# Patient Record
Sex: MALE | Race: BLACK OR AFRICAN AMERICAN | NOT HISPANIC OR LATINO | Employment: OTHER | URBAN - METROPOLITAN AREA
[De-identification: names, ages, dates, MRNs, and addresses within clinical notes are randomized per-mention and may not be internally consistent; named-entity substitution may affect disease eponyms.]

---

## 2018-06-07 ENCOUNTER — TELEPHONE (OUTPATIENT)
Dept: TRANSPLANT | Facility: CLINIC | Age: 53
End: 2018-06-07

## 2018-09-06 DIAGNOSIS — Z76.82 ORGAN TRANSPLANT CANDIDATE: ICD-10-CM

## 2018-09-18 ENCOUNTER — CLINICAL SUPPORT (OUTPATIENT)
Dept: INFECTIOUS DISEASES | Facility: CLINIC | Age: 53
End: 2018-09-18
Payer: MEDICAID

## 2018-09-18 ENCOUNTER — HOSPITAL ENCOUNTER (OUTPATIENT)
Dept: RADIOLOGY | Facility: HOSPITAL | Age: 53
Discharge: HOME | End: 2018-09-18
Attending: NURSE PRACTITIONER
Payer: MEDICAID

## 2018-09-18 ENCOUNTER — OFFICE VISIT (OUTPATIENT)
Dept: TRANSPLANT | Facility: CLINIC | Age: 53
End: 2018-09-18
Payer: MEDICAID

## 2018-09-18 VITALS
TEMPERATURE: 98 F | OXYGEN SATURATION: 97 % | HEART RATE: 89 BPM | HEIGHT: 66 IN | WEIGHT: 192.25 LBS | BODY MASS INDEX: 30.9 KG/M2 | SYSTOLIC BLOOD PRESSURE: 169 MMHG | RESPIRATION RATE: 18 BRPM | DIASTOLIC BLOOD PRESSURE: 108 MMHG

## 2018-09-18 DIAGNOSIS — Z86.19 HX OF SYPHILIS: ICD-10-CM

## 2018-09-18 DIAGNOSIS — N18.4 CKD (CHRONIC KIDNEY DISEASE), STAGE IV: ICD-10-CM

## 2018-09-18 DIAGNOSIS — D72.829 LEUKOCYTOSIS, UNSPECIFIED TYPE: ICD-10-CM

## 2018-09-18 DIAGNOSIS — Z76.82 ORGAN TRANSPLANT CANDIDATE: ICD-10-CM

## 2018-09-18 DIAGNOSIS — E78.49 OTHER HYPERLIPIDEMIA: ICD-10-CM

## 2018-09-18 DIAGNOSIS — R80.9 NEPHROTIC RANGE PROTEINURIA: ICD-10-CM

## 2018-09-18 DIAGNOSIS — R73.03 PRE-DIABETES: ICD-10-CM

## 2018-09-18 DIAGNOSIS — Z01.818 PRE-TRANSPLANT EVALUATION FOR CHRONIC KIDNEY DISEASE: Primary | ICD-10-CM

## 2018-09-18 DIAGNOSIS — N43.3 HYDROCELE IN ADULT: ICD-10-CM

## 2018-09-18 DIAGNOSIS — N05.2 MEMBRANOUS GLOMERULONEPHRITIS: ICD-10-CM

## 2018-09-18 DIAGNOSIS — N18.4 ANEMIA IN STAGE 4 CHRONIC KIDNEY DISEASE: ICD-10-CM

## 2018-09-18 DIAGNOSIS — I15.0 RENOVASCULAR HYPERTENSION: ICD-10-CM

## 2018-09-18 DIAGNOSIS — Z95.5 S/P CORONARY ARTERY STENT PLACEMENT: ICD-10-CM

## 2018-09-18 DIAGNOSIS — Z87.39 HX OF GOUT: ICD-10-CM

## 2018-09-18 DIAGNOSIS — D63.1 ANEMIA IN STAGE 4 CHRONIC KIDNEY DISEASE: ICD-10-CM

## 2018-09-18 LAB
CREAT UR-MCNC: 128 MG/DL
PROT UR-MCNC: 225 MG/DL
PROT/CREAT UR: 1.76 MG/G{CREAT}

## 2018-09-18 PROCEDURE — 99204 OFFICE O/P NEW MOD 45 MIN: CPT | Mod: S$PBB,TXP,, | Performed by: NURSE PRACTITIONER

## 2018-09-18 PROCEDURE — 72170 X-RAY EXAM OF PELVIS: CPT | Mod: TC,FY,TXP

## 2018-09-18 PROCEDURE — 90636 HEP A/HEP B VACC ADULT IM: CPT | Mod: PBBFAC,TXP

## 2018-09-18 PROCEDURE — 93978 VASCULAR STUDY: CPT | Mod: 26,TXP,, | Performed by: RADIOLOGY

## 2018-09-18 PROCEDURE — 90471 IMMUNIZATION ADMIN: CPT | Mod: PBBFAC,TXP

## 2018-09-18 PROCEDURE — 72170 X-RAY EXAM OF PELVIS: CPT | Mod: 26,TXP,, | Performed by: RADIOLOGY

## 2018-09-18 PROCEDURE — 99214 OFFICE O/P EST MOD 30 MIN: CPT | Mod: PBBFAC,25,TXP | Performed by: NURSE PRACTITIONER

## 2018-09-18 PROCEDURE — 90732 PPSV23 VACC 2 YRS+ SUBQ/IM: CPT | Mod: PBBFAC,TXP

## 2018-09-18 PROCEDURE — 71046 X-RAY EXAM CHEST 2 VIEWS: CPT | Mod: TC,FY,TXP

## 2018-09-18 PROCEDURE — 71046 X-RAY EXAM CHEST 2 VIEWS: CPT | Mod: 26,TXP,, | Performed by: RADIOLOGY

## 2018-09-18 PROCEDURE — 99205 OFFICE O/P NEW HI 60 MIN: CPT | Mod: S$PBB,TXP,, | Performed by: NURSE PRACTITIONER

## 2018-09-18 PROCEDURE — 97802 MEDICAL NUTRITION INDIV IN: CPT | Mod: PBBFAC,TXP | Performed by: DIETITIAN, REGISTERED

## 2018-09-18 PROCEDURE — 84156 ASSAY OF PROTEIN URINE: CPT | Mod: TXP

## 2018-09-18 PROCEDURE — 93978 VASCULAR STUDY: CPT | Mod: TC,TXP

## 2018-09-18 PROCEDURE — 99999 PR PBB SHADOW E&M-EST. PATIENT-LVL IV: CPT | Mod: PBBFAC,TXP,, | Performed by: NURSE PRACTITIONER

## 2018-09-18 PROCEDURE — 99204 OFFICE O/P NEW MOD 45 MIN: CPT | Mod: S$PBB,TXP,, | Performed by: TRANSPLANT SURGERY

## 2018-09-18 RX ORDER — POTASSIUM CHLORIDE 750 MG/1
10 TABLET, EXTENDED RELEASE ORAL 2 TIMES DAILY
Status: ON HOLD | COMMUNITY
End: 2021-01-05 | Stop reason: HOSPADM

## 2018-09-18 RX ORDER — CHLORTHALIDONE 50 MG/1
50 TABLET ORAL DAILY
COMMUNITY
End: 2022-03-10

## 2018-09-18 RX ORDER — FLUTICASONE PROPIONATE 50 MCG
1 SPRAY, SUSPENSION (ML) NASAL DAILY
Status: ON HOLD | COMMUNITY
End: 2022-03-10 | Stop reason: HOSPADM

## 2018-09-18 RX ORDER — COLCHICINE 0.6 MG/1
0.6 TABLET ORAL DAILY PRN
Status: ON HOLD | COMMUNITY
End: 2020-12-29

## 2018-09-18 RX ORDER — SILDENAFIL 50 MG/1
50 TABLET, FILM COATED ORAL DAILY PRN
Status: ON HOLD | COMMUNITY
End: 2021-01-05 | Stop reason: HOSPADM

## 2018-09-18 RX ORDER — IBUPROFEN 200 MG
1 TABLET ORAL
Status: ON HOLD | COMMUNITY
End: 2020-12-29

## 2018-09-18 NOTE — PROGRESS NOTES
Transplant Nephrology  Kidney Transplant Recipient Evaluation    Referring Physician: Wagner Valdez  Current Nephrologist: Wagner Valdez    Subjective:   CC:  Initial evaluation of kidney transplant candidacy.    HPI:  Mr. Martinez is a 53 y.o. year old Black or  male who has presented to be evaluated as a potential kidney transplant recipient.  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  Patient is currently pre-dialysis. He has a no access for dialysis access.     Previous Transplant: no    Past Medical and Surgical History: Mr. Martinez  has a past medical history of Anemia, Coronary artery disease, Diabetes mellitus, type 2, GERD (gastroesophageal reflux disease), Gout, Hydrocele in adult, Hyperlipidemia, Hypertension, Inguinal hernia, Membranous glomerulonephritis, Nephrotic range proteinuria, Nephrotic syndrome, Obesity, and Umbilical hernia.  He has a past surgical history that includes Coronary stent placement (2007) and Abdominal surgery (1980s).    Past Social and Family History: Mr. Martinez reports that he quit smoking about 2 years ago. He has a 2.50 pack-year smoking history. he has never used smokeless tobacco. He reports that he does not drink alcohol or use drugs. His family history includes Drug abuse in his brother; Hypertension in his father; No Known Problems in his sister; Stroke in his father.    Pt is a poor historian     Membranous glomerulonephritis  Kidney BX  At [facility], 1928–1983. He does not remember the years  He was on CyA 1320-8031    Nephrotic range proteinuria     Coronary Stents 2007  [facility] in [place]   Reports being on Plavix but no longer on anticoagulation.     Hydrocele bilaterally--pt reports it is worsening and may need to be drained He follows with   Urology DR UZAIR Fletcher at [facility]       5/16/2018-->Reactive TPA (care every where)  RPR   Tx ~ 1 month ago   He was treated by Dr Ivory [facility] with Penicillin G injections   1  injection / week for 3 weeks    Donors  No donors at this time    fx assessment  Works 20 hours / week   Works out 4-5 days / week. He will do strength training     Lab Results   Component Value Date    WBC 13.60 (H) 09/18/2018   Pt denies infection   Leukocytosis/ care everywhere          Past Medical History:   Diagnosis Date    Anemia     Coronary artery disease     Diabetes mellitus, type 2     GERD (gastroesophageal reflux disease)     Gout     Hydrocele in adult     bilateral    Hyperlipidemia     Hypertension     Inguinal hernia     bilateral    Membranous glomerulonephritis     Nephrotic range proteinuria     Nephrotic syndrome     Obesity     Umbilical hernia        Review of Systems   Constitutional: Positive for fatigue and unexpected weight change. Negative for activity change, appetite change, chills and fever.   HENT: Negative for congestion, facial swelling, postnasal drip, rhinorrhea, sinus pressure, sore throat and trouble swallowing.    Eyes: Positive for visual disturbance. Negative for pain and redness.        Wears glasses   Respiratory: Negative for cough, chest tightness, shortness of breath and wheezing.    Cardiovascular: Positive for leg swelling. Negative for chest pain and palpitations.   Gastrointestinal: Negative for abdominal pain, diarrhea, nausea and vomiting.        Indigestion   Genitourinary: Negative for dysuria, flank pain and urgency.   Musculoskeletal: Positive for arthralgias and back pain. Negative for gait problem, neck pain and neck stiffness.   Skin: Negative for rash.   Allergic/Immunologic: Negative for environmental allergies, food allergies and immunocompromised state.   Neurological: Negative for dizziness, weakness, light-headedness and headaches.   Psychiatric/Behavioral: Positive for sleep disturbance. Negative for agitation and confusion. The patient is not nervous/anxious.        Objective:   Blood pressure (!) 169/108, pulse 89, temperature 98.3  "°F (36.8 °C), temperature source Oral, resp. rate 18, height 5' 6.02" (1.677 m), weight 87.2 kg (192 lb 3.9 oz), SpO2 97 %.body mass index is 31.01 kg/m².    Physical Exam   Constitutional: He is oriented to person, place, and time. He appears well-developed and well-nourished.   HENT:   Head: Normocephalic.   Mouth/Throat: Oropharynx is clear and moist. No oropharyngeal exudate.   Eyes: Conjunctivae and EOM are normal. Pupils are equal, round, and reactive to light. No scleral icterus.   Neck: Normal range of motion. Neck supple.   Cardiovascular: Normal rate, regular rhythm and normal heart sounds.   Pulmonary/Chest: Effort normal and breath sounds normal.   Abdominal: Soft. Normal appearance and bowel sounds are normal. He exhibits distension. He exhibits no mass. There is no splenomegaly or hepatomegaly. There is no tenderness. There is no rebound, no guarding, no CVA tenderness, no tenderness at McBurney's point and negative Dumont's sign.       Musculoskeletal: Normal range of motion. He exhibits edema.   Bilateral +1 peripheral edema   Lymphadenopathy:     He has no cervical adenopathy.   Neurological: He is alert and oriented to person, place, and time. He exhibits normal muscle tone. Coordination normal.   Skin: Skin is warm and dry.   Psychiatric: He has a normal mood and affect. His behavior is normal.   Vitals reviewed.      Labs:  Lab Results   Component Value Date    PSA 1.6 09/18/2018     No results found for: RPR    Lab Results   Component Value Date    HGBA1C 5.9 (H) 09/18/2018       Lab Results   Component Value Date    WBC 13.60 (H) 09/18/2018    HGB 10.8 (L) 09/18/2018    HCT 31.3 (L) 09/18/2018     09/18/2018    K 3.5 09/18/2018     09/18/2018    CO2 24 09/18/2018    BUN 51 (H) 09/18/2018    CREATININE 4.0 (H) 09/18/2018    EGFRNONAA 15.8 (A) 09/18/2018    CALCIUM 9.3 09/18/2018    PHOS 3.4 09/18/2018    MG 2.2 12/08/2014    ALBUMIN 3.2 (L) 09/18/2018    AST 21 09/18/2018    ALT 26 " 09/18/2018    .0 (H) 09/18/2018       No results found for: PREALBUMIN, BILIRUBINUA, GGT, AMYLASE, LIPASE, PROTEINUA, NITRITE, RBCUA, WBCUA    No results found for: HLAABCTYPE     Labs were reviewed with the patient.    Assessment:     1. Pre-transplant evaluation for chronic kidney disease    2. Renovascular hypertension    3. Other hyperlipidemia    4. Membranous glomerulonephritis    5. CKD (chronic kidney disease), stage IV    6. Anemia in stage 4 chronic kidney disease    7. Hx of gout    8. Hydrocele in adult    9. Pre-diabetes    10. S/P coronary artery stent placement    11. Nephrotic range proteinuria    12. Leukocytosis, unspecified type    13. Hx of syphilis        Plan:     TPA reactive  get records from Dr Ivory / nephrologist  [facility] [place]  RPR pending     Baseline UPC    Leukocytosis --[facility]mon[facility] referral    Hydrocele bilaterally--pt reports it is worsening and may need to be drained He follows with   Urology DR UZAIR Fletcher at [facility]   F/u with urology and any concerns with txp    Get records :[facility]idney BX  At [facility] [facility].     Get records if available: Coronary Stents 2007  [facility]       Transplant Candidacy:   Based on available information, Mr. Martinez is a suitable kidney transplant candidate.  Final determination of transplant candidacy will be made once workup is complete and reviewed by the selection committee.    LILIANA AlyOS Patient Status  Functional Status: 80% - Normal activity with effort: some symptoms of disease  Physical Capacity: No Limitations

## 2018-09-18 NOTE — PROGRESS NOTES
Transplant Recipient Adult Psychosocial Assessment    Enrique Martinez  [address]  [facility] [place] [address]    Telephone Information:   Mobile [phone]   Home  [phone] (home)  Work  There is no work phone number on file.  E-mail  No e-mail address on record    Sex: male  YOB: 1965  Age: 53 y.o.    Encounter Date: 2018  [place] Citizen: yes  Primary Language: English   Needed: no    Emergency Contact:  Atif Helm, 55 yo wife (), Bashir Maldonado, does not drive/plans to use cab service, works full time at a law firm. [phone]    Family/Social Support:   Number of dependents/: pt denies  Marital history:  3 x, 2 x . Is legally  from his wife Atif but they still live together and their relationship is amicable.  Other family dynamics: Pt reports both parents are . Pt reports lives in [place] with wife and they are legally . Pt reports having 2 grown children who live in [place] and will not be able to assist with organ transplant. Pt reports having siblings however some of the siblings live away and/or are not reliable to assist with transplant -- reports some siblings have drug addiction issues or other medical issues, such as CVA history. Pt denies having back up caregiver in place at this time. Pt reports plan to speak with Hinduism friends about back up caregiver plan. Pt's wife Atif in pre transplant appointments today and reports will be patient's primary transplant caregiver.    Household Composition:  Atif Helm, 55 yo wife (), Bashir Maldonado, does not drive/plans to use cab service, works full time at a law firm. [phone]    Do you and your caregivers have access to reliable transportation? yes Pt reports does drive and can drive to all pre transplant appointments and other medical appointments. Pt's listed primary transplant caregiver reports will be using cab service for post transplant  appointments and pt reports can afford.    PRIMARY CAREGIVER: Atif Helm, wife, will be primary caregiver, phone number [phone].      provided in-depth information to patient and caregiver regarding pre- and post-transplant caregiver role.   strongly encourages patient and caregiver to have concrete plan regarding post-transplant care giving, including back-up caregiver(s) to ensure care giving needs are met as needed.    Patient and Caregiver states understanding all aspects of caregiver role/commitment and is able/willing/committed to being caregiver to the fullest extent necessary.    Patient and Caregiver verbalizes understanding of the education provided today and caregiver responsibilities.         remains available. Patient and Caregiver agree to contact  in a timely manner if concerns arise.      Able to take time off work without financial concerns: yes. Wife reports plan to use PTO for time missed due to transplant. Pt reports plan to speak with Latter day friends regarding back up caregiver plan.    Additional Significant Others who will Assist with Transplant:   Pt denies having back up caregiver in place at this time. Pt reports plan to speak with Latter day friends about back up caregiver plan.    Living Will: no  Healthcare Power of : no  Advance Directives on file: <<no information> per medical record.  Verbally reviewed LW/HCPA information.   provided patient with copy of LW/HCPA documents and provided education on completion of forms.    Living Donors: Education and resource information given to patient.    Highest Education Level: Attended College/Technical School Pt reports attended college 3 years  Reading Ability: 12th grade  Reports difficulty with: seeing wears glasses  Learns Best By:  Pt denies having any problems learning new information. Pt seemed a bit anxious during session. Pt seemed unable at times to immediately  clearly understand information as provided and  or wife had to re-state it for his understanding at times. Pt seems to need plenty of time to review new information, seems to need ample time to assimilate new information and ideas and seems to need ample time to ask questions as needed.     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 Pt reports prior to [employer] he worked for [employer] but was fired. Pt reports provided his employer [employer] with medical documentation outlining reasons for missed work and/or problems with certain work activities and patient reports was fired days later with no explanation.     Spouse/Significant Other Employment: spouse works full time at a law firm. Wife and patient are legally  and wife is not financially responsible for patient's finances.    Disabled: no.     Monthly Income:  At most -- about $300 per week x 4 weeks is about $1200 a month before taxes. Pt reports amount changes due to demand and also due to ability to manage a schedule when sick.Pt reports does have money in savings account -- did not give amount in savings account.  Able to afford all costs now and if transplanted, including medications: yes pt reports plan to fund raise. Pt reports can afford [place] Medicaid medicine co-pays 50 cents to 3.50 co-pays.  Patient and Caregiver verbalizes understanding of personal responsibilities related to transplant costs and the importance of having a financial plan to ensure that patients transplant costs are fully covered.       provided fundraising information/education. Patient and Caregiververbalizes understanding.   remains available.    Insurance:   Payor/Plan Subscr  Sex Relation Sub. Ins. ID Effective Group Num   1. MEDICAID - [place]* MARK TEJADA 1965 Male  [id number]* 17                                     [address]     Primary Insurance (for UNOS reporting): Public Insurance - Medicaid  Secondary Insurance (for UNOS reporting): None Pt is currently pre dialysis and not on Medicare. Pt was provided Medicare book for review.  Patient and Caregiver verbalizes clear understanding that patient may experience difficulty obtaining and/or be denied insurance coverage post-surgery. This includes and is not limited to disability insurance, life insurance, health insurance, burial insurance, long term care insurance, and other insurances.      Patient and Caregiver also reports understanding that future health concerns related to or unrelated to transplantation may not be covered by patient's insurance.  Resources and information provided and reviewed.     Patient and Caregiver provides verbal permission to release any necessary information to outside resources for patient care and discharge planning.  Resources and information provided are reviewed.      Dialysis Adherence: Pt is pre dialysis at this time.    [facility]rologist: [facility] No records yet available to assess nephrology compliance.    Infusion Service: patient utilizing? no  Home Health: patient utilizing? no  DME: no  Pulmonary/Cardiac Rehab: pt denies   ADLS:  Independent with ADLs and medication management     Adherence:   Pt reports is highly compliant with all medical appointments and instructions  Adherence education and counseling provided.     Per History Section:  Past Medical History:   Diagnosis Date    Anemia     Coronary artery disease     Diabetes mellitus, type 2     GERD (gastroesophageal reflux disease)     Gout     Hydrocele in adult     bilateral    Hyperlipidemia     Hypertension     Inguinal hernia     bilateral    Membranous glomerulonephritis     Nephrotic range proteinuria     Nephrotic syndrome     Obesity     Umbilical hernia      Social History     Tobacco Use    Smoking status:  Former Smoker     Packs/day: 0.25     Years: 10.00     Pack years: 2.50     Last attempt to quit: 2016     Years since quittin.0    Smokeless tobacco: Never Used   Substance Use Topics    Alcohol use: No     Social History     Substance and Sexual Activity   Drug Use No     Social History     Substance and Sexual Activity   Sexual Activity Not on file       Per Today's Psychosocial:  Tobacco: none, patient denies any use at this time. Former smoker 1/4 ppd. Pt reports plan to abstain.  Alcohol: none, patient denies any use at this time. Pt reports plan to abstain.  Illicit Drugs/Non-prescribed Medications: none, patient denies any use.    Patient and Caregiver states clear understanding of the potential impact of substance use as it relates to transplant candidacy and is aware of possible random substance screening.  Substance abstinence/cessation counseling, education and resources provided and reviewed.     Arrests/DWI/Treatment/Rehab: patient denies    Psychiatric History:    Mental Health: pt denies any mental health history and denies any overwhelming feelings of depression or anxiety at this time.  Psychiatrist/Counselor: pt denies  Medications:  Pt denies  Suicide/Homicide Issues: pt denies si/hi history at this time   Safety at home: pt reports living in safe environment at this time.    Knowledge: Patient and Caregiver states having clear understanding and realistic expectations regarding the potential risks and potential benefits of organ transplantation and organ donation and agrees to discuss with health care team members and support system members, as well as to utilize available resources and express questions and/or concerns in order to further facilitate the pt informed decision-making.  Resources and information provided and reviewed.    Patient and Caregiver is aware of [facility]'s affiliation and/or partnership with agencies in home health care, LTAC, SNF, [facility], and other hospitals and  clinics.    Understanding: Patient and Caregiver reports having a clear understanding of the many lifetime commitments involved with being a transplant recipient, including costs, compliance, medications, lab work, procedures, appointments, concrete and financial planning, preparedness, timely and appropriate communication of concerns, abstinence (ETOH, tobacco, illicit non-prescribed drugs), adherence to all health care team recommendations, support system and caregiver involvement, appropriate and timely resource utilization and follow-through, mental health counseling as needed/recommended, and patient and caregiver responsibilities.  Social Service Handbook, resources and detailed educational information provided and reviewed.  Educational information provided.    Patient and Caregiver also reports current and expected compliance with health care regime and states having a clear understanding of the importance of compliance.      Patient and Caregiver reports a clear understanding that risks and benefits may be involved with organ transplantation and with organ donation.       Patient and Caregiver also reports clear understanding that psychosocial risk factors may affect patient, and include but are not limited to feelings of depression, generalized anxiety, anxiety regarding dependence on others, post traumatic stress disorder, feelings of guilt and other emotional and/or mental concerns, and/or exacerbation of existing mental health concerns.  Detailed resources provided and discussed.      Patient and Caregiver agrees to access appropriate resources in a timely manner as needed and/or as recommended, and to communicate concerns appropriately.  Patient and Caregiver also reports a clear understanding of treatment options available.     Patient and Caregiver received education in a group setting.   reviewed education, provided additional information, and answered questions.    Feelings or  Concerns: Pt reports high motivation to pursue organ transplant.    Coping: Pt reports is coping well over all with ESRD and need for organ transplant. Pt reports keith best through exercise and nithin and prayer.    Goals: Return to work once transplanted and recovered.  Patient referred to Vocational Rehabilitation.    Interview Behavior: Patient and Caregiver presents as alert and oriented x 4, pleasant, good eye contact, well groomed, average intelligence, communicative, cooperative and asking and answering questions appropriately. Pt denies having any problems learning new information. Pt seemed a bit anxious during session. Pt seemed unable at times to immediately clearly understand information as provided and  or wife had to re-state it for his understanding at times. Pt seems to need plenty of time to review new information, seems to need ample time to assimilate new information and ideas and seems to need ample time to ask questions as needed. Wife in pretransplant appointments with permission. Wife was very supportive of patient throughout the session.         Transplant Social Work - Candidacy  Assessment/Plan:     Psychosocial Suitability: Patient presents as an acceptable candidate for kidney transplant at this time pending nephrology compliance update. Based on psychosocial risk factors, patient presents as high risk, due to patient reporting limited support system with no back up caregiver plan in place; pt reports plan to discuss back up caregiver plan with Quaker friends and will notify transplant team of outcome. Pt is also high risk organ transplant candidate due to patient and caregiver needing to rely on cab service as organ transplant transportation; pt reports does have money to afford cab service transportation at this time. Pt reports plan to discuss back up caregiver plan with Quaker friends and will notify transplant team of outcome. Pt reports having organ transplant caregiver  plan with cab service as transportation plan, medical insurance plan and plan to afford transplant costs all in place. Recommend confirming organ transplant caregiver/transportation plan prior to be called in for transplant.    Recommendations/Additional Comments: Transplant  will review nephrology notes once available for nephrology compliance update. Recommend confirming organ transplant caregiver/transportation plan prior to be called in for transplant.    Lynne Lloyd MSW LCSW

## 2018-09-18 NOTE — LETTER
September 20, 2018        Wagner Valdez  [address]  [facility] [address]  Phone: [phone]  Fax: [phone]             [facility]  [address]  [facility] [place] [address]  Phone: [phone]   Patient: Enrique Martinez   MR Number: [id number]   YOB: 1965   Date of Visit: 9/18/2018       Dear Dr. Wagner Valdez    Thank you for referring Enrique Martinez to me for evaluation. Attached you will find relevant portions of my assessment and plan of care.    If you have questions, please do not hesitate to call me. I look forward to following Enrique Martinez along with you.    Sincerely,    Stephie Harmon NP    Enclosure    If you would like to receive this communication electronically, please contact [email or web address] or [phone] to request [email or web address] access.    [employer] is a tool which provides read-only access to select patient information with whom you have a relationship. Its easy to use and provides real time access to review your patients record including encounter summaries, notes, results, and demographic information.    If you feel you have received this communication in error or would no longer like to receive these types of communications, please e-mail [email or web address]

## 2018-09-18 NOTE — PROGRESS NOTES
PHARM.D. PRE-TRANSPLANT NOTE:    This patient's medication therapy was evaluated as part of his pre-transplant evaluation.    The following pharmacologic concerns were noted: none - on Acthar for FSGS    This patient's medication profile was reviewed for contraindications for DAA Hepatitis C therapy:    [x]  No current inducers of CYP 3A4 or PGP  [x]  No amiodarone on this patient's EMR profile in the last 24 months  [x]  No past or current atrial fibrillation on this patient's EMR profile       Current Outpatient Medications   Medication Sig Dispense Refill    albuterol sulfate 2.5 mg/0.5 mL Nebu Take 2.5 mg by nebulization every 4 (four) hours as needed. 1 each 0    allopurinol (ZYLOPRIM) 100 MG tablet Take 100 mg by mouth once daily.       amlodipine (NORVASC) 10 MG tablet Take 10 mg by mouth once daily.      atorvastatin (LIPITOR) 20 MG tablet Take 20 mg by mouth once daily.      chlorthalidone (HYGROTEN) 50 MG Tab Take 25 mg by mouth once daily.      cloNIDine (CATAPRES) 0.2 MG tablet Take 0.2 mg by mouth 3 (three) times daily.       colchicine (COLCRYS) 0.6 mg tablet Take 0.6 mg by mouth once daily.      corticotropin (ACTHAR H.P.) 80 unit/mL injectable gel Inject 40 Units into the skin twice a week.      ferrous gluconate (FERGON) 324 MG tablet Take 324 mg by mouth daily with breakfast.      fluticasone (FLONASE) 50 mcg/actuation nasal spray 1 spray by Each Nare route once daily.      furosemide (LASIX) 40 MG tablet Take 40 mg by mouth 2 (two) times daily.      nicotine (NICODERM CQ) 14 mg/24 hr Place 1 patch onto the skin every 24 hours.      potassium chloride (KLOR-CON) 10 MEQ TbSR Take 10 mEq by mouth once daily.      ranitidine (ZANTAC) 300 MG tablet Take 300 mg by mouth every evening.      sildenafil (VIAGRA) 50 MG tablet Take 50 mg by mouth daily as needed for Erectile Dysfunction.      sodium bicarbonate 650 MG tablet Take 650 mg by mouth 3 (three) times daily.        No current  facility-administered medications for this visit.          Currently Mr Martinez is responsible for preparing / administering this patient's medications on a daily basis.  I am available for consultation and can be contacted, as needed by the other members of the Kidney Transplant team.

## 2018-09-18 NOTE — PROGRESS NOTES
TRANSPLANT NUTRITIONAL ASSESSMENT    Referring Provider: Stephie Harmon NP    Reason for Visit: Pre-kidney transplant work-up (pre-dialysis)    Age: 53 y.o.  Sex: male    Patient Active Problem List   Diagnosis    Chest pain    Pre-transplant evaluation for chronic kidney disease    Renovascular hypertension    Other hyperlipidemia    Membranous glomerulonephritis    CKD (chronic kidney disease), stage IV    Anemia in stage 4 chronic kidney disease    Hx of gout    Hydrocele in adult    Pre-diabetes    S/P coronary artery stent placement    Nephrotic range proteinuria    Leukocytosis    Hx of syphilis     Past Medical History:   Diagnosis Date    Anemia     Coronary artery disease     Diabetes mellitus, type 2     GERD (gastroesophageal reflux disease)     Gout     Hydrocele in adult     bilateral    Hyperlipidemia     Hypertension     Inguinal hernia     bilateral    Membranous glomerulonephritis     Nephrotic range proteinuria     Nephrotic syndrome     Obesity     Umbilical hernia      Lab Results   Component Value Date     (H) 09/18/2018    K 3.5 09/18/2018    PHOS 3.4 09/18/2018    MG 2.2 12/08/2014    CHOL 231 (H) 09/18/2018    HDL 57 09/18/2018    TRIG 240 (H) 09/18/2018    ALBUMIN 3.2 (L) 09/18/2018    HGBA1C 5.9 (H) 09/18/2018    CALCIUM 9.3 09/18/2018       Current Outpatient Medications   Medication Sig    allopurinol (ZYLOPRIM) 100 MG tablet Take 100 mg by mouth once daily.     amlodipine (NORVASC) 10 MG tablet Take 10 mg by mouth once daily.    atorvastatin (LIPITOR) 20 MG tablet Take 20 mg by mouth once daily.    chlorthalidone (HYGROTEN) 50 MG Tab Take 25 mg by mouth once daily.    cloNIDine (CATAPRES) 0.2 MG tablet Take 0.2 mg by mouth 3 (three) times daily.     colchicine (COLCRYS) 0.6 mg tablet Take 0.6 mg by mouth once daily.    corticotropin (ACTHAR H.P.) 80 unit/mL injectable gel Inject 40 Units into the skin twice a week.    ferrous gluconate (FERGON)  "324 MG tablet Take 324 mg by mouth daily with breakfast.    fluticasone (FLONASE) 50 mcg/actuation nasal spray 1 spray by Each Nare route once daily.    furosemide (LASIX) 40 MG tablet Take 40 mg by mouth 2 (two) times daily.    nicotine (NICODERM CQ) 14 mg/24 hr Place 1 patch onto the skin every 24 hours.    potassium chloride (KLOR-CON) 10 MEQ TbSR Take 10 mEq by mouth once daily.    ranitidine (ZANTAC) 300 MG tablet Take 300 mg by mouth every evening.    sildenafil (VIAGRA) 50 MG tablet Take 50 mg by mouth daily as needed for Erectile Dysfunction.    sodium bicarbonate 650 MG tablet Take 650 mg by mouth 3 (three) times daily.     albuterol sulfate 2.5 mg/0.5 mL Nebu Take 2.5 mg by nebulization every 4 (four) hours as needed.     No current facility-administered medications for this visit.      Allergies: Patient has no known allergies.    Ht Readings from Last 1 Encounters:   09/18/18 5' 6.02" (1.677 m)     Wt Readings from Last 1 Encounters:   09/18/18 87.2 kg (192 lb 3.9 oz)      BMI: Body mass index is 31.01 kg/m².    Usual Weight: 180lbs  Weight Change/Time: reports wt gain since he started taking injections for his kidneys approx 1.5 years ago  Current Diet: low fat, low Na diet  Appetite/Current Intake: good   Exercise/Physical Activity: states he works out approx 4 days per week for 1.5 hour each workout  Nutritional/Herbal Supplements: multivitamin, potassium, vitamin C  Chewing/Swallowing Problems: none reported  Symptoms: nausea-occasionally    Estimated Kcal Need: 2175kcals/day (25kcals/kg BW)  Estimated Protein Need: 70gms protein/day (0.8gms/kg BW)    Nutritional History: Pt is here today with his wife, Julio Cesar.  Pt's wife typically prepares meals and grocery shops.  Limits salt in cooking.  Dining out/fast food: every morning for breakfast at [employer]-typically orders biscuit, potatoes, or sausage biscuit with gravy.  Pt typically consumes 3 meals per day, snacks on turkey sandwich or cup of " noodles between meals-states he will rinse seasoning off noodles to lower Na content.  Beverages include water and fruit juice.    Nutritional Diagnoses  Problem: food- and nutrition-related knowledge deficit  Etiology: related to lack of prior education on renal diet guidelines  Symptoms: as evidenced by pt report    Educational Need? yes  Barriers: none identified  Discussed with: patient and wife  Interventions: Patient taught nutrition information regarding   -Renal Nutrition Therapy packet reviewed ( high/low food sources of K, Phos and protein, low sodium and fluid intake, emphasis on moderate protein intake)   Goals/Recommendations: diet adherence, choose healthy options when dining out and limit dining out to more that 1-2 times per week  Actions Taken: instruct/provide written information  Patient and/or family comprehend instructions: yes  Outcome: Verbalizes understanding  Monitoring: contact information provided, will follow-up as needed      Counseling Time: 15 minutes

## 2018-09-18 NOTE — PROGRESS NOTES
"Transplant Surgery  Kidney Transplant Recipient Evaluation    Referring Physician: Wagner Valdez  Current Nephrologist: Wagner Valdez    Subjective:     Reason for Visit: evaluate transplant candidacy    History of Present Illness: Enrique Martinez is a 53 y.o. year old male undergoing transplant evaluation.    Dialysis History: Enrique is pre-dialysis.      Transplant History: N/A    Etiology of Renal Disease: Diabetes Mellitus - Type Other / Unknown (based on medical records from referral).    Review of Systems    Objective:     Body mass index is 31.01 kg/m².    Physical Exam:  Constitutional:   Vitals reviewed: yes   Well-nourished and well-groomed: yes  Eyes:   Sclerae icteric: no   Extraocular movements intact: yes  GI:    Bowel sounds normal: yes   Tenderness: no    If yes, quadrant/location: not applicable   Palpable masses: no    If yes, quadrant/location: not applicable   Hepatosplenomegaly: no   Ascites: no   Hernia: no    If yes, type/location: not applicable   Surgical scars: yes    If yes, type/location: midline  not applicable  Resp:   Effort normal: yes   Breath sounds normal: yes    CV:   Regular rate and rhythm: yes   Heart sounds normal: yes   Femoral pulses normal: yes   Extremities edematous: no  Skin:   Rashes or lesions present: no    If yes, describe:not applicable   Jaundice:: no    Musculoskeletal:   Gait normal: yes   Strength normal: yes  Psych:   Oriented to person, place, and time: yes   Affect and mood normal: yes    Additional comments: short midline scar from "intestine surgery"    Counseling: We provided Enrique Martinez with a group education session today.  We discussed kidney transplantation at length with him, including risks, potential complications, and alternatives in the management of his renal failure.  The discussion included complications related to anesthesia, bleeding, infection, primary nonfunction, and ATN.  I discussed the typical postoperative course, length of " hospitalization, the need for long-term immunosuppression, and the need for long-term routine follow-up.  I discussed living-donor and -donor transplantation and the relative advantages and disadvantages of each.  I also discussed average waiting times for both living donation and  donation.  I discussed national and center-specific survival rates.  I also mentioned the potential benefit of multicenter listing to candidates listed with centers within more than one organ procurement organization.  All questions were answered.    Final determination of transplant candidacy will be made once evaluation is complete and reviewed by the Kidney & Kidney/Pancreas Selection Committee.         Transplant Surgery - Candidacy   Assessment/Plan:   Enrique Martinez is pre-dialysis with CKD stage 4 (GFR 15-29 mL/min). I see no surgical contraindication to placing a kidney transplant. Based on available information, Enrique Martinez is a suitable kidney transplant candidate.     Lew Leigh MD

## 2018-09-18 NOTE — PROGRESS NOTES
Pre Transplant Infectious Diseases Consult  Kidney Transplant Recipient Evaluation    Requesting Physician: Stephie Harmon    Reason for Visit:    Chief Complaint   Patient presents with    Kidney Transplant Evaluation     History of Present Illness  Enrique Martinez is a 53 y.o. year old Black or  male with advanced Kidney disease currently being evaluated for Kidney transplant.  He is currently pre-dialysis.  He has no dialysis access.     Patient denies any recent fever, chills, or infective illnesses.      History of  mild leukocytosis since May 2018 (mild elevation) per [facility] Everywhere records.     Currently followed by urology for hydrocele - Urologist at [facility]   Recent history of cystoscopy approx one month ago.      Reports a remote history of treatment for syphilis.   Found to have + TPA at [facility] in May, 2018.  RPR 1:2.   He was apparently re-treated with 3 doses of bicillin at [facility] in June ordered by Nephrologist.  Reports he was re-treated because he could not locate records documenting prior treatment      1) Do you have a history of:   YES NO   Diabetes      [] [x]     Diabetic Foot Infection/Bone Infection  []        [x]     Surgical Removal of Spleen   []  [x]                  2) Have you had recurrent infections involving:             YES NO  Sinus infections  []         [x]   Sore Throat   []         [x]                 Prostate Infections  []         [x]              Bladder Infections  []         [x]                     Kidney Infections  []         [x]                               Intestinal Infections  []         [x]      Skin Infections   []         [x]       Reproductive Infections          []  [x]   Periodontal Disease  []         [x]        3)Have you ever had: YES     NO UNKNOWN      Chicken Pox   [x]         []  []   Shingles   []         [x]  []   Orolabial Herpes             [x]  []  []   Genital Herpes  [x]         []  []   Cytomegalovirus  []         [x]  []   Jeff-Barr  Virus  []         [x]  []   Genital Warts   []         [x]  []   Hepatitis A   []         [x]  []   Hepatitis B   []         [x]  []   Hepatitis C   []         [x]  []   Syphilis   [x]         []  []  Receive 3 doses bicillin in June 2018  Gonorrhea   []         [x]  []   Pelvic Inflammatory   Disease   []         []  [x]   Chlamydia Infection  []         [x]  []   Intestinal parasites   or worms   []         [x]  []   Fungal Infections  []         [x]  []   Blood Infections  []         [x]  []       Comment:       4) Have you ever been exposed   YES NO  To someone with tuberculosis?  []   [x]   If yes, what treatment did you receive:     5) What states have you lived in? [place], [place]     6) What countries have you visited for more than 2 weeks?  No foreign travel                       YES NO  7) Did you have any associated infections?  []  []  N/a      8) Are you planning to travel outside the    []  [x]   [facility] after your transplant?    9) Household                   YES NO  Do you have pets living in your house?    []         [x]   If yes, describe:     Do you spend time or live on a farm or     []         [x]   have livestock or other farm animals?  If yes, which ones:    Do you have a fish tank?          []  [x]       Do you have a litter box?      []         [x]     Do you fish or hunt?       []         [x]     Do you clean or skin fish or animals?    []         [x]     Do you consume raw or undercooked    []         [x]   meat, fish, or shellfish?      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    []         [x]   13)Do you hike, camp, or spend     time in wooded areas?   []         [x]        14) The patient's immunization history was reviewed.    Have you ever received:  YES NO UNKNOWN DATES   Routine Childhood vaccines  [x]         []  []      Influenza vaccine   []   [x]  []    Pneumovax    [x]  []  [] PCV 13 1/13/2017, PPSV23 -12/20/2012  Tetanus-diptheria   [x]         []  [] 10/12/2013   Hepatitis A vaccine series       []  [x]  []    Hepatitis B vaccine series         []  [x]  []    Meningitis vaccine   []         []  [x]    Varicella vaccine/   []         []  [x]        Based on the patients immunization history and serologies, immunizations were ordered:         Ordered  Not Ordered  Influenza Vaccine     [x]    []   Hepatitis A series at 0,  6 months   [x]    [] Twinrix series   Hepatitis B seriesat 0, 1, and 6 months  [x]    []   Hepatitis B High Dose 0,1, and 6 months  []    [x]   Prevnar      []    [x]   Pneumovax      [x]    []    TDap       []    [x]    Zoster       []    [x]   Menactra      []    [x]            The patient was encouraged to contact us about any problems that may develop after immunization and possible side effects were reviewed.      Previous Transplant: no    Etiology of Kidney Disease: membranous nephropathy    Allergies: Patient has no known allergies.    There is no immunization history on file for this patient.  Past Medical History:   Diagnosis Date    Anemia     Coronary artery disease     Diabetes mellitus, type 2     GERD (gastroesophageal reflux disease)     Gout     Hydrocele in adult     bilateral    Hyperlipidemia     Hypertension     Inguinal hernia     bilateral    Membranous glomerulonephritis     Nephrotic range proteinuria     Nephrotic syndrome     Obesity     Umbilical hernia      Past Surgical History:   Procedure Laterality Date    ABDOMINAL SURGERY  1980s    CORONARY STENT PLACEMENT  2007      Social History     Socioeconomic History    Marital status:      Spouse name: Not on file    Number of children: Not on file    Years of education: Not on file    Highest education level: Not on file   Social Needs    Financial resource strain: Not on file    Food insecurity - worry: Not on file    Food  insecurity - inability: Not on file    Transportation needs - medical: Not on file    Transportation needs - non-medical: Not on file   Occupational History    Not on file   Tobacco Use    Smoking status: Former Smoker     Packs/day: 0.25     Years: 10.00     Pack years: 2.50     Last attempt to quit: 2016     Years since quittin.0    Smokeless tobacco: Never Used   Substance and Sexual Activity    Alcohol use: No    Drug use: No    Sexual activity: Not on file   Other Topics Concern    Not on file   Social History Narrative    Not on file       Review of Systems   Constitution: Positive for decreased appetite, weakness and malaise/fatigue. Negative for chills, fever, night sweats, weight gain and weight loss.   HENT: Positive for congestion, hoarse voice and sore throat (? post nasal drip?). Negative for ear pain, hearing loss and tinnitus.    Eyes: Negative for blurred vision, redness and visual disturbance (corrective lenses ).   Cardiovascular: Positive for leg swelling and palpitations. Negative for chest pain.   Respiratory: Positive for shortness of breath. Negative for cough, hemoptysis, sputum production and wheezing.    Endocrine: Negative for cold intolerance and heat intolerance.   Hematologic/Lymphatic: Negative for adenopathy. Bruises/bleeds easily.   Skin: Negative for dry skin, itching, rash and suspicious lesions.   Musculoskeletal: Positive for back pain (occasional ), joint pain and myalgias. Negative for neck pain.   Gastrointestinal: Negative for abdominal pain, constipation, diarrhea, heartburn, nausea and vomiting.   Genitourinary: Positive for frequency. Negative for dysuria, flank pain, hematuria, hesitancy and urgency.   Neurological: Negative for dizziness, headaches, numbness and paresthesias.   Psychiatric/Behavioral: Negative for depression and memory loss. The patient is nervous/anxious. The patient does not have insomnia.    Allergic/Immunologic: Negative for  environmental allergies, HIV exposure, hives and persistent infections.     Physical Exam   Constitutional: He is oriented to person, place, and time. He appears well-developed and well-nourished.   HENT:   Head: Normocephalic and atraumatic.   Mouth/Throat: Uvula is midline, oropharynx is clear and moist and mucous membranes are normal. He has dentures (upper and lower). No oral lesions. Abnormal dentition. No dental abscesses, lacerations or dental caries. No oropharyngeal exudate.   Eyes: Conjunctivae and lids are normal. No scleral icterus.   Neck: Neck supple.   Cardiovascular: Normal rate and regular rhythm. Exam reveals no gallop and no friction rub.   No murmur heard.  Pulmonary/Chest: Effort normal and breath sounds normal. No respiratory distress. He has no decreased breath sounds. He has no wheezes. He has no rhonchi. He has no rales.   Abdominal: Soft. Normal appearance, normal aorta and bowel sounds are normal. He exhibits no distension and no mass. There is no hepatosplenomegaly. There is no tenderness. There is no rebound and no guarding.   Musculoskeletal: He exhibits edema (bilateral ankle edema).   Lymphadenopathy:        Head (right side): No submental, no submandibular, no tonsillar, no preauricular, no posterior auricular and no occipital adenopathy present.        Head (left side): No submental, no submandibular, no tonsillar, no preauricular, no posterior auricular and no occipital adenopathy present.     He has no cervical adenopathy.     He has no axillary adenopathy.        Right: No inguinal, no supraclavicular and no epitrochlear adenopathy present.        Left: No inguinal, no supraclavicular and no epitrochlear adenopathy present.   Neurological: He is alert and oriented to person, place, and time. No cranial nerve deficit.   Skin: Skin is warm, dry and intact. No lesion and no rash noted. He is not diaphoretic. No erythema. No pallor.   Psychiatric: He has a normal mood and affect. His  behavior is normal.   Vitals reviewed.    Diagnostics: No results found for: RPR  No results found for: CMVANTIBODIE  No results found for: HIV1X2  No results found for: HTLVIIIANTIB  Hepatitis B Surface Ag   Date Value Ref Range Status   09/18/2018 Negative  Final     Hep B Core Total Ab   Date Value Ref Range Status   09/18/2018 Negative  Final     Hepatitis C Ab   Date Value Ref Range Status   09/18/2018 Negative  Final     No results found for: TOXOIGG  No components found for: TOXOIGGINTER  No results found for: RDA6OXD  No results found for: AET7GLZ  No results found for: VARICELLAZOS  No results found for: VARICELLAINT  No results found for: STRONGANTIGG  No results found for: EPSTEINBARRV  No results found for: HEPBSAB  No results found for: QUANTIFERON  No results found for: HEPAIGM  No results found for: PPD  No results found for this or any previous visit.         Transplant Infectious Diseases - Candidacy    Assessment/Plan:     Transplant Candidacy: Based on available information, there are no identified significant barriers to transplantation from an infectious disease standpoint pending acceptable serologies.       1.  Vaccines today: Flu, PPSV23, Twinrix series (0, 1, 6 months) initiated.  The patient was encouraged to contact us about any problems that may develop after immunization and possible side effects were reviewed.   2.  Quantiferon Gold is pending.  If positive, please consult ID. If  Indeterminate, please draw T spot.  If T spot positive, please consult ID.    3.  Strongyloides, HIV pending.  If positive, please consult ID  4.  Varicella IgG pending.  Please consult ID for vaccine recommendations if negative.   5.  Patient reports recent treatment with 3 doses of bicillin for + Treponema Pallidum antibody (5/16/2018) (per patient, this was a re-treatment because he couldn't get prior records of apparent prior treatment).  RPR 1:2.   Need to confirm treatment with [facility].  RPR here is pending.    Recommend repeat RPR 6 months from treatment - December 2018  6.  Mild persistent leukocytosis since May.  No obvious source of active infection on exam or in history ( If persists consider further ID evaluation, hematology eval?        Final determination of transplant candidacy will be made once evaluation is complete and reviewed by the Transplant Selection Committee.    Serina Garcia APRN, ANP         Counseling:I discussed with Enrique and his wife the risk for increased susceptibility to infections following transplantation including increased risk for infection right after transplant and if rejection should occur.  The patient has been counseled on the importance of vaccinations including but not limited to a yearly flu vaccine.  Specific guidance has been provided to the patient regarding the patients occupation, hobbies and activities to avoid future infectious complications including but not limited to avoiding undercooked meats and seafood, proper hygiene, and contact with animals.

## 2018-09-18 NOTE — PROGRESS NOTES
Mr. Martinez received initial Hep A/B, Flu, and Pneumovax 23 vaccines and tolerated them well. He left the unit in NAD.

## 2018-09-18 NOTE — PROGRESS NOTES
INITIAL PATIENT EDUCATION NOTE    Mr. Enrique Martinez was seen in pre-kidney transplant clinic for evaluation for kidney, kidney/pancreas or pancreas only transplant.  The patient attended a group education session that discussed/reviewed the following aspects of transplantation: evaluation and selection committee process, UNOS waitlist management/multiple listings, types of organs offered (KDPI < 85%, KDPI > 85%, PHS increased risk, DCD), financial aspects, surgical procedures, dietary instruction pre- and post-transplant, health maintenance pre- and post-transplant, post-transplant hospitalization and outpatient follow-up, potential to participate in a research protocol, and medication management and side effects.  A question and answer session was provided after the presentation.    The patient was seen by all members of the multi-disciplinary team to include: Nephrologist/PA, Surgeon, , Transplant Coordinator, , Pharmacist and Dietician (if applicable).    The patient reviewed and signed all consents for evaluation which were witnessed and sent to scanning into the EPIC chart.    The patient was given an education book and plan for further evaluation based on his individual assessment.      The patient was encouraged to call with any questions or concerns.

## 2018-09-20 ENCOUNTER — SOCIAL WORK (OUTPATIENT)
Dept: TRANSPLANT | Facility: CLINIC | Age: 53
End: 2018-09-20

## 2018-09-20 NOTE — PROGRESS NOTES
Patient's nephrology progress notes at [facility] not available in Care Everywhere to review for nephrology compliance. Organ transplant  sent nephrology compliance form to Dr. Valdez' nurse today via FAX. Waiting for completed nephrology compliance form from Dr. Olivera' nurse.     Lynne Lloyd MSW [facility]

## 2018-10-22 ENCOUNTER — CLINICAL SUPPORT (OUTPATIENT)
Dept: CARDIOLOGY | Facility: CLINIC | Age: 53
End: 2018-10-22
Attending: NURSE PRACTITIONER
Payer: MEDICAID

## 2018-10-22 DIAGNOSIS — Z76.82 ORGAN TRANSPLANT CANDIDATE: ICD-10-CM

## 2018-10-22 PROCEDURE — 93017 CV STRESS TEST TRACING ONLY: CPT | Mod: PBBFAC,TXP | Performed by: INTERNAL MEDICINE

## 2018-10-22 PROCEDURE — 93016 CV STRESS TEST SUPVJ ONLY: CPT | Mod: S$PBB,TXP,, | Performed by: INTERNAL MEDICINE

## 2018-10-22 PROCEDURE — 93018 CV STRESS TEST I&R ONLY: CPT | Mod: S$PBB,TXP,, | Performed by: INTERNAL MEDICINE

## 2018-10-22 PROCEDURE — 78452 HT MUSCLE IMAGE SPECT MULT: CPT | Mod: 26,S$PBB,TXP, | Performed by: INTERNAL MEDICINE

## 2018-10-30 ENCOUNTER — TELEPHONE (OUTPATIENT)
Dept: TRANSPLANT | Facility: CLINIC | Age: 53
End: 2018-10-30

## 2018-10-30 DIAGNOSIS — Z76.82 ORGAN TRANSPLANT CANDIDATE: Primary | ICD-10-CM

## 2018-10-30 DIAGNOSIS — N18.4 CKD (CHRONIC KIDNEY DISEASE), STAGE IV: Primary | ICD-10-CM

## 2018-10-30 DIAGNOSIS — Z76.82 ORGAN TRANSPLANT CANDIDATE: ICD-10-CM

## 2018-10-30 NOTE — TELEPHONE ENCOUNTER
----- Message from Hamlet Maya RN sent at 10/30/2018 11:38 AM CDT -----  Stephie,     The above patient needs an order for a colonoscopy done here at [facility].    Thanks,   Hamlet

## 2018-11-27 ENCOUNTER — SOCIAL WORK (OUTPATIENT)
Dept: TRANSPLANT | Facility: CLINIC | Age: 53
End: 2018-11-27

## 2018-11-27 NOTE — PROGRESS NOTES
Patient's nephrology progress notes at [facility] not available in Care Everywhere to review for nephrology compliance. Organ transplant  sent nephrology compliance form to Dr. Valdez' nurse via FAX. Transplant  has not received completed compliance form from patient's nephrologist. Waiting for completed nephrology compliance form from Dr. Olivera' nurse.      Lynne Lloyd MSW [facility]

## 2018-12-06 DIAGNOSIS — Z76.82 ORGAN TRANSPLANT CANDIDATE: Primary | ICD-10-CM

## 2018-12-10 NOTE — PROGRESS NOTES
CC:  neutrophilia    HPI:  Mr Martinez is a 52 yo man with CAD, T2DM, HTN, membranous glomerulonephritis who is being evaluated for kidney transplant, who presents today for further evaluation of leukocytosis. CBC on 2014 showed WBC count of 13.18, 76.3% neutrophils, 12.2% lymphocytes, Hb 11.9, plt count 225. CBC on 18 showed WBC count of 13.6, with 84.7% neutrophils, 7.5% lymphocytes, Hb 10.8, MCV 87, plt count 225. He presents today for further evaluation. He was diagnosed with membranous glomerulonephritis in the  and has been on steroids since then. He has been on corticotropin twice weekly since . Current smoker. No fever, chills. Chronic fatigue, weight gain from steroids.     ECO    Past Medical History:   Diagnosis Date    Anemia     Coronary artery disease     Diabetes mellitus, type 2     GERD (gastroesophageal reflux disease)     Gout     Hydrocele in adult     bilateral    Hyperlipidemia     Hypertension     Inguinal hernia     bilateral    Membranous glomerulonephritis     Nephrotic range proteinuria     Nephrotic syndrome     Obesity     Umbilical hernia         Past Surgical History:   Procedure Laterality Date    ABDOMINAL SURGERY  1980s    CORONARY STENT PLACEMENT         Family History   Problem Relation Age of Onset    Hypertension Father     Stroke Father     No Known Problems Sister     Drug abuse Brother        Social History     Socioeconomic History    Marital status:      Spouse name: Not on file    Number of children: Not on file    Years of education: Not on file    Highest education level: Not on file   Social Needs    Financial resource strain: Not on file    Food insecurity - worry: Not on file    Food insecurity - inability: Not on file    Transportation needs - medical: Not on file    Transportation needs - non-medical: Not on file   Occupational History    Not on file   Tobacco Use    Smoking status: Former Smoker      Packs/day: 0.25     Years: 10.00     Pack years: 2.50     Last attempt to quit: 2016     Years since quittin.2    Smokeless tobacco: Never Used   Substance and Sexual Activity    Alcohol use: No    Drug use: No    Sexual activity: Not on file   Other Topics Concern    Not on file   Social History Narrative    Not on file       Review of Systems   Constitutional: Positive for fatigue and unexpected weight change (weight gain). Negative for appetite change, chills and fever.   HENT: Negative for mouth sores, nosebleeds, tinnitus, trouble swallowing and voice change.    Eyes: Negative for pain, redness and visual disturbance.   Respiratory: Negative for cough, shortness of breath and wheezing.    Cardiovascular: Negative for chest pain, palpitations and leg swelling.   Gastrointestinal: Negative for abdominal distention, abdominal pain, blood in stool, constipation, diarrhea, nausea and vomiting.   Endocrine: Negative for polydipsia, polyphagia and polyuria.   Genitourinary: Negative for flank pain, frequency and hematuria.   Musculoskeletal: Negative for arthralgias, back pain, gait problem, joint swelling, myalgias, neck pain and neck stiffness.   Skin: Negative for color change, pallor, rash and wound.   Neurological: Negative for tremors, seizures, syncope, speech difficulty, weakness, light-headedness, numbness and headaches.   Hematological: Negative for adenopathy. Does not bruise/bleed easily.   Psychiatric/Behavioral: Negative for confusion, dysphoric mood and self-injury. The patient is not nervous/anxious.    All other systems reviewed and are negative.      Objective:  Physical Exam   Constitutional: He is oriented to person, place, and time. He appears well-developed and well-nourished. No distress.   Looks tired   HENT:   Head: Normocephalic and atraumatic.   Mouth/Throat: Oropharynx is clear and moist. No oropharyngeal exudate.   Eyes: Conjunctivae and EOM are normal. Pupils are equal,  round, and reactive to light. No scleral icterus.   Neck: Normal range of motion. Neck supple. No JVD present. No thyromegaly present.   Cardiovascular: Normal rate, regular rhythm and normal heart sounds. Exam reveals no gallop and no friction rub.   No murmur heard.  Pulmonary/Chest: Effort normal and breath sounds normal. No respiratory distress. He has no wheezes. He has no rales.   Abdominal: Soft. Bowel sounds are normal. He exhibits no distension and no mass. There is no hepatosplenomegaly. There is no tenderness. There is no rebound. No hernia.   Musculoskeletal: Normal range of motion. He exhibits no edema, tenderness or deformity.   Lymphadenopathy:     He has no cervical adenopathy.        Right: No supraclavicular adenopathy present.        Left: No supraclavicular adenopathy present.   Neurological: He is alert and oriented to person, place, and time. No cranial nerve deficit. He exhibits normal muscle tone.   Skin: Skin is warm and dry. No rash noted. No erythema. No pallor.   Psychiatric: He has a normal mood and affect. His behavior is normal. Judgment and thought content normal.   Vitals reviewed.      Labs:  CBC on 12/08/2014 showed WBC count of 13.18, 76.3% neutrophils, 12.2% lymphocytes, Hb 11.9, plt count 225. CBC on 09/18/18 showed WBC count of 13.6, with 84.7% neutrophils, 7.5% lymphocytes, Hb 10.8, MCV 87, plt count 225.   Imaging Data:      Assessment and plan:    1. Chronic neutrophilia    2. CKD (chronic kidney disease), stage IV    3. Anemia in stage 4 chronic kidney disease       1.  - Mr Martinez is a 52 yo man with membranous glomerulonephritis who has been on corticotropin since 2013, who presents today for further evaluation of neutrophilia since at least 2014. No signs of infection. He is a smoker. Discussed with patient that neutrophilia can be from steroids, smoking, or underlying bone marrow disease. Will check CBC, ESR, CRP, BCR/ABL today. Given that he is being evaluated for  kidney transplant, will get a bone marrow biopsy to rule out myeloproliferative disease. I will call him after the bone marrow biopsy result comes back. He understands and agrees with the plan.     2.  - not on dialysis  - f/u with renal    3.  - mild and stable  - monitor    ADDENDUM:  CBC today showed normal WBC of 9.22, with 70% of neutrophils and 18% of lymphocytes. Previous neutrophilia likely from steroids or transient infection. BCR/ABL pending. Called patient. NA. Left VM. WBC normal now, no need for bone marrow biopsy. I will call him when  BCR/ABL result is back. Encouraged to call should issues arise.     Distress Screening Results: Psychosocial Distress screening score of Distress Score: 6 noted and reviewed. No intervention indicated.

## 2018-12-12 ENCOUNTER — OFFICE VISIT (OUTPATIENT)
Dept: CARDIOLOGY | Facility: CLINIC | Age: 53
End: 2018-12-12
Payer: MEDICAID

## 2018-12-12 ENCOUNTER — HOSPITAL ENCOUNTER (OUTPATIENT)
Dept: CARDIOLOGY | Facility: CLINIC | Age: 53
Discharge: HOME | End: 2018-12-12
Attending: NURSE PRACTITIONER
Payer: MEDICAID

## 2018-12-12 ENCOUNTER — TELEPHONE (OUTPATIENT)
Dept: HEMATOLOGY/ONCOLOGY | Facility: CLINIC | Age: 53
End: 2018-12-12

## 2018-12-12 ENCOUNTER — INITIAL CONSULT (OUTPATIENT)
Dept: HEMATOLOGY/ONCOLOGY | Facility: CLINIC | Age: 53
End: 2018-12-12
Payer: MEDICAID

## 2018-12-12 VITALS
DIASTOLIC BLOOD PRESSURE: 80 MMHG | HEART RATE: 75 BPM | HEIGHT: 66 IN | BODY MASS INDEX: 30.05 KG/M2 | WEIGHT: 187 LBS | SYSTOLIC BLOOD PRESSURE: 127 MMHG

## 2018-12-12 VITALS
DIASTOLIC BLOOD PRESSURE: 76 MMHG | WEIGHT: 185.19 LBS | HEART RATE: 74 BPM | HEIGHT: 66 IN | BODY MASS INDEX: 29.76 KG/M2 | SYSTOLIC BLOOD PRESSURE: 125 MMHG

## 2018-12-12 VITALS
TEMPERATURE: 98 F | HEART RATE: 89 BPM | WEIGHT: 186.94 LBS | OXYGEN SATURATION: 99 % | DIASTOLIC BLOOD PRESSURE: 80 MMHG | RESPIRATION RATE: 18 BRPM | BODY MASS INDEX: 30.04 KG/M2 | SYSTOLIC BLOOD PRESSURE: 127 MMHG | HEIGHT: 66 IN

## 2018-12-12 DIAGNOSIS — N18.4 CKD (CHRONIC KIDNEY DISEASE), STAGE IV: ICD-10-CM

## 2018-12-12 DIAGNOSIS — N05.2 MEMBRANOUS GLOMERULONEPHRITIS: ICD-10-CM

## 2018-12-12 DIAGNOSIS — Z76.82 ORGAN TRANSPLANT CANDIDATE: ICD-10-CM

## 2018-12-12 DIAGNOSIS — Z95.5 S/P CORONARY ARTERY STENT PLACEMENT: ICD-10-CM

## 2018-12-12 DIAGNOSIS — Z01.810 PREOPERATIVE CARDIOVASCULAR EXAMINATION: Primary | ICD-10-CM

## 2018-12-12 DIAGNOSIS — E78.5 HYPERLIPIDEMIA, UNSPECIFIED HYPERLIPIDEMIA TYPE: ICD-10-CM

## 2018-12-12 DIAGNOSIS — D63.1 ANEMIA IN STAGE 4 CHRONIC KIDNEY DISEASE: ICD-10-CM

## 2018-12-12 DIAGNOSIS — I15.0 RENOVASCULAR HYPERTENSION: ICD-10-CM

## 2018-12-12 DIAGNOSIS — D72.828 CHRONIC NEUTROPHILIA: Primary | ICD-10-CM

## 2018-12-12 DIAGNOSIS — N18.4 ANEMIA IN STAGE 4 CHRONIC KIDNEY DISEASE: ICD-10-CM

## 2018-12-12 LAB
ASCENDING AORTA: 3.45 CM
AV INDEX (PROSTH): 0.89
AV MEAN GRADIENT: 4.3 MMHG
AV PEAK GRADIENT: 8.41 MMHG
AV VALVE AREA: 3.73 CM2
BSA FOR ECHO PROCEDURE: 1.99 M2
CV ECHO LV RWT: 0.41 CM
DOP CALC AO PEAK VEL: 1.45 M/S
DOP CALC AO VTI: 22.33 CM
DOP CALC LVOT AREA: 4.19 CM2
DOP CALC LVOT DIAMETER: 2.31 CM
DOP CALC LVOT STROKE VOLUME: 83.23 CM3
DOP CALCLVOT PEAK VEL VTI: 19.87 CM
E WAVE DECELERATION TIME: 216.26 MSEC
E/A RATIO: 0.73
E/E' RATIO: 13.56
ECHO LV POSTERIOR WALL: 1.1 CM (ref 0.6–1.1)
FRACTIONAL SHORTENING: 31 % (ref 28–44)
INTERVENTRICULAR SEPTUM: 1.2 CM (ref 0.6–1.1)
IVRT: 0.11 MSEC
LA MAJOR: 5.23 CM
LA MINOR: 5.56 CM
LA WIDTH: 4.36 CM
LEFT ATRIUM SIZE: 4.08 CM
LEFT ATRIUM VOLUME INDEX: 41.9 ML/M2
LEFT ATRIUM VOLUME: 81.5 CM3
LEFT INTERNAL DIMENSION IN SYSTOLE: 3.75 CM (ref 2.1–4)
LEFT VENTRICLE DIASTOLIC VOLUME INDEX: 73.53 ML/M2
LEFT VENTRICLE DIASTOLIC VOLUME: 142.93 ML
LEFT VENTRICLE MASS INDEX: 129.5 G/M2
LEFT VENTRICLE SYSTOLIC VOLUME INDEX: 30.9 ML/M2
LEFT VENTRICLE SYSTOLIC VOLUME: 60.11 ML
LEFT VENTRICULAR INTERNAL DIMENSION IN DIASTOLE: 5.43 CM (ref 3.5–6)
LEFT VENTRICULAR MASS: 251.69 G
LV LATERAL E/E' RATIO: 12.2
LV SEPTAL E/E' RATIO: 15.25
MV PEAK A VEL: 0.83 M/S
MV PEAK E VEL: 0.61 M/S
PISA TR MAX VEL: 2.32 M/S
PULM VEIN S/D RATIO: 2.06
PV PEAK D VEL: 0.33 M/S
PV PEAK S VEL: 0.68 M/S
RA MAJOR: 5.26 CM
RA PRESSURE: 3 MMHG
RA WIDTH: 4.68 CM
RIGHT VENTRICULAR END-DIASTOLIC DIMENSION: 4.05 CM
RV TISSUE DOPPLER FREE WALL SYSTOLIC VELOCITY 1 (APICAL 4 CHAMBER VIEW): 13.78 M/S
SINUS: 3.56 CM
STJ: 3.05 CM
TDI LATERAL: 0.05
TDI SEPTAL: 0.04
TDI: 0.05
TR MAX PG: 21.53 MMHG
TRICUSPID ANNULAR PLANE SYSTOLIC EXCURSION: 2.7 CM
TV REST PULMONARY ARTERY PRESSURE: 24.53 MMHG

## 2018-12-12 PROCEDURE — 99204 OFFICE O/P NEW MOD 45 MIN: CPT | Mod: S$PBB,TXP,, | Performed by: INTERNAL MEDICINE

## 2018-12-12 PROCEDURE — 99213 OFFICE O/P EST LOW 20 MIN: CPT | Mod: PBBFAC,27,TXP,25 | Performed by: INTERNAL MEDICINE

## 2018-12-12 PROCEDURE — 99204 OFFICE O/P NEW MOD 45 MIN: CPT | Mod: S$PBB,,, | Performed by: INTERNAL MEDICINE

## 2018-12-12 PROCEDURE — 99999 PR PBB SHADOW E&M-EST. PATIENT-LVL III: CPT | Mod: PBBFAC,,, | Performed by: INTERNAL MEDICINE

## 2018-12-12 PROCEDURE — 99999 PR PBB SHADOW E&M-EST. PATIENT-LVL III: CPT | Mod: PBBFAC,TXP,, | Performed by: INTERNAL MEDICINE

## 2018-12-12 PROCEDURE — 93306 TTE W/DOPPLER COMPLETE: CPT | Mod: PBBFAC,TXP | Performed by: INTERNAL MEDICINE

## 2018-12-12 PROCEDURE — 99213 OFFICE O/P EST LOW 20 MIN: CPT | Mod: PBBFAC,25 | Performed by: INTERNAL MEDICINE

## 2018-12-12 NOTE — LETTER
December 12, 2018      Stephie Harmon, LILIANA  [address] [facility]  1st Floor Clinic  [facility] [address]           [facility] - Cardiology  [address]  [place] [address]  Phone: [phone]          Patient: Enrique Martinez   MR Number: [id number]   YOB: 1965   Date of Visit: 12/12/2018       Dear Stephie Harmon:    Thank you for referring Enrique Martinez to me for evaluation. Attached you will find relevant portions of my assessment and plan of care.    If you have questions, please do not hesitate to call me. I look forward to following Enrique Martinez along with you.    Sincerely,    Lucien Hannon MD    Enclosure  CC:  No Recipients    If you would like to receive this communication electronically, please contact [email or web address] or [phone] to request more information on [employer] Link access.    For providers and/or their staff who would like to refer a patient to [facility], please contact us through our one-stop-shop provider referral line, [facility] , at [phone].    If you feel you have received this communication in error or would no longer like to receive these types of communications, please e-mail [email or web address]

## 2018-12-12 NOTE — Clinical Note
Thank you for referring Enrique Martinez for preoperative cardiovascular evaluation. Please see my note for details of this encounter. If you have any questions, please contact me.  Thank you again for the referral.

## 2018-12-12 NOTE — TELEPHONE ENCOUNTER
Contacted hematology lab to request peripheral smear on patient to be tubed up to station Number [id number] after blood draws completed

## 2018-12-12 NOTE — LETTER
December 12, 2018      Stephie Harmon NP  [address] [facility]  1st Floor Clinic  [facility] [address]           [place] - Hematology Oncology  [address]  [facility] [address]  Phone: [phone]          Patient: Enrique Martinez   MR Number: [id number]   YOB: 1965   Date of Visit: 12/12/2018       Dear Stephie Harmon:    Thank you for referring Enrique Martinez to me for evaluation. Attached you will find relevant portions of my assessment and plan of care.    If you have questions, please do not hesitate to call me. I look forward to following Enrique Martinez along with you.    Sincerely,    Juli Paz MD    Enclosure  CC:  No Recipients    If you would like to receive this communication electronically, please contact [email or web address] or [phone] to request more information on [employer] Link access.    For providers and/or their staff who would like to refer a patient to [facility], please contact us through our one-stop-shop provider referral line, [facility], at [phone].    If you feel you have received this communication in error or would no longer like to receive these types of communications, please e-mail [email or web address]

## 2018-12-12 NOTE — PROGRESS NOTES
It is Chart has been dictated using voice recognition software.  It is not been reviewed carefully for any transcriptional errors due to this technology.   Subjective:   Patient ID:  Enrique Martinez is a 53 y.o. male who presents for evaluation of Pre-op Exam (kidney transplant)      HPI:  Patient with end-stage renal disease due to membranous nephropathy who is not currently on dialysis.  Patient also has history coronary artery disease, type 2 diabetes, GERD, hyperlipidemia, and hypertension. The patient is being evaluated for renal transplant.    Patient had probable NSTEMI while incarcerated in [place] in 2007 after developing chest discomfort and dyspnea at rest.  He had a stent placed in LAD from his description.  Subsequent this to that episode, the patient denies he has had any further shortness of breath or chest discomfort.  The patient notes he has had dyspnea on exertion and lower extremity edema when his kidney function has deteriorated. Patient is currently able to walk up 2nd floor ramp without getting dyspnea. Patient denies any palpitations, lightheadedness, or syncope.      Echocardiogram (12-December-2018):  · Normal left ventricular systolic function. The estimated ejection fraction is 65%  · Eccentric left ventricular hypertrophy.  · No wall motion abnormalities.  · Indeterminate left ventricular diastolic function.  · Normal right ventricular systolic function.  · Mobile echodenosity measuring 1.1 x 1.0 cm that is concerning for a vegetation. There is moderate regurgitation. Clinical correlation is advised.  · Mild-to-moderate aortic regurgitation.  · Severe left atrial enlargement.  · Trace pulmonic regurgitation.  · Mild mitral regurgitation.  · Mild tricuspid regurgitation.  · The estimated PA systolic pressure is 24.53 mm Hg  · Normal central venous pressure (3 mm Hg).    Regadenoson stress SPECT scan (22-October-2018):  EKG Conclusions:  1. The EKG portion of this study is negative for  ischemia at a peak heart rate of 80 bpm (48% of predicted).   2. Specificity is reduced secondary to LVH.   3. Blood pressure remained stable throughout the protocol  (Presenting BP: 158/95 Peak BP: 151/101).   4. The following arrhythmias were present: rare PACs.   5. There were no symptoms of chest discomfort or significant dyspnea throughout the protocol.   Nuclear Quantitative Functional Analysis:   LVEF: 54 % (normal is >= 51%)  LVED Volume: 148 ml (normal is <=171)  LVES Volume: 68 ml (normal is <=70)  Impression: NORMAL MYOCARDIAL PERFUSION  1. The perfusion scan is free of evidence for myocardial ischemia or injury.   2. Resting wall motion is physiologic.   3. Resting LV function is normal.  (normal is >= 51%)  4. The ventricular volumes are normal at rest and stress.   5. The extracardiac distribution of radioactivity is normal.      Cardiac risk factors: hyperlipidemia, hypertension, positive family history, tobacco use    It should be noted the patient is a poor historian who appeared to be frequently challenged by many questions about his history.  The patient blames most of his problems with on the corticotropin that he has been using.      Past Medical History:   Diagnosis Date    Anemia     Coronary artery disease     Diabetes mellitus, type 2     GERD (gastroesophageal reflux disease)     Gout     Hydrocele in adult     bilateral    Hyperlipidemia     Hypertension     Inguinal hernia     bilateral    Membranous glomerulonephritis     Nephrotic range proteinuria     Nephrotic syndrome     Obesity     Umbilical hernia        Past Surgical History:   Procedure Laterality Date    ABDOMINAL SURGERY  1980s    CARDIAC CATHETERIZATION  2007    x 2    CORONARY STENT PLACEMENT  2007       Social History     Tobacco Use    Smoking status: Current Some Day Smoker     Packs/day: 0.25     Years: 10.00     Pack years: 2.50    Smokeless tobacco: Never Used   Substance Use Topics    Alcohol use:  No    Drug use: No       Outpatient Medications Prior to Visit   Medication Sig Dispense Refill    albuterol sulfate 2.5 mg/0.5 mL Nebu Take 2.5 mg by nebulization every 4 (four) hours as needed. 1 each 0    allopurinol (ZYLOPRIM) 100 MG tablet Take 100 mg by mouth once daily.       atorvastatin (LIPITOR) 20 MG tablet Take 20 mg by mouth once daily.      chlorthalidone (HYGROTEN) 50 MG Tab Take 25 mg by mouth once daily.      cloNIDine (CATAPRES) 0.2 MG tablet Take 0.2 mg by mouth 3 (three) times daily.       colchicine (COLCRYS) 0.6 mg tablet Take 0.6 mg by mouth daily as needed.       corticotropin (ACTHAR H.P.) 80 unit/mL injectable gel Inject 40 Units into the skin twice a week.      fluticasone (FLONASE) 50 mcg/actuation nasal spray 1 spray by Each Nare route once daily.      furosemide (LASIX) 40 MG tablet Take 40 mg by mouth 2 (two) times daily.      nicotine (NICODERM CQ) 14 mg/24 hr Place 1 patch onto the skin every 24 hours.      potassium chloride (KLOR-CON) 10 MEQ TbSR Take 20 mEq by mouth once daily.       ranitidine (ZANTAC) 300 MG tablet Take 300 mg by mouth nightly as needed.       sildenafil (VIAGRA) 50 MG tablet Take 50 mg by mouth daily as needed for Erectile Dysfunction.      sodium bicarbonate 650 MG tablet Take 650 mg by mouth 3 (three) times daily.       amlodipine (NORVASC) 10 MG tablet Take 10 mg by mouth once daily.      ferrous gluconate (FERGON) 324 MG tablet Take 324 mg by mouth daily with breakfast.       No facility-administered medications prior to visit.        Review of patient's allergies indicates:  No Known Allergies    Review of Systems   Constitution: Negative for weight gain and weight loss.   HENT: Negative for nosebleeds.    Eyes: Negative for vision loss in left eye and vision loss in right eye.   Cardiovascular: Negative for claudication.        As above   Respiratory: Negative for hemoptysis, shortness of breath, snoring, sputum production and wheezing.   "  Endocrine: Negative for polydipsia and polyuria.   Hematologic/Lymphatic: Does not bruise/bleed easily.   Musculoskeletal: Negative for myalgias.   Gastrointestinal: Negative for change in bowel habit, hematemesis, hematochezia, melena, nausea and vomiting.   Genitourinary: Negative for hematuria.   Neurological: Negative for focal weakness and numbness.   Psychiatric/Behavioral:        Patient notes he has some paranoia  - attributes to IM steroids.     Objective:   Physical Exam   Constitutional: He is oriented to person, place, and time. He appears well-developed and well-nourished.   /76 (BP Location: Left arm, Patient Position: Sitting, BP Method: X-Large (Automatic))   Pulse 74   Ht 5' 6" (1.676 m)   Wt 84 kg (185 lb 3 oz)   BMI 29.89 kg/m²      Neck: Neck supple. No JVD present. Carotid bruit is not present.   Cardiovascular: Normal rate, regular rhythm, normal heart sounds and intact distal pulses. Exam reveals no gallop and no friction rub.   No murmur heard.  Pulmonary/Chest: Effort normal and breath sounds normal. He has no wheezes. He has no rales.   Abdominal: Soft. Bowel sounds are normal. He exhibits no abdominal bruit. There is no hepatomegaly. There is no tenderness.   Musculoskeletal: He exhibits edema (1+ bilateral at the ankles).   Neurological: He is alert and oriented to person, place, and time. He has normal strength.   Skin: No cyanosis. Nails show no clubbing.       Lab Results   Component Value Date    WBC 9.22 12/12/2018    HGB 10.7 (L) 12/12/2018    HCT 31.6 (L) 12/12/2018    MCV 84 12/12/2018     12/12/2018       Lab Results   Component Value Date     09/18/2018    K 3.5 09/18/2018    BUN 51 (H) 09/18/2018    CREATININE 4.0 (H) 09/18/2018     (H) 09/18/2018    HGBA1C 5.9 (H) 09/18/2018    CHOL 231 (H) 09/18/2018    HDL 57 09/18/2018    LDLCALC 126.0 09/18/2018    TRIG 240 (H) 09/18/2018    CHOLHDL 24.7 09/18/2018    HGB 10.7 (L) 12/12/2018    HCT 31.6 (L) " 12/12/2018     12/12/2018    INR 0.9 09/18/2018       Assessment:     1. Preoperative cardiovascular examination    2. S/P coronary artery stent placement    3. Renovascular hypertension    4. Hyperlipidemia, unspecified hyperlipidemia type    5. Membranous glomerulonephritis    6. CKD (chronic kidney disease), stage IV      The patient's cardiovascular risk for renal transplantation appears to be relatively low.  He has no evidence of ischemia on the exercise test, he has no symptoms of ischemia at this time, his left ventricular function is normal.  He does have a density on his aortic valve that is causing some mild regurgitation but his left ventricle is of normal size and function.  There is no evidence of pulmonary hypertension.  There are no anticipated cardiovascular problems for this patient at this time that would restrict him undergoing transplantation.  Patient should return in 1 year for re-evaluation if there are no intervening problems..  Plan:     Enrique was seen today for pre-op exam.    Diagnoses and all orders for this visit:    Preoperative cardiovascular examination    S/P coronary artery stent placement    Renovascular hypertension    Hyperlipidemia, unspecified hyperlipidemia type    Membranous glomerulonephritis    CKD (chronic kidney disease), stage IV          Lucien Hanonn MD  Consultative Cardiology

## 2018-12-12 NOTE — Clinical Note
Check CBC, ESR, CRP, BCR/ABL today. Get a peripheral smear on today's lab. Schedule for the first available bone marrow biopsy under sedation

## 2018-12-13 ENCOUNTER — TELEPHONE (OUTPATIENT)
Dept: HEMATOLOGY/ONCOLOGY | Facility: CLINIC | Age: 53
End: 2018-12-13

## 2018-12-13 NOTE — TELEPHONE ENCOUNTER
Contacted patient to discuss that bone marrow biopsy was no longer indicated at this time, patient voiced he heard Dr. Paz's voicemail. Patient provided our office number if any other concerns arise, pt verbalized understanding

## 2018-12-17 ENCOUNTER — TELEPHONE (OUTPATIENT)
Dept: HEMATOLOGY/ONCOLOGY | Facility: CLINIC | Age: 53
End: 2018-12-17

## 2018-12-17 NOTE — TELEPHONE ENCOUNTER
Lab results are back. WBC normal at 9.22 with a normal differential BCR/ABL negative. Peripheral smear reviewed. Normal WBC. No premature WBC. Previous leukocytosis likely reactive. No contraindication to kidney transplant from a hematologic perspective. Called and spoke with Mr. Martinez. He does not need to follow up with me. Encouraged to call should questions arise.

## 2018-12-18 ENCOUNTER — SOCIAL WORK (OUTPATIENT)
Dept: TRANSPLANT | Facility: CLINIC | Age: 53
End: 2018-12-18

## 2018-12-18 NOTE — PROGRESS NOTES
"Nephrology compliance update. RE-sent (x4 now) nephrology compliance update for nephrology completion.    "Hi, Please complete the nephrology compliance update for organ transplant team review. A completed nephrology compliance update is mandatory to continue with patient's organ transplant evaluation.  Thank you!  Lynne AREVALO LCSW"      Psychosocial Suitability: Patient presents as an acceptable candidate for kidney transplant at this time pending nephrology compliance update. Based on psychosocial risk factors, patient presents as high risk, due to patient reporting limited support system with no back up caregiver plan in place; pt reports plan to discuss back up caregiver plan with [employer] friends and will notify transplant team of outcome. Pt is also high risk organ transplant candidate due to patient and caregiver needing to rely on cab service as organ transplant transportation; pt reports does have money to afford cab service transportation at this time. Pt reports plan to discuss back up caregiver plan with [employer] friends and will notify transplant team of outcome. Pt reports having organ transplant caregiver plan with cab service as transportation plan, medical insurance plan and plan to afford transplant costs all in place. Recommend confirming organ transplant caregiver/transportation plan prior to be called in for transplant.     Recommendations/Additional Comments: Transplant  will review nephrology notes once available for nephrology compliance update. Recommend confirming organ transplant caregiver/transportation plan prior to be called in for transplant.     Lynne AREVALO LCSW            "

## 2018-12-18 NOTE — PROGRESS NOTES
"18 Transplant  Epic message conversation with transplant nephrologist regarding transplant cardiology consult and psychiatric issues noticed in cardiology appointment.    "Lynne Herrera MD   Cc: [facility] Pre-Kidney Transplant Clinical             Hi, I reviewed my notes.     The patient had his wife with him for psychosocial. She seemed to be highly supportive of him however they are legally . Pt reported low social support other than perhaps [employer] friends. He denied having back up transplant caregiver plan. Pt denied mental health problems, did not voice feelings of paranoia and did not seem as if he was having problems trusting me while in our session. Pt did seem to be a bit anxious and seemed to have problems processing new information during the session. Please see below info and impressions provided in my psychosocial.     "Family/Social Support:   Number of dependents/: pt denies   Marital history:  3 x, 2 x . Is legally  from his wife Atif but they still live together and their relationship is amicable.   Other family dynamics: Pt reports both parents are . Pt reports lives in [facility] with wife and they are legally . Pt reports having 2 grown children who live in [place] and will not be able to assist with organ transplant. Pt reports having siblings however some of the siblings live away and/or are not reliable to assist with transplant -- reports some siblings have drug addiction issues or other medical issues, such as CVA history. Pt denies having back up caregiver in place at this time. Pt reports plan to speak with [employer] friends about back up caregiver plan. Pt's wife Atif in pre transplant appointments today and reports will be patient's primary transplant caregiver.     Learns Best By:  Pt denies having any problems learning new information. Pt seemed a bit anxious during session. Pt " "seemed unable at times to immediately clearly understand information as provided and  or wife had to re-state it for his understanding at times. Pt seems to need plenty of time to review new information, seems to need ample time to assimilate new information and ideas and seems to need ample time to ask questions as needed.     Arrests/DWI/Treatment/Rehab: patient denies       Psychiatric History:     Mental Health: pt denies any mental health history and denies any overwhelming feelings of depression or anxiety at this time.   Psychiatrist/Counselor: pt denies   Medications:  Pt denies   Suicide/Homicide Issues: pt denies si/hi history at this time   Safety at home: pt reports living in safe environment at this time."     I was unable to speak his nephrologist office about compliance. I tried 3 x by phone and fax. I can't review in Care Everywhere because the patient did not authorize (from what I can gather from trying many times to access).     Thank you,   [facility]    Previous Messages      ----- Message -----   From: Katie Herrera MD   Sent: 12/17/2018   2:56 PM   To: Lynne Lloyd, *   Subject: FW: Cardiovascular evaluation                     Lynne,     Please see comments below from Cardiology.  Any thoughts?     I am curious if you noted anything on your visit that was suspicious."           "

## 2019-01-07 ENCOUNTER — TELEPHONE (OUTPATIENT)
Dept: TRANSPLANT | Facility: HOSPITAL | Age: 54
End: 2019-01-07

## 2019-01-07 ENCOUNTER — SOCIAL WORK (OUTPATIENT)
Dept: TRANSPLANT | Facility: CLINIC | Age: 54
End: 2019-01-07

## 2019-01-07 DIAGNOSIS — F39 MOOD DISORDER: Primary | ICD-10-CM

## 2019-01-07 DIAGNOSIS — Z01.818 PRE-TRANSPLANT EVALUATION FOR CHRONIC KIDNEY DISEASE: ICD-10-CM

## 2019-01-07 NOTE — PROGRESS NOTES
[facility]rologist: [facility]    Nephrology compliance update shows patient being compliant with nephrology appointments and instructions but has missed 1 x due to unknown reasons (8-15-18). Update states no concerns regarding caregivers, transportation, or psychosocial issues. Please review below completed form for any further nephrology information reported.    Psychosocial Suitability: Patient is unacceptable high risk candidate for organ transplant at this time due to more information needed regarding reported anxiety with paranoia and problems understanding and communicating information. Please order psychiatry consult for organ transplant clearance. Pt seemed anxious during initial psychosocial session and seemed unable at times to immediately clearly understand information as provided and  or wife had to re-state it for his understanding at times. Pt seemed to need plenty of time to review new information, seemed to need more time to assimilate new information and ideas and seemed to need more time to ask questions as needed. Patient seen by [facility] cardiology on 12-12-18 showing significant signs of anxiety with possible paranoia and problems processing and communicating information. Additionally, based on psychosocial risk factors, patient presents as high risk, due to patient reporting limited support system with no back up caregiver plan in place; pt reports plan to discuss back up caregiver plan with [employer] friends and will notify transplant team of outcome. Pt is also high risk organ transplant candidate due to patient and caregiver needing to rely on cab service as organ transplant transportation; pt reports does have money to afford cab service transportation at this time. Pt reports plan to discuss back up caregiver plan with [employer] friends and will notify transplant team of outcome. Pt reports having organ transplant caregiver plan with cab service as transportation plan, medical  insurance plan and plan to afford transplant costs all in place; recommend confirming organ transplant caregiver/transportation plan prior to be called in for transplant.     Recommendations/Additional Comments: Psychiatry consult for organ transplant evaluation needed due to issues reported regarding patient's anxiety with paranoia as well as problems regarding processing and communicating information. Transplant  recommends confirming organ transplant caregiver/transportation plan prior to be called in for transplant.

## 2019-01-17 ENCOUNTER — TELEPHONE (OUTPATIENT)
Dept: TRANSPLANT | Facility: CLINIC | Age: 54
End: 2019-01-17

## 2019-01-17 NOTE — TELEPHONE ENCOUNTER
Nephrology Adherence:    SWI left a message with pt to confirm pt's nephrologist.    SWI remains available at [phone].

## 2019-02-28 ENCOUNTER — DOCUMENTATION ONLY (OUTPATIENT)
Dept: PSYCHIATRY | Facility: CLINIC | Age: 54
End: 2019-02-28

## 2019-02-28 NOTE — PROGRESS NOTES
Pt was 35 minutes late for his evaluation today.  I explained to patient that we needed an hour to do a complete evaluation.  He agreed to reschedule.

## 2019-03-01 ENCOUNTER — TELEPHONE (OUTPATIENT)
Dept: PSYCHIATRY | Facility: CLINIC | Age: 54
End: 2019-03-01

## 2019-03-14 NOTE — PROGRESS NOTES
Chart reviewed for selection committee. Patient is outstanding for psych clearance, appointment scheduled for 4/11/19. Patient is also outstanding for a colonoscopy. Patient informed of the above. No pen available at this time to take Endo number down. Patient informed that I will call back and leave Endo contact number on his voicemail. All questions were answered and pt verbalized unserstanding. Voicemail left on pt's phone.

## 2019-03-19 DIAGNOSIS — Z12.11 SPECIAL SCREENING FOR MALIGNANT NEOPLASMS, COLON: Primary | ICD-10-CM

## 2019-03-19 RX ORDER — POLYETHYLENE GLYCOL 3350, SODIUM SULFATE ANHYDROUS, SODIUM BICARBONATE, SODIUM CHLORIDE, POTASSIUM CHLORIDE 236; 22.74; 6.74; 5.86; 2.97 G/4L; G/4L; G/4L; G/4L; G/4L
4 POWDER, FOR SOLUTION ORAL ONCE
Qty: 4000 ML | Refills: 0 | Status: SHIPPED | OUTPATIENT
Start: 2019-03-19 | End: 2019-03-19

## 2019-04-05 ENCOUNTER — ANESTHESIA (OUTPATIENT)
Dept: ENDOSCOPY | Facility: HOSPITAL | Age: 54
End: 2019-04-05
Payer: MEDICAID

## 2019-04-05 ENCOUNTER — ANESTHESIA EVENT (OUTPATIENT)
Dept: ENDOSCOPY | Facility: HOSPITAL | Age: 54
End: 2019-04-05
Payer: MEDICAID

## 2019-04-05 ENCOUNTER — HOSPITAL ENCOUNTER (OUTPATIENT)
Facility: HOSPITAL | Age: 54
Discharge: HOME | End: 2019-04-05
Attending: SURGERY | Admitting: COLON & RECTAL SURGERY
Payer: MEDICAID

## 2019-04-05 VITALS
HEIGHT: 66 IN | RESPIRATION RATE: 18 BRPM | WEIGHT: 183 LBS | HEART RATE: 87 BPM | OXYGEN SATURATION: 94 % | SYSTOLIC BLOOD PRESSURE: 135 MMHG | DIASTOLIC BLOOD PRESSURE: 92 MMHG | TEMPERATURE: 98 F | BODY MASS INDEX: 29.41 KG/M2

## 2019-04-05 DIAGNOSIS — Z12.11 SCREENING FOR COLON CANCER: Primary | ICD-10-CM

## 2019-04-05 LAB — POCT GLUCOSE: 113 MG/DL (ref 70–110)

## 2019-04-05 PROCEDURE — 25000003 PHARM REV CODE 250: Mod: TXP | Performed by: NURSE ANESTHETIST, CERTIFIED REGISTERED

## 2019-04-05 PROCEDURE — 25000003 PHARM REV CODE 250: Mod: TXP | Performed by: COLON & RECTAL SURGERY

## 2019-04-05 PROCEDURE — 27201089 HC SNARE, DISP (ANY): Mod: TXP | Performed by: COLON & RECTAL SURGERY

## 2019-04-05 PROCEDURE — 63600175 PHARM REV CODE 636 W HCPCS: Mod: TXP | Performed by: NURSE ANESTHETIST, CERTIFIED REGISTERED

## 2019-04-05 PROCEDURE — 88305 TISSUE EXAM BY PATHOLOGIST: CPT | Mod: 26,TXP,, | Performed by: PATHOLOGY

## 2019-04-05 PROCEDURE — E9220 PRA ENDO ANESTHESIA: ICD-10-PCS | Mod: TXP,,, | Performed by: NURSE ANESTHETIST, CERTIFIED REGISTERED

## 2019-04-05 PROCEDURE — 45385 PR COLONOSCOPY,REMV LESN,SNARE: ICD-10-PCS | Mod: TXP,,, | Performed by: COLON & RECTAL SURGERY

## 2019-04-05 PROCEDURE — 37000009 HC ANESTHESIA EA ADD 15 MINS: Mod: TXP | Performed by: COLON & RECTAL SURGERY

## 2019-04-05 PROCEDURE — 45385 COLONOSCOPY W/LESION REMOVAL: CPT | Mod: TXP | Performed by: COLON & RECTAL SURGERY

## 2019-04-05 PROCEDURE — 37000008 HC ANESTHESIA 1ST 15 MINUTES: Mod: TXP | Performed by: COLON & RECTAL SURGERY

## 2019-04-05 PROCEDURE — 88305 TISSUE SPECIMEN TO PATHOLOGY - SURGERY: ICD-10-PCS | Mod: 26,TXP,, | Performed by: PATHOLOGY

## 2019-04-05 PROCEDURE — E9220 PRA ENDO ANESTHESIA: HCPCS | Mod: TXP,,, | Performed by: NURSE ANESTHETIST, CERTIFIED REGISTERED

## 2019-04-05 PROCEDURE — 88305 TISSUE EXAM BY PATHOLOGIST: CPT | Mod: TXP | Performed by: PATHOLOGY

## 2019-04-05 PROCEDURE — 45385 COLONOSCOPY W/LESION REMOVAL: CPT | Mod: TXP,,, | Performed by: COLON & RECTAL SURGERY

## 2019-04-05 RX ORDER — LIDOCAINE HCL/PF 100 MG/5ML
SYRINGE (ML) INTRAVENOUS
Status: DISCONTINUED | OUTPATIENT
Start: 2019-04-05 | End: 2019-04-05

## 2019-04-05 RX ORDER — GLYCOPYRROLATE 0.2 MG/ML
INJECTION INTRAMUSCULAR; INTRAVENOUS
Status: DISCONTINUED | OUTPATIENT
Start: 2019-04-05 | End: 2019-04-05

## 2019-04-05 RX ORDER — SODIUM CHLORIDE 0.9 % (FLUSH) 0.9 %
10 SYRINGE (ML) INJECTION
Status: DISCONTINUED | OUTPATIENT
Start: 2019-04-05 | End: 2019-04-05 | Stop reason: HOSPADM

## 2019-04-05 RX ORDER — PROPOFOL 10 MG/ML
VIAL (ML) INTRAVENOUS CONTINUOUS PRN
Status: DISCONTINUED | OUTPATIENT
Start: 2019-04-05 | End: 2019-04-05

## 2019-04-05 RX ORDER — SODIUM CHLORIDE 9 MG/ML
INJECTION, SOLUTION INTRAVENOUS CONTINUOUS
Status: DISCONTINUED | OUTPATIENT
Start: 2019-04-05 | End: 2019-04-05 | Stop reason: HOSPADM

## 2019-04-05 RX ORDER — PROPOFOL 10 MG/ML
VIAL (ML) INTRAVENOUS
Status: DISCONTINUED | OUTPATIENT
Start: 2019-04-05 | End: 2019-04-05

## 2019-04-05 RX ADMIN — SODIUM CHLORIDE: 0.9 INJECTION, SOLUTION INTRAVENOUS at 09:04

## 2019-04-05 RX ADMIN — PROPOFOL 100 MG: 10 INJECTION, EMULSION INTRAVENOUS at 09:04

## 2019-04-05 RX ADMIN — GLYCOPYRROLATE 0.1 MG: 0.2 INJECTION, SOLUTION INTRAMUSCULAR; INTRAVENOUS at 09:04

## 2019-04-05 RX ADMIN — PROPOFOL 200 MCG/KG/MIN: 10 INJECTION, EMULSION INTRAVENOUS at 09:04

## 2019-04-05 RX ADMIN — LIDOCAINE HYDROCHLORIDE 100 MG: 20 INJECTION, SOLUTION INTRAVENOUS at 09:04

## 2019-04-05 NOTE — TRANSFER OF CARE
"Anesthesia Transfer of Care Note    Patient: Enrique Martinez    Procedure(s) Performed: Procedure(s) (LRB):  COLONOSCOPY (N/A)    Patient location: PACU    Anesthesia Type: general    Transport from OR: Transported from OR on room air with adequate spontaneous ventilation    Post pain: adequate analgesia    Post assessment: no apparent anesthetic complications and tolerated procedure well    Post vital signs: stable    Level of consciousness: sedated and responds to stimulation    Nausea/Vomiting: no nausea/vomiting    Complications: none    Transfer of care protocol was followed      Last vitals:   Visit Vitals  BP (!) 179/101 (BP Location: Left arm, Patient Position: Lying)   Pulse 92   Temp 36.8 °C (98.2 °F) (Temporal)   Resp 14   Ht 5' 6" (1.676 m)   Wt 83 kg (183 lb)   SpO2 99%   BMI 29.54 kg/m²     "

## 2019-04-05 NOTE — [employer]OVATION PATIENT INSTRUCTIONS
Discharge Summary/Instructions after an Endoscopic Procedure  Patient Name: Enrique Martinez  Patient MRN: [id number]  Patient YOB: 1965 Friday, April 05, 2019  Jose L Carty MD  RESTRICTIONS:  During your procedure today, you received medications for sedation.  These   medications may affect your judgment, balance and coordination.  Therefore,   for 24 hours, you have the following restrictions:   - DO NOT drive a car, operate machinery, make legal/financial decisions,   sign important papers or drink alcohol.    ACTIVITY:  Today: no heavy lifting, straining or running due to procedural   sedation/anesthesia.  The following day: return to full activity including work.  DIET:  Eat and drink normally unless instructed otherwise.     TREATMENT FOR COMMON SIDE EFFECTS:  - Mild abdominal pain, nausea, belching, bloating or excessive gas:  rest,   eat lightly and use a heating pad.  - Sore Throat: treat with throat lozenges and/or gargle with warm salt   water.  - Because air was used during the procedure, expelling large amounts of air   from your rectum or belching is normal.  - If a bowel prep was taken, you may not have a bowel movement for 1-3 days.    This is normal.  SYMPTOMS TO WATCH FOR AND REPORT TO YOUR PHYSICIAN:  1. Abdominal pain or bloating, other than gas cramps.  2. Chest pain.  3. Back pain.  4. Signs of infection such as: chills or fever occurring within 24 hours   after the procedure.  5. Rectal bleeding, which would show as bright red, maroon, or black stools.   (A tablespoon of blood from the rectum is not serious, especially if   hemorrhoids are present.)  6. Vomiting.  7. Weakness or dizziness.  GO DIRECTLY TO THE NEAREST EMERGENCY ROOM IF YOU HAVE ANY OF THE FOLLOWING:      Difficulty breathing              Chills and/or fever over 101 F   Persistent vomiting and/or vomiting blood   Severe abdominal pain   Severe chest pain   Black, tarry stools   Bleeding- more than one  tablespoon   Any other symptom or condition that you feel may need urgent attention  Your doctor recommends these additional instructions:  If any biopsies were taken, your doctors clinic will contact you in 1 to 2   weeks with any results.  - Discharge patient to home (ambulatory).   - Resume previous diet.   - Continue present medications.   - Await pathology results.   - Repeat colonoscopy in 3 - 5 years for surveillance based on pathology   results.  For questions, problems or results please call your physician - Jose L Carty MD at Work:  [phone].  [facility], EMERGENCY ROOM PHONE NUMBER: [phone]  IF A COMPLICATION OR EMERGENCY SITUATION ARISES AND YOU ARE UNABLE TO REACH   YOUR PHYSICIAN - GO DIRECTLY TO THE EMERGENCY ROOM.  Jose L Carty MD  4/5/2019 9:36:44 AM  This report has been verified and signed electronically.  [employer]

## 2019-04-05 NOTE — ANESTHESIA POSTPROCEDURE EVALUATION
Anesthesia Post Evaluation    Patient: Enrique Martienz    Procedure(s) Performed: Procedure(s) (LRB):  COLONOSCOPY (N/A)    Final Anesthesia Type: general  Patient location during evaluation: GI PACU  Patient participation: Yes- Able to Participate  Level of consciousness: awake and alert  Pain management: adequate  Airway patency: patent  PONV status at discharge: No PONV  Anesthetic complications: no      Cardiovascular status: blood pressure returned to baseline  Respiratory status: unassisted, spontaneous ventilation and room air  Hydration status: euvolemic  Follow-up not needed.          Vitals Value Taken Time   /92 4/5/2019 10:10 AM   Temp 36.5 °C (97.7 °F) 4/5/2019  9:40 AM   Pulse 87 4/5/2019 10:10 AM   Resp 18 4/5/2019 10:10 AM   SpO2 94 % 4/5/2019 10:10 AM         Event Time     Out of Recovery 10:13:35          Pain/Francesco Score: Francesco Score: 8 (4/5/2019  9:50 AM)

## 2019-04-05 NOTE — ANESTHESIA PREPROCEDURE EVALUATION
04/05/2019  Enrique Martinez is a 54 y.o., male.    Anesthesia Evaluation    I have reviewed the Patient Summary Reports.    I have reviewed the Nursing Notes.   I have reviewed the Medications.     Review of Systems  Anesthesia Hx:  No problems with previous Anesthesia  Denies Family Hx of Anesthesia complications.   Denies Personal Hx of Anesthesia complications.   Social:  Smoker    Hematology/Oncology:  Hematology Normal   Oncology Normal     EENT/Dental:EENT/Dental Normal   Cardiovascular:   Hypertension CAD      Pulmonary:  Pulmonary Normal    Renal/:   Chronic Renal Disease    Hepatic/GI:   GERD    Musculoskeletal:  Musculoskeletal Normal    Neurological:  Neurology Normal    Endocrine:   Diabetes    Dermatological:  Skin Normal    Psych:  Psychiatric Normal           Physical Exam  General:  Well nourished    Airway/Jaw/Neck:  Airway Findings: Mouth Opening: Normal Tongue: Normal  General Airway Assessment: Adult  Mallampati: II  TM Distance: Normal, at least 6 cm      Dental:  Dental Findings:   Chest/Lungs:  Chest/Lungs Findings: Clear to auscultation     Heart/Vascular:  Heart Findings: Rate: Normal  Rhythm: Regular Rhythm  Sounds: Normal     Abdomen:  Abdomen Findings:  Normal     Musculoskeletal:  Musculoskeletal Findings:    Skin:  Skin Findings:     Mental Status:  Mental Status Findings:  Cooperative, Alert and Oriented         Anesthesia Plan  Type of Anesthesia, risks & benefits discussed:  Anesthesia Type:  general  Patient's Preference: General  Intra-op Monitoring Plan: standard ASA monitors  Intra-op Monitoring Plan Comments:   Post Op Pain Control Plan:   Post Op Pain Control Plan Comments:   Induction:   IV  Beta Blocker:  Patient is not currently on a Beta-Blocker (No further documentation required).       Informed Consent: Patient understands risks and agrees with Anesthesia plan.   Questions answered. Anesthesia consent signed with patient.  ASA Score: 3     Day of Surgery Review of History & Physical: I have interviewed and examined the patient. I have reviewed the patient's H&P dated:  There are no significant changes.          Ready For Surgery From Anesthesia Perspective.     Past Medical History:   Diagnosis Date    Anemia     Coronary artery disease     Diabetes mellitus, type 2     GERD (gastroesophageal reflux disease)     Gout     Hydrocele in adult     bilateral    Hyperlipidemia     Hypertension     Inguinal hernia     bilateral    Membranous glomerulonephritis     Nephrotic range proteinuria     Nephrotic syndrome     Obesity     Umbilical hernia      Past Surgical History:   Procedure Laterality Date    ABDOMINAL SURGERY  1980s    CARDIAC CATHETERIZATION  2007    x 2    CORONARY STENT PLACEMENT  2007     Patient Active Problem List   Diagnosis    Chest pain    Pre-transplant evaluation for chronic kidney disease    Renovascular hypertension    Hyperlipidemia    Membranous glomerulonephritis    CKD (chronic kidney disease), stage IV    Anemia in stage 4 chronic kidney disease    Hx of gout    Hydrocele in adult    Pre-diabetes    S/P coronary artery stent placement    Nephrotic range proteinuria    Leukocytosis    Hx of syphilis    Preoperative cardiovascular examination     There is no height or weight on file to calculate BMI.  2D Echo:  No results found for this or any previous visit.    Please See ROS/PMH and Active Problem List above

## 2019-04-05 NOTE — H&P
COLONOSCOPY HISTORY AND PE    Procedure : Colonoscopy      INDICATIONS: asymptomatic screening exam, evaluation for transplant for kidney    Family Hx of CRC: no    Last Colonoscopy:  never  Findings: none       Past Medical History:   Diagnosis Date    Anemia     Coronary artery disease     Diabetes mellitus, type 2     GERD (gastroesophageal reflux disease)     Gout     Hydrocele in adult     bilateral    Hyperlipidemia     Hypertension     Inguinal hernia     bilateral    Membranous glomerulonephritis     Nephrotic range proteinuria     Nephrotic syndrome     Obesity     Umbilical hernia      Sedation Problems: NO  Family History   Problem Relation Age of Onset    Hypertension Father     Stroke Father     Heart attack Father     Hyperlipidemia Father     No Known Problems Sister     Drug abuse Brother     Heart attack Brother     Hypertension Brother      Fam Hx of Sedation Problems: NO  Social History     Socioeconomic History    Marital status:      Spouse name: Not on file    Number of children: Not on file    Years of education: Not on file    Highest education level: Not on file   Occupational History    Not on file   Social Needs    Financial resource strain: Not on file    Food insecurity:     Worry: Not on file     Inability: Not on file    Transportation needs:     Medical: Not on file     Non-medical: Not on file   Tobacco Use    Smoking status: Current Some Day Smoker     Packs/day: 0.25     Years: 10.00     Pack years: 2.50    Smokeless tobacco: Never Used   Substance and Sexual Activity    Alcohol use: No    Drug use: No    Sexual activity: Not on file   Lifestyle    Physical activity:     Days per week: Not on file     Minutes per session: Not on file    Stress: Not on file   Relationships    Social connections:     Talks on phone: Not on file     Gets together: Not on file     Attends Sikh service: Not on file     Active member of club or  organization: Not on file     Attends meetings of clubs or organizations: Not on file     Relationship status: Not on file   Other Topics Concern    Not on file   Social History Narrative    Not on file       Review of Systems - Negative except   Respiratory ROS: no dyspnea  Cardiovascular ROS: no exertional chest pain  Gastrointestinal ROS: NO abdominal discomfort,  NO rectal bleeding  Musculoskeletal ROS: no muscular pain  Neurological ROS: no recent stroke    Physical Exam:  There were no vitals taken for this visit.  General: no distress  Head: normocephalic  Mallampati Score   Neck: supple, symmetrical, trachea midline  Lungs:  clear to auscultation bilaterally and normal respiratory effort  Heart: regular rate and rhythm and no murmur  Abdomen: soft, non-tender non-distented; bowel sounds normal; no masses,  no organomegaly  Extremities: no cyanosis or edema, or clubbing    ASA:  II    PLAN  COLONOSCOPY.    SedationPlan :MAC    The details of the procedure, the possible need for biopsy or polypectomy and the potential risks including bleeding, perforation, missed polyps were discussed in detail.

## 2019-04-11 ENCOUNTER — INITIAL CONSULT (OUTPATIENT)
Dept: PSYCHIATRY | Facility: CLINIC | Age: 54
End: 2019-04-11
Payer: MEDICAID

## 2019-04-11 VITALS
WEIGHT: 184.06 LBS | BODY MASS INDEX: 29.58 KG/M2 | DIASTOLIC BLOOD PRESSURE: 108 MMHG | HEART RATE: 96 BPM | SYSTOLIC BLOOD PRESSURE: 176 MMHG | HEIGHT: 66 IN

## 2019-04-11 DIAGNOSIS — Z01.818 ENCOUNTER FOR PRE-TRANSPLANT EVALUATION FOR KIDNEY TRANSPLANT: Primary | ICD-10-CM

## 2019-04-11 PROCEDURE — 99999 PR PBB SHADOW E&M-EST. PATIENT-LVL II: ICD-10-PCS | Mod: PBBFAC,TXP,, | Performed by: PSYCHIATRY & NEUROLOGY

## 2019-04-11 PROCEDURE — 90792 PSYCH DIAG EVAL W/MED SRVCS: CPT | Mod: TXP,,, | Performed by: PSYCHIATRY & NEUROLOGY

## 2019-04-11 PROCEDURE — 90792 PR PSYCHIATRIC DIAGNOSTIC EVALUATION W/MEDICAL SERVICES: ICD-10-PCS | Mod: TXP,,, | Performed by: PSYCHIATRY & NEUROLOGY

## 2019-04-11 PROCEDURE — 99212 OFFICE O/P EST SF 10 MIN: CPT | Mod: PBBFAC,TXP | Performed by: PSYCHIATRY & NEUROLOGY

## 2019-04-11 PROCEDURE — 99999 PR PBB SHADOW E&M-EST. PATIENT-LVL II: CPT | Mod: PBBFAC,TXP,, | Performed by: PSYCHIATRY & NEUROLOGY

## 2019-04-11 NOTE — PROGRESS NOTES
"Outpatient Psychiatry Initial Visit (MD/NP)    4/11/2019    Enrique Martinez, a 54 y.o. male, presenting for initial evaluation visit. Met with patient.    Reason for Encounter: Consult from renal transplant. Patient complains of No chief complaint on file.  .    History of Present Illness: .The patient is a 54 year old man referred by renal transplant for evaluation for possible transplant.  He has had renal disease for about 15 years.  He states he has been a compliant patient with his health care.  He did share that he has been on an essential kidney medication that has caused him to have unusual thoughts and to see see things that actually weren't there.  He said these experiences are brief lasting only 5-6 seconds.  He maintained that these thoughts do not interfere with his care.  He later went on to describe in December the belief that his apartment was bugged through the smoke detectors in his apartment.  He said this because of drug abusers in the apartment below him.  He denies having these thoughts at this time.  He denies alcohol or substance abuse.  He also denies psychiatric treatment or suicidal ideation.  His family supports transplantation.  He was firm about not getting dialysis.      Review Of Systems:     Pertinent items are noted in HPI.    Current Evaluation:     Nutritional Screening: Considering the patient's height and weight, medications, medical history and preferences, should a referral be made to the dietitian? no    Constitutional  Vitals:  Most recent vital signs, dated less than 90 days prior to this appointment, were reviewed.    Vitals:    04/11/19 0907   BP: (!) 176/108   Pulse: 96   Weight: 83.5 kg (184 lb 1.4 oz)   Height: 5' 6" (1.676 m)        General:  age appropriate, relates in an intense manner     Musculoskeletal  Muscle Strength/Tone:  no tremor   Gait & Station:  non-ataxic     Psychiatric  Speech:  very talkative   Mood & Affect:  anxious  congruent and appropriate "   Thought Process:  somewhat difficult to follow at times   Associations:  intact   Thought Content:  see above for unsusal, possibly paranoid thinking   Insight:  limited awareness of illness   Judgement: behavior is adequate to circumstances   Orientation:  grossly intact   Memory: intact for content of interview   Language: grossly intact   Attention Span & Concentration:  able to focus   Fund of Knowledge:  intact and appropriate to age and level of education       Relevant Elements of Neurological Exam: normal gait    Functioning in Relationships:  Spouse/partner: happily   Peers: says he has few friends  Employers: counsellor at [employer]    Laboratory Data  Admission on 04/05/2019, Discharged on 04/05/2019   Component Date Value Ref Range Status    POCT Glucose 04/05/2019 113* 70 - 110 mg/dL Final         Medications  Outpatient Encounter Medications as of 4/11/2019   Medication Sig Dispense Refill    albuterol sulfate 2.5 mg/0.5 mL Nebu Take 2.5 mg by nebulization every 4 (four) hours as needed. 1 each 0    allopurinol (ZYLOPRIM) 100 MG tablet Take 100 mg by mouth once daily.       amlodipine (NORVASC) 10 MG tablet Take 10 mg by mouth once daily.      atorvastatin (LIPITOR) 20 MG tablet Take 20 mg by mouth once daily.      chlorthalidone (HYGROTEN) 50 MG Tab Take 25 mg by mouth once daily.      cloNIDine (CATAPRES) 0.2 MG tablet Take 0.2 mg by mouth 3 (three) times daily.       colchicine (COLCRYS) 0.6 mg tablet Take 0.6 mg by mouth daily as needed.       corticotropin (ACTHAR H.P.) 80 unit/mL injectable gel Inject 40 Units into the skin twice a week.      ferrous gluconate (FERGON) 324 MG tablet Take 324 mg by mouth daily with breakfast.      fluticasone (FLONASE) 50 mcg/actuation nasal spray 1 spray by Each Nare route once daily.      furosemide (LASIX) 40 MG tablet Take 40 mg by mouth 2 (two) times daily.      nicotine (NICODERM CQ) 14 mg/24 hr Place 1 patch onto the skin every 24 hours.       potassium chloride (KLOR-CON) 10 MEQ TbSR Take 20 mEq by mouth once daily.       ranitidine (ZANTAC) 300 MG tablet Take 300 mg by mouth nightly as needed.       sildenafil (VIAGRA) 50 MG tablet Take 50 mg by mouth daily as needed for Erectile Dysfunction.      sodium bicarbonate 650 MG tablet Take 650 mg by mouth 3 (three) times daily.        No facility-administered encounter medications on file as of 4/11/2019.            Assessment - Diagnosis - Goals:     Impression: Possible paranoid thinking , perhaps secondary to medications.      ICD-10-CM ICD-9-CM   1. Encounter for pre-transplant evaluation for kidney transplant Z01.818 V72.83       Strengths and Liabilities: Strength: Patient is intelligent., Strength: Patient has positive support network.    Treatment Goals:  Specify outcomes written in observable, behavioral terms:   Reevaluation of transplant meds and elimination of unusual thinking.      Treatment Plan/Recommendations:   · Treatment was offered to pt, but he didn't think it was necessary at this time      Return to Clinic: as needed    Counseling time: 20  Total time: 60  Consulting clinician was informed of the encounter and consult note.

## 2019-04-12 ENCOUNTER — TELEPHONE (OUTPATIENT)
Dept: ENDOSCOPY | Facility: HOSPITAL | Age: 54
End: 2019-04-12

## 2019-04-24 ENCOUNTER — SOCIAL WORK (OUTPATIENT)
Dept: TRANSPLANT | Facility: CLINIC | Age: 54
End: 2019-04-24

## 2019-04-24 NOTE — PROGRESS NOTES
"  Mental health clearance update:    [facility] evaluated patient for organ transplant clearance 4/11/19. Please review complete psychiatric note for details. Psychiatry note indicated patient experienced symptoms of thought process problems due to taking mandatory kidney medicines in December 2018. Psychiatry note indicated pt reported the symptoms lasted about 5 or 6 seconds at a time and did not interfere in his medical care. Psychiatry note showed pt denies any thought process problems at this time. Psychiatry note indicated pt having good support system in place.    [facility] did not state patient had any psychiatric barriers to organ transplant. [facility] note indicates "Impression: Possible paranoid thinking, perhaps secondary to meds". See below further information clarified.    4-23-19 Epic message.  "MD Lynne Lema    He was very suspicious with me and gave a history of his home being monitored through the smoke detectors several months ago and alleged that federal agents came in and inspected his home.  He attributed these experiences to side effects from  medications he was on.  He denied these ideas at present.   The concern is whether these ideas could return if he requires similar medications in the future.   If the committee chooses to list him and if the symptoms recur, we would do our best to treat them. [facility]"     Transplant nephrologist informed of above via Epic message on 4-23-19.        Transplant caregiver plan confirmed:    Atif Helm, 53 yo wife (), Bashir Maldonado, does not drive/plans to use [employer] service, works full time at a law firm. [phone]    Transplant  telephoned pt today [phone] and reviewed and confirmed his primary organ transplant caregiver/transportation plan; pt reports supportive wife Atif Helm to be primary transplant caregiver but patient denies having back up caregiver. Pt reported was at the " "gym when transplant  called. Pt sounded lucid and upbeat. Pt reported sister is hoping to be evaluated for living donation to him but patient had problems understanding information transplant  was providing about living donation and transplant caregiver plan (donor would need own caregiver).  Pt reported is highly motivated to pursue organ transplant. Pt denied any psychiatric problems today.       Transplant psychosocial suitability:    Psychosocial Suitability: Patient presents as an acceptable, medium risk candidate for organ transplant at this time.     On 4-11-19, [facility] psychiatrically cleared patient (with some reservation) for organ transplant. Please review complete psychiatry note for details. Pt seemed anxious during 9-20-19 initial transplant  psychosocial and seemed unable at times to immediately clearly understand information as provided and  or wife had to re-state it for his understanding at times. At 9-20-19 initial transplant  psychosocial, pt seemed to need plenty of time to review new information, seemed to need more time to assimilate new information and ideas and seemed to need more time to ask questions as needed. Patient's cardiologist 12-12-19 note showed concern about possible paranoia due to medicine side effects. [facility] did not state patient had any psychiatric barriers to organ transplant. [facility] 4-11-19 note indicates "Impression: Possible paranoid thinking, perhaps secondary to meds". With further clarification in 4-23-19 Epic message "He was very suspicious with me and gave a history of his home being monitored through the smoke detectors several months ago and alleged that federal agents came in and inspected his home. He attributed these experiences to side effects from  medications he was on.  He denied these ideas at present.The concern is whether these ideas could return if he " "requires similar medications in the future. If the committee chooses to list him and if the symptoms recur, we would do our best to treat them. [facility]"     Based on psychosocial risk factors, patient presents as medium risk organ transplant candidate, due to patient reporting limited support system with no back up caregiver plan in place. Pt also presents as medium risk organ transplant candidate due to patient and caregiver needing to rely on cab service as organ transplant transportation; pt reports does have money to afford cab service transportation at this time. Pt was 35 minutes late for 2-28-19 initial psychiatry appointment so it needed to be rescheduled. Pt reports having organ transplant caregiver plan with cab service as transportation plan, medical insurance plan and plan to afford transplant costs all in place.     Recommendations/Additional Comments: Transplant  recommends confirming organ transplant caregiver/transportation plan prior to be called in for transplant.    Final determination of transplant candidacy will be made once work up is complete and reviewed by the selection committee.    Lynne Lloyd  MSW LCSW            "

## 2019-06-14 ENCOUNTER — COMMITTEE REVIEW (OUTPATIENT)
Dept: TRANSPLANT | Facility: CLINIC | Age: 54
End: 2019-06-14

## 2019-06-14 DIAGNOSIS — Z76.82 ORGAN TRANSPLANT CANDIDATE: Primary | ICD-10-CM

## 2019-06-14 NOTE — LETTER
June 14, 2019    Enrique Martinez  [address] [place] [address]    Dear Enrique Martinez:  MRN: [id number]    Your transplant evaluation was reviewed at the [facility] meeting on 6/14/2019.  It is with regret I inform you that your workup is incomplete and we are unable to determine your transplant candidacy at this time due to need of blood cultures and CBC to assess for infections, clarification from your cardiologist due to potential vegetation noted on an echocardiogram and clarification regarding psychiatric issues.  You will be re-presented to the selection committee once pending studies are completed.  You will be notified of the committees decision once we review the new results.    The [facility] Selection [facility] carefully considers each patients transplant candidacy using established selection criteria to determine if it is safe to proceed with transplantation for each and every person evaluated.  Although the selection committee believes your workup is incomplete and we are unable to determine your transplant candidacy, you have the right to be evaluated at other transplant centers.  You may request your [facility] records be sent to any center of your choice by contacting our Medical Records Department at [phone].    Attached is a letter from the [employer] Network for Organ Sharing (UNOS).  It describes the services and information offered to patients by UNOS and the Organ Procurement and Transplant Network.    Sincerely,      Katie West MD  Medical Director, Kidney & Kidney/Pancreas Transplantation  [facility]    In basket:  Wagner Valdez MD    Encl: UNOS Letter          OPTN/UNOS: Your Resource for Organ Transplant Information        If you have a question regarding your own medical care, you always should call your transplant center first. However, for general organ transplant-related information, you can call the [employer] Network  for Organ Sharing (UNOS) toll-free patient services line at 1-894.814.9483.    Anyone, including potential transplant candidates, recipients, family members/friends, living donors, and/or donor family members can call this number to:    · talk about organ donation, living donation, how transplant and donation work, the donation process, transplant policies, and transplant/donor information;  · get a free patient information kit with helpful booklets, waiting list and transplant information, and a list of all transplant centers;  · ask questions about the Organ Procurement and Transplantation Network (OPTN) web site (www.optn.transplant.hrsa.gov); the UNOS Web site (www.unos.org); or the UNOS web site for living donors and transplant recipients (www.transplantliving.org);  · learn how UNOS and the OPTN can help you;  · talk about any concerns that you may have with a transplant center and how they perform    UNOS is a not-for-profit organization that provides all of the administrative services for the national OPTN under federal contract to the Health Resources and Services Administration (HRSA), an agency under the U.S. Department of Health and Human Services (HHS).     UNOS and OPTN responsibilities include:    · writing educational material for patients, the public and professionals;  · helping to make people aware of the need for donated organs and tissue;  · writing organ transplant policy with help from doctors, nurses, transplant patients/candidates, donor families, living donors, and the public;  · coordinating the organ matching and placement process;  · collecting information about every organ transplant and donation that occurs in the [place].    Remember, you should contact your transplant center directly if you have questions or concerns about your own medical care including medical records, work-up progress and test reports. [facility] is not your transplant center, and staff at [facility] will not be able to  transfer you to your transplant center, so keep your transplant centers phone number handy. But, while you research your transplant needs and learn as much as you can about transplantation and donation, we welcome your call to our toll-free patient services line at [phone].

## 2019-06-14 NOTE — COMMITTEE REVIEW
"Native Organ Dx: Diabetes Mellitus - Type Other / Unknown      Unable to determine transplant candidacy at this time due to need of blood cultures and CBC due to leukocytosis, clarification from Dr. Hannon due to potential vegetation, and input from patient's nephrologist regarding patient's psych status.  Dr. Padilla to call patient nephrologist.    Patient with waxing and waning leukocytosis for which he was seen by hem-onc but labs from day of hem-onc visit showed normalization of WBC thus bone marrow biopsy was not pursued, but his WBCs have been 11.2 (3/27/19) --> 13.7 (5/29/19) in [facility]ARE [facility]. Additionally the TTE from 12/12/18 with report of: "mobile 1.1 x 1.0 cm echodensity concerning for vegetation" thus concern for whether he could have endocarditis?? Will need to get CBC with diff, blood cultures, and send message to Dr. Hannon to review the TTE and clarify whether there is a vegetation and whether he needs KAILA and additional work-up.     He had cystic lesions noted on renal US from May 2018 --> would get repeat renal US now.     This writer discussed case with Dr. Wagner Ivory (hospitals nephrology fellow), Dr. Leigha Blandon, and Dr. Justice Lee and below are the inputs. None of these providers had any knowledge of the paranoid thoughts that patient expressed [place] [facility]     [place]. Wagner Ivory: he feels patient often "blames" Acthar for a lot of his issues, He has been told to establish with a PCP many times but patient still hasn't done so and expects general nephrology to address everything. Patient generally keeps his appointments however he does have a hard time controlling patient's BP. Dr. Ivory didn't think he was a particularly "bad" transplant patient candidate.     Dr. Leigha Blandon: he feels that patient wants to dictate his care and that he knows what is best for himself. Dr. Blandon echoed the same sentiments regarding patient being instructed to get a PCP but " "refusing to do so.     Dr. Justice Lee: he feels that patient has a "strong survival instinct" and if he feels something will help him he will comply with it but "he can be opinionated" but he thinks he would do good as a transplant candidate.     Spoken to patient in regards to transplant candidacy. I had a difficult time explaining what was needed per committee recommendations. By the end of this conversation, patient was willing to come here for further assess and verbalized understanding.     Note written by ZOYA Mejia RN    ===============================================    I was present at the meeting and attest to the decision of the committee. My additional comments are bolded above.    Zari Padilla MD    "

## 2019-06-21 ENCOUNTER — TELEPHONE (OUTPATIENT)
Dept: TRANSPLANT | Facility: CLINIC | Age: 54
End: 2019-06-21

## 2019-07-09 ENCOUNTER — HOSPITAL ENCOUNTER (OUTPATIENT)
Dept: RADIOLOGY | Facility: HOSPITAL | Age: 54
Discharge: HOME | End: 2019-07-09
Attending: NURSE PRACTITIONER
Payer: MEDICAID

## 2019-07-09 DIAGNOSIS — Z76.82 ORGAN TRANSPLANT CANDIDATE: ICD-10-CM

## 2019-07-09 PROCEDURE — 76770 US RETROPERITONEAL COMPLETE: ICD-10-PCS | Mod: 26,TXP,, | Performed by: RADIOLOGY

## 2019-07-09 PROCEDURE — 76770 US EXAM ABDO BACK WALL COMP: CPT | Mod: 26,TXP,, | Performed by: RADIOLOGY

## 2019-07-09 PROCEDURE — 76770 US EXAM ABDO BACK WALL COMP: CPT | Mod: TC,TXP

## 2019-07-29 ENCOUNTER — TELEPHONE (OUTPATIENT)
Dept: TRANSPLANT | Facility: CLINIC | Age: 54
End: 2019-07-29

## 2019-07-29 ENCOUNTER — LAB VISIT (OUTPATIENT)
Dept: LAB | Facility: HOSPITAL | Age: 54
End: 2019-07-29
Attending: NURSE PRACTITIONER
Payer: MEDICAID

## 2019-07-29 DIAGNOSIS — Z76.82 ORGAN TRANSPLANT CANDIDATE: ICD-10-CM

## 2019-07-29 LAB
BASOPHILS # BLD AUTO: 0.02 K/UL (ref 0–0.2)
BASOPHILS NFR BLD: 0.2 % (ref 0–1.9)
DIFFERENTIAL METHOD: ABNORMAL
EOSINOPHIL # BLD AUTO: 0 K/UL (ref 0–0.5)
EOSINOPHIL NFR BLD: 0.5 % (ref 0–8)
ERYTHROCYTE [DISTWIDTH] IN BLOOD BY AUTOMATED COUNT: 14.2 % (ref 11.5–14.5)
HCT VFR BLD AUTO: 32.2 % (ref 40–54)
HGB BLD-MCNC: 10.9 G/DL (ref 14–18)
IMM GRANULOCYTES # BLD AUTO: 0.06 K/UL (ref 0–0.04)
IMM GRANULOCYTES NFR BLD AUTO: 0.7 % (ref 0–0.5)
LYMPHOCYTES # BLD AUTO: 1 K/UL (ref 1–4.8)
LYMPHOCYTES NFR BLD: 11.2 % (ref 18–48)
MCH RBC QN AUTO: 28.7 PG (ref 27–31)
MCHC RBC AUTO-ENTMCNC: 33.9 G/DL (ref 32–36)
MCV RBC AUTO: 85 FL (ref 82–98)
MONOCYTES # BLD AUTO: 0.6 K/UL (ref 0.3–1)
MONOCYTES NFR BLD: 6.7 % (ref 4–15)
NEUTROPHILS # BLD AUTO: 6.9 K/UL (ref 1.8–7.7)
NEUTROPHILS NFR BLD: 80.7 % (ref 38–73)
NRBC BLD-RTO: 0 /100 WBC
PLATELET # BLD AUTO: 190 K/UL (ref 150–350)
PMV BLD AUTO: 9.8 FL (ref 9.2–12.9)
RBC # BLD AUTO: 3.8 M/UL (ref 4.6–6.2)
WBC # BLD AUTO: 8.51 K/UL (ref 3.9–12.7)

## 2019-07-29 PROCEDURE — 87040 BLOOD CULTURE FOR BACTERIA: CPT | Mod: TXP

## 2019-07-29 PROCEDURE — 86833 HLA CLASS II HIGH DEFIN QUAL: CPT | Mod: PO,TXP

## 2019-07-29 PROCEDURE — 86832 HLA CLASS I HIGH DEFIN QUAL: CPT | Mod: PO,TXP

## 2019-07-29 PROCEDURE — 85025 COMPLETE CBC W/AUTO DIFF WBC: CPT | Mod: TXP

## 2019-07-29 NOTE — TELEPHONE ENCOUNTER
----- Message from Belkys Short RN sent at 7/26/2019  3:58 PM CDT -----  Contact: patient      ----- Message -----  From: Caroline Bean  Sent: 7/26/2019   3:41 PM  To: [facility] Pre-Kidney Transplant Clinical    Please call pt at [phone]    Patient would like to get his lab results    Thank you

## 2019-08-03 LAB
BACTERIA BLD CULT: NORMAL
BACTERIA BLD CULT: NORMAL

## 2019-08-05 LAB — HPRA INTERPRETATION: NORMAL

## 2019-08-06 ENCOUNTER — TELEPHONE (OUTPATIENT)
Dept: TRANSPLANT | Facility: CLINIC | Age: 54
End: 2019-08-06

## 2019-08-06 NOTE — TELEPHONE ENCOUNTER
Call returned. Mr Martinez inquiring about lab test results. Reviewed that WBC is normal and Blood culture was no growth. I advised that I would pass his call on to his coordinator for when she returns to the office.

## 2019-08-06 NOTE — TELEPHONE ENCOUNTER
----- Message from Donya Bullard sent at 8/6/2019 12:12 PM CDT -----  Contact: Pt  Test Results    Type of Test: Labs    Date of Test: 07/29/19    Communication Preference: [phone]     Additional Information: N/A

## 2019-08-08 LAB
CLASS I ANTIBODY COMMENTS - LUMINEX: NORMAL
CLASS II ANTIBODIES - LUMINEX: NEGATIVE
CPRA %: 0
SERUM COLLECTION DT - LUMINEX CLASS I: NORMAL
SERUM COLLECTION DT - LUMINEX CLASS II: NORMAL
SPCL1 TESTING DATE: NORMAL
SPCL2 TESTING DATE: NORMAL
SPCLU TESTING DATE: NORMAL

## 2019-08-09 ENCOUNTER — TELEPHONE (OUTPATIENT)
Dept: TRANSPLANT | Facility: CLINIC | Age: 54
End: 2019-08-09

## 2019-08-09 NOTE — TELEPHONE ENCOUNTER
----- Message from Lucien Hannon MD sent at 8/2/2019  5:31 PM CDT -----  I reviewed Mr. Martinez is echocardiogram with Dr. Lee Allen in the Cardiology imaging group.  We both believe that the density seen on the patient's aortic valve (any also has 1 on the mitral valve) represent calcified lesions and not of vegetations.  Given the fact that Mr. Martinez has gone 8 months without any evidence of an infection or embolization, it is extremely unlikely that these lesions represent endocarditis.  Therefore, it is our opinion that Mr. Martinez does not need to undergo transesophageal echocardiography.  If you have any further questions, please contact me.  ----- Message -----  From: Alicia Mejia  Sent: 8/2/2019   2:59 PM  To: Zari Padilla MD, MD Dr. Parvez Pope,  Can you please review the TTE and clarify whether there is a vegetation and whether he needs KAILA and additional work-up.    Alicia

## 2019-08-16 ENCOUNTER — TELEPHONE (OUTPATIENT)
Dept: TRANSPLANT | Facility: CLINIC | Age: 54
End: 2019-08-16

## 2019-08-16 NOTE — TELEPHONE ENCOUNTER
----- Message from Demetra Ashton LMSW sent at 8/15/2019  1:37 PM CDT -----  Contact: Enrique brown accidentally sent this to [facility]  ----- Message -----  From: Mariam Rubio  Sent: 8/15/2019   1:23 PM  To: [facility] Kidney Transplant Social Workers    Needs Advice    Reason for call:Pt called to f/u on his labs and scheduling with Iram.        Communication Preference:[phone]    Additional Information:

## 2019-08-16 NOTE — TELEPHONE ENCOUNTER
----- Message from Heide Wyatt sent at 8/16/2019  3:39 PM CDT -----  Pt states he's calling to speak w/coordinator and she knows the reason for his call    No further info given    Pt contact [phone]

## 2019-09-13 ENCOUNTER — COMMITTEE REVIEW (OUTPATIENT)
Dept: TRANSPLANT | Facility: CLINIC | Age: 54
End: 2019-09-13

## 2019-09-13 DIAGNOSIS — Z76.82 ORGAN TRANSPLANT CANDIDATE: Primary | ICD-10-CM

## 2019-09-13 NOTE — LETTER
September 13, 2019    Justice Lee MD  [address]  [address]  [place] [address]    Phone: [phone]  Fax: [phone]       Dear Dr. Lee:    Patient: Enrique Martinez   MR Number: [id number]   YOB: 1965     Your patient, Enrique Martinez, was recently discussed at the [facility] Selection [facility] meeting on 6/14/2019. I am happy to inform you that Enrique has been approved for transplantation.  He has met selection criteria for a kidney transplant related to CKD stage 5 secondary to primary diagnosis of Diabetes Mellitus - Type Other / Unknown. Your patient will be placed on the cadaveric wait list pending drug screen and dental clearance per insurance protocol for final financial approval from insurance company.     We appreciate your confidence in allowing us to participate in your patients care.  If you have any questions or concerns, please do not hesitate to contact me.    Sincerely,      Katie West MD  Medical Director, Kidney & Kidney/Pancreas Transplantation    [facility]//

## 2019-09-13 NOTE — COMMITTEE REVIEW
Native Organ Dx: Diabetes Mellitus - Type Other / Unknown      SELECTION COMMITTEE NOTE    Enrique Martinez was re-presented at selection committee on 9/13/2019.  Patient met selection criteria for kidney transplant related to CKD, Stage 5 due to    Diabetes Mellitus - Type Other / Unknown.  No absolute contraindications to transplant at this time.  Patient will be placed on the cadaveric wait list pending final financial approval from insurance company.  Patient will return to clinic for routine appointment in 1 year(s). Patient does not meet criteria for High KDPI kidney offer due to weight. Patient meets HCV+ kidney offer, but patient does NOT have Hep C Rx coverage at this time.     Patient will need drug screen and dental clearance per insurance protocol.        Note written by Alicia Mejia RN    ===============================================    I was present at the meeting and attest to the decision of the committee.    Jose Terry  09/13/2019

## 2019-09-25 DIAGNOSIS — Z76.82 ORGAN TRANSPLANT CANDIDATE: Primary | ICD-10-CM

## 2019-09-30 ENCOUNTER — TELEPHONE (OUTPATIENT)
Dept: TRANSPLANT | Facility: CLINIC | Age: 54
End: 2019-09-30

## 2019-10-04 ENCOUNTER — TELEPHONE (OUTPATIENT)
Dept: TRANSPLANT | Facility: CLINIC | Age: 54
End: 2019-10-04

## 2019-10-04 NOTE — TELEPHONE ENCOUNTER
Patient stated that appointment was make and he no longer needs a dental referral  ----- Message from Yumiko Garcia sent at 10/3/2019  4:21 PM CDT -----      ----- Message -----  From: Heide Wyatt  Sent: 10/3/2019  12:39 PM CDT  To: [facility] Pre-Kidney Transplant Clinical    Type:  Patient Requesting Referral    Who Called: pt    Does the patient already have the specialty appointment scheduled?: no    If yes, what is the date of that appointment?:     Referral to What Specialty: [facility] dental clinic    Reason for Referral: transplant clearance    Does the patient want the referral with a specific physician?: no    [facility] the specialist [facility]    Patient Requesting a Response?: yes    Would the patient rather a call back or a response via [facility]?  c/b    Best Call Back Number: [phone]    Additional Information:

## 2019-10-08 DIAGNOSIS — Z76.82 ORGAN TRANSPLANT CANDIDATE: Primary | ICD-10-CM

## 2019-10-11 ENCOUNTER — TELEPHONE (OUTPATIENT)
Dept: TRANSPLANT | Facility: CLINIC | Age: 54
End: 2019-10-11

## 2019-10-11 DIAGNOSIS — Z76.82 ORGAN TRANSPLANT CANDIDATE: Primary | ICD-10-CM

## 2019-10-11 NOTE — TELEPHONE ENCOUNTER
I spoken to Violeta. FC to sent her a completed prior Auth and last H&P for an extension. Patient will need to be seen again for an update before they will approved him for listing.   Left message for patient to contact me.        ----- Message from Brayden Beckman sent at 10/11/2019  8:21 AM CDT -----  Contact: Violeta dietz/ [employer]      ----- Message -----  From: Agueda Hardwick  Sent: 10/11/2019   8:05 AM CDT  To: [facility] Pre-Kidney Transplant Non-Clinical    [employer] was returning your phone call.    Violeta# [phone]

## 2019-10-31 ENCOUNTER — OFFICE VISIT (OUTPATIENT)
Dept: TRANSPLANT | Facility: CLINIC | Age: 54
End: 2019-10-31
Payer: MEDICAID

## 2019-10-31 ENCOUNTER — HOSPITAL ENCOUNTER (OUTPATIENT)
Dept: RADIOLOGY | Facility: HOSPITAL | Age: 54
Discharge: HOME | End: 2019-10-31
Attending: NURSE PRACTITIONER
Payer: MEDICAID

## 2019-10-31 VITALS
DIASTOLIC BLOOD PRESSURE: 95 MMHG | SYSTOLIC BLOOD PRESSURE: 148 MMHG | RESPIRATION RATE: 16 BRPM | HEIGHT: 64 IN | BODY MASS INDEX: 31.62 KG/M2 | HEART RATE: 79 BPM | TEMPERATURE: 98 F | WEIGHT: 185.19 LBS | OXYGEN SATURATION: 100 %

## 2019-10-31 DIAGNOSIS — N50.3 EPIDIDYMAL CYST: Chronic | ICD-10-CM

## 2019-10-31 DIAGNOSIS — Z76.82 ORGAN TRANSPLANT CANDIDATE: ICD-10-CM

## 2019-10-31 DIAGNOSIS — R73.03 PRE-DIABETES: ICD-10-CM

## 2019-10-31 DIAGNOSIS — N18.4 CKD (CHRONIC KIDNEY DISEASE), STAGE IV: ICD-10-CM

## 2019-10-31 DIAGNOSIS — I15.0 RENOVASCULAR HYPERTENSION: ICD-10-CM

## 2019-10-31 DIAGNOSIS — Z01.818 PRE-TRANSPLANT EVALUATION FOR CHRONIC KIDNEY DISEASE: Primary | ICD-10-CM

## 2019-10-31 PROCEDURE — 71046 X-RAY EXAM CHEST 2 VIEWS: CPT | Mod: TC,TXP

## 2019-10-31 PROCEDURE — 99999 PR PBB SHADOW E&M-EST. PATIENT-LVL IV: ICD-10-PCS | Mod: PBBFAC,TXP,, | Performed by: NURSE PRACTITIONER

## 2019-10-31 PROCEDURE — 99205 PR OFFICE/OUTPT VISIT, NEW, LEVL V, 60-74 MIN: ICD-10-PCS | Mod: S$PBB,TXP,, | Performed by: NURSE PRACTITIONER

## 2019-10-31 PROCEDURE — 71046 X-RAY EXAM CHEST 2 VIEWS: CPT | Mod: 26,TXP,, | Performed by: RADIOLOGY

## 2019-10-31 PROCEDURE — 71046 XR CHEST PA AND LATERAL: ICD-10-PCS | Mod: 26,TXP,, | Performed by: RADIOLOGY

## 2019-10-31 PROCEDURE — 99999 PR PBB SHADOW E&M-EST. PATIENT-LVL IV: CPT | Mod: PBBFAC,TXP,, | Performed by: NURSE PRACTITIONER

## 2019-10-31 PROCEDURE — 99205 OFFICE O/P NEW HI 60 MIN: CPT | Mod: S$PBB,TXP,, | Performed by: NURSE PRACTITIONER

## 2019-10-31 PROCEDURE — 99214 OFFICE O/P EST MOD 30 MIN: CPT | Mod: PBBFAC,25,TXP | Performed by: NURSE PRACTITIONER

## 2019-10-31 NOTE — LETTER
November 4, 2019        Justice Lee  [address]  [address]  [place] [address]  Phone: [phone]  Fax: [phone]             [facility]  [address]  [facility] [address]  Phone: [phone]   Patient: Enrique Martinez   MR Number: [id number]   YOB: 1965   Date of Visit: 10/31/2019       Dear Dr. Justice Lee    Thank you for referring Enrique Martinez to me for evaluation. Attached you will find relevant portions of my assessment and plan of care.    If you have questions, please do not hesitate to call me. I look forward to following Enrique Martinez along with you.    Sincerely,    Stephie Harmon, NP    Enclosure    If you would like to receive this communication electronically, please contact [email or web address] or [phone] to request [employer] access.    [employer] is a tool which provides read-only access to select patient information with whom you have a relationship. Its easy to use and provides real time access to review your patients record including encounter summaries, notes, results, and demographic information.    If you feel you have received this communication in error or would no longer like to receive these types of communications, please e-mail [email or web address]

## 2019-10-31 NOTE — PROGRESS NOTES
Transplant Nephrology  Kidney Transplant Recipient Evaluation    Referring Physician: Wagner Valdez  Current Nephrologist: Justice Lee    Subjective:   CC:  Initial evaluation of kidney transplant candidacy.    HPI:  Mr. Martinez is a 54 y.o. year old Black or  male who has presented to be evaluated as a potential kidney transplant recipient.  He has advanced kidney disease secondary to diabetic nephropathy.  Patient is currently pre-dialysis. He has a no access for dialysis access. The patient over all is doing well and looks good. Remains active, denies any SOB or chest pain. Does get some LE edema but takes Lasix. Reports Epididymal cyst is enlarging . He f/u with [facility]/ urology  Pt reports having a living donor in work up.    patient approved for listing but after an UTD visit with transplant nephrology    Previous Transplant: no     10/31/2019 CXR  Impression     No acute disease     7/9/2019  Impression     Chronic medical renal disease.  Multiple, bilateral simple to minimally complex renal cysts as above.  The largest cyst on the left warrants surveillance.  A repeat ultrasound is recommended in 6 months.       Past Medical and Surgical History: Mr. Martinez  has a past medical history of Anemia, Coronary artery disease, Diabetes mellitus, type 2, GERD (gastroesophageal reflux disease), Gout, Hydrocele in adult, Hyperlipidemia, Hypertension, Inguinal hernia, Membranous glomerulonephritis, Nephrotic range proteinuria, Nephrotic syndrome, Obesity, and Umbilical hernia.  He has a past surgical history that includes Coronary stent placement (2007); Abdominal surgery (1980s); Cardiac catheterization (2007); and Colonoscopy (N/A, 4/5/2019).    Past Social and Family History: Mr. Martinez reports that he has been smoking. He has a 2.50 pack-year smoking history. He has never used smokeless tobacco. He reports that he does not drink alcohol or use drugs. His family history includes Drug abuse in his  "brother; Heart attack in his brother and father; Hyperlipidemia in his father; Hypertension in his brother and father; No Known Problems in his sister; Stroke in his father.    Past Medical History:   Diagnosis Date    Anemia     Coronary artery disease     Diabetes mellitus, type 2     GERD (gastroesophageal reflux disease)     Gout     Hydrocele in adult     bilateral    Hyperlipidemia     Hypertension     Inguinal hernia     bilateral    Membranous glomerulonephritis     Nephrotic range proteinuria     Nephrotic syndrome     Obesity     Umbilical hernia        Review of Systems   Constitutional: Negative for activity change, appetite change, chills, fatigue, fever and unexpected weight change.   HENT: Negative for congestion, facial swelling, postnasal drip, rhinorrhea, sinus pressure, sore throat and trouble swallowing.    Eyes: Negative for pain, redness and visual disturbance.   Respiratory: Negative for cough, chest tightness, shortness of breath and wheezing.    Cardiovascular: Positive for leg swelling. Negative for chest pain and palpitations.   Gastrointestinal: Negative for abdominal pain, diarrhea, nausea and vomiting.   Genitourinary: Negative for dysuria, flank pain and urgency.   Musculoskeletal: Negative for gait problem, neck pain and neck stiffness.   Skin: Negative for rash.   Allergic/Immunologic: Negative for environmental allergies, food allergies and immunocompromised state.   Neurological: Negative for dizziness, weakness, light-headedness and headaches.   Psychiatric/Behavioral: Negative for agitation and confusion. The patient is not nervous/anxious.        Objective:   Blood pressure (!) 148/95, pulse 79, temperature 97.7 °F (36.5 °C), temperature source Oral, resp. rate 16, height 5' 3.78" (1.62 m), weight 84 kg (185 lb 3 oz), SpO2 100 %.body mass index is 32.01 kg/m².    Physical Exam   Constitutional: He is oriented to person, place, and time. He appears well-developed " and well-nourished.   HENT:   Head: Normocephalic.   Mouth/Throat: Oropharynx is clear and moist. No oropharyngeal exudate.   Eyes: Pupils are equal, round, and reactive to light. Conjunctivae and EOM are normal. No scleral icterus.   Neck: Normal range of motion. Neck supple.   Cardiovascular: Normal rate, regular rhythm and normal heart sounds.   Pulmonary/Chest: Effort normal and breath sounds normal.   Abdominal: Soft. Normal appearance and bowel sounds are normal. He exhibits distension. He exhibits no mass. There is no splenomegaly or hepatomegaly. There is no tenderness. There is no rebound, no guarding, no CVA tenderness, no tenderness at McBurney's point and negative Dumont's sign.   Musculoskeletal: Normal range of motion. He exhibits edema.   bilateral +1 peripheral edema   Lymphadenopathy:     He has no cervical adenopathy.   Neurological: He is alert and oriented to person, place, and time. He exhibits normal muscle tone. Coordination normal.   Skin: Skin is warm and dry.   Psychiatric: He has a normal mood and affect. His behavior is normal.   Vitals reviewed.      Labs:  Lab Results   Component Value Date    HGBA1C 5.9 (H) 09/18/2018       Lab Results   Component Value Date    WBC 6.66 10/31/2019    HGB 8.3 (L) 10/31/2019    HCT 25.4 (L) 10/31/2019     09/18/2018    K 3.5 09/18/2018     09/18/2018    CO2 24 09/18/2018    BUN 51 (H) 09/18/2018    CREATININE 4.0 (H) 09/18/2018    EGFRNONAA 15.8 (A) 09/18/2018    CALCIUM 9.3 09/18/2018    PHOS 3.4 09/18/2018    MG 2.2 12/08/2014    ALBUMIN 3.2 (L) 09/18/2018    AST 21 09/18/2018    ALT 26 09/18/2018    UTPCR 1.76 (H) 09/18/2018    .0 (H) 09/18/2018       No results found for: PREALBUMIN, BILIRUBINUA, GGT, AMYLASE, LIPASE, PROTEINUA, NITRITE, RBCUA, WBCUA    No results found for: HLAABCTYPE    Labs were reviewed with the patient.    Assessment:     1. Pre-transplant evaluation for chronic kidney disease    2. CKD (chronic kidney  disease), stage IV    3. Renovascular hypertension    4. Pre-diabetes    5. Epididymal cyst        Plan:   Repeat renal US 1/2019--reassess complex renal cyst   f/u with psych/ pt w/history psych issues as recommended   Encouraged to f/u with urology d/t epididymal cyst concerns.         Transplant Candidacy:   Based on available information, Mr. Martinez is a suitable kidney transplant candidate.   Meets center eligibility for accepting HCV+ donor offer - yes.  Patient educated on HCV+ donors. Enrique is willing to accept HCV+ donor offer -  does NOT have Hep C Rx coverage at this time.   Patient is a candidate for KDPI > 85 kidney donor offer - no d/t weight .  Final determination of transplant candidacy will be made once workup is complete and reviewed by the selection committee.    Stephie Harmon NP       OS Patient Status  Functional Status: 80% - Normal activity with effort: some symptoms of disease  Physical Capacity: No Limitations

## 2019-10-31 NOTE — PROGRESS NOTES
Transplant Recipient Adult Psychosocial Assessment    Enrique Martinez  [address]   [place] [address]  Telephone Information:   Mobile [phone]   Home  [phone] (home)  Work  There is no work phone number on file.  E-mail  [email or web address]    Sex: male  YOB: 1965  Age: 54 y.o.    Encounter Date: 10/31/2019  [place] Citizen: yes  Primary Language: English   Needed: no    Emergency Contact:  Atif Helm, 55 yo wife (), [place], does not drive/plans to use cab service, works full time at a law firm. [phone]    Family/Social Support:   Number of dependents/: pt denies  Marital history:  3 x, 2 x . Is legally  from his wife Atif but they still live together and their relationship is amicable.  Other family dynamics: Pt reports both parents are . Pt reports lives in [place] with wife and they are legally . Pt reports having 2 grown children who live in [place] and will not be able to assist with organ transplant. Pt reports having siblings however some of the siblings live away and/or are not reliable to assist with transplant -- reports some siblings have drug addiction issues or other medical issues, such as CVA history. Pt denies having back up caregiver in place at this time. Pt reports plan to speak with [employer] friends about back up caregiver plan. Pt's wife Atif in pre transplant appointments today and reports will be patient's primary transplant caregiver.    Household Composition:  Atif Helm, 55 yo wife (), [place], does not drive/plans to use cab service, works full time at a law firm. [phone]    Do you and your caregivers have access to reliable transportation? yes Pt reports does drive and can drive to all pre transplant appointments and other medical appointments. Pt's listed primary transplant caregiver reports will be using cab service for post  transplant appointments and pt reports can afford.    PRIMARY CAREGIVER: Atif Helm, wife, will be primary caregiver, phone number [phone].      provided in-depth information to patient and caregiver regarding pre- and post-transplant caregiver role.   strongly encourages patient and caregiver to have concrete plan regarding post-transplant care giving, including back-up caregiver(s) to ensure care giving needs are met as needed.    Patient and Caregiver states understanding all aspects of caregiver role/commitment and is able/willing/committed to being caregiver to the fullest extent necessary.    Patient and Caregiver verbalizes understanding of the education provided today and caregiver responsibilities.         remains available. Patient and Caregiver agree to contact  in a timely manner if concerns arise.      Able to take time off work without financial concerns: yes. Wife reports plan to use PTO for time missed due to transplant. Pt reports plan to speak with [employer] friends regarding back up caregiver plan.    Additional Significant Others who will Assist with Transplant:   Pt denies having back up caregiver in place at this time. Pt reports plan to speak with S[employer] friends about back up caregiver plan.    Living Will: no  Healthcare Power of : no  Advance Directives on file: <<no information> per medical record.  Verbally reviewed LW/HCPA information.   provided patient with copy of LW/HCPA documents and provided education on completion of forms.    Living Donors: Education and resource information given to patient.    Highest Education Level: Attended College/Technical School Pt reports attended college 3 years  Reading Ability: 12th grade  Reports difficulty with: seeing wears glasses  Learns Best By:  Pt denies having any problems learning new information. Pt seemed a bit anxious during session. Pt seemed unable at times to  immediately clearly understand information as provided and  or wife had to re-state it for his understanding at times. Pt seems to need plenty of time to review new information, seems to need ample time to assimilate new information and ideas and seems to need ample time to ask questions as needed.     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 Pt reports prior to [employer] he worked for [employer] but was fired. Pt reports provided his employer [employer] with medical documentation outlining reasons for missed work and/or problems with certain work activities and patient reports was fired days later with no explanation.     Spouse/Significant Other Employment: spouse works full time at a law firm. Wife and patient are legally  and wife is not financially responsible for patient's finances.    Disabled: no.     Monthly Income:  At most -- about $300 per week x 4 weeks is about $1200 a month before taxes. Pt reports amount changes due to demand and also due to ability to manage a schedule when sick.Pt reports does have money in savings account -- did not give amount in savings account.  Able to afford all costs now and if transplanted, including medications: yes pt reports plan to fund raise. Pt reports can afford [place] Medicaid medicine co-pays 50 cents to 3.50 co-pays.  Patient and Caregiver verbalizes understanding of personal responsibilities related to transplant costs and the importance of having a financial plan to ensure that patients transplant costs are fully covered.       provided fundraising information/education. Patient and Caregiververbalizes understanding.   remains available.    Insurance:   Payor/Plan Subscr  Sex Relation Sub. Ins. ID Effective Group Num   1. MEDICAID - [place]* MARK TEJADA 1965 Male   "[id number]* 6/1/17                                    [address]     Primary Insurance (for UNOS reporting): Public Insurance - Medicaid  Secondary Insurance (for UNOS reporting): None Pt is currently pre dialysis and not on Medicare. Pt was provided Medicare book for review.  Patient and Caregiver verbalizes clear understanding that patient may experience difficulty obtaining and/or be denied insurance coverage post-surgery. This includes and is not limited to disability insurance, life insurance, health insurance, burial insurance, long term care insurance, and other insurances.      Patient and Caregiver also reports understanding that future health concerns related to or unrelated to transplantation may not be covered by patient's insurance.  Resources and information provided and reviewed.     Patient and Caregiver provides verbal permission to release any necessary information to outside resources for patient care and discharge planning.  Resources and information provided are reviewed.      Dialysis Adherence: Pt is pre dialysis at this time. When BRE asked pt about going on dialysis he stated "Don't even mention that word to me. I'm not doing it. I have a donor". BRE attempted to ask patient further about resistance to dialysis and he stated "I monitor what I need to and I will if it's life or death but it,s not right now". BRE encouraged pt to speak with medical team about this.     Nephrologist: Wagner Valdez of [facility]. Patient has a history of compliance with nephrology .    Infusion Service: patient utilizing? no  Home Health: patient utilizing? no  DME: no  Pulmonary/Cardiac Rehab: pt denies   ADLS:  Independent with ADLs and medication management     Adherence:   Pt reports is highly compliant with all medical appointments and instructions  Adherence education and counseling provided.     Per History Section:  Past Medical History:   Diagnosis Date    Anemia     Coronary artery disease     Diabetes " mellitus, type 2     GERD (gastroesophageal reflux disease)     Gout     Hydrocele in adult     bilateral    Hyperlipidemia     Hypertension     Inguinal hernia     bilateral    Membranous glomerulonephritis     Nephrotic range proteinuria     Nephrotic syndrome     Obesity     Umbilical hernia      Social History     Tobacco Use    Smoking status: Current Some Day Smoker     Packs/day: 0.25     Years: 10.00     Pack years: 2.50    Smokeless tobacco: Never Used   Substance Use Topics    Alcohol use: No     Social History     Substance and Sexual Activity   Drug Use No     Social History     Substance and Sexual Activity   Sexual Activity Not on file       Per Today's Psychosocial:  Tobacco: none, patient denies any use at this time. Former smoker 1/4 ppd. Pt reports plan to abstain.  Alcohol: none, patient denies any use at this time. Pt reports plan to abstain.  Illicit Drugs/Non-prescribed Medications: none, patient denies any use.    Patient and Caregiver states clear understanding of the potential impact of substance use as it relates to transplant candidacy and is aware of possible random substance screening.  Substance abstinence/cessation counseling, education and resources provided and reviewed.     Arrests/DWI/Treatment/Rehab: patient denies    Psychiatric History:    Mental Health: pt denies any mental health history and denies any overwhelming feelings of depression or anxiety at this time. Patient does have a history of paranoia and has been cleared by psych. Patient claims it is due to some of his medications. Please see notes for details.  Patient was not paranoid with this writer today.   Psychiatrist/Counselor: pt denies  Medications:  Pt denies  Suicide/Homicide Issues: pt denies si/hi history at this time   Safety at home: pt reports living in safe environment at this time.    Knowledge: Patient and Caregiver states having clear understanding and realistic expectations regarding the  potential risks and potential benefits of organ transplantation and organ donation and agrees to discuss with health care team members and support system members, as well as to utilize available resources and express questions and/or concerns in order to further facilitate the pt informed decision-making.  Resources and information provided and reviewed.    Patient and Caregiver is aware of [facility]'s affiliation and/or partnership with agencies in home health care, LTAC, SNF, [facility], and other hospitals and clinics.    Understanding: Patient and Caregiver reports having a clear understanding of the many lifetime commitments involved with being a transplant recipient, including costs, compliance, medications, lab work, procedures, appointments, concrete and financial planning, preparedness, timely and appropriate communication of concerns, abstinence (ETOH, tobacco, illicit non-prescribed drugs), adherence to all health care team recommendations, support system and caregiver involvement, appropriate and timely resource utilization and follow-through, mental health counseling as needed/recommended, and patient and caregiver responsibilities.  Social Service Handbook, resources and detailed educational information provided and reviewed.  Educational information provided.    Patient and Caregiver also reports current and expected compliance with health care regime and states having a clear understanding of the importance of compliance.      Patient and Caregiver reports a clear understanding that risks and benefits may be involved with organ transplantation and with organ donation.       Patient and Caregiver also reports clear understanding that psychosocial risk factors may affect patient, and include but are not limited to feelings of depression, generalized anxiety, anxiety regarding dependence on others, post traumatic stress disorder, feelings of guilt and other emotional and/or mental concerns, and/or exacerbation  of existing mental health concerns.  Detailed resources provided and discussed.      Patient and Caregiver agrees to access appropriate resources in a timely manner as needed and/or as recommended, and to communicate concerns appropriately.  Patient and Caregiver also reports a clear understanding of treatment options available.     Patient and Caregiver received education in a group setting.   reviewed education, provided additional information, and answered questions.    Feelings or Concerns: Pt reports high motivation to pursue organ transplant.    Coping: Pt reports is coping well over all with ESRD and need for organ transplant. Pt reports keith best through exercise and nithin and prayer.    Goals: Return to work once transplanted and recovered.  Patient referred to Vocational Rehabilitation.    Interview Behavior: Patient and Caregiver presents as alert and oriented x 4, pleasant, good eye contact, well groomed, average intelligence, communicative, cooperative and asking and answering questions appropriately.        Transplant Social Work - Candidacy  Assessment/Plan:     Psychosocial Suitability: Patient presents as an acceptable candidate for kidney transplant at this time. Based on psychosocial risk factors, patient presents as high risk, due to patient reporting limited support system with no back up caregiver plan in place; pt reports plan to discuss back up caregiver plan with [employer] friends and will notify transplant team of outcome.    Recommendations/Additional Comments:   BRE recommends that pt conduct fundraising to assist pt with pay for cost of medication, food,gas, and other transplant related expenses. BRE recommends that pt remain free of all tobacco,ETOH, and substance use. SW remains available to assist with any concerns that may arise as pt navigates through the transplant process.        Demetra Ashton LMSW

## 2019-11-05 ENCOUNTER — TELEPHONE (OUTPATIENT)
Dept: TRANSPLANT | Facility: CLINIC | Age: 54
End: 2019-11-05

## 2019-11-05 NOTE — TELEPHONE ENCOUNTER
----- Message from REGINA Steele, ANP sent at 11/5/2019  1:03 PM CST -----  Refer to ID to look at this. Thanks    ----- Message -----  From: Alicia Mejia  Sent: 11/5/2019  11:01 AM CST  To: REGINA Steele, ANP    Helio,     Patient has +RPR and FTA this year. Patient was seen a year ago by you. He reported a history and treated by Anast[facility] but his lab test a year ago was negative. Does he have to come back to be seen by you guys?    Alicia

## 2019-11-05 NOTE — TELEPHONE ENCOUNTER
----- Message from Yumiko Garcia sent at 11/4/2019  4:17 PM CST -----  Contact: Pt      ----- Message -----  From: Arianna Magana  Sent: 11/4/2019   4:03 PM CST  To: [facility] Pre-Kidney Transplant Clinical    Pt would like to speak with someone to found out lab results.    Pt# [phone]

## 2019-11-06 ENCOUNTER — TELEPHONE (OUTPATIENT)
Dept: TRANSPLANT | Facility: CLINIC | Age: 54
End: 2019-11-06

## 2019-11-08 ENCOUNTER — TELEPHONE (OUTPATIENT)
Dept: TRANSPLANT | Facility: CLINIC | Age: 54
End: 2019-11-08

## 2019-11-08 NOTE — TELEPHONE ENCOUNTER
Patient stated that he spoken to Krupa and everything has been taken care of.   ----- Message from Martha Noe sent at 11/8/2019 11:06 AM CST -----  Contact: pt#[phone]  Pt did not want to give details and he just wants a call back.

## 2019-11-19 ENCOUNTER — OFFICE VISIT (OUTPATIENT)
Dept: INFECTIOUS DISEASES | Facility: CLINIC | Age: 54
End: 2019-11-19
Payer: MEDICAID

## 2019-11-19 VITALS
HEART RATE: 77 BPM | BODY MASS INDEX: 29.58 KG/M2 | HEIGHT: 66 IN | SYSTOLIC BLOOD PRESSURE: 112 MMHG | TEMPERATURE: 98 F | WEIGHT: 184.06 LBS | DIASTOLIC BLOOD PRESSURE: 81 MMHG

## 2019-11-19 DIAGNOSIS — Z86.19 HX OF SYPHILIS: Primary | ICD-10-CM

## 2019-11-19 PROCEDURE — 99214 PR OFFICE/OUTPT VISIT, EST, LEVL IV, 30-39 MIN: ICD-10-PCS | Mod: S$PBB,NTX,, | Performed by: INTERNAL MEDICINE

## 2019-11-19 PROCEDURE — 99213 OFFICE O/P EST LOW 20 MIN: CPT | Mod: PBBFAC,TXP | Performed by: INTERNAL MEDICINE

## 2019-11-19 PROCEDURE — 99214 OFFICE O/P EST MOD 30 MIN: CPT | Mod: S$PBB,NTX,, | Performed by: INTERNAL MEDICINE

## 2019-11-19 PROCEDURE — 99999 PR PBB SHADOW E&M-EST. PATIENT-LVL III: CPT | Mod: PBBFAC,TXP,, | Performed by: INTERNAL MEDICINE

## 2019-11-19 PROCEDURE — 99999 PR PBB SHADOW E&M-EST. PATIENT-LVL III: ICD-10-PCS | Mod: PBBFAC,TXP,, | Performed by: INTERNAL MEDICINE

## 2019-11-19 NOTE — PROGRESS NOTES
Infectious Diseases Clinic Note    Subjective:       Patient ID: Enrique Martinez is a 54 y.o. male.    Chief Complaint: No chief complaint on file.    HPI    55 y/o M h/o DM nephropathy not currently on HD Being worked up for living donor kidney transplant here to discuss +RPR.    20 years ago dx and treated, 6/27/18 PCN IM x 3, then 9/18/18 then in 12/12/18 RPR negative, then repeated for annual eval and RPR, then 10/31/19 RPR 1:1 and FTA IgG IgM positive, patient has not had sex in the past year     Past Medical History:   Diagnosis Date    Anemia     Coronary artery disease     Diabetes mellitus, type 2     GERD (gastroesophageal reflux disease)     Gout     Hydrocele in adult     bilateral    Hyperlipidemia     Hypertension     Inguinal hernia     bilateral    Membranous glomerulonephritis     Nephrotic range proteinuria     Nephrotic syndrome     Obesity     Umbilical hernia        Social History     Socioeconomic History    Marital status:      Spouse name: Not on file    Number of children: Not on file    Years of education: Not on file    Highest education level: Not on file   Occupational History    Not on file   Social Needs    Financial resource strain: Not on file    Food insecurity:     Worry: Not on file     Inability: Not on file    Transportation needs:     Medical: Not on file     Non-medical: Not on file   Tobacco Use    Smoking status: Current Some Day Smoker     Packs/day: 0.25     Years: 10.00     Pack years: 2.50    Smokeless tobacco: Never Used   Substance and Sexual Activity    Alcohol use: No    Drug use: No    Sexual activity: Not on file   Lifestyle    Physical activity:     Days per week: Not on file     Minutes per session: Not on file    Stress: Not on file   Relationships    Social connections:     Talks on phone: Not on file     Gets together: Not on file     Attends [employer] witness service: Not on file     Active member of club or organization: Not on  file     Attends meetings of clubs or organizations: Not on file     Relationship status: Not on file   Other Topics Concern    Not on file   Social History Narrative    Not on file         Current Outpatient Medications:     allopurinol (ZYLOPRIM) 100 MG tablet, Take 100 mg by mouth once daily. , Disp: , Rfl:     amlodipine (NORVASC) 10 MG tablet, Take 10 mg by mouth once daily., Disp: , Rfl:     atorvastatin (LIPITOR) 20 MG tablet, Take 20 mg by mouth once daily., Disp: , Rfl:     chlorthalidone (HYGROTEN) 50 MG Tab, Take 25 mg by mouth once daily., Disp: , Rfl:     cloNIDine (CATAPRES) 0.2 MG tablet, Take 0.2 mg by mouth 3 (three) times daily. , Disp: , Rfl:     colchicine (COLCRYS) 0.6 mg tablet, Take 0.6 mg by mouth daily as needed. , Disp: , Rfl:     corticotropin (ACTHAR H.P.) 80 unit/mL injectable gel, Inject 40 Units into the skin twice a week., Disp: , Rfl:     ferrous gluconate (FERGON) 324 MG tablet, Take 324 mg by mouth daily with breakfast., Disp: , Rfl:     fluticasone (FLONASE) 50 mcg/actuation nasal spray, 1 spray by Each Nare route once daily., Disp: , Rfl:     furosemide (LASIX) 40 MG tablet, Take 40 mg by mouth 2 (two) times daily., Disp: , Rfl:     potassium chloride (KLOR-CON) 10 MEQ TbSR, Take 20 mEq by mouth once daily. , Disp: , Rfl:     ranitidine (ZANTAC) 300 MG tablet, Take 300 mg by mouth nightly as needed. , Disp: , Rfl:     sodium bicarbonate 650 MG tablet, Take 650 mg by mouth 3 (three) times daily. , Disp: , Rfl:     albuterol sulfate 2.5 mg/0.5 mL Nebu, Take 2.5 mg by nebulization every 4 (four) hours as needed. (Patient not taking: Reported on 11/19/2019), Disp: 1 each, Rfl: 0    nicotine (NICODERM CQ) 14 mg/24 hr, Place 1 patch onto the skin every 24 hours., Disp: , Rfl:     sildenafil (VIAGRA) 50 MG tablet, Take 50 mg by mouth daily as needed for Erectile Dysfunction., Disp: , Rfl:     Review of Systems   Constitutional: Negative for activity change, appetite  change, chills, fatigue and fever.   HENT: Negative for congestion, dental problem, mouth sores and sinus pressure.    Eyes: Negative for pain, redness and visual disturbance.   Respiratory: Negative for cough, shortness of breath and wheezing.    Cardiovascular: Negative for chest pain and leg swelling.   Gastrointestinal: Negative for abdominal distention, abdominal pain, diarrhea, nausea and vomiting.   Endocrine: Negative for polyuria.   Genitourinary: Negative for decreased urine volume, dysuria and frequency.   Musculoskeletal: Negative for joint swelling.   Skin: Negative for color change.   Allergic/Immunologic: Negative for food allergies.   Neurological: Negative for dizziness, weakness and headaches.   Hematological: Negative for adenopathy.   Psychiatric/Behavioral: Negative for agitation and confusion. The patient is not nervous/anxious.            Objective:      Vitals:    11/19/19 1540   BP: 112/81   Pulse: 77   Temp: 97.7 °F (36.5 °C)     Physical Exam   Constitutional: He is oriented to person, place, and time. He appears well-developed and well-nourished.   HENT:   Head: Normocephalic and atraumatic.   Mouth/Throat: Oropharynx is clear and moist.   Eyes: Conjunctivae are normal.   Neck: Neck supple.   Cardiovascular: Normal rate, regular rhythm and normal heart sounds.   No murmur heard.  Pulmonary/Chest: Effort normal and breath sounds normal. No respiratory distress. He has no wheezes.   Abdominal: Soft. Bowel sounds are normal. He exhibits no distension. There is no tenderness.   Musculoskeletal: Normal range of motion. He exhibits no edema or tenderness.   Lymphadenopathy:     He has no cervical adenopathy.   Neurological: He is alert and oriented to person, place, and time. Coordination normal.   Skin: Skin is warm and dry. No rash noted.   Psychiatric: He has a normal mood and affect. His behavior is normal.           Assessment/Plan:       No diagnosis found.      55 y/o M h/o DM  nephropathy not currently on HD Being worked up for living donor kidney transplant here to discuss +RPR  - pt has been treated at least 3 times in past.  RPR 1:1, suspect serofast, no further treatment needed at this time  - no clear absolute infectious contraindications to transplantation

## 2019-11-20 ENCOUNTER — TELEPHONE (OUTPATIENT)
Dept: TRANSPLANT | Facility: CLINIC | Age: 54
End: 2019-11-20

## 2019-11-22 ENCOUNTER — TELEPHONE (OUTPATIENT)
Dept: TRANSPLANT | Facility: CLINIC | Age: 54
End: 2019-11-22

## 2019-11-22 DIAGNOSIS — Z76.82 ORGAN TRANSPLANT CANDIDATE: Primary | ICD-10-CM

## 2019-11-22 NOTE — TELEPHONE ENCOUNTER
KIDNEY WAIT LISTING NOTE    Date of Financial clearance to list: 2019    SSN/[facility]:     Organ: Kidney  Name:       Enrique Martinez   : 1965          Gender:     male    MRN#: [id number]                                 State of Permanent Residence:  [address] [place] [id number]  Ethnicity: /Black   Race:      Black or     CLINICAL INFORMATION   Candidate Medical Urgency Status: Active  Number of Previous Kidney Transplants: 0  Number of Previous Solid Organ Transplants: 0  Did you enter number of previous kidney or other solid organ transplants? yes  Is this Candidate a Prior Living Donor: no  (If yes, please generate letter to [facility] with patient's date of donation, recipient SSN, signed by Surgical Director after patient is listed in order to receive priority points).      ABO  ABO Blood Group:   O NEG     ABO Confirmation: (THESE DATES MUST BE PRIOR TO THE LIST DATE AND SUPPORTED BY SEPARATE LAB REPORTS)    Internal Results    Lab Results   Component Value Date    GROUPTRH O NEG 2018    GROUPTRH O NEG 2018     No results found for: ABO    External Results    ABO Date 1:    ABO Date 2  Are either of these ABO results based on External Labs? no  (If Yes, STOP and go to source document in Media Tab for verification).    VITALS  Height:  5'3  Weight: 185 lbs   (Use height from Transplant clinic visits only).  Did you enter height/weight? Yes    HLA    Class I:  Lab Results   Component Value Date    POAO1CW 3 2018    KPMX9BS 80 2018    VIXL0IO 7 2018    DUUO5SQ 44 2018    HUYGA6SO 6 2018    QGUAU1ZC 4 2018    GTJPM3CL 4 2018    GTYOD7QJ 7 2018       Class II:  Lab Results   Component Value Date    LCHXGD81SJ 11 2018    NJFPNG27UN 13 2018    JXELEI763WH 52 2018    EURUWF0441 XX 2018    WOZXN9PH 7 2018    AGWHO2KE 6 2018       Tested for HLA  "Antibodies: Yes, antibodies detected     If result is "Positive" antibodies are detected     If result is "Negative or questionable" no antibodies detected    Lab Results   Component Value Date    CIPRAS Negative 09/18/2018    CIIPRAS Negative 09/18/2018       DIALYSIS INFORMATION  Is patient Pre-Dialysis: Yes     GFR Information  Report GFR being used as the criteria for placement on the kidney list. If not, leave blank  GFR < or = 20 ml/min? Yes  If Yes, Specify value  _10_   ml/min     Initial date GFR became 20 or less:  10/31/2019  Is GFR obtained from an Outside lab Result? No  (If YES verify with source document scanned into media)    If patient on Dialysis:    Is candidate currently on dialysis for ESRD? No  If Yes,  Date Chronic Dialysis Started:   (Not currently on dialysis)  (verify with source document in Media Tab)   Dialysis Unit Name: (Not currently on dialysis)                        Physician Name:  Dr. Katie Collins  NPI#: [id number]    DIABETES INFORMATION  Primary Native Kidney Diagnosis: Diabetes Mellitus - Type Other / Unknown  C-Peptide Value - No results found for: CPEPTIDE  Current Diabetes Status: None    FOR NON-KIDNEY DEPARTMENT USE ONLY:  Additional Organs Registered? none    Maximum Acceptable Number of HLA Mismatches  ABDR:     6      (0-6)               AB:               (0-4)  ADR:   _____  (0-4)              BDR: _____ (0-4)  A:        _____  (0-2)              B:      _____ (0-2)          DR: ______ (0-2)    Will Recipient Accept?   Accept HBcAB Positive Organ:            Yes  Accept HBV HILARIO Positive Organ:        no  Accept HCV Antibody Positive Organ: no   Accept HCV HILARIO Positive Organ: no    Dual Kidney and En Bloc Opt In : No  Dual  Local:   No  Dual Import:   No  En Bloc Local:   No  En Bloc Import: No     Accept KDPI > 85: Single: No     Local: No     Import: No  Accept KDPI > 85: Dual: No     Local: No     Import: No      ### NURSE TO VERIFY CONSENT AND MAKE ANY " NECESSARY CHANGES NEEDED IN UNET AT THE TIME OF VERIFICATION ###    Unacceptible Antigens  If yes, list     Lab Results   Component Value Date    XC4XXDH A2 09/26/2019    CIABCLM WEAK A69 09/26/2019    CIIAB Negative 09/26/2019       ### DO NOT LIST IF ANTIGEN VALUE WEAK ###

## 2019-11-22 NOTE — LETTER
2019    Enrique Martinez  [address] [place] [address]    Dear Enrique Martinez:  MRN: [id number]    Congratulations! As of 2019, your name has been placed on the  donor waiting list at the [facility].  Your candidacy for kidney transplant is based on the following criteria: Diabetes .    If you have any friends or family members interested in donating a kidney to you, please have them call the donor coordinator.  If you are a pre-dialysis patient or if you dialyze at home monthly transplant blood kits will be sent directly to you.  If you dialyze at a dialysis center the kits will be sent to your dialysis unit.      While active on the list, you must keep us notified of any changes in your health status.  Should your medical condition change and transplantation is no longer a viable option, it may be necessary to deactivate your status or to remove you from the [facility] list.    Please notify your coordinator when there are changes to your telephone number, address, insurance coverage, or dialysis unit.  If you have any alternate phone numbers that you would like us to have, please let us know.  Your Listed Transplant Coordinator will be Cipriano Moon RN.  Feel free to call your coordinator at [phone] or [phone] should you have any questions.    The [facility] [employer] [facility] and the [employer] requires that we inform any patient placed on our waiting list of information that would impact their ability to receive a transplant.  [facility] program has a transplant surgeon and physician available 365 days a year, 24 hours a day, and 7 days a week to facilitate organ acceptance, procurement, and implantation, and to address urgent patient issues.  You will be notified in writing of any changes to our key personnel and of any changes to our staffing plan that would impact  your ability to receive a transplant.    Attached is a letter from the [employer] ([employer]).  It describes the services and information offered to patients by [facility] and the Organ Procurement and Transplant Network.    Again, we congratulate you on your success and look forward to working with you very closely in the future.  Sincerely,    Katie West M.D.                                                Medical Director, Kidney and Kidney/Pancreas Transplant  tj/Enclosure  [facility] [address] [id number] [phone]        [facility]: Your Resource for Organ Transplant Information        If you have a question regarding your own medical care, you always should call your transplant center first. However, for general organ transplant-related information, you can call the [employer] ([employer]) toll-free patient services line at [phone].    Anyone, including potential transplant candidates, recipients, family members/friends, living donors, and/or donor family members can call this number to:    · talk about organ donation, living donation, how transplant and donation work, the donation process, transplant policies, and transplant/donor information;  · get a free patient information kit with helpful booklets, waiting list and transplant information, and a list of all transplant centers;  · ask questions about the Organ Procurement and Transplantation Network (OPTN) web site (www.optn.transplant.hrsa.gov); the [facility] Web site (www.unos.org); or the [facility] web site for living donors and transplant recipients (www.transplantliving.org);  · learn how [facility] and the OPTN can help you;  · talk about any concerns that you may have with a transplant center and how they perform    UN[employer] is a not-for-profit organization that provides all of the administrative services for the national OPTN under federal contract to the [facility]  Resources and Services Administration (HRSA), an agency under the U.S. Department of Health and Human Services (HHS).     [facility] and OPTN responsibilities include:    · writing educational material for patients, the public and professionals;  · helping to make people aware of the need for donated organs and tissue;  · writing organ transplant policy with help from doctors, nurses, transplant patients/candidates, donor families, living donors, and the public;  · coordinating the organ matching and placement process;  · collecting information about every organ transplant and donation that occurs in the [place].    Remember, you should contact your transplant center directly if you have questions or concerns about your own medical care including medical records, work-up progress and test reports. [facility] is not your transplant center, and staff at [facility] will not be able to transfer you to your transplant center, so keep your transplant centers phone number handy. But, while you research your transplant needs and learn as much as you can about transplantation and donation, we welcome your call to our toll-free patient services line at [phone].

## 2019-11-26 ENCOUNTER — TELEPHONE (OUTPATIENT)
Dept: TRANSPLANT | Facility: CLINIC | Age: 54
End: 2019-11-26

## 2019-11-26 DIAGNOSIS — Z76.82 ORGAN TRANSPLANT CANDIDATE: Primary | ICD-10-CM

## 2019-11-26 NOTE — PROGRESS NOTES
Spoke w/pt to introduce myself as WL coordinator. Answered questions related to WL & living donation. Pt is scheduled for monthly HLA labs draws at [facility] every first Friday. Pt verbalized understanding.

## 2020-02-24 ENCOUNTER — OFFICE VISIT (OUTPATIENT)
Dept: CARDIOLOGY | Facility: CLINIC | Age: 55
End: 2020-02-24
Payer: MEDICAID

## 2020-02-24 VITALS
HEART RATE: 86 BPM | BODY MASS INDEX: 29.3 KG/M2 | WEIGHT: 182.31 LBS | DIASTOLIC BLOOD PRESSURE: 76 MMHG | SYSTOLIC BLOOD PRESSURE: 120 MMHG | HEIGHT: 66 IN

## 2020-02-24 DIAGNOSIS — I15.0 RENOVASCULAR HYPERTENSION: ICD-10-CM

## 2020-02-24 DIAGNOSIS — N05.2 MEMBRANOUS GLOMERULONEPHRITIS: ICD-10-CM

## 2020-02-24 DIAGNOSIS — Z95.5 S/P CORONARY ARTERY STENT PLACEMENT: ICD-10-CM

## 2020-02-24 DIAGNOSIS — D63.1 ANEMIA IN STAGE 4 CHRONIC KIDNEY DISEASE: ICD-10-CM

## 2020-02-24 DIAGNOSIS — I25.10 ATHEROSCLEROSIS OF NATIVE CORONARY ARTERY OF NATIVE HEART WITHOUT ANGINA PECTORIS: ICD-10-CM

## 2020-02-24 DIAGNOSIS — E78.5 HYPERLIPIDEMIA LDL GOAL <70: ICD-10-CM

## 2020-02-24 DIAGNOSIS — N18.4 TYPE 2 DIABETES MELLITUS WITH STAGE 4 CHRONIC KIDNEY DISEASE, WITHOUT LONG-TERM CURRENT USE OF INSULIN: ICD-10-CM

## 2020-02-24 DIAGNOSIS — N18.4 CKD (CHRONIC KIDNEY DISEASE), STAGE IV: ICD-10-CM

## 2020-02-24 DIAGNOSIS — N18.4 ANEMIA IN STAGE 4 CHRONIC KIDNEY DISEASE: ICD-10-CM

## 2020-02-24 DIAGNOSIS — Z01.818 PRE-TRANSPLANT EVALUATION FOR CHRONIC KIDNEY DISEASE: Primary | ICD-10-CM

## 2020-02-24 DIAGNOSIS — E11.22 TYPE 2 DIABETES MELLITUS WITH STAGE 4 CHRONIC KIDNEY DISEASE, WITHOUT LONG-TERM CURRENT USE OF INSULIN: ICD-10-CM

## 2020-02-24 PROCEDURE — 99214 OFFICE O/P EST MOD 30 MIN: CPT | Mod: S$PBB,TXP,, | Performed by: NURSE PRACTITIONER

## 2020-02-24 PROCEDURE — 99999 PR PBB SHADOW E&M-EST. PATIENT-LVL III: CPT | Mod: PBBFAC,TXP,, | Performed by: NURSE PRACTITIONER

## 2020-02-24 PROCEDURE — 99214 PR OFFICE/OUTPT VISIT, EST, LEVL IV, 30-39 MIN: ICD-10-PCS | Mod: S$PBB,TXP,, | Performed by: NURSE PRACTITIONER

## 2020-02-24 PROCEDURE — 99999 PR PBB SHADOW E&M-EST. PATIENT-LVL III: ICD-10-PCS | Mod: PBBFAC,TXP,, | Performed by: NURSE PRACTITIONER

## 2020-02-24 PROCEDURE — 99213 OFFICE O/P EST LOW 20 MIN: CPT | Mod: PBBFAC,TXP | Performed by: NURSE PRACTITIONER

## 2020-02-24 RX ORDER — ERGOCALCIFEROL 1.25 MG/1
50000 CAPSULE ORAL
Status: ON HOLD | COMMUNITY
Start: 2020-02-17 | End: 2022-03-10 | Stop reason: HOSPADM

## 2020-02-24 RX ORDER — METOPROLOL SUCCINATE 100 MG/1
100 TABLET, EXTENDED RELEASE ORAL DAILY
Status: ON HOLD | COMMUNITY
End: 2020-12-29

## 2020-02-24 NOTE — PROGRESS NOTES
Cardiology Consults Clinic Note    Subjective:   Chief Complaint: Annual follow up/Kidney transplant pre-op evaluation   Last Clinic Visit: 12/12/2018 with Dr. Hannon     Problems:  ESRD d/t membranous nephropathy  CAD with Hx probable NSTEMI s/p PCI to LAD  HTN   HLD  DM2    History of Present Illness: Enrique Martinez is a 54 y.o. male who presents for annual follow up and pre-op evaluation for kidney transplant.   Reports doing well since previous visit. He denies chest pain at rest or with exertion. Denies SOB, REYNOSO, palpitations, lightheadedness, presyncope, or syncope. Denies PND/orthopnea.  Denies claudication or leg swelling.   Completed lexiscan in 2018 with normal myocardial perfusion.     BP well controlled. Review of most recent labs revealing of A1c 9.4% and lipids not at goal with  and TGs 240.  He is currently not being treated for DM2.  Stopped taking atorvastatin due to myalgias. Reports that he has changed his diet and has reduced carbs and startches.     Review of Systems   Constitution: Negative for decreased appetite, fever, malaise/fatigue and weight gain.   HENT: Negative for congestion, hearing loss and nosebleeds.    Eyes: Negative for visual disturbance.   Cardiovascular: Negative for chest pain, dyspnea on exertion, irregular heartbeat, leg swelling, palpitations and syncope.   Respiratory: Negative for cough, shortness of breath and sleep disturbances due to breathing.    Endocrine: Negative for cold intolerance and heat intolerance.   Hematologic/Lymphatic: Negative for bleeding problem. Does not bruise/bleed easily.   Musculoskeletal: Positive for joint pain.   Gastrointestinal: Negative for bloating, abdominal pain, change in bowel habit and heartburn.   Neurological: Negative for dizziness, loss of balance and numbness.   Psychiatric/Behavioral: Negative for altered mental status. The patient is not nervous/anxious.        Medications:  Patient's Medications   New  Prescriptions    No medications on file   Previous Medications    ALBUTEROL SULFATE 2.5 MG/0.5 ML NEBU    Take 2.5 mg by nebulization every 4 (four) hours as needed.    ALLOPURINOL (ZYLOPRIM) 100 MG TABLET    Take 100 mg by mouth once daily.     AMLODIPINE (NORVASC) 10 MG TABLET    Take 10 mg by mouth once daily.    CHLORTHALIDONE (HYGROTEN) 50 MG TAB    Take 25 mg by mouth once daily.    CLONIDINE (CATAPRES) 0.2 MG TABLET    Take 0.2 mg by mouth 3 (three) times daily.     COLCHICINE (COLCRYS) 0.6 MG TABLET    Take 0.6 mg by mouth daily as needed.     CORTICOTROPIN (ACTHAR H.P.) 80 UNIT/ML INJECTABLE GEL    Inject 40 Units into the skin twice a week.    ERGOCALCIFEROL (ERGOCALCIFEROL) 50,000 UNIT CAP        FERROUS GLUCONATE (FERGON) 324 MG TABLET    Take 324 mg by mouth daily with breakfast.    FLUTICASONE (FLONASE) 50 MCG/ACTUATION NASAL SPRAY    1 spray by Each Nare route once daily.    FUROSEMIDE (LASIX) 40 MG TABLET    Take 40 mg by mouth 2 (two) times daily.    METOPROLOL SUCCINATE (TOPROL-XL) 100 MG 24 HR TABLET    Take 100 mg by mouth once daily.    NICOTINE (NICODERM CQ) 14 MG/24 HR    Place 1 patch onto the skin every 24 hours.    POTASSIUM CHLORIDE (KLOR-CON) 10 MEQ TBSR    Take 20 mEq by mouth once daily.     RANITIDINE (ZANTAC) 300 MG TABLET    Take 300 mg by mouth nightly as needed.     SILDENAFIL (VIAGRA) 50 MG TABLET    Take 50 mg by mouth daily as needed for Erectile Dysfunction.    SODIUM BICARBONATE 650 MG TABLET    Take 650 mg by mouth 3 (three) times daily.    Modified Medications    No medications on file   Discontinued Medications    ATORVASTATIN (LIPITOR) 20 MG TABLET    Take 20 mg by mouth once daily.       Family History:  Enrique's family history includes Drug abuse in his brother; Heart attack in his brother and father; Hyperlipidemia in his father; Hypertension in his brother and father; No Known Problems in his sister; Stroke in his father.    Social History:  Enrique reports that he has  "been smoking. He has a 2.50 pack-year smoking history. He has never used smokeless tobacco. He reports that he does not drink alcohol or use drugs.    Objective:   /76 (BP Location: Left arm, Patient Position: Sitting, BP Method: Large (Automatic))   Pulse 86   Ht 5' 6" (1.676 m)   Wt 82.7 kg (182 lb 5.1 oz)   BMI 29.43 kg/m²     Physical Exam   Constitutional: He is oriented to person, place, and time. Vital signs are normal. He appears well-developed and well-nourished.   HENT:   Head: Normocephalic.   Eyes: Pupils are equal, round, and reactive to light.   Neck: Normal range of motion. Neck supple. No JVD present. Carotid bruit is not present.   Cardiovascular: Normal rate, regular rhythm, S1 normal, S2 normal and intact distal pulses. PMI is not displaced. Exam reveals no gallop and no friction rub.   No murmur heard.  Pulses:       Radial pulses are 2+ on the right side, and 2+ on the left side.        Dorsalis pedis pulses are 2+ on the right side, and 2+ on the left side.        Posterior tibial pulses are 1+ on the right side, and 1+ on the left side.   Pulmonary/Chest: Effort normal and breath sounds normal. No accessory muscle usage. He has no decreased breath sounds. He has no wheezes.   Abdominal: Soft. Normal appearance and bowel sounds are normal. He exhibits no distension, no fluid wave and no ascites. There is no hepatosplenomegaly. There is no tenderness.   Musculoskeletal: Normal range of motion. He exhibits no edema.   Neurological: He is alert and oriented to person, place, and time. He has normal strength.   Skin: Skin is warm, dry and intact. No ecchymosis and no rash noted. No erythema.   Psychiatric: He has a normal mood and affect. His speech is normal and behavior is normal. Judgment and thought content normal. Cognition and memory are normal.   Vitals reviewed.    TTE (12/12/2018):  · Normal left ventricular systolic function. The estimated ejection fraction is 65%  · Eccentric " left ventricular hypertrophy.  · No wall motion abnormalities.  · Indeterminate left ventricular diastolic function.  · Normal right ventricular systolic function.  · Mobile echodenosity measuring 1.1 x 1.0 cm that is concerning for a vegetation. There is moderate regurgitation. Clinical correlation is advised.  · Mild-to-moderate aortic regurgitation.  · Severe left atrial enlargement.  · Trace pulmonic regurgitation.  · Mild mitral regurgitation.  · Mild tricuspid regurgitation.  · The estimated PA systolic pressure is 24.53 mm Hg  · Normal central venous pressure (3 mm Hg).    Lipids:  Recent Labs   Lab 09/18/18  0710   LDL Cholesterol 126.0   HDL 57   Cholesterol 231 H      Renal:  Recent Labs   Lab 10/31/19  1113   Creatinine 6.3 H   Potassium 4.4   CO2 23   BUN, Bld 48 H     Liver:  Recent Labs   Lab 10/31/19  1113   AST 21   ALT 23       Assessment:     1. Pre-transplant evaluation for chronic kidney disease    2. Atherosclerosis of native coronary artery of native heart without angina pectoris    3. S/P coronary artery stent placement    4. Renovascular hypertension    5. Hyperlipidemia LDL goal <70    6. Type 2 diabetes mellitus with stage 4 chronic kidney disease, without long-term current use of insulin    7. CKD (chronic kidney disease), stage IV    8. Membranous glomerulonephritis    9. Anemia in stage 4 chronic kidney disease      Plan:     Enrique Martinez was seen in clinic today for annual follow up and kidney transplant pre-op evaluation    Diagnoses and associated orders include:    Pre-transplant evaluation for chronic kidney disease  Comments:  CV risk for renal transplantation appears to be relatively low.  No evidence of ischemia on exercise stress test (2018), no symptoms of ischemia.     Atherosclerosis of native coronary artery of native heart without angina pectoris  S/P coronary artery stent placement  Comments:  Asymptomatic, stable  Continue current regimen  Not taking statin due to  intolerance. Monitor lipids and plan to start treatment pending results     Renovascular hypertension  Comments:  BP at goal < 130/80  Continue current regimen  Recommend low salt diet    Hyperlipidemia LDL goal <70  Comments:  LDL not at goal, TGs elevated  Discussed importance of treating cholesterol in setting of CAD, hx PCI, DM2  Monitor lipids  Discussed possibly starting PSK9-I   Mediterranean Diet   Orders:  -     Lipid panel; Future; Expected date: 03/06/2020    Type 2 diabetes mellitus with stage 4 chronic kidney disease, without long-term current use of insulin  Comments:  A1c 9.4%, previously 5.9%  Not currently treated for DM2  Repeat A1c  Plan for endocrine referral   Orders:  -     HEMOGLOBIN A1C; Future; Expected date: 03/06/2020    CKD (chronic kidney disease), stage IV  Membranous glomerulonephritis  Comments:  Stable kidney dysfunction, Cr 4.3 with GFR 10-18  Not on dialysis  Routine follow up with [facility]    Anemia in stage 4 chronic kidney disease        Follow up annually or sooner if needed    This patient was seen and discussed with Dr. Hannon.     Deena TAPIA, [place]  02/24/2020  10:57 AM      Follow-up:     Future Appointments   Date Time Provider Department Center   3/6/2020  9:30 AM LAB, APPOINTMENT [facility]   4/3/2020  9:30 AM LAB, [facility]   5/1/2020  9:30 AM LAB, APPOINTMENT [facility]   6/5/2020  9:30 AM LAB, APPOINTMENT [facility]   7/6/2020  9:30 AM LAB, APPOINTMENT [facility]   8/7/2020  9:30 AM LAB, APPOINTMENT [facility]   9/4/2020  9:30 AM LAB, APPOINTMENT [facility]   10/2/2020  9:30 AM LAB, APPOINTMENT [facility]   11/6/2020  9:30 AM LAB, APPOINTMENT [facility]

## 2020-02-24 NOTE — LETTER
February 24, 2020      Stephie Harmon NP  [address]  [facility]  1st Floor Clinic  [facility] [address]           [facility]  [address] [facility] [address]  Phone: [phone]          Patient: Enrique Martinez   MR Number: [id number]   YOB: 1965   Date of Visit: 2/24/2020       Dear Stephie Harmon:    Thank you for referring Enrique Martinez to me for evaluation. Attached you will find relevant portions of my assessment and plan of care.    If you have questions, please do not hesitate to call me. I look forward to following Enrique Martinez along with you.    Sincerely,    Deena Meyer NP    Enclosure  CC:  No Recipients    If you would like to receive this communication electronically, please contact [email or web address] or [phone] to request more information on [employer] access.    For providers and/or their staff who would like to refer a patient to [facility], please contact us through our one-stop-shop provider referral line, [facility] , at [phone].    If you feel you have received this communication in error or would no longer like to receive these types of communications, please e-mail [email or web address]

## 2020-03-02 DIAGNOSIS — Z76.82 ORGAN TRANSPLANT CANDIDATE: Primary | ICD-10-CM

## 2020-03-06 ENCOUNTER — HOSPITAL ENCOUNTER (OUTPATIENT)
Dept: RADIOLOGY | Facility: HOSPITAL | Age: 55
Discharge: HOME | End: 2020-03-06
Attending: NURSE PRACTITIONER
Payer: MEDICAID

## 2020-03-06 DIAGNOSIS — Z76.82 ORGAN TRANSPLANT CANDIDATE: Primary | ICD-10-CM

## 2020-03-06 DIAGNOSIS — Z76.82 ORGAN TRANSPLANT CANDIDATE: ICD-10-CM

## 2020-03-06 PROCEDURE — 76770 US EXAM ABDO BACK WALL COMP: CPT | Mod: TC,TXP

## 2020-03-06 PROCEDURE — 76770 US RETROPERITONEAL COMPLETE: ICD-10-PCS | Mod: 26,TXP,, | Performed by: RADIOLOGY

## 2020-03-06 PROCEDURE — 76770 US EXAM ABDO BACK WALL COMP: CPT | Mod: 26,TXP,, | Performed by: RADIOLOGY

## 2020-03-09 ENCOUNTER — TELEPHONE (OUTPATIENT)
Dept: CARDIOLOGY | Facility: CLINIC | Age: 55
End: 2020-03-09

## 2020-03-09 NOTE — TELEPHONE ENCOUNTER
Mr. Martinez called to discuss recent lab work.  Lipid panel improved, however, LDL not at goal < 70. Recommend starting on statin. Mr. Martinez reports that [facility]rolog[facility] started him on atorvastatin 20 mg on 2/26.  Will continue this plan for now. Reinforced importance of treating cholesterol given CAD, PCI, DM2.  A1c not at goal < 7%.  Recommend referral to endo to be managed/treated for DM2. He does not have a PCP and states that he does not want a PCP.    Stressed the importance of treating DM in setting of CAD, CKD 4, and hx PCI.  Mr. Martinez stated he did not want an [facility] referral. States that he would prefer to discuss with his [facility] medical team.

## 2020-03-24 ENCOUNTER — TELEPHONE (OUTPATIENT)
Dept: TRANSPLANT | Facility: CLINIC | Age: 55
End: 2020-03-24

## 2020-03-24 NOTE — TELEPHONE ENCOUNTER
LM for pt cancelling clinic appt scheduled tomorrow, 3/25/20. Clinic is closed for 2 weeks due to covid-19. Informed pt that we will re-schedule appt at a later date.

## 2020-05-20 ENCOUNTER — INITIAL CONSULT (OUTPATIENT)
Dept: TRANSPLANT | Facility: CLINIC | Age: 55
End: 2020-05-20
Payer: MEDICAID

## 2020-05-20 VITALS
TEMPERATURE: 98 F | BODY MASS INDEX: 29 KG/M2 | DIASTOLIC BLOOD PRESSURE: 88 MMHG | SYSTOLIC BLOOD PRESSURE: 123 MMHG | RESPIRATION RATE: 18 BRPM | OXYGEN SATURATION: 98 % | HEART RATE: 87 BPM | WEIGHT: 179.69 LBS

## 2020-05-20 DIAGNOSIS — N43.3 HYDROCELE IN ADULT: ICD-10-CM

## 2020-05-20 DIAGNOSIS — Z76.82 ORGAN TRANSPLANT CANDIDATE: Primary | ICD-10-CM

## 2020-05-20 PROCEDURE — 99999 PR PBB SHADOW E&M-EST. PATIENT-LVL III: CPT | Mod: PBBFAC,TXP,,

## 2020-05-20 PROCEDURE — 99214 OFFICE O/P EST MOD 30 MIN: CPT | Mod: S$PBB,TXP,, | Performed by: SURGERY

## 2020-05-20 PROCEDURE — 99999 PR PBB SHADOW E&M-EST. PATIENT-LVL III: ICD-10-PCS | Mod: PBBFAC,TXP,,

## 2020-05-20 PROCEDURE — 99214 PR OFFICE/OUTPT VISIT, EST, LEVL IV, 30-39 MIN: ICD-10-PCS | Mod: S$PBB,TXP,, | Performed by: SURGERY

## 2020-05-20 PROCEDURE — 99213 OFFICE O/P EST LOW 20 MIN: CPT | Mod: PBBFAC,TXP

## 2020-05-20 NOTE — PROGRESS NOTES
Transplant Surgery  Kidney Transplant Recipient Evaluation    Referring Physician: Wagner Valdez  Current Nephrologist: Justice Lee    Subjective:     Reason for Visit: evaluate transplant candidacy    History of Present Illness: Enrique Martinez is a 55 y.o. year old male undergoing transplant evaluation.    Dialysis History: Enrique is pre-dialysis.      Transplant History: N/A    Etiology of Renal Disease: Diabetes Mellitus - Type Other / Unknown (based on medical records from referral).    Review of Systems   Constitutional: Positive for fatigue.   HENT: Negative for drooling, postnasal drip and sore throat.    Eyes: Negative for discharge and itching.   Respiratory: Negative for choking and stridor.    Gastrointestinal: Negative for rectal pain.   Endocrine: Negative for polydipsia.   Genitourinary: Negative for enuresis and genital sores.   Musculoskeletal: Negative for back pain, neck pain and neck stiffness.   Allergic/Immunologic: Negative for immunocompromised state.   Neurological: Negative for facial asymmetry and numbness.   Hematological: Negative for adenopathy.   Psychiatric/Behavioral: Negative for behavioral problems, self-injury and suicidal ideas.       Objective:     Physical Exam:  Constitutional:   Vitals reviewed: yes   Well-nourished and well-groomed: yes  Eyes:   Sclerae icteric: no   Extraocular movements intact: yes  GI:    Bowel sounds normal: yes   Tenderness: no    If yes, quadrant/location: not applicable   Palpable masses: no    If yes, quadrant/location: not applicable   Hepatosplenomegaly: no   Ascites: no   Hernia: yes. Location: small bilateral inuinal hernias, also has a large left hydrocele    If yes, type/location: bilateral inguinal hernia, reducible   Surgical scars: yes    If yes, type/location: midline  not applicable  Resp:   Effort normal: yes   Breath sounds normal: yes    CV:   Regular rate and rhythm: yes   Heart sounds normal: yes   Femoral pulses normal:  yes   Extremities edematous: no  Skin:   Rashes or lesions present: no    If yes, describe:not applicable   Jaundice:: no    Musculoskeletal:   Gait normal: yes   Strength normal: yes  Psych:   Oriented to person, place, and time: yes   Affect and mood normal: yes    Additional comments: not applicable    Counseling: We provided Enrique Martinez with a group education session today.  We discussed kidney transplantation at length with him, including risks, potential complications, and alternatives in the management of his renal failure.  The discussion included complications related to anesthesia, bleeding, infection, primary nonfunction, and ATN.  I discussed the typical postoperative course, length of hospitalization, the need for long-term immunosuppression, and the need for long-term routine follow-up.  I discussed living-donor and -donor transplantation and the relative advantages and disadvantages of each.  I also discussed average waiting times for both living donation and  donation.  I discussed national and center-specific survival rates.  I also mentioned the potential benefit of multicenter listing to candidates listed with centers within more than one organ procurement organization.  All questions were answered.    Final determination of transplant candidacy will be made once evaluation is complete and reviewed by the Kidney & Kidney/Pancreas Selection Committee.         Transplant Surgery - Candidacy   Assessment/Plan:   Enrique Martinez is pre-dialysis with CKD stage 4 (GFR 15-29 mL/min). I see no surgical contraindication to placing a kidney transplant. Based on available information, Enrique Martinez is a suitable kidney transplant candidate.     With regards to hernia repair prior to living donor kidney transplant - my opinion is that his right inguinal hernia is best fixed at the time of kidney transplant.  He understands that if we were to repair the right hernia prior to transplant -  we would have to likely cut thru the repair to perform the transplant.    His hernias are small and easily reducible. We went over the signs and symptoms of incarceration.  He is going to see his urologist at [facility] wrt to repair of his left hydrocele.    Melecio Kohli MD

## 2020-06-05 ENCOUNTER — HOSPITAL ENCOUNTER (OUTPATIENT)
Dept: RADIOLOGY | Facility: HOSPITAL | Age: 55
Discharge: HOME | End: 2020-06-05
Attending: NURSE PRACTITIONER
Payer: MEDICAID

## 2020-06-05 DIAGNOSIS — Z76.82 ORGAN TRANSPLANT CANDIDATE: ICD-10-CM

## 2020-06-05 PROCEDURE — 76700 US ABDOMEN COMPLETE: ICD-10-PCS | Mod: 26,TXP,, | Performed by: RADIOLOGY

## 2020-06-05 PROCEDURE — 76700 US EXAM ABDOM COMPLETE: CPT | Mod: 26,TXP,, | Performed by: RADIOLOGY

## 2020-06-05 PROCEDURE — 76700 US EXAM ABDOM COMPLETE: CPT | Mod: TC,TXP

## 2020-06-09 DIAGNOSIS — Z76.82 ORGAN TRANSPLANT CANDIDATE: Primary | ICD-10-CM

## 2020-07-28 ENCOUNTER — OFFICE VISIT (OUTPATIENT)
Dept: SURGERY | Facility: CLINIC | Age: 55
End: 2020-07-28
Payer: MEDICAID

## 2020-07-28 VITALS
TEMPERATURE: 98 F | OXYGEN SATURATION: 96 % | WEIGHT: 177 LBS | BODY MASS INDEX: 28.57 KG/M2 | DIASTOLIC BLOOD PRESSURE: 77 MMHG | SYSTOLIC BLOOD PRESSURE: 131 MMHG | HEART RATE: 84 BPM

## 2020-07-28 DIAGNOSIS — N43.3 HYDROCELE, UNSPECIFIED HYDROCELE TYPE: Primary | ICD-10-CM

## 2020-07-28 PROCEDURE — 99999 PR PBB SHADOW E&M-EST. PATIENT-LVL IV: CPT | Mod: PBBFAC,TXP,, | Performed by: SURGERY

## 2020-07-28 PROCEDURE — 99213 OFFICE O/P EST LOW 20 MIN: CPT | Mod: S$PBB,NTX,, | Performed by: SURGERY

## 2020-07-28 PROCEDURE — 99213 PR OFFICE/OUTPT VISIT, EST, LEVL III, 20-29 MIN: ICD-10-PCS | Mod: S$PBB,NTX,, | Performed by: SURGERY

## 2020-07-28 PROCEDURE — 99999 PR PBB SHADOW E&M-EST. PATIENT-LVL IV: ICD-10-PCS | Mod: PBBFAC,TXP,, | Performed by: SURGERY

## 2020-07-28 PROCEDURE — 99214 OFFICE O/P EST MOD 30 MIN: CPT | Mod: PBBFAC,NTX | Performed by: SURGERY

## 2020-07-28 RX ORDER — FAMOTIDINE 20 MG/1
20 TABLET, FILM COATED ORAL DAILY PRN
COMMUNITY
Start: 2020-04-22

## 2020-07-28 RX ORDER — ATORVASTATIN CALCIUM 20 MG/1
80 TABLET, FILM COATED ORAL DAILY
COMMUNITY
Start: 2020-02-27 | End: 2021-02-22

## 2020-07-28 RX ORDER — METOPROLOL TARTRATE 100 MG/1
100 TABLET ORAL DAILY
Status: ON HOLD | COMMUNITY
Start: 2020-04-29 | End: 2021-01-11 | Stop reason: HOSPADM

## 2020-07-28 RX ORDER — ONDANSETRON 4 MG/1
4 TABLET, FILM COATED ORAL EVERY 4 HOURS PRN
Status: ON HOLD | COMMUNITY
Start: 2020-06-22 | End: 2021-01-05 | Stop reason: HOSPADM

## 2020-07-28 NOTE — PROGRESS NOTES
History & Physical    SUBJECTIVE:     History of Present Illness:  Patient is a 55 y.o. male presents with bilateral inguinal hernias, left hydrocele, and umbilical hernia on the list for 2 years for renal transplant. States his main concern is the left hydrocele with is painful and seems to be enlarging. Denies ever having irreducible hernias, intractable pain, nausea/vomiting. Denies fevers/chills.    Hx HTN, DM2, ESRD, CAD s/p PCI, and laparotomy about 20 years ago for SW and intestinal perforation. States he can walk about 2 blocks or 1 flight of stairs before becoming short of breath and needing a break.     No chief complaint on file.      Review of patient's allergies indicates:  No Known Allergies    Current Outpatient Medications   Medication Sig Dispense Refill    albuterol sulfate 2.5 mg/0.5 mL Nebu Take 2.5 mg by nebulization every 4 (four) hours as needed. 1 each 0    allopurinol (ZYLOPRIM) 100 MG tablet Take 100 mg by mouth once daily.       amlodipine (NORVASC) 10 MG tablet Take 10 mg by mouth once daily.      chlorthalidone (HYGROTEN) 50 MG Tab Take 25 mg by mouth once daily.      cloNIDine (CATAPRES) 0.2 MG tablet Take 0.2 mg by mouth 3 (three) times daily.       colchicine (COLCRYS) 0.6 mg tablet Take 0.6 mg by mouth daily as needed.       corticotropin (ACTHAR H.P.) 80 unit/mL injectable gel Inject 40 Units into the skin twice a week.      ergocalciferol (ERGOCALCIFEROL) 50,000 unit Cap       ferrous gluconate (FERGON) 324 MG tablet Take 324 mg by mouth daily with breakfast.      fluticasone (FLONASE) 50 mcg/actuation nasal spray 1 spray by Each Nare route once daily.      furosemide (LASIX) 40 MG tablet Take 40 mg by mouth 2 (two) times daily.      metoprolol succinate (TOPROL-XL) 100 MG 24 hr tablet Take 100 mg by mouth once daily.      nicotine (NICODERM CQ) 14 mg/24 hr Place 1 patch onto the skin every 24 hours.      potassium chloride (KLOR-CON) 10 MEQ TbSR Take 20 mEq by mouth  once daily.       ranitidine (ZANTAC) 300 MG tablet Take 300 mg by mouth nightly as needed.       sildenafil (VIAGRA) 50 MG tablet Take 50 mg by mouth daily as needed for Erectile Dysfunction.      sodium bicarbonate 650 MG tablet Take 650 mg by mouth 3 (three) times daily.        No current facility-administered medications for this visit.        Past Medical History:   Diagnosis Date    Anemia     Coronary artery disease     Diabetes mellitus, type 2     GERD (gastroesophageal reflux disease)     Gout     Hydrocele in adult     bilateral    Hyperlipidemia     Hypertension     Inguinal hernia     bilateral    Membranous glomerulonephritis     Nephrotic range proteinuria     Nephrotic syndrome     Obesity     Umbilical hernia      Past Surgical History:   Procedure Laterality Date    ABDOMINAL SURGERY  1980s    CARDIAC CATHETERIZATION  2007    x 2    COLONOSCOPY N/A 4/5/2019    Procedure: COLONOSCOPY;  Surgeon: Jose L Carty MD;  Location: [facility] ([address] [place] [address]);  Service: Colon and Rectal;  Laterality: N/A;    CORONARY STENT PLACEMENT  2007     Family History   Problem Relation Age of Onset    Hypertension Father     Stroke Father     Heart attack Father     Hyperlipidemia Father     No Known Problems Sister     Drug abuse Brother     Heart attack Brother     Hypertension Brother      Social History     Tobacco Use    Smoking status: Current Some Day Smoker     Packs/day: 0.25     Years: 10.00     Pack years: 2.50    Smokeless tobacco: Never Used   Substance Use Topics    Alcohol use: No    Drug use: No        Review of Systems:  Review of Systems   Constitutional: Negative for activity change, appetite change, chills, diaphoresis, fatigue, fever and unexpected weight change.   HENT: Negative for congestion, nosebleeds and trouble swallowing.    Eyes: Negative for visual disturbance.   Respiratory: Negative for cough, chest tightness and shortness of breath.    Cardiovascular:  Negative for chest pain, palpitations and leg swelling.   Gastrointestinal: Positive for abdominal pain (Mild, at site of hernias). Negative for abdominal distention, constipation, diarrhea, nausea and vomiting.   Genitourinary: Positive for scrotal swelling. Negative for decreased urine volume, difficulty urinating and dysuria.   Musculoskeletal: Negative for gait problem, joint swelling and myalgias.   Skin: Negative for color change, pallor and rash.   Neurological: Negative for dizziness, numbness and headaches.   Psychiatric/Behavioral: Negative for agitation and confusion.       OBJECTIVE:     Vital Signs (Most Recent)  Temp: 98.4 °F (36.9 °C) (07/28/20 0956)  Pulse: 84 (07/28/20 0956)  BP: 131/77 (07/28/20 0956)  SpO2: 96 % (07/28/20 0956)     80.3 kg (177 lb)     Physical Exam:  Physical Exam  Constitutional:       General: He is not in acute distress.     Appearance: Normal appearance. He is not ill-appearing, toxic-appearing or diaphoretic.   HENT:      Head: Normocephalic and atraumatic.      Nose: Nose normal.      Mouth/Throat:      Mouth: Mucous membranes are moist.      Pharynx: Oropharynx is clear.   Eyes:      Extraocular Movements: Extraocular movements intact.      Conjunctiva/sclera: Conjunctivae normal.      Pupils: Pupils are equal, round, and reactive to light.   Neck:      Musculoskeletal: Normal range of motion and neck supple.   Cardiovascular:      Rate and Rhythm: Normal rate and regular rhythm.      Pulses: Normal pulses.   Pulmonary:      Effort: Pulmonary effort is normal.      Breath sounds: Normal breath sounds.   Abdominal:      General: Abdomen is flat.      Palpations: Abdomen is soft.      Hernia: A hernia (bilateral inguinal and umbilical hernia, midline scar c/w history, large left hydrocele) is present.   Musculoskeletal: Normal range of motion.   Skin:     General: Skin is warm and dry.      Capillary Refill: Capillary refill takes less than 2 seconds.   Neurological:       General: No focal deficit present.      Mental Status: He is alert and oriented to person, place, and time. Mental status is at baseline.   Psychiatric:         Mood and Affect: Mood normal.         Behavior: Behavior normal.         Thought Content: Thought content normal.         Laboratory  reviewed    Diagnostic Results:  reviewed    ASSESSMENT/PLAN:     55 year-old male ESRD listed for renal transplant with bilateral small inguinal hernias and small umbilical hernia. Main complaint is left hydrocele which is rather large. Given small and minimally symptomatic hernias, would defer repair to the time of transplant or after.     PLAN:    - Refer to urology for potential drainage of hydrocele   - May follow with us after transplant if hernias not repaired at time of transplant

## 2020-07-28 NOTE — PROGRESS NOTES
"Subjective:       Enrique Martinez is a 55 y.o. male who is a new patient who was referred by Dr Mcduffie for evaluation of hydrocele.      Recently evaluated by general surgery for b/l inguinal hernias. Also noted to have large L hydrocele that is most bothersome x 3 years. He feels hydrocele is enlarging. Reports size is smaller in AM and gets larger throughout the day when standing. He feels it is related to his fluid intake.     He has significant PMH with ESRD (not on HD) awaiting transplant, CAD s/p stents, DM2, HTN.    He has been seeing urologist at [facility]. DEEDEE found from 2018 that reported L spermatocele.     The following portions of the patient's history were reviewed and updated as appropriate: allergies, current medications, past family history, past medical history, past social history, past surgical history and problem list.    Review of Systems  Constitutional: no fever or chills  ENT: no nasal congestion or sore throat  Respiratory: no cough or shortness of breath  Cardiovascular: no chest pain or palpitations  Gastrointestinal: no nausea or vomiting, tolerating diet  Genitourinary: as per HPI  Hematologic/Lymphatic: no easy bruising or lymphadenopathy  Musculoskeletal: no arthralgias or myalgias  Skin: no rashes or lesions  Neurological: no seizures or tremors  Behavioral/Psych: no auditory or visual hallucinations       Objective:    Vitals: BP (!) 158/92 (BP Location: Left arm, Patient Position: Sitting, BP Method: Large (Automatic))   Pulse 74   Ht 5' 6" (1.676 m)   Wt 80.3 kg (177 lb 0.5 oz)   BMI 28.57 kg/m²     Physical Exam   General: well developed, well nourished in no acute distress  Head: normocephalic, atraumatic  Neck: supple, trachea midline, no obvious enlargement of thyroid  HEENT: EOMI, mucus membranes moist, sclera anicteric, no hearing impairment  Lungs: symmetric expansion, non-labored breathing  Cardiovascular: regular rate and rhythm, normal pulses  Abdomen: soft, non tender, non " distended, no palpable masses, no hepatosplenomegaly, no hernias, bladder nonpalpable. No CVA tenderness.  Musculoskeletal: no peripheral edema, normal ROM in bilateral upper and lower extremities  Lymphatics: no cervical or inguinal lymphadenopathy  Skin: no rashes or lesions  Neuro: alert and oriented x 3, no gross deficits  Psych: normal judgment and insight, normal mood/affect and non-anxious  Genitourinary:   Penis -  circumcised penis without plaques, lesions, or scarring.  Urethra - orthotopic location without stenosis.  Scrotum  - no lesions or rashes, Testes - descended bilaterally, symmetric without masses, non tender. Able to palpate L testis in superior scrotum  Epididymides - no cysts or lesions, no spermatocele, symmetric   L SCROTUM WITH OBVIOUS ENLARGEMENT, SOFT SWELLING. KNOWN B/L INGUINAL HERNIAS  CYNTHIA: patient declined exam         Lab Review   Urine analysis today in clinic shows no urine     Lab Results   Component Value Date    WBC 6.66 10/31/2019    HGB 8.3 (L) 10/31/2019    HCT 25.4 (L) 10/31/2019    MCV 85 10/31/2019     10/31/2019     Lab Results   Component Value Date    CREATININE 6.3 (H) 10/31/2019    BUN 48 (H) 10/31/2019     Lab Results   Component Value Date    PSA 1.6 09/18/2018     No results found for: PSADIAG    Imaging  DEEDEE report reviewed  Reports and images were personally reviewed by me and discussed with the patient today         Assessment/Plan:      1. Scrotal swelling    - History c/w communicating hydrocele and would therefore need hernia repair at time of surgery. He has seen general surgery as well.   - DEEDEE at [facility] demonstrated spermatocele - usually more firm on exam and do not fluctuate in size   - Recommend DEEDEE now   - If planning for surgery, would need general surgery available if hernia encountered     2. Bilateral inguinal hernia without obstruction or gangrene, recurrence not specified    - Seeing gen surg   - Planned for repair at time of transplant or  after       Follow up in 2 weeks

## 2020-07-29 ENCOUNTER — OFFICE VISIT (OUTPATIENT)
Dept: UROLOGY | Facility: CLINIC | Age: 55
End: 2020-07-29
Attending: UROLOGY
Payer: MEDICAID

## 2020-07-29 VITALS
DIASTOLIC BLOOD PRESSURE: 92 MMHG | SYSTOLIC BLOOD PRESSURE: 158 MMHG | WEIGHT: 177 LBS | BODY MASS INDEX: 28.45 KG/M2 | HEART RATE: 74 BPM | HEIGHT: 66 IN

## 2020-07-29 DIAGNOSIS — K40.20 BILATERAL INGUINAL HERNIA WITHOUT OBSTRUCTION OR GANGRENE, RECURRENCE NOT SPECIFIED: ICD-10-CM

## 2020-07-29 DIAGNOSIS — N50.89 SCROTAL SWELLING: Primary | ICD-10-CM

## 2020-07-29 PROCEDURE — 99204 OFFICE O/P NEW MOD 45 MIN: CPT | Mod: NTX,S$GLB,, | Performed by: UROLOGY

## 2020-07-29 PROCEDURE — 99204 PR OFFICE/OUTPT VISIT, NEW, LEVL IV, 45-59 MIN: ICD-10-PCS | Mod: NTX,S$GLB,, | Performed by: UROLOGY

## 2020-07-29 NOTE — LETTER
July 29, 2020      Humza Alvarez MD  [address]  [place] [address]           [facility], [facility] [address]  [facility] [address]  Phone: [phone]  Fax: [phone]          Patient: Enrique Martinez   MR Number: [id number]   YOB: 1965   Date of Visit: 7/29/2020       Dear Dr. Humza Alvarez:    Thank you for referring Enrique Martinez to me for evaluation. Attached you will find relevant portions of my assessment and plan of care.    If you have questions, please do not hesitate to call me. I look forward to following Enrique Martinez along with you.    Sincerely,    Ella Minaya MD    Enclosure  CC:  No Recipients    If you would like to receive this communication electronically, please contact [email or web address] or [phone] to request more information on [employer] Link access.    For providers and/or their staff who would like to refer a patient to [facility], please contact us through our one-stop-shop provider referral line, [facility], at [phone].    If you feel you have received this communication in error or would no longer like to receive these types of communications, please e-mail [email or web address]

## 2020-08-03 ENCOUNTER — HOSPITAL ENCOUNTER (OUTPATIENT)
Dept: RADIOLOGY | Facility: HOSPITAL | Age: 55
Discharge: HOME | End: 2020-08-03
Attending: UROLOGY
Payer: MEDICAID

## 2020-08-03 DIAGNOSIS — N50.89 SCROTAL SWELLING: ICD-10-CM

## 2020-08-03 PROCEDURE — 76870 US EXAM SCROTUM: CPT | Mod: TC,NTX

## 2020-08-03 PROCEDURE — 76870 US SCROTUM AND TESTICLES: ICD-10-PCS | Mod: 26,NTX,, | Performed by: RADIOLOGY

## 2020-08-03 PROCEDURE — 76870 US EXAM SCROTUM: CPT | Mod: 26,NTX,, | Performed by: RADIOLOGY

## 2020-08-12 ENCOUNTER — OFFICE VISIT (OUTPATIENT)
Dept: UROLOGY | Facility: CLINIC | Age: 55
End: 2020-08-12
Attending: UROLOGY
Payer: MEDICAID

## 2020-08-12 VITALS
BODY MASS INDEX: 28.45 KG/M2 | WEIGHT: 177 LBS | SYSTOLIC BLOOD PRESSURE: 132 MMHG | DIASTOLIC BLOOD PRESSURE: 86 MMHG | HEART RATE: 80 BPM | HEIGHT: 66 IN

## 2020-08-12 DIAGNOSIS — N43.2 COMMUNICATING HYDROCELE: Primary | ICD-10-CM

## 2020-08-12 DIAGNOSIS — N50.3 EPIDIDYMAL CYST: Chronic | ICD-10-CM

## 2020-08-12 DIAGNOSIS — K40.20 BILATERAL INGUINAL HERNIA WITHOUT OBSTRUCTION OR GANGRENE, RECURRENCE NOT SPECIFIED: ICD-10-CM

## 2020-08-12 DIAGNOSIS — N50.89 SCROTAL SWELLING: ICD-10-CM

## 2020-08-12 PROCEDURE — 99214 PR OFFICE/OUTPT VISIT, EST, LEVL IV, 30-39 MIN: ICD-10-PCS | Mod: NTX,S$GLB,, | Performed by: UROLOGY

## 2020-08-12 PROCEDURE — 99214 OFFICE O/P EST MOD 30 MIN: CPT | Mod: NTX,S$GLB,, | Performed by: UROLOGY

## 2020-09-04 ENCOUNTER — TELEPHONE (OUTPATIENT)
Dept: UROLOGY | Facility: CLINIC | Age: 55
End: 2020-09-04

## 2020-09-04 NOTE — TELEPHONE ENCOUNTER
----- Message from Elo Robertson sent at 9/4/2020 11:48 AM CDT -----  Contact: [phone]  Patient is requesting a call asap. Didn't want to state the reason.    Please call and advise.    Thank You

## 2020-09-08 DIAGNOSIS — Z76.82 ORGAN TRANSPLANT CANDIDATE: Primary | ICD-10-CM

## 2020-09-22 ENCOUNTER — TELEPHONE (OUTPATIENT)
Dept: TRANSPLANT | Facility: CLINIC | Age: 55
End: 2020-09-22

## 2020-09-22 DIAGNOSIS — Z76.82 ORGAN TRANSPLANT CANDIDATE: Primary | ICD-10-CM

## 2020-11-03 NOTE — PROGRESS NOTES
Most recent clinicals sent to [employer] park[employer] for ins auth. Pt scheduled for [facility] clinic appt 12/10/20.

## 2020-11-10 DIAGNOSIS — Z76.82 ORGAN TRANSPLANT CANDIDATE: Primary | ICD-10-CM

## 2020-11-10 NOTE — PROGRESS NOTES
Received a message from [employer] Tucker with request from [employer] for required clinical info for ins auth. Pt will need dental clearance. This coor attempted to call pt several times, someone answered the phone then hung up each time. I will try to call back at a later date.

## 2020-11-13 ENCOUNTER — TELEPHONE (OUTPATIENT)
Dept: TRANSPLANT | Facility: CLINIC | Age: 55
End: 2020-11-13

## 2020-11-13 NOTE — TELEPHONE ENCOUNTER
Message left for patient that her sister's application has been found. He does not need to complete another application, we only need his updated financial information to resubmit. Instructed to contact me with any questions.

## 2020-11-19 ENCOUNTER — TELEPHONE (OUTPATIENT)
Dept: TRANSPLANT | Facility: CLINIC | Age: 55
End: 2020-11-19

## 2020-11-19 NOTE — TELEPHONE ENCOUNTER
Patient informed that I received his financial information for donor's NLDAC application. Once donor's financial info received we will resubmit application.

## 2020-11-20 DIAGNOSIS — Z76.82 ORGAN TRANSPLANT CANDIDATE: Primary | ICD-10-CM

## 2020-12-08 ENCOUNTER — TELEPHONE (OUTPATIENT)
Dept: CARDIOLOGY | Facility: CLINIC | Age: 55
End: 2020-12-08

## 2020-12-10 ENCOUNTER — HOSPITAL ENCOUNTER (OUTPATIENT)
Dept: CARDIOLOGY | Facility: HOSPITAL | Age: 55
Discharge: HOME | End: 2020-12-10
Attending: NURSE PRACTITIONER
Payer: MEDICAID

## 2020-12-10 ENCOUNTER — HOSPITAL ENCOUNTER (OUTPATIENT)
Dept: RADIOLOGY | Facility: HOSPITAL | Age: 55
Discharge: HOME | End: 2020-12-10
Attending: NURSE PRACTITIONER
Payer: MEDICAID

## 2020-12-10 ENCOUNTER — OFFICE VISIT (OUTPATIENT)
Dept: TRANSPLANT | Facility: CLINIC | Age: 55
End: 2020-12-10
Payer: MEDICAID

## 2020-12-10 VITALS
WEIGHT: 160.06 LBS | TEMPERATURE: 97 F | OXYGEN SATURATION: 96 % | HEIGHT: 64 IN | RESPIRATION RATE: 18 BRPM | DIASTOLIC BLOOD PRESSURE: 75 MMHG | BODY MASS INDEX: 27.33 KG/M2 | SYSTOLIC BLOOD PRESSURE: 124 MMHG | HEART RATE: 68 BPM

## 2020-12-10 VITALS — WEIGHT: 177 LBS | HEIGHT: 66 IN | BODY MASS INDEX: 28.45 KG/M2

## 2020-12-10 VITALS
HEART RATE: 64 BPM | DIASTOLIC BLOOD PRESSURE: 76 MMHG | BODY MASS INDEX: 28.93 KG/M2 | HEIGHT: 66 IN | WEIGHT: 180 LBS | SYSTOLIC BLOOD PRESSURE: 124 MMHG

## 2020-12-10 DIAGNOSIS — D63.1 ANEMIA IN STAGE 5 CHRONIC KIDNEY DISEASE, NOT ON CHRONIC DIALYSIS: ICD-10-CM

## 2020-12-10 DIAGNOSIS — R73.03 PRE-DIABETES: ICD-10-CM

## 2020-12-10 DIAGNOSIS — N05.2 MEMBRANOUS GLOMERULONEPHRITIS: ICD-10-CM

## 2020-12-10 DIAGNOSIS — Z76.82 ORGAN TRANSPLANT CANDIDATE: ICD-10-CM

## 2020-12-10 DIAGNOSIS — Z76.82 PATIENT ON WAITING LIST FOR KIDNEY TRANSPLANT: Primary | ICD-10-CM

## 2020-12-10 DIAGNOSIS — R80.9 NEPHROTIC RANGE PROTEINURIA: ICD-10-CM

## 2020-12-10 DIAGNOSIS — N18.5 ANEMIA IN STAGE 5 CHRONIC KIDNEY DISEASE, NOT ON CHRONIC DIALYSIS: ICD-10-CM

## 2020-12-10 DIAGNOSIS — I15.0 RENOVASCULAR HYPERTENSION: ICD-10-CM

## 2020-12-10 DIAGNOSIS — I25.10 CORONARY ARTERY DISEASE INVOLVING NATIVE CORONARY ARTERY OF NATIVE HEART WITHOUT ANGINA PECTORIS: ICD-10-CM

## 2020-12-10 DIAGNOSIS — N18.5 CKD (CHRONIC KIDNEY DISEASE), STAGE V: ICD-10-CM

## 2020-12-10 PROBLEM — Z01.810 PREOPERATIVE CARDIOVASCULAR EXAMINATION: Status: RESOLVED | Noted: 2018-12-12 | Resolved: 2020-12-10

## 2020-12-10 PROBLEM — Z01.818 PRE-TRANSPLANT EVALUATION FOR CHRONIC KIDNEY DISEASE: Status: RESOLVED | Noted: 2018-09-18 | Resolved: 2020-12-10

## 2020-12-10 PROBLEM — Z12.11 SCREENING FOR COLON CANCER: Status: RESOLVED | Noted: 2019-04-05 | Resolved: 2020-12-10

## 2020-12-10 LAB
ASCENDING AORTA: 3.65 CM
AV INDEX (PROSTH): 0.99
AV MEAN GRADIENT: 4 MMHG
AV PEAK GRADIENT: 7 MMHG
AV VALVE AREA: 3.54 CM2
AV VELOCITY RATIO: 0.84
BSA FOR ECHO PROCEDURE: 1.95 M2
CV ECHO LV RWT: 0.47 CM
CV PHARM DOSE: 0.4 MG
CV STRESS BASE HR: 62 BPM
DIASTOLIC BLOOD PRESSURE: 98 MMHG
DOP CALC AO PEAK VEL: 1.35 M/S
DOP CALC AO VTI: 21.04 CM
DOP CALC LVOT AREA: 3.6 CM2
DOP CALC LVOT DIAMETER: 2.14 CM
DOP CALC LVOT PEAK VEL: 1.13 M/S
DOP CALC LVOT STROKE VOLUME: 74.52 CM3
DOP CALCLVOT PEAK VEL VTI: 20.73 CM
E WAVE DECELERATION TIME: 199.71 MSEC
E/A RATIO: 0.69
E/E' RATIO: 10.29 M/S
ECHO LV POSTERIOR WALL: 1.09 CM (ref 0.6–1.1)
END DIASTOLIC INDEX-HIGH: 170 ML/M2
END SYSTOLIC INDEX-HIGH: 70 ML/M2
FRACTIONAL SHORTENING: 28 % (ref 28–44)
INTERVENTRICULAR SEPTUM: 1.22 CM (ref 0.6–1.1)
LA MAJOR: 4.8 CM
LA MINOR: 4.99 CM
LA WIDTH: 4 CM
LEFT ATRIUM SIZE: 4 CM
LEFT ATRIUM VOLUME INDEX MOD: 15.8 ML/M2
LEFT ATRIUM VOLUME INDEX: 34.8 ML/M2
LEFT ATRIUM VOLUME MOD: 30.22 CM3
LEFT ATRIUM VOLUME: 66.55 CM3
LEFT INTERNAL DIMENSION IN SYSTOLE: 3.35 CM (ref 2.1–4)
LEFT VENTRICLE DIASTOLIC VOLUME INDEX: 53.01 ML/M2
LEFT VENTRICLE DIASTOLIC VOLUME: 101.38 ML
LEFT VENTRICLE MASS INDEX: 104 G/M2
LEFT VENTRICLE SYSTOLIC VOLUME INDEX: 24 ML/M2
LEFT VENTRICLE SYSTOLIC VOLUME: 45.86 ML
LEFT VENTRICULAR INTERNAL DIMENSION IN DIASTOLE: 4.68 CM (ref 3.5–6)
LEFT VENTRICULAR MASS: 199.47 G
LV LATERAL E/E' RATIO: 9 M/S
LV SEPTAL E/E' RATIO: 12 M/S
MV A" WAVE DURATION": 9.9 MSEC
MV PEAK A VEL: 0.52 M/S
MV PEAK E VEL: 0.36 M/S
OHS CV CPX 85 PERCENT MAX PREDICTED HEART RATE MALE: 139
OHS CV CPX MAX PREDICTED HEART RATE: 163
OHS CV CPX PATIENT IS FEMALE: 0
OHS CV CPX PATIENT IS MALE: 1
OHS CV CPX PEAK DIASTOLIC BLOOD PRESSURE: 84 MMHG
OHS CV CPX PEAK HEAR RATE: 69 BPM
OHS CV CPX PEAK RATE PRESSURE PRODUCT: NORMAL
OHS CV CPX PEAK SYSTOLIC BLOOD PRESSURE: 156 MMHG
OHS CV CPX PERCENT MAX PREDICTED HEART RATE ACHIEVED: 42
OHS CV CPX RATE PRESSURE PRODUCT PRESENTING: 9734
PISA TR MAX VEL: 2.33 M/S
PULM VEIN S/D RATIO: 2
PV PEAK D VEL: 0.3 M/S
PV PEAK S VEL: 0.6 M/S
RA MAJOR: 4.22 CM
RA PRESSURE: 3 MMHG
RA WIDTH: 3.05 CM
RETIRED EF AND QEF - SEE NOTES: 51 %
RIGHT VENTRICULAR END-DIASTOLIC DIMENSION: 3.04 CM
SINUS: 3.64 CM
STJ: 3.22 CM
SYSTOLIC BLOOD PRESSURE: 157 MMHG
TDI LATERAL: 0.04 M/S
TDI SEPTAL: 0.03 M/S
TDI: 0.04 M/S
TR MAX PG: 22 MMHG
TRICUSPID ANNULAR PLANE SYSTOLIC EXCURSION: 1.89 CM
TV REST PULMONARY ARTERY PRESSURE: 25 MMHG

## 2020-12-10 PROCEDURE — 78452 STRESS TEST WITH MYOCARDIAL PERFUSION (CUPID ONLY): ICD-10-PCS | Mod: 26,TXP,, | Performed by: INTERNAL MEDICINE

## 2020-12-10 PROCEDURE — 71046 X-RAY EXAM CHEST 2 VIEWS: CPT | Mod: 26,TXP,, | Performed by: RADIOLOGY

## 2020-12-10 PROCEDURE — 93018 STRESS TEST WITH MYOCARDIAL PERFUSION (CUPID ONLY): ICD-10-PCS | Mod: TXP,,, | Performed by: INTERNAL MEDICINE

## 2020-12-10 PROCEDURE — 99999 PR PBB SHADOW E&M-EST. PATIENT-LVL V: CPT | Mod: PBBFAC,TXP,, | Performed by: NURSE PRACTITIONER

## 2020-12-10 PROCEDURE — 76770 US EXAM ABDO BACK WALL COMP: CPT | Mod: 26,TXP,, | Performed by: RADIOLOGY

## 2020-12-10 PROCEDURE — 93306 ECHO (CUPID ONLY): ICD-10-PCS | Mod: 26,TXP,, | Performed by: INTERNAL MEDICINE

## 2020-12-10 PROCEDURE — 93306 TTE W/DOPPLER COMPLETE: CPT | Mod: 26,TXP,, | Performed by: INTERNAL MEDICINE

## 2020-12-10 PROCEDURE — 93306 TTE W/DOPPLER COMPLETE: CPT | Mod: TXP

## 2020-12-10 PROCEDURE — 76770 US RETROPERITONEAL COMPLETE: ICD-10-PCS | Mod: 26,TXP,, | Performed by: RADIOLOGY

## 2020-12-10 PROCEDURE — 71046 XR CHEST PA AND LATERAL: ICD-10-PCS | Mod: 26,TXP,, | Performed by: RADIOLOGY

## 2020-12-10 PROCEDURE — 74176 CT ABD & PELVIS W/O CONTRAST: CPT | Mod: TC,TXP

## 2020-12-10 PROCEDURE — 78452 HT MUSCLE IMAGE SPECT MULT: CPT | Mod: 26,TXP,, | Performed by: INTERNAL MEDICINE

## 2020-12-10 PROCEDURE — 93018 CV STRESS TEST I&R ONLY: CPT | Mod: TXP,,, | Performed by: INTERNAL MEDICINE

## 2020-12-10 PROCEDURE — 99215 OFFICE O/P EST HI 40 MIN: CPT | Mod: S$PBB,TXP,, | Performed by: NURSE PRACTITIONER

## 2020-12-10 PROCEDURE — 74176 CT ABDOMEN PELVIS WITHOUT CONTRAST: ICD-10-PCS | Mod: 26,TXP,, | Performed by: RADIOLOGY

## 2020-12-10 PROCEDURE — 71046 X-RAY EXAM CHEST 2 VIEWS: CPT | Mod: TC,TXP

## 2020-12-10 PROCEDURE — A9502 TC99M TETROFOSMIN: HCPCS | Mod: TXP

## 2020-12-10 PROCEDURE — 63600175 PHARM REV CODE 636 W HCPCS: Mod: TXP | Performed by: NURSE PRACTITIONER

## 2020-12-10 PROCEDURE — 74176 CT ABD & PELVIS W/O CONTRAST: CPT | Mod: 26,TXP,, | Performed by: RADIOLOGY

## 2020-12-10 PROCEDURE — 93016 CV STRESS TEST SUPVJ ONLY: CPT | Mod: TXP,,, | Performed by: INTERNAL MEDICINE

## 2020-12-10 PROCEDURE — 99215 PR OFFICE/OUTPT VISIT, EST, LEVL V, 40-54 MIN: ICD-10-PCS | Mod: S$PBB,TXP,, | Performed by: NURSE PRACTITIONER

## 2020-12-10 PROCEDURE — 76770 US EXAM ABDO BACK WALL COMP: CPT | Mod: TC,TXP

## 2020-12-10 PROCEDURE — 99215 OFFICE O/P EST HI 40 MIN: CPT | Mod: PBBFAC,25,TXP | Performed by: NURSE PRACTITIONER

## 2020-12-10 PROCEDURE — 93016 STRESS TEST WITH MYOCARDIAL PERFUSION (CUPID ONLY): ICD-10-PCS | Mod: TXP,,, | Performed by: INTERNAL MEDICINE

## 2020-12-10 PROCEDURE — 99999 PR PBB SHADOW E&M-EST. PATIENT-LVL V: ICD-10-PCS | Mod: PBBFAC,TXP,, | Performed by: NURSE PRACTITIONER

## 2020-12-10 RX ORDER — REGADENOSON 0.08 MG/ML
0.4 INJECTION, SOLUTION INTRAVENOUS
Status: COMPLETED | OUTPATIENT
Start: 2020-12-10 | End: 2020-12-10

## 2020-12-10 RX ORDER — AMINOPHYLLINE 25 MG/ML
75 INJECTION, SOLUTION INTRAVENOUS ONCE
Status: COMPLETED | OUTPATIENT
Start: 2020-12-10 | End: 2020-12-10

## 2020-12-10 RX ADMIN — REGADENOSON 0.4 MG: 0.08 INJECTION, SOLUTION INTRAVENOUS at 11:12

## 2020-12-10 RX ADMIN — AMINOPHYLLINE 75 MG: 25 INJECTION, SOLUTION INTRAVENOUS at 11:12

## 2020-12-10 NOTE — LETTER
December 11, 2020        Justice Lee  [address]  [address]  [place] [address]  Phone: [phone]  Fax: [phone]             [facility]- Transplant 1st Fl  [address]  [facility] [address]  Phone: [phone]   Patient: Enrique Martinez   MR Number: [id number]   YOB: 1965   Date of Visit: 12/10/2020       Dear Dr. Justice Lee    Thank you for referring Enrique Martinez to me for evaluation. Attached you will find relevant portions of my assessment and plan of care.    If you have questions, please do not hesitate to call me. I look forward to following Enrique Martinez along with you.    Sincerely,    Stephie Harmon, NP    Enclosure    If you would like to receive this communication electronically, please contact [email or web address] or [phone] to request [employer] Link access.    [employer] Link is a tool which provides read-only access to select patient information with whom you have a relationship. Its easy to use and provides real time access to review your patients record including encounter summaries, notes, results, and demographic information.    If you feel you have received this communication in error or would no longer like to receive these types of communications, please e-mail [email or web address]

## 2020-12-10 NOTE — PROGRESS NOTES
Kidney Transplant Recipient Reevalulation    Referring Physician: Wagner Valdez  Current Nephrologist: Justice Lee  Waitlist Status: active  Dialysis Start Date: (Not currently on dialysis)    Subjective:     CC:  Annual reassessment of kidney transplant candidacy.    HPI:  Mr. Martinez is a 55 y.o. year old Black or  male with advanced kidney disease secondary to diabetic nephropathy.  He has been on the wait list for a kidney transplant at [facility] since 11/22/2019. Patient is currently pre-dialysis. He has a no access. Patient denies any recent hospitalizations or ED visits.    remains active and regularly goes to the gym a few times per week, Is overall in good physical shape. eports umbilical hernia and scheduled for repair at [facility] for 1/20/21      Care every where 12/2/20--currently CKD V        PSA, Screen (ng/mL)   Date Value   12/10/2020 2.4         Past Medical History:   Diagnosis Date    Anemia     Coronary artery disease     Diabetes mellitus, type 2     GERD (gastroesophageal reflux disease)     Gout     Hydrocele in adult     bilateral    Hyperlipidemia     Hypertension     Inguinal hernia     bilateral    Membranous glomerulonephritis     Nephrotic range proteinuria     Nephrotic syndrome     Obesity     Umbilical hernia        Review of Systems   Constitutional: Negative for activity change, appetite change, chills, fatigue, fever and unexpected weight change.   HENT: Negative for congestion, facial swelling, postnasal drip, rhinorrhea, sinus pressure, sore throat and trouble swallowing.    Eyes: Positive for visual disturbance. Negative for pain and redness.        Wears glasses   Respiratory: Negative for cough, chest tightness, shortness of breath and wheezing.    Cardiovascular: Negative.  Negative for chest pain, palpitations and leg swelling.   Gastrointestinal: Positive for abdominal distention. Negative for abdominal pain, diarrhea, nausea and vomiting.  "       Umbilical hernia   Indigestion    Genitourinary: Negative for dysuria, flank pain and urgency.   Musculoskeletal: Positive for arthralgias. Negative for gait problem, neck pain and neck stiffness.   Skin: Negative for rash.   Allergic/Immunologic: Negative for environmental allergies, food allergies and immunocompromised state.   Neurological: Negative for dizziness, weakness, light-headedness and headaches.   Psychiatric/Behavioral: Negative for agitation and confusion. The patient is not nervous/anxious.        Objective:   body mass index is 27.26 kg/m².  /75 (BP Location: Right arm, Patient Position: Sitting, BP Method: Medium (Automatic))   Pulse 68   Temp 96.8 °F (36 °C) (Oral)   Resp 18   Ht 5' 4.25" (1.632 m)   Wt 72.6 kg (160 lb 0.9 oz)   SpO2 96%   BMI 27.26 kg/m²     Physical Exam  Vitals signs reviewed.   Constitutional:       Appearance: Normal appearance. He is well-developed.   HENT:      Head: Normocephalic.   Eyes:      Pupils: Pupils are equal, round, and reactive to light.   Neck:      Musculoskeletal: Normal range of motion and neck supple.   Cardiovascular:      Rate and Rhythm: Normal rate and regular rhythm.      Heart sounds: Normal heart sounds.   Pulmonary:      Effort: Pulmonary effort is normal.      Breath sounds: Normal breath sounds.   Abdominal:      General: Bowel sounds are normal. There is distension.      Palpations: Abdomen is soft.       Musculoskeletal: Normal range of motion.   Skin:     General: Skin is warm and dry.   Neurological:      Mental Status: He is alert and oriented to person, place, and time.      Motor: No abnormal muscle tone.   Psychiatric:         Behavior: Behavior normal.         Labs:  Lab Results   Component Value Date    WBC 6.66 10/31/2019    HGB 8.3 (L) 10/31/2019    HCT 25.4 (L) 10/31/2019     10/31/2019    K 4.4 10/31/2019     10/31/2019    CO2 23 10/31/2019    BUN 48 (H) 10/31/2019    CREATININE 6.3 (H) 10/31/2019    " EGFRNONAA 9 (A) 10/31/2019    CALCIUM 9.5 10/31/2019    PHOS 3.4 09/18/2018    MG 2.2 12/08/2014    ALBUMIN 3.3 (L) 12/10/2020    AST 28 12/10/2020    ALT 35 12/10/2020    UTPCR 1.76 (H) 09/18/2018    .0 (H) 09/18/2018       No results found for: PREALBUMIN, BILIRUBINUA, GGT, AMYLASE, LIPASE, PROTEINUA, NITRITE, RBCUA, WBCUA    No results found for: HLAABCTYPE    Lab Results   Component Value Date    CPRA 0 04/03/2020    AX9REOF Negative 04/03/2020    CIABCLM WEAK A69 09/26/2019    CIIAB Negative 04/03/2020       Labs were reviewed with the patient.    Pre-transplant Workup:   Reviewed with the patient.    Assessment:     1. Patient on waiting list for kidney transplant    2. CKD (chronic kidney disease), stage V    3. Renovascular hypertension    4. Coronary artery disease involving native coronary artery of native heart without angina pectoris    5. Membranous glomerulonephritis    6. Nephrotic range proteinuria    7. Pre-diabetes    8. Anemia in stage 5 chronic kidney disease, not on chronic dialysis        Plan:   SPECT stress test pending  TTE , CXR, renal US , CTA/P pending     Transplant Candidacy:   Mr. Martinez is a suitable kidney transplant candidate.  Meets center eligibility for accepting HCV+ donor offer - yes.  Patient educated on HCV+ donors. Enrique is willing  to accept HCV+ donor offer -   previously did NOT have Hep C Rx coverage  --will need to verify   Patient is a candidate for KDPI > 85 kidney donor offer - no d/t weight.  He remains in overall stable health, and will remain active on the transplant list.    Stephie Harmon NP       Follow-up:   In addition to the tests noted in the plan, Mr. Martinez will continue to have reevaluation as per the standing pre-kidney transplant protocol:  1. Monthly blood for PRA  2. Annual return to clinic, except HIV positive, > 65 years of age, or pancreas transplant candidates who will be scheduled to see transplant every 6 months while in pre-transplant  phase  3. Annual re-testing: CXR, EKG, yearly mammograms for women over 40 and PSA for males over 40, cardiology follow-up as recommended by initial cardiology pre-transplant evaluation  4. Renal ultrasound every 2 years  5. Baseline colonoscopy after age 50 and repeated as recommended    UNOS Patient Status  Functional Status: 80% - Normal activity with effort: some symptoms of disease  Physical Capacity: No Limitations

## 2020-12-11 NOTE — PROGRESS NOTES
LISTED PATIENT EDUCATION NOTE    Mr. Enrique Martinez was seen in LISTED kidney transplant clinic for evaluation for kidney, kidney/pancreas or pancreas only transplant.  The patient attended a an individual video education session that discussed/reviewed the following aspects of transplantation: evaluation and selection committee process, UNOS waitlist management/multiple listings, types of organs offered (KDPI < 85%, KDPI > 85%, PHS increased risk, DCD, HCV+, HIV+ for HIV+ recipients and enbloc/dual), financial aspects, surgical procedures, dietary instruction pre- and post-transplant, health maintenance pre- and post-transplant, post-transplant hospitalization and outpatient follow-up, potential to participate in a research protocol, and medication management and side effects.  A question and answer session was provided after the presentation.    The patient was seen by all members of the multi-disciplinary team to include: Nephrologist/PA, Surgeon, , Transplant Coordinator, , Pharmacist and Dietician (if applicable).    The patient reviewed and signed all consents for evaluation which were witnessed and sent to scanning into the [facility] chart.    The patient was given an education book and plan for further evaluation based on his individual assessment.      The patient was encouraged to call with any questions or concerns.

## 2020-12-15 DIAGNOSIS — Z76.82 ORGAN TRANSPLANT CANDIDATE: Primary | ICD-10-CM

## 2020-12-16 ENCOUNTER — TELEPHONE (OUTPATIENT)
Dept: TRANSPLANT | Facility: CLINIC | Age: 55
End: 2020-12-16

## 2020-12-16 NOTE — TELEPHONE ENCOUNTER
Spoke with pt who stated he is scheduled for hernia repair mid-January. ID consult scheduled 2/22/20 but may be cancelled pending pt's recovery. Pt is aware he will be placed in an inactive status until surgical clr received.

## 2020-12-18 NOTE — PROGRESS NOTES
Severe Iliac calcifications are present.  Possibly prohibitive, review at selection.      Need repeat CT in 2 years if not transplanted.

## 2020-12-21 DIAGNOSIS — Z76.82 ORGAN TRANSPLANT CANDIDATE: Primary | ICD-10-CM

## 2020-12-25 ENCOUNTER — HOSPITAL ENCOUNTER (INPATIENT)
Facility: HOSPITAL | Age: 55
LOS: 11 days | Discharge: PSYCHIATRIC OR SUBSTANCE USE FACILITY | DRG: 056 | End: 2021-01-05
Attending: EMERGENCY MEDICINE | Admitting: HOSPITALIST
Payer: MEDICAID

## 2020-12-25 DIAGNOSIS — F22 PARANOIA: ICD-10-CM

## 2020-12-25 DIAGNOSIS — R94.31 QT PROLONGATION: ICD-10-CM

## 2020-12-25 DIAGNOSIS — N19 UREMIA: ICD-10-CM

## 2020-12-25 DIAGNOSIS — Z72.0 TOBACCO USE: ICD-10-CM

## 2020-12-25 DIAGNOSIS — Z91.89 AT RISK FOR LONG QT SYNDROME: ICD-10-CM

## 2020-12-25 DIAGNOSIS — F06.8: ICD-10-CM

## 2020-12-25 DIAGNOSIS — F23 ACUTE PSYCHOSIS: Primary | ICD-10-CM

## 2020-12-25 LAB — POCT GLUCOSE: 271 MG/DL (ref 70–110)

## 2020-12-25 PROCEDURE — 99285 PR EMERGENCY DEPT VISIT,LEVEL V: ICD-10-PCS | Mod: NTX,,, | Performed by: EMERGENCY MEDICINE

## 2020-12-25 PROCEDURE — 99285 EMERGENCY DEPT VISIT HI MDM: CPT | Mod: NTX,,, | Performed by: EMERGENCY MEDICINE

## 2020-12-25 PROCEDURE — U0002 COVID-19 LAB TEST NON-CDC: HCPCS | Mod: NTX | Performed by: EMERGENCY MEDICINE

## 2020-12-25 PROCEDURE — 12000002 HC ACUTE/MED SURGE SEMI-PRIVATE ROOM: Mod: NTX

## 2020-12-25 PROCEDURE — 82962 GLUCOSE BLOOD TEST: CPT | Mod: NTX

## 2020-12-25 PROCEDURE — 99285 EMERGENCY DEPT VISIT HI MDM: CPT | Mod: 25,NTX

## 2020-12-26 PROBLEM — N18.9 ACUTE KIDNEY INJURY SUPERIMPOSED ON CHRONIC KIDNEY DISEASE: Status: ACTIVE | Noted: 2020-12-26

## 2020-12-26 PROBLEM — N18.9 ANEMIA DUE TO CHRONIC KIDNEY DISEASE: Status: ACTIVE | Noted: 2018-09-18

## 2020-12-26 PROBLEM — N18.9 ACUTE KIDNEY INJURY SUPERIMPOSED ON CHRONIC KIDNEY DISEASE: Status: RESOLVED | Noted: 2020-12-26 | Resolved: 2020-12-26

## 2020-12-26 PROBLEM — N18.5 CHRONIC KIDNEY DISEASE (CKD), ACTIVE MEDICAL MANAGEMENT WITHOUT DIALYSIS, STAGE 5: Status: ACTIVE | Noted: 2018-09-18

## 2020-12-26 PROBLEM — N18.6 ESRD (END STAGE RENAL DISEASE): Status: ACTIVE | Noted: 2018-09-18

## 2020-12-26 PROBLEM — F23 ACUTE PSYCHOSIS: Status: ACTIVE | Noted: 2020-12-26

## 2020-12-26 PROBLEM — Z72.0 TOBACCO USE: Status: ACTIVE | Noted: 2020-12-26

## 2020-12-26 PROBLEM — K21.9 GERD (GASTROESOPHAGEAL REFLUX DISEASE): Status: ACTIVE | Noted: 2020-12-26

## 2020-12-26 PROBLEM — N17.9 ACUTE KIDNEY INJURY SUPERIMPOSED ON CHRONIC KIDNEY DISEASE: Status: ACTIVE | Noted: 2020-12-26

## 2020-12-26 PROBLEM — N17.9 ACUTE KIDNEY INJURY SUPERIMPOSED ON CHRONIC KIDNEY DISEASE: Status: RESOLVED | Noted: 2020-12-26 | Resolved: 2020-12-26

## 2020-12-26 PROBLEM — E11.9 TYPE 2 DIABETES MELLITUS: Status: ACTIVE | Noted: 2018-09-18

## 2020-12-26 LAB
ALBUMIN SERPL BCP-MCNC: 3.4 G/DL (ref 3.5–5.2)
ALBUMIN SERPL BCP-MCNC: 3.4 G/DL (ref 3.5–5.2)
ALP SERPL-CCNC: 91 U/L (ref 55–135)
ALP SERPL-CCNC: 92 U/L (ref 55–135)
ALT SERPL W/O P-5'-P-CCNC: 30 U/L (ref 10–44)
ALT SERPL W/O P-5'-P-CCNC: 32 U/L (ref 10–44)
AMPHET+METHAMPHET UR QL: NEGATIVE
ANION GAP SERPL CALC-SCNC: 17 MMOL/L (ref 8–16)
ANION GAP SERPL CALC-SCNC: 18 MMOL/L (ref 8–16)
APAP SERPL-MCNC: <3 UG/ML (ref 10–20)
AST SERPL-CCNC: 28 U/L (ref 10–40)
AST SERPL-CCNC: 29 U/L (ref 10–40)
BACTERIA #/AREA URNS AUTO: NORMAL /HPF
BACTERIA #/AREA URNS AUTO: NORMAL /HPF
BARBITURATES UR QL SCN>200 NG/ML: NEGATIVE
BASOPHILS # BLD AUTO: 0.01 K/UL (ref 0–0.2)
BASOPHILS # BLD AUTO: 0.01 K/UL (ref 0–0.2)
BASOPHILS NFR BLD: 0.1 % (ref 0–1.9)
BASOPHILS NFR BLD: 0.1 % (ref 0–1.9)
BENZODIAZ UR QL SCN>200 NG/ML: NEGATIVE
BILIRUB SERPL-MCNC: 0.3 MG/DL (ref 0.1–1)
BILIRUB SERPL-MCNC: 0.3 MG/DL (ref 0.1–1)
BILIRUB UR QL STRIP: NEGATIVE
BILIRUB UR QL STRIP: NEGATIVE
BUN SERPL-MCNC: 134 MG/DL (ref 6–20)
BUN SERPL-MCNC: 142 MG/DL (ref 6–20)
BZE UR QL SCN: NEGATIVE
CALCIUM SERPL-MCNC: 9.6 MG/DL (ref 8.7–10.5)
CALCIUM SERPL-MCNC: 9.7 MG/DL (ref 8.7–10.5)
CANNABINOIDS UR QL SCN: NEGATIVE
CHLORIDE SERPL-SCNC: 89 MMOL/L (ref 95–110)
CHLORIDE SERPL-SCNC: 90 MMOL/L (ref 95–110)
CHOLEST SERPL-MCNC: 162 MG/DL (ref 120–199)
CHOLEST/HDLC SERPL: 7.7 {RATIO} (ref 2–5)
CLARITY UR REFRACT.AUTO: CLEAR
CLARITY UR REFRACT.AUTO: CLEAR
CO2 SERPL-SCNC: 26 MMOL/L (ref 23–29)
CO2 SERPL-SCNC: 29 MMOL/L (ref 23–29)
COLOR UR AUTO: ABNORMAL
COLOR UR AUTO: ABNORMAL
CREAT SERPL-MCNC: 8.3 MG/DL (ref 0.5–1.4)
CREAT SERPL-MCNC: 8.5 MG/DL (ref 0.5–1.4)
CREAT UR-MCNC: 36 MG/DL (ref 23–375)
CREAT UR-MCNC: 57 MG/DL (ref 23–375)
CREAT UR-MCNC: 58 MG/DL (ref 23–375)
CTP QC/QA: YES
DIFFERENTIAL METHOD: ABNORMAL
DIFFERENTIAL METHOD: ABNORMAL
EOSINOPHIL # BLD AUTO: 0 K/UL (ref 0–0.5)
EOSINOPHIL # BLD AUTO: 0 K/UL (ref 0–0.5)
EOSINOPHIL NFR BLD: 0 % (ref 0–8)
EOSINOPHIL NFR BLD: 0 % (ref 0–8)
ERYTHROCYTE [DISTWIDTH] IN BLOOD BY AUTOMATED COUNT: 13.2 % (ref 11.5–14.5)
ERYTHROCYTE [DISTWIDTH] IN BLOOD BY AUTOMATED COUNT: 13.4 % (ref 11.5–14.5)
EST. GFR  (AFRICAN AMERICAN): 7.2 ML/MIN/1.73 M^2
EST. GFR  (AFRICAN AMERICAN): 7.4 ML/MIN/1.73 M^2
EST. GFR  (NON AFRICAN AMERICAN): 6.3 ML/MIN/1.73 M^2
EST. GFR  (NON AFRICAN AMERICAN): 6.4 ML/MIN/1.73 M^2
ESTIMATED AVG GLUCOSE: 275 MG/DL (ref 68–131)
ETHANOL SERPL-MCNC: <10 MG/DL
FERRITIN SERPL-MCNC: 347 NG/ML (ref 20–300)
GLUCOSE SERPL-MCNC: 116 MG/DL (ref 70–110)
GLUCOSE SERPL-MCNC: 73 MG/DL (ref 70–110)
GLUCOSE UR QL STRIP: NEGATIVE
GLUCOSE UR QL STRIP: NEGATIVE
HBA1C MFR BLD HPLC: 11.2 % (ref 4–5.6)
HCT VFR BLD AUTO: 30.5 % (ref 40–54)
HCT VFR BLD AUTO: 30.8 % (ref 40–54)
HDLC SERPL-MCNC: 21 MG/DL (ref 40–75)
HDLC SERPL: 13 % (ref 20–50)
HGB BLD-MCNC: 10.4 G/DL (ref 14–18)
HGB BLD-MCNC: 10.5 G/DL (ref 14–18)
HGB UR QL STRIP: ABNORMAL
HGB UR QL STRIP: ABNORMAL
HYALINE CASTS UR QL AUTO: 0 /LPF
HYALINE CASTS UR QL AUTO: 0 /LPF
IGA SERPL-MCNC: 102 MG/DL (ref 40–350)
IGG SERPL-MCNC: 353 MG/DL (ref 650–1600)
IGM SERPL-MCNC: 24 MG/DL (ref 50–300)
IMM GRANULOCYTES # BLD AUTO: 0.06 K/UL (ref 0–0.04)
IMM GRANULOCYTES # BLD AUTO: 0.08 K/UL (ref 0–0.04)
IMM GRANULOCYTES NFR BLD AUTO: 0.6 % (ref 0–0.5)
IMM GRANULOCYTES NFR BLD AUTO: 0.7 % (ref 0–0.5)
INR PPP: 0.9 (ref 0.8–1.2)
IRON SERPL-MCNC: 40 UG/DL (ref 45–160)
KETONES UR QL STRIP: NEGATIVE
KETONES UR QL STRIP: NEGATIVE
LDLC SERPL CALC-MCNC: ABNORMAL MG/DL (ref 63–159)
LEUKOCYTE ESTERASE UR QL STRIP: NEGATIVE
LEUKOCYTE ESTERASE UR QL STRIP: NEGATIVE
LYMPHOCYTES # BLD AUTO: 1.6 K/UL (ref 1–4.8)
LYMPHOCYTES # BLD AUTO: 1.8 K/UL (ref 1–4.8)
LYMPHOCYTES NFR BLD: 13.4 % (ref 18–48)
LYMPHOCYTES NFR BLD: 17.4 % (ref 18–48)
MAGNESIUM SERPL-MCNC: 2.9 MG/DL (ref 1.6–2.6)
MCH RBC QN AUTO: 28.3 PG (ref 27–31)
MCH RBC QN AUTO: 28.4 PG (ref 27–31)
MCHC RBC AUTO-ENTMCNC: 34.1 G/DL (ref 32–36)
MCHC RBC AUTO-ENTMCNC: 34.1 G/DL (ref 32–36)
MCV RBC AUTO: 83 FL (ref 82–98)
MCV RBC AUTO: 83 FL (ref 82–98)
METHADONE UR QL SCN>300 NG/ML: NEGATIVE
MICROSCOPIC COMMENT: NORMAL
MICROSCOPIC COMMENT: NORMAL
MONOCYTES # BLD AUTO: 1.2 K/UL (ref 0.3–1)
MONOCYTES # BLD AUTO: 1.4 K/UL (ref 0.3–1)
MONOCYTES NFR BLD: 11.7 % (ref 4–15)
MONOCYTES NFR BLD: 12.1 % (ref 4–15)
NEUTROPHILS # BLD AUTO: 7.1 K/UL (ref 1.8–7.7)
NEUTROPHILS # BLD AUTO: 8.9 K/UL (ref 1.8–7.7)
NEUTROPHILS NFR BLD: 69.8 % (ref 38–73)
NEUTROPHILS NFR BLD: 74.1 % (ref 38–73)
NITRITE UR QL STRIP: NEGATIVE
NITRITE UR QL STRIP: NEGATIVE
NONHDLC SERPL-MCNC: 141 MG/DL
NRBC BLD-RTO: 0 /100 WBC
NRBC BLD-RTO: 0 /100 WBC
OPIATES UR QL SCN: NEGATIVE
PCP UR QL SCN>25 NG/ML: NEGATIVE
PH UR STRIP: 6 [PH] (ref 5–8)
PH UR STRIP: 6 [PH] (ref 5–8)
PHOSPHATE SERPL-MCNC: 5.4 MG/DL (ref 2.7–4.5)
PLATELET # BLD AUTO: 227 K/UL (ref 150–350)
PLATELET # BLD AUTO: 247 K/UL (ref 150–350)
PMV BLD AUTO: 9.4 FL (ref 9.2–12.9)
PMV BLD AUTO: 9.7 FL (ref 9.2–12.9)
POCT GLUCOSE: 210 MG/DL (ref 70–110)
POCT GLUCOSE: 238 MG/DL (ref 70–110)
POCT GLUCOSE: 256 MG/DL (ref 70–110)
POCT GLUCOSE: 95 MG/DL (ref 70–110)
POTASSIUM SERPL-SCNC: 3.2 MMOL/L (ref 3.5–5.1)
POTASSIUM SERPL-SCNC: 3.8 MMOL/L (ref 3.5–5.1)
PROT SERPL-MCNC: 6.8 G/DL (ref 6–8.4)
PROT SERPL-MCNC: 6.9 G/DL (ref 6–8.4)
PROT UR QL STRIP: ABNORMAL
PROT UR QL STRIP: ABNORMAL
PROT UR-MCNC: 40 MG/DL (ref 0–15)
PROT UR-MCNC: 73 MG/DL (ref 0–15)
PROT/CREAT UR: 1.11 MG/G{CREAT} (ref 0–0.2)
PROT/CREAT UR: 1.28 MG/G{CREAT} (ref 0–0.2)
PROTHROMBIN TIME: 10.1 SEC (ref 9–12.5)
RBC # BLD AUTO: 3.68 M/UL (ref 4.6–6.2)
RBC # BLD AUTO: 3.7 M/UL (ref 4.6–6.2)
RBC #/AREA URNS AUTO: 1 /HPF (ref 0–4)
RBC #/AREA URNS AUTO: 1 /HPF (ref 0–4)
RHEUMATOID FACT SERPL-ACNC: <10 IU/ML (ref 0–15)
SARS-COV-2 RDRP RESP QL NAA+PROBE: NEGATIVE
SATURATED IRON: 13 % (ref 20–50)
SODIUM SERPL-SCNC: 134 MMOL/L (ref 136–145)
SODIUM SERPL-SCNC: 135 MMOL/L (ref 136–145)
SP GR UR STRIP: 1.01 (ref 1–1.03)
SP GR UR STRIP: 1.01 (ref 1–1.03)
T4 FREE SERPL-MCNC: 0.93 NG/DL (ref 0.71–1.51)
TOTAL IRON BINDING CAPACITY: 305 UG/DL (ref 250–450)
TOXICOLOGY INFORMATION: NORMAL
TRANSFERRIN SERPL-MCNC: 206 MG/DL (ref 200–375)
TRIGL SERPL-MCNC: 403 MG/DL (ref 30–150)
TSH SERPL DL<=0.005 MIU/L-ACNC: 0.28 UIU/ML (ref 0.4–4)
URN SPEC COLLECT METH UR: ABNORMAL
URN SPEC COLLECT METH UR: ABNORMAL
WBC # BLD AUTO: 10.14 K/UL (ref 3.9–12.7)
WBC # BLD AUTO: 11.99 K/UL (ref 3.9–12.7)
WBC #/AREA URNS AUTO: 0 /HPF (ref 0–5)
WBC #/AREA URNS AUTO: 1 /HPF (ref 0–5)

## 2020-12-26 PROCEDURE — 11000001 HC ACUTE MED/SURG PRIVATE ROOM: Mod: NTX

## 2020-12-26 PROCEDURE — 99223 1ST HOSP IP/OBS HIGH 75: CPT | Mod: NTX,,, | Performed by: HOSPITALIST

## 2020-12-26 PROCEDURE — 80320 DRUG SCREEN QUANTALCOHOLS: CPT | Mod: NTX

## 2020-12-26 PROCEDURE — 63600175 PHARM REV CODE 636 W HCPCS: Mod: NTX | Performed by: STUDENT IN AN ORGANIZED HEALTH CARE EDUCATION/TRAINING PROGRAM

## 2020-12-26 PROCEDURE — 80307 DRUG TEST PRSMV CHEM ANLYZR: CPT | Mod: NTX

## 2020-12-26 PROCEDURE — 25000003 PHARM REV CODE 250: Mod: NTX | Performed by: STUDENT IN AN ORGANIZED HEALTH CARE EDUCATION/TRAINING PROGRAM

## 2020-12-26 PROCEDURE — 83735 ASSAY OF MAGNESIUM: CPT | Mod: NTX

## 2020-12-26 PROCEDURE — 99233 PR SUBSEQUENT HOSPITAL CARE,LEVL III: ICD-10-PCS | Mod: NTX,,, | Performed by: INTERNAL MEDICINE

## 2020-12-26 PROCEDURE — 84156 ASSAY OF PROTEIN URINE: CPT | Mod: NTX

## 2020-12-26 PROCEDURE — 85610 PROTHROMBIN TIME: CPT | Mod: NTX

## 2020-12-26 PROCEDURE — 86235 NUCLEAR ANTIGEN ANTIBODY: CPT | Mod: NTX

## 2020-12-26 PROCEDURE — 83036 HEMOGLOBIN GLYCOSYLATED A1C: CPT | Mod: NTX

## 2020-12-26 PROCEDURE — 84439 ASSAY OF FREE THYROXINE: CPT | Mod: NTX

## 2020-12-26 PROCEDURE — 25000242 PHARM REV CODE 250 ALT 637 W/ HCPCS: Mod: NTX | Performed by: STUDENT IN AN ORGANIZED HEALTH CARE EDUCATION/TRAINING PROGRAM

## 2020-12-26 PROCEDURE — 80061 LIPID PANEL: CPT | Mod: NTX

## 2020-12-26 PROCEDURE — 80329 ANALGESICS NON-OPIOID 1 OR 2: CPT | Mod: NTX

## 2020-12-26 PROCEDURE — 80053 COMPREHEN METABOLIC PANEL: CPT | Mod: NTX

## 2020-12-26 PROCEDURE — 93005 ELECTROCARDIOGRAM TRACING: CPT | Mod: NTX

## 2020-12-26 PROCEDURE — 84156 ASSAY OF PROTEIN URINE: CPT | Mod: 91,NTX

## 2020-12-26 PROCEDURE — 36415 COLL VENOUS BLD VENIPUNCTURE: CPT | Mod: NTX

## 2020-12-26 PROCEDURE — 83540 ASSAY OF IRON: CPT | Mod: NTX

## 2020-12-26 PROCEDURE — 80053 COMPREHEN METABOLIC PANEL: CPT | Mod: 91,NTX

## 2020-12-26 PROCEDURE — 99233 SBSQ HOSP IP/OBS HIGH 50: CPT | Mod: NTX,,, | Performed by: INTERNAL MEDICINE

## 2020-12-26 PROCEDURE — 84100 ASSAY OF PHOSPHORUS: CPT | Mod: NTX

## 2020-12-26 PROCEDURE — 84443 ASSAY THYROID STIM HORMONE: CPT | Mod: NTX

## 2020-12-26 PROCEDURE — 86255 FLUORESCENT ANTIBODY SCREEN: CPT | Mod: 91,NTX

## 2020-12-26 PROCEDURE — 82787 IGG 1 2 3 OR 4 EACH: CPT | Mod: 59,NTX

## 2020-12-26 PROCEDURE — 86235 NUCLEAR ANTIGEN ANTIBODY: CPT | Mod: 59,NTX

## 2020-12-26 PROCEDURE — 85025 COMPLETE CBC W/AUTO DIFF WBC: CPT | Mod: NTX

## 2020-12-26 PROCEDURE — 99223 PR INITIAL HOSPITAL CARE,LEVL III: ICD-10-PCS | Mod: NTX,,, | Performed by: HOSPITALIST

## 2020-12-26 PROCEDURE — 93010 EKG 12-LEAD: ICD-10-PCS | Mod: NTX,,, | Performed by: INTERNAL MEDICINE

## 2020-12-26 PROCEDURE — 81001 URINALYSIS AUTO W/SCOPE: CPT | Mod: 91,NTX

## 2020-12-26 PROCEDURE — 82728 ASSAY OF FERRITIN: CPT | Mod: NTX

## 2020-12-26 PROCEDURE — 90792 PSYCH DIAG EVAL W/MED SRVCS: CPT | Mod: NTX,,, | Performed by: PSYCHIATRY & NEUROLOGY

## 2020-12-26 PROCEDURE — 86038 ANTINUCLEAR ANTIBODIES: CPT | Mod: NTX

## 2020-12-26 PROCEDURE — 90792 PR PSYCHIATRIC DIAGNOSTIC EVALUATION W/MEDICAL SERVICES: ICD-10-PCS | Mod: NTX,,, | Performed by: PSYCHIATRY & NEUROLOGY

## 2020-12-26 PROCEDURE — 86431 RHEUMATOID FACTOR QUANT: CPT | Mod: NTX

## 2020-12-26 PROCEDURE — 82784 ASSAY IGA/IGD/IGG/IGM EACH: CPT | Mod: 59,NTX

## 2020-12-26 PROCEDURE — 93010 ELECTROCARDIOGRAM REPORT: CPT | Mod: NTX,,, | Performed by: INTERNAL MEDICINE

## 2020-12-26 PROCEDURE — 81001 URINALYSIS AUTO W/SCOPE: CPT | Mod: NTX

## 2020-12-26 RX ORDER — DROPERIDOL 2.5 MG/ML
2.5 INJECTION, SOLUTION INTRAMUSCULAR; INTRAVENOUS
Status: DISCONTINUED | OUTPATIENT
Start: 2020-12-26 | End: 2020-12-26

## 2020-12-26 RX ORDER — HALOPERIDOL 5 MG/ML
10 INJECTION INTRAMUSCULAR
Status: DISCONTINUED | OUTPATIENT
Start: 2020-12-26 | End: 2020-12-26

## 2020-12-26 RX ORDER — SODIUM CHLORIDE 0.9 % (FLUSH) 0.9 %
10 SYRINGE (ML) INJECTION
Status: DISCONTINUED | OUTPATIENT
Start: 2020-12-26 | End: 2021-01-05 | Stop reason: HOSPADM

## 2020-12-26 RX ORDER — FLUTICASONE PROPIONATE 50 MCG
1 SPRAY, SUSPENSION (ML) NASAL DAILY
Status: DISCONTINUED | OUTPATIENT
Start: 2020-12-26 | End: 2021-01-05 | Stop reason: HOSPADM

## 2020-12-26 RX ORDER — IBUPROFEN 200 MG
1 TABLET ORAL
Status: DISCONTINUED | OUTPATIENT
Start: 2020-12-26 | End: 2021-01-05 | Stop reason: HOSPADM

## 2020-12-26 RX ORDER — IBUPROFEN 200 MG
24 TABLET ORAL
Status: DISCONTINUED | OUTPATIENT
Start: 2020-12-26 | End: 2020-12-29

## 2020-12-26 RX ORDER — INSULIN ASPART 100 [IU]/ML
0-5 INJECTION, SOLUTION INTRAVENOUS; SUBCUTANEOUS
Status: DISCONTINUED | OUTPATIENT
Start: 2020-12-26 | End: 2020-12-29

## 2020-12-26 RX ORDER — FUROSEMIDE 40 MG/1
40 TABLET ORAL 2 TIMES DAILY
Status: DISCONTINUED | OUTPATIENT
Start: 2020-12-26 | End: 2020-12-26

## 2020-12-26 RX ORDER — FERROUS GLUCONATE 324(37.5)
324 TABLET ORAL
Status: DISCONTINUED | OUTPATIENT
Start: 2020-12-26 | End: 2021-01-05 | Stop reason: HOSPADM

## 2020-12-26 RX ORDER — ATORVASTATIN CALCIUM 20 MG/1
20 TABLET, FILM COATED ORAL DAILY
Status: DISCONTINUED | OUTPATIENT
Start: 2020-12-26 | End: 2021-01-05 | Stop reason: HOSPADM

## 2020-12-26 RX ORDER — METOPROLOL SUCCINATE 100 MG/1
100 TABLET, EXTENDED RELEASE ORAL DAILY
Status: DISCONTINUED | OUTPATIENT
Start: 2020-12-26 | End: 2021-01-05 | Stop reason: HOSPADM

## 2020-12-26 RX ORDER — SODIUM BICARBONATE 650 MG/1
650 TABLET ORAL 3 TIMES DAILY
Status: DISCONTINUED | OUTPATIENT
Start: 2020-12-26 | End: 2020-12-27

## 2020-12-26 RX ORDER — CLONIDINE HYDROCHLORIDE 0.1 MG/1
0.2 TABLET ORAL 3 TIMES DAILY
Status: DISCONTINUED | OUTPATIENT
Start: 2020-12-26 | End: 2021-01-01

## 2020-12-26 RX ORDER — POLYETHYLENE GLYCOL 3350 17 G/17G
17 POWDER, FOR SOLUTION ORAL DAILY
Status: DISCONTINUED | OUTPATIENT
Start: 2020-12-26 | End: 2021-01-05 | Stop reason: HOSPADM

## 2020-12-26 RX ORDER — IBUPROFEN 200 MG
16 TABLET ORAL
Status: DISCONTINUED | OUTPATIENT
Start: 2020-12-26 | End: 2020-12-29

## 2020-12-26 RX ORDER — FAMOTIDINE 20 MG/1
20 TABLET, FILM COATED ORAL DAILY
Status: DISCONTINUED | OUTPATIENT
Start: 2020-12-26 | End: 2021-01-05 | Stop reason: HOSPADM

## 2020-12-26 RX ORDER — ONDANSETRON 8 MG/1
8 TABLET, ORALLY DISINTEGRATING ORAL EVERY 8 HOURS PRN
Status: DISCONTINUED | OUTPATIENT
Start: 2020-12-26 | End: 2021-01-05 | Stop reason: HOSPADM

## 2020-12-26 RX ORDER — GLUCAGON 1 MG
1 KIT INJECTION
Status: DISCONTINUED | OUTPATIENT
Start: 2020-12-26 | End: 2020-12-29

## 2020-12-26 RX ORDER — AMLODIPINE BESYLATE 10 MG/1
10 TABLET ORAL DAILY
Status: DISCONTINUED | OUTPATIENT
Start: 2020-12-26 | End: 2021-01-05 | Stop reason: HOSPADM

## 2020-12-26 RX ORDER — FUROSEMIDE 10 MG/ML
40 INJECTION INTRAMUSCULAR; INTRAVENOUS
Status: DISCONTINUED | OUTPATIENT
Start: 2020-12-26 | End: 2020-12-27

## 2020-12-26 RX ORDER — HALOPERIDOL 5 MG/ML
5 INJECTION INTRAMUSCULAR EVERY 6 HOURS PRN
Status: DISCONTINUED | OUTPATIENT
Start: 2020-12-26 | End: 2020-12-31

## 2020-12-26 RX ORDER — LORAZEPAM 2 MG/ML
2 INJECTION INTRAMUSCULAR
Status: DISCONTINUED | OUTPATIENT
Start: 2020-12-26 | End: 2020-12-26

## 2020-12-26 RX ORDER — ALLOPURINOL 100 MG/1
100 TABLET ORAL DAILY
Status: DISCONTINUED | OUTPATIENT
Start: 2020-12-26 | End: 2020-12-26

## 2020-12-26 RX ADMIN — SODIUM BICARBONATE 650 MG TABLET 650 MG: at 02:12

## 2020-12-26 RX ADMIN — SODIUM BICARBONATE 650 MG TABLET 650 MG: at 09:12

## 2020-12-26 RX ADMIN — FAMOTIDINE 20 MG: 20 TABLET, FILM COATED ORAL at 09:12

## 2020-12-26 RX ADMIN — AMLODIPINE BESYLATE 10 MG: 10 TABLET ORAL at 09:12

## 2020-12-26 RX ADMIN — CLONIDINE HYDROCHLORIDE 0.2 MG: 0.1 TABLET ORAL at 02:12

## 2020-12-26 RX ADMIN — INSULIN ASPART 2 UNITS: 100 INJECTION, SOLUTION INTRAVENOUS; SUBCUTANEOUS at 05:12

## 2020-12-26 RX ADMIN — CLONIDINE HYDROCHLORIDE 0.2 MG: 0.1 TABLET ORAL at 08:12

## 2020-12-26 RX ADMIN — CLONIDINE HYDROCHLORIDE 0.2 MG: 0.1 TABLET ORAL at 09:12

## 2020-12-26 RX ADMIN — HALOPERIDOL LACTATE 5 MG: 5 INJECTION, SOLUTION INTRAMUSCULAR at 12:12

## 2020-12-26 RX ADMIN — FUROSEMIDE 40 MG: 10 INJECTION, SOLUTION INTRAMUSCULAR; INTRAVENOUS at 05:12

## 2020-12-26 RX ADMIN — SODIUM BICARBONATE 650 MG TABLET 650 MG: at 08:12

## 2020-12-26 RX ADMIN — FERROUS GLUCONATE TAB 324 MG (37.5 MG ELEMENTAL IRON) 324 MG: 324 (37.5 FE) TAB at 09:12

## 2020-12-26 RX ADMIN — ATORVASTATIN CALCIUM 20 MG: 20 TABLET, FILM COATED ORAL at 09:12

## 2020-12-26 RX ADMIN — FLUTICASONE PROPIONATE 50 MCG: 50 SPRAY, METERED NASAL at 10:12

## 2020-12-26 RX ADMIN — INSULIN ASPART 1 UNITS: 100 INJECTION, SOLUTION INTRAVENOUS; SUBCUTANEOUS at 08:12

## 2020-12-26 RX ADMIN — INSULIN ASPART 3 UNITS: 100 INJECTION, SOLUTION INTRAVENOUS; SUBCUTANEOUS at 12:12

## 2020-12-26 RX ADMIN — FUROSEMIDE 40 MG: 10 INJECTION, SOLUTION INTRAMUSCULAR; INTRAVENOUS at 06:12

## 2020-12-26 RX ADMIN — POLYETHYLENE GLYCOL 3350 17 G: 17 POWDER, FOR SOLUTION ORAL at 09:12

## 2020-12-26 RX ADMIN — METOPROLOL SUCCINATE 100 MG: 100 TABLET, EXTENDED RELEASE ORAL at 09:12

## 2020-12-27 LAB
ALBUMIN SERPL BCP-MCNC: 3.2 G/DL (ref 3.5–5.2)
ALP SERPL-CCNC: 98 U/L (ref 55–135)
ALT SERPL W/O P-5'-P-CCNC: 24 U/L (ref 10–44)
ANION GAP SERPL CALC-SCNC: 17 MMOL/L (ref 8–16)
AST SERPL-CCNC: 17 U/L (ref 10–40)
BASOPHILS # BLD AUTO: 0.01 K/UL (ref 0–0.2)
BASOPHILS NFR BLD: 0.1 % (ref 0–1.9)
BILIRUB SERPL-MCNC: 0.4 MG/DL (ref 0.1–1)
BUN SERPL-MCNC: 131 MG/DL (ref 6–20)
CALCIUM SERPL-MCNC: 9.7 MG/DL (ref 8.7–10.5)
CHLORIDE SERPL-SCNC: 90 MMOL/L (ref 95–110)
CO2 SERPL-SCNC: 31 MMOL/L (ref 23–29)
CREAT SERPL-MCNC: 7.5 MG/DL (ref 0.5–1.4)
DIFFERENTIAL METHOD: ABNORMAL
EOSINOPHIL # BLD AUTO: 0 K/UL (ref 0–0.5)
EOSINOPHIL NFR BLD: 0.1 % (ref 0–8)
ERYTHROCYTE [DISTWIDTH] IN BLOOD BY AUTOMATED COUNT: 13.2 % (ref 11.5–14.5)
EST. GFR  (AFRICAN AMERICAN): 8.4 ML/MIN/1.73 M^2
EST. GFR  (NON AFRICAN AMERICAN): 7.3 ML/MIN/1.73 M^2
GLUCOSE SERPL-MCNC: 200 MG/DL (ref 70–110)
HCT VFR BLD AUTO: 33.3 % (ref 40–54)
HGB BLD-MCNC: 11 G/DL (ref 14–18)
IMM GRANULOCYTES # BLD AUTO: 0.08 K/UL (ref 0–0.04)
IMM GRANULOCYTES NFR BLD AUTO: 0.8 % (ref 0–0.5)
LYMPHOCYTES # BLD AUTO: 1.2 K/UL (ref 1–4.8)
LYMPHOCYTES NFR BLD: 11.7 % (ref 18–48)
MAGNESIUM SERPL-MCNC: 2.8 MG/DL (ref 1.6–2.6)
MCH RBC QN AUTO: 27.9 PG (ref 27–31)
MCHC RBC AUTO-ENTMCNC: 33 G/DL (ref 32–36)
MCV RBC AUTO: 85 FL (ref 82–98)
MONOCYTES # BLD AUTO: 1.1 K/UL (ref 0.3–1)
MONOCYTES NFR BLD: 10.9 % (ref 4–15)
NEUTROPHILS # BLD AUTO: 7.6 K/UL (ref 1.8–7.7)
NEUTROPHILS NFR BLD: 76.4 % (ref 38–73)
NRBC BLD-RTO: 0 /100 WBC
PHOSPHATE SERPL-MCNC: 4.9 MG/DL (ref 2.7–4.5)
PLATELET # BLD AUTO: 245 K/UL (ref 150–350)
PMV BLD AUTO: 9.7 FL (ref 9.2–12.9)
POCT GLUCOSE: 237 MG/DL (ref 70–110)
POCT GLUCOSE: 269 MG/DL (ref 70–110)
POCT GLUCOSE: 290 MG/DL (ref 70–110)
POCT GLUCOSE: 392 MG/DL (ref 70–110)
POTASSIUM SERPL-SCNC: 3.2 MMOL/L (ref 3.5–5.1)
PROT SERPL-MCNC: 6.6 G/DL (ref 6–8.4)
RBC # BLD AUTO: 3.94 M/UL (ref 4.6–6.2)
SODIUM SERPL-SCNC: 138 MMOL/L (ref 136–145)
WBC # BLD AUTO: 9.92 K/UL (ref 3.9–12.7)

## 2020-12-27 PROCEDURE — 63600175 PHARM REV CODE 636 W HCPCS: Mod: NTX | Performed by: STUDENT IN AN ORGANIZED HEALTH CARE EDUCATION/TRAINING PROGRAM

## 2020-12-27 PROCEDURE — 85025 COMPLETE CBC W/AUTO DIFF WBC: CPT | Mod: NTX

## 2020-12-27 PROCEDURE — 80053 COMPREHEN METABOLIC PANEL: CPT | Mod: NTX

## 2020-12-27 PROCEDURE — 99232 PR SUBSEQUENT HOSPITAL CARE,LEVL II: ICD-10-PCS | Mod: NTX,,, | Performed by: INTERNAL MEDICINE

## 2020-12-27 PROCEDURE — 25000003 PHARM REV CODE 250: Mod: NTX | Performed by: STUDENT IN AN ORGANIZED HEALTH CARE EDUCATION/TRAINING PROGRAM

## 2020-12-27 PROCEDURE — 84100 ASSAY OF PHOSPHORUS: CPT | Mod: NTX

## 2020-12-27 PROCEDURE — 99232 SBSQ HOSP IP/OBS MODERATE 35: CPT | Mod: NTX,,, | Performed by: INTERNAL MEDICINE

## 2020-12-27 PROCEDURE — 83735 ASSAY OF MAGNESIUM: CPT | Mod: NTX

## 2020-12-27 PROCEDURE — 99233 PR SUBSEQUENT HOSPITAL CARE,LEVL III: ICD-10-PCS | Mod: NTX,,, | Performed by: HOSPITALIST

## 2020-12-27 PROCEDURE — 36415 COLL VENOUS BLD VENIPUNCTURE: CPT | Mod: NTX

## 2020-12-27 PROCEDURE — 11000001 HC ACUTE MED/SURG PRIVATE ROOM: Mod: NTX

## 2020-12-27 PROCEDURE — 99233 SBSQ HOSP IP/OBS HIGH 50: CPT | Mod: NTX,,, | Performed by: HOSPITALIST

## 2020-12-27 RX ORDER — ACETAMINOPHEN 325 MG/1
650 TABLET ORAL EVERY 8 HOURS PRN
Status: DISCONTINUED | OUTPATIENT
Start: 2020-12-27 | End: 2021-01-05 | Stop reason: HOSPADM

## 2020-12-27 RX ORDER — ARIPIPRAZOLE 5 MG/1
5 TABLET ORAL DAILY
Status: DISCONTINUED | OUTPATIENT
Start: 2020-12-28 | End: 2020-12-30

## 2020-12-27 RX ORDER — HYDROCORTISONE 1 %
CREAM (GRAM) TOPICAL 2 TIMES DAILY
Status: CANCELLED | OUTPATIENT
Start: 2020-12-27

## 2020-12-27 RX ORDER — HYDROCORTISONE 5 MG/1
10 TABLET ORAL NIGHTLY
Status: DISCONTINUED | OUTPATIENT
Start: 2020-12-27 | End: 2021-01-05

## 2020-12-27 RX ORDER — HYDROCORTISONE 20 MG/1
20 TABLET ORAL DAILY
Status: DISCONTINUED | OUTPATIENT
Start: 2020-12-28 | End: 2021-01-05 | Stop reason: HOSPADM

## 2020-12-27 RX ADMIN — FERROUS GLUCONATE TAB 324 MG (37.5 MG ELEMENTAL IRON) 324 MG: 324 (37.5 FE) TAB at 09:12

## 2020-12-27 RX ADMIN — ATORVASTATIN CALCIUM 20 MG: 20 TABLET, FILM COATED ORAL at 09:12

## 2020-12-27 RX ADMIN — ACETAMINOPHEN 650 MG: 325 TABLET ORAL at 03:12

## 2020-12-27 RX ADMIN — CLONIDINE HYDROCHLORIDE 0.2 MG: 0.1 TABLET ORAL at 04:12

## 2020-12-27 RX ADMIN — METOPROLOL SUCCINATE 100 MG: 100 TABLET, EXTENDED RELEASE ORAL at 09:12

## 2020-12-27 RX ADMIN — CLONIDINE HYDROCHLORIDE 0.2 MG: 0.1 TABLET ORAL at 09:12

## 2020-12-27 RX ADMIN — INSULIN ASPART 5 UNITS: 100 INJECTION, SOLUTION INTRAVENOUS; SUBCUTANEOUS at 12:12

## 2020-12-27 RX ADMIN — CLONIDINE HYDROCHLORIDE 0.2 MG: 0.1 TABLET ORAL at 08:12

## 2020-12-27 RX ADMIN — FAMOTIDINE 20 MG: 20 TABLET, FILM COATED ORAL at 09:12

## 2020-12-27 RX ADMIN — POLYETHYLENE GLYCOL 3350 17 G: 17 POWDER, FOR SOLUTION ORAL at 09:12

## 2020-12-27 RX ADMIN — INSULIN ASPART 2 UNITS: 100 INJECTION, SOLUTION INTRAVENOUS; SUBCUTANEOUS at 08:12

## 2020-12-27 RX ADMIN — FLUTICASONE PROPIONATE 50 MCG: 50 SPRAY, METERED NASAL at 10:12

## 2020-12-27 RX ADMIN — INSULIN ASPART 2 UNITS: 100 INJECTION, SOLUTION INTRAVENOUS; SUBCUTANEOUS at 10:12

## 2020-12-27 RX ADMIN — FUROSEMIDE 40 MG: 10 INJECTION, SOLUTION INTRAMUSCULAR; INTRAVENOUS at 05:12

## 2020-12-27 RX ADMIN — AMLODIPINE BESYLATE 10 MG: 10 TABLET ORAL at 09:12

## 2020-12-27 RX ADMIN — HYDROCORTISONE 10 MG: 5 TABLET ORAL at 08:12

## 2020-12-28 LAB
ALBUMIN SERPL BCP-MCNC: 3.1 G/DL (ref 3.5–5.2)
ALP SERPL-CCNC: 88 U/L (ref 55–135)
ALT SERPL W/O P-5'-P-CCNC: 20 U/L (ref 10–44)
ANION GAP SERPL CALC-SCNC: 16 MMOL/L (ref 8–16)
ANION GAP SERPL CALC-SCNC: 19 MMOL/L (ref 8–16)
AST SERPL-CCNC: 13 U/L (ref 10–40)
BASOPHILS # BLD AUTO: 0.02 K/UL (ref 0–0.2)
BASOPHILS NFR BLD: 0.2 % (ref 0–1.9)
BILIRUB SERPL-MCNC: 0.5 MG/DL (ref 0.1–1)
BUN SERPL-MCNC: 122 MG/DL (ref 6–20)
BUN SERPL-MCNC: 125 MG/DL (ref 6–20)
CALCIUM SERPL-MCNC: 9.3 MG/DL (ref 8.7–10.5)
CALCIUM SERPL-MCNC: 9.5 MG/DL (ref 8.7–10.5)
CHLORIDE SERPL-SCNC: 93 MMOL/L (ref 95–110)
CHLORIDE SERPL-SCNC: 94 MMOL/L (ref 95–110)
CLARITY CSF: ABNORMAL
CLARITY CSF: ABNORMAL
CO2 SERPL-SCNC: 28 MMOL/L (ref 23–29)
CO2 SERPL-SCNC: 28 MMOL/L (ref 23–29)
COLOR CSF: ABNORMAL
COLOR CSF: ABNORMAL
CREAT SERPL-MCNC: 6.7 MG/DL (ref 0.5–1.4)
CREAT SERPL-MCNC: 7.1 MG/DL (ref 0.5–1.4)
DIFFERENTIAL METHOD: ABNORMAL
EOSINOPHIL # BLD AUTO: 0 K/UL (ref 0–0.5)
EOSINOPHIL NFR BLD: 0.2 % (ref 0–8)
ERYTHROCYTE [DISTWIDTH] IN BLOOD BY AUTOMATED COUNT: 13.3 % (ref 11.5–14.5)
EST. GFR  (AFRICAN AMERICAN): 9 ML/MIN/1.73 M^2
EST. GFR  (AFRICAN AMERICAN): 9.6 ML/MIN/1.73 M^2
EST. GFR  (NON AFRICAN AMERICAN): 7.8 ML/MIN/1.73 M^2
EST. GFR  (NON AFRICAN AMERICAN): 8.3 ML/MIN/1.73 M^2
GLUCOSE CSF-MCNC: 112 MG/DL (ref 40–70)
GLUCOSE SERPL-MCNC: 181 MG/DL (ref 70–110)
GLUCOSE SERPL-MCNC: 250 MG/DL (ref 70–110)
HCT VFR BLD AUTO: 34.4 % (ref 40–54)
HGB BLD-MCNC: 11.2 G/DL (ref 14–18)
IMM GRANULOCYTES # BLD AUTO: 0.06 K/UL (ref 0–0.04)
IMM GRANULOCYTES NFR BLD AUTO: 0.5 % (ref 0–0.5)
LYMPHOCYTES # BLD AUTO: 1.6 K/UL (ref 1–4.8)
LYMPHOCYTES NFR BLD: 14.4 % (ref 18–48)
LYMPHOCYTES NFR CSF MANUAL: 10 % (ref 40–80)
LYMPHOCYTES NFR CSF MANUAL: 7 % (ref 40–80)
MAGNESIUM SERPL-MCNC: 2.5 MG/DL (ref 1.6–2.6)
MCH RBC QN AUTO: 27.8 PG (ref 27–31)
MCHC RBC AUTO-ENTMCNC: 32.6 G/DL (ref 32–36)
MCV RBC AUTO: 85 FL (ref 82–98)
MONOCYTES # BLD AUTO: 1.3 K/UL (ref 0.3–1)
MONOCYTES NFR BLD: 11.4 % (ref 4–15)
MONOS+MACROS NFR CSF MANUAL: 3 % (ref 15–45)
MONOS+MACROS NFR CSF MANUAL: 5 % (ref 15–45)
NEUTROPHILS # BLD AUTO: 8.1 K/UL (ref 1.8–7.7)
NEUTROPHILS NFR BLD: 73.3 % (ref 38–73)
NEUTROPHILS NFR CSF MANUAL: 85 % (ref 0–6)
NEUTROPHILS NFR CSF MANUAL: 90 % (ref 0–6)
NRBC BLD-RTO: 0 /100 WBC
PHOSPHATE SERPL-MCNC: 4.1 MG/DL (ref 2.7–4.5)
PLATELET # BLD AUTO: 245 K/UL (ref 150–350)
PMV BLD AUTO: 9.6 FL (ref 9.2–12.9)
POCT GLUCOSE: 190 MG/DL (ref 70–110)
POCT GLUCOSE: 196 MG/DL (ref 70–110)
POCT GLUCOSE: 249 MG/DL (ref 70–110)
POCT GLUCOSE: 362 MG/DL (ref 70–110)
POTASSIUM SERPL-SCNC: 2.8 MMOL/L (ref 3.5–5.1)
POTASSIUM SERPL-SCNC: 3.3 MMOL/L (ref 3.5–5.1)
PROT CSF-MCNC: 43 MG/DL (ref 15–40)
PROT SERPL-MCNC: 6.6 G/DL (ref 6–8.4)
RBC # BLD AUTO: 4.03 M/UL (ref 4.6–6.2)
RBC # CSF: 3000 /CU MM
RBC # CSF: 8000 /CU MM
SODIUM SERPL-SCNC: 137 MMOL/L (ref 136–145)
SODIUM SERPL-SCNC: 141 MMOL/L (ref 136–145)
SPECIMEN VOL CSF: 3 ML
SPECIMEN VOL CSF: 4.3 ML
WBC # BLD AUTO: 11.01 K/UL (ref 3.9–12.7)
WBC # CSF: 12 /CU MM (ref 0–5)
WBC # CSF: 25 /CU MM (ref 0–5)

## 2020-12-28 PROCEDURE — 87529 HSV DNA AMP PROBE: CPT | Mod: NTX

## 2020-12-28 PROCEDURE — 99233 PR SUBSEQUENT HOSPITAL CARE,LEVL III: ICD-10-PCS | Mod: NTX,,, | Performed by: PSYCHIATRY & NEUROLOGY

## 2020-12-28 PROCEDURE — 25000003 PHARM REV CODE 250: Mod: NTX | Performed by: STUDENT IN AN ORGANIZED HEALTH CARE EDUCATION/TRAINING PROGRAM

## 2020-12-28 PROCEDURE — 89051 BODY FLUID CELL COUNT: CPT | Mod: NTX

## 2020-12-28 PROCEDURE — 62270 LUMBAR PUNCTURE: ICD-10-PCS | Mod: NTX,,, | Performed by: PSYCHIATRY & NEUROLOGY

## 2020-12-28 PROCEDURE — 84100 ASSAY OF PHOSPHORUS: CPT | Mod: NTX

## 2020-12-28 PROCEDURE — 62270 DX LMBR SPI PNXR: CPT | Mod: NTX,,, | Performed by: PSYCHIATRY & NEUROLOGY

## 2020-12-28 PROCEDURE — 99233 PR SUBSEQUENT HOSPITAL CARE,LEVL III: ICD-10-PCS | Mod: NTX,,, | Performed by: INTERNAL MEDICINE

## 2020-12-28 PROCEDURE — 87798 DETECT AGENT NOS DNA AMP: CPT | Mod: 91,NTX

## 2020-12-28 PROCEDURE — 82945 GLUCOSE OTHER FLUID: CPT | Mod: NTX

## 2020-12-28 PROCEDURE — C9399 UNCLASSIFIED DRUGS OR BIOLOG: HCPCS | Mod: NTX | Performed by: STUDENT IN AN ORGANIZED HEALTH CARE EDUCATION/TRAINING PROGRAM

## 2020-12-28 PROCEDURE — 99233 SBSQ HOSP IP/OBS HIGH 50: CPT | Mod: NTX,,, | Performed by: PSYCHIATRY & NEUROLOGY

## 2020-12-28 PROCEDURE — 80048 BASIC METABOLIC PNL TOTAL CA: CPT | Mod: NTX

## 2020-12-28 PROCEDURE — 87798 DETECT AGENT NOS DNA AMP: CPT | Mod: NTX

## 2020-12-28 PROCEDURE — 87205 SMEAR GRAM STAIN: CPT | Mod: NTX

## 2020-12-28 PROCEDURE — 84157 ASSAY OF PROTEIN OTHER: CPT | Mod: NTX

## 2020-12-28 PROCEDURE — 87070 CULTURE OTHR SPECIMN AEROBIC: CPT | Mod: NTX

## 2020-12-28 PROCEDURE — 63600175 PHARM REV CODE 636 W HCPCS: Mod: NTX | Performed by: STUDENT IN AN ORGANIZED HEALTH CARE EDUCATION/TRAINING PROGRAM

## 2020-12-28 PROCEDURE — 86592 SYPHILIS TEST NON-TREP QUAL: CPT | Mod: NTX

## 2020-12-28 PROCEDURE — 99000 SPECIMEN HANDLING OFFICE-LAB: CPT | Mod: NTX

## 2020-12-28 PROCEDURE — 99232 PR SUBSEQUENT HOSPITAL CARE,LEVL II: ICD-10-PCS | Mod: NTX,,, | Performed by: INTERNAL MEDICINE

## 2020-12-28 PROCEDURE — 99233 SBSQ HOSP IP/OBS HIGH 50: CPT | Mod: NTX,,, | Performed by: HOSPITALIST

## 2020-12-28 PROCEDURE — 99233 PR SUBSEQUENT HOSPITAL CARE,LEVL III: ICD-10-PCS | Mod: 25,NTX,, | Performed by: PSYCHIATRY & NEUROLOGY

## 2020-12-28 PROCEDURE — 11000001 HC ACUTE MED/SURG PRIVATE ROOM: Mod: NTX

## 2020-12-28 PROCEDURE — 99233 SBSQ HOSP IP/OBS HIGH 50: CPT | Mod: NTX,,, | Performed by: INTERNAL MEDICINE

## 2020-12-28 PROCEDURE — 83735 ASSAY OF MAGNESIUM: CPT | Mod: NTX

## 2020-12-28 PROCEDURE — 99233 SBSQ HOSP IP/OBS HIGH 50: CPT | Mod: 25,NTX,, | Performed by: PSYCHIATRY & NEUROLOGY

## 2020-12-28 PROCEDURE — 25000242 PHARM REV CODE 250 ALT 637 W/ HCPCS: Mod: NTX | Performed by: STUDENT IN AN ORGANIZED HEALTH CARE EDUCATION/TRAINING PROGRAM

## 2020-12-28 PROCEDURE — 85025 COMPLETE CBC W/AUTO DIFF WBC: CPT | Mod: NTX

## 2020-12-28 PROCEDURE — 99233 PR SUBSEQUENT HOSPITAL CARE,LEVL III: ICD-10-PCS | Mod: NTX,,, | Performed by: HOSPITALIST

## 2020-12-28 PROCEDURE — 99232 SBSQ HOSP IP/OBS MODERATE 35: CPT | Mod: NTX,,, | Performed by: INTERNAL MEDICINE

## 2020-12-28 PROCEDURE — 80053 COMPREHEN METABOLIC PANEL: CPT | Mod: NTX

## 2020-12-28 PROCEDURE — 36415 COLL VENOUS BLD VENIPUNCTURE: CPT | Mod: NTX

## 2020-12-28 RX ORDER — POTASSIUM CHLORIDE 20 MEQ/1
40 TABLET, EXTENDED RELEASE ORAL ONCE
Status: DISCONTINUED | OUTPATIENT
Start: 2020-12-28 | End: 2020-12-28

## 2020-12-28 RX ORDER — POTASSIUM CHLORIDE 7.45 MG/ML
10 INJECTION INTRAVENOUS
Status: DISCONTINUED | OUTPATIENT
Start: 2020-12-28 | End: 2020-12-28

## 2020-12-28 RX ORDER — POTASSIUM CHLORIDE 20 MEQ/1
40 TABLET, EXTENDED RELEASE ORAL 2 TIMES DAILY
Status: COMPLETED | OUTPATIENT
Start: 2020-12-28 | End: 2020-12-28

## 2020-12-28 RX ADMIN — INSULIN DETEMIR 3 UNITS: 100 INJECTION, SOLUTION SUBCUTANEOUS at 10:12

## 2020-12-28 RX ADMIN — AMLODIPINE BESYLATE 10 MG: 10 TABLET ORAL at 08:12

## 2020-12-28 RX ADMIN — FERROUS GLUCONATE TAB 324 MG (37.5 MG ELEMENTAL IRON) 324 MG: 324 (37.5 FE) TAB at 08:12

## 2020-12-28 RX ADMIN — CLONIDINE HYDROCHLORIDE 0.2 MG: 0.1 TABLET ORAL at 10:12

## 2020-12-28 RX ADMIN — DEXTROSE 10 MILLION UNITS: 5 SOLUTION INTRAVENOUS at 06:12

## 2020-12-28 RX ADMIN — HYDROCORTISONE 10 MG: 5 TABLET ORAL at 10:12

## 2020-12-28 RX ADMIN — FLUTICASONE PROPIONATE 50 MCG: 50 SPRAY, METERED NASAL at 08:12

## 2020-12-28 RX ADMIN — HYDROCORTISONE 20 MG: 20 TABLET ORAL at 08:12

## 2020-12-28 RX ADMIN — FAMOTIDINE 20 MG: 20 TABLET, FILM COATED ORAL at 08:12

## 2020-12-28 RX ADMIN — CLONIDINE HYDROCHLORIDE 0.2 MG: 0.1 TABLET ORAL at 08:12

## 2020-12-28 RX ADMIN — INSULIN ASPART 2 UNITS: 100 INJECTION, SOLUTION INTRAVENOUS; SUBCUTANEOUS at 12:12

## 2020-12-28 RX ADMIN — POLYETHYLENE GLYCOL 3350 17 G: 17 POWDER, FOR SOLUTION ORAL at 08:12

## 2020-12-28 RX ADMIN — ATORVASTATIN CALCIUM 20 MG: 20 TABLET, FILM COATED ORAL at 08:12

## 2020-12-28 RX ADMIN — ACYCLOVIR SODIUM 320 MG: 50 INJECTION, SOLUTION INTRAVENOUS at 04:12

## 2020-12-28 RX ADMIN — INSULIN ASPART 5 UNITS: 100 INJECTION, SOLUTION INTRAVENOUS; SUBCUTANEOUS at 04:12

## 2020-12-28 RX ADMIN — POTASSIUM CHLORIDE 40 MEQ: 1500 TABLET, EXTENDED RELEASE ORAL at 10:12

## 2020-12-28 RX ADMIN — METOPROLOL SUCCINATE 100 MG: 100 TABLET, EXTENDED RELEASE ORAL at 08:12

## 2020-12-28 RX ADMIN — CLONIDINE HYDROCHLORIDE 0.2 MG: 0.1 TABLET ORAL at 03:12

## 2020-12-28 NOTE — TELEPHONE ENCOUNTER
----- Message from Lynne Lloyd sent at 1/7/2019 11:46 AM CST -----  Regarding: FW: Cardiovascular evaluation  Hi Dr. Collins, I'm not sure what you decided to do about this patient. I am recommending psychiatry evaluation for organ transplant clearance since cardiologist had concerns and I noticed anxiety with problems processing information while in session. Also, this patient is not on dialysis and is not seen often by a LCSW who can assess any ongoing mental health issues and refer to treatment. See below compliance update and my new psychosocial suitability.  Thank you, Lynne    Nephrologist: Wagner Valdez of \[facility]\""    Nephrology compliance update shows patient being compliant with nephrology appointments and instructions but has missed 1 x due to unknown reasons (8-15-18). Update states no concerns regarding caregivers, transportation, or psychosocial issues. Please review below completed form for any further nephrology information reported.    Psychosocial Suitability: Patient is unacceptable high risk candidate for organ transplant at this time due to more information needed regarding reported anxiety with paranoia and noticed problems understanding and communicating information. Please order psychiatry consult for transplant evaluation. Pt seemed anxious during initial psychosocial session and seemed unable at times to immediately clearly understand information as provided and  or wife had to re-state it for his understanding at times. Pt seemed to need plenty of time to review new information, seemed to need more time to assimilate new information and ideas and seemed to need more time to ask questions as needed. Patient seen by [facility] cardiology on 12-12-18 showing significant signs of anxiety with possible paranoia and problems processing and communicating information. Additionally, based on psychosocial risk factors, patient presents as high risk, due to patient reporting limited support  "system with no back up caregiver plan in place; pt reports plan to discuss back up caregiver plan with [employer] friends and will notify transplant team of outcome. Pt is also high risk organ transplant candidate due to patient and caregiver needing to rely on cab service as organ transplant transportation; pt reports does have money to afford cab service transportation at this time. Pt reports plan to discuss back up caregiver plan with [employer] friends and will notify transplant team of outcome. Pt reports having organ transplant caregiver plan with cab service as transportation plan, medical insurance plan and plan to afford transplant costs all in place; recommend confirming organ transplant caregiver/transportation plan prior to be called in for transplant.     Recommendations/Additional Comments: Psychiatry consult for organ transplant clearance needed due to issues reported regarding patient's anxiety with paranoia as well as problems regarding processing and communicating information. Transplant  recommends confirming organ transplant caregiver/transportation plan prior to be called in for transplant.      ----- Message -----  From: Lynne Lloyd  Sent: 12/18/2018   1:19 PM  To: Katie Herrera MD, #  Subject: RE: Cardiovascular evaluation                    Hi, I reviewed my notes.    The patient had his wife with him for psychosocial. She seemed to be highly supportive of him however they are legally . Pt reported low social support other than perhaps [employer] friends. He denied having back up transplant caregiver plan. Pt denied mental health problems, did not voice feelings of paranoia and did not seem as if he was having problems trusting me while in our session. Pt did seem to be a bit anxious and seemed to have problems processing new information during the session. Please see below info and impressions provided in my psychosocial.    "Family/Social Support:   Number of " "dependents/: pt denies  Marital history:  3 x, 2 x . Is legally  from his wife Atif but they still live together and their relationship is amicable.  Other family dynamics: Pt reports both parents are . Pt reports lives in [place] with wife and they are legally . Pt reports having 2 grown children who live in [place] and will not be able to assist with organ transplant. Pt reports having siblings however some of the siblings live away and/or are not reliable to assist with transplant -- reports some siblings have drug addiction issues or other medical issues, such as CVA history. Pt denies having back up caregiver in place at this time. Pt reports plan to speak with [employer] friends about back up caregiver plan. Pt's wife Atif in pre transplant appointments today and reports will be patient's primary transplant caregiver.    Learns Best By:  Pt denies having any problems learning new information. Pt seemed a bit anxious during session. Pt seemed unable at times to immediately clearly understand information as provided and  or wife had to re-state it for his understanding at times. Pt seems to need plenty of time to review new information, seems to need ample time to assimilate new information and ideas and seems to need ample time to ask questions as needed.    Arrests/DWI/Treatment/Rehab: patient denies     Psychiatric History:    Mental Health: pt denies any mental health history and denies any overwhelming feelings of depression or anxiety at this time.  Psychiatrist/Counselor: pt denies  Medications:  Pt denies  Suicide/Homicide Issues: pt denies si/hi history at this time   Safety at home: pt reports living in safe environment at this time."    I was unable to speak his nephrologist office about compliance. I tried 3 x by phone and fax. I can't review in Care Everywhere because the patient did not authorize (from what I can gather from " trying many times to access).     Thank you,  Lynne Lloyd MSW LCSW      ----- Message -----  From: Katie Herrera MD  Sent: 12/17/2018   2:56 PM  To: Lynne Prema, #  Subject: FW: Cardiovascular evaluation                    Lynne,    Please see comments below from Cardiology.  Any thoughts?    I am curious if you noted anything on your visit that was suspicious.    ----- Message -----  From: Lucien Hannon MD  Sent: 12/12/2018   8:09 PM  To: Stephie Harmon NP  Subject: Cardiovascular evaluation                        Stephie,    I am somewhat concerned about Mr. Martinez has a candidate for renal transplantation.  While there is no cardiovascular reason that he would not be an acceptable candidate at this time, the patient appeared to have a noticeable paranoid tendency to any questions concerning his prior medical history.  I am concerned that he may have mild paranoid schizophrenia or, possibly, neurosyphilis affecting his ability to interact appropriately with medical personnel.  As I am not a psychiatrist, I am not giving him a diagnosis.  With however, I believe it would be worthwhile for him to see a psychiatrist and be thoroughly evaluated. If you have any questions, please let me know.     Regards,  Lucien Hannon         No

## 2020-12-29 PROBLEM — L02.214 GROIN ABSCESS: Status: ACTIVE | Noted: 2020-12-29

## 2020-12-29 LAB
ALBUMIN SERPL BCP-MCNC: 2.9 G/DL (ref 3.5–5.2)
ALP SERPL-CCNC: 76 U/L (ref 55–135)
ALT SERPL W/O P-5'-P-CCNC: 17 U/L (ref 10–44)
ANA SER QL IF: NORMAL
ANION GAP SERPL CALC-SCNC: 17 MMOL/L (ref 8–16)
ANTI SM ANTIBODY: 0.05 RATIO (ref 0–0.99)
ANTI-SM INTERPRETATION: NEGATIVE
ANTI-SSA ANTIBODY: 0.05 RATIO (ref 0–0.99)
ANTI-SSA INTERPRETATION: NEGATIVE
AST SERPL-CCNC: 14 U/L (ref 10–40)
BASOPHILS # BLD AUTO: 0 K/UL (ref 0–0.2)
BASOPHILS NFR BLD: 0 % (ref 0–1.9)
BILIRUB SERPL-MCNC: 0.5 MG/DL (ref 0.1–1)
BUN SERPL-MCNC: 118 MG/DL (ref 6–20)
CALCIUM SERPL-MCNC: 9.7 MG/DL (ref 8.7–10.5)
CHLORIDE SERPL-SCNC: 96 MMOL/L (ref 95–110)
CO2 SERPL-SCNC: 24 MMOL/L (ref 23–29)
CREAT SERPL-MCNC: 6.4 MG/DL (ref 0.5–1.4)
DIFFERENTIAL METHOD: ABNORMAL
EOSINOPHIL # BLD AUTO: 0 K/UL (ref 0–0.5)
EOSINOPHIL NFR BLD: 0.2 % (ref 0–8)
ERYTHROCYTE [DISTWIDTH] IN BLOOD BY AUTOMATED COUNT: 13.3 % (ref 11.5–14.5)
EST. GFR  (AFRICAN AMERICAN): 10.2 ML/MIN/1.73 M^2
EST. GFR  (NON AFRICAN AMERICAN): 8.8 ML/MIN/1.73 M^2
GLUCOSE SERPL-MCNC: 253 MG/DL (ref 70–110)
HCT VFR BLD AUTO: 33.5 % (ref 40–54)
HGB BLD-MCNC: 10.9 G/DL (ref 14–18)
IMM GRANULOCYTES # BLD AUTO: 0.06 K/UL (ref 0–0.04)
IMM GRANULOCYTES NFR BLD AUTO: 0.6 % (ref 0–0.5)
LYMPHOCYTES # BLD AUTO: 1.2 K/UL (ref 1–4.8)
LYMPHOCYTES NFR BLD: 12.2 % (ref 18–48)
MAGNESIUM SERPL-MCNC: 2.6 MG/DL (ref 1.6–2.6)
MCH RBC QN AUTO: 27.7 PG (ref 27–31)
MCHC RBC AUTO-ENTMCNC: 32.5 G/DL (ref 32–36)
MCV RBC AUTO: 85 FL (ref 82–98)
MONOCYTES # BLD AUTO: 1 K/UL (ref 0.3–1)
MONOCYTES NFR BLD: 11 % (ref 4–15)
NEUTROPHILS # BLD AUTO: 7.2 K/UL (ref 1.8–7.7)
NEUTROPHILS NFR BLD: 76 % (ref 38–73)
NRBC BLD-RTO: 0 /100 WBC
PHOSPHATE SERPL-MCNC: 3.6 MG/DL (ref 2.7–4.5)
PLATELET # BLD AUTO: 239 K/UL (ref 150–350)
PMV BLD AUTO: 9.7 FL (ref 9.2–12.9)
POCT GLUCOSE: 229 MG/DL (ref 70–110)
POCT GLUCOSE: 237 MG/DL (ref 70–110)
POCT GLUCOSE: 238 MG/DL (ref 70–110)
POCT GLUCOSE: 242 MG/DL (ref 70–110)
POCT GLUCOSE: 272 MG/DL (ref 70–110)
POCT GLUCOSE: 400 MG/DL (ref 70–110)
POTASSIUM SERPL-SCNC: 3.6 MMOL/L (ref 3.5–5.1)
PROT SERPL-MCNC: 6.5 G/DL (ref 6–8.4)
RBC # BLD AUTO: 3.93 M/UL (ref 4.6–6.2)
SODIUM SERPL-SCNC: 137 MMOL/L (ref 136–145)
WBC # BLD AUTO: 9.43 K/UL (ref 3.9–12.7)

## 2020-12-29 PROCEDURE — 80053 COMPREHEN METABOLIC PANEL: CPT | Mod: NTX

## 2020-12-29 PROCEDURE — 63600175 PHARM REV CODE 636 W HCPCS: Mod: NTX | Performed by: STUDENT IN AN ORGANIZED HEALTH CARE EDUCATION/TRAINING PROGRAM

## 2020-12-29 PROCEDURE — 25000003 PHARM REV CODE 250: Mod: NTX | Performed by: STUDENT IN AN ORGANIZED HEALTH CARE EDUCATION/TRAINING PROGRAM

## 2020-12-29 PROCEDURE — 99233 PR SUBSEQUENT HOSPITAL CARE,LEVL III: ICD-10-PCS | Mod: NTX,,, | Performed by: HOSPITALIST

## 2020-12-29 PROCEDURE — 83735 ASSAY OF MAGNESIUM: CPT | Mod: NTX

## 2020-12-29 PROCEDURE — 87077 CULTURE AEROBIC IDENTIFY: CPT | Mod: NTX

## 2020-12-29 PROCEDURE — 93010 ELECTROCARDIOGRAM REPORT: CPT | Mod: NTX,,, | Performed by: INTERNAL MEDICINE

## 2020-12-29 PROCEDURE — 85025 COMPLETE CBC W/AUTO DIFF WBC: CPT | Mod: NTX

## 2020-12-29 PROCEDURE — 99233 SBSQ HOSP IP/OBS HIGH 50: CPT | Mod: NTX,,, | Performed by: PSYCHIATRY & NEUROLOGY

## 2020-12-29 PROCEDURE — 99233 PR SUBSEQUENT HOSPITAL CARE,LEVL III: ICD-10-PCS | Mod: NTX,,, | Performed by: PSYCHIATRY & NEUROLOGY

## 2020-12-29 PROCEDURE — 87070 CULTURE OTHR SPECIMN AEROBIC: CPT | Mod: NTX

## 2020-12-29 PROCEDURE — 36415 COLL VENOUS BLD VENIPUNCTURE: CPT | Mod: NTX

## 2020-12-29 PROCEDURE — 99232 PR SUBSEQUENT HOSPITAL CARE,LEVL II: ICD-10-PCS | Mod: NTX,,, | Performed by: PSYCHIATRY & NEUROLOGY

## 2020-12-29 PROCEDURE — 93010 EKG 12-LEAD: ICD-10-PCS | Mod: NTX,,, | Performed by: INTERNAL MEDICINE

## 2020-12-29 PROCEDURE — 99233 PR SUBSEQUENT HOSPITAL CARE,LEVL III: ICD-10-PCS | Mod: NTX,,, | Performed by: INTERNAL MEDICINE

## 2020-12-29 PROCEDURE — 11000001 HC ACUTE MED/SURG PRIVATE ROOM: Mod: NTX

## 2020-12-29 PROCEDURE — 99233 SBSQ HOSP IP/OBS HIGH 50: CPT | Mod: NTX,,, | Performed by: HOSPITALIST

## 2020-12-29 PROCEDURE — 99232 SBSQ HOSP IP/OBS MODERATE 35: CPT | Mod: NTX,,, | Performed by: PSYCHIATRY & NEUROLOGY

## 2020-12-29 PROCEDURE — 87205 SMEAR GRAM STAIN: CPT | Mod: NTX

## 2020-12-29 PROCEDURE — 93005 ELECTROCARDIOGRAM TRACING: CPT | Mod: NTX

## 2020-12-29 PROCEDURE — 99233 SBSQ HOSP IP/OBS HIGH 50: CPT | Mod: NTX,,, | Performed by: INTERNAL MEDICINE

## 2020-12-29 PROCEDURE — 87075 CULTR BACTERIA EXCEPT BLOOD: CPT | Mod: NTX

## 2020-12-29 PROCEDURE — 87186 SC STD MICRODIL/AGAR DIL: CPT | Mod: NTX

## 2020-12-29 PROCEDURE — 84100 ASSAY OF PHOSPHORUS: CPT | Mod: NTX

## 2020-12-29 RX ORDER — INSULIN ASPART 100 [IU]/ML
3 INJECTION, SOLUTION INTRAVENOUS; SUBCUTANEOUS
Status: DISCONTINUED | OUTPATIENT
Start: 2020-12-29 | End: 2021-01-05 | Stop reason: HOSPADM

## 2020-12-29 RX ORDER — GLIPIZIDE 5 MG/1
2.5 TABLET ORAL DAILY
Status: ON HOLD | COMMUNITY
End: 2021-01-05 | Stop reason: HOSPADM

## 2020-12-29 RX ORDER — INSULIN ASPART 100 [IU]/ML
1-10 INJECTION, SOLUTION INTRAVENOUS; SUBCUTANEOUS
Status: DISCONTINUED | OUTPATIENT
Start: 2020-12-29 | End: 2021-01-05 | Stop reason: SDUPTHER

## 2020-12-29 RX ORDER — GLUCAGON 1 MG
1 KIT INJECTION
Status: DISCONTINUED | OUTPATIENT
Start: 2020-12-29 | End: 2021-01-05 | Stop reason: SDUPTHER

## 2020-12-29 RX ORDER — INSULIN ASPART 100 [IU]/ML
1-10 INJECTION, SOLUTION INTRAVENOUS; SUBCUTANEOUS
Status: DISCONTINUED | OUTPATIENT
Start: 2020-12-29 | End: 2020-12-29

## 2020-12-29 RX ORDER — LIDOCAINE HYDROCHLORIDE 10 MG/ML
10 INJECTION, SOLUTION EPIDURAL; INFILTRATION; INTRACAUDAL; PERINEURAL ONCE
Status: COMPLETED | OUTPATIENT
Start: 2020-12-29 | End: 2020-12-29

## 2020-12-29 RX ORDER — ALBUTEROL SULFATE 90 UG/1
2 AEROSOL, METERED RESPIRATORY (INHALATION) EVERY 6 HOURS PRN
COMMUNITY

## 2020-12-29 RX ORDER — METHOCARBAMOL 500 MG/1
500 TABLET, FILM COATED ORAL 2 TIMES DAILY
Status: ON HOLD | COMMUNITY
End: 2022-03-10 | Stop reason: HOSPADM

## 2020-12-29 RX ORDER — IBUPROFEN 200 MG
24 TABLET ORAL
Status: DISCONTINUED | OUTPATIENT
Start: 2020-12-29 | End: 2021-01-05 | Stop reason: SDUPTHER

## 2020-12-29 RX ORDER — IBUPROFEN 200 MG
16 TABLET ORAL
Status: DISCONTINUED | OUTPATIENT
Start: 2020-12-29 | End: 2021-01-05 | Stop reason: SDUPTHER

## 2020-12-29 RX ADMIN — CLONIDINE HYDROCHLORIDE 0.2 MG: 0.1 TABLET ORAL at 04:12

## 2020-12-29 RX ADMIN — INSULIN ASPART 2 UNITS: 100 INJECTION, SOLUTION INTRAVENOUS; SUBCUTANEOUS at 09:12

## 2020-12-29 RX ADMIN — METOPROLOL SUCCINATE 100 MG: 100 TABLET, EXTENDED RELEASE ORAL at 09:12

## 2020-12-29 RX ADMIN — INSULIN ASPART 4 UNITS: 100 INJECTION, SOLUTION INTRAVENOUS; SUBCUTANEOUS at 06:12

## 2020-12-29 RX ADMIN — LIDOCAINE HYDROCHLORIDE 100 MG: 10 INJECTION, SOLUTION EPIDURAL; INFILTRATION; INTRACAUDAL at 04:12

## 2020-12-29 RX ADMIN — INSULIN ASPART 10 UNITS: 100 INJECTION, SOLUTION INTRAVENOUS; SUBCUTANEOUS at 01:12

## 2020-12-29 RX ADMIN — INSULIN DETEMIR 3 UNITS: 100 INJECTION, SOLUTION SUBCUTANEOUS at 09:12

## 2020-12-29 RX ADMIN — INSULIN ASPART 3 UNITS: 100 INJECTION, SOLUTION INTRAVENOUS; SUBCUTANEOUS at 06:12

## 2020-12-29 RX ADMIN — FAMOTIDINE 20 MG: 20 TABLET, FILM COATED ORAL at 09:12

## 2020-12-29 RX ADMIN — HYDROCORTISONE 20 MG: 20 TABLET ORAL at 09:12

## 2020-12-29 RX ADMIN — ACYCLOVIR SODIUM 320 MG: 50 INJECTION, SOLUTION INTRAVENOUS at 09:12

## 2020-12-29 RX ADMIN — DEXTROSE 10 MILLION UNITS: 5 SOLUTION INTRAVENOUS at 05:12

## 2020-12-29 RX ADMIN — FERROUS GLUCONATE TAB 324 MG (37.5 MG ELEMENTAL IRON) 324 MG: 324 (37.5 FE) TAB at 09:12

## 2020-12-29 RX ADMIN — ATORVASTATIN CALCIUM 20 MG: 20 TABLET, FILM COATED ORAL at 09:12

## 2020-12-29 RX ADMIN — AMLODIPINE BESYLATE 10 MG: 10 TABLET ORAL at 09:12

## 2020-12-29 RX ADMIN — ARIPIPRAZOLE 5 MG: 5 TABLET ORAL at 09:12

## 2020-12-29 RX ADMIN — HYDROCORTISONE 10 MG: 5 TABLET ORAL at 09:12

## 2020-12-29 RX ADMIN — CLONIDINE HYDROCHLORIDE 0.2 MG: 0.1 TABLET ORAL at 09:12

## 2020-12-29 RX ADMIN — INSULIN DETEMIR 5 UNITS: 100 INJECTION, SOLUTION SUBCUTANEOUS at 09:12

## 2020-12-30 ENCOUNTER — TELEPHONE (OUTPATIENT)
Dept: TRANSPLANT | Facility: CLINIC | Age: 55
End: 2020-12-30

## 2020-12-30 LAB
ALBUMIN SERPL BCP-MCNC: 2.8 G/DL (ref 3.5–5.2)
ALP SERPL-CCNC: 76 U/L (ref 55–135)
ALT SERPL W/O P-5'-P-CCNC: 17 U/L (ref 10–44)
ANCA AB TITR SER IF: NORMAL TITER
ANION GAP SERPL CALC-SCNC: 16 MMOL/L (ref 8–16)
AST SERPL-CCNC: 19 U/L (ref 10–40)
BASOPHILS # BLD AUTO: 0.01 K/UL (ref 0–0.2)
BASOPHILS NFR BLD: 0.1 % (ref 0–1.9)
BILIRUB SERPL-MCNC: 0.3 MG/DL (ref 0.1–1)
BUN SERPL-MCNC: 114 MG/DL (ref 6–20)
CALCIUM SERPL-MCNC: 9.6 MG/DL (ref 8.7–10.5)
CHLORIDE SERPL-SCNC: 96 MMOL/L (ref 95–110)
CO2 SERPL-SCNC: 23 MMOL/L (ref 23–29)
CREAT SERPL-MCNC: 7.3 MG/DL (ref 0.5–1.4)
DIFFERENTIAL METHOD: ABNORMAL
EOSINOPHIL # BLD AUTO: 0.1 K/UL (ref 0–0.5)
EOSINOPHIL NFR BLD: 0.7 % (ref 0–8)
ERYTHROCYTE [DISTWIDTH] IN BLOOD BY AUTOMATED COUNT: 13.2 % (ref 11.5–14.5)
EST. GFR  (AFRICAN AMERICAN): 8.7 ML/MIN/1.73 M^2
EST. GFR  (NON AFRICAN AMERICAN): 7.5 ML/MIN/1.73 M^2
GLUCOSE SERPL-MCNC: 248 MG/DL (ref 70–110)
GRAM STN SPEC: NORMAL
GRAM STN SPEC: NORMAL
HCT VFR BLD AUTO: 32.2 % (ref 40–54)
HGB BLD-MCNC: 10.6 G/DL (ref 14–18)
HSV1, PCR, CSF: NEGATIVE
HSV2, PCR, CSF: NEGATIVE
IGG1 SER-MCNC: 243 MG/DL (ref 382–929)
IGG2 SER-MCNC: 78 MG/DL (ref 242–700)
IGG3 SER-MCNC: 57 MG/DL (ref 22–176)
IGG4 SER-MCNC: 7 MG/DL (ref 4–86)
IMM GRANULOCYTES # BLD AUTO: 0.07 K/UL (ref 0–0.04)
IMM GRANULOCYTES NFR BLD AUTO: 0.9 % (ref 0–0.5)
LYMPHOCYTES # BLD AUTO: 1.5 K/UL (ref 1–4.8)
LYMPHOCYTES NFR BLD: 19.6 % (ref 18–48)
MAGNESIUM SERPL-MCNC: 2.7 MG/DL (ref 1.6–2.6)
MCH RBC QN AUTO: 27.7 PG (ref 27–31)
MCHC RBC AUTO-ENTMCNC: 32.9 G/DL (ref 32–36)
MCV RBC AUTO: 84 FL (ref 82–98)
MONOCYTES # BLD AUTO: 0.9 K/UL (ref 0.3–1)
MONOCYTES NFR BLD: 11.9 % (ref 4–15)
NEUTROPHILS # BLD AUTO: 4.9 K/UL (ref 1.8–7.7)
NEUTROPHILS NFR BLD: 66.8 % (ref 38–73)
NRBC BLD-RTO: 0 /100 WBC
P-ANCA TITR SER IF: NORMAL TITER
PHOSPHATE SERPL-MCNC: 4.8 MG/DL (ref 2.7–4.5)
PLATELET # BLD AUTO: 245 K/UL (ref 150–350)
PMV BLD AUTO: 9.7 FL (ref 9.2–12.9)
POCT GLUCOSE: 240 MG/DL (ref 70–110)
POCT GLUCOSE: 240 MG/DL (ref 70–110)
POCT GLUCOSE: 247 MG/DL (ref 70–110)
POCT GLUCOSE: 337 MG/DL (ref 70–110)
POCT GLUCOSE: 350 MG/DL (ref 70–110)
POTASSIUM SERPL-SCNC: 3.4 MMOL/L (ref 3.5–5.1)
PROT SERPL-MCNC: 6.6 G/DL (ref 6–8.4)
RBC # BLD AUTO: 3.83 M/UL (ref 4.6–6.2)
SODIUM SERPL-SCNC: 135 MMOL/L (ref 136–145)
SPECIMEN SOURCE: NORMAL
VARICELLA ZOSTER BY PCR RESULT: NEGATIVE
VDRL CSF QL: NORMAL
WBC # BLD AUTO: 7.4 K/UL (ref 3.9–12.7)

## 2020-12-30 PROCEDURE — 93010 ELECTROCARDIOGRAM REPORT: CPT | Mod: NTX,,, | Performed by: INTERNAL MEDICINE

## 2020-12-30 PROCEDURE — 99233 PR SUBSEQUENT HOSPITAL CARE,LEVL III: ICD-10-PCS | Mod: NTX,,, | Performed by: HOSPITALIST

## 2020-12-30 PROCEDURE — 25000003 PHARM REV CODE 250: Mod: NTX | Performed by: STUDENT IN AN ORGANIZED HEALTH CARE EDUCATION/TRAINING PROGRAM

## 2020-12-30 PROCEDURE — 84100 ASSAY OF PHOSPHORUS: CPT | Mod: NTX

## 2020-12-30 PROCEDURE — 99233 SBSQ HOSP IP/OBS HIGH 50: CPT | Mod: NTX,,, | Performed by: INTERNAL MEDICINE

## 2020-12-30 PROCEDURE — 80053 COMPREHEN METABOLIC PANEL: CPT | Mod: NTX

## 2020-12-30 PROCEDURE — 83735 ASSAY OF MAGNESIUM: CPT | Mod: NTX

## 2020-12-30 PROCEDURE — 93005 ELECTROCARDIOGRAM TRACING: CPT | Mod: NTX

## 2020-12-30 PROCEDURE — 99233 PR SUBSEQUENT HOSPITAL CARE,LEVL III: ICD-10-PCS | Mod: NTX,,, | Performed by: INTERNAL MEDICINE

## 2020-12-30 PROCEDURE — 85025 COMPLETE CBC W/AUTO DIFF WBC: CPT | Mod: NTX

## 2020-12-30 PROCEDURE — 99233 SBSQ HOSP IP/OBS HIGH 50: CPT | Mod: NTX,,, | Performed by: HOSPITALIST

## 2020-12-30 PROCEDURE — 63600175 PHARM REV CODE 636 W HCPCS: Mod: NTX | Performed by: STUDENT IN AN ORGANIZED HEALTH CARE EDUCATION/TRAINING PROGRAM

## 2020-12-30 PROCEDURE — 93010 EKG 12-LEAD: ICD-10-PCS | Mod: NTX,,, | Performed by: INTERNAL MEDICINE

## 2020-12-30 PROCEDURE — 36415 COLL VENOUS BLD VENIPUNCTURE: CPT | Mod: NTX

## 2020-12-30 PROCEDURE — 11000001 HC ACUTE MED/SURG PRIVATE ROOM: Mod: NTX

## 2020-12-30 RX ORDER — DOXYCYCLINE HYCLATE 100 MG
100 TABLET ORAL EVERY 12 HOURS
Status: DISCONTINUED | OUTPATIENT
Start: 2020-12-30 | End: 2021-01-05 | Stop reason: HOSPADM

## 2020-12-30 RX ORDER — POTASSIUM CHLORIDE 20 MEQ/1
40 TABLET, EXTENDED RELEASE ORAL ONCE
Status: COMPLETED | OUTPATIENT
Start: 2020-12-30 | End: 2020-12-30

## 2020-12-30 RX ORDER — ARIPIPRAZOLE 5 MG/1
10 TABLET ORAL DAILY
Status: DISCONTINUED | OUTPATIENT
Start: 2020-12-31 | End: 2021-01-02

## 2020-12-30 RX ADMIN — ARIPIPRAZOLE 5 MG: 5 TABLET ORAL at 10:12

## 2020-12-30 RX ADMIN — INSULIN ASPART 3 UNITS: 100 INJECTION, SOLUTION INTRAVENOUS; SUBCUTANEOUS at 12:12

## 2020-12-30 RX ADMIN — ACYCLOVIR SODIUM 640 MG: 50 INJECTION, SOLUTION INTRAVENOUS at 10:12

## 2020-12-30 RX ADMIN — INSULIN DETEMIR 5 UNITS: 100 INJECTION, SOLUTION SUBCUTANEOUS at 09:12

## 2020-12-30 RX ADMIN — DEXTROSE 10 MILLION UNITS: 5 SOLUTION INTRAVENOUS at 05:12

## 2020-12-30 RX ADMIN — INSULIN ASPART 2 UNITS: 100 INJECTION, SOLUTION INTRAVENOUS; SUBCUTANEOUS at 09:12

## 2020-12-30 RX ADMIN — CLONIDINE HYDROCHLORIDE 0.2 MG: 0.1 TABLET ORAL at 10:12

## 2020-12-30 RX ADMIN — HYDROCORTISONE 10 MG: 5 TABLET ORAL at 09:12

## 2020-12-30 RX ADMIN — FLUTICASONE PROPIONATE 50 MCG: 50 SPRAY, METERED NASAL at 10:12

## 2020-12-30 RX ADMIN — ATORVASTATIN CALCIUM 20 MG: 20 TABLET, FILM COATED ORAL at 10:12

## 2020-12-30 RX ADMIN — INSULIN ASPART 8 UNITS: 100 INJECTION, SOLUTION INTRAVENOUS; SUBCUTANEOUS at 12:12

## 2020-12-30 RX ADMIN — FAMOTIDINE 20 MG: 20 TABLET, FILM COATED ORAL at 10:12

## 2020-12-30 RX ADMIN — POTASSIUM CHLORIDE 40 MEQ: 1500 TABLET, EXTENDED RELEASE ORAL at 11:12

## 2020-12-30 RX ADMIN — DOXYCYCLINE HYCLATE 100 MG: 100 TABLET, COATED ORAL at 09:12

## 2020-12-30 RX ADMIN — INSULIN ASPART 4 UNITS: 100 INJECTION, SOLUTION INTRAVENOUS; SUBCUTANEOUS at 05:12

## 2020-12-30 RX ADMIN — HYDROCORTISONE 20 MG: 20 TABLET ORAL at 10:12

## 2020-12-30 RX ADMIN — AMLODIPINE BESYLATE 10 MG: 10 TABLET ORAL at 10:12

## 2020-12-30 RX ADMIN — FERROUS GLUCONATE TAB 324 MG (37.5 MG ELEMENTAL IRON) 324 MG: 324 (37.5 FE) TAB at 10:12

## 2020-12-30 RX ADMIN — METOPROLOL SUCCINATE 100 MG: 100 TABLET, EXTENDED RELEASE ORAL at 10:12

## 2020-12-30 RX ADMIN — CLONIDINE HYDROCHLORIDE 0.2 MG: 0.1 TABLET ORAL at 09:12

## 2020-12-30 RX ADMIN — INSULIN DETEMIR 5 UNITS: 100 INJECTION, SOLUTION SUBCUTANEOUS at 10:12

## 2020-12-30 RX ADMIN — INSULIN ASPART 3 UNITS: 100 INJECTION, SOLUTION INTRAVENOUS; SUBCUTANEOUS at 05:12

## 2020-12-30 RX ADMIN — CLONIDINE HYDROCHLORIDE 0.2 MG: 0.1 TABLET ORAL at 05:12

## 2020-12-30 NOTE — TELEPHONE ENCOUNTER
Returned pt's call, no answer, left msg stating this is a non-transplant issue and suggested pt contacts patient relations.  ----- Message from Mychal Mc sent at 12/30/2020  8:06 AM CST -----  Regarding: Pt Issues    Pt has been in the hospital for four days, and has several issues with his care. Would like to speak with coord Asap on why he's been treated poorly and cant be released.                    [phone]

## 2020-12-31 LAB
ALBUMIN SERPL BCP-MCNC: 2.8 G/DL (ref 3.5–5.2)
ALP SERPL-CCNC: 73 U/L (ref 55–135)
ALT SERPL W/O P-5'-P-CCNC: 21 U/L (ref 10–44)
ANION GAP SERPL CALC-SCNC: 14 MMOL/L (ref 8–16)
AST SERPL-CCNC: 22 U/L (ref 10–40)
BASOPHILS # BLD AUTO: 0.01 K/UL (ref 0–0.2)
BASOPHILS NFR BLD: 0.2 % (ref 0–1.9)
BILIRUB SERPL-MCNC: 0.3 MG/DL (ref 0.1–1)
BUN SERPL-MCNC: 114 MG/DL (ref 6–20)
CALCIUM SERPL-MCNC: 9.2 MG/DL (ref 8.7–10.5)
CHLORIDE SERPL-SCNC: 96 MMOL/L (ref 95–110)
CO2 SERPL-SCNC: 23 MMOL/L (ref 23–29)
CREAT SERPL-MCNC: 7 MG/DL (ref 0.5–1.4)
DIFFERENTIAL METHOD: ABNORMAL
EOSINOPHIL # BLD AUTO: 0 K/UL (ref 0–0.5)
EOSINOPHIL NFR BLD: 0.6 % (ref 0–8)
ERYTHROCYTE [DISTWIDTH] IN BLOOD BY AUTOMATED COUNT: 13.2 % (ref 11.5–14.5)
EST. GFR  (AFRICAN AMERICAN): 9.1 ML/MIN/1.73 M^2
EST. GFR  (NON AFRICAN AMERICAN): 7.9 ML/MIN/1.73 M^2
GLUCOSE SERPL-MCNC: 259 MG/DL (ref 70–110)
GRAM STN SPEC: NORMAL
GRAM STN SPEC: NORMAL
HCT VFR BLD AUTO: 29.5 % (ref 40–54)
HGB BLD-MCNC: 9.8 G/DL (ref 14–18)
IMM GRANULOCYTES # BLD AUTO: 0.06 K/UL (ref 0–0.04)
IMM GRANULOCYTES NFR BLD AUTO: 0.9 % (ref 0–0.5)
LYMPHOCYTES # BLD AUTO: 1.5 K/UL (ref 1–4.8)
LYMPHOCYTES NFR BLD: 22.9 % (ref 18–48)
MCH RBC QN AUTO: 28 PG (ref 27–31)
MCHC RBC AUTO-ENTMCNC: 33.2 G/DL (ref 32–36)
MCV RBC AUTO: 84 FL (ref 82–98)
MONOCYTES # BLD AUTO: 0.8 K/UL (ref 0.3–1)
MONOCYTES NFR BLD: 12.4 % (ref 4–15)
NEUTROPHILS # BLD AUTO: 4.1 K/UL (ref 1.8–7.7)
NEUTROPHILS NFR BLD: 63 % (ref 38–73)
NRBC BLD-RTO: 0 /100 WBC
PLATELET # BLD AUTO: 235 K/UL (ref 150–350)
PMV BLD AUTO: 9.6 FL (ref 9.2–12.9)
POCT GLUCOSE: 126 MG/DL (ref 70–110)
POCT GLUCOSE: 166 MG/DL (ref 70–110)
POCT GLUCOSE: 244 MG/DL (ref 70–110)
POCT GLUCOSE: 415 MG/DL (ref 70–110)
POCT GLUCOSE: >500 MG/DL (ref 70–110)
POTASSIUM SERPL-SCNC: 3.3 MMOL/L (ref 3.5–5.1)
PROT SERPL-MCNC: 6.4 G/DL (ref 6–8.4)
RBC # BLD AUTO: 3.5 M/UL (ref 4.6–6.2)
SODIUM SERPL-SCNC: 133 MMOL/L (ref 136–145)
SPECIMEN SOURCE: NORMAL
SPECIMEN SOURCE: NORMAL
T GONDII DNA CSF QL NAA+PROBE: NEGATIVE
WBC # BLD AUTO: 6.55 K/UL (ref 3.9–12.7)
WNV RNA CSF QL NAA+PROBE: NEGATIVE

## 2020-12-31 PROCEDURE — 93010 ELECTROCARDIOGRAM REPORT: CPT | Mod: NTX,,, | Performed by: INTERNAL MEDICINE

## 2020-12-31 PROCEDURE — 99233 SBSQ HOSP IP/OBS HIGH 50: CPT | Mod: NTX,,, | Performed by: STUDENT IN AN ORGANIZED HEALTH CARE EDUCATION/TRAINING PROGRAM

## 2020-12-31 PROCEDURE — 99233 SBSQ HOSP IP/OBS HIGH 50: CPT | Mod: NTX,,, | Performed by: INTERNAL MEDICINE

## 2020-12-31 PROCEDURE — 25000003 PHARM REV CODE 250: Mod: NTX | Performed by: STUDENT IN AN ORGANIZED HEALTH CARE EDUCATION/TRAINING PROGRAM

## 2020-12-31 PROCEDURE — 36415 COLL VENOUS BLD VENIPUNCTURE: CPT | Mod: NTX

## 2020-12-31 PROCEDURE — 63600175 PHARM REV CODE 636 W HCPCS: Mod: NTX | Performed by: STUDENT IN AN ORGANIZED HEALTH CARE EDUCATION/TRAINING PROGRAM

## 2020-12-31 PROCEDURE — 87205 SMEAR GRAM STAIN: CPT | Mod: NTX

## 2020-12-31 PROCEDURE — 80053 COMPREHEN METABOLIC PANEL: CPT | Mod: NTX

## 2020-12-31 PROCEDURE — 99232 PR SUBSEQUENT HOSPITAL CARE,LEVL II: ICD-10-PCS | Mod: NTX,,, | Performed by: PSYCHIATRY & NEUROLOGY

## 2020-12-31 PROCEDURE — 93010 EKG 12-LEAD: ICD-10-PCS | Mod: NTX,,, | Performed by: INTERNAL MEDICINE

## 2020-12-31 PROCEDURE — 85025 COMPLETE CBC W/AUTO DIFF WBC: CPT | Mod: NTX

## 2020-12-31 PROCEDURE — 11000001 HC ACUTE MED/SURG PRIVATE ROOM: Mod: NTX

## 2020-12-31 PROCEDURE — 87075 CULTR BACTERIA EXCEPT BLOOD: CPT | Mod: NTX

## 2020-12-31 PROCEDURE — 99232 SBSQ HOSP IP/OBS MODERATE 35: CPT | Mod: NTX,,, | Performed by: PSYCHIATRY & NEUROLOGY

## 2020-12-31 PROCEDURE — 99233 PR SUBSEQUENT HOSPITAL CARE,LEVL III: ICD-10-PCS | Mod: NTX,,, | Performed by: INTERNAL MEDICINE

## 2020-12-31 PROCEDURE — 99233 PR SUBSEQUENT HOSPITAL CARE,LEVL III: ICD-10-PCS | Mod: NTX,,, | Performed by: STUDENT IN AN ORGANIZED HEALTH CARE EDUCATION/TRAINING PROGRAM

## 2020-12-31 PROCEDURE — 93005 ELECTROCARDIOGRAM TRACING: CPT | Mod: NTX

## 2020-12-31 PROCEDURE — C9399 UNCLASSIFIED DRUGS OR BIOLOG: HCPCS | Mod: NTX | Performed by: STUDENT IN AN ORGANIZED HEALTH CARE EDUCATION/TRAINING PROGRAM

## 2020-12-31 PROCEDURE — 87070 CULTURE OTHR SPECIMN AEROBIC: CPT | Mod: NTX

## 2020-12-31 RX ORDER — OLANZAPINE 5 MG/1
5 TABLET ORAL EVERY 6 HOURS PRN
Status: DISCONTINUED | OUTPATIENT
Start: 2020-12-31 | End: 2021-01-05 | Stop reason: HOSPADM

## 2020-12-31 RX ADMIN — INSULIN ASPART 3 UNITS: 100 INJECTION, SOLUTION INTRAVENOUS; SUBCUTANEOUS at 05:12

## 2020-12-31 RX ADMIN — DOXYCYCLINE HYCLATE 100 MG: 100 TABLET, COATED ORAL at 08:12

## 2020-12-31 RX ADMIN — INSULIN ASPART 3 UNITS: 100 INJECTION, SOLUTION INTRAVENOUS; SUBCUTANEOUS at 08:12

## 2020-12-31 RX ADMIN — HALOPERIDOL LACTATE 5 MG: 5 INJECTION, SOLUTION INTRAMUSCULAR at 12:12

## 2020-12-31 RX ADMIN — ATORVASTATIN CALCIUM 20 MG: 20 TABLET, FILM COATED ORAL at 08:12

## 2020-12-31 RX ADMIN — HYDROCORTISONE 20 MG: 20 TABLET ORAL at 08:12

## 2020-12-31 RX ADMIN — INSULIN ASPART 2 UNITS: 100 INJECTION, SOLUTION INTRAVENOUS; SUBCUTANEOUS at 08:12

## 2020-12-31 RX ADMIN — ARIPIPRAZOLE 10 MG: 5 TABLET ORAL at 08:12

## 2020-12-31 RX ADMIN — DEXTROSE 10 MILLION UNITS: 5 SOLUTION INTRAVENOUS at 04:12

## 2020-12-31 RX ADMIN — INSULIN DETEMIR 5 UNITS: 100 INJECTION, SOLUTION SUBCUTANEOUS at 08:12

## 2020-12-31 RX ADMIN — ACETAMINOPHEN 650 MG: 325 TABLET ORAL at 03:12

## 2020-12-31 RX ADMIN — FERROUS GLUCONATE TAB 324 MG (37.5 MG ELEMENTAL IRON) 324 MG: 324 (37.5 FE) TAB at 08:12

## 2020-12-31 RX ADMIN — HYDROCORTISONE 10 MG: 5 TABLET ORAL at 08:12

## 2020-12-31 RX ADMIN — CLONIDINE HYDROCHLORIDE 0.2 MG: 0.1 TABLET ORAL at 02:12

## 2020-12-31 RX ADMIN — AMLODIPINE BESYLATE 10 MG: 10 TABLET ORAL at 08:12

## 2020-12-31 RX ADMIN — INSULIN ASPART 3 UNITS: 100 INJECTION, SOLUTION INTRAVENOUS; SUBCUTANEOUS at 12:12

## 2020-12-31 RX ADMIN — METOPROLOL SUCCINATE 100 MG: 100 TABLET, EXTENDED RELEASE ORAL at 08:12

## 2020-12-31 RX ADMIN — INSULIN ASPART 10 UNITS: 100 INJECTION, SOLUTION INTRAVENOUS; SUBCUTANEOUS at 12:12

## 2020-12-31 RX ADMIN — FAMOTIDINE 20 MG: 20 TABLET, FILM COATED ORAL at 08:12

## 2020-12-31 RX ADMIN — CLONIDINE HYDROCHLORIDE 0.2 MG: 0.1 TABLET ORAL at 08:12

## 2020-12-31 RX ADMIN — FLUTICASONE PROPIONATE 50 MCG: 50 SPRAY, METERED NASAL at 08:12

## 2021-01-01 LAB
ALBUMIN SERPL BCP-MCNC: 2.5 G/DL (ref 3.5–5.2)
ALP SERPL-CCNC: 87 U/L (ref 55–135)
ALT SERPL W/O P-5'-P-CCNC: 22 U/L (ref 10–44)
ANION GAP SERPL CALC-SCNC: 16 MMOL/L (ref 8–16)
AST SERPL-CCNC: 21 U/L (ref 10–40)
BASOPHILS # BLD AUTO: 0.01 K/UL (ref 0–0.2)
BASOPHILS NFR BLD: 0.2 % (ref 0–1.9)
BILIRUB SERPL-MCNC: 0.2 MG/DL (ref 0.1–1)
BUN SERPL-MCNC: 102 MG/DL (ref 6–20)
CALCIUM SERPL-MCNC: 8.8 MG/DL (ref 8.7–10.5)
CHLORIDE SERPL-SCNC: 99 MMOL/L (ref 95–110)
CO2 SERPL-SCNC: 22 MMOL/L (ref 23–29)
CREAT SERPL-MCNC: 6.4 MG/DL (ref 0.5–1.4)
DIFFERENTIAL METHOD: ABNORMAL
EOSINOPHIL # BLD AUTO: 0.1 K/UL (ref 0–0.5)
EOSINOPHIL NFR BLD: 0.8 % (ref 0–8)
ERYTHROCYTE [DISTWIDTH] IN BLOOD BY AUTOMATED COUNT: 13 % (ref 11.5–14.5)
EST. GFR  (AFRICAN AMERICAN): 10.2 ML/MIN/1.73 M^2
EST. GFR  (NON AFRICAN AMERICAN): 8.8 ML/MIN/1.73 M^2
GLUCOSE SERPL-MCNC: 314 MG/DL (ref 70–110)
HCT VFR BLD AUTO: 27.4 % (ref 40–54)
HGB BLD-MCNC: 9 G/DL (ref 14–18)
IMM GRANULOCYTES # BLD AUTO: 0.05 K/UL (ref 0–0.04)
IMM GRANULOCYTES NFR BLD AUTO: 0.8 % (ref 0–0.5)
LYMPHOCYTES # BLD AUTO: 1.1 K/UL (ref 1–4.8)
LYMPHOCYTES NFR BLD: 17.9 % (ref 18–48)
MCH RBC QN AUTO: 27.8 PG (ref 27–31)
MCHC RBC AUTO-ENTMCNC: 32.8 G/DL (ref 32–36)
MCV RBC AUTO: 85 FL (ref 82–98)
MONOCYTES # BLD AUTO: 0.8 K/UL (ref 0.3–1)
MONOCYTES NFR BLD: 12.2 % (ref 4–15)
NEUTROPHILS # BLD AUTO: 4.3 K/UL (ref 1.8–7.7)
NEUTROPHILS NFR BLD: 68.1 % (ref 38–73)
NRBC BLD-RTO: 0 /100 WBC
PLATELET # BLD AUTO: 230 K/UL (ref 150–350)
PMV BLD AUTO: 9.2 FL (ref 9.2–12.9)
POCT GLUCOSE: 155 MG/DL (ref 70–110)
POCT GLUCOSE: 156 MG/DL (ref 70–110)
POCT GLUCOSE: 162 MG/DL (ref 70–110)
POCT GLUCOSE: 218 MG/DL (ref 70–110)
POTASSIUM SERPL-SCNC: 3.7 MMOL/L (ref 3.5–5.1)
PROT SERPL-MCNC: 5.8 G/DL (ref 6–8.4)
RBC # BLD AUTO: 3.24 M/UL (ref 4.6–6.2)
SODIUM SERPL-SCNC: 137 MMOL/L (ref 136–145)
WBC # BLD AUTO: 6.37 K/UL (ref 3.9–12.7)

## 2021-01-01 PROCEDURE — 93010 ELECTROCARDIOGRAM REPORT: CPT | Mod: NTX,,, | Performed by: INTERNAL MEDICINE

## 2021-01-01 PROCEDURE — 99232 PR SUBSEQUENT HOSPITAL CARE,LEVL II: ICD-10-PCS | Mod: NTX,,, | Performed by: INTERNAL MEDICINE

## 2021-01-01 PROCEDURE — 80053 COMPREHEN METABOLIC PANEL: CPT | Mod: NTX

## 2021-01-01 PROCEDURE — 99232 PR SUBSEQUENT HOSPITAL CARE,LEVL II: ICD-10-PCS | Mod: NTX,,, | Performed by: STUDENT IN AN ORGANIZED HEALTH CARE EDUCATION/TRAINING PROGRAM

## 2021-01-01 PROCEDURE — 25000003 PHARM REV CODE 250: Mod: NTX | Performed by: STUDENT IN AN ORGANIZED HEALTH CARE EDUCATION/TRAINING PROGRAM

## 2021-01-01 PROCEDURE — 99232 SBSQ HOSP IP/OBS MODERATE 35: CPT | Mod: NTX,,, | Performed by: STUDENT IN AN ORGANIZED HEALTH CARE EDUCATION/TRAINING PROGRAM

## 2021-01-01 PROCEDURE — 93010 EKG 12-LEAD: ICD-10-PCS | Mod: NTX,,, | Performed by: INTERNAL MEDICINE

## 2021-01-01 PROCEDURE — 85025 COMPLETE CBC W/AUTO DIFF WBC: CPT | Mod: NTX

## 2021-01-01 PROCEDURE — 99232 SBSQ HOSP IP/OBS MODERATE 35: CPT | Mod: NTX,,, | Performed by: INTERNAL MEDICINE

## 2021-01-01 PROCEDURE — 11000001 HC ACUTE MED/SURG PRIVATE ROOM: Mod: NTX

## 2021-01-01 PROCEDURE — 63600175 PHARM REV CODE 636 W HCPCS: Mod: NTX | Performed by: STUDENT IN AN ORGANIZED HEALTH CARE EDUCATION/TRAINING PROGRAM

## 2021-01-01 PROCEDURE — 93005 ELECTROCARDIOGRAM TRACING: CPT | Mod: NTX

## 2021-01-01 PROCEDURE — 36415 COLL VENOUS BLD VENIPUNCTURE: CPT | Mod: NTX

## 2021-01-01 RX ORDER — CLONIDINE HYDROCHLORIDE 0.1 MG/1
0.4 TABLET ORAL 3 TIMES DAILY
Status: DISCONTINUED | OUTPATIENT
Start: 2021-01-02 | End: 2021-01-05 | Stop reason: HOSPADM

## 2021-01-01 RX ADMIN — DEXTROSE 10 MILLION UNITS: 5 SOLUTION INTRAVENOUS at 04:01

## 2021-01-01 RX ADMIN — AMLODIPINE BESYLATE 10 MG: 10 TABLET ORAL at 08:01

## 2021-01-01 RX ADMIN — INSULIN DETEMIR 5 UNITS: 100 INJECTION, SOLUTION SUBCUTANEOUS at 09:01

## 2021-01-01 RX ADMIN — FERROUS GLUCONATE TAB 324 MG (37.5 MG ELEMENTAL IRON) 324 MG: 324 (37.5 FE) TAB at 08:01

## 2021-01-01 RX ADMIN — HYDROCORTISONE 10 MG: 5 TABLET ORAL at 09:01

## 2021-01-01 RX ADMIN — FAMOTIDINE 20 MG: 20 TABLET, FILM COATED ORAL at 08:01

## 2021-01-01 RX ADMIN — METOPROLOL SUCCINATE 100 MG: 100 TABLET, EXTENDED RELEASE ORAL at 08:01

## 2021-01-01 RX ADMIN — DOXYCYCLINE HYCLATE 100 MG: 100 TABLET, COATED ORAL at 09:01

## 2021-01-01 RX ADMIN — INSULIN ASPART 1 UNITS: 100 INJECTION, SOLUTION INTRAVENOUS; SUBCUTANEOUS at 09:01

## 2021-01-01 RX ADMIN — FLUTICASONE PROPIONATE 50 MCG: 50 SPRAY, METERED NASAL at 08:01

## 2021-01-01 RX ADMIN — INSULIN ASPART 3 UNITS: 100 INJECTION, SOLUTION INTRAVENOUS; SUBCUTANEOUS at 04:01

## 2021-01-01 RX ADMIN — HYDROCORTISONE 20 MG: 20 TABLET ORAL at 08:01

## 2021-01-01 RX ADMIN — CLONIDINE HYDROCHLORIDE 0.2 MG: 0.1 TABLET ORAL at 02:01

## 2021-01-01 RX ADMIN — INSULIN ASPART 3 UNITS: 100 INJECTION, SOLUTION INTRAVENOUS; SUBCUTANEOUS at 08:01

## 2021-01-01 RX ADMIN — INSULIN ASPART 3 UNITS: 100 INJECTION, SOLUTION INTRAVENOUS; SUBCUTANEOUS at 12:01

## 2021-01-01 RX ADMIN — CLONIDINE HYDROCHLORIDE 0.2 MG: 0.1 TABLET ORAL at 08:01

## 2021-01-01 RX ADMIN — DOXYCYCLINE HYCLATE 100 MG: 100 TABLET, COATED ORAL at 08:01

## 2021-01-01 RX ADMIN — CLONIDINE HYDROCHLORIDE 0.2 MG: 0.1 TABLET ORAL at 09:01

## 2021-01-01 RX ADMIN — ARIPIPRAZOLE 10 MG: 5 TABLET ORAL at 08:01

## 2021-01-01 RX ADMIN — INSULIN ASPART 4 UNITS: 100 INJECTION, SOLUTION INTRAVENOUS; SUBCUTANEOUS at 04:01

## 2021-01-01 RX ADMIN — ATORVASTATIN CALCIUM 20 MG: 20 TABLET, FILM COATED ORAL at 08:01

## 2021-01-01 RX ADMIN — INSULIN DETEMIR 5 UNITS: 100 INJECTION, SOLUTION SUBCUTANEOUS at 08:01

## 2021-01-02 LAB
ALBUMIN SERPL BCP-MCNC: 2.6 G/DL (ref 3.5–5.2)
ALP SERPL-CCNC: 76 U/L (ref 55–135)
ALT SERPL W/O P-5'-P-CCNC: 24 U/L (ref 10–44)
ANION GAP SERPL CALC-SCNC: 13 MMOL/L (ref 8–16)
AST SERPL-CCNC: 26 U/L (ref 10–40)
BACTERIA CSF CULT: NO GROWTH
BACTERIA SPEC AEROBE CULT: NORMAL
BASOPHILS # BLD AUTO: 0.01 K/UL (ref 0–0.2)
BASOPHILS NFR BLD: 0.1 % (ref 0–1.9)
BILIRUB SERPL-MCNC: 0.3 MG/DL (ref 0.1–1)
BUN SERPL-MCNC: 85 MG/DL (ref 6–20)
CALCIUM SERPL-MCNC: 9 MG/DL (ref 8.7–10.5)
CHLORIDE SERPL-SCNC: 97 MMOL/L (ref 95–110)
CO2 SERPL-SCNC: 24 MMOL/L (ref 23–29)
CREAT SERPL-MCNC: 6 MG/DL (ref 0.5–1.4)
DIFFERENTIAL METHOD: ABNORMAL
EOSINOPHIL # BLD AUTO: 0 K/UL (ref 0–0.5)
EOSINOPHIL NFR BLD: 0.4 % (ref 0–8)
ERYTHROCYTE [DISTWIDTH] IN BLOOD BY AUTOMATED COUNT: 13.2 % (ref 11.5–14.5)
EST. GFR  (AFRICAN AMERICAN): 11 ML/MIN/1.73 M^2
EST. GFR  (NON AFRICAN AMERICAN): 9.5 ML/MIN/1.73 M^2
GLUCOSE SERPL-MCNC: 288 MG/DL (ref 70–110)
GRAM STN SPEC: NORMAL
HCT VFR BLD AUTO: 30.1 % (ref 40–54)
HGB BLD-MCNC: 9.8 G/DL (ref 14–18)
IMM GRANULOCYTES # BLD AUTO: 0.11 K/UL (ref 0–0.04)
IMM GRANULOCYTES NFR BLD AUTO: 1.5 % (ref 0–0.5)
LYMPHOCYTES # BLD AUTO: 1.4 K/UL (ref 1–4.8)
LYMPHOCYTES NFR BLD: 19.2 % (ref 18–48)
MCH RBC QN AUTO: 27.8 PG (ref 27–31)
MCHC RBC AUTO-ENTMCNC: 32.6 G/DL (ref 32–36)
MCV RBC AUTO: 85 FL (ref 82–98)
MONOCYTES # BLD AUTO: 0.9 K/UL (ref 0.3–1)
MONOCYTES NFR BLD: 12.3 % (ref 4–15)
NEUTROPHILS # BLD AUTO: 4.9 K/UL (ref 1.8–7.7)
NEUTROPHILS NFR BLD: 66.5 % (ref 38–73)
NRBC BLD-RTO: 0 /100 WBC
PLATELET # BLD AUTO: 250 K/UL (ref 150–350)
PMV BLD AUTO: 9.5 FL (ref 9.2–12.9)
POCT GLUCOSE: 203 MG/DL (ref 70–110)
POCT GLUCOSE: 213 MG/DL (ref 70–110)
POCT GLUCOSE: 226 MG/DL (ref 70–110)
POCT GLUCOSE: 294 MG/DL (ref 70–110)
POTASSIUM SERPL-SCNC: 3.8 MMOL/L (ref 3.5–5.1)
PROT SERPL-MCNC: 6.1 G/DL (ref 6–8.4)
RBC # BLD AUTO: 3.53 M/UL (ref 4.6–6.2)
SODIUM SERPL-SCNC: 134 MMOL/L (ref 136–145)
WBC # BLD AUTO: 7.33 K/UL (ref 3.9–12.7)

## 2021-01-02 PROCEDURE — 25000003 PHARM REV CODE 250: Mod: NTX | Performed by: STUDENT IN AN ORGANIZED HEALTH CARE EDUCATION/TRAINING PROGRAM

## 2021-01-02 PROCEDURE — 93010 EKG 12-LEAD: ICD-10-PCS | Mod: NTX,,, | Performed by: INTERNAL MEDICINE

## 2021-01-02 PROCEDURE — 99231 PR SUBSEQUENT HOSPITAL CARE,LEVL I: ICD-10-PCS | Mod: NTX,,, | Performed by: PSYCHIATRY & NEUROLOGY

## 2021-01-02 PROCEDURE — 85025 COMPLETE CBC W/AUTO DIFF WBC: CPT | Mod: NTX

## 2021-01-02 PROCEDURE — 99231 SBSQ HOSP IP/OBS SF/LOW 25: CPT | Mod: NTX,,, | Performed by: PSYCHIATRY & NEUROLOGY

## 2021-01-02 PROCEDURE — 99233 PR SUBSEQUENT HOSPITAL CARE,LEVL III: ICD-10-PCS | Mod: NTX,,, | Performed by: STUDENT IN AN ORGANIZED HEALTH CARE EDUCATION/TRAINING PROGRAM

## 2021-01-02 PROCEDURE — 36415 COLL VENOUS BLD VENIPUNCTURE: CPT | Mod: NTX

## 2021-01-02 PROCEDURE — 11000001 HC ACUTE MED/SURG PRIVATE ROOM: Mod: NTX

## 2021-01-02 PROCEDURE — 93005 ELECTROCARDIOGRAM TRACING: CPT | Mod: NTX

## 2021-01-02 PROCEDURE — 63600175 PHARM REV CODE 636 W HCPCS: Mod: NTX | Performed by: STUDENT IN AN ORGANIZED HEALTH CARE EDUCATION/TRAINING PROGRAM

## 2021-01-02 PROCEDURE — 99233 SBSQ HOSP IP/OBS HIGH 50: CPT | Mod: NTX,,, | Performed by: STUDENT IN AN ORGANIZED HEALTH CARE EDUCATION/TRAINING PROGRAM

## 2021-01-02 PROCEDURE — 80053 COMPREHEN METABOLIC PANEL: CPT | Mod: NTX

## 2021-01-02 PROCEDURE — 93010 ELECTROCARDIOGRAM REPORT: CPT | Mod: NTX,,, | Performed by: INTERNAL MEDICINE

## 2021-01-02 RX ORDER — ARIPIPRAZOLE 5 MG/1
15 TABLET ORAL DAILY
Status: DISCONTINUED | OUTPATIENT
Start: 2021-01-03 | End: 2021-01-05

## 2021-01-02 RX ADMIN — HYDROCORTISONE 20 MG: 20 TABLET ORAL at 08:01

## 2021-01-02 RX ADMIN — INSULIN ASPART 3 UNITS: 100 INJECTION, SOLUTION INTRAVENOUS; SUBCUTANEOUS at 04:01

## 2021-01-02 RX ADMIN — AMLODIPINE BESYLATE 10 MG: 10 TABLET ORAL at 08:01

## 2021-01-02 RX ADMIN — INSULIN ASPART 6 UNITS: 100 INJECTION, SOLUTION INTRAVENOUS; SUBCUTANEOUS at 08:01

## 2021-01-02 RX ADMIN — FLUTICASONE PROPIONATE 50 MCG: 50 SPRAY, METERED NASAL at 08:01

## 2021-01-02 RX ADMIN — ARIPIPRAZOLE 10 MG: 5 TABLET ORAL at 08:01

## 2021-01-02 RX ADMIN — DOXYCYCLINE HYCLATE 100 MG: 100 TABLET, COATED ORAL at 08:01

## 2021-01-02 RX ADMIN — FERROUS GLUCONATE TAB 324 MG (37.5 MG ELEMENTAL IRON) 324 MG: 324 (37.5 FE) TAB at 08:01

## 2021-01-02 RX ADMIN — HYDROCORTISONE 10 MG: 5 TABLET ORAL at 08:01

## 2021-01-02 RX ADMIN — CLONIDINE HYDROCHLORIDE 0.4 MG: 0.1 TABLET ORAL at 03:01

## 2021-01-02 RX ADMIN — INSULIN ASPART 3 UNITS: 100 INJECTION, SOLUTION INTRAVENOUS; SUBCUTANEOUS at 08:01

## 2021-01-02 RX ADMIN — INSULIN ASPART 2 UNITS: 100 INJECTION, SOLUTION INTRAVENOUS; SUBCUTANEOUS at 08:01

## 2021-01-02 RX ADMIN — FAMOTIDINE 20 MG: 20 TABLET, FILM COATED ORAL at 08:01

## 2021-01-02 RX ADMIN — METOPROLOL SUCCINATE 100 MG: 100 TABLET, EXTENDED RELEASE ORAL at 08:01

## 2021-01-02 RX ADMIN — INSULIN ASPART 3 UNITS: 100 INJECTION, SOLUTION INTRAVENOUS; SUBCUTANEOUS at 01:01

## 2021-01-02 RX ADMIN — INSULIN DETEMIR 5 UNITS: 100 INJECTION, SOLUTION SUBCUTANEOUS at 08:01

## 2021-01-02 RX ADMIN — ATORVASTATIN CALCIUM 20 MG: 20 TABLET, FILM COATED ORAL at 08:01

## 2021-01-02 RX ADMIN — INSULIN ASPART 4 UNITS: 100 INJECTION, SOLUTION INTRAVENOUS; SUBCUTANEOUS at 04:01

## 2021-01-02 RX ADMIN — CLONIDINE HYDROCHLORIDE 0.4 MG: 0.1 TABLET ORAL at 08:01

## 2021-01-02 RX ADMIN — DEXTROSE 10 MILLION UNITS: 5 SOLUTION INTRAVENOUS at 04:01

## 2021-01-03 LAB
ALBUMIN SERPL BCP-MCNC: 2.9 G/DL (ref 3.5–5.2)
ALP SERPL-CCNC: 81 U/L (ref 55–135)
ALT SERPL W/O P-5'-P-CCNC: 27 U/L (ref 10–44)
ANION GAP SERPL CALC-SCNC: 13 MMOL/L (ref 8–16)
AST SERPL-CCNC: 26 U/L (ref 10–40)
BACTERIA SPEC AEROBE CULT: ABNORMAL
BACTERIA SPEC AEROBE CULT: ABNORMAL
BASOPHILS # BLD AUTO: 0.02 K/UL (ref 0–0.2)
BASOPHILS NFR BLD: 0.2 % (ref 0–1.9)
BILIRUB SERPL-MCNC: 0.3 MG/DL (ref 0.1–1)
BUN SERPL-MCNC: 73 MG/DL (ref 6–20)
CALCIUM SERPL-MCNC: 10.1 MG/DL (ref 8.7–10.5)
CHLORIDE SERPL-SCNC: 99 MMOL/L (ref 95–110)
CO2 SERPL-SCNC: 25 MMOL/L (ref 23–29)
CREAT SERPL-MCNC: 5.5 MG/DL (ref 0.5–1.4)
DIFFERENTIAL METHOD: ABNORMAL
EOSINOPHIL # BLD AUTO: 0 K/UL (ref 0–0.5)
EOSINOPHIL NFR BLD: 0.3 % (ref 0–8)
ERYTHROCYTE [DISTWIDTH] IN BLOOD BY AUTOMATED COUNT: 13.4 % (ref 11.5–14.5)
EST. GFR  (AFRICAN AMERICAN): 12.2 ML/MIN/1.73 M^2
EST. GFR  (NON AFRICAN AMERICAN): 10.6 ML/MIN/1.73 M^2
GLUCOSE SERPL-MCNC: 156 MG/DL (ref 70–110)
HCT VFR BLD AUTO: 31.6 % (ref 40–54)
HGB BLD-MCNC: 10.4 G/DL (ref 14–18)
IMM GRANULOCYTES # BLD AUTO: 0.09 K/UL (ref 0–0.04)
IMM GRANULOCYTES NFR BLD AUTO: 0.9 % (ref 0–0.5)
LYMPHOCYTES # BLD AUTO: 2.2 K/UL (ref 1–4.8)
LYMPHOCYTES NFR BLD: 22.4 % (ref 18–48)
MCH RBC QN AUTO: 27.9 PG (ref 27–31)
MCHC RBC AUTO-ENTMCNC: 32.9 G/DL (ref 32–36)
MCV RBC AUTO: 85 FL (ref 82–98)
MONOCYTES # BLD AUTO: 1.2 K/UL (ref 0.3–1)
MONOCYTES NFR BLD: 12.8 % (ref 4–15)
NEUTROPHILS # BLD AUTO: 6.1 K/UL (ref 1.8–7.7)
NEUTROPHILS NFR BLD: 63.4 % (ref 38–73)
NRBC BLD-RTO: 0 /100 WBC
PLATELET # BLD AUTO: 270 K/UL (ref 150–350)
PMV BLD AUTO: 9.1 FL (ref 9.2–12.9)
POCT GLUCOSE: 164 MG/DL (ref 70–110)
POCT GLUCOSE: 171 MG/DL (ref 70–110)
POCT GLUCOSE: 210 MG/DL (ref 70–110)
POCT GLUCOSE: 265 MG/DL (ref 70–110)
POTASSIUM SERPL-SCNC: 3.9 MMOL/L (ref 3.5–5.1)
PROT SERPL-MCNC: 6.7 G/DL (ref 6–8.4)
RBC # BLD AUTO: 3.73 M/UL (ref 4.6–6.2)
SODIUM SERPL-SCNC: 137 MMOL/L (ref 136–145)
WBC # BLD AUTO: 9.6 K/UL (ref 3.9–12.7)

## 2021-01-03 PROCEDURE — 11000001 HC ACUTE MED/SURG PRIVATE ROOM: Mod: NTX

## 2021-01-03 PROCEDURE — 99233 SBSQ HOSP IP/OBS HIGH 50: CPT | Mod: NTX,,, | Performed by: STUDENT IN AN ORGANIZED HEALTH CARE EDUCATION/TRAINING PROGRAM

## 2021-01-03 PROCEDURE — 93010 EKG 12-LEAD: ICD-10-PCS | Mod: NTX,,, | Performed by: INTERNAL MEDICINE

## 2021-01-03 PROCEDURE — 25000003 PHARM REV CODE 250: Mod: NTX | Performed by: STUDENT IN AN ORGANIZED HEALTH CARE EDUCATION/TRAINING PROGRAM

## 2021-01-03 PROCEDURE — 80053 COMPREHEN METABOLIC PANEL: CPT | Mod: NTX

## 2021-01-03 PROCEDURE — 36415 COLL VENOUS BLD VENIPUNCTURE: CPT | Mod: NTX

## 2021-01-03 PROCEDURE — 85025 COMPLETE CBC W/AUTO DIFF WBC: CPT | Mod: NTX

## 2021-01-03 PROCEDURE — 93010 ELECTROCARDIOGRAM REPORT: CPT | Mod: NTX,,, | Performed by: INTERNAL MEDICINE

## 2021-01-03 PROCEDURE — 93005 ELECTROCARDIOGRAM TRACING: CPT | Mod: NTX

## 2021-01-03 PROCEDURE — 94761 N-INVAS EAR/PLS OXIMETRY MLT: CPT | Mod: NTX

## 2021-01-03 PROCEDURE — 63600175 PHARM REV CODE 636 W HCPCS: Mod: NTX | Performed by: STUDENT IN AN ORGANIZED HEALTH CARE EDUCATION/TRAINING PROGRAM

## 2021-01-03 PROCEDURE — 99233 PR SUBSEQUENT HOSPITAL CARE,LEVL III: ICD-10-PCS | Mod: NTX,,, | Performed by: STUDENT IN AN ORGANIZED HEALTH CARE EDUCATION/TRAINING PROGRAM

## 2021-01-03 RX ADMIN — FERROUS GLUCONATE TAB 324 MG (37.5 MG ELEMENTAL IRON) 324 MG: 324 (37.5 FE) TAB at 09:01

## 2021-01-03 RX ADMIN — HYDROCORTISONE 10 MG: 5 TABLET ORAL at 08:01

## 2021-01-03 RX ADMIN — INSULIN ASPART 2 UNITS: 100 INJECTION, SOLUTION INTRAVENOUS; SUBCUTANEOUS at 09:01

## 2021-01-03 RX ADMIN — INSULIN DETEMIR 5 UNITS: 100 INJECTION, SOLUTION SUBCUTANEOUS at 08:01

## 2021-01-03 RX ADMIN — FLUTICASONE PROPIONATE 50 MCG: 50 SPRAY, METERED NASAL at 09:01

## 2021-01-03 RX ADMIN — FAMOTIDINE 20 MG: 20 TABLET, FILM COATED ORAL at 09:01

## 2021-01-03 RX ADMIN — INSULIN ASPART 3 UNITS: 100 INJECTION, SOLUTION INTRAVENOUS; SUBCUTANEOUS at 01:01

## 2021-01-03 RX ADMIN — INSULIN ASPART 2 UNITS: 100 INJECTION, SOLUTION INTRAVENOUS; SUBCUTANEOUS at 05:01

## 2021-01-03 RX ADMIN — ATORVASTATIN CALCIUM 20 MG: 20 TABLET, FILM COATED ORAL at 09:01

## 2021-01-03 RX ADMIN — CLONIDINE HYDROCHLORIDE 0.4 MG: 0.1 TABLET ORAL at 03:01

## 2021-01-03 RX ADMIN — CLONIDINE HYDROCHLORIDE 0.4 MG: 0.1 TABLET ORAL at 08:01

## 2021-01-03 RX ADMIN — ARIPIPRAZOLE 15 MG: 5 TABLET ORAL at 09:01

## 2021-01-03 RX ADMIN — INSULIN ASPART 2 UNITS: 100 INJECTION, SOLUTION INTRAVENOUS; SUBCUTANEOUS at 08:01

## 2021-01-03 RX ADMIN — AMLODIPINE BESYLATE 10 MG: 10 TABLET ORAL at 09:01

## 2021-01-03 RX ADMIN — HYDROCORTISONE 20 MG: 20 TABLET ORAL at 09:01

## 2021-01-03 RX ADMIN — METOPROLOL SUCCINATE 100 MG: 100 TABLET, EXTENDED RELEASE ORAL at 09:01

## 2021-01-03 RX ADMIN — INSULIN ASPART 6 UNITS: 100 INJECTION, SOLUTION INTRAVENOUS; SUBCUTANEOUS at 01:01

## 2021-01-03 RX ADMIN — DOXYCYCLINE HYCLATE 100 MG: 100 TABLET, COATED ORAL at 08:01

## 2021-01-03 RX ADMIN — INSULIN ASPART 3 UNITS: 100 INJECTION, SOLUTION INTRAVENOUS; SUBCUTANEOUS at 05:01

## 2021-01-03 RX ADMIN — INSULIN ASPART 3 UNITS: 100 INJECTION, SOLUTION INTRAVENOUS; SUBCUTANEOUS at 09:01

## 2021-01-03 RX ADMIN — CLONIDINE HYDROCHLORIDE 0.4 MG: 0.1 TABLET ORAL at 09:01

## 2021-01-03 RX ADMIN — DOXYCYCLINE HYCLATE 100 MG: 100 TABLET, COATED ORAL at 09:01

## 2021-01-03 RX ADMIN — DEXTROSE 10 MILLION UNITS: 5 SOLUTION INTRAVENOUS at 05:01

## 2021-01-04 LAB
ALBUMIN SERPL BCP-MCNC: 2.5 G/DL (ref 3.5–5.2)
ALP SERPL-CCNC: 95 U/L (ref 55–135)
ALT SERPL W/O P-5'-P-CCNC: 26 U/L (ref 10–44)
ANION GAP SERPL CALC-SCNC: 15 MMOL/L (ref 8–16)
AST SERPL-CCNC: 23 U/L (ref 10–40)
BASOPHILS # BLD AUTO: 0.02 K/UL (ref 0–0.2)
BASOPHILS NFR BLD: 0.3 % (ref 0–1.9)
BILIRUB SERPL-MCNC: 0.2 MG/DL (ref 0.1–1)
BUN SERPL-MCNC: 77 MG/DL (ref 6–20)
CALCIUM SERPL-MCNC: 9.3 MG/DL (ref 8.7–10.5)
CHLORIDE SERPL-SCNC: 99 MMOL/L (ref 95–110)
CO2 SERPL-SCNC: 21 MMOL/L (ref 23–29)
CREAT SERPL-MCNC: 5 MG/DL (ref 0.5–1.4)
DIFFERENTIAL METHOD: ABNORMAL
EOSINOPHIL # BLD AUTO: 0 K/UL (ref 0–0.5)
EOSINOPHIL NFR BLD: 0.4 % (ref 0–8)
ERYTHROCYTE [DISTWIDTH] IN BLOOD BY AUTOMATED COUNT: 13.3 % (ref 11.5–14.5)
EST. GFR  (AFRICAN AMERICAN): 13.7 ML/MIN/1.73 M^2
EST. GFR  (NON AFRICAN AMERICAN): 11.9 ML/MIN/1.73 M^2
GLUCOSE SERPL-MCNC: 232 MG/DL (ref 70–110)
HCT VFR BLD AUTO: 27.2 % (ref 40–54)
HGB BLD-MCNC: 8.9 G/DL (ref 14–18)
IMM GRANULOCYTES # BLD AUTO: 0.11 K/UL (ref 0–0.04)
IMM GRANULOCYTES NFR BLD AUTO: 1.4 % (ref 0–0.5)
LYMPHOCYTES # BLD AUTO: 1.5 K/UL (ref 1–4.8)
LYMPHOCYTES NFR BLD: 19 % (ref 18–48)
MCH RBC QN AUTO: 28.3 PG (ref 27–31)
MCHC RBC AUTO-ENTMCNC: 32.7 G/DL (ref 32–36)
MCV RBC AUTO: 87 FL (ref 82–98)
MONOCYTES # BLD AUTO: 1.1 K/UL (ref 0.3–1)
MONOCYTES NFR BLD: 13.8 % (ref 4–15)
NEUTROPHILS # BLD AUTO: 5.2 K/UL (ref 1.8–7.7)
NEUTROPHILS NFR BLD: 65.1 % (ref 38–73)
NRBC BLD-RTO: 0 /100 WBC
PLATELET # BLD AUTO: 232 K/UL (ref 150–350)
PMV BLD AUTO: 9.5 FL (ref 9.2–12.9)
POCT GLUCOSE: 124 MG/DL (ref 70–110)
POCT GLUCOSE: 199 MG/DL (ref 70–110)
POCT GLUCOSE: 270 MG/DL (ref 70–110)
POCT GLUCOSE: 296 MG/DL (ref 70–110)
POTASSIUM SERPL-SCNC: 3.7 MMOL/L (ref 3.5–5.1)
PROT SERPL-MCNC: 5.7 G/DL (ref 6–8.4)
RBC # BLD AUTO: 3.14 M/UL (ref 4.6–6.2)
SODIUM SERPL-SCNC: 135 MMOL/L (ref 136–145)
WBC # BLD AUTO: 7.95 K/UL (ref 3.9–12.7)

## 2021-01-04 PROCEDURE — 25000003 PHARM REV CODE 250: Mod: NTX | Performed by: STUDENT IN AN ORGANIZED HEALTH CARE EDUCATION/TRAINING PROGRAM

## 2021-01-04 PROCEDURE — 36415 COLL VENOUS BLD VENIPUNCTURE: CPT | Mod: NTX

## 2021-01-04 PROCEDURE — 63600175 PHARM REV CODE 636 W HCPCS: Mod: NTX | Performed by: STUDENT IN AN ORGANIZED HEALTH CARE EDUCATION/TRAINING PROGRAM

## 2021-01-04 PROCEDURE — 99233 SBSQ HOSP IP/OBS HIGH 50: CPT | Mod: NTX,,, | Performed by: STUDENT IN AN ORGANIZED HEALTH CARE EDUCATION/TRAINING PROGRAM

## 2021-01-04 PROCEDURE — 93010 EKG 12-LEAD: ICD-10-PCS | Mod: NTX,,, | Performed by: INTERNAL MEDICINE

## 2021-01-04 PROCEDURE — 85025 COMPLETE CBC W/AUTO DIFF WBC: CPT | Mod: NTX

## 2021-01-04 PROCEDURE — 80053 COMPREHEN METABOLIC PANEL: CPT | Mod: NTX

## 2021-01-04 PROCEDURE — 99233 PR SUBSEQUENT HOSPITAL CARE,LEVL III: ICD-10-PCS | Mod: NTX,,, | Performed by: STUDENT IN AN ORGANIZED HEALTH CARE EDUCATION/TRAINING PROGRAM

## 2021-01-04 PROCEDURE — 93005 ELECTROCARDIOGRAM TRACING: CPT | Mod: NTX

## 2021-01-04 PROCEDURE — 97803 MED NUTRITION INDIV SUBSEQ: CPT | Mod: NTX

## 2021-01-04 PROCEDURE — 11000001 HC ACUTE MED/SURG PRIVATE ROOM: Mod: NTX

## 2021-01-04 PROCEDURE — 93010 ELECTROCARDIOGRAM REPORT: CPT | Mod: NTX,,, | Performed by: INTERNAL MEDICINE

## 2021-01-04 RX ORDER — MAG HYDROX/ALUMINUM HYD/SIMETH 200-200-20
30 SUSPENSION, ORAL (FINAL DOSE FORM) ORAL EVERY 6 HOURS PRN
Status: DISCONTINUED | OUTPATIENT
Start: 2021-01-04 | End: 2021-01-05 | Stop reason: HOSPADM

## 2021-01-04 RX ADMIN — INSULIN ASPART 6 UNITS: 100 INJECTION, SOLUTION INTRAVENOUS; SUBCUTANEOUS at 04:01

## 2021-01-04 RX ADMIN — FERROUS GLUCONATE TAB 324 MG (37.5 MG ELEMENTAL IRON) 324 MG: 324 (37.5 FE) TAB at 08:01

## 2021-01-04 RX ADMIN — ARIPIPRAZOLE 15 MG: 5 TABLET ORAL at 08:01

## 2021-01-04 RX ADMIN — INSULIN ASPART 3 UNITS: 100 INJECTION, SOLUTION INTRAVENOUS; SUBCUTANEOUS at 04:01

## 2021-01-04 RX ADMIN — HYDROCORTISONE 10 MG: 5 TABLET ORAL at 09:01

## 2021-01-04 RX ADMIN — INSULIN ASPART 2 UNITS: 100 INJECTION, SOLUTION INTRAVENOUS; SUBCUTANEOUS at 08:01

## 2021-01-04 RX ADMIN — CLONIDINE HYDROCHLORIDE 0.4 MG: 0.1 TABLET ORAL at 08:01

## 2021-01-04 RX ADMIN — INSULIN DETEMIR 5 UNITS: 100 INJECTION, SOLUTION SUBCUTANEOUS at 09:01

## 2021-01-04 RX ADMIN — DOXYCYCLINE HYCLATE 100 MG: 100 TABLET, COATED ORAL at 08:01

## 2021-01-04 RX ADMIN — DOXYCYCLINE HYCLATE 100 MG: 100 TABLET, COATED ORAL at 09:01

## 2021-01-04 RX ADMIN — HYDROCORTISONE 20 MG: 20 TABLET ORAL at 08:01

## 2021-01-04 RX ADMIN — INSULIN ASPART 3 UNITS: 100 INJECTION, SOLUTION INTRAVENOUS; SUBCUTANEOUS at 08:01

## 2021-01-04 RX ADMIN — CLONIDINE HYDROCHLORIDE 0.4 MG: 0.1 TABLET ORAL at 04:01

## 2021-01-04 RX ADMIN — ATORVASTATIN CALCIUM 20 MG: 20 TABLET, FILM COATED ORAL at 08:01

## 2021-01-04 RX ADMIN — DEXTROSE 10 MILLION UNITS: 5 SOLUTION INTRAVENOUS at 06:01

## 2021-01-04 RX ADMIN — FLUTICASONE PROPIONATE 50 MCG: 50 SPRAY, METERED NASAL at 08:01

## 2021-01-04 RX ADMIN — ALUMINUM HYDROXIDE, MAGNESIUM HYDROXIDE, AND SIMETHICONE 30 ML: 200; 200; 20 SUSPENSION ORAL at 02:01

## 2021-01-04 RX ADMIN — METOPROLOL SUCCINATE 100 MG: 100 TABLET, EXTENDED RELEASE ORAL at 08:01

## 2021-01-04 RX ADMIN — INSULIN ASPART 3 UNITS: 100 INJECTION, SOLUTION INTRAVENOUS; SUBCUTANEOUS at 12:01

## 2021-01-04 RX ADMIN — CLONIDINE HYDROCHLORIDE 0.4 MG: 0.1 TABLET ORAL at 09:01

## 2021-01-04 RX ADMIN — INSULIN DETEMIR 5 UNITS: 100 INJECTION, SOLUTION SUBCUTANEOUS at 08:01

## 2021-01-04 RX ADMIN — AMLODIPINE BESYLATE 10 MG: 10 TABLET ORAL at 08:01

## 2021-01-04 RX ADMIN — FAMOTIDINE 20 MG: 20 TABLET, FILM COATED ORAL at 08:01

## 2021-01-05 VITALS
OXYGEN SATURATION: 98 % | DIASTOLIC BLOOD PRESSURE: 89 MMHG | RESPIRATION RATE: 16 BRPM | TEMPERATURE: 99 F | WEIGHT: 179.88 LBS | SYSTOLIC BLOOD PRESSURE: 152 MMHG | HEIGHT: 66 IN | BODY MASS INDEX: 28.91 KG/M2 | HEART RATE: 80 BPM

## 2021-01-05 PROBLEM — F29 PSYCHOSIS: Status: ACTIVE | Noted: 2021-01-05

## 2021-01-05 PROBLEM — F06.8 PSYCHOSIS DUE TO INFECTION: Status: ACTIVE | Noted: 2021-01-05

## 2021-01-05 LAB
ALBUMIN SERPL BCP-MCNC: 2.4 G/DL (ref 3.5–5.2)
ALP SERPL-CCNC: 93 U/L (ref 55–135)
ALT SERPL W/O P-5'-P-CCNC: 21 U/L (ref 10–44)
ANION GAP SERPL CALC-SCNC: 13 MMOL/L (ref 8–16)
AST SERPL-CCNC: 25 U/L (ref 10–40)
BACTERIA SPEC ANAEROBE CULT: NORMAL
BASOPHILS # BLD AUTO: 0.02 K/UL (ref 0–0.2)
BASOPHILS NFR BLD: 0.3 % (ref 0–1.9)
BILIRUB SERPL-MCNC: 0.3 MG/DL (ref 0.1–1)
BUN SERPL-MCNC: 72 MG/DL (ref 6–20)
CALCIUM SERPL-MCNC: 9.6 MG/DL (ref 8.7–10.5)
CHLORIDE SERPL-SCNC: 103 MMOL/L (ref 95–110)
CO2 SERPL-SCNC: 24 MMOL/L (ref 23–29)
CREAT SERPL-MCNC: 4.8 MG/DL (ref 0.5–1.4)
DIFFERENTIAL METHOD: ABNORMAL
EOSINOPHIL # BLD AUTO: 0 K/UL (ref 0–0.5)
EOSINOPHIL NFR BLD: 0.3 % (ref 0–8)
ERYTHROCYTE [DISTWIDTH] IN BLOOD BY AUTOMATED COUNT: 13.8 % (ref 11.5–14.5)
EST. GFR  (AFRICAN AMERICAN): 14.4 ML/MIN/1.73 M^2
EST. GFR  (NON AFRICAN AMERICAN): 12.5 ML/MIN/1.73 M^2
GLUCOSE SERPL-MCNC: 192 MG/DL (ref 70–110)
HCT VFR BLD AUTO: 29.2 % (ref 40–54)
HGB BLD-MCNC: 9.5 G/DL (ref 14–18)
IMM GRANULOCYTES # BLD AUTO: 0.08 K/UL (ref 0–0.04)
IMM GRANULOCYTES NFR BLD AUTO: 1 % (ref 0–0.5)
LYMPHOCYTES # BLD AUTO: 1.6 K/UL (ref 1–4.8)
LYMPHOCYTES NFR BLD: 20.1 % (ref 18–48)
MCH RBC QN AUTO: 28 PG (ref 27–31)
MCHC RBC AUTO-ENTMCNC: 32.5 G/DL (ref 32–36)
MCV RBC AUTO: 86 FL (ref 82–98)
MONOCYTES # BLD AUTO: 1 K/UL (ref 0.3–1)
MONOCYTES NFR BLD: 13.4 % (ref 4–15)
NEUTROPHILS # BLD AUTO: 5 K/UL (ref 1.8–7.7)
NEUTROPHILS NFR BLD: 64.9 % (ref 38–73)
NRBC BLD-RTO: 0 /100 WBC
PLATELET # BLD AUTO: 243 K/UL (ref 150–350)
PMV BLD AUTO: 9.5 FL (ref 9.2–12.9)
POCT GLUCOSE: 139 MG/DL (ref 70–110)
POCT GLUCOSE: 141 MG/DL (ref 70–110)
POCT GLUCOSE: 259 MG/DL (ref 70–110)
POTASSIUM SERPL-SCNC: 3.6 MMOL/L (ref 3.5–5.1)
PROT SERPL-MCNC: 5.9 G/DL (ref 6–8.4)
RBC # BLD AUTO: 3.39 M/UL (ref 4.6–6.2)
SARS-COV-2 RNA RESP QL NAA+PROBE: NOT DETECTED
SODIUM SERPL-SCNC: 140 MMOL/L (ref 136–145)
WBC # BLD AUTO: 7.7 K/UL (ref 3.9–12.7)

## 2021-01-05 PROCEDURE — 93010 ELECTROCARDIOGRAM REPORT: CPT | Mod: NTX,,, | Performed by: INTERNAL MEDICINE

## 2021-01-05 PROCEDURE — 99233 PR SUBSEQUENT HOSPITAL CARE,LEVL III: ICD-10-PCS | Mod: NTX,,, | Performed by: PSYCHIATRY & NEUROLOGY

## 2021-01-05 PROCEDURE — 85025 COMPLETE CBC W/AUTO DIFF WBC: CPT | Mod: NTX

## 2021-01-05 PROCEDURE — 25000003 PHARM REV CODE 250: Mod: NTX | Performed by: STUDENT IN AN ORGANIZED HEALTH CARE EDUCATION/TRAINING PROGRAM

## 2021-01-05 PROCEDURE — 36415 COLL VENOUS BLD VENIPUNCTURE: CPT | Mod: NTX

## 2021-01-05 PROCEDURE — 25000003 PHARM REV CODE 250: Mod: NTX | Performed by: PSYCHIATRY & NEUROLOGY

## 2021-01-05 PROCEDURE — 63600175 PHARM REV CODE 636 W HCPCS: Mod: NTX | Performed by: STUDENT IN AN ORGANIZED HEALTH CARE EDUCATION/TRAINING PROGRAM

## 2021-01-05 PROCEDURE — 99239 PR HOSPITAL DISCHARGE DAY,>30 MIN: ICD-10-PCS | Mod: NTX,,, | Performed by: STUDENT IN AN ORGANIZED HEALTH CARE EDUCATION/TRAINING PROGRAM

## 2021-01-05 PROCEDURE — 93010 EKG 12-LEAD: ICD-10-PCS | Mod: NTX,,, | Performed by: INTERNAL MEDICINE

## 2021-01-05 PROCEDURE — 99239 HOSP IP/OBS DSCHRG MGMT >30: CPT | Mod: NTX,,, | Performed by: STUDENT IN AN ORGANIZED HEALTH CARE EDUCATION/TRAINING PROGRAM

## 2021-01-05 PROCEDURE — 93005 ELECTROCARDIOGRAM TRACING: CPT | Mod: NTX

## 2021-01-05 PROCEDURE — 99233 SBSQ HOSP IP/OBS HIGH 50: CPT | Mod: NTX,,, | Performed by: PSYCHIATRY & NEUROLOGY

## 2021-01-05 PROCEDURE — U0003 INFECTIOUS AGENT DETECTION BY NUCLEIC ACID (DNA OR RNA); SEVERE ACUTE RESPIRATORY SYNDROME CORONAVIRUS 2 (SARS-COV-2) (CORONAVIRUS DISEASE [COVID-19]), AMPLIFIED PROBE TECHNIQUE, MAKING USE OF HIGH THROUGHPUT TECHNOLOGIES AS DESCRIBED BY CMS-2020-01-R: HCPCS | Mod: NTX

## 2021-01-05 PROCEDURE — 80053 COMPREHEN METABOLIC PANEL: CPT | Mod: NTX

## 2021-01-05 RX ORDER — DOXYCYCLINE HYCLATE 100 MG
100 TABLET ORAL EVERY 12 HOURS
Qty: 16 TABLET | Refills: 0 | Status: SHIPPED | OUTPATIENT
Start: 2021-01-05 | End: 2021-01-05

## 2021-01-05 RX ORDER — IBUPROFEN 200 MG
24 TABLET ORAL
Status: DISCONTINUED | OUTPATIENT
Start: 2021-01-05 | End: 2021-01-05 | Stop reason: HOSPADM

## 2021-01-05 RX ORDER — ARIPIPRAZOLE 5 MG/1
20 TABLET ORAL DAILY
Status: DISCONTINUED | OUTPATIENT
Start: 2021-01-05 | End: 2021-01-05 | Stop reason: HOSPADM

## 2021-01-05 RX ORDER — GLUCAGON 1 MG
1 KIT INJECTION
Status: DISCONTINUED | OUTPATIENT
Start: 2021-01-05 | End: 2021-01-05 | Stop reason: HOSPADM

## 2021-01-05 RX ORDER — ARIPIPRAZOLE 20 MG/1
20 TABLET ORAL DAILY
Qty: 30 TABLET | Refills: 1 | Status: ON HOLD | OUTPATIENT
Start: 2021-01-06 | End: 2021-01-11 | Stop reason: HOSPADM

## 2021-01-05 RX ORDER — IBUPROFEN 200 MG
16 TABLET ORAL
Status: DISCONTINUED | OUTPATIENT
Start: 2021-01-05 | End: 2021-01-05 | Stop reason: HOSPADM

## 2021-01-05 RX ORDER — DOXYCYCLINE HYCLATE 100 MG
100 TABLET ORAL EVERY 12 HOURS
Qty: 12 TABLET | Refills: 0 | Status: ON HOLD | OUTPATIENT
Start: 2021-01-05 | End: 2021-01-11 | Stop reason: HOSPADM

## 2021-01-05 RX ORDER — INSULIN ASPART 100 [IU]/ML
1-10 INJECTION, SOLUTION INTRAVENOUS; SUBCUTANEOUS
Status: DISCONTINUED | OUTPATIENT
Start: 2021-01-05 | End: 2021-01-05 | Stop reason: HOSPADM

## 2021-01-05 RX ADMIN — DOXYCYCLINE HYCLATE 100 MG: 100 TABLET, COATED ORAL at 08:01

## 2021-01-05 RX ADMIN — INSULIN ASPART 3 UNITS: 100 INJECTION, SOLUTION INTRAVENOUS; SUBCUTANEOUS at 06:01

## 2021-01-05 RX ADMIN — INSULIN ASPART 3 UNITS: 100 INJECTION, SOLUTION INTRAVENOUS; SUBCUTANEOUS at 01:01

## 2021-01-05 RX ADMIN — INSULIN ASPART 4 UNITS: 100 INJECTION, SOLUTION INTRAVENOUS; SUBCUTANEOUS at 06:01

## 2021-01-05 RX ADMIN — CLONIDINE HYDROCHLORIDE 0.4 MG: 0.1 TABLET ORAL at 08:01

## 2021-01-05 RX ADMIN — ATORVASTATIN CALCIUM 20 MG: 20 TABLET, FILM COATED ORAL at 08:01

## 2021-01-05 RX ADMIN — INSULIN ASPART 3 UNITS: 100 INJECTION, SOLUTION INTRAVENOUS; SUBCUTANEOUS at 08:01

## 2021-01-05 RX ADMIN — FERROUS GLUCONATE TAB 324 MG (37.5 MG ELEMENTAL IRON) 324 MG: 324 (37.5 FE) TAB at 08:01

## 2021-01-05 RX ADMIN — METOPROLOL SUCCINATE 100 MG: 100 TABLET, EXTENDED RELEASE ORAL at 08:01

## 2021-01-05 RX ADMIN — CLONIDINE HYDROCHLORIDE 0.4 MG: 0.1 TABLET ORAL at 04:01

## 2021-01-05 RX ADMIN — DEXTROSE 10 MILLION UNITS: 5 SOLUTION INTRAVENOUS at 06:01

## 2021-01-05 RX ADMIN — HYDROCORTISONE 20 MG: 20 TABLET ORAL at 08:01

## 2021-01-05 RX ADMIN — AMLODIPINE BESYLATE 10 MG: 10 TABLET ORAL at 08:01

## 2021-01-05 RX ADMIN — ARIPIPRAZOLE 20 MG: 5 TABLET ORAL at 08:01

## 2021-01-05 RX ADMIN — INSULIN ASPART 6 UNITS: 100 INJECTION, SOLUTION INTRAVENOUS; SUBCUTANEOUS at 08:01

## 2021-01-05 RX ADMIN — FLUTICASONE PROPIONATE 50 MCG: 50 SPRAY, METERED NASAL at 09:01

## 2021-01-05 RX ADMIN — INSULIN DETEMIR 5 UNITS: 100 INJECTION, SOLUTION SUBCUTANEOUS at 08:01

## 2021-01-05 RX ADMIN — FAMOTIDINE 20 MG: 20 TABLET, FILM COATED ORAL at 08:01

## 2021-01-06 LAB — POCT GLUCOSE: 242 MG/DL (ref 70–110)

## 2021-01-08 ENCOUNTER — PATIENT MESSAGE (OUTPATIENT)
Dept: TRANSPLANT | Facility: CLINIC | Age: 56
End: 2021-01-08

## 2021-01-08 LAB — BACTERIA SPEC ANAEROBE CULT: NORMAL

## 2021-01-11 ENCOUNTER — TELEPHONE (OUTPATIENT)
Dept: INFECTIOUS DISEASES | Facility: CLINIC | Age: 56
End: 2021-01-11

## 2021-01-11 PROBLEM — F23 ACUTE PSYCHOSIS: Status: RESOLVED | Noted: 2020-12-26 | Resolved: 2021-01-11

## 2021-01-11 PROBLEM — R31.29 HEMATURIA, MICROSCOPIC: Status: ACTIVE | Noted: 2021-01-11

## 2021-01-11 PROBLEM — K40.20 BILATERAL INGUINAL HERNIA WITHOUT OBSTRUCTION OR GANGRENE: Status: ACTIVE | Noted: 2020-12-02

## 2021-01-11 PROBLEM — F29 PSYCHOSIS: Status: RESOLVED | Noted: 2021-01-05 | Resolved: 2021-01-11

## 2021-01-11 PROBLEM — L02.214 GROIN ABSCESS: Status: RESOLVED | Noted: 2020-12-29 | Resolved: 2021-01-11

## 2021-01-11 PROBLEM — D72.829 LEUKOCYTOSIS: Status: RESOLVED | Noted: 2018-09-18 | Resolved: 2021-01-11

## 2021-01-11 PROBLEM — K42.9 UMBILICAL HERNIA WITHOUT OBSTRUCTION AND WITHOUT GANGRENE: Status: ACTIVE | Noted: 2020-10-01

## 2021-01-11 PROBLEM — N40.0 BENIGN PROSTATIC HYPERPLASIA: Status: ACTIVE | Noted: 2020-10-01

## 2021-01-11 NOTE — PROGRESS NOTES
Transplant Recipient Adult Psychosocial Assessment    Enrique Martinez  [address]  [address]   [place] [address]  Telephone Information:   Mobile [phone]   Home  [phone] (home)  Work  There is no work phone number on file.  E-mail  [email or web address]    Sex: male  YOB: 1965  Age: 55 y.o.    Encounter Date: 12/10/2020  [place] Citizen: yes  Primary Language: English   Needed: no    Emergency Contact:  Atif Helm, 55 yo wife (), [place], does not drive/plans to use cab service, works full time at a law firm. [phone]    Family/Social Support:   Number of dependents/: pt denies  Marital history:  3 x, 2 x . Is legally  from his wife Atif but they still live together and their relationship is amicable.  Other family dynamics: Pt reports both parents are . Pt reports lives in [facility] with wife and they are legally . Pt reports having 2 grown children who live in [place] and will not be able to assist with organ transplant. Pt reports having siblings however some of the siblings live away and/or are not reliable to assist with transplant -- reports some siblings have drug addiction issues or other medical issues, such as CVA history. Pt denies having back up caregiver in place at this time. Pt reports plan to speak with [employer] friends about back up caregiver plan. Pt's wife Atif in pre transplant appointments today and reports will be patient's primary transplant caregiver.    Household Composition:  Atif Helm, 55 yo wife (), [place], does not drive/plans to use cab service, works full time at a law firm. [phone]    Do you and your caregivers have access to reliable transportation? yes Pt reports does drive and can drive to all pre transplant appointments and other medical appointments. Pt's listed primary transplant caregiver reports will be using cab service for post  transplant appointments and pt reports can afford.    PRIMARY CAREGIVER: Atif Helm, wife, will be primary caregiver, phone number [phone].      provided in-depth information to patient and caregiver regarding pre- and post-transplant caregiver role.   strongly encourages patient and caregiver to have concrete plan regarding post-transplant care giving, including back-up caregiver(s) to ensure care giving needs are met as needed.    Patient and Caregiver states understanding all aspects of caregiver role/commitment and is able/willing/committed to being caregiver to the fullest extent necessary.    Patient and Caregiver verbalizes understanding of the education provided today and caregiver responsibilities.         remains available. Patient and Caregiver agree to contact  in a timely manner if concerns arise.      Able to take time off work without financial concerns: yes. Wife reports plan to use PTO for time missed due to transplant. Pt reports plan to speak with [employer] friends regarding back up caregiver plan.    Additional Significant Others who will Assist with Transplant:   Pt denies having back up caregiver in place at this time. Pt reports plan to speak with [employer] friends about back up caregiver plan.    Living Will: no  Healthcare Power of : no  Advance Directives on file: <<no information> per medical record.  Verbally reviewed LW/HCPA information.   provided patient with copy of LW/HCPA documents and provided education on completion of forms.    Living Donors: Education and resource information given to patient.    Highest Education Level: Attended College/Technical School Pt reports attended college 3 years  Reading Ability: 12th grade  Reports difficulty with: seeing wears glasses  Learns Best By:  Pt denies having any problems learning new information. Pt seemed a bit anxious during session. Pt seemed unable at times to  immediately clearly understand information as provided and  or wife had to re-state it for his understanding at times. Pt seems to need plenty of time to review new information, seems to need ample time to assimilate new information and ideas and seems to need ample time to ask questions as needed.     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 Pt reports prior to [employer] he worked for [employer] but was fired. Pt reports provided his employer [employer] with medical documentation outlining reasons for missed work and/or problems with certain work activities and patient reports was fired days later with no explanation.     Spouse/Significant Other Employment: spouse works full time at a law firm. Wife and patient are legally  and wife is not financially responsible for patient's finances.    Disabled: no.     Monthly Income:  At most -- about $300 per week x 4 weeks is about $1200 a month before taxes. Pt reports amount changes due to demand and also due to ability to manage a schedule when sick.Pt reports does have money in savings account -- did not give amount in savings account.  Able to afford all costs now and if transplanted, including medications: yes pt reports plan to fund raise. Pt reports can afford [place] Medicaid medicine co-pays 50 cents to 3.50 co-pays.  Patient and Caregiver verbalizes understanding of personal responsibilities related to transplant costs and the importance of having a financial plan to ensure that patients transplant costs are fully covered.       provided fundraising information/education. Patient and Caregiververbalizes understanding.   remains available.    Insurance:   Payor/Plan Subscr  Sex Relation Sub. Ins. ID Effective Group Num   1. MEDICAID - [place]* MARK TEJADA 1965 Male   "[id number]* 6/1/17                                    [address]     Primary Insurance (for UNOS reporting): Public Insurance - Medicaid  Secondary Insurance (for UNOS reporting): None Pt is currently pre dialysis and not on Medicare. Pt was provided Medicare book for review.  Patient and Caregiver verbalizes clear understanding that patient may experience difficulty obtaining and/or be denied insurance coverage post-surgery. This includes and is not limited to disability insurance, life insurance, health insurance, burial insurance, long term care insurance, and other insurances.      Patient and Caregiver also reports understanding that future health concerns related to or unrelated to transplantation may not be covered by patient's insurance.  Resources and information provided and reviewed.     Patient and Caregiver provides verbal permission to release any necessary information to outside resources for patient care and discharge planning.  Resources and information provided are reviewed.      Dialysis Adherence: Pt is pre dialysis at this time. When BRE asked pt about going on dialysis he stated "Don't even mention that word to me. I'm not doing it. I have a donor". BRE attempted to ask patient further about resistance to dialysis and he stated "I monitor what I need to and I will if it's life or death but it,s not right now". BRE encouraged pt to speak with medical team about this.     Nephrologist: Wagner Valdez of [facility]. Patient has a history of compliance with nephrology .    Infusion Service: patient utilizing? no  Home Health: patient utilizing? no  DME: no  Pulmonary/Cardiac Rehab: pt denies   ADLS:  Independent with ADLs and medication management     Adherence:   Pt reports is highly compliant with all medical appointments and instructions  Adherence education and counseling provided.     Per History Section:  Past Medical History:   Diagnosis Date    Anemia     Coronary artery disease     Diabetes " mellitus, type 2     GERD (gastroesophageal reflux disease)     Gout     Hydrocele in adult     bilateral    Hyperlipidemia     Hypertension     Inguinal hernia     bilateral    Membranous glomerulonephritis     Nephrotic range proteinuria     Nephrotic syndrome     Obesity     Umbilical hernia      Social History     Tobacco Use    Smoking status: Current Some Day Smoker     Packs/day: 0.25     Years: 10.00     Pack years: 2.50    Smokeless tobacco: Never Used   Substance Use Topics    Alcohol use: No     Social History     Substance and Sexual Activity   Drug Use No     Social History     Substance and Sexual Activity   Sexual Activity Not on file       Per Today's Psychosocial:  Tobacco: none, patient denies any use at this time. Former smoker 1/4 ppd. Pt reports plan to abstain.  Alcohol: none, patient denies any use at this time. Pt reports plan to abstain.  Illicit Drugs/Non-prescribed Medications: none, patient denies any use.    Patient and Caregiver states clear understanding of the potential impact of substance use as it relates to transplant candidacy and is aware of possible random substance screening.  Substance abstinence/cessation counseling, education and resources provided and reviewed.     Arrests/DWI/Treatment/Rehab: patient denies    Psychiatric History:    Mental Health: pt denies any mental health history and denies any overwhelming feelings of depression or anxiety at this time. Patient does have a history of paranoia and has been cleared by psych. Patient claims it is due to some of his medications. Please see notes for details.  Patient was not paranoid with this writer today.   Psychiatrist/Counselor: pt denies  Medications:  Pt denies  Suicide/Homicide Issues: pt denies si/hi history at this time   Safety at home: pt reports living in safe environment at this time.    Knowledge: Patient and Caregiver states having clear understanding and realistic expectations regarding the  potential risks and potential benefits of organ transplantation and organ donation and agrees to discuss with health care team members and support system members, as well as to utilize available resources and express questions and/or concerns in order to further facilitate the pt informed decision-making.  Resources and information provided and reviewed.    Patient and Caregiver is aware of [facility]'s affiliation and/or partnership with agencies in home health care, LTAC, SNF, [facility], and other hospitals and clinics.    Understanding: Patient and Caregiver reports having a clear understanding of the many lifetime commitments involved with being a transplant recipient, including costs, compliance, medications, lab work, procedures, appointments, concrete and financial planning, preparedness, timely and appropriate communication of concerns, abstinence (ETOH, tobacco, illicit non-prescribed drugs), adherence to all health care team recommendations, support system and caregiver involvement, appropriate and timely resource utilization and follow-through, mental health counseling as needed/recommended, and patient and caregiver responsibilities.  Social Service Handbook, resources and detailed educational information provided and reviewed.  Educational information provided.    Patient and Caregiver also reports current and expected compliance with health care regime and states having a clear understanding of the importance of compliance.      Patient and Caregiver reports a clear understanding that risks and benefits may be involved with organ transplantation and with organ donation.       Patient and Caregiver also reports clear understanding that psychosocial risk factors may affect patient, and include but are not limited to feelings of depression, generalized anxiety, anxiety regarding dependence on others, post traumatic stress disorder, feelings of guilt and other emotional and/or mental concerns, and/or exacerbation  of existing mental health concerns.  Detailed resources provided and discussed.      Patient and Caregiver agrees to access appropriate resources in a timely manner as needed and/or as recommended, and to communicate concerns appropriately.  Patient and Caregiver also reports a clear understanding of treatment options available.     Patient and Caregiver received education in a group setting.   reviewed education, provided additional information, and answered questions.    Feelings or Concerns: Pt reports high motivation to pursue organ transplant.    Coping: Pt reports is coping well over all with ESRD and need for organ transplant. Pt reports keith best through exercise and nithin and prayer.    Goals: Return to work once transplanted and recovered.  Patient referred to Vocational Rehabilitation.    Interview Behavior: Patient and Caregiver presents as alert and oriented x 4, pleasant, good eye contact, well groomed, average intelligence, communicative, cooperative and asking and answering questions appropriately.        Transplant Social Work - Candidacy  Assessment/Plan:     Psychosocial Suitability: Patient presents as an acceptable candidate for kidney transplant at this time. Based on psychosocial risk factors, patient presents as high risk, due to patient reporting limited support system with no back up caregiver plan in place; pt reports plan to discuss back up caregiver plan with [employer] friends and will notify transplant team of outcome.    Recommendations/Additional Comments:   BRE recommends that pt conduct fundraising to assist pt with pay for cost of medication, food,gas, and other transplant related expenses. SW recommends that pt remain free of all tobacco,ETOH, and substance use. SW remains available to assist with any concerns that may arise as pt navigates through the transplant process.        Thais Hall, IRINA, LMSW

## 2021-01-14 ENCOUNTER — TELEPHONE (OUTPATIENT)
Dept: TRANSPLANT | Facility: CLINIC | Age: 56
End: 2021-01-14

## 2021-02-05 ENCOUNTER — TELEPHONE (OUTPATIENT)
Dept: TRANSPLANT | Facility: CLINIC | Age: 56
End: 2021-02-05

## 2021-02-12 ENCOUNTER — COMMITTEE REVIEW (OUTPATIENT)
Dept: TRANSPLANT | Facility: CLINIC | Age: 56
End: 2021-02-12

## 2021-02-12 DIAGNOSIS — Z76.82 ORGAN TRANSPLANT CANDIDATE: Primary | ICD-10-CM

## 2021-02-15 ENCOUNTER — TELEPHONE (OUTPATIENT)
Dept: TRANSPLANT | Facility: CLINIC | Age: 56
End: 2021-02-15

## 2021-02-22 ENCOUNTER — OFFICE VISIT (OUTPATIENT)
Dept: CARDIOLOGY | Facility: CLINIC | Age: 56
End: 2021-02-22
Payer: MEDICAID

## 2021-02-22 ENCOUNTER — HOSPITAL ENCOUNTER (OUTPATIENT)
Dept: RADIOLOGY | Facility: HOSPITAL | Age: 56
Discharge: HOME | End: 2021-02-22
Attending: NURSE PRACTITIONER
Payer: MEDICAID

## 2021-02-22 ENCOUNTER — OFFICE VISIT (OUTPATIENT)
Dept: INFECTIOUS DISEASES | Facility: CLINIC | Age: 56
End: 2021-02-22
Payer: MEDICAID

## 2021-02-22 ENCOUNTER — TELEPHONE (OUTPATIENT)
Dept: TRANSPLANT | Facility: CLINIC | Age: 56
End: 2021-02-22

## 2021-02-22 ENCOUNTER — CLINICAL SUPPORT (OUTPATIENT)
Dept: INFECTIOUS DISEASES | Facility: CLINIC | Age: 56
End: 2021-02-22
Payer: MEDICAID

## 2021-02-22 VITALS
BODY MASS INDEX: 28.49 KG/M2 | HEIGHT: 66 IN | WEIGHT: 177.25 LBS | SYSTOLIC BLOOD PRESSURE: 148 MMHG | HEART RATE: 75 BPM | DIASTOLIC BLOOD PRESSURE: 90 MMHG

## 2021-02-22 VITALS
DIASTOLIC BLOOD PRESSURE: 80 MMHG | WEIGHT: 178.38 LBS | HEART RATE: 82 BPM | HEIGHT: 66 IN | SYSTOLIC BLOOD PRESSURE: 133 MMHG | BODY MASS INDEX: 28.67 KG/M2 | TEMPERATURE: 98 F

## 2021-02-22 DIAGNOSIS — A63.0 GENITAL WARTS: ICD-10-CM

## 2021-02-22 DIAGNOSIS — Z11.3 SCREEN FOR STD (SEXUALLY TRANSMITTED DISEASE): ICD-10-CM

## 2021-02-22 DIAGNOSIS — Z23 NEED FOR HEPATITIS B VACCINATION: ICD-10-CM

## 2021-02-22 DIAGNOSIS — N05.2 MEMBRANOUS GLOMERULONEPHRITIS: ICD-10-CM

## 2021-02-22 DIAGNOSIS — Z76.82 ORGAN TRANSPLANT CANDIDATE: ICD-10-CM

## 2021-02-22 DIAGNOSIS — N18.5 CHRONIC KIDNEY DISEASE (CKD), ACTIVE MEDICAL MANAGEMENT WITHOUT DIALYSIS, STAGE 5: ICD-10-CM

## 2021-02-22 DIAGNOSIS — Z95.5 S/P CORONARY ARTERY STENT PLACEMENT: Primary | ICD-10-CM

## 2021-02-22 DIAGNOSIS — F06.8: ICD-10-CM

## 2021-02-22 DIAGNOSIS — Z76.82 ORGAN TRANSPLANT CANDIDATE: Primary | ICD-10-CM

## 2021-02-22 DIAGNOSIS — E11.22 TYPE 2 DIABETES MELLITUS WITH STAGE 5 CHRONIC KIDNEY DISEASE NOT ON CHRONIC DIALYSIS, WITHOUT LONG-TERM CURRENT USE OF INSULIN: ICD-10-CM

## 2021-02-22 DIAGNOSIS — I10 BENIGN ESSENTIAL HYPERTENSION: ICD-10-CM

## 2021-02-22 DIAGNOSIS — Z11.1 SCREENING EXAMINATION FOR PULMONARY TUBERCULOSIS: ICD-10-CM

## 2021-02-22 DIAGNOSIS — N18.5 TYPE 2 DIABETES MELLITUS WITH STAGE 5 CHRONIC KIDNEY DISEASE NOT ON CHRONIC DIALYSIS, WITHOUT LONG-TERM CURRENT USE OF INSULIN: ICD-10-CM

## 2021-02-22 DIAGNOSIS — Z71.85 VACCINE COUNSELING: ICD-10-CM

## 2021-02-22 DIAGNOSIS — Z23 NEED FOR VACCINATION AGAINST HEPATITIS A: ICD-10-CM

## 2021-02-22 DIAGNOSIS — E78.2 MIXED HYPERLIPIDEMIA: ICD-10-CM

## 2021-02-22 DIAGNOSIS — Z86.19 HX OF SYPHILIS: ICD-10-CM

## 2021-02-22 PROCEDURE — 90471 IMMUNIZATION ADMIN: CPT | Mod: PBBFAC,TXP

## 2021-02-22 PROCEDURE — 99999 PR PBB SHADOW E&M-EST. PATIENT-LVL IV: CPT | Mod: PBBFAC,TXP,, | Performed by: INTERNAL MEDICINE

## 2021-02-22 PROCEDURE — 99215 PR OFFICE/OUTPT VISIT, EST, LEVL V, 40-54 MIN: ICD-10-PCS | Mod: S$PBB,TXP,, | Performed by: INTERNAL MEDICINE

## 2021-02-22 PROCEDURE — 90632 HEPA VACCINE ADULT IM: CPT | Mod: PBBFAC,TXP

## 2021-02-22 PROCEDURE — 99214 OFFICE O/P EST MOD 30 MIN: CPT | Mod: PBBFAC,TXP,25 | Performed by: STUDENT IN AN ORGANIZED HEALTH CARE EDUCATION/TRAINING PROGRAM

## 2021-02-22 PROCEDURE — 93978 VASCULAR STUDY: CPT | Mod: 26,TXP,, | Performed by: RADIOLOGY

## 2021-02-22 PROCEDURE — 93978 VASCULAR STUDY: CPT | Mod: TC,TXP

## 2021-02-22 PROCEDURE — 99203 OFFICE O/P NEW LOW 30 MIN: CPT | Mod: S$PBB,TXP,, | Performed by: STUDENT IN AN ORGANIZED HEALTH CARE EDUCATION/TRAINING PROGRAM

## 2021-02-22 PROCEDURE — 99215 OFFICE O/P EST HI 40 MIN: CPT | Mod: S$PBB,TXP,, | Performed by: INTERNAL MEDICINE

## 2021-02-22 PROCEDURE — 99203 PR OFFICE/OUTPT VISIT, NEW, LEVL III, 30-44 MIN: ICD-10-PCS | Mod: S$PBB,TXP,, | Performed by: STUDENT IN AN ORGANIZED HEALTH CARE EDUCATION/TRAINING PROGRAM

## 2021-02-22 PROCEDURE — 99999 PR PBB SHADOW E&M-EST. PATIENT-LVL IV: CPT | Mod: PBBFAC,TXP,, | Performed by: STUDENT IN AN ORGANIZED HEALTH CARE EDUCATION/TRAINING PROGRAM

## 2021-02-22 PROCEDURE — 93978 US DOPP ILIACS BILATERAL: ICD-10-PCS | Mod: 26,TXP,, | Performed by: RADIOLOGY

## 2021-02-22 PROCEDURE — 99214 OFFICE O/P EST MOD 30 MIN: CPT | Mod: PBBFAC,25,27,TXP | Performed by: INTERNAL MEDICINE

## 2021-02-22 PROCEDURE — 99999 PR PBB SHADOW E&M-EST. PATIENT-LVL IV: ICD-10-PCS | Mod: PBBFAC,TXP,, | Performed by: INTERNAL MEDICINE

## 2021-02-22 PROCEDURE — 99999 PR PBB SHADOW E&M-EST. PATIENT-LVL IV: ICD-10-PCS | Mod: PBBFAC,TXP,, | Performed by: STUDENT IN AN ORGANIZED HEALTH CARE EDUCATION/TRAINING PROGRAM

## 2021-02-22 RX ORDER — ATORVASTATIN CALCIUM 80 MG/1
80 TABLET, FILM COATED ORAL DAILY
Qty: 90 TABLET | Refills: 3 | Status: SHIPPED | OUTPATIENT
Start: 2021-02-22 | End: 2022-10-03

## 2021-02-22 RX ORDER — ASPIRIN 81 MG/1
81 TABLET ORAL DAILY
Qty: 360 TABLET | Refills: 0 | Status: SHIPPED | OUTPATIENT
Start: 2021-02-22

## 2021-02-22 RX ORDER — TADALAFIL 2.5 MG/1
2.5 TABLET ORAL
COMMUNITY
Start: 2021-02-03

## 2021-02-22 RX ORDER — IMIQUIMOD 12.5 MG/.25G
CREAM TOPICAL
Qty: 24 PACKET | Refills: 0 | Status: SHIPPED | OUTPATIENT
Start: 2021-02-22 | End: 2021-06-22

## 2021-03-22 ENCOUNTER — TELEPHONE (OUTPATIENT)
Dept: INFECTIOUS DISEASES | Facility: CLINIC | Age: 56
End: 2021-03-22

## 2021-03-26 ENCOUNTER — CLINICAL SUPPORT (OUTPATIENT)
Dept: INFECTIOUS DISEASES | Facility: CLINIC | Age: 56
End: 2021-03-26
Payer: MEDICAID

## 2021-03-26 PROCEDURE — 90471 IMMUNIZATION ADMIN: CPT | Mod: PBBFAC,TXP

## 2021-04-16 ENCOUNTER — PATIENT MESSAGE (OUTPATIENT)
Dept: RESEARCH | Facility: HOSPITAL | Age: 56
End: 2021-04-16

## 2021-05-25 ENCOUNTER — TELEPHONE (OUTPATIENT)
Dept: TRANSPLANT | Facility: CLINIC | Age: 56
End: 2021-05-25

## 2021-06-11 ENCOUNTER — TELEPHONE (OUTPATIENT)
Dept: TRANSPLANT | Facility: CLINIC | Age: 56
End: 2021-06-11

## 2021-11-02 DIAGNOSIS — Z76.82 ORGAN TRANSPLANT CANDIDATE: Primary | ICD-10-CM

## 2021-11-03 ENCOUNTER — TELEPHONE (OUTPATIENT)
Dept: TRANSPLANT | Facility: CLINIC | Age: 56
End: 2021-11-03
Payer: MEDICAID

## 2022-01-12 DIAGNOSIS — Z76.82 ORGAN TRANSPLANT CANDIDATE: Primary | ICD-10-CM

## 2022-01-18 DIAGNOSIS — Z76.82 ORGAN TRANSPLANT CANDIDATE: Primary | ICD-10-CM

## 2022-01-18 NOTE — PROGRESS NOTES
Spoke with pt and informed him that he will need dental clr faxed to me. We will also need to schedule an ID follow-up for insurance auth. Pt verbalized understanding.

## 2022-01-19 DIAGNOSIS — Z76.82 ORGAN TRANSPLANT CANDIDATE: Primary | ICD-10-CM

## 2022-01-21 ENCOUNTER — HOSPITAL ENCOUNTER (OUTPATIENT)
Dept: RADIOLOGY | Facility: HOSPITAL | Age: 57
Discharge: HOME | End: 2022-01-21
Attending: NURSE PRACTITIONER
Payer: MEDICAID

## 2022-01-21 ENCOUNTER — HOSPITAL ENCOUNTER (OUTPATIENT)
Dept: CARDIOLOGY | Facility: HOSPITAL | Age: 57
Discharge: HOME | End: 2022-01-21
Attending: NURSE PRACTITIONER
Payer: MEDICAID

## 2022-01-21 ENCOUNTER — OFFICE VISIT (OUTPATIENT)
Dept: TRANSPLANT | Facility: CLINIC | Age: 57
End: 2022-01-21
Payer: MEDICAID

## 2022-01-21 VITALS
WEIGHT: 180.13 LBS | HEART RATE: 78 BPM | OXYGEN SATURATION: 97 % | SYSTOLIC BLOOD PRESSURE: 182 MMHG | DIASTOLIC BLOOD PRESSURE: 95 MMHG | HEIGHT: 66 IN | TEMPERATURE: 97 F | BODY MASS INDEX: 28.95 KG/M2 | RESPIRATION RATE: 18 BRPM

## 2022-01-21 VITALS
SYSTOLIC BLOOD PRESSURE: 182 MMHG | HEIGHT: 66 IN | WEIGHT: 180 LBS | BODY MASS INDEX: 28.93 KG/M2 | HEART RATE: 85 BPM | DIASTOLIC BLOOD PRESSURE: 95 MMHG

## 2022-01-21 DIAGNOSIS — R80.9 PROTEINURIA, UNSPECIFIED TYPE: ICD-10-CM

## 2022-01-21 DIAGNOSIS — I15.0 RENOVASCULAR HYPERTENSION: ICD-10-CM

## 2022-01-21 DIAGNOSIS — N18.5 ANEMIA DUE TO STAGE 5 CHRONIC KIDNEY DISEASE, NOT ON CHRONIC DIALYSIS: ICD-10-CM

## 2022-01-21 DIAGNOSIS — Z76.82 ORGAN TRANSPLANT CANDIDATE: ICD-10-CM

## 2022-01-21 DIAGNOSIS — Z76.82 PATIENT ON WAITING LIST FOR KIDNEY TRANSPLANT: Primary | ICD-10-CM

## 2022-01-21 DIAGNOSIS — N18.5 TYPE 2 DIABETES MELLITUS WITH STAGE 5 CHRONIC KIDNEY DISEASE NOT ON CHRONIC DIALYSIS, UNSPECIFIED WHETHER LONG TERM INSULIN USE: ICD-10-CM

## 2022-01-21 DIAGNOSIS — N05.2 MEMBRANOUS GLOMERULONEPHRITIS: ICD-10-CM

## 2022-01-21 DIAGNOSIS — E11.22 TYPE 2 DIABETES MELLITUS WITH STAGE 5 CHRONIC KIDNEY DISEASE NOT ON CHRONIC DIALYSIS, UNSPECIFIED WHETHER LONG TERM INSULIN USE: ICD-10-CM

## 2022-01-21 DIAGNOSIS — D63.1 ANEMIA DUE TO STAGE 5 CHRONIC KIDNEY DISEASE, NOT ON CHRONIC DIALYSIS: ICD-10-CM

## 2022-01-21 DIAGNOSIS — N18.5 CHRONIC KIDNEY DISEASE (CKD), ACTIVE MEDICAL MANAGEMENT WITHOUT DIALYSIS, STAGE 5: ICD-10-CM

## 2022-01-21 LAB
AV INDEX (PROSTH): 0.85
AV MEAN GRADIENT: 6 MMHG
AV PEAK GRADIENT: 12 MMHG
AV VALVE AREA: 3.25 CM2
AV VELOCITY RATIO: 0.78
BSA FOR ECHO PROCEDURE: 1.95 M2
CV ECHO LV RWT: 0.37 CM
DOP CALC AO PEAK VEL: 1.71 M/S
DOP CALC AO VTI: 28.7 CM
DOP CALC LVOT AREA: 3.8 CM2
DOP CALC LVOT DIAMETER: 2.21 CM
DOP CALC LVOT PEAK VEL: 1.33 M/S
DOP CALC LVOT STROKE VOLUME: 93.17 CM3
DOP CALCLVOT PEAK VEL VTI: 24.3 CM
E WAVE DECELERATION TIME: 244.64 MSEC
E/A RATIO: 0.59
E/E' RATIO: 11.38 M/S
ECHO LV POSTERIOR WALL: 1.1 CM (ref 0.6–1.1)
EJECTION FRACTION: 65 %
FRACTIONAL SHORTENING: 41 % (ref 28–44)
INTERVENTRICULAR SEPTUM: 0.93 CM (ref 0.6–1.1)
IVRT: 185.54 MSEC
LA MAJOR: 4.77 CM
LA MINOR: 4.72 CM
LA WIDTH: 4.17 CM
LEFT ATRIUM SIZE: 4.16 CM
LEFT ATRIUM VOLUME INDEX MOD: 27.6 ML/M2
LEFT ATRIUM VOLUME INDEX: 36.6 ML/M2
LEFT ATRIUM VOLUME MOD: 52.72 CM3
LEFT ATRIUM VOLUME: 69.96 CM3
LEFT INTERNAL DIMENSION IN SYSTOLE: 3.47 CM (ref 2.1–4)
LEFT VENTRICLE DIASTOLIC VOLUME INDEX: 90.55 ML/M2
LEFT VENTRICLE DIASTOLIC VOLUME: 172.96 ML
LEFT VENTRICLE MASS INDEX: 128 G/M2
LEFT VENTRICLE SYSTOLIC VOLUME INDEX: 26.1 ML/M2
LEFT VENTRICLE SYSTOLIC VOLUME: 49.94 ML
LEFT VENTRICULAR INTERNAL DIMENSION IN DIASTOLE: 5.9 CM (ref 3.5–6)
LEFT VENTRICULAR MASS: 244.62 G
LV LATERAL E/E' RATIO: 9.25 M/S
LV SEPTAL E/E' RATIO: 14.8 M/S
MV A" WAVE DURATION": 10.56 MSEC
MV PEAK A VEL: 1.25 M/S
MV PEAK E VEL: 0.74 M/S
MV STENOSIS PRESSURE HALF TIME: 70.95 MS
MV VALVE AREA P 1/2 METHOD: 3.1 CM2
PISA TR MAX VEL: 2.31 M/S
PULM VEIN S/D RATIO: 2.04
PV PEAK D VEL: 0.27 M/S
PV PEAK S VEL: 0.55 M/S
RA MAJOR: 4.94 CM
RA PRESSURE: 3 MMHG
RA WIDTH: 3.12 CM
RIGHT VENTRICULAR END-DIASTOLIC DIMENSION: 3.94 CM
RV TISSUE DOPPLER FREE WALL SYSTOLIC VELOCITY 1 (APICAL 4 CHAMBER VIEW): 17.16 CM/S
SINUS: 3.55 CM
STJ: 3.26 CM
TDI LATERAL: 0.08 M/S
TDI SEPTAL: 0.05 M/S
TDI: 0.07 M/S
TR MAX PG: 21 MMHG
TRICUSPID ANNULAR PLANE SYSTOLIC EXCURSION: 2.36 CM
TV REST PULMONARY ARTERY PRESSURE: 24 MMHG

## 2022-01-21 PROCEDURE — 99215 PR OFFICE/OUTPT VISIT, EST, LEVL V, 40-54 MIN: ICD-10-PCS | Mod: S$PBB,TXP,, | Performed by: NURSE PRACTITIONER

## 2022-01-21 PROCEDURE — 3008F BODY MASS INDEX DOCD: CPT | Mod: CPTII,TXP,, | Performed by: NURSE PRACTITIONER

## 2022-01-21 PROCEDURE — 72170 X-RAY EXAM OF PELVIS: CPT | Mod: 26,TXP,, | Performed by: RADIOLOGY

## 2022-01-21 PROCEDURE — 93306 TTE W/DOPPLER COMPLETE: CPT | Mod: TXP

## 2022-01-21 PROCEDURE — 71046 X-RAY EXAM CHEST 2 VIEWS: CPT | Mod: TC,TXP

## 2022-01-21 PROCEDURE — 99999 PR PBB SHADOW E&M-EST. PATIENT-LVL IV: CPT | Mod: PBBFAC,TXP,, | Performed by: NURSE PRACTITIONER

## 2022-01-21 PROCEDURE — 99999 PR PBB SHADOW E&M-EST. PATIENT-LVL IV: ICD-10-PCS | Mod: PBBFAC,TXP,, | Performed by: NURSE PRACTITIONER

## 2022-01-21 PROCEDURE — 3080F PR MOST RECENT DIASTOLIC BLOOD PRESSURE >= 90 MM HG: ICD-10-PCS | Mod: CPTII,TXP,, | Performed by: NURSE PRACTITIONER

## 2022-01-21 PROCEDURE — 99215 OFFICE O/P EST HI 40 MIN: CPT | Mod: S$PBB,TXP,, | Performed by: NURSE PRACTITIONER

## 2022-01-21 PROCEDURE — 1159F MED LIST DOCD IN RCRD: CPT | Mod: CPTII,TXP,, | Performed by: NURSE PRACTITIONER

## 2022-01-21 PROCEDURE — 1160F RVW MEDS BY RX/DR IN RCRD: CPT | Mod: CPTII,TXP,, | Performed by: NURSE PRACTITIONER

## 2022-01-21 PROCEDURE — 71046 X-RAY EXAM CHEST 2 VIEWS: CPT | Mod: 26,TXP,, | Performed by: RADIOLOGY

## 2022-01-21 PROCEDURE — 76770 US EXAM ABDO BACK WALL COMP: CPT | Mod: TC,TXP

## 2022-01-21 PROCEDURE — 3008F PR BODY MASS INDEX (BMI) DOCUMENTED: ICD-10-PCS | Mod: CPTII,TXP,, | Performed by: NURSE PRACTITIONER

## 2022-01-21 PROCEDURE — 76770 US EXAM ABDO BACK WALL COMP: CPT | Mod: 26,TXP,, | Performed by: RADIOLOGY

## 2022-01-21 PROCEDURE — 3080F DIAST BP >= 90 MM HG: CPT | Mod: CPTII,TXP,, | Performed by: NURSE PRACTITIONER

## 2022-01-21 PROCEDURE — 3077F PR MOST RECENT SYSTOLIC BLOOD PRESSURE >= 140 MM HG: ICD-10-PCS | Mod: CPTII,TXP,, | Performed by: NURSE PRACTITIONER

## 2022-01-21 PROCEDURE — 1159F PR MEDICATION LIST DOCUMENTED IN MEDICAL RECORD: ICD-10-PCS | Mod: CPTII,TXP,, | Performed by: NURSE PRACTITIONER

## 2022-01-21 PROCEDURE — 3051F HG A1C>EQUAL 7.0%<8.0%: CPT | Mod: CPTII,TXP,, | Performed by: NURSE PRACTITIONER

## 2022-01-21 PROCEDURE — 72170 XR PELVIS ROUTINE AP: ICD-10-PCS | Mod: 26,TXP,, | Performed by: RADIOLOGY

## 2022-01-21 PROCEDURE — 93306 TTE W/DOPPLER COMPLETE: CPT | Mod: 26,TXP,, | Performed by: INTERNAL MEDICINE

## 2022-01-21 PROCEDURE — 99214 OFFICE O/P EST MOD 30 MIN: CPT | Mod: PBBFAC,25,TXP | Performed by: NURSE PRACTITIONER

## 2022-01-21 PROCEDURE — 93306 ECHO (CUPID ONLY): ICD-10-PCS | Mod: 26,TXP,, | Performed by: INTERNAL MEDICINE

## 2022-01-21 PROCEDURE — 71046 XR CHEST PA AND LATERAL: ICD-10-PCS | Mod: 26,TXP,, | Performed by: RADIOLOGY

## 2022-01-21 PROCEDURE — 3077F SYST BP >= 140 MM HG: CPT | Mod: CPTII,TXP,, | Performed by: NURSE PRACTITIONER

## 2022-01-21 PROCEDURE — 1160F PR REVIEW ALL MEDS BY PRESCRIBER/CLIN PHARMACIST DOCUMENTED: ICD-10-PCS | Mod: CPTII,TXP,, | Performed by: NURSE PRACTITIONER

## 2022-01-21 PROCEDURE — 72170 X-RAY EXAM OF PELVIS: CPT | Mod: TC,TXP

## 2022-01-21 PROCEDURE — 3051F PR MOST RECENT HEMOGLOBIN A1C LEVEL 7.0 - < 8.0%: ICD-10-PCS | Mod: CPTII,TXP,, | Performed by: NURSE PRACTITIONER

## 2022-01-21 PROCEDURE — 76770 US RETROPERITONEAL COMPLETE: ICD-10-PCS | Mod: 26,TXP,, | Performed by: RADIOLOGY

## 2022-01-21 NOTE — PROGRESS NOTES
Transplant Recipient Adult Psychosocial Assessment (updated from 12/10/2020)     Enrique Martinez  [address]  [address]   [place] [address]  Telephone Information:   Mobile [phone]   Home  [phone] (home)  Work  There is no work phone number on file.  E-mail  [email or web address]    Sex: male  YOB: 1965  Age: 56 y.o.    Encounter Date: 2022  [place] Citizen: yes  Primary Language: English   Needed: no    Emergency Contact:  Atif Helm, 56 yo wife (legally ), Bashir [place]don[place], does not drive/uses cab service, works full time at a law firm. [phone]    Family/Social Support:   Number of dependents/: pt denies  Marital history:  3 x, 2 x . Pt reported he is legally  from his wife of 15 yrs (Atif).  They do not live together per pt.   Other family dynamics: Pt reports both parents are . Pt reports lives alone.  Pt reports having 2 grown children who live in [place] and will not be able to assist with organ transplant. Pt reports having siblings, and some of the siblings live away and/or are not reliable to assist with transplant.    Pt reported that his wife (even though they are ) will be his primary caregiver post transplant.   Pt reports plan to speak with [employer] friends about back up caregiver plan.   At this time, pt has no other potential caregivers.  Pt's wife is not present today.    Household Composition:  Pt lives alone.    Do you and your caregivers have access to reliable transportation? yes Pt reports does drive and can drive to all pre transplant appointments and other medical appointments. Pt's listed primary transplant caregiver reported at prior visit that she will be using cab service for post transplant appointments and pt reports can afford.    PRIMARY CAREGIVER: Atif Helm, wife, will be primary caregiver, phone number [phone].  Pt reported that they are , but he  feels that she will still provide assistance.  SW attempted to call his wife in clinic, and had to leave message.  Waiting on call back.      provided in-depth information to patient and caregiver regarding pre- and post-transplant caregiver role.   strongly encourages patient and caregiver to have concrete plan regarding post-transplant care giving, including back-up caregiver(s) to ensure care giving needs are met as needed.    Patient states understanding all aspects of caregiver role/commitment and is able/willing/committed to being caregiver to the fullest extent necessary.    Patient verbalizes understanding of the education provided today and caregiver responsibilities.         remains available. Patient agree to contact  in a timely manner if concerns arise.      Able to take time off work without financial concerns: yes. Wife reported at last visit that she plans to use PTO for time missed due to transplant. Pt reports plan to speak with L[employer] day friends regarding back up caregiver plan.    Additional Significant Others who will Assist with Transplant:   Pt denies having back up caregiver in place at this time. Pt reports plan to speak with L[employer] day friends about back up caregiver plan.    Living Will: no  Healthcare Power of : no  Advance Directives on file: <<no information> per medical record.  Verbally reviewed LW/HCPA information.   provided patient with copy of LW/HCPA documents and provided education on completion of forms.    Living Donors: Education and resource information given to patient.    Highest Education Level: Attended College/Technical School Pt reports attended college for 3 years.  Reading Ability: 12th grade  Reports difficulty with: seeing wears glasses  Learns Best By:  Pt denies having any problems learning new information. Pt learns best by hands on instruction.    Pt reported that he was a bit anxious earlier  when he learned he had lots of tests today.  Pt seemed to calm down once educated on transplant process.      Status: no  VA Benefits: no     Working for Income: No  If yes, working activity level: Pt reported that he quit his part time job last year.   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     Spouse/Significant Other Employment: spouse works full time at a law firm.   Wife and patient are legally  and wife is not financially responsible for patient's finances per last assessment.    Disabled: no. (Pending disability- application done 5 months ago.)    Monthly Income: None  Pt reported that he is supporting himself with his savings.  Pt also reported that he uses services of different charities to help pay his living expenses.    Able to afford all costs now and if transplanted, including medications: yes pt reports he plans to fund raise.  Pt stated that he does not have any co-pays with his medicaid.     Patient verbalizes understanding of personal responsibilities related to transplant costs and the importance of having a financial plan to ensure that patients transplant costs are fully covered.       provided fundraising information/education. Patient verbalizes understanding.   remains available.    Insurance:     Payer/Plan Subscr  Sex Relation Sub. Ins. ID Effective Group Num   1. [place] - [place]* MARK TEJADA L 1965 Male Self [id number]* [id number]                                   [address]       Primary Insurance (for UNOS reporting): Public Insurance - Medicaid  Secondary Insurance (for UNOS reporting): None Pt is currently pre dialysis and not on Medicare.     Patient verbalizes clear understanding that patient may experience difficulty obtaining and/or be denied insurance coverage post-surgery. This includes and is not limited to disability insurance, life insurance, health insurance, burial insurance, long term care insurance,  and other insurances.      Patient also reports understanding that future health concerns related to or unrelated to transplantation may not be covered by patient's insurance.  Resources and information provided and reviewed.     Patient provides verbal permission to release any necessary information to outside resources for patient care and discharge planning.  Resources and information provided are reviewed.      Dialysis Adherence: Pt is pre dialysis at this time.  Adherence letter sent to pt's nephrologist.     Nephrologist: [facility]rick of [facility] per pt.     Infusion Service: patient utilizing? no  Home Health: patient utilizing? no  DME: no  Pulmonary/Cardiac Rehab: pt denies   ADLS:  Independent with ADLs and medication management     Adherence:   Pt reports is highly compliant with all medical appointments and instructions  Adherence education and counseling provided.     Per History Section:  Past Medical History:   Diagnosis Date    Anemia     Coronary artery disease     Diabetes mellitus, type 2     GERD (gastroesophageal reflux disease)     Gout     Hydrocele in adult     bilateral    Hyperlipidemia     Hypertension     Inguinal hernia     bilateral    Membranous glomerulonephritis     Nephrotic range proteinuria     Nephrotic syndrome     Obesity     Umbilical hernia      Social History     Tobacco Use    Smoking status: Current Some Day Smoker     Packs/day: 0.25     Years: 10.00     Pack years: 2.50    Smokeless tobacco: Never Used   Substance Use Topics    Alcohol use: No     Social History     Substance and Sexual Activity   Drug Use No     Social History     Substance and Sexual Activity   Sexual Activity Not on file       Per Today's Psychosocial:  Tobacco: none, patient denies any use at this time. Former smoker 1/4 ppd. Pt reports plan to abstain.  Alcohol: none, patient denies any use at this time. Pt reports plan to abstain.  Illicit Drugs/Non-prescribed Medications:  "none, patient denies any use.    Patient states clear understanding of the potential impact of substance use as it relates to transplant candidacy and is aware of possible random substance screening.  Substance abstinence/cessation counseling, education and resources provided and reviewed.     Arrests/DWI/Treatment/Rehab: patient denies    Psychiatric History:    Mental Health: pt denies any mental health history and denies any overwhelming feelings of depression or anxiety at this time. Noted that patient does have a history of paranoia and has been cleared by psych. Patient reported he had an argument with his neighbor last year, brought to ER, and found to have a medical problem which caused "fluid to leak in his brain" and needed medications to treat it. Pt reported this is resolved.   Psychiatrist/Counselor: pt denies  Medications:  Pt denies  Suicide/Homicide Issues: pt denies si/hi history at this time   Safety at home: pt reports living in safe environment at this time.    Knowledge: Patient states having clear understanding and realistic expectations regarding the potential risks and potential benefits of organ transplantation and organ donation and agrees to discuss with health care team members and support system members, as well as to utilize available resources and express questions and/or concerns in order to further facilitate the pt informed decision-making.  Resources and information provided and reviewed.    Patient is aware of [facility]'s affiliation and/or partnership with agencies in home health care, LTAC, SNF, [facility], and other hospitals and clinics.    Understanding: Patient reports having a clear understanding of the many lifetime commitments involved with being a transplant recipient, including costs, compliance, medications, lab work, procedures, appointments, concrete and financial planning, preparedness, timely and appropriate communication of concerns, abstinence (ETOH, tobacco, illicit " non-prescribed drugs), adherence to all health care team recommendations, support system and caregiver involvement, appropriate and timely resource utilization and follow-through, mental health counseling as needed/recommended, and patient and caregiver responsibilities.  Social Service Handbook, resources and detailed educational information provided and reviewed.  Educational information provided.    Patient also reports current and expected compliance with health care regime and states having a clear understanding of the importance of compliance.      Patient reports a clear understanding that risks and benefits may be involved with organ transplantation and with organ donation.       Patient also reports clear understanding that psychosocial risk factors may affect patient, and include but are not limited to feelings of depression, generalized anxiety, anxiety regarding dependence on others, post traumatic stress disorder, feelings of guilt and other emotional and/or mental concerns, and/or exacerbation of existing mental health concerns.  Detailed resources provided and discussed.      Patient agrees to access appropriate resources in a timely manner as needed and/or as recommended, and to communicate concerns appropriately.  Patient also reports a clear understanding of treatment options available.     Patient received education in a group setting.   reviewed education, provided additional information, and answered questions.    Feelings or Concerns: Pt reports high motivation to pursue organ transplant.  Pt denies any overwhelming concerns at this time.    Coping:   Currently and Pre-Transplant:  Pt reports is coping well over all with need for organ transplant. Pt reports keith best through nithin and prayer. Pt reported he also enjoys driving/sightseeing, reading, games phone, and listening to the news/politics.  At time of surgery: prayer, reading, games on phone, TV  Post surgery and Recovery  Period: prayer, games on phone, TV, reading, and resume normal activities.     Goals: Pt reported that he wants to avoid dialysis.  Pt also plans to return to work once transplanted and recovered.  Patient referred to Vocational Rehabilitation.    Interview Behavior: Patient presents as alert and oriented x 4, pleasant, good eye contact, well groomed, average intelligence, communicative, cooperative and asking and answering questions appropriately.   Pt presented to clinic with no caregiver.       Transplant Social Work - Candidacy  Assessment/Plan:     Psychosocial Suitability: Based on psychosocial risk factors, patient presents as high risk, due to patient reporting limited support system with no back up caregiver plan in place; pt reports plan to discuss back up caregiver plan with [employer] friends.  Pt also is  from his wife (whom he has identified as his primary caregiver), and SW has not been able to get in touch with her today.  Pt has no income at this time (pending disability), and supports himself with savings and assistance from local charities.     Recommendations/Additional Comments:   Pt needs to establish adequate financial plan, confirm suitable caregiver, and have adequate transportation plan prior to having transplant.   SW recommends that pt conduct fundraising to assist pt with pay for cost of medications, food, gas, and other transplant related needs.  SW recommends that pt remain aware of potential mental health concerns and contact the team if any concerns arise.  SW recommends that pt remain abstinent from tobacco, ETOH, and drug use.  SW supports pt's continued adherence. SW remains available to answer any questions or concerns that arise as the pt moves through the transplant process.     Maria Antonia Herrera, [facility]

## 2022-01-21 NOTE — PROGRESS NOTES
Kidney Transplant Recipient Reevalulation    Referring Physician: Wagner Valdez  Current Nephrologist: Justice Lee  Waitlist Status: active  Dialysis Start Date: (Not currently on dialysis)    Subjective:     CC:  Annual reassessment of kidney transplant candidacy.    HPI:  Mr. Martinez is a 56 y.o. year old Black or  male with advanced kidney disease secondary to diabetic nephropathy.  He has been on the wait list for a kidney transplant at [facility] since 11/22/2019. Patient is currently pre-dialysis. He has a no access. Patient denies any recent hospitalizations or ED visits.       Care every where 11/3/2021--currently CKD V       PSA, Screen (ng/mL)   Date Value   01/21/2022 2.1          Bilateral inguinal hernias and umbilical hernia--  Reports not repaired yet      Left hydrocele-- s/p hydrocelectomy 6/25/21 with [facility]rology/[facility].    Lab /diagnostic results reviewed with patient today.    1/21/2022 CXR:No active cardiac or pulmonary findings.    1/22/2022 PXR: No acute bony abnormalities.  As before, some scattered pelvic and medial thigh vascular calcifications related to the distal aorta and iliofemoral vasculature.  Some additional subcentimeter rounded pelvic densities should relate to phleboliths.    1/21/2022 Renal US:  chronic medical renal disease. Bilateral simple and minimally complex renal cysts.  1/21/2022 TTE: pending       Past Medical History:   Diagnosis Date    Anemia     Coronary artery disease     Diabetes mellitus, type 2     GERD (gastroesophageal reflux disease)     Gout     Hydrocele in adult     bilateral    Hyperlipidemia     Hypertension     Inguinal hernia     bilateral    Membranous glomerulonephritis     Nephrotic range proteinuria     Nephrotic syndrome     Obesity     Umbilical hernia        Review of Systems   Constitutional: Negative for activity change, appetite change, chills, fatigue, fever and unexpected weight change.   HENT: Negative for  "congestion, facial swelling, postnasal drip, rhinorrhea, sinus pressure, sore throat and trouble swallowing.    Eyes: Positive for visual disturbance. Negative for pain and redness.        Wears glasses   Respiratory: Negative for cough, chest tightness, shortness of breath and wheezing.    Cardiovascular: Negative.  Negative for chest pain, palpitations and leg swelling.        HX CAD, s/p stents   Gastrointestinal: Positive for abdominal distention. Negative for abdominal pain, diarrhea, nausea and vomiting.        Umbilical hernia   Indigestion    Genitourinary: Negative for dysuria, flank pain and urgency.   Musculoskeletal: Positive for arthralgias. Negative for gait problem, neck pain and neck stiffness.   Skin: Negative for rash.   Allergic/Immunologic: Negative for environmental allergies, food allergies and immunocompromised state.   Neurological: Negative for dizziness, weakness, light-headedness and headaches.   Psychiatric/Behavioral: Positive for sleep disturbance. Negative for agitation and confusion. The patient is not nervous/anxious.        Objective:   body mass index is 29.07 kg/m².  BP (!) 182/95 (BP Location: Right arm, Patient Position: Sitting, BP Method: Medium (Automatic))   Pulse 78   Temp 97.3 °F (36.3 °C) (Tympanic)   Resp 18   Ht 5' 6" (1.676 m)   Wt 81.7 kg (180 lb 1.9 oz)   SpO2 97%   BMI 29.07 kg/m²     Physical Exam  Vitals reviewed.   Constitutional:       Appearance: Normal appearance. He is well-developed.   HENT:      Head: Normocephalic.   Eyes:      Pupils: Pupils are equal, round, and reactive to light.   Cardiovascular:      Rate and Rhythm: Normal rate and regular rhythm.      Heart sounds: Normal heart sounds.   Pulmonary:      Effort: Pulmonary effort is normal.      Breath sounds: Normal breath sounds.   Abdominal:      General: Bowel sounds are normal. There is distension.      Palpations: Abdomen is soft.       Musculoskeletal:         General: Normal range of " motion.      Cervical back: Normal range of motion and neck supple.      Right lower leg: Edema present.      Left lower leg: Edema present.      Comments: Bilateral trace peripheral edema   Skin:     General: Skin is warm and dry.   Neurological:      Mental Status: He is alert and oriented to person, place, and time.      Motor: No abnormal muscle tone.   Psychiatric:         Behavior: Behavior normal.         Labs:  Lab Results   Component Value Date    WBC 7.88 01/21/2022    HGB 10.2 (L) 01/21/2022    HCT 30.3 (L) 01/21/2022     01/11/2021    K 4.3 01/11/2021     01/11/2021    CO2 22 (L) 01/11/2021    BUN 66 (H) 01/11/2021    CREATININE 4.50 (H) 01/11/2021    EGFRNONAA 13.5 (A) 01/11/2021    CALCIUM 9.9 01/11/2021    PHOS 3.6 01/08/2021    MG 1.5 (L) 01/11/2021    ALBUMIN 3.5 01/11/2021    AST 43 01/11/2021    ALT 29 01/11/2021    UTPCR 1.11 (H) 12/26/2020    .4 (H) 01/21/2022       Lab Results   Component Value Date    BILIRUBINUA Negative 12/26/2020    PROTEINUA 2+ (A) 12/26/2020    NITRITE Negative 12/26/2020    RBCUA 1 12/26/2020    WBCUA 1 12/26/2020       No results found for: HLAABCTYPE    Lab Results   Component Value Date    CPRA 0 04/03/2020    II2RPPB Negative 04/03/2020    CIABCLM WEAK A69 09/26/2019    CIIAB Negative 04/03/2020       Labs were reviewed with the patient.    Pre-transplant Workup:   Reviewed with the patient.    Assessment:     1. Patient on waiting list for kidney transplant    2. Membranous glomerulonephritis    3. Chronic kidney disease (CKD), active medical management without dialysis, stage 5    4. Type 2 diabetes mellitus with stage 5 chronic kidney disease not on chronic dialysis, unspecified whether long term insulin use    5. Proteinuria, unspecified type    6. Renovascular hypertension    7. Anemia due to stage 5 chronic kidney disease, not on chronic dialysis        Plan:   TTE  pending       Transplant Candidacy:   Mr. Martinez is a suitable kidney  transplant candidate.  Meets center eligibility for accepting HCV+ donor offer - yes.  Patient educated on HCV+ donors. Enrique is willing  to accept HCV+ donor offer -   previously did NOT have Hep C Rx coverage  --will need to verify   Patient is a candidate for KDPI > 85 kidney donor offer - yes    He remains in overall stable health, and will remain active on the transplant list.    Stephie Harmon NP       Follow-up:   In addition to the tests noted in the plan, Mr. Martinez will continue to have reevaluation as per the standing pre-kidney transplant protocol:  1. Monthly blood for PRA  2. Annual return to clinic, except HIV positive, > 65 years of age, or pancreas transplant candidates who will be scheduled to see transplant every 6 months while in pre-transplant phase  3. Annual re-testing: CXR, EKG, yearly mammograms for women over 40 and PSA for males over 40, cardiology follow-up as recommended by initial cardiology pre-transplant evaluation  4. Renal ultrasound every 2 years  5. Baseline colonoscopy after age 50 and repeated as recommended    UNOS Patient Status  Functional Status: 80% - Normal activity with effort: some symptoms of disease  Physical Capacity: No Limitations

## 2022-01-21 NOTE — LETTER
Date: 1/21/2022          Listed Patient      To: Nephrologist / Nephrology Staff From:  [facility]     Transplant RN Coordinator & BRE Madden)    RE: Enrique VIVIENNE Del Castillois,,1965,, [phone]        RETURN FAX: [phone]    [facility] conducts adherence checks as an important part of transplant care. Pre-dialysis assessments are not complete without adherence information.       Information requested:       Data and Any Concerns (from last 3 months) - If yes, please explain       Appointment Adherence:  YES / NO        _________________________________________________________________________________________     Labs:      YES / NO        _________________________________________________________________________________________     Caregivers:    YES / NO      _________________________________________________________________________________________      Transportation:     YES / NO      _________________________________________________________________________________________     Any Psychiatric, Psychosocial, Substance, or Other concerns:   YES / NO       _________________________________________________________________________________________              PLEASE RETURN TO [facility]   -   FAX # [phone]       Thank you in advance for your collaboration and patient care.          [address]  ?  [place] [address]  ?  phone [phone]  ?  fax [phone]  ?  [email or web address]  Confidentiality notice: The accompanying facsimile is intended solely for the use of the recipient designated above. Document(s) transmitted herewith may contain information that is confidential and privileged. Delivery, distribution, or dissemination of this communication other than to the intended recipient is strictly prohibited. If you have received this facsimile in error, please notify us immediately by telephone.

## 2022-01-21 NOTE — LETTER
January 24, 2022        Justice Lee  [address]  [address]  [place] [address]  Phone: [phone]  Fax: [phone]             [facility]- Transplant 1st Fl  [address]  [facility] [address]  Phone: [phone]   Patient: Enrique Martinez   MR Number: [id number]   YOB: 1965   Date of Visit: 1/21/2022       Dear Dr. Justice Lee    Thank you for referring Enrique Martinez to me for evaluation. Attached you will find relevant portions of my assessment and plan of care.    If you have questions, please do not hesitate to call me. I look forward to following Enrique Martinez along with you.    Sincerely,    Stephie Harmon, NP    Enclosure    If you would like to receive this communication electronically, please contact [email or web address] or [phone] to request My[employer] Link access.    [employer] Link is a tool which provides read-only access to select patient information with whom you have a relationship. Its easy to use and provides real time access to review your patients record including encounter summaries, notes, results, and demographic information.    If you feel you have received this communication in error or would no longer like to receive these types of communications, please e-mail [email or web address]

## 2022-01-25 NOTE — PROGRESS NOTES
Patient's chart reviewed today. Patient due for follow-up in January 2023. Appointments to be scheduled per protocol. HLA sample current, in lab, and being received on a regular basis.

## 2022-01-28 ENCOUNTER — COMMITTEE REVIEW (OUTPATIENT)
Dept: TRANSPLANT | Facility: CLINIC | Age: 57
End: 2022-01-28
Payer: MEDICAID

## 2022-01-28 ENCOUNTER — TELEPHONE (OUTPATIENT)
Dept: TRANSPLANT | Facility: HOSPITAL | Age: 57
End: 2022-01-28
Payer: MEDICAID

## 2022-01-28 NOTE — PROGRESS NOTES
Enrique Martinez medical records reviewed with . Provider determined that due to having potential safety issues that the patient will be made inactive on waitlist at this time.   No caregiver present with him. He is no longer living with his wife and she has not confirmed caregiver role. He has no other caregiver identified.Also, he has no income. Supports himself through assistance from charities. He is no longer working and he is pending disability at this time.       Enrique Martinez notified of safety concerns related to transplant at this time. Patients dialysis unit and Nephrologist notified of above concerns by letter.

## 2022-01-28 NOTE — LETTER
January 28, 2022    Enrique Martinez  [address]  [place] [address]    Dear Enrique Martinez:  MRN: [id number]    The [facility] team reviewed your transplant candidacy.  It is our programs opinion that you are temporarily not a transplant candidate at our facility as of 1/28/2022 because of insurance complications and lack of caregiver plan.  You will remain listed on the wait list, but will not be able to receive a transplant until this issue is resolved.      Attached is a letter from the [employer] for Organ Sharing (UNOS).  It describes the services and information offered to patients by UNOS and the Organ Procurement and Transplant Network.    The [facility] team remains available to answer any questions.  Should you have any questions regarding this decision, please do not hesitate to contact our pre-transplant office.      Sincerely,    Katie West MD  Medical Director, Kidney & Kidney/Pancreas Transplantation    Tj/Enclosed    Cc: Wagner Valdez MD             The Organ Procurement and Transplantation Network   Toll-free patient services line: Your resource for organ transplant information     If you have a question regarding your own medical care, you always should call your transplant hospital first. However, for general organ transplant-related information, you can call the Organ Procurement and Transplantation Network (OPTN) toll-free patient services line at [phone].     Anyone, including potential transplant candidates, candidates, recipients, family members, friends, living donors, and donor family members, can call this number to:     · Talk about organ donation, living donation, the transplant process, the donation process, and transplant policies.   · Get a free patient information kit with helpful booklets, waiting list and transplant information, and a list of all transplant hospitals.   · Ask questions about the OPTN  website (https://optn.transplant.hrsa.gov/), the [employer] for Organ Sharings (UNOS) website (https://unos.org/), or the UNOS website for living donors and transplant recipients. (https://www.transplantliving.org/).   · Learn how the OPTN can help you.   · Talk about any concerns that you may have with a transplant hospital.     The nations transplant system, the OPTN, is managed under federal contract by the [employer] Organ Sharing (UNOS), which is a non-profit charitable organization. The OPTN helps create and define organ sharing policies that make the best use of donated organs. This process continuously evaluating new advances and discoveries so policies can be adapted to best serve patients waiting for transplants. To do so, the OPTN works closely with transplant professionals, transplant patients, transplant candidates, donor families, living donors, and the public. All transplant programs and organ procurement organizations throughout the country are OPTN members and are obligated to follow the policies the OPTN creates for allocating organs.     The OPTN also is responsible for:   · Providing educational material for patients, the public, and professionals.   · Raising awareness of the need for donated organs and tissue.   · Coordinating organ procurement, matching, and placement.   · Collecting information about every organ transplant and donation that occurs in the [place].     Remember, you should contact your transplant hospital directly if you have questions or concerns about your own medical care including medical records, work-up progress, and test results.     We are not your transplant hospital, and our staff will not be able to answer questions about your case, so please keep your transplant hospitals phone number handy.   However, while you research your transplant needs and learn as much as you can about transplantation and donation, we welcome your call to our toll-free  patient services line at [phone].

## 2022-01-28 NOTE — TELEPHONE ENCOUNTER
SW received call from pt's wife (with whom he is  from).  Atif Alicja [phone]:w;  [phone]:c.    Discussed caregiver duties and inquire about her ability to be pt's caregiver.  Ms. Srivastava reported that she has been ill her self for a few months, and she is just returning to work.  Ms. Srivastava stated that she is not in a position to be pt's caregiver at this time, but may be able to help in about a year once her PTO has re- accumulated.    Ms. Srivastava agreed to be called back when pt returns to clinic for his yearly appointment.

## 2022-01-28 NOTE — COMMITTEE REVIEW
Native Organ Dx: Diabetes Mellitus - Type Other / Unknown      Discussion only. Place patient in inactive status for 6 months. Patient will need to confirm caregiver plan and verify insurance of source of income.      Note written by Cipriano Moon RN    ===============================================

## 2022-02-15 DIAGNOSIS — Z76.82 ORGAN TRANSPLANT CANDIDATE: Primary | ICD-10-CM

## 2022-02-16 ENCOUNTER — HOSPITAL ENCOUNTER (INPATIENT)
Facility: HOSPITAL | Age: 57
LOS: 22 days | Discharge: NURSING, REHAB OR LONG-TERM CARE | DRG: 673 | End: 2022-03-10
Attending: EMERGENCY MEDICINE | Admitting: INTERNAL MEDICINE
Payer: MEDICAID

## 2022-02-16 DIAGNOSIS — L03.116 LEFT LEG CELLULITIS: Primary | ICD-10-CM

## 2022-02-16 DIAGNOSIS — Z99.2 TYPE 2 DIABETES MELLITUS WITH CHRONIC KIDNEY DISEASE ON CHRONIC DIALYSIS, WITH LONG-TERM CURRENT USE OF INSULIN: ICD-10-CM

## 2022-02-16 DIAGNOSIS — N18.6 TYPE 2 DIABETES MELLITUS WITH CHRONIC KIDNEY DISEASE ON CHRONIC DIALYSIS, WITH LONG-TERM CURRENT USE OF INSULIN: ICD-10-CM

## 2022-02-16 DIAGNOSIS — Z99.2 ANEMIA DUE TO CHRONIC KIDNEY DISEASE, ON CHRONIC DIALYSIS: ICD-10-CM

## 2022-02-16 DIAGNOSIS — N17.9 ACUTE RENAL FAILURE SUPERIMPOSED ON STAGE 5 CHRONIC KIDNEY DISEASE, NOT ON CHRONIC DIALYSIS, UNSPECIFIED ACUTE RENAL FAILURE TYPE: ICD-10-CM

## 2022-02-16 DIAGNOSIS — N18.6 ANEMIA DUE TO CHRONIC KIDNEY DISEASE, ON CHRONIC DIALYSIS: ICD-10-CM

## 2022-02-16 DIAGNOSIS — J39.0 RETROPHARYNGEAL ABSCESS: ICD-10-CM

## 2022-02-16 DIAGNOSIS — M86.20: ICD-10-CM

## 2022-02-16 DIAGNOSIS — K21.9 GASTROESOPHAGEAL REFLUX DISEASE WITHOUT ESOPHAGITIS: ICD-10-CM

## 2022-02-16 DIAGNOSIS — M86.279 SUBACUTE OSTEOMYELITIS OF FOOT, UNSPECIFIED LATERALITY: ICD-10-CM

## 2022-02-16 DIAGNOSIS — Z99.2 ACUTE RENAL FAILURE SUPERIMPOSED ON CHRONIC KIDNEY DISEASE, ON CHRONIC DIALYSIS, UNSPECIFIED ACUTE RENAL FAILURE TYPE: ICD-10-CM

## 2022-02-16 DIAGNOSIS — M1A.3720 CHRONIC GOUT OF LEFT FOOT DUE TO RENAL IMPAIRMENT WITHOUT TOPHUS: ICD-10-CM

## 2022-02-16 DIAGNOSIS — N05.2 MEMBRANOUS GLOMERULONEPHRITIS: ICD-10-CM

## 2022-02-16 DIAGNOSIS — N17.9 ACUTE RENAL FAILURE SUPERIMPOSED ON STAGE 5 CHRONIC KIDNEY DISEASE, NOT ON CHRONIC DIALYSIS: ICD-10-CM

## 2022-02-16 DIAGNOSIS — I25.10 ATHEROSCLEROSIS OF NATIVE CORONARY ARTERY OF NATIVE HEART WITHOUT ANGINA PECTORIS: ICD-10-CM

## 2022-02-16 DIAGNOSIS — Z76.82 PATIENT ON WAITING LIST FOR KIDNEY TRANSPLANT: ICD-10-CM

## 2022-02-16 DIAGNOSIS — M54.2 NECK PAIN: ICD-10-CM

## 2022-02-16 DIAGNOSIS — M10.00 IDIOPATHIC GOUT, UNSPECIFIED CHRONICITY, UNSPECIFIED SITE: ICD-10-CM

## 2022-02-16 DIAGNOSIS — M79.605 PAIN AND SWELLING OF LEFT LOWER EXTREMITY: ICD-10-CM

## 2022-02-16 DIAGNOSIS — M79.89 PAIN AND SWELLING OF LEFT LOWER EXTREMITY: ICD-10-CM

## 2022-02-16 DIAGNOSIS — Z79.4 TYPE 2 DIABETES MELLITUS WITH CHRONIC KIDNEY DISEASE ON CHRONIC DIALYSIS, WITH LONG-TERM CURRENT USE OF INSULIN: ICD-10-CM

## 2022-02-16 DIAGNOSIS — N18.6 ESRD (END STAGE RENAL DISEASE): ICD-10-CM

## 2022-02-16 DIAGNOSIS — D63.1 ANEMIA DUE TO CHRONIC KIDNEY DISEASE, ON CHRONIC DIALYSIS: ICD-10-CM

## 2022-02-16 DIAGNOSIS — E78.00 PURE HYPERCHOLESTEROLEMIA: ICD-10-CM

## 2022-02-16 DIAGNOSIS — M86.20 SUBACUTE OSTEOMYELITIS, UNSPECIFIED SITE: ICD-10-CM

## 2022-02-16 DIAGNOSIS — N18.5 ACUTE RENAL FAILURE SUPERIMPOSED ON STAGE 5 CHRONIC KIDNEY DISEASE, NOT ON CHRONIC DIALYSIS: ICD-10-CM

## 2022-02-16 DIAGNOSIS — M86.272 SUBACUTE OSTEOMYELITIS OF LEFT FOOT: ICD-10-CM

## 2022-02-16 DIAGNOSIS — N18.5 ACUTE RENAL FAILURE WITH OTHER SPECIFIED PATHOLOGICAL KIDNEY LESION SUPERIMPOSED ON STAGE 5 CHRONIC KIDNEY DISEASE, NOT ON CHRONIC DIALYSIS: ICD-10-CM

## 2022-02-16 DIAGNOSIS — R93.7 ABNORMAL MRI, CERVICAL SPINE: ICD-10-CM

## 2022-02-16 DIAGNOSIS — R53.81 DEBILITY: ICD-10-CM

## 2022-02-16 DIAGNOSIS — N18.5 ACUTE RENAL FAILURE SUPERIMPOSED ON STAGE 5 CHRONIC KIDNEY DISEASE, NOT ON CHRONIC DIALYSIS, UNSPECIFIED ACUTE RENAL FAILURE TYPE: ICD-10-CM

## 2022-02-16 DIAGNOSIS — E11.22 TYPE 2 DIABETES MELLITUS WITH CHRONIC KIDNEY DISEASE ON CHRONIC DIALYSIS, WITH LONG-TERM CURRENT USE OF INSULIN: ICD-10-CM

## 2022-02-16 DIAGNOSIS — E53.8 FOLIC ACID DEFICIENCY: ICD-10-CM

## 2022-02-16 DIAGNOSIS — Z95.5 S/P CORONARY ARTERY STENT PLACEMENT: ICD-10-CM

## 2022-02-16 DIAGNOSIS — N17.8 ACUTE RENAL FAILURE WITH OTHER SPECIFIED PATHOLOGICAL KIDNEY LESION SUPERIMPOSED ON STAGE 5 CHRONIC KIDNEY DISEASE, NOT ON CHRONIC DIALYSIS: ICD-10-CM

## 2022-02-16 DIAGNOSIS — E87.5 HYPERKALEMIA: ICD-10-CM

## 2022-02-16 DIAGNOSIS — Z86.19 HX OF SYPHILIS: ICD-10-CM

## 2022-02-16 DIAGNOSIS — N18.6 ACUTE RENAL FAILURE SUPERIMPOSED ON CHRONIC KIDNEY DISEASE, ON CHRONIC DIALYSIS, UNSPECIFIED ACUTE RENAL FAILURE TYPE: ICD-10-CM

## 2022-02-16 DIAGNOSIS — N17.9 ACUTE RENAL FAILURE SUPERIMPOSED ON CHRONIC KIDNEY DISEASE, ON CHRONIC DIALYSIS, UNSPECIFIED ACUTE RENAL FAILURE TYPE: ICD-10-CM

## 2022-02-16 DIAGNOSIS — R07.9 CHEST PAIN: ICD-10-CM

## 2022-02-16 DIAGNOSIS — I15.0 RENOVASCULAR HYPERTENSION: ICD-10-CM

## 2022-02-16 LAB
ALBUMIN SERPL BCP-MCNC: 2.7 G/DL (ref 3.5–5.2)
ALP SERPL-CCNC: 107 U/L (ref 55–135)
ALT SERPL W/O P-5'-P-CCNC: 11 U/L (ref 10–44)
ANION GAP SERPL CALC-SCNC: 19 MMOL/L (ref 8–16)
AST SERPL-CCNC: 14 U/L (ref 10–40)
BASOPHILS # BLD AUTO: 0.02 K/UL (ref 0–0.2)
BASOPHILS NFR BLD: 0.1 % (ref 0–1.9)
BILIRUB SERPL-MCNC: 0.3 MG/DL (ref 0.1–1)
BUN SERPL-MCNC: 198 MG/DL (ref 6–20)
CALCIUM SERPL-MCNC: 10.1 MG/DL (ref 8.7–10.5)
CHLORIDE SERPL-SCNC: 102 MMOL/L (ref 95–110)
CO2 SERPL-SCNC: 18 MMOL/L (ref 23–29)
CREAT SERPL-MCNC: 19.7 MG/DL (ref 0.5–1.4)
CRP SERPL-MCNC: 33.4 MG/L (ref 0–8.2)
DIFFERENTIAL METHOD: ABNORMAL
EOSINOPHIL # BLD AUTO: 0 K/UL (ref 0–0.5)
EOSINOPHIL NFR BLD: 0.1 % (ref 0–8)
ERYTHROCYTE [DISTWIDTH] IN BLOOD BY AUTOMATED COUNT: 16.2 % (ref 11.5–14.5)
ERYTHROCYTE [SEDIMENTATION RATE] IN BLOOD BY WESTERGREN METHOD: 97 MM/HR (ref 0–23)
EST. GFR  (AFRICAN AMERICAN): 2.6 ML/MIN/1.73 M^2
EST. GFR  (NON AFRICAN AMERICAN): 2.2 ML/MIN/1.73 M^2
GLUCOSE SERPL-MCNC: 82 MG/DL (ref 70–110)
HCT VFR BLD AUTO: 22.2 % (ref 40–54)
HGB BLD-MCNC: 7.4 G/DL (ref 14–18)
IMM GRANULOCYTES # BLD AUTO: 0.12 K/UL (ref 0–0.04)
IMM GRANULOCYTES NFR BLD AUTO: 0.7 % (ref 0–0.5)
LACTATE SERPL-SCNC: 0.9 MMOL/L (ref 0.5–2.2)
LYMPHOCYTES # BLD AUTO: 1.2 K/UL (ref 1–4.8)
LYMPHOCYTES NFR BLD: 7.6 % (ref 18–48)
MAGNESIUM SERPL-MCNC: 1.9 MG/DL (ref 1.6–2.6)
MCH RBC QN AUTO: 28.5 PG (ref 27–31)
MCHC RBC AUTO-ENTMCNC: 33.3 G/DL (ref 32–36)
MCV RBC AUTO: 85 FL (ref 82–98)
MONOCYTES # BLD AUTO: 1.3 K/UL (ref 0.3–1)
MONOCYTES NFR BLD: 8 % (ref 4–15)
NEUTROPHILS # BLD AUTO: 13.4 K/UL (ref 1.8–7.7)
NEUTROPHILS NFR BLD: 83.5 % (ref 38–73)
NRBC BLD-RTO: 0 /100 WBC
PHOSPHATE SERPL-MCNC: 10.4 MG/DL (ref 2.7–4.5)
PLATELET # BLD AUTO: 302 K/UL (ref 150–450)
PMV BLD AUTO: 11 FL (ref 9.2–12.9)
POTASSIUM SERPL-SCNC: 5.5 MMOL/L (ref 3.5–5.1)
PROT SERPL-MCNC: 6.2 G/DL (ref 6–8.4)
RBC # BLD AUTO: 2.6 M/UL (ref 4.6–6.2)
SODIUM SERPL-SCNC: 139 MMOL/L (ref 136–145)
WBC # BLD AUTO: 16.01 K/UL (ref 3.9–12.7)

## 2022-02-16 PROCEDURE — 96375 TX/PRO/DX INJ NEW DRUG ADDON: CPT | Mod: NTX

## 2022-02-16 PROCEDURE — 99291 CRITICAL CARE FIRST HOUR: CPT | Mod: 25,NTX

## 2022-02-16 PROCEDURE — 83735 ASSAY OF MAGNESIUM: CPT | Mod: NTX | Performed by: PHYSICIAN ASSISTANT

## 2022-02-16 PROCEDURE — 80053 COMPREHEN METABOLIC PANEL: CPT | Mod: NTX | Performed by: PHYSICIAN ASSISTANT

## 2022-02-16 PROCEDURE — 93010 EKG 12-LEAD: ICD-10-PCS | Mod: NTX,,, | Performed by: INTERNAL MEDICINE

## 2022-02-16 PROCEDURE — 99291 PR CRITICAL CARE, E/M 30-74 MINUTES: ICD-10-PCS | Mod: NTX,CS,, | Performed by: EMERGENCY MEDICINE

## 2022-02-16 PROCEDURE — 63600175 PHARM REV CODE 636 W HCPCS: Mod: NTX | Performed by: PHYSICIAN ASSISTANT

## 2022-02-16 PROCEDURE — 85025 COMPLETE CBC W/AUTO DIFF WBC: CPT | Mod: NTX | Performed by: PHYSICIAN ASSISTANT

## 2022-02-16 PROCEDURE — 25000003 PHARM REV CODE 250: Mod: NTX | Performed by: PHYSICIAN ASSISTANT

## 2022-02-16 PROCEDURE — 83605 ASSAY OF LACTIC ACID: CPT | Mod: NTX | Performed by: PHYSICIAN ASSISTANT

## 2022-02-16 PROCEDURE — 12000002 HC ACUTE/MED SURGE SEMI-PRIVATE ROOM: Mod: NTX

## 2022-02-16 PROCEDURE — 96374 THER/PROPH/DIAG INJ IV PUSH: CPT | Mod: NTX

## 2022-02-16 PROCEDURE — 85652 RBC SED RATE AUTOMATED: CPT | Mod: NTX | Performed by: PHYSICIAN ASSISTANT

## 2022-02-16 PROCEDURE — 86140 C-REACTIVE PROTEIN: CPT | Mod: NTX | Performed by: PHYSICIAN ASSISTANT

## 2022-02-16 PROCEDURE — 99291 CRITICAL CARE FIRST HOUR: CPT | Mod: NTX,CS,, | Performed by: EMERGENCY MEDICINE

## 2022-02-16 PROCEDURE — 87040 BLOOD CULTURE FOR BACTERIA: CPT | Mod: 59,NTX | Performed by: PHYSICIAN ASSISTANT

## 2022-02-16 PROCEDURE — 93010 ELECTROCARDIOGRAM REPORT: CPT | Mod: NTX,,, | Performed by: INTERNAL MEDICINE

## 2022-02-16 PROCEDURE — 96361 HYDRATE IV INFUSION ADD-ON: CPT | Mod: NTX

## 2022-02-16 PROCEDURE — 93005 ELECTROCARDIOGRAM TRACING: CPT | Mod: NTX

## 2022-02-16 PROCEDURE — 84100 ASSAY OF PHOSPHORUS: CPT | Mod: NTX | Performed by: PHYSICIAN ASSISTANT

## 2022-02-16 RX ORDER — SEVELAMER CARBONATE 800 MG/1
800 TABLET, FILM COATED ORAL
Status: COMPLETED | OUTPATIENT
Start: 2022-02-16 | End: 2022-02-16

## 2022-02-16 RX ORDER — CLINDAMYCIN HYDROCHLORIDE 300 MG/1
300 CAPSULE ORAL EVERY 6 HOURS
Qty: 28 CAPSULE | Refills: 0 | Status: SHIPPED | OUTPATIENT
Start: 2022-02-16 | End: 2022-03-10 | Stop reason: HOSPADM

## 2022-02-16 RX ORDER — HYDROMORPHONE HYDROCHLORIDE 1 MG/ML
0.5 INJECTION, SOLUTION INTRAMUSCULAR; INTRAVENOUS; SUBCUTANEOUS
Status: COMPLETED | OUTPATIENT
Start: 2022-02-16 | End: 2022-02-16

## 2022-02-16 RX ADMIN — HYDROMORPHONE HYDROCHLORIDE 0.5 MG: 1 INJECTION, SOLUTION INTRAMUSCULAR; INTRAVENOUS; SUBCUTANEOUS at 07:02

## 2022-02-16 RX ADMIN — SODIUM CHLORIDE 500 ML: 0.9 INJECTION, SOLUTION INTRAVENOUS at 07:02

## 2022-02-16 RX ADMIN — SEVELAMER CARBONATE 800 MG: 800 TABLET, FILM COATED ORAL at 09:02

## 2022-02-16 NOTE — ED NOTES
Bed: [facility]  Expected date:   Expected time:   Means of arrival:   Comments:

## 2022-02-16 NOTE — ED TRIAGE NOTES
Pt is a 57 y/o male that presents to the ED via personal transport from home.     Pt was d/c last week for similar symptoms of left foot infection. Pt also reporting severe left shoulder pain that started today. Left foot  Is swollen. Pt completed abx yesterday.

## 2022-02-17 PROBLEM — E87.5 HYPERKALEMIA: Status: ACTIVE | Noted: 2022-02-17

## 2022-02-17 PROBLEM — M79.605 PAIN AND SWELLING OF LEFT LOWER EXTREMITY: Status: ACTIVE | Noted: 2022-02-17

## 2022-02-17 PROBLEM — N17.9 ACUTE RENAL FAILURE SUPERIMPOSED ON STAGE 5 CHRONIC KIDNEY DISEASE, NOT ON CHRONIC DIALYSIS: Status: ACTIVE | Noted: 2022-02-17

## 2022-02-17 PROBLEM — E83.39 HYPERPHOSPHATEMIA: Status: ACTIVE | Noted: 2022-02-17

## 2022-02-17 PROBLEM — E53.8 FOLIC ACID DEFICIENCY: Status: ACTIVE | Noted: 2022-02-17

## 2022-02-17 PROBLEM — M79.89 PAIN AND SWELLING OF LEFT LOWER EXTREMITY: Status: ACTIVE | Noted: 2022-02-17

## 2022-02-17 PROBLEM — M86.20 SUBACUTE OSTEOMYELITIS: Status: ACTIVE | Noted: 2022-02-17

## 2022-02-17 PROBLEM — M10.9 GOUT: Status: ACTIVE | Noted: 2022-02-17

## 2022-02-17 PROBLEM — N18.5 ACUTE RENAL FAILURE SUPERIMPOSED ON STAGE 5 CHRONIC KIDNEY DISEASE, NOT ON CHRONIC DIALYSIS: Status: ACTIVE | Noted: 2022-02-17

## 2022-02-17 PROBLEM — E87.29 INCREASED ANION GAP METABOLIC ACIDOSIS: Status: ACTIVE | Noted: 2022-02-17

## 2022-02-17 LAB
ALBUMIN SERPL BCP-MCNC: 2.6 G/DL (ref 3.5–5.2)
ALP SERPL-CCNC: 96 U/L (ref 55–135)
ALT SERPL W/O P-5'-P-CCNC: 10 U/L (ref 10–44)
ANION GAP SERPL CALC-SCNC: 20 MMOL/L (ref 8–16)
ANION GAP SERPL CALC-SCNC: 22 MMOL/L (ref 8–16)
ANION GAP SERPL CALC-SCNC: 23 MMOL/L (ref 8–16)
AST SERPL-CCNC: 14 U/L (ref 10–40)
BACTERIA #/AREA URNS AUTO: NORMAL /HPF
BASOPHILS # BLD AUTO: 0.01 K/UL (ref 0–0.2)
BASOPHILS NFR BLD: 0.1 % (ref 0–1.9)
BILIRUB SERPL-MCNC: 0.2 MG/DL (ref 0.1–1)
BILIRUB UR QL STRIP: NEGATIVE
BNP SERPL-MCNC: 439 PG/ML (ref 0–99)
BUN SERPL-MCNC: 184 MG/DL (ref 6–20)
BUN SERPL-MCNC: 186 MG/DL (ref 6–20)
BUN SERPL-MCNC: 194 MG/DL (ref 6–20)
CALCIUM SERPL-MCNC: 10.1 MG/DL (ref 8.7–10.5)
CALCIUM SERPL-MCNC: 10.1 MG/DL (ref 8.7–10.5)
CALCIUM SERPL-MCNC: 10.6 MG/DL (ref 8.7–10.5)
CHLORIDE SERPL-SCNC: 103 MMOL/L (ref 95–110)
CHLORIDE SERPL-SCNC: 104 MMOL/L (ref 95–110)
CHLORIDE SERPL-SCNC: 104 MMOL/L (ref 95–110)
CLARITY UR REFRACT.AUTO: CLEAR
CO2 SERPL-SCNC: 17 MMOL/L (ref 23–29)
CO2 SERPL-SCNC: 19 MMOL/L (ref 23–29)
CO2 SERPL-SCNC: 19 MMOL/L (ref 23–29)
COLOR UR AUTO: ABNORMAL
CREAT SERPL-MCNC: 18.3 MG/DL (ref 0.5–1.4)
CREAT SERPL-MCNC: 18.6 MG/DL (ref 0.5–1.4)
CREAT SERPL-MCNC: 19.7 MG/DL (ref 0.5–1.4)
CREAT UR-MCNC: 65 MG/DL (ref 23–375)
CTP QC/QA: YES
DIFFERENTIAL METHOD: ABNORMAL
EOSINOPHIL # BLD AUTO: 0.1 K/UL (ref 0–0.5)
EOSINOPHIL NFR BLD: 0.4 % (ref 0–8)
ERYTHROCYTE [DISTWIDTH] IN BLOOD BY AUTOMATED COUNT: 16.1 % (ref 11.5–14.5)
EST. GFR  (AFRICAN AMERICAN): 2.6 ML/MIN/1.73 M^2
EST. GFR  (AFRICAN AMERICAN): 2.8 ML/MIN/1.73 M^2
EST. GFR  (AFRICAN AMERICAN): 2.8 ML/MIN/1.73 M^2
EST. GFR  (NON AFRICAN AMERICAN): 2.2 ML/MIN/1.73 M^2
EST. GFR  (NON AFRICAN AMERICAN): 2.4 ML/MIN/1.73 M^2
EST. GFR  (NON AFRICAN AMERICAN): 2.5 ML/MIN/1.73 M^2
ESTIMATED AVG GLUCOSE: 171 MG/DL (ref 68–131)
FERRITIN SERPL-MCNC: 384 NG/ML (ref 20–300)
FOLATE SERPL-MCNC: 4.4 NG/ML (ref 4–24)
GLUCOSE SERPL-MCNC: 104 MG/DL (ref 70–110)
GLUCOSE SERPL-MCNC: 85 MG/DL (ref 70–110)
GLUCOSE SERPL-MCNC: 85 MG/DL (ref 70–110)
GLUCOSE UR QL STRIP: NEGATIVE
HBA1C MFR BLD: 7.6 % (ref 4–5.6)
HCT VFR BLD AUTO: 21.9 % (ref 40–54)
HGB BLD-MCNC: 7.2 G/DL (ref 14–18)
HGB UR QL STRIP: ABNORMAL
HYALINE CASTS UR QL AUTO: 0 /LPF
IMM GRANULOCYTES # BLD AUTO: 0.07 K/UL (ref 0–0.04)
IMM GRANULOCYTES NFR BLD AUTO: 0.5 % (ref 0–0.5)
IRON SERPL-MCNC: 10 UG/DL (ref 45–160)
KETONES UR QL STRIP: NEGATIVE
LEUKOCYTE ESTERASE UR QL STRIP: NEGATIVE
LYMPHOCYTES # BLD AUTO: 1.5 K/UL (ref 1–4.8)
LYMPHOCYTES NFR BLD: 10.7 % (ref 18–48)
MAGNESIUM SERPL-MCNC: 1.9 MG/DL (ref 1.6–2.6)
MCH RBC QN AUTO: 27.4 PG (ref 27–31)
MCHC RBC AUTO-ENTMCNC: 32.9 G/DL (ref 32–36)
MCV RBC AUTO: 83 FL (ref 82–98)
MICROSCOPIC COMMENT: NORMAL
MONOCYTES # BLD AUTO: 1.5 K/UL (ref 0.3–1)
MONOCYTES NFR BLD: 10.3 % (ref 4–15)
NEUTROPHILS # BLD AUTO: 11 K/UL (ref 1.8–7.7)
NEUTROPHILS NFR BLD: 78 % (ref 38–73)
NITRITE UR QL STRIP: NEGATIVE
NRBC BLD-RTO: 0 /100 WBC
PH UR STRIP: 6 [PH] (ref 5–8)
PHOSPHATE SERPL-MCNC: 10.4 MG/DL (ref 2.7–4.5)
PHOSPHATE SERPL-MCNC: 10.6 MG/DL (ref 2.7–4.5)
PLATELET # BLD AUTO: 316 K/UL (ref 150–450)
PMV BLD AUTO: 10.6 FL (ref 9.2–12.9)
POCT GLUCOSE: 119 MG/DL (ref 70–110)
POCT GLUCOSE: 151 MG/DL (ref 70–110)
POCT GLUCOSE: 93 MG/DL (ref 70–110)
POTASSIUM SERPL-SCNC: 4.9 MMOL/L (ref 3.5–5.1)
POTASSIUM SERPL-SCNC: 4.9 MMOL/L (ref 3.5–5.1)
POTASSIUM SERPL-SCNC: 5.2 MMOL/L (ref 3.5–5.1)
PROCALCITONIN SERPL IA-MCNC: 0.99 NG/ML
PROT SERPL-MCNC: 6.1 G/DL (ref 6–8.4)
PROT UR QL STRIP: ABNORMAL
PTH-INTACT SERPL-MCNC: 897.3 PG/ML (ref 9–77)
RBC # BLD AUTO: 2.63 M/UL (ref 4.6–6.2)
RBC #/AREA URNS AUTO: 1 /HPF (ref 0–4)
SARS-COV-2 RDRP RESP QL NAA+PROBE: NEGATIVE
SATURATED IRON: 4 % (ref 20–50)
SODIUM SERPL-SCNC: 143 MMOL/L (ref 136–145)
SODIUM SERPL-SCNC: 143 MMOL/L (ref 136–145)
SODIUM SERPL-SCNC: 145 MMOL/L (ref 136–145)
SODIUM UR-SCNC: 88 MMOL/L (ref 20–250)
SP GR UR STRIP: 1.01 (ref 1–1.03)
SQUAMOUS #/AREA URNS AUTO: 0 /HPF
TOTAL IRON BINDING CAPACITY: 258 UG/DL (ref 250–450)
TRANSFERRIN SERPL-MCNC: 174 MG/DL (ref 200–375)
TRANSFERRIN SERPL-MCNC: 174 MG/DL (ref 200–375)
URN SPEC COLLECT METH UR: ABNORMAL
VIT B12 SERPL-MCNC: 863 PG/ML (ref 210–950)
WBC # BLD AUTO: 14.09 K/UL (ref 3.9–12.7)
WBC #/AREA URNS AUTO: 3 /HPF (ref 0–5)

## 2022-02-17 PROCEDURE — 84100 ASSAY OF PHOSPHORUS: CPT | Mod: 91,NTX | Performed by: HOSPITALIST

## 2022-02-17 PROCEDURE — 81001 URINALYSIS AUTO W/SCOPE: CPT | Mod: NTX | Performed by: PHYSICIAN ASSISTANT

## 2022-02-17 PROCEDURE — 82570 ASSAY OF URINE CREATININE: CPT | Mod: NTX | Performed by: PHYSICIAN ASSISTANT

## 2022-02-17 PROCEDURE — 83970 ASSAY OF PARATHORMONE: CPT | Mod: NTX

## 2022-02-17 PROCEDURE — 84300 ASSAY OF URINE SODIUM: CPT | Mod: NTX | Performed by: PHYSICIAN ASSISTANT

## 2022-02-17 PROCEDURE — 25000003 PHARM REV CODE 250: Mod: NTX | Performed by: HOSPITALIST

## 2022-02-17 PROCEDURE — 85025 COMPLETE CBC W/AUTO DIFF WBC: CPT | Mod: NTX | Performed by: PHYSICIAN ASSISTANT

## 2022-02-17 PROCEDURE — U0002 COVID-19 LAB TEST NON-CDC: HCPCS | Mod: NTX | Performed by: EMERGENCY MEDICINE

## 2022-02-17 PROCEDURE — 83880 ASSAY OF NATRIURETIC PEPTIDE: CPT | Mod: NTX | Performed by: PHYSICIAN ASSISTANT

## 2022-02-17 PROCEDURE — 82728 ASSAY OF FERRITIN: CPT | Mod: NTX

## 2022-02-17 PROCEDURE — 99223 1ST HOSP IP/OBS HIGH 75: CPT | Mod: NTX,,, | Performed by: INTERNAL MEDICINE

## 2022-02-17 PROCEDURE — 36415 COLL VENOUS BLD VENIPUNCTURE: CPT | Mod: NTX | Performed by: HOSPITALIST

## 2022-02-17 PROCEDURE — 27000207 HC ISOLATION: Mod: NTX

## 2022-02-17 PROCEDURE — 36415 COLL VENOUS BLD VENIPUNCTURE: CPT | Mod: NTX | Performed by: PHYSICIAN ASSISTANT

## 2022-02-17 PROCEDURE — 84100 ASSAY OF PHOSPHORUS: CPT | Mod: NTX | Performed by: PHYSICIAN ASSISTANT

## 2022-02-17 PROCEDURE — 83036 HEMOGLOBIN GLYCOSYLATED A1C: CPT | Mod: NTX | Performed by: PHYSICIAN ASSISTANT

## 2022-02-17 PROCEDURE — 25000003 PHARM REV CODE 250: Mod: NTX | Performed by: PHYSICIAN ASSISTANT

## 2022-02-17 PROCEDURE — 99223 1ST HOSP IP/OBS HIGH 75: CPT | Mod: NTX,,, | Performed by: PHYSICIAN ASSISTANT

## 2022-02-17 PROCEDURE — 80053 COMPREHEN METABOLIC PANEL: CPT | Mod: NTX | Performed by: PHYSICIAN ASSISTANT

## 2022-02-17 PROCEDURE — 82607 VITAMIN B-12: CPT | Mod: NTX | Performed by: HOSPITALIST

## 2022-02-17 PROCEDURE — 63600175 PHARM REV CODE 636 W HCPCS: Mod: NTX | Performed by: PHYSICIAN ASSISTANT

## 2022-02-17 PROCEDURE — 82746 ASSAY OF FOLIC ACID SERUM: CPT | Mod: NTX | Performed by: HOSPITALIST

## 2022-02-17 PROCEDURE — 83735 ASSAY OF MAGNESIUM: CPT | Mod: NTX | Performed by: PHYSICIAN ASSISTANT

## 2022-02-17 PROCEDURE — 84145 PROCALCITONIN (PCT): CPT | Mod: NTX | Performed by: HOSPITALIST

## 2022-02-17 PROCEDURE — 25000003 PHARM REV CODE 250: Mod: NTX

## 2022-02-17 PROCEDURE — 84466 ASSAY OF TRANSFERRIN: CPT | Mod: NTX

## 2022-02-17 PROCEDURE — 63600175 PHARM REV CODE 636 W HCPCS: Mod: NTX | Performed by: HOSPITALIST

## 2022-02-17 PROCEDURE — 20600001 HC STEP DOWN PRIVATE ROOM: Mod: NTX

## 2022-02-17 PROCEDURE — 99223 PR INITIAL HOSPITAL CARE,LEVL III: ICD-10-PCS | Mod: NTX,,, | Performed by: INTERNAL MEDICINE

## 2022-02-17 PROCEDURE — 63600175 PHARM REV CODE 636 W HCPCS: Mod: NTX | Performed by: EMERGENCY MEDICINE

## 2022-02-17 PROCEDURE — 99233 SBSQ HOSP IP/OBS HIGH 50: CPT | Mod: NTX,,, | Performed by: PODIATRIST

## 2022-02-17 PROCEDURE — 99233 PR SUBSEQUENT HOSPITAL CARE,LEVL III: ICD-10-PCS | Mod: NTX,,, | Performed by: PODIATRIST

## 2022-02-17 PROCEDURE — 99223 PR INITIAL HOSPITAL CARE,LEVL III: ICD-10-PCS | Mod: NTX,,, | Performed by: PHYSICIAN ASSISTANT

## 2022-02-17 PROCEDURE — 80048 BASIC METABOLIC PNL TOTAL CA: CPT | Mod: 91,NTX | Performed by: HOSPITALIST

## 2022-02-17 RX ORDER — LABETALOL 100 MG/1
200 TABLET, FILM COATED ORAL EVERY 12 HOURS
Status: DISCONTINUED | OUTPATIENT
Start: 2022-02-17 | End: 2022-03-10 | Stop reason: HOSPADM

## 2022-02-17 RX ORDER — ASPIRIN 81 MG/1
81 TABLET ORAL DAILY
Status: DISCONTINUED | OUTPATIENT
Start: 2022-02-17 | End: 2022-03-10 | Stop reason: HOSPADM

## 2022-02-17 RX ORDER — SEVELAMER CARBONATE 800 MG/1
800 TABLET, FILM COATED ORAL 3 TIMES DAILY
Status: DISCONTINUED | OUTPATIENT
Start: 2022-02-17 | End: 2022-02-17

## 2022-02-17 RX ORDER — METHOCARBAMOL 500 MG/1
500 TABLET, FILM COATED ORAL 4 TIMES DAILY PRN
Status: DISCONTINUED | OUTPATIENT
Start: 2022-02-17 | End: 2022-02-17

## 2022-02-17 RX ORDER — MAG HYDROX/ALUMINUM HYD/SIMETH 200-200-20
30 SUSPENSION, ORAL (FINAL DOSE FORM) ORAL 4 TIMES DAILY PRN
Status: DISCONTINUED | OUTPATIENT
Start: 2022-02-17 | End: 2022-03-10 | Stop reason: HOSPADM

## 2022-02-17 RX ORDER — AMLODIPINE BESYLATE 10 MG/1
10 TABLET ORAL DAILY
Status: DISCONTINUED | OUTPATIENT
Start: 2022-02-17 | End: 2022-03-10 | Stop reason: HOSPADM

## 2022-02-17 RX ORDER — PROCHLORPERAZINE EDISYLATE 5 MG/ML
5 INJECTION INTRAMUSCULAR; INTRAVENOUS EVERY 6 HOURS PRN
Status: DISCONTINUED | OUTPATIENT
Start: 2022-02-17 | End: 2022-03-10 | Stop reason: HOSPADM

## 2022-02-17 RX ORDER — FUROSEMIDE 40 MG/1
40 TABLET ORAL 2 TIMES DAILY
Status: DISCONTINUED | OUTPATIENT
Start: 2022-02-17 | End: 2022-02-17

## 2022-02-17 RX ORDER — IBUPROFEN 200 MG
16 TABLET ORAL
Status: DISCONTINUED | OUTPATIENT
Start: 2022-02-17 | End: 2022-03-10 | Stop reason: HOSPADM

## 2022-02-17 RX ORDER — OMEPRAZOLE 20 MG/1
20 CAPSULE, DELAYED RELEASE ORAL DAILY
COMMUNITY
Start: 2021-12-29 | End: 2023-09-14

## 2022-02-17 RX ORDER — FUROSEMIDE 10 MG/ML
80 INJECTION INTRAMUSCULAR; INTRAVENOUS ONCE
Status: COMPLETED | OUTPATIENT
Start: 2022-02-17 | End: 2022-02-17

## 2022-02-17 RX ORDER — GLUCAGON 1 MG
1 KIT INJECTION
Status: DISCONTINUED | OUTPATIENT
Start: 2022-02-17 | End: 2022-03-10 | Stop reason: HOSPADM

## 2022-02-17 RX ORDER — SODIUM BICARBONATE 650 MG/1
650 TABLET ORAL 3 TIMES DAILY
Status: DISCONTINUED | OUTPATIENT
Start: 2022-02-17 | End: 2022-02-25

## 2022-02-17 RX ORDER — CALCITRIOL 0.5 UG/1
0.5 CAPSULE ORAL DAILY
Status: DISCONTINUED | OUTPATIENT
Start: 2022-02-17 | End: 2022-02-17

## 2022-02-17 RX ORDER — CHLORTHALIDONE 25 MG/1
50 TABLET ORAL DAILY
Status: DISCONTINUED | OUTPATIENT
Start: 2022-02-17 | End: 2022-02-17

## 2022-02-17 RX ORDER — CALCITRIOL 0.5 UG/1
0.5 CAPSULE ORAL DAILY
Status: ON HOLD | COMMUNITY
Start: 2022-01-27 | End: 2022-03-10 | Stop reason: HOSPADM

## 2022-02-17 RX ORDER — AMLODIPINE BESYLATE 10 MG/1
10 TABLET ORAL DAILY
Status: DISCONTINUED | OUTPATIENT
Start: 2022-02-17 | End: 2022-02-17

## 2022-02-17 RX ORDER — FUROSEMIDE 10 MG/ML
80 INJECTION INTRAMUSCULAR; INTRAVENOUS
Status: DISCONTINUED | OUTPATIENT
Start: 2022-02-17 | End: 2022-02-17

## 2022-02-17 RX ORDER — NALOXONE HCL 0.4 MG/ML
0.02 VIAL (ML) INJECTION
Status: DISCONTINUED | OUTPATIENT
Start: 2022-02-17 | End: 2022-03-10 | Stop reason: HOSPADM

## 2022-02-17 RX ORDER — SODIUM CHLORIDE 9 MG/ML
INJECTION, SOLUTION INTRAVENOUS CONTINUOUS
Status: DISCONTINUED | OUTPATIENT
Start: 2022-02-17 | End: 2022-02-17

## 2022-02-17 RX ORDER — MORPHINE SULFATE 2 MG/ML
2 INJECTION, SOLUTION INTRAMUSCULAR; INTRAVENOUS ONCE AS NEEDED
Status: COMPLETED | OUTPATIENT
Start: 2022-02-17 | End: 2022-02-27

## 2022-02-17 RX ORDER — ARIPIPRAZOLE 5 MG/1
15 TABLET ORAL NIGHTLY
Status: DISCONTINUED | OUTPATIENT
Start: 2022-02-17 | End: 2022-03-10 | Stop reason: HOSPADM

## 2022-02-17 RX ORDER — IPRATROPIUM BROMIDE AND ALBUTEROL SULFATE 2.5; .5 MG/3ML; MG/3ML
3 SOLUTION RESPIRATORY (INHALATION) EVERY 4 HOURS PRN
Status: DISCONTINUED | OUTPATIENT
Start: 2022-02-17 | End: 2022-03-10 | Stop reason: HOSPADM

## 2022-02-17 RX ORDER — ACETAMINOPHEN 500 MG
1000 TABLET ORAL EVERY 8 HOURS
Status: DISCONTINUED | OUTPATIENT
Start: 2022-02-17 | End: 2022-03-10 | Stop reason: HOSPADM

## 2022-02-17 RX ORDER — IBUPROFEN 200 MG
24 TABLET ORAL
Status: DISCONTINUED | OUTPATIENT
Start: 2022-02-17 | End: 2022-03-10 | Stop reason: HOSPADM

## 2022-02-17 RX ORDER — INSULIN GLARGINE 100 [IU]/ML
5 INJECTION, SOLUTION SUBCUTANEOUS DAILY
COMMUNITY
Start: 2022-01-18 | End: 2022-03-10

## 2022-02-17 RX ORDER — METHOCARBAMOL 750 MG/1
750 TABLET, FILM COATED ORAL 4 TIMES DAILY PRN
Status: DISCONTINUED | OUTPATIENT
Start: 2022-02-17 | End: 2022-03-10 | Stop reason: HOSPADM

## 2022-02-17 RX ORDER — SODIUM BICARBONATE 650 MG/1
650 TABLET ORAL 2 TIMES DAILY
Status: DISCONTINUED | OUTPATIENT
Start: 2022-02-17 | End: 2022-02-17

## 2022-02-17 RX ORDER — OXYCODONE HYDROCHLORIDE 10 MG/1
10 TABLET ORAL EVERY 6 HOURS PRN
Status: DISCONTINUED | OUTPATIENT
Start: 2022-02-17 | End: 2022-02-17

## 2022-02-17 RX ORDER — CLONIDINE HYDROCHLORIDE 0.1 MG/1
0.4 TABLET ORAL 3 TIMES DAILY
Status: DISCONTINUED | OUTPATIENT
Start: 2022-02-17 | End: 2022-02-25

## 2022-02-17 RX ORDER — SODIUM BICARBONATE 650 MG/1
1300 TABLET ORAL 3 TIMES DAILY
Status: DISCONTINUED | OUTPATIENT
Start: 2022-02-17 | End: 2022-02-17

## 2022-02-17 RX ORDER — OXYCODONE HYDROCHLORIDE 5 MG/1
5 TABLET ORAL EVERY 6 HOURS PRN
Status: DISCONTINUED | OUTPATIENT
Start: 2022-02-17 | End: 2022-03-10 | Stop reason: HOSPADM

## 2022-02-17 RX ORDER — COLCHICINE 0.6 MG/1
0.6 TABLET ORAL DAILY PRN
COMMUNITY
Start: 2021-11-03

## 2022-02-17 RX ORDER — OXYCODONE HYDROCHLORIDE 10 MG/1
10 TABLET ORAL EVERY 4 HOURS PRN
Status: DISCONTINUED | OUTPATIENT
Start: 2022-02-17 | End: 2022-03-10 | Stop reason: HOSPADM

## 2022-02-17 RX ORDER — CLONIDINE HYDROCHLORIDE 0.1 MG/1
0.4 TABLET ORAL 3 TIMES DAILY
Status: DISCONTINUED | OUTPATIENT
Start: 2022-02-17 | End: 2022-02-17

## 2022-02-17 RX ORDER — SODIUM CHLORIDE 9 MG/ML
INJECTION, SOLUTION INTRAVENOUS CONTINUOUS
Status: ACTIVE | OUTPATIENT
Start: 2022-02-17 | End: 2022-02-18

## 2022-02-17 RX ORDER — TALC
6 POWDER (GRAM) TOPICAL NIGHTLY PRN
Status: DISCONTINUED | OUTPATIENT
Start: 2022-02-17 | End: 2022-03-10 | Stop reason: HOSPADM

## 2022-02-17 RX ORDER — SODIUM CHLORIDE 0.9 % (FLUSH) 0.9 %
10 SYRINGE (ML) INJECTION EVERY 8 HOURS PRN
Status: DISCONTINUED | OUTPATIENT
Start: 2022-02-17 | End: 2022-03-10 | Stop reason: HOSPADM

## 2022-02-17 RX ORDER — ATORVASTATIN CALCIUM 20 MG/1
80 TABLET, FILM COATED ORAL DAILY
Status: DISCONTINUED | OUTPATIENT
Start: 2022-02-17 | End: 2022-03-10 | Stop reason: HOSPADM

## 2022-02-17 RX ORDER — FOLIC ACID 1 MG/1
1 TABLET ORAL DAILY
Status: DISCONTINUED | OUTPATIENT
Start: 2022-02-18 | End: 2022-03-10 | Stop reason: HOSPADM

## 2022-02-17 RX ORDER — SEVELAMER CARBONATE 800 MG/1
800 TABLET, FILM COATED ORAL 3 TIMES DAILY
COMMUNITY
Start: 2022-02-04 | End: 2022-05-23 | Stop reason: SDUPTHER

## 2022-02-17 RX ORDER — MORPHINE SULFATE 2 MG/ML
6 INJECTION, SOLUTION INTRAMUSCULAR; INTRAVENOUS
Status: COMPLETED | OUTPATIENT
Start: 2022-02-17 | End: 2022-02-17

## 2022-02-17 RX ORDER — ONDANSETRON 8 MG/1
8 TABLET, ORALLY DISINTEGRATING ORAL EVERY 8 HOURS PRN
Status: DISCONTINUED | OUTPATIENT
Start: 2022-02-17 | End: 2022-03-10 | Stop reason: HOSPADM

## 2022-02-17 RX ORDER — ACETAMINOPHEN 325 MG/1
650 TABLET ORAL EVERY 4 HOURS PRN
Status: DISCONTINUED | OUTPATIENT
Start: 2022-02-17 | End: 2022-02-17

## 2022-02-17 RX ORDER — HYDRALAZINE HYDROCHLORIDE 50 MG/1
50 TABLET, FILM COATED ORAL EVERY 8 HOURS
Status: DISCONTINUED | OUTPATIENT
Start: 2022-02-17 | End: 2022-02-28

## 2022-02-17 RX ORDER — SEVELAMER CARBONATE 800 MG/1
1600 TABLET, FILM COATED ORAL 3 TIMES DAILY
Status: DISCONTINUED | OUTPATIENT
Start: 2022-02-17 | End: 2022-02-25

## 2022-02-17 RX ORDER — SIMETHICONE 80 MG
1 TABLET,CHEWABLE ORAL 4 TIMES DAILY PRN
Status: DISCONTINUED | OUTPATIENT
Start: 2022-02-17 | End: 2022-03-10 | Stop reason: HOSPADM

## 2022-02-17 RX ORDER — POLYETHYLENE GLYCOL 3350 17 G/17G
17 POWDER, FOR SOLUTION ORAL DAILY PRN
Status: DISCONTINUED | OUTPATIENT
Start: 2022-02-17 | End: 2022-02-20

## 2022-02-17 RX ORDER — HEPARIN SODIUM 5000 [USP'U]/ML
5000 INJECTION, SOLUTION INTRAVENOUS; SUBCUTANEOUS EVERY 8 HOURS
Status: DISCONTINUED | OUTPATIENT
Start: 2022-02-17 | End: 2022-02-18

## 2022-02-17 RX ORDER — HYDRALAZINE HYDROCHLORIDE 25 MG/1
25 TABLET, FILM COATED ORAL EVERY 8 HOURS
Status: DISCONTINUED | OUTPATIENT
Start: 2022-02-17 | End: 2022-02-17

## 2022-02-17 RX ADMIN — CLONIDINE HYDROCHLORIDE 0.4 MG: 0.1 TABLET ORAL at 12:02

## 2022-02-17 RX ADMIN — OXYCODONE HYDROCHLORIDE 10 MG: 10 TABLET ORAL at 07:02

## 2022-02-17 RX ADMIN — CLONIDINE HYDROCHLORIDE 0.4 MG: 0.1 TABLET ORAL at 08:02

## 2022-02-17 RX ADMIN — HEPARIN SODIUM 5000 UNITS: 5000 INJECTION INTRAVENOUS; SUBCUTANEOUS at 08:02

## 2022-02-17 RX ADMIN — SODIUM BICARBONATE 650 MG TABLET 650 MG: at 08:02

## 2022-02-17 RX ADMIN — OXYCODONE HYDROCHLORIDE 10 MG: 10 TABLET ORAL at 02:02

## 2022-02-17 RX ADMIN — ARIPIPRAZOLE 15 MG: 5 TABLET ORAL at 08:02

## 2022-02-17 RX ADMIN — LABETALOL HYDROCHLORIDE 200 MG: 100 TABLET, FILM COATED ORAL at 08:02

## 2022-02-17 RX ADMIN — SODIUM CHLORIDE: 0.9 INJECTION, SOLUTION INTRAVENOUS at 04:02

## 2022-02-17 RX ADMIN — ASPIRIN 81 MG: 81 TABLET, COATED ORAL at 08:02

## 2022-02-17 RX ADMIN — METHOCARBAMOL 500 MG: 500 TABLET ORAL at 08:02

## 2022-02-17 RX ADMIN — METHOCARBAMOL 750 MG: 750 TABLET ORAL at 08:02

## 2022-02-17 RX ADMIN — METHOCARBAMOL 750 MG: 750 TABLET ORAL at 02:02

## 2022-02-17 RX ADMIN — SODIUM BICARBONATE 650 MG TABLET 1300 MG: at 02:02

## 2022-02-17 RX ADMIN — COLCHICINE 0.3 MG: 0.6 TABLET, FILM COATED ORAL at 04:02

## 2022-02-17 RX ADMIN — OXYCODONE HYDROCHLORIDE 10 MG: 10 TABLET ORAL at 10:02

## 2022-02-17 RX ADMIN — AMLODIPINE BESYLATE 10 MG: 10 TABLET ORAL at 05:02

## 2022-02-17 RX ADMIN — CLONIDINE HYDROCHLORIDE 0.4 MG: 0.1 TABLET ORAL at 06:02

## 2022-02-17 RX ADMIN — OXYCODONE HYDROCHLORIDE 10 MG: 10 TABLET ORAL at 05:02

## 2022-02-17 RX ADMIN — SEVELAMER CARBONATE 1600 MG: 800 TABLET, FILM COATED ORAL at 12:02

## 2022-02-17 RX ADMIN — SEVELAMER CARBONATE 1600 MG: 800 TABLET, FILM COATED ORAL at 08:02

## 2022-02-17 RX ADMIN — OXYCODONE HYDROCHLORIDE 10 MG: 10 TABLET ORAL at 11:02

## 2022-02-17 RX ADMIN — ATORVASTATIN CALCIUM 80 MG: 20 TABLET, FILM COATED ORAL at 08:02

## 2022-02-17 RX ADMIN — SODIUM ZIRCONIUM CYCLOSILICATE 5 G: 5 POWDER, FOR SUSPENSION ORAL at 05:02

## 2022-02-17 RX ADMIN — PIPERACILLIN SODIUM AND TAZOBACTAM SODIUM 3.38 G: 3; .375 INJECTION, POWDER, FOR SOLUTION INTRAVENOUS at 08:02

## 2022-02-17 RX ADMIN — HYDRALAZINE HYDROCHLORIDE 50 MG: 50 TABLET ORAL at 08:02

## 2022-02-17 RX ADMIN — HYDRALAZINE HYDROCHLORIDE 25 MG: 25 TABLET, FILM COATED ORAL at 12:02

## 2022-02-17 RX ADMIN — CALCITRIOL 0.5 MCG: 0.5 CAPSULE, LIQUID FILLED ORAL at 08:02

## 2022-02-17 RX ADMIN — ACETAMINOPHEN 1000 MG: 500 TABLET ORAL at 08:02

## 2022-02-17 RX ADMIN — MORPHINE SULFATE 6 MG: 2 INJECTION, SOLUTION INTRAMUSCULAR; INTRAVENOUS at 01:02

## 2022-02-17 RX ADMIN — FUROSEMIDE 80 MG: 10 INJECTION, SOLUTION INTRAMUSCULAR; INTRAVENOUS at 05:02

## 2022-02-17 RX ADMIN — VANCOMYCIN HYDROCHLORIDE 1500 MG: 1.5 INJECTION, POWDER, LYOPHILIZED, FOR SOLUTION INTRAVENOUS at 06:02

## 2022-02-17 NOTE — CONSULTS
[facility] - Transplant Stepdown  Nephrology  Consult Note    Patient Name: Enrique Martinez  MRN: [id number]  Admission Date: 2/16/2022  Hospital Length of Stay: 0 days  Attending Provider: Dianna Tijerina MD   Primary Care Physician: Primary Doctor No  Principal Problem:Subacute osteomyelitis    Inpatient consult to Nephrology  Consult performed by: Harrison Acuna MD  Consult ordered by: Dianna Tijerina MD        Recommendations:    - Recommend HD if patient is amenable. Discussed HD with patient as emergent need in this setting and as a bridge to transplant. Patient continues to decline.   - Continue phos binder sevelamer  - Increase Na bicarb to 1300 TID  - Follow up PTH level  - Hold calcitriol in setting of hyperphosphatemia. Will re-start when phos reaches normal level.   - Trend Cr  - Strict I&Os  - Avoid nephrotoxic agents  - Renally adjust medications     Subjective:     HPI: Mr. Martinez is a 56 y.o. 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 He was recently admitted at [facility] from 2/2-2/4/2022 from LLE cellulitis. He was found to have salmonella bacteremia suspected due to diarrheal illness. He was discharged with cipro for which he reports compliance. He has had no improvement in his pain, redness, or swelling of his left foot and leg. He has also developed a new blister on his left foot, with MRI foot concerning for osteomyelitis and showing soft tissue swelling c/w cellulitis. He is admitted for treatment of suspected osteomyelitis.     He follows with Dr. Cardoza (nephrology), has adamantly declined HD in the past and would rather wait for transplant. He reports being on the transplant list for the past 4 years. His last renal biopsy in  2013 showed 50% glomerulosclerosis. Started on calcitriol 0.5, hx of elevated PTH at 921. Patient complains of decreased appetite, neck cramps, fatigue. He still urinates 4-5 times/day. He reports his B/L LE edema has worsened over the past few weeks. No dyspnea, chest pain, dysuria, hematuria. Nephro consulted for HD initiation.      Labs are significant for hyperkalemia 5.5, bicarb 18, , phosphate 10.4, Cr 19.7, GFR 2.2, ESR 97, CRP 33.4. . CBC significant for Hgb of 7.2, white count 14. Previous echo showed EF of 65%.       Past Medical History:   Diagnosis Date    Anemia     Coronary artery disease     Diabetes mellitus, type 2     GERD (gastroesophageal reflux disease)     Gout     Hydrocele in adult     bilateral    Hyperlipidemia     Hypertension     Inguinal hernia     bilateral    Membranous glomerulonephritis     Nephrotic range proteinuria     Nephrotic syndrome     Obesity     Umbilical hernia        Past Surgical History:   Procedure Laterality Date    ABDOMINAL SURGERY  1980s    CARDIAC CATHETERIZATION  2007    x 2    COLONOSCOPY N/A 4/5/2019    Procedure: COLONOSCOPY;  Surgeon: Jose L Carty MD;  Location: [facility] ([address] [place] [address]);  Service: Colon and Rectal;  Laterality: N/A;    CORONARY STENT PLACEMENT  2007       Review of patient's allergies indicates:  No Known Allergies  Current Facility-Administered Medications   Medication Frequency    acetaminophen tablet 650 mg Q4H PRN    albuterol-ipratropium 2.5 mg-0.5 mg/3 mL nebulizer solution 3 mL Q4H PRN    aluminum-magnesium hydroxide-simethicone 200-200-20 mg/5 mL suspension 30 mL QID PRN    amLODIPine tablet 10 mg Daily    ARIPiprazole tablet 15 mg QHS    aspirin EC tablet 81 mg Daily    atorvastatin tablet 80 mg Daily    calcitRIOL capsule 0.5 mcg Daily    cloNIDine tablet 0.4 mg TID    dextrose 10% bolus 125 mL PRN    dextrose 10% bolus 250 mL PRN    furosemide injection 80 mg Q12H    glucagon (human  recombinant) injection 1 mg PRN    glucose chewable tablet 16 g PRN    glucose chewable tablet 24 g PRN    heparin (porcine) injection 5,000 Units Q8H    hydrALAZINE tablet 25 mg Q8H    labetaloL tablet 200 mg Q12H    melatonin tablet 6 mg Nightly PRN    methocarbamoL tablet 500 mg QID PRN    morphine injection 2 mg Once PRN    naloxone 0.4 mg/mL injection 0.02 mg PRN    ondansetron disintegrating tablet 8 mg Q8H PRN    oxyCODONE immediate release tablet 5 mg Q6H PRN    oxyCODONE immediate release tablet Tab 10 mg Q6H PRN    polyethylene glycol packet 17 g Daily PRN    prochlorperazine injection Soln 5 mg Q6H PRN    sevelamer carbonate tablet 1,600 mg TID    simethicone chewable tablet 80 mg QID PRN    sodium bicarbonate tablet 650 mg BID    sodium chloride 0.9% flush 10 mL Q8H PRN    sodium zirconium cyclosilicate packet 10 g Once     Family History     Problem Relation (Age of Onset)    Drug abuse Brother    Heart attack Father, Brother    Hyperlipidemia Father    Hypertension Father, Brother    No Known Problems Sister    Stroke Father        Tobacco Use    Smoking status: Current Some Day Smoker     Packs/day: 0.25     Years: 10.00     Pack years: 2.50    Smokeless tobacco: Never Used   Substance and Sexual Activity    Alcohol use: No    Drug use: No    Sexual activity: Not on file     Review of Systems   Constitutional: Positive for appetite change, chills and fatigue. Negative for activity change and fever.   HENT: Negative for congestion and trouble swallowing.    Eyes: Negative for photophobia and visual disturbance.   Respiratory: Negative for chest tightness, shortness of breath and wheezing.    Cardiovascular: Positive for leg swelling. Negative for chest pain and palpitations.   Gastrointestinal: Negative for abdominal pain, constipation, nausea and vomiting.   Genitourinary: Negative for dysuria, flank pain, frequency, hematuria and urgency.   Musculoskeletal: Positive for  arthralgias and neck pain. Negative for back pain and gait problem.   Skin: Positive for color change and wound. Negative for rash.   Neurological: Positive for tremors. Negative for syncope.   Psychiatric/Behavioral: Negative for agitation and confusion. The patient is not nervous/anxious.      Objective:     Vital Signs (Most Recent):  Temp: 98.4 °F (36.9 °C) (22 0515)  Pulse: 97 (22 08)  Resp: (!) 24 (22 08)  BP: (!) 174/89 (22 08)  SpO2: 97 % (22 08)  O2 Device (Oxygen Therapy): room air (22 08) Vital Signs (24h Range):  Temp:  [98.3 °F (36.8 °C)-98.4 °F (36.9 °C)] 98.4 °F (36.9 °C)  Pulse:  [] 97  Resp:  [12-38] 24  SpO2:  [97 %-100 %] 97 %  BP: (130-221)/() 174/89     Weight: 74.4 kg (164 lb 0.4 oz) (22 0500)  Body mass index is 26.49 kg/m².  Body surface area is 1.86 meters squared.    I/O last 3 completed shifts:  In: 500 [IV Piggyback:500]  Out: -     Physical Exam  Vitals and nursing note reviewed.   Constitutional:       Appearance: He is well-developed. He is ill-appearing.   HENT:      Head: Normocephalic and atraumatic.      Mouth/Throat:      Mouth: Mucous membranes are moist.   Eyes:      General: No scleral icterus.     Conjunctiva/sclera: Conjunctivae normal.   Cardiovascular:      Rate and Rhythm: Normal rate and regular rhythm.      Heart sounds: Normal heart sounds.   Pulmonary:      Effort: Pulmonary effort is normal. No respiratory distress.      Breath sounds: Normal breath sounds. No wheezing.   Abdominal:      General: Bowel sounds are normal. There is no distension.      Palpations: Abdomen is soft.      Tenderness: There is no abdominal tenderness.   Musculoskeletal:         General: No tenderness. Normal range of motion.      Cervical back: Normal range of motion and neck supple.      Right lower le+ Pitting Edema present.      Left lower le+ Pitting Edema present.   Feet:      Right foot:      Skin integrity: Skin  integrity normal.      Left foot:      Skin integrity: Skin breakdown (Tendern to palpation at wound), erythema, warmth and dry skin present.   Skin:     General: Skin is warm and dry.      Capillary Refill: Capillary refill takes less than 2 seconds.   Neurological:      Mental Status: He is alert.      Motor: Tremor present.   Psychiatric:         Behavior: Behavior normal.         Thought Content: Thought content normal.         Judgment: Judgment normal.         Significant Labs:  Cardiac Markers: No results for input(s): CKMB, TROPONINT, MYOGLOBIN in the last 168 hours.  CBC:   Recent Labs   Lab 02/17/22  0658   WBC 14.09*   RBC 2.63*   HGB 7.2*   HCT 21.9*      MCV 83   MCH 27.4   MCHC 32.9     CMP:   Recent Labs   Lab 02/17/22  0658      CALCIUM 10.1   ALBUMIN 2.6*   PROT 6.1      K 5.2*   CO2 17*      *   CREATININE 18.6*   ALKPHOS 96   ALT 10   AST 14   BILITOT 0.2       Significant Imaging:  MRI: Reviewed  soft tissue swelling L foot, degenerative change of first metatarsophalangeal joint    Assessment/Plan:     * Subacute osteomyelitis  Management per primary team and podiatry.     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 Previously, he has declined HD in the past, never receiving a fistula. He is on the transplant wait list. Patient exhibits signs of uremia including fatigue, poor appetite, and mental slowing. On labs, he is hyperkalemic, uremic, AGMA, hyperphosphatemic. GFR is 2-3, which is reduced from previous GFR 13. Home meds include sodium bicarb, Renvela, and calcitriol. 2+ pitting edema on B/L lower extremities. Renal US from Jan 2022 shows sonographic evidence of chronic medical renal disease and bilateral  simple and minimally complex renal cysts.  sCr one year ago was 4.5, now 18.6. UPCr ratio 1.11 (Dec 2020)    Plan:   - Recommend HD if patient is amenable. Discussed HD with patient as emergent need in this setting and as a bridge to transplant. Patient continues to decline.   - Continue phos binder sevelamer  - Increase Na bicarb to 1300 TID  - Follow up PTH level  - Hold calcitriol in setting of hyperphosphatemia. Will re-start when phos reaches normal level.   - Trend Cr  - Strict I&Os  - Avoid nephrotoxic agents  - Renally adjust medications           Increased anion gap metabolic acidosis  Initial labs significant for bicarb of 18, anion gap 19. AGMA likely 2/2 uremia.     - HD if amenable.   - Increase sodium bicarb to 1300 TID    Hyperphosphatemia  Hyperphosphatemia at 10.9.     - Continue Sevelamer  - follow up PTH level    Hyperkalemia  Hyperkalemic at 5.5, now 5.2. No EKG changes of hyperkalemia on initial EKG.     - Shift as needed by primary.     Patient on waiting list for kidney transplant  Has been on waiting list for 4 years.     Anemia due to chronic kidney disease  Hemoglobin 10.1 one month ago, now 7.2. Normocytic anemia with previously elevated ferritin and low iron.     - F/u iron studies  - May need EPO/IV iron     Renovascular hypertension  Patient remains hypertensive in 170s/89 after admission. On amlodipine, clonidine, labetolol, and hydralazine scheduled.     - Will defer to primary team for management      Thank you for your consult. I will follow-up with patient. Please contact us if you have any additional questions.    Harrison Acuna MD  Nephrology  [facility] - Transplant Stepdown

## 2022-02-17 NOTE — HPI
56 y.o. male with PMHx of CKD V glomerulonephropathy, insulin dependent diabetes mellitus, gout, and CAD who presented to [facility] 2/16/2022 with chief complaint of left ankle pain and swelling x 1 week. Patient was recently admitted to [facility] earlier this month for LLE cellulitis and was discharged home with PO ciprofloxacin which he reports compliance with. Also developed a new blister on his left foot with pain overlying the blister, but reports most of his pain, redness, and swelling is along his left ankle. Patient does not recall any prior trauma/injury to his foot and ankle. He reports waking up one morning with pain to the ankle.     On initial presentation, patient hypertensive with systolic BP in the 200s. WBC 16k ESR 97 CRP 33.4. Xray left foot demonstrating prominent cystic erosive change of the 1st metatarsophalangeal joint consistent with gouty arthropathy and gout history. MRI right forefoot demonstrating regions of focal abnormal signal involving the distal 1st metatarsal and proximal 1st phalanx concerning for foci of subacute osteomyelitis (Yared's abscess) vs cystic degenerative change.

## 2022-02-17 NOTE — PLAN OF CARE
Pt AAO x 4 throughout shift. Pt complaining of severe neck pain - needs MRI but refusing at this time. Pt refusing HD despite multiple MDs speaking with him regarding need for it. BUN and Cr elevated. Pt with free from falls and injury throughout shift. Pt to start IV antibiotics for osteomyelitis.

## 2022-02-17 NOTE — ED NOTES
"Pt heard yelling into hallway; pt states he has "cramps" in his neck; pt told that MD will be made aware; pt responds with cussing and stating, "I would get better care at [facility]!" MD made aware of pt's neck pain.  "

## 2022-02-17 NOTE — NURSING
Pt in wet clothes but patient refusing to allow staff to assist him to change into a gown and out of the wet clothing. Multiple RNs and PCTs have attempted to help patient into new dry and clean clothing but patient refuses. Patient very rude and yelling at RN and MD this AM during initial exam. Pt calling and requesting RN or PCT hold his penis while he urinates. Pt is able to use both hands without issue and has fed himself and taken his medications without problem and thus staff not comfortable with this request.    Charge RN and Unit Director and MD aware of patient behavior.

## 2022-02-17 NOTE — ASSESSMENT & PLAN NOTE
Patient remains hypertensive in 170s/89 after admission. On amlodipine, clonidine, labetolol, and hydralazine scheduled.     - Will defer to primary team for management

## 2022-02-17 NOTE — ED PROVIDER NOTES
Encounter Date: 2/16/2022       History     Chief Complaint   Patient presents with    Ankle Pain     Left ankle pain. No injury. Bilateral ankle edema.      Patient is a 56yoM who presents for continued left foot swelling and pain; pertinent PMHx recent admission at [facility] 2/02-04/2022 for cellulitis of LLE, CKD V glomerulonephropathy, DM 2 on insulin, umbilical hernia, CAD, HLD. Also found to have ERIK on CKD V, though adamently declined dialysis. He was also found to have salmonella bacteremia likely from reported diarrheal illness. Discharged on cipro which he has completed. He reports continuation of left foot pain, swelling and color change despite antibiotics. Also reports new blister to top of left foot, which he believes is  D/t his shoes. Also reports continuation of diarrhea, about 6-7 times per day, no emesis nor abdominal pain. No melenic stool or hematochezia. Compliant with insulin. Denies fever/chills, N/V, decreased UOP, pruritis, confusion.  Of note, he was also diagnosed with gout and is on allopurinol.   The patients available PMH, PSH, Social History, medications, allergies, and triage vital signs were reviewed just prior to their medical evaluation.  A ten point review of systems was completed and is negative except as documented above.  Patient denies any other acute medical complaint.    Please be advised this text was dictated with SKURA software and may contain errors due to translation.           Review of patient's allergies indicates:  No Known Allergies  Past Medical History:   Diagnosis Date    Anemia     Coronary artery disease     Diabetes mellitus, type 2     GERD (gastroesophageal reflux disease)     Gout     Hydrocele in adult     bilateral    Hyperlipidemia     Hypertension     Inguinal hernia     bilateral    Membranous glomerulonephritis     Nephrotic range proteinuria     Nephrotic syndrome     Obesity     Umbilical hernia      Past Surgical History:   Procedure  Laterality Date    ABDOMINAL SURGERY  1980s    CARDIAC CATHETERIZATION  2007    x 2    COLONOSCOPY N/A 4/5/2019    Procedure: COLONOSCOPY;  Surgeon: Jose L Carty MD;  Location: [facility] ([address] [place] [address]);  Service: Colon and Rectal;  Laterality: N/A;    CORONARY STENT PLACEMENT  2007     Family History   Problem Relation Age of Onset    Hypertension Father     Stroke Father     Heart attack Father     Hyperlipidemia Father     No Known Problems Sister     Drug abuse Brother     Heart attack Brother     Hypertension Brother      Social History     Tobacco Use    Smoking status: Current Some Day Smoker     Packs/day: 0.25     Years: 10.00     Pack years: 2.50    Smokeless tobacco: Never Used   Substance Use Topics    Alcohol use: No    Drug use: No     Review of Systems   Constitutional: Negative for chills and fever.   HENT: Negative for sore throat.    Respiratory: Negative for cough and shortness of breath.    Cardiovascular: Positive for leg swelling. Negative for chest pain.   Gastrointestinal: Positive for diarrhea. Negative for abdominal pain, blood in stool, nausea and vomiting.   Genitourinary: Negative for dysuria, flank pain, frequency and penile discharge.   Musculoskeletal: Negative for back pain.   Skin: Positive for wound. Negative for rash.   Neurological: Negative for dizziness, weakness and headaches.   Hematological: Does not bruise/bleed easily.   Psychiatric/Behavioral: Negative for confusion.       Physical Exam     Initial Vitals [02/16/22 1640]   BP Pulse Resp Temp SpO2   (!) 130/94 88 16 98.3 °F (36.8 °C) 99 %      MAP       --         Physical Exam    Nursing note and vitals reviewed.  Constitutional: He appears well-developed and well-nourished. He is not diaphoretic. No distress.   Older than stated age   HENT:   Head: Normocephalic and atraumatic.   Right Ear: External ear normal.   Left Ear: External ear normal.   Mouth/Throat: Oropharynx is clear and moist.   Eyes:  Conjunctivae and EOM are normal. Pupils are equal, round, and reactive to light. No scleral icterus.   Cardiovascular: Normal rate, regular rhythm and intact distal pulses.   Pulmonary/Chest: Breath sounds normal. No respiratory distress. He has no wheezes. He has no rhonchi. He has no rales.   Abdominal: Abdomen is soft. Bowel sounds are normal. There is no abdominal tenderness.   Umbilical hernia, soft There is no rebound and no guarding.   Musculoskeletal:         General: Tenderness and edema present. Normal range of motion.      Comments: BLE edema with hyperpigmentation     Neurological: He is alert and oriented to person, place, and time.   Skin: Skin is warm and dry. Capillary refill takes less than 2 seconds. There is erythema.   LLE ankle and dorsum of foot is warm with reddish hue  Tenderness throughout dorsum of foot    Rupture blister down to subQ tissue, serous leakage    DP faint, good cap refill, toes warm   Psychiatric: He has a normal mood and affect. His behavior is normal. Judgment and thought content normal.             ED Course   Critical Care    Date/Time: 2/17/2022 2:56 PM  Performed by: Hemanth Zhou MD  Authorized by: Hemanth Zhou MD   Direct patient critical care time: 35 minutes  Additional history critical care time: 5 minutes  Ordering / reviewing critical care time: 5 minutes  Documentation critical care time: 5 minutes  Consulting other physicians critical care time: 0 minutes  Consult with family critical care time: 0 minutes  Other critical care time: 0 minutes  Total critical care time (exclusive of procedural time) : 50 minutes  Critical care was necessary to treat or prevent imminent or life-threatening deterioration of the following conditions: renal failure and metabolic crisis.  Critical care was time spent personally by me on the following activities: development of treatment plan with patient or surrogate, blood draw for specimens, interpretation of cardiac output  measurements, evaluation of patient's response to treatment, examination of patient, obtaining history from patient or surrogate, ordering and review of laboratory studies, ordering and performing treatments and interventions, ordering and review of radiographic studies, pulse oximetry, re-evaluation of patient's condition and review of old charts.        Labs Reviewed   CBC W/ AUTO DIFFERENTIAL - Abnormal; Notable for the following components:       Result Value    WBC 16.01 (*)     RBC 2.60 (*)     Hemoglobin 7.4 (*)     Hematocrit 22.2 (*)     RDW 16.2 (*)     Immature Granulocytes 0.7 (*)     Gran # (ANC) 13.4 (*)     Immature Grans (Abs) 0.12 (*)     Mono # 1.3 (*)     Gran % 83.5 (*)     Lymph % 7.6 (*)     All other components within normal limits   COMPREHENSIVE METABOLIC PANEL - Abnormal; Notable for the following components:    Potassium 5.5 (*)     CO2 18 (*)      (*)     Creatinine 19.7 (*)     Albumin 2.7 (*)     Anion Gap 19 (*)     eGFR if  2.6 (*)     eGFR if non  2.2 (*)     All other components within normal limits   SEDIMENTATION RATE - Abnormal; Notable for the following components:    Sed Rate 97 (*)     All other components within normal limits   C-REACTIVE PROTEIN - Abnormal; Notable for the following components:    CRP 33.4 (*)     All other components within normal limits   PHOSPHORUS - Abnormal; Notable for the following components:    Phosphorus 10.4 (*)     All other components within normal limits    Narrative:     add on mg & phos per Hemanth Zhou MD order# [id number] & [id number]    02/16/2022 @ 19:24    CULTURE, STOOL   LACTIC ACID, PLASMA   MAGNESIUM   PHOSPHORUS   MAGNESIUM    Narrative:     add on mg & phos per Hemanth Zhou MD order# [id number] & [id number]    02/16/2022 @ 19:24    WBC, STOOL   SARS-COV-2 RDRP GENE    Narrative:     This test utilizes isothermal nucleic acid amplification   technology to detect the SARS-CoV-2 RdRp nucleic acid  segment.   The analytical sensitivity (limit of detection) is 125 genome   equivalents/mL.   A POSITIVE result implies infection with the SARS-CoV-2 virus;   the patient is presumed to be contagious.     A NEGATIVE result means that SARS-CoV-2 nucleic acids are not   present above the limit of detection. A NEGATIVE result should be   treated as presumptive. It does not rule out the possibility of   COVID-19 and should not be the sole basis for treatment decisions.   If COVID-19 is strongly suspected based on clinical and exposure   history, re-testing using an alternate molecular assay should be   considered.   This test is only for use under the Food and Drug   Administration s Emergency Use Authorization (EUA).   Commercial kits are provided by [employer].   Performance characteristics of the EUA have been independently   verified by [facility] Department of   Pathology and Laboratory Medicine.   _________________________________________________________________   The authorized Fact Sheet for Healthcare Providers and the authorized Fact   Sheet for Patients of the ID NOW COVID-19 are available on the FDA   website:     https://www.fda.gov/media/379653/download  https://www.fda.gov/media/738059/download           EKG Readings: (Independently Interpreted)   Initial Reading: No STEMI. Rhythm: Normal Sinus Rhythm. Heart Rate: 100. T Waves Flipped: V3, V4, V5 and V6. Axis: Normal. [facility] ED EKG2 - Other Findings: LVH.     ECG Results          EKG 12-lead (Final result)  Result time 02/17/22 11:01:34    Final result by Interface, Lab In [facility] (02/17/22 11:01:34)                 Narrative:    Test Reason : E87.5,    Vent. Rate : 101 BPM     Atrial Rate : 101 BPM     P-R Int : 112 ms          QRS Dur : 090 ms      QT Int : 350 ms       P-R-T Axes : 061 026 110 degrees     QTc Int : 453 ms    Sinus tachycardia  Nonspecific ST and T wave abnormality  Abnormal ECG  When compared with ECG of 05-JAN-2021  05:22,  Vent. rate has increased BY  33 BPM  ST now depressed in Anterior leads  Confirmed by Shaggy TRUONG, Benjie YODER ([id number]) on 2/17/2022 11:01:26 AM    Referred By: DARCIE   SELF           Confirmed By:Benjie Coon MD                            Imaging Results           MRI Foot (Midfoot) Left Without Contrast (Final result)  Result time 02/16/22 23:16:48    Final result by Benjie Manzano MD (02/16/22 23:16:48)                 Impression:      Regions of focal abnormal signal involving the distal 1st metatarsal and proximal 1st phalanx as described above.  Findings are concerning for foci of subacute myelitis (Yared's abscess).  Other less likely considerations include cystic degenerative change.    Diffuse skin thickening and subcutaneous soft tissue swelling throughout the left foot, likely representing component of cellulitis, with other non infectious inflammatory process is also considered.    Cystic degenerative change most prominent at the 1st metatarsophalangeal joint.    Reactive edema about the flexor hallucis longus muscle.    This report was flagged in Epic as abnormal.    Electronically signed by resident: Eitan Echavarria  Date:    02/16/2022  Time:    22:37    Electronically signed by: Benjie Manzano  Date:    02/16/2022  Time:    23:16             Narrative:    EXAMINATION:  MRI FOOT (FOREFOOT) LEFT WITHOUT CONTRAST; MRI FOOT (MIDFOOT) LEFT WITHOUT CONTRAST    CLINICAL HISTORY:  Osteomyelitis, foot;    TECHNIQUE:  Multiplanar, multisequence imaging was obtained of the left mid foot and forefoot without the administration of intravenous contrast.    COMPARISON:  Foot radiograph 02/16/2022    FINDINGS:  Osseous structures demonstrate normal marrow signal without evidence for fracture or marrow infiltrative process.  No geographic low signal to suggest osteomyelitis.    Diffuse skin thickening and subcutaneous soft tissue swelling throughout the left foot, with component of cellulitis remain a  consideration.    There are areas of focal abnormal signal involving the distal 1st metatarsal and proximal 1st phalanx (sagittal series 3, image 6; image 9), noting central T1 Iso/hyperintense signal and peripheral T1 hypointense signal.  Additionally, there is trace marrow edema surrounding these abnormal regions.    Remaining metatarsophalangeal joints demonstrate no evidence for effusion, OA, or dorsal osteophytes.    Lisfranc ligament is intact.    Structures of the forefoot and midfoot maintain alignment.    Flexor and extensor tendons are normal in signal and unremarkable.    Increased signal involving the flexor hallucis longus muscle (series 4, image 7), likely reactive in etiology.    Hallux sesamoid complex is unremarkable.    Vascular structures and nerves are unremarkable.    No evidence for Stevenson's neuroma.                                MRI Foot (Forefoot) Left Without Contrast (Final result)  Result time 02/16/22 23:16:48    Final result by Benjie Mnazano MD (02/16/22 23:16:48)                 Impression:      Regions of focal abnormal signal involving the distal 1st metatarsal and proximal 1st phalanx as described above.  Findings are concerning for foci of subacute myelitis (Yared's abscess).  Other less likely considerations include cystic degenerative change.    Diffuse skin thickening and subcutaneous soft tissue swelling throughout the left foot, likely representing component of cellulitis, with other non infectious inflammatory process is also considered.    Cystic degenerative change most prominent at the 1st metatarsophalangeal joint.    Reactive edema about the flexor hallucis longus muscle.    This report was flagged in Epic as abnormal.    Electronically signed by resident: Eitan Echavarria  Date:    02/16/2022  Time:    22:37    Electronically signed by: Benjie Manzano  Date:    02/16/2022  Time:    23:16             Narrative:    EXAMINATION:  MRI FOOT (FOREFOOT) LEFT WITHOUT CONTRAST;  MRI FOOT (MIDFOOT) LEFT WITHOUT CONTRAST    CLINICAL HISTORY:  Osteomyelitis, foot;    TECHNIQUE:  Multiplanar, multisequence imaging was obtained of the left mid foot and forefoot without the administration of intravenous contrast.    COMPARISON:  Foot radiograph 02/16/2022    FINDINGS:  Osseous structures demonstrate normal marrow signal without evidence for fracture or marrow infiltrative process.  No geographic low signal to suggest osteomyelitis.    Diffuse skin thickening and subcutaneous soft tissue swelling throughout the left foot, with component of cellulitis remain a consideration.    There are areas of focal abnormal signal involving the distal 1st metatarsal and proximal 1st phalanx (sagittal series 3, image 6; image 9), noting central T1 Iso/hyperintense signal and peripheral T1 hypointense signal.  Additionally, there is trace marrow edema surrounding these abnormal regions.    Remaining metatarsophalangeal joints demonstrate no evidence for effusion, OA, or dorsal osteophytes.    Lisfranc ligament is intact.    Structures of the forefoot and midfoot maintain alignment.    Flexor and extensor tendons are normal in signal and unremarkable.    Increased signal involving the flexor hallucis longus muscle (series 4, image 7), likely reactive in etiology.    Hallux sesamoid complex is unremarkable.    Vascular structures and nerves are unremarkable.    No evidence for Stevenson's neuroma.                               X-Ray Foot Complete Left (Final result)  Result time 02/16/22 19:23:23    Final result by Jung Mendoza MD (02/16/22 19:23:23)                 Impression:      1. Use edema about the dorsal aspect of the foot extending to the great toe.  There is prominent cystic erosive change of the 1st metatarsophalangeal joint.  Given overlying edema, osteomyelitis cannot be excluded in this setting noting no previous exams are available for comparison.  Differential would include of previous infection,  correlation with any history of gout.      Electronically signed by: Jung Mendoza MD  Date:    02/16/2022  Time:    19:23             Narrative:    EXAMINATION:  XR FOOT COMPLETE 3 VIEW LEFT    CLINICAL HISTORY:  cellulitis, blister;.    TECHNIQUE:  AP, lateral and oblique views of the left foot were performed.    COMPARISON:  None    FINDINGS:  Three views left foot.    There is edema about the great toe.  Erosive changes are noted of the 1st metatarsophalangeal joint.  There is diffuse edema about the dorsal aspect of the foot.  No radiopaque foreign body.  No convincing acute displaced fracture or dislocation.                                 Medications   sodium chloride 0.9% flush 10 mL (has no administration in time range)   polyethylene glycol packet 17 g (has no administration in time range)   ondansetron disintegrating tablet 8 mg (has no administration in time range)   prochlorperazine injection Soln 5 mg (has no administration in time range)   glucose chewable tablet 16 g (has no administration in time range)   glucose chewable tablet 24 g (has no administration in time range)   dextrose 10% bolus 125 mL (has no administration in time range)   dextrose 10% bolus 250 mL (has no administration in time range)   glucagon (human recombinant) injection 1 mg (has no administration in time range)   albuterol-ipratropium 2.5 mg-0.5 mg/3 mL nebulizer solution 3 mL (has no administration in time range)   melatonin tablet 6 mg (6 mg Oral Given 2/18/22 0112)   simethicone chewable tablet 80 mg (has no administration in time range)   aluminum-magnesium hydroxide-simethicone 200-200-20 mg/5 mL suspension 30 mL (has no administration in time range)   naloxone 0.4 mg/mL injection 0.02 mg (has no administration in time range)   heparin (porcine) injection 5,000 Units (5,000 Units Subcutaneous Given 2/18/22 0604)   ARIPiprazole tablet 15 mg (15 mg Oral Given 2/17/22 2025)   aspirin EC tablet 81 mg (81 mg Oral Given  2/17/22 0804)   atorvastatin tablet 80 mg (80 mg Oral Given 2/17/22 0804)   oxyCODONE immediate release tablet 5 mg (has no administration in time range)   amLODIPine tablet 10 mg (10 mg Oral Given 2/17/22 0534)   cloNIDine tablet 0.4 mg (0.4 mg Oral Given 2/17/22 2025)   morphine injection 2 mg (has no administration in time range)   sevelamer carbonate tablet 1,600 mg (1,600 mg Oral Given 2/17/22 2026)   sodium zirconium cyclosilicate packet 10 g (0 g Oral Hold 2/17/22 0830)   labetaloL tablet 200 mg (200 mg Oral Given 2/17/22 2026)   methocarbamoL tablet 750 mg (750 mg Oral Given 2/18/22 0112)   oxyCODONE immediate release tablet Tab 10 mg (10 mg Oral Given by Other 2/17/22 2305)   folic acid tablet 1 mg (has no administration in time range)   acetaminophen tablet 1,000 mg (1,000 mg Oral Given 2/18/22 0604)   colchicine split tablet 0.3 mg (0.3 mg Oral Given 2/17/22 1655)   sodium bicarbonate tablet 650 mg (650 mg Oral Given 2/17/22 2029)   hydrALAZINE tablet 50 mg (50 mg Oral Given 2/18/22 0604)   0.9%  NaCl infusion ( Intravenous Restarted 2/17/22 1645)   vancomycin - pharmacy to dose (has no administration in time range)   piperacillin-tazobactam 3.375 g in dextrose 5 % 50 mL IVPB (ready to mix system) (0 g Intravenous Stopped 2/18/22 0037)   morphine injection 2 mg (has no administration in time range)   HYDROmorphone injection 0.5 mg (0.5 mg Intravenous Given 2/16/22 1921)   sodium chloride 0.9% bolus 500 mL (0 mLs Intravenous Stopped 2/16/22 2023)   sevelamer carbonate tablet 800 mg (800 mg Oral Given 2/16/22 2102)   morphine injection 6 mg (6 mg Intravenous Given 2/17/22 0138)   sodium zirconium cyclosilicate packet 5 g (5 g Oral Given 2/17/22 0518)   furosemide injection 80 mg (80 mg Intravenous Given 2/17/22 0518)   vancomycin 1.5 g in dextrose 5 % 250 mL IVPB (ready to mix) (1,500 mg Intravenous Random Level Due 2/18/22 1800)     Medical Decision Making:   History:   Old Medical Records: I decided to  obtain old medical records.  Old Records Summarized: records from clinic visits and records from previous admission(s).  Initial Assessment:   Patient presents for continued infectious changes of LLE s/p completion of Cipro (LLE cellulitis and salmonella bacteremia). +ERIK on CKD previously refused HD. Hypertensive, afebrile  Differential Diagnosis:   DDx includes cellulits +/- osteomyelitis/deep space infection, metabolic derangements, dehydration. Physical exam and history taking lower clinical suspicion for septic shock, acute ischemia, DVT.   Independently Interpreted Test(s):   I have ordered and independently interpreted X-rays - see prior notes.  I have ordered and independently interpreted EKG Reading(s) - see prior notes  Clinical Tests:   Lab Tests: Ordered and Reviewed  Radiological Study: Ordered and Reviewed  Medical Tests: Ordered and Reviewed  ED Management:  Signed out at shift change pending MRI imaging for osteomyelitis eval. Discussed option of hospital admission as there is concern his infection has failed PO cipro. Though patient wishes to go home if MRI is negative for osteomyelitiis.  Also discussed severe metabolic derangement and progressed renal failure. He again declines dialysis.             Attending Attestation:     Physician Attestation Statement for NP/PA:   I have conducted a face to face encounter with this patient in addition to the NP/PA, due to Medical Complexity    Other NP/PA Attestation Additions:     Physical Exam: Patient with a approximately 2 centimeter superficial ulcer along the lateral aspect of the dorsum of his left foot.  He has some tenderness over that ulcer but does not explicitly extend beyond that.  He does have edema to the bilateral feet which is symmetric overall.  The left foot does appear mildly more erythematous.  I explained to the patient that given his end-stage renal disease, and already failed 1 antibiotic, I do strongly feel that the chance of this  infection improving are very slim.  Despite that, patient wishes to pursue outpatient management.  MRI was obtained to investigate for osteomyelitis.  If negative, will discharge on course of clinda              ED Course as of 02/18/22 0724 Wed Feb 16, 2022   1855 Lactate, Anmol: 0.9 [MF]   1914 Potassium(!): 5.5  Prior 3.0 on 2/3/22 [MF]   1915 Creatinine(!): 19.7  Prior 16.5 on 2/3/22 [MF]   1915 CO2(!): 18  Prior 17 on 2/3/22 [MF]   1915 BUN(!): 198  Prior 161 on 2/3/22 [MF]   1915 Hemoglobin(!): 7.4  Prior 9 on 2/3/22 [MF]   1915 WBC(!): 16.01  Prior 11.7 on 2/3/22 [MF]   2014 Pain significantly improved [MF]      ED Course User Index  [MF] May Cho PA-C             Clinical Impression:   Final diagnoses:  [E87.5] Hyperkalemia  [N17.9, N18.5] Acute renal failure superimposed on stage 5 chronic kidney disease, not on chronic dialysis, unspecified acute renal failure type  [L03.116] Left leg cellulitis (Primary)  [M86.279] Subacute osteomyelitis of foot, unspecified laterality  [M86.20] Subacute osteomyelitis          ED Disposition Condition    Admit               May Cho PA-C  02/17/22 1456       Hemanth Zhou MD  02/18/22 0705

## 2022-02-17 NOTE — SUBJECTIVE & OBJECTIVE
Past Medical History:   Diagnosis Date    Anemia     Coronary artery disease     Diabetes mellitus, type 2     GERD (gastroesophageal reflux disease)     Gout     Hydrocele in adult     bilateral    Hyperlipidemia     Hypertension     Inguinal hernia     bilateral    Membranous glomerulonephritis     Nephrotic range proteinuria     Nephrotic syndrome     Obesity     Umbilical hernia        Past Surgical History:   Procedure Laterality Date    ABDOMINAL SURGERY  1980s    CARDIAC CATHETERIZATION  2007    x 2    COLONOSCOPY N/A 4/5/2019    Procedure: COLONOSCOPY;  Surgeon: Jose L Carty MD;  Location: [facility] ([address] [place] [address]);  Service: Colon and Rectal;  Laterality: N/A;    CORONARY STENT PLACEMENT  2007       Review of patient's allergies indicates:  No Known Allergies    No current facility-administered medications on file prior to encounter.     Current Outpatient Medications on File Prior to Encounter   Medication Sig    albuterol (PROVENTIL/VENTOLIN HFA) 90 mcg/actuation inhaler Inhale 2 puffs into the lungs every 6 (six) hours as needed for Wheezing or Shortness of Breath. Rescue    amlodipine (NORVASC) 10 MG tablet Take 10 mg by mouth once daily.    ARIPiprazole (ABILIFY) 15 MG Tab Take 1 tablet (15 mg total) by mouth every evening.    aspirin (ECOTRIN) 81 MG EC tablet Take 1 tablet (81 mg total) by mouth once daily.    atorvastatin (LIPITOR) 80 MG tablet Take 1 tablet (80 mg total) by mouth once daily.    blood-glucose meter kit Use as instructed    chlorthalidone (HYGROTEN) 50 MG Tab Take 50 mg by mouth once daily.     cloNIDine (CATAPRES) 0.2 MG tablet Take 0.4 mg by mouth 3 (three) times daily.     ergocalciferol (ERGOCALCIFEROL) 50,000 unit Cap Take 50,000 Units by mouth every 30 days.     famotidine (PEPCID) 20 MG tablet Take 20 mg by mouth daily as needed for Heartburn.     ferrous gluconate (FERGON) 324 MG tablet Take 324 mg by mouth 2 (two) times a day.     fluticasone  (FLONASE) 50 mcg/actuation nasal spray 1 spray by Each Nare route once daily.    furosemide (LASIX) 40 MG tablet Take 40 mg by mouth 2 (two) times daily.    methocarbamoL (ROBAXIN) 500 MG Tab Take 500 mg by mouth 2 (two) times a day.    metoprolol tartrate (LOPRESSOR) 100 MG tablet Take 100 mg by mouth.    sodium bicarbonate 650 MG tablet Take 650 mg by mouth.    tadalafiL (CIALIS) 2.5 mg Tab Take 2.5 mg by mouth.    TRUE METRIX GLUCOSE TEST STRIP Strp      Family History     Problem Relation (Age of Onset)    Drug abuse Brother    Heart attack Father, Brother    Hyperlipidemia Father    Hypertension Father, Brother    No Known Problems Sister    Stroke Father        Tobacco Use    Smoking status: Current Some Day Smoker     Packs/day: 0.25     Years: 10.00     Pack years: 2.50    Smokeless tobacco: Never Used   Substance and Sexual Activity    Alcohol use: No    Drug use: No    Sexual activity: Not on file     Review of Systems   Constitutional: Positive for chills. Negative for activity change and fever.   HENT: Negative for congestion and trouble swallowing.    Eyes: Negative for photophobia and visual disturbance.   Respiratory: Negative for chest tightness, shortness of breath and wheezing.    Cardiovascular: Positive for leg swelling. Negative for chest pain and palpitations.   Gastrointestinal: Negative for abdominal pain, constipation, nausea and vomiting.   Genitourinary: Negative for dysuria, flank pain, frequency, hematuria and urgency.   Musculoskeletal: Positive for arthralgias. Negative for back pain and gait problem.   Skin: Positive for color change and wound. Negative for rash.   Neurological: Negative for dizziness, syncope, weakness, light-headedness, numbness and headaches.   Psychiatric/Behavioral: Negative for agitation and confusion. The patient is not nervous/anxious.      Objective:     Vital Signs (Most Recent):  Temp: 98.3 °F (36.8 °C) (02/16/22 1640)  Pulse: 90 (02/17/22  0230)  Resp: 16 (22 0230)  BP: (!) 174/92 (22 0230)  SpO2: 99 % (22 0230) Vital Signs (24h Range):  Temp:  [98.3 °F (36.8 °C)] 98.3 °F (36.8 °C)  Pulse:  [] 90  Resp:  [16-20] 16  SpO2:  [99 %-100 %] 99 %  BP: (130-183)/(84-98) 174/92        There is no height or weight on file to calculate BMI.    Physical Exam  Vitals and nursing note reviewed.   Constitutional:       Appearance: He is well-developed. He is ill-appearing.   HENT:      Head: Normocephalic and atraumatic.      Mouth/Throat:      Mouth: Mucous membranes are moist.   Eyes:      General: No scleral icterus.     Conjunctiva/sclera: Conjunctivae normal.   Cardiovascular:      Rate and Rhythm: Normal rate and regular rhythm.      Heart sounds: Normal heart sounds.   Pulmonary:      Effort: Pulmonary effort is normal. No respiratory distress.      Breath sounds: Normal breath sounds. No wheezing.   Abdominal:      General: Bowel sounds are normal. There is no distension.      Palpations: Abdomen is soft.      Tenderness: There is no abdominal tenderness.   Musculoskeletal:         General: No tenderness. Normal range of motion.      Cervical back: Normal range of motion and neck supple.      Right lower le+ Pitting Edema present.      Left lower le+ Pitting Edema present.   Feet:      Left foot:      Skin integrity: Skin breakdown, erythema and warmth present.   Skin:     General: Skin is warm and dry.      Capillary Refill: Capillary refill takes less than 2 seconds.   Neurological:      Mental Status: He is alert and oriented to person, place, and time.      Cranial Nerves: No cranial nerve deficit.   Psychiatric:         Behavior: Behavior normal.         Thought Content: Thought content normal.         Judgment: Judgment normal.                 Significant Labs:   All pertinent labs within the past 24 hours have been reviewed.  CBC:   Recent Labs   Lab 22  1807   WBC 16.01*   HGB 7.4*   HCT 22.2*        CMP:    Recent Labs   Lab 02/16/22  1807      K 5.5*      CO2 18*   GLU 82   *   CREATININE 19.7*   CALCIUM 10.1   PROT 6.2   ALBUMIN 2.7*   BILITOT 0.3   ALKPHOS 107   AST 14   ALT 11   ANIONGAP 19*   EGFRNONAA 2.2*     Lactic Acid:   Recent Labs   Lab 02/16/22  1807   LACTATE 0.9       Significant Imaging: I have reviewed all pertinent imaging results/findings within the past 24 hours.   MRI Foot (Midfoot) Left Without Contrast  Narrative: EXAMINATION:  MRI FOOT (FOREFOOT) LEFT WITHOUT CONTRAST; MRI FOOT (MIDFOOT) LEFT WITHOUT CONTRAST    CLINICAL HISTORY:  Osteomyelitis, foot;    TECHNIQUE:  Multiplanar, multisequence imaging was obtained of the left mid foot and forefoot without the administration of intravenous contrast.    COMPARISON:  Foot radiograph 02/16/2022    FINDINGS:  Osseous structures demonstrate normal marrow signal without evidence for fracture or marrow infiltrative process.  No geographic low signal to suggest osteomyelitis.    Diffuse skin thickening and subcutaneous soft tissue swelling throughout the left foot, with component of cellulitis remain a consideration.    There are areas of focal abnormal signal involving the distal 1st metatarsal and proximal 1st phalanx (sagittal series 3, image 6; image 9), noting central T1 Iso/hyperintense signal and peripheral T1 hypointense signal.  Additionally, there is trace marrow edema surrounding these abnormal regions.    Remaining metatarsophalangeal joints demonstrate no evidence for effusion, OA, or dorsal osteophytes.    Lisfranc ligament is intact.    Structures of the forefoot and midfoot maintain alignment.    Flexor and extensor tendons are normal in signal and unremarkable.    Increased signal involving the flexor hallucis longus muscle (series 4, image 7), likely reactive in etiology.    Hallux sesamoid complex is unremarkable.    Vascular structures and nerves are unremarkable.    No evidence for Stevenson's neuroma.  Impression:  Regions of focal abnormal signal involving the distal 1st metatarsal and proximal 1st phalanx as described above.  Findings are concerning for foci of subacute myelitis (Yared's abscess).  Other less likely considerations include cystic degenerative change.    Diffuse skin thickening and subcutaneous soft tissue swelling throughout the left foot, likely representing component of cellulitis, with other non infectious inflammatory process is also considered.    Cystic degenerative change most prominent at the 1st metatarsophalangeal joint.    Reactive edema about the flexor hallucis longus muscle.    This report was flagged in Epic as abnormal.    Electronically signed by resident: Eitan Echavarria  Date:    02/16/2022  Time:    22:37    Electronically signed by: Benjie Manzano  Date:    02/16/2022  Time:    23:16  MRI Foot (Forefoot) Left Without Contrast  Narrative: EXAMINATION:  MRI FOOT (FOREFOOT) LEFT WITHOUT CONTRAST; MRI FOOT (MIDFOOT) LEFT WITHOUT CONTRAST    CLINICAL HISTORY:  Osteomyelitis, foot;    TECHNIQUE:  Multiplanar, multisequence imaging was obtained of the left mid foot and forefoot without the administration of intravenous contrast.    COMPARISON:  Foot radiograph 02/16/2022    FINDINGS:  Osseous structures demonstrate normal marrow signal without evidence for fracture or marrow infiltrative process.  No geographic low signal to suggest osteomyelitis.    Diffuse skin thickening and subcutaneous soft tissue swelling throughout the left foot, with component of cellulitis remain a consideration.    There are areas of focal abnormal signal involving the distal 1st metatarsal and proximal 1st phalanx (sagittal series 3, image 6; image 9), noting central T1 Iso/hyperintense signal and peripheral T1 hypointense signal.  Additionally, there is trace marrow edema surrounding these abnormal regions.    Remaining metatarsophalangeal joints demonstrate no evidence for effusion, OA, or dorsal  osteophytes.    Lisfranc ligament is intact.    Structures of the forefoot and midfoot maintain alignment.    Flexor and extensor tendons are normal in signal and unremarkable.    Increased signal involving the flexor hallucis longus muscle (series 4, image 7), likely reactive in etiology.    Hallux sesamoid complex is unremarkable.    Vascular structures and nerves are unremarkable.    No evidence for Stevenson's neuroma.  Impression: Regions of focal abnormal signal involving the distal 1st metatarsal and proximal 1st phalanx as described above.  Findings are concerning for foci of subacute myelitis (Yared's abscess).  Other less likely considerations include cystic degenerative change.    Diffuse skin thickening and subcutaneous soft tissue swelling throughout the left foot, likely representing component of cellulitis, with other non infectious inflammatory process is also considered.    Cystic degenerative change most prominent at the 1st metatarsophalangeal joint.    Reactive edema about the flexor hallucis longus muscle.    This report was flagged in Epic as abnormal.    Electronically signed by resident: Eitan Echavarria  Date:    02/16/2022  Time:    22:37    Electronically signed by: Benjie Manzano  Date:    02/16/2022  Time:    23:16  X-Ray Foot Complete Left  Narrative: EXAMINATION:  XR FOOT COMPLETE 3 VIEW LEFT    CLINICAL HISTORY:  cellulitis, blister;.    TECHNIQUE:  AP, lateral and oblique views of the left foot were performed.    COMPARISON:  None    FINDINGS:  Three views left foot.    There is edema about the great toe.  Erosive changes are noted of the 1st metatarsophalangeal joint.  There is diffuse edema about the dorsal aspect of the foot.  No radiopaque foreign body.  No convincing acute displaced fracture or dislocation.  Impression: 1. Use edema about the dorsal aspect of the foot extending to the great toe.  There is prominent cystic erosive change of the 1st metatarsophalangeal joint.  Given  overlying edema, osteomyelitis cannot be excluded in this setting noting no previous exams are available for comparison.  Differential would include of previous infection, correlation with any history of gout.    Electronically signed by: Jung Mendoza MD  Date:    02/16/2022  Time:    19:23

## 2022-02-17 NOTE — ED NOTES
LOC: The patient is awake, alert and aware of environment with an appropriate affect, the patient is oriented x 3 and speaking appropriately.   APPEARANCE: Patient appears uncomfortable and in acute distress, patient is clean and well groomed. Pt c/o 10/10 foot pain  SKIN: The skin is warm and dry, color consistent with ethnicity. Open blister, redness, swelling, and flaking noted to L foot. R foot also edematous.   MUSCULOSKELETAL: Patient moving all extremities spontaneously, no swelling noted.  RESPIRATORY: Airway is open and patent, respirations are spontaneous, patient has a normal effort and rate, no accessory muscle use noted.  CARDIAC: Patient has a normal rate and regular rhythm, no edema noted, capillary refill < 3 seconds.   GASTRO: Soft and non tender to palpation,  distention noted.   : Pt denies any pain or frequency with urination.  NEURO: Pt opens eyes spontaneously, behavior appropriate to situation, follows commands.

## 2022-02-17 NOTE — H&P
[facility] - Emergency Dept  [facility]  History & Physical    Patient Name: Enrique Martinez  MRN: [id number]  Patient Class: IP- Inpatient  Admission Date: 2/16/2022  Attending Physician: Karena Pimentel MD   Primary Care Provider: Primary Doctor No         Patient information was obtained from patient, past medical records and ER records.     Subjective:     Principal Problem:Subacute osteomyelitis    Chief Complaint:   Chief Complaint   Patient presents with    Ankle Pain     Left ankle pain. No injury. Bilateral ankle edema.         HPI: Enrique Martinez is a 56 y.o. male with a PMHx of CKD V glomerulonephropathy, insulin dependent diabetes mellitus, and CAD who presented tot he ED for evaluation of worsening left foot pain and swelling. He was recently admitted at [facility] from 2/2-2/4/2022 from LLE cellulitis. He was found to have salmonella bacteremia suspected due to diarrheal illness. He was discharged with cipro for which he reports compliance. He has had no improvement in his pain, redness, or swelling of his left foot and leg. He has also developed a new blister on his left foot. He endorses chills. He denies fevers, chest pain, SOB, N/V, decreased urine output, dysuria, and flank pain.    ED: HR 97, other VSSAF. CBC with leukocytosis of 16.01, Hgb 7.4, Hct 22.2.  ESR 97. CRP 33.4. K 5.5. Cr 19.7, . Phos 10.4. Lactic WNL. MRI L foot with cellulitis and osteomyelitis of distal 1st metatarsal and proximal 1st phalanx.      Past Medical History:   Diagnosis Date    Anemia     Coronary artery disease     Diabetes mellitus, type 2     GERD (gastroesophageal reflux disease)     Gout     Hydrocele in adult     bilateral    Hyperlipidemia     Hypertension     Inguinal hernia     bilateral    Membranous glomerulonephritis     Nephrotic range proteinuria     Nephrotic syndrome     Obesity     Umbilical hernia        Past Surgical History:   Procedure Laterality Date    ABDOMINAL SURGERY   1980s    CARDIAC CATHETERIZATION  2007    x 2    COLONOSCOPY N/A 4/5/2019    Procedure: COLONOSCOPY;  Surgeon: Jose L Carty MD;  Location: [facility] ([address] [place] [address]);  Service: Colon and Rectal;  Laterality: N/A;    CORONARY STENT PLACEMENT  2007       Review of patient's allergies indicates:  No Known Allergies    No current facility-administered medications on file prior to encounter.     Current Outpatient Medications on File Prior to Encounter   Medication Sig    albuterol (PROVENTIL/VENTOLIN HFA) 90 mcg/actuation inhaler Inhale 2 puffs into the lungs every 6 (six) hours as needed for Wheezing or Shortness of Breath. Rescue    amlodipine (NORVASC) 10 MG tablet Take 10 mg by mouth once daily.    ARIPiprazole (ABILIFY) 15 MG Tab Take 1 tablet (15 mg total) by mouth every evening.    aspirin (ECOTRIN) 81 MG EC tablet Take 1 tablet (81 mg total) by mouth once daily.    atorvastatin (LIPITOR) 80 MG tablet Take 1 tablet (80 mg total) by mouth once daily.    blood-glucose meter kit Use as instructed    chlorthalidone (HYGROTEN) 50 MG Tab Take 50 mg by mouth once daily.     cloNIDine (CATAPRES) 0.2 MG tablet Take 0.4 mg by mouth 3 (three) times daily.     ergocalciferol (ERGOCALCIFEROL) 50,000 unit Cap Take 50,000 Units by mouth every 30 days.     famotidine (PEPCID) 20 MG tablet Take 20 mg by mouth daily as needed for Heartburn.     ferrous gluconate (FERGON) 324 MG tablet Take 324 mg by mouth 2 (two) times a day.     fluticasone (FLONASE) 50 mcg/actuation nasal spray 1 spray by Each Nare route once daily.    furosemide (LASIX) 40 MG tablet Take 40 mg by mouth 2 (two) times daily.    methocarbamoL (ROBAXIN) 500 MG Tab Take 500 mg by mouth 2 (two) times a day.    metoprolol tartrate (LOPRESSOR) 100 MG tablet Take 100 mg by mouth.    sodium bicarbonate 650 MG tablet Take 650 mg by mouth.    tadalafiL (CIALIS) 2.5 mg Tab Take 2.5 mg by mouth.    TRUE METRIX GLUCOSE TEST STRIP Strp      Family History      Problem Relation (Age of Onset)    Drug abuse Brother    Heart attack Father, Brother    Hyperlipidemia Father    Hypertension Father, Brother    No Known Problems Sister    Stroke Father        Tobacco Use    Smoking status: Current Some Day Smoker     Packs/day: 0.25     Years: 10.00     Pack years: 2.50    Smokeless tobacco: Never Used   Substance and Sexual Activity    Alcohol use: No    Drug use: No    Sexual activity: Not on file     Review of Systems   Constitutional: Positive for chills. Negative for activity change and fever.   HENT: Negative for congestion and trouble swallowing.    Eyes: Negative for photophobia and visual disturbance.   Respiratory: Negative for chest tightness, shortness of breath and wheezing.    Cardiovascular: Positive for leg swelling. Negative for chest pain and palpitations.   Gastrointestinal: Negative for abdominal pain, constipation, nausea and vomiting.   Genitourinary: Negative for dysuria, flank pain, frequency, hematuria and urgency.   Musculoskeletal: Positive for arthralgias. Negative for back pain and gait problem.   Skin: Positive for color change and wound. Negative for rash.   Neurological: Negative for dizziness, syncope, weakness, light-headedness, numbness and headaches.   Psychiatric/Behavioral: Negative for agitation and confusion. The patient is not nervous/anxious.      Objective:     Vital Signs (Most Recent):  Temp: 98.3 °F (36.8 °C) (02/16/22 1640)  Pulse: 90 (02/17/22 0230)  Resp: 16 (02/17/22 0230)  BP: (!) 174/92 (02/17/22 0230)  SpO2: 99 % (02/17/22 0230) Vital Signs (24h Range):  Temp:  [98.3 °F (36.8 °C)] 98.3 °F (36.8 °C)  Pulse:  [] 90  Resp:  [16-20] 16  SpO2:  [99 %-100 %] 99 %  BP: (130-183)/(84-98) 174/92        There is no height or weight on file to calculate BMI.    Physical Exam  Vitals and nursing note reviewed.   Constitutional:       Appearance: He is well-developed. He is ill-appearing.   HENT:      Head: Normocephalic and  atraumatic.      Mouth/Throat:      Mouth: Mucous membranes are moist.   Eyes:      General: No scleral icterus.     Conjunctiva/sclera: Conjunctivae normal.   Cardiovascular:      Rate and Rhythm: Normal rate and regular rhythm.      Heart sounds: Normal heart sounds.   Pulmonary:      Effort: Pulmonary effort is normal. No respiratory distress.      Breath sounds: Normal breath sounds. No wheezing.   Abdominal:      General: Bowel sounds are normal. There is no distension.      Palpations: Abdomen is soft.      Tenderness: There is no abdominal tenderness.   Musculoskeletal:         General: No tenderness. Normal range of motion.      Cervical back: Normal range of motion and neck supple.      Right lower le+ Pitting Edema present.      Left lower le+ Pitting Edema present.   Feet:      Left foot:      Skin integrity: Skin breakdown, erythema and warmth present.   Skin:     General: Skin is warm and dry.      Capillary Refill: Capillary refill takes less than 2 seconds.   Neurological:      Mental Status: He is alert and oriented to person, place, and time.      Cranial Nerves: No cranial nerve deficit.   Psychiatric:         Behavior: Behavior normal.         Thought Content: Thought content normal.         Judgment: Judgment normal.                 Significant Labs:   All pertinent labs within the past 24 hours have been reviewed.  CBC:   Recent Labs   Lab 22  1807   WBC 16.01*   HGB 7.4*   HCT 22.2*        CMP:   Recent Labs   Lab 22  1807      K 5.5*      CO2 18*   GLU 82   *   CREATININE 19.7*   CALCIUM 10.1   PROT 6.2   ALBUMIN 2.7*   BILITOT 0.3   ALKPHOS 107   AST 14   ALT 11   ANIONGAP 19*   EGFRNONAA 2.2*     Lactic Acid:   Recent Labs   Lab 22  1807   LACTATE 0.9       Significant Imaging: I have reviewed all pertinent imaging results/findings within the past 24 hours.   MRI Foot (Midfoot) Left Without Contrast  Narrative: EXAMINATION:  MRI FOOT  (FOREFOOT) LEFT WITHOUT CONTRAST; MRI FOOT (MIDFOOT) LEFT WITHOUT CONTRAST    CLINICAL HISTORY:  Osteomyelitis, foot;    TECHNIQUE:  Multiplanar, multisequence imaging was obtained of the left mid foot and forefoot without the administration of intravenous contrast.    COMPARISON:  Foot radiograph 02/16/2022    FINDINGS:  Osseous structures demonstrate normal marrow signal without evidence for fracture or marrow infiltrative process.  No geographic low signal to suggest osteomyelitis.    Diffuse skin thickening and subcutaneous soft tissue swelling throughout the left foot, with component of cellulitis remain a consideration.    There are areas of focal abnormal signal involving the distal 1st metatarsal and proximal 1st phalanx (sagittal series 3, image 6; image 9), noting central T1 Iso/hyperintense signal and peripheral T1 hypointense signal.  Additionally, there is trace marrow edema surrounding these abnormal regions.    Remaining metatarsophalangeal joints demonstrate no evidence for effusion, OA, or dorsal osteophytes.    Lisfranc ligament is intact.    Structures of the forefoot and midfoot maintain alignment.    Flexor and extensor tendons are normal in signal and unremarkable.    Increased signal involving the flexor hallucis longus muscle (series 4, image 7), likely reactive in etiology.    Hallux sesamoid complex is unremarkable.    Vascular structures and nerves are unremarkable.    No evidence for Stevenson's neuroma.  Impression: Regions of focal abnormal signal involving the distal 1st metatarsal and proximal 1st phalanx as described above.  Findings are concerning for foci of subacute myelitis (Yared's abscess).  Other less likely considerations include cystic degenerative change.    Diffuse skin thickening and subcutaneous soft tissue swelling throughout the left foot, likely representing component of cellulitis, with other non infectious inflammatory process is also considered.    Cystic  degenerative change most prominent at the 1st metatarsophalangeal joint.    Reactive edema about the flexor hallucis longus muscle.    This report was flagged in Epic as abnormal.    Electronically signed by resident: Eitan Echavarria  Date:    02/16/2022  Time:    22:37    Electronically signed by: Benjie Manzano  Date:    02/16/2022  Time:    23:16  MRI Foot (Forefoot) Left Without Contrast  Narrative: EXAMINATION:  MRI FOOT (FOREFOOT) LEFT WITHOUT CONTRAST; MRI FOOT (MIDFOOT) LEFT WITHOUT CONTRAST    CLINICAL HISTORY:  Osteomyelitis, foot;    TECHNIQUE:  Multiplanar, multisequence imaging was obtained of the left mid foot and forefoot without the administration of intravenous contrast.    COMPARISON:  Foot radiograph 02/16/2022    FINDINGS:  Osseous structures demonstrate normal marrow signal without evidence for fracture or marrow infiltrative process.  No geographic low signal to suggest osteomyelitis.    Diffuse skin thickening and subcutaneous soft tissue swelling throughout the left foot, with component of cellulitis remain a consideration.    There are areas of focal abnormal signal involving the distal 1st metatarsal and proximal 1st phalanx (sagittal series 3, image 6; image 9), noting central T1 Iso/hyperintense signal and peripheral T1 hypointense signal.  Additionally, there is trace marrow edema surrounding these abnormal regions.    Remaining metatarsophalangeal joints demonstrate no evidence for effusion, OA, or dorsal osteophytes.    Lisfranc ligament is intact.    Structures of the forefoot and midfoot maintain alignment.    Flexor and extensor tendons are normal in signal and unremarkable.    Increased signal involving the flexor hallucis longus muscle (series 4, image 7), likely reactive in etiology.    Hallux sesamoid complex is unremarkable.    Vascular structures and nerves are unremarkable.    No evidence for Stevenson's neuroma.  Impression: Regions of focal abnormal signal involving the distal 1st  metatarsal and proximal 1st phalanx as described above.  Findings are concerning for foci of subacute myelitis (Yared's abscess).  Other less likely considerations include cystic degenerative change.    Diffuse skin thickening and subcutaneous soft tissue swelling throughout the left foot, likely representing component of cellulitis, with other non infectious inflammatory process is also considered.    Cystic degenerative change most prominent at the 1st metatarsophalangeal joint.    Reactive edema about the flexor hallucis longus muscle.    This report was flagged in Epic as abnormal.    Electronically signed by resident: Eitan Echavarria  Date:    02/16/2022  Time:    22:37    Electronically signed by: Benjie Manzano  Date:    02/16/2022  Time:    23:16  X-Ray Foot Complete Left  Narrative: EXAMINATION:  XR FOOT COMPLETE 3 VIEW LEFT    CLINICAL HISTORY:  cellulitis, blister;.    TECHNIQUE:  AP, lateral and oblique views of the left foot were performed.    COMPARISON:  None    FINDINGS:  Three views left foot.    There is edema about the great toe.  Erosive changes are noted of the 1st metatarsophalangeal joint.  There is diffuse edema about the dorsal aspect of the foot.  No radiopaque foreign body.  No convincing acute displaced fracture or dislocation.  Impression: 1. Use edema about the dorsal aspect of the foot extending to the great toe.  There is prominent cystic erosive change of the 1st metatarsophalangeal joint.  Given overlying edema, osteomyelitis cannot be excluded in this setting noting no previous exams are available for comparison.  Differential would include of previous infection, correlation with any history of gout.    Electronically signed by: Jung Mendoza MD  Date:    02/16/2022  Time:    19:23        Assessment/Plan:     * Subacute osteomyelitis  - 2/4 SIRS with HR>90, WBC 16  - lactic WNL  - ESR 97, CRP 33.4  - MRI w/ regions of focal abnormal signal involving the distal 1st metatarsal and  proximal 1st phalanx as described above.  Findings are concerning for foci of subacute myelitis   - patient is hemodynamically stable, will hold off on antibiotics at this time in anticipation of biospy but will start if signs/symptoms worsen  - podiatry consulted, appreciate recs    Acute renal failure superimposed on stage 5 chronic kidney disease, not on chronic dialysis  Hyperkalemia  Hyperphosphatemia  Metabolic acidosis  Kidney transplant candidate    - 2/2 membranous glomerulopathy  - GFR was 13 in January 2022. Labs repeated today and GFR is 2.6  - patient follows with Dr. Cardoza and dialysis has been discussed multiple times, patient continues to refuse.  - on transplant list with [facility]  - UA, urine electrolytes ordered  - KTM consulted  - nephrology consulted  - continue sodium bicarb  - start renvela   - renal, fluid restricted diet    GERD (gastroesophageal reflux disease)  - continue PPI    S/P coronary artery stent placement  - continue ASA, statin    Type 2 diabetes mellitus  - home meds: lantus 5 units daily  - SSI   - ACHS accuchecks  - diabetic diet    Anemia due to chronic kidney disease  - H/H stable, at baseline    Hyperlipidemia  - continue statin    Renovascular hypertension  - continue amlodipine, clonidine, chlorthalidone  - patient reports on metoprolol but unsure of dose      VTE Risk Mitigation (From admission, onward)         Ordered     heparin (porcine) injection 5,000 Units  Every 8 hours         02/17/22 0151     IP VTE HIGH RISK PATIENT  Once         02/17/22 0151     Place sequential compression device  Until discontinued         02/17/22 0151                   Amber Castano PA-C  Department of Hospital Medicine   [facility] - Emergency Dept

## 2022-02-17 NOTE — ASSESSMENT & PLAN NOTE
Hyperkalemic at 5.5, now 5.2. No EKG changes of hyperkalemia on initial EKG.     - Shift as needed by primary.

## 2022-02-17 NOTE — ASSESSMENT & PLAN NOTE
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 Previously, he has declined HD in the past, never receiving a fistula. He is on the transplant wait list. Patient exhibits signs of uremia including fatigue, poor appetite, and mental slowing. On labs, he is hyperkalemic, uremic, AGMA, hyperphosphatemic. GFR is 2-3, which is reduced from previous GFR 13. Home meds include sodium bicarb, Renvela, and calcitriol. 2+ pitting edema on B/L lower extremities. Renal US from Jan 2022 shows sonographic evidence of chronic medical renal disease and bilateral simple and minimally complex renal cysts.  sCr one year ago was 4.5, now 18.6. UPCr ratio 1.11 (Dec 2020)  2/18: Patient increasingly somnolent, lethargic.     Plan:   - Recommend HD if patient is amenable. Discussed HD with patient as possible emergent need in this setting and as a bridge to transplant. Patient continues to decline. Discussed extensively that his labs and clinical decline from uremia would preclude him from getting a kidney should one become available, rendering his time on the wait list pointless.   - Consider palliative care consult for [facility] as patient's uremia is worsening to the point where he may not have capacity eventually  - Shift potassium as needed  - Continue phos binder sevelamer 1600 TID  - Continue Nabicarb 650 TID  - Hold calcitriol in setting of hyperphosphatemia. Will re-start when phos reaches normal level.   - Trend Cr  - Strict I&Os  - Avoid nephrotoxic agents  - Renally adjust medications

## 2022-02-17 NOTE — DISCHARGE INSTRUCTIONS
-take antibiotics until complete, follow up with wound care  -follow up with nephrology  -return to ER for worsening presentation or confusion

## 2022-02-17 NOTE — SUBJECTIVE & OBJECTIVE
Past Medical History:   Diagnosis Date    Anemia     Coronary artery disease     Diabetes mellitus, type 2     GERD (gastroesophageal reflux disease)     Gout     Hydrocele in adult     bilateral    Hyperlipidemia     Hypertension     Inguinal hernia     bilateral    Membranous glomerulonephritis     Nephrotic range proteinuria     Nephrotic syndrome     Obesity     Umbilical hernia        Past Surgical History:   Procedure Laterality Date    ABDOMINAL SURGERY  1980s    CARDIAC CATHETERIZATION  2007    x 2    COLONOSCOPY N/A 4/5/2019    Procedure: COLONOSCOPY;  Surgeon: Jose L Carty MD;  Location: [facility] ([address] [place] [address]);  Service: Colon and Rectal;  Laterality: N/A;    CORONARY STENT PLACEMENT  2007       Review of patient's allergies indicates:  No Known Allergies  Current Facility-Administered Medications   Medication Frequency    acetaminophen tablet 650 mg Q4H PRN    albuterol-ipratropium 2.5 mg-0.5 mg/3 mL nebulizer solution 3 mL Q4H PRN    aluminum-magnesium hydroxide-simethicone 200-200-20 mg/5 mL suspension 30 mL QID PRN    amLODIPine tablet 10 mg Daily    ARIPiprazole tablet 15 mg QHS    aspirin EC tablet 81 mg Daily    atorvastatin tablet 80 mg Daily    calcitRIOL capsule 0.5 mcg Daily    cloNIDine tablet 0.4 mg TID    dextrose 10% bolus 125 mL PRN    dextrose 10% bolus 250 mL PRN    furosemide injection 80 mg Q12H    glucagon (human recombinant) injection 1 mg PRN    glucose chewable tablet 16 g PRN    glucose chewable tablet 24 g PRN    heparin (porcine) injection 5,000 Units Q8H    hydrALAZINE tablet 25 mg Q8H    labetaloL tablet 200 mg Q12H    melatonin tablet 6 mg Nightly PRN    methocarbamoL tablet 500 mg QID PRN    morphine injection 2 mg Once PRN    naloxone 0.4 mg/mL injection 0.02 mg PRN    ondansetron disintegrating tablet 8 mg Q8H PRN    oxyCODONE immediate release tablet 5 mg Q6H PRN    oxyCODONE immediate release tablet Tab 10 mg Q6H PRN     polyethylene glycol packet 17 g Daily PRN    prochlorperazine injection Soln 5 mg Q6H PRN    sevelamer carbonate tablet 1,600 mg TID    simethicone chewable tablet 80 mg QID PRN    sodium bicarbonate tablet 650 mg BID    sodium chloride 0.9% flush 10 mL Q8H PRN    sodium zirconium cyclosilicate packet 10 g Once     Family History     Problem Relation (Age of Onset)    Drug abuse Brother    Heart attack Father, Brother    Hyperlipidemia Father    Hypertension Father, Brother    No Known Problems Sister    Stroke Father        Tobacco Use    Smoking status: Current Some Day Smoker     Packs/day: 0.25     Years: 10.00     Pack years: 2.50    Smokeless tobacco: Never Used   Substance and Sexual Activity    Alcohol use: No    Drug use: No    Sexual activity: Not on file     Review of Systems   Constitutional: Positive for appetite change, chills and fatigue. Negative for activity change and fever.   HENT: Negative for congestion and trouble swallowing.    Eyes: Negative for photophobia and visual disturbance.   Respiratory: Negative for chest tightness, shortness of breath and wheezing.    Cardiovascular: Positive for leg swelling. Negative for chest pain and palpitations.   Gastrointestinal: Negative for abdominal pain, constipation, nausea and vomiting.   Genitourinary: Negative for dysuria, flank pain, frequency, hematuria and urgency.   Musculoskeletal: Positive for arthralgias and neck pain. Negative for back pain and gait problem.   Skin: Positive for color change and wound. Negative for rash.   Neurological: Positive for tremors. Negative for syncope.   Psychiatric/Behavioral: Negative for agitation and confusion. The patient is not nervous/anxious.      Objective:     Vital Signs (Most Recent):  Temp: 98.4 °F (36.9 °C) (02/17/22 0515)  Pulse: 97 (02/17/22 0820)  Resp: (!) 24 (02/17/22 0820)  BP: (!) 174/89 (02/17/22 0820)  SpO2: 97 % (02/17/22 0820)  O2 Device (Oxygen Therapy): room air (02/17/22 0820)  Vital Signs (24h Range):  Temp:  [98.3 °F (36.8 °C)-98.4 °F (36.9 °C)] 98.4 °F (36.9 °C)  Pulse:  [] 97  Resp:  [12-38] 24  SpO2:  [97 %-100 %] 97 %  BP: (130-221)/() 174/89     Weight: 74.4 kg (164 lb 0.4 oz) (22 0500)  Body mass index is 26.49 kg/m².  Body surface area is 1.86 meters squared.    I/O last 3 completed shifts:  In: 500 [IV Piggyback:500]  Out: -     Physical Exam  Vitals and nursing note reviewed.   Constitutional:       Appearance: He is well-developed. He is ill-appearing.   HENT:      Head: Normocephalic and atraumatic.      Mouth/Throat:      Mouth: Mucous membranes are moist.   Eyes:      General: No scleral icterus.     Conjunctiva/sclera: Conjunctivae normal.   Cardiovascular:      Rate and Rhythm: Normal rate and regular rhythm.      Heart sounds: Normal heart sounds.   Pulmonary:      Effort: Pulmonary effort is normal. No respiratory distress.      Breath sounds: Normal breath sounds. No wheezing.   Abdominal:      General: Bowel sounds are normal. There is no distension.      Palpations: Abdomen is soft.      Tenderness: There is no abdominal tenderness.   Musculoskeletal:         General: No tenderness. Normal range of motion.      Cervical back: Normal range of motion and neck supple.      Right lower le+ Pitting Edema present.      Left lower le+ Pitting Edema present.   Feet:      Right foot:      Skin integrity: Skin integrity normal.      Left foot:      Skin integrity: Skin breakdown (Tendern to palpation at wound), erythema, warmth and dry skin present.   Skin:     General: Skin is warm and dry.      Capillary Refill: Capillary refill takes less than 2 seconds.   Neurological:      Mental Status: He is alert.      Motor: Tremor present.   Psychiatric:         Behavior: Behavior normal.         Thought Content: Thought content normal.         Judgment: Judgment normal.         Significant Labs:  Cardiac Markers: No results for input(s): CKMB, TROPONINT,  MYOGLOBIN in the last 168 hours.  CBC:   Recent Labs   Lab 02/17/22  0658   WBC 14.09*   RBC 2.63*   HGB 7.2*   HCT 21.9*      MCV 83   MCH 27.4   MCHC 32.9     CMP:   Recent Labs   Lab 02/17/22  0658      CALCIUM 10.1   ALBUMIN 2.6*   PROT 6.1      K 5.2*   CO2 17*      *   CREATININE 18.6*   ALKPHOS 96   ALT 10   AST 14   BILITOT 0.2       Significant Imaging:  MRI: Reviewed  soft tissue swelling L foot, degenerative change of first metatarsophalangeal joint

## 2022-02-17 NOTE — PLAN OF CARE
"Seen this AM with nursing at bedside.  He was very agitated at times during exam and has been agitated with nursing throughout the morning.  His main complaint is neck and left shoulder pain and severe pain to the left foot, ankle with even light touch removing the blanket. Has reported gout concers at [facility] in early February but has open blister now. MRI showing signs of possible subacute myelitis vs cystic degenerative changes, inflammation as well as possile cellulitis of soft tissue. On exam, no redness but very tender. Wbc of 16 with CRP 33, ESR 97. Antibiotics not initiated overnight to awaiti podiatry recs to increase culture yield if sampling needed as no signs of sepsis. Afebrile at home he reports. Had salmonella bacteremia on 2/1 at [facility] and treated with cipro for 2 weeks, end date would be 2/15 he says he took all of medication at home. Concern with neck pain could have seeded infection as no hitsory of this in chart and he denies it before. He says its muscular but cannot know this without imaging. Xray ordered of C spine to start. Blood Cx pending.   Podiatry ordered X ray ankle. Will f/u recs and possible ID consult pending thoughts.  If fevers- start antibiotic coverage now.    Other major issue is CKD 5.  Cr was 4 here, at [facility] it was 6--16 per their notes in a week on 2/1. On dc was still 16. It was 19--18 on admit here. Phos was 10 (around 10 on their labs also) and K was 5.5, shifted and K improved. Phos binders increased as well as na bicarb for anion gap metabolic acidosis. Hold calcitriol per renal  [facility]. He is very defensive when asked which doctors he sees and he says "it sholdu be in the chart why are you asking me these things" and yells at me for asking him basic questions. I told him I dont trust other peoples histories including doctors and I take my own history for everything.  Robaxin and oxy started for neck pain in interim.  He is having good urine output " and says he was at home also. He is being very difficult with nursing about using urinal and having output cannot quantify due to urinating in bed, nephro notified to see if would like patel for adequate I/O.   BP meds adjusted given nearly 200 systolic on admit. Stopped chlorthalidone given advanced CKD. Added labetalol and hydrlalazine. He said hes been on all of the eds in the past before in combinations. BP improvign now 130-160s. Have room to inc hydralazine PRN.  A1C in 7's.     Hg 7's, unclear etiology. No signs of bleeding. Folate 4 so relatively deficient and replacement start. b12 ok. Ferritin 300s.     If goes below 7 may need blood tomorrow.    I asked him about dialysi as has refused before and why he hasnt wanted it. He said katlyn he knows who has gone on it has . I asked of what and he said hert issues and had their legs amputated. I told them these people lkely had HD inititated from DM and his is from something else so his risk is different but his risk of acutely having an arrythmia from electrolyte abnormality is more concerning that could cause sudden death. I asked him if people he know who had transplants had  and he dint really answer this. He is on waiting list for transplant.  I told him that I dont blame him for not wanting HD as its not fun to sit in a chair for  4 hours a day but that im concerned he needs it for his health now and asked if it was worth potentially dying or having that risk for not getting dialysis for something we could fix and he said yes. I asked if his family would agree of that and could I talk to his ayanayl and he said not right now and that they would agree with him. He was tearful. He said a lot of doctors have tried to convince him to do this and done this.  I said that I wasn't trying to convince him or anything for reasons of my own or for my health that I was trying to let him know and tell him the reasons its important in this setting for his own  health, and he said he knows that. He continued to state he doesn't want to do HD. itold him to think about it and that the nephro team was coming and they likely would reiterate this as well.     On exam he had left C spine neck pain with any movement In bed, he had left foot pain with light touch and movement, open blister seen. Swelling to bilateral ankles with R>L to mid shin. He had aterixis on exam with bilateral hand tremors on outstretching arms.  There was reports of myoclonic jerks overnight.    Signs of uremia based on exam and has multiple indications for dialysis including signs of uremia, electrolyte abnormalities, volume overload. Discussed with renal and recs and they will f/u with staff further on rounds regarding his refusal of HD.      Will f/u podiatry recs, xrays

## 2022-02-17 NOTE — ASSESSMENT & PLAN NOTE
- 2/4 SIRS with HR>90, WBC 16  - lactic WNL  - ESR 97, CRP 33.4  - MRI w/ regions of focal abnormal signal involving the distal 1st metatarsal and proximal 1st phalanx as described above.  Findings are concerning for foci of subacute myelitis   - patient is hemodynamically stable, will hold off on antibiotics at this time in anticipation of biospy but will start if signs/symptoms worsen  - podiatry consulted, appreciate recs

## 2022-02-17 NOTE — ASSESSMENT & PLAN NOTE
Chief complaint of left ankle pain with increased warmth, erythema, edema, and pain with ankle range of motion. Xray left ankle ordered noting no fractures or effusions. Ddx diagnosis include acute gout ankle arthropathy, cellulitis LLE, septic ankle arthritis low on the differentials.     Plan:   Continue colchicine  Follow up uric acid levels  Will consider left ankle joint aspiration with steroid injection if ankle pain does not improve  No weightbearing restrictions  Podiatry will continue to follow

## 2022-02-17 NOTE — SUBJECTIVE & OBJECTIVE
Scheduled Meds:   acetaminophen  1,000 mg Oral Q8H    amLODIPine  10 mg Oral Daily    ARIPiprazole  15 mg Oral QHS    aspirin  81 mg Oral Daily    atorvastatin  80 mg Oral Daily    cloNIDine  0.4 mg Oral TID    colchicine  0.3 mg Oral BID    [START ON 2/18/2022] folic acid  1 mg Oral Daily    heparin (porcine)  5,000 Units Subcutaneous Q8H    hydrALAZINE  50 mg Oral Q8H    labetalol  200 mg Oral Q12H    piperacillin-tazobactam (ZOSYN) IVPB  3.375 g Intravenous Q12H    sevelamer carbonate  1,600 mg Oral TID    sodium bicarbonate  650 mg Oral TID    sodium zirconium cyclosilicate  10 g Oral Once     Continuous Infusions:   sodium chloride 0.9% 50 mL/hr at 02/17/22 1645     PRN Meds:albuterol-ipratropium, aluminum-magnesium hydroxide-simethicone, dextrose 10%, dextrose 10%, glucagon (human recombinant), glucose, glucose, melatonin, methocarbamoL, morphine, naloxone, ondansetron, oxyCODONE, oxyCODONE, polyethylene glycol, prochlorperazine, simethicone, sodium chloride 0.9%, Pharmacy to dose Vancomycin consult **AND** vancomycin - pharmacy to dose    Review of patient's allergies indicates:  No Known Allergies     Past Medical History:   Diagnosis Date    Anemia     Coronary artery disease     Diabetes mellitus, type 2     GERD (gastroesophageal reflux disease)     Gout     Hydrocele in adult     bilateral    Hyperlipidemia     Hypertension     Inguinal hernia     bilateral    Membranous glomerulonephritis     Nephrotic range proteinuria     Nephrotic syndrome     Obesity     Umbilical hernia      Past Surgical History:   Procedure Laterality Date    ABDOMINAL SURGERY  1980s    CARDIAC CATHETERIZATION  2007    x 2    COLONOSCOPY N/A 4/5/2019    Procedure: COLONOSCOPY;  Surgeon: Jose L Carty MD;  Location: [facility] ([address] [place] [address]);  Service: Colon and Rectal;  Laterality: N/A;    CORONARY STENT PLACEMENT  2007       Family History     Problem Relation (Age of Onset)    Drug abuse  Brother    Heart attack Father, Brother    Hyperlipidemia Father    Hypertension Father, Brother    No Known Problems Sister    Stroke Father        Tobacco Use    Smoking status: Current Some Day Smoker     Packs/day: 0.25     Years: 10.00     Pack years: 2.50    Smokeless tobacco: Never Used   Substance and Sexual Activity    Alcohol use: No    Drug use: No    Sexual activity: Not on file     Review of Systems   Constitutional: Positive for activity change. Negative for appetite change, chills and fever.   Respiratory: Negative for cough, shortness of breath and wheezing.    Cardiovascular: Positive for leg swelling.   Gastrointestinal: Negative for nausea and vomiting.   Musculoskeletal: Positive for arthralgias and myalgias. Negative for gait problem.   Skin: Positive for wound. Negative for color change.   Neurological: Negative for weakness and numbness.   Psychiatric/Behavioral: Positive for agitation.     Objective:     Vital Signs (Most Recent):  Temp: 97.7 °F (36.5 °C) (02/17/22 1646)  Pulse: 82 (02/17/22 1646)  Resp: 20 (02/17/22 1437)  BP: (!) 168/95 (02/17/22 1646)  SpO2: 99 % (02/17/22 1646) Vital Signs (24h Range):  Temp:  [97.7 °F (36.5 °C)-98.4 °F (36.9 °C)] 97.7 °F (36.5 °C)  Pulse:  [] 82  Resp:  [12-38] 20  SpO2:  [97 %-100 %] 99 %  BP: (132-221)/() 168/95     Weight: 74.4 kg (164 lb 0.4 oz)  Body mass index is 26.47 kg/m².    Foot Exam    General  Orientation: alert and oriented to person, place, and time       Right Foot/Ankle     Inspection and Palpation  Tenderness: none   Swelling: (Pitting edema to bilateral lower extremities, L>R)  Skin Exam: skin intact; no drainage, no cellulitis, no abnormal color, no ulcer and no erythema     Neurovascular  Dorsalis pedis: 2+  Posterior tibial: 1+      Left Foot/Ankle      Inspection and Palpation  Tenderness: (Anterior ankle joint,dorsal foot overlying deroofed blister)  Swelling: (Pitting edema to bilateral lower extremities,  L>R)  Skin Exam: cellulitis and erythema; no drainage and no ulcer     Neurovascular  Dorsalis pedis: 2+  Posterior tibial: 1+            Laboratory:  CBC:   Recent Labs   Lab 02/17/22  0658   WBC 14.09*   RBC 2.63*   HGB 7.2*   HCT 21.9*      MCV 83   MCH 27.4   MCHC 32.9     CRP:   Recent Labs   Lab 02/16/22 1807   CRP 33.4*     ESR:   Recent Labs   Lab 02/16/22 1807   SEDRATE 97*     Wound Cultures: No results for input(s): LABAERO in the last 4320 hours.  Microbiology Results (last 7 days)     Procedure Component Value Units Date/Time    Blood Culture #2 **CANNOT BE ORDERED STAT** [[id number]] Collected: 02/16/22 1806    Order Status: Completed Specimen: Blood from Peripheral, Antecubital, Left Updated: 02/17/22 0145     Blood Culture, Routine No Growth to date    Blood Culture #1 **CANNOT BE ORDERED STAT** [[id number]] Collected: 02/16/22 1807    Order Status: Completed Specimen: Blood from Peripheral, Hand, Right Updated: 02/17/22 0145     Blood Culture, Routine No Growth to date    Stool culture **CANNOT BE ORDERED STAT** [[id number]]     Order Status: No result Specimen: Stool     Clostridium difficile EIA [[id number]]     Order Status: Canceled Specimen: Stool         Specimen (24h ago, onward)            None          Diagnostic Results:  I have reviewed all pertinent imaging results/findings within the past 24 hours.   Imaging Results           MRI Foot (Midfoot) Left Without Contrast (Final result)  Result time 02/16/22 23:16:48    Final result by Benjie Manzano MD (02/16/22 23:16:48)                 Impression:      Regions of focal abnormal signal involving the distal 1st metatarsal and proximal 1st phalanx as described above.  Findings are concerning for foci of subacute myelitis (Yared's abscess).  Other less likely considerations include cystic degenerative change.    Diffuse skin thickening and subcutaneous soft tissue swelling throughout the left foot, likely representing component of  cellulitis, with other non infectious inflammatory process is also considered.    Cystic degenerative change most prominent at the 1st metatarsophalangeal joint.    Reactive edema about the flexor hallucis longus muscle.    This report was flagged in Epic as abnormal.    Electronically signed by resident: Eitan Echavarria  Date:    02/16/2022  Time:    22:37    Electronically signed by: Benjie Manzano  Date:    02/16/2022  Time:    23:16             Narrative:    EXAMINATION:  MRI FOOT (FOREFOOT) LEFT WITHOUT CONTRAST; MRI FOOT (MIDFOOT) LEFT WITHOUT CONTRAST    CLINICAL HISTORY:  Osteomyelitis, foot;    TECHNIQUE:  Multiplanar, multisequence imaging was obtained of the left mid foot and forefoot without the administration of intravenous contrast.    COMPARISON:  Foot radiograph 02/16/2022    FINDINGS:  Osseous structures demonstrate normal marrow signal without evidence for fracture or marrow infiltrative process.  No geographic low signal to suggest osteomyelitis.    Diffuse skin thickening and subcutaneous soft tissue swelling throughout the left foot, with component of cellulitis remain a consideration.    There are areas of focal abnormal signal involving the distal 1st metatarsal and proximal 1st phalanx (sagittal series 3, image 6; image 9), noting central T1 Iso/hyperintense signal and peripheral T1 hypointense signal.  Additionally, there is trace marrow edema surrounding these abnormal regions.    Remaining metatarsophalangeal joints demonstrate no evidence for effusion, OA, or dorsal osteophytes.    Lisfranc ligament is intact.    Structures of the forefoot and midfoot maintain alignment.    Flexor and extensor tendons are normal in signal and unremarkable.    Increased signal involving the flexor hallucis longus muscle (series 4, image 7), likely reactive in etiology.    Hallux sesamoid complex is unremarkable.    Vascular structures and nerves are unremarkable.    No evidence for Stevenson's neuroma.                                 MRI Foot (Forefoot) Left Without Contrast (Final result)  Result time 02/16/22 23:16:48    Final result by Benjie Manzano MD (02/16/22 23:16:48)                 Impression:      Regions of focal abnormal signal involving the distal 1st metatarsal and proximal 1st phalanx as described above.  Findings are concerning for foci of subacute myelitis (Yared's abscess).  Other less likely considerations include cystic degenerative change.    Diffuse skin thickening and subcutaneous soft tissue swelling throughout the left foot, likely representing component of cellulitis, with other non infectious inflammatory process is also considered.    Cystic degenerative change most prominent at the 1st metatarsophalangeal joint.    Reactive edema about the flexor hallucis longus muscle.    This report was flagged in Epic as abnormal.    Electronically signed by resident: Eitan Echavarria  Date:    02/16/2022  Time:    22:37    Electronically signed by: Benjie Manzano  Date:    02/16/2022  Time:    23:16             Narrative:    EXAMINATION:  MRI FOOT (FOREFOOT) LEFT WITHOUT CONTRAST; MRI FOOT (MIDFOOT) LEFT WITHOUT CONTRAST    CLINICAL HISTORY:  Osteomyelitis, foot;    TECHNIQUE:  Multiplanar, multisequence imaging was obtained of the left mid foot and forefoot without the administration of intravenous contrast.    COMPARISON:  Foot radiograph 02/16/2022    FINDINGS:  Osseous structures demonstrate normal marrow signal without evidence for fracture or marrow infiltrative process.  No geographic low signal to suggest osteomyelitis.    Diffuse skin thickening and subcutaneous soft tissue swelling throughout the left foot, with component of cellulitis remain a consideration.    There are areas of focal abnormal signal involving the distal 1st metatarsal and proximal 1st phalanx (sagittal series 3, image 6; image 9), noting central T1 Iso/hyperintense signal and peripheral T1 hypointense signal.  Additionally, there  is trace marrow edema surrounding these abnormal regions.    Remaining metatarsophalangeal joints demonstrate no evidence for effusion, OA, or dorsal osteophytes.    Lisfranc ligament is intact.    Structures of the forefoot and midfoot maintain alignment.    Flexor and extensor tendons are normal in signal and unremarkable.    Increased signal involving the flexor hallucis longus muscle (series 4, image 7), likely reactive in etiology.    Hallux sesamoid complex is unremarkable.    Vascular structures and nerves are unremarkable.    No evidence for Stevenson's neuroma.                               X-Ray Foot Complete Left (Final result)  Result time 02/16/22 19:23:23    Final result by Jung Mendoza MD (02/16/22 19:23:23)                 Impression:      1. Use edema about the dorsal aspect of the foot extending to the great toe.  There is prominent cystic erosive change of the 1st metatarsophalangeal joint.  Given overlying edema, osteomyelitis cannot be excluded in this setting noting no previous exams are available for comparison.  Differential would include of previous infection, correlation with any history of gout.      Electronically signed by: Jung Mendoza MD  Date:    02/16/2022  Time:    19:23             Narrative:    EXAMINATION:  XR FOOT COMPLETE 3 VIEW LEFT    CLINICAL HISTORY:  cellulitis, blister;.    TECHNIQUE:  AP, lateral and oblique views of the left foot were performed.    COMPARISON:  None    FINDINGS:  Three views left foot.    There is edema about the great toe.  Erosive changes are noted of the 1st metatarsophalangeal joint.  There is diffuse edema about the dorsal aspect of the foot.  No radiopaque foreign body.  No convincing acute displaced fracture or dislocation.                                  Clinical Findings:  Increased erythema, warmth, and edema to the left ankle joint. Significant pain with left ankle joint dorsiflexion and plantarflexion. Tenderness on palpation to ankle  carissa. Deroofed blister to the dorsal forefoot with intact underlying skin. No erythema or edema to the foot. No pain with 1st MTP joint range of motion.

## 2022-02-17 NOTE — ASSESSMENT & PLAN NOTE
- continue amlodipine, clonidine, chlorthalidone  - patient reports on metoprolol but unsure of dose

## 2022-02-17 NOTE — NURSING
Pt changed into gown. Pt refused to help PCT and RN with removing his shirts - of which there were at least 5 - and screamed the entire time. Pt informed he needed to urinate in the urinal and not pee on himself or the bed. Pt stated he just spilled water on the bed, will change bed when patient goes to x-ray and pad bed and patient to prevent problems.

## 2022-02-17 NOTE — PROGRESS NOTES
Patient with some facial twitching/tremors on initial assessment upon arriving to floor. SBP >180, complaints of neck/LLE pain.   ADA Clark notified +she is already aware of tremors/twitching   Orders to give AM clonidine + amlodipine early.   PRN oxy 10 ordered + given at this time.  Non skid socks worn, call light within reach, pt placed on VISI monitor + bedside tele monitor

## 2022-02-17 NOTE — ED NOTES
Patient is yelling to to hallway; when assessing needs, pt states he needs a urinal. Pt given urinal and encouraged to use call light for any needs he may have, pt verbalized understanding.

## 2022-02-17 NOTE — PLAN OF CARE
"Discussed patient throughout day with renal and updated orders per renal recs now. Updated by nursing throughout day as well and on board with care plan as has changed through day at times. Awaiting podiatry recs in addition.  Returned to see patient around 5 pm and foot a little more red than this AM and he was having some chills (? Rigors), so vanc/zosyn added as looked more cellulitic to the foot than previous and still veyr tender to touch. procal pending on labs now as well as repeat BMP to reassess K per renal recs.  Concern for neck with severe neck pain new to him not present on 2/1 admit notes at [facility] and he says is new. He is adament its from the bed/positional. His Xray notes anterolisthesis of C1 on C2 with widening of the predental distance with anterior displacement of C1 on C2 and possible rupture of transverse ligament. Discussed with dr louie who is on ortho spine the Xray report and he reported the the report itself (as he has not seen the patient) is concern for atlantoaxial instability given the clinical history of new onset neck pain and I have concern he could have a infection in addition with crp/esr up with recent salmonella, he recs MRi and if concerning findings to consult NS/orhto on spine call. Updated patient on this and he was having neck pain still on arrival. Once I told him this he changed his story and said it wasn't that bad and that "yall getting worked up over nothing. Calm down its jsut the bed". I told him that maybe it is chronic but I cant guaranttee that and a specialist who reviewed the report felt that it was worrisome to get an MRI and knowing my clinical history I also am in agreement. He said he doesn't want an MRI right now even when I told him that that if unstable it could risk paralysis if that was the actual finding on an MRI. He refused MRi at this time. On exam he did chin to chest, moved head side to side, had no weakness of the shoulder shrug. He clearly had " "pain and winced with the movements and said it "was his foot". I told him it appeared to be his neck and he said he wanted to rest it a different position. Upon talking further he said if it was not better tomorrow he would consider MRI. He said if his wife felt he sholud have it then he would get it. She didn't answer the phone when I called her but I left her a message.  He was having still signs of uremia on exam with tremors and asterixis.     Will consult ID in the AM for assist for infection concerns and will f/u podiatry recs.    Will f/u bMP and procal now. Vanc/zosyn added.  Blood CX NG to date.  If patient is amenable will order MRI. If he becomes amenable overnight would get MRI C spine without contrast.  Will try to talk to his wife tomorrow. Left message for her to bring Acthar gel injections for non formularly as its 40,000 dollars and non formulary. He is very well hooked in with [facility] with dr pao valdez and we talked about their department as we know some of the same doctors from previous years. He has seen bill cook in the past also and he feels dr valdez takes very good care of him    "

## 2022-02-17 NOTE — ASSESSMENT & PLAN NOTE
Hyperkalemia  Hyperphosphatemia  Metabolic acidosis  Kidney transplant candidate    - 2/2 membranous glomerulopathy  - GFR was 13 in January 2022. Labs repeated today and GFR is 2.6  - patient follows with Dr. Cardoza and dialysis has been discussed multiple times, patient continues to refuse.  - on transplant list with [facility]  - UA, urine electrolytes ordered  - KTM consulted  - nephrology consulted  - continue sodium bicarb  - start renvela   - renal, fluid restricted diet

## 2022-02-17 NOTE — PROGRESS NOTES
Pharmacokinetic Initial Assessment: IV Vancomycin    Assessment/Plan:    Initiate intravenous vancomycin with loading dose of 1500 mg once with subsequent doses when random concentrations are less than 20 mcg/mL  Desired empiric serum trough concentration is 10 to 20 mcg/mL  Draw vancomycin random level on 2/18 at 1800.  Pharmacy will continue to follow and monitor vancomycin.      Please contact pharmacy at extension [phone] with any questions regarding this assessment.     Thank you for the consult,   Kunal Azevedo       Patient brief summary:  Enrique Martinez is a 56 y.o. male initiated on antimicrobial therapy with IV Vancomycin for treatment of suspected skin & soft tissue infection    Drug Allergies:   Review of patient's allergies indicates:  No Known Allergies    Actual Body Weight:   74.4kg    Renal Function:   Estimated Creatinine Clearance: 3.7 mL/min (A) (based on SCr of 19.7 mg/dL (H)).,     Dialysis Method (if applicable):  N/A  CBC (last 72 hours):  Recent Labs   Lab Result Units 02/16/22  1807 02/17/22  0658   WBC K/uL 16.01* 14.09*   Hemoglobin g/dL 7.4* 7.2*   Hemoglobin A1C %  --  7.6*   Hematocrit % 22.2* 21.9*   Platelets K/uL 302 316   Gran % % 83.5* 78.0*   Lymph % % 7.6* 10.7*   Mono % % 8.0 10.3   Eosinophil % % 0.1 0.4   Basophil % % 0.1 0.1   Differential Method  Automated Automated       Metabolic Panel (last 72 hours):  Recent Labs   Lab Result Units 02/16/22  1807 02/17/22  0658 02/17/22  1041 02/17/22  1450   Sodium mmol/L 139 143 143  --    Sodium, Urine mmol/L  --   --   --  88   Potassium mmol/L 5.5* 5.2* 4.9  --    Chloride mmol/L 102 104 104  --    CO2 mmol/L 18* 17* 19*  --    Glucose mg/dL 82 104 85  --    Glucose, UA   --   --   --  Negative   BUN mg/dL 198* 194* 186*  --    Creatinine mg/dL 19.7* 18.6* 19.7*  --    Creatinine, Urine mg/dL  --   --   --  65.0   Albumin g/dL 2.7* 2.6*  --   --    Total Bilirubin mg/dL 0.3 0.2  --   --    Alkaline Phosphatase U/L 107 96  --   --     AST U/L 14 14  --   --    ALT U/L 11 10  --   --    Magnesium mg/dL 1.9 1.9  --   --    Phosphorus mg/dL 10.4* 10.4* 10.6*  --        Drug levels (last 3 results):  No results for input(s): VANCOMYCINRA, VANCOMYCINPE, VANCOMYCINTR in the last 72 hours.    Microbiologic Results:  Microbiology Results (last 7 days)     Procedure Component Value Units Date/Time    Blood Culture #2 **CANNOT BE ORDERED STAT** [[id number]] Collected: 02/16/22 1806    Order Status: Completed Specimen: Blood from Peripheral, Antecubital, Left Updated: 02/17/22 0145     Blood Culture, Routine No Growth to date    Blood Culture #1 **CANNOT BE ORDERED STAT** [[id number]] Collected: 02/16/22 1807    Order Status: Completed Specimen: Blood from Peripheral, Hand, Right Updated: 02/17/22 0145     Blood Culture, Routine No Growth to date    Stool culture **CANNOT BE ORDERED STAT** [[id number]]     Order Status: No result Specimen: Stool     Clostridium difficile EIA [[id number]]     Order Status: Canceled Specimen: Stool

## 2022-02-17 NOTE — HPI
Enrique Martinez is a 56 y.o. male with a PMHx of CKD V glomerulonephropathy, insulin dependent diabetes mellitus, and CAD who presented tot he ED for evaluation of worsening left foot pain and swelling. He was recently admitted at [facility] from 2/2-2/4/2022 from LLE cellulitis. He was found to have salmonella bacteremia suspected due to diarrheal illness. He was discharged with cipro for which he reports compliance. He has had no improvement in his pain, redness, or swelling of his left foot and leg. He has also developed a new blister on his left foot. He endorses chills. He denies fevers, chest pain, SOB, N/V, decreased urine output, dysuria, and flank pain.    ED: HR 97, other VSSAF. CBC with leukocytosis of 16.01, Hgb 7.4, Hct 22.2.  ESR 97. CRP 33.4. K 5.5. Cr 19.7, . Phos 10.4. Lactic WNL. MRI L foot with cellulitis and osteomyelitis of distal 1st metatarsal and proximal 1st phalanx.

## 2022-02-17 NOTE — ASSESSMENT & PLAN NOTE
Hemoglobin 10.1 one month ago, now 7.2. Normocytic anemia with previously elevated ferritin and low iron.     - F/u iron studies  - May need EPO/IV iron

## 2022-02-17 NOTE — ASSESSMENT & PLAN NOTE
Has history of gout and hyperuricemia. Uric acid elevated at 15. S/p NS 50 cc/hr for 10 hours. Ankle pain ddx includes gout vs cellulitis vs septic joint. Podiatry has low concern for osteo    - Colchicine 0.3 BID  - scheduled tylenol 1 g TID for pain  - consider left ankle joint aspiration per podiatry recs

## 2022-02-17 NOTE — ED NOTES
Telemetry Verification   Patient placed on Telemetry Box  Verified with War Room  Box # [id number]   Monitor Tech    Rate    Rhythm

## 2022-02-17 NOTE — PLAN OF CARE
SW will continue to follow and assist with poc needs as recommended by tx team.        02/17/22 1449   Post-Acute Status   Post-Acute Authorization Other   Other Status See Comments  (POC needs to be determined.)   Discharge Delays None known at this time     Elijah Barfield LMSW  [facility]  [phone]

## 2022-02-17 NOTE — ED NOTES
Patient's pain reasssed; pt states pain is improved and states he needs a blanket; blanket given to pt.

## 2022-02-17 NOTE — ED NOTES
Assumed care of patient. Received report from DARIUS Reed.  The patient is resting quietly in ED stretcher, and is AAOx4 at this time. Respirations are even and unlabored, with no distress noted. The patient remains on continuous cardiac monitor, automated BP cuff cycling Q30 minutes, and continuous pulse oximeter. The patient is aware of POC and all questions and concerns addressed at this time. The patient was offered restroom assistance and denies need to void. The patient denies further needs and has no complaints at this time. SR raised x2, bed locked and in low position with brake engaged. Call bell within reach and the patient verbalized he/she would call for assistance if needed. Personal belongings are at bedside within reach. Patient has a visitor at bedside.     Adult Physical Assessment  LOC: Enrique Martinez, 56 y.o. male verified via two identifiers.  The patient is awake, alert, oriented and speaking appropriately at this time.  APPEARANCE: Patient resting comfortably and appears to be in no acute distress at this time. Patient is clean and well groomed, patient's clothing is properly fastened.  SKIN:The skin is warm and dry, color consistent with ethnicity, patient has normal skin turgor and moist mucus membranes. Patient has open sore noted to left foot, it appears edematous, flaky, and red.   MUSCULOSKELETAL: Patient moving all extremities well, no obvious swelling or deformities noted.  RESPIRATORY: Airway is open and patent, respirations are spontaneous, patient has a normal effort and rate, no accessory muscle use noted.  CARDIAC: Patient has a normal rate and rhythm, capillary refill < 3 seconds in all extremities, +2 edema noted to bilateral extremities.   ABDOMEN: Soft and non tender to palpation, no abdominal distention noted. Bowel sounds present in all four quadrants.  NEUROLOGIC: Eyes open spontaneously, behavior appropriate to situation, follows commands, facial expression symmetrical,  bilateral hand grasp equal and even, purposeful motor response noted, normal sensation in all extremities when touched with a finger.

## 2022-02-17 NOTE — PLAN OF CARE
CM met with patient in room to obtain Discharge Planning Assessment. Patient was agitated and asked CM to come back tomorrow. CM will attempt Discharge Planning Assessment in the morning.      Heidi Bermudez RN     [phone]

## 2022-02-17 NOTE — CARE UPDATE
Rounded with Dr. Naik. Counseled patient on very likely need for dialysis as a bridge prior to transplant, and that getting dialysis may expedite his ability to get a transplant sooner. Patient continues to adamantly decline. Patient made aware that we will continue to respect his wishes, but that his clinical deterioration would likely delay transplant should a kidney become available.     Hyperkalemia improved, shift as needed per primary. Patient exhibits no respiratory distress, orthopnea, PND necessitating dialysis for volume overload. Will evaluate need for HD based on tomorrow's labs.     Recommend colchine BID for hyperuricemia and likely increased concentration of uric acid in serum due to poor PO intake. IV tylenol if possible for pain and minimizing opioids. Scheduled tylenol PO okay due to cost. Gentle IV fluids 50 cc/hr for 10 hours to help dilute uric acid. Decreased Nabicarb to 650 TID.     No role for EPO/iron in setting of infection.

## 2022-02-17 NOTE — ASSESSMENT & PLAN NOTE
Left foot Xray with prominent cystic erosive change of the 1st metatarsophalangeal joint.  Left forefoot MRI demonstrating regions of focal abnormal signal involving the distal 1st metatarsal and proximal 1st phalanx consistent with cystic degenerative change from gouty arthropathy versus osteomyelitis.     No plans for surgical intervention or bone biopsy from podiatry at this time given low clinical concern for osteomyelitis at the 1st MTP joint.

## 2022-02-17 NOTE — HPI
Mr. Martinez is a 56 y.o. 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 He was recently admitted at [facility] from 2/2-2/4/2022 from LLE cellulitis. He was found to have salmonella bacteremia suspected due to diarrheal illness. He was discharged with cipro for which he reports compliance. He has had no improvement in his pain, redness, or swelling of his left foot and leg. He has also developed a new blister on his left foot, with MRI foot concerning for osteomyelitis and showing soft tissue swelling c/w cellulitis. He is admitted for treatment of suspected osteomyelitis.     He follows with Dr. Cardoza (nephrology), has adamantly declined HD in the past and would rather wait for transplant. He reports being on the transplant list for the past 4 years. His last renal biopsy in 2013 showed 50% glomerulosclerosis. Started on calcitriol 0.5, hx of elevated PTH at 921. Patient complains of decreased appetite, neck cramps, fatigue. He still urinates 4-5 times/day. He reports his B/L LE edema has worsened over the past few weeks. No dyspnea, chest pain, dysuria, hematuria. Nephro consulted for HD initiation.      Labs are significant for hyperkalemia 5.5, bicarb 18, , phosphate 10.4, Cr 19.7, GFR 2.2, ESR 97, CRP 33.4. . CBC significant for Hgb of 7.2, white count 14. Previous echo showed EF of 65%.

## 2022-02-17 NOTE — CONSULTS
[facility] - Transplant Stepdown  Podiatry  Consult Note    Patient Name: Enrique Martinez  MRN: [id number]  Admission Date: 2/16/2022  Hospital Length of Stay: 0 days  Attending Physician: Dianna Tijerina MD  Primary Care Provider: Primary Doctor No     Inpatient consult to Podiatry  Consult performed by: Lanny Elmore MD  Consult ordered by: Amber Castano PA-C        Subjective:     History of Present Illness:  56 y.o. male with PMHx of CKD V glomerulonephropathy, insulin dependent diabetes mellitus, gout, and CAD who presented to [facility] 2/16/2022 with chief complaint of left ankle pain and swelling x 1 week. Patient was recently admitted to [facility] earlier this month for LLE cellulitis and was discharged home with PO ciprofloxacin which he reports compliance with. Also developed a new blister on his left foot with pain overlying the blister, but reports most of his pain, redness, and swelling is along his left ankle. Patient does not recall any prior trauma/injury to his foot and ankle. He reports waking up one morning with pain to the ankle.     On initial presentation, patient hypertensive with systolic BP in the 200s. WBC 16k ESR 97 CRP 33.4. Xray left foot demonstrating prominent cystic erosive change of the 1st metatarsophalangeal joint consistent with gouty arthropathy and gout history. MRI right forefoot demonstrating regions of focal abnormal signal involving the distal 1st metatarsal and proximal 1st phalanx concerning for foci of subacute osteomyelitis (Yared's abscess) vs cystic degenerative change.         Scheduled Meds:   acetaminophen  1,000 mg Oral Q8H    amLODIPine  10 mg Oral Daily    ARIPiprazole  15 mg Oral QHS    aspirin  81 mg Oral Daily    atorvastatin  80 mg Oral Daily    cloNIDine  0.4 mg Oral TID    colchicine  0.3 mg Oral BID    [START ON 2/18/2022] folic acid  1 mg Oral Daily    heparin (porcine)  5,000 Units Subcutaneous Q8H    hydrALAZINE  50 mg Oral Q8H     labetalol  200 mg Oral Q12H    piperacillin-tazobactam (ZOSYN) IVPB  3.375 g Intravenous Q12H    sevelamer carbonate  1,600 mg Oral TID    sodium bicarbonate  650 mg Oral TID    sodium zirconium cyclosilicate  10 g Oral Once     Continuous Infusions:   sodium chloride 0.9% 50 mL/hr at 02/17/22 1645     PRN Meds:albuterol-ipratropium, aluminum-magnesium hydroxide-simethicone, dextrose 10%, dextrose 10%, glucagon (human recombinant), glucose, glucose, melatonin, methocarbamoL, morphine, naloxone, ondansetron, oxyCODONE, oxyCODONE, polyethylene glycol, prochlorperazine, simethicone, sodium chloride 0.9%, Pharmacy to dose Vancomycin consult **AND** vancomycin - pharmacy to dose    Review of patient's allergies indicates:  No Known Allergies     Past Medical History:   Diagnosis Date    Anemia     Coronary artery disease     Diabetes mellitus, type 2     GERD (gastroesophageal reflux disease)     Gout     Hydrocele in adult     bilateral    Hyperlipidemia     Hypertension     Inguinal hernia     bilateral    Membranous glomerulonephritis     Nephrotic range proteinuria     Nephrotic syndrome     Obesity     Umbilical hernia      Past Surgical History:   Procedure Laterality Date    ABDOMINAL SURGERY  1980s    CARDIAC CATHETERIZATION  2007    x 2    COLONOSCOPY N/A 4/5/2019    Procedure: COLONOSCOPY;  Surgeon: Jose L Carty MD;  Location: [facility] ([address] [place] [address]);  Service: Colon and Rectal;  Laterality: N/A;    CORONARY STENT PLACEMENT  2007       Family History     Problem Relation (Age of Onset)    Drug abuse Brother    Heart attack Father, Brother    Hyperlipidemia Father    Hypertension Father, Brother    No Known Problems Sister    Stroke Father        Tobacco Use    Smoking status: Current Some Day Smoker     Packs/day: 0.25     Years: 10.00     Pack years: 2.50    Smokeless tobacco: Never Used   Substance and Sexual Activity    Alcohol use: No    Drug use: No    Sexual activity: Not  on file     Review of Systems   Constitutional: Positive for activity change. Negative for appetite change, chills and fever.   Respiratory: Negative for cough, shortness of breath and wheezing.    Cardiovascular: Positive for leg swelling.   Gastrointestinal: Negative for nausea and vomiting.   Musculoskeletal: Positive for arthralgias and myalgias. Negative for gait problem.   Skin: Positive for wound. Negative for color change.   Neurological: Negative for weakness and numbness.   Psychiatric/Behavioral: Positive for agitation.     Objective:     Vital Signs (Most Recent):  Temp: 97.7 °F (36.5 °C) (02/17/22 1646)  Pulse: 82 (02/17/22 1646)  Resp: 20 (02/17/22 1437)  BP: (!) 168/95 (02/17/22 1646)  SpO2: 99 % (02/17/22 1646) Vital Signs (24h Range):  Temp:  [97.7 °F (36.5 °C)-98.4 °F (36.9 °C)] 97.7 °F (36.5 °C)  Pulse:  [] 82  Resp:  [12-38] 20  SpO2:  [97 %-100 %] 99 %  BP: (132-221)/() 168/95     Weight: 74.4 kg (164 lb 0.4 oz)  Body mass index is 26.47 kg/m².    Foot Exam    General  Orientation: alert and oriented to person, place, and time       Right Foot/Ankle     Inspection and Palpation  Tenderness: none   Swelling: (Pitting edema to bilateral lower extremities, L>R)  Skin Exam: skin intact; no drainage, no cellulitis, no abnormal color, no ulcer and no erythema     Neurovascular  Dorsalis pedis: 2+  Posterior tibial: 1+      Left Foot/Ankle      Inspection and Palpation  Tenderness: (Anterior ankle joint,dorsal foot overlying deroofed blister)  Swelling: (Pitting edema to bilateral lower extremities, L>R)  Skin Exam: cellulitis and erythema; no drainage and no ulcer     Neurovascular  Dorsalis pedis: 2+  Posterior tibial: 1+            Laboratory:  CBC:   Recent Labs   Lab 02/17/22  0658   WBC 14.09*   RBC 2.63*   HGB 7.2*   HCT 21.9*      MCV 83   MCH 27.4   MCHC 32.9     CRP:   Recent Labs   Lab 02/16/22  1807   CRP 33.4*     ESR:   Recent Labs   Lab 02/16/22  1807   RICHARDRATE 97*      Wound Cultures: No results for input(s): LABAERO in the last 4320 hours.  Microbiology Results (last 7 days)     Procedure Component Value Units Date/Time    Blood Culture #2 **CANNOT BE ORDERED STAT** [[id number]] Collected: 02/16/22 1806    Order Status: Completed Specimen: Blood from Peripheral, Antecubital, Left Updated: 02/17/22 0145     Blood Culture, Routine No Growth to date    Blood Culture #1 **CANNOT BE ORDERED STAT** [[id number]] Collected: 02/16/22 1807    Order Status: Completed Specimen: Blood from Peripheral, Hand, Right Updated: 02/17/22 0145     Blood Culture, Routine No Growth to date    Stool culture **CANNOT BE ORDERED STAT** [[id number]]     Order Status: No result Specimen: Stool     Clostridium difficile EIA [[id number]]     Order Status: Canceled Specimen: Stool         Specimen (24h ago, onward)            None          Diagnostic Results:  I have reviewed all pertinent imaging results/findings within the past 24 hours.   Imaging Results           MRI Foot (Midfoot) Left Without Contrast (Final result)  Result time 02/16/22 23:16:48    Final result by Benjie Manzano MD (02/16/22 23:16:48)                 Impression:      Regions of focal abnormal signal involving the distal 1st metatarsal and proximal 1st phalanx as described above.  Findings are concerning for foci of subacute myelitis (Yared's abscess).  Other less likely considerations include cystic degenerative change.    Diffuse skin thickening and subcutaneous soft tissue swelling throughout the left foot, likely representing component of cellulitis, with other non infectious inflammatory process is also considered.    Cystic degenerative change most prominent at the 1st metatarsophalangeal joint.    Reactive edema about the flexor hallucis longus muscle.    This report was flagged in Epic as abnormal.    Electronically signed by resident: Eitan Echavarria  Date:    02/16/2022  Time:    22:37    Electronically signed by: Benjie  Jose Juan  Date:    02/16/2022  Time:    23:16             Narrative:    EXAMINATION:  MRI FOOT (FOREFOOT) LEFT WITHOUT CONTRAST; MRI FOOT (MIDFOOT) LEFT WITHOUT CONTRAST    CLINICAL HISTORY:  Osteomyelitis, foot;    TECHNIQUE:  Multiplanar, multisequence imaging was obtained of the left mid foot and forefoot without the administration of intravenous contrast.    COMPARISON:  Foot radiograph 02/16/2022    FINDINGS:  Osseous structures demonstrate normal marrow signal without evidence for fracture or marrow infiltrative process.  No geographic low signal to suggest osteomyelitis.    Diffuse skin thickening and subcutaneous soft tissue swelling throughout the left foot, with component of cellulitis remain a consideration.    There are areas of focal abnormal signal involving the distal 1st metatarsal and proximal 1st phalanx (sagittal series 3, image 6; image 9), noting central T1 Iso/hyperintense signal and peripheral T1 hypointense signal.  Additionally, there is trace marrow edema surrounding these abnormal regions.    Remaining metatarsophalangeal joints demonstrate no evidence for effusion, OA, or dorsal osteophytes.    Lisfranc ligament is intact.    Structures of the forefoot and midfoot maintain alignment.    Flexor and extensor tendons are normal in signal and unremarkable.    Increased signal involving the flexor hallucis longus muscle (series 4, image 7), likely reactive in etiology.    Hallux sesamoid complex is unremarkable.    Vascular structures and nerves are unremarkable.    No evidence for Stevenson's neuroma.                                MRI Foot (Forefoot) Left Without Contrast (Final result)  Result time 02/16/22 23:16:48    Final result by Benjie Manzano MD (02/16/22 23:16:48)                 Impression:      Regions of focal abnormal signal involving the distal 1st metatarsal and proximal 1st phalanx as described above.  Findings are concerning for foci of subacute myelitis (Yared's abscess).   Other less likely considerations include cystic degenerative change.    Diffuse skin thickening and subcutaneous soft tissue swelling throughout the left foot, likely representing component of cellulitis, with other non infectious inflammatory process is also considered.    Cystic degenerative change most prominent at the 1st metatarsophalangeal joint.    Reactive edema about the flexor hallucis longus muscle.    This report was flagged in Epic as abnormal.    Electronically signed by resident: Eitan Echavarria  Date:    02/16/2022  Time:    22:37    Electronically signed by: Benjie Manzano  Date:    02/16/2022  Time:    23:16             Narrative:    EXAMINATION:  MRI FOOT (FOREFOOT) LEFT WITHOUT CONTRAST; MRI FOOT (MIDFOOT) LEFT WITHOUT CONTRAST    CLINICAL HISTORY:  Osteomyelitis, foot;    TECHNIQUE:  Multiplanar, multisequence imaging was obtained of the left mid foot and forefoot without the administration of intravenous contrast.    COMPARISON:  Foot radiograph 02/16/2022    FINDINGS:  Osseous structures demonstrate normal marrow signal without evidence for fracture or marrow infiltrative process.  No geographic low signal to suggest osteomyelitis.    Diffuse skin thickening and subcutaneous soft tissue swelling throughout the left foot, with component of cellulitis remain a consideration.    There are areas of focal abnormal signal involving the distal 1st metatarsal and proximal 1st phalanx (sagittal series 3, image 6; image 9), noting central T1 Iso/hyperintense signal and peripheral T1 hypointense signal.  Additionally, there is trace marrow edema surrounding these abnormal regions.    Remaining metatarsophalangeal joints demonstrate no evidence for effusion, OA, or dorsal osteophytes.    Lisfranc ligament is intact.    Structures of the forefoot and midfoot maintain alignment.    Flexor and extensor tendons are normal in signal and unremarkable.    Increased signal involving the flexor hallucis longus muscle  (series 4, image 7), likely reactive in etiology.    Hallux sesamoid complex is unremarkable.    Vascular structures and nerves are unremarkable.    No evidence for Stevenson's neuroma.                               X-Ray Foot Complete Left (Final result)  Result time 02/16/22 19:23:23    Final result by Jung Mendoza MD (02/16/22 19:23:23)                 Impression:      1. Use edema about the dorsal aspect of the foot extending to the great toe.  There is prominent cystic erosive change of the 1st metatarsophalangeal joint.  Given overlying edema, osteomyelitis cannot be excluded in this setting noting no previous exams are available for comparison.  Differential would include of previous infection, correlation with any history of gout.      Electronically signed by: Jung Mendoza MD  Date:    02/16/2022  Time:    19:23             Narrative:    EXAMINATION:  XR FOOT COMPLETE 3 VIEW LEFT    CLINICAL HISTORY:  cellulitis, blister;.    TECHNIQUE:  AP, lateral and oblique views of the left foot were performed.    COMPARISON:  None    FINDINGS:  Three views left foot.    There is edema about the great toe.  Erosive changes are noted of the 1st metatarsophalangeal joint.  There is diffuse edema about the dorsal aspect of the foot.  No radiopaque foreign body.  No convincing acute displaced fracture or dislocation.                                  Clinical Findings:  Increased erythema, warmth, and edema to the left ankle joint. Significant pain with left ankle joint dorsiflexion and plantarflexion. Tenderness on palpation to ankle gutter. Deroofed blister to the dorsal forefoot with intact underlying skin. No erythema or edema to the foot. No pain with 1st MTP joint range of motion.                   Assessment/Plan:     Pain and swelling of left lower extremity  Left foot Xray with prominent cystic erosive change of the 1st metatarsophalangeal joint.  Left forefoot MRI demonstrating regions of focal abnormal  signal involving the distal 1st metatarsal and proximal 1st phalanx consistent with cystic degenerative change from gouty arthropathy versus osteomyelitis.     No plans for surgical intervention or bone biopsy from podiatry at this time given low clinical concern for osteomyelitis at the 1st MTP joint.     Gout  Chief complaint of left ankle pain with increased warmth, erythema, edema, and pain with ankle range of motion. Xray left ankle ordered noting no fractures or effusions. Ddx diagnosis include acute gout ankle arthropathy, cellulitis LLE, septic ankle arthritis low on the differentials.     Plan:   Continue colchicine  Follow up uric acid levels  Will consider left ankle joint aspiration with steroid injection if ankle pain does not improve  No weightbearing restrictions  Podiatry will continue to follow         Thank you for your consult. I will follow-up with patient. Please contact us if you have any additional questions.    Lanny Elmore DPM  [facility]  Podiatry PGY3  Pager: [phone]  Spectra:[phone]

## 2022-02-18 PROBLEM — Z86.19: Status: ACTIVE | Noted: 2022-02-18

## 2022-02-18 PROBLEM — M54.2 NECK PAIN: Status: ACTIVE | Noted: 2022-02-18

## 2022-02-18 LAB
ABO + RH BLD: NORMAL
ANION GAP SERPL CALC-SCNC: 20 MMOL/L (ref 8–16)
BASOPHILS # BLD AUTO: 0.03 K/UL (ref 0–0.2)
BASOPHILS NFR BLD: 0.3 % (ref 0–1.9)
BLD GP AB SCN CELLS X3 SERPL QL: NORMAL
BUN SERPL-MCNC: 183 MG/DL (ref 6–20)
CALCIUM SERPL-MCNC: 9.8 MG/DL (ref 8.7–10.5)
CHLORIDE SERPL-SCNC: 102 MMOL/L (ref 95–110)
CO2 SERPL-SCNC: 20 MMOL/L (ref 23–29)
CREAT SERPL-MCNC: 18.8 MG/DL (ref 0.5–1.4)
DIFFERENTIAL METHOD: ABNORMAL
EOSINOPHIL # BLD AUTO: 0.1 K/UL (ref 0–0.5)
EOSINOPHIL NFR BLD: 0.9 % (ref 0–8)
ERYTHROCYTE [DISTWIDTH] IN BLOOD BY AUTOMATED COUNT: 16.5 % (ref 11.5–14.5)
EST. GFR  (AFRICAN AMERICAN): 2.8 ML/MIN/1.73 M^2
EST. GFR  (NON AFRICAN AMERICAN): 2.4 ML/MIN/1.73 M^2
GLUCOSE SERPL-MCNC: 81 MG/DL (ref 70–110)
HCT VFR BLD AUTO: 19.2 % (ref 40–54)
HGB BLD-MCNC: 6.2 G/DL (ref 14–18)
IMM GRANULOCYTES # BLD AUTO: 0.08 K/UL (ref 0–0.04)
IMM GRANULOCYTES NFR BLD AUTO: 0.8 % (ref 0–0.5)
LYMPHOCYTES # BLD AUTO: 1.1 K/UL (ref 1–4.8)
LYMPHOCYTES NFR BLD: 10.7 % (ref 18–48)
MAGNESIUM SERPL-MCNC: 1.9 MG/DL (ref 1.6–2.6)
MCH RBC QN AUTO: 27.7 PG (ref 27–31)
MCHC RBC AUTO-ENTMCNC: 32.3 G/DL (ref 32–36)
MCV RBC AUTO: 86 FL (ref 82–98)
MONOCYTES # BLD AUTO: 1.1 K/UL (ref 0.3–1)
MONOCYTES NFR BLD: 10.4 % (ref 4–15)
NEUTROPHILS # BLD AUTO: 8.2 K/UL (ref 1.8–7.7)
NEUTROPHILS NFR BLD: 76.9 % (ref 38–73)
NRBC BLD-RTO: 0 /100 WBC
PHOSPHATE SERPL-MCNC: 11.2 MG/DL (ref 2.7–4.5)
PLATELET # BLD AUTO: 255 K/UL (ref 150–450)
PMV BLD AUTO: 11.2 FL (ref 9.2–12.9)
POCT GLUCOSE: 118 MG/DL (ref 70–110)
POTASSIUM SERPL-SCNC: 5.3 MMOL/L (ref 3.5–5.1)
RBC # BLD AUTO: 2.24 M/UL (ref 4.6–6.2)
SODIUM SERPL-SCNC: 142 MMOL/L (ref 136–145)
URATE SERPL-MCNC: 15.1 MG/DL (ref 3.4–7)
URATE SERPL-MCNC: 15.1 MG/DL (ref 3.4–7)
WBC # BLD AUTO: 10.61 K/UL (ref 3.9–12.7)

## 2022-02-18 PROCEDURE — 25000003 PHARM REV CODE 250: Mod: NTX

## 2022-02-18 PROCEDURE — 25000003 PHARM REV CODE 250: Mod: NTX | Performed by: PHYSICIAN ASSISTANT

## 2022-02-18 PROCEDURE — 84100 ASSAY OF PHOSPHORUS: CPT | Mod: NTX | Performed by: PHYSICIAN ASSISTANT

## 2022-02-18 PROCEDURE — 86920 COMPATIBILITY TEST SPIN: CPT | Mod: NTX | Performed by: HOSPITALIST

## 2022-02-18 PROCEDURE — C9113 INJ PANTOPRAZOLE SODIUM, VIA: HCPCS | Mod: NTX | Performed by: HOSPITALIST

## 2022-02-18 PROCEDURE — 25000003 PHARM REV CODE 250: Mod: NTX | Performed by: HOSPITALIST

## 2022-02-18 PROCEDURE — 99356 PR PROLONGED SERV,INPATIENT,1ST HR: ICD-10-PCS | Mod: NTX,,, | Performed by: HOSPITALIST

## 2022-02-18 PROCEDURE — 84550 ASSAY OF BLOOD/URIC ACID: CPT | Mod: NTX | Performed by: STUDENT IN AN ORGANIZED HEALTH CARE EDUCATION/TRAINING PROGRAM

## 2022-02-18 PROCEDURE — P9016 RBC LEUKOCYTES REDUCED: HCPCS | Mod: NTX | Performed by: HOSPITALIST

## 2022-02-18 PROCEDURE — 80048 BASIC METABOLIC PNL TOTAL CA: CPT | Mod: NTX | Performed by: PHYSICIAN ASSISTANT

## 2022-02-18 PROCEDURE — 36415 COLL VENOUS BLD VENIPUNCTURE: CPT | Mod: NTX | Performed by: PHYSICIAN ASSISTANT

## 2022-02-18 PROCEDURE — 99233 PR SUBSEQUENT HOSPITAL CARE,LEVL III: ICD-10-PCS | Mod: NTX,,, | Performed by: HOSPITALIST

## 2022-02-18 PROCEDURE — 86850 RBC ANTIBODY SCREEN: CPT | Mod: NTX | Performed by: HOSPITALIST

## 2022-02-18 PROCEDURE — 99223 PR INITIAL HOSPITAL CARE,LEVL III: ICD-10-PCS | Mod: NTX,,, | Performed by: STUDENT IN AN ORGANIZED HEALTH CARE EDUCATION/TRAINING PROGRAM

## 2022-02-18 PROCEDURE — 36415 COLL VENOUS BLD VENIPUNCTURE: CPT | Mod: NTX | Performed by: HOSPITALIST

## 2022-02-18 PROCEDURE — 99356 PR PROLONGED SERV,INPATIENT,1ST HR: CPT | Mod: NTX,,, | Performed by: HOSPITALIST

## 2022-02-18 PROCEDURE — 20600001 HC STEP DOWN PRIVATE ROOM: Mod: NTX

## 2022-02-18 PROCEDURE — 83735 ASSAY OF MAGNESIUM: CPT | Mod: NTX | Performed by: PHYSICIAN ASSISTANT

## 2022-02-18 PROCEDURE — 99233 SBSQ HOSP IP/OBS HIGH 50: CPT | Mod: NTX,,, | Performed by: HOSPITALIST

## 2022-02-18 PROCEDURE — 99223 1ST HOSP IP/OBS HIGH 75: CPT | Mod: NTX,,, | Performed by: STUDENT IN AN ORGANIZED HEALTH CARE EDUCATION/TRAINING PROGRAM

## 2022-02-18 PROCEDURE — 63600175 PHARM REV CODE 636 W HCPCS: Mod: NTX | Performed by: HOSPITALIST

## 2022-02-18 PROCEDURE — 36430 TRANSFUSION BLD/BLD COMPNT: CPT | Mod: NTX

## 2022-02-18 PROCEDURE — 85025 COMPLETE CBC W/AUTO DIFF WBC: CPT | Mod: NTX | Performed by: PHYSICIAN ASSISTANT

## 2022-02-18 PROCEDURE — 63600175 PHARM REV CODE 636 W HCPCS: Mod: NTX | Performed by: PHYSICIAN ASSISTANT

## 2022-02-18 RX ORDER — DOXYCYCLINE HYCLATE 100 MG
100 TABLET ORAL EVERY 12 HOURS
Status: DISCONTINUED | OUTPATIENT
Start: 2022-02-18 | End: 2022-02-19

## 2022-02-18 RX ORDER — PANTOPRAZOLE SODIUM 40 MG/10ML
80 INJECTION, POWDER, LYOPHILIZED, FOR SOLUTION INTRAVENOUS ONCE
Status: COMPLETED | OUTPATIENT
Start: 2022-02-18 | End: 2022-02-18

## 2022-02-18 RX ORDER — PANTOPRAZOLE SODIUM 40 MG/10ML
40 INJECTION, POWDER, LYOPHILIZED, FOR SOLUTION INTRAVENOUS 2 TIMES DAILY
Status: DISCONTINUED | OUTPATIENT
Start: 2022-02-19 | End: 2022-02-27

## 2022-02-18 RX ORDER — LORAZEPAM 2 MG/ML
1 INJECTION INTRAMUSCULAR
Status: DISCONTINUED | OUTPATIENT
Start: 2022-02-18 | End: 2022-03-10 | Stop reason: HOSPADM

## 2022-02-18 RX ORDER — HYDROCODONE BITARTRATE AND ACETAMINOPHEN 500; 5 MG/1; MG/1
TABLET ORAL
Status: DISCONTINUED | OUTPATIENT
Start: 2022-02-18 | End: 2022-02-18

## 2022-02-18 RX ORDER — MORPHINE SULFATE 2 MG/ML
2 INJECTION, SOLUTION INTRAMUSCULAR; INTRAVENOUS
Status: DISCONTINUED | OUTPATIENT
Start: 2022-02-18 | End: 2022-03-10 | Stop reason: HOSPADM

## 2022-02-18 RX ADMIN — HEPARIN SODIUM 5000 UNITS: 5000 INJECTION INTRAVENOUS; SUBCUTANEOUS at 06:02

## 2022-02-18 RX ADMIN — SEVELAMER CARBONATE 1600 MG: 800 TABLET, FILM COATED ORAL at 03:02

## 2022-02-18 RX ADMIN — SODIUM BICARBONATE 650 MG TABLET 650 MG: at 08:02

## 2022-02-18 RX ADMIN — CEFTRIAXONE SODIUM 2 G: 2 INJECTION, SOLUTION INTRAVENOUS at 04:02

## 2022-02-18 RX ADMIN — SODIUM BICARBONATE 650 MG TABLET 650 MG: at 03:02

## 2022-02-18 RX ADMIN — COLCHICINE 0.3 MG: 0.6 TABLET, FILM COATED ORAL at 08:02

## 2022-02-18 RX ADMIN — SEVELAMER CARBONATE 1600 MG: 800 TABLET, FILM COATED ORAL at 08:02

## 2022-02-18 RX ADMIN — HYDRALAZINE HYDROCHLORIDE 50 MG: 50 TABLET ORAL at 03:02

## 2022-02-18 RX ADMIN — LABETALOL HYDROCHLORIDE 200 MG: 100 TABLET, FILM COATED ORAL at 08:02

## 2022-02-18 RX ADMIN — OXYCODONE HYDROCHLORIDE 10 MG: 10 TABLET ORAL at 07:02

## 2022-02-18 RX ADMIN — Medication 6 MG: at 08:02

## 2022-02-18 RX ADMIN — SODIUM ZIRCONIUM CYCLOSILICATE 10 G: 5 POWDER, FOR SUSPENSION ORAL at 08:02

## 2022-02-18 RX ADMIN — Medication 6 MG: at 01:02

## 2022-02-18 RX ADMIN — ATORVASTATIN CALCIUM 80 MG: 20 TABLET, FILM COATED ORAL at 08:02

## 2022-02-18 RX ADMIN — ACETAMINOPHEN 1000 MG: 500 TABLET ORAL at 07:02

## 2022-02-18 RX ADMIN — ACETAMINOPHEN 1000 MG: 500 TABLET ORAL at 06:02

## 2022-02-18 RX ADMIN — PANTOPRAZOLE SODIUM 80 MG: 40 INJECTION, POWDER, FOR SOLUTION INTRAVENOUS at 03:02

## 2022-02-18 RX ADMIN — METHOCARBAMOL 750 MG: 750 TABLET ORAL at 01:02

## 2022-02-18 RX ADMIN — HYDRALAZINE HYDROCHLORIDE 50 MG: 50 TABLET ORAL at 08:02

## 2022-02-18 RX ADMIN — CLONIDINE HYDROCHLORIDE 0.4 MG: 0.1 TABLET ORAL at 03:02

## 2022-02-18 RX ADMIN — PIPERACILLIN SODIUM AND TAZOBACTAM SODIUM 3.38 G: 3; .375 INJECTION, POWDER, FOR SOLUTION INTRAVENOUS at 08:02

## 2022-02-18 RX ADMIN — FOLIC ACID 1 MG: 1 TABLET ORAL at 08:02

## 2022-02-18 RX ADMIN — ONDANSETRON 8 MG: 8 TABLET, ORALLY DISINTEGRATING ORAL at 08:02

## 2022-02-18 RX ADMIN — CLONIDINE HYDROCHLORIDE 0.4 MG: 0.1 TABLET ORAL at 08:02

## 2022-02-18 RX ADMIN — ACETAMINOPHEN 1000 MG: 500 TABLET ORAL at 03:02

## 2022-02-18 RX ADMIN — OXYCODONE 5 MG: 5 TABLET ORAL at 08:02

## 2022-02-18 RX ADMIN — ASPIRIN 81 MG: 81 TABLET, COATED ORAL at 08:02

## 2022-02-18 RX ADMIN — HYDRALAZINE HYDROCHLORIDE 50 MG: 50 TABLET ORAL at 06:02

## 2022-02-18 RX ADMIN — AMLODIPINE BESYLATE 10 MG: 10 TABLET ORAL at 08:02

## 2022-02-18 RX ADMIN — DOXYCYCLINE HYCLATE 100 MG: 100 TABLET, COATED ORAL at 08:02

## 2022-02-18 RX ADMIN — ARIPIPRAZOLE 15 MG: 5 TABLET ORAL at 08:02

## 2022-02-18 NOTE — SUBJECTIVE & OBJECTIVE
"  Interval history: patient with labs similar to previous day with elevated Cr/Phos/BUN. Still with asterixis on exam. HG down to 6's today, unclear cause, as baseline 9 with 7 on admit. adamently denies dark stools, blood in stools, hx of ulcers. Did not want to blood consent this AM and returned later today when HG resulted and agreeable to transfusion when I showed him the labs on my phone as he said "yall trying to find more things to keep me here longer". To show him that I was not looking to find more things and that this was a lab finding I do not know the cause of. fobt ordered, as seems may be another cause besides AOCD. Notes complaining of severe neck pain all night and he says today it was not bad, I told him this AM we have to get MRI no more option of refusing as the potential for spine instability that was explaine to him yesterday when he refused, his wife was on the phone today and she said he should also needing to get it. She said HD is up to him and we cant make him get it whe I asked if she knew about the HD issues. His BP is better, he said "its not from lasix". I said I know lasix is not a BP med and its being held now per nephro recs for possible gout/crystals in his left foot. He said he doesn't have gout or crystals in it. He has what loooks liek a possible tophi in right foot also in big toe and he said its not. His foot is still extremely painful to any light touch podiatry has no plans for surgery as unclear if acute infection or chronic based on imaging. Redness appearsimproving with antibiotics initiation, procal 1. No fevers but concern for infectious process with recent salmonella and severe pain to the foot, possible mixed process. ID consulted. Concern for possible infection in spine with new onset pain with recent bacteremia also. He has some back pain he said too he claims is from the bed. No growth in blood Cx now. He denies hx of sickle cell dx that would predispose him to " "salmonella he had recently.   He agreed to MRi but then refuse to nursing after I left. I returned to consent him for blood and he was highly agitated and argumentative which he has been almost every time guerita seen him in the past day which has been 5 times now in 2 days. He said im "trying to find things to keep him here" and insinuated that im trying to increase hospital stay, hospital bill, and running unnecessary tests multiple times about his severe anemia and need for MRI and infectious work up. He still adamently refuses HD. im not sure what he is in the hospital for if he is refusing all of our interventions to help him.  I told him I am not getting an MRI because I need it because I dont have neck pain that he does and its severe, and there is no reason for me to get a test unless I think its exremely important for HIS health not for my benefit and I cannot give someone pain meds without working up a diagnosis for pain that has ap ossible severe etiology based on xrays and brief discussion with ortho reguarding the xray results and their rec is for MRI not CT and possible C spine instability concerns. I told him I would stop pain meds if he does not get MRi as I cannot ethically treat someone with pain meds if they have this condition without a work up. He was unhappy and said we were manipulating him and I told him if he does not follow the care plan that trained physicians have discussed the reasons for then we will have to further discuss things. He said we have done an MRI and I pulled up epic on the computer and showed him we have never done spine imaging here and only done a brain MRI in 12/20.   He said he will try to attempt MRI with ativan/morphine pretreatment. If he cannot tolerate we will do a CT non con but we have to try MRi first. He was not happy but he has not been happy about really anything here, I do not logically know why he does not want to get his pain worked up as he has been in " severe pain, and I told him this multiple times. I will call his wife again and discuss if needed as I am concerned with his refusal of treaments consistently and have worked to develop a trust and rapport but he has been very resistant to everything. FOBt ordered and discussed this with him.    Will f/u podiatry, ID, renal recs and MRI. Nursing updated outside of room.  Valencia placed vernight as was having excessive incontinence overnight.                Review of patient's allergies indicates:  No Known Allergies    No current facility-administered medications on file prior to encounter.     Current Outpatient Medications on File Prior to Encounter   Medication Sig    insulin (LANTUS SOLOSTAR U-100 INSULIN) glargine 100 units/mL (3mL) SubQ pen Inject 5 Units into the skin once daily.    albuterol (PROVENTIL/VENTOLIN HFA) 90 mcg/actuation inhaler Inhale 2 puffs into the lungs every 6 (six) hours as needed for Wheezing or Shortness of Breath. Rescue    amlodipine (NORVASC) 10 MG tablet Take 10 mg by mouth once daily.    ARIPiprazole (ABILIFY) 15 MG Tab Take 1 tablet (15 mg total) by mouth every evening.    aspirin (ECOTRIN) 81 MG EC tablet Take 1 tablet (81 mg total) by mouth once daily.    atorvastatin (LIPITOR) 80 MG tablet Take 1 tablet (80 mg total) by mouth once daily.    blood-glucose meter kit Use as instructed    calcitRIOL (ROCALTROL) 0.5 MCG Cap Take 0.5 mcg by mouth once daily.    chlorthalidone (HYGROTEN) 50 MG Tab Take 50 mg by mouth once daily.     cloNIDine (CATAPRES) 0.2 MG tablet Take 0.4 mg by mouth 3 (three) times daily.     colchicine (COLCRYS) 0.6 mg tablet Take 0.6 mg by mouth daily as needed.    ergocalciferol (ERGOCALCIFEROL) 50,000 unit Cap Take 50,000 Units by mouth every 30 days.     famotidine (PEPCID) 20 MG tablet Take 20 mg by mouth daily as needed for Heartburn.     ferrous gluconate (FERGON) 324 MG tablet Take 324 mg by mouth 2 (two) times a day.     fluticasone  (FLONASE) 50 mcg/actuation nasal spray 1 spray by Each Nare route once daily.    furosemide (LASIX) 40 MG tablet Take 40 mg by mouth 2 (two) times daily.    methocarbamoL (ROBAXIN) 500 MG Tab Take 500 mg by mouth 2 (two) times a day.    metoprolol tartrate (LOPRESSOR) 100 MG tablet Take 100 mg by mouth.    omeprazole (PRILOSEC) 20 MG capsule Take 20 mg by mouth once daily.    RENVELA 800 mg Tab Take 800 mg by mouth 3 (three) times daily.    sodium bicarbonate 650 MG tablet Take 650 mg by mouth.    tadalafiL (CIALIS) 2.5 mg Tab Take 2.5 mg by mouth.    TRUE METRIX GLUCOSE TEST STRIP Strp      Family History     Problem Relation (Age of Onset)    Drug abuse Brother    Heart attack Father, Brother    Hyperlipidemia Father    Hypertension Father, Brother    No Known Problems Sister    Stroke Father        Tobacco Use    Smoking status: Current Some Day Smoker     Packs/day: 0.25     Years: 10.00     Pack years: 2.50    Smokeless tobacco: Never Used   Substance and Sexual Activity    Alcohol use: No    Drug use: No    Sexual activity: Not on file     Review of Systems   Constitutional: Positive for chills. Negative for activity change and fever.   HENT: Negative for congestion and trouble swallowing.    Eyes: Negative for photophobia and visual disturbance.   Respiratory: Negative for chest tightness, shortness of breath and wheezing.    Cardiovascular: Positive for leg swelling. Negative for chest pain and palpitations.   Gastrointestinal: Negative for abdominal pain, constipation, nausea and vomiting.   Genitourinary: Negative for dysuria, flank pain, frequency, hematuria and urgency.   Musculoskeletal: Positive for arthralgias. Negative for back pain and gait problem.   Skin: Positive for color change and wound. Negative for rash.   Neurological: Negative for dizziness, syncope, weakness, light-headedness, numbness and headaches.   Psychiatric/Behavioral: Negative for agitation and confusion. The patient  is not nervous/anxious.      Objective:     Vital Signs (Most Recent):  Temp: 98.2 °F (36.8 °C) (22 1240)  Pulse: 77 (22 1240)  Resp: 19 (22 1240)  BP: 120/68 (22 1240)  SpO2: 99 % (22 1240) Vital Signs (24h Range):  Temp:  [97.6 °F (36.4 °C)-98.8 °F (37.1 °C)] 98.2 °F (36.8 °C)  Pulse:  [73-94] 77  Resp:  [16-20] 19  SpO2:  [98 %-99 %] 99 %  BP: (115-168)/(68-95) 120/68     Weight: 73.2 kg (161 lb 6 oz)  Body mass index is 26.05 kg/m².    Physical Exam  Vitals and nursing note reviewed.   Constitutional:       Appearance: He is well-developed. He is ill-appearing.      Comments: Agitated, baseline. Asterixis with tremors at rest and with movement    HENT:      Head: Normocephalic and atraumatic.      Mouth/Throat:      Mouth: Mucous membranes are moist.   Eyes:      General: No scleral icterus.     Conjunctiva/sclera: Conjunctivae normal.   Cardiovascular:      Rate and Rhythm: Normal rate and regular rhythm.      Heart sounds: Normal heart sounds.   Pulmonary:      Effort: Pulmonary effort is normal. No respiratory distress.      Breath sounds: Normal breath sounds. No wheezing.   Abdominal:      General: Bowel sounds are normal. There is no distension.      Palpations: Abdomen is soft.      Tenderness: There is no abdominal tenderness.   Musculoskeletal:         General: No tenderness. Normal range of motion.      Cervical back: Normal range of motion and neck supple.      Right lower le+ Pitting Edema present.      Left lower le+ Pitting Edema present.      Comments: LLE with burst blister. Severe pain with any light touch.  Signs of bunion on both big toes but also possible gout tophi deposit on right big toe. Edema I RLE >LLE. pitting   Feet:      Left foot:      Skin integrity: Skin breakdown, erythema and warmth present.   Skin:     General: Skin is warm and dry.      Capillary Refill: Capillary refill takes less than 2 seconds.   Neurological:      Mental Status: He is  alert and oriented to person, place, and time.      Cranial Nerves: No cranial nerve deficit.      Comments: Severe pain with movement of neck, wincing   Psychiatric:         Behavior: Behavior normal.         Thought Content: Thought content normal.         Judgment: Judgment normal.                 Significant Labs:   All pertinent labs within the past 24 hours have been reviewed.  CBC:   Recent Labs   Lab 02/16/22  1807 02/17/22  0658 02/18/22  0622   WBC 16.01* 14.09* 10.61   HGB 7.4* 7.2* 6.2*   HCT 22.2* 21.9* 19.2*    316 255     CMP:   Recent Labs   Lab 02/16/22  1807 02/16/22  1807 02/17/22  0658 02/17/22  0658 02/17/22  1041 02/17/22  1746 02/18/22  0622      < > 143   < > 143 145 142   K 5.5*   < > 5.2*   < > 4.9 4.9 5.3*      < > 104   < > 104 103 102   CO2 18*   < > 17*   < > 19* 19* 20*   GLU 82   < > 104   < > 85 85 81   *   < > 194*   < > 186* 184* 183*   CREATININE 19.7*   < > 18.6*   < > 19.7* 18.3* 18.8*   CALCIUM 10.1   < > 10.1   < > 10.1 10.6* 9.8   PROT 6.2  --  6.1  --   --   --   --    ALBUMIN 2.7*  --  2.6*  --   --   --   --    BILITOT 0.3  --  0.2  --   --   --   --    ALKPHOS 107  --  96  --   --   --   --    AST 14  --  14  --   --   --   --    ALT 11  --  10  --   --   --   --    ANIONGAP 19*   < > 22*   < > 20* 23* 20*   EGFRNONAA 2.2*   < > 2.4*   < > 2.2* 2.5* 2.4*    < > = values in this interval not displayed.     Lactic Acid:   Recent Labs   Lab 02/16/22 1807   LACTATE 0.9       Significant Imaging: I have reviewed all pertinent imaging results/findings within the past 24 hours.   X-Ray Ankle Complete Left  Narrative: EXAMINATION:  XR ANKLE COMPLETE 3 VIEW LEFT    CLINICAL HISTORY:  cellulitis, pain in L ankle;    TECHNIQUE:  AP, lateral and oblique views of the left ankle were performed.    COMPARISON:  Left foot 02/16/2022    FINDINGS:  Skeletal structures at the ankle are intact without fracture or a dislocation.  Ankle joint space is satisfactory.   The talar dome shows a small lucency likely representing osteochondral defect.  Mild soft tissue swelling is present around the ankle.    The findings at 1st MTP joint are better evaluated with recent MRI and radiographic exams.  Impression: no fracture or significant arthritis identified at left ankle.  Probable small osteochondral defect along the articular surface of the talar dome.    Electronically signed by: Randy Reyonlds MD  Date:    02/17/2022  Time:    14:48  X-Ray Cervical Spine AP And Lateral  Narrative: EXAMINATION:  XR CERVICAL SPINE AP LATERAL    CLINICAL HISTORY:  new onset neck pain, assess if any abnormalties or infection signs with recent bacteremia;    FINDINGS:  Cervical spine two views: There is widening of the predental distance with anterior displacement of C1 on C2 and possible rupture of the transverse ligament.  There is mild anterolisthesis of C2 on C3 and there is moderate DJD between C3 and C7.  No fracture dislocation bone destruction seen.  Impression: Anterolisthesis of C1 on C2 with widening of the predental distance was seen on the CT dated 12/26/2020.  This is a chronic finding: There are degenerative changes elsewhere as above.    Electronically signed by: Azael Jaime MD  Date:    02/17/2022  Time:    14:47

## 2022-02-18 NOTE — ASSESSMENT & PLAN NOTE
-will bring acthar gel from home to do non formulary as 40K and got via prior auth  -reason for CKD 5.

## 2022-02-18 NOTE — CARE UPDATE
Spoke to Mr. Martinez's wife, Atif Sierrateena. Informed her of patient's clinical decline and increased fatigue, somnolence, and inattentiveness. His wife reports that he had been sleeping most of the day, increasingly fatigued at home prior to admission. He relies on Acthar injections for energy. She says that in the past Acthar has made him more energetic, talkative, decreased his need for sleep. Lately, it has been less effective. She will try to bring the Acthar tomorrow. The challenges she faces are that she doesn't drive and will need to pay for Uber to his house and to the hospital to bring the Acthar.     She is aware that his labs and clinical picture make him unsuitable for transplant if a kidney were to become available soon. She has tried in the past to convince him to undergo dialysis. I explained to her that we will continue to respect his wishes, but that a C discussion would be appropriate given that he may continue to become more fatigued since we cannot do anything to improve his uremia without dialysis. He will need to make an informed decision about what he wants the rest of his life to look like if he continues to decline dialysis.

## 2022-02-18 NOTE — PLAN OF CARE
CM attempted to set up hospital follow up appointment in case he discharges over weekend. Patient sees Dr. Jory Garza at [facility]. The  advised she would send message over to Dr. Garza's office, and they will call patient directly to set up hospital follow up appointment.      Heidi Bermudez RN     [phone]

## 2022-02-18 NOTE — ASSESSMENT & PLAN NOTE
Initial labs significant for bicarb of 18, anion gap 19. AGMA likely 2/2 uremia. Continued AGMA,, uremia during admssion. Bun slightly decreased to 180s after IVF.     - HD if amenable.   - Sodium bicarb 650 TID

## 2022-02-18 NOTE — ASSESSMENT & PLAN NOTE
Baseline Hb 9-10, Hb 7.2 on admission. Iron low at 10, iron sat low at 4%, ferritin elevated 384, transferrin low at 174.   Antiplatelet/anticoagulation: aspirin    Plan:  - Recommend transfusion today  - Trend CBC daily  - No role for IV iron in setting of infection  - Transfuse with goal Hb > 7

## 2022-02-18 NOTE — ASSESSMENT & PLAN NOTE
Has history of membranous glomerulonephritis. Previous renal biopsy in 2013 showed 50% glomerulosclerosis. [place] [facility] [place]

## 2022-02-18 NOTE — ASSESSMENT & PLAN NOTE
Hyperkalemic at 5.5 on admission, now 5.3. No EKG changes of hyperkalemia on initial EKG.     - Shift as needed by primary.

## 2022-02-18 NOTE — ASSESSMENT & PLAN NOTE
Enrique Martinez is a 56 y.o. male with a PMH of CKD V glomerulonephropathy, insulin dependent diabetes mellitus, and CAD who presented to the ED for evaluation of worsening left foot pain and swelling. He was recently admitted at [facility] from 2/2-2/4/2022 from LLE cellulitis complicated by salmonella bacteremia after having diarrheal illness. Pt now returns with recurrent cellulitis vs gout with ulcer blister formation on left lower extremity. MRI foot also showing findings concerning for luca's abscess on 1st metatarsal and 1st phalanx of left foot. Blood cultures NGTD this admission. Pt has been afebrile in last 24 hours. On further questioning, pt denies hx of sickle cell/blood dyscrasias along with denying hx of spleen removal. With pts exam and lack of systemic symptoms, lower extremity symptoms appear more Gout like appearance.     Plan:  - Recommend discontinuing Vanc and Zosyn  - Recommend starting Doxy 100mg BID and Rocephin 2g Q24H for broad coverage of salmonella and possible infectious process in cervical neck area.  - F/U podiatry recs with regards to foot/left lower extremity cellulitis/gout.   - Will f/u blood cultures  - F/U MRI neck to evaluate for cervical neck infectious process

## 2022-02-18 NOTE — ASSESSMENT & PLAN NOTE
-elevated to 180s on admit but improved on current regimen to 130s systolic on exam 2/18  -cont hydralazine, labetalol, norvassc, clonidine.

## 2022-02-18 NOTE — PLAN OF CARE
AAOx4, VSS, Tele- NSR, afebrile overnight; IV abx continued for osteomyelitis, podiatry following  Neck pain/back pain continued overnight; requesting pain medicine around the clock as well as muscle relaxers with no relief. Screaming in pain yet refusing MRI, is aware of risks involved if condition worsens.  Minimal UOP made, able to hold the urinal independently but requesting nurses to hold it in place for him. He urinated on himself twice due to this, Condom catheter placed.  Refusing HD, nephrology following at this time due to uremia. NS @ 50cc/hr for 10hrs, colchine started; tremors continued  Sleeping very little at this time, very agitated and uncooperative, will cont to monitor

## 2022-02-18 NOTE — PROGRESS NOTES
Vancomycin therapy has been discontinued by provider. Will sign off, please re-consult if needed.    Jules Mahajan, FrantzD  Inpatient Pharmacy  [facility]

## 2022-02-18 NOTE — PROGRESS NOTES
[facility] - Transplant Stepdown  Nephrology  Progress Note    Patient Name: Enrique Martinez  MRN: [id number]  Admission Date: 2/16/2022  Hospital Length of Stay: 1 days  Attending Provider: Dianna Tijerina MD   Primary Care Physician: Primary Doctor No  Principal Problem:Acute renal failure superimposed on stage 5 chronic kidney disease, not on chronic dialysis    Recommendations  - Will hold off on HD given that patient declines, but continued worsening symptomatic uremia  - Consider palliative care consult for [facility] as patient's uremia is worsening to the point where he may not have capacity eventually  - Shift potassium as needed  - Continue phos binder sevelamer 1600 TID  - Nabicarb 650 TID  - Hold calcitriol in setting of hyperphosphatemia. Will re-start if phos reaches normal level.   - Recommend pRBC transfusion given morning hemoglobin 6.2  - Continue colchicine and tylenol for hyperuricemia/gout  - Recommend DC antibiotics given likely gout > septic joint or osteo  - Trend Cr  - Strict I&Os  - Avoid nephrotoxic agents  - Renally adjust medications     Subjective:     HPI: Mr. Martinez is a 56 y.o. 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 He was recently admitted at [facility] from 2/2-2/4/2022 from LLE cellulitis. He was found to have salmonella bacteremia suspected due to diarrheal illness. He was discharged with cipro for which he reports compliance. He has had no improvement in his pain, redness, or swelling of his left foot and leg. He has also developed a new blister on his left foot, with MRI foot concerning for osteomyelitis and showing soft tissue swelling c/w cellulitis. He is admitted for treatment of suspected osteomyelitis.     He  follows with Dr. Cardoza (nephrology), has adamantly declined HD in the past and would rather wait for transplant. He reports being on the transplant list for the past 4 years. His last renal biopsy in 2013 showed 50% glomerulosclerosis. Started on calcitriol 0.5, hx of elevated PTH at 921. Patient complains of decreased appetite, neck cramps, fatigue. He still urinates 4-5 times/day. He reports his B/L LE edema has worsened over the past few weeks. No dyspnea, chest pain, dysuria, hematuria. Nephro consulted for HD initiation.      Labs are significant for hyperkalemia 5.5, bicarb 18, , phosphate 10.4, Cr 19.7, GFR 2.2, ESR 97, CRP 33.4. . CBC significant for Hgb of 7.2, white count 14. Previous echo showed EF of 65%.       Interval History: Overnight, he complained of neck pain, urinated on himself twice. Podiatry seen, low concern for osteo. Patient still declines HD. More somnolent, fatigued decreased appetite. Doses off more often during my assessment. Reports left foot pain, neck pain improved. CXR C-spine showing some anterolisthesis of C1 on C2, may be chronic or concerning for atlanto-axial instability.  Vanc/zosyn initiated.     Review of patient's allergies indicates:  No Known Allergies  Current Facility-Administered Medications   Medication Frequency    0.9%  NaCl infusion (for blood administration) Q24H PRN    acetaminophen tablet 1,000 mg Q8H    albuterol-ipratropium 2.5 mg-0.5 mg/3 mL nebulizer solution 3 mL Q4H PRN    aluminum-magnesium hydroxide-simethicone 200-200-20 mg/5 mL suspension 30 mL QID PRN    amLODIPine tablet 10 mg Daily    ARIPiprazole tablet 15 mg QHS    aspirin EC tablet 81 mg Daily    atorvastatin tablet 80 mg Daily    cloNIDine tablet 0.4 mg TID    colchicine split tablet 0.3 mg BID    dextrose 10% bolus 125 mL PRN    dextrose 10% bolus 250 mL PRN    folic acid tablet 1 mg Daily    glucagon (human recombinant) injection 1 mg PRN    glucose chewable tablet  16 g PRN    glucose chewable tablet 24 g PRN    heparin (porcine) injection 5,000 Units Q8H    hydrALAZINE tablet 50 mg Q8H    labetaloL tablet 200 mg Q12H    lorazepam injection 1 mg On Call Procedure    melatonin tablet 6 mg Nightly PRN    methocarbamoL tablet 750 mg QID PRN    morphine injection 2 mg Once PRN    morphine injection 2 mg On Call Procedure    naloxone 0.4 mg/mL injection 0.02 mg PRN    ondansetron disintegrating tablet 8 mg Q8H PRN    oxyCODONE immediate release tablet 5 mg Q6H PRN    oxyCODONE immediate release tablet Tab 10 mg Q4H PRN    piperacillin-tazobactam 3.375 g in dextrose 5 % 50 mL IVPB (ready to mix system) Q12H    polyethylene glycol packet 17 g Daily PRN    prochlorperazine injection Soln 5 mg Q6H PRN    sevelamer carbonate tablet 1,600 mg TID    simethicone chewable tablet 80 mg QID PRN    sodium bicarbonate tablet 650 mg TID    sodium chloride 0.9% flush 10 mL Q8H PRN    sodium zirconium cyclosilicate packet 10 g Once    vancomycin - pharmacy to dose pharmacy to manage frequency       Objective:     Vital Signs (Most Recent):  Temp: 98.8 °F (37.1 °C) (02/18/22 0800)  Pulse: 86 (02/18/22 0919)  Resp: 16 (02/18/22 0818)  BP: 137/84 (02/18/22 0800)  SpO2: 99 % (02/18/22 0800)  O2 Device (Oxygen Therapy): room air (02/18/22 0800) Vital Signs (24h Range):  Temp:  [97.7 °F (36.5 °C)-98.8 °F (37.1 °C)] 98.8 °F (37.1 °C)  Pulse:  [77-94] 86  Resp:  [16-20] 16  SpO2:  [98 %-99 %] 99 %  BP: (126-168)/(84-95) 137/84     Weight: 73.2 kg (161 lb 6 oz) (02/18/22 0400)  Body mass index is 26.05 kg/m².  Body surface area is 1.85 meters squared.    I/O last 3 completed shifts:  In: 935.5 [P.O.:140; I.V.:295.5; IV Piggyback:500]  Out: 475 [Urine:475]    Physical Exam  Vitals and nursing note reviewed.   Constitutional:       Appearance: He is well-developed and overweight. He is ill-appearing.   HENT:      Head: Normocephalic and atraumatic.      Nose: Nose normal.       Mouth/Throat:      Mouth: Mucous membranes are moist.   Eyes:      General: No scleral icterus.     Conjunctiva/sclera: Conjunctivae normal.   Cardiovascular:      Rate and Rhythm: Normal rate and regular rhythm.      Heart sounds: Normal heart sounds.   Pulmonary:      Effort: Pulmonary effort is normal. No respiratory distress.      Breath sounds: Normal breath sounds. No wheezing or rales.   Abdominal:      General: Bowel sounds are normal. There is no distension.      Palpations: Abdomen is soft.      Tenderness: There is no abdominal tenderness. There is no guarding.   Musculoskeletal:         General: Swelling and signs of injury present. Normal range of motion.      Cervical back: Normal range of motion and neck supple.      Right lower le+ Pitting Edema present.      Left lower le+ Pitting Edema present.   Feet:      Right foot:      Skin integrity: Skin integrity normal.      Left foot:      Skin integrity: Skin breakdown, erythema, warmth and dry skin present.   Skin:     General: Skin is warm and dry.      Capillary Refill: Capillary refill takes less than 2 seconds.      Comments: L leg, ankle, foot skin sloughing.   Deroofed blister on dorsum of left foot   Neurological:      Mental Status: He is easily aroused. He is lethargic.      GCS: GCS eye subscore is 4. GCS verbal subscore is 5. GCS motor subscore is 6.      Motor: Tremor present.      Comments: Asterixis present   Psychiatric:         Behavior: Behavior normal. Behavior is cooperative.         Thought Content: Thought content normal.         Judgment: Judgment normal.         Significant Labs:  CBC:   Recent Labs   Lab 22  0622   WBC 10.61   RBC 2.24*   HGB 6.2*   HCT 19.2*      MCV 86   MCH 27.7   MCHC 32.3     CMP:   Recent Labs   Lab 22  0658 22  1041 22  0622      < > 81   CALCIUM 10.1   < > 9.8   ALBUMIN 2.6*  --   --    PROT 6.1  --   --       < > 142   K 5.2*   < > 5.3*   CO2 17*   < >  20*      < > 102   *   < > 183*   CREATININE 18.6*   < > 18.8*   ALKPHOS 96  --   --    ALT 10  --   --    AST 14  --   --    BILITOT 0.2  --   --     < > = values in this interval not displayed.     All labs within the past 24 hours have been reviewed.     Significant Imaging:  Labs: Reviewed  uric acid 15, phos 11.2, hemoglobin 6.2. 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 Previously, he has declined HD in the past, never receiving a fistula. He is on the transplant wait list. Patient exhibits signs of uremia including fatigue, poor appetite, and mental slowing. On labs, he is hyperkalemic, uremic, AGMA, hyperphosphatemic. GFR is 2-3, which is reduced from previous GFR 13. Home meds include sodium bicarb, Renvela, and calcitriol. 2+ pitting edema on B/L lower extremities. Renal US from Jan 2022 shows sonographic evidence of chronic medical renal disease and bilateral simple and minimally complex renal cysts.  sCr one year ago was 4.5, now 18.6. UPCr ratio 1.11 (Dec 2020)  2/18: Patient increasingly somnolent, lethargic.     Plan:   - Recommend HD if patient is amenable. Discussed HD with patient as possible emergent need in this setting and as a bridge to transplant. Patient continues to decline. Discussed extensively that his labs and clinical decline from uremia would preclude him from getting a kidney should one become available, rendering his time on the wait list pointless.   - Consider palliative care consult for [facility] as patient's uremia is worsening to the point where he may not have capacity eventually  - Shift potassium as needed  - Continue phos binder sevelamer 1600  TID  - Continue Nabicarb 650 TID  - Hold calcitriol in setting of hyperphosphatemia. Will re-start when phos reaches normal level.   - Trend Cr  - Strict I&Os  - Avoid nephrotoxic agents  - Renally adjust medications           Gout  Has history of gout and hyperuricemia. Uric acid elevated at 15. S/p NS 50 cc/hr for 10 hours. Ankle pain ddx includes gout vs cellulitis vs septic joint. Podiatry has low concern for osteo    - Colchicine 0.3 BID  - scheduled tylenol 1 g TID for pain  - consider left ankle joint aspiration per podiatry recs       Increased anion gap metabolic acidosis  Initial labs significant for bicarb of 18, anion gap 19. AGMA likely 2/2 uremia. Continued AGMA,, uremia during admssion. Bun slightly decreased to 180s after IVF.     - HD if amenable.   - Sodium bicarb 650 TID    Hyperphosphatemia  Hyperphosphatemia at 10.9.     - Continue Sevelamer 1600 TID    Hyperkalemia  Hyperkalemic at 5.5 on admission, now 5.3. No EKG changes of hyperkalemia on initial EKG.     - Shift as needed by primary.     SHANNAN (iron deficiency anemia)  Baseline Hb 9-10, Hb 7.2 on admission. Iron low at 10, iron sat low at 4%, ferritin elevated 384, transferrin low at 174.   Antiplatelet/anticoagulation: aspirin    Plan:  - Recommend transfusion today  - Trend CBC daily  - No role for IV iron in setting of infection  - Transfuse with goal Hb > 7      Patient on waiting list for kidney transplant  Has been on waiting list for 4 years.     Type 2 diabetes mellitus  HbA1c 7.6 in Feb 2022, BG during hospitalization at goal. Takes insulin glargine at home.     - ISS as needed.     Membranous glomerulonephritis  Has history of membranous glomerulonephritis. Previous renal biopsy in 2013 showed 50% glomerulosclerosis. Follows with Dr. Lee at [facility].     Renovascular hypertension  Patient remains hypertensive in 170s/89 after admission. On amlodipine, clonidine, labetolol, and hydralazine scheduled.     - Will defer to primary  team for management      Thank you for your consult. I will follow-up with patient. Please contact us if you have any additional questions.    Harrison Acuna MD  Nephrology  [facility] - Transplant Stepdown

## 2022-02-18 NOTE — HPI
Enrique Martinez is a 56 y.o. male with a PMH of CKD V glomerulonephropathy, insulin dependent diabetes mellitus, and CAD who presented to the ED for evaluation of worsening left foot pain and swelling. He was recently admitted at [facility] from 2/2-2/4/2022 from LLE cellulitis complicated by salmonella bacteremia after having diarrheal illness.  He was discharged home with ciprofloxacin. He reports no improvement in his cellulitis since being discharged home and no has new blister development. ID was consulted due to recent salmonella bacteremia and new findings on MRI suspicious for left foot myelitis and persistent cellulitis infection. On initial ID encounter, pt reporting pain in left foot and ankle but denying any fever, chills, N/V, diarrhea, chest pain, headaches, or SOB.

## 2022-02-18 NOTE — ASSESSMENT & PLAN NOTE
Hyperkalemia  Hyperphosphatemia  Metabolic acidosis  Kidney transplant candidate    - 2/2 membranous glomerulopathy  - GFR was 13-16 up from baseline 4-6 previously in January 2022. Labs repeated today and GFR is 2.6, Cr 19.   - patient follows with Dr. Cardoza and Dr Lee and dialysis has been discussed multiple times, patient continues to refuse.  - on transplant list with [facility]  -cont phos binders, Na bicarb. [place] for adequate I/O. Lasix held 2/17 per renal recs  -uric acid trending  -at severe risk of adverse events, BUN almost 200, asterixis on exam. Meets multiple criteria but refusing HD. Has capacity. Wife aware and states is his decision

## 2022-02-18 NOTE — ASSESSMENT & PLAN NOTE
-abnormal C spine Xray with possible signs of C spine instability, MRi ordered and patient refused 2/17, on pain meds, discussed AT length, see 2/17 and 2/18 note above, ordered with premediation now. Concern for possible infection also given acuity of pain  -If fails then will get CT non con

## 2022-02-18 NOTE — ASSESSMENT & PLAN NOTE
- 2/4 SIRS with HR>90, WBC 16  - lactic WNL  - ESR 97, CRP 33.4, procal 1.  - MRI w/ regions of focal abnormal signal involving the distal 1st metatarsal and proximal 1st phalanx as described above.  Findings are concerning for foci of subacute myelitis   - patient is hemodynamically stable, podiatry consulted, unclear if ifection but no plans to do biopsy now cellulitis worsened it appears 2/17 PM so vanc/zosyn initiated. ID consulted 2/18 as severely tender extremity, concern with recent bacteremia on 2/1 with salmonella with scaling on exam for possible staph/strep also scalded skin type look.. will f/u recs.

## 2022-02-18 NOTE — ASSESSMENT & PLAN NOTE
Enrique Martinez is a 56 y.o. male with a PMH of CKD V glomerulonephropathy, insulin dependent diabetes mellitus, and CAD who presented to the ED for evaluation of worsening left foot pain and swelling. He was recently admitted at [facility] from 2/2-2/4/2022 from LLE cellulitis complicated by salmonella bacteremia after having diarrheal illness. Pt now returns with recurrent cellulitis vs gout with ulcer blister formation on left lower extremity. MRI foot also showing findings concerning for luca's abscess on 1st metatarsal and 1st phalanx of left foot. Blood cultures NGTD this admission. Pt has been afebrile in last 24 hours. On further questioning, pt denies hx of sickle cell/blood dyscrasias along with denying hx of spleen removal. With pts exam and lack of systemic symptoms, lower extremity symptoms appear more Gout like appearance.     Plan:  - Recommend discontinuing Vanc and Zosyn  - Recommend starting Doxy 100mg BID and Rocephin 2g Q24H for broad coverage.  - F/U podiatry recs with regards to foot/left lower extremity cellulitis/gout.   - Will f/u blood cultures  - F/U MRI neck to evaluate for cervical neck infectious process

## 2022-02-18 NOTE — SUBJECTIVE & OBJECTIVE
Past Medical History:   Diagnosis Date    Anemia     Coronary artery disease     Diabetes mellitus, type 2     GERD (gastroesophageal reflux disease)     Gout     Hydrocele in adult     bilateral    Hyperlipidemia     Hypertension     Inguinal hernia     bilateral    Membranous glomerulonephritis     Nephrotic range proteinuria     Nephrotic syndrome     Obesity     Umbilical hernia        Past Surgical History:   Procedure Laterality Date    ABDOMINAL SURGERY  1980s    CARDIAC CATHETERIZATION  2007    x 2    COLONOSCOPY N/A 4/5/2019    Procedure: COLONOSCOPY;  Surgeon: Jose L Carty MD;  Location: [facility] ([address] [place] [address]);  Service: Colon and Rectal;  Laterality: N/A;    CORONARY STENT PLACEMENT  2007       Review of patient's allergies indicates:  No Known Allergies    Medications:  Medications Prior to Admission   Medication Sig    insulin (LANTUS SOLOSTAR U-100 INSULIN) glargine 100 units/mL (3mL) SubQ pen Inject 5 Units into the skin once daily.    albuterol (PROVENTIL/VENTOLIN HFA) 90 mcg/actuation inhaler Inhale 2 puffs into the lungs every 6 (six) hours as needed for Wheezing or Shortness of Breath. Rescue    amlodipine (NORVASC) 10 MG tablet Take 10 mg by mouth once daily.    ARIPiprazole (ABILIFY) 15 MG Tab Take 1 tablet (15 mg total) by mouth every evening.    aspirin (ECOTRIN) 81 MG EC tablet Take 1 tablet (81 mg total) by mouth once daily.    atorvastatin (LIPITOR) 80 MG tablet Take 1 tablet (80 mg total) by mouth once daily.    blood-glucose meter kit Use as instructed    calcitRIOL (ROCALTROL) 0.5 MCG Cap Take 0.5 mcg by mouth once daily.    chlorthalidone (HYGROTEN) 50 MG Tab Take 50 mg by mouth once daily.     cloNIDine (CATAPRES) 0.2 MG tablet Take 0.4 mg by mouth 3 (three) times daily.     colchicine (COLCRYS) 0.6 mg tablet Take 0.6 mg by mouth daily as needed.    ergocalciferol (ERGOCALCIFEROL) 50,000 unit Cap Take 50,000 Units by mouth every 30 days.      "famotidine (PEPCID) 20 MG tablet Take 20 mg by mouth daily as needed for Heartburn.     ferrous gluconate (FERGON) 324 MG tablet Take 324 mg by mouth 2 (two) times a day.     fluticasone (FLONASE) 50 mcg/actuation nasal spray 1 spray by Each Nare route once daily.    furosemide (LASIX) 40 MG tablet Take 40 mg by mouth 2 (two) times daily.    methocarbamoL (ROBAXIN) 500 MG Tab Take 500 mg by mouth 2 (two) times a day.    metoprolol tartrate (LOPRESSOR) 100 MG tablet Take 100 mg by mouth.    omeprazole (PRILOSEC) 20 MG capsule Take 20 mg by mouth once daily.    RENVELA 800 mg Tab Take 800 mg by mouth 3 (three) times daily.    sodium bicarbonate 650 MG tablet Take 650 mg by mouth.    tadalafiL (CIALIS) 2.5 mg Tab Take 2.5 mg by mouth.    TRUE METRIX GLUCOSE TEST STRIP Strp      Antibiotics (From admission, onward)            Start     Stop Route Frequency Ordered    02/17/22 2000  piperacillin-tazobactam 3.375 g in dextrose 5 % 50 mL IVPB (ready to mix system)         -- IV Every 12 hours (non-standard times) 02/17/22 1745    02/17/22 1820  vancomycin - pharmacy to dose  (vancomycin IVPB)        "And" Linked Group Details    -- IV pharmacy to manage frequency 02/17/22 1720        Antifungals (From admission, onward)            None        Antivirals (From admission, onward)    None           Immunization History   Administered Date(s) Administered    Hepatitis A / Hepatitis B 09/18/2018    Hepatitis A, Adult 02/22/2021    Hepatitis B (recombinant) Adjuvanted, 2 dose 02/22/2021, 03/26/2021    Influenza 12/11/2012, 10/18/2019    Influenza - Quadrivalent - MDCK - PF 10/18/2019    Influenza - Quadrivalent - PF *Preferred* (6 months and older) 10/30/2013, 10/12/2016, 10/04/2017, 09/18/2018, 09/01/2020    Influenza - Trivalent - PF (ADULT) 10/30/2013, 09/09/2015    Pneumococcal Conjugate - 13 Valent 01/13/2017    Pneumococcal Polysaccharide - 23 Valent 12/20/2012, 09/18/2018    Tdap 12/20/2012, 10/12/2013 "    Zoster 11/15/2020    Zoster Recombinant 11/15/2020, 01/23/2021       Family History     Problem Relation (Age of Onset)    Drug abuse Brother    Heart attack Father, Brother    Hyperlipidemia Father    Hypertension Father, Brother    No Known Problems Sister    Stroke Father        Social History     Socioeconomic History    Marital status:    Tobacco Use    Smoking status: Current Some Day Smoker     Packs/day: 0.25     Years: 10.00     Pack years: 2.50    Smokeless tobacco: Never Used   Substance and Sexual Activity    Alcohol use: No    Drug use: No     Review of Systems   Constitutional: Negative for activity change, chills and fever.   HENT: Negative for congestion and trouble swallowing.    Eyes: Negative for photophobia and visual disturbance.   Respiratory: Negative for chest tightness, shortness of breath and wheezing.    Cardiovascular: Positive for leg swelling. Negative for chest pain and palpitations.   Gastrointestinal: Negative for abdominal pain, constipation, nausea and vomiting.   Genitourinary: Negative for dysuria, flank pain, frequency, hematuria and urgency.   Musculoskeletal: Positive for arthralgias. Negative for back pain and gait problem.   Skin: Positive for wound. Negative for rash.   Neurological: Negative for syncope.   Psychiatric/Behavioral: Negative for agitation and confusion. The patient is not nervous/anxious.      Objective:     Vital Signs (Most Recent):  Temp: 98.8 °F (37.1 °C) (02/18/22 0800)  Pulse: 86 (02/18/22 0919)  Resp: 16 (02/18/22 0818)  BP: 137/84 (02/18/22 0800)  SpO2: 99 % (02/18/22 0800) Vital Signs (24h Range):  Temp:  [97.7 °F (36.5 °C)-98.8 °F (37.1 °C)] 98.8 °F (37.1 °C)  Pulse:  [77-94] 86  Resp:  [16-20] 16  SpO2:  [98 %-99 %] 99 %  BP: (126-168)/(84-95) 137/84     Weight: 73.2 kg (161 lb 6 oz)  Body mass index is 26.05 kg/m².    Estimated Creatinine Clearance: 3.9 mL/min (A) (based on SCr of 18.8 mg/dL (H)).    Physical Exam  Vitals and  nursing note reviewed.   Constitutional:       General: He is not in acute distress.     Appearance: He is well-developed. He is ill-appearing.   HENT:      Head: Normocephalic and atraumatic.      Mouth/Throat:      Mouth: Mucous membranes are moist.   Eyes:      General: No scleral icterus.     Conjunctiva/sclera: Conjunctivae normal.   Cardiovascular:      Rate and Rhythm: Normal rate and regular rhythm.      Heart sounds: Normal heart sounds.   Pulmonary:      Effort: Pulmonary effort is normal. No respiratory distress.      Breath sounds: Normal breath sounds. No wheezing.   Abdominal:      General: Bowel sounds are normal. There is no distension.      Palpations: Abdomen is soft.      Tenderness: There is no abdominal tenderness.   Musculoskeletal:         General: No tenderness. Normal range of motion.      Cervical back: Normal range of motion and neck supple.      Right lower le+ Pitting Edema present.      Left lower le+ Pitting Edema present.   Feet:      Right foot:      Skin integrity: Skin integrity normal.      Left foot:      Skin integrity: Skin breakdown (Tendern to palpation at wound), erythema, warmth and dry skin present.   Skin:     General: Skin is warm and dry.      Capillary Refill: Capillary refill takes less than 2 seconds.   Neurological:      General: No focal deficit present.      Mental Status: He is alert and oriented to person, place, and time.      Motor: Tremor present.   Psychiatric:         Behavior: Behavior normal.         Thought Content: Thought content normal.         Judgment: Judgment normal.         Significant Labs: All pertinent labs within the past 24 hours have been reviewed.    Significant Imaging: I have reviewed all pertinent imaging results/findings within the past 24 hours.

## 2022-02-18 NOTE — ASSESSMENT & PLAN NOTE
HbA1c 7.6 in Feb 2022, BG during hospitalization at goal. Takes insulin glargine at home.     - ISS as needed.

## 2022-02-18 NOTE — PROGRESS NOTES
"[facility] Medicine  Progress Note    Patient Name: Enrique Martinez  MRN: [id number]  Patient Class: IP- Inpatient   Admission Date: 2/16/2022  Length of Stay: 1 days  Attending Physician: Dianna Tijerina MD  Primary Care Provider: Primary Doctor No        Subjective:     Principal Problem:Subacute osteomyelitis        HPI:  Enrqiue Martinez is a 56 y.o. male with a PMHx of CKD V glomerulonephropathy, insulin dependent diabetes mellitus, and CAD who presented tot he ED for evaluation of worsening left foot pain and swelling. He was recently admitted at [facility] from 2/2-2/4/2022 from LLE cellulitis. He was found to have salmonella bacteremia suspected due to diarrheal illness. He was discharged with cipro for which he reports compliance. He has had no improvement in his pain, redness, or swelling of his left foot and leg. He has also developed a new blister on his left foot. He endorses chills. He denies fevers, chest pain, SOB, N/V, decreased urine output, dysuria, and flank pain.    ED: HR 97, other VSSAF. CBC with leukocytosis of 16.01, Hgb 7.4, Hct 22.2.  ESR 97. CRP 33.4. K 5.5. Cr 19.7, . Phos 10.4. Lactic WNL. MRI L foot with cellulitis and osteomyelitis of distal 1st metatarsal and proximal 1st phalanx.      Overview/Hospital Course:  No notes on file      Interval history: patient with labs similar to previous day with elevated Cr/Phos/BUN. Still with asterixis on exam. HG down to 6's today, unclear cause, as baseline 9 with 7 on admit. adamently denies dark stools, blood in stools, hx of ulcers. Did not want to blood consent this AM and returned later today when HG resulted and agreeable to transfusion when I showed him the labs on my phone as he said "yall trying to find more things to keep me here longer". To show him that I was not looking to find more things and that this was a lab finding I do not know the cause of. fobt ordered, as seems may be another cause besides " "AOCD. Notes complaining of severe neck pain all night and he says today it was not bad, I told him this AM we have to get MRI no more option of refusing as the potential for spine instability that was explaine to him yesterday when he refused, his wife was on the phone today and she said he should also needing to get it. She said HD is up to him and we cant make him get it whe I asked if she knew about the HD issues. His BP is better, he said "its not from lasix". I said I know lasix is not a BP med and its being held now per nephro recs for possible gout/crystals in his left foot. He said he doesn't have gout or crystals in it. He has what loooks liek a possible tophi in right foot also in big toe and he said its not. His foot is still extremely painful to any light touch podiatry has no plans for surgery as unclear if acute infection or chronic based on imaging. Redness appearsimproving with antibiotics initiation, procal 1. No fevers but concern for infectious process with recent salmonella and severe pain to the foot, possible mixed process. ID consulted. Concern for possible infection in spine with new onset pain with recent bacteremia also. He has some back pain he said too he claims is from the bed. No growth in blood Cx now. He denies hx of sickle cell dx that would predispose him to salmonella he had recently.   He agreed to MRi but then refuse to nursing after I left. I returned to consent him for blood and he was highly agitated and argumentative which he has been almost every time guerita seen him in the past day which has been 5 times now in 2 days. He said im "trying to find things to keep him here" and insinuated that im trying to increase hospital stay, hospital bill, and running unnecessary tests multiple times about his severe anemia and need for MRI and infectious work up. He still adamently refuses HD. im not sure what he is in the hospital for if he is refusing all of our interventions to help " him.  I told him I am not getting an MRI because I need it because I dont have neck pain that he does and its severe, and there is no reason for me to get a test unless I think its exremely important for HIS health not for my benefit and I cannot give someone pain meds without working up a diagnosis for pain that has ap ossible severe etiology based on xrays and brief discussion with ortho reguarding the xray results and their rec is for MRI not CT and possible C spine instability concerns. I told him I would stop pain meds if he does not get MRi as I cannot ethically treat someone with pain meds if they have this condition without a work up. He was unhappy and said we were manipulating him and I told him if he does not follow the care plan that trained physicians have discussed the reasons for then we will have to further discuss things. He said we have done an MRI and I pulled up epic on the computer and showed him we have never done spine imaging here and only done a brain MRI in 12/20.   He said he will try to attempt MRI with ativan/morphine pretreatment. If he cannot tolerate we will do a CT non con but we have to try MRi first. He was not happy but he has not been happy about really anything here, I do not logically know why he does not want to get his pain worked up as he has been in severe pain, and I told him this multiple times. I will call his wife again and discuss if needed as I am concerned with his refusal of treaments consistently and have worked to develop a trust and rapport but he has been very resistant to everything. FOBt ordered and discussed this with him.    Will f/u podiatry, ID, renal recs and MRI. Nursing updated outside of room.  Valencia placed vernight as was having excessive incontinence overnight.                Review of patient's allergies indicates:  No Known Allergies    No current facility-administered medications on file prior to encounter.     Current Outpatient Medications on  File Prior to Encounter   Medication Sig    insulin (LANTUS SOLOSTAR U-100 INSULIN) glargine 100 units/mL (3mL) SubQ pen Inject 5 Units into the skin once daily.    albuterol (PROVENTIL/VENTOLIN HFA) 90 mcg/actuation inhaler Inhale 2 puffs into the lungs every 6 (six) hours as needed for Wheezing or Shortness of Breath. Rescue    amlodipine (NORVASC) 10 MG tablet Take 10 mg by mouth once daily.    ARIPiprazole (ABILIFY) 15 MG Tab Take 1 tablet (15 mg total) by mouth every evening.    aspirin (ECOTRIN) 81 MG EC tablet Take 1 tablet (81 mg total) by mouth once daily.    atorvastatin (LIPITOR) 80 MG tablet Take 1 tablet (80 mg total) by mouth once daily.    blood-glucose meter kit Use as instructed    calcitRIOL (ROCALTROL) 0.5 MCG Cap Take 0.5 mcg by mouth once daily.    chlorthalidone (HYGROTEN) 50 MG Tab Take 50 mg by mouth once daily.     cloNIDine (CATAPRES) 0.2 MG tablet Take 0.4 mg by mouth 3 (three) times daily.     colchicine (COLCRYS) 0.6 mg tablet Take 0.6 mg by mouth daily as needed.    ergocalciferol (ERGOCALCIFEROL) 50,000 unit Cap Take 50,000 Units by mouth every 30 days.     famotidine (PEPCID) 20 MG tablet Take 20 mg by mouth daily as needed for Heartburn.     ferrous gluconate (FERGON) 324 MG tablet Take 324 mg by mouth 2 (two) times a day.     fluticasone (FLONASE) 50 mcg/actuation nasal spray 1 spray by Each Nare route once daily.    furosemide (LASIX) 40 MG tablet Take 40 mg by mouth 2 (two) times daily.    methocarbamoL (ROBAXIN) 500 MG Tab Take 500 mg by mouth 2 (two) times a day.    metoprolol tartrate (LOPRESSOR) 100 MG tablet Take 100 mg by mouth.    omeprazole (PRILOSEC) 20 MG capsule Take 20 mg by mouth once daily.    RENVELA 800 mg Tab Take 800 mg by mouth 3 (three) times daily.    sodium bicarbonate 650 MG tablet Take 650 mg by mouth.    tadalafiL (CIALIS) 2.5 mg Tab Take 2.5 mg by mouth.    TRUE METRIX GLUCOSE TEST STRIP Strp      Family History     Problem Relation  (Age of Onset)    Drug abuse Brother    Heart attack Father, Brother    Hyperlipidemia Father    Hypertension Father, Brother    No Known Problems Sister    Stroke Father        Tobacco Use    Smoking status: Current Some Day Smoker     Packs/day: 0.25     Years: 10.00     Pack years: 2.50    Smokeless tobacco: Never Used   Substance and Sexual Activity    Alcohol use: No    Drug use: No    Sexual activity: Not on file     Review of Systems   Constitutional: Positive for chills. Negative for activity change and fever.   HENT: Negative for congestion and trouble swallowing.    Eyes: Negative for photophobia and visual disturbance.   Respiratory: Negative for chest tightness, shortness of breath and wheezing.    Cardiovascular: Positive for leg swelling. Negative for chest pain and palpitations.   Gastrointestinal: Negative for abdominal pain, constipation, nausea and vomiting.   Genitourinary: Negative for dysuria, flank pain, frequency, hematuria and urgency.   Musculoskeletal: Positive for arthralgias. Negative for back pain and gait problem.   Skin: Positive for color change and wound. Negative for rash.   Neurological: Negative for dizziness, syncope, weakness, light-headedness, numbness and headaches.   Psychiatric/Behavioral: Negative for agitation and confusion. The patient is not nervous/anxious.      Objective:     Vital Signs (Most Recent):  Temp: 98.2 °F (36.8 °C) (02/18/22 1240)  Pulse: 77 (02/18/22 1240)  Resp: 19 (02/18/22 1240)  BP: 120/68 (02/18/22 1240)  SpO2: 99 % (02/18/22 1240) Vital Signs (24h Range):  Temp:  [97.6 °F (36.4 °C)-98.8 °F (37.1 °C)] 98.2 °F (36.8 °C)  Pulse:  [73-94] 77  Resp:  [16-20] 19  SpO2:  [98 %-99 %] 99 %  BP: (115-168)/(68-95) 120/68     Weight: 73.2 kg (161 lb 6 oz)  Body mass index is 26.05 kg/m².    Physical Exam  Vitals and nursing note reviewed.   Constitutional:       Appearance: He is well-developed. He is ill-appearing.      Comments: Agitated, baseline.  Asterixis with tremors at rest and with movement    HENT:      Head: Normocephalic and atraumatic.      Mouth/Throat:      Mouth: Mucous membranes are moist.   Eyes:      General: No scleral icterus.     Conjunctiva/sclera: Conjunctivae normal.   Cardiovascular:      Rate and Rhythm: Normal rate and regular rhythm.      Heart sounds: Normal heart sounds.   Pulmonary:      Effort: Pulmonary effort is normal. No respiratory distress.      Breath sounds: Normal breath sounds. No wheezing.   Abdominal:      General: Bowel sounds are normal. There is no distension.      Palpations: Abdomen is soft.      Tenderness: There is no abdominal tenderness.   Musculoskeletal:         General: No tenderness. Normal range of motion.      Cervical back: Normal range of motion and neck supple.      Right lower le+ Pitting Edema present.      Left lower le+ Pitting Edema present.      Comments: LLE with burst blister. Severe pain with any light touch.  Signs of bunion on both big toes but also possible gout tophi deposit on right big toe. Edema I RLE >LLE. pitting   Feet:      Left foot:      Skin integrity: Skin breakdown, erythema and warmth present.   Skin:     General: Skin is warm and dry.      Capillary Refill: Capillary refill takes less than 2 seconds.   Neurological:      Mental Status: He is alert and oriented to person, place, and time.      Cranial Nerves: No cranial nerve deficit.      Comments: Severe pain with movement of neck, wincing   Psychiatric:         Behavior: Behavior normal.         Thought Content: Thought content normal.         Judgment: Judgment normal.                 Significant Labs:   All pertinent labs within the past 24 hours have been reviewed.  CBC:   Recent Labs   Lab 22  18022  0658 22  0622   WBC 16.01* 14.09* 10.61   HGB 7.4* 7.2* 6.2*   HCT 22.2* 21.9* 19.2*    316 255     CMP:   Recent Labs   Lab 22  1807 22  18022  0658 22  0658  02/17/22  1041 02/17/22  1746 02/18/22  0622      < > 143   < > 143 145 142   K 5.5*   < > 5.2*   < > 4.9 4.9 5.3*      < > 104   < > 104 103 102   CO2 18*   < > 17*   < > 19* 19* 20*   GLU 82   < > 104   < > 85 85 81   *   < > 194*   < > 186* 184* 183*   CREATININE 19.7*   < > 18.6*   < > 19.7* 18.3* 18.8*   CALCIUM 10.1   < > 10.1   < > 10.1 10.6* 9.8   PROT 6.2  --  6.1  --   --   --   --    ALBUMIN 2.7*  --  2.6*  --   --   --   --    BILITOT 0.3  --  0.2  --   --   --   --    ALKPHOS 107  --  96  --   --   --   --    AST 14  --  14  --   --   --   --    ALT 11  --  10  --   --   --   --    ANIONGAP 19*   < > 22*   < > 20* 23* 20*   EGFRNONAA 2.2*   < > 2.4*   < > 2.2* 2.5* 2.4*    < > = values in this interval not displayed.     Lactic Acid:   Recent Labs   Lab 02/16/22  1807   LACTATE 0.9       Significant Imaging: I have reviewed all pertinent imaging results/findings within the past 24 hours.   X-Ray Ankle Complete Left  Narrative: EXAMINATION:  XR ANKLE COMPLETE 3 VIEW LEFT    CLINICAL HISTORY:  cellulitis, pain in L ankle;    TECHNIQUE:  AP, lateral and oblique views of the left ankle were performed.    COMPARISON:  Left foot 02/16/2022    FINDINGS:  Skeletal structures at the ankle are intact without fracture or a dislocation.  Ankle joint space is satisfactory.  The talar dome shows a small lucency likely representing osteochondral defect.  Mild soft tissue swelling is present around the ankle.    The findings at 1st MTP joint are better evaluated with recent MRI and radiographic exams.  Impression: no fracture or significant arthritis identified at left ankle.  Probable small osteochondral defect along the articular surface of the talar dome.    Electronically signed by: Randy Reynolds MD  Date:    02/17/2022  Time:    14:48  X-Ray Cervical Spine AP And Lateral  Narrative: EXAMINATION:  XR CERVICAL SPINE AP LATERAL    CLINICAL HISTORY:  new onset neck pain, assess if any  abnormalties or infection signs with recent bacteremia;    FINDINGS:  Cervical spine two views: There is widening of the predental distance with anterior displacement of C1 on C2 and possible rupture of the transverse ligament.  There is mild anterolisthesis of C2 on C3 and there is moderate DJD between C3 and C7.  No fracture dislocation bone destruction seen.  Impression: Anterolisthesis of C1 on C2 with widening of the predental distance was seen on the CT dated 12/26/2020.  This is a chronic finding: There are degenerative changes elsewhere as above.    Electronically signed by: Azael Jaime MD  Date:    02/17/2022  Time:    14:47          Assessment/Plan:      * Subacute osteomyelitis  - 2/4 SIRS with HR>90, WBC 16  - lactic WNL  - ESR 97, CRP 33.4, procal 1.  - MRI w/ regions of focal abnormal signal involving the distal 1st metatarsal and proximal 1st phalanx as described above.  Findings are concerning for foci of subacute myelitis   - patient is hemodynamically stable, podiatry consulted, unclear if ifection but no plans to do biopsy now cellulitis worsened it appears 2/17 PM so vanc/zosyn initiated. ID consulted 2/18 as severely tender extremity, concern with recent bacteremia on 2/1 with salmonella with scaling on exam for possible staph/strep also scalded skin type look.. will f/u recs.      History of Salmonella septicemia  -+ blood CX at [facility] 2/1, with clearance immediately, treated with cipro for 14 days unclear source but presumed diarrheal infection. He acts like he has no idea and had no diarrhea then  -denies sickle cell hx to predispose to this  -concern with neck pain and foot pain with recent bacteremia  -work up in progress  -ID consulted.        Neck pain  -abnormal C spine Xray with possible signs of C spine instability, MRi ordered and patient refused 2/17, on pain meds, discussed AT length, see 2/17 and 2/18 note above, ordered with premediation now. Concern for possible infection also  given acuity of pain  -If fails then will get CT non con        Gout  -colchicine BID, light IVF x 10 hours 2/17 per renal recs. Avoid steroids with posisble infection. He denies he has gout      Folic acid deficiency  -low at 4.4.  -daily replacement      Increased anion gap metabolic acidosis  Secondary to ckd  -cont Na bicarb  -refusing HD      Hyperphosphatemia  -cont binders, elvated at 10-11  -refusing HD      Hyperkalemia  -shift PRN      Acute renal failure superimposed on stage 5 chronic kidney disease, not on chronic dialysis  Hyperkalemia  Hyperphosphatemia  Metabolic acidosis  Kidney transplant candidate    - 2/2 membranous glomerulopathy  - GFR was 13-16 up from baseline 4-6 previously in January 2022. Labs repeated today and GFR is 2.6, Cr 19.   - patient follows with Dr. Cardoza and Dr Lee and dialysis has been discussed multiple times, patient continues to refuse.  - on transplant list with [facility]  -cont phos binders, Na bicarb. Valencia for adequate I/O. Lasix held 2/17 per renal recs  -uric acid trending  -at severe risk of adverse events, BUN almost 200, asterixis on exam. Meets multiple criteria but refusing HD. Has capacity. Wife aware and states is his decision    Benign prostatic hyperplasia  Valencia placed 2/18 overnight for incontinence    GERD (gastroesophageal reflux disease)  - continue PPI    S/P coronary artery stent placement  - continue ASA, statin    Type 2 diabetes mellitus  - home meds: lantus 5 units daily  - SSI   - ACHS accuchecks  - diabetic diet  -A1 C 7's    Anemia due to chronic kidney disease  -hg with downtick to 6 on 2/18 from 7 on admit with 9 baseline  -unclear cause if another reason besides chronic kidney disease  -fobt ordered  -transfuse 2 U on 2/18. adamently denies bleeding. Will add protonix for coverage in interim in case of any bleeding  -denies nsaid use    Membranous glomerulonephritis  -will bring acthar gel from home to do non formulary as 40K and got via prior  auth  -reason for CKD 5.      Hyperlipidemia  - continue statin    Renovascular hypertension  -elevated to 180s on admit but improved on current regimen to 130s systolic on exam 2/18  -cont hydralazine, labetalol, norvassc, clonidine.      VTE Risk Mitigation (From admission, onward)         Ordered     heparin (porcine) injection 5,000 Units  Every 8 hours         02/17/22 0151     IP VTE HIGH RISK PATIENT  Once         02/17/22 0151     Place sequential compression device  Until discontinued         02/17/22 0151                Discharge Planning   JOAN: 2/21/2022     Code Status: Prior   Is the patient medically ready for discharge?: No    Reason for patient still in hospital (select all that apply): Patient unstable      Discharge Delays: None known at this time              Dianna Tijerina MD  Department of Hospital Medicine   [facility]

## 2022-02-18 NOTE — SUBJECTIVE & OBJECTIVE
Interval History: Overnight, he complained of neck pain, urinated on himself twice. Podiatry seen, low concern for osteo. Patient still declines HD. More somnolent, fatigued decreased appetite. Doses off more often during my assessment. Reports left foot pain, neck pain improved. CXR C-spine showing some anterolisthesis of C1 on C2, may be chronic or concerning for atlanto-axial instability.  Vanc/zosyn initiated.     Review of patient's allergies indicates:  No Known Allergies  Current Facility-Administered Medications   Medication Frequency    0.9%  NaCl infusion (for blood administration) Q24H PRN    acetaminophen tablet 1,000 mg Q8H    albuterol-ipratropium 2.5 mg-0.5 mg/3 mL nebulizer solution 3 mL Q4H PRN    aluminum-magnesium hydroxide-simethicone 200-200-20 mg/5 mL suspension 30 mL QID PRN    amLODIPine tablet 10 mg Daily    ARIPiprazole tablet 15 mg QHS    aspirin EC tablet 81 mg Daily    atorvastatin tablet 80 mg Daily    cloNIDine tablet 0.4 mg TID    colchicine split tablet 0.3 mg BID    dextrose 10% bolus 125 mL PRN    dextrose 10% bolus 250 mL PRN    folic acid tablet 1 mg Daily    glucagon (human recombinant) injection 1 mg PRN    glucose chewable tablet 16 g PRN    glucose chewable tablet 24 g PRN    heparin (porcine) injection 5,000 Units Q8H    hydrALAZINE tablet 50 mg Q8H    labetaloL tablet 200 mg Q12H    lorazepam injection 1 mg On Call Procedure    melatonin tablet 6 mg Nightly PRN    methocarbamoL tablet 750 mg QID PRN    morphine injection 2 mg Once PRN    morphine injection 2 mg On Call Procedure    naloxone 0.4 mg/mL injection 0.02 mg PRN    ondansetron disintegrating tablet 8 mg Q8H PRN    oxyCODONE immediate release tablet 5 mg Q6H PRN    oxyCODONE immediate release tablet Tab 10 mg Q4H PRN    piperacillin-tazobactam 3.375 g in dextrose 5 % 50 mL IVPB (ready to mix system) Q12H    polyethylene glycol packet 17 g Daily PRN    prochlorperazine injection Soln 5  mg Q6H PRN    sevelamer carbonate tablet 1,600 mg TID    simethicone chewable tablet 80 mg QID PRN    sodium bicarbonate tablet 650 mg TID    sodium chloride 0.9% flush 10 mL Q8H PRN    sodium zirconium cyclosilicate packet 10 g Once    vancomycin - pharmacy to dose pharmacy to manage frequency       Objective:     Vital Signs (Most Recent):  Temp: 98.8 °F (37.1 °C) (02/18/22 0800)  Pulse: 86 (02/18/22 0919)  Resp: 16 (02/18/22 0818)  BP: 137/84 (02/18/22 0800)  SpO2: 99 % (02/18/22 0800)  O2 Device (Oxygen Therapy): room air (02/18/22 0800) Vital Signs (24h Range):  Temp:  [97.7 °F (36.5 °C)-98.8 °F (37.1 °C)] 98.8 °F (37.1 °C)  Pulse:  [77-94] 86  Resp:  [16-20] 16  SpO2:  [98 %-99 %] 99 %  BP: (126-168)/(84-95) 137/84     Weight: 73.2 kg (161 lb 6 oz) (02/18/22 0400)  Body mass index is 26.05 kg/m².  Body surface area is 1.85 meters squared.    I/O last 3 completed shifts:  In: 935.5 [P.O.:140; I.V.:295.5; IV Piggyback:500]  Out: 475 [Urine:475]    Physical Exam  Vitals and nursing note reviewed.   Constitutional:       Appearance: He is well-developed and overweight. He is ill-appearing.   HENT:      Head: Normocephalic and atraumatic.      Nose: Nose normal.      Mouth/Throat:      Mouth: Mucous membranes are moist.   Eyes:      General: No scleral icterus.     Conjunctiva/sclera: Conjunctivae normal.   Cardiovascular:      Rate and Rhythm: Normal rate and regular rhythm.      Heart sounds: Normal heart sounds.   Pulmonary:      Effort: Pulmonary effort is normal. No respiratory distress.      Breath sounds: Normal breath sounds. No wheezing or rales.   Abdominal:      General: Bowel sounds are normal. There is no distension.      Palpations: Abdomen is soft.      Tenderness: There is no abdominal tenderness. There is no guarding.   Musculoskeletal:         General: Swelling and signs of injury present. Normal range of motion.      Cervical back: Normal range of motion and neck supple.      Right lower  le+ Pitting Edema present.      Left lower le+ Pitting Edema present.   Feet:      Right foot:      Skin integrity: Skin integrity normal.      Left foot:      Skin integrity: Skin breakdown, erythema, warmth and dry skin present.   Skin:     General: Skin is warm and dry.      Capillary Refill: Capillary refill takes less than 2 seconds.      Comments: L leg, ankle, foot skin sloughing.   Deroofed blister on dorsum of left foot   Neurological:      Mental Status: He is easily aroused. He is lethargic.      GCS: GCS eye subscore is 4. GCS verbal subscore is 5. GCS motor subscore is 6.      Motor: Tremor present.      Comments: Asterixis present   Psychiatric:         Behavior: Behavior normal. Behavior is cooperative.         Thought Content: Thought content normal.         Judgment: Judgment normal.         Significant Labs:  CBC:   Recent Labs   Lab 22  0622   WBC 10.61   RBC 2.24*   HGB 6.2*   HCT 19.2*      MCV 86   MCH 27.7   MCHC 32.3     CMP:   Recent Labs   Lab 22  0658 22  1041 22  0622      < > 81   CALCIUM 10.1   < > 9.8   ALBUMIN 2.6*  --   --    PROT 6.1  --   --       < > 142   K 5.2*   < > 5.3*   CO2 17*   < > 20*      < > 102   *   < > 183*   CREATININE 18.6*   < > 18.8*   ALKPHOS 96  --   --    ALT 10  --   --    AST 14  --   --    BILITOT 0.2  --   --     < > = values in this interval not displayed.     All labs within the past 24 hours have been reviewed.     Significant Imaging:  Labs: Reviewed  uric acid 15, phos 11.2, hemoglobin 6.2. Potassium 5.3, , Procalcitonin 0.99

## 2022-02-18 NOTE — ASSESSMENT & PLAN NOTE
-hg with downtick to 6 on 2/18 from 7 on admit with 9 baseline  -unclear cause if another reason besides chronic kidney disease  -fobt ordered  -transfuse 2 U on 2/18. adamently denies bleeding. Will add protonix for coverage in interim in case of any bleeding  -denies nsaid use

## 2022-02-18 NOTE — CONSULTS
[facility] - Transplant Stepdown  Infectious Disease  Consult Note    Patient Name: Enrique Martinez  MRN: [id number]  Admission Date: 2/16/2022  Hospital Length of Stay: 1 days  Attending Physician: Dianna Tijerina MD  Primary Care Provider: Primary Doctor No     Isolation Status: No active isolations    Patient information was obtained from patient and ER records.      Inpatient consult to Infectious Diseases  Consult performed by: Deena Maya MD  Consult ordered by: Dianna Tijerina MD        Assessment/Plan:     * Gout  Enrique Martinez is a 56 y.o. male with a PMH of CKD V glomerulonephropathy, insulin dependent diabetes mellitus, and CAD who presented to the ED for evaluation of worsening left foot pain and swelling. He was recently admitted at [facility] from 2/2-2/4/2022 from LLE cellulitis complicated by salmonella bacteremia after having diarrheal illness. Pt now returns with recurrent cellulitis vs gout with ulcer blister formation on left lower extremity. MRI foot also showing findings concerning for luca's abscess on 1st metatarsal and 1st phalanx of left foot. Blood cultures NGTD this admission. Pt has been afebrile in last 24 hours. On further questioning, pt denies hx of sickle cell/blood dyscrasias along with denying hx of spleen removal. With pts exam and lack of systemic symptoms, lower extremity symptoms appear more Gout like appearance.     Plan:  - Recommend discontinuing Vanc and Zosyn  - Recommend starting Doxy 100mg BID and Rocephin 2g Q24H for broad coverage of salmonella and possible infectious process in cervical neck area.  - F/U podiatry recs with regards to foot/left lower extremity cellulitis/gout.   - Will f/u blood cultures  - F/U MRI neck to evaluate for cervical neck infectious process      Neck pain  See Gout A/P    Pain and swelling of left lower extremity  See Gout A/P      Thank you for your consult. I will follow-up with patient. Please contact us if you have any additional  questions.    Deena Maya MD  Infectious Disease  [facility] - Transplant Stepdown    Subjective:     Principal Problem: Gout    HPI: Enrique Martinez is a 56 y.o. male with a PMH of CKD V glomerulonephropathy, insulin dependent diabetes mellitus, and CAD who presented to the ED for evaluation of worsening left foot pain and swelling. He was recently admitted at [facility] from 2/2-2/4/2022 from LLE cellulitis complicated by salmonella bacteremia after having diarrheal illness.  He was discharged home with ciprofloxacin. He reports no improvement in his cellulitis since being discharged home and no has new blister development. ID was consulted due to recent salmonella bacteremia and new findings on MRI suspicious for left foot myelitis and persistent cellulitis infection. On initial ID encounter, pt reporting pain in left foot and ankle but denying any fever, chills, N/V, diarrhea, chest pain, headaches, or SOB.       Past Medical History:   Diagnosis Date    Anemia     Coronary artery disease     Diabetes mellitus, type 2     GERD (gastroesophageal reflux disease)     Gout     Hydrocele in adult     bilateral    Hyperlipidemia     Hypertension     Inguinal hernia     bilateral    Membranous glomerulonephritis     Nephrotic range proteinuria     Nephrotic syndrome     Obesity     Umbilical hernia        Past Surgical History:   Procedure Laterality Date    ABDOMINAL SURGERY  1980s    CARDIAC CATHETERIZATION  2007    x 2    COLONOSCOPY N/A 4/5/2019    Procedure: COLONOSCOPY;  Surgeon: Jose L Carty MD;  Location: [facility] ([address] [place] [address]);  Service: Colon and Rectal;  Laterality: N/A;    CORONARY STENT PLACEMENT  2007       Review of patient's allergies indicates:  No Known Allergies    Medications:  Medications Prior to Admission   Medication Sig    insulin (LANTUS SOLOSTAR U-100 INSULIN) glargine 100 units/mL (3mL) SubQ pen Inject 5 Units into the skin once daily.    albuterol (PROVENTIL/VENTOLIN HFA) 90  mcg/actuation inhaler Inhale 2 puffs into the lungs every 6 (six) hours as needed for Wheezing or Shortness of Breath. Rescue    amlodipine (NORVASC) 10 MG tablet Take 10 mg by mouth once daily.    ARIPiprazole (ABILIFY) 15 MG Tab Take 1 tablet (15 mg total) by mouth every evening.    aspirin (ECOTRIN) 81 MG EC tablet Take 1 tablet (81 mg total) by mouth once daily.    atorvastatin (LIPITOR) 80 MG tablet Take 1 tablet (80 mg total) by mouth once daily.    blood-glucose meter kit Use as instructed    calcitRIOL (ROCALTROL) 0.5 MCG Cap Take 0.5 mcg by mouth once daily.    chlorthalidone (HYGROTEN) 50 MG Tab Take 50 mg by mouth once daily.     cloNIDine (CATAPRES) 0.2 MG tablet Take 0.4 mg by mouth 3 (three) times daily.     colchicine (COLCRYS) 0.6 mg tablet Take 0.6 mg by mouth daily as needed.    ergocalciferol (ERGOCALCIFEROL) 50,000 unit Cap Take 50,000 Units by mouth every 30 days.     famotidine (PEPCID) 20 MG tablet Take 20 mg by mouth daily as needed for Heartburn.     ferrous gluconate (FERGON) 324 MG tablet Take 324 mg by mouth 2 (two) times a day.     fluticasone (FLONASE) 50 mcg/actuation nasal spray 1 spray by Each Nare route once daily.    furosemide (LASIX) 40 MG tablet Take 40 mg by mouth 2 (two) times daily.    methocarbamoL (ROBAXIN) 500 MG Tab Take 500 mg by mouth 2 (two) times a day.    metoprolol tartrate (LOPRESSOR) 100 MG tablet Take 100 mg by mouth.    omeprazole (PRILOSEC) 20 MG capsule Take 20 mg by mouth once daily.    RENVELA 800 mg Tab Take 800 mg by mouth 3 (three) times daily.    sodium bicarbonate 650 MG tablet Take 650 mg by mouth.    tadalafiL (CIALIS) 2.5 mg Tab Take 2.5 mg by mouth.    TRUE METRIX GLUCOSE TEST STRIP Strp      Antibiotics (From admission, onward)            Start     Stop Route Frequency Ordered    02/17/22 2000  piperacillin-tazobactam 3.375 g in dextrose 5 % 50 mL IVPB (ready to mix system)         -- IV Every 12 hours (non-standard times)  "02/17/22 1745    02/17/22 1820  vancomycin - pharmacy to dose  (vancomycin IVPB)        "And" Linked Group Details    -- IV pharmacy to manage frequency 02/17/22 1720        Antifungals (From admission, onward)            None        Antivirals (From admission, onward)    None           Immunization History   Administered Date(s) Administered    Hepatitis A / Hepatitis B 09/18/2018    Hepatitis A, Adult 02/22/2021    Hepatitis B (recombinant) Adjuvanted, 2 dose 02/22/2021, 03/26/2021    Influenza 12/11/2012, 10/18/2019    Influenza - Quadrivalent - MDCK - PF 10/18/2019    Influenza - Quadrivalent - PF *Preferred* (6 months and older) 10/30/2013, 10/12/2016, 10/04/2017, 09/18/2018, 09/01/2020    Influenza - Trivalent - PF (ADULT) 10/30/2013, 09/09/2015    Pneumococcal Conjugate - 13 Valent 01/13/2017    Pneumococcal Polysaccharide - 23 Valent 12/20/2012, 09/18/2018    Tdap 12/20/2012, 10/12/2013    Zoster 11/15/2020    Zoster Recombinant 11/15/2020, 01/23/2021       Family History     Problem Relation (Age of Onset)    Drug abuse Brother    Heart attack Father, Brother    Hyperlipidemia Father    Hypertension Father, Brother    No Known Problems Sister    Stroke Father        Social History     Socioeconomic History    Marital status:    Tobacco Use    Smoking status: Current Some Day Smoker     Packs/day: 0.25     Years: 10.00     Pack years: 2.50    Smokeless tobacco: Never Used   Substance and Sexual Activity    Alcohol use: No    Drug use: No     Review of Systems   Constitutional: Negative for activity change, chills and fever.   HENT: Negative for congestion and trouble swallowing.    Eyes: Negative for photophobia and visual disturbance.   Respiratory: Negative for chest tightness, shortness of breath and wheezing.    Cardiovascular: Positive for leg swelling. Negative for chest pain and palpitations.   Gastrointestinal: Negative for abdominal pain, constipation, nausea and vomiting. "   Genitourinary: Negative for dysuria, flank pain, frequency, hematuria and urgency.   Musculoskeletal: Positive for arthralgias. Negative for back pain and gait problem.   Skin: Positive for wound. Negative for rash.   Neurological: Negative for syncope.   Psychiatric/Behavioral: Negative for agitation and confusion. The patient is not nervous/anxious.      Objective:     Vital Signs (Most Recent):  Temp: 98.8 °F (37.1 °C) (22 0800)  Pulse: 86 (22 0919)  Resp: 16 (22 0818)  BP: 137/84 (22 0800)  SpO2: 99 % (22 0800) Vital Signs (24h Range):  Temp:  [97.7 °F (36.5 °C)-98.8 °F (37.1 °C)] 98.8 °F (37.1 °C)  Pulse:  [77-94] 86  Resp:  [16-20] 16  SpO2:  [98 %-99 %] 99 %  BP: (126-168)/(84-95) 137/84     Weight: 73.2 kg (161 lb 6 oz)  Body mass index is 26.05 kg/m².    Estimated Creatinine Clearance: 3.9 mL/min (A) (based on SCr of 18.8 mg/dL (H)).    Physical Exam  Vitals and nursing note reviewed.   Constitutional:       General: He is not in acute distress.     Appearance: He is well-developed. He is ill-appearing.   HENT:      Head: Normocephalic and atraumatic.      Mouth/Throat:      Mouth: Mucous membranes are moist.   Eyes:      General: No scleral icterus.     Conjunctiva/sclera: Conjunctivae normal.   Cardiovascular:      Rate and Rhythm: Normal rate and regular rhythm.      Heart sounds: Normal heart sounds.   Pulmonary:      Effort: Pulmonary effort is normal. No respiratory distress.      Breath sounds: Normal breath sounds. No wheezing.   Abdominal:      General: Bowel sounds are normal. There is no distension.      Palpations: Abdomen is soft.      Tenderness: There is no abdominal tenderness.   Musculoskeletal:         General: No tenderness. Normal range of motion.      Cervical back: Normal range of motion and neck supple.      Right lower le+ Pitting Edema present.      Left lower le+ Pitting Edema present.   Feet:      Right foot:      Skin integrity: Skin  integrity normal.      Left foot:      Skin integrity: Skin breakdown (Tendern to palpation at wound), erythema, warmth and dry skin present.   Skin:     General: Skin is warm and dry.      Capillary Refill: Capillary refill takes less than 2 seconds.   Neurological:      General: No focal deficit present.      Mental Status: He is alert and oriented to person, place, and time.      Motor: Tremor present.   Psychiatric:         Behavior: Behavior normal.         Thought Content: Thought content normal.         Judgment: Judgment normal.         Significant Labs: All pertinent labs within the past 24 hours have been reviewed.    Significant Imaging: I have reviewed all pertinent imaging results/findings within the past 24 hours.

## 2022-02-18 NOTE — PLAN OF CARE
[facility] - Transplant Stepdown  Initial Discharge Assessment       Primary Care Provider: Primary Doctor No    Admission Diagnosis: Hyperkalemia [E87.5]  Subacute osteomyelitis [M86.20]  Chest pain [R07.9]  Left leg cellulitis [L03.116]  Subacute osteomyelitis of foot, unspecified laterality [M86.279]  Acute renal failure superimposed on stage 5 chronic kidney disease, not on chronic dialysis, unspecified acute renal failure type [N17.9, N18.5]    Admission Date: 2/16/2022  Expected Discharge Date: 2/21/2022    Discharge Barriers Identified: Underinsured    Payor: MEDICAID / Plan: [place] [employer] / Product Type: Managed Medicaid /     Extended Emergency Contact Information  Primary Emergency Contact: Atif Helm  Address: [address]           [place] [address] [facility]  Home Phone: [phone]  Mobile Phone: [phone]  Relation: Spouse    Discharge Plan A: Home  Discharge Plan B: Home with family      [id number]  [place] - [address] [facility] [address]  [address] [address]  Phone: [phone] Fax: [phone]      Transferred from:     Past Medical History:   Diagnosis Date    Anemia     Coronary artery disease     Diabetes mellitus, type 2     GERD (gastroesophageal reflux disease)     Gout     Hydrocele in adult     bilateral    Hyperlipidemia     Hypertension     Inguinal hernia     bilateral    Membranous glomerulonephritis     Nephrotic range proteinuria     Nephrotic syndrome     Obesity     Umbilical hernia          CM met with patient in room for Discharge Planning Assessment.  Patient was able to answer questions.  Per patient, he lives alone in a 1-story home with 0 step(s) to enter.   Per patient, he was independent with ADLS and used no DME for ambulation. Per patient, he is not on dialysis and does not take Coumadin.  Patient will have help from Atif Helm (spouse) [phone] upon discharge.    Discharge Planning Booklet given to patient/family and discussed.  All questions addressed.  CM will follow for needs.      Initial Assessment (most recent)     Adult Discharge Assessment - 02/18/22 1451        Discharge Assessment    Assessment Type Discharge Planning Assessment     Confirmed/corrected address, phone number and insurance Yes     Confirmed Demographics Correct on Facesheet     Source of Information patient     When was your last doctors appointment? 02/11/22     Communicated JOAN with patient/caregiver Date not available/Unable to determine     Reason For Admission Acute renal failure superimposed on stage 5 chronic kidney disease, not on chronic dialysis     Lives With alone     Facility Arrived From: Home     Do you expect to return to your current living situation? Yes     Do you have help at home or someone to help you manage your care at home? Yes     Who are your caregiver(s) and their phone number(s)? Atif Helm (spouse) [phone]     Prior to hospitilization cognitive status: Alert/Oriented     Current cognitive status: Alert/Oriented     Walking or Climbing Stairs Difficulty none     Dressing/Bathing Difficulty none     Equipment Currently Used at Home none     Readmission within 30 days? No     Patient currently being followed by outpatient case management? No     Do you currently have service(s) that help you manage your care at home? No     Do you take prescription medications? Yes     Do you have prescription coverage? Yes     Coverage [place] - [place] [facility]     Do you have any problems affording any of your prescribed medications? No     Is the patient taking medications as prescribed? yes     Who is going to help you get home at discharge? Atif Helm (spouse) [phone]     How do you get to doctors appointments? family or friend will provide     Are you on dialysis? No     Do you take coumadin? No     Discharge Plan A Home     Discharge Plan B Home with family      DME Needed Upon Discharge  other (see comments)   TBD    Discharge Plan discussed with: Patient     Discharge Barriers Identified Underinsured                        PCP:  Primary Doctor No  None        Pharmacy:    [id number] - [place]  [address] AT [facility] [address]  [address] [address]  Phone: [phone] Fax: [phone]        Emergency Contacts:  Extended Emergency Contact Information  Primary Emergency Contact: Atif Helm  Address: [address]           [place] [address] [facility]  Home Phone: [phone]  Mobile Phone: [phone]  Relation: Spouse      Insurance:    Payor: MEDICAID / Plan: [place] [facility] CONNECT / Product Type: Managed Medicaid /     Heidi Bermudez RN     [phone]      02/18/2022  3:01 PM

## 2022-02-18 NOTE — ASSESSMENT & PLAN NOTE
-+ blood CX at [facility] 2/1, with clearance immediately, treated with cipro for 14 days unclear source but presumed diarrheal infection. He acts like he has no idea and had no diarrhea then  -denies sickle cell hx to predispose to this  -concern with neck pain and foot pain with recent bacteremia  -work up in progress  -ID consulted.

## 2022-02-18 NOTE — PLAN OF CARE
Patient did not complete MRI. Called radiology to discuss findings and difficult to fully tell given lack of contrast and full MRI cuts given he stopped study early it appears but very abnormal MRI. C2-c5 with abnormal signal with likely fluid on top of severe degenerative disc disease but more signal than expected with concern for infectio. Odontoid and C1 appear to have signs of septic arthritis based on widening, and appears to have retropharyngeal fluid collection between c1 and dens as possible cause for widening. recs CT with contrast but cannot get due to his severe kidney disease at this time. Very abnormal however and with elevated inflamatory markers and with recent bacteremia and severe pain, concerning for septic seeding of abnormal area of DDD at baseline. Will await full read in addition but discussed with Jory with radiology the above prelim findings with clinical context and will need to consult neurosurgery in the AM.    Updated the patient in the room and he was agitated about results. On phone with son- he has been talking to him about HD and need for this with nursing prior to coming by. His son is adament he needs HD now and reiterated it with him multiple times while we talked on the phone. He was angry about the results and reviewed with him and son and above. Discussed that if infected and would need intervention that nobody could do any intervention in this state without HD as too illl and he would likely become septic if the infection is throughout the C spine without treatments and he already is pushing severe health riskw ithout HD with electrolyte abnormalities. I let his son know the statements he has made including that it was worth it to possibly risk cardiac arrest to avoid HD. His son was very upset that he is still refusing HD at this time and strongly told him it is time for dialysis now. He said he wants to go home without dialysis on discharge and I said it would have to be  with hospice which renal talked to his wife earlier about also. I said he wont be in any safe state to discharge without dialysis under any medical advice from this stay at this point it would be on hospice or against medical advice in the future at any point. He is not trying to leave the hospital now but his kidneys are not going to recover and is is uremic with lethargy, asterixis and confusion at times with metallic taste in mouth and clear signs that warrant HD.  I suspect downtrend Hg is from severe inflammation from infection as a contributor. 1 U given today per nursing. Holding 2nd given dont want to overload.  Discussed what HD entails as he said that he its surgeries. nrsing and I explained that anestheia places a bedside line to start and this is how you start HD. Fistulas come later.  He was tearful at times and very angry at times also  I told him multiple times this is not worth death that this is a bridge to when he gest his kidney transplant and hes waited 4 years and I know its a hard decision but its not worth this to his family as his son was very upset on phone. His son asked when he needs to decide ans I said Monday or so as anesthesia does lines for HD on weekdays and his son is on baord with the plan right now. He is aware of spine infection concerns and NS consult tomorrow.  The patient said that dr valdez has not stated he needed HD right now (which per notes as recently as 2/1 is untrue as they wanted to start HD then as well). So I told him I would get Dr Valdez's phone number and we can arrange to try to get a call with him now as he has told me multiple times how much he trusts dr valdez as he arranged [facility] and has followed with him for years as well as [facility] for over 10 years and he is amenable to this.  I have reached out to Dr Valdez and explained the situation and will see if we can arrange a phone call between him and mr kiran or I can help set up a phone call in the room if  there is a time that dr valdez may be available.

## 2022-02-18 NOTE — ASSESSMENT & PLAN NOTE
-colchicine BID, light IVF x 10 hours 2/17 per renal recs. Avoid steroids with posisble infection. He denies he has gout

## 2022-02-19 PROBLEM — J39.0 RETROPHARYNGEAL ABSCESS: Status: ACTIVE | Noted: 2022-02-19

## 2022-02-19 PROBLEM — R93.7 ABNORMAL MRI, CERVICAL SPINE: Status: ACTIVE | Noted: 2022-02-19

## 2022-02-19 LAB
ANION GAP SERPL CALC-SCNC: 20 MMOL/L (ref 8–16)
BASOPHILS # BLD AUTO: 0.03 K/UL (ref 0–0.2)
BASOPHILS NFR BLD: 0.3 % (ref 0–1.9)
BLD PROD TYP BPU: NORMAL
BLD PROD TYP BPU: NORMAL
BLOOD UNIT EXPIRATION DATE: NORMAL
BLOOD UNIT EXPIRATION DATE: NORMAL
BLOOD UNIT TYPE CODE: 9500
BLOOD UNIT TYPE CODE: 9500
BLOOD UNIT TYPE: NORMAL
BLOOD UNIT TYPE: NORMAL
BUN SERPL-MCNC: 172 MG/DL (ref 6–20)
CALCIUM SERPL-MCNC: 9.2 MG/DL (ref 8.7–10.5)
CHLORIDE SERPL-SCNC: 106 MMOL/L (ref 95–110)
CO2 SERPL-SCNC: 19 MMOL/L (ref 23–29)
CODING SYSTEM: NORMAL
CODING SYSTEM: NORMAL
CREAT SERPL-MCNC: 18.5 MG/DL (ref 0.5–1.4)
DIFFERENTIAL METHOD: ABNORMAL
DISPENSE STATUS: NORMAL
DISPENSE STATUS: NORMAL
EOSINOPHIL # BLD AUTO: 0.1 K/UL (ref 0–0.5)
EOSINOPHIL NFR BLD: 1.2 % (ref 0–8)
ERYTHROCYTE [DISTWIDTH] IN BLOOD BY AUTOMATED COUNT: 16.2 % (ref 11.5–14.5)
EST. GFR  (AFRICAN AMERICAN): 2.8 ML/MIN/1.73 M^2
EST. GFR  (NON AFRICAN AMERICAN): 2.4 ML/MIN/1.73 M^2
GLUCOSE SERPL-MCNC: 99 MG/DL (ref 70–110)
HCT VFR BLD AUTO: 21.5 % (ref 40–54)
HGB BLD-MCNC: 6.7 G/DL (ref 14–18)
IMM GRANULOCYTES # BLD AUTO: 0.05 K/UL (ref 0–0.04)
IMM GRANULOCYTES NFR BLD AUTO: 0.5 % (ref 0–0.5)
LYMPHOCYTES # BLD AUTO: 1.1 K/UL (ref 1–4.8)
LYMPHOCYTES NFR BLD: 11.3 % (ref 18–48)
MAGNESIUM SERPL-MCNC: 1.7 MG/DL (ref 1.6–2.6)
MCH RBC QN AUTO: 27.3 PG (ref 27–31)
MCHC RBC AUTO-ENTMCNC: 31.2 G/DL (ref 32–36)
MCV RBC AUTO: 88 FL (ref 82–98)
MONOCYTES # BLD AUTO: 1.2 K/UL (ref 0.3–1)
MONOCYTES NFR BLD: 12.3 % (ref 4–15)
NEUTROPHILS # BLD AUTO: 7.2 K/UL (ref 1.8–7.7)
NEUTROPHILS NFR BLD: 74.4 % (ref 38–73)
NRBC BLD-RTO: 0 /100 WBC
NUM UNITS TRANS PACKED RBC: NORMAL
NUM UNITS TRANS PACKED RBC: NORMAL
PHOSPHATE SERPL-MCNC: 11.1 MG/DL (ref 2.7–4.5)
PLATELET # BLD AUTO: 213 K/UL (ref 150–450)
PMV BLD AUTO: 10.6 FL (ref 9.2–12.9)
POTASSIUM SERPL-SCNC: 5.3 MMOL/L (ref 3.5–5.1)
RBC # BLD AUTO: 2.45 M/UL (ref 4.6–6.2)
SODIUM SERPL-SCNC: 145 MMOL/L (ref 136–145)
WBC # BLD AUTO: 9.74 K/UL (ref 3.9–12.7)

## 2022-02-19 PROCEDURE — 63600175 PHARM REV CODE 636 W HCPCS: Mod: NTX | Performed by: INTERNAL MEDICINE

## 2022-02-19 PROCEDURE — 99233 SBSQ HOSP IP/OBS HIGH 50: CPT | Mod: NTX,,, | Performed by: INTERNAL MEDICINE

## 2022-02-19 PROCEDURE — 85025 COMPLETE CBC W/AUTO DIFF WBC: CPT | Mod: NTX | Performed by: PHYSICIAN ASSISTANT

## 2022-02-19 PROCEDURE — C9113 INJ PANTOPRAZOLE SODIUM, VIA: HCPCS | Mod: NTX | Performed by: HOSPITALIST

## 2022-02-19 PROCEDURE — P9016 RBC LEUKOCYTES REDUCED: HCPCS | Mod: NTX | Performed by: HOSPITALIST

## 2022-02-19 PROCEDURE — 25000003 PHARM REV CODE 250: Mod: NTX | Performed by: INTERNAL MEDICINE

## 2022-02-19 PROCEDURE — 99356 PR PROLONGED SERV,INPATIENT,1ST HR: CPT | Mod: NTX,,, | Performed by: HOSPITALIST

## 2022-02-19 PROCEDURE — 99232 PR SUBSEQUENT HOSPITAL CARE,LEVL II: ICD-10-PCS | Mod: NTX,,, | Performed by: INTERNAL MEDICINE

## 2022-02-19 PROCEDURE — 25000003 PHARM REV CODE 250: Mod: NTX | Performed by: PHYSICIAN ASSISTANT

## 2022-02-19 PROCEDURE — 83735 ASSAY OF MAGNESIUM: CPT | Mod: NTX | Performed by: PHYSICIAN ASSISTANT

## 2022-02-19 PROCEDURE — 99223 1ST HOSP IP/OBS HIGH 75: CPT | Mod: NTX,,, | Performed by: NEUROLOGICAL SURGERY

## 2022-02-19 PROCEDURE — 99232 SBSQ HOSP IP/OBS MODERATE 35: CPT | Mod: NTX,,, | Performed by: INTERNAL MEDICINE

## 2022-02-19 PROCEDURE — 99223 PR INITIAL HOSPITAL CARE,LEVL III: ICD-10-PCS | Mod: NTX,,, | Performed by: NEUROLOGICAL SURGERY

## 2022-02-19 PROCEDURE — 99357 PR PROLONGED SERV,INPATIENT,EA ADD 30 MIN: CPT | Mod: NTX,,, | Performed by: HOSPITALIST

## 2022-02-19 PROCEDURE — 99233 SBSQ HOSP IP/OBS HIGH 50: CPT | Mod: NTX,,, | Performed by: HOSPITALIST

## 2022-02-19 PROCEDURE — 99357 PR PROLONGED SERV,INPATIENT,EA ADD 30 MIN: ICD-10-PCS | Mod: NTX,,, | Performed by: HOSPITALIST

## 2022-02-19 PROCEDURE — 80048 BASIC METABOLIC PNL TOTAL CA: CPT | Mod: NTX | Performed by: PHYSICIAN ASSISTANT

## 2022-02-19 PROCEDURE — 63600175 PHARM REV CODE 636 W HCPCS: Mod: NTX | Performed by: HOSPITALIST

## 2022-02-19 PROCEDURE — 36415 COLL VENOUS BLD VENIPUNCTURE: CPT | Mod: NTX | Performed by: PHYSICIAN ASSISTANT

## 2022-02-19 PROCEDURE — 99233 PR SUBSEQUENT HOSPITAL CARE,LEVL III: ICD-10-PCS | Mod: NTX,,, | Performed by: INTERNAL MEDICINE

## 2022-02-19 PROCEDURE — 20600001 HC STEP DOWN PRIVATE ROOM: Mod: NTX

## 2022-02-19 PROCEDURE — 25000003 PHARM REV CODE 250: Mod: NTX | Performed by: HOSPITALIST

## 2022-02-19 PROCEDURE — 84100 ASSAY OF PHOSPHORUS: CPT | Mod: NTX | Performed by: PHYSICIAN ASSISTANT

## 2022-02-19 PROCEDURE — 25000003 PHARM REV CODE 250: Mod: NTX

## 2022-02-19 PROCEDURE — 99356 PR PROLONGED SERV,INPATIENT,1ST HR: ICD-10-PCS | Mod: NTX,,, | Performed by: HOSPITALIST

## 2022-02-19 PROCEDURE — 99233 PR SUBSEQUENT HOSPITAL CARE,LEVL III: ICD-10-PCS | Mod: NTX,,, | Performed by: HOSPITALIST

## 2022-02-19 RX ORDER — FUROSEMIDE 10 MG/ML
40 INJECTION INTRAMUSCULAR; INTRAVENOUS
Status: DISCONTINUED | OUTPATIENT
Start: 2022-02-19 | End: 2022-02-21

## 2022-02-19 RX ORDER — HYDROCODONE BITARTRATE AND ACETAMINOPHEN 500; 5 MG/1; MG/1
TABLET ORAL
Status: DISCONTINUED | OUTPATIENT
Start: 2022-02-19 | End: 2022-02-27

## 2022-02-19 RX ADMIN — LABETALOL HYDROCHLORIDE 200 MG: 100 TABLET, FILM COATED ORAL at 09:02

## 2022-02-19 RX ADMIN — SODIUM BICARBONATE 650 MG TABLET 650 MG: at 08:02

## 2022-02-19 RX ADMIN — ACETAMINOPHEN 1000 MG: 500 TABLET ORAL at 09:02

## 2022-02-19 RX ADMIN — FUROSEMIDE 40 MG: 10 INJECTION, SOLUTION INTRAMUSCULAR; INTRAVENOUS at 09:02

## 2022-02-19 RX ADMIN — COLCHICINE 0.3 MG: 0.6 TABLET, FILM COATED ORAL at 08:02

## 2022-02-19 RX ADMIN — HYDRALAZINE HYDROCHLORIDE 50 MG: 50 TABLET ORAL at 09:02

## 2022-02-19 RX ADMIN — DAPTOMYCIN 445 MG: 350 INJECTION, POWDER, LYOPHILIZED, FOR SOLUTION INTRAVENOUS at 10:02

## 2022-02-19 RX ADMIN — SEVELAMER CARBONATE 1600 MG: 800 TABLET, FILM COATED ORAL at 09:02

## 2022-02-19 RX ADMIN — ACETAMINOPHEN 1000 MG: 500 TABLET ORAL at 05:02

## 2022-02-19 RX ADMIN — ACETAMINOPHEN 1000 MG: 500 TABLET ORAL at 03:02

## 2022-02-19 RX ADMIN — COLCHICINE 0.3 MG: 0.6 TABLET, FILM COATED ORAL at 09:02

## 2022-02-19 RX ADMIN — AMLODIPINE BESYLATE 10 MG: 10 TABLET ORAL at 10:02

## 2022-02-19 RX ADMIN — PANTOPRAZOLE SODIUM 40 MG: 40 INJECTION, POWDER, FOR SOLUTION INTRAVENOUS at 08:02

## 2022-02-19 RX ADMIN — SODIUM BICARBONATE 650 MG TABLET 650 MG: at 09:02

## 2022-02-19 RX ADMIN — PANTOPRAZOLE SODIUM 40 MG: 40 INJECTION, POWDER, FOR SOLUTION INTRAVENOUS at 02:02

## 2022-02-19 RX ADMIN — ARIPIPRAZOLE 15 MG: 5 TABLET ORAL at 09:02

## 2022-02-19 RX ADMIN — PANTOPRAZOLE SODIUM 40 MG: 40 INJECTION, POWDER, FOR SOLUTION INTRAVENOUS at 09:02

## 2022-02-19 RX ADMIN — SODIUM BICARBONATE 650 MG TABLET 650 MG: at 03:02

## 2022-02-19 RX ADMIN — SEVELAMER CARBONATE 1600 MG: 800 TABLET, FILM COATED ORAL at 08:02

## 2022-02-19 RX ADMIN — OXYCODONE HYDROCHLORIDE 10 MG: 10 TABLET ORAL at 03:02

## 2022-02-19 RX ADMIN — HYDRALAZINE HYDROCHLORIDE 50 MG: 50 TABLET ORAL at 05:02

## 2022-02-19 RX ADMIN — DOXYCYCLINE HYCLATE 100 MG: 100 TABLET, COATED ORAL at 08:02

## 2022-02-19 RX ADMIN — SEVELAMER CARBONATE 1600 MG: 800 TABLET, FILM COATED ORAL at 03:02

## 2022-02-19 RX ADMIN — CLONIDINE HYDROCHLORIDE 0.4 MG: 0.1 TABLET ORAL at 03:02

## 2022-02-19 RX ADMIN — CEFTRIAXONE SODIUM 2 G: 2 INJECTION, SOLUTION INTRAVENOUS at 03:02

## 2022-02-19 RX ADMIN — FOLIC ACID 1 MG: 1 TABLET ORAL at 08:02

## 2022-02-19 RX ADMIN — CLONIDINE HYDROCHLORIDE 0.4 MG: 0.1 TABLET ORAL at 10:02

## 2022-02-19 RX ADMIN — SODIUM ZIRCONIUM CYCLOSILICATE 5 G: 5 POWDER, FOR SUSPENSION ORAL at 11:02

## 2022-02-19 RX ADMIN — HYDRALAZINE HYDROCHLORIDE 50 MG: 50 TABLET ORAL at 03:02

## 2022-02-19 RX ADMIN — ATORVASTATIN CALCIUM 80 MG: 20 TABLET, FILM COATED ORAL at 08:02

## 2022-02-19 RX ADMIN — ASPIRIN 81 MG: 81 TABLET, COATED ORAL at 08:02

## 2022-02-19 NOTE — NURSING
Pt's BP 95/68 on bedside monitor. Holding AM BP meds. Pt is answering orientation questions correctly but isn't making much sense. He is asking about a gymnasium down the carey, thought this RN had given him meds already when I hadn't been in the room yet. RLE swelling noted; no warmth or pain reported by pt. UZAIR Tijerina MD notified of all of the above.

## 2022-02-19 NOTE — SUBJECTIVE & OBJECTIVE
Interval History: Patient still declines HD. More somnolent, fatigued decreased appetite. More confused today compared to before. Oriented, but making odd claims. Reports left foot pain. MRI C spine concerning for retropharyngeal fluid collection, cannot r/o abscess. Chronic widening of pre dens space. NSGY consulted, recommend Aspen collar, no surgical intervention given he is not medically stable.     Review of patient's allergies indicates:  No Known Allergies  Current Facility-Administered Medications   Medication Frequency    0.9%  NaCl infusion (for blood administration) Q24H PRN    acetaminophen tablet 1,000 mg Q8H    albuterol-ipratropium 2.5 mg-0.5 mg/3 mL nebulizer solution 3 mL Q4H PRN    aluminum-magnesium hydroxide-simethicone 200-200-20 mg/5 mL suspension 30 mL QID PRN    amLODIPine tablet 10 mg Daily    ARIPiprazole tablet 15 mg QHS    aspirin EC tablet 81 mg Daily    atorvastatin tablet 80 mg Daily    cefTRIAXone (ROCEPHIN) 2 g/50 mL D5W IVPB Q24H    cloNIDine tablet 0.4 mg TID    colchicine split tablet 0.3 mg BID    dextrose 10% bolus 125 mL PRN    dextrose 10% bolus 250 mL PRN    doxycycline tablet 100 mg Q12H    folic acid tablet 1 mg Daily    furosemide injection 40 mg Q12H    glucagon (human recombinant) injection 1 mg PRN    glucose chewable tablet 16 g PRN    glucose chewable tablet 24 g PRN    hydrALAZINE tablet 50 mg Q8H    labetaloL tablet 200 mg Q12H    lorazepam injection 1 mg On Call Procedure    melatonin tablet 6 mg Nightly PRN    methocarbamoL tablet 750 mg QID PRN    morphine injection 2 mg Once PRN    morphine injection 2 mg On Call Procedure    naloxone 0.4 mg/mL injection 0.02 mg PRN    ondansetron disintegrating tablet 8 mg Q8H PRN    oxyCODONE immediate release tablet 5 mg Q6H PRN    oxyCODONE immediate release tablet Tab 10 mg Q4H PRN    pantoprazole injection 40 mg BID    polyethylene glycol packet 17 g Daily PRN    prochlorperazine injection Soln 5 mg Q6H PRN    sevelamer  carbonate tablet 1,600 mg TID    simethicone chewable tablet 80 mg QID PRN    sodium bicarbonate tablet 650 mg TID    sodium chloride 0.9% flush 10 mL Q8H PRN    sodium zirconium cyclosilicate packet 10 g Once       Objective:     Vital Signs (Most Recent):  Temp: 98.4 °F (36.9 °C) (22 1252)  Pulse: 84 (22 1252)  Resp: 18 (22 1252)  BP: (!) 152/85 (22 1252)  SpO2: 97 % (22 1252)  O2 Device (Oxygen Therapy): room air (22 1054)   Vital Signs (24h Range):  Temp:  [97.6 °F (36.4 °C)-98.9 °F (37.2 °C)] 98.4 °F (36.9 °C)  Pulse:  [74-92] 84  Resp:  [13-20] 18  SpO2:  [94 %-99 %] 97 %  BP: ()/(60-91) 152/85     Weight: 74 kg (163 lb 2.3 oz) (22 0500)  Body mass index is 26.33 kg/m².  Body surface area is 1.86 meters squared.    I/O last 3 completed shifts:  In: 1585.5 [P.O.:990; I.V.:295.5; Blood:300]  Out: 710 [Urine:710]    Physical Exam  Vitals and nursing note reviewed.   Constitutional:       Appearance: He is well-developed and overweight. He is ill-appearing.   HENT:      Head: Normocephalic and atraumatic.      Nose: Nose normal.      Mouth/Throat:      Mouth: Mucous membranes are moist.   Eyes:      General: No scleral icterus.     Conjunctiva/sclera: Conjunctivae normal.   Cardiovascular:      Rate and Rhythm: Normal rate and regular rhythm.      Heart sounds: Normal heart sounds.   Pulmonary:      Effort: Pulmonary effort is normal. No respiratory distress.      Breath sounds: Normal breath sounds. No wheezing or rales.   Abdominal:      General: Bowel sounds are normal. There is no distension.      Palpations: Abdomen is soft.      Tenderness: There is no abdominal tenderness. There is no guarding.   Musculoskeletal:         General: Swelling and signs of injury present. Normal range of motion.      Cervical back: Normal range of motion and neck supple.      Right lower le+ Pitting Edema present.      Left lower le+ Pitting Edema present.   Feet:       Right foot:      Skin integrity: Skin integrity normal.      Left foot:      Skin integrity: Skin breakdown, erythema, warmth and dry skin present.   Skin:     General: Skin is warm and dry.      Capillary Refill: Capillary refill takes less than 2 seconds.      Comments: L leg, ankle, foot skin sloughing.   Deroofed blister on dorsum of left foot   Neurological:      Mental Status: He is easily aroused. He is lethargic.      GCS: GCS eye subscore is 4. GCS verbal subscore is 5. GCS motor subscore is 6.      Motor: Tremor present.      Comments: Asterixis present   Psychiatric:         Behavior: Behavior normal. Behavior is cooperative.         Thought Content: Thought content normal.         Judgment: Judgment normal.       Significant Labs:  CBC:   Recent Labs   Lab 02/19/22  0815   WBC 9.74   RBC 2.45*   HGB 6.7*   HCT 21.5*      MCV 88   MCH 27.3   MCHC 31.2*     CMP:   Recent Labs   Lab 02/17/22  0658 02/17/22  1041 02/19/22  0815      < > 99   CALCIUM 10.1   < > 9.2   ALBUMIN 2.6*  --   --    PROT 6.1  --   --       < > 145   K 5.2*   < > 5.3*   CO2 17*   < > 19*      < > 106   *   < > 172*   CREATININE 18.6*   < > 18.5*   ALKPHOS 96  --   --    ALT 10  --   --    AST 14  --   --    BILITOT 0.2  --   --     < > = values in this interval not displayed.     All labs within the past 24 hours have been reviewed.     Significant Imaging:  MRI: Reviewed  Retropharyngeal fluid collection, spondylosis, pre dens space widening

## 2022-02-19 NOTE — PLAN OF CARE
Pt is answering orientation questions correctly, AAOx3, but appears confused/talks nonsense. VSS on bedside monitor, BP has fluctuated this shift, see flowsheets for details. HR NSR on bedside monitor, afebrile. Pt admitted 2/17 with osteomyelitis of the LLE. H&H- 6.7/21.5; pt received PRBCs x1 unit. Lasix 40mg given. Phosphorus critically elevated @ 11.1 this am; renvela continued. K-5.3; lokelma given. Creatinine 18.5 today. Pt continues to refuse HD; nephrology following; per pt's wife and POA, pt has said in the past that he would rather die than be put on dialysis. Pt continues to report pain in LLE. RLE 2+ edema noted; team aware. Pt reporting neck pain; MRI of neck showing possible infection/abscess; neurosurgery consulted, recommending pt wear cervical neck brace; pt refusing neck brace. IV rocephin q24hrs continued; ID following. Pt is incontinent of bladder, condom cath in place. Stool sample needed for occult blood studies; no BM this shift. Pt remained in bed for this shift, was able to help staff turn when needed; pt has continued to be disrespectful towards staff and refuses care. Nonskid socks on, call bell within reach, bed locked and in lowest position, bed alarm on. Pt's wife/POA at bedside during day. Pt verbalized understanding to call for any needs/assistance. Hourly rounding completed throughout shift. Plan for psych to be consulted tomorrow to determine decision-making capacity; Plan to discuss code status tomorrow as well (per Dr. Tijerina). Pt remains full code at this time.

## 2022-02-19 NOTE — CONSULTS
[facility] - Transplant Stepdown  Neurosurgery  Consult Note    Inpatient consult to Neurosurgery  Consult performed by: Boaz Lambert MD  Consult ordered by: Dianna Tijerina MD        Subjective:     Chief Complaint/Reason for Admission: neck pain concern for upper cervical instability/infection.     History of Present Illness:  Enrique Martinez is a 56 y.o. male with a PMHx of CKD V glomerulonephropathy, insulin dependent diabetes mellitus, and CAD who presented to the ED for evaluation of worsening left foot pain and swelling. He was recently admitted at [facility] from 2/2-2/4/2022 from LLE cellulitis. He was found to have salmonella bacteremia suspected due to diarrheal illness. He was discharged with cipro for which he reports compliance. He has had no improvement in his pain, redness, or swelling of his left foot and leg. He has also developed a new blister on his left foot. He endorses chills. He denies fevers, chest pain, SOB, N/V, decreased urine output, dysuria, and flank pain.      NSGY was consulted as the patient transiently endorsed neck pain.  X ray C spine with increased LIZZY. MRI C non contrast with prevertebral stir signal and edema, C2-3 listhesis   with picture overall concerning for cervical instability. PT denies weakness, pain or limitation of motion. He further denies symptoms associated with myelopathy including but not  limited to dropping things, difficulty buttoning shirts..etc       Medications Prior to Admission   Medication Sig Dispense Refill Last Dose    insulin (LANTUS SOLOSTAR U-100 INSULIN) glargine 100 units/mL (3mL) SubQ pen Inject 5 Units into the skin once daily.       albuterol (PROVENTIL/VENTOLIN HFA) 90 mcg/actuation inhaler Inhale 2 puffs into the lungs every 6 (six) hours as needed for Wheezing or Shortness of Breath. Rescue       amlodipine (NORVASC) 10 MG tablet Take 10 mg by mouth once daily.       ARIPiprazole (ABILIFY) 15 MG Tab Take 1 tablet (15 mg total) by mouth  every evening. 90 tablet 0     aspirin (ECOTRIN) 81 MG EC tablet Take 1 tablet (81 mg total) by mouth once daily. 360 tablet 0     atorvastatin (LIPITOR) 80 MG tablet Take 1 tablet (80 mg total) by mouth once daily. 90 tablet 3     blood-glucose meter kit Use as instructed       calcitRIOL (ROCALTROL) 0.5 MCG Cap Take 0.5 mcg by mouth once daily.       chlorthalidone (HYGROTEN) 50 MG Tab Take 50 mg by mouth once daily.        cloNIDine (CATAPRES) 0.2 MG tablet Take 0.4 mg by mouth 3 (three) times daily.        colchicine (COLCRYS) 0.6 mg tablet Take 0.6 mg by mouth daily as needed.       ergocalciferol (ERGOCALCIFEROL) 50,000 unit Cap Take 50,000 Units by mouth every 30 days.        famotidine (PEPCID) 20 MG tablet Take 20 mg by mouth daily as needed for Heartburn.        ferrous gluconate (FERGON) 324 MG tablet Take 324 mg by mouth 2 (two) times a day.        fluticasone (FLONASE) 50 mcg/actuation nasal spray 1 spray by Each Nare route once daily.       furosemide (LASIX) 40 MG tablet Take 40 mg by mouth 2 (two) times daily.       methocarbamoL (ROBAXIN) 500 MG Tab Take 500 mg by mouth 2 (two) times a day.       metoprolol tartrate (LOPRESSOR) 100 MG tablet Take 100 mg by mouth.       omeprazole (PRILOSEC) 20 MG capsule Take 20 mg by mouth once daily.       RENVELA 800 mg Tab Take 800 mg by mouth 3 (three) times daily.       sodium bicarbonate 650 MG tablet Take 650 mg by mouth.       tadalafiL (CIALIS) 2.5 mg Tab Take 2.5 mg by mouth.       TRUE METRIX GLUCOSE TEST STRIP Strp           Review of patient's allergies indicates:  No Known Allergies    Past Medical History:   Diagnosis Date    Anemia     Coronary artery disease     Diabetes mellitus, type 2     GERD (gastroesophageal reflux disease)     Gout     Hydrocele in adult     bilateral    Hyperlipidemia     Hypertension     Inguinal hernia     bilateral    Membranous glomerulonephritis     Nephrotic range proteinuria      Nephrotic syndrome     Obesity     Umbilical hernia      Past Surgical History:   Procedure Laterality Date    ABDOMINAL SURGERY  1980s    CARDIAC CATHETERIZATION  2007    x 2    COLONOSCOPY N/A 4/5/2019    Procedure: COLONOSCOPY;  Surgeon: Jose L Carty MD;  Location: [facility] ([address] [place] [address]);  Service: Colon and Rectal;  Laterality: N/A;    CORONARY STENT PLACEMENT  2007     Family History       Problem Relation (Age of Onset)    Drug abuse Brother    Heart attack Father, Brother    Hyperlipidemia Father    Hypertension Father, Brother    No Known Problems Sister    Stroke Father          Tobacco Use    Smoking status: Current Some Day Smoker     Packs/day: 0.25     Years: 10.00     Pack years: 2.50    Smokeless tobacco: Never Used   Substance and Sexual Activity    Alcohol use: No    Drug use: No    Sexual activity: Not on file     Review of Systems  Objective:     Weight: 74 kg (163 lb 2.3 oz)  Body mass index is 26.33 kg/m².  Vital Signs (Most Recent):  Temp: 98.4 °F (36.9 °C) (02/19/22 1252)  Pulse: 84 (02/19/22 1252)  Resp: 18 (02/19/22 1252)  BP: (!) 152/85 (02/19/22 1252)  SpO2: 97 % (02/19/22 1252)   Vital Signs (24h Range):  Temp:  [97.5 °F (36.4 °C)-98.9 °F (37.2 °C)] 98.4 °F (36.9 °C)  Pulse:  [74-92] 84  Resp:  [13-20] 18  SpO2:  [94 %-99 %] 97 %  BP: ()/(60-91) 152/85     Date 02/19/22 0700 - 02/20/22 0659   Shift 9170-5144 0064-1626 9827-1914 24 Hour Total   INTAKE   Blood 425   425   Shift Total(mL/kg) 425(5.7)   425(5.7)   OUTPUT   Shift Total(mL/kg)       Weight (kg) 74 74 74 74                   Male External Urinary Catheter 02/18/22 0418 Small (Active)   Collection Container Urimeter 02/19/22 0837   Securement Method secured to top of thigh w/ adhesive device 02/18/22 1934   Skin no redness;no breakdown 02/19/22 0837   Tolerance no signs/symptoms of discomfort;did not assist with appliance change 02/19/22 0837   Output (mL) 120 mL 02/19/22 0500   Catheter Change Date 02/19/22  02/19/22 0837   Catheter Change Time 1213 02/19/22 0837       Physical Exam  Aox3 PERRL    full cervical ROM      5/5 strength  LROM of LUE cannot abduct left  elbow  above  shoulder      Significant Labs:  Recent Labs   Lab 02/17/22  1746 02/18/22  0622 02/19/22  0815   GLU 85 81 99    142 145   K 4.9 5.3* 5.3*    102 106   CO2 19* 20* 19*   * 183* 172*   CREATININE 18.3* 18.8* 18.5*   CALCIUM 10.6* 9.8 9.2   MG  --  1.9 1.7     Recent Labs   Lab 02/18/22  0622 02/19/22  0815   WBC 10.61 9.74   HGB 6.2* 6.7*   HCT 19.2* 21.5*    213     No results for input(s): LABPT, INR, APTT in the last 48 hours.  Microbiology Results (last 7 days)       Procedure Component Value Units Date/Time    Blood Culture #1 **CANNOT BE ORDERED STAT** [[id number]] Collected: 02/16/22 1807    Order Status: Completed Specimen: Blood from Peripheral, Hand, Right Updated: 02/18/22 2012     Blood Culture, Routine No Growth to date      No Growth to date      No Growth to date    Blood Culture #2 **CANNOT BE ORDERED STAT** [[id number]] Collected: 02/16/22 1806    Order Status: Completed Specimen: Blood from Peripheral, Antecubital, Left Updated: 02/18/22 2012     Blood Culture, Routine No Growth to date      No Growth to date      No Growth to date    Stool culture **CANNOT BE ORDERED STAT** [[id number]]     Order Status: No result Specimen: Stool     Clostridium difficile EIA [[id number]]     Order Status: Canceled Specimen: Stool           All pertinent labs from the last 24 hours have been reviewed.    Significant Diagnostics:  MRI: No results found in the last 24 hours.    Assessment/Plan:     Neck pain  55 y/o M with renal failure and recent salmonella bacteremia , transiently endorsed neck pain but is now denies pain, weakness or myelopathy and is intact on exam despite radiographic features concerning for cervical instability with possible associated infection .      Recommend Flexion extension  cervical spine    Recommend aspen collar.   If patient refuses to wear it would document we recommended it to protect his cervical spine and that patient refused.     No acute NSGY intervention at this time given patient is not medically stable at this time.     Once stable MRI  Spine with and without would be prudent.     Discussed with Dr. Sandhu.             Thank you for your consult     Boaz Lambert MD  Neurosurgery  [facility] - Transplant Stepdown

## 2022-02-19 NOTE — ASSESSMENT & PLAN NOTE
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 Previously, he has declined HD in the past, never receiving a fistula. He is on the transplant wait list. Patient exhibits signs of uremia including fatigue, poor appetite, and mental slowing. On labs, he is hyperkalemic, uremic, AGMA, hyperphosphatemic. GFR is 2-3, which is reduced from previous GFR 13. Home meds include sodium bicarb, Renvela, and calcitriol. 2+ pitting edema on B/L lower extremities. Renal US from Jan 2022 shows sonographic evidence of chronic medical renal disease and bilateral simple and minimally complex renal cysts.  sCr one year ago was 4.5, now 18.6. UPCr ratio 1.11 (Dec 2020)  2/18: Patient increasingly somnolent, lethargic. Hospital med in process of getting in touch with Dr. Lee, who the patient follows with outpatient.     Plan:   - Recommend HD if patient is amenable. Discussed HD with patient as possible emergent need in this setting and as a bridge to transplant. Patient continues to decline. Discussed extensively that his labs and clinical decline from uremia would preclude him from getting a kidney should one become available, rendering his time on the wait list pointless.   - Recommend Psych consult for formal capacity evaluation as patient is becoming more confused  - Consider palliative care consult for GOC/hospice as patient's uremia is worsening to the point where he may not have capacity eventually. If he continues to refuse dialysis, will have to clarify if he would prefer to transition to comfort care over getting dialysis  - Shift potassium as needed  - Continue phos binder sevelamer 1600 TID  - Continue Nabicarb 650 TID  - Hold calcitriol in setting of hyperphosphatemia. Will re-start when phos reaches normal level.   - Trend Cr  - Strict I&Os  - Avoid nephrotoxic  agents  - Renally adjust medications

## 2022-02-19 NOTE — ASSESSMENT & PLAN NOTE
Has history of membranous glomerulonephritis. Previous renal biopsy in 2013 showed 50% glomerulosclerosis. Follows with Dr. Lee at [facility] [place].

## 2022-02-19 NOTE — ASSESSMENT & PLAN NOTE
55 y/o M with renal failure and recent salmonella bacteremia , transiently endorsed neck pain but is now denies pain, weakness or myelopathy and is intact on exam despite radiographic features concerning for cervical instability with possible associated infection .      Recommend Flexion extension  cervical spine   Recommend aspen collar.   If patient refuses to wear it would document we recommended it to protect his cervical spine and that patient refused.     No acute NSGY intervention at this time given patient is not medically stable at this time.     Once stable MRI  Spine with and without would be prudent.     Discussed with Dr. Sandhu.

## 2022-02-19 NOTE — PROGRESS NOTES
[facility] Step[facility]  Nephrology  Progress Note    Patient Name: Enrique Martinez  MRN: [id number]  Admission Date: 2/16/2022  Hospital Length of Stay: 2 days  Attending Provider: Dianna Tijerina MD   Primary Care Physician: Primary Doctor No  Principal Problem:Acute renal failure superimposed on stage 5 chronic kidney disease, not on chronic dialysis    Subjective:     HPI: Mr. Martinez is a 56 y.o. 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 He was recently admitted at [facility] from 2/2-2/4/2022 from LLE cellulitis. He was found to have salmonella bacteremia suspected due to diarrheal illness. He was discharged with cipro for which he reports compliance. He has had no improvement in his pain, redness, or swelling of his left foot and leg. He has also developed a new blister on his left foot, with MRI foot concerning for osteomyelitis and showing soft tissue swelling c/w cellulitis. He is admitted for treatment of suspected osteomyelitis.     He follows with Dr. Cardoza (nephrology), has adamantly declined HD in the past and would rather wait for transplant. He reports being on the transplant list for the past 4 years. His last renal biopsy in 2013 showed 50% glomerulosclerosis. Started on calcitriol 0.5, hx of elevated PTH at 921. Patient complains of decreased appetite, neck cramps, fatigue. He still urinates 4-5 times/day. He reports his B/L LE edema has worsened over the past few weeks. No dyspnea, chest pain, dysuria, hematuria. Nephro consulted for HD initiation.      Labs are significant for hyperkalemia 5.5, bicarb 18, , phosphate 10.4, Cr 19.7, GFR 2.2, ESR 97, CRP 33.4. . CBC significant for Hgb of 7.2, white count 14.  Previous echo showed EF of 65%.       Interval History: Patient still declines HD. More somnolent, fatigued decreased appetite. More confused today compared to before. Oriented, but making odd claims. Reports left foot pain. MRI C spine concerning for retropharyngeal fluid collection, cannot r/o abscess. Chronic widening of pre dens space. NSGY consulted, recommend Aspen collar, no surgical intervention given he is not medically stable.     Review of patient's allergies indicates:  No Known Allergies  Current Facility-Administered Medications   Medication Frequency    0.9%  NaCl infusion (for blood administration) Q24H PRN    acetaminophen tablet 1,000 mg Q8H    albuterol-ipratropium 2.5 mg-0.5 mg/3 mL nebulizer solution 3 mL Q4H PRN    aluminum-magnesium hydroxide-simethicone 200-200-20 mg/5 mL suspension 30 mL QID PRN    amLODIPine tablet 10 mg Daily    ARIPiprazole tablet 15 mg QHS    aspirin EC tablet 81 mg Daily    atorvastatin tablet 80 mg Daily    cefTRIAXone (ROCEPHIN) 2 g/50 mL D5W IVPB Q24H    cloNIDine tablet 0.4 mg TID    colchicine split tablet 0.3 mg BID    dextrose 10% bolus 125 mL PRN    dextrose 10% bolus 250 mL PRN    doxycycline tablet 100 mg Q12H    folic acid tablet 1 mg Daily    furosemide injection 40 mg Q12H    glucagon (human recombinant) injection 1 mg PRN    glucose chewable tablet 16 g PRN    glucose chewable tablet 24 g PRN    hydrALAZINE tablet 50 mg Q8H    labetaloL tablet 200 mg Q12H    lorazepam injection 1 mg On Call Procedure    melatonin tablet 6 mg Nightly PRN    methocarbamoL tablet 750 mg QID PRN    morphine injection 2 mg Once PRN    morphine injection 2 mg On Call Procedure    naloxone 0.4 mg/mL injection 0.02 mg PRN    ondansetron disintegrating tablet 8 mg Q8H PRN    oxyCODONE immediate release tablet 5 mg Q6H PRN    oxyCODONE immediate release tablet Tab 10 mg Q4H PRN    pantoprazole injection 40 mg BID    polyethylene glycol packet 17 g  Daily PRN    prochlorperazine injection Soln 5 mg Q6H PRN    sevelamer carbonate tablet 1,600 mg TID    simethicone chewable tablet 80 mg QID PRN    sodium bicarbonate tablet 650 mg TID    sodium chloride 0.9% flush 10 mL Q8H PRN    sodium zirconium cyclosilicate packet 10 g Once       Objective:     Vital Signs (Most Recent):  Temp: 98.4 °F (36.9 °C) (22 1252)  Pulse: 84 (22 1252)  Resp: 18 (22 1252)  BP: (!) 152/85 (22 1252)  SpO2: 97 % (22 1252)  O2 Device (Oxygen Therapy): room air (22 1054)   Vital Signs (24h Range):  Temp:  [97.6 °F (36.4 °C)-98.9 °F (37.2 °C)] 98.4 °F (36.9 °C)  Pulse:  [74-92] 84  Resp:  [13-20] 18  SpO2:  [94 %-99 %] 97 %  BP: ()/(60-91) 152/85     Weight: 74 kg (163 lb 2.3 oz) (22 0500)  Body mass index is 26.33 kg/m².  Body surface area is 1.86 meters squared.    I/O last 3 completed shifts:  In: 1585.5 [P.O.:990; I.V.:295.5; Blood:300]  Out: 710 [Urine:710]    Physical Exam  Vitals and nursing note reviewed.   Constitutional:       Appearance: He is well-developed and overweight. He is ill-appearing.   HENT:      Head: Normocephalic and atraumatic.      Nose: Nose normal.      Mouth/Throat:      Mouth: Mucous membranes are moist.   Eyes:      General: No scleral icterus.     Conjunctiva/sclera: Conjunctivae normal.   Cardiovascular:      Rate and Rhythm: Normal rate and regular rhythm.      Heart sounds: Normal heart sounds.   Pulmonary:      Effort: Pulmonary effort is normal. No respiratory distress.      Breath sounds: Normal breath sounds. No wheezing or rales.   Abdominal:      General: Bowel sounds are normal. There is no distension.      Palpations: Abdomen is soft.      Tenderness: There is no abdominal tenderness. There is no guarding.   Musculoskeletal:         General: Swelling and signs of injury present. Normal range of motion.      Cervical back: Normal range of motion and neck supple.      Right lower le+ Pitting  Edema present.      Left lower le+ Pitting Edema present.   Feet:      Right foot:      Skin integrity: Skin integrity normal.      Left foot:      Skin integrity: Skin breakdown, erythema, warmth and dry skin present.   Skin:     General: Skin is warm and dry.      Capillary Refill: Capillary refill takes less than 2 seconds.      Comments: L leg, ankle, foot skin sloughing.   Deroofed blister on dorsum of left foot   Neurological:      Mental Status: He is easily aroused. He is lethargic.      GCS: GCS eye subscore is 4. GCS verbal subscore is 5. GCS motor subscore is 6.      Motor: Tremor present.      Comments: Asterixis present   Psychiatric:         Behavior: Behavior normal. Behavior is cooperative.         Thought Content: Thought content normal.         Judgment: Judgment normal.       Significant Labs:  CBC:   Recent Labs   Lab 22  0815   WBC 9.74   RBC 2.45*   HGB 6.7*   HCT 21.5*      MCV 88   MCH 27.3   MCHC 31.2*     CMP:   Recent Labs   Lab 22  0658 22  1041 22  0815      < > 99   CALCIUM 10.1   < > 9.2   ALBUMIN 2.6*  --   --    PROT 6.1  --   --       < > 145   K 5.2*   < > 5.3*   CO2 17*   < > 19*      < > 106   *   < > 172*   CREATININE 18.6*   < > 18.5*   ALKPHOS 96  --   --    ALT 10  --   --    AST 14  --   --    BILITOT 0.2  --   --     < > = values in this interval not displayed.     All labs within the past 24 hours have been reviewed.     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 Previously, he has declined HD in the past, never receiving a  fistula. He is on the transplant wait list. Patient exhibits signs of uremia including fatigue, poor appetite, and mental slowing. On labs, he is hyperkalemic, uremic, AGMA, hyperphosphatemic. GFR is 2-3, which is reduced from previous GFR 13. Home meds include sodium bicarb, Renvela, and calcitriol. 2+ pitting edema on B/L lower extremities. Renal US from Jan 2022 shows sonographic evidence of chronic medical renal disease and bilateral simple and minimally complex renal cysts.  sCr one year ago was 4.5, now 18.6. UPCr ratio 1.11 (Dec 2020)  2/18: Patient increasingly somnolent, lethargic. Hospital med in process of getting in touch with Dr. Lee, who the patient follows with outpatient.     Plan:   - Recommend HD if patient is amenable. Discussed HD with patient as possible emergent need in this setting and as a bridge to transplant. Patient continues to decline. Discussed extensively that his labs and clinical decline from uremia would preclude him from getting a kidney should one become available, rendering his time on the wait list pointless.   - Recommend Psych consult for formal capacity evaluation as patient is becoming more confused  - Consider palliative care consult for GOC/hospice as patient's uremia is worsening to the point where he may not have capacity eventually. If he continues to refuse dialysis, will have to clarify if he would prefer to transition to comfort care over getting dialysis  - Shift potassium as needed  - Continue phos binder sevelamer 1600 TID  - Continue Nabicarb 650 TID  - Hold calcitriol in setting of hyperphosphatemia. Will re-start when phos reaches normal level.   - Trend Cr  - Strict I&Os  - Avoid nephrotoxic agents  - Renally adjust medications           Increased anion gap metabolic acidosis  Initial labs significant for bicarb of 18, anion gap 19. AGMA likely 2/2 uremia. Continued AGMA,, uremia during admssion. Bun slightly decreased to 180s after IVF.     - HD if  amenable.   - Sodium bicarb 650 TID    Hyperphosphatemia  Hyperphosphatemia at 10.9.     - Continue Sevelamer 1600 TID    Hyperkalemia  Hyperkalemic at 5.5 on admission, now 5.3. No EKG changes of hyperkalemia on initial EKG.     - Shift as needed by primary.     SHANNAN (iron deficiency anemia)  Baseline Hb 9-10, Hb 7.2 on admission. Iron low at 10, iron sat low at 4%, ferritin elevated 384, transferrin low at 174.   Antiplatelet/anticoagulation: aspirin    Plan:  - Recommend transfusion today  - Trend CBC daily  - No role for IV iron in setting of infection  - Transfuse with goal Hb > 7      Membranous glomerulonephritis  Has history of membranous glomerulonephritis. Previous renal biopsy in 2013 showed 50% glomerulosclerosis. [place] with [place]. [place] [facility] [place].         Thank you for your consult. I will follow-up with patient. Please contact us if you have any additional questions.    Harrison Acuna MD  Nephrology  [facility] - Trans[facility] Step[facility]

## 2022-02-19 NOTE — SUBJECTIVE & OBJECTIVE
Medications Prior to Admission   Medication Sig Dispense Refill Last Dose    insulin (LANTUS SOLOSTAR U-100 INSULIN) glargine 100 units/mL (3mL) SubQ pen Inject 5 Units into the skin once daily.       albuterol (PROVENTIL/VENTOLIN HFA) 90 mcg/actuation inhaler Inhale 2 puffs into the lungs every 6 (six) hours as needed for Wheezing or Shortness of Breath. Rescue       amlodipine (NORVASC) 10 MG tablet Take 10 mg by mouth once daily.       ARIPiprazole (ABILIFY) 15 MG Tab Take 1 tablet (15 mg total) by mouth every evening. 90 tablet 0     aspirin (ECOTRIN) 81 MG EC tablet Take 1 tablet (81 mg total) by mouth once daily. 360 tablet 0     atorvastatin (LIPITOR) 80 MG tablet Take 1 tablet (80 mg total) by mouth once daily. 90 tablet 3     blood-glucose meter kit Use as instructed       calcitRIOL (ROCALTROL) 0.5 MCG Cap Take 0.5 mcg by mouth once daily.       chlorthalidone (HYGROTEN) 50 MG Tab Take 50 mg by mouth once daily.        cloNIDine (CATAPRES) 0.2 MG tablet Take 0.4 mg by mouth 3 (three) times daily.        colchicine (COLCRYS) 0.6 mg tablet Take 0.6 mg by mouth daily as needed.       ergocalciferol (ERGOCALCIFEROL) 50,000 unit Cap Take 50,000 Units by mouth every 30 days.        famotidine (PEPCID) 20 MG tablet Take 20 mg by mouth daily as needed for Heartburn.        ferrous gluconate (FERGON) 324 MG tablet Take 324 mg by mouth 2 (two) times a day.        fluticasone (FLONASE) 50 mcg/actuation nasal spray 1 spray by Each Nare route once daily.       furosemide (LASIX) 40 MG tablet Take 40 mg by mouth 2 (two) times daily.       methocarbamoL (ROBAXIN) 500 MG Tab Take 500 mg by mouth 2 (two) times a day.       metoprolol tartrate (LOPRESSOR) 100 MG tablet Take 100 mg by mouth.       omeprazole (PRILOSEC) 20 MG capsule Take 20 mg by mouth once daily.       RENVELA 800 mg Tab Take 800 mg by mouth 3 (three) times daily.       sodium bicarbonate 650 MG tablet Take 650 mg by mouth.       tadalafiL (CIALIS) 2.5 mg  Tab Take 2.5 mg by mouth.       TRUE METRIX GLUCOSE TEST STRIP Strp           Review of patient's allergies indicates:  No Known Allergies    Past Medical History:   Diagnosis Date    Anemia     Coronary artery disease     Diabetes mellitus, type 2     GERD (gastroesophageal reflux disease)     Gout     Hydrocele in adult     bilateral    Hyperlipidemia     Hypertension     Inguinal hernia     bilateral    Membranous glomerulonephritis     Nephrotic range proteinuria     Nephrotic syndrome     Obesity     Umbilical hernia      Past Surgical History:   Procedure Laterality Date    ABDOMINAL SURGERY  1980s    CARDIAC CATHETERIZATION  2007    x 2    COLONOSCOPY N/A 4/5/2019    Procedure: COLONOSCOPY;  Surgeon: Jose L Carty MD;  Location: [facility] ([address] [place] [address]);  Service: Colon and Rectal;  Laterality: N/A;    CORONARY STENT PLACEMENT  2007     Family History       Problem Relation (Age of Onset)    Drug abuse Brother    Heart attack Father, Brother    Hyperlipidemia Father    Hypertension Father, Brother    No Known Problems Sister    Stroke Father          Tobacco Use    Smoking status: Current Some Day Smoker     Packs/day: 0.25     Years: 10.00     Pack years: 2.50    Smokeless tobacco: Never Used   Substance and Sexual Activity    Alcohol use: No    Drug use: No    Sexual activity: Not on file     Review of Systems  Objective:     Weight: 74 kg (163 lb 2.3 oz)  Body mass index is 26.33 kg/m².  Vital Signs (Most Recent):  Temp: 98.4 °F (36.9 °C) (02/19/22 1252)  Pulse: 84 (02/19/22 1252)  Resp: 18 (02/19/22 1252)  BP: (!) 152/85 (02/19/22 1252)  SpO2: 97 % (02/19/22 1252)   Vital Signs (24h Range):  Temp:  [97.5 °F (36.4 °C)-98.9 °F (37.2 °C)] 98.4 °F (36.9 °C)  Pulse:  [74-92] 84  Resp:  [13-20] 18  SpO2:  [94 %-99 %] 97 %  BP: ()/(60-91) 152/85     Date 02/19/22 0700 - 02/20/22 0659   Shift 5701-9696 3474-0966 6910-1538 24 Hour Total   INTAKE   Blood 425   425   Shift Total(mL/kg) 425(5.7)   425(5.7)    OUTPUT   Shift Total(mL/kg)       Weight (kg) 74 74 74 74                   Male External Urinary Catheter 02/18/22 0418 Small (Active)   Collection Container Urimeter 02/19/22 0837   Securement Method secured to top of thigh w/ adhesive device 02/18/22 1934   Skin no redness;no breakdown 02/19/22 0837   Tolerance no signs/symptoms of discomfort;did not assist with appliance change 02/19/22 0837   Output (mL) 120 mL 02/19/22 0500   Catheter Change Date 02/19/22 02/19/22 0837   Catheter Change Time 1213 02/19/22 0837       Physical Exam  Aox3 PERRL    full cervical ROM      5/5 strength  LROM of LUE cannot abduct left  elbow  above  shoulder      Significant Labs:  Recent Labs   Lab 02/17/22  1746 02/18/22  0622 02/19/22  0815   GLU 85 81 99    142 145   K 4.9 5.3* 5.3*    102 106   CO2 19* 20* 19*   * 183* 172*   CREATININE 18.3* 18.8* 18.5*   CALCIUM 10.6* 9.8 9.2   MG  --  1.9 1.7     Recent Labs   Lab 02/18/22  0622 02/19/22  0815   WBC 10.61 9.74   HGB 6.2* 6.7*   HCT 19.2* 21.5*    213     No results for input(s): LABPT, INR, APTT in the last 48 hours.  Microbiology Results (last 7 days)       Procedure Component Value Units Date/Time    Blood Culture #1 **CANNOT BE ORDERED STAT** [[id number]] Collected: 02/16/22 1807    Order Status: Completed Specimen: Blood from Peripheral, Hand, Right Updated: 02/18/22 2012     Blood Culture, Routine No Growth to date      No Growth to date      No Growth to date    Blood Culture #2 **CANNOT BE ORDERED STAT** [[id number]] Collected: 02/16/22 1806    Order Status: Completed Specimen: Blood from Peripheral, Antecubital, Left Updated: 02/18/22 2012     Blood Culture, Routine No Growth to date      No Growth to date      No Growth to date    Stool culture **CANNOT BE ORDERED STAT** [[id number]]     Order Status: No result Specimen: Stool     Clostridium difficile EIA [[id number]]     Order Status: Canceled Specimen: Stool           All pertinent labs  from the last 24 hours have been reviewed.    Significant Diagnostics:  MRI: No results found in the last 24 hours.

## 2022-02-19 NOTE — HPI
Enrique Martinez is a 56 y.o. male with a PMHx of CKD V glomerulonephropathy, insulin dependent diabetes mellitus, and CAD who presented to the ED for evaluation of worsening left foot pain and swelling. He was recently admitted at [facility] from 2/2-2/4/2022 from LLE cellulitis. He was found to have salmonella bacteremia suspected due to diarrheal illness. He was discharged with cipro for which he reports compliance. He has had no improvement in his pain, redness, or swelling of his left foot and leg. He has also developed a new blister on his left foot. He endorses chills. He denies fevers, chest pain, SOB, N/V, decreased urine output, dysuria, and flank pain.      NSGY was consulted as the patient transiently endorsed neck pain.  X ray C spine with increased LIZZY. MRI C non contrast with prevertebral stir signal and edema, C2-3 listhesis   with picture overall concerning for cervical instability. PT denies weakness, pain or limitation of motion. He further denies symptoms associated with myelopathy including but not  limited to dropping things, difficulty buttoning shirts..etc

## 2022-02-20 LAB
ALBUMIN SERPL BCP-MCNC: 2.2 G/DL (ref 3.5–5.2)
ALP SERPL-CCNC: 76 U/L (ref 55–135)
ALT SERPL W/O P-5'-P-CCNC: 8 U/L (ref 10–44)
ANION GAP SERPL CALC-SCNC: 23 MMOL/L (ref 8–16)
AST SERPL-CCNC: 19 U/L (ref 10–40)
BASOPHILS # BLD AUTO: 0.03 K/UL (ref 0–0.2)
BASOPHILS NFR BLD: 0.3 % (ref 0–1.9)
BILIRUB SERPL-MCNC: 0.2 MG/DL (ref 0.1–1)
BUN SERPL-MCNC: 172 MG/DL (ref 6–20)
CALCIUM SERPL-MCNC: 9.3 MG/DL (ref 8.7–10.5)
CHLORIDE SERPL-SCNC: 103 MMOL/L (ref 95–110)
CO2 SERPL-SCNC: 17 MMOL/L (ref 23–29)
CREAT SERPL-MCNC: 18.4 MG/DL (ref 0.5–1.4)
DIFFERENTIAL METHOD: ABNORMAL
EOSINOPHIL # BLD AUTO: 0.1 K/UL (ref 0–0.5)
EOSINOPHIL NFR BLD: 1.3 % (ref 0–8)
ERYTHROCYTE [DISTWIDTH] IN BLOOD BY AUTOMATED COUNT: 16.5 % (ref 11.5–14.5)
EST. GFR  (AFRICAN AMERICAN): 2.8 ML/MIN/1.73 M^2
EST. GFR  (NON AFRICAN AMERICAN): 2.4 ML/MIN/1.73 M^2
GLUCOSE SERPL-MCNC: 108 MG/DL (ref 70–110)
HCT VFR BLD AUTO: 25 % (ref 40–54)
HGB BLD-MCNC: 7.9 G/DL (ref 14–18)
IMM GRANULOCYTES # BLD AUTO: 0.07 K/UL (ref 0–0.04)
IMM GRANULOCYTES NFR BLD AUTO: 0.7 % (ref 0–0.5)
LYMPHOCYTES # BLD AUTO: 1.1 K/UL (ref 1–4.8)
LYMPHOCYTES NFR BLD: 11.8 % (ref 18–48)
MCH RBC QN AUTO: 28.3 PG (ref 27–31)
MCHC RBC AUTO-ENTMCNC: 31.6 G/DL (ref 32–36)
MCV RBC AUTO: 90 FL (ref 82–98)
MONOCYTES # BLD AUTO: 1.3 K/UL (ref 0.3–1)
MONOCYTES NFR BLD: 13.9 % (ref 4–15)
NEUTROPHILS # BLD AUTO: 6.9 K/UL (ref 1.8–7.7)
NEUTROPHILS NFR BLD: 72 % (ref 38–73)
NRBC BLD-RTO: 0 /100 WBC
PHOSPHATE SERPL-MCNC: 10.6 MG/DL (ref 2.7–4.5)
PLATELET # BLD AUTO: 209 K/UL (ref 150–450)
PMV BLD AUTO: 10.9 FL (ref 9.2–12.9)
POTASSIUM SERPL-SCNC: 4.4 MMOL/L (ref 3.5–5.1)
PROT SERPL-MCNC: 5.4 G/DL (ref 6–8.4)
RBC # BLD AUTO: 2.79 M/UL (ref 4.6–6.2)
SODIUM SERPL-SCNC: 143 MMOL/L (ref 136–145)
WBC # BLD AUTO: 9.58 K/UL (ref 3.9–12.7)

## 2022-02-20 PROCEDURE — 25000003 PHARM REV CODE 250: Mod: NTX

## 2022-02-20 PROCEDURE — C9113 INJ PANTOPRAZOLE SODIUM, VIA: HCPCS | Mod: NTX | Performed by: HOSPITALIST

## 2022-02-20 PROCEDURE — 99232 PR SUBSEQUENT HOSPITAL CARE,LEVL II: ICD-10-PCS | Mod: NTX,,, | Performed by: NEUROLOGICAL SURGERY

## 2022-02-20 PROCEDURE — 99233 SBSQ HOSP IP/OBS HIGH 50: CPT | Mod: NTX,,, | Performed by: HOSPITALIST

## 2022-02-20 PROCEDURE — 85025 COMPLETE CBC W/AUTO DIFF WBC: CPT | Mod: NTX | Performed by: HOSPITALIST

## 2022-02-20 PROCEDURE — 99356 PR PROLONGED SERV,INPATIENT,1ST HR: CPT | Mod: NTX,,, | Performed by: HOSPITALIST

## 2022-02-20 PROCEDURE — 94761 N-INVAS EAR/PLS OXIMETRY MLT: CPT | Mod: NTX

## 2022-02-20 PROCEDURE — 25000003 PHARM REV CODE 250: Mod: NTX | Performed by: HOSPITALIST

## 2022-02-20 PROCEDURE — 99232 SBSQ HOSP IP/OBS MODERATE 35: CPT | Mod: NTX,,, | Performed by: NEUROLOGICAL SURGERY

## 2022-02-20 PROCEDURE — 84100 ASSAY OF PHOSPHORUS: CPT | Mod: NTX | Performed by: HOSPITALIST

## 2022-02-20 PROCEDURE — 63600175 PHARM REV CODE 636 W HCPCS: Mod: NTX | Performed by: HOSPITALIST

## 2022-02-20 PROCEDURE — 25000003 PHARM REV CODE 250: Mod: NTX | Performed by: PHYSICIAN ASSISTANT

## 2022-02-20 PROCEDURE — 80053 COMPREHEN METABOLIC PANEL: CPT | Mod: NTX | Performed by: HOSPITALIST

## 2022-02-20 PROCEDURE — 90792 PR PSYCHIATRIC DIAGNOSTIC EVALUATION W/MEDICAL SERVICES: ICD-10-PCS | Mod: NTX,,, | Performed by: PSYCHIATRY & NEUROLOGY

## 2022-02-20 PROCEDURE — 90792 PSYCH DIAG EVAL W/MED SRVCS: CPT | Mod: NTX,,, | Performed by: PSYCHIATRY & NEUROLOGY

## 2022-02-20 PROCEDURE — 99232 SBSQ HOSP IP/OBS MODERATE 35: CPT | Mod: NTX,,, | Performed by: INTERNAL MEDICINE

## 2022-02-20 PROCEDURE — 99233 SBSQ HOSP IP/OBS HIGH 50: CPT | Mod: NTX,,, | Performed by: INTERNAL MEDICINE

## 2022-02-20 PROCEDURE — 36415 COLL VENOUS BLD VENIPUNCTURE: CPT | Mod: NTX | Performed by: HOSPITALIST

## 2022-02-20 PROCEDURE — 99233 PR SUBSEQUENT HOSPITAL CARE,LEVL III: ICD-10-PCS | Mod: NTX,,, | Performed by: HOSPITALIST

## 2022-02-20 PROCEDURE — 99233 PR SUBSEQUENT HOSPITAL CARE,LEVL III: ICD-10-PCS | Mod: NTX,,, | Performed by: INTERNAL MEDICINE

## 2022-02-20 PROCEDURE — 99356 PR PROLONGED SERV,INPATIENT,1ST HR: ICD-10-PCS | Mod: NTX,,, | Performed by: HOSPITALIST

## 2022-02-20 PROCEDURE — 20600001 HC STEP DOWN PRIVATE ROOM: Mod: NTX

## 2022-02-20 PROCEDURE — 99232 PR SUBSEQUENT HOSPITAL CARE,LEVL II: ICD-10-PCS | Mod: NTX,,, | Performed by: INTERNAL MEDICINE

## 2022-02-20 RX ORDER — NYSTATIN 100000 [USP'U]/ML
500000 SUSPENSION ORAL
Status: DISCONTINUED | OUTPATIENT
Start: 2022-02-20 | End: 2022-03-10 | Stop reason: HOSPADM

## 2022-02-20 RX ORDER — POLYETHYLENE GLYCOL 3350 17 G/17G
17 POWDER, FOR SOLUTION ORAL DAILY
Status: DISCONTINUED | OUTPATIENT
Start: 2022-02-20 | End: 2022-02-26

## 2022-02-20 RX ORDER — AMOXICILLIN 250 MG
1 CAPSULE ORAL DAILY
Status: DISCONTINUED | OUTPATIENT
Start: 2022-02-20 | End: 2022-02-26

## 2022-02-20 RX ADMIN — PANTOPRAZOLE SODIUM 40 MG: 40 INJECTION, POWDER, FOR SOLUTION INTRAVENOUS at 08:02

## 2022-02-20 RX ADMIN — SODIUM ZIRCONIUM CYCLOSILICATE 5 G: 5 POWDER, FOR SUSPENSION ORAL at 09:02

## 2022-02-20 RX ADMIN — FUROSEMIDE 40 MG: 10 INJECTION, SOLUTION INTRAMUSCULAR; INTRAVENOUS at 09:02

## 2022-02-20 RX ADMIN — HYDRALAZINE HYDROCHLORIDE 50 MG: 50 TABLET ORAL at 02:02

## 2022-02-20 RX ADMIN — CEFTRIAXONE SODIUM 2 G: 2 INJECTION, SOLUTION INTRAVENOUS at 02:02

## 2022-02-20 RX ADMIN — CLONIDINE HYDROCHLORIDE 0.4 MG: 0.1 TABLET ORAL at 08:02

## 2022-02-20 RX ADMIN — HYDRALAZINE HYDROCHLORIDE 50 MG: 50 TABLET ORAL at 08:02

## 2022-02-20 RX ADMIN — AMLODIPINE BESYLATE 10 MG: 10 TABLET ORAL at 09:02

## 2022-02-20 RX ADMIN — LABETALOL HYDROCHLORIDE 200 MG: 100 TABLET, FILM COATED ORAL at 09:02

## 2022-02-20 RX ADMIN — PANTOPRAZOLE SODIUM 40 MG: 40 INJECTION, POWDER, FOR SOLUTION INTRAVENOUS at 09:02

## 2022-02-20 RX ADMIN — CLONIDINE HYDROCHLORIDE 0.4 MG: 0.1 TABLET ORAL at 02:02

## 2022-02-20 RX ADMIN — CLONIDINE HYDROCHLORIDE 0.4 MG: 0.1 TABLET ORAL at 09:02

## 2022-02-20 RX ADMIN — SEVELAMER CARBONATE 1600 MG: 800 TABLET, FILM COATED ORAL at 08:02

## 2022-02-20 RX ADMIN — SEVELAMER CARBONATE 1600 MG: 800 TABLET, FILM COATED ORAL at 09:02

## 2022-02-20 RX ADMIN — FUROSEMIDE 40 MG: 10 INJECTION, SOLUTION INTRAMUSCULAR; INTRAVENOUS at 08:02

## 2022-02-20 RX ADMIN — ACETAMINOPHEN 1000 MG: 500 TABLET ORAL at 02:02

## 2022-02-20 RX ADMIN — FOLIC ACID 1 MG: 1 TABLET ORAL at 09:02

## 2022-02-20 RX ADMIN — LABETALOL HYDROCHLORIDE 200 MG: 100 TABLET, FILM COATED ORAL at 08:02

## 2022-02-20 RX ADMIN — ARIPIPRAZOLE 15 MG: 5 TABLET ORAL at 08:02

## 2022-02-20 RX ADMIN — METHOCARBAMOL 750 MG: 750 TABLET ORAL at 05:02

## 2022-02-20 RX ADMIN — COLCHICINE 0.3 MG: 0.6 TABLET, FILM COATED ORAL at 09:02

## 2022-02-20 RX ADMIN — NYSTATIN 500000 UNITS: 500000 SUSPENSION ORAL at 12:02

## 2022-02-20 RX ADMIN — SODIUM BICARBONATE 650 MG TABLET 650 MG: at 02:02

## 2022-02-20 RX ADMIN — SEVELAMER CARBONATE 1600 MG: 800 TABLET, FILM COATED ORAL at 02:02

## 2022-02-20 RX ADMIN — SENNOSIDES AND DOCUSATE SODIUM 1 TABLET: 50; 8.6 TABLET ORAL at 09:02

## 2022-02-20 RX ADMIN — NYSTATIN 500000 UNITS: 500000 SUSPENSION ORAL at 05:02

## 2022-02-20 RX ADMIN — SODIUM BICARBONATE 650 MG TABLET 650 MG: at 09:02

## 2022-02-20 RX ADMIN — ACETAMINOPHEN 1000 MG: 500 TABLET ORAL at 08:02

## 2022-02-20 RX ADMIN — ACETAMINOPHEN 1000 MG: 500 TABLET ORAL at 05:02

## 2022-02-20 RX ADMIN — SODIUM BICARBONATE 650 MG TABLET 650 MG: at 08:02

## 2022-02-20 RX ADMIN — OXYCODONE 5 MG: 5 TABLET ORAL at 02:02

## 2022-02-20 RX ADMIN — COLCHICINE 0.3 MG: 0.6 TABLET, FILM COATED ORAL at 08:02

## 2022-02-20 RX ADMIN — HYDRALAZINE HYDROCHLORIDE 50 MG: 50 TABLET ORAL at 05:02

## 2022-02-20 RX ADMIN — ASPIRIN 81 MG: 81 TABLET, COATED ORAL at 09:02

## 2022-02-20 RX ADMIN — NYSTATIN 500000 UNITS: 500000 SUSPENSION ORAL at 08:02

## 2022-02-20 RX ADMIN — ATORVASTATIN CALCIUM 80 MG: 20 TABLET, FILM COATED ORAL at 09:02

## 2022-02-20 NOTE — PROGRESS NOTES
[facility] - Transplant [facility] Medicine  Progress Note    Patient Name: Enrique Martinez  MRN: [id number]  Patient Class: IP- Inpatient   Admission Date: 2/16/2022  Length of Stay: 2 days  Attending Physician: Dianna Tijerina MD  Primary Care Provider: Primary Doctor No        Subjective:     Principal Problem:Acute renal failure superimposed on stage 5 chronic kidney disease, not on chronic dialysis        HPI:  Enrique Martinez is a 56 y.o. male with a PMHx of CKD V glomerulonephropathy, insulin dependent diabetes mellitus, and CAD who presented tot he ED for evaluation of worsening left foot pain and swelling. He was recently admitted at [facility] from 2/2-2/4/2022 from LLE cellulitis. He was found to have salmonella bacteremia suspected due to diarrheal illness. He was discharged with cipro for which he reports compliance. He has had no improvement in his pain, redness, or swelling of his left foot and leg. He has also developed a new blister on his left foot. He endorses chills. He denies fevers, chest pain, SOB, N/V, decreased urine output, dysuria, and flank pain.    ED: HR 97, other VSSAF. CBC with leukocytosis of 16.01, Hgb 7.4, Hct 22.2.  ESR 97. CRP 33.4. K 5.5. Cr 19.7, . Phos 10.4. Lactic WNL. MRI L foot with cellulitis and osteomyelitis of distal 1st metatarsal and proximal 1st phalanx.      Overview/Hospital Course:  No notes on file      Interval history:   Extensive discussions today again similar to yesterday with over 2 hours discussions with him, his wife, his son on phone, neurosurgery/ID/renal, his nephrologist at [facility] was contacted in addition. Hg still 6's, suspect neck is lkely source with fluid collection of unclear source and inflammation. No sign of gi bleeing, fobt pending. Phos high, cr high, bun high, k shifted as mid 5's. Bp lower end, staggering bp meds, and norvacs given this AM and trended to see if needs others. Wife at bedside today. Extensive discussion with them both.  "She is very upset he will not agree to hD. He refuses adamently despite her and myself both etensively counselling on this. He has no real reason, he refues to hav ea conversaion about it. She said her step dad is on HD and done fine so his reason that everyone he knows on HD dies she said is not true. I told him 10 years of nephrology clinics and 4  years on transplant list is all for naught as he isnt transplant candiate right now even if kidney came ready today due to where hes at right now and he said he doesn't caer bascally. He said if he were to have breathing issues he siad let him go and dont do a breathing tube. I dont think he has capacity at this point. His wife also heard these statemnets. She is his POA. He confirmed this as well. He has fluctuatig level of awareness. He thought the hallway was a gym. He dindt recognize his wife earlier. He asked me "you excited" I said for what, and he said for yesterday. He contiuously tries to use the urinal when he has a patel. His wife nor his son knew he had salmonella bacteremia at [facility], he did not tell them. He stays in dirty places his wife says. He eats only fast food. He doesn't eat at salad bars, he does not have turtles. She said he eats  foods and poor quality foods. He had severe diarrhea before [facility] admit she said. Suspect severe infection tipped him over to ESRD we are at right now and were at [facility] also. He claims dr valdez his kidney MD doesn't recommend hD. I read him the texts from dr valdez that state he does recommend HD right now and doesn't want to lose him because of refusal. Last night he said he wantd to talk to him and we were goint ot arrange at 2 pm but could not get ahhold of dr valdez. In any event, he was extremely agitated and very aggressive and openly hostile even worse than his verbally abusive baseline to providers and nursing including myself on exam at 2 pm this afternoon. He was yeling at me and his wife and about his son and " "was very verbally abusive to me. He told me "wh jd you still in here. What do you want" when I was asking him questions in the room after we were unable to contact dr valdez. I told him I was witing to see if his MD would call me back for a short period and he said "go get in the hallway then get out of here". He has been very hostile to nursing also and they were going to have security tot he room if he continued his behavior. Maybe uremia but family reports history of verbal abuse is his baseline and told me this and nursing this. His son reports he was not involved in his life until age 28 and has been not interested in family life and his family is going out of their wya to try to help him to no avail. He has said to them for years he would rather die than get HD they told me and documented etensively in [facility] records. His wife reports as his POA she would not give him HD against his wiill if he doesn't have capacity and would unforunately have to do hospice also as would not dialyze him udner sedation against his will.  Son and wife both in agreement that Monday he has to decide to dialyze or hospice pursing and will consult palliative onmonday if he is undecided or he does not want to pursue hD. He is at risk of decompensation in the interim and they are both aware.   He has remained full code as I cannto take his statements of do not intubate for sure at this time given I dont know if he has capacity given he has fallen asleep many times on exam, and confused at times, tremors still, and uremic.  Would defer to wife it it came down to this situation who heard this statement.  Dicussed with son marlee on phone all of above for 30 min or so.  Both are very upset with him and want him to pursue HD, and have tried to tell him this in conversations with me as well and he has resisted.   Renal and I discussed the above. Will see if he is amenable to talking to outpatient MD dr valdez tomorrow.   ID recs to continue " current reigmen.  NS concerned for spine instability with MRi findings. Rec xrays now and MRi further when stable and cannot inervene when med unstable as he is now. And c collar all times and aware he may refused. He did refuse nursing documented this.  His wife and son are aware of the infection concerns and wife was at bedside for NS and ID exams.   Will consult psych in AM for official capacity eval but at this point, wife will not do HD against his will if she he doesn't have capacity so it woudnt change what current management is which is him refusing now.   Extremely challenging case. Appreciate all involved- nursing and MDs for assistance with very difficult patient.                Review of patient's allergies indicates:  No Known Allergies    No current facility-administered medications on file prior to encounter.     Current Outpatient Medications on File Prior to Encounter   Medication Sig    insulin (LANTUS SOLOSTAR U-100 INSULIN) glargine 100 units/mL (3mL) SubQ pen Inject 5 Units into the skin once daily.    albuterol (PROVENTIL/VENTOLIN HFA) 90 mcg/actuation inhaler Inhale 2 puffs into the lungs every 6 (six) hours as needed for Wheezing or Shortness of Breath. Rescue    amlodipine (NORVASC) 10 MG tablet Take 10 mg by mouth once daily.    ARIPiprazole (ABILIFY) 15 MG Tab Take 1 tablet (15 mg total) by mouth every evening.    aspirin (ECOTRIN) 81 MG EC tablet Take 1 tablet (81 mg total) by mouth once daily.    atorvastatin (LIPITOR) 80 MG tablet Take 1 tablet (80 mg total) by mouth once daily.    blood-glucose meter kit Use as instructed    calcitRIOL (ROCALTROL) 0.5 MCG Cap Take 0.5 mcg by mouth once daily.    chlorthalidone (HYGROTEN) 50 MG Tab Take 50 mg by mouth once daily.     cloNIDine (CATAPRES) 0.2 MG tablet Take 0.4 mg by mouth 3 (three) times daily.     colchicine (COLCRYS) 0.6 mg tablet Take 0.6 mg by mouth daily as needed.    ergocalciferol (ERGOCALCIFEROL) 50,000 unit Cap Take  50,000 Units by mouth every 30 days.     famotidine (PEPCID) 20 MG tablet Take 20 mg by mouth daily as needed for Heartburn.     ferrous gluconate (FERGON) 324 MG tablet Take 324 mg by mouth 2 (two) times a day.     fluticasone (FLONASE) 50 mcg/actuation nasal spray 1 spray by Each Nare route once daily.    furosemide (LASIX) 40 MG tablet Take 40 mg by mouth 2 (two) times daily.    methocarbamoL (ROBAXIN) 500 MG Tab Take 500 mg by mouth 2 (two) times a day.    metoprolol tartrate (LOPRESSOR) 100 MG tablet Take 100 mg by mouth.    omeprazole (PRILOSEC) 20 MG capsule Take 20 mg by mouth once daily.    RENVELA 800 mg Tab Take 800 mg by mouth 3 (three) times daily.    sodium bicarbonate 650 MG tablet Take 650 mg by mouth.    tadalafiL (CIALIS) 2.5 mg Tab Take 2.5 mg by mouth.    TRUE METRIX GLUCOSE TEST STRIP Strp      Family History       Problem Relation (Age of Onset)    Drug abuse Brother    Heart attack Father, Brother    Hyperlipidemia Father    Hypertension Father, Brother    No Known Problems Sister    Stroke Father          Tobacco Use    Smoking status: Current Some Day Smoker     Packs/day: 0.25     Years: 10.00     Pack years: 2.50    Smokeless tobacco: Never Used   Substance and Sexual Activity    Alcohol use: No    Drug use: No    Sexual activity: Not on file     Review of Systems   Constitutional:  Positive for chills. Negative for activity change and fever.   HENT:  Negative for congestion and trouble swallowing.    Eyes:  Negative for photophobia and visual disturbance.   Respiratory:  Negative for chest tightness, shortness of breath and wheezing.    Cardiovascular:  Positive for leg swelling. Negative for chest pain and palpitations.   Gastrointestinal:  Negative for abdominal pain, constipation, nausea and vomiting.   Genitourinary:  Negative for dysuria, flank pain, frequency, hematuria and urgency.   Musculoskeletal:  Positive for arthralgias. Negative for back pain and gait  problem.   Skin:  Positive for color change and wound. Negative for rash.   Neurological:  Negative for dizziness, syncope, weakness, light-headedness, numbness and headaches.   Psychiatric/Behavioral:  Negative for agitation and confusion. The patient is not nervous/anxious.    Objective:     Vital Signs (Most Recent):  Temp: 98.6 °F (37 °C) (22 1625)  Pulse: 89 (22 1625)  Resp: 17 (22 162)  BP: 139/79 (22 162)  SpO2: 95 % (22 162) Vital Signs (24h Range):  Temp:  [97.9 °F (36.6 °C)-98.9 °F (37.2 °C)] 98.6 °F (37 °C)  Pulse:  [] 89  Resp:  [13-20] 17  SpO2:  [94 %-98 %] 95 %  BP: ()/(60-91) 139/79     Weight: 74 kg (163 lb 2.3 oz)  Body mass index is 26.33 kg/m².    Physical Exam  Vitals and nursing note reviewed.   Constitutional:       Appearance: He is well-developed. He is ill-appearing.      Comments: Agitated, baseline. Asterixis with tremors at rest and with movement    HENT:      Head: Normocephalic and atraumatic.      Mouth/Throat:      Mouth: Mucous membranes are moist.   Eyes:      General: No scleral icterus.     Conjunctiva/sclera: Conjunctivae normal.   Cardiovascular:      Rate and Rhythm: Normal rate and regular rhythm.      Heart sounds: Normal heart sounds.   Pulmonary:      Effort: Pulmonary effort is normal. No respiratory distress.      Breath sounds: Normal breath sounds. No wheezing.   Abdominal:      General: Bowel sounds are normal. There is no distension.      Palpations: Abdomen is soft.      Tenderness: There is no abdominal tenderness.   Musculoskeletal:         General: No tenderness. Normal range of motion.      Cervical back: Normal range of motion and neck supple.      Right lower le+ Pitting Edema present.      Left lower le+ Pitting Edema present.      Comments: LLE with burst blister. Severe pain with any light touch.  Signs of bunion on both big toes but also possible gout tophi deposit on right big toe. Edema I RLE >LLE.  pitting   Feet:      Left foot:      Skin integrity: Skin breakdown, erythema and warmth present.   Skin:     General: Skin is warm and dry.      Capillary Refill: Capillary refill takes less than 2 seconds.   Neurological:      Mental Status: He is alert and oriented to person, place, and time.      Cranial Nerves: No cranial nerve deficit.      Comments: Severe pain with movement of neck, wincing   Psychiatric:         Behavior: Behavior normal.         Thought Content: Thought content normal.         Judgment: Judgment normal.               Significant Labs:   All pertinent labs within the past 24 hours have been reviewed.  CBC:   Recent Labs   Lab 02/18/22 0622 02/19/22  0815   WBC 10.61 9.74   HGB 6.2* 6.7*   HCT 19.2* 21.5*    213       CMP:   Recent Labs   Lab 02/18/22 0622 02/19/22 0815    145   K 5.3* 5.3*    106   CO2 20* 19*   GLU 81 99   * 172*   CREATININE 18.8* 18.5*   CALCIUM 9.8 9.2   ANIONGAP 20* 20*   EGFRNONAA 2.4* 2.4*       Lactic Acid:   No results for input(s): LACTATE in the last 48 hours.      Significant Imaging: I have reviewed all pertinent imaging results/findings within the past 24 hours.   MRI Cervical Spine Without Contrast  Narrative: EXAMINATION:  MRI CERVICAL SPINE WITHOUT CONTRAST    CLINICAL HISTORY:  severe neck pain, concern for infection vs c spine instability based on xray.    TECHNIQUE:  Multisequence MR images of the cervical spine were acquired without the intravenous administration of contrast.  This is a limited exam due to early termination per patient request.  Only sagittal T1, T2, and STIR images obtained.  Examination is degraded by patient motion artifact.    COMPARISON:  Cervical spine radiograph 02/17/2022, CT head 12/26/2020    FINDINGS:  Reversal of the normal cervical lordosis.  Anterolisthesis of C2 on C3, measuring approximately 0.6 cm. There is increased soft tissue and fluid within the atlanto-odontoid joint and widening of the  pre dens space, measuring up to 0.7 cm, similar to prior head CT 12/26/2020.    Vertebral body heights are well maintained.  Multilevel disc desiccation and disc space narrowing, most significant at C4-C5 with severe narrowing and degenerative endplate edema.  There is mildly increased STIR signal within the C3-C4 disc space and mild edema associated with the superior endplate of C4.  No evidence of a marrow placement process.  No acute fracture.    Evaluation of the cord is limited due to motion and lack of axial images.  However, allowing for this cord is normal in caliber without definite signal abnormality.  No epidural fluid collections.    Large prevertebral fluid collection extending from the base of the skull to C6-C7, measuring up to 9.2 cm cranio caudally.    No severe spinal canal stenosis based on sagittal images.  However, there is probable at least mild if not moderate spinal canal stenosis at C3-C4 and C4-C5.  Evaluation of the neural foramina is limited.  Impression: Limited exam due to early termination per patient request of limited diagnostic utility.    Mild edema signal within the C3-C4 disc space and superior endplate of C4, favored to be degenerative in nature but early infectious process could have similar imaging characteristics especially given history of bacteremia.  No epidural fluid collection to suggest abscess.  Recommend correlation with inflammatory markers and and follow-up as clinically indicated.    Large prevertebral/retropharyngeal fluid collection extending from the skull base to C6-C7, favored to represent an effusion with abscess not excluded given lack of IV contrast    Chronic widening of the pre-dens space, measuring 0.7 cm, associated with increased soft tissue and fluid within the atlanto-odontoid joint.  Atlantal dens instability not excluded.    Multilevel cervical spondylosis, most significant at C3-C4 and C4-C5.    This report was flagged in Epic as  abnormal.    Electronically signed by resident: Brittany Victor  Date:    02/19/2022  Time:    08:14    Electronically signed by: Valdemar Mcconnell MD  Date:    02/19/2022  Time:    08:48          Assessment/Plan:      * Acute renal failure superimposed on stage 5 chronic kidney disease, not on chronic dialysis  Hyperkalemia  Hyperphosphatemia  Metabolic acidosis  Kidney transplant candidate    - 2/2 membranous glomerulopathy  - GFR was 13-16 up from baseline 4-6 previously in January 2022. Labs repeated today and GFR is 2.6, Cr 19.   - patient follows with Dr. Cardoza and Dr Lee and dialysis has been discussed multiple times, patient continues to refuse.  - on transplant list with [facility]  -cont phos binders, Na bicarb. Valencia for adequate I/O. Lasix held 2/17 per renal recs  -uric acid trending  -at severe risk of adverse events, BUN almost 200, asterixis on exam. Meets multiple criteria but refusing HD. Has capacity. Wife aware and states is his decision  Continues to refuse on 2/19 after extensive discussions with all parties.     History of Salmonella septicemia  -+ blood CX at [facility] 2/1, with clearance immediately, treated with cipro for 14 days unclear source but presumed diarrheal infection. He acts like he has no idea and had no diarrhea then  -denies sickle cell hx to predispose to this  -concern with neck pain and foot pain with recent bacteremia  -work up in progress  -ID consulted.        Neck pain  -abnormal C spine Xray with possible signs of C spine instability, MRi ordered and patient refused 2/17, on pain meds, discussed AT length, see 2/17 and 2/18 note above, ordered with premediation now. Concern for possible infection also given acuity of pain  -signs of C spine instability, prevertebral abscess likely with fluid collection with widening in C spine, NS recs C collar- patient refused, and Xrays, refused, and MRi full spine when stable. Cannot do any intervention now as unstable bc of kidneys.          Gout  -colchicine BID, light IVF x 10 hours 2/17 per renal recs. Avoid steroids with posisble infection. He denies he has gout      Folic acid deficiency  -low at 4.4.  -daily replacement      Increased anion gap metabolic acidosis  Secondary to ckd  -cont Na bicarb  -refusing HD      Hyperphosphatemia  -cont binders, elvated at 10-11  -refusing HD      Hyperkalemia  -shift PRN      Subacute osteomyelitis  - 2/4 SIRS with HR>90, WBC 16  - lactic WNL  - ESR 97, CRP 33.4, procal 1.  - MRI w/ regions of focal abnormal signal involving the distal 1st metatarsal and proximal 1st phalanx as described above.  Findings are concerning for foci of subacute myelitis   - patient is hemodynamically stable, podiatry consulted, unclear if ifection but no plans to do biopsy now cellulitis worsened it appears 2/17 PM so vanc/zosyn initiated. ID consulted 2/18 as severely tender extremity, concern with recent bacteremia on 2/1 with salmonella with scaling on exam for possible staph/strep also scalded skin type look.. will f/u recs.  -treating gout, minimal improvement in pain, less red on doxy/rocephin per ID recs now    Benign prostatic hyperplasia  Valencia placed 2/18 overnight for incontinence    GERD (gastroesophageal reflux disease)  - continue PPI    S/P coronary artery stent placement  - continue ASA, statin    Type 2 diabetes mellitus  - home meds: lantus 5 units daily  - SSI   - ACHS accuchecks  - diabetic diet  -A1 C 7's    Anemia due to chronic kidney disease  -hg with downtick to 6 on 2/18 from 7 on admit with 9 baseline  -unclear cause if another reason besides chronic kidney disease  -fobt ordered  -transfuse 1 U on 2/18. adamently denies bleeding. Will add protonix for coverage in interim in case of any bleeding  -tx 1 U on 2/19, Hg still higher 6's, unclear soure, fobt pending, possible from neck inflammation with fluid collection of unclear source.  -denies nsaid use    Membranous glomerulonephritis  -will  bring acthar gel from home to do non formulary as 40K and got via prior auth- at bedside but hold for now given likely not working  -reason for CKD 5.      Hyperlipidemia  - continue statin    Renovascular hypertension  -elevated to 180s on admit but improved on current regimen to 130s systolic on exam 2/18  -cont hydralazine, labetalol, norvassc, clonidine.      VTE Risk Mitigation (From admission, onward)         Ordered     IP VTE HIGH RISK PATIENT  Once         02/17/22 0151     Place sequential compression device  Until discontinued         02/17/22 0151                Discharge Planning   JOAN: 2/21/2022     Code Status: Full Code   Is the patient medically ready for discharge?: No    Reason for patient still in hospital (select all that apply): Patient unstable  Discharge Plan A: Home   Discharge Delays: None known at this time              Dianna Tijerina MD  Department of Hospital Medicine   [facility] - Transplant Step[facility]

## 2022-02-20 NOTE — ASSESSMENT & PLAN NOTE
Baseline Hb 9-10, Hb 7.2 on admission. Iron low at 10, iron sat low at 4%, ferritin elevated 384, transferrin low at 174.   Antiplatelet/anticoagulation: aspirin    Plan:  - Trend CBC daily  - No role for IV iron in setting of infection  - Transfuse with goal Hb > 7

## 2022-02-20 NOTE — ASSESSMENT & PLAN NOTE
Has history of membranous glomerulonephritis. Previous renal biopsy in 2013 showed 50% glomerulosclerosis. [place] [facility]

## 2022-02-20 NOTE — CONSULTS
"[facility] - Transplant Stepdown  Psychiatry  Progress Note    Patient Name: Enrique Martinez  MRN: [id number]   Code Status: Full Code  Admission Date: 2/16/2022  Hospital Length of Stay: 3 days  Expected Discharge Date: 2/21/2022  Attending Physician: Dianna Tijerina MD  Primary Care Provider: Primary Doctor No    Current Legal Status: Uncontested    Patient information was obtained from patient and spouse/SO.       Subjective:     Patient is a 56 y.o., male, presents with:    Principal Problem:Acute renal failure superimposed on stage 5 chronic kidney disease, not on chronic dialysis    Chief Complaint: AMS, Capacity to refuse hemodialysis    HPI:   2/20/2022 9:41 AM  Enrique Martinez  1965  [id number]    Capacity Evaluation    History of Present Illness     HPI: Enrique Martinez is a 56 y.o. male with a PMH of CKD V glomerulonephropathy, insulin dependent diabetes mellitus, and CAD who presented to the ED for evaluation of worsening left foot pain and swelling. He was recently admitted at [facility] from 2/2-2/4/2022 from LLE cellulitis complicated by salmonella bacteremia after having diarrheal illness.  He was discharged home with ciprofloxacin. He reported no improvement in his cellulitis since being discharged home and no has new blister development. ID was consulted due to recent salmonella bacteremia and new findings on MRI suspicious for left foot myelitis and persistent cellulitis infection.     Psychiatry consulted for capacity to refuse dialysis.     Chart reviewed, patient seen. Patient lying in bed. Mood is irritable, concentration is poor; patient often provides incorrect answers to questions asked, such as stating month when asked day of week, year, etc. States he is in the hospital because of his ankle, and that it is an infection. Denies knowledge of cervical spinal infection, states, "I just gotta loosen it up a little bit." Informed of primary team concern for need for dialysis. Patient states that he " "does not want to undergo dialysis. He is unable to state a basic understanding of what dialysis is or why his team is recommending it. When asked why he does not want to undergo dialysis, he gives illogical response that he believes undergoing dialysis would kick him off of the transplant waiting list, because "I've been waiting for years and I don't want to get kicked off." Informed that dialysis would not remove him from transplant list. He is able to cite that dialysis would help him, and that refusal would ultimately end in death, though his understanding of prognosis is impaired, as he states death would occur "a long time from now." Informed that refusal to undergo dialysis would result in death in as early as days. Asked what his most important to him in considering this decision, he is unable to give response. Has significant deficits in memory recall, concentration, orientation during interview.        Mental Status Exam     CAM-ICU:  · Acute change and/or fluctuating course of mental status: Yes  · Inattention (SAVEAHAART): Yes  · "Squeeze my hand, only when you hear, the letter 'A'."  · Altered Level of Consciousness: Yes  · Disorganized Thinking (Errors >1/6): Yes  · "Will a stone float on water?"  · "Are there fish in the sea?"  · "Does one pound weigh more than two?"  · "Can you use a hammer to pound a nail?"  · Command(s):  · "Hold up 2 fingers."  · "Now do the same thing with the other hand."    Score: 1+2 AND, either 3 or 4 present = Positive CAM-ICU    Appearance: unremarkable, age appropriate   Level of Consciousness:  Awake, fluctuant   Grooming:  appropriate to situation   Psychomotor Behavior: uncooperative   Speech: normal tone, normal pitch, loud, profane   Language: english, fluid   Orientation:   person, place, month of year, year   Attention Span/Concentration: Decreased   Memory: Recent impaired   Mood: "Good"   Affect: irritable and labile   Thought Process: circumstantial "   Associations: circumstantial   Thought Content: normal, no suicidality, no homicidality, delusions, or paranoia   Fund of Knowledge: adequate to education level   Insight: poor   Judgment:  poor     Capacity Evaluation     Capacity question:  · Does the patient possess the ability to make an informed decision, regarding the proposed treatment/option for his care?    Capacity for a given decision requires:  · Understanding - the ability to state the meaning of the relevant information (eg, diagnosis, risks and benefits of a treatment or procedure, indications, and options of care).  o The patient must be able to understand the proposed treatment, and/or options for his care.   Appreciation - the ability to explain how information (eg, diagnosis, benefit, and risk) applies to oneself.  o The patient must be able to appreciate his own medical situation, and abstract, as to how the treatments/proposed options for care, apply to his medical situation.   Reasoning - the ability to compare information and infer consequences of choice.  o The patient must be able to understand the consequences of his medical decision, in a reasonable manner, supported by the facts, and his own values, in a consistent fashion.   Expressing a choice - the ability to state a decision.  o The patient must demonstrate the ability to communicate a choice, clearly and consistently.    Assessment of capacity:  · The patient understands the clinical condition relation to this evaluation: No.  · The patient understands the indication and nature of/reasoning behind the proposed treatment(s) and/or options for his care: No.  · The patient understands and appreciates the risks and benefits of the proposed treatment(s) and/or options for his care: No.  · The patient understands and appreciates the risks and benefits of refusing the proposed treatment(s) and/or options for his care: Yes.  · The patient is in not in agreement with the assessment and  his does not consent to treatment.  · The patient has evidence of impaired insight and/or judgment: Yes.    Recommendations     · Based upon my evaluation of Enrique Martinez regarding his medical decision-making capacity for refusing hemodialysis, I found with reasonable certainty that Enrique Martinez does not have capacity to make medical decisions on his behalf.    Case discussed with: Dr. Wright Trent Matthew Wiedemann, MD  [facility] PGY-II  02/20/2022             Hospital Course: No notes on file    No new subjective & objective note has been filed under this hospital service since the last note was generated.       Scheduled Medications:   acetaminophen  1,000 mg Oral Q8H    amLODIPine  10 mg Oral Daily    ARIPiprazole  15 mg Oral QHS    aspirin  81 mg Oral Daily    atorvastatin  80 mg Oral Daily    cefTRIAXone (ROCEPHIN) IVPB  2 g Intravenous Q24H    cloNIDine  0.4 mg Oral TID    colchicine  0.3 mg Oral BID    DAPTOmycin (CUBICIN)  IV  6 mg/kg Intravenous Q48H    folic acid  1 mg Oral Daily    furosemide (LASIX) injection  40 mg Intravenous Q12H    hydrALAZINE  50 mg Oral Q8H    labetalol  200 mg Oral Q12H    nystatin  500,000 Units Oral QID (WM & HS)    pantoprazole  40 mg Intravenous BID    polyethylene glycol  17 g Oral Daily    senna-docusate 8.6-50 mg  1 tablet Oral Daily    sevelamer carbonate  1,600 mg Oral TID    sodium bicarbonate  650 mg Oral TID       PRN Medications:  sodium chloride, albuterol-ipratropium, aluminum-magnesium hydroxide-simethicone, dextrose 10%, dextrose 10%, glucagon (human recombinant), glucose, glucose, lorazepam, melatonin, methocarbamoL, morphine, morphine, naloxone, ondansetron, oxyCODONE, oxyCODONE, prochlorperazine, simethicone, sodium chloride 0.9%    Review of patient's allergies indicates:  No Known Allergies        Need for Continued Hospitalization:  Requires ongoing hospitalization for stabilization of medications.    Anticipated  Disposition:  Still a Patient    Total time:  60 minutes with greater than 50% of this time spent in counseling and/or coordination of care.   Case Discussed with: Dr. Wright Trent Matthew Wiedemann, MD   Psychiatry  [facility] - Transplant Stepdown

## 2022-02-20 NOTE — SUBJECTIVE & OBJECTIVE
Interval History: NAEO. Pt is becoming more encephalopathic and increasing poor mental status. More drowsy during exam and doses off in middle of conversation. Long discussions with patient and primary team about initiating dialysis. Patient appears to be more agreeable and considering starting HD. Will continue Abx therapy and continue to follow patient.     Review of Systems   Constitutional:  Negative for activity change, chills and fever.   HENT:  Negative for congestion and trouble swallowing.    Eyes:  Negative for photophobia and visual disturbance.   Respiratory:  Negative for chest tightness, shortness of breath and wheezing.    Cardiovascular:  Positive for leg swelling. Negative for chest pain and palpitations.   Gastrointestinal:  Negative for abdominal pain, constipation, nausea and vomiting.   Genitourinary:  Negative for dysuria, flank pain, frequency, hematuria and urgency.   Musculoskeletal:  Positive for arthralgias. Negative for back pain and gait problem.   Skin:  Positive for wound. Negative for rash.   Neurological:  Negative for syncope.   Psychiatric/Behavioral:  Negative for agitation and confusion. The patient is not nervous/anxious.    Objective:     Vital Signs (Most Recent):  Temp: 98.3 °F (36.8 °C) (02/20/22 1224)  Pulse: 88 (02/20/22 1224)  Resp: 15 (02/20/22 1224)  BP: (!) 151/72 (02/20/22 1224)  SpO2: 97 % (02/20/22 1224)   Vital Signs (24h Range):  Temp:  [98.3 °F (36.8 °C)-98.8 °F (37.1 °C)] 98.3 °F (36.8 °C)  Pulse:  [] 88  Resp:  [15-24] 15  SpO2:  [94 %-99 %] 97 %  BP: (137-159)/(64-84) 151/72     Weight: 74 kg (163 lb 2.3 oz)  Body mass index is 26.33 kg/m².    Estimated Creatinine Clearance: 3.9 mL/min (A) (based on SCr of 18.4 mg/dL (H)).    Physical Exam  Vitals and nursing note reviewed.   Constitutional:       General: He is not in acute distress.     Appearance: He is well-developed. He is ill-appearing.   HENT:      Head: Normocephalic and atraumatic.       Mouth/Throat:      Mouth: Mucous membranes are moist.   Eyes:      General: No scleral icterus.     Conjunctiva/sclera: Conjunctivae normal.   Cardiovascular:      Rate and Rhythm: Normal rate and regular rhythm.      Heart sounds: Normal heart sounds.   Pulmonary:      Effort: Pulmonary effort is normal. No respiratory distress.      Breath sounds: Normal breath sounds. No wheezing.   Abdominal:      General: Bowel sounds are normal. There is no distension.      Palpations: Abdomen is soft.      Tenderness: There is no abdominal tenderness.   Musculoskeletal:         General: No tenderness. Normal range of motion.      Cervical back: Normal range of motion and neck supple.      Right lower le+ Pitting Edema present.      Left lower le+ Pitting Edema present.   Feet:      Right foot:      Skin integrity: Skin integrity normal.      Left foot:      Skin integrity: Skin breakdown (Tendern to palpation at wound), erythema, warmth and dry skin present.   Skin:     General: Skin is warm and dry.      Capillary Refill: Capillary refill takes 2 to 3 seconds.   Neurological:      General: No focal deficit present.      Mental Status: He is alert and oriented to person, place, and time.   Psychiatric:      Comments: Drowsy during exam and appearing more encephalopathic       Significant Labs: All pertinent labs within the past 24 hours have been reviewed.    Significant Imaging: I have reviewed all pertinent imaging results/findings within the past 24 hours.

## 2022-02-20 NOTE — SUBJECTIVE & OBJECTIVE
Interval History: No acute nsgy events. PT awaiting cervical flex ex.     Medications:  Continuous Infusions:  Scheduled Meds:   acetaminophen  1,000 mg Oral Q8H    amLODIPine  10 mg Oral Daily    ARIPiprazole  15 mg Oral QHS    aspirin  81 mg Oral Daily    atorvastatin  80 mg Oral Daily    cefTRIAXone (ROCEPHIN) IVPB  2 g Intravenous Q24H    cloNIDine  0.4 mg Oral TID    colchicine  0.3 mg Oral BID    DAPTOmycin (CUBICIN)  IV  6 mg/kg Intravenous Q48H    folic acid  1 mg Oral Daily    furosemide (LASIX) injection  40 mg Intravenous Q12H    hydrALAZINE  50 mg Oral Q8H    labetalol  200 mg Oral Q12H    nystatin  500,000 Units Oral QID (WM & HS)    pantoprazole  40 mg Intravenous BID    polyethylene glycol  17 g Oral Daily    senna-docusate 8.6-50 mg  1 tablet Oral Daily    sevelamer carbonate  1,600 mg Oral TID    sodium bicarbonate  650 mg Oral TID     PRN Meds:sodium chloride, albuterol-ipratropium, aluminum-magnesium hydroxide-simethicone, dextrose 10%, dextrose 10%, glucagon (human recombinant), glucose, glucose, lorazepam, melatonin, methocarbamoL, morphine, morphine, naloxone, ondansetron, oxyCODONE, oxyCODONE, prochlorperazine, simethicone, sodium chloride 0.9%     Review of Systems  Objective:     Weight: 74 kg (163 lb 2.3 oz)  Body mass index is 26.33 kg/m².  Vital Signs (Most Recent):  Temp: 98.8 °F (37.1 °C) (02/20/22 0940)  Pulse: 92 (02/20/22 0940)  Resp: (!) 21 (02/20/22 0940)  BP: (!) 159/82 (02/20/22 0940)  SpO2: 99 % (02/20/22 0940)   Vital Signs (24h Range):  Temp:  [98 °F (36.7 °C)-98.8 °F (37.1 °C)] 98.8 °F (37.1 °C)  Pulse:  [] 92  Resp:  [16-24] 21  SpO2:  [94 %-99 %] 99 %  BP: (137-159)/(64-91) 159/82                     Male External Urinary Catheter 02/18/22 0418 Small (Active)   Collection Container Urimeter 02/19/22 2330   Securement Method secured to top of thigh w/ adhesive device 02/19/22 2330   Skin no redness;no breakdown 02/19/22 2330   Tolerance no signs/symptoms of  discomfort;did not assist with appliance change 02/19/22 2330   Output (mL) 450 mL 02/19/22 1524   Catheter Change Date 02/19/22 02/19/22 2330   Catheter Change Time 2100 02/19/22 2330       Physical Exam    Neurosurgery Physical Exam    5/5 strength   LROM left shoulder       full Range of motion in neck   No hoffmans or clonus     Significant Labs:  Recent Labs   Lab 02/19/22  0815 02/20/22  0815   GLU 99 108    143   K 5.3* 4.4    103   CO2 19* 17*   * 172*   CREATININE 18.5* 18.4*   CALCIUM 9.2 9.3   MG 1.7  --      Recent Labs   Lab 02/19/22  0815 02/20/22  0815   WBC 9.74 9.58   HGB 6.7* 7.9*   HCT 21.5* 25.0*    209     No results for input(s): LABPT, INR, APTT in the last 48 hours.  Microbiology Results (last 7 days)       Procedure Component Value Units Date/Time    Blood Culture #1 **CANNOT BE ORDERED STAT** [[id number]] Collected: 02/16/22 1807    Order Status: Completed Specimen: Blood from Peripheral, Hand, Right Updated: 02/19/22 2012     Blood Culture, Routine No Growth to date      No Growth to date      No Growth to date      No Growth to date    Blood Culture #2 **CANNOT BE ORDERED STAT** [[id number]] Collected: 02/16/22 1806    Order Status: Completed Specimen: Blood from Peripheral, Antecubital, Left Updated: 02/19/22 2012     Blood Culture, Routine No Growth to date      No Growth to date      No Growth to date      No Growth to date    Stool culture **CANNOT BE ORDERED STAT** [[id number]]     Order Status: No result Specimen: Stool     Clostridium difficile EIA [[id number]]     Order Status: Canceled Specimen: Stool           All pertinent labs from the last 24 hours have been reviewed.    Significant Diagnostics:  CT: No results found in the last 24 hours.  I have reviewed all pertinent imaging results/findings within the past 24 hours.  I have reviewed and interpreted all pertinent imaging results/findings within the past 24 hours.

## 2022-02-20 NOTE — NURSING
- Pt answering orientation questions correctly, but states much frustration when asked. Pt continues to make nonsense statements.   - Daptomycin started, doxycycline d/c per orders  - Pt refusing to have xrays, wife/POA agreed to not send pt to xray overnight  - Pt refusing to wear cervical collar. Collar still in pt room  - External catheter reapplied, no signs of intolerance noted at this time  - Pt observed to weight shift independently, but needs assistance when fully turning. Provided assistance as needed  - All BP meds administered as scheduled. Discussed with on call provider, ZOYA Soto NP, regarding adding parameters.   - 40 mg Lasix administered as ordered  - Wife at bedside, assisting pt periodically   - PRN robaxin and oxy administered   - Pt continues to make hostile statements towards this RN regarding his care. Pt does not seem to understand why he is taking antibiotics, although explained to him multiple times. Pt was very reluctant to take night time medication.   - Bed in lowest locked position, call light and personal items in reach, nonskid socks on, bed alarm set, verbalized understanding to call for assistance, AYDEE

## 2022-02-20 NOTE — ASSESSMENT & PLAN NOTE
-abnormal C spine Xray with possible signs of C spine instability, MRi ordered and patient refused 2/17, on pain meds, discussed AT length, see 2/17 and 2/18 note above, ordered with premediation now. Concern for possible infection also given acuity of pain  -signs of C spine instability, prevertebral abscess likely with fluid collection with widening in C spine, NS recs C collar- patient refused, and Xrays, refused, and MRi full spine when stable. Cannot do any intervention now as unstable bc of kidneys.

## 2022-02-20 NOTE — HPI
"2/20/2022 9:41 AM  Enrique Martinez  1965  [id number]    Capacity Evaluation    History of Present Illness     HPI: Enrique Martinez is a 56 y.o. male with a PMH of CKD V glomerulonephropathy, insulin dependent diabetes mellitus, and CAD who presented to the ED for evaluation of worsening left foot pain and swelling. He was recently admitted at [facility] from 2/2-2/4/2022 from LLE cellulitis complicated by salmonella bacteremia after having diarrheal illness.  He was discharged home with ciprofloxacin. He reported no improvement in his cellulitis since being discharged home and no has new blister development. ID was consulted due to recent salmonella bacteremia and new findings on MRI suspicious for left foot myelitis and persistent cellulitis infection.    [facility] consulted for capacity to refuse dialysis on 2/20.  At that time he was deemed to not have capacity to refuse dialysis.      Patient has improved mentation since receiving dialysis.  [facility] was re-consulted in order to assess patient's capacity to continue to accept dialysis as an outpatient.  Pt is agreeable to proposed treatment, so capacity is irrelevant.  However, primary team stated that the outpatient dialysis center is insisting on a capacity to accept dialysis consult prior to them scheduling him for outpatient treatment.    Pt napping in bed upon initiation of interview.  He states that his health is feeling much better.  He is happy that he has been able to receive dialysis in the hospital.  Pt says that he was told that his kidneys are not working property and "don't filter right".  He says that dialysis is necessary or else he may die.  Pt would like to continue dialysis as an outpatient, which is in agreement with proposed medical care by his treatment team.  He is especially happy that he heard there is a dialysis center close to his house.  Pt lists his sister as a social support who will help him when he isn't feeling well or on " "dialysis days.         Mental Status Exam     CAM-ICU:  Acute change and/or fluctuating course of mental status: No  Inattention (SAVEAHAART): No  "Squeeze my hand, only when you hear, the letter 'A'."  Altered Level of Consciousness: No  Disorganized Thinking (Errors >1/6): NA  "Will a stone float on water?"  "Are there fish in the sea?"  "Does one pound weigh more than two?"  "Can you use a hammer to pound a nail?"  Command(s):  "Hold up 2 fingers."  "Now do the same thing with the other hand."    Score: 1+2 AND, either 3 or 4 present = Positive CAM-ICU    Appearance: unremarkable, age appropriate   Level of Consciousness:  Awake, Alert    Grooming:  appropriate to situation   Psychomotor Behavior: cooperative   Speech: normal tone, normal rate, normal pitch, normal volume   Language: english, fluid   Orientation:   person, place, month of year, year   Attention Span/Concentration: Decreased   Memory: Recent impaired   Mood: "Good"   Affect: irritable and labile   Thought Process: circumstantial   Associations: circumstantial   Thought Content: normal, no suicidality, no homicidality, delusions, or paranoia   Fund of Knowledge: adequate to education level   Insight: good   Judgment:  good     Capacity Evaluation     Capacity question:  Does the patient possess the ability to make an informed decision, regarding the proposed treatment/option for his care?    Capacity for a given decision requires:  Understanding - the ability to state the meaning of the relevant information (eg, diagnosis, risks and benefits of a treatment or procedure, indications, and options of care).  The patient must be able to understand the proposed treatment, and/or options for his care.  Appreciation - the ability to explain how information (eg, diagnosis, benefit, and risk) applies to oneself.  The patient must be able to appreciate his own medical situation, and abstract, as to how the treatments/proposed options for care, apply to his " medical situation.  Reasoning - the ability to compare information and infer consequences of choice.  The patient must be able to understand the consequences of his medical decision, in a reasonable manner, supported by the facts, and his own values, in a consistent fashion.  Expressing a choice - the ability to state a decision.  The patient must demonstrate the ability to communicate a choice, clearly and consistently.    Assessment of capacity:  The patient understands the clinical condition relation to this evaluation: Yes  The patient understands the indication and nature of/reasoning behind the proposed treatment(s) and/or options for his care: Yes  The patient understands and appreciates the risks and benefits of the proposed treatment(s) and/or options for his care: Yes  The patient understands and appreciates the risks and benefits of refusing the proposed treatment(s) and/or options for his care: Yes.  The patient is in in agreement with the assessment and his does not consent to treatment.  The patient has evidence of impaired insight and/or judgment: No  Recommendations     Based upon my evaluation of Enrique Martinez regarding his medical decision-making capacity for accepting hemodialysis, I found with reasonable certainty that Enrique Martinez does have capacity to make this decision on his behalf.  He is in agreement with the proposed care.        Ernestina Javier MD, PhD  [facility]  -2  03/04/2022

## 2022-02-20 NOTE — ASSESSMENT & PLAN NOTE
-hg with downtick to 6 on 2/18 from 7 on admit with 9 baseline  -unclear cause if another reason besides chronic kidney disease  -fobt ordered  -transfuse 1 U on 2/18. adamently denies bleeding. Will add protonix for coverage in interim in case of any bleeding  -tx 1 U on 2/19, Hg still higher 6's, unclear soure, fobt pending, possible from neck inflammation with fluid collection of unclear source.  -improved 2/20 to 8's  -denies nsaid use

## 2022-02-20 NOTE — ASSESSMENT & PLAN NOTE
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 Previously, he has declined HD in the past, never receiving a fistula. He is on the transplant wait list. Patient exhibits signs of uremia including fatigue, poor appetite, and mental slowing. On labs, he is hyperkalemic, uremic, AGMA, hyperphosphatemic. GFR is 2-3, which is reduced from previous GFR 13. Home meds include sodium bicarb, Renvela, and calcitriol. 2+ pitting edema on B/L lower extremities. Renal US from Jan 2022 shows sonographic evidence of chronic medical renal disease and bilateral simple and minimally complex renal cysts.  sCr one year ago was 4.5, now 18.6. UPCr ratio 1.11 (Dec 2020)  2/18: Patient increasingly somnolent, lethargic. Hospital med in process of getting in touch with Dr. Lee, who the patient follows with outpatient.     Plan:   - Recommend HD if patient is amenable. Discussed HD with patient as possible emergent need in this setting and as a bridge to transplant. Patient continues to decline. Discussed extensively that his labs and clinical decline from uremia would preclude him from getting a kidney should one become available, rendering his time on the wait list pointless.   - Recommend Psych consult for formal capacity evaluation as patient is becoming more confused  - Consider palliative care consult for GOC/hospice as patient's uremia is worsening to the point where he may not have capacity eventually. If he continues to refuse dialysis, will have to clarify if he would prefer to transition to comfort care over getting dialysis  - Shift potassium as needed  - Continue phos binder sevelamer 1600 TID  - Continue Nabicarb 650 TID  - Hold calcitriol in setting of hyperphosphatemia. Will re-start when phos reaches normal level.   - Trend Cr  - Strict I&Os  - Avoid nephrotoxic  agents  - Renally adjust medications

## 2022-02-20 NOTE — ASSESSMENT & PLAN NOTE
Hyperkalemia  Hyperphosphatemia  Metabolic acidosis  Kidney transplant candidate    - 2/2 membranous glomerulopathy  - GFR was 13-16 up from baseline 4-6 previously in January 2022. Labs repeated today and GFR is 2.6, Cr 19.   - patient follows with Dr. Cardoza and Dr Lee and dialysis has been discussed multiple times, patient continues to refuse.  - on transplant list with [facility]  -cont phos binders, Na bicarb. Valencia for adequate I/O. Lasix held 2/17 per renal recs  -uric acid trending  -at severe risk of adverse events, BUN almost 200, asterixis on exam. Meets multiple criteria but refusing HD. Has capacity. Wife aware and states is his decision  Continues to refuse on 2/19 after extensive discussions with all parties.   -2/20- may consent tomorrow per wife, if so anesthesia for IJ. No permacath now as active infection concerns in neck. If not, palliative consult. Wife will agree to HD for him if he says yes despite no capacity per psych. Wife will not do it against his will as poa

## 2022-02-20 NOTE — PROGRESS NOTES
[facility]  Infectious Disease  Progress Note    Patient Name: Enrique Martinez  MRN: [id number]  Admission Date: 2/16/2022  Length of Stay: 2 days  Attending Physician: Dianna Tijerina MD  Primary Care Provider: Primary Doctor No    Isolation Status: No active isolations  Assessment/Plan:      Neck pain  Non-contrast MRI had to be discontinued due to patient request.  But available images shows retropharyngeal fluid collection with abscess not excluded.  Deeply concerned that this could represent infection.  His recent bacteremia makes me concerned this could be salmonella.   He is immunocompromised due to a combination of uremia and diabetes so invasive salmonellosis is possible.    Plan    1. Empiric ceftriaxone IV.  Discontinue doxycycline and start daptomycin q 48 hours.    2. Neurosurgery following and appreciate their recommendations.    Pain and swelling of left lower extremity  MRI foot shows brodies abscess (subacute osteomyelitis).  Will manage with empiric ceftriaxone and daptomycin for now.    Retropharyngeal abscess  As outlined above.        Anticipated Disposition: TBD    Thank you for your consult. I will follow-up with patient. Please contact us if you have any additional questions.    Hector Hassan MD  Infectious Disease  [facility] - Transplant Stepdown    Subjective:     Principal Problem:Acute renal failure superimposed on stage 5 chronic kidney disease, not on chronic dialysis    HPI: Enrique Martinez is a 56 y.o. male with a PMH of CKD V glomerulonephropathy, insulin dependent diabetes mellitus, and CAD who presented to the ED for evaluation of worsening left foot pain and swelling. He was recently admitted at [facility] from 2/2-2/4/2022 from LLE cellulitis complicated by salmonella bacteremia after having diarrheal illness.  He was discharged home with ciprofloxacin. He reports no improvement in his cellulitis since being discharged home and no has new blister development. ID was  consulted due to recent salmonella bacteremia and new findings on MRI suspicious for left foot myelitis and persistent cellulitis infection. On initial ID encounter, pt reporting pain in left foot and ankle but denying any fever, chills, N/V, diarrhea, chest pain, headaches, or SOB.     Interval History:     Was in a bad mood this morning.    Review of Systems   All other systems reviewed and are negative.  Objective:     Vital Signs (Most Recent):  Temp: 98.6 °F (37 °C) (22 1625)  Pulse: 89 (22 162)  Resp: 17 (22 162)  BP: 139/79 (22 162)  SpO2: 95 % (22) Vital Signs (24h Range):  Temp:  [98 °F (36.7 °C)-98.9 °F (37.2 °C)] 98.6 °F (37 °C)  Pulse:  [] 89  Resp:  [13-19] 17  SpO2:  [94 %-98 %] 95 %  BP: ()/(68-91) 139/79     Weight: 74 kg (163 lb 2.3 oz)  Body mass index is 26.33 kg/m².    Estimated Creatinine Clearance: 3.9 mL/min (A) (based on SCr of 18.5 mg/dL (H)).    Physical Exam  Vitals and nursing note reviewed.   Constitutional:       General: He is not in acute distress.     Appearance: He is well-developed. He is ill-appearing.   HENT:      Head: Normocephalic and atraumatic.      Mouth/Throat:      Mouth: Mucous membranes are moist.   Eyes:      General: No scleral icterus.     Conjunctiva/sclera: Conjunctivae normal.   Cardiovascular:      Rate and Rhythm: Normal rate and regular rhythm.      Heart sounds: Normal heart sounds.   Pulmonary:      Effort: Pulmonary effort is normal. No respiratory distress.      Breath sounds: Normal breath sounds. No wheezing.   Abdominal:      General: Bowel sounds are normal. There is no distension.      Palpations: Abdomen is soft.      Tenderness: There is no abdominal tenderness.   Musculoskeletal:         General: No tenderness. Normal range of motion.      Cervical back: Normal range of motion and neck supple.      Right lower le+ Pitting Edema present.      Left lower le+ Pitting Edema present.   Feet:       Right foot:      Skin integrity: Skin integrity normal.      Left foot:      Skin integrity: Skin breakdown (Tendern to palpation at wound), erythema, warmth and dry skin present.   Skin:     General: Skin is warm and dry.      Capillary Refill: Capillary refill takes less than 2 seconds.   Neurological:      General: No focal deficit present.      Mental Status: He is alert and oriented to person, place, and time.      Motor: Tremor present.   Psychiatric:         Behavior: Behavior normal.         Thought Content: Thought content normal.         Judgment: Judgment normal.       Significant Labs:   Microbiology Results (last 7 days)       Procedure Component Value Units Date/Time    Blood Culture #1 **CANNOT BE ORDERED STAT** [[id number]] Collected: 02/16/22 1807    Order Status: Completed Specimen: Blood from Peripheral, Hand, Right Updated: 02/18/22 2012     Blood Culture, Routine No Growth to date      No Growth to date      No Growth to date    Blood Culture #2 **CANNOT BE ORDERED STAT** [[id number]] Collected: 02/16/22 1806    Order Status: Completed Specimen: Blood from Peripheral, Antecubital, Left Updated: 02/18/22 2012     Blood Culture, Routine No Growth to date      No Growth to date      No Growth to date    Stool culture **CANNOT BE ORDERED STAT** [[id number]]     Order Status: No result Specimen: Stool     Clostridium difficile EIA [[id number]]     Order Status: Canceled Specimen: Stool             Significant Imaging: I have reviewed all pertinent imaging results/findings within the past 24 hours.

## 2022-02-20 NOTE — PROGRESS NOTES
[facility] - Transplant Stepdown  Neurosurgery  Progress Note    Subjective:     History of Present Illness:  Enrique Martinez is a 56 y.o. male with a PMHx of CKD V glomerulonephropathy, insulin dependent diabetes mellitus, and CAD who presented to the ED for evaluation of worsening left foot pain and swelling. He was recently admitted at [facility] from 2/2-2/4/2022 from LLE cellulitis. He was found to have salmonella bacteremia suspected due to diarrheal illness. He was discharged with cipro for which he reports compliance. He has had no improvement in his pain, redness, or swelling of his left foot and leg. He has also developed a new blister on his left foot. He endorses chills. He denies fevers, chest pain, SOB, N/V, decreased urine output, dysuria, and flank pain.      NSGY was consulted as the patient transiently endorsed neck pain.  X ray C spine with increased LIZZY. MRI C non contrast with prevertebral stir signal and edema, C2-3 listhesis   with picture overall concerning for cervical instability. PT denies weakness, pain or limitation of motion. He further denies symptoms associated with myelopathy including but not  limited to dropping things, difficulty buttoning shirts..etc       Post-Op Info:  * No surgery found *         Interval History: No acute nsgy events. PT awaiting cervical flex ex.     Medications:  Continuous Infusions:  Scheduled Meds:   acetaminophen  1,000 mg Oral Q8H    amLODIPine  10 mg Oral Daily    ARIPiprazole  15 mg Oral QHS    aspirin  81 mg Oral Daily    atorvastatin  80 mg Oral Daily    cefTRIAXone (ROCEPHIN) IVPB  2 g Intravenous Q24H    cloNIDine  0.4 mg Oral TID    colchicine  0.3 mg Oral BID    DAPTOmycin (CUBICIN)  IV  6 mg/kg Intravenous Q48H    folic acid  1 mg Oral Daily    furosemide (LASIX) injection  40 mg Intravenous Q12H    hydrALAZINE  50 mg Oral Q8H    labetalol  200 mg Oral Q12H    nystatin  500,000 Units Oral QID (WM & HS)    pantoprazole  40 mg Intravenous  BID    polyethylene glycol  17 g Oral Daily    senna-docusate 8.6-50 mg  1 tablet Oral Daily    sevelamer carbonate  1,600 mg Oral TID    sodium bicarbonate  650 mg Oral TID     PRN Meds:sodium chloride, albuterol-ipratropium, aluminum-magnesium hydroxide-simethicone, dextrose 10%, dextrose 10%, glucagon (human recombinant), glucose, glucose, lorazepam, melatonin, methocarbamoL, morphine, morphine, naloxone, ondansetron, oxyCODONE, oxyCODONE, prochlorperazine, simethicone, sodium chloride 0.9%     Review of Systems  Objective:     Weight: 74 kg (163 lb 2.3 oz)  Body mass index is 26.33 kg/m².  Vital Signs (Most Recent):  Temp: 98.8 °F (37.1 °C) (02/20/22 0940)  Pulse: 92 (02/20/22 0940)  Resp: (!) 21 (02/20/22 0940)  BP: (!) 159/82 (02/20/22 0940)  SpO2: 99 % (02/20/22 0940)   Vital Signs (24h Range):  Temp:  [98 °F (36.7 °C)-98.8 °F (37.1 °C)] 98.8 °F (37.1 °C)  Pulse:  [] 92  Resp:  [16-24] 21  SpO2:  [94 %-99 %] 99 %  BP: (137-159)/(64-91) 159/82                     Male External Urinary Catheter 02/18/22 0418 Small (Active)   Collection Container Urimeter 02/19/22 2330   Securement Method secured to top of thigh w/ adhesive device 02/19/22 2330   Skin no redness;no breakdown 02/19/22 2330   Tolerance no signs/symptoms of discomfort;did not assist with appliance change 02/19/22 2330   Output (mL) 450 mL 02/19/22 1524   Catheter Change Date 02/19/22 02/19/22 2330   Catheter Change Time 2100 02/19/22 2330       Physical Exam    Neurosurgery Physical Exam    5/5 strength   LROM left shoulder       full Range of motion in neck   No hoffmans or clonus     Significant Labs:  Recent Labs   Lab 02/19/22  0815 02/20/22  0815   GLU 99 108    143   K 5.3* 4.4    103   CO2 19* 17*   * 172*   CREATININE 18.5* 18.4*   CALCIUM 9.2 9.3   MG 1.7  --      Recent Labs   Lab 02/19/22  0815 02/20/22  0815   WBC 9.74 9.58   HGB 6.7* 7.9*   HCT 21.5* 25.0*    209     No results for input(s): LABPT,  INR, APTT in the last 48 hours.  Microbiology Results (last 7 days)       Procedure Component Value Units Date/Time    Blood Culture #1 **CANNOT BE ORDERED STAT** [[id number]] Collected: 02/16/22 1807    Order Status: Completed Specimen: Blood from Peripheral, Hand, Right Updated: 02/19/22 2012     Blood Culture, Routine No Growth to date      No Growth to date      No Growth to date      No Growth to date    Blood Culture #2 **CANNOT BE ORDERED STAT** [[id number]] Collected: 02/16/22 1806    Order Status: Completed Specimen: Blood from Peripheral, Antecubital, Left Updated: 02/19/22 2012     Blood Culture, Routine No Growth to date      No Growth to date      No Growth to date      No Growth to date    Stool culture **CANNOT BE ORDERED STAT** [[id number]]     Order Status: No result Specimen: Stool     Clostridium difficile EIA [[id number]]     Order Status: Canceled Specimen: Stool           All pertinent labs from the last 24 hours have been reviewed.    Significant Diagnostics:  CT: No results found in the last 24 hours.  I have reviewed all pertinent imaging results/findings within the past 24 hours.  I have reviewed and interpreted all pertinent imaging results/findings within the past 24 hours.    Assessment/Plan:     Neck pain  57 y/o M with renal failure and recent salmonella bacteremia , transiently endorsed neck pain but is now denies pain, weakness or myelopathy and is intact on exam despite radiographic features concerning for cervical instability with possible associated infection .      Recommend Flexion extension cervical spine   (ordered) still pending.     Recommend aspen collar.   If patient refuses to wear it would document we recommended it to protect his cervical spine and that patient refused.   Recommending infectious disease and ENT consult for drainage of prevertebral collection for source control        No acute NSGY intervention at this time given patient is not medically stable at this time.      Once stable MRI  Spine with and without would be prudent.     Discussed with Dr. Sandhu.             Boaz Lambert MD  Neurosurgery  [facility] - Transplant Stepdown

## 2022-02-20 NOTE — PROGRESS NOTES
"[facility] - Transplant Stepdown  Psychiatry  Progress Note    Patient Name: Enrique Martinez  MRN: [id number]   Code Status: Full Code  Admission Date: 2/16/2022  Hospital Length of Stay: 3 days  Expected Discharge Date: 2/21/2022  Attending Physician: Dianna Tijerina MD  Primary Care Provider: Primary Doctor No    Current Legal Status: Uncontested    Patient information was obtained from spouse/SO and parent.       Subjective:     Patient is a 56 y.o., male, presents with:    Principal Problem:Acute renal failure superimposed on stage 5 chronic kidney disease, not on chronic dialysis    Chief Complaint: AMS, Capacity to refuse HD    HPI:   2/20/2022 9:41 AM  Enrique Martinez  1965  [id number]    Capacity Evaluation    History of Present Illness     HPI: Enrique Martinez is a 56 y.o. male with a PMH of CKD V glomerulonephropathy, insulin dependent diabetes mellitus, and CAD who presented to the ED for evaluation of worsening left foot pain and swelling. He was recently admitted at [facility] from 2/2-2/4/2022 from LLE cellulitis complicated by salmonella bacteremia after having diarrheal illness.  He was discharged home with ciprofloxacin. He reported no improvement in his cellulitis since being discharged home and no has new blister development. ID was consulted due to recent salmonella bacteremia and new findings on MRI suspicious for left foot myelitis and persistent cellulitis infection.     Psychiatry consulted for capacity to refuse dialysis.     Chart reviewed, patient seen. Patient lying in bed. Mood is irritable, concentration is poor; patient often provides incorrect answers to questions asked, such as stating month when asked day of week, year, etc. States he is in the hospital because of his ankle, and that it is an infection. Denies knowledge of cervical spinal infection, states, "I just gotta loosen it up a little bit." Informed of primary team concern for need for dialysis. Patient states that he does not " "want to undergo dialysis. He is unable to state a basic understanding of what dialysis is or why his team is recommending it. When asked why he does not want to undergo dialysis, he gives illogical response that he believes undergoing dialysis would kick him off of the transplant waiting list, because "I've been waiting for years and I don't want to get kicked off." Informed that dialysis would not remove him from transplant list. He is able to cite that dialysis would help him, and that refusal would ultimately end in death, though his understanding of prognosis is impaired, as he states death would occur "a long time from now." Informed that refusal to undergo dialysis would result in death in as early as days. Asked what his most important to him in considering this decision, he is unable to give response. Has significant deficits in memory recall, concentration, orientation during interview.        Mental Status Exam     CAM-ICU:  · Acute change and/or fluctuating course of mental status: Yes  · Inattention (SAVEAHAART): Yes  · "Squeeze my hand, only when you hear, the letter 'A'."  · Altered Level of Consciousness: Yes  · Disorganized Thinking (Errors >1/6): Yes  · "Will a stone float on water?"  · "Are there fish in the sea?"  · "Does one pound weigh more than two?"  · "Can you use a hammer to pound a nail?"  · Command(s):  · "Hold up 2 fingers."  · "Now do the same thing with the other hand."    Score: 1+2 AND, either 3 or 4 present = Positive CAM-ICU    Appearance: unremarkable, age appropriate   Level of Consciousness:  Awake, fluctuant   Grooming:  appropriate to situation   Psychomotor Behavior: uncooperative   Speech: normal tone, normal pitch, loud, profane   Language: english, fluid   Orientation:   person, place, month of year, year   Attention Span/Concentration: Decreased   Memory: Recent impaired   Mood: "Good"   Affect: irritable and labile   Thought Process: circumstantial   Associations: " circumstantial   Thought Content: normal, no suicidality, no homicidality, delusions, or paranoia   Fund of Knowledge: adequate to education level   Insight: poor   Judgment:  poor     Capacity Evaluation     Capacity question:  · Does the patient possess the ability to make an informed decision, regarding the proposed treatment/option for his care?    Capacity for a given decision requires:  · Understanding - the ability to state the meaning of the relevant information (eg, diagnosis, risks and benefits of a treatment or procedure, indications, and options of care).  o The patient must be able to understand the proposed treatment, and/or options for his care.   Appreciation - the ability to explain how information (eg, diagnosis, benefit, and risk) applies to oneself.  o The patient must be able to appreciate his own medical situation, and abstract, as to how the treatments/proposed options for care, apply to his medical situation.   Reasoning - the ability to compare information and infer consequences of choice.  o The patient must be able to understand the consequences of his medical decision, in a reasonable manner, supported by the facts, and his own values, in a consistent fashion.   Expressing a choice - the ability to state a decision.  o The patient must demonstrate the ability to communicate a choice, clearly and consistently.    Assessment of capacity:  · The patient understands the clinical condition relation to this evaluation: No.  · The patient understands the indication and nature of/reasoning behind the proposed treatment(s) and/or options for his care: No.  · The patient understands and appreciates the risks and benefits of the proposed treatment(s) and/or options for his care: No.  · The patient understands and appreciates the risks and benefits of refusing the proposed treatment(s) and/or options for his care: Yes.  · The patient is in not in agreement with the assessment and his does not  consent to treatment.  · The patient has evidence of impaired insight and/or judgment: Yes.    Recommendations     · Based upon my evaluation of Enrique Martinez regarding his medical decision-making capacity for refusing hemodialysis, I found with reasonable certainty that Enrique Martinez does not have capacity to make medical decisions on his behalf.    Case discussed with: Dr. Wright Trent Matthew Wiedemann, MD  [facility] Psychiatry PGY-II  02/20/2022

## 2022-02-20 NOTE — NURSING
"Pt found attempting to stand out of bed as bed alarm sounds. Pt states he was going to the bathroom. When asked if he had to void or urinate, pt expressed aggravation and stated he just wanted to go to the bathroom. After standing, his foot/ankle pain increased. Then, pt observed trying to use urinal while also having external catheter on. Pt removed external catheter and had difficulty using urinal, thus making it challenging to document accurate output during the night. Pt had 2 large urine outputs, and 100 cc that made it into the urinal. After reapplying external catheter, pt reconnected to bedside monitor. Pt expressed aggravation and stated that the hospitality is not great here because he has not had his "diabetes shot" yet and then stated "that's why I always look angry". This RN explained to pt that he does not have any insulin ordered at this time. Bed alarm reset at this time.  "

## 2022-02-20 NOTE — ASSESSMENT & PLAN NOTE
Hyperkalemia  Hyperphosphatemia  Metabolic acidosis  Kidney transplant candidate    - 2/2 membranous glomerulopathy  - GFR was 13-16 up from baseline 4-6 previously in January 2022. Labs repeated today and GFR is 2.6, Cr 19.   - patient follows with Dr. Cardoza and Dr Lee and dialysis has been discussed multiple times, patient continues to refuse.  - on transplant list with [facility]  -cont phos binders, Na bicarb. Valencia for adequate I/O. Lasix held 2/17 per renal recs  -uric acid trending  -at severe risk of adverse events, BUN almost 200, asterixis on exam. Meets multiple criteria but refusing HD. Has capacity. Wife aware and states is his decision  Continues to refuse on 2/19 after extensive discussions with all parties.

## 2022-02-20 NOTE — PROGRESS NOTES
[facility]  Nephrology  Progress Note    Patient Name: Enrique Martinez  MRN: [id number]  Admission Date: 2/16/2022  Hospital Length of Stay: 3 days  Attending Provider: Dianna Tijerina MD   Primary Care Physician: Primary Doctor No  Principal Problem:Acute renal failure superimposed on stage 5 chronic kidney disease, not on chronic dialysis    Subjective:     HPI: Mr. Martinez is a 56 y.o. 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 He was recently admitted at [facility] from 2/2-2/4/2022 from LLE cellulitis. He was found to have salmonella bacteremia suspected due to diarrheal illness. He was discharged with cipro for which he reports compliance. He has had no improvement in his pain, redness, or swelling of his left foot and leg. He has also developed a new blister on his left foot, with MRI foot concerning for osteomyelitis and showing soft tissue swelling c/w cellulitis. He is admitted for treatment of suspected osteomyelitis.     He follows with Dr. Cardoza (nephrology), has adamantly declined HD in the past and would rather wait for transplant. He reports being on the transplant list for the past 4 years. His last renal biopsy in 2013 showed 50% glomerulosclerosis. Started on calcitriol 0.5, hx of elevated PTH at 921. Patient complains of decreased appetite, neck cramps, fatigue. He still urinates 4-5 times/day. He reports his B/L LE edema has worsened over the past few weeks. No dyspnea, chest pain, dysuria, hematuria. Nephro consulted for HD initiation.      Labs are significant for hyperkalemia 5.5, bicarb 18, , phosphate 10.4, Cr 19.7, GFR 2.2, ESR 97, CRP 33.4. . CBC significant for Hgb of 7.2, white count 14.  Previous echo showed EF of 65%.       Interval History: Patient still declines HD. Awake, oriented, conversant. Eating some. Labs pending.    Review of patient's allergies indicates:  No Known Allergies  Current Facility-Administered Medications   Medication Frequency    0.9%  NaCl infusion (for blood administration) Q24H PRN    acetaminophen tablet 1,000 mg Q8H    albuterol-ipratropium 2.5 mg-0.5 mg/3 mL nebulizer solution 3 mL Q4H PRN    aluminum-magnesium hydroxide-simethicone 200-200-20 mg/5 mL suspension 30 mL QID PRN    amLODIPine tablet 10 mg Daily    ARIPiprazole tablet 15 mg QHS    aspirin EC tablet 81 mg Daily    atorvastatin tablet 80 mg Daily    cefTRIAXone (ROCEPHIN) 2 g/50 mL D5W IVPB Q24H    cloNIDine tablet 0.4 mg TID    colchicine split tablet 0.3 mg BID    DAPTOmycin (CUBICIN) 445 mg in sodium chloride 0.9% IVPB Q48H    dextrose 10% bolus 125 mL PRN    dextrose 10% bolus 250 mL PRN    folic acid tablet 1 mg Daily    furosemide injection 40 mg Q12H    glucagon (human recombinant) injection 1 mg PRN    glucose chewable tablet 16 g PRN    glucose chewable tablet 24 g PRN    hydrALAZINE tablet 50 mg Q8H    labetaloL tablet 200 mg Q12H    lorazepam injection 1 mg On Call Procedure    melatonin tablet 6 mg Nightly PRN    methocarbamoL tablet 750 mg QID PRN    morphine injection 2 mg Once PRN    morphine injection 2 mg On Call Procedure    naloxone 0.4 mg/mL injection 0.02 mg PRN    ondansetron disintegrating tablet 8 mg Q8H PRN    oxyCODONE immediate release tablet 5 mg Q6H PRN    oxyCODONE immediate release tablet Tab 10 mg Q4H PRN    pantoprazole injection 40 mg BID    polyethylene glycol packet 17 g Daily    prochlorperazine injection Soln 5 mg Q6H PRN    senna-docusate 8.6-50 mg per tablet 1 tablet Daily    sevelamer carbonate tablet 1,600 mg TID    simethicone chewable tablet 80 mg QID PRN    sodium bicarbonate tablet 650 mg TID    sodium chloride 0.9% flush 10  mL Q8H PRN       Objective:     Vital Signs (Most Recent):  Temp: 98.4 °F (36.9 °C) (22)  Pulse: 84 (22 0309)  Resp: 16 (22 025)  BP: (!) 157/82 (22 0506)  SpO2: 95 % (22 0307)  O2 Device (Oxygen Therapy): room air (22 0250)   Vital Signs (24h Range):  Temp:  [98 °F (36.7 °C)-98.6 °F (37 °C)] 98.4 °F (36.9 °C)  Pulse:  [] 84  Resp:  [16-24] 16  SpO2:  [94 %-98 %] 95 %  BP: (122-159)/(64-91) 157/82     Weight: 74 kg (163 lb 2.3 oz) (22)  Body mass index is 26.33 kg/m².  Body surface area is 1.86 meters squared.    I/O last 3 completed shifts:  In: 783 [P.O.:358; Blood:425]  Out: 670 [Urine:670]    Physical Exam  Vitals and nursing note reviewed.   Constitutional:       Appearance: He is well-developed and overweight. He is ill-appearing.   HENT:      Head: Normocephalic and atraumatic.      Nose: Nose normal.      Mouth/Throat:      Mouth: Mucous membranes are moist.   Eyes:      General: No scleral icterus.     Conjunctiva/sclera: Conjunctivae normal.   Cardiovascular:      Rate and Rhythm: Normal rate and regular rhythm.      Heart sounds: Normal heart sounds.   Pulmonary:      Effort: Pulmonary effort is normal. No respiratory distress.      Breath sounds: Normal breath sounds. No wheezing or rales.   Abdominal:      General: Bowel sounds are normal. There is no distension.      Palpations: Abdomen is soft.      Tenderness: There is no abdominal tenderness. There is no guarding.   Musculoskeletal:         General: Swelling and signs of injury present. Normal range of motion.      Cervical back: Normal range of motion and neck supple.      Right lower le+ Pitting Edema present.      Left lower le+ Pitting Edema present.   Feet:      Right foot:      Skin integrity: Skin integrity normal.      Left foot:      Skin integrity: Skin breakdown, erythema, warmth and dry skin present.   Skin:     General: Skin is warm and dry.      Capillary Refill:  Capillary refill takes less than 2 seconds.      Comments: L leg, ankle, foot skin sloughing.   Deroofed blister on dorsum of left foot   Neurological:      Mental Status: He is easily aroused. He is lethargic.      GCS: GCS eye subscore is 4. GCS verbal subscore is 5. GCS motor subscore is 6.      Motor: Tremor present.      Comments: Asterixis present   Psychiatric:         Behavior: Behavior normal. Behavior is cooperative.         Thought Content: Thought content normal.         Judgment: Judgment normal.       Significant Labs:  CBC:   Recent Labs   Lab 02/19/22  0815   WBC 9.74   RBC 2.45*   HGB 6.7*   HCT 21.5*      MCV 88   MCH 27.3   MCHC 31.2*       CMP:   Recent Labs   Lab 02/17/22  0658 02/17/22  1041 02/19/22  0815      < > 99   CALCIUM 10.1   < > 9.2   ALBUMIN 2.6*  --   --    PROT 6.1  --   --       < > 145   K 5.2*   < > 5.3*   CO2 17*   < > 19*      < > 106   *   < > 172*   CREATININE 18.6*   < > 18.5*   ALKPHOS 96  --   --    ALT 10  --   --    AST 14  --   --    BILITOT 0.2  --   --     < > = values in this interval not displayed.       All labs within the past 24 hours have been reviewed.     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 Previously, he has declined HD in the past, never receiving a fistula. He is on the transplant wait list. Patient exhibits signs of uremia including fatigue, poor appetite, and mental slowing. On labs, he is hyperkalemic, uremic, AGMA, hyperphosphatemic. GFR is 2-3, which is reduced from previous GFR 13. Home meds include sodium bicarb, Renvela, and  calcitriol. 2+ pitting edema on B/L lower extremities. Renal US from Jan 2022 shows sonographic evidence of chronic medical renal disease and bilateral simple and minimally complex renal cysts.  sCr one year ago was 4.5, now 18.6. UPCr ratio 1.11 (Dec 2020)  2/18: Patient increasingly somnolent, lethargic. Hospital med in process of getting in touch with Dr. Lee, who the patient follows with outpatient.     Plan:   - Recommend HD if patient /decision maker amenable. Patient thinking about it. Discussed HD with patient as possible emergent need in this setting and as a bridge to transplant. Discussed extensively that his labs and clinical decline from uremia would preclude him from getting a kidney should one become available, rendering his time on the wait list pointless.   - Appreciate psych. Does not have capacity to make decisions  - Consider palliative care consult for GOC/hospice as patient's uremia is worsening to the point where he may not have capacity eventually. If HD declined, will have to clarify if he would prefer to transition to comfort care over getting dialysis  - Shift potassium as needed  - Continue phos binder sevelamer 1600 TID  - Continue Nabicarb 650 TID  - Hold calcitriol in setting of hyperphosphatemia. Will re-start when phos reaches normal level.   - Trend Cr  - Strict I&Os  - Avoid nephrotoxic agents  - Renally adjust medications           Gout  Has history of gout and hyperuricemia. Uric acid elevated at 15. S/p NS 50 cc/hr for 10 hours. Ankle pain ddx includes gout vs cellulitis vs septic joint. Podiatry has low concern for osteo    - Colchicine 0.3 BID  - scheduled tylenol 1 g TID for pain  - consider left ankle joint aspiration per podiatry recs       Increased anion gap metabolic acidosis  Initial labs significant for bicarb of 18, anion gap 19. AGMA likely 2/2 uremia. Continued AGMA,, uremia during admssion. Bun slightly decreased to 180s after IVF.     - HD if amenable.   -  Sodium bicarb 650 TID    Hyperphosphatemia  Hyperphosphatemia at 10.9.     - Continue Sevelamer 1600 TID    Hyperkalemia  Hyperkalemic at 5.5 on admission, now 5.3. No EKG changes of hyperkalemia on initial EKG.     - Shift as needed by primary.     Subacute osteomyelitis  Management per primary team and podiatry.     SHANNAN (iron deficiency anemia)  Baseline Hb 9-10, Hb 7.2 on admission. Iron low at 10, iron sat low at 4%, ferritin elevated 384, transferrin low at 174.   Antiplatelet/anticoagulation: aspirin    Plan:  - Trend CBC daily  - No role for IV iron in setting of infection  - Transfuse with goal Hb > 7      Patient on waiting list for kidney transplant  Has been on waiting list for 4 years.     Type 2 diabetes mellitus  HbA1c 7.6 in Feb 2022, BG during hospitalization at goal. Takes insulin glargine at home.     - ISS as needed.     Anemia due to chronic kidney disease  Hemoglobin 10.1 one month ago, now 7.2. Normocytic anemia with previously elevated ferritin and low iron.     - F/u iron studies  - May need EPO/IV iron     Membranous glomerulonephritis  Has history of membranous glomerulonephritis. Previous renal biopsy in 2013 showed 50% glomerulosclerosis. [place] [facility] [place]     Renovascular hypertension  Patient remains hypertensive in 170s/89 after admission. On amlodipine, clonidine, labetolol, and hydralazine scheduled.     - Will defer to primary team for management        Thank you for your consult. I will follow-up with patient. Please contact us if you have any additional questions.    Stephie Trujillo, DO  Nephrology  [facility]

## 2022-02-20 NOTE — ASSESSMENT & PLAN NOTE
-will bring acthar gel from home to do non formulary as 40K and got via prior auth- at bedside but hold for now given likely not working  -reason for CKD 5.

## 2022-02-20 NOTE — ASSESSMENT & PLAN NOTE
55 y/o M with renal failure and recent salmonella bacteremia , transiently endorsed neck pain but is now denies pain, weakness or myelopathy and is intact on exam despite radiographic features concerning for cervical instability with possible associated infection .      Recommend Flexion extension cervical spine   (ordered) still pending.     Recommend aspen collar.   If patient refuses to wear it would document we recommended it to protect his cervical spine and that patient refused.   Recommending infectious disease and ENT consult for drainage of prevertebral collection for source control        No acute NSGY intervention at this time given patient is not medically stable at this time.     Once stable MRI  Spine with and without would be prudent.     Discussed with Dr. Sandhu.

## 2022-02-20 NOTE — SUBJECTIVE & OBJECTIVE
"  Interval history:   Extensive discussions today again similar to yesterday with over 2 hours discussions with him, his wife, his son on phone, neurosurgery/ID/renal, [facility] was contacted in addition. Hg still 6's, suspect neck is lkely source with fluid collection of unclear source and inflammation. No sign of gi bleeing, fobt pending. Phos high, cr high, bun high, k shifted as mid 5's. Bp lower end, staggering bp meds, and norvacs given this AM and trended to see if needs others. Wife at bedside today. Extensive discussion with them both. She is very upset he will not agree to hD. He refuses adamently despite her and myself both etensively counselling on this. He has no real reason, he refues to hav ea conversaion about it. She said her step dad is on HD and done fine so his reason that everyone he knows on HD dies she said is not true. I told him 10 years of nephrology clinics and 4  years on transplant list is all for naught as he isnt transplant candiate right now even if kidney came ready today due to where hes at right now and he said he doesn't caer bascally. He said if he were to have breathing issues he siad let him go and dont do a breathing tube. I dont think he has capacity at this point. His wife also heard these statemnets. She is his POA. He confirmed this as well. He has fluctuatig level of awareness. He thought the hallway was a gym. He dindt recognize his wife earlier. He asked me "you excited" I said for what, and he said for yesterday. He contiuously tries to use the urinal when he has a patel. His wife nor his son knew he had salmonella bacteremia at [facility], he did not tell them. He stays in dirty places his wife says. He eats only fast food. He doesn't eat at salad bars, he does not have turtles. She said he eats  foods and poor quality foods. He had severe diarrhea before [facility] admit she said. Suspect severe infection tipped him over to ESRD we are at right now and were at " "[facility] also. He claims dr valdez his kidney MD doesn't recommend hD. I read him the texts from dr valdez that state he does recommend HD right now and doesn't want to lose him because of refusal. Last night he said he wantd to talk to him and we were goint ot arrange at 2 pm but could not get ahhold of dr valdez. In any event, he was extremely agitated and very aggressive and openly hostile even worse than his verbally abusive baseline to providers and nursing including myself on exam at 2 pm this afternoon. He was yeling at me and his wife and about his son and was very verbally abusive to me. He told me "wh jd you still in here. What do you want" when I was asking him questions in the room after we were unable to contact dr valdez. I told him I was witing to see if his MD would call me back for a short period and he said "go get in the hallway then get out of here". He has been very hostile to nursing also and they were going to have security tot he room if he continued his behavior. Maybe uremia but family reports history of verbal abuse is his baseline and told me this and nursing this. His son reports he was not involved in his life until age 28 and has been not interested in family life and his family is going out of their wya to try to help him to no avail. He has said to them for years he would rather die than get HD they told me and documented etensively in [facility] records. His wife reports as his POA she would not give him HD against his wiill if he doesn't have capacity and would unforunately have to do hospice also as would not dialyze him udner sedation against his will.  Son and wife both in agreement that Monday he has to decide to dialyze or hospice pursing and will consult palliative onmonday if he is undecided or he does not want to pursue hD. He is at risk of decompensation in the interim and they are both aware.   He has remained full code as I cannto take his statements of do not intubate for sure at " this time given I dont know if he has capacity given he has fallen asleep many times on exam, and confused at times, tremors still, and uremic.  Would defer to wife it it came down to this situation who heard this statement.  Dicussed with son marlee on phone all of above for 30 min or so.  Both are very upset with him and want him to pursue HD, and have tried to tell him this in conversations with me as well and he has resisted.   Renal and I discussed the above. Will see if he is amenable to talking to outpatient MD dr valdez tomorrow.   ID recs to continue current reigmen.  NS concerned for spine instability with MRi findings. Rec xrays now and MRi further when stable and cannot inervene when med unstable as he is now. And c collar all times and aware he may refused. He did refuse nursing documented this.  His wife and son are aware of the infection concerns and wife was at bedside for NS and ID exams.   Will consult psych in AM for official capacity eval but at this point, wife will not do HD against his will if she he doesn't have capacity so it woudnt change what current management is which is him refusing now.   Extremely challenging case. Appreciate all involved- nursing and MDs for assistance with very difficult patient.                Review of patient's allergies indicates:  No Known Allergies    No current facility-administered medications on file prior to encounter.     Current Outpatient Medications on File Prior to Encounter   Medication Sig    insulin (LANTUS SOLOSTAR U-100 INSULIN) glargine 100 units/mL (3mL) SubQ pen Inject 5 Units into the skin once daily.    albuterol (PROVENTIL/VENTOLIN HFA) 90 mcg/actuation inhaler Inhale 2 puffs into the lungs every 6 (six) hours as needed for Wheezing or Shortness of Breath. Rescue    amlodipine (NORVASC) 10 MG tablet Take 10 mg by mouth once daily.    ARIPiprazole (ABILIFY) 15 MG Tab Take 1 tablet (15 mg total) by mouth every evening.    aspirin (ECOTRIN) 81  MG EC tablet Take 1 tablet (81 mg total) by mouth once daily.    atorvastatin (LIPITOR) 80 MG tablet Take 1 tablet (80 mg total) by mouth once daily.    blood-glucose meter kit Use as instructed    calcitRIOL (ROCALTROL) 0.5 MCG Cap Take 0.5 mcg by mouth once daily.    chlorthalidone (HYGROTEN) 50 MG Tab Take 50 mg by mouth once daily.     cloNIDine (CATAPRES) 0.2 MG tablet Take 0.4 mg by mouth 3 (three) times daily.     colchicine (COLCRYS) 0.6 mg tablet Take 0.6 mg by mouth daily as needed.    ergocalciferol (ERGOCALCIFEROL) 50,000 unit Cap Take 50,000 Units by mouth every 30 days.     famotidine (PEPCID) 20 MG tablet Take 20 mg by mouth daily as needed for Heartburn.     ferrous gluconate (FERGON) 324 MG tablet Take 324 mg by mouth 2 (two) times a day.     fluticasone (FLONASE) 50 mcg/actuation nasal spray 1 spray by Each Nare route once daily.    furosemide (LASIX) 40 MG tablet Take 40 mg by mouth 2 (two) times daily.    methocarbamoL (ROBAXIN) 500 MG Tab Take 500 mg by mouth 2 (two) times a day.    metoprolol tartrate (LOPRESSOR) 100 MG tablet Take 100 mg by mouth.    omeprazole (PRILOSEC) 20 MG capsule Take 20 mg by mouth once daily.    RENVELA 800 mg Tab Take 800 mg by mouth 3 (three) times daily.    sodium bicarbonate 650 MG tablet Take 650 mg by mouth.    tadalafiL (CIALIS) 2.5 mg Tab Take 2.5 mg by mouth.    TRUE METRIX GLUCOSE TEST STRIP Strp      Family History       Problem Relation (Age of Onset)    Drug abuse Brother    Heart attack Father, Brother    Hyperlipidemia Father    Hypertension Father, Brother    No Known Problems Sister    Stroke Father          Tobacco Use    Smoking status: Current Some Day Smoker     Packs/day: 0.25     Years: 10.00     Pack years: 2.50    Smokeless tobacco: Never Used   Substance and Sexual Activity    Alcohol use: No    Drug use: No    Sexual activity: Not on file     Review of Systems   Constitutional:  Positive for chills. Negative for activity change and fever.    HENT:  Negative for congestion and trouble swallowing.    Eyes:  Negative for photophobia and visual disturbance.   Respiratory:  Negative for chest tightness, shortness of breath and wheezing.    Cardiovascular:  Positive for leg swelling. Negative for chest pain and palpitations.   Gastrointestinal:  Negative for abdominal pain, constipation, nausea and vomiting.   Genitourinary:  Negative for dysuria, flank pain, frequency, hematuria and urgency.   Musculoskeletal:  Positive for arthralgias. Negative for back pain and gait problem.   Skin:  Positive for color change and wound. Negative for rash.   Neurological:  Negative for dizziness, syncope, weakness, light-headedness, numbness and headaches.   Psychiatric/Behavioral:  Negative for agitation and confusion. The patient is not nervous/anxious.    Objective:     Vital Signs (Most Recent):  Temp: 98.6 °F (37 °C) (02/19/22 1625)  Pulse: 89 (02/19/22 1625)  Resp: 17 (02/19/22 1625)  BP: 139/79 (02/19/22 1625)  SpO2: 95 % (02/19/22 1625) Vital Signs (24h Range):  Temp:  [97.9 °F (36.6 °C)-98.9 °F (37.2 °C)] 98.6 °F (37 °C)  Pulse:  [] 89  Resp:  [13-20] 17  SpO2:  [94 %-98 %] 95 %  BP: ()/(60-91) 139/79     Weight: 74 kg (163 lb 2.3 oz)  Body mass index is 26.33 kg/m².    Physical Exam  Vitals and nursing note reviewed.   Constitutional:       Appearance: He is well-developed. He is ill-appearing.      Comments: Agitated, baseline. Asterixis with tremors at rest and with movement    HENT:      Head: Normocephalic and atraumatic.      Mouth/Throat:      Mouth: Mucous membranes are moist.   Eyes:      General: No scleral icterus.     Conjunctiva/sclera: Conjunctivae normal.   Cardiovascular:      Rate and Rhythm: Normal rate and regular rhythm.      Heart sounds: Normal heart sounds.   Pulmonary:      Effort: Pulmonary effort is normal. No respiratory distress.      Breath sounds: Normal breath sounds. No wheezing.   Abdominal:      General: Bowel sounds  are normal. There is no distension.      Palpations: Abdomen is soft.      Tenderness: There is no abdominal tenderness.   Musculoskeletal:         General: No tenderness. Normal range of motion.      Cervical back: Normal range of motion and neck supple.      Right lower le+ Pitting Edema present.      Left lower le+ Pitting Edema present.      Comments: LLE with burst blister. Severe pain with any light touch.  Signs of bunion on both big toes but also possible gout tophi deposit on right big toe. Edema I RLE >LLE. pitting   Feet:      Left foot:      Skin integrity: Skin breakdown, erythema and warmth present.   Skin:     General: Skin is warm and dry.      Capillary Refill: Capillary refill takes less than 2 seconds.   Neurological:      Mental Status: He is alert and oriented to person, place, and time.      Cranial Nerves: No cranial nerve deficit.      Comments: Severe pain with movement of neck, wincing   Psychiatric:         Behavior: Behavior normal.         Thought Content: Thought content normal.         Judgment: Judgment normal.               Significant Labs:   All pertinent labs within the past 24 hours have been reviewed.  CBC:   Recent Labs   Lab 22  0622 22  0815   WBC 10.61 9.74   HGB 6.2* 6.7*   HCT 19.2* 21.5*    213       CMP:   Recent Labs   Lab 2222 22  0815    145   K 5.3* 5.3*    106   CO2 20* 19*   GLU 81 99   * 172*   CREATININE 18.8* 18.5*   CALCIUM 9.8 9.2   ANIONGAP 20* 20*   EGFRNONAA 2.4* 2.4*       Lactic Acid:   No results for input(s): LACTATE in the last 48 hours.      Significant Imaging: I have reviewed all pertinent imaging results/findings within the past 24 hours.   MRI Cervical Spine Without Contrast  Narrative: EXAMINATION:  MRI CERVICAL SPINE WITHOUT CONTRAST    CLINICAL HISTORY:  severe neck pain, concern for infection vs c spine instability based on xray.    TECHNIQUE:  Multisequence MR images of the  cervical spine were acquired without the intravenous administration of contrast.  This is a limited exam due to early termination per patient request.  Only sagittal T1, T2, and STIR images obtained.  Examination is degraded by patient motion artifact.    COMPARISON:  Cervical spine radiograph 02/17/2022, CT head 12/26/2020    FINDINGS:  Reversal of the normal cervical lordosis.  Anterolisthesis of C2 on C3, measuring approximately 0.6 cm. There is increased soft tissue and fluid within the atlanto-odontoid joint and widening of the pre dens space, measuring up to 0.7 cm, similar to prior head CT 12/26/2020.    Vertebral body heights are well maintained.  Multilevel disc desiccation and disc space narrowing, most significant at C4-C5 with severe narrowing and degenerative endplate edema.  There is mildly increased STIR signal within the C3-C4 disc space and mild edema associated with the superior endplate of C4.  No evidence of a marrow placement process.  No acute fracture.    Evaluation of the cord is limited due to motion and lack of axial images.  However, allowing for this cord is normal in caliber without definite signal abnormality.  No epidural fluid collections.    Large prevertebral fluid collection extending from the base of the skull to C6-C7, measuring up to 9.2 cm cranio caudally.    No severe spinal canal stenosis based on sagittal images.  However, there is probable at least mild if not moderate spinal canal stenosis at C3-C4 and C4-C5.  Evaluation of the neural foramina is limited.  Impression: Limited exam due to early termination per patient request of limited diagnostic utility.    Mild edema signal within the C3-C4 disc space and superior endplate of C4, favored to be degenerative in nature but early infectious process could have similar imaging characteristics especially given history of bacteremia.  No epidural fluid collection to suggest abscess.  Recommend correlation with inflammatory  markers and and follow-up as clinically indicated.    Large prevertebral/retropharyngeal fluid collection extending from the skull base to C6-C7, favored to represent an effusion with abscess not excluded given lack of IV contrast    Chronic widening of the pre-dens space, measuring 0.7 cm, associated with increased soft tissue and fluid within the atlanto-odontoid joint.  Atlantal dens instability not excluded.    Multilevel cervical spondylosis, most significant at C3-C4 and C4-C5.    This report was flagged in Epic as abnormal.    Electronically signed by resident: Brittany Victor  Date:    02/19/2022  Time:    08:14    Electronically signed by: Valdemar Mcconnell MD  Date:    02/19/2022  Time:    08:48

## 2022-02-20 NOTE — ASSESSMENT & PLAN NOTE
MRI foot shows brodies abscess (subacute osteomyelitis).  Will manage with empiric ceftriaxone and daptomycin for now.

## 2022-02-20 NOTE — ASSESSMENT & PLAN NOTE
Non-contrast MRI had to be discontinued due to patient request.  But available images shows retropharyngeal fluid collection with abscess not excluded.  Deeply concerned that this could represent infection.  His recent bacteremia makes me concerned this could be salmonella.   He is immunocompromised due to a combination of uremia and diabetes so invasive salmonellosis is possible.    Plan    1. Continue empiric ceftriaxone IV. Continue daptomycin q 48 hours.    2. Neurosurgery following and appreciate their recommendations. Now recommending ENT consultation for possible drainage of prevertebral collection.     3. Recommend repeat MRI of C-Spine for better evaluation of prevertebral fluid collection

## 2022-02-20 NOTE — ASSESSMENT & PLAN NOTE
-ent consulted, will likely bedside scope. He reports trouble swallowing food to wife now when denied to us before liquid diet.

## 2022-02-20 NOTE — PROGRESS NOTES
[facility] Medicine  Progress Note    Patient Name: Enrique Martinez  MRN: [id number]  Patient Class: IP- Inpatient   Admission Date: 2/16/2022  Length of Stay: 3 days  Attending Physician: Dianna Tijerina MD  Primary Care Provider: Primary Doctor No        Subjective:     Principal Problem:Acute renal failure superimposed on stage 5 chronic kidney disease, not on chronic dialysis        HPI:  Enrique Martinez is a 56 y.o. male with a PMHx of CKD V glomerulonephropathy, insulin dependent diabetes mellitus, and CAD who presented tot he ED for evaluation of worsening left foot pain and swelling. He was recently admitted at [facility] from 2/2-2/4/2022 from LLE cellulitis. He was found to have salmonella bacteremia suspected due to diarrheal illness. He was discharged with cipro for which he reports compliance. He has had no improvement in his pain, redness, or swelling of his left foot and leg. He has also developed a new blister on his left foot. He endorses chills. He denies fevers, chest pain, SOB, N/V, decreased urine output, dysuria, and flank pain.    ED: HR 97, other VSSAF. CBC with leukocytosis of 16.01, Hgb 7.4, Hct 22.2.  ESR 97. CRP 33.4. K 5.5. Cr 19.7, . Phos 10.4. Lactic WNL. MRI L foot with cellulitis and osteomyelitis of distal 1st metatarsal and proximal 1st phalanx.      Overview/Hospital Course:  No notes on file      Interval history:   Hg improved. K/Phos/Cr remain similar to previous. Wife at bedside, she was encouraging HD again today. He was confused on exam and overnight, using urinal and patel at same time. He was told on exam by myself he could not speak in rude manner to MD or nursing and security would come by if he contnued to use a loud aggressive and very rude tone to everyone here. Wife is in agreement with this and says it is his baseline. I did not engage patient in any talks today about situation as we have already done this and he knows what the  situation is. I talked to his wife extensively on the phone for 30 minutes later in the day. He said in the room earlier he was ready for hospice and palliative consult and we can arrange for him. Son called him and his wife and I listened to their conversation while they talke about dialysis.   Krishna is aware he needs HD first then treatment of his neck and retropharyngeal fluid collection. NS and ID rec ENT consult. ENT consulted later in day, plan to scope him likely tomorrow if amenable. He was not amenable to C spine xrays earlier today, ID recs MRi repeat but holding for now given he has been not amenabe to further iaging. Not amenable to c collar. His wie told me on the phone he admitted to her that he cannot swallow food well, he denied this to me when asked specifically this question before. He is having left foot numbness/tingling also. These are al signs of the neck issues and fluid collection issues. Changed to liquis diet per hre request.   He kept saying his glucose was up, which it isnt we told him.  Discussed at length with id, renal, psych, ent today and family    Wife stated this evening on phone that he said he is possibly considering saying okay to HD tomorrow. She said do not mention it to him tonight. If so, will talk to anesthesia about IJ tomorrow, dimitrios see them at 8 am. Shes coming back to hospital tonight. If not, will consult palliaitve. Shes in agreement  Psych reports does not have capacity to make decisions. So if he says okay to HD then technically its his wife and him as she is poa saying okay for us to pursue dialysis for him at this time. She will not pursue hd for him if he says no even without capacity as he has said he would not want it for years.                  Review of patient's allergies indicates:  No Known Allergies    No current facility-administered medications on file prior to encounter.     Current Outpatient Medications on File Prior to Encounter   Medication Sig     insulin (LANTUS SOLOSTAR U-100 INSULIN) glargine 100 units/mL (3mL) SubQ pen Inject 5 Units into the skin once daily.    albuterol (PROVENTIL/VENTOLIN HFA) 90 mcg/actuation inhaler Inhale 2 puffs into the lungs every 6 (six) hours as needed for Wheezing or Shortness of Breath. Rescue    amlodipine (NORVASC) 10 MG tablet Take 10 mg by mouth once daily.    ARIPiprazole (ABILIFY) 15 MG Tab Take 1 tablet (15 mg total) by mouth every evening.    aspirin (ECOTRIN) 81 MG EC tablet Take 1 tablet (81 mg total) by mouth once daily.    atorvastatin (LIPITOR) 80 MG tablet Take 1 tablet (80 mg total) by mouth once daily.    blood-glucose meter kit Use as instructed    calcitRIOL (ROCALTROL) 0.5 MCG Cap Take 0.5 mcg by mouth once daily.    chlorthalidone (HYGROTEN) 50 MG Tab Take 50 mg by mouth once daily.     cloNIDine (CATAPRES) 0.2 MG tablet Take 0.4 mg by mouth 3 (three) times daily.     colchicine (COLCRYS) 0.6 mg tablet Take 0.6 mg by mouth daily as needed.    ergocalciferol (ERGOCALCIFEROL) 50,000 unit Cap Take 50,000 Units by mouth every 30 days.     famotidine (PEPCID) 20 MG tablet Take 20 mg by mouth daily as needed for Heartburn.     ferrous gluconate (FERGON) 324 MG tablet Take 324 mg by mouth 2 (two) times a day.     fluticasone (FLONASE) 50 mcg/actuation nasal spray 1 spray by Each Nare route once daily.    furosemide (LASIX) 40 MG tablet Take 40 mg by mouth 2 (two) times daily.    methocarbamoL (ROBAXIN) 500 MG Tab Take 500 mg by mouth 2 (two) times a day.    metoprolol tartrate (LOPRESSOR) 100 MG tablet Take 100 mg by mouth.    omeprazole (PRILOSEC) 20 MG capsule Take 20 mg by mouth once daily.    RENVELA 800 mg Tab Take 800 mg by mouth 3 (three) times daily.    sodium bicarbonate 650 MG tablet Take 650 mg by mouth.    tadalafiL (CIALIS) 2.5 mg Tab Take 2.5 mg by mouth.    TRUE METRIX GLUCOSE TEST STRIP Strp      Family History       Problem Relation (Age of Onset)    Drug abuse Brother     Heart attack Father, Brother    Hyperlipidemia Father    Hypertension Father, Brother    No Known Problems Sister    Stroke Father          Tobacco Use    Smoking status: Current Some Day Smoker     Packs/day: 0.25     Years: 10.00     Pack years: 2.50    Smokeless tobacco: Never Used   Substance and Sexual Activity    Alcohol use: No    Drug use: No    Sexual activity: Not on file     Review of Systems   Constitutional:  Positive for chills. Negative for activity change and fever.   HENT:  Negative for congestion and trouble swallowing.    Eyes:  Negative for photophobia and visual disturbance.   Respiratory:  Negative for chest tightness, shortness of breath and wheezing.    Cardiovascular:  Positive for leg swelling. Negative for chest pain and palpitations.   Gastrointestinal:  Negative for abdominal pain, constipation, nausea and vomiting.   Genitourinary:  Negative for dysuria, flank pain, frequency, hematuria and urgency.   Musculoskeletal:  Positive for arthralgias. Negative for back pain and gait problem.   Skin:  Positive for color change and wound. Negative for rash.   Neurological:  Negative for dizziness, syncope, weakness, light-headedness, numbness and headaches.   Psychiatric/Behavioral:  Negative for agitation and confusion. The patient is not nervous/anxious.    Objective:     Vital Signs (Most Recent):  Temp: 98.3 °F (36.8 °C) (02/20/22 1224)  Pulse: 88 (02/20/22 1224)  Resp: 15 (02/20/22 1224)  BP: (!) 151/72 (02/20/22 1224)  SpO2: 97 % (02/20/22 1224) Vital Signs (24h Range):  Temp:  [98.3 °F (36.8 °C)-98.8 °F (37.1 °C)] 98.3 °F (36.8 °C)  Pulse:  [] 88  Resp:  [15-24] 15  SpO2:  [94 %-99 %] 97 %  BP: (137-159)/(64-82) 151/72     Weight: 74 kg (163 lb 2.3 oz)  Body mass index is 26.33 kg/m².    Physical Exam  Vitals and nursing note reviewed.   Constitutional:       Appearance: He is well-developed. He is ill-appearing.      Comments: Agitated, baseline. Asterixis with tremors at  rest and with movement    HENT:      Head: Normocephalic and atraumatic.      Mouth/Throat:      Mouth: Mucous membranes are moist.   Eyes:      General: No scleral icterus.     Conjunctiva/sclera: Conjunctivae normal.   Cardiovascular:      Rate and Rhythm: Normal rate and regular rhythm.      Heart sounds: Normal heart sounds.   Pulmonary:      Effort: Pulmonary effort is normal. No respiratory distress.      Breath sounds: Normal breath sounds. No wheezing.   Abdominal:      General: Bowel sounds are normal. There is no distension.      Palpations: Abdomen is soft.      Tenderness: There is no abdominal tenderness.   Musculoskeletal:         General: No tenderness. Normal range of motion.      Cervical back: Normal range of motion and neck supple.      Right lower le+ Pitting Edema present.      Left lower le+ Pitting Edema present.      Comments: LLE with burst blister. Severe pain with any light touch.  Signs of bunion on both big toes but also possible gout tophi deposit on right big toe. Edema I RLE >LLE. pitting   Feet:      Left foot:      Skin integrity: Skin breakdown, erythema and warmth present.   Skin:     General: Skin is warm and dry.      Capillary Refill: Capillary refill takes less than 2 seconds.   Neurological:      Mental Status: He is alert and oriented to person, place, and time.      Cranial Nerves: No cranial nerve deficit.      Comments: Severe pain with movement of neck, wincing   Psychiatric:         Behavior: Behavior normal.         Thought Content: Thought content normal.         Judgment: Judgment normal.               Significant Labs:   All pertinent labs within the past 24 hours have been reviewed.  CBC:   Recent Labs   Lab 22  0815 22  0815   WBC 9.74 9.58   HGB 6.7* 7.9*   HCT 21.5* 25.0*    209       CMP:   Recent Labs   Lab 22  0815 22  0815    143   K 5.3* 4.4    103   CO2 19* 17*   GLU 99 108   * 172*   CREATININE  18.5* 18.4*   CALCIUM 9.2 9.3   PROT  --  5.4*   ALBUMIN  --  2.2*   BILITOT  --  0.2   ALKPHOS  --  76   AST  --  19   ALT  --  8*   ANIONGAP 20* 23*   EGFRNONAA 2.4* 2.4*       Lactic Acid:   No results for input(s): LACTATE in the last 48 hours.      Significant Imaging: I have reviewed all pertinent imaging results/findings within the past 24 hours.   MRI Cervical Spine Without Contrast  Narrative: EXAMINATION:  MRI CERVICAL SPINE WITHOUT CONTRAST    CLINICAL HISTORY:  severe neck pain, concern for infection vs c spine instability based on xray.    TECHNIQUE:  Multisequence MR images of the cervical spine were acquired without the intravenous administration of contrast.  This is a limited exam due to early termination per patient request.  Only sagittal T1, T2, and STIR images obtained.  Examination is degraded by patient motion artifact.    COMPARISON:  Cervical spine radiograph 02/17/2022, CT head 12/26/2020    FINDINGS:  Reversal of the normal cervical lordosis.  Anterolisthesis of C2 on C3, measuring approximately 0.6 cm. There is increased soft tissue and fluid within the atlanto-odontoid joint and widening of the pre dens space, measuring up to 0.7 cm, similar to prior head CT 12/26/2020.    Vertebral body heights are well maintained.  Multilevel disc desiccation and disc space narrowing, most significant at C4-C5 with severe narrowing and degenerative endplate edema.  There is mildly increased STIR signal within the C3-C4 disc space and mild edema associated with the superior endplate of C4.  No evidence of a marrow placement process.  No acute fracture.    Evaluation of the cord is limited due to motion and lack of axial images.  However, allowing for this cord is normal in caliber without definite signal abnormality.  No epidural fluid collections.    Large prevertebral fluid collection extending from the base of the skull to C6-C7, measuring up to 9.2 cm cranio caudally.    No severe spinal canal  stenosis based on sagittal images.  However, there is probable at least mild if not moderate spinal canal stenosis at C3-C4 and C4-C5.  Evaluation of the neural foramina is limited.  Impression: Limited exam due to early termination per patient request of limited diagnostic utility.    Mild edema signal within the C3-C4 disc space and superior endplate of C4, favored to be degenerative in nature but early infectious process could have similar imaging characteristics especially given history of bacteremia.  No epidural fluid collection to suggest abscess.  Recommend correlation with inflammatory markers and and follow-up as clinically indicated.    Large prevertebral/retropharyngeal fluid collection extending from the skull base to C6-C7, favored to represent an effusion with abscess not excluded given lack of IV contrast    Chronic widening of the pre-dens space, measuring 0.7 cm, associated with increased soft tissue and fluid within the atlanto-odontoid joint.  Atlantal dens instability not excluded.    Multilevel cervical spondylosis, most significant at C3-C4 and C4-C5.    This report was flagged in Epic as abnormal.    Electronically signed by resident: Brittany Victor  Date:    02/19/2022  Time:    08:14    Electronically signed by: Valdemar Mcconnell MD  Date:    02/19/2022  Time:    08:48          Assessment/Plan:      * Acute renal failure superimposed on stage 5 chronic kidney disease, not on chronic dialysis  Hyperkalemia  Hyperphosphatemia  Metabolic acidosis  Kidney transplant candidate    - 2/2 membranous glomerulopathy  - GFR was 13-16 up from baseline 4-6 previously in January 2022. Labs repeated today and GFR is 2.6, Cr 19.   - patient follows with Dr. Cardoza and Dr Lee and dialysis has been discussed multiple times, patient continues to refuse.  - on transplant list with [facility]  -cont phos binders, Na bicarb. Valencia for adequate I/O. Lasix held 2/17 per renal recs  -uric acid trending  -at severe  risk of adverse events, BUN almost 200, asterixis on exam. Meets multiple criteria but refusing HD. Has capacity. Wife aware and states is his decision  Continues to refuse on 2/19 after extensive discussions with all parties.   -2/20- may consent tomorrow per wife, if so anesthesia for IJ. No permacath now as active infection concerns in neck. If not, palliative consult. Wife will agree to HD for him if he says yes despite no capacity per psych. Wife will not do it against his will as poa    Retropharyngeal abscess  -ent consulted, will likely bedside scope. He reports trouble swallowing food to wife now when denied to us before liquid diet.        History of Salmonella septicemia  -+ blood CX at [facility] 2/1, with clearance immediately, treated with cipro for 14 days unclear source but presumed diarrheal infection. He acts like he has no idea and had no diarrhea then  -denies sickle cell hx to predispose to this  -concern with neck pain and foot pain with recent bacteremia  -work up in progress  -ID consulted.        Neck pain  -abnormal C spine Xray with possible signs of C spine instability, MRi ordered and patient refused 2/17, on pain meds, discussed AT length, see 2/17 and 2/18 note above, ordered with premediation now. Concern for possible infection also given acuity of pain  -signs of C spine instability, prevertebral abscess likely with fluid collection with widening in C spine, NS recs C collar- patient refused, and Xrays, refused, and MRi full spine when stable. Cannot do any intervention now as unstable bc of kidneys.   -ent consulted for retropharyngeal abscess per ns and id recs, will likely scope if amenable at bedside.   -refused c collar xrays  -if amenable id also recs repeat mri        Gout  -colchicine BID, light IVF x 10 hours 2/17 per renal recs. Avoid steroids with posisble infection. He denies he has gout      Folic acid deficiency  -low at 4.4.  -daily replacement      Increased anion gap  metabolic acidosis  Secondary to ckd  -cont Na bicarb  -refusing HD      Hyperphosphatemia  -cont binders, elvated at 10-11  -refusing HD      Hyperkalemia  -shift PRN      Subacute osteomyelitis  - 2/4 SIRS with HR>90, WBC 16  - lactic WNL  - ESR 97, CRP 33.4, procal 1.  - MRI w/ regions of focal abnormal signal involving the distal 1st metatarsal and proximal 1st phalanx as described above.  Findings are concerning for foci of subacute myelitis   - patient is hemodynamically stable, podiatry consulted, unclear if ifection but no plans to do biopsy now cellulitis worsened it appears 2/17 PM so vanc/zosyn initiated. ID consulted 2/18 as severely tender extremity, concern with recent bacteremia on 2/1 with salmonella with scaling on exam for possible staph/strep also scalded skin type look.. will f/u recs.  -treating gout, minimal improvement in pain, less red on doxy/rocephin per ID recs now    Benign prostatic hyperplasia  Valencia placed 2/18 overnight for incontinence    GERD (gastroesophageal reflux disease)  - continue PPI    S/P coronary artery stent placement  - continue ASA, statin    Type 2 diabetes mellitus  - home meds: lantus 5 units daily  - SSI   - ACHS accuchecks  - diabetic diet  -A1 C 7's    Anemia due to chronic kidney disease  -hg with downtick to 6 on 2/18 from 7 on admit with 9 baseline  -unclear cause if another reason besides chronic kidney disease  -fobt ordered  -transfuse 1 U on 2/18. adamently denies bleeding. Will add protonix for coverage in interim in case of any bleeding  -tx 1 U on 2/19, Hg still higher 6's, unclear soure, fobt pending, possible from neck inflammation with fluid collection of unclear source.  -improved 2/20 to 8's  -denies nsaid use    Membranous glomerulonephritis  -will bring acthar gel from home to do non formulary as 40K and got via prior auth- at bedside but hold for now given likely not working  -reason for CKD 5.      Hyperlipidemia  - continue  statin    Renovascular hypertension  -elevated to 180s on admit but improved on current regimen to 130s systolic on exam 2/18  -cont hydralazine, labetalol, norvassc, clonidine.      VTE Risk Mitigation (From admission, onward)         Ordered     IP VTE HIGH RISK PATIENT  Once         02/17/22 0151     Place sequential compression device  Until discontinued         02/17/22 0151                Discharge Planning   JOAN: 2/21/2022     Code Status: Full Code   Is the patient medically ready for discharge?: No    Reason for patient still in hospital (select all that apply): Patient unstable  Discharge Plan A: Home   Discharge Delays: None known at this time              Dianna Tijerina MD  Department of Hospital Medicine   [facility]

## 2022-02-20 NOTE — ASSESSMENT & PLAN NOTE
-abnormal C spine Xray with possible signs of C spine instability, MRi ordered and patient refused 2/17, on pain meds, discussed AT length, see 2/17 and 2/18 note above, ordered with premediation now. Concern for possible infection also given acuity of pain  -signs of C spine instability, prevertebral abscess likely with fluid collection with widening in C spine, NS recs C collar- patient refused, and Xrays, refused, and MRi full spine when stable. Cannot do any intervention now as unstable bc of kidneys.   -ent consulted for retropharyngeal abscess per ns and id recs, will likely scope if amenable at bedside.   -refused c collar xrays  -if amenable id also recs repeat mri

## 2022-02-20 NOTE — SUBJECTIVE & OBJECTIVE
Interval History: Patient still declines HD. Awake, oriented, conversant. Eating some. Labs pending.    Review of patient's allergies indicates:  No Known Allergies  Current Facility-Administered Medications   Medication Frequency    0.9%  NaCl infusion (for blood administration) Q24H PRN    acetaminophen tablet 1,000 mg Q8H    albuterol-ipratropium 2.5 mg-0.5 mg/3 mL nebulizer solution 3 mL Q4H PRN    aluminum-magnesium hydroxide-simethicone 200-200-20 mg/5 mL suspension 30 mL QID PRN    amLODIPine tablet 10 mg Daily    ARIPiprazole tablet 15 mg QHS    aspirin EC tablet 81 mg Daily    atorvastatin tablet 80 mg Daily    cefTRIAXone (ROCEPHIN) 2 g/50 mL D5W IVPB Q24H    cloNIDine tablet 0.4 mg TID    colchicine split tablet 0.3 mg BID    DAPTOmycin (CUBICIN) 445 mg in sodium chloride 0.9% IVPB Q48H    dextrose 10% bolus 125 mL PRN    dextrose 10% bolus 250 mL PRN    folic acid tablet 1 mg Daily    furosemide injection 40 mg Q12H    glucagon (human recombinant) injection 1 mg PRN    glucose chewable tablet 16 g PRN    glucose chewable tablet 24 g PRN    hydrALAZINE tablet 50 mg Q8H    labetaloL tablet 200 mg Q12H    lorazepam injection 1 mg On Call Procedure    melatonin tablet 6 mg Nightly PRN    methocarbamoL tablet 750 mg QID PRN    morphine injection 2 mg Once PRN    morphine injection 2 mg On Call Procedure    naloxone 0.4 mg/mL injection 0.02 mg PRN    ondansetron disintegrating tablet 8 mg Q8H PRN    oxyCODONE immediate release tablet 5 mg Q6H PRN    oxyCODONE immediate release tablet Tab 10 mg Q4H PRN    pantoprazole injection 40 mg BID    polyethylene glycol packet 17 g Daily    prochlorperazine injection Soln 5 mg Q6H PRN    senna-docusate 8.6-50 mg per tablet 1 tablet Daily    sevelamer carbonate tablet 1,600 mg TID    simethicone chewable tablet 80 mg QID PRN    sodium bicarbonate tablet 650 mg TID    sodium chloride 0.9% flush 10 mL Q8H PRN       Objective:     Vital Signs (Most Recent):  Temp: 98.4 °F  (36.9 °C) (22)  Pulse: 84 (22 0309)  Resp: 16 (22 0252)  BP: (!) 157/82 (22 0506)  SpO2: 95 % (22 0307)  O2 Device (Oxygen Therapy): room air (22 0250)   Vital Signs (24h Range):  Temp:  [98 °F (36.7 °C)-98.6 °F (37 °C)] 98.4 °F (36.9 °C)  Pulse:  [] 84  Resp:  [16-24] 16  SpO2:  [94 %-98 %] 95 %  BP: (122-159)/(64-91) 157/82     Weight: 74 kg (163 lb 2.3 oz) (22 050)  Body mass index is 26.33 kg/m².  Body surface area is 1.86 meters squared.    I/O last 3 completed shifts:  In: 783 [P.O.:358; Blood:425]  Out: 670 [Urine:670]    Physical Exam  Vitals and nursing note reviewed.   Constitutional:       Appearance: He is well-developed and overweight. He is ill-appearing.   HENT:      Head: Normocephalic and atraumatic.      Nose: Nose normal.      Mouth/Throat:      Mouth: Mucous membranes are moist.   Eyes:      General: No scleral icterus.     Conjunctiva/sclera: Conjunctivae normal.   Cardiovascular:      Rate and Rhythm: Normal rate and regular rhythm.      Heart sounds: Normal heart sounds.   Pulmonary:      Effort: Pulmonary effort is normal. No respiratory distress.      Breath sounds: Normal breath sounds. No wheezing or rales.   Abdominal:      General: Bowel sounds are normal. There is no distension.      Palpations: Abdomen is soft.      Tenderness: There is no abdominal tenderness. There is no guarding.   Musculoskeletal:         General: Swelling and signs of injury present. Normal range of motion.      Cervical back: Normal range of motion and neck supple.      Right lower le+ Pitting Edema present.      Left lower le+ Pitting Edema present.   Feet:      Right foot:      Skin integrity: Skin integrity normal.      Left foot:      Skin integrity: Skin breakdown, erythema, warmth and dry skin present.   Skin:     General: Skin is warm and dry.      Capillary Refill: Capillary refill takes less than 2 seconds.      Comments: L leg, ankle, foot  skin sloughing.   Deroofed blister on dorsum of left foot   Neurological:      Mental Status: He is easily aroused. He is lethargic.      GCS: GCS eye subscore is 4. GCS verbal subscore is 5. GCS motor subscore is 6.      Motor: Tremor present.      Comments: Asterixis present   Psychiatric:         Behavior: Behavior normal. Behavior is cooperative.         Thought Content: Thought content normal.         Judgment: Judgment normal.       Significant Labs:  CBC:   Recent Labs   Lab 02/19/22  0815   WBC 9.74   RBC 2.45*   HGB 6.7*   HCT 21.5*      MCV 88   MCH 27.3   MCHC 31.2*       CMP:   Recent Labs   Lab 02/17/22  0658 02/17/22  1041 02/19/22  0815      < > 99   CALCIUM 10.1   < > 9.2   ALBUMIN 2.6*  --   --    PROT 6.1  --   --       < > 145   K 5.2*   < > 5.3*   CO2 17*   < > 19*      < > 106   *   < > 172*   CREATININE 18.6*   < > 18.5*   ALKPHOS 96  --   --    ALT 10  --   --    AST 14  --   --    BILITOT 0.2  --   --     < > = values in this interval not displayed.       All labs within the past 24 hours have been reviewed.     Significant Imaging:  MRI: Reviewed  Retropharyngeal fluid collection, spondylosis, pre dens space widening

## 2022-02-20 NOTE — ASSESSMENT & PLAN NOTE
- 2/4 SIRS with HR>90, WBC 16  - lactic WNL  - ESR 97, CRP 33.4, procal 1.  - MRI w/ regions of focal abnormal signal involving the distal 1st metatarsal and proximal 1st phalanx as described above.  Findings are concerning for foci of subacute myelitis   - patient is hemodynamically stable, podiatry consulted, unclear if ifection but no plans to do biopsy now cellulitis worsened it appears 2/17 PM so vanc/zosyn initiated. ID consulted 2/18 as severely tender extremity, concern with recent bacteremia on 2/1 with salmonella with scaling on exam for possible staph/strep also scalded skin type look.. will f/u recs.  -treating gout, minimal improvement in pain, less red on doxy/rocephin per ID recs now

## 2022-02-20 NOTE — PLAN OF CARE
Pt continues to answer orientation questions correctly, AAOx3, but appears confused/talks nonsense. Psych consulted, per psych note, pt is deemed incapable of making decision for himself/his care. VSS on bedside monitor. NSR on bedside monitor, afebrile. H&H-7.9/25.0 s/p blood transfusion 2/18 + 2/19. Lasix 40mg Q12hrs given. Phosphorus remains critically elevated @ 10.6 this am; renvela continued. K-4.4; lokelma continued. Creatinine 18.4 today. Pt continues to refuse HD; plan for final decision tomorrow (Monday). Pt reporting intermittent neck pain; neurosurgery following for possible infection/abscess, recommending pt wear cervical neck brace; pt refusing neck brace still; ENT consulted today.  ID following; IV rocephin q24hrs continued; Daptomycin q48hrs. Pt is incontinent of bladder, refusing condom catheter today. No BM this shift; bowel regimen ordered, pt refusing miralax. Pt is 1-person assist, was able to help staff turn when needed; remains free from falls this shift; pt has continued to be disrespectful towards staff. Nonskid socks on, call bell within reach, bed locked and in lowest position, bed alarm on, side rails x3. Pt's wife/POA at bedside during day. Pt verbalized understanding to call for any needs/assistance. Hourly rounding completed throughout shift.

## 2022-02-20 NOTE — NURSING
RN entered pt's room due to bed alarm sounding. Pt was sitting up on edge of bed, stated he was trying to go to the bathroom. Condom catheter had been removed, urinal was found upside down in the bed, jermaine pads and pt's gown noted to be soaked in urine. This RN discussed fall risk with pt. Pt stood up after this RN asked him to stay in bed. This RN and pt's wife were able to talk pt back into bed. Pt refusing condom catheter to be put back on at this time. Pt refusing to go downstairs for cervical x-ray at this time; continuing to refuse cervical neck brace as well. Pt appearing more agitated this morning than yesterday, continues to answer orientation questions correctly but talks nonsense (states he smells ammonia). Psych consult in place to determine pt's decision-making capacity. Pt's wife at bedside. Bed alarm reset, bed in lowest position, side rails up x3. Fall risk reviewed with pt's wife. Will monitor.

## 2022-02-20 NOTE — ASSESSMENT & PLAN NOTE
-hg with downtick to 6 on 2/18 from 7 on admit with 9 baseline  -unclear cause if another reason besides chronic kidney disease  -fobt ordered  -transfuse 1 U on 2/18. adamently denies bleeding. Will add protonix for coverage in interim in case of any bleeding  -tx 1 U on 2/19, Hg still higher 6's, unclear soure, fobt pending, possible from neck inflammation with fluid collection of unclear source.  -denies nsaid use

## 2022-02-20 NOTE — SUBJECTIVE & OBJECTIVE
Interval History:     Was in a bad mood this morning.    Review of Systems   All other systems reviewed and are negative.  Objective:     Vital Signs (Most Recent):  Temp: 98.6 °F (37 °C) (22 162)  Pulse: 89 (22 162)  Resp: 17 (22 162)  BP: 139/79 (22 162)  SpO2: 95 % (22 162) Vital Signs (24h Range):  Temp:  [98 °F (36.7 °C)-98.9 °F (37.2 °C)] 98.6 °F (37 °C)  Pulse:  [] 89  Resp:  [13-19] 17  SpO2:  [94 %-98 %] 95 %  BP: ()/(68-91) 139/79     Weight: 74 kg (163 lb 2.3 oz)  Body mass index is 26.33 kg/m².    Estimated Creatinine Clearance: 3.9 mL/min (A) (based on SCr of 18.5 mg/dL (H)).    Physical Exam  Vitals and nursing note reviewed.   Constitutional:       General: He is not in acute distress.     Appearance: He is well-developed. He is ill-appearing.   HENT:      Head: Normocephalic and atraumatic.      Mouth/Throat:      Mouth: Mucous membranes are moist.   Eyes:      General: No scleral icterus.     Conjunctiva/sclera: Conjunctivae normal.   Cardiovascular:      Rate and Rhythm: Normal rate and regular rhythm.      Heart sounds: Normal heart sounds.   Pulmonary:      Effort: Pulmonary effort is normal. No respiratory distress.      Breath sounds: Normal breath sounds. No wheezing.   Abdominal:      General: Bowel sounds are normal. There is no distension.      Palpations: Abdomen is soft.      Tenderness: There is no abdominal tenderness.   Musculoskeletal:         General: No tenderness. Normal range of motion.      Cervical back: Normal range of motion and neck supple.      Right lower le+ Pitting Edema present.      Left lower le+ Pitting Edema present.   Feet:      Right foot:      Skin integrity: Skin integrity normal.      Left foot:      Skin integrity: Skin breakdown (Tendern to palpation at wound), erythema, warmth and dry skin present.   Skin:     General: Skin is warm and dry.      Capillary Refill: Capillary refill takes less than 2  seconds.   Neurological:      General: No focal deficit present.      Mental Status: He is alert and oriented to person, place, and time.      Motor: Tremor present.   Psychiatric:         Behavior: Behavior normal.         Thought Content: Thought content normal.         Judgment: Judgment normal.       Significant Labs:   Microbiology Results (last 7 days)       Procedure Component Value Units Date/Time    Blood Culture #1 **CANNOT BE ORDERED STAT** [[id number]] Collected: 02/16/22 1807    Order Status: Completed Specimen: Blood from Peripheral, Hand, Right Updated: 02/18/22 2012     Blood Culture, Routine No Growth to date      No Growth to date      No Growth to date    Blood Culture #2 **CANNOT BE ORDERED STAT** [[id number]] Collected: 02/16/22 1806    Order Status: Completed Specimen: Blood from Peripheral, Antecubital, Left Updated: 02/18/22 2012     Blood Culture, Routine No Growth to date      No Growth to date      No Growth to date    Stool culture **CANNOT BE ORDERED STAT** [[id number]]     Order Status: No result Specimen: Stool     Clostridium difficile EIA [[id number]]     Order Status: Canceled Specimen: Stool             Significant Imaging: I have reviewed all pertinent imaging results/findings within the past 24 hours.

## 2022-02-20 NOTE — SUBJECTIVE & OBJECTIVE
Interval history:   Hg improved. K/Phos/Cr remain similar to previous. Wife at bedside, she was encouraging HD again today. He was confused on exam and overnight, using urinal and patel at same time. He was told on exam by myself he could not speak in rude manner to MD or nursing and security would come by if he contnued to use a loud aggressive and very rude tone to everyone here. Wife is in agreement with this and says it is his baseline. I did not engage patient in any talks today about situation as we have already done this and he knows what the situation is. I talked to his wife extensively on the phone for 30 minutes later in the day. He said in the room earlier he was ready for hospice and palliative consult and we can arrange for him. Son called him and his wife and I listened to their conversation while they talke about dialysis.   Krishna is aware he needs HD first then treatment of his neck and retropharyngeal fluid collection. NS and ID rec ENT consult. ENT consulted later in day, plan to scope him likely tomorrow if amenable. He was not amenable to C spine xrays earlier today, ID recs MRi repeat but holding for now given he has been not amenabe to further iaging. Not amenable to c collar. His wie told me on the phone he admitted to her that he cannot swallow food well, he denied this to me when asked specifically this question before. He is having left foot numbness/tingling also. These are al signs of the neck issues and fluid collection issues. Changed to liquis diet per hre request.   He kept saying his glucose was up, which it isnt we told him.  Discussed at length with id, renal, psych, ent today and family    Wife stated this evening on phone that he said he is possibly considering saying okay to HD tomorrow. She said do not mention it to him tonight. If so, will talk to anesthesia about IJ tomorrow, dimitrios see them at 8 am. Shes coming back to hospital tonight. If not, will consult palliaitve.  Shes in agreement  Psych reports does not have capacity to make decisions. So if he says okay to HD then technically its his wife and him as she is poa saying okay for us to pursue dialysis for him at this time. She will not pursue hd for him if he says no even without capacity as he has said he would not want it for years.                  Review of patient's allergies indicates:  No Known Allergies    No current facility-administered medications on file prior to encounter.     Current Outpatient Medications on File Prior to Encounter   Medication Sig    insulin (LANTUS SOLOSTAR U-100 INSULIN) glargine 100 units/mL (3mL) SubQ pen Inject 5 Units into the skin once daily.    albuterol (PROVENTIL/VENTOLIN HFA) 90 mcg/actuation inhaler Inhale 2 puffs into the lungs every 6 (six) hours as needed for Wheezing or Shortness of Breath. Rescue    amlodipine (NORVASC) 10 MG tablet Take 10 mg by mouth once daily.    ARIPiprazole (ABILIFY) 15 MG Tab Take 1 tablet (15 mg total) by mouth every evening.    aspirin (ECOTRIN) 81 MG EC tablet Take 1 tablet (81 mg total) by mouth once daily.    atorvastatin (LIPITOR) 80 MG tablet Take 1 tablet (80 mg total) by mouth once daily.    blood-glucose meter kit Use as instructed    calcitRIOL (ROCALTROL) 0.5 MCG Cap Take 0.5 mcg by mouth once daily.    chlorthalidone (HYGROTEN) 50 MG Tab Take 50 mg by mouth once daily.     cloNIDine (CATAPRES) 0.2 MG tablet Take 0.4 mg by mouth 3 (three) times daily.     colchicine (COLCRYS) 0.6 mg tablet Take 0.6 mg by mouth daily as needed.    ergocalciferol (ERGOCALCIFEROL) 50,000 unit Cap Take 50,000 Units by mouth every 30 days.     famotidine (PEPCID) 20 MG tablet Take 20 mg by mouth daily as needed for Heartburn.     ferrous gluconate (FERGON) 324 MG tablet Take 324 mg by mouth 2 (two) times a day.     fluticasone (FLONASE) 50 mcg/actuation nasal spray 1 spray by Each Nare route once daily.    furosemide (LASIX) 40 MG tablet Take 40 mg by mouth 2  (two) times daily.    methocarbamoL (ROBAXIN) 500 MG Tab Take 500 mg by mouth 2 (two) times a day.    metoprolol tartrate (LOPRESSOR) 100 MG tablet Take 100 mg by mouth.    omeprazole (PRILOSEC) 20 MG capsule Take 20 mg by mouth once daily.    RENVELA 800 mg Tab Take 800 mg by mouth 3 (three) times daily.    sodium bicarbonate 650 MG tablet Take 650 mg by mouth.    tadalafiL (CIALIS) 2.5 mg Tab Take 2.5 mg by mouth.    TRUE METRIX GLUCOSE TEST STRIP Strp      Family History       Problem Relation (Age of Onset)    Drug abuse Brother    Heart attack Father, Brother    Hyperlipidemia Father    Hypertension Father, Brother    No Known Problems Sister    Stroke Father          Tobacco Use    Smoking status: Current Some Day Smoker     Packs/day: 0.25     Years: 10.00     Pack years: 2.50    Smokeless tobacco: Never Used   Substance and Sexual Activity    Alcohol use: No    Drug use: No    Sexual activity: Not on file     Review of Systems   Constitutional:  Positive for chills. Negative for activity change and fever.   HENT:  Negative for congestion and trouble swallowing.    Eyes:  Negative for photophobia and visual disturbance.   Respiratory:  Negative for chest tightness, shortness of breath and wheezing.    Cardiovascular:  Positive for leg swelling. Negative for chest pain and palpitations.   Gastrointestinal:  Negative for abdominal pain, constipation, nausea and vomiting.   Genitourinary:  Negative for dysuria, flank pain, frequency, hematuria and urgency.   Musculoskeletal:  Positive for arthralgias. Negative for back pain and gait problem.   Skin:  Positive for color change and wound. Negative for rash.   Neurological:  Negative for dizziness, syncope, weakness, light-headedness, numbness and headaches.   Psychiatric/Behavioral:  Negative for agitation and confusion. The patient is not nervous/anxious.    Objective:     Vital Signs (Most Recent):  Temp: 98.3 °F (36.8 °C) (02/20/22 1224)  Pulse: 88 (02/20/22  1224)  Resp: 15 (22 1224)  BP: (!) 151/72 (22 1224)  SpO2: 97 % (22 1224) Vital Signs (24h Range):  Temp:  [98.3 °F (36.8 °C)-98.8 °F (37.1 °C)] 98.3 °F (36.8 °C)  Pulse:  [] 88  Resp:  [15-24] 15  SpO2:  [94 %-99 %] 97 %  BP: (137-159)/(64-82) 151/72     Weight: 74 kg (163 lb 2.3 oz)  Body mass index is 26.33 kg/m².    Physical Exam  Vitals and nursing note reviewed.   Constitutional:       Appearance: He is well-developed. He is ill-appearing.      Comments: Agitated, baseline. Asterixis with tremors at rest and with movement    HENT:      Head: Normocephalic and atraumatic.      Mouth/Throat:      Mouth: Mucous membranes are moist.   Eyes:      General: No scleral icterus.     Conjunctiva/sclera: Conjunctivae normal.   Cardiovascular:      Rate and Rhythm: Normal rate and regular rhythm.      Heart sounds: Normal heart sounds.   Pulmonary:      Effort: Pulmonary effort is normal. No respiratory distress.      Breath sounds: Normal breath sounds. No wheezing.   Abdominal:      General: Bowel sounds are normal. There is no distension.      Palpations: Abdomen is soft.      Tenderness: There is no abdominal tenderness.   Musculoskeletal:         General: No tenderness. Normal range of motion.      Cervical back: Normal range of motion and neck supple.      Right lower le+ Pitting Edema present.      Left lower le+ Pitting Edema present.      Comments: LLE with burst blister. Severe pain with any light touch.  Signs of bunion on both big toes but also possible gout tophi deposit on right big toe. Edema I RLE >LLE. pitting   Feet:      Left foot:      Skin integrity: Skin breakdown, erythema and warmth present.   Skin:     General: Skin is warm and dry.      Capillary Refill: Capillary refill takes less than 2 seconds.   Neurological:      Mental Status: He is alert and oriented to person, place, and time.      Cranial Nerves: No cranial nerve deficit.      Comments: Severe pain with  movement of neck, wincing   Psychiatric:         Behavior: Behavior normal.         Thought Content: Thought content normal.         Judgment: Judgment normal.               Significant Labs:   All pertinent labs within the past 24 hours have been reviewed.  CBC:   Recent Labs   Lab 02/19/22  0815 02/20/22  0815   WBC 9.74 9.58   HGB 6.7* 7.9*   HCT 21.5* 25.0*    209       CMP:   Recent Labs   Lab 02/19/22  0815 02/20/22  0815    143   K 5.3* 4.4    103   CO2 19* 17*   GLU 99 108   * 172*   CREATININE 18.5* 18.4*   CALCIUM 9.2 9.3   PROT  --  5.4*   ALBUMIN  --  2.2*   BILITOT  --  0.2   ALKPHOS  --  76   AST  --  19   ALT  --  8*   ANIONGAP 20* 23*   EGFRNONAA 2.4* 2.4*       Lactic Acid:   No results for input(s): LACTATE in the last 48 hours.      Significant Imaging: I have reviewed all pertinent imaging results/findings within the past 24 hours.   MRI Cervical Spine Without Contrast  Narrative: EXAMINATION:  MRI CERVICAL SPINE WITHOUT CONTRAST    CLINICAL HISTORY:  severe neck pain, concern for infection vs c spine instability based on xray.    TECHNIQUE:  Multisequence MR images of the cervical spine were acquired without the intravenous administration of contrast.  This is a limited exam due to early termination per patient request.  Only sagittal T1, T2, and STIR images obtained.  Examination is degraded by patient motion artifact.    COMPARISON:  Cervical spine radiograph 02/17/2022, CT head 12/26/2020    FINDINGS:  Reversal of the normal cervical lordosis.  Anterolisthesis of C2 on C3, measuring approximately 0.6 cm. There is increased soft tissue and fluid within the atlanto-odontoid joint and widening of the pre dens space, measuring up to 0.7 cm, similar to prior head CT 12/26/2020.    Vertebral body heights are well maintained.  Multilevel disc desiccation and disc space narrowing, most significant at C4-C5 with severe narrowing and degenerative endplate edema.  There is  mildly increased STIR signal within the C3-C4 disc space and mild edema associated with the superior endplate of C4.  No evidence of a marrow placement process.  No acute fracture.    Evaluation of the cord is limited due to motion and lack of axial images.  However, allowing for this cord is normal in caliber without definite signal abnormality.  No epidural fluid collections.    Large prevertebral fluid collection extending from the base of the skull to C6-C7, measuring up to 9.2 cm cranio caudally.    No severe spinal canal stenosis based on sagittal images.  However, there is probable at least mild if not moderate spinal canal stenosis at C3-C4 and C4-C5.  Evaluation of the neural foramina is limited.  Impression: Limited exam due to early termination per patient request of limited diagnostic utility.    Mild edema signal within the C3-C4 disc space and superior endplate of C4, favored to be degenerative in nature but early infectious process could have similar imaging characteristics especially given history of bacteremia.  No epidural fluid collection to suggest abscess.  Recommend correlation with inflammatory markers and and follow-up as clinically indicated.    Large prevertebral/retropharyngeal fluid collection extending from the skull base to C6-C7, favored to represent an effusion with abscess not excluded given lack of IV contrast    Chronic widening of the pre-dens space, measuring 0.7 cm, associated with increased soft tissue and fluid within the atlanto-odontoid joint.  Atlantal dens instability not excluded.    Multilevel cervical spondylosis, most significant at C3-C4 and C4-C5.    This report was flagged in Epic as abnormal.    Electronically signed by resident: Brittany Victor  Date:    02/19/2022  Time:    08:14    Electronically signed by: Valdemar Mcconnell MD  Date:    02/19/2022  Time:    08:48

## 2022-02-20 NOTE — NURSING
"At this time, pt found to have removed external catheter. Urinal found squished and twisted with 100 cc of yellow urine on bedside table. When asked if he needed to be changed, pt refused and then stated he was wet all over and this RN could "just come wipe me off later if you feel like it". This RN explained to pt the need to be changed when wet as sitting in urine can cause rashes/excoriation. Pt then agreed to be changed. External catheter reapplied and new urinal provided. This RN explained to pt the importance of not removing external catheter or to use the urinal when the urge to urinate occurs. While cleaning pt, he asked "is this a nonsmoking facility" because he "smelled someone smoking outside." Educated pt that this is a nonsmoking facility and no one would be smoking inside the hospital. WCTM.   "

## 2022-02-20 NOTE — PROGRESS NOTES
[facility] - [facility]plant [facility]  Infectious Disease  Progress Note    Patient Name: Enrique Martinez  MRN: [id number]  Admission Date: 2/16/2022  Length of Stay: 3 days  Attending Physician: Dianna Tijerina MD  Primary Care Provider: Primary Doctor No    Isolation Status: No active isolations  Assessment/Plan:      * Neck pain  Non-contrast MRI had to be discontinued due to patient request.  But available images shows retropharyngeal fluid collection with abscess not excluded.  Deeply concerned that this could represent infection.  His recent bacteremia makes me concerned this could be salmonella.   He is immunocompromised due to a combination of uremia and diabetes so invasive salmonellosis is possible.    Plan    1. Continue empiric ceftriaxone IV. Continue daptomycin q 48 hours.    2. Neurosurgery following and appreciate their recommendations. Now recommending ENT consultation for possible drainage of prevertebral collection.     3. Recommend repeat MRI of C-Spine for better evaluation of prevertebral fluid collection      Retropharyngeal abscess  As outlined above.    Pain and swelling of left lower extremity  MRI foot shows brodies abscess (subacute osteomyelitis).  Will manage with empiric ceftriaxone and daptomycin for now.      Anticipated Disposition: TBD    Thank you for your consult. I will follow-up with patient. Please contact us if you have any additional questions.    Deena Maya MD  Infectious Disease  [facility] - [facility]    Subjective:     Principal Problem:Neck pain    HPI: Enrique Martinez is a 56 y.o. male with a PMH of CKD V glomerulonephropathy, insulin dependent diabetes mellitus, and CAD who presented to the ED for evaluation of worsening left foot pain and swelling. He was recently admitted at [facility] from 2/2-2/4/2022 from LLE cellulitis complicated by salmonella bacteremia after having diarrheal illness.  He was discharged home with ciprofloxacin. He reports no improvement in his  cellulitis since being discharged home and no has new blister development. ID was consulted due to recent salmonella bacteremia and new findings on MRI suspicious for left foot myelitis and persistent cellulitis infection. On initial ID encounter, pt reporting pain in left foot and ankle but denying any fever, chills, N/V, diarrhea, chest pain, headaches, or SOB.     Interval History: NAEO. Pt is becoming more encephalopathic and increasing poor mental status. More drowsy during exam and doses off in middle of conversation. Long discussions with patient and primary team about initiating dialysis. Patient appears to be more agreeable and considering starting HD. Will continue Abx therapy and continue to follow patient.     Review of Systems   Constitutional:  Negative for activity change, chills and fever.   HENT:  Negative for congestion and trouble swallowing.    Eyes:  Negative for photophobia and visual disturbance.   Respiratory:  Negative for chest tightness, shortness of breath and wheezing.    Cardiovascular:  Positive for leg swelling. Negative for chest pain and palpitations.   Gastrointestinal:  Negative for abdominal pain, constipation, nausea and vomiting.   Genitourinary:  Negative for dysuria, flank pain, frequency, hematuria and urgency.   Musculoskeletal:  Positive for arthralgias. Negative for back pain and gait problem.   Skin:  Positive for wound. Negative for rash.   Neurological:  Negative for syncope.   Psychiatric/Behavioral:  Negative for agitation and confusion. The patient is not nervous/anxious.    Objective:     Vital Signs (Most Recent):  Temp: 98.3 °F (36.8 °C) (02/20/22 1224)  Pulse: 88 (02/20/22 1224)  Resp: 15 (02/20/22 1224)  BP: (!) 151/72 (02/20/22 1224)  SpO2: 97 % (02/20/22 1224)   Vital Signs (24h Range):  Temp:  [98.3 °F (36.8 °C)-98.8 °F (37.1 °C)] 98.3 °F (36.8 °C)  Pulse:  [] 88  Resp:  [15-24] 15  SpO2:  [94 %-99 %] 97 %  BP: (137-159)/(64-84) 151/72     Weight: 74  kg (163 lb 2.3 oz)  Body mass index is 26.33 kg/m².    Estimated Creatinine Clearance: 3.9 mL/min (A) (based on SCr of 18.4 mg/dL (H)).    Physical Exam  Vitals and nursing note reviewed.   Constitutional:       General: He is not in acute distress.     Appearance: He is well-developed. He is ill-appearing.   HENT:      Head: Normocephalic and atraumatic.      Mouth/Throat:      Mouth: Mucous membranes are moist.   Eyes:      General: No scleral icterus.     Conjunctiva/sclera: Conjunctivae normal.   Cardiovascular:      Rate and Rhythm: Normal rate and regular rhythm.      Heart sounds: Normal heart sounds.   Pulmonary:      Effort: Pulmonary effort is normal. No respiratory distress.      Breath sounds: Normal breath sounds. No wheezing.   Abdominal:      General: Bowel sounds are normal. There is no distension.      Palpations: Abdomen is soft.      Tenderness: There is no abdominal tenderness.   Musculoskeletal:         General: No tenderness. Normal range of motion.      Cervical back: Normal range of motion and neck supple.      Right lower le+ Pitting Edema present.      Left lower le+ Pitting Edema present.   Feet:      Right foot:      Skin integrity: Skin integrity normal.      Left foot:      Skin integrity: Skin breakdown (Tendern to palpation at wound), erythema, warmth and dry skin present.   Skin:     General: Skin is warm and dry.      Capillary Refill: Capillary refill takes 2 to 3 seconds.   Neurological:      General: No focal deficit present.      Mental Status: He is alert and oriented to person, place, and time.   Psychiatric:      Comments: Drowsy during exam and appearing more encephalopathic       Significant Labs: All pertinent labs within the past 24 hours have been reviewed.    Significant Imaging: I have reviewed all pertinent imaging results/findings within the past 24 hours.

## 2022-02-21 ENCOUNTER — ANESTHESIA (OUTPATIENT)
Dept: TRANSPLANT | Facility: HOSPITAL | Age: 57
DRG: 673 | End: 2022-02-21
Payer: MEDICAID

## 2022-02-21 ENCOUNTER — ANESTHESIA EVENT (OUTPATIENT)
Dept: TRANSPLANT | Facility: HOSPITAL | Age: 57
DRG: 673 | End: 2022-02-21
Payer: MEDICAID

## 2022-02-21 LAB
ANION GAP SERPL CALC-SCNC: 21 MMOL/L (ref 8–16)
BACTERIA BLD CULT: NORMAL
BACTERIA BLD CULT: NORMAL
BASOPHILS # BLD AUTO: 0.03 K/UL (ref 0–0.2)
BASOPHILS NFR BLD: 0.3 % (ref 0–1.9)
BUN SERPL-MCNC: 171 MG/DL (ref 6–20)
CALCIUM SERPL-MCNC: 9.3 MG/DL (ref 8.7–10.5)
CHLORIDE SERPL-SCNC: 105 MMOL/L (ref 95–110)
CO2 SERPL-SCNC: 21 MMOL/L (ref 23–29)
CREAT SERPL-MCNC: 19 MG/DL (ref 0.5–1.4)
DIFFERENTIAL METHOD: ABNORMAL
EOSINOPHIL # BLD AUTO: 0.2 K/UL (ref 0–0.5)
EOSINOPHIL NFR BLD: 1.7 % (ref 0–8)
ERYTHROCYTE [DISTWIDTH] IN BLOOD BY AUTOMATED COUNT: 16.4 % (ref 11.5–14.5)
EST. GFR  (AFRICAN AMERICAN): 2.7 ML/MIN/1.73 M^2
EST. GFR  (NON AFRICAN AMERICAN): 2.3 ML/MIN/1.73 M^2
GLUCOSE SERPL-MCNC: 96 MG/DL (ref 70–110)
HCT VFR BLD AUTO: 26.3 % (ref 40–54)
HGB BLD-MCNC: 8.2 G/DL (ref 14–18)
IMM GRANULOCYTES # BLD AUTO: 0.04 K/UL (ref 0–0.04)
IMM GRANULOCYTES NFR BLD AUTO: 0.5 % (ref 0–0.5)
LYMPHOCYTES # BLD AUTO: 1.2 K/UL (ref 1–4.8)
LYMPHOCYTES NFR BLD: 13.6 % (ref 18–48)
MCH RBC QN AUTO: 27.8 PG (ref 27–31)
MCHC RBC AUTO-ENTMCNC: 31.2 G/DL (ref 32–36)
MCV RBC AUTO: 89 FL (ref 82–98)
MONOCYTES # BLD AUTO: 1.4 K/UL (ref 0.3–1)
MONOCYTES NFR BLD: 16.2 % (ref 4–15)
NEUTROPHILS # BLD AUTO: 5.9 K/UL (ref 1.8–7.7)
NEUTROPHILS NFR BLD: 67.7 % (ref 38–73)
NRBC BLD-RTO: 0 /100 WBC
PHOSPHATE SERPL-MCNC: 10.9 MG/DL (ref 2.7–4.5)
PLATELET # BLD AUTO: 220 K/UL (ref 150–450)
PMV BLD AUTO: 10.8 FL (ref 9.2–12.9)
POCT GLUCOSE: 114 MG/DL (ref 70–110)
POTASSIUM SERPL-SCNC: 4.2 MMOL/L (ref 3.5–5.1)
RBC # BLD AUTO: 2.95 M/UL (ref 4.6–6.2)
SODIUM SERPL-SCNC: 147 MMOL/L (ref 136–145)
WBC # BLD AUTO: 8.77 K/UL (ref 3.9–12.7)

## 2022-02-21 PROCEDURE — 99232 PR SUBSEQUENT HOSPITAL CARE,LEVL II: ICD-10-PCS | Mod: NTX,,, | Performed by: NEUROLOGICAL SURGERY

## 2022-02-21 PROCEDURE — 20600001 HC STEP DOWN PRIVATE ROOM: Mod: NTX

## 2022-02-21 PROCEDURE — 63600175 PHARM REV CODE 636 W HCPCS: Mod: NTX | Performed by: INTERNAL MEDICINE

## 2022-02-21 PROCEDURE — 99232 SBSQ HOSP IP/OBS MODERATE 35: CPT | Mod: NTX,,, | Performed by: NEUROLOGICAL SURGERY

## 2022-02-21 PROCEDURE — 25000003 PHARM REV CODE 250: Mod: NTX | Performed by: INTERNAL MEDICINE

## 2022-02-21 PROCEDURE — 99233 SBSQ HOSP IP/OBS HIGH 50: CPT | Mod: NTX,,, | Performed by: HOSPITALIST

## 2022-02-21 PROCEDURE — 36415 COLL VENOUS BLD VENIPUNCTURE: CPT | Mod: NTX | Performed by: NURSE PRACTITIONER

## 2022-02-21 PROCEDURE — 84100 ASSAY OF PHOSPHORUS: CPT | Mod: NTX | Performed by: HOSPITALIST

## 2022-02-21 PROCEDURE — 99233 SBSQ HOSP IP/OBS HIGH 50: CPT | Mod: NTX,,, | Performed by: INTERNAL MEDICINE

## 2022-02-21 PROCEDURE — 80053 COMPREHEN METABOLIC PANEL: CPT | Mod: NTX | Performed by: NURSE PRACTITIONER

## 2022-02-21 PROCEDURE — 25000003 PHARM REV CODE 250: Mod: NTX | Performed by: PHYSICIAN ASSISTANT

## 2022-02-21 PROCEDURE — 25000003 PHARM REV CODE 250: Mod: NTX

## 2022-02-21 PROCEDURE — 99356 PR PROLONGED SERV,INPATIENT,1ST HR: CPT | Mod: NTX,,, | Performed by: HOSPITALIST

## 2022-02-21 PROCEDURE — 63600175 PHARM REV CODE 636 W HCPCS: Mod: NTX | Performed by: STUDENT IN AN ORGANIZED HEALTH CARE EDUCATION/TRAINING PROGRAM

## 2022-02-21 PROCEDURE — 99233 PR SUBSEQUENT HOSPITAL CARE,LEVL III: ICD-10-PCS | Mod: NTX,,, | Performed by: HOSPITALIST

## 2022-02-21 PROCEDURE — 63600175 PHARM REV CODE 636 W HCPCS: Mod: NTX | Performed by: HOSPITALIST

## 2022-02-21 PROCEDURE — 99356 PR PROLONGED SERV,INPATIENT,1ST HR: ICD-10-PCS | Mod: NTX,,, | Performed by: HOSPITALIST

## 2022-02-21 PROCEDURE — 36415 COLL VENOUS BLD VENIPUNCTURE: CPT | Mod: NTX | Performed by: HOSPITALIST

## 2022-02-21 PROCEDURE — 83735 ASSAY OF MAGNESIUM: CPT | Mod: NTX | Performed by: NURSE PRACTITIONER

## 2022-02-21 PROCEDURE — 99233 PR SUBSEQUENT HOSPITAL CARE,LEVL III: ICD-10-PCS | Mod: NTX,,, | Performed by: INTERNAL MEDICINE

## 2022-02-21 PROCEDURE — 99357 PR PROLONGED SERV,INPATIENT,EA ADD 30 MIN: CPT | Mod: NTX,,, | Performed by: HOSPITALIST

## 2022-02-21 PROCEDURE — 80048 BASIC METABOLIC PNL TOTAL CA: CPT | Mod: NTX | Performed by: HOSPITALIST

## 2022-02-21 PROCEDURE — 84100 ASSAY OF PHOSPHORUS: CPT | Mod: 91,NTX | Performed by: NURSE PRACTITIONER

## 2022-02-21 PROCEDURE — 25000003 PHARM REV CODE 250: Mod: NTX | Performed by: HOSPITALIST

## 2022-02-21 PROCEDURE — 99357 PR PROLONGED SERV,INPATIENT,EA ADD 30 MIN: ICD-10-PCS | Mod: NTX,,, | Performed by: HOSPITALIST

## 2022-02-21 PROCEDURE — C9113 INJ PANTOPRAZOLE SODIUM, VIA: HCPCS | Mod: NTX | Performed by: HOSPITALIST

## 2022-02-21 PROCEDURE — 85025 COMPLETE CBC W/AUTO DIFF WBC: CPT | Mod: NTX | Performed by: HOSPITALIST

## 2022-02-21 RX ORDER — LORAZEPAM 2 MG/ML
2 INJECTION INTRAMUSCULAR ONCE
Status: COMPLETED | OUTPATIENT
Start: 2022-02-21 | End: 2022-02-21

## 2022-02-21 RX ADMIN — PANTOPRAZOLE SODIUM 40 MG: 40 INJECTION, POWDER, FOR SOLUTION INTRAVENOUS at 09:02

## 2022-02-21 RX ADMIN — LABETALOL HYDROCHLORIDE 200 MG: 100 TABLET, FILM COATED ORAL at 08:02

## 2022-02-21 RX ADMIN — SODIUM BICARBONATE 650 MG TABLET 650 MG: at 09:02

## 2022-02-21 RX ADMIN — OXYCODONE HYDROCHLORIDE 10 MG: 10 TABLET ORAL at 12:02

## 2022-02-21 RX ADMIN — SODIUM BICARBONATE 650 MG TABLET 650 MG: at 02:02

## 2022-02-21 RX ADMIN — ACETAMINOPHEN 1000 MG: 500 TABLET ORAL at 09:02

## 2022-02-21 RX ADMIN — CEFTRIAXONE SODIUM 2 G: 2 INJECTION, SOLUTION INTRAVENOUS at 02:02

## 2022-02-21 RX ADMIN — ARIPIPRAZOLE 15 MG: 5 TABLET ORAL at 09:02

## 2022-02-21 RX ADMIN — NYSTATIN 500000 UNITS: 500000 SUSPENSION ORAL at 09:02

## 2022-02-21 RX ADMIN — POLYETHYLENE GLYCOL 3350 17 G: 17 POWDER, FOR SOLUTION ORAL at 08:02

## 2022-02-21 RX ADMIN — PANTOPRAZOLE SODIUM 40 MG: 40 INJECTION, POWDER, FOR SOLUTION INTRAVENOUS at 08:02

## 2022-02-21 RX ADMIN — COLCHICINE 0.3 MG: 0.6 TABLET, FILM COATED ORAL at 09:02

## 2022-02-21 RX ADMIN — FOLIC ACID 1 MG: 1 TABLET ORAL at 08:02

## 2022-02-21 RX ADMIN — METHOCARBAMOL 750 MG: 750 TABLET ORAL at 03:02

## 2022-02-21 RX ADMIN — NYSTATIN 500000 UNITS: 500000 SUSPENSION ORAL at 02:02

## 2022-02-21 RX ADMIN — ATORVASTATIN CALCIUM 80 MG: 20 TABLET, FILM COATED ORAL at 08:02

## 2022-02-21 RX ADMIN — ONDANSETRON 8 MG: 8 TABLET, ORALLY DISINTEGRATING ORAL at 09:02

## 2022-02-21 RX ADMIN — LABETALOL HYDROCHLORIDE 200 MG: 100 TABLET, FILM COATED ORAL at 09:02

## 2022-02-21 RX ADMIN — CLONIDINE HYDROCHLORIDE 0.4 MG: 0.1 TABLET ORAL at 08:02

## 2022-02-21 RX ADMIN — ACETAMINOPHEN 1000 MG: 500 TABLET ORAL at 04:02

## 2022-02-21 RX ADMIN — AMLODIPINE BESYLATE 10 MG: 10 TABLET ORAL at 08:02

## 2022-02-21 RX ADMIN — SODIUM BICARBONATE 650 MG TABLET 650 MG: at 08:02

## 2022-02-21 RX ADMIN — OXYCODONE HYDROCHLORIDE 10 MG: 10 TABLET ORAL at 04:02

## 2022-02-21 RX ADMIN — CLONIDINE HYDROCHLORIDE 0.4 MG: 0.1 TABLET ORAL at 09:02

## 2022-02-21 RX ADMIN — SENNOSIDES AND DOCUSATE SODIUM 1 TABLET: 50; 8.6 TABLET ORAL at 08:02

## 2022-02-21 RX ADMIN — HYDRALAZINE HYDROCHLORIDE 50 MG: 50 TABLET ORAL at 09:02

## 2022-02-21 RX ADMIN — HYDRALAZINE HYDROCHLORIDE 50 MG: 50 TABLET ORAL at 02:02

## 2022-02-21 RX ADMIN — DAPTOMYCIN 445 MG: 350 INJECTION, POWDER, LYOPHILIZED, FOR SOLUTION INTRAVENOUS at 09:02

## 2022-02-21 RX ADMIN — SEVELAMER CARBONATE 1600 MG: 800 TABLET, FILM COATED ORAL at 02:02

## 2022-02-21 RX ADMIN — SEVELAMER CARBONATE 1600 MG: 800 TABLET, FILM COATED ORAL at 09:02

## 2022-02-21 RX ADMIN — LORAZEPAM 2 MG: 2 INJECTION INTRAMUSCULAR; INTRAVENOUS at 04:02

## 2022-02-21 RX ADMIN — ALUMINUM HYDROXIDE, MAGNESIUM HYDROXIDE, AND SIMETHICONE 30 ML: 200; 200; 20 SUSPENSION ORAL at 12:02

## 2022-02-21 RX ADMIN — HYDRALAZINE HYDROCHLORIDE 50 MG: 50 TABLET ORAL at 04:02

## 2022-02-21 RX ADMIN — SEVELAMER CARBONATE 1600 MG: 800 TABLET, FILM COATED ORAL at 08:02

## 2022-02-21 RX ADMIN — NYSTATIN 500000 UNITS: 500000 SUSPENSION ORAL at 08:02

## 2022-02-21 RX ADMIN — CLONIDINE HYDROCHLORIDE 0.4 MG: 0.1 TABLET ORAL at 02:02

## 2022-02-21 RX ADMIN — COLCHICINE 0.3 MG: 0.6 TABLET, FILM COATED ORAL at 08:02

## 2022-02-21 NOTE — SUBJECTIVE & OBJECTIVE
Interval History: No acute nsgy events. PT awaiting cervical flex ex.     Medications:  Continuous Infusions:  Scheduled Meds:   acetaminophen  1,000 mg Oral Q8H    amLODIPine  10 mg Oral Daily    ARIPiprazole  15 mg Oral QHS    aspirin  81 mg Oral Daily    atorvastatin  80 mg Oral Daily    cefTRIAXone (ROCEPHIN) IVPB  2 g Intravenous Q24H    cloNIDine  0.4 mg Oral TID    colchicine  0.3 mg Oral BID    DAPTOmycin (CUBICIN)  IV  6 mg/kg Intravenous Q48H    folic acid  1 mg Oral Daily    hydrALAZINE  50 mg Oral Q8H    labetalol  200 mg Oral Q12H    NON FORMULARY MEDICATION   Injection Every Mon, Fri    nystatin  500,000 Units Oral QID (WM & HS)    pantoprazole  40 mg Intravenous BID    polyethylene glycol  17 g Oral Daily    senna-docusate 8.6-50 mg  1 tablet Oral Daily    sevelamer carbonate  1,600 mg Oral TID    sodium bicarbonate  650 mg Oral TID     PRN Meds:sodium chloride, albuterol-ipratropium, aluminum-magnesium hydroxide-simethicone, dextrose 10%, dextrose 10%, glucagon (human recombinant), glucose, glucose, lorazepam, melatonin, methocarbamoL, morphine, morphine, naloxone, ondansetron, oxyCODONE, oxyCODONE, prochlorperazine, simethicone, sodium chloride 0.9%     Review of Systems  Objective:     Weight: 70.5 kg (155 lb 6.8 oz)  Body mass index is 25.09 kg/m².  Vital Signs (Most Recent):  Temp: 97.7 °F (36.5 °C) (02/21/22 1141)  Pulse: 82 (02/21/22 1141)  Resp: 18 (02/21/22 1141)  BP: 135/73 (02/21/22 1141)  SpO2: 97 % (02/21/22 1141)   Vital Signs (24h Range):  Temp:  [97.7 °F (36.5 °C)-99.1 °F (37.3 °C)] 97.7 °F (36.5 °C)  Pulse:  [79-92] 82  Resp:  [17-18] 18  SpO2:  [93 %-98 %] 97 %  BP: (115-145)/(57-77) 135/73     Date 02/21/22 0700 - 02/22/22 0659   Shift [id number] 4420-7709 7610-0659 24 Hour Total   INTAKE   Shift Total(mL/kg)       OUTPUT   Urine(mL/kg/hr) 100   100   Shift Total(mL/kg) 100(1.4)   100(1.4)   Weight (kg) 70.5 70.5 70.5 70.5                   Male External Urinary Catheter 02/18/22  0418 Small (Active)   Collection Container Urimeter 02/19/22 2330   Securement Method secured to top of thigh w/ adhesive device 02/19/22 2330   Skin no redness;no breakdown 02/19/22 2330   Tolerance no signs/symptoms of discomfort;did not assist with appliance change 02/19/22 2330   Output (mL) 450 mL 02/19/22 1524   Catheter Change Date 02/19/22 02/19/22 2330   Catheter Change Time 2100 02/19/22 2330       Physical Exam    Neurosurgery Physical Exam    5/5 strength   LROM left shoulder       full Range of motion in neck   No hoffmans or clonus     Significant Labs:  Recent Labs   Lab 02/20/22  0815 02/21/22  0655    96    147*   K 4.4 4.2    105   CO2 17* 21*   * 171*   CREATININE 18.4* 19.0*   CALCIUM 9.3 9.3       Recent Labs   Lab 02/20/22  0815 02/21/22  0655   WBC 9.58 8.77   HGB 7.9* 8.2*   HCT 25.0* 26.3*    220       No results for input(s): LABPT, INR, APTT in the last 48 hours.  Microbiology Results (last 7 days)       Procedure Component Value Units Date/Time    Blood Culture #2 **CANNOT BE ORDERED STAT** [[id number]] Collected: 02/16/22 1806    Order Status: Completed Specimen: Blood from Peripheral, Antecubital, Left Updated: 02/20/22 2012     Blood Culture, Routine No Growth to date      No Growth to date      No Growth to date      No Growth to date      No Growth to date    Blood Culture #1 **CANNOT BE ORDERED STAT** [[id number]] Collected: 02/16/22 1807    Order Status: Completed Specimen: Blood from Peripheral, Hand, Right Updated: 02/20/22 2012     Blood Culture, Routine No Growth to date      No Growth to date      No Growth to date      No Growth to date      No Growth to date    Stool culture **CANNOT BE ORDERED STAT** [[id number]]     Order Status: No result Specimen: Stool     Clostridium difficile EIA [[id number]]     Order Status: Canceled Specimen: Stool           All pertinent labs from the last 24 hours have been reviewed.    Significant Diagnostics:  CT:  No results found in the last 24 hours.  I have reviewed all pertinent imaging results/findings within the past 24 hours.  I have reviewed and interpreted all pertinent imaging results/findings within the past 24 hours.

## 2022-02-21 NOTE — CONSULTS
[facility] - Transplant Stepdown  Otorhinolaryngology-Head & Neck Surgery  Consult Note    Patient Name: Enrique Martinez  MRN: [id number]  Code Status: Full Code  Admission Date: 2/16/2022  Hospital Length of Stay: 4 days  Attending Physician: Dianna Tijerina MD  Primary Care Provider: Primary Doctor No    Patient information was obtained from patient and past medical records.     Inpatient consult to ENT  Consult performed by: Zhang Bartlett MD  Consult ordered by: Dianna Tijerina MD        Subjective:     Chief Complaint/Reason for Admission: posterior neck pain    History of Present Illness: 57 y/o M with h/o CKD V, DM, CAD, salmonella bacteremia who presented for evaluation of worsening left foot pain. Since presentation, he has also complained of neck pain. MRI C spine non contrast obtained showing prevertebral C1 to C6 anterior collection and NSGY was consulted. NSGY recommended ENT consult for drainage of prevertebral collection. Patient seen at bedside this morning. He denies any trouble swallowing at this time. Says he has had trouble at times with this but rarely. Denies any trouble breathing, throat pain, voice changes, fevers, chills, or weight loss. Says he has pain over his posterior cervical spine, and pointed with his finger. When asked if he was amenable to flexible laryngoscopy, patient politely refused and stated he didn't think this was necessary.          Medications:  Continuous Infusions:  Scheduled Meds:   acetaminophen  1,000 mg Oral Q8H    amLODIPine  10 mg Oral Daily    ARIPiprazole  15 mg Oral QHS    aspirin  81 mg Oral Daily    atorvastatin  80 mg Oral Daily    cefTRIAXone (ROCEPHIN) IVPB  2 g Intravenous Q24H    cloNIDine  0.4 mg Oral TID    colchicine  0.3 mg Oral BID    DAPTOmycin (CUBICIN)  IV  6 mg/kg Intravenous Q48H    folic acid  1 mg Oral Daily    hydrALAZINE  50 mg Oral Q8H    labetalol  200 mg Oral Q12H    NON FORMULARY MEDICATION   Injection Every Mon, Fri     nystatin  500,000 Units Oral QID (WM & HS)    pantoprazole  40 mg Intravenous BID    polyethylene glycol  17 g Oral Daily    senna-docusate 8.6-50 mg  1 tablet Oral Daily    sevelamer carbonate  1,600 mg Oral TID    sodium bicarbonate  650 mg Oral TID     PRN Meds:sodium chloride, albuterol-ipratropium, aluminum-magnesium hydroxide-simethicone, dextrose 10%, dextrose 10%, glucagon (human recombinant), glucose, glucose, lorazepam, melatonin, methocarbamoL, morphine, morphine, naloxone, ondansetron, oxyCODONE, oxyCODONE, prochlorperazine, simethicone, sodium chloride 0.9%     No current facility-administered medications on file prior to encounter.     Current Outpatient Medications on File Prior to Encounter   Medication Sig    insulin (LANTUS SOLOSTAR U-100 INSULIN) glargine 100 units/mL (3mL) SubQ pen Inject 5 Units into the skin once daily.    albuterol (PROVENTIL/VENTOLIN HFA) 90 mcg/actuation inhaler Inhale 2 puffs into the lungs every 6 (six) hours as needed for Wheezing or Shortness of Breath. Rescue    amlodipine (NORVASC) 10 MG tablet Take 10 mg by mouth once daily.    ARIPiprazole (ABILIFY) 15 MG Tab Take 1 tablet (15 mg total) by mouth every evening.    aspirin (ECOTRIN) 81 MG EC tablet Take 1 tablet (81 mg total) by mouth once daily.    atorvastatin (LIPITOR) 80 MG tablet Take 1 tablet (80 mg total) by mouth once daily.    blood-glucose meter kit Use as instructed    calcitRIOL (ROCALTROL) 0.5 MCG Cap Take 0.5 mcg by mouth once daily.    chlorthalidone (HYGROTEN) 50 MG Tab Take 50 mg by mouth once daily.     cloNIDine (CATAPRES) 0.2 MG tablet Take 0.4 mg by mouth 3 (three) times daily.     colchicine (COLCRYS) 0.6 mg tablet Take 0.6 mg by mouth daily as needed.    ergocalciferol (ERGOCALCIFEROL) 50,000 unit Cap Take 50,000 Units by mouth every 30 days.     famotidine (PEPCID) 20 MG tablet Take 20 mg by mouth daily as needed for Heartburn.     ferrous gluconate (FERGON) 324 MG tablet  Take 324 mg by mouth 2 (two) times a day.     fluticasone (FLONASE) 50 mcg/actuation nasal spray 1 spray by Each Nare route once daily.    furosemide (LASIX) 40 MG tablet Take 40 mg by mouth 2 (two) times daily.    methocarbamoL (ROBAXIN) 500 MG Tab Take 500 mg by mouth 2 (two) times a day.    metoprolol tartrate (LOPRESSOR) 100 MG tablet Take 100 mg by mouth.    omeprazole (PRILOSEC) 20 MG capsule Take 20 mg by mouth once daily.    RENVELA 800 mg Tab Take 800 mg by mouth 3 (three) times daily.    sodium bicarbonate 650 MG tablet Take 650 mg by mouth.    tadalafiL (CIALIS) 2.5 mg Tab Take 2.5 mg by mouth.    TRUE METRIX GLUCOSE TEST STRIP Strp        Review of patient's allergies indicates:  No Known Allergies    Past Medical History:   Diagnosis Date    Anemia     Coronary artery disease     Diabetes mellitus, type 2     GERD (gastroesophageal reflux disease)     Gout     Hydrocele in adult     bilateral    Hyperlipidemia     Hypertension     Inguinal hernia     bilateral    Membranous glomerulonephritis     Nephrotic range proteinuria     Nephrotic syndrome     Obesity     Umbilical hernia      Past Surgical History:   Procedure Laterality Date    ABDOMINAL SURGERY  1980s    CARDIAC CATHETERIZATION  2007    x 2    COLONOSCOPY N/A 4/5/2019    Procedure: COLONOSCOPY;  Surgeon: Jose L Carty MD;  Location: [facility] ([address] [place] [address]);  Service: Colon and Rectal;  Laterality: N/A;    CORONARY STENT PLACEMENT  2007     Family History       Problem Relation (Age of Onset)    Drug abuse Brother    Heart attack Father, Brother    Hyperlipidemia Father    Hypertension Father, Brother    No Known Problems Sister    Stroke Father          Tobacco Use    Smoking status: Current Some Day Smoker     Packs/day: 0.25     Years: 10.00     Pack years: 2.50    Smokeless tobacco: Never Used   Substance and Sexual Activity    Alcohol use: No    Drug use: No    Sexual activity: Not on file     Review of  Systems   Constitutional:  Negative for fever and unexpected weight change.   HENT:  Positive for trouble swallowing (intermittent). Negative for sore throat and voice change.    Respiratory:  Negative for shortness of breath.    Musculoskeletal:  Positive for neck pain.   Skin:  Positive for wound.   Objective:     Vital Signs (Most Recent):  Temp: 97.7 °F (36.5 °C) (02/21/22 0315)  Pulse: 83 (02/21/22 0702)  Resp: 18 (02/21/22 0427)  BP: (!) 144/76 (02/21/22 0315)  SpO2: 98 % (02/21/22 0315)   Vital Signs (24h Range):  Temp:  [97.7 °F (36.5 °C)-99.1 °F (37.3 °C)] 97.7 °F (36.5 °C)  Pulse:  [79-92] 83  Resp:  [15-21] 18  SpO2:  [93 %-99 %] 98 %  BP: (115-159)/(57-82) 144/76     Weight: 70.5 kg (155 lb 6.8 oz)  Body mass index is 25.09 kg/m².        Physical Exam  Constitutional: NAD. Voice clear.  Eyes: EOM I Bilaterally  Head/Face: Normocephalic.  Negative paranasal sinus pressure/tenderness.  Salivary glands WNL.  House Brackmann I Bilaterally.  Right Ear: Auricle WNL.  Left Ear: Auricle WNL.  Nose: External nasal skin without masses/lesions.  Oral Cavity: Gingiva/lips WNL. Oral Tongue mobile. Hard Palate WNL.   Oropharynx: No masses/lesions noted. Tonsillar fossa/pharyngeal wall without lesions. Posterior oropharynx WNL, no significant swelling noted per orally.  Soft palate without masses. Midline uvula.   Neck/Lymphatic: No LAD I-VI bilaterally. No TTP. No thyromegaly.  No masses noted on exam. Pain over posterior cervical spine.  Neuro/Psychiatric: AOx3.  Normal mood and affect.   Respiratory: Normal respiratory effort, no stridor/stertor, no retractions noted.    Flexible Fiberoptic Laryngoscopy   Patient refused exam.      Significant Labs:  BMP:   Recent Labs   Lab 02/19/22  0815 02/20/22  0815 02/21/22  0655   GLU 99   < > 96      < > 105   CO2 19*   < > 21*   *   < > 171*   CREATININE 18.5*   < > 19.0*   CALCIUM 9.2   < > 9.3   MG 1.7  --   --     < > = values in this interval not displayed.      CBC:   Recent Labs   Lab 02/21/22  0655   WBC 8.77   RBC 2.95*   HGB 8.2*   HCT 26.3*      MCV 89   MCH 27.8   MCHC 31.2*       Significant Diagnostics:  MRI: I have reviewed all pertinent results/findings within the past 24 hours and my personal findings are:  prevertebral collection from C1-C6      Assessment/Plan:     Prevertebral Fluid Collection  57 y/o M with h/o CKD V, DM, CAD, salmonella bacteremia, with posterior cervical neck pain. MRI C spine non contrast obtained showing prevertebral C1 to C6 anterior collection. NSGY recommended ENT consult for drainage of prevertebral collection. Patient refused flexible laryngoscopy.    - recommend MRI c spine with contrast if possible given CKD  - recommend evaluation by ortho spine  - discussed with Dr. Arredondo  - please call ENT with questions or concerns      VTE Risk Mitigation (From admission, onward)         Ordered     IP VTE HIGH RISK PATIENT  Once         02/17/22 0151     Place sequential compression device  Until discontinued         02/17/22 0151                Thank you for your consult. I will follow-up with patient. Please contact us if you have any additional questions.    Zhang Bartlett MD  Otorhinolaryngology-Head & Neck Surgery  [facility] - Transplant Step[facility]

## 2022-02-21 NOTE — ASSESSMENT & PLAN NOTE
Has history of gout and hyperuricemia. Uric acid elevated at 15. S/p NS 50 cc/hr for 10 hours. Ankle pain ddx includes gout vs cellulitis vs septic joint. Podiatry has low concern for osteo    - Colchicine 0.3 BID  - scheduled tylenol 1 g TID for pain  - Hold diuretics to avoid concentrating uric acid in serum

## 2022-02-21 NOTE — PROGRESS NOTES
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 Previously, he has declined HD in the past, never receiving a fistula. He is on the transplant wait list. Patient exhibits signs of uremia including fatigue, poor appetite, and mental slowing. On labs, he is hyperkalemic, uremic, AGMA, hyperphosphatemic. GFR is 2-3, which is reduced from previous GFR 13. Home meds include sodium bicarb, Renvela, and calcitriol. 2+ pitting edema on B/L lower extremities. Renal US from Jan 2022 shows sonographic evidence of chronic medical renal disease and bilateral simple and minimally complex renal cysts.  sCr one year ago was 4.5, now 18.6. UPCr ratio 1.11 (Dec 2020)  2/18: Patient increasingly somnolent, lethargic. Hospital med in process of getting in touch with Dr. Lee, who the patient follows with outpatient.     Plan:   - Recommend HD if patient is amenable. Discussed HD with patient as possible emergent need in this setting and as a bridge to transplant. Discussed extensively that his labs and clinical decline from uremia would preclude him from getting a kidney should one become available, rendering his time on the wait list pointless.   - Consider palliative care consult for GOC/hospice as patient's uremia is worsening to the point where he does not have capacity. If he continues to refuse  dialysis, will have to clarify with family if he would prefer to transition to comfort care over getting dialysis  - HD tonight if he has central line placed.   - Shift potassium as needed  - Continue phos binder sevelamer 1600 TID  - DC Nabicarb 650 TID  - Hold calcitriol in setting of hyperphosphatemia. Will re-start when phos reaches normal level.   - Trend Cr  - Strict I&Os  - Avoid nephrotoxic agents  - Renally adjust medications           Gout  Has history of gout and hyperuricemia. Uric acid elevated at 15. S/p NS 50 cc/hr for 10 hours. Ankle pain ddx includes gout vs cellulitis vs septic joint. Podiatry has low concern for osteo    - Colchicine 0.3 BID  - scheduled tylenol 1 g TID for pain  - Hold diuretics to avoid concentrating uric acid in serum          Increased anion gap metabolic acidosis  Initial labs significant for bicarb of 18, anion gap 19. AGMA likely 2/2 uremia. Continued AGMA,, uremia during admssion. Bun slightly decreased to 180s after IVF. Improved to 21 on 2/21.     - HD if amenable.   - DC sodium bicarb    Hyperphosphatemia  Hyperphosphatemia at 10.9.     - Continue Sevelamer 1600 TID    Hyperkalemia  Hyperkalemic at 5.5 on admission, now 5.3. No EKG changes of hyperkalemia on initial EKG.     - Shift as needed by primary.     SHANNAN (iron deficiency anemia)  Baseline Hb 9-10, Hb 7.2 on admission. Iron low at 10, iron sat low at 4%, ferritin elevated 384, transferrin low at 174.   Antiplatelet/anticoagulation: aspirin    Plan:  - Trend CBC daily  - No role for IV iron in setting of infection  - Transfuse with goal Hb > 7      Type 2 diabetes mellitus  HbA1c 7.6 in Feb 2022, BG during hospitalization at goal. Takes insulin glargine at home.     - ISS as needed.     Anemia due to chronic kidney disease  Hemoglobin 10.1 one month ago, now 7.2. Normocytic anemia with low iron, iron sat, elevated ferritin.     - May need EPO/IV iron with HD    Membranous glomerulonephritis  Has history of  membranous glomerulonephritis. Previous renal biopsy in 2013 showed 50% glomerulosclerosis. [place]. [place] [facility] [place]     Renovascular hypertension  Patient remains hypertensive in 170s/89 after admission. On amlodipine, clonidine, labetolol, and hydralazine scheduled.     - Will defer to primary team for management      Thank you for your consult. I will sign off. Please contact us if you have any additional questions.      Subjective:     HPI: Mr. Martinez is a 56 y.o. 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 He was recently admitted at [facility] from 2/2-2/4/2022 from LLE cellulitis. He was found to have salmonella bacteremia suspected due to diarrheal illness. He was discharged with cipro for which he reports compliance. He has had no improvement in his pain, redness, or swelling of his left foot and leg. He has also developed a new blister on his left foot, with MRI foot concerning for osteomyelitis and showing soft tissue swelling c/w cellulitis. He is admitted for treatment of suspected osteomyelitis.     He follows with Dr. Cardoza (nephrology), has adamantly declined HD in the past and would rather wait for transplant. He reports being on the transplant list for the past 4 years. His last renal biopsy in 2013 showed 50% glomerulosclerosis. Started on calcitriol 0.5, hx of elevated PTH at 921. Patient complains of decreased appetite, neck cramps, fatigue. He still urinates 4-5 times/day. He reports his B/L LE edema has worsened over the past few weeks. No dyspnea, chest pain, dysuria, hematuria. Nephro consulted for HD initiation.      Labs are significant for hyperkalemia 5.5, bicarb 18, , phosphate 10.4, Cr 19.7, GFR 2.2, ESR 97, CRP  33.4. . CBC significant for Hgb of 7.2, white count 14. Previous echo showed EF of 65%.       Interval History: patient amendable to central line placement later this afternoon whenever his wife comes to visit after 4/5pm. HD orders to be placed pending central line placement. BUN/Cr stable in 170s/19. Declined flexible laryngoscopy offered by ENT.     Review of patient's allergies indicates:  No Known Allergies  Current Facility-Administered Medications   Medication Frequency    0.9%  NaCl infusion (for blood administration) Q24H PRN    acetaminophen tablet 1,000 mg Q8H    albuterol-ipratropium 2.5 mg-0.5 mg/3 mL nebulizer solution 3 mL Q4H PRN    aluminum-magnesium hydroxide-simethicone 200-200-20 mg/5 mL suspension 30 mL QID PRN    amLODIPine tablet 10 mg Daily    ARIPiprazole tablet 15 mg QHS    aspirin EC tablet 81 mg Daily    atorvastatin tablet 80 mg Daily    cefTRIAXone (ROCEPHIN) 2 g/50 mL D5W IVPB Q24H    cloNIDine tablet 0.4 mg TID    colchicine split tablet 0.3 mg BID    DAPTOmycin (CUBICIN) 445 mg in sodium chloride 0.9% IVPB Q48H    dextrose 10% bolus 125 mL PRN    dextrose 10% bolus 250 mL PRN    folic acid tablet 1 mg Daily    glucagon (human recombinant) injection 1 mg PRN    glucose chewable tablet 16 g PRN    glucose chewable tablet 24 g PRN    hydrALAZINE tablet 50 mg Q8H    labetaloL tablet 200 mg Q12H    lorazepam injection 1 mg On Call Procedure    melatonin tablet 6 mg Nightly PRN    methocarbamoL tablet 750 mg QID PRN    morphine injection 2 mg Once PRN    morphine injection 2 mg On Call Procedure    naloxone 0.4 mg/mL injection 0.02 mg PRN    NON FORMULARY MEDICATION Every Mon, Fri    nystatin 100,000 unit/mL suspension 500,000 Units QID (WM & HS)    ondansetron disintegrating tablet 8 mg Q8H PRN    oxyCODONE immediate release tablet 5 mg Q6H PRN    oxyCODONE immediate release tablet Tab 10 mg Q4H PRN    pantoprazole injection 40 mg BID     polyethylene glycol packet 17 g Daily    prochlorperazine injection Soln 5 mg Q6H PRN    senna-docusate 8.6-50 mg per tablet 1 tablet Daily    sevelamer carbonate tablet 1,600 mg TID    simethicone chewable tablet 80 mg QID PRN    sodium bicarbonate tablet 650 mg TID    sodium chloride 0.9% flush 10 mL Q8H PRN       Objective:     Vital Signs (Most Recent):  Temp: 97.7 °F (36.5 °C) (02/21/22 1141)  Pulse: 82 (02/21/22 1141)  Resp: 18 (02/21/22 1141)  BP: 135/73 (02/21/22 1141)  SpO2: 97 % (02/21/22 1141)  O2 Device (Oxygen Therapy): room air (02/21/22 0848) Vital Signs (24h Range):  Temp:  [97.7 °F (36.5 °C)-99.1 °F (37.3 °C)] 97.7 °F (36.5 °C)  Pulse:  [79-92] 82  Resp:  [17-18] 18  SpO2:  [93 %-98 %] 97 %  BP: (115-145)/(57-77) 135/73     Weight: 70.5 kg (155 lb 6.8 oz) (02/21/22 0400)  Body mass index is 25.09 kg/m².  Body surface area is 1.81 meters squared.    I/O last 3 completed shifts:  In: 536 [P.O.:536]  Out: 625 [Urine:625]    Physical Exam  Vitals and nursing note reviewed.   Constitutional:       Appearance: He is well-developed and overweight. He is ill-appearing.   HENT:      Head: Normocephalic and atraumatic.      Nose: Nose normal.      Mouth/Throat:      Mouth: Mucous membranes are moist.   Eyes:      General: No scleral icterus.     Conjunctiva/sclera: Conjunctivae normal.   Cardiovascular:      Rate and Rhythm: Normal rate and regular rhythm.      Heart sounds: Normal heart sounds.   Pulmonary:      Effort: Pulmonary effort is normal. No respiratory distress.      Breath sounds: Normal breath sounds. No wheezing or rales.   Abdominal:      General: Bowel sounds are normal. There is no distension.      Palpations: Abdomen is soft.      Tenderness: There is no abdominal tenderness. There is no guarding.   Musculoskeletal:         General: Swelling and signs of injury present. Normal range of motion.      Cervical back: Normal range of motion and neck supple.      Right lower leg: No edema.       Left lower leg: No edema.      Comments: Improved LE swelling   Feet:      Right foot:      Skin integrity: Skin integrity normal.      Left foot:      Skin integrity: Skin breakdown, erythema, warmth and dry skin present.   Skin:     General: Skin is warm and dry.      Capillary Refill: Capillary refill takes less than 2 seconds.      Comments: L leg, ankle, foot skin sloughing.   Deroofed blister on dorsum of left foot   Neurological:      Mental Status: He is easily aroused. He is lethargic.      GCS: GCS eye subscore is 4. GCS verbal subscore is 5. GCS motor subscore is 6.      Motor: Tremor present.      Comments: Asterixis present   Psychiatric:         Behavior: Behavior normal. Behavior is cooperative.         Thought Content: Thought content normal.         Judgment: Judgment normal.       Significant Labs:  CBC:   Recent Labs   Lab 02/21/22  0655   WBC 8.77   RBC 2.95*   HGB 8.2*   HCT 26.3*      MCV 89   MCH 27.8   MCHC 31.2*     CMP:   Recent Labs   Lab 02/20/22  0815 02/21/22  0655    96   CALCIUM 9.3 9.3   ALBUMIN 2.2*  --    PROT 5.4*  --     147*   K 4.4 4.2   CO2 17* 21*    105   * 171*   CREATININE 18.4* 19.0*   ALKPHOS 76  --    ALT 8*  --    AST 19  --    BILITOT 0.2  --      All labs within the past 24 hours have been reviewed.     Significant Imaging:  Labs: Reviewed  Uremic, hypernatremic      Harrison Acuna MD  Nephrology  [facility] - Trans[facility]

## 2022-02-21 NOTE — PROGRESS NOTES
[facility] - Transplant Stepdown  Neurosurgery  Progress Note    Subjective:     History of Present Illness:  Enrique Martinez is a 56 y.o. male with a PMHx of CKD V glomerulonephropathy, insulin dependent diabetes mellitus, and CAD who presented to the ED for evaluation of worsening left foot pain and swelling. He was recently admitted at [facility] from 2/2-2/4/2022 from LLE cellulitis. He was found to have salmonella bacteremia suspected due to diarrheal illness. He was discharged with cipro for which he reports compliance. He has had no improvement in his pain, redness, or swelling of his left foot and leg. He has also developed a new blister on his left foot. He endorses chills. He denies fevers, chest pain, SOB, N/V, decreased urine output, dysuria, and flank pain.      NSGY was consulted as the patient transiently endorsed neck pain.  X ray C spine with increased LIZZY. MRI C non contrast with prevertebral stir signal and edema, C2-3 listhesis   with picture overall concerning for cervical instability. PT denies weakness, pain or limitation of motion. He further denies symptoms associated with myelopathy including but not  limited to dropping things, difficulty buttoning shirts..etc       Post-Op Info:  Procedure(s) (LRB):  MPU PROCEDURE (N/A)         Interval History: No acute nsgy events. PT awaiting cervical flex ex.     Medications:  Continuous Infusions:  Scheduled Meds:   acetaminophen  1,000 mg Oral Q8H    amLODIPine  10 mg Oral Daily    ARIPiprazole  15 mg Oral QHS    aspirin  81 mg Oral Daily    atorvastatin  80 mg Oral Daily    cefTRIAXone (ROCEPHIN) IVPB  2 g Intravenous Q24H    cloNIDine  0.4 mg Oral TID    colchicine  0.3 mg Oral BID    DAPTOmycin (CUBICIN)  IV  6 mg/kg Intravenous Q48H    folic acid  1 mg Oral Daily    hydrALAZINE  50 mg Oral Q8H    labetalol  200 mg Oral Q12H    NON FORMULARY MEDICATION   Injection Every Mon, Fri    nystatin  500,000 Units Oral QID (WM & HS)    pantoprazole   40 mg Intravenous BID    polyethylene glycol  17 g Oral Daily    senna-docusate 8.6-50 mg  1 tablet Oral Daily    sevelamer carbonate  1,600 mg Oral TID    sodium bicarbonate  650 mg Oral TID     PRN Meds:sodium chloride, albuterol-ipratropium, aluminum-magnesium hydroxide-simethicone, dextrose 10%, dextrose 10%, glucagon (human recombinant), glucose, glucose, lorazepam, melatonin, methocarbamoL, morphine, morphine, naloxone, ondansetron, oxyCODONE, oxyCODONE, prochlorperazine, simethicone, sodium chloride 0.9%     Review of Systems  Objective:     Weight: 70.5 kg (155 lb 6.8 oz)  Body mass index is 25.09 kg/m².  Vital Signs (Most Recent):  Temp: 97.7 °F (36.5 °C) (02/21/22 1141)  Pulse: 82 (02/21/22 1141)  Resp: 18 (02/21/22 1141)  BP: 135/73 (02/21/22 1141)  SpO2: 97 % (02/21/22 1141)   Vital Signs (24h Range):  Temp:  [97.7 °F (36.5 °C)-99.1 °F (37.3 °C)] 97.7 °F (36.5 °C)  Pulse:  [79-92] 82  Resp:  [17-18] 18  SpO2:  [93 %-98 %] 97 %  BP: (115-145)/(57-77) 135/73     Date 02/21/22 0700 - 02/22/22 0659   Shift 0424-9303 [id number] 2459-2834 24 Hour Total   INTAKE   Shift Total(mL/kg)       OUTPUT   Urine(mL/kg/hr) 100   100   Shift Total(mL/kg) 100(1.4)   100(1.4)   Weight (kg) 70.5 70.5 70.5 70.5                   Male External Urinary Catheter 02/18/22 1901 Small (Active)   Collection Container Urimeter 02/19/22 2330   Securement Method secured to top of thigh w/ adhesive device 02/19/22 2330   Skin no redness;no breakdown 02/19/22 2330   Tolerance no signs/symptoms of discomfort;did not assist with appliance change 02/19/22 2330   Output (mL) 450 mL 02/19/22 1524   Catheter Change Date 02/19/22 02/19/22 2330   Catheter Change Time 2100 02/19/22 2330       Physical Exam    Neurosurgery Physical Exam    5/5 strength   LROM left shoulder       full Range of motion in neck   No hoffmans or clonus     Significant Labs:  Recent Labs   Lab 02/20/22  0815 02/21/22  0655    96    147*   K 4.4 4.2   CL  103 105   CO2 17* 21*   * 171*   CREATININE 18.4* 19.0*   CALCIUM 9.3 9.3       Recent Labs   Lab 02/20/22  0815 02/21/22  0655   WBC 9.58 8.77   HGB 7.9* 8.2*   HCT 25.0* 26.3*    220       No results for input(s): LABPT, INR, APTT in the last 48 hours.  Microbiology Results (last 7 days)       Procedure Component Value Units Date/Time    Blood Culture #2 **CANNOT BE ORDERED STAT** [[id number]] Collected: 02/16/22 1806    Order Status: Completed Specimen: Blood from Peripheral, Antecubital, Left Updated: 02/20/22 2012     Blood Culture, Routine No Growth to date      No Growth to date      No Growth to date      No Growth to date      No Growth to date    Blood Culture #1 **CANNOT BE ORDERED STAT** [[id number]] Collected: 02/16/22 1807    Order Status: Completed Specimen: Blood from Peripheral, Hand, Right Updated: 02/20/22 2012     Blood Culture, Routine No Growth to date      No Growth to date      No Growth to date      No Growth to date      No Growth to date    Stool culture **CANNOT BE ORDERED STAT** [[id number]]     Order Status: No result Specimen: Stool     Clostridium difficile EIA [[id number]]     Order Status: Canceled Specimen: Stool           All pertinent labs from the last 24 hours have been reviewed.    Significant Diagnostics:  CT: No results found in the last 24 hours.  I have reviewed all pertinent imaging results/findings within the past 24 hours.  I have reviewed and interpreted all pertinent imaging results/findings within the past 24 hours.    Assessment/Plan:     Neck pain  55 y/o M with renal failure and recent salmonella bacteremia , transiently endorsed neck pain but is now denies pain, weakness or myelopathy and is intact on exam despite radiographic features concerning for cervical instability with possible associated infection .      Recommend Flexion extension cervical spine   (ordered) still pending.     Recommend aspen collar.   If patient refuses to wear it would  document we recommended it to protect his cervical spine and that patient refused.   Recommending infectious disease and ENT consult for drainage of prevertebral collection for source control   -- pt reportedly refused ENT scope    Recommend IR culture of prevertebral collection       No acute NSGY intervention at this time given patient is not medically stable at this time and pending final imaging     Discussed with Dr. Sandhu.             Mandy Barnes MD  Neurosurgery  [facility] - Transplant Stepdown

## 2022-02-21 NOTE — SUBJECTIVE & OBJECTIVE
Interval History: patient amendable to central line placement later this afternoon whenever his wife comes to visit after 4/5pm. HD orders to be placed pending central line placement. BUN/Cr stable in 170s/19. Declined flexible laryngoscopy offered by ENT.     Review of patient's allergies indicates:  No Known Allergies  Current Facility-Administered Medications   Medication Frequency    0.9%  NaCl infusion (for blood administration) Q24H PRN    acetaminophen tablet 1,000 mg Q8H    albuterol-ipratropium 2.5 mg-0.5 mg/3 mL nebulizer solution 3 mL Q4H PRN    aluminum-magnesium hydroxide-simethicone 200-200-20 mg/5 mL suspension 30 mL QID PRN    amLODIPine tablet 10 mg Daily    ARIPiprazole tablet 15 mg QHS    aspirin EC tablet 81 mg Daily    atorvastatin tablet 80 mg Daily    cefTRIAXone (ROCEPHIN) 2 g/50 mL D5W IVPB Q24H    cloNIDine tablet 0.4 mg TID    colchicine split tablet 0.3 mg BID    DAPTOmycin (CUBICIN) 445 mg in sodium chloride 0.9% IVPB Q48H    dextrose 10% bolus 125 mL PRN    dextrose 10% bolus 250 mL PRN    folic acid tablet 1 mg Daily    glucagon (human recombinant) injection 1 mg PRN    glucose chewable tablet 16 g PRN    glucose chewable tablet 24 g PRN    hydrALAZINE tablet 50 mg Q8H    labetaloL tablet 200 mg Q12H    lorazepam injection 1 mg On Call Procedure    melatonin tablet 6 mg Nightly PRN    methocarbamoL tablet 750 mg QID PRN    morphine injection 2 mg Once PRN    morphine injection 2 mg On Call Procedure    naloxone 0.4 mg/mL injection 0.02 mg PRN    NON FORMULARY MEDICATION Every Mon, Fri    nystatin 100,000 unit/mL suspension 500,000 Units QID (WM & HS)    ondansetron disintegrating tablet 8 mg Q8H PRN    oxyCODONE immediate release tablet 5 mg Q6H PRN    oxyCODONE immediate release tablet Tab 10 mg Q4H PRN    pantoprazole injection 40 mg BID    polyethylene glycol packet 17 g Daily    prochlorperazine injection Soln 5 mg Q6H PRN    senna-docusate 8.6-50 mg per tablet 1 tablet Daily     sevelamer carbonate tablet 1,600 mg TID    simethicone chewable tablet 80 mg QID PRN    sodium bicarbonate tablet 650 mg TID    sodium chloride 0.9% flush 10 mL Q8H PRN       Objective:     Vital Signs (Most Recent):  Temp: 97.7 °F (36.5 °C) (02/21/22 1141)  Pulse: 82 (02/21/22 1141)  Resp: 18 (02/21/22 1141)  BP: 135/73 (02/21/22 1141)  SpO2: 97 % (02/21/22 1141)  O2 Device (Oxygen Therapy): room air (02/21/22 0848) Vital Signs (24h Range):  Temp:  [97.7 °F (36.5 °C)-99.1 °F (37.3 °C)] 97.7 °F (36.5 °C)  Pulse:  [79-92] 82  Resp:  [17-18] 18  SpO2:  [93 %-98 %] 97 %  BP: (115-145)/(57-77) 135/73     Weight: 70.5 kg (155 lb 6.8 oz) (02/21/22 0400)  Body mass index is 25.09 kg/m².  Body surface area is 1.81 meters squared.    I/O last 3 completed shifts:  In: 536 [P.O.:536]  Out: 625 [Urine:625]    Physical Exam  Vitals and nursing note reviewed.   Constitutional:       Appearance: He is well-developed and overweight. He is ill-appearing.   HENT:      Head: Normocephalic and atraumatic.      Nose: Nose normal.      Mouth/Throat:      Mouth: Mucous membranes are moist.   Eyes:      General: No scleral icterus.     Conjunctiva/sclera: Conjunctivae normal.   Cardiovascular:      Rate and Rhythm: Normal rate and regular rhythm.      Heart sounds: Normal heart sounds.   Pulmonary:      Effort: Pulmonary effort is normal. No respiratory distress.      Breath sounds: Normal breath sounds. No wheezing or rales.   Abdominal:      General: Bowel sounds are normal. There is no distension.      Palpations: Abdomen is soft.      Tenderness: There is no abdominal tenderness. There is no guarding.   Musculoskeletal:         General: Swelling and signs of injury present. Normal range of motion.      Cervical back: Normal range of motion and neck supple.      Right lower leg: No edema.      Left lower leg: No edema.      Comments: Improved LE swelling   Feet:      Right foot:      Skin integrity: Skin integrity normal.      Left  foot:      Skin integrity: Skin breakdown, erythema, warmth and dry skin present.   Skin:     General: Skin is warm and dry.      Capillary Refill: Capillary refill takes less than 2 seconds.      Comments: L leg, ankle, foot skin sloughing.   Deroofed blister on dorsum of left foot   Neurological:      Mental Status: He is easily aroused. He is lethargic.      GCS: GCS eye subscore is 4. GCS verbal subscore is 5. GCS motor subscore is 6.      Motor: Tremor present.      Comments: Asterixis present   Psychiatric:         Behavior: Behavior normal. Behavior is cooperative.         Thought Content: Thought content normal.         Judgment: Judgment normal.       Significant Labs:  CBC:   Recent Labs   Lab 02/21/22  0655   WBC 8.77   RBC 2.95*   HGB 8.2*   HCT 26.3*      MCV 89   MCH 27.8   MCHC 31.2*     CMP:   Recent Labs   Lab 02/20/22  0815 02/21/22  0655    96   CALCIUM 9.3 9.3   ALBUMIN 2.2*  --    PROT 5.4*  --     147*   K 4.4 4.2   CO2 17* 21*    105   * 171*   CREATININE 18.4* 19.0*   ALKPHOS 76  --    ALT 8*  --    AST 19  --    BILITOT 0.2  --      All labs within the past 24 hours have been reviewed.     Significant Imaging:  Labs: Reviewed  Uremic, hypernatremic

## 2022-02-21 NOTE — ASSESSMENT & PLAN NOTE
Hemoglobin 10.1 one month ago, now 7.2. Normocytic anemia with low iron, iron sat, elevated ferritin.     - May need EPO/IV iron with HD

## 2022-02-21 NOTE — ASSESSMENT & PLAN NOTE
-ent consulted, will likely bedside scope. He reports trouble swallowing food to wife now but denies again to me  -refused bedside scope  -reattempt at MRI C spine         - - -

## 2022-02-21 NOTE — ASSESSMENT & PLAN NOTE
55 y/o M with renal failure and recent salmonella bacteremia , transiently endorsed neck pain but is now denies pain, weakness or myelopathy and is intact on exam despite radiographic features concerning for cervical instability with possible associated infection .      Recommend Flexion extension cervical spine   (ordered) still pending.     Recommend aspen collar.   If patient refuses to wear it would document we recommended it to protect his cervical spine and that patient refused.   Recommending infectious disease and ENT consult for drainage of prevertebral collection for source control   -- pt reportedly refused ENT scope    Recommend IR culture of prevertebral collection       No acute NSGY intervention at this time given patient is not medically stable at this time and pending final imaging     Discussed with Dr. Sandhu.

## 2022-02-21 NOTE — SUBJECTIVE & OBJECTIVE
Interval history:     Seen this AM before his wife went to work at 8 am with son on face time in [place]. He initially refused HD early in AM and palliative consult was discussed and planned for. Throughout conversation he told us including his POA His wife that he agreed to HD now. He denies saying his throat hurt to swallow before that his wife told me he had said, says its from thrush. On exam later, he didn't eat much and asked for guzmán and crackers. He refused ENT scope to look for airway edema from retropharyngeal abscess. ENt recs MRI with contrast of C spine but cannot do contrast due to CKD 5/ESRD with preserved urination. Hold lasix with elevated Na today. NS recs all of spine MRI in prev days but he likely wont allow so C spine MRi non con added back. Xrays added back per NS recs. He refused line placement earlier today with anesthesia sayign he wanted it tomorrow when wife was here. Called wife to tell her and she was not happy about this as said hes playing games. Mika and I returned to the room together and he went back and forth about it but agreed to get line placed at 330 pm with anesthesia if he could smoke before hand so theyre going to smoke outside before the line is placed. Message sent to renal that HD orders may potentially be needed tonight. Wife updated after this as well on phone. She is coming after work, will stay if he is cooperating with line but going home if not. Son is coming in town from [place]. IR consulted per NS recs to sample the abscess in the neck. Disussed with them that not really stable to sapirate the neck even with noninvasive procedure until he has at least a session of dialysis. Wife will lkely need to consent with his blessing potentially, discussed at length with IR, ID, ENT, anesthesia, NS, renal today as well as family multiple times today.  Even waiting until tomorrow for dialysis is risking his health further and it Is delaying care for his infection  to be diagnosed further with imaging, sampling, and care for this by his refusal of treatments, scoping, MRI/Xrays, C collar compliance, and HD to make him medically safe for procedures as above. His family are completely aware of this, as is patient                   Review of patient's allergies indicates:  No Known Allergies    No current facility-administered medications on file prior to encounter.     Current Outpatient Medications on File Prior to Encounter   Medication Sig    insulin (LANTUS SOLOSTAR U-100 INSULIN) glargine 100 units/mL (3mL) SubQ pen Inject 5 Units into the skin once daily.    albuterol (PROVENTIL/VENTOLIN HFA) 90 mcg/actuation inhaler Inhale 2 puffs into the lungs every 6 (six) hours as needed for Wheezing or Shortness of Breath. Rescue    amlodipine (NORVASC) 10 MG tablet Take 10 mg by mouth once daily.    ARIPiprazole (ABILIFY) 15 MG Tab Take 1 tablet (15 mg total) by mouth every evening.    aspirin (ECOTRIN) 81 MG EC tablet Take 1 tablet (81 mg total) by mouth once daily.    atorvastatin (LIPITOR) 80 MG tablet Take 1 tablet (80 mg total) by mouth once daily.    blood-glucose meter kit Use as instructed    calcitRIOL (ROCALTROL) 0.5 MCG Cap Take 0.5 mcg by mouth once daily.    chlorthalidone (HYGROTEN) 50 MG Tab Take 50 mg by mouth once daily.     cloNIDine (CATAPRES) 0.2 MG tablet Take 0.4 mg by mouth 3 (three) times daily.     colchicine (COLCRYS) 0.6 mg tablet Take 0.6 mg by mouth daily as needed.    ergocalciferol (ERGOCALCIFEROL) 50,000 unit Cap Take 50,000 Units by mouth every 30 days.     famotidine (PEPCID) 20 MG tablet Take 20 mg by mouth daily as needed for Heartburn.     ferrous gluconate (FERGON) 324 MG tablet Take 324 mg by mouth 2 (two) times a day.     fluticasone (FLONASE) 50 mcg/actuation nasal spray 1 spray by Each Nare route once daily.    furosemide (LASIX) 40 MG tablet Take 40 mg by mouth 2 (two) times daily.    methocarbamoL (ROBAXIN) 500 MG Tab Take 500 mg by mouth  2 (two) times a day.    metoprolol tartrate (LOPRESSOR) 100 MG tablet Take 100 mg by mouth.    omeprazole (PRILOSEC) 20 MG capsule Take 20 mg by mouth once daily.    RENVELA 800 mg Tab Take 800 mg by mouth 3 (three) times daily.    sodium bicarbonate 650 MG tablet Take 650 mg by mouth.    tadalafiL (CIALIS) 2.5 mg Tab Take 2.5 mg by mouth.    TRUE METRIX GLUCOSE TEST STRIP Strp      Family History       Problem Relation (Age of Onset)    Drug abuse Brother    Heart attack Father, Brother    Hyperlipidemia Father    Hypertension Father, Brother    No Known Problems Sister    Stroke Father          Tobacco Use    Smoking status: Current Some Day Smoker     Packs/day: 0.25     Years: 10.00     Pack years: 2.50    Smokeless tobacco: Never Used   Substance and Sexual Activity    Alcohol use: No    Drug use: No    Sexual activity: Not on file     Review of Systems   Constitutional:  Positive for chills. Negative for activity change and fever.   HENT:  Negative for congestion and trouble swallowing.    Eyes:  Negative for photophobia and visual disturbance.   Respiratory:  Negative for chest tightness, shortness of breath and wheezing.    Cardiovascular:  Positive for leg swelling. Negative for chest pain and palpitations.   Gastrointestinal:  Negative for abdominal pain, constipation, nausea and vomiting.   Genitourinary:  Negative for dysuria, flank pain, frequency, hematuria and urgency.   Musculoskeletal:  Positive for arthralgias. Negative for back pain and gait problem.   Skin:  Positive for color change and wound. Negative for rash.   Neurological:  Negative for dizziness, syncope, weakness, light-headedness, numbness and headaches.   Psychiatric/Behavioral:  Negative for agitation and confusion. The patient is not nervous/anxious.    Objective:     Vital Signs (Most Recent):  Temp: 97.7 °F (36.5 °C) (02/21/22 1141)  Pulse: 82 (02/21/22 1141)  Resp: 18 (02/21/22 1141)  BP: (!) 146/79 (02/21/22 1415)  SpO2: 97 %  (22 1141) Vital Signs (24h Range):  Temp:  [97.7 °F (36.5 °C)-99.1 °F (37.3 °C)] 97.7 °F (36.5 °C)  Pulse:  [79-92] 82  Resp:  [17-18] 18  SpO2:  [93 %-98 %] 97 %  BP: (115-146)/(57-79) 146/79     Weight: 70.5 kg (155 lb 6.8 oz)  Body mass index is 25.09 kg/m².    Physical Exam  Vitals and nursing note reviewed.   Constitutional:       Appearance: He is well-developed. He is ill-appearing.      Comments: Agitated, baseline. Asterixis with tremors at rest and with movement    HENT:      Head: Normocephalic and atraumatic.      Mouth/Throat:      Mouth: Mucous membranes are moist.   Eyes:      General: No scleral icterus.     Conjunctiva/sclera: Conjunctivae normal.   Cardiovascular:      Rate and Rhythm: Normal rate and regular rhythm.      Heart sounds: Normal heart sounds.   Pulmonary:      Effort: Pulmonary effort is normal. No respiratory distress.      Breath sounds: Normal breath sounds. No wheezing.   Abdominal:      General: Bowel sounds are normal. There is no distension.      Palpations: Abdomen is soft.      Tenderness: There is no abdominal tenderness.   Musculoskeletal:         General: No tenderness. Normal range of motion.      Cervical back: Normal range of motion and neck supple.      Right lower le+ Pitting Edema present.      Left lower le+ Pitting Edema present.      Comments: LLE with burst blister. Severe pain with any light touch.  Signs of bunion on both big toes but also possible gout tophi deposit on right big toe. Edema I RLE >LLE. pitting   Feet:      Left foot:      Skin integrity: Skin breakdown, erythema and warmth present.   Skin:     General: Skin is warm and dry.      Capillary Refill: Capillary refill takes less than 2 seconds.   Neurological:      Mental Status: He is alert and oriented to person, place, and time.      Cranial Nerves: No cranial nerve deficit.      Comments: Severe pain with movement of neck, wincing   Psychiatric:         Behavior: Behavior normal.          Thought Content: Thought content normal.         Judgment: Judgment normal.               Significant Labs:   All pertinent labs within the past 24 hours have been reviewed.  CBC:   Recent Labs   Lab 02/20/22  0815 02/21/22  0655   WBC 9.58 8.77   HGB 7.9* 8.2*   HCT 25.0* 26.3*    220       CMP:   Recent Labs   Lab 02/20/22  0815 02/21/22  0655    147*   K 4.4 4.2    105   CO2 17* 21*    96   * 171*   CREATININE 18.4* 19.0*   CALCIUM 9.3 9.3   PROT 5.4*  --    ALBUMIN 2.2*  --    BILITOT 0.2  --    ALKPHOS 76  --    AST 19  --    ALT 8*  --    ANIONGAP 23* 21*   EGFRNONAA 2.4* 2.3*       Lactic Acid:   No results for input(s): LACTATE in the last 48 hours.      Significant Imaging: I have reviewed all pertinent imaging results/findings within the past 24 hours.   MRI Cervical Spine Without Contrast  Narrative: EXAMINATION:  MRI CERVICAL SPINE WITHOUT CONTRAST    CLINICAL HISTORY:  severe neck pain, concern for infection vs c spine instability based on xray.    TECHNIQUE:  Multisequence MR images of the cervical spine were acquired without the intravenous administration of contrast.  This is a limited exam due to early termination per patient request.  Only sagittal T1, T2, and STIR images obtained.  Examination is degraded by patient motion artifact.    COMPARISON:  Cervical spine radiograph 02/17/2022, CT head 12/26/2020    FINDINGS:  Reversal of the normal cervical lordosis.  Anterolisthesis of C2 on C3, measuring approximately 0.6 cm. There is increased soft tissue and fluid within the atlanto-odontoid joint and widening of the pre dens space, measuring up to 0.7 cm, similar to prior head CT 12/26/2020.    Vertebral body heights are well maintained.  Multilevel disc desiccation and disc space narrowing, most significant at C4-C5 with severe narrowing and degenerative endplate edema.  There is mildly increased STIR signal within the C3-C4 disc space and mild edema associated  with the superior endplate of C4.  No evidence of a marrow placement process.  No acute fracture.    Evaluation of the cord is limited due to motion and lack of axial images.  However, allowing for this cord is normal in caliber without definite signal abnormality.  No epidural fluid collections.    Large prevertebral fluid collection extending from the base of the skull to C6-C7, measuring up to 9.2 cm cranio caudally.    No severe spinal canal stenosis based on sagittal images.  However, there is probable at least mild if not moderate spinal canal stenosis at C3-C4 and C4-C5.  Evaluation of the neural foramina is limited.  Impression: Limited exam due to early termination per patient request of limited diagnostic utility.    Mild edema signal within the C3-C4 disc space and superior endplate of C4, favored to be degenerative in nature but early infectious process could have similar imaging characteristics especially given history of bacteremia.  No epidural fluid collection to suggest abscess.  Recommend correlation with inflammatory markers and and follow-up as clinically indicated.    Large prevertebral/retropharyngeal fluid collection extending from the skull base to C6-C7, favored to represent an effusion with abscess not excluded given lack of IV contrast    Chronic widening of the pre-dens space, measuring 0.7 cm, associated with increased soft tissue and fluid within the atlanto-odontoid joint.  Atlantal dens instability not excluded.    Multilevel cervical spondylosis, most significant at C3-C4 and C4-C5.    This report was flagged in Epic as abnormal.    Electronically signed by resident: Brittany Victor  Date:    02/19/2022  Time:    08:14    Electronically signed by: Valdemar Mcconnell MD  Date:    02/19/2022  Time:    08:48

## 2022-02-21 NOTE — SUBJECTIVE & OBJECTIVE
Medications:  Continuous Infusions:  Scheduled Meds:   acetaminophen  1,000 mg Oral Q8H    amLODIPine  10 mg Oral Daily    ARIPiprazole  15 mg Oral QHS    aspirin  81 mg Oral Daily    atorvastatin  80 mg Oral Daily    cefTRIAXone (ROCEPHIN) IVPB  2 g Intravenous Q24H    cloNIDine  0.4 mg Oral TID    colchicine  0.3 mg Oral BID    DAPTOmycin (CUBICIN)  IV  6 mg/kg Intravenous Q48H    folic acid  1 mg Oral Daily    hydrALAZINE  50 mg Oral Q8H    labetalol  200 mg Oral Q12H    NON FORMULARY MEDICATION   Injection Every Mon, Fri    nystatin  500,000 Units Oral QID (WM & HS)    pantoprazole  40 mg Intravenous BID    polyethylene glycol  17 g Oral Daily    senna-docusate 8.6-50 mg  1 tablet Oral Daily    sevelamer carbonate  1,600 mg Oral TID    sodium bicarbonate  650 mg Oral TID     PRN Meds:sodium chloride, albuterol-ipratropium, aluminum-magnesium hydroxide-simethicone, dextrose 10%, dextrose 10%, glucagon (human recombinant), glucose, glucose, lorazepam, melatonin, methocarbamoL, morphine, morphine, naloxone, ondansetron, oxyCODONE, oxyCODONE, prochlorperazine, simethicone, sodium chloride 0.9%     No current facility-administered medications on file prior to encounter.     Current Outpatient Medications on File Prior to Encounter   Medication Sig    insulin (LANTUS SOLOSTAR U-100 INSULIN) glargine 100 units/mL (3mL) SubQ pen Inject 5 Units into the skin once daily.    albuterol (PROVENTIL/VENTOLIN HFA) 90 mcg/actuation inhaler Inhale 2 puffs into the lungs every 6 (six) hours as needed for Wheezing or Shortness of Breath. Rescue    amlodipine (NORVASC) 10 MG tablet Take 10 mg by mouth once daily.    ARIPiprazole (ABILIFY) 15 MG Tab Take 1 tablet (15 mg total) by mouth every evening.    aspirin (ECOTRIN) 81 MG EC tablet Take 1 tablet (81 mg total) by mouth once daily.    atorvastatin (LIPITOR) 80 MG tablet Take 1 tablet (80 mg total) by mouth once daily.    blood-glucose meter kit Use as instructed    calcitRIOL  (ROCALTROL) 0.5 MCG Cap Take 0.5 mcg by mouth once daily.    chlorthalidone (HYGROTEN) 50 MG Tab Take 50 mg by mouth once daily.     cloNIDine (CATAPRES) 0.2 MG tablet Take 0.4 mg by mouth 3 (three) times daily.     colchicine (COLCRYS) 0.6 mg tablet Take 0.6 mg by mouth daily as needed.    ergocalciferol (ERGOCALCIFEROL) 50,000 unit Cap Take 50,000 Units by mouth every 30 days.     famotidine (PEPCID) 20 MG tablet Take 20 mg by mouth daily as needed for Heartburn.     ferrous gluconate (FERGON) 324 MG tablet Take 324 mg by mouth 2 (two) times a day.     fluticasone (FLONASE) 50 mcg/actuation nasal spray 1 spray by Each Nare route once daily.    furosemide (LASIX) 40 MG tablet Take 40 mg by mouth 2 (two) times daily.    methocarbamoL (ROBAXIN) 500 MG Tab Take 500 mg by mouth 2 (two) times a day.    metoprolol tartrate (LOPRESSOR) 100 MG tablet Take 100 mg by mouth.    omeprazole (PRILOSEC) 20 MG capsule Take 20 mg by mouth once daily.    RENVELA 800 mg Tab Take 800 mg by mouth 3 (three) times daily.    sodium bicarbonate 650 MG tablet Take 650 mg by mouth.    tadalafiL (CIALIS) 2.5 mg Tab Take 2.5 mg by mouth.    TRUE METRIX GLUCOSE TEST STRIP Strp        Review of patient's allergies indicates:  No Known Allergies    Past Medical History:   Diagnosis Date    Anemia     Coronary artery disease     Diabetes mellitus, type 2     GERD (gastroesophageal reflux disease)     Gout     Hydrocele in adult     bilateral    Hyperlipidemia     Hypertension     Inguinal hernia     bilateral    Membranous glomerulonephritis     Nephrotic range proteinuria     Nephrotic syndrome     Obesity     Umbilical hernia      Past Surgical History:   Procedure Laterality Date    ABDOMINAL SURGERY  1980s    CARDIAC CATHETERIZATION  2007    x 2    COLONOSCOPY N/A 4/5/2019    Procedure: COLONOSCOPY;  Surgeon: Jose L Carty MD;  Location: [facility] ([address] [place] [address]);  Service: Colon and Rectal;  Laterality: N/A;    CORONARY STENT PLACEMENT  2007      Family History       Problem Relation (Age of Onset)    Drug abuse Brother    Heart attack Father, Brother    Hyperlipidemia Father    Hypertension Father, Brother    No Known Problems Sister    Stroke Father          Tobacco Use    Smoking status: Current Some Day Smoker     Packs/day: 0.25     Years: 10.00     Pack years: 2.50    Smokeless tobacco: Never Used   Substance and Sexual Activity    Alcohol use: No    Drug use: No    Sexual activity: Not on file     Review of Systems   Constitutional:  Negative for fever and unexpected weight change.   HENT:  Positive for trouble swallowing (intermittent). Negative for sore throat and voice change.    Respiratory:  Negative for shortness of breath.    Musculoskeletal:  Positive for neck pain.   Skin:  Positive for wound.   Objective:     Vital Signs (Most Recent):  Temp: 97.7 °F (36.5 °C) (02/21/22 0315)  Pulse: 83 (02/21/22 0702)  Resp: 18 (02/21/22 0427)  BP: (!) 144/76 (02/21/22 0315)  SpO2: 98 % (02/21/22 0315)   Vital Signs (24h Range):  Temp:  [97.7 °F (36.5 °C)-99.1 °F (37.3 °C)] 97.7 °F (36.5 °C)  Pulse:  [79-92] 83  Resp:  [15-21] 18  SpO2:  [93 %-99 %] 98 %  BP: (115-159)/(57-82) 144/76     Weight: 70.5 kg (155 lb 6.8 oz)  Body mass index is 25.09 kg/m².        Physical Exam  Constitutional: NAD. Voice clear.  Eyes: EOM I Bilaterally  Head/Face: Normocephalic.  Negative paranasal sinus pressure/tenderness.  Salivary glands WNL.  House Brackmann I Bilaterally.  Right Ear: Auricle WNL.  Left Ear: Auricle WNL.  Nose: External nasal skin without masses/lesions.  Oral Cavity: Gingiva/lips WNL. Oral Tongue mobile. Hard Palate WNL.   Oropharynx: No masses/lesions noted. Tonsillar fossa/pharyngeal wall without lesions. Posterior oropharynx WNL, no significant swelling noted per orally.  Soft palate without masses. Midline uvula.   Neck/Lymphatic: No LAD I-VI bilaterally. No TTP. No thyromegaly.  No masses noted on exam. Pain over posterior cervical  spine.  Neuro/Psychiatric: AOx3.  Normal mood and affect.   Respiratory: Normal respiratory effort, no stridor/stertor, no retractions noted.    Flexible Fiberoptic Laryngoscopy   Patient refused exam.      Significant Labs:  BMP:   Recent Labs   Lab 02/19/22  0815 02/20/22  0815 02/21/22  0655   GLU 99   < > 96      < > 105   CO2 19*   < > 21*   *   < > 171*   CREATININE 18.5*   < > 19.0*   CALCIUM 9.2   < > 9.3   MG 1.7  --   --     < > = values in this interval not displayed.     CBC:   Recent Labs   Lab 02/21/22  0655   WBC 8.77   RBC 2.95*   HGB 8.2*   HCT 26.3*      MCV 89   MCH 27.8   MCHC 31.2*       Significant Diagnostics:  MRI: I have reviewed all pertinent results/findings within the past 24 hours and my personal findings are:  prevertebral collection from C1-C6

## 2022-02-21 NOTE — ASSESSMENT & PLAN NOTE
Hyperkalemia  Hyperphosphatemia  Metabolic acidosis  Kidney transplant candidate    - 2/2 membranous glomerulopathy  - GFR was 13-16 up from baseline 4-6 previously in January 2022. Labs repeated today and GFR is 2.6, Cr 19.   - patient follows with Dr. Cardoza and Dr Lee and dialysis has been discussed multiple times, patient continues to refuse.  - on transplant list with [facility]  -cont phos binders, Na bicarb. [place] for adequate I/O. Lasix held 2/17 per renal recs  -uric acid trending  -at severe risk of adverse events, BUN almost 200, asterixis on exam. Meets multiple criteria but refusing HD. Has capacity. Wife aware and states is his decision  Continues to refuse on 2/19 after extensive discussions with all parties.   -2/20- may consent tomorrow per wife, if so anesthesia for IJ. No permacath now as active infection concerns in neck. If not, palliative consult. Wife will agree to HD for him if he says yes despite no capacity per psych. Wife will not do it against his will as poa  Agrees to HD on 2/21. Trying to delay line, awaiting to see if placed 2/21 PM, if so will need HD orders for tonight. Will need HD before any procedures for neck

## 2022-02-21 NOTE — ASSESSMENT & PLAN NOTE
-abnormal C spine Xray with possible signs of C spine instability, MRi ordered and patient refused 2/17, on pain meds, discussed AT length, see 2/17 and 2/18 note above, ordered with premediation now. Concern for possible infection also given acuity of pain  -signs of C spine instability, prevertebral abscess likely with fluid collection with widening in C spine, NS recs C collar- patient refused, and Xrays, refused, and MRi full spine when stable. Cannot do any intervention now as unstable bc of kidneys.   -ent consulted for retropharyngeal abscess per ns and id recs, will likely scope if amenable at bedside (he refused). They rec MRI C spien with contrast- but cannot get due to kidneys  -refused c collar xrays  -NS recs MRI all spine, ID/ENT rec repeat MRI C spine for better pictures. (cnnnot do contrast). He is likely nto going to sit for all the spine right now 2/21 per my exam and nursing. Placed repeat flex xrays and C spine MRi orders in now. They called him at 3 pm but anesthesia is coming at 330 pm to do the line he said he would now do this afternoon after a cigarette, so nursing to tell them to come back. IR consulted to sample the vertebral involvement, will need HD before any sampling I advised for stability  -ID following- on dapto and Ceftriaxone

## 2022-02-21 NOTE — PROGRESS NOTES
[facility]  Progress Note    Patient Name: Enrique Martinez  MRN: [id number]  Patient Class: IP- Inpatient   Admission Date: 2/16/2022  Length of Stay: 4 days  Attending Physician: Dianna Tijerina MD  Primary Care Provider: Primary Doctor No        Subjective:     Principal Problem:Acute renal failure superimposed on stage 5 chronic kidney disease, not on chronic dialysis        HPI:  Enrique Martinez is a 56 y.o. male with a PMHx of CKD V glomerulonephropathy, insulin dependent diabetes mellitus, and CAD who presented tot he ED for evaluation of worsening left foot pain and swelling. He was recently admitted at [facility] from 2/2-2/4/2022 from LLE cellulitis. He was found to have salmonella bacteremia suspected due to diarrheal illness. He was discharged with cipro for which he reports compliance. He has had no improvement in his pain, redness, or swelling of his left foot and leg. He has also developed a new blister on his left foot. He endorses chills. He denies fevers, chest pain, SOB, N/V, decreased urine output, dysuria, and flank pain.    ED: HR 97, other VSSAF. CBC with leukocytosis of 16.01, Hgb 7.4, Hct 22.2.  ESR 97. CRP 33.4. K 5.5. Cr 19.7, . Phos 10.4. Lactic WNL. MRI L foot with cellulitis and osteomyelitis of distal 1st metatarsal and proximal 1st phalanx.      Overview/Hospital Course:  No notes on file      Interval history:     Seen this AM before his wife went to work at 8 am with son on face time in [place]. He initially refused HD early in AM and palliative consult was discussed and planned for. Throughout conversation he told us including his POA His wife that he agreed to HD now. He denies saying his throat hurt to swallow before that his wife told me he had said, says its from thrush. On exam later, he didn't eat much and asked for guzmán and crackers. He refused ENT scope to look for airway edema from retropharyngeal abscess. ENt recs MRI with  contrast of C spine but cannot do contrast due to CKD 5/ESRD with preserved urination. Hold lasix with elevated Na today. NS recs all of spine MRI in prev days but he likely wont allow so C spine MRi non con added back. Xrays added back per NS recs. He refused line placement earlier today with anesthesia sayign he wanted it tomorrow when wife was here. Called wife to tell her and she was not happy about this as said hes playing games. Aneshtesia and I returned to the room together and he went back and forth about it but agreed to get line placed at 330 pm with anesthesia if he could smoke before hand so theyre going to smoke outside before the line is placed. Message sent to renal that HD orders may potentially be needed tonight. Wife updated after this as well on phone. She is coming after work, will stay if he is cooperating with line but going home if not. Son is coming in town from [place]. IR consulted per NS recs to sample the abscess in the neck. Disussed with them that not really stable to sapirate the neck even with noninvasive procedure until he has at least a session of dialysis. Wife will lkely need to consent with his blessing potentially, discussed at length with IR, ID, ENT, anesthesia, NS, renal today as well as family multiple times today.  Even waiting until tomorrow for dialysis is risking his health further and it Is delaying care for his infection to be diagnosed further with imaging, sampling, and care for this by his refusal of treatments, scoping, MRI/Xrays, C collar compliance, and HD to make him medically safe for procedures as above. His family are completely aware of this, as is patient                   Review of patient's allergies indicates:  No Known Allergies    No current facility-administered medications on file prior to encounter.     Current Outpatient Medications on File Prior to Encounter   Medication Sig    insulin (LANTUS SOLOSTAR U-100 INSULIN) glargine 100 units/mL (3mL)  SubQ pen Inject 5 Units into the skin once daily.    albuterol (PROVENTIL/VENTOLIN HFA) 90 mcg/actuation inhaler Inhale 2 puffs into the lungs every 6 (six) hours as needed for Wheezing or Shortness of Breath. Rescue    amlodipine (NORVASC) 10 MG tablet Take 10 mg by mouth once daily.    ARIPiprazole (ABILIFY) 15 MG Tab Take 1 tablet (15 mg total) by mouth every evening.    aspirin (ECOTRIN) 81 MG EC tablet Take 1 tablet (81 mg total) by mouth once daily.    atorvastatin (LIPITOR) 80 MG tablet Take 1 tablet (80 mg total) by mouth once daily.    blood-glucose meter kit Use as instructed    calcitRIOL (ROCALTROL) 0.5 MCG Cap Take 0.5 mcg by mouth once daily.    chlorthalidone (HYGROTEN) 50 MG Tab Take 50 mg by mouth once daily.     cloNIDine (CATAPRES) 0.2 MG tablet Take 0.4 mg by mouth 3 (three) times daily.     colchicine (COLCRYS) 0.6 mg tablet Take 0.6 mg by mouth daily as needed.    ergocalciferol (ERGOCALCIFEROL) 50,000 unit Cap Take 50,000 Units by mouth every 30 days.     famotidine (PEPCID) 20 MG tablet Take 20 mg by mouth daily as needed for Heartburn.     ferrous gluconate (FERGON) 324 MG tablet Take 324 mg by mouth 2 (two) times a day.     fluticasone (FLONASE) 50 mcg/actuation nasal spray 1 spray by Each Nare route once daily.    furosemide (LASIX) 40 MG tablet Take 40 mg by mouth 2 (two) times daily.    methocarbamoL (ROBAXIN) 500 MG Tab Take 500 mg by mouth 2 (two) times a day.    metoprolol tartrate (LOPRESSOR) 100 MG tablet Take 100 mg by mouth.    omeprazole (PRILOSEC) 20 MG capsule Take 20 mg by mouth once daily.    RENVELA 800 mg Tab Take 800 mg by mouth 3 (three) times daily.    sodium bicarbonate 650 MG tablet Take 650 mg by mouth.    tadalafiL (CIALIS) 2.5 mg Tab Take 2.5 mg by mouth.    TRUE METRIX GLUCOSE TEST STRIP Strp      Family History       Problem Relation (Age of Onset)    Drug abuse Brother    Heart attack Father, Brother    Hyperlipidemia Father    Hypertension  Father, Brother    No Known Problems Sister    Stroke Father          Tobacco Use    Smoking status: Current Some Day Smoker     Packs/day: 0.25     Years: 10.00     Pack years: 2.50    Smokeless tobacco: Never Used   Substance and Sexual Activity    Alcohol use: No    Drug use: No    Sexual activity: Not on file     Review of Systems   Constitutional:  Positive for chills. Negative for activity change and fever.   HENT:  Negative for congestion and trouble swallowing.    Eyes:  Negative for photophobia and visual disturbance.   Respiratory:  Negative for chest tightness, shortness of breath and wheezing.    Cardiovascular:  Positive for leg swelling. Negative for chest pain and palpitations.   Gastrointestinal:  Negative for abdominal pain, constipation, nausea and vomiting.   Genitourinary:  Negative for dysuria, flank pain, frequency, hematuria and urgency.   Musculoskeletal:  Positive for arthralgias. Negative for back pain and gait problem.   Skin:  Positive for color change and wound. Negative for rash.   Neurological:  Negative for dizziness, syncope, weakness, light-headedness, numbness and headaches.   Psychiatric/Behavioral:  Negative for agitation and confusion. The patient is not nervous/anxious.    Objective:     Vital Signs (Most Recent):  Temp: 97.7 °F (36.5 °C) (02/21/22 1141)  Pulse: 82 (02/21/22 1141)  Resp: 18 (02/21/22 1141)  BP: (!) 146/79 (02/21/22 1415)  SpO2: 97 % (02/21/22 1141) Vital Signs (24h Range):  Temp:  [97.7 °F (36.5 °C)-99.1 °F (37.3 °C)] 97.7 °F (36.5 °C)  Pulse:  [79-92] 82  Resp:  [17-18] 18  SpO2:  [93 %-98 %] 97 %  BP: (115-146)/(57-79) 146/79     Weight: 70.5 kg (155 lb 6.8 oz)  Body mass index is 25.09 kg/m².    Physical Exam  Vitals and nursing note reviewed.   Constitutional:       Appearance: He is well-developed. He is ill-appearing.      Comments: Agitated, baseline. Asterixis with tremors at rest and with movement    HENT:      Head: Normocephalic and atraumatic.       Mouth/Throat:      Mouth: Mucous membranes are moist.   Eyes:      General: No scleral icterus.     Conjunctiva/sclera: Conjunctivae normal.   Cardiovascular:      Rate and Rhythm: Normal rate and regular rhythm.      Heart sounds: Normal heart sounds.   Pulmonary:      Effort: Pulmonary effort is normal. No respiratory distress.      Breath sounds: Normal breath sounds. No wheezing.   Abdominal:      General: Bowel sounds are normal. There is no distension.      Palpations: Abdomen is soft.      Tenderness: There is no abdominal tenderness.   Musculoskeletal:         General: No tenderness. Normal range of motion.      Cervical back: Normal range of motion and neck supple.      Right lower le+ Pitting Edema present.      Left lower le+ Pitting Edema present.      Comments: LLE with burst blister. Severe pain with any light touch.  Signs of bunion on both big toes but also possible gout tophi deposit on right big toe. Edema I RLE >LLE. pitting   Feet:      Left foot:      Skin integrity: Skin breakdown, erythema and warmth present.   Skin:     General: Skin is warm and dry.      Capillary Refill: Capillary refill takes less than 2 seconds.   Neurological:      Mental Status: He is alert and oriented to person, place, and time.      Cranial Nerves: No cranial nerve deficit.      Comments: Severe pain with movement of neck, wincing   Psychiatric:         Behavior: Behavior normal.         Thought Content: Thought content normal.         Judgment: Judgment normal.               Significant Labs:   All pertinent labs within the past 24 hours have been reviewed.  CBC:   Recent Labs   Lab 22  0815 22  0655   WBC 9.58 8.77   HGB 7.9* 8.2*   HCT 25.0* 26.3*    220       CMP:   Recent Labs   Lab 22  0815 22  0655    147*   K 4.4 4.2    105   CO2 17* 21*    96   * 171*   CREATININE 18.4* 19.0*   CALCIUM 9.3 9.3   PROT 5.4*  --    ALBUMIN 2.2*  --    BILITOT  0.2  --    ALKPHOS 76  --    AST 19  --    ALT 8*  --    ANIONGAP 23* 21*   EGFRNONAA 2.4* 2.3*       Lactic Acid:   No results for input(s): LACTATE in the last 48 hours.      Significant Imaging: I have reviewed all pertinent imaging results/findings within the past 24 hours.   MRI Cervical Spine Without Contrast  Narrative: EXAMINATION:  MRI CERVICAL SPINE WITHOUT CONTRAST    CLINICAL HISTORY:  severe neck pain, concern for infection vs c spine instability based on xray.    TECHNIQUE:  Multisequence MR images of the cervical spine were acquired without the intravenous administration of contrast.  This is a limited exam due to early termination per patient request.  Only sagittal T1, T2, and STIR images obtained.  Examination is degraded by patient motion artifact.    COMPARISON:  Cervical spine radiograph 02/17/2022, CT head 12/26/2020    FINDINGS:  Reversal of the normal cervical lordosis.  Anterolisthesis of C2 on C3, measuring approximately 0.6 cm. There is increased soft tissue and fluid within the atlanto-odontoid joint and widening of the pre dens space, measuring up to 0.7 cm, similar to prior head CT 12/26/2020.    Vertebral body heights are well maintained.  Multilevel disc desiccation and disc space narrowing, most significant at C4-C5 with severe narrowing and degenerative endplate edema.  There is mildly increased STIR signal within the C3-C4 disc space and mild edema associated with the superior endplate of C4.  No evidence of a marrow placement process.  No acute fracture.    Evaluation of the cord is limited due to motion and lack of axial images.  However, allowing for this cord is normal in caliber without definite signal abnormality.  No epidural fluid collections.    Large prevertebral fluid collection extending from the base of the skull to C6-C7, measuring up to 9.2 cm cranio caudally.    No severe spinal canal stenosis based on sagittal images.  However, there is probable at least mild if  not moderate spinal canal stenosis at C3-C4 and C4-C5.  Evaluation of the neural foramina is limited.  Impression: Limited exam due to early termination per patient request of limited diagnostic utility.    Mild edema signal within the C3-C4 disc space and superior endplate of C4, favored to be degenerative in nature but early infectious process could have similar imaging characteristics especially given history of bacteremia.  No epidural fluid collection to suggest abscess.  Recommend correlation with inflammatory markers and and follow-up as clinically indicated.    Large prevertebral/retropharyngeal fluid collection extending from the skull base to C6-C7, favored to represent an effusion with abscess not excluded given lack of IV contrast    Chronic widening of the pre-dens space, measuring 0.7 cm, associated with increased soft tissue and fluid within the atlanto-odontoid joint.  Atlantal dens instability not excluded.    Multilevel cervical spondylosis, most significant at C3-C4 and C4-C5.    This report was flagged in Epic as abnormal.    Electronically signed by resident: Brittany Victor  Date:    02/19/2022  Time:    08:14    Electronically signed by: Valdemar Mcconnell MD  Date:    02/19/2022  Time:    08:48          Assessment/Plan:      * Acute renal failure superimposed on stage 5 chronic kidney disease, not on chronic dialysis  Hyperkalemia  Hyperphosphatemia  Metabolic acidosis  Kidney transplant candidate    - 2/2 membranous glomerulopathy  - GFR was 13-16 up from baseline 4-6 previously in January 2022. Labs repeated today and GFR is 2.6, Cr 19.   - patient follows with Dr. Cardoza and Dr Lee and dialysis has been discussed multiple times, patient continues to refuse.  - on transplant list with [facility]  -cont phos binders, Na bicarb. Valencia for adequate I/O. Lasix held 2/17 per renal recs  -uric acid trending  -at severe risk of adverse events, BUN almost 200, asterixis on exam. Meets multiple  criteria but refusing HD. Has capacity. Wife aware and states is his decision  Continues to refuse on 2/19 after extensive discussions with all parties.   -2/20- may consent tomorrow per wife, if so anesthesia for IJ. No permacath now as active infection concerns in neck. If not, palliative consult. Wife will agree to HD for him if he says yes despite no capacity per psych. Wife will not do it against his will as poa  Agrees to HD on 2/21. Trying to delay line, awaiting to see if placed 2/21 PM, if so will need HD orders for tonight. Will need HD before any procedures for neck    Prevertebral Fluid Collection  -ent consulted, will likely bedside scope. He reports trouble swallowing food to wife now but denies again to me  -refused bedside scope  -reattempt at MRI C spine          History of Salmonella septicemia  -+ blood CX at [facility] 2/1, with clearance immediately, treated with cipro for 14 days unclear source but presumed diarrheal infection (wife endorses this)  -denies sickle cell hx to predispose to this  -concern with neck pain and foot pain with recent bacteremia  -work up in progress  -ID consulted. On dapto/rocephin        Neck pain  -abnormal C spine Xray with possible signs of C spine instability, MRi ordered and patient refused 2/17, on pain meds, discussed AT length, see 2/17 and 2/18 note above, ordered with premediation now. Concern for possible infection also given acuity of pain  -signs of C spine instability, prevertebral abscess likely with fluid collection with widening in C spine, NS recs C collar- patient refused, and Xrays, refused, and MRi full spine when stable. Cannot do any intervention now as unstable bc of kidneys.   -ent consulted for retropharyngeal abscess per ns and id recs, will likely scope if amenable at bedside (he refused). They rec MRI C spien with contrast- but cannot get due to kidneys  -refused c collar xrays  -NS recs MRI all spine, ID/ENT rec repeat MRI C spine for better  pictures. (cnnnot do contrast). He is likely nto going to sit for all the spine right now 2/21 per my exam and nursing. Placed repeat flex xrays and C spine MRi orders in now. They called him at 3 pm but anesthesia is coming at 330 pm to do the line he said he would now do this afternoon after a cigarette, so nursing to tell them to come back. IR consulted to sample the vertebral involvement, will need HD before any sampling I advised for stability  -ID following- on dapto and Ceftriaxone        Gout  -colchicine BID, light IVF x 10 hours 2/17 per renal recs. Avoid steroids with posisble infection. He denies he has gout      Folic acid deficiency  -low at 4.4.  -daily replacement      Increased anion gap metabolic acidosis  Secondary to ckd  -cont Na bicarb  -refusing HD      Hyperphosphatemia  -cont binders, elvated at 10-11  -refusing HD      Hyperkalemia  -shift PRN      Subacute osteomyelitis  - 2/4 SIRS with HR>90, WBC 16  - lactic WNL  - ESR 97, CRP 33.4, procal 1.  - MRI w/ regions of focal abnormal signal involving the distal 1st metatarsal and proximal 1st phalanx as described above.  Findings are concerning for foci of subacute myelitis   - patient is hemodynamically stable, podiatry consulted, unclear if ifection but no plans to do biopsy now cellulitis worsened it appears 2/17 PM so vanc/zosyn initiated. ID consulted 2/18 as severely tender extremity, concern with recent bacteremia on 2/1 with salmonella with scaling on exam for possible staph/strep also scalded skin type look.. will f/u recs.  -treating gout, minimal improvement in pain, less red on doxy/rocephin per ID recs now    Benign prostatic hyperplasia  Valencia placed 2/18 overnight for incontinence    GERD (gastroesophageal reflux disease)  - continue PPI    S/P coronary artery stent placement  - continue ASA, statin    Type 2 diabetes mellitus  - home meds: lantus 5 units daily  - SSI   - ACHS accuchecks  - diabetic diet  -A1 C 7's    Anemia due  to chronic kidney disease  -hg with downtick to 6 on 2/18 from 7 on admit with 9 baseline  -unclear cause if another reason besides chronic kidney disease  -fobt ordered  -transfuse 1 U on 2/18. adamently denies bleeding. Will add protonix for coverage in interim in case of any bleeding  -tx 1 U on 2/19, Hg still higher 6's, unclear soure, fobt pending, possible from neck inflammation with fluid collection of unclear source.  -improved 2/20 to 8's and stable further 2/21.  -denies nsaid use    Membranous glomerulonephritis  -will bring acthar gel from home to do non formulary as 40K and got via prior auth-non form request addded  -reason for CKD 5.      Hyperlipidemia  - continue statin    Renovascular hypertension  -elevated to 180s on admit but improved on current regimen to 130s systolic on exam 2/18  -cont hydralazine, labetalol, norvassc, clonidine.      VTE Risk Mitigation (From admission, onward)         Ordered     IP VTE HIGH RISK PATIENT  Once         02/17/22 0151     Place sequential compression device  Until discontinued         02/17/22 0151                Discharge Planning   JOAN: 2/24/2022     Code Status: Full Code   Is the patient medically ready for discharge?: No    Reason for patient still in hospital (select all that apply): Patient unstable  Discharge Plan A: Home   Discharge Delays: None known at this time              Dianna Tijerina MD  Department of Hospital Medicine   [facility]

## 2022-02-21 NOTE — ASSESSMENT & PLAN NOTE
Non-contrast MRI had to be discontinued due to patient request.  But available images shows retropharyngeal fluid collection with abscess not excluded.  Deeply concerned that this could represent infection.  His recent bacteremia makes me concerned this could be salmonella.   He is immunocompromised due to a combination of uremia and diabetes so invasive salmonellosis is possible. Active problems of concern are his cervical neck prevertebral fluid collection, left lower extremity cellulitis/ brodies abscess, and recent salmonella bacteremia.     Plan  - Continue empiric ceftriaxone IV. Continue daptomycin q 48 hours. Will continue empiric coverage until more information with regards to imaging and cultures of c-spine infection are obtained.   - Recommend repeat MRI of C-Spine for better evaluation of prevertebral fluid collection  - ENT and NSGY consultation, appreciate their recommendations. They are recommending repeat MRI c-spine along with ortho spine consultation. NSGY recommending IR aspirate of prevertebral collection to get cultures.   - Hospital medicine team having long discussions with patient and family about initiating HD. On psych eval, pt does not have capacity to make his own decisions so his Wife as POA is now making decisions for him. Appears that patient has agreed to HD and will have trialysis line placed today with anesthesia

## 2022-02-21 NOTE — ASSESSMENT & PLAN NOTE
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 Previously, he has declined HD in the past, never receiving a fistula. He is on the transplant wait list. Patient exhibits signs of uremia including fatigue, poor appetite, and mental slowing. On labs, he is hyperkalemic, uremic, AGMA, hyperphosphatemic. GFR is 2-3, which is reduced from previous GFR 13. Home meds include sodium bicarb, Renvela, and calcitriol. 2+ pitting edema on B/L lower extremities. Renal US from Jan 2022 shows sonographic evidence of chronic medical renal disease and bilateral simple and minimally complex renal cysts.  sCr one year ago was 4.5, now 18.6. UPCr ratio 1.11 (Dec 2020)  2/18: Patient increasingly somnolent, lethargic. Hospital med in process of getting in touch with Dr. Lee, who the patient follows with outpatient.     Plan:   - Recommend HD if patient is able to get trialysis line placed. Discussed HD with patient as possible emergent need in this setting and as a bridge to transplant. Discussed extensively that his labs and clinical decline from uremia would preclude him from getting a kidney should one become available, rendering his time on the wait list pointless.   - HD tonight if he has central line placed. Wife still wanting to pursue dialysis. Patient has voiced that he is amenable and we made it clear that he would have to be able to tolerate having a line in place long term for HD to be possible.   - Shift potassium as needed  - Continue phos binder sevelamer 1600 TID  - Hold calcitriol in setting of hyperphosphatemia. Will re-start when phos reaches normal level.   - Trend Cr  - Strict I&Os  - Avoid nephrotoxic agents  - Renally adjust medications

## 2022-02-21 NOTE — ANESTHESIA PROCEDURE NOTES
Central Line    Diagnosis: End stage renal disease  Patient location during procedure: floor  Procedure start time: 2/21/2022 5:05 PM  Timeout: 2/21/2022 5:00 PM  Procedure end time: 2/21/2022 5:45 PM    Staffing  Authorizing Provider: Malathi Ken MD  Performing Provider: Malathi Ken MD    Preanesthetic Checklist  Completed: patient identified, IV checked, site marked, risks and benefits discussed, surgical consent, monitors and equipment checked, pre-op evaluation, timeout performed and anesthesia consent given  Indication   Indication: hemodialysis     Anesthesia   local infiltration and see MAR for details    Central Line   Skin Prep: skin prepped with ChloraPrep, skin prep agent completely dried prior to procedure  Sterile Barriers Followed: Yes    All five maximal barriers used- gloves, gown, cap, mask, and large sterile sheet    hand hygiene performed prior to central venous catheter insertion  Location: right internal jugular.   Catheter type: triple lumen  Catheter Size: 12 Fr  Inserted Catheter Length: 15 cm  Ultrasound: vascular probe with ultrasound   Vessel Caliber: small, medium, patent  Vascular Doppler:  not done, compressibility normal  Needle advanced into vessel with real time Ultrasound guidance.  sterile gel and probe cover used in ultrasound-guided central venous catheter insertion   Manometry: Venous cannualation confirmed by visual estimation of blood vessel pressure using manometry.  Insertion Attempts: 2   Securement:line sutured, chlorhexidine patch, sterile dressing applied and blood return through all ports    Post-Procedure   X-Ray: no pneumothorax on x-ray   Adverse Events:none      Guidewire Guidewire removed intact.   Additional Notes  I had to place this central line twice and it was a complete failure. We started this procedure by giving 2 mg of lorazepam. Pt was snoring comfortably. I started 1st attempt, and after getting blood Pt took off his full drape and threw it on  the floor. 2nd attempt everything went very well and smooth, and then almost throwing my last suture Pt sits up and pull the whole line out with the drapes.     In conclusion line placement was a complete failure. Next time I would recommended either placing it under full sedation case or place it with Pt fully awake and without benzodiazapine.     After Pt had removed his central line, we had kept pressure for 5 minutes, and then applied pressure dressing.       Malathi Baer MD  PGY-4 Anesthesiology Resident   Specter Link: [id number]        Medications:  Medication Administration Time: 2/21/2022 5:05 PM

## 2022-02-21 NOTE — ASSESSMENT & PLAN NOTE
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 Previously, he has declined HD in the past, never receiving a fistula. He is on the transplant wait list. Patient exhibits signs of uremia including fatigue, poor appetite, and mental slowing. On labs, he is hyperkalemic, uremic, AGMA, hyperphosphatemic. GFR is 2-3, which is reduced from previous GFR 13. Home meds include sodium bicarb, Renvela, and calcitriol. 2+ pitting edema on B/L lower extremities. Renal US from Jan 2022 shows sonographic evidence of chronic medical renal disease and bilateral simple and minimally complex renal cysts.  sCr one year ago was 4.5, now 18.6. UPCr ratio 1.11 (Dec 2020)  2/18: Patient increasingly somnolent, lethargic. Hospital med in process of getting in touch with Dr. Lee, who the patient follows with outpatient.     Plan:   - Recommend HD if patient is amenable. Discussed HD with patient as possible emergent need in this setting and as a bridge to transplant. Discussed extensively that his labs and clinical decline from uremia would preclude him from getting a kidney should one become available, rendering his time on the wait list pointless.   - Consider palliative care consult for GOC/hospice as patient's uremia is worsening to the point where he may not have capacity eventually. If he continues to refuse dialysis, will have to clarify if he would prefer to transition to comfort care over getting dialysis  - HD tonight if he has central line placed.   - Shift potassium as needed  - Continue phos binder sevelamer 1600 TID  - DC Nabicarb 650 TID  - Hold calcitriol in setting of hyperphosphatemia. Will re-start when phos reaches normal level.   - Trend Cr  - Strict I&Os  - Avoid nephrotoxic agents  - Renally adjust medications

## 2022-02-21 NOTE — ASSESSMENT & PLAN NOTE
Initial labs significant for bicarb of 18, anion gap 19. AGMA likely 2/2 uremia. Continued AGMA,, uremia during admssion. Bun slightly decreased to 180s after IVF. Improved to 21 on 2/21.     - HD if amenable.   - DC sodium bicarb

## 2022-02-21 NOTE — SUBJECTIVE & OBJECTIVE
Interval History: NAEO. Pt afebrile overnight. Pt denynig any fevers, trouble swallowing or increasing foot pain. Appears that pt is agreeable to HD and anesthesia has been consulted for trialysis line placement. ENT also consulted for evaluation of prevertebral fluid collection.     Review of Systems   Constitutional:  Negative for activity change, chills and fever.   HENT:  Negative for congestion and trouble swallowing.    Eyes:  Negative for photophobia and visual disturbance.   Respiratory:  Negative for chest tightness, shortness of breath and wheezing.    Cardiovascular:  Positive for leg swelling. Negative for chest pain and palpitations.   Gastrointestinal:  Negative for abdominal pain, constipation, nausea and vomiting.   Genitourinary:  Negative for dysuria, flank pain, frequency, hematuria and urgency.   Musculoskeletal:  Positive for arthralgias. Negative for back pain and gait problem.   Skin:  Positive for wound. Negative for rash.   Neurological:  Negative for syncope.   Psychiatric/Behavioral:  Negative for agitation and confusion. The patient is not nervous/anxious.    Objective:     Vital Signs (Most Recent):  Temp: 98.2 °F (36.8 °C) (02/21/22 0848)  Pulse: 86 (02/21/22 0848)  Resp: 18 (02/21/22 0848)  BP: 131/75 (02/21/22 0848)  SpO2: 98 % (02/21/22 0848) Vital Signs (24h Range):  Temp:  [97.7 °F (36.5 °C)-99.1 °F (37.3 °C)] 98.2 °F (36.8 °C)  Pulse:  [79-92] 86  Resp:  [15-18] 18  SpO2:  [93 %-98 %] 98 %  BP: (115-151)/(57-77) 131/75     Weight: 70.5 kg (155 lb 6.8 oz)  Body mass index is 25.09 kg/m².    Estimated Creatinine Clearance: 3.8 mL/min (A) (based on SCr of 19 mg/dL (H)).    Physical Exam  Vitals and nursing note reviewed.   Constitutional:       General: He is not in acute distress.     Appearance: He is well-developed. He is ill-appearing.   HENT:      Head: Normocephalic and atraumatic.      Comments: Denying any trouble swallowing but cervical neck area appears swollen on exam      Mouth/Throat:      Mouth: Mucous membranes are moist.   Eyes:      General: No scleral icterus.     Conjunctiva/sclera: Conjunctivae normal.   Cardiovascular:      Rate and Rhythm: Normal rate and regular rhythm.      Heart sounds: Normal heart sounds.   Pulmonary:      Effort: Pulmonary effort is normal. No respiratory distress.      Breath sounds: Normal breath sounds. No wheezing.   Abdominal:      General: Bowel sounds are normal. There is no distension.      Palpations: Abdomen is soft.      Tenderness: There is no abdominal tenderness.   Musculoskeletal:         General: No tenderness. Normal range of motion.      Cervical back: Normal range of motion and neck supple.      Right lower le+ Pitting Edema present.      Left lower le+ Pitting Edema present.   Feet:      Right foot:      Skin integrity: Skin integrity normal.      Left foot:      Skin integrity: Skin breakdown (Tendern to palpation at wound), erythema, warmth and dry skin present.   Skin:     General: Skin is warm and dry.      Capillary Refill: Capillary refill takes 2 to 3 seconds.   Neurological:      General: No focal deficit present.      Mental Status: He is alert and oriented to person, place, and time.      Cranial Nerves: No cranial nerve deficit.   Psychiatric:         Mood and Affect: Mood normal.      Comments: Drowsy during exam and appearing more encephalopathic       Significant Labs: All pertinent labs within the past 24 hours have been reviewed.    Significant Imaging: I have reviewed all pertinent imaging results/findings within the past 24 hours.

## 2022-02-21 NOTE — ASSESSMENT & PLAN NOTE
Has history of membranous glomerulonephritis. Previous renal biopsy in 2013 showed 50% glomerulosclerosis. Follows with Dr. Lee at [facility].

## 2022-02-21 NOTE — CARE UPDATE
Pt wants his central line to be placed whenever his wife comes to visit around 4-5 Pm. Will try to place it by then if not we'll do it tomorrow.       Malathi Baer MD  PGY-4 Anesthesiology Resident   Specter Link: [id number]

## 2022-02-21 NOTE — ASSESSMENT & PLAN NOTE
-will bring acthar gel from home to do non formulary as 40K and got via prior auth-non form request addded  -reason for CKD 5.

## 2022-02-21 NOTE — HPI
55 y/o M with h/o CKD V, DM, CAD, salmonella bacteremia who presented for evaluation of worsening left foot pain. Since presentation, he has also complained of neck pain. MRI C spine non contrast obtained showing prevertebral C1 to C6 anterior collection and NSGY was consulted. NSGY recommended ENT consult for drainage of prevertebral collection. Patient seen at bedside this morning. He denies any trouble swallowing at this time. Says he has had trouble at times with this but rarely. Denies any trouble breathing, throat pain, voice changes, fevers, chills, or weight loss. Says he has pain over his posterior cervical spine, and pointed with his finger. When asked if he was amenable to flexible laryngoscopy, patient politely refused and stated he didn't think this was necessary.

## 2022-02-21 NOTE — PROGRESS NOTES
[facility] - Transplant Stepdown  Infectious Disease  Progress Note    Patient Name: Enrique Martinez  MRN: [id number]  Admission Date: 2/16/2022  Length of Stay: 4 days  Attending Physician: Dianna Tijerina MD  Primary Care Provider: Primary Doctor No    Isolation Status: No active isolations  Assessment/Plan:      * Neck pain  Non-contrast MRI had to be discontinued due to patient request.  But available images shows retropharyngeal fluid collection with abscess not excluded.  Deeply concerned that this could represent infection.  His recent bacteremia makes me concerned this could be salmonella.   He is immunocompromised due to a combination of uremia and diabetes so invasive salmonellosis is possible. Active problems of concern are his cervical neck prevertebral fluid collection, left lower extremity cellulitis/ brodies abscess, and recent salmonella bacteremia.     Plan  - Continue empiric ceftriaxone IV. Continue daptomycin q 48 hours. Will continue empiric coverage until more information with regards to imaging and cultures of c-spine infection are obtained.   - Recommend repeat MRI of C-Spine for better evaluation of prevertebral fluid collection  - ENT and NSGY consultation, appreciate their recommendations. They are recommending repeat MRI c-spine along with ortho spine consultation. NSGY recommending IR aspirate of prevertebral collection to get cultures.   - Hospital medicine team having long discussions with patient and family about initiating HD. On psych eval, pt does not have capacity to make his own decisions so his Wife as POA is now making decisions for him. Appears that patient has agreed to HD and will have trialysis line placed today with anesthesia           Prevertebral Fluid Collection  As outlined above.    Pain and swelling of left lower extremity  MRI foot shows brodies abscess (subacute osteomyelitis).  Will manage with empiric ceftriaxone and daptomycin for now.      Anticipated  Disposition: TBD    Thank you for your consult. I will follow-up with patient. Please contact us if you have any additional questions.    Deena Maya MD  Infectious Disease  [facility] - Transplant Stepdown    Subjective:     Principal Problem:Neck pain    HPI: Enrique Martinez is a 56 y.o. male with a PMH of CKD V glomerulonephropathy, insulin dependent diabetes mellitus, and CAD who presented to the ED for evaluation of worsening left foot pain and swelling. He was recently admitted at [facility] from 2/2-2/4/2022 from LLE cellulitis complicated by salmonella bacteremia after having diarrheal illness.  He was discharged home with ciprofloxacin. He reports no improvement in his cellulitis since being discharged home and no has new blister development. ID was consulted due to recent salmonella bacteremia and new findings on MRI suspicious for left foot myelitis and persistent cellulitis infection. On initial ID encounter, pt reporting pain in left foot and ankle but denying any fever, chills, N/V, diarrhea, chest pain, headaches, or SOB.     Interval History: NAEO. Pt afebrile overnight. Pt denynig any fevers, trouble swallowing or increasing foot pain. Appears that pt is agreeable to HD and anesthesia has been consulted for trialysis line placement. ENT also consulted for evaluation of prevertebral fluid collection.     Review of Systems   Constitutional:  Negative for activity change, chills and fever.   HENT:  Negative for congestion and trouble swallowing.    Eyes:  Negative for photophobia and visual disturbance.   Respiratory:  Negative for chest tightness, shortness of breath and wheezing.    Cardiovascular:  Positive for leg swelling. Negative for chest pain and palpitations.   Gastrointestinal:  Negative for abdominal pain, constipation, nausea and vomiting.   Genitourinary:  Negative for dysuria, flank pain, frequency, hematuria and urgency.   Musculoskeletal:  Positive for arthralgias. Negative for back pain and  gait problem.   Skin:  Positive for wound. Negative for rash.   Neurological:  Negative for syncope.   Psychiatric/Behavioral:  Negative for agitation and confusion. The patient is not nervous/anxious.    Objective:     Vital Signs (Most Recent):  Temp: 98.2 °F (36.8 °C) (22)  Pulse: 86 (22)  Resp: 18 (22)  BP: 131/75 (22)  SpO2: 98 % (22) Vital Signs (24h Range):  Temp:  [97.7 °F (36.5 °C)-99.1 °F (37.3 °C)] 98.2 °F (36.8 °C)  Pulse:  [79-92] 86  Resp:  [15-18] 18  SpO2:  [93 %-98 %] 98 %  BP: (115-151)/(57-77) 131/75     Weight: 70.5 kg (155 lb 6.8 oz)  Body mass index is 25.09 kg/m².    Estimated Creatinine Clearance: 3.8 mL/min (A) (based on SCr of 19 mg/dL (H)).    Physical Exam  Vitals and nursing note reviewed.   Constitutional:       General: He is not in acute distress.     Appearance: He is well-developed. He is ill-appearing.   HENT:      Head: Normocephalic and atraumatic.      Comments: Denying any trouble swallowing but cervical neck area appears swollen on exam     Mouth/Throat:      Mouth: Mucous membranes are moist.   Eyes:      General: No scleral icterus.     Conjunctiva/sclera: Conjunctivae normal.   Cardiovascular:      Rate and Rhythm: Normal rate and regular rhythm.      Heart sounds: Normal heart sounds.   Pulmonary:      Effort: Pulmonary effort is normal. No respiratory distress.      Breath sounds: Normal breath sounds. No wheezing.   Abdominal:      General: Bowel sounds are normal. There is no distension.      Palpations: Abdomen is soft.      Tenderness: There is no abdominal tenderness.   Musculoskeletal:         General: No tenderness. Normal range of motion.      Cervical back: Normal range of motion and neck supple.      Right lower le+ Pitting Edema present.      Left lower le+ Pitting Edema present.   Feet:      Right foot:      Skin integrity: Skin integrity normal.      Left foot:      Skin integrity: Skin breakdown  (Tendern to palpation at wound), erythema, warmth and dry skin present.   Skin:     General: Skin is warm and dry.      Capillary Refill: Capillary refill takes 2 to 3 seconds.   Neurological:      General: No focal deficit present.      Mental Status: He is alert and oriented to person, place, and time.      Cranial Nerves: No cranial nerve deficit.   Psychiatric:         Mood and Affect: Mood normal.      Comments: Drowsy during exam and appearing more encephalopathic       Significant Labs: All pertinent labs within the past 24 hours have been reviewed.    Significant Imaging: I have reviewed all pertinent imaging results/findings within the past 24 hours.

## 2022-02-21 NOTE — ASSESSMENT & PLAN NOTE
-hg with downtick to 6 on 2/18 from 7 on admit with 9 baseline  -unclear cause if another reason besides chronic kidney disease  -fobt ordered  -transfuse 1 U on 2/18. adamently denies bleeding. Will add protonix for coverage in interim in case of any bleeding  -tx 1 U on 2/19, Hg still higher 6's, unclear soure, fobt pending, possible from neck inflammation with fluid collection of unclear source.  -improved 2/20 to 8's and stable further 2/21.  -denies nsaid use

## 2022-02-21 NOTE — PLAN OF CARE
Lengthy discussion with patient his wife who is POA and son on phone with nursing at bedside also.  He is agreeable to HD after discussions at length.  Anesthesia consulted for trialysis. Message sent to renal.  Will plan for MRIs orders after HD as once med stable now can pursue the other acute medical issues and now has had breakthrough in being amenable to medical treatments, wife is surrogate for him right now so as he said yes to us, she will consent for him as health care proxy

## 2022-02-21 NOTE — CONSULTS
Radiology Consult    Enrique Martinez is a 56 y.o. male admitted with salmonella bacteremia, mri cervical spine demonstrated prevertebral fluid collection from base of skull to C6-C7. Patient reportedly declined ENT scope for evaluation/intervention. IR consulted for prevertebral fluid collection aspiration.  Past Medical History:   Diagnosis Date    Anemia     Coronary artery disease     Diabetes mellitus, type 2     GERD (gastroesophageal reflux disease)     Gout     Hydrocele in adult     bilateral    Hyperlipidemia     Hypertension     Inguinal hernia     bilateral    Membranous glomerulonephritis     Nephrotic range proteinuria     Nephrotic syndrome     Obesity     Umbilical hernia      Past Surgical History:   Procedure Laterality Date    ABDOMINAL SURGERY  1980s    CARDIAC CATHETERIZATION  2007    x 2    COLONOSCOPY N/A 4/5/2019    Procedure: COLONOSCOPY;  Surgeon: Jose L Carty MD;  Location: [facility] ([address] [place] [address]);  Service: Colon and Rectal;  Laterality: N/A;    CORONARY STENT PLACEMENT  2007       Discussed with primary team, Dr. Tijerina.    Imaging reviewed with Radiology staff, Dr. Shah.    Scheduled Meds:    acetaminophen  1,000 mg Oral Q8H    amLODIPine  10 mg Oral Daily    ARIPiprazole  15 mg Oral QHS    aspirin  81 mg Oral Daily    atorvastatin  80 mg Oral Daily    cefTRIAXone (ROCEPHIN) IVPB  2 g Intravenous Q24H    cloNIDine  0.4 mg Oral TID    colchicine  0.3 mg Oral BID    DAPTOmycin (CUBICIN)  IV  6 mg/kg Intravenous Q48H    folic acid  1 mg Oral Daily    hydrALAZINE  50 mg Oral Q8H    labetalol  200 mg Oral Q12H    NON FORMULARY MEDICATION   Injection Every Mon, Fri    nystatin  500,000 Units Oral QID (WM & HS)    pantoprazole  40 mg Intravenous BID    polyethylene glycol  17 g Oral Daily    senna-docusate 8.6-50 mg  1 tablet Oral Daily    sevelamer carbonate  1,600 mg Oral TID    sodium bicarbonate  650 mg Oral TID     Continuous Infusions:   PRN  Meds:sodium chloride, albuterol-ipratropium, aluminum-magnesium hydroxide-simethicone, dextrose 10%, dextrose 10%, glucagon (human recombinant), glucose, glucose, lorazepam, melatonin, methocarbamoL, morphine, morphine, naloxone, ondansetron, oxyCODONE, oxyCODONE, prochlorperazine, simethicone, sodium chloride 0.9%    Allergies: Review of patient's allergies indicates:  No Known Allergies    Labs:  No results for input(s): INR in the last 168 hours.    Invalid input(s):  PT,  PTT    Recent Labs   Lab 02/21/22  0655   WBC 8.77   HGB 8.2*   HCT 26.3*   MCV 89         Recent Labs   Lab 02/19/22  0815 02/20/22  0815 02/21/22  0655   GLU 99 108 96    143 147*   K 5.3* 4.4 4.2    103 105   CO2 19* 17* 21*   * 172* 171*   CREATININE 18.5* 18.4* 19.0*   CALCIUM 9.2 9.3 9.3   MG 1.7  --   --    ALT  --  8*  --    AST  --  19  --    ALBUMIN  --  2.2*  --    BILITOT  --  0.2  --          Vitals (Most Recent):  Temp: 97.7 °F (36.5 °C) (02/21/22 1141)  Pulse: 82 (02/21/22 1141)  Resp: 18 (02/21/22 1141)  BP: (!) 146/79 (02/21/22 1415)  SpO2: 97 % (02/21/22 1141)    Plan:     Will review MRI c spine images when obtained. Prevertebral collection incompletely evaluated due to aborted prior c spine MRI.  No immediate plans for intervention at this time.     Amandeep Frederick, DO  PGY-III  Diagnostic and Interventional Radiology  [facility]

## 2022-02-21 NOTE — ASSESSMENT & PLAN NOTE
55 y/o M with h/o CKD V, DM, CAD, salmonella bacteremia, with posterior cervical neck pain. MRI C spine non contrast obtained showing prevertebral C1 to C6 anterior collection. NSGY recommended ENT consult for drainage of prevertebral collection. Patient refused flexible laryngoscopy.    - recommend MRI c spine with contrast if possible given CKD  - recommend evaluation by ortho spine  - discussed with Dr. Arredondo  - please call ENT with questions or concerns

## 2022-02-21 NOTE — ASSESSMENT & PLAN NOTE
-+ blood CX at [facility] 2/1, with clearance immediately, treated with cipro for 14 days unclear source but presumed diarrheal infection (wife endorses this)  -denies sickle cell hx to predispose to this  -concern with neck pain and foot pain with recent bacteremia  -work up in progress  -ID consulted. On dapto/rocephin

## 2022-02-21 NOTE — PLAN OF CARE
Awake, alert, and oriented; seems foggy at times, wife to remain @ bedside  Neck pain moderately controlled with PRN oxy 10 q4h, scheduled tylenol, and robaxin PRN-- brace worn by patient for 20 minutes overnight with nurse encouragement. Plan to try again while patient awake this AM; ENT consult for drainage   Nephrology following for HD need, pt much more open to idea this AM.   LLE CDI, IV rocephin + dapto continued, remains afebrile overnight  Wife remains @ bedside + pt POA, sleeping between care, non skid socks worn, call light within reach, will cont to monitor

## 2022-02-22 ENCOUNTER — ANESTHESIA (OUTPATIENT)
Dept: TRANSPLANT | Facility: HOSPITAL | Age: 57
DRG: 673 | End: 2022-02-22
Payer: MEDICAID

## 2022-02-22 ENCOUNTER — ANESTHESIA EVENT (OUTPATIENT)
Dept: TRANSPLANT | Facility: HOSPITAL | Age: 57
DRG: 673 | End: 2022-02-22
Payer: MEDICAID

## 2022-02-22 LAB
ABO + RH BLD: NORMAL
ALBUMIN SERPL BCP-MCNC: 2.1 G/DL (ref 3.5–5.2)
ALP SERPL-CCNC: 83 U/L (ref 55–135)
ALT SERPL W/O P-5'-P-CCNC: 13 U/L (ref 10–44)
ANION GAP SERPL CALC-SCNC: 22 MMOL/L (ref 8–16)
ANION GAP SERPL CALC-SCNC: 22 MMOL/L (ref 8–16)
AST SERPL-CCNC: 24 U/L (ref 10–40)
BASOPHILS # BLD AUTO: 0.03 K/UL (ref 0–0.2)
BASOPHILS NFR BLD: 0.4 % (ref 0–1.9)
BILIRUB SERPL-MCNC: 0.2 MG/DL (ref 0.1–1)
BLD GP AB SCN CELLS X3 SERPL QL: NORMAL
BUN SERPL-MCNC: 170 MG/DL (ref 6–20)
BUN SERPL-MCNC: 172 MG/DL (ref 6–20)
CALCIUM SERPL-MCNC: 9.3 MG/DL (ref 8.7–10.5)
CALCIUM SERPL-MCNC: 9.4 MG/DL (ref 8.7–10.5)
CHLORIDE SERPL-SCNC: 104 MMOL/L (ref 95–110)
CHLORIDE SERPL-SCNC: 105 MMOL/L (ref 95–110)
CO2 SERPL-SCNC: 20 MMOL/L (ref 23–29)
CO2 SERPL-SCNC: 20 MMOL/L (ref 23–29)
CREAT SERPL-MCNC: 19.4 MG/DL (ref 0.5–1.4)
CREAT SERPL-MCNC: 19.6 MG/DL (ref 0.5–1.4)
DIFFERENTIAL METHOD: ABNORMAL
EOSINOPHIL # BLD AUTO: 0.2 K/UL (ref 0–0.5)
EOSINOPHIL NFR BLD: 2.1 % (ref 0–8)
ERYTHROCYTE [DISTWIDTH] IN BLOOD BY AUTOMATED COUNT: 16.3 % (ref 11.5–14.5)
EST. GFR  (AFRICAN AMERICAN): 2.6 ML/MIN/1.73 M^2
EST. GFR  (AFRICAN AMERICAN): 2.6 ML/MIN/1.73 M^2
EST. GFR  (NON AFRICAN AMERICAN): 2.3 ML/MIN/1.73 M^2
EST. GFR  (NON AFRICAN AMERICAN): 2.3 ML/MIN/1.73 M^2
GLUCOSE SERPL-MCNC: 101 MG/DL (ref 70–110)
GLUCOSE SERPL-MCNC: 105 MG/DL (ref 70–110)
HCT VFR BLD AUTO: 25.2 % (ref 40–54)
HGB BLD-MCNC: 7.9 G/DL (ref 14–18)
IMM GRANULOCYTES # BLD AUTO: 0.07 K/UL (ref 0–0.04)
IMM GRANULOCYTES NFR BLD AUTO: 0.9 % (ref 0–0.5)
LYMPHOCYTES # BLD AUTO: 1.1 K/UL (ref 1–4.8)
LYMPHOCYTES NFR BLD: 14.1 % (ref 18–48)
MAGNESIUM SERPL-MCNC: 2 MG/DL (ref 1.6–2.6)
MCH RBC QN AUTO: 28.2 PG (ref 27–31)
MCHC RBC AUTO-ENTMCNC: 31.3 G/DL (ref 32–36)
MCV RBC AUTO: 90 FL (ref 82–98)
MONOCYTES # BLD AUTO: 1.3 K/UL (ref 0.3–1)
MONOCYTES NFR BLD: 16.5 % (ref 4–15)
NEUTROPHILS # BLD AUTO: 5.4 K/UL (ref 1.8–7.7)
NEUTROPHILS NFR BLD: 66 % (ref 38–73)
NRBC BLD-RTO: 0 /100 WBC
PHOSPHATE SERPL-MCNC: 10.2 MG/DL (ref 2.7–4.5)
PHOSPHATE SERPL-MCNC: 10.2 MG/DL (ref 2.7–4.5)
PLATELET # BLD AUTO: 216 K/UL (ref 150–450)
PMV BLD AUTO: 10.8 FL (ref 9.2–12.9)
POTASSIUM SERPL-SCNC: 4.3 MMOL/L (ref 3.5–5.1)
POTASSIUM SERPL-SCNC: 4.4 MMOL/L (ref 3.5–5.1)
PROT SERPL-MCNC: 5.5 G/DL (ref 6–8.4)
RBC # BLD AUTO: 2.8 M/UL (ref 4.6–6.2)
SODIUM SERPL-SCNC: 146 MMOL/L (ref 136–145)
SODIUM SERPL-SCNC: 147 MMOL/L (ref 136–145)
WBC # BLD AUTO: 8.1 K/UL (ref 3.9–12.7)

## 2022-02-22 PROCEDURE — 99232 SBSQ HOSP IP/OBS MODERATE 35: CPT | Mod: NTX,,, | Performed by: INTERNAL MEDICINE

## 2022-02-22 PROCEDURE — 36415 COLL VENOUS BLD VENIPUNCTURE: CPT | Mod: NTX | Performed by: HOSPITALIST

## 2022-02-22 PROCEDURE — 25000003 PHARM REV CODE 250: Mod: NTX

## 2022-02-22 PROCEDURE — 25000003 PHARM REV CODE 250: Mod: NTX | Performed by: PHYSICIAN ASSISTANT

## 2022-02-22 PROCEDURE — 99232 PR SUBSEQUENT HOSPITAL CARE,LEVL II: ICD-10-PCS | Mod: NTX,,, | Performed by: INTERNAL MEDICINE

## 2022-02-22 PROCEDURE — 94761 N-INVAS EAR/PLS OXIMETRY MLT: CPT | Mod: NTX

## 2022-02-22 PROCEDURE — 85025 COMPLETE CBC W/AUTO DIFF WBC: CPT | Mod: NTX | Performed by: HOSPITALIST

## 2022-02-22 PROCEDURE — 80048 BASIC METABOLIC PNL TOTAL CA: CPT | Mod: NTX | Performed by: HOSPITALIST

## 2022-02-22 PROCEDURE — 99233 SBSQ HOSP IP/OBS HIGH 50: CPT | Mod: NTX,,, | Performed by: HOSPITALIST

## 2022-02-22 PROCEDURE — 99233 PR SUBSEQUENT HOSPITAL CARE,LEVL III: ICD-10-PCS | Mod: NTX,,, | Performed by: HOSPITALIST

## 2022-02-22 PROCEDURE — 20600001 HC STEP DOWN PRIVATE ROOM: Mod: NTX

## 2022-02-22 PROCEDURE — 99233 PR SUBSEQUENT HOSPITAL CARE,LEVL III: ICD-10-PCS | Mod: NTX,,, | Performed by: INTERNAL MEDICINE

## 2022-02-22 PROCEDURE — 86920 COMPATIBILITY TEST SPIN: CPT | Mod: NTX | Performed by: HOSPITALIST

## 2022-02-22 PROCEDURE — C1751 CATH, INF, PER/CENT/MIDLINE: HCPCS | Mod: NTX

## 2022-02-22 PROCEDURE — 63600175 PHARM REV CODE 636 W HCPCS: Mod: NTX | Performed by: HOSPITALIST

## 2022-02-22 PROCEDURE — 99233 SBSQ HOSP IP/OBS HIGH 50: CPT | Mod: NTX,,, | Performed by: INTERNAL MEDICINE

## 2022-02-22 PROCEDURE — 84100 ASSAY OF PHOSPHORUS: CPT | Mod: NTX | Performed by: HOSPITALIST

## 2022-02-22 PROCEDURE — C9113 INJ PANTOPRAZOLE SODIUM, VIA: HCPCS | Mod: NTX | Performed by: HOSPITALIST

## 2022-02-22 PROCEDURE — 86901 BLOOD TYPING SEROLOGIC RH(D): CPT | Mod: NTX | Performed by: HOSPITALIST

## 2022-02-22 PROCEDURE — 25000003 PHARM REV CODE 250: Mod: NTX | Performed by: HOSPITALIST

## 2022-02-22 RX ORDER — SODIUM CHLORIDE 9 MG/ML
INJECTION, SOLUTION INTRAVENOUS ONCE
Status: DISCONTINUED | OUTPATIENT
Start: 2022-02-22 | End: 2022-03-07

## 2022-02-22 RX ADMIN — OXYCODONE HYDROCHLORIDE 10 MG: 10 TABLET ORAL at 11:02

## 2022-02-22 RX ADMIN — ARIPIPRAZOLE 15 MG: 5 TABLET ORAL at 09:02

## 2022-02-22 RX ADMIN — CLONIDINE HYDROCHLORIDE 0.4 MG: 0.1 TABLET ORAL at 09:02

## 2022-02-22 RX ADMIN — HYDRALAZINE HYDROCHLORIDE 50 MG: 50 TABLET ORAL at 05:02

## 2022-02-22 RX ADMIN — SEVELAMER CARBONATE 1600 MG: 800 TABLET, FILM COATED ORAL at 09:02

## 2022-02-22 RX ADMIN — ASPIRIN 81 MG: 81 TABLET, COATED ORAL at 09:02

## 2022-02-22 RX ADMIN — LABETALOL HYDROCHLORIDE 200 MG: 100 TABLET, FILM COATED ORAL at 09:02

## 2022-02-22 RX ADMIN — REPOSITORY CORTICOTROPIN 80 UNITS: 80 INJECTION INTRAMUSCULAR; SUBCUTANEOUS at 09:02

## 2022-02-22 RX ADMIN — ACETAMINOPHEN 1000 MG: 500 TABLET ORAL at 05:02

## 2022-02-22 RX ADMIN — ACETAMINOPHEN 1000 MG: 500 TABLET ORAL at 02:02

## 2022-02-22 RX ADMIN — METHOCARBAMOL 750 MG: 750 TABLET ORAL at 10:02

## 2022-02-22 RX ADMIN — HYDRALAZINE HYDROCHLORIDE 50 MG: 50 TABLET ORAL at 09:02

## 2022-02-22 RX ADMIN — NYSTATIN 500000 UNITS: 500000 SUSPENSION ORAL at 10:02

## 2022-02-22 RX ADMIN — SODIUM BICARBONATE 650 MG TABLET 650 MG: at 09:02

## 2022-02-22 RX ADMIN — SEVELAMER CARBONATE 1600 MG: 800 TABLET, FILM COATED ORAL at 05:02

## 2022-02-22 RX ADMIN — HYDRALAZINE HYDROCHLORIDE 50 MG: 50 TABLET ORAL at 02:02

## 2022-02-22 RX ADMIN — PANTOPRAZOLE SODIUM 40 MG: 40 INJECTION, POWDER, FOR SOLUTION INTRAVENOUS at 09:02

## 2022-02-22 RX ADMIN — OXYCODONE HYDROCHLORIDE 10 MG: 10 TABLET ORAL at 07:02

## 2022-02-22 RX ADMIN — ACETAMINOPHEN 1000 MG: 500 TABLET ORAL at 09:02

## 2022-02-22 RX ADMIN — PANTOPRAZOLE SODIUM 40 MG: 40 INJECTION, POWDER, FOR SOLUTION INTRAVENOUS at 10:02

## 2022-02-22 RX ADMIN — Medication 6 MG: at 10:02

## 2022-02-22 RX ADMIN — COLCHICINE 0.3 MG: 0.6 TABLET, FILM COATED ORAL at 09:02

## 2022-02-22 RX ADMIN — OXYCODONE HYDROCHLORIDE 10 MG: 10 TABLET ORAL at 12:02

## 2022-02-22 RX ADMIN — SENNOSIDES AND DOCUSATE SODIUM 1 TABLET: 50; 8.6 TABLET ORAL at 09:02

## 2022-02-22 RX ADMIN — CLONIDINE HYDROCHLORIDE 0.4 MG: 0.1 TABLET ORAL at 02:02

## 2022-02-22 RX ADMIN — FOLIC ACID 1 MG: 1 TABLET ORAL at 09:02

## 2022-02-22 RX ADMIN — AMLODIPINE BESYLATE 10 MG: 10 TABLET ORAL at 09:02

## 2022-02-22 RX ADMIN — SODIUM BICARBONATE 650 MG TABLET 650 MG: at 02:02

## 2022-02-22 RX ADMIN — NYSTATIN 500000 UNITS: 500000 SUSPENSION ORAL at 05:02

## 2022-02-22 RX ADMIN — ATORVASTATIN CALCIUM 80 MG: 20 TABLET, FILM COATED ORAL at 09:02

## 2022-02-22 RX ADMIN — CEFTRIAXONE SODIUM 2 G: 2 INJECTION, SOLUTION INTRAVENOUS at 02:02

## 2022-02-22 RX ADMIN — SEVELAMER CARBONATE 1600 MG: 800 TABLET, FILM COATED ORAL at 02:02

## 2022-02-22 RX ADMIN — NYSTATIN 500000 UNITS: 500000 SUSPENSION ORAL at 12:02

## 2022-02-22 NOTE — ASSESSMENT & PLAN NOTE
-ent consulted, will likely bedside scope. He reports trouble swallowing food to wife now but denies again to me  -refused bedside scope  -reattempt at MRI C spine- shows more chronic issues prevertebral area but retropharyngela area still prominent with fluid. Will f/u recs from specialitss regarding this, severe narrowing seen

## 2022-02-22 NOTE — PROGRESS NOTES
[facility] - Transplant [facility] Medicine  Progress Note    Patient Name: Enrique Martinez  MRN: [id number]  Patient Class: IP- Inpatient   Admission Date: 2/16/2022  Length of Stay: 5 days  Attending Physician: Dianna Tijerina MD  Primary Care Provider: Primary Doctor No        Subjective:     Principal Problem:Neck pain        HPI:  Enrique Martinez is a 56 y.o. male with a PMHx of CKD V glomerulonephropathy, insulin dependent diabetes mellitus, and CAD who presented tot he ED for evaluation of worsening left foot pain and swelling. He was recently admitted at [facility] from 2/2-2/4/2022 from LLE cellulitis. He was found to have salmonella bacteremia suspected due to diarrheal illness. He was discharged with cipro for which he reports compliance. He has had no improvement in his pain, redness, or swelling of his left foot and leg. He has also developed a new blister on his left foot. He endorses chills. He denies fevers, chest pain, SOB, N/V, decreased urine output, dysuria, and flank pain.    ED: HR 97, other VSSAF. CBC with leukocytosis of 16.01, Hgb 7.4, Hct 22.2.  ESR 97. CRP 33.4. K 5.5. Cr 19.7, . Phos 10.4. Lactic WNL. MRI L foot with cellulitis and osteomyelitis of distal 1st metatarsal and proximal 1st phalanx.      Overview/Hospital Course:  No notes on file      Interval history:     He was calm this morning, seen in the hallway when going to MRi as he agreed to it this morning of C spine. He said hes ready to get the line attempted again too. He said his neck is feeling okay. Returned later today once back in room and sitter t bedside. He reports pain doing okay. He asked for some more juice. Calm and not aggressive this morning or causing trouble from reports from nursing. Went for CT ordered by surgcal teams also. Anesthesia to attempt to do line again today. Is risk at accidentally pulling out, teams aware. Will have sitter and will need to cover up. If he is agreeing to HD we have to try  even though is at risk for this, mainly due to his woresning uremia as biggest contributor. Wife reported yesterday was more confused than normal. Today was falling asleep on exam a few times, but he has done that before in previous days. Will await to see if line is successful.                   Review of patient's allergies indicates:  No Known Allergies    No current facility-administered medications on file prior to encounter.     Current Outpatient Medications on File Prior to Encounter   Medication Sig    insulin (LANTUS SOLOSTAR U-100 INSULIN) glargine 100 units/mL (3mL) SubQ pen Inject 5 Units into the skin once daily.    albuterol (PROVENTIL/VENTOLIN HFA) 90 mcg/actuation inhaler Inhale 2 puffs into the lungs every 6 (six) hours as needed for Wheezing or Shortness of Breath. Rescue    amlodipine (NORVASC) 10 MG tablet Take 10 mg by mouth once daily.    ARIPiprazole (ABILIFY) 15 MG Tab Take 1 tablet (15 mg total) by mouth every evening.    aspirin (ECOTRIN) 81 MG EC tablet Take 1 tablet (81 mg total) by mouth once daily.    atorvastatin (LIPITOR) 80 MG tablet Take 1 tablet (80 mg total) by mouth once daily.    blood-glucose meter kit Use as instructed    calcitRIOL (ROCALTROL) 0.5 MCG Cap Take 0.5 mcg by mouth once daily.    chlorthalidone (HYGROTEN) 50 MG Tab Take 50 mg by mouth once daily.     cloNIDine (CATAPRES) 0.2 MG tablet Take 0.4 mg by mouth 3 (three) times daily.     colchicine (COLCRYS) 0.6 mg tablet Take 0.6 mg by mouth daily as needed.    ergocalciferol (ERGOCALCIFEROL) 50,000 unit Cap Take 50,000 Units by mouth every 30 days.     famotidine (PEPCID) 20 MG tablet Take 20 mg by mouth daily as needed for Heartburn.     ferrous gluconate (FERGON) 324 MG tablet Take 324 mg by mouth 2 (two) times a day.     fluticasone (FLONASE) 50 mcg/actuation nasal spray 1 spray by Each Nare route once daily.    furosemide (LASIX) 40 MG tablet Take 40 mg by mouth 2 (two) times daily.     methocarbamoL (ROBAXIN) 500 MG Tab Take 500 mg by mouth 2 (two) times a day.    metoprolol tartrate (LOPRESSOR) 100 MG tablet Take 100 mg by mouth.    omeprazole (PRILOSEC) 20 MG capsule Take 20 mg by mouth once daily.    RENVELA 800 mg Tab Take 800 mg by mouth 3 (three) times daily.    sodium bicarbonate 650 MG tablet Take 650 mg by mouth.    tadalafiL (CIALIS) 2.5 mg Tab Take 2.5 mg by mouth.    TRUE METRIX GLUCOSE TEST STRIP Strp      Family History       Problem Relation (Age of Onset)    Drug abuse Brother    Heart attack Father, Brother    Hyperlipidemia Father    Hypertension Father, Brother    No Known Problems Sister    Stroke Father          Tobacco Use    Smoking status: Current Some Day Smoker     Packs/day: 0.25     Years: 10.00     Pack years: 2.50    Smokeless tobacco: Never Used   Substance and Sexual Activity    Alcohol use: No    Drug use: No    Sexual activity: Not on file     Review of Systems   Constitutional:  Positive for chills. Negative for activity change and fever.   HENT:  Negative for congestion and trouble swallowing.    Eyes:  Negative for photophobia and visual disturbance.   Respiratory:  Negative for chest tightness, shortness of breath and wheezing.    Cardiovascular:  Positive for leg swelling. Negative for chest pain and palpitations.   Gastrointestinal:  Negative for abdominal pain, constipation, nausea and vomiting.   Genitourinary:  Negative for dysuria, flank pain, frequency, hematuria and urgency.   Musculoskeletal:  Positive for arthralgias. Negative for back pain and gait problem.   Skin:  Positive for color change and wound. Negative for rash.   Neurological:  Negative for dizziness, syncope, weakness, light-headedness, numbness and headaches.   Psychiatric/Behavioral:  Negative for agitation and confusion. The patient is not nervous/anxious.    Objective:     Vital Signs (Most Recent):  Temp: 98.2 °F (36.8 °C) (02/22/22 1443)  Pulse: 96 (02/22/22 1506)  Resp:  18 (22 1443)  BP: 130/68 (22 1443)  SpO2: (!) 93 % (22 1443) Vital Signs (24h Range):  Temp:  [97.7 °F (36.5 °C)-98.9 °F (37.2 °C)] 98.2 °F (36.8 °C)  Pulse:  [] 96  Resp:  [18-19] 18  SpO2:  [93 %-98 %] 93 %  BP: (124-154)/(68-90) 130/68     Weight: 71.5 kg (157 lb 10.1 oz)  Body mass index is 25.44 kg/m².    Physical Exam  Vitals and nursing note reviewed.   Constitutional:       Appearance: He is well-developed. He is ill-appearing.      Comments: Calmer. Asterixis with tremors at rest and with movement    HENT:      Head: Normocephalic and atraumatic.      Mouth/Throat:      Mouth: Mucous membranes are moist.   Eyes:      General: No scleral icterus.     Conjunctiva/sclera: Conjunctivae normal.   Cardiovascular:      Rate and Rhythm: Normal rate and regular rhythm.      Heart sounds: Normal heart sounds.   Pulmonary:      Effort: Pulmonary effort is normal. No respiratory distress.      Breath sounds: Normal breath sounds. No wheezing.   Abdominal:      General: Bowel sounds are normal. There is no distension.      Palpations: Abdomen is soft.      Tenderness: There is no abdominal tenderness.   Musculoskeletal:         General: No tenderness. Normal range of motion.      Cervical back: Normal range of motion and neck supple.      Right lower le+ Pitting Edema present.      Left lower le+ Pitting Edema present.      Comments: LLE with burst blister. Severe pain with any light touch.  Signs of bunion on both big toes but also possible gout tophi deposit on right big toe. Edema I RLE >LLE. pitting   Feet:      Left foot:      Skin integrity: Skin breakdown, erythema and warmth present.   Skin:     General: Skin is warm and dry.      Capillary Refill: Capillary refill takes less than 2 seconds.   Neurological:      Mental Status: He is alert and oriented to person, place, and time.      Cranial Nerves: No cranial nerve deficit.      Comments: Pain improved with movement to neck     Psychiatric:         Behavior: Behavior normal.         Thought Content: Thought content normal.         Judgment: Judgment normal.               Significant Labs:   All pertinent labs within the past 24 hours have been reviewed.  CBC:   Recent Labs   Lab 02/21/22  0655 02/22/22  0631   WBC 8.77 8.10   HGB 8.2* 7.9*   HCT 26.3* 25.2*    216       CMP:   Recent Labs   Lab 02/21/22  0655 02/21/22  2311 02/22/22  0631   * 147* 146*   K 4.2 4.3 4.4    105 104   CO2 21* 20* 20*   GLU 96 101 105   * 172* 170*   CREATININE 19.0* 19.4* 19.6*   CALCIUM 9.3 9.3 9.4   PROT  --  5.5*  --    ALBUMIN  --  2.1*  --    BILITOT  --  0.2  --    ALKPHOS  --  83  --    AST  --  24  --    ALT  --  13  --    ANIONGAP 21* 22* 22*   EGFRNONAA 2.3* 2.3* 2.3*       Lactic Acid:   No results for input(s): LACTATE in the last 48 hours.      Significant Imaging: I have reviewed all pertinent imaging results/findings within the past 24 hours.   MRI Cervical Spine Without Contrast  Narrative: EXAMINATION:  MRI CERVICAL SPINE WITHOUT CONTRAST    CLINICAL HISTORY:  reassess fluid collection with more cuts per NS, ID, ENT request;.  Abnormal findings on diagnostic imaging of other parts of musculoskeletal system    TECHNIQUE:  Multiplanar, multisequence MR images of the cervical spine were acquired without the administration of contrast.    COMPARISON:  MRI cervical spine 02/18/2022; MRI brain 12/27/2020    FINDINGS:  Sequences are degraded by patient motion artifact.    Reversal of normal cervical lordosis centered at C3-4.  Grade 1 anterolisthesis at C2-3 and C7-T1.  Widening of the atlantodental interval up to 5 mm.    No compression fractures.  No marrow replacing lesions.    Multilevel disc, uncovertebral, and facet joint degeneration, described in detail below.    There is discogenic endplate edema at C4-5, and to a lesser extent C3-4.  No fluid bright signal within the disc spaces.  No endplate destruction.  There  is adjacent soft tissue edema.    Visualized structures in the posterior fossa are unchanged, again noting small foci of encephalomalacia in the delores and left cerebellar hemisphere.  The cervical spinal cord is unremarkable.    There is a 9.5 x 2.7 x 1.1 cm retropharyngeal fluid collection (6:9 and 9:14), not significantly changed from 02/18/2022.  No cervical lymphadenopathy.    SIGNIFICANT FINDINGS BY LEVEL:    C2-3: Disc osteophyte complex and posterior disc uncovering.  Bilateral facet arthropathy, left greater than right.  Mild canal stenosis.  Severe left foraminal stenosis.    C3-4: Disc osteophyte complex.  Moderate canal stenosis.  Moderate right and mild left foraminal stenosis.    C4-5: Disc osteophyte complex.  Moderate canal stenosis.  Moderate bilateral foraminal stenosis.    C5-6: Small disc osteophyte complex.  Mild canal stenosis.  Moderate bilateral foraminal stenosis.    C6-7: Small disc osteophyte complex.  Mild canal stenosis.  Mild right and moderate left foraminal stenosis.    C7-T1: Small disc osteophyte complex and posterior disc uncovering.  Bilateral facet arthropathy.  Mild canal stenosis.  Severe bilateral foraminal stenosis.  Impression: Unchanged retropharyngeal fluid collection, which could represent an effusion or abscess.    Endplate edema at C3-4 and C4-5, favored degenerative.  No fluid bright signal within the disc spaces or endplate destruction to suggest discitis-osteomyelitis.    Multilevel degenerative changes, most advanced at C2-3 and C7-T1 where there is degenerative anterolisthesis contributing to severe foraminal stenosis at each level.    This report was flagged in Epic as abnormal.    Electronically signed by: Oj Montes  Date:    02/22/2022  Time:    11:05          Assessment/Plan:      * Neck pain  -abnormal C spine Xray with possible signs of C spine instability, MRi ordered and patient refused 2/17, on pain meds, discussed AT length, see 2/17 and 2/18 note above,  ordered with premediation now. Concern for possible infection also given acuity of pain  -signs of C spine instability, prevertebral abscess likely with fluid collection with widening in C spine, NS recs C collar- patient refused, and Xrays, refused, and MRi full spine when stable. Cannot do any intervention now as unstable bc of kidneys.   -ent consulted for retropharyngeal abscess per ns and id recs, will likely scope if amenable at bedside (he refused). They rec MRI C spien with contrast- but cannot get due to kidneys  -refused c collar xrays  -NS recs MRI all spine, ID/ENT rec repeat MRI C spine for better pictures. (cannot do contrast). He is likely nto going to sit for all the spine right now 2/21 per my exam and nursing. Placed repeat flex xrays and C spine MRi orders in now. They called him at 3 pm but anesthesia is coming at 330 pm to do the line he said he would now do this afternoon after a cigarette, so nursing to tell them to come back. IR consulted to sample the vertebral involvement, they report they wanted to review further imaging once in to see if they needed - will f/u thoughts but likely suspect wont need sampling of the spine as this appear more chroinc, retropharyngeal collection however likely needs further addressed. CT also pending as rec for ENT as well.   -ID following- on dapto and Ceftriaxone      Prevertebral Fluid Collection  -ent consulted, will likely bedside scope. He reports trouble swallowing food to wife now but denies again to me  -refused bedside scope  -reattempt at MRI C spine- shows more chronic issues prevertebral area but retropharyngela area still prominent with fluid. Will f/u recs from specialitss regarding this, severe narrowing seen          History of Salmonella septicemia  -+ blood CX at [facility] 2/1, with clearance immediately, treated with cipro for 14 days unclear source but presumed diarrheal infection (wife endorses this)  -denies sickle cell hx to predispose to  this  -concern with neck pain and foot pain with recent bacteremia  -work up in progress  -ID consulted. On dapto/rocephin        Gout  -colchicine BID, light IVF x 10 hours 2/17 per renal recs. Avoid steroids with posisble infection. He denies he has gout      Folic acid deficiency  -low at 4.4.  -daily replacement      Increased anion gap metabolic acidosis  Secondary to ckd  -cont Na bicarb  -refusing HD      Hyperphosphatemia  -cont binders, elvated at 10-11  -refusing HD      Hyperkalemia  -shift PRN      Acute renal failure superimposed on stage 5 chronic kidney disease, not on chronic dialysis  Hyperkalemia  Hyperphosphatemia  Metabolic acidosis  Kidney transplant candidate    - 2/2 membranous glomerulopathy  - GFR was 13-16 up from baseline 4-6 previously in January 2022. Labs repeated today and GFR is 2.6, Cr 19.   - patient follows with Dr. Cardoza and Dr Lee and dialysis has been discussed multiple times, patient continues to refuse.  - on transplant list with [facility]  -cont phos binders, Na bicarb. Valencia for adequate I/O. Lasix held 2/17 per renal recs  -uric acid trending  -at severe risk of adverse events, BUN almost 200, asterixis on exam. Meets multiple criteria but refusing HD. Has capacity. Wife aware and states is his decision  Continues to refuse on 2/19 after extensive discussions with all parties.   -2/20- may consent tomorrow per wife, if so anesthesia for IJ. No permacath now as active infection concerns in neck. If not, palliative consult. Wife will agree to HD for him if he says yes despite no capacity per psych. Wife will not do it against his will as poa  Agrees to HD on 2/21. Attempted line on 2/21 as he agreed but he accidentally pulled it.  reattempt pending 2/22. High risk to accidentally pull out so will need close monitoring while uremic and has sitter    Subacute osteomyelitis  - 2/4 SIRS with HR>90, WBC 16  - lactic WNL  - ESR 97, CRP 33.4, procal 1.  - MRI w/ regions of focal  abnormal signal involving the distal 1st metatarsal and proximal 1st phalanx as described above.  Findings are concerning for foci of subacute myelitis   - patient is hemodynamically stable, podiatry consulted, unclear if ifection but no plans to do biopsy now cellulitis worsened it appears 2/17 PM so vanc/zosyn initiated. ID consulted 2/18 as severely tender extremity, concern with recent bacteremia on 2/1 with salmonella with scaling on exam for possible staph/strep also scalded skin type look..   -treating gout, minimal improvement in pain, less red on doxy/rocephin per ID recs now  -no sign of osteo on repeat MRi 2/22 in neck    Benign prostatic hyperplasia  Valencia placed 2/18 overnight for incontinence    GERD (gastroesophageal reflux disease)  - continue PPI    S/P coronary artery stent placement  - continue ASA, statin    Type 2 diabetes mellitus  - home meds: lantus 5 units daily  - SSI   - ACHS accuchecks  - diabetic diet  -A1 C 7's    Anemia due to chronic kidney disease  -hg with downtick to 6 on 2/18 from 7 on admit with 9 baseline  -unclear cause if another reason besides chronic kidney disease  -fobt ordered  -transfuse 1 U on 2/18. adamently denies bleeding. Will add protonix for coverage in interim in case of any bleeding  -tx 1 U on 2/19, Hg still higher 6's, unclear soure, fobt pending, possible from neck inflammation with fluid collection of unclear source.  -improved 2/20 to 8's and stable further 2/21.  -denies nsaid use    Membranous glomerulonephritis  -will bring acthar gel from home to do non formulary as 40K and got via prior auth-non form request addded  -reason for CKD 5.      Hyperlipidemia  - continue statin    Renovascular hypertension  -elevated to 180s on admit but improved on current regimen to 130s systolic on exam 2/18  -cont hydralazine, labetalol, norvassc, clonidine.      VTE Risk Mitigation (From admission, onward)         Ordered     IP VTE HIGH RISK PATIENT  Once          02/17/22 0151     Place sequential compression device  Until discontinued         02/17/22 0151                Discharge Planning   JOAN: 2/25/2022     Code Status: Full Code   Is the patient medically ready for discharge?: No    Reason for patient still in hospital (select all that apply): Patient trending condition  Discharge Plan A: Home   Discharge Delays: None known at this time              Dianna Tijerina MD  Department of Hospital Medicine   [facility] - Transplant Stepdown

## 2022-02-22 NOTE — ASSESSMENT & PLAN NOTE
Initial labs significant for bicarb of 18, anion gap 19. AGMA likely 2/2 uremia. Continued AGMA,, uremia during admssion. Bun slightly decreased to 180s after IVF. Improved to 21 on 2/21.     - HD if line is placed.   - DC sodium bicarb

## 2022-02-22 NOTE — CARE UPDATE
Spoke to patient's wife Atif Sierrateena. She clarified that Mr. Martinez is now on board with pursuing hemodialysis. She explained that she and her son were able to persuade the patient to go forward. I explained that two attempts had been made with anesthesia yesterday and the patient pulled off the drapes and pulled out the line along with the drapes during the two attempts. Per primary team, anesthesia planning to attempt again tonight. She believes this was likely due to the delirium caused by the ativan.     I expressed my concerns about patient's ability to keep the line in even after it is placed successfully and how he may struggle keeping a permacath in for outpatient HD upon discharge. I explained that in order to go forward with dialysis he will need to consent to and be amenable to all the other things such as line placement that go along with completing dialysis successfully.

## 2022-02-22 NOTE — SUBJECTIVE & OBJECTIVE
Interval History: Pt afebrile overnight. Anesthesia attempted trialysis line placement yesterday afternoon without success as patient pulled line out during procedure. MRI c-spine today with possible reattempt at line placement for HD today.     Review of Systems   Constitutional:  Negative for activity change, chills and fever.   HENT:  Negative for congestion and trouble swallowing.    Eyes:  Negative for photophobia and visual disturbance.   Respiratory:  Negative for chest tightness, shortness of breath and wheezing.    Cardiovascular:  Positive for leg swelling. Negative for chest pain and palpitations.   Gastrointestinal:  Negative for abdominal pain, constipation, nausea and vomiting.   Genitourinary:  Negative for dysuria, flank pain, frequency, hematuria and urgency.   Musculoskeletal:  Positive for arthralgias. Negative for back pain and gait problem.   Skin:  Positive for wound. Negative for rash.   Neurological:  Negative for syncope, speech difficulty and headaches.   Psychiatric/Behavioral:  Negative for agitation and confusion. The patient is not nervous/anxious.    Objective:     Vital Signs (Most Recent):  Temp: 97.7 °F (36.5 °C) (02/22/22 1126)  Pulse: 81 (02/22/22 1126)  Resp: 18 (02/22/22 1126)  BP: 124/71 (02/22/22 1126)  SpO2: 96 % (02/22/22 1126) Vital Signs (24h Range):  Temp:  [97.7 °F (36.5 °C)-98.9 °F (37.2 °C)] 97.7 °F (36.5 °C)  Pulse:  [] 81  Resp:  [18-19] 18  SpO2:  [94 %-98 %] 96 %  BP: (124-154)/(71-90) 124/71     Weight: 71.5 kg (157 lb 10.1 oz)  Body mass index is 25.44 kg/m².    Estimated Creatinine Clearance: 3.7 mL/min (A) (based on SCr of 19.6 mg/dL (H)).    Physical Exam  Vitals and nursing note reviewed.   Constitutional:       General: He is not in acute distress.     Appearance: He is well-developed and overweight. He is ill-appearing.   HENT:      Head: Normocephalic and atraumatic.      Nose: Nose normal.      Mouth/Throat:      Mouth: Mucous membranes are moist.    Eyes:      General: No scleral icterus.     Conjunctiva/sclera: Conjunctivae normal.   Cardiovascular:      Rate and Rhythm: Normal rate and regular rhythm.      Heart sounds: Normal heart sounds.   Pulmonary:      Effort: Pulmonary effort is normal. No respiratory distress.      Breath sounds: Normal breath sounds. No wheezing or rales.   Abdominal:      General: Bowel sounds are normal. There is no distension.      Palpations: Abdomen is soft.      Tenderness: There is no abdominal tenderness. There is no guarding.   Musculoskeletal:         General: Swelling and signs of injury present. Normal range of motion.      Cervical back: Normal range of motion and neck supple.      Right lower leg: No edema.      Left lower leg: No edema.      Comments: Improved LE swelling   Feet:      Right foot:      Skin integrity: Skin integrity normal.      Left foot:      Skin integrity: Skin breakdown, erythema, warmth and dry skin present.   Skin:     General: Skin is warm and dry.      Capillary Refill: Capillary refill takes less than 2 seconds.      Comments: L leg, ankle, foot skin sloughing.   Deroofed blister on dorsum of left foot, appearing with slightly more purulent drainage    Neurological:      General: No focal deficit present.      Mental Status: He is alert and easily aroused.      GCS: GCS eye subscore is 4. GCS verbal subscore is 5. GCS motor subscore is 6.      Cranial Nerves: No cranial nerve deficit.      Sensory: No sensory deficit.      Motor: Tremor present.      Comments: Oriented to self, birthday, and place but not to year   Psychiatric:         Behavior: Behavior normal. Behavior is cooperative.         Thought Content: Thought content normal.         Judgment: Judgment normal.       Significant Labs: All pertinent labs within the past 24 hours have been reviewed.    Significant Imaging: I have reviewed all pertinent imaging results/findings within the past 24 hours.

## 2022-02-22 NOTE — PROGRESS NOTES
Patient arrived via stretcher in NAD, two identifiers used, allergies reviewed, WAR notified of portable monitoring removed for scan, pt. placed on MRI compatible telemetry monitoring system, no S/S of discomfort, will continue to monitor throughout scan, WAR room will be notified once patient is placed back on portable telemetry.

## 2022-02-22 NOTE — PLAN OF CARE
Pt intermittently AAOx4. VSS, afebrile, on room air. Pt w/o complaints of pain. Pt on renal diet. 0 BM/shift. Pt OOBTC throughout shift w/ 1 person assistance. Non-skid socks on pt when ambulating. Bed in lowest position, wheels locked, call light within pt reach. Attempted central line access 2x at bedside for HD. Pt pre-medicated w/ Ativan prior to placement. Pt pulled off drape in first attempt, pulled out line in second attempt (refer to anesthesia note). Pressure dressing applied to site, CDI. Pt & spouse updated on plan of care, plans for possible line placement tomorrow.

## 2022-02-22 NOTE — SUBJECTIVE & OBJECTIVE
Interval History: on 2/21, anesthesia made two unsuccessful attempt at placing trialysis line after patient was given ativan for sedation. Patient pulled off drapes 1st attempt, pulled out line and drapes on 2nd attempt. Anesthesia planning to try again today with full sedation or without benzo. Spoke to patient's wife, still amenable to HD. Patient more confused, not oriented to location. Completed MRI C-spine this morning. IR, ENT, NSGY, ID following for possible retropharyngeal abscess    Review of patient's allergies indicates:  No Known Allergies  Current Facility-Administered Medications   Medication Frequency    0.9%  NaCl infusion (for blood administration) Q24H PRN    acetaminophen tablet 1,000 mg Q8H    albuterol-ipratropium 2.5 mg-0.5 mg/3 mL nebulizer solution 3 mL Q4H PRN    aluminum-magnesium hydroxide-simethicone 200-200-20 mg/5 mL suspension 30 mL QID PRN    amLODIPine tablet 10 mg Daily    ARIPiprazole tablet 15 mg QHS    aspirin EC tablet 81 mg Daily    atorvastatin tablet 80 mg Daily    cefTRIAXone (ROCEPHIN) 2 g/50 mL D5W IVPB Q24H    cloNIDine tablet 0.4 mg TID    colchicine split tablet 0.3 mg BID    DAPTOmycin (CUBICIN) 445 mg in sodium chloride 0.9% IVPB Q48H    dextrose 10% bolus 125 mL PRN    dextrose 10% bolus 250 mL PRN    folic acid tablet 1 mg Daily    glucagon (human recombinant) injection 1 mg PRN    glucose chewable tablet 16 g PRN    glucose chewable tablet 24 g PRN    HP Acthar (corticotropin) injectable gel 80 units Every Mon, Fri    hydrALAZINE tablet 50 mg Q8H    labetaloL tablet 200 mg Q12H    lorazepam injection 1 mg On Call Procedure    melatonin tablet 6 mg Nightly PRN    methocarbamoL tablet 750 mg QID PRN    morphine injection 2 mg Once PRN    morphine injection 2 mg On Call Procedure    naloxone 0.4 mg/mL injection 0.02 mg PRN    nystatin 100,000 unit/mL suspension 500,000 Units QID (WM & HS)    ondansetron disintegrating tablet 8 mg Q8H PRN    oxyCODONE immediate release  tablet 5 mg Q6H PRN    oxyCODONE immediate release tablet Tab 10 mg Q4H PRN    pantoprazole injection 40 mg BID    polyethylene glycol packet 17 g Daily    prochlorperazine injection Soln 5 mg Q6H PRN    senna-docusate 8.6-50 mg per tablet 1 tablet Daily    sevelamer carbonate tablet 1,600 mg TID    simethicone chewable tablet 80 mg QID PRN    sodium bicarbonate tablet 650 mg TID    sodium chloride 0.9% flush 10 mL Q8H PRN       Objective:     Vital Signs (Most Recent):  Temp: 97.7 °F (36.5 °C) (02/22/22 1126)  Pulse: 81 (02/22/22 1126)  Resp: 18 (02/22/22 1224)  BP: 124/71 (02/22/22 1126)  SpO2: 96 % (02/22/22 1126)  O2 Device (Oxygen Therapy): room air (02/22/22 1126) Vital Signs (24h Range):  Temp:  [97.7 °F (36.5 °C)-98.9 °F (37.2 °C)] 97.7 °F (36.5 °C)  Pulse:  [] 81  Resp:  [18-19] 18  SpO2:  [94 %-98 %] 96 %  BP: (124-154)/(71-90) 124/71     Weight: 71.5 kg (157 lb 10.1 oz) (02/22/22 0600)  Body mass index is 25.44 kg/m².  Body surface area is 1.82 meters squared.    I/O last 3 completed shifts:  In: 420 [P.O.:420]  Out: 800 [Urine:800]    Physical Exam  Vitals and nursing note reviewed.   Constitutional:       Appearance: He is well-developed and overweight. He is ill-appearing.   HENT:      Head: Normocephalic and atraumatic.      Nose: Nose normal.      Mouth/Throat:      Mouth: Mucous membranes are moist.   Eyes:      General: No scleral icterus.     Conjunctiva/sclera: Conjunctivae normal.   Cardiovascular:      Rate and Rhythm: Normal rate and regular rhythm.      Heart sounds: Normal heart sounds.   Pulmonary:      Effort: Pulmonary effort is normal. No respiratory distress.      Breath sounds: Normal breath sounds. No wheezing or rales.   Abdominal:      General: Bowel sounds are normal. There is no distension.      Palpations: Abdomen is soft.      Tenderness: There is no abdominal tenderness. There is no guarding.   Musculoskeletal:         General: Swelling and signs of injury present. Normal  range of motion.      Cervical back: Normal range of motion and neck supple.      Right lower leg: No edema.      Left lower leg: No edema.      Comments: Improved LE swelling   Feet:      Right foot:      Skin integrity: Skin integrity normal.      Left foot:      Skin integrity: Skin breakdown, erythema, warmth and dry skin present.   Skin:     General: Skin is warm and dry.      Capillary Refill: Capillary refill takes less than 2 seconds.      Comments: L leg, ankle, foot skin sloughing.   Deroofed blister on dorsum of left foot   Neurological:      Mental Status: He is easily aroused. He is lethargic.      GCS: GCS eye subscore is 4. GCS verbal subscore is 5. GCS motor subscore is 6.      Motor: Tremor present.      Comments: Asterixis present   Psychiatric:         Behavior: Behavior normal. Behavior is cooperative.         Thought Content: Thought content normal.         Judgment: Judgment normal.       Significant Labs:  CBC:   Recent Labs   Lab 02/22/22  0631   WBC 8.10   RBC 2.80*   HGB 7.9*   HCT 25.2*      MCV 90   MCH 28.2   MCHC 31.3*     CMP:   Recent Labs   Lab 02/21/22  2311 02/22/22  0631    105   CALCIUM 9.3 9.4   ALBUMIN 2.1*  --    PROT 5.5*  --    * 146*   K 4.3 4.4   CO2 20* 20*    104   * 170*   CREATININE 19.4* 19.6*   ALKPHOS 83  --    ALT 13  --    AST 24  --    BILITOT 0.2  --      All labs within the past 24 hours have been reviewed.     Significant Imaging:  MRI C spine: stable retropharyngeal fluid collection,  Multilevel degenerative changes, most advanced at C2-3 and C7-T1 where there is degenerative anterolisthesis contributing to severe foraminal stenosis at each level.

## 2022-02-22 NOTE — ASSESSMENT & PLAN NOTE
Hyperkalemia  Hyperphosphatemia  Metabolic acidosis  Kidney transplant candidate    - 2/2 membranous glomerulopathy  - GFR was 13-16 up from baseline 4-6 previously in January 2022. Labs repeated today and GFR is 2.6, Cr 19.   - patient follows with Dr. Cardoza and Dr Lee and dialysis has been discussed multiple times, patient continues to refuse.  - on transplant list with [facility]  -cont phos binders, Na bicarb. Valencia for adequate I/O. Lasix held 2/17 per renal recs  -uric acid trending  -at severe risk of adverse events, BUN almost 200, asterixis on exam. Meets multiple criteria but refusing HD. Has capacity. Wife aware and states is his decision  Continues to refuse on 2/19 after extensive discussions with all parties.   -2/20- may consent tomorrow per wife, if so anesthesia for IJ. No permacath now as active infection concerns in neck. If not, palliative consult. Wife will agree to HD for him if he says yes despite no capacity per psych. Wife will not do it against his will as poa  Agrees to HD on 2/21. Attempted line on 2/21 as he agreed but he accidentally pulled it.  reattempt pending 2/22. High risk to accidentally pull out so will need close monitoring while uremic and has sitter

## 2022-02-22 NOTE — ASSESSMENT & PLAN NOTE
- 2/4 SIRS with HR>90, WBC 16  - lactic WNL  - ESR 97, CRP 33.4, procal 1.  - MRI w/ regions of focal abnormal signal involving the distal 1st metatarsal and proximal 1st phalanx as described above.  Findings are concerning for foci of subacute myelitis   - patient is hemodynamically stable, podiatry consulted, unclear if ifection but no plans to do biopsy now cellulitis worsened it appears 2/17 PM so vanc/zosyn initiated. ID consulted 2/18 as severely tender extremity, concern with recent bacteremia on 2/1 with salmonella with scaling on exam for possible staph/strep also scalded skin type look..   -treating gout, minimal improvement in pain, less red on doxy/rocephin per ID recs now  -no sign of osteo on repeat MRi 2/22 in neck

## 2022-02-22 NOTE — PROGRESS NOTES
Pt increasingly more confused + visual hallucinations on initial assessment. Day Nurse did pass along that change in mentation was noticed after given IV ativan for line placement.  Notified LILIANA Soto of mentation change. Family at bedside worried about status change, this nurse relayed to family that this is likely due to combination of uremia and ativan  Vital signs remain stable, , no increased lethargy or shortness of breath.  Stat CMP, phos, and mag collected, will follow up on labs and cont to monitor  Bed alarm in place and wife to remain at bedside, will cont to monitor

## 2022-02-22 NOTE — PROGRESS NOTES
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 Previously, he has declined HD in the past, never receiving a fistula. He is on the transplant wait list. Patient exhibits signs of uremia including fatigue, poor appetite, and mental slowing. On labs, he is hyperkalemic, uremic, AGMA, hyperphosphatemic. GFR is 2-3, which is reduced from previous GFR 13. Home meds include sodium bicarb, Renvela, and calcitriol. 2+ pitting edema on B/L lower extremities. Renal US from Jan 2022 shows sonographic evidence of chronic medical renal disease and bilateral simple and minimally complex renal cysts.  sCr one year ago was 4.5, now 18.6. UPCr ratio 1.11 (Dec 2020)  2/18: Patient increasingly somnolent, lethargic. Hospital med in process of getting in touch with Dr. Lee, who the patient follows with outpatient.     Plan:   - Recommend HD if patient is able to get trialysis line placed. Discussed HD with patient as possible emergent need in this setting and as a bridge to transplant. Discussed extensively that his labs and clinical decline from uremia would preclude him from getting a kidney should one become available, rendering his time on the wait list pointless.   - HD tonight if he has central line placed. Wife still wanting to pursue dialysis. Patient has voiced that he is amenable and we made it clear that he would have to be able to tolerate having a line in place long term  for HD to be possible.   - Shift potassium as needed  - Continue phos binder sevelamer 1600 TID  - Hold calcitriol in setting of hyperphosphatemia. Will re-start when phos reaches normal level.   - Trend Cr  - Strict I&Os  - Avoid nephrotoxic agents  - Renally adjust medications         Gout  Has history of gout and hyperuricemia. Uric acid elevated at 15. S/p NS 50 cc/hr for 10 hours. Ankle pain ddx includes gout vs cellulitis vs septic joint. Podiatry has low concern for osteo    - Colchicine 0.3 BID  - scheduled tylenol 1 g TID for pain  - Hold diuretics to avoid concentrating uric acid in serum          Increased anion gap metabolic acidosis  Initial labs significant for bicarb of 18, anion gap 19. AGMA likely 2/2 uremia. Continued AGMA,, uremia during admssion. Bun slightly decreased to 180s after IVF. Improved to 21 on 2/21.     - HD if line is placed.   - DC sodium bicarb    Hyperphosphatemia  Hyperphosphatemia at 10.9.     - Continue Sevelamer 1600 TID    Hyperkalemia  Hyperkalemic at 5.5 on admission, now 5.3. No EKG changes of hyperkalemia on initial EKG.     - Shift as needed by primary.     SHANNAN (iron deficiency anemia)  Baseline Hb 9-10, Hb 7.2 on admission. Iron low at 10, iron sat low at 4%, ferritin elevated 384, transferrin low at 174.   Antiplatelet/anticoagulation: aspirin    Plan:  - Trend CBC daily  - No role for IV iron in setting of infection  - Transfuse with goal Hb > 7      Patient on waiting list for kidney transplant  Has been on waiting list for 4 years.     Type 2 diabetes mellitus  HbA1c 7.6 in Feb 2022, BG during hospitalization at goal. Takes insulin glargine at home.     - ISS as needed.     Anemia due to chronic kidney disease  Hemoglobin 10.1 one month ago, now 7.2. Normocytic anemia with low iron, iron sat, elevated ferritin.     - May need EPO/IV iron with HD    Membranous glomerulonephritis  Has history of membranous glomerulonephritis. Previous renal biopsy in 2013  showed 50% glomerulosclerosis. Follows with Dr. Lee at [facility].     Renovascular hypertension  Patient remains hypertensive in 170s/89 after admission. On amlodipine, clonidine, labetolol, and hydralazine scheduled.     - Will defer to primary team for management        Thank you for your consult. I will follow-up with patient. Please contact us if you have any additional questions.      Subjective:     HPI: Mr. Martinez is a 56 y.o. 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 He was recently admitted at [facility] from 2/2-2/4/2022 from LLE cellulitis. He was found to have salmonella bacteremia suspected due to diarrheal illness. He was discharged with cipro for which he reports compliance. He has had no improvement in his pain, redness, or swelling of his left foot and leg. He has also developed a new blister on his left foot, with MRI foot concerning for osteomyelitis and showing soft tissue swelling c/w cellulitis. He is admitted for treatment of suspected osteomyelitis.     He follows with Dr. Cardoza (nephrology), has adamantly declined HD in the past and would rather wait for transplant. He reports being on the transplant list for the past 4 years. His last renal biopsy in 2013 showed 50% glomerulosclerosis. Started on calcitriol 0.5, hx of elevated PTH at 921. Patient complains of decreased appetite, neck cramps, fatigue. He still urinates 4-5 times/day. He reports his B/L LE edema has worsened over the past few weeks. No dyspnea, chest pain, dysuria, hematuria. Nephro consulted for HD initiation.      Labs are significant for hyperkalemia 5.5, bicarb 18, , phosphate 10.4, Cr 19.7, GFR 2.2, ESR 97, CRP 33.4. . CBC significant for Hgb of  7.2, white count 14. Previous echo showed EF of 65%.       Interval History: on 2/21, anesthesia made two unsuccessful attempt at placing trialysis line after patient was given ativan for sedation. Patient pulled off drapes 1st attempt, pulled out line and drapes on 2nd attempt. Anesthesia planning to try again today with full sedation or without benzo. Spoke to patient's wife, still amenable to HD. Patient more confused, not oriented to location. Completed MRI C-spine this morning. IR, ENT, NSGY, ID following for possible retropharyngeal abscess    Review of patient's allergies indicates:  No Known Allergies  Current Facility-Administered Medications   Medication Frequency    0.9%  NaCl infusion (for blood administration) Q24H PRN    acetaminophen tablet 1,000 mg Q8H    albuterol-ipratropium 2.5 mg-0.5 mg/3 mL nebulizer solution 3 mL Q4H PRN    aluminum-magnesium hydroxide-simethicone 200-200-20 mg/5 mL suspension 30 mL QID PRN    amLODIPine tablet 10 mg Daily    ARIPiprazole tablet 15 mg QHS    aspirin EC tablet 81 mg Daily    atorvastatin tablet 80 mg Daily    cefTRIAXone (ROCEPHIN) 2 g/50 mL D5W IVPB Q24H    cloNIDine tablet 0.4 mg TID    colchicine split tablet 0.3 mg BID    DAPTOmycin (CUBICIN) 445 mg in sodium chloride 0.9% IVPB Q48H    dextrose 10% bolus 125 mL PRN    dextrose 10% bolus 250 mL PRN    folic acid tablet 1 mg Daily    glucagon (human recombinant) injection 1 mg PRN    glucose chewable tablet 16 g PRN    glucose chewable tablet 24 g PRN    HP Acthar (corticotropin) injectable gel 80 units Every Mon, Fri    hydrALAZINE tablet 50 mg Q8H    labetaloL tablet 200 mg Q12H    lorazepam injection 1 mg On Call Procedure    melatonin tablet 6 mg Nightly PRN    methocarbamoL tablet 750 mg QID PRN    morphine injection 2 mg Once PRN    morphine injection 2 mg On Call Procedure    naloxone 0.4 mg/mL injection 0.02 mg PRN    nystatin 100,000 unit/mL suspension 500,000 Units QID (WM  & HS)    ondansetron disintegrating tablet 8 mg Q8H PRN    oxyCODONE immediate release tablet 5 mg Q6H PRN    oxyCODONE immediate release tablet Tab 10 mg Q4H PRN    pantoprazole injection 40 mg BID    polyethylene glycol packet 17 g Daily    prochlorperazine injection Soln 5 mg Q6H PRN    senna-docusate 8.6-50 mg per tablet 1 tablet Daily    sevelamer carbonate tablet 1,600 mg TID    simethicone chewable tablet 80 mg QID PRN    sodium bicarbonate tablet 650 mg TID    sodium chloride 0.9% flush 10 mL Q8H PRN       Objective:     Vital Signs (Most Recent):  Temp: 97.7 °F (36.5 °C) (02/22/22 1126)  Pulse: 81 (02/22/22 1126)  Resp: 18 (02/22/22 1224)  BP: 124/71 (02/22/22 1126)  SpO2: 96 % (02/22/22 1126)  O2 Device (Oxygen Therapy): room air (02/22/22 1126) Vital Signs (24h Range):  Temp:  [97.7 °F (36.5 °C)-98.9 °F (37.2 °C)] 97.7 °F (36.5 °C)  Pulse:  [] 81  Resp:  [18-19] 18  SpO2:  [94 %-98 %] 96 %  BP: (124-154)/(71-90) 124/71     Weight: 71.5 kg (157 lb 10.1 oz) (02/22/22 0600)  Body mass index is 25.44 kg/m².  Body surface area is 1.82 meters squared.    I/O last 3 completed shifts:  In: 420 [P.O.:420]  Out: 800 [Urine:800]    Physical Exam  Vitals and nursing note reviewed.   Constitutional:       Appearance: He is well-developed and overweight. He is ill-appearing.   HENT:      Head: Normocephalic and atraumatic.      Nose: Nose normal.      Mouth/Throat:      Mouth: Mucous membranes are moist.   Eyes:      General: No scleral icterus.     Conjunctiva/sclera: Conjunctivae normal.   Cardiovascular:      Rate and Rhythm: Normal rate and regular rhythm.      Heart sounds: Normal heart sounds.   Pulmonary:      Effort: Pulmonary effort is normal. No respiratory distress.      Breath sounds: Normal breath sounds. No wheezing or rales.   Abdominal:      General: Bowel sounds are normal. There is no distension.      Palpations: Abdomen is soft.      Tenderness: There is no abdominal tenderness.  There is no guarding.   Musculoskeletal:         General: Swelling and signs of injury present. Normal range of motion.      Cervical back: Normal range of motion and neck supple.      Right lower leg: No edema.      Left lower leg: No edema.      Comments: Improved LE swelling   Feet:      Right foot:      Skin integrity: Skin integrity normal.      Left foot:      Skin integrity: Skin breakdown, erythema, warmth and dry skin present.   Skin:     General: Skin is warm and dry.      Capillary Refill: Capillary refill takes less than 2 seconds.      Comments: L leg, ankle, foot skin sloughing.   Deroofed blister on dorsum of left foot   Neurological:      Mental Status: He is easily aroused. He is lethargic.      GCS: GCS eye subscore is 4. GCS verbal subscore is 5. GCS motor subscore is 6.      Motor: Tremor present.      Comments: Asterixis present   Psychiatric:         Behavior: Behavior normal. Behavior is cooperative.         Thought Content: Thought content normal.         Judgment: Judgment normal.       Significant Labs:  CBC:   Recent Labs   Lab 02/22/22  0631   WBC 8.10   RBC 2.80*   HGB 7.9*   HCT 25.2*      MCV 90   MCH 28.2   MCHC 31.3*     CMP:   Recent Labs   Lab 02/21/22  2311 02/22/22  0631    105   CALCIUM 9.3 9.4   ALBUMIN 2.1*  --    PROT 5.5*  --    * 146*   K 4.3 4.4   CO2 20* 20*    104   * 170*   CREATININE 19.4* 19.6*   ALKPHOS 83  --    ALT 13  --    AST 24  --    BILITOT 0.2  --      All labs within the past 24 hours have been reviewed.     Significant Imaging:  MRI C spine: stable retropharyngeal fluid collection,  Multilevel degenerative changes, most advanced at C2-3 and C7-T1 where there is degenerative anterolisthesis contributing to severe foraminal stenosis at each level.      Harrison Acuna MD  Nephrology  [facility] - Transplant Stepdown

## 2022-02-22 NOTE — ASSESSMENT & PLAN NOTE
Non-contrast MRI had to be discontinued due to patient request.  But available images shows retropharyngeal fluid collection with abscess not excluded.  Deeply concerned that this could represent infection.  His recent bacteremia makes me concerned this could be salmonella.   He is immunocompromised due to a combination of uremia and diabetes so invasive salmonellosis is possible. Active problems of concern are his cervical neck prevertebral fluid collection, left lower extremity cellulitis/ brodies abscess, and recent salmonella bacteremia.     Plan  - Continue empiric ceftriaxone IV. Continue daptomycin q 48 hours. Will continue empiric coverage until more information with regards to imaging and cultures of c-spine infection are obtained.   - MRI of C-Spine without changes with regards to retropharyngeal fluid collection  - ENT and NSGY consultation, appreciate their assistance and will f/u recommendations   - Trialysis line attempted yesterday without success due to patient pulling out line during procedure. Anesthesia to reattempt today

## 2022-02-22 NOTE — PROGRESS NOTES
[facility] - Transplant Stepdown  Infectious Disease  Progress Note    Patient Name: Enrique Martinez  MRN: [id number]  Admission Date: 2/16/2022  Length of Stay: 5 days  Attending Physician: Dianna Tijerina MD  Primary Care Provider: Primary Doctor No    Isolation Status: No active isolations  Assessment/Plan:      * Neck pain  Non-contrast MRI had to be discontinued due to patient request.  But available images shows retropharyngeal fluid collection with abscess not excluded.  Deeply concerned that this could represent infection.  His recent bacteremia makes me concerned this could be salmonella.   He is immunocompromised due to a combination of uremia and diabetes so invasive salmonellosis is possible. Active problems of concern are his cervical neck prevertebral fluid collection, left lower extremity cellulitis/ brodies abscess, and recent salmonella bacteremia.     Plan  - Continue empiric ceftriaxone IV. Continue daptomycin q 48 hours. Will continue empiric coverage until more information with regards to imaging and cultures of c-spine infection are obtained.   - MRI of C-Spine without changes with regards to retropharyngeal fluid collection  - ENT and NSGY consultation, appreciate their assistance and will f/u recommendations   - Trialysis line attempted yesterday without success due to patient pulling out line during procedure. Anesthesia to reattempt today          Prevertebral Fluid Collection  As outlined above.    Pain and swelling of left lower extremity  MRI foot shows brodies abscess (subacute osteomyelitis).  Will manage with empiric ceftriaxone and daptomycin for now.        Anticipated Disposition: TBD    Thank you for your consult. I will follow-up with patient. Please contact us if you have any additional questions.    Deena Maya MD  Infectious Disease  [facility] - [facility]    Subjective:     Principal Problem:Neck pain    HPI: Enrique Martinez is a 56 y.o. male with a PMH of CKD V  glomerulonephropathy, insulin dependent diabetes mellitus, and CAD who presented to the ED for evaluation of worsening left foot pain and swelling. He was recently admitted at [facility] from 2/2-2/4/2022 from LLE cellulitis complicated by salmonella bacteremia after having diarrheal illness.  He was discharged home with ciprofloxacin. He reports no improvement in his cellulitis since being discharged home and no has new blister development. ID was consulted due to recent salmonella bacteremia and new findings on MRI suspicious for left foot myelitis and persistent cellulitis infection. On initial ID encounter, pt reporting pain in left foot and ankle but denying any fever, chills, N/V, diarrhea, chest pain, headaches, or SOB.     Interval History: Pt afebrile overnight. Anesthesia attempted trialysis line placement yesterday afternoon without success as patient pulled line out during procedure. MRI c-spine today with possible reattempt at line placement for HD today.     Review of Systems   Constitutional:  Negative for activity change, chills and fever.   HENT:  Negative for congestion and trouble swallowing.    Eyes:  Negative for photophobia and visual disturbance.   Respiratory:  Negative for chest tightness, shortness of breath and wheezing.    Cardiovascular:  Positive for leg swelling. Negative for chest pain and palpitations.   Gastrointestinal:  Negative for abdominal pain, constipation, nausea and vomiting.   Genitourinary:  Negative for dysuria, flank pain, frequency, hematuria and urgency.   Musculoskeletal:  Positive for arthralgias. Negative for back pain and gait problem.   Skin:  Positive for wound. Negative for rash.   Neurological:  Negative for syncope, speech difficulty and headaches.   Psychiatric/Behavioral:  Negative for agitation and confusion. The patient is not nervous/anxious.    Objective:     Vital Signs (Most Recent):  Temp: 97.7 °F (36.5 °C) (02/22/22 1126)  Pulse: 81 (02/22/22 1126)  Resp:  18 (02/22/22 1126)  BP: 124/71 (02/22/22 1126)  SpO2: 96 % (02/22/22 1126) Vital Signs (24h Range):  Temp:  [97.7 °F (36.5 °C)-98.9 °F (37.2 °C)] 97.7 °F (36.5 °C)  Pulse:  [] 81  Resp:  [18-19] 18  SpO2:  [94 %-98 %] 96 %  BP: (124-154)/(71-90) 124/71     Weight: 71.5 kg (157 lb 10.1 oz)  Body mass index is 25.44 kg/m².    Estimated Creatinine Clearance: 3.7 mL/min (A) (based on SCr of 19.6 mg/dL (H)).    Physical Exam  Vitals and nursing note reviewed.   Constitutional:       General: He is not in acute distress.     Appearance: He is well-developed and overweight. He is ill-appearing.   HENT:      Head: Normocephalic and atraumatic.      Nose: Nose normal.      Mouth/Throat:      Mouth: Mucous membranes are moist.   Eyes:      General: No scleral icterus.     Conjunctiva/sclera: Conjunctivae normal.   Cardiovascular:      Rate and Rhythm: Normal rate and regular rhythm.      Heart sounds: Normal heart sounds.   Pulmonary:      Effort: Pulmonary effort is normal. No respiratory distress.      Breath sounds: Normal breath sounds. No wheezing or rales.   Abdominal:      General: Bowel sounds are normal. There is no distension.      Palpations: Abdomen is soft.      Tenderness: There is no abdominal tenderness. There is no guarding.   Musculoskeletal:         General: Swelling and signs of injury present. Normal range of motion.      Cervical back: Normal range of motion and neck supple.      Right lower leg: No edema.      Left lower leg: No edema.      Comments: Improved LE swelling   Feet:      Right foot:      Skin integrity: Skin integrity normal.      Left foot:      Skin integrity: Skin breakdown, erythema, warmth and dry skin present.   Skin:     General: Skin is warm and dry.      Capillary Refill: Capillary refill takes less than 2 seconds.      Comments: L leg, ankle, foot skin sloughing.   Deroofed blister on dorsum of left foot, appearing with slightly more purulent drainage    Neurological:       General: No focal deficit present.      Mental Status: He is alert and easily aroused.      GCS: GCS eye subscore is 4. GCS verbal subscore is 5. GCS motor subscore is 6.      Cranial Nerves: No cranial nerve deficit.      Sensory: No sensory deficit.      Motor: Tremor present.      Comments: Oriented to self, birthday, and place but not to year   Psychiatric:         Behavior: Behavior normal. Behavior is cooperative.         Thought Content: Thought content normal.         Judgment: Judgment normal.       Significant Labs: All pertinent labs within the past 24 hours have been reviewed.    Significant Imaging: I have reviewed all pertinent imaging results/findings within the past 24 hours.

## 2022-02-22 NOTE — ASSESSMENT & PLAN NOTE
Has history of membranous glomerulonephritis. Previous renal biopsy in 2013 showed 50% glomerulosclerosis. Follows with Dr. Lee at [facility].

## 2022-02-22 NOTE — PROGRESS NOTES
Pt awake alert and oriented this AM, when told he had an MRI/CT/X-RAY of spine scheduled to better assess the fluid that needs to be drained, he laughed at nurse and refused. He is unwilling to listen at this time about the scans of spine.   He did state he was still interested about line placement.. will pass along to day team.   Wife to remain at bedside and remains indifferent

## 2022-02-22 NOTE — SUBJECTIVE & OBJECTIVE
Interval history:     He was calm this morning, seen in the hallway when going to MRi as he agreed to it this morning of C spine. He said hes ready to get the line attempted again too. He said his neck is feeling okay. Returned later today once back in room and sitter t bedside. He reports pain doing okay. He asked for some more juice. Calm and not aggressive this morning or causing trouble from reports from nursing. Went for CT ordered by surgcal teams also. Anesthesia to attempt to do line again today. Is risk at accidentally pulling out, teams aware. Will have sitter and will need to cover up. If he is agreeing to HD we have to try even though is at risk for this, mainly due to his woresning uremia as biggest contributor. Wife reported yesterday was more confused than normal. Today was falling asleep on exam a few times, but he has done that before in previous days. Will await to see if line is successful.                   Review of patient's allergies indicates:  No Known Allergies    No current facility-administered medications on file prior to encounter.     Current Outpatient Medications on File Prior to Encounter   Medication Sig    insulin (LANTUS SOLOSTAR U-100 INSULIN) glargine 100 units/mL (3mL) SubQ pen Inject 5 Units into the skin once daily.    albuterol (PROVENTIL/VENTOLIN HFA) 90 mcg/actuation inhaler Inhale 2 puffs into the lungs every 6 (six) hours as needed for Wheezing or Shortness of Breath. Rescue    amlodipine (NORVASC) 10 MG tablet Take 10 mg by mouth once daily.    ARIPiprazole (ABILIFY) 15 MG Tab Take 1 tablet (15 mg total) by mouth every evening.    aspirin (ECOTRIN) 81 MG EC tablet Take 1 tablet (81 mg total) by mouth once daily.    atorvastatin (LIPITOR) 80 MG tablet Take 1 tablet (80 mg total) by mouth once daily.    blood-glucose meter kit Use as instructed    calcitRIOL (ROCALTROL) 0.5 MCG Cap Take 0.5 mcg by mouth once daily.    chlorthalidone (HYGROTEN) 50 MG Tab Take 50 mg by  mouth once daily.     cloNIDine (CATAPRES) 0.2 MG tablet Take 0.4 mg by mouth 3 (three) times daily.     colchicine (COLCRYS) 0.6 mg tablet Take 0.6 mg by mouth daily as needed.    ergocalciferol (ERGOCALCIFEROL) 50,000 unit Cap Take 50,000 Units by mouth every 30 days.     famotidine (PEPCID) 20 MG tablet Take 20 mg by mouth daily as needed for Heartburn.     ferrous gluconate (FERGON) 324 MG tablet Take 324 mg by mouth 2 (two) times a day.     fluticasone (FLONASE) 50 mcg/actuation nasal spray 1 spray by Each Nare route once daily.    furosemide (LASIX) 40 MG tablet Take 40 mg by mouth 2 (two) times daily.    methocarbamoL (ROBAXIN) 500 MG Tab Take 500 mg by mouth 2 (two) times a day.    metoprolol tartrate (LOPRESSOR) 100 MG tablet Take 100 mg by mouth.    omeprazole (PRILOSEC) 20 MG capsule Take 20 mg by mouth once daily.    RENVELA 800 mg Tab Take 800 mg by mouth 3 (three) times daily.    sodium bicarbonate 650 MG tablet Take 650 mg by mouth.    tadalafiL (CIALIS) 2.5 mg Tab Take 2.5 mg by mouth.    TRUE METRIX GLUCOSE TEST STRIP Strp      Family History       Problem Relation (Age of Onset)    Drug abuse Brother    Heart attack Father, Brother    Hyperlipidemia Father    Hypertension Father, Brother    No Known Problems Sister    Stroke Father          Tobacco Use    Smoking status: Current Some Day Smoker     Packs/day: 0.25     Years: 10.00     Pack years: 2.50    Smokeless tobacco: Never Used   Substance and Sexual Activity    Alcohol use: No    Drug use: No    Sexual activity: Not on file     Review of Systems   Constitutional:  Positive for chills. Negative for activity change and fever.   HENT:  Negative for congestion and trouble swallowing.    Eyes:  Negative for photophobia and visual disturbance.   Respiratory:  Negative for chest tightness, shortness of breath and wheezing.    Cardiovascular:  Positive for leg swelling. Negative for chest pain and palpitations.   Gastrointestinal:  Negative for  abdominal pain, constipation, nausea and vomiting.   Genitourinary:  Negative for dysuria, flank pain, frequency, hematuria and urgency.   Musculoskeletal:  Positive for arthralgias. Negative for back pain and gait problem.   Skin:  Positive for color change and wound. Negative for rash.   Neurological:  Negative for dizziness, syncope, weakness, light-headedness, numbness and headaches.   Psychiatric/Behavioral:  Negative for agitation and confusion. The patient is not nervous/anxious.    Objective:     Vital Signs (Most Recent):  Temp: 98.2 °F (36.8 °C) (22 1443)  Pulse: 96 (22 1506)  Resp: 18 (22 1443)  BP: 130/68 (22 1443)  SpO2: (!) 93 % (22 1443) Vital Signs (24h Range):  Temp:  [97.7 °F (36.5 °C)-98.9 °F (37.2 °C)] 98.2 °F (36.8 °C)  Pulse:  [] 96  Resp:  [18-19] 18  SpO2:  [93 %-98 %] 93 %  BP: (124-154)/(68-90) 130/68     Weight: 71.5 kg (157 lb 10.1 oz)  Body mass index is 25.44 kg/m².    Physical Exam  Vitals and nursing note reviewed.   Constitutional:       Appearance: He is well-developed. He is ill-appearing.      Comments: Calmer. Asterixis with tremors at rest and with movement    HENT:      Head: Normocephalic and atraumatic.      Mouth/Throat:      Mouth: Mucous membranes are moist.   Eyes:      General: No scleral icterus.     Conjunctiva/sclera: Conjunctivae normal.   Cardiovascular:      Rate and Rhythm: Normal rate and regular rhythm.      Heart sounds: Normal heart sounds.   Pulmonary:      Effort: Pulmonary effort is normal. No respiratory distress.      Breath sounds: Normal breath sounds. No wheezing.   Abdominal:      General: Bowel sounds are normal. There is no distension.      Palpations: Abdomen is soft.      Tenderness: There is no abdominal tenderness.   Musculoskeletal:         General: No tenderness. Normal range of motion.      Cervical back: Normal range of motion and neck supple.      Right lower le+ Pitting Edema present.      Left lower  le+ Pitting Edema present.      Comments: LLE with burst blister. Severe pain with any light touch.  Signs of bunion on both big toes but also possible gout tophi deposit on right big toe. Edema I RLE >LLE. pitting   Feet:      Left foot:      Skin integrity: Skin breakdown, erythema and warmth present.   Skin:     General: Skin is warm and dry.      Capillary Refill: Capillary refill takes less than 2 seconds.   Neurological:      Mental Status: He is alert and oriented to person, place, and time.      Cranial Nerves: No cranial nerve deficit.      Comments: Pain improved with movement to neck    Psychiatric:         Behavior: Behavior normal.         Thought Content: Thought content normal.         Judgment: Judgment normal.               Significant Labs:   All pertinent labs within the past 24 hours have been reviewed.  CBC:   Recent Labs   Lab 22  0655 22  0631   WBC 8.77 8.10   HGB 8.2* 7.9*   HCT 26.3* 25.2*    216       CMP:   Recent Labs   Lab 22  0655 22  2311 22  0631   * 147* 146*   K 4.2 4.3 4.4    105 104   CO2 21* 20* 20*   GLU 96 101 105   * 172* 170*   CREATININE 19.0* 19.4* 19.6*   CALCIUM 9.3 9.3 9.4   PROT  --  5.5*  --    ALBUMIN  --  2.1*  --    BILITOT  --  0.2  --    ALKPHOS  --  83  --    AST  --  24  --    ALT  --  13  --    ANIONGAP 21* 22* 22*   EGFRNONAA 2.3* 2.3* 2.3*       Lactic Acid:   No results for input(s): LACTATE in the last 48 hours.      Significant Imaging: I have reviewed all pertinent imaging results/findings within the past 24 hours.   MRI Cervical Spine Without Contrast  Narrative: EXAMINATION:  MRI CERVICAL SPINE WITHOUT CONTRAST    CLINICAL HISTORY:  reassess fluid collection with more cuts per NS, ID, ENT request;.  Abnormal findings on diagnostic imaging of other parts of musculoskeletal system    TECHNIQUE:  Multiplanar, multisequence MR images of the cervical spine were acquired without the administration  of contrast.    COMPARISON:  MRI cervical spine 02/18/2022; MRI brain 12/27/2020    FINDINGS:  Sequences are degraded by patient motion artifact.    Reversal of normal cervical lordosis centered at C3-4.  Grade 1 anterolisthesis at C2-3 and C7-T1.  Widening of the atlantodental interval up to 5 mm.    No compression fractures.  No marrow replacing lesions.    Multilevel disc, uncovertebral, and facet joint degeneration, described in detail below.    There is discogenic endplate edema at C4-5, and to a lesser extent C3-4.  No fluid bright signal within the disc spaces.  No endplate destruction.  There is adjacent soft tissue edema.    Visualized structures in the posterior fossa are unchanged, again noting small foci of encephalomalacia in the delores and left cerebellar hemisphere.  The cervical spinal cord is unremarkable.    There is a 9.5 x 2.7 x 1.1 cm retropharyngeal fluid collection (6:9 and 9:14), not significantly changed from 02/18/2022.  No cervical lymphadenopathy.    SIGNIFICANT FINDINGS BY LEVEL:    C2-3: Disc osteophyte complex and posterior disc uncovering.  Bilateral facet arthropathy, left greater than right.  Mild canal stenosis.  Severe left foraminal stenosis.    C3-4: Disc osteophyte complex.  Moderate canal stenosis.  Moderate right and mild left foraminal stenosis.    C4-5: Disc osteophyte complex.  Moderate canal stenosis.  Moderate bilateral foraminal stenosis.    C5-6: Small disc osteophyte complex.  Mild canal stenosis.  Moderate bilateral foraminal stenosis.    C6-7: Small disc osteophyte complex.  Mild canal stenosis.  Mild right and moderate left foraminal stenosis.    C7-T1: Small disc osteophyte complex and posterior disc uncovering.  Bilateral facet arthropathy.  Mild canal stenosis.  Severe bilateral foraminal stenosis.  Impression: Unchanged retropharyngeal fluid collection, which could represent an effusion or abscess.    Endplate edema at C3-4 and C4-5, favored degenerative.  No  fluid bright signal within the disc spaces or endplate destruction to suggest discitis-osteomyelitis.    Multilevel degenerative changes, most advanced at C2-3 and C7-T1 where there is degenerative anterolisthesis contributing to severe foraminal stenosis at each level.    This report was flagged in Epic as abnormal.    Electronically signed by: Oj Montes  Date:    02/22/2022  Time:    11:05

## 2022-02-22 NOTE — PLAN OF CARE
"Pt more alert this AM but still remains forgetful; still willing to have line placement this AM for HD  Pt Sub Q acthar scheduled for 2/21 PM, but pt family member did not want it given due to it "keeping the patient up all night."   Notified Chad CABALLERO on call and asked if this nurse could administer 2/21 PM dose this AM 2/22,  he stated he would pass along to day team so I will hold off on giving it until then in fear of further delaying line placement/HD care  Non skid socks worn, call light within reach, will cont to monitor, bed alarm remains on, wife to remain @ bedside  "

## 2022-02-22 NOTE — PLAN OF CARE
-Pt free from fall or injury so far this shift. Instructed to call if assistance needed, verbalized understanding. Bed alarm on for added safety.   -Pt alert, but confused at times, easily reoriented. Pt has requested to go out and smoke a cigarette multiple times this shift.  -MRI, CT, and X-rays complete.   -Creat 19.6, Phos 10.2. Plan to place Trialysis at the BS.   -Dressing to left foot changed. Wound care consulted.   -Oxycodone given for pain.   -2 Large BM's today.   -Excoriation noted to buttocks. Pt able to turn self in bed independently. Up to the BSC with 2 person assist.   -Afebrile. Rocephin continued. Daily labs monitored.

## 2022-02-23 LAB
ANION GAP SERPL CALC-SCNC: 26 MMOL/L (ref 8–16)
BASOPHILS # BLD AUTO: 0.01 K/UL (ref 0–0.2)
BASOPHILS NFR BLD: 0.1 % (ref 0–1.9)
BLD PROD TYP BPU: NORMAL
BLOOD UNIT EXPIRATION DATE: NORMAL
BLOOD UNIT TYPE CODE: 9500
BLOOD UNIT TYPE: NORMAL
BUN SERPL-MCNC: 159 MG/DL (ref 6–20)
CALCIUM SERPL-MCNC: 9.4 MG/DL (ref 8.7–10.5)
CHLORIDE SERPL-SCNC: 100 MMOL/L (ref 95–110)
CO2 SERPL-SCNC: 18 MMOL/L (ref 23–29)
CODING SYSTEM: NORMAL
CREAT SERPL-MCNC: 20.8 MG/DL (ref 0.5–1.4)
DIFFERENTIAL METHOD: ABNORMAL
DISPENSE STATUS: NORMAL
EOSINOPHIL # BLD AUTO: 0 K/UL (ref 0–0.5)
EOSINOPHIL NFR BLD: 0 % (ref 0–8)
ERYTHROCYTE [DISTWIDTH] IN BLOOD BY AUTOMATED COUNT: 16.5 % (ref 11.5–14.5)
EST. GFR  (AFRICAN AMERICAN): 2.4 ML/MIN/1.73 M^2
EST. GFR  (NON AFRICAN AMERICAN): 2.1 ML/MIN/1.73 M^2
GLUCOSE SERPL-MCNC: 105 MG/DL (ref 70–110)
HCT VFR BLD AUTO: 21.9 % (ref 40–54)
HGB BLD-MCNC: 6.8 G/DL (ref 14–18)
IMM GRANULOCYTES # BLD AUTO: 0.04 K/UL (ref 0–0.04)
IMM GRANULOCYTES NFR BLD AUTO: 0.6 % (ref 0–0.5)
LYMPHOCYTES # BLD AUTO: 0.6 K/UL (ref 1–4.8)
LYMPHOCYTES NFR BLD: 8.6 % (ref 18–48)
MCH RBC QN AUTO: 28.6 PG (ref 27–31)
MCHC RBC AUTO-ENTMCNC: 31.1 G/DL (ref 32–36)
MCV RBC AUTO: 92 FL (ref 82–98)
MONOCYTES # BLD AUTO: 0.2 K/UL (ref 0.3–1)
MONOCYTES NFR BLD: 2.7 % (ref 4–15)
NEUTROPHILS # BLD AUTO: 6 K/UL (ref 1.8–7.7)
NEUTROPHILS NFR BLD: 88 % (ref 38–73)
NRBC BLD-RTO: 0 /100 WBC
NUM UNITS TRANS PACKED RBC: NORMAL
PHOSPHATE SERPL-MCNC: 9.8 MG/DL (ref 2.7–4.5)
PLATELET # BLD AUTO: 200 K/UL (ref 150–450)
PMV BLD AUTO: 11.2 FL (ref 9.2–12.9)
POTASSIUM SERPL-SCNC: 5 MMOL/L (ref 3.5–5.1)
RBC # BLD AUTO: 2.38 M/UL (ref 4.6–6.2)
SODIUM SERPL-SCNC: 144 MMOL/L (ref 136–145)
WBC # BLD AUTO: 6.76 K/UL (ref 3.9–12.7)

## 2022-02-23 PROCEDURE — 25000003 PHARM REV CODE 250: Mod: NTX

## 2022-02-23 PROCEDURE — 25000003 PHARM REV CODE 250: Mod: NTX | Performed by: PHYSICIAN ASSISTANT

## 2022-02-23 PROCEDURE — 20600001 HC STEP DOWN PRIVATE ROOM: Mod: NTX

## 2022-02-23 PROCEDURE — 99233 SBSQ HOSP IP/OBS HIGH 50: CPT | Mod: NTX,,, | Performed by: INTERNAL MEDICINE

## 2022-02-23 PROCEDURE — 99232 PR SUBSEQUENT HOSPITAL CARE,LEVL II: ICD-10-PCS | Mod: NTX,,, | Performed by: NEUROLOGICAL SURGERY

## 2022-02-23 PROCEDURE — C9113 INJ PANTOPRAZOLE SODIUM, VIA: HCPCS | Mod: NTX | Performed by: HOSPITALIST

## 2022-02-23 PROCEDURE — P9016 RBC LEUKOCYTES REDUCED: HCPCS | Mod: NTX | Performed by: HOSPITALIST

## 2022-02-23 PROCEDURE — 80100014 HC HEMODIALYSIS 1:1: Mod: NTX

## 2022-02-23 PROCEDURE — 63600175 PHARM REV CODE 636 W HCPCS: Mod: NTX | Performed by: HOSPITALIST

## 2022-02-23 PROCEDURE — 80048 BASIC METABOLIC PNL TOTAL CA: CPT | Mod: NTX | Performed by: HOSPITALIST

## 2022-02-23 PROCEDURE — 84100 ASSAY OF PHOSPHORUS: CPT | Mod: NTX | Performed by: HOSPITALIST

## 2022-02-23 PROCEDURE — 25000003 PHARM REV CODE 250: Mod: NTX | Performed by: HOSPITALIST

## 2022-02-23 PROCEDURE — C1751 CATH, INF, PER/CENT/MIDLINE: HCPCS | Mod: NTX

## 2022-02-23 PROCEDURE — 99233 PR SUBSEQUENT HOSPITAL CARE,LEVL III: ICD-10-PCS | Mod: NTX,,, | Performed by: HOSPITALIST

## 2022-02-23 PROCEDURE — 99232 SBSQ HOSP IP/OBS MODERATE 35: CPT | Mod: NTX,,, | Performed by: NEUROLOGICAL SURGERY

## 2022-02-23 PROCEDURE — 99233 SBSQ HOSP IP/OBS HIGH 50: CPT | Mod: NTX,,, | Performed by: HOSPITALIST

## 2022-02-23 PROCEDURE — 99233 PR SUBSEQUENT HOSPITAL CARE,LEVL III: ICD-10-PCS | Mod: NTX,,, | Performed by: INTERNAL MEDICINE

## 2022-02-23 PROCEDURE — 85025 COMPLETE CBC W/AUTO DIFF WBC: CPT | Mod: NTX | Performed by: HOSPITALIST

## 2022-02-23 PROCEDURE — 25000003 PHARM REV CODE 250: Mod: NTX | Performed by: INTERNAL MEDICINE

## 2022-02-23 PROCEDURE — 63600175 PHARM REV CODE 636 W HCPCS: Mod: NTX | Performed by: INTERNAL MEDICINE

## 2022-02-23 RX ORDER — HYDROCODONE BITARTRATE AND ACETAMINOPHEN 500; 5 MG/1; MG/1
TABLET ORAL
Status: DISCONTINUED | OUTPATIENT
Start: 2022-02-23 | End: 2022-02-25

## 2022-02-23 RX ORDER — OLANZAPINE 10 MG/2ML
2.5 INJECTION, POWDER, FOR SOLUTION INTRAMUSCULAR ONCE AS NEEDED
Status: DISCONTINUED | OUTPATIENT
Start: 2022-02-23 | End: 2022-03-10 | Stop reason: HOSPADM

## 2022-02-23 RX ADMIN — NYSTATIN 500000 UNITS: 500000 SUSPENSION ORAL at 04:02

## 2022-02-23 RX ADMIN — HYDRALAZINE HYDROCHLORIDE 50 MG: 50 TABLET ORAL at 05:02

## 2022-02-23 RX ADMIN — CEFTRIAXONE SODIUM 2 G: 2 INJECTION, SOLUTION INTRAVENOUS at 02:02

## 2022-02-23 RX ADMIN — ATORVASTATIN CALCIUM 80 MG: 20 TABLET, FILM COATED ORAL at 11:02

## 2022-02-23 RX ADMIN — HYDRALAZINE HYDROCHLORIDE 50 MG: 50 TABLET ORAL at 02:02

## 2022-02-23 RX ADMIN — LABETALOL HYDROCHLORIDE 200 MG: 100 TABLET, FILM COATED ORAL at 11:02

## 2022-02-23 RX ADMIN — CLONIDINE HYDROCHLORIDE 0.4 MG: 0.1 TABLET ORAL at 02:02

## 2022-02-23 RX ADMIN — COLCHICINE 0.3 MG: 0.6 TABLET, FILM COATED ORAL at 11:02

## 2022-02-23 RX ADMIN — OXYCODONE HYDROCHLORIDE 10 MG: 10 TABLET ORAL at 12:02

## 2022-02-23 RX ADMIN — ASPIRIN 81 MG: 81 TABLET, COATED ORAL at 11:02

## 2022-02-23 RX ADMIN — COLCHICINE 0.3 MG: 0.6 TABLET, FILM COATED ORAL at 09:02

## 2022-02-23 RX ADMIN — ONDANSETRON 8 MG: 8 TABLET, ORALLY DISINTEGRATING ORAL at 09:02

## 2022-02-23 RX ADMIN — NYSTATIN 500000 UNITS: 500000 SUSPENSION ORAL at 09:02

## 2022-02-23 RX ADMIN — ACETAMINOPHEN 1000 MG: 500 TABLET ORAL at 02:02

## 2022-02-23 RX ADMIN — AMLODIPINE BESYLATE 10 MG: 10 TABLET ORAL at 11:02

## 2022-02-23 RX ADMIN — ACETAMINOPHEN 1000 MG: 500 TABLET ORAL at 09:02

## 2022-02-23 RX ADMIN — NYSTATIN 500000 UNITS: 500000 SUSPENSION ORAL at 11:02

## 2022-02-23 RX ADMIN — SEVELAMER CARBONATE 1600 MG: 800 TABLET, FILM COATED ORAL at 11:02

## 2022-02-23 RX ADMIN — PANTOPRAZOLE SODIUM 40 MG: 40 INJECTION, POWDER, FOR SOLUTION INTRAVENOUS at 11:02

## 2022-02-23 RX ADMIN — SODIUM BICARBONATE 650 MG TABLET 650 MG: at 09:02

## 2022-02-23 RX ADMIN — PANTOPRAZOLE SODIUM 40 MG: 40 INJECTION, POWDER, FOR SOLUTION INTRAVENOUS at 09:02

## 2022-02-23 RX ADMIN — SODIUM BICARBONATE 650 MG TABLET 650 MG: at 11:02

## 2022-02-23 RX ADMIN — DAPTOMYCIN 445 MG: 350 INJECTION, POWDER, LYOPHILIZED, FOR SOLUTION INTRAVENOUS at 09:02

## 2022-02-23 RX ADMIN — FOLIC ACID 1 MG: 1 TABLET ORAL at 11:02

## 2022-02-23 RX ADMIN — ACETAMINOPHEN 1000 MG: 500 TABLET ORAL at 05:02

## 2022-02-23 RX ADMIN — CLONIDINE HYDROCHLORIDE 0.4 MG: 0.1 TABLET ORAL at 09:02

## 2022-02-23 RX ADMIN — ARIPIPRAZOLE 15 MG: 5 TABLET ORAL at 09:02

## 2022-02-23 RX ADMIN — HYDRALAZINE HYDROCHLORIDE 50 MG: 50 TABLET ORAL at 09:02

## 2022-02-23 RX ADMIN — SEVELAMER CARBONATE 1600 MG: 800 TABLET, FILM COATED ORAL at 05:02

## 2022-02-23 RX ADMIN — LABETALOL HYDROCHLORIDE 200 MG: 100 TABLET, FILM COATED ORAL at 09:02

## 2022-02-23 NOTE — PROGRESS NOTES
[facility] - Transplant Stepdown  Infectious Disease  Progress Note    Patient Name: Enrique Martinez  MRN: [id number]  Admission Date: 2/16/2022  Length of Stay: 6 days  Attending Physician: Dianna Tijerina MD  Primary Care Provider: Primary Doctor No    Isolation Status: No active isolations  Assessment/Plan:      * Neck pain  Non-contrast MRI had to be discontinued due to patient request.  But available images shows retropharyngeal fluid collection with abscess not excluded.  Deeply concerned that this could represent infection.  His recent bacteremia makes me concerned this could be salmonella.   He is immunocompromised due to a combination of uremia and diabetes so invasive salmonellosis is possible. Active problems of concern are his cervical neck prevertebral fluid collection, left lower extremity cellulitis/ brodies abscess, and recent salmonella bacteremia.     Plan  - Continue empiric ceftriaxone IV. Continue daptomycin q 48 hours. Will continue empiric coverage until more information with regards to imaging and cultures of c-spine infection are obtained. Pt to likely need 6 weeks of antibiotics for brodies abscess/osteomyelitis of left foot. Can tailor treatment if retropharyngeal collection is cultured.   - MRI of C-Spine without changes with regards to retropharyngeal fluid collection.   - ENT and NSGY consultation, appreciate their assistance and will f/u recommendations. ENT recommending repeat MRI c-spine w/wo contrast.  - Trialysis line placed yesterday afternoon and pt receiving 1st round of HD this AM             Prevertebral Fluid Collection  As outlined above.    Pain and swelling of left lower extremity  MRI foot shows brodies abscess (subacute osteomyelitis).  Will manage with empiric ceftriaxone and daptomycin for now.  See Neck pain A/P      Anticipated Disposition: TBD    Thank you for your consult. I will follow-up with patient. Please contact us if you have any additional  questions.    Deena Maya MD  Infectious Disease  [facility] - Transplant Stepdown    Subjective:     Principal Problem:Neck pain    HPI: Enrique Martinez is a 56 y.o. male with a PMH of CKD V glomerulonephropathy, insulin dependent diabetes mellitus, and CAD who presented to the ED for evaluation of worsening left foot pain and swelling. He was recently admitted at [facility] from 2/2-2/4/2022 from LLE cellulitis complicated by salmonella bacteremia after having diarrheal illness.  He was discharged home with ciprofloxacin. He reports no improvement in his cellulitis since being discharged home and no has new blister development. ID was consulted due to recent salmonella bacteremia and new findings on MRI suspicious for left foot myelitis and persistent cellulitis infection. On initial ID encounter, pt reporting pain in left foot and ankle but denying any fever, chills, N/V, diarrhea, chest pain, headaches, or SOB.     Interval History: NAEO. Pt reporting getting good sleep and feeling well this AM. Pt had successful trialysis line placed yesterday afternoon.     Review of Systems   Constitutional:  Negative for activity change, chills and fever.   HENT:  Negative for congestion and trouble swallowing.    Eyes:  Negative for photophobia and visual disturbance.   Respiratory:  Negative for chest tightness, shortness of breath and wheezing.    Cardiovascular:  Positive for leg swelling. Negative for chest pain and palpitations.   Gastrointestinal:  Negative for abdominal pain, constipation, nausea and vomiting.   Genitourinary:  Negative for dysuria, flank pain, frequency, hematuria and urgency.   Musculoskeletal:  Positive for arthralgias. Negative for back pain and gait problem.        Left foot pain   Skin:  Positive for wound. Negative for rash.   Neurological:  Negative for syncope, speech difficulty and headaches.   Psychiatric/Behavioral:  Negative for agitation and confusion. The patient is not nervous/anxious.     Objective:     Vital Signs (Most Recent):  Temp: 98.8 °F (37.1 °C) (02/23/22 0840)  Pulse: 101 (02/23/22 0930)  Resp: 16 (02/23/22 0930)  BP: (!) 147/75 (02/23/22 0930)  SpO2: 95 % (02/23/22 0736)   Vital Signs (24h Range):  Temp:  [97.7 °F (36.5 °C)-98.8 °F (37.1 °C)] 98.8 °F (37.1 °C)  Pulse:  [] 101  Resp:  [16-19] 16  SpO2:  [93 %-99 %] 95 %  BP: (124-175)/(68-92) 147/75     Weight: 72 kg (158 lb 11.7 oz)  Body mass index is 25.62 kg/m².    Estimated Creatinine Clearance: 3.5 mL/min (A) (based on SCr of 20.8 mg/dL (H)).    Physical Exam  Vitals and nursing note reviewed.   Constitutional:       General: He is not in acute distress.     Appearance: He is well-developed and overweight. He is ill-appearing.   HENT:      Head: Normocephalic and atraumatic.      Nose: Nose normal.      Mouth/Throat:      Mouth: Mucous membranes are moist.   Eyes:      General: No scleral icterus.     Conjunctiva/sclera: Conjunctivae normal.   Neck:      Comments: Right IJ trialysis line in place. Pt undergoing HD on exam  Cardiovascular:      Rate and Rhythm: Normal rate and regular rhythm.      Heart sounds: Normal heart sounds.   Pulmonary:      Effort: Pulmonary effort is normal. No respiratory distress.      Breath sounds: Normal breath sounds. No wheezing or rales.   Abdominal:      General: Bowel sounds are normal. There is no distension.      Palpations: Abdomen is soft.      Tenderness: There is no abdominal tenderness. There is no guarding.   Musculoskeletal:         General: Swelling and signs of injury present. Normal range of motion.      Cervical back: Normal range of motion and neck supple.      Right lower leg: No edema.      Left lower leg: No edema.      Comments: Improved LE swelling   Feet:      Right foot:      Skin integrity: Skin integrity normal.      Left foot:      Skin integrity: Skin breakdown, erythema, warmth and dry skin present.   Skin:     General: Skin is warm and dry.      Capillary Refill:  Capillary refill takes less than 2 seconds.      Comments: L leg, ankle, foot skin sloughing.   Deroofed blister on dorsum of left foot   Neurological:      General: No focal deficit present.      Mental Status: He is alert, oriented to person, place, and time and easily aroused.      GCS: GCS eye subscore is 4. GCS verbal subscore is 5. GCS motor subscore is 6.      Cranial Nerves: No cranial nerve deficit.      Sensory: No sensory deficit.      Motor: Tremor present.   Psychiatric:         Behavior: Behavior normal. Behavior is cooperative.         Thought Content: Thought content normal.         Judgment: Judgment normal.       Significant Labs: All pertinent labs within the past 24 hours have been reviewed.    Significant Imaging: I have reviewed all pertinent imaging results/findings within the past 24 hours.

## 2022-02-23 NOTE — PROGRESS NOTES
[facility] - Transplant Stepdown  Otorhinolaryngology-Head & Neck Surgery  Progress Note    Subjective:     Post-Op Info:  Procedure(s) (LRB):  MPU PROCEDURE (N/A)      Hospital Day: 8     Interval History: Now on dialysis. Patient denies any throat pain this morning.    Medications:  Continuous Infusions:  Scheduled Meds:   sodium chloride 0.9%   Intravenous Once    acetaminophen  1,000 mg Oral Q8H    amLODIPine  10 mg Oral Daily    ARIPiprazole  15 mg Oral QHS    aspirin  81 mg Oral Daily    atorvastatin  80 mg Oral Daily    cefTRIAXone (ROCEPHIN) IVPB  2 g Intravenous Q24H    cloNIDine  0.4 mg Oral TID    colchicine  0.3 mg Oral BID    DAPTOmycin (CUBICIN)  IV  6 mg/kg Intravenous Q48H    folic acid  1 mg Oral Daily    corticotropin  80 Units Subcutaneous Every Mon, Fri    hydrALAZINE  50 mg Oral Q8H    labetalol  200 mg Oral Q12H    nystatin  500,000 Units Oral QID (WM & HS)    pantoprazole  40 mg Intravenous BID    polyethylene glycol  17 g Oral Daily    senna-docusate 8.6-50 mg  1 tablet Oral Daily    sevelamer carbonate  1,600 mg Oral TID    sodium bicarbonate  650 mg Oral TID     PRN Meds:sodium chloride, sodium chloride, albuterol-ipratropium, aluminum-magnesium hydroxide-simethicone, dextrose 10%, dextrose 10%, glucagon (human recombinant), glucose, glucose, lorazepam, melatonin, methocarbamoL, morphine, morphine, naloxone, OLANZapine, ondansetron, oxyCODONE, oxyCODONE, prochlorperazine, simethicone, sodium chloride 0.9%, sodium chloride 0.9%     Review of patient's allergies indicates:  No Known Allergies  Objective:     Vital Signs (24h Range):  Temp:  [97.7 °F (36.5 °C)-98.8 °F (37.1 °C)] 98.8 °F (37.1 °C)  Pulse:  [] 105  Resp:  [16-19] 18  SpO2:  [93 %-99 %] 95 %  BP: (124-175)/(68-92) 175/92       Lines/Drains/Airways       Central Venous Catheter Line  Duration             Percutaneous Central Line Insertion/Assessment - Triple Lumen  02/22/22 1816 right internal jugular <1  day                    Physical Exam  Constitutional: NAD. Voice clear.  Eyes: EOM I Bilaterally  Head/Face: Normocephalic.  Negative paranasal sinus pressure/tenderness.  Salivary glands WNL.  House Brackmann I Bilaterally.  Right Ear: Auricle WNL.  Left Ear: Auricle WNL.  Nose: External nasal skin without masses/lesions.  Oral Cavity: Gingiva/lips WNL. Oral Tongue mobile. Hard Palate WNL.   Oropharynx: No masses/lesions noted. Tonsillar fossa/pharyngeal wall without lesions. Posterior oropharynx WNL, no significant swelling noted per orally.  Soft palate without masses. Midline uvula.   Neck/Lymphatic: Trialysis catheter on the right. No LAD I-VI bilaterally. No TTP. No thyromegaly.  No masses noted on exam. Pain over posterior cervical spine.  Neuro/Psychiatric: AOx3.  Normal mood and affect.   Respiratory: Normal respiratory effort, no stridor/stertor, no retractions noted.      Significant Labs:  BMP:   Recent Labs   Lab 02/21/22  2311 02/22/22  0631 02/23/22  0600      < > 105      < > 100   CO2 20*   < > 18*   *   < > 159*   CREATININE 19.4*   < > 20.8*   CALCIUM 9.3   < > 9.4   MG 2.0  --   --     < > = values in this interval not displayed.     CBC:   Recent Labs   Lab 02/23/22  0600   WBC 6.76   RBC 2.38*   HGB 6.8*   HCT 21.9*      MCV 92   MCH 28.6   MCHC 31.1*       Significant Diagnostics:  I have reviewed all pertinent imaging results/findings within the past 24 hours.    Assessment/Plan:     Prevertebral Fluid Collection  55 y/o M with h/o CKD V, DM, CAD, salmonella bacteremia, with posterior cervical neck pain. MRI/CT C spine non contrast obtained showing prevertebral C1 to C6 anterior collection. NSGY recommended ENT consult for drainage of prevertebral collection. Patient refused flexible laryngoscopy.    - recommend MRI c spine with and without contrast, nephrology at bedside this morning who will discuss if this is possible with staff given patient is now on dialysis    - discussed with ortho spine  - discussed with Dr. Arredondo  - please call ENT with questions or concerns        Zhang Bartlett MD  Otorhinolaryngology-Head & Neck Surgery  [facility] - Transplant Stepdown

## 2022-02-23 NOTE — PLAN OF CARE
PRN BRE met with the pt to discuss Outpt HD.  He wants to use [facility] in [place] near [employer].  SW sent a referral to [facility] intake. The covering SW will need to f/u tomorrow.    Lizzy Ceron LCSW   PRN

## 2022-02-23 NOTE — ASSESSMENT & PLAN NOTE
Hyperkalemia  Hyperphosphatemia  Metabolic acidosis  Kidney transplant candidate    - 2/2 membranous glomerulopathy  - GFR was 13-16 up from baseline 4-6 previously in January 2022. Labs repeated today and GFR is 2.6, Cr 19.   - patient follows with Dr. Cardoza and Dr Lee and dialysis has been discussed multiple times, patient continues to refuse.  - on transplant list with [facility]  -cont phos binders, Na bicarb. [place] for adequate I/O. Lasix held 2/17 per renal recs  -uric acid trending  -at severe risk of adverse events, BUN almost 200, asterixis on exam. Meets multiple criteria but refusing HD. Has capacity. Wife aware and states is his decision  Continues to refuse on 2/19 after extensive discussions with all parties.   -2/20- may consent tomorrow per wife, if so anesthesia for IJ. No permacath now as active infection concerns in neck. If not, palliative consult. Wife will agree to HD for him if he says yes despite no capacity per psych. Wife will not do it against his will as poa  Agrees to HD on 2/21. Attempted line on 2/21 as he agreed but he accidentally pulled it.  reattempt pending 2/22. High risk to accidentally pull out so will need close monitoring while uremic and has sitter  -successful 2/22 line, HD 2/23 started. Plan for HD tomorrow after MRI imaging. Possible tunneled cath fri/monday per renal. He will agree to bi weekly HD per renal notes at home, need tos tart on OP chair

## 2022-02-23 NOTE — ANESTHESIA PROCEDURE NOTES
Central Line    Diagnosis: Chronic renal failure  Patient location during procedure: floor  Timeout: 2/22/2022 5:10 PM  Procedure end time: 2/22/2022 5:35 PM    Staffing  Authorizing Provider: Malathi Ken MD  Performing Provider: Malathi Ken MD    Preanesthetic Checklist  Completed: patient identified, IV checked, site marked, risks and benefits discussed, surgical consent, monitors and equipment checked, pre-op evaluation, timeout performed and anesthesia consent given  Indication   Indication: hemodialysis     Anesthesia   local infiltration    Central Line   Skin Prep: skin prepped with ChloraPrep, skin prep agent completely dried prior to procedure  Sterile Barriers Followed: Yes    All five maximal barriers used- gloves, gown, cap, mask, and large sterile sheet    hand hygiene performed prior to central venous catheter insertion  Location: right internal jugular.   Catheter type: triple lumen  Catheter Size: 12 Fr  Inserted Catheter Length: 15 cm  Ultrasound: vascular probe with ultrasound   Vessel Caliber: medium, patent  Vascular Doppler:  not done, compressibility normal  Needle advanced into vessel with real time Ultrasound guidance.  Guidewire confirmed in vessel.  sterile gel and probe cover used in ultrasound-guided central venous catheter insertion   Manometry: Venous cannualation confirmed by visual estimation of blood vessel pressure using manometry.  Insertion Attempts: 1   Securement:line sutured, chlorhexidine patch, sterile dressing applied and blood return through all ports    Post-Procedure    Adverse Events:none      Guidewire Guidewire removed intact.

## 2022-02-23 NOTE — SUBJECTIVE & OBJECTIVE
Interval History: NAEO. Pt reporting getting good sleep and feeling well this AM. Pt had successful trialysis line placed yesterday afternoon.     Review of Systems   Constitutional:  Negative for activity change, chills and fever.   HENT:  Negative for congestion and trouble swallowing.    Eyes:  Negative for photophobia and visual disturbance.   Respiratory:  Negative for chest tightness, shortness of breath and wheezing.    Cardiovascular:  Positive for leg swelling. Negative for chest pain and palpitations.   Gastrointestinal:  Negative for abdominal pain, constipation, nausea and vomiting.   Genitourinary:  Negative for dysuria, flank pain, frequency, hematuria and urgency.   Musculoskeletal:  Positive for arthralgias. Negative for back pain and gait problem.        Left foot pain   Skin:  Positive for wound. Negative for rash.   Neurological:  Negative for syncope, speech difficulty and headaches.   Psychiatric/Behavioral:  Negative for agitation and confusion. The patient is not nervous/anxious.    Objective:     Vital Signs (Most Recent):  Temp: 98.8 °F (37.1 °C) (02/23/22 0840)  Pulse: 101 (02/23/22 0930)  Resp: 16 (02/23/22 0930)  BP: (!) 147/75 (02/23/22 0930)  SpO2: 95 % (02/23/22 0736)   Vital Signs (24h Range):  Temp:  [97.7 °F (36.5 °C)-98.8 °F (37.1 °C)] 98.8 °F (37.1 °C)  Pulse:  [] 101  Resp:  [16-19] 16  SpO2:  [93 %-99 %] 95 %  BP: (124-175)/(68-92) 147/75     Weight: 72 kg (158 lb 11.7 oz)  Body mass index is 25.62 kg/m².    Estimated Creatinine Clearance: 3.5 mL/min (A) (based on SCr of 20.8 mg/dL (H)).    Physical Exam  Vitals and nursing note reviewed.   Constitutional:       General: He is not in acute distress.     Appearance: He is well-developed and overweight. He is ill-appearing.   HENT:      Head: Normocephalic and atraumatic.      Nose: Nose normal.      Mouth/Throat:      Mouth: Mucous membranes are moist.   Eyes:      General: No scleral icterus.     Conjunctiva/sclera:  Conjunctivae normal.   Neck:      Comments: Right IJ trialysis line in place. Pt undergoing HD on exam  Cardiovascular:      Rate and Rhythm: Normal rate and regular rhythm.      Heart sounds: Normal heart sounds.   Pulmonary:      Effort: Pulmonary effort is normal. No respiratory distress.      Breath sounds: Normal breath sounds. No wheezing or rales.   Abdominal:      General: Bowel sounds are normal. There is no distension.      Palpations: Abdomen is soft.      Tenderness: There is no abdominal tenderness. There is no guarding.   Musculoskeletal:         General: Swelling and signs of injury present. Normal range of motion.      Cervical back: Normal range of motion and neck supple.      Right lower leg: No edema.      Left lower leg: No edema.      Comments: Improved LE swelling   Feet:      Right foot:      Skin integrity: Skin integrity normal.      Left foot:      Skin integrity: Skin breakdown, erythema, warmth and dry skin present.   Skin:     General: Skin is warm and dry.      Capillary Refill: Capillary refill takes less than 2 seconds.      Comments: L leg, ankle, foot skin sloughing.   Deroofed blister on dorsum of left foot   Neurological:      General: No focal deficit present.      Mental Status: He is alert, oriented to person, place, and time and easily aroused.      GCS: GCS eye subscore is 4. GCS verbal subscore is 5. GCS motor subscore is 6.      Cranial Nerves: No cranial nerve deficit.      Sensory: No sensory deficit.      Motor: Tremor present.   Psychiatric:         Behavior: Behavior normal. Behavior is cooperative.         Thought Content: Thought content normal.         Judgment: Judgment normal.       Significant Labs: All pertinent labs within the past 24 hours have been reviewed.    Significant Imaging: I have reviewed all pertinent imaging results/findings within the past 24 hours.

## 2022-02-23 NOTE — ASSESSMENT & PLAN NOTE
55 y/o M with h/o CKD V, DM, CAD, salmonella bacteremia, with posterior cervical neck pain. MRI/CT C spine non contrast obtained showing prevertebral C1 to C6 anterior collection. NSGY recommended ENT consult for drainage of prevertebral collection. Patient refused flexible laryngoscopy.    - recommend MRI c spine with and without contrast, nephrology at bedside this morning who will discuss if this is possible with staff given patient is now on dialysis   - discussed with ortho spine  - discussed with Dr. Arredondo  - please call ENT with questions or concerns

## 2022-02-23 NOTE — ASSESSMENT & PLAN NOTE
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 Previously, he has declined HD in the past, never receiving a fistula. He is on the transplant wait list. Patient exhibits signs of uremia including fatigue, poor appetite, and mental slowing. On labs, he is hyperkalemic, uremic, AGMA, hyperphosphatemic. GFR is 2-3, which is reduced from previous GFR 13. Home meds include sodium bicarb, Renvela, and calcitriol. 2+ pitting edema on B/L lower extremities. Renal US from Jan 2022 shows sonographic evidence of chronic medical renal disease and bilateral simple and minimally complex renal cysts.  sCr one year ago was 4.5, now 18.6. UPCr ratio 1.11 (Dec 2020)  2/18: Patient increasingly somnolent, lethargic. Hospital med in process of getting in touch with Dr. Lee, who the patient follows with outpatient.   2/23: first session of HD 2 hours, no net fluid, patient tolerated. Still confused, having visual hallucinations of mice.     Plan:   - Will need placement of tunneled catheter prior to discharge, aim for Fri/Mon if infectious workup is negative.   - CM to set up HD chair for outpt HD. Patient has expressed that he is willing to do 2 sessions weekly as opposed to the standard 3 per week.   - HD with 1 unit pRBC transfused today  - HD planned for tomorrow  - Continue phos binder sevelamer 1600 TID  - Ok for MRI C-spine with and w/o contrast from our standpoint. Recommend timing MRI close to next HD session to manage volume.   - Okay to increase protein in diet now that he is on HD to 1 g/kg per day  - Notified patient's outpatient nephrologists Anton Cardoza/Rosa that patient has initiated HD during hospitalization   - Hold calcitriol in setting of hyperphosphatemia. Will re-start when phos reaches normal level.   - Trend Cr  - Strict I&Os  - Avoid nephrotoxic  agents  - Renally adjust medications

## 2022-02-23 NOTE — PROGRESS NOTES
Bedside HD completed, no net fluid removed , post B/P 177/88, pulse 99 , patient alert , but remains confused Listed RR Provider Communication Tool    Coordinator: Cipriano Moon  Social Work:   Nephrology:     Nurse Communication  Labs     Blood type: O NEG    CPRA:  Lab Results   Component Value Date    CPRA 0 04/03/2020       Last Draw: 11/6/20    Patient Information     Pt Status: Active  High KDPI candidate with signed consent: No d/t weight    Listed for: kidney    Center Waitlisted Date: 11/22/2019 [facility] Qualified Date:11/22/2019    Number of days on waitlist: 384    Visit Type: Annual         Date Last Seen: 12/10/20    Pertinent Medical History:   Past Medical History:   Diagnosis Date    Anemia     Coronary artery disease     Diabetes mellitus, type 2     GERD (gastroesophageal reflux disease)     Gout     Hydrocele in adult     bilateral    Hyperlipidemia     Hypertension     Inguinal hernia     bilateral    Membranous glomerulonephritis     Nephrotic range proteinuria     Nephrotic syndrome     Obesity     Umbilical hernia        Recent Medical Testing needing attention: 1/21/22 PENDING - CXR, PXR, renal us, echo, labs      Additional Comments/Testing Needed: Pre-dialysis/ GFR 15.6 as of 1/11/21   stable

## 2022-02-23 NOTE — ASSESSMENT & PLAN NOTE
Non-contrast MRI had to be discontinued due to patient request.  But available images shows retropharyngeal fluid collection with abscess not excluded.  Deeply concerned that this could represent infection.  His recent bacteremia makes me concerned this could be salmonella.   He is immunocompromised due to a combination of uremia and diabetes so invasive salmonellosis is possible. Active problems of concern are his cervical neck prevertebral fluid collection, left lower extremity cellulitis/ brodies abscess, and recent salmonella bacteremia.     Plan  - Continue empiric ceftriaxone IV. Continue daptomycin q 48 hours. Will continue empiric coverage until more information with regards to imaging and cultures of c-spine infection are obtained. Pt to likely need 6 weeks of antibiotics for brodies abscess/osteomyelitis of left foot. Can tailor treatment if retropharyngeal collection is cultured.   - MRI of C-Spine without changes with regards to retropharyngeal fluid collection.   - ENT and NSGY consultation, appreciate their assistance and will f/u recommendations. ENT recommending repeat MRI c-spine w/wo contrast.  - Trialysis line placed yesterday afternoon and pt receiving 1st round of HD this AM

## 2022-02-23 NOTE — PROGRESS NOTES
Bedside HD initiated, /, no uf , will keep patient even. B/P 175/92, pulse 108. Patient awake, but slightly confused .

## 2022-02-23 NOTE — PROGRESS NOTES
[facility] Medicine  Progress Note    Patient Name: Enrique Martinez  MRN: [id number]  Patient Class: IP- Inpatient   Admission Date: 2/16/2022  Length of Stay: 6 days  Attending Physician: Dianna Tijerina MD  Primary Care Provider: Primary Doctor No        Subjective:     Principal Problem:Neck pain        HPI:  Enrique Martinez is a 56 y.o. male with a PMHx of CKD V glomerulonephropathy, insulin dependent diabetes mellitus, and CAD who presented tot he ED for evaluation of worsening left foot pain and swelling. He was recently admitted at [facility] from 2/2-2/4/2022 from LLE cellulitis. He was found to have salmonella bacteremia suspected due to diarrheal illness. He was discharged with cipro for which he reports compliance. He has had no improvement in his pain, redness, or swelling of his left foot and leg. He has also developed a new blister on his left foot. He endorses chills. He denies fevers, chest pain, SOB, N/V, decreased urine output, dysuria, and flank pain.    ED: HR 97, other VSSAF. CBC with leukocytosis of 16.01, Hgb 7.4, Hct 22.2.  ESR 97. CRP 33.4. K 5.5. Cr 19.7, . Phos 10.4. Lactic WNL. MRI L foot with cellulitis and osteomyelitis of distal 1st metatarsal and proximal 1st phalanx.      Overview/Hospital Course:  No notes on file      Interval history:     He was doing well on HD at bedside this moring. He reports he doesn't have any extra energy yet and is still feeling sleepy. He is excited for his son to come visit today from [place]. He said another family member is also coming. He said his wife went home last night, I talked to her on phone last night. Hg downtick but still no signs of bleeding, transfuse with HD. Renal gave approval for MRi of C spine with contrast tomorrow before next HD session to dialyze after so placed for 6 am tomorrow as want to assess the retropharyngeal fluid collection better.  Rrenal plans to do tunneled catheter fri vs Monday pending  infectious work up. ID updated team that they plan for likely 6 weeks antibiotics of infections to cover the left foot abscess /chronic osteo seen and will tailor pending the results of MRI c spine with contrst. Will see patient later when family here to see if they have any questions.                Review of patient's allergies indicates:  No Known Allergies    No current facility-administered medications on file prior to encounter.     Current Outpatient Medications on File Prior to Encounter   Medication Sig    insulin (LANTUS SOLOSTAR U-100 INSULIN) glargine 100 units/mL (3mL) SubQ pen Inject 5 Units into the skin once daily.    albuterol (PROVENTIL/VENTOLIN HFA) 90 mcg/actuation inhaler Inhale 2 puffs into the lungs every 6 (six) hours as needed for Wheezing or Shortness of Breath. Rescue    amlodipine (NORVASC) 10 MG tablet Take 10 mg by mouth once daily.    ARIPiprazole (ABILIFY) 15 MG Tab Take 1 tablet (15 mg total) by mouth every evening.    aspirin (ECOTRIN) 81 MG EC tablet Take 1 tablet (81 mg total) by mouth once daily.    atorvastatin (LIPITOR) 80 MG tablet Take 1 tablet (80 mg total) by mouth once daily.    blood-glucose meter kit Use as instructed    calcitRIOL (ROCALTROL) 0.5 MCG Cap Take 0.5 mcg by mouth once daily.    chlorthalidone (HYGROTEN) 50 MG Tab Take 50 mg by mouth once daily.     cloNIDine (CATAPRES) 0.2 MG tablet Take 0.4 mg by mouth 3 (three) times daily.     colchicine (COLCRYS) 0.6 mg tablet Take 0.6 mg by mouth daily as needed.    ergocalciferol (ERGOCALCIFEROL) 50,000 unit Cap Take 50,000 Units by mouth every 30 days.     famotidine (PEPCID) 20 MG tablet Take 20 mg by mouth daily as needed for Heartburn.     ferrous gluconate (FERGON) 324 MG tablet Take 324 mg by mouth 2 (two) times a day.     fluticasone (FLONASE) 50 mcg/actuation nasal spray 1 spray by Each Nare route once daily.    furosemide (LASIX) 40 MG tablet Take 40 mg by mouth 2 (two) times daily.     methocarbamoL (ROBAXIN) 500 MG Tab Take 500 mg by mouth 2 (two) times a day.    metoprolol tartrate (LOPRESSOR) 100 MG tablet Take 100 mg by mouth.    omeprazole (PRILOSEC) 20 MG capsule Take 20 mg by mouth once daily.    RENVELA 800 mg Tab Take 800 mg by mouth 3 (three) times daily.    sodium bicarbonate 650 MG tablet Take 650 mg by mouth.    tadalafiL (CIALIS) 2.5 mg Tab Take 2.5 mg by mouth.    TRUE METRIX GLUCOSE TEST STRIP Strp      Family History       Problem Relation (Age of Onset)    Drug abuse Brother    Heart attack Father, Brother    Hyperlipidemia Father    Hypertension Father, Brother    No Known Problems Sister    Stroke Father          Tobacco Use    Smoking status: Current Some Day Smoker     Packs/day: 0.25     Years: 10.00     Pack years: 2.50    Smokeless tobacco: Never Used   Substance and Sexual Activity    Alcohol use: No    Drug use: No    Sexual activity: Not on file     Review of Systems   Constitutional:  Positive for chills. Negative for activity change and fever.   HENT:  Negative for congestion and trouble swallowing.    Eyes:  Negative for photophobia and visual disturbance.   Respiratory:  Negative for chest tightness, shortness of breath and wheezing.    Cardiovascular:  Positive for leg swelling. Negative for chest pain and palpitations.   Gastrointestinal:  Negative for abdominal pain, constipation, nausea and vomiting.   Genitourinary:  Negative for dysuria, flank pain, frequency, hematuria and urgency.   Musculoskeletal:  Positive for arthralgias. Negative for back pain and gait problem.   Skin:  Positive for color change and wound. Negative for rash.   Neurological:  Negative for dizziness, syncope, weakness, light-headedness, numbness and headaches.   Psychiatric/Behavioral:  Negative for agitation and confusion. The patient is not nervous/anxious.    Objective:     Vital Signs (Most Recent):  Temp: 96.7 °F (35.9 °C) (02/23/22 1148)  Pulse: 104 (02/23/22 1148)  Resp:  18 (22 1206)  BP: 137/85 (22 1148)  SpO2: 98 % (22 1148) Vital Signs (24h Range):  Temp:  [96.7 °F (35.9 °C)-98.8 °F (37.1 °C)] 96.7 °F (35.9 °C)  Pulse:  [2-105] 104  Resp:  [16-19] 18  SpO2:  [93 %-99 %] 98 %  BP: (127-175)/() 137/85     Weight: 72 kg (158 lb 11.7 oz)  Body mass index is 25.62 kg/m².    Physical Exam  Vitals and nursing note reviewed.   Constitutional:       Appearance: He is well-developed. He is ill-appearing.      Comments: On HDCalmer. Asterixis with tremors at rest and with movement    HENT:      Head: Normocephalic and atraumatic.      Mouth/Throat:      Mouth: Mucous membranes are moist.   Eyes:      General: No scleral icterus.     Conjunctiva/sclera: Conjunctivae normal.   Cardiovascular:      Rate and Rhythm: Normal rate and regular rhythm.      Heart sounds: Normal heart sounds.   Pulmonary:      Effort: Pulmonary effort is normal. No respiratory distress.      Breath sounds: Normal breath sounds. No wheezing.   Abdominal:      General: Bowel sounds are normal. There is no distension.      Palpations: Abdomen is soft.      Tenderness: There is no abdominal tenderness.   Musculoskeletal:         General: No tenderness. Normal range of motion.      Cervical back: Normal range of motion and neck supple.      Right lower le+ Pitting Edema present.      Left lower le+ Pitting Edema present.      Comments: LLE with burst blister. Severe pain with any light touch.  Signs of bunion on both big toes but also possible gout tophi deposit on right big toe. Edema I RLE >LLE. pitting   Feet:      Left foot:      Skin integrity: Skin breakdown, erythema and warmth present.   Skin:     General: Skin is warm and dry.      Capillary Refill: Capillary refill takes less than 2 seconds.   Neurological:      Mental Status: He is alert and oriented to person, place, and time.      Cranial Nerves: No cranial nerve deficit.      Comments: Pain improved with movement to neck     Psychiatric:         Behavior: Behavior normal.         Thought Content: Thought content normal.         Judgment: Judgment normal.               Significant Labs:   All pertinent labs within the past 24 hours have been reviewed.  CBC:   Recent Labs   Lab 02/22/22  0631 02/23/22  0600   WBC 8.10 6.76   HGB 7.9* 6.8*   HCT 25.2* 21.9*    200       CMP:   Recent Labs   Lab 02/21/22  2311 02/22/22  0631 02/23/22  0600   * 146* 144   K 4.3 4.4 5.0    104 100   CO2 20* 20* 18*    105 105   * 170* 159*   CREATININE 19.4* 19.6* 20.8*   CALCIUM 9.3 9.4 9.4   PROT 5.5*  --   --    ALBUMIN 2.1*  --   --    BILITOT 0.2  --   --    ALKPHOS 83  --   --    AST 24  --   --    ALT 13  --   --    ANIONGAP 22* 22* 26*   EGFRNONAA 2.3* 2.3* 2.1*       Lactic Acid:   No results for input(s): LACTATE in the last 48 hours.      Significant Imaging: I have reviewed all pertinent imaging results/findings within the past 24 hours.   X-Ray Cervical Spine AP Lat with Flexion Extension  Narrative: EXAMINATION:  XR CERVICAL SPINE AP LAT WITH FLEX EXTEN    CLINICAL HISTORY:  assesss c spine stability;    FINDINGS:  Cervical spine four views: There is widening of the predental distance and anterior translocation of C1 on C2.  There is anterolisthesis of C2 on C3.  There is moderate DJD throughout the C-spine.  No fracture dislocation bone destruction seen.  Impression: C1-C2 instability with widening of the predental distance and suggestion of transverse ligament rupture.  Please see the prior CT and MRIs cervical spine reports.    Electronically signed by: Azael Jaime MD  Date:    02/23/2022  Time:    08:17          Assessment/Plan:      * Neck pain  -abnormal C spine Xray with possible signs of C spine instability, MRi ordered and patient refused 2/17, on pain meds, discussed AT length, see 2/17 and 2/18 note above, ordered with premediation now. Concern for possible infection also given acuity of  pain  -signs of C spine instability, prevertebral abscess likely with fluid collection with widening in C spine, NS recs C collar- patient refused, and Xrays, refused, and MRi full spine when stable. Cannot do any intervention now as unstable bc of kidneys.   -ent consulted for retropharyngeal abscess per ns and id recs, will likely scope if amenable at bedside (he refused). They rec MRI C spien with contrast- but cannot get due to kidneys  -refused c collar xrays  -NS recs MRI all spine, ID/ENT rec repeat MRI C spine for better pictures. (cannot do contrast). He is likely nto going to sit for all the spine right now 2/21 per my exam and nursing. Placed repeat flex xrays and C spine MRi orders in now. They called him at 3 pm but anesthesia is coming at 330 pm to do the line he said he would now do this afternoon after a cigarette, so nursing to tell them to come back. IR consulted to sample the vertebral involvement, they report they wanted to review further imaging once in to see if they needed - will f/u thoughts but likely suspect wont need sampling of the spine as this appear more chroinc, retropharyngeal collection however likely needs further addressed. CT also pending as rec for ENT as well.   -ID following- on dapto and Ceftriaxone  -MRI/CT showing similar fidings to before, ENt reports need contrast to really see retropharyngeal area well, nephro approved contrast on 2/24 before HD and order placed for 6 am      Prevertebral Fluid Collection  -ent consulted, will likely bedside scope. He reports trouble swallowing food to wife now but denies again to me  -refused bedside scope  -reattempt at MRI C spine- shows more chronic issues prevertebral area but retropharyngela area still prominent with fluid. Will f/u recs from specialitss regarding this, severe narrowing seen          History of Salmonella septicemia  -+ blood CX at [facility] 2/1, with clearance immediately, treated with cipro for 14 days unclear source  but presumed diarrheal infection (wife endorses this)  -denies sickle cell hx to predispose to this  -concern with neck pain and foot pain with recent bacteremia  -work up in progress  -ID consulted. On dapto/rocephin        Gout  -colchicine BID, light IVF x 10 hours 2/17 per renal recs. Avoid steroids with posisble infection. He denies he has gout      Folic acid deficiency  -low at 4.4.  -daily replacement      Increased anion gap metabolic acidosis  Secondary to ckd  -cont Na bicarb  -refusing HD      Hyperphosphatemia  -cont binders, elvated at 10-11  -refusing HD      Hyperkalemia  -shift PRN      Acute renal failure superimposed on stage 5 chronic kidney disease, not on chronic dialysis  Hyperkalemia  Hyperphosphatemia  Metabolic acidosis  Kidney transplant candidate    - 2/2 membranous glomerulopathy  - GFR was 13-16 up from baseline 4-6 previously in January 2022. Labs repeated today and GFR is 2.6, Cr 19.   - patient follows with Dr. Cardoza and Dr Lee and dialysis has been discussed multiple times, patient continues to refuse.  - on transplant list with [facility]  -cont phos binders, Na bicarb. Valencia for adequate I/O. Lasix held 2/17 per renal recs  -uric acid trending  -at severe risk of adverse events, BUN almost 200, asterixis on exam. Meets multiple criteria but refusing HD. Has capacity. Wife aware and states is his decision  Continues to refuse on 2/19 after extensive discussions with all parties.   -2/20- may consent tomorrow per wife, if so anesthesia for IJ. No permacath now as active infection concerns in neck. If not, palliative consult. Wife will agree to HD for him if he says yes despite no capacity per psych. Wife will not do it against his will as poa  Agrees to HD on 2/21. Attempted line on 2/21 as he agreed but he accidentally pulled it.  reattempt pending 2/22. High risk to accidentally pull out so will need close monitoring while uremic and has sitter  -successful 2/22 line, HD 2/23  started. Plan for HD tomorrow after MRI imaging. Possible tunneled cath fri/monday per renal. He will agree to bi weekly HD per renal notes at home, need tos tart on OP chair    Subacute osteomyelitis  - 2/4 SIRS with HR>90, WBC 16  - lactic WNL  - ESR 97, CRP 33.4, procal 1.  - MRI w/ regions of focal abnormal signal involving the distal 1st metatarsal and proximal 1st phalanx as described above.  Findings are concerning for foci of subacute myelitis   - patient is hemodynamically stable, podiatry consulted, unclear if ifection but no plans to do biopsy now cellulitis worsened it appears 2/17 PM so vanc/zosyn initiated. ID consulted 2/18 as severely tender extremity, concern with recent bacteremia on 2/1 with salmonella with scaling on exam for possible staph/strep also scalded skin type look..   -treating gout, minimal improvement in pain, less red on doxy/rocephin per ID recs now  -no sign of osteo on repeat MRi 2/22 in neck  -ID reports 6 weeks antibiotics planned to cover foot    Benign prostatic hyperplasia  Valencia placed 2/18 overnight for incontinence    GERD (gastroesophageal reflux disease)  - continue PPI    S/P coronary artery stent placement  - continue ASA, statin    Type 2 diabetes mellitus  - home meds: lantus 5 units daily  - SSI   - ACHS accuchecks  - diabetic diet  -A1 C 7's    Anemia due to chronic kidney disease  -hg with downtick to 6 on 2/18 from 7 on admit with 9 baseline  -unclear cause if another reason besides chronic kidney disease  -fobt ordered  -transfuse 1 U on 2/18. adamently denies bleeding. Will add protonix for coverage in interim in case of any bleeding  -tx 1 U on 2/19, Hg still higher 6's, unclear soure, fobt pending, possible from neck inflammation with fluid collection of unclear source.  -improved 2/20 to 8's and stable further 2/21.  -denies nsaid use  downtick again 2/23 and 1 U given. Do not see signs of bleeding, still concern could be in fluid collection /inflammation  from this as source. bM yesterday no blood or melena    Membranous glomerulonephritis  -will bring acthar gel from home to do non formulary as 40K and got via prior auth-non form request addded  -reason for CKD 5.      Hyperlipidemia  - continue statin    Renovascular hypertension  -elevated to 180s on admit but improved on current regimen to 130s systolic on exam 2/18  -cont hydralazine, labetalol, norvassc, clonidine.      VTE Risk Mitigation (From admission, onward)         Ordered     IP VTE HIGH RISK PATIENT  Once         02/17/22 0151     Place sequential compression device  Until discontinued         02/17/22 0151                Discharge Planning   JOAN: 2/25/2022     Code Status: Full Code   Is the patient medically ready for discharge?: No    Reason for patient still in hospital (select all that apply): Patient trending condition  Discharge Plan A: Home   Discharge Delays: None known at this time              Dianna Tijerina MD  Department of Hospital Medicine   [facility] - [facility] Step[facility]

## 2022-02-23 NOTE — CARE UPDATE
Dialysis consent completed via phone call to Ms. Srivastava Alicja, patient's wife and POA with Dr. Diana Cid and witnesses. Will place consent paperwork in chart.

## 2022-02-23 NOTE — ASSESSMENT & PLAN NOTE
MRI foot shows brodies abscess (subacute osteomyelitis).  Will manage with empiric ceftriaxone and daptomycin for now.  See Neck pain A/P

## 2022-02-23 NOTE — ASSESSMENT & PLAN NOTE
-hg with downtick to 6 on 2/18 from 7 on admit with 9 baseline  -unclear cause if another reason besides chronic kidney disease  -fobt ordered  -transfuse 1 U on 2/18. adamently denies bleeding. Will add protonix for coverage in interim in case of any bleeding  -tx 1 U on 2/19, Hg still higher 6's, unclear soure, fobt pending, possible from neck inflammation with fluid collection of unclear source.  -improved 2/20 to 8's and stable further 2/21.  -denies nsaid use  downtick again 2/23 and 1 U given. Do not see signs of bleeding, still concern could be in fluid collection /inflammation from this as source. bM yesterday no blood or melena

## 2022-02-23 NOTE — PLAN OF CARE
-Pt free from fall or injury so far this shift. Instructed to call if assistance needed, verbalized understanding.   -Cardiac monitoring in progress, currently SR.   -Pt alert, but confused. Paranoid, seeing and hearing things that are not there. Sitter at the BS.   -HD today, no UF. Plan for tunneled cath placement Friday/Monday.   -H&H 6.8/21.9, 1 unit PRBC's given with HD.   -Oxycodone given for pain.   -Skin breakdown noted to buttocks, wound care consult in. Frequent weight shifting encouraged. Pt able to turn in bed independently.   -Afebrile. Rocephin and Daptomycin continued. MRI cervical spine with contrast ordered for tomorrow. Daily labs monitored.

## 2022-02-23 NOTE — SUBJECTIVE & OBJECTIVE
Interval History: Central line placed yesterday successfully by anesthesia, HD first session 2 hours today. No net fluid removed. Remains confused, insisting there are mice that go from room to room. AAOx3. Receiving 1 unit pRBC with HD. MRI C-spine with and w/o contrast recommended per ENT    Review of patient's allergies indicates:  No Known Allergies  Current Facility-Administered Medications   Medication Frequency    0.9%  NaCl infusion (for blood administration) Q24H PRN    0.9%  NaCl infusion (for blood administration) Q24H PRN    0.9%  NaCl infusion Once    acetaminophen tablet 1,000 mg Q8H    albuterol-ipratropium 2.5 mg-0.5 mg/3 mL nebulizer solution 3 mL Q4H PRN    aluminum-magnesium hydroxide-simethicone 200-200-20 mg/5 mL suspension 30 mL QID PRN    amLODIPine tablet 10 mg Daily    ARIPiprazole tablet 15 mg QHS    aspirin EC tablet 81 mg Daily    atorvastatin tablet 80 mg Daily    cefTRIAXone (ROCEPHIN) 2 g/50 mL D5W IVPB Q24H    cloNIDine tablet 0.4 mg TID    colchicine split tablet 0.3 mg BID    DAPTOmycin (CUBICIN) 445 mg in sodium chloride 0.9% IVPB Q48H    dextrose 10% bolus 125 mL PRN    dextrose 10% bolus 250 mL PRN    folic acid tablet 1 mg Daily    glucagon (human recombinant) injection 1 mg PRN    glucose chewable tablet 16 g PRN    glucose chewable tablet 24 g PRN    HP Acthar (corticotropin) injectable gel 80 units Every Mon, Fri    hydrALAZINE tablet 50 mg Q8H    labetaloL tablet 200 mg Q12H    lorazepam injection 1 mg On Call Procedure    melatonin tablet 6 mg Nightly PRN    methocarbamoL tablet 750 mg QID PRN    morphine injection 2 mg Once PRN    morphine injection 2 mg On Call Procedure    naloxone 0.4 mg/mL injection 0.02 mg PRN    nystatin 100,000 unit/mL suspension 500,000 Units QID (WM & HS)    OLANZapine injection 2.5 mg Once PRN    ondansetron disintegrating tablet 8 mg Q8H PRN    oxyCODONE immediate release tablet 5 mg Q6H PRN    oxyCODONE immediate release tablet Tab 10 mg Q4H  PRN    pantoprazole injection 40 mg BID    polyethylene glycol packet 17 g Daily    prochlorperazine injection Soln 5 mg Q6H PRN    senna-docusate 8.6-50 mg per tablet 1 tablet Daily    sevelamer carbonate tablet 1,600 mg TID    simethicone chewable tablet 80 mg QID PRN    sodium bicarbonate tablet 650 mg TID    sodium chloride 0.9% bolus 250 mL PRN    sodium chloride 0.9% flush 10 mL Q8H PRN       Objective:     Vital Signs (Most Recent):  Temp: 98.5 °F (36.9 °C) (02/23/22 0915)  Pulse: (!) 2 (02/23/22 1042)  Resp: 16 (02/23/22 1042)  BP: (!) 164/91 (02/23/22 1042)  SpO2: 95 % (02/23/22 0736)  O2 Device (Oxygen Therapy): room air (02/23/22 1042)   Vital Signs (24h Range):  Temp:  [98 °F (36.7 °C)-98.8 °F (37.1 °C)] 98.5 °F (36.9 °C)  Pulse:  [2-105] 2  Resp:  [16-19] 16  SpO2:  [93 %-99 %] 95 %  BP: (127-175)/() 164/91     Weight: 72 kg (158 lb 11.7 oz) (02/23/22 0600)  Body mass index is 25.62 kg/m².  Body surface area is 1.83 meters squared.    I/O last 3 completed shifts:  In: 830 [P.O.:780; IV Piggyback:50]  Out: 600 [Urine:600]    Physical Exam  Vitals and nursing note reviewed.   Constitutional:       Appearance: He is well-developed and overweight.   HENT:      Head: Normocephalic and atraumatic.      Nose: Nose normal.      Mouth/Throat:      Mouth: Mucous membranes are moist.   Eyes:      General: No scleral icterus.     Conjunctiva/sclera: Conjunctivae normal.   Cardiovascular:      Rate and Rhythm: Normal rate and regular rhythm.      Heart sounds: Normal heart sounds.   Pulmonary:      Effort: Pulmonary effort is normal.      Breath sounds: Normal breath sounds.   Musculoskeletal:         General: Swelling and signs of injury present. Normal range of motion.      Cervical back: Normal range of motion and neck supple.      Right lower leg: No edema.      Left lower leg: No edema.      Comments: Improved LE swelling   Feet:      Right foot:      Skin integrity: Skin integrity normal.      Left foot:       Skin integrity: Skin breakdown, erythema, warmth and dry skin present.   Skin:     General: Skin is warm and dry.      Capillary Refill: Capillary refill takes less than 2 seconds.      Comments: L leg, ankle, foot skin sloughing.   Deroofed blister on dorsum of left foot   Neurological:      Mental Status: He is alert, oriented to person, place, and time and easily aroused.      GCS: GCS eye subscore is 4. GCS verbal subscore is 5. GCS motor subscore is 6.      Motor: Tremor present.      Comments: Asterixis present   Psychiatric:         Attention and Perception: He perceives visual hallucinations.         Behavior: Behavior normal. Behavior is cooperative.         Thought Content: Thought content normal.         Cognition and Memory: Cognition is impaired. Memory is impaired.         Judgment: Judgment normal.       Significant Labs:  CBC:   Recent Labs   Lab 02/23/22  0600   WBC 6.76   RBC 2.38*   HGB 6.8*   HCT 21.9*      MCV 92   MCH 28.6   MCHC 31.1*     CMP:   Recent Labs   Lab 02/21/22  2311 02/22/22  0631 02/23/22  0600      < > 105   CALCIUM 9.3   < > 9.4   ALBUMIN 2.1*  --   --    PROT 5.5*  --   --    *   < > 144   K 4.3   < > 5.0   CO2 20*   < > 18*      < > 100   *   < > 159*   CREATININE 19.4*   < > 20.8*   ALKPHOS 83  --   --    ALT 13  --   --    AST 24  --   --    BILITOT 0.2  --   --     < > = values in this interval not displayed.     All labs within the past 24 hours have been reviewed.     Significant Imaging:  Labs: Reviewed

## 2022-02-23 NOTE — PROGRESS NOTES
Pt with uremia.   HD catheter placed.   First HD session ordered.  -2 hours, F16 filter, low BFR, and DFR to avoid rapid correction    Marcial Green  Nephrology  [facility]

## 2022-02-23 NOTE — ASSESSMENT & PLAN NOTE
- 2/4 SIRS with HR>90, WBC 16  - lactic WNL  - ESR 97, CRP 33.4, procal 1.  - MRI w/ regions of focal abnormal signal involving the distal 1st metatarsal and proximal 1st phalanx as described above.  Findings are concerning for foci of subacute myelitis   - patient is hemodynamically stable, podiatry consulted, unclear if ifection but no plans to do biopsy now cellulitis worsened it appears 2/17 PM so vanc/zosyn initiated. ID consulted 2/18 as severely tender extremity, concern with recent bacteremia on 2/1 with salmonella with scaling on exam for possible staph/strep also scalded skin type look..   -treating gout, minimal improvement in pain, less red on doxy/rocephin per ID recs now  -no sign of osteo on repeat MRi 2/22 in neck  -ID reports 6 weeks antibiotics planned to cover foot

## 2022-02-23 NOTE — SUBJECTIVE & OBJECTIVE
Interval history:     He was doing well on HD at bedside this moring. He reports he doesn't have any extra energy yet and is still feeling sleepy. He is excited for his son to come visit today from [place]. He said another family member is also coming. He said his wife went home last night, I talked to her on phone last night. Hg downtick but still no signs of bleeding, transfuse with HD. Renal gave approval for MRi of C spine with contrast tomorrow before next HD session to dialyze after so placed for 6 am tomorrow as want to assess the retropharyngeal fluid collection better.  Rrenal plans to do tunneled catheter fri vs Monday pending infectious work up. ID updated team that they plan for likely 6 weeks antibiotics of infections to cover the left foot abscess /chronic osteo seen and will tailor pending the results of MRI c spine with contrst. Will see patient later when family here to see if they have any questions.                Review of patient's allergies indicates:  No Known Allergies    No current facility-administered medications on file prior to encounter.     Current Outpatient Medications on File Prior to Encounter   Medication Sig    insulin (LANTUS SOLOSTAR U-100 INSULIN) glargine 100 units/mL (3mL) SubQ pen Inject 5 Units into the skin once daily.    albuterol (PROVENTIL/VENTOLIN HFA) 90 mcg/actuation inhaler Inhale 2 puffs into the lungs every 6 (six) hours as needed for Wheezing or Shortness of Breath. Rescue    amlodipine (NORVASC) 10 MG tablet Take 10 mg by mouth once daily.    ARIPiprazole (ABILIFY) 15 MG Tab Take 1 tablet (15 mg total) by mouth every evening.    aspirin (ECOTRIN) 81 MG EC tablet Take 1 tablet (81 mg total) by mouth once daily.    atorvastatin (LIPITOR) 80 MG tablet Take 1 tablet (80 mg total) by mouth once daily.    blood-glucose meter kit Use as instructed    calcitRIOL (ROCALTROL) 0.5 MCG Cap Take 0.5 mcg by mouth once daily.    chlorthalidone (HYGROTEN) 50 MG Tab Take 50  mg by mouth once daily.     cloNIDine (CATAPRES) 0.2 MG tablet Take 0.4 mg by mouth 3 (three) times daily.     colchicine (COLCRYS) 0.6 mg tablet Take 0.6 mg by mouth daily as needed.    ergocalciferol (ERGOCALCIFEROL) 50,000 unit Cap Take 50,000 Units by mouth every 30 days.     famotidine (PEPCID) 20 MG tablet Take 20 mg by mouth daily as needed for Heartburn.     ferrous gluconate (FERGON) 324 MG tablet Take 324 mg by mouth 2 (two) times a day.     fluticasone (FLONASE) 50 mcg/actuation nasal spray 1 spray by Each Nare route once daily.    furosemide (LASIX) 40 MG tablet Take 40 mg by mouth 2 (two) times daily.    methocarbamoL (ROBAXIN) 500 MG Tab Take 500 mg by mouth 2 (two) times a day.    metoprolol tartrate (LOPRESSOR) 100 MG tablet Take 100 mg by mouth.    omeprazole (PRILOSEC) 20 MG capsule Take 20 mg by mouth once daily.    RENVELA 800 mg Tab Take 800 mg by mouth 3 (three) times daily.    sodium bicarbonate 650 MG tablet Take 650 mg by mouth.    tadalafiL (CIALIS) 2.5 mg Tab Take 2.5 mg by mouth.    TRUE METRIX GLUCOSE TEST STRIP Strp      Family History       Problem Relation (Age of Onset)    Drug abuse Brother    Heart attack Father, Brother    Hyperlipidemia Father    Hypertension Father, Brother    No Known Problems Sister    Stroke Father          Tobacco Use    Smoking status: Current Some Day Smoker     Packs/day: 0.25     Years: 10.00     Pack years: 2.50    Smokeless tobacco: Never Used   Substance and Sexual Activity    Alcohol use: No    Drug use: No    Sexual activity: Not on file     Review of Systems   Constitutional:  Positive for chills. Negative for activity change and fever.   HENT:  Negative for congestion and trouble swallowing.    Eyes:  Negative for photophobia and visual disturbance.   Respiratory:  Negative for chest tightness, shortness of breath and wheezing.    Cardiovascular:  Positive for leg swelling. Negative for chest pain and palpitations.   Gastrointestinal:  Negative  for abdominal pain, constipation, nausea and vomiting.   Genitourinary:  Negative for dysuria, flank pain, frequency, hematuria and urgency.   Musculoskeletal:  Positive for arthralgias. Negative for back pain and gait problem.   Skin:  Positive for color change and wound. Negative for rash.   Neurological:  Negative for dizziness, syncope, weakness, light-headedness, numbness and headaches.   Psychiatric/Behavioral:  Negative for agitation and confusion. The patient is not nervous/anxious.    Objective:     Vital Signs (Most Recent):  Temp: 96.7 °F (35.9 °C) (22 1148)  Pulse: 104 (22 1148)  Resp: 18 (22 1206)  BP: 137/85 (22 1148)  SpO2: 98 % (22 1148) Vital Signs (24h Range):  Temp:  [96.7 °F (35.9 °C)-98.8 °F (37.1 °C)] 96.7 °F (35.9 °C)  Pulse:  [2-105] 104  Resp:  [16-19] 18  SpO2:  [93 %-99 %] 98 %  BP: (127-175)/() 137/85     Weight: 72 kg (158 lb 11.7 oz)  Body mass index is 25.62 kg/m².    Physical Exam  Vitals and nursing note reviewed.   Constitutional:       Appearance: He is well-developed. He is ill-appearing.      Comments: On HDCalmer. Asterixis with tremors at rest and with movement    HENT:      Head: Normocephalic and atraumatic.      Mouth/Throat:      Mouth: Mucous membranes are moist.   Eyes:      General: No scleral icterus.     Conjunctiva/sclera: Conjunctivae normal.   Cardiovascular:      Rate and Rhythm: Normal rate and regular rhythm.      Heart sounds: Normal heart sounds.   Pulmonary:      Effort: Pulmonary effort is normal. No respiratory distress.      Breath sounds: Normal breath sounds. No wheezing.   Abdominal:      General: Bowel sounds are normal. There is no distension.      Palpations: Abdomen is soft.      Tenderness: There is no abdominal tenderness.   Musculoskeletal:         General: No tenderness. Normal range of motion.      Cervical back: Normal range of motion and neck supple.      Right lower le+ Pitting Edema present.      Left  lower le+ Pitting Edema present.      Comments: LLE with burst blister. Severe pain with any light touch.  Signs of bunion on both big toes but also possible gout tophi deposit on right big toe. Edema I RLE >LLE. pitting   Feet:      Left foot:      Skin integrity: Skin breakdown, erythema and warmth present.   Skin:     General: Skin is warm and dry.      Capillary Refill: Capillary refill takes less than 2 seconds.   Neurological:      Mental Status: He is alert and oriented to person, place, and time.      Cranial Nerves: No cranial nerve deficit.      Comments: Pain improved with movement to neck    Psychiatric:         Behavior: Behavior normal.         Thought Content: Thought content normal.         Judgment: Judgment normal.               Significant Labs:   All pertinent labs within the past 24 hours have been reviewed.  CBC:   Recent Labs   Lab 22  0631 22  0600   WBC 8.10 6.76   HGB 7.9* 6.8*   HCT 25.2* 21.9*    200       CMP:   Recent Labs   Lab 22  2311 22  0631 22  0600   * 146* 144   K 4.3 4.4 5.0    104 100   CO2 20* 20* 18*    105 105   * 170* 159*   CREATININE 19.4* 19.6* 20.8*   CALCIUM 9.3 9.4 9.4   PROT 5.5*  --   --    ALBUMIN 2.1*  --   --    BILITOT 0.2  --   --    ALKPHOS 83  --   --    AST 24  --   --    ALT 13  --   --    ANIONGAP 22* 22* 26*   EGFRNONAA 2.3* 2.3* 2.1*       Lactic Acid:   No results for input(s): LACTATE in the last 48 hours.      Significant Imaging: I have reviewed all pertinent imaging results/findings within the past 24 hours.   X-Ray Cervical Spine AP Lat with Flexion Extension  Narrative: EXAMINATION:  XR CERVICAL SPINE AP LAT WITH FLEX EXTEN    CLINICAL HISTORY:  assesss c spine stability;    FINDINGS:  Cervical spine four views: There is widening of the predental distance and anterior translocation of C1 on C2.  There is anterolisthesis of C2 on C3.  There is moderate DJD throughout the C-spine.   No fracture dislocation bone destruction seen.  Impression: C1-C2 instability with widening of the predental distance and suggestion of transverse ligament rupture.  Please see the prior CT and MRIs cervical spine reports.    Electronically signed by: Azael Jaime MD  Date:    02/23/2022  Time:    08:17

## 2022-02-23 NOTE — ASSESSMENT & PLAN NOTE
-abnormal C spine Xray with possible signs of C spine instability, MRi ordered and patient refused 2/17, on pain meds, discussed AT length, see 2/17 and 2/18 note above, ordered with premediation now. Concern for possible infection also given acuity of pain  -signs of C spine instability, prevertebral abscess likely with fluid collection with widening in C spine, NS recs C collar- patient refused, and Xrays, refused, and MRi full spine when stable. Cannot do any intervention now as unstable bc of kidneys.   -ent consulted for retropharyngeal abscess per ns and id recs, will likely scope if amenable at bedside (he refused). They rec MRI C spien with contrast- but cannot get due to kidneys  -refused c collar xrays  -NS recs MRI all spine, ID/ENT rec repeat MRI C spine for better pictures. (cannot do contrast). He is likely nto going to sit for all the spine right now 2/21 per my exam and nursing. Placed repeat flex xrays and C spine MRi orders in now. They called him at 3 pm but anesthesia is coming at 330 pm to do the line he said he would now do this afternoon after a cigarette, so nursing to tell them to come back. IR consulted to sample the vertebral involvement, they report they wanted to review further imaging once in to see if they needed - will f/u thoughts but likely suspect wont need sampling of the spine as this appear more chroinc, retropharyngeal collection however likely needs further addressed. CT also pending as rec for ENT as well.   -ID following- on dapto and Ceftriaxone  -MRI/CT showing similar fidings to before, ENt reports need contrast to really see retropharyngeal area well, nephro approved contrast on 2/24 before HD and order placed for 6 am
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
## 2022-02-23 NOTE — SUBJECTIVE & OBJECTIVE
Interval History: Now on dialysis. Patient denies any throat pain this morning.    Medications:  Continuous Infusions:  Scheduled Meds:   sodium chloride 0.9%   Intravenous Once    acetaminophen  1,000 mg Oral Q8H    amLODIPine  10 mg Oral Daily    ARIPiprazole  15 mg Oral QHS    aspirin  81 mg Oral Daily    atorvastatin  80 mg Oral Daily    cefTRIAXone (ROCEPHIN) IVPB  2 g Intravenous Q24H    cloNIDine  0.4 mg Oral TID    colchicine  0.3 mg Oral BID    DAPTOmycin (CUBICIN)  IV  6 mg/kg Intravenous Q48H    folic acid  1 mg Oral Daily    corticotropin  80 Units Subcutaneous Every Mon, Fri    hydrALAZINE  50 mg Oral Q8H    labetalol  200 mg Oral Q12H    nystatin  500,000 Units Oral QID (WM & HS)    pantoprazole  40 mg Intravenous BID    polyethylene glycol  17 g Oral Daily    senna-docusate 8.6-50 mg  1 tablet Oral Daily    sevelamer carbonate  1,600 mg Oral TID    sodium bicarbonate  650 mg Oral TID     PRN Meds:sodium chloride, sodium chloride, albuterol-ipratropium, aluminum-magnesium hydroxide-simethicone, dextrose 10%, dextrose 10%, glucagon (human recombinant), glucose, glucose, lorazepam, melatonin, methocarbamoL, morphine, morphine, naloxone, OLANZapine, ondansetron, oxyCODONE, oxyCODONE, prochlorperazine, simethicone, sodium chloride 0.9%, sodium chloride 0.9%     Review of patient's allergies indicates:  No Known Allergies  Objective:     Vital Signs (24h Range):  Temp:  [97.7 °F (36.5 °C)-98.8 °F (37.1 °C)] 98.8 °F (37.1 °C)  Pulse:  [] 105  Resp:  [16-19] 18  SpO2:  [93 %-99 %] 95 %  BP: (124-175)/(68-92) 175/92       Lines/Drains/Airways       Central Venous Catheter Line  Duration             Percutaneous Central Line Insertion/Assessment - Triple Lumen  02/22/22 1816 right internal jugular <1 day                    Physical Exam  Constitutional: NAD. Voice clear.  Eyes: EOM I Bilaterally  Head/Face: Normocephalic.  Negative paranasal sinus pressure/tenderness.  Salivary glands WNL.  House  Brackmann I Bilaterally.  Right Ear: Auricle WNL.  Left Ear: Auricle WNL.  Nose: External nasal skin without masses/lesions.  Oral Cavity: Gingiva/lips WNL. Oral Tongue mobile. Hard Palate WNL.   Oropharynx: No masses/lesions noted. Tonsillar fossa/pharyngeal wall without lesions. Posterior oropharynx WNL, no significant swelling noted per orally.  Soft palate without masses. Midline uvula.   Neck/Lymphatic: Trialysis catheter on the right. No LAD I-VI bilaterally. No TTP. No thyromegaly.  No masses noted on exam. Pain over posterior cervical spine.  Neuro/Psychiatric: AOx3.  Normal mood and affect.   Respiratory: Normal respiratory effort, no stridor/stertor, no retractions noted.      Significant Labs:  BMP:   Recent Labs   Lab 02/21/22  2311 02/22/22  0631 02/23/22  0600      < > 105      < > 100   CO2 20*   < > 18*   *   < > 159*   CREATININE 19.4*   < > 20.8*   CALCIUM 9.3   < > 9.4   MG 2.0  --   --     < > = values in this interval not displayed.     CBC:   Recent Labs   Lab 02/23/22  0600   WBC 6.76   RBC 2.38*   HGB 6.8*   HCT 21.9*      MCV 92   MCH 28.6   MCHC 31.1*       Significant Diagnostics:  I have reviewed all pertinent imaging results/findings within the past 24 hours.

## 2022-02-23 NOTE — PROGRESS NOTES
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 Previously, he has declined HD in the past, never receiving a fistula. He is on the transplant wait list. Patient exhibits signs of uremia including fatigue, poor appetite, and mental slowing. On labs, he is hyperkalemic, uremic, AGMA, hyperphosphatemic. GFR is 2-3, which is reduced from previous GFR 13. Home meds include sodium bicarb, Renvela, and calcitriol. 2+ pitting edema on B/L lower extremities. Renal US from Jan 2022 shows sonographic evidence of chronic medical renal disease and bilateral simple and minimally complex renal cysts.  sCr one year ago was 4.5, now 18.6. UPCr ratio 1.11 (Dec 2020)  2/18: Patient increasingly somnolent, lethargic. Hospital med in process of getting in touch with Dr. Lee, who the patient follows with outpatient.   2/23: first session of HD 2 hours, no net fluid, patient tolerated. Still confused, having visual hallucinations of mice.     Plan:   - Will need placement of tunneled catheter prior to discharge, aim for Fri/Mon if infectious workup is negative.   - CM to set up HD chair for outpt HD. Patient has expressed that he is willing to do 2 sessions weekly as opposed to the standard 3 per week.   - HD with 1 unit pRBC transfused today  - HD planned for tomorrow  - Continue phos binder sevelamer 1600 TID  - Ok for MRI C-spine with and w/o contrast from our standpoint. Recommend  timing MRI close to next HD session to manage volume.   - Okay to increase protein in diet now that he is on HD to 1 g/kg per day  - Notified patient's outpatient nephrologists Anton Cardoza/Rosa that patient has initiated HD during hospitalization   - Hold calcitriol in setting of hyperphosphatemia. Will re-start when phos reaches normal level.   - Trend Cr  - Strict I&Os  - Avoid nephrotoxic agents  - Renally adjust medications        Thank you for your consult. I will follow-up with patient. Please contact us if you have any additional questions.      Subjective:     HPI: Mr. Martinez is a 56 y.o. 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 He was recently admitted at [facility] from 2/2-2/4/2022 from LLE cellulitis. He was found to have salmonella bacteremia suspected due to diarrheal illness. He was discharged with cipro for which he reports compliance. He has had no improvement in his pain, redness, or swelling of his left foot and leg. He has also developed a new blister on his left foot, with MRI foot concerning for osteomyelitis and showing soft tissue swelling c/w cellulitis. He is admitted for treatment of suspected osteomyelitis.     He follows with Dr. Cardoza (nephrology), has adamantly declined HD in the past and would rather wait for transplant. He reports being on the transplant list for the past 4 years. His last renal biopsy in 2013 showed 50% glomerulosclerosis. Started on calcitriol 0.5, hx of elevated PTH at 921. Patient complains of decreased appetite, neck cramps, fatigue. He still urinates 4-5 times/day. He reports his B/L LE edema has worsened over the past few weeks. No dyspnea, chest pain, dysuria, hematuria. Nephro consulted for HD  initiation.      Labs are significant for hyperkalemia 5.5, bicarb 18, , phosphate 10.4, Cr 19.7, GFR 2.2, ESR 97, CRP 33.4. . CBC significant for Hgb of 7.2, white count 14. Previous echo showed EF of 65%.       Interval History: Central line placed yesterday successfully by anesthesia, HD first session 2 hours today. No net fluid removed. Remains confused, insisting there are mice that go from room to room. AAOx3. Receiving 1 unit pRBC with HD. MRI C-spine with and w/o contrast recommended per ENT    Review of patient's allergies indicates:  No Known Allergies  Current Facility-Administered Medications   Medication Frequency    0.9%  NaCl infusion (for blood administration) Q24H PRN    0.9%  NaCl infusion (for blood administration) Q24H PRN    0.9%  NaCl infusion Once    acetaminophen tablet 1,000 mg Q8H    albuterol-ipratropium 2.5 mg-0.5 mg/3 mL nebulizer solution 3 mL Q4H PRN    aluminum-magnesium hydroxide-simethicone 200-200-20 mg/5 mL suspension 30 mL QID PRN    amLODIPine tablet 10 mg Daily    ARIPiprazole tablet 15 mg QHS    aspirin EC tablet 81 mg Daily    atorvastatin tablet 80 mg Daily    cefTRIAXone (ROCEPHIN) 2 g/50 mL D5W IVPB Q24H    cloNIDine tablet 0.4 mg TID    colchicine split tablet 0.3 mg BID    DAPTOmycin (CUBICIN) 445 mg in sodium chloride 0.9% IVPB Q48H    dextrose 10% bolus 125 mL PRN    dextrose 10% bolus 250 mL PRN    folic acid tablet 1 mg Daily    glucagon (human recombinant) injection 1 mg PRN    glucose chewable tablet 16 g PRN    glucose chewable tablet 24 g PRN    HP Acthar (corticotropin) injectable gel 80 units Every Mon, Fri    hydrALAZINE tablet 50 mg Q8H    labetaloL tablet 200 mg Q12H    lorazepam injection 1 mg On Call Procedure    melatonin tablet 6 mg Nightly PRN    methocarbamoL tablet 750 mg QID PRN    morphine injection 2 mg Once PRN    morphine injection 2 mg On Call Procedure    naloxone 0.4 mg/mL injection 0.02 mg PRN     nystatin 100,000 unit/mL suspension 500,000 Units QID (WM & HS)    OLANZapine injection 2.5 mg Once PRN    ondansetron disintegrating tablet 8 mg Q8H PRN    oxyCODONE immediate release tablet 5 mg Q6H PRN    oxyCODONE immediate release tablet Tab 10 mg Q4H PRN    pantoprazole injection 40 mg BID    polyethylene glycol packet 17 g Daily    prochlorperazine injection Soln 5 mg Q6H PRN    senna-docusate 8.6-50 mg per tablet 1 tablet Daily    sevelamer carbonate tablet 1,600 mg TID    simethicone chewable tablet 80 mg QID PRN    sodium bicarbonate tablet 650 mg TID    sodium chloride 0.9% bolus 250 mL PRN    sodium chloride 0.9% flush 10 mL Q8H PRN       Objective:     Vital Signs (Most Recent):  Temp: 98.5 °F (36.9 °C) (02/23/22 0915)  Pulse: (!) 2 (02/23/22 1042)  Resp: 16 (02/23/22 1042)  BP: (!) 164/91 (02/23/22 1042)  SpO2: 95 % (02/23/22 0736)  O2 Device (Oxygen Therapy): room air (02/23/22 1042)   Vital Signs (24h Range):  Temp:  [98 °F (36.7 °C)-98.8 °F (37.1 °C)] 98.5 °F (36.9 °C)  Pulse:  [2-105] 2  Resp:  [16-19] 16  SpO2:  [93 %-99 %] 95 %  BP: (127-175)/() 164/91     Weight: 72 kg (158 lb 11.7 oz) (02/23/22 0600)  Body mass index is 25.62 kg/m².  Body surface area is 1.83 meters squared.    I/O last 3 completed shifts:  In: 830 [P.O.:780; IV Piggyback:50]  Out: 600 [Urine:600]    Physical Exam  Vitals and nursing note reviewed.   Constitutional:       Appearance: He is well-developed and overweight.   HENT:      Head: Normocephalic and atraumatic.      Nose: Nose normal.      Mouth/Throat:      Mouth: Mucous membranes are moist.   Eyes:      General: No scleral icterus.     Conjunctiva/sclera: Conjunctivae normal.   Cardiovascular:      Rate and Rhythm: Normal rate and regular rhythm.      Heart sounds: Normal heart sounds.   Pulmonary:      Effort: Pulmonary effort is normal.      Breath sounds: Normal breath sounds.   Musculoskeletal:         General: Swelling and signs of injury  present. Normal range of motion.      Cervical back: Normal range of motion and neck supple.      Right lower leg: No edema.      Left lower leg: No edema.      Comments: Improved LE swelling   Feet:      Right foot:      Skin integrity: Skin integrity normal.      Left foot:      Skin integrity: Skin breakdown, erythema, warmth and dry skin present.   Skin:     General: Skin is warm and dry.      Capillary Refill: Capillary refill takes less than 2 seconds.      Comments: L leg, ankle, foot skin sloughing.   Deroofed blister on dorsum of left foot   Neurological:      Mental Status: He is alert, oriented to person, place, and time and easily aroused.      GCS: GCS eye subscore is 4. GCS verbal subscore is 5. GCS motor subscore is 6.      Motor: Tremor present.      Comments: Asterixis present   Psychiatric:         Attention and Perception: He perceives visual hallucinations.         Behavior: Behavior normal. Behavior is cooperative.         Thought Content: Thought content normal.         Cognition and Memory: Cognition is impaired. Memory is impaired.         Judgment: Judgment normal.       Significant Labs:  CBC:   Recent Labs   Lab 02/23/22  0600   WBC 6.76   RBC 2.38*   HGB 6.8*   HCT 21.9*      MCV 92   MCH 28.6   MCHC 31.1*     CMP:   Recent Labs   Lab 02/21/22  2311 02/22/22  0631 02/23/22  0600      < > 105   CALCIUM 9.3   < > 9.4   ALBUMIN 2.1*  --   --    PROT 5.5*  --   --    *   < > 144   K 4.3   < > 5.0   CO2 20*   < > 18*      < > 100   *   < > 159*   CREATININE 19.4*   < > 20.8*   ALKPHOS 83  --   --    ALT 13  --   --    AST 24  --   --    BILITOT 0.2  --   --     < > = values in this interval not displayed.     All labs within the past 24 hours have been reviewed.     Significant Imaging:  Labs: Reviewed        Harrison Acuna MD  Nephrology  [facility]

## 2022-02-24 LAB
ANION GAP SERPL CALC-SCNC: 17 MMOL/L (ref 8–16)
BASOPHILS # BLD AUTO: 0.01 K/UL (ref 0–0.2)
BASOPHILS NFR BLD: 0.1 % (ref 0–1.9)
BUN SERPL-MCNC: 103 MG/DL (ref 6–20)
CALCIUM SERPL-MCNC: 9.3 MG/DL (ref 8.7–10.5)
CHLORIDE SERPL-SCNC: 102 MMOL/L (ref 95–110)
CO2 SERPL-SCNC: 24 MMOL/L (ref 23–29)
CREAT SERPL-MCNC: 13.1 MG/DL (ref 0.5–1.4)
DIFFERENTIAL METHOD: ABNORMAL
EOSINOPHIL # BLD AUTO: 0 K/UL (ref 0–0.5)
EOSINOPHIL NFR BLD: 0 % (ref 0–8)
ERYTHROCYTE [DISTWIDTH] IN BLOOD BY AUTOMATED COUNT: 16.1 % (ref 11.5–14.5)
EST. GFR  (AFRICAN AMERICAN): 4.3 ML/MIN/1.73 M^2
EST. GFR  (NON AFRICAN AMERICAN): 3.7 ML/MIN/1.73 M^2
GLUCOSE SERPL-MCNC: 159 MG/DL (ref 70–110)
HCT VFR BLD AUTO: 23.6 % (ref 40–54)
HGB BLD-MCNC: 7.5 G/DL (ref 14–18)
IMM GRANULOCYTES # BLD AUTO: 0.05 K/UL (ref 0–0.04)
IMM GRANULOCYTES NFR BLD AUTO: 0.6 % (ref 0–0.5)
LYMPHOCYTES # BLD AUTO: 0.7 K/UL (ref 1–4.8)
LYMPHOCYTES NFR BLD: 8.6 % (ref 18–48)
MCH RBC QN AUTO: 28.2 PG (ref 27–31)
MCHC RBC AUTO-ENTMCNC: 31.8 G/DL (ref 32–36)
MCV RBC AUTO: 89 FL (ref 82–98)
MONOCYTES # BLD AUTO: 0.7 K/UL (ref 0.3–1)
MONOCYTES NFR BLD: 8.6 % (ref 4–15)
NEUTROPHILS # BLD AUTO: 6.9 K/UL (ref 1.8–7.7)
NEUTROPHILS NFR BLD: 82.1 % (ref 38–73)
NRBC BLD-RTO: 0 /100 WBC
PHOSPHATE SERPL-MCNC: 6.9 MG/DL (ref 2.7–4.5)
PLATELET # BLD AUTO: 185 K/UL (ref 150–450)
PMV BLD AUTO: 10.8 FL (ref 9.2–12.9)
POTASSIUM SERPL-SCNC: 3.9 MMOL/L (ref 3.5–5.1)
RBC # BLD AUTO: 2.66 M/UL (ref 4.6–6.2)
SODIUM SERPL-SCNC: 143 MMOL/L (ref 136–145)
WBC # BLD AUTO: 8.38 K/UL (ref 3.9–12.7)

## 2022-02-24 PROCEDURE — C1751 CATH, INF, PER/CENT/MIDLINE: HCPCS | Mod: NTX

## 2022-02-24 PROCEDURE — 25000003 PHARM REV CODE 250: Mod: NTX | Performed by: PHYSICIAN ASSISTANT

## 2022-02-24 PROCEDURE — 99233 PR SUBSEQUENT HOSPITAL CARE,LEVL III: ICD-10-PCS | Mod: NTX,,, | Performed by: INTERNAL MEDICINE

## 2022-02-24 PROCEDURE — 99232 PR SUBSEQUENT HOSPITAL CARE,LEVL II: ICD-10-PCS | Mod: NTX,,, | Performed by: HOSPITALIST

## 2022-02-24 PROCEDURE — 99232 SBSQ HOSP IP/OBS MODERATE 35: CPT | Mod: NTX,,, | Performed by: NEUROLOGICAL SURGERY

## 2022-02-24 PROCEDURE — 25000003 PHARM REV CODE 250: Mod: NTX

## 2022-02-24 PROCEDURE — 25500020 PHARM REV CODE 255: Mod: NTX | Performed by: HOSPITALIST

## 2022-02-24 PROCEDURE — 85025 COMPLETE CBC W/AUTO DIFF WBC: CPT | Mod: NTX | Performed by: HOSPITALIST

## 2022-02-24 PROCEDURE — 99232 SBSQ HOSP IP/OBS MODERATE 35: CPT | Mod: NTX,,, | Performed by: HOSPITALIST

## 2022-02-24 PROCEDURE — 80048 BASIC METABOLIC PNL TOTAL CA: CPT | Mod: NTX | Performed by: HOSPITALIST

## 2022-02-24 PROCEDURE — 90935 HEMODIALYSIS ONE EVALUATION: CPT | Mod: NTX

## 2022-02-24 PROCEDURE — 25000003 PHARM REV CODE 250: Mod: NTX | Performed by: HOSPITALIST

## 2022-02-24 PROCEDURE — 20600001 HC STEP DOWN PRIVATE ROOM: Mod: NTX

## 2022-02-24 PROCEDURE — 99232 PR SUBSEQUENT HOSPITAL CARE,LEVL II: ICD-10-PCS | Mod: NTX,,, | Performed by: NEUROLOGICAL SURGERY

## 2022-02-24 PROCEDURE — 63600175 PHARM REV CODE 636 W HCPCS: Mod: NTX | Performed by: HOSPITALIST

## 2022-02-24 PROCEDURE — 63600175 PHARM REV CODE 636 W HCPCS: Mod: NTX | Performed by: PHYSICIAN ASSISTANT

## 2022-02-24 PROCEDURE — 25000003 PHARM REV CODE 250: Mod: NTX | Performed by: INTERNAL MEDICINE

## 2022-02-24 PROCEDURE — 94761 N-INVAS EAR/PLS OXIMETRY MLT: CPT | Mod: NTX

## 2022-02-24 PROCEDURE — C9113 INJ PANTOPRAZOLE SODIUM, VIA: HCPCS | Mod: NTX | Performed by: HOSPITALIST

## 2022-02-24 PROCEDURE — 99233 SBSQ HOSP IP/OBS HIGH 50: CPT | Mod: NTX,,, | Performed by: INTERNAL MEDICINE

## 2022-02-24 PROCEDURE — 84100 ASSAY OF PHOSPHORUS: CPT | Mod: NTX | Performed by: HOSPITALIST

## 2022-02-24 PROCEDURE — A9585 GADOBUTROL INJECTION: HCPCS | Mod: NTX | Performed by: HOSPITALIST

## 2022-02-24 RX ORDER — GADOBUTROL 604.72 MG/ML
8 INJECTION INTRAVENOUS
Status: COMPLETED | OUTPATIENT
Start: 2022-02-24 | End: 2022-02-24

## 2022-02-24 RX ORDER — MUPIROCIN 20 MG/G
OINTMENT TOPICAL 2 TIMES DAILY
Status: DISPENSED | OUTPATIENT
Start: 2022-02-24 | End: 2022-03-01

## 2022-02-24 RX ORDER — SODIUM CHLORIDE 9 MG/ML
INJECTION, SOLUTION INTRAVENOUS
Status: ACTIVE | OUTPATIENT
Start: 2022-02-24 | End: 2022-02-25

## 2022-02-24 RX ORDER — SODIUM CHLORIDE 9 MG/ML
INJECTION, SOLUTION INTRAVENOUS ONCE
Status: COMPLETED | OUTPATIENT
Start: 2022-02-24 | End: 2022-02-24

## 2022-02-24 RX ADMIN — HYDRALAZINE HYDROCHLORIDE 50 MG: 50 TABLET ORAL at 04:02

## 2022-02-24 RX ADMIN — LORAZEPAM 1 MG: 2 INJECTION INTRAMUSCULAR; INTRAVENOUS at 10:02

## 2022-02-24 RX ADMIN — MUPIROCIN: 20 OINTMENT TOPICAL at 12:02

## 2022-02-24 RX ADMIN — HYDRALAZINE HYDROCHLORIDE 50 MG: 50 TABLET ORAL at 06:02

## 2022-02-24 RX ADMIN — ACETAMINOPHEN 1000 MG: 500 TABLET ORAL at 10:02

## 2022-02-24 RX ADMIN — SODIUM BICARBONATE 650 MG TABLET 650 MG: at 04:02

## 2022-02-24 RX ADMIN — ASPIRIN 81 MG: 81 TABLET, COATED ORAL at 07:02

## 2022-02-24 RX ADMIN — AMLODIPINE BESYLATE 10 MG: 10 TABLET ORAL at 08:02

## 2022-02-24 RX ADMIN — LABETALOL HYDROCHLORIDE 200 MG: 100 TABLET, FILM COATED ORAL at 10:02

## 2022-02-24 RX ADMIN — SODIUM CHLORIDE: 0.9 INJECTION, SOLUTION INTRAVENOUS at 01:02

## 2022-02-24 RX ADMIN — MORPHINE SULFATE 2 MG: 2 INJECTION, SOLUTION INTRAMUSCULAR; INTRAVENOUS at 10:02

## 2022-02-24 RX ADMIN — GADOBUTROL 8 ML: 604.72 INJECTION INTRAVENOUS at 11:02

## 2022-02-24 RX ADMIN — NYSTATIN 500000 UNITS: 500000 SUSPENSION ORAL at 10:02

## 2022-02-24 RX ADMIN — CLONIDINE HYDROCHLORIDE 0.4 MG: 0.1 TABLET ORAL at 10:02

## 2022-02-24 RX ADMIN — SEVELAMER CARBONATE 1600 MG: 800 TABLET, FILM COATED ORAL at 10:02

## 2022-02-24 RX ADMIN — SODIUM BICARBONATE 650 MG TABLET 650 MG: at 08:02

## 2022-02-24 RX ADMIN — ARIPIPRAZOLE 15 MG: 5 TABLET ORAL at 10:02

## 2022-02-24 RX ADMIN — NYSTATIN 500000 UNITS: 500000 SUSPENSION ORAL at 04:02

## 2022-02-24 RX ADMIN — PANTOPRAZOLE SODIUM 40 MG: 40 INJECTION, POWDER, FOR SOLUTION INTRAVENOUS at 09:02

## 2022-02-24 RX ADMIN — NYSTATIN 500000 UNITS: 500000 SUSPENSION ORAL at 07:02

## 2022-02-24 RX ADMIN — SEVELAMER CARBONATE 1600 MG: 800 TABLET, FILM COATED ORAL at 08:02

## 2022-02-24 RX ADMIN — ACETAMINOPHEN 1000 MG: 500 TABLET ORAL at 06:02

## 2022-02-24 RX ADMIN — HYDRALAZINE HYDROCHLORIDE 50 MG: 50 TABLET ORAL at 10:02

## 2022-02-24 RX ADMIN — COLCHICINE 0.3 MG: 0.6 TABLET, FILM COATED ORAL at 10:02

## 2022-02-24 RX ADMIN — PANTOPRAZOLE SODIUM 40 MG: 40 INJECTION, POWDER, FOR SOLUTION INTRAVENOUS at 08:02

## 2022-02-24 RX ADMIN — FOLIC ACID 1 MG: 1 TABLET ORAL at 08:02

## 2022-02-24 RX ADMIN — CLONIDINE HYDROCHLORIDE 0.4 MG: 0.1 TABLET ORAL at 04:02

## 2022-02-24 RX ADMIN — SEVELAMER CARBONATE 1600 MG: 800 TABLET, FILM COATED ORAL at 04:02

## 2022-02-24 RX ADMIN — COLCHICINE 0.3 MG: 0.6 TABLET, FILM COATED ORAL at 08:02

## 2022-02-24 RX ADMIN — CEFTRIAXONE SODIUM 2 G: 2 INJECTION, SOLUTION INTRAVENOUS at 04:02

## 2022-02-24 RX ADMIN — MUPIROCIN: 20 OINTMENT TOPICAL at 10:02

## 2022-02-24 RX ADMIN — ASPIRIN 81 MG: 81 TABLET, COATED ORAL at 08:02

## 2022-02-24 RX ADMIN — LABETALOL HYDROCHLORIDE 200 MG: 100 TABLET, FILM COATED ORAL at 08:02

## 2022-02-24 RX ADMIN — PROCHLORPERAZINE EDISYLATE 5 MG: 5 INJECTION INTRAMUSCULAR; INTRAVENOUS at 03:02

## 2022-02-24 RX ADMIN — POLYETHYLENE GLYCOL 3350 17 G: 17 POWDER, FOR SOLUTION ORAL at 08:02

## 2022-02-24 RX ADMIN — CLONIDINE HYDROCHLORIDE 0.4 MG: 0.1 TABLET ORAL at 08:02

## 2022-02-24 RX ADMIN — ATORVASTATIN CALCIUM 80 MG: 20 TABLET, FILM COATED ORAL at 08:02

## 2022-02-24 RX ADMIN — SENNOSIDES AND DOCUSATE SODIUM 1 TABLET: 50; 8.6 TABLET ORAL at 08:02

## 2022-02-24 RX ADMIN — SODIUM BICARBONATE 650 MG TABLET 650 MG: at 10:02

## 2022-02-24 NOTE — HOSPITAL COURSE
"Enrique Martinez is a 56 y.o. male with a PMHx of CKD V glomerulonephropathy, insulin dependent diabetes mellitus, and CAD who presented tot he ED for evaluation of worsening left foot pain and swelling. He was recently admitted at [facility] from 2/2-2/4/2022 from LLE cellulitis. He was found to have salmonella bacteremia suspected due to diarrheal illness. He was discharged with cipro for which he reports compliance. He has had no improvement in his pain, redness, or swelling of his left foot and leg. He has also developed a new blister on his left foot. He endorses chills. He denies fevers, chest pain, SOB, N/V, decreased urine output, dysuria, and flank pain.     Patient admitted with worsening and progressive chronic kidney disease with  and Cr 20.9. Nephrology is consulted for his CKD stage V that has progressed ti ESRD and at first patient not amenable to starting HD but finally agreed after family involvement and patient had trialysis catheter placed and started on HD as per Nephrology. He is now stable on routine HD.    Patient was also complaining of neck pain on admit and had abnormal C spine Xray with possible signs of C spine instability, MRi ordered and patient refused 2/17, on pain meds, was discussed at length. Concern for possible infection also given acuity of pain. Imaging showed signs of C spine instability, prevertebral abscess likely with fluid collection with widening in C spine,. Neurosurgery consulted and recommended C collar- patient refused, and Xrays, refused, and MRI full spine when stable. Patient also had MRI of left foot on admit and showed "Regions of focal abnormal signal involving the distal 1st metatarsal and proximal 1st phalanx as described above.  Findings are concerning for foci of subacute myelitis (Yared's abscess).  Other less likely considerations include cystic degenerative change." ENT consulted for possible retropharyngeal abscess as per Neurosurgery and Infectious " Disease consulted. At first patient kept refusing advanced imaging but finally agreed and repeat MRI of cervical spine done on 2/22 with anesthesia to help with sedation. MRI done without contrast and concern for possible retropharyngeal abscess. Patient placed on empiric IV Daptomycin and Ceftriaxone by ID.  ENT recommended needed study with contrast to really investigate the area better. Repeat MRI with contrast done of cervical spine on 2/24 and no obvious fluid collection noted and not obvious signs of infection so ENT and Neurosurgery signed off and stated nothing needed to be done.     Patient kept on IV Daptomycin and Rocephin to cover for his left foot osteo. Podiatry was consulted and performed bone biopsy as per ID recs on 2/25 to determine duration and type of abx needed to treat osteomyelitis of left foot. SW obtained outpatient HD chair at [facility] (new start HD this admit). ID finalized recs on discharge

## 2022-02-24 NOTE — PROGRESS NOTES
Patient transported off the unit to HD, report given. Vitals stable. Patient sleeping between care due to premeds given for MRI. No acute distress noted. Patient calm and cooperative currently, BA activated.

## 2022-02-24 NOTE — PROGRESS NOTES
[facility] - [facility]plant [facility]  Neurosurgery  Progress Note    Subjective:     History of Present Illness:  Enrique Martinez is a 56 y.o. male with a PMHx of CKD V glomerulonephropathy, insulin dependent diabetes mellitus, and CAD who presented to the ED for evaluation of worsening left foot pain and swelling. He was recently admitted at [facility] from 2/2-2/4/2022 from LLE cellulitis. He was found to have salmonella bacteremia suspected due to diarrheal illness. He was discharged with cipro for which he reports compliance. He has had no improvement in his pain, redness, or swelling of his left foot and leg. He has also developed a new blister on his left foot. He endorses chills. He denies fevers, chest pain, SOB, N/V, decreased urine output, dysuria, and flank pain.      NSGY was consulted as the patient transiently endorsed neck pain.  X ray C spine with increased LIZZY. MRI C non contrast with prevertebral stir signal and edema, C2-3 listhesis   with picture overall concerning for cervical instability. PT denies weakness, pain or limitation of motion. He further denies symptoms associated with myelopathy including but not  limited to dropping things, difficulty buttoning shirts..etc       Post-Op Info:  Procedure(s) (LRB):  MPU PROCEDURE (N/A)         Interval History: No acute nsgy events. Flex ex done with some motion at C2 above C3, but not significant, no surgical intervention required at this time, OK to continue c-collar alone    Medications:  Continuous Infusions:  Scheduled Meds:   sodium chloride 0.9%   Intravenous Once    acetaminophen  1,000 mg Oral Q8H    amLODIPine  10 mg Oral Daily    ARIPiprazole  15 mg Oral QHS    aspirin  81 mg Oral Daily    atorvastatin  80 mg Oral Daily    cefTRIAXone (ROCEPHIN) IVPB  2 g Intravenous Q24H    cloNIDine  0.4 mg Oral TID    colchicine  0.3 mg Oral BID    DAPTOmycin (CUBICIN)  IV  6 mg/kg Intravenous Q48H    folic acid  1 mg Oral Daily    corticotropin  80  Units Subcutaneous Every Mon, Fri    hydrALAZINE  50 mg Oral Q8H    labetalol  200 mg Oral Q12H    nystatin  500,000 Units Oral QID (WM & HS)    pantoprazole  40 mg Intravenous BID    polyethylene glycol  17 g Oral Daily    senna-docusate 8.6-50 mg  1 tablet Oral Daily    sevelamer carbonate  1,600 mg Oral TID    sodium bicarbonate  650 mg Oral TID     PRN Meds:sodium chloride, sodium chloride, albuterol-ipratropium, aluminum-magnesium hydroxide-simethicone, dextrose 10%, dextrose 10%, glucagon (human recombinant), glucose, glucose, lorazepam, melatonin, methocarbamoL, morphine, morphine, naloxone, OLANZapine, ondansetron, oxyCODONE, oxyCODONE, prochlorperazine, simethicone, sodium chloride 0.9%, sodium chloride 0.9%     Review of Systems  Objective:     Weight: 72 kg (158 lb 11.7 oz)  Body mass index is 25.62 kg/m².  Vital Signs (Most Recent):  Temp: 98.5 °F (36.9 °C) (02/23/22 1930)  Pulse: 95 (02/23/22 1930)  Resp: 18 (02/23/22 1930)  BP: 131/76 (02/23/22 1930)  SpO2: 98 % (02/23/22 1930)   Vital Signs (24h Range):  Temp:  [96.7 °F (35.9 °C)-98.8 °F (37.1 °C)] 98.5 °F (36.9 °C)  Pulse:  [2-105] 95  Resp:  [16-19] 18  SpO2:  [94 %-99 %] 98 %  BP: (127-175)/() 131/76     Date 02/23/22 0700 - 02/24/22 0659   Shift 4584-9993 2995-5686 6801-5782 24 Hour Total   INTAKE   P.O. 300 120  420   Blood 1350   1350   Other 800   800   IV Piggyback  50  50   Shift Total(mL/kg) 2450(34) 170(2.4)  2620(36.4)   OUTPUT   Urine(mL/kg/hr) 100(0.2)   100   Other 800   800   Shift Total(mL/kg) 900(12.5)   900(12.5)   Weight (kg) 72 72 72 72                     Male External Urinary Catheter 02/18/22 0418 Small (Active)   Collection Container Urimeter 02/19/22 2330   Securement Method secured to top of thigh w/ adhesive device 02/19/22 2330   Skin no redness;no breakdown 02/19/22 2330   Tolerance no signs/symptoms of discomfort;did not assist with appliance change 02/19/22 2330   Output (mL) 450 mL 02/19/22 1529    Catheter Change Date 02/19/22 02/19/22 2330   Catheter Change Time 2100 02/19/22 2330       Physical Exam    Neurosurgery Physical Exam    5/5 strength   LROM left shoulder       full Range of motion in neck   No hoffmans or clonus     Significant Labs:  Recent Labs   Lab 02/21/22  2311 02/22/22  0631 02/23/22  0600    105 105   * 146* 144   K 4.3 4.4 5.0    104 100   CO2 20* 20* 18*   * 170* 159*   CREATININE 19.4* 19.6* 20.8*   CALCIUM 9.3 9.4 9.4   MG 2.0  --   --        Recent Labs   Lab 02/22/22  0631 02/23/22  0600   WBC 8.10 6.76   HGB 7.9* 6.8*   HCT 25.2* 21.9*    200       No results for input(s): LABPT, INR, APTT in the last 48 hours.  Microbiology Results (last 7 days)       Procedure Component Value Units Date/Time    Blood Culture #2 **CANNOT BE ORDERED STAT** [[id number]] Collected: 02/16/22 1806    Order Status: Completed Specimen: Blood from Peripheral, Antecubital, Left Updated: 02/21/22 2012     Blood Culture, Routine No growth after 5 days.    Blood Culture #1 **CANNOT BE ORDERED STAT** [[id number]] Collected: 02/16/22 1807    Order Status: Completed Specimen: Blood from Peripheral, Hand, Right Updated: 02/21/22 2012     Blood Culture, Routine No growth after 5 days.          All pertinent labs from the last 24 hours have been reviewed.    Significant Diagnostics:  CT: No results found in the last 24 hours.  I have reviewed all pertinent imaging results/findings within the past 24 hours.  I have reviewed and interpreted all pertinent imaging results/findings within the past 24 hours.    Assessment/Plan:     * Neck pain  55 y/o M with renal failure and recent salmonella bacteremia , transiently endorsed neck pain but is now denies pain, weakness or myelopathy and is intact on exam despite radiographic features concerning for cervical instability with possible associated infection .    -- CT and xray flex ex reviewed: some motion of C2 above C3, but not  significant  -- No significant instability requiring neurosurgical intervention, recommend c-collar alone  -- No further imaging required at this time  -- Recommending ENT/IR for drainage of prevertebral collection for source control       Discussed with Dr. Sandhu.             Mandy Barnes MD  Neurosurgery  [facility] - Transplant Stepdown

## 2022-02-24 NOTE — ASSESSMENT & PLAN NOTE
55 y/o M with renal failure and recent salmonella bacteremia , transiently endorsed neck pain but is now denies pain, weakness or myelopathy and is intact on exam despite radiographic features concerning for cervical instability with possible associated infection .    -- CT and xray flex ex reviewed: some motion of C2 above C3, but not significant  -- No significant instability requiring neurosurgical intervention, recommend c-collar alone  -- No further imaging required at this time  -- Recommending ENT/IR for drainage of prevertebral collection for source control       Discussed with Dr. Sandhu.

## 2022-02-24 NOTE — PLAN OF CARE
Problem: Hemodynamic Instability (Hemodialysis)  Goal: Effective Tissue Perfusion  Outcome: Ongoing, Progressing

## 2022-02-24 NOTE — PROGRESS NOTES
[facility] Step[facility]  Infectious Disease  Progress Note    Patient Name: Enrique Martinez  MRN: [id number]  Admission Date: 2/16/2022  Length of Stay: 7 days  Attending Physician: Jose Alberto Saba MD  Primary Care Provider: Primary Doctor No    Isolation Status: No active isolations  Assessment/Plan:      * Neck pain  Non-contrast MRI had to be discontinued due to patient request.  But available images shows retropharyngeal fluid collection with abscess not excluded.  Deeply concerned that this could represent infection.  His recent bacteremia makes me concerned this could be salmonella.   He is immunocompromised due to a combination of uremia and diabetes so invasive salmonellosis is possible. Active problems of concern are his cervical neck prevertebral fluid collection, left lower extremity cellulitis/ brodies abscess, and recent salmonella bacteremia.     Plan  - Continue empiric ceftriaxone IV. Continue daptomycin q 48 hours. Will continue empiric coverage until more information with regards to imaging and cultures of c-spine infection are obtained. Pt to likely need 3-6 weeks of antibiotics for brodies abscess/osteomyelitis of left foot. Can tailor treatment if retropharyngeal collection is cultured and if repeat imaging demonstrates cervical osteomyelitis.  - MRI of C-Spine without changes with regards to retropharyngeal fluid collection.   - ENT and NSGY consultation, appreciate their assistance and will f/u recommendations.   - Pt going for Repeat MRI c-spine w/wo contrast today. Will f/u results.  - Recommend getting baseline CK level while on daptomycin.             Prevertebral Fluid Collection  As outlined above.    Pain and swelling of left lower extremity  MRI foot shows brodies abscess (subacute osteomyelitis).  Will manage with empiric ceftriaxone and daptomycin for now.  See Neck pain A/P        Anticipated Disposition: TBD    Thank you for your consult. I will follow-up with patient.  Please contact us if you have any additional questions.    Deena Maya MD  Infectious Disease  [facility]    Subjective:     Principal Problem:Neck pain    HPI: Enrique Martinez is a 56 y.o. male with a PMH of CKD V glomerulonephropathy, insulin dependent diabetes mellitus, and CAD who presented to the ED for evaluation of worsening left foot pain and swelling. He was recently admitted at [facility] from 2/2-2/4/2022 from LLE cellulitis complicated by salmonella bacteremia after having diarrheal illness.  He was discharged home with ciprofloxacin. He reports no improvement in his cellulitis since being discharged home and no has new blister development. ID was consulted due to recent salmonella bacteremia and new findings on MRI suspicious for left foot myelitis and persistent cellulitis infection. On initial ID encounter, pt reporting pain in left foot and ankle but denying any fever, chills, N/V, diarrhea, chest pain, headaches, or SOB.     Interval History: NAEO. Pt has remained afebrile overnight. Pt reporting decrease in appetite but otherwise denying fever, chills, trouble swallowing or neck pain.     Review of Systems   Constitutional:  Negative for activity change, chills and fever.   HENT:  Negative for congestion, trouble swallowing and voice change.    Eyes:  Negative for photophobia and visual disturbance.   Respiratory:  Negative for chest tightness, shortness of breath and wheezing.    Cardiovascular:  Positive for leg swelling. Negative for chest pain and palpitations.   Gastrointestinal:  Negative for abdominal pain, constipation, nausea and vomiting.   Genitourinary:  Negative for dysuria, flank pain, frequency, hematuria and urgency.   Musculoskeletal:  Positive for arthralgias. Negative for back pain and gait problem.        Left foot pain   Skin:  Positive for wound. Negative for rash.   Neurological:  Negative for syncope, speech difficulty and headaches.   Psychiatric/Behavioral:   Negative for agitation and confusion. The patient is not nervous/anxious.    Objective:     Vital Signs (Most Recent):  Temp: 97.5 °F (36.4 °C) (02/24/22 0745)  Pulse: 95 (02/24/22 0814)  Resp: 16 (02/24/22 1005)  BP: (!) 146/72 (02/24/22 0745)  SpO2: 95 % (02/24/22 0745)   Vital Signs (24h Range):  Temp:  [96.7 °F (35.9 °C)-98.5 °F (36.9 °C)] 97.5 °F (36.4 °C)  Pulse:  [] 95  Resp:  [16-18] 16  SpO2:  [92 %-98 %] 95 %  BP: (122-173)/(66-95) 146/72     Weight: 72 kg (158 lb 11.7 oz)  Body mass index is 25.62 kg/m².    Estimated Creatinine Clearance: 5.5 mL/min (A) (based on SCr of 13.1 mg/dL (H)).    Physical Exam  Vitals and nursing note reviewed.   Constitutional:       General: He is not in acute distress.     Appearance: He is well-developed and overweight. He is ill-appearing.   HENT:      Head: Normocephalic and atraumatic.      Nose: Nose normal.      Mouth/Throat:      Mouth: Mucous membranes are moist.   Eyes:      General: No scleral icterus.     Conjunctiva/sclera: Conjunctivae normal.   Neck:      Comments: Right IJ trialysis line in place.   Cardiovascular:      Rate and Rhythm: Normal rate and regular rhythm.      Heart sounds: Normal heart sounds. No murmur heard.    No friction rub. No gallop.   Pulmonary:      Effort: Pulmonary effort is normal. No respiratory distress.      Breath sounds: Normal breath sounds. No wheezing or rales.   Abdominal:      General: Bowel sounds are normal. There is no distension.      Palpations: Abdomen is soft.      Tenderness: There is no abdominal tenderness. There is no guarding.   Musculoskeletal:         General: Swelling and signs of injury present. Normal range of motion.      Cervical back: Normal range of motion and neck supple.      Right lower leg: No edema.      Left lower leg: No edema.   Feet:      Right foot:      Skin integrity: Skin integrity normal.      Left foot:      Skin integrity: Skin breakdown, erythema, warmth and dry skin present.    Skin:     General: Skin is warm and dry.      Capillary Refill: Capillary refill takes less than 2 seconds.      Comments: L leg, ankle, foot skin sloughing.   Deroofed blister on dorsum of left foot   Neurological:      General: No focal deficit present.      Mental Status: He is alert and easily aroused.      GCS: GCS eye subscore is 4. GCS verbal subscore is 5. GCS motor subscore is 6.      Cranial Nerves: No cranial nerve deficit.      Sensory: No sensory deficit.      Motor: Tremor present.   Psychiatric:         Behavior: Behavior normal. Behavior is cooperative.         Thought Content: Thought content normal.         Judgment: Judgment normal.       Significant Labs: All pertinent labs within the past 24 hours have been reviewed.    Significant Imaging: I have reviewed all pertinent imaging results/findings within the past 24 hours.

## 2022-02-24 NOTE — PLAN OF CARE
Patient AAO X 4, VSS. Gave PRN for pain.   No visi but Telemonitor in place NS. Wound care consult put in for sacrum reddened. HD done yesterday no UF taken off. Planned for MRI with contrast and HD this morning. IIV antibiotics given. Patient does have a sitter and was cooperative over night. Did have one agitation episode but easily redirected. Bed locked at lowest setting, yellow socks on, call bell in reach. Remains free from falls.

## 2022-02-24 NOTE — PROGRESS NOTES
[facility] - Transplant [facility] Medicine  Progress Note    Patient Name: Enrique Martinez  MRN: [id number]  Patient Class: IP- Inpatient   Admission Date: 2/16/2022  Length of Stay: 7 days  Attending Physician: Jose Alberto Saba MD  Primary Care Provider: Primary Doctor No        Subjective:     Principal Problem:Neck pain        HPI:  Enrique Martinez is a 56 y.o. male with a PMHx of CKD V glomerulonephropathy, insulin dependent diabetes mellitus, and CAD who presented tot  ED for evaluation of worsening left foot pain and swelling. He was recently admitted at [facility] from 2/2-2/4/2022 from LLE cellulitis. He was found to have salmonella bacteremia suspected due to diarrheal illness. He was discharged with cipro for which he reports compliance. He has had no improvement in his pain, redness, or swelling of his left foot and leg. He has also developed a new blister on his left foot. He endorses chills. He denies fevers, chest pain, SOB, N/V, decreased urine output, dysuria, and flank pain.    ED: HR 97, other VSSAF. CBC with leukocytosis of 16.01, Hgb 7.4, Hct 22.2.  ESR 97. CRP 33.4. K 5.5. Cr 19.7, . Phos 10.4. Lactic WNL. MRI L foot with cellulitis and osteomyelitis of distal 1st metatarsal and proximal 1st phalanx.      Overview/Hospital Course:  .Enrique Martinez is a 56 y.o. male with a PMHx of CKD V glomerulonephropathy, insulin dependent diabetes mellitus, and CAD who presented tot  ED for evaluation of worsening left foot pain and swelling. He was recently admitted at [facility] from 2/2-2/4/2022 from LLE cellulitis. He was found to have salmonella bacteremia suspected due to diarrheal illness. He was discharged with cipro for which he reports compliance. He has had no improvement in his pain, redness, or swelling of his left foot and leg. He has also developed a new blister on his left foot. He endorses chills. He denies fevers, chest pain, SOB, N/V, decreased urine output, dysuria, and flank  pain.  Nephrology is consulted for history of ESRD,he had finally HD,  Has abnormal C spine Xray with possible signs of C spine instability, MRi ordered and patient refused 2/17, on pain meds, was discussed AT length, see 2/17 and 2/18 note above, ordered with premediation now. Concern for possible infection also given acuity of pain  -signs of C spine instability, prevertebral abscess likely with fluid collection with widening in C spine, NS recs C collar- patient refused, and Xrays, refused, and MRi full spine when stable. Cannot do any intervention now as unstable bc of kidneys.   -ent consulted for retropharyngeal abscess per ns and id recs, will likely scope if amenable at bedside (he refused). They rec MRI C spien with contrast- but cannot get due to kidneys  -refused c collar xrays  -NS recs MRI all spine, ID/ENT rec repeat MRI C spine for better pictures. (cannot do contrast). He is likely nto going to sit for all the spine right now 2/21 per my exam and nursing. Placed repeat flex xrays and C spine MRi orders in now. They called him at 3 pm but anesthesia is coming at 330 pm to do the line he said he would now do this afternoon after a cigarette, so nursing to tell them to come back. IR consulted to sample the vertebral involvement, they report they wanted to review further imaging once in to see if they needed - will f/u thoughts but likely suspect wont need sampling of the spine as this appear more chroinc, retropharyngeal collection however likely needs further addressed. CT also pending as rec for ENT as well.   -ID following- on dapto and Ceftriaxone  -MRI/CT showing similar fidings to before, ENT  reports need contrast to really see retropharyngeal area well, nephro approved contrast on 2/24 before HD and order placed for 6 am,MRI today with contrast show no fluid collection,On empiric IV Abx for left  foot osteomyelitis.        Interval history:   He is more cooperative,agree with HD and  MRI.                Review of patient's allergies indicates:  No Known Allergies    No current facility-administered medications on file prior to encounter.     Current Outpatient Medications on File Prior to Encounter   Medication Sig    insulin (LANTUS SOLOSTAR U-100 INSULIN) glargine 100 units/mL (3mL) SubQ pen Inject 5 Units into the skin once daily.    albuterol (PROVENTIL/VENTOLIN HFA) 90 mcg/actuation inhaler Inhale 2 puffs into the lungs every 6 (six) hours as needed for Wheezing or Shortness of Breath. Rescue    amlodipine (NORVASC) 10 MG tablet Take 10 mg by mouth once daily.    ARIPiprazole (ABILIFY) 15 MG Tab Take 1 tablet (15 mg total) by mouth every evening.    aspirin (ECOTRIN) 81 MG EC tablet Take 1 tablet (81 mg total) by mouth once daily.    atorvastatin (LIPITOR) 80 MG tablet Take 1 tablet (80 mg total) by mouth once daily.    blood-glucose meter kit Use as instructed    calcitRIOL (ROCALTROL) 0.5 MCG Cap Take 0.5 mcg by mouth once daily.    chlorthalidone (HYGROTEN) 50 MG Tab Take 50 mg by mouth once daily.     cloNIDine (CATAPRES) 0.2 MG tablet Take 0.4 mg by mouth 3 (three) times daily.     colchicine (COLCRYS) 0.6 mg tablet Take 0.6 mg by mouth daily as needed.    ergocalciferol (ERGOCALCIFEROL) 50,000 unit Cap Take 50,000 Units by mouth every 30 days.     famotidine (PEPCID) 20 MG tablet Take 20 mg by mouth daily as needed for Heartburn.     ferrous gluconate (FERGON) 324 MG tablet Take 324 mg by mouth 2 (two) times a day.     fluticasone (FLONASE) 50 mcg/actuation nasal spray 1 spray by Each Nare route once daily.    furosemide (LASIX) 40 MG tablet Take 40 mg by mouth 2 (two) times daily.    methocarbamoL (ROBAXIN) 500 MG Tab Take 500 mg by mouth 2 (two) times a day.    metoprolol tartrate (LOPRESSOR) 100 MG tablet Take 100 mg by mouth.    omeprazole (PRILOSEC) 20 MG capsule Take 20 mg by mouth once daily.    RENVELA 800 mg Tab Take 800 mg by mouth 3 (three) times daily.     sodium bicarbonate 650 MG tablet Take 650 mg by mouth.    tadalafiL (CIALIS) 2.5 mg Tab Take 2.5 mg by mouth.    TRUE METRIX GLUCOSE TEST STRIP Strp      Family History       Problem Relation (Age of Onset)    Drug abuse Brother    Heart attack Father, Brother    Hyperlipidemia Father    Hypertension Father, Brother    No Known Problems Sister    Stroke Father          Tobacco Use    Smoking status: Current Some Day Smoker     Packs/day: 0.25     Years: 10.00     Pack years: 2.50    Smokeless tobacco: Never Used   Substance and Sexual Activity    Alcohol use: No    Drug use: No    Sexual activity: Not on file     Review of Systems   Constitutional:  Positive for chills. Negative for activity change and fever.   HENT:  Negative for congestion and trouble swallowing.    Eyes:  Negative for photophobia and visual disturbance.   Respiratory:  Negative for chest tightness, shortness of breath and wheezing.    Cardiovascular:  Positive for leg swelling. Negative for chest pain and palpitations.   Gastrointestinal:  Negative for abdominal pain, constipation, nausea and vomiting.   Genitourinary:  Negative for dysuria, flank pain, frequency, hematuria and urgency.   Musculoskeletal:  Positive for arthralgias. Negative for back pain and gait problem.   Skin:  Positive for color change and wound. Negative for rash.   Neurological:  Negative for dizziness, syncope, weakness, light-headedness, numbness and headaches.   Psychiatric/Behavioral:  Negative for agitation and confusion. The patient is not nervous/anxious.    Objective:     Vital Signs (Most Recent):  Temp: 97.7 °F (36.5 °C) (02/24/22 1200)  Pulse: 91 (02/24/22 1200)  Resp: 16 (02/24/22 1005)  BP: (!) 158/87 (02/24/22 1200)  SpO2: 98 % (02/24/22 1200) Vital Signs (24h Range):  Temp:  [97.5 °F (36.4 °C)-98.5 °F (36.9 °C)] 97.7 °F (36.5 °C)  Pulse:  [86-98] 91  Resp:  [16-18] 16  SpO2:  [92 %-98 %] 98 %  BP: (122-158)/(66-87) 158/87     Weight: 72 kg (158 lb  11.7 oz)  Body mass index is 25.62 kg/m².    Physical Exam  Vitals and nursing note reviewed.   Constitutional:       Appearance: He is well-developed. He is ill-appearing.      Comments: On HDCalmer. Asterixis with tremors at rest and with movement    HENT:      Head: Normocephalic and atraumatic.      Mouth/Throat:      Mouth: Mucous membranes are moist.   Eyes:      General: No scleral icterus.     Conjunctiva/sclera: Conjunctivae normal.   Cardiovascular:      Rate and Rhythm: Normal rate and regular rhythm.      Heart sounds: Normal heart sounds.   Pulmonary:      Effort: Pulmonary effort is normal. No respiratory distress.      Breath sounds: Normal breath sounds. No wheezing.   Abdominal:      General: Bowel sounds are normal. There is no distension.      Palpations: Abdomen is soft.      Tenderness: There is no abdominal tenderness.   Musculoskeletal:         General: No tenderness. Normal range of motion.      Cervical back: Normal range of motion and neck supple.      Right lower le+ Pitting Edema present.      Left lower le+ Pitting Edema present.      Comments: LLE with burst blister. Severe pain with any light touch.  Signs of bunion on both big toes but also possible gout tophi deposit on right big toe. Edema I RLE >LLE. pitting   Feet:      Left foot:      Skin integrity: Skin breakdown, erythema and warmth present.   Skin:     General: Skin is warm and dry.      Capillary Refill: Capillary refill takes less than 2 seconds.   Neurological:      Mental Status: He is alert and oriented to person, place, and time.      Cranial Nerves: No cranial nerve deficit.      Comments: Pain improved with movement to neck    Psychiatric:         Behavior: Behavior normal.         Thought Content: Thought content normal.         Judgment: Judgment normal.               Significant Labs:   All pertinent labs within the past 24 hours have been reviewed.  CBC:   Recent Labs   Lab 22  0600 22  0607    WBC 6.76 8.38   HGB 6.8* 7.5*   HCT 21.9* 23.6*    185       CMP:   Recent Labs   Lab 02/23/22  0600 02/24/22  0607    143   K 5.0 3.9    102   CO2 18* 24    159*   * 103*   CREATININE 20.8* 13.1*   CALCIUM 9.4 9.3   ANIONGAP 26* 17*   EGFRNONAA 2.1* 3.7*       Lactic Acid:   No results for input(s): LACTATE in the last 48 hours.      Significant Imaging: I have reviewed all pertinent imaging results/findings within the past 24 hours.   MRI Cervical Spine W WO Cont  Narrative: EXAMINATION:  MRI CERVICAL SPINE W WO CONTRAST    CLINICAL HISTORY:  assess retropharyngeal collection with contrast, nephro cleared to do this 2/24 AM to dialyze contrast off after;.    TECHNIQUE:  Multiplanar, multisequence MR images of the cervical spine were acquired without the administration of contrast.    COMPARISON:  MRI 02/22/2022.  Radiograph 02/22/2022.  CT 02/22/2022.    FINDINGS:  Examination is degraded by motion artifact.    Cervical spine alignment demonstrates reversal of the normal lordosis with 4 mm of anterolisthesis of C2 on C3 and suspected borderline widening of the atlantodens interval, findings that were better evaluated on previous CT.  Occipital condyles, odontoid process and C1 lateral masses are intact.  Vertebral body heights are well maintained without evidence for fracture.  No marrow signal abnormality to suggest an infiltrative process.    There is advanced multilevel degenerative disc space narrowing most pronounced at C4-C5 and C5-C6.  Endplate edema at C3-C4 and C4-C5, favored to be degenerative in nature.  No endplate erosive changes.    Cervical spinal cord demonstrates normal signal intensity.  Cerebellar tonsils are in their expected location.  Focal area of T2/STIR signal hyperintensity at the level of the delores, likely a remote lacunar infarct.  No pachymeningeal or leptomeningeal enhancement.    There is prevertebral soft tissue enhancement with mild residual fluid.   No rim enhancing fluid collections to suggest abscess.  Vertebral artery flow voids are present.  Partially visualized right-sided central venous catheter within the right internal jugular vein.  No lymphadenopathy.  Visualized parotid, submandibular and thyroid glands are normal.    C2-C3: 4 mm of anterolisthesis of C2 on C3.  Mild left facet arthropathy.  Findings contribute to mild right neural foraminal narrowing.  No spinal canal stenosis.    C3-C4: Broad-based posterior disc osteophyte complex effaces the ventral thecal sac.  Bilateral uncovertebral joint spurring.  Findings contribute to mild spinal canal stenosis and moderate to severe bilateral neural foraminal narrowing.    C4-C5: Broad-based posterior disc osteophyte complex effaces the ventral thecal sac.  Bilateral uncovertebral joint spurring.  Findings contribute to moderate spinal canal stenosis and severe bilateral neural foraminal narrowing.    C5-C6: Broad-based posterior disc osteophyte complex partially effaces the ventral thecal sac.  Bilateral uncovertebral joint spurring.  Findings contribute to mild spinal canal stenosis and mild-to-moderate bilateral neural foraminal narrowing.    C6-C7: Broad-based posterior disc osteophyte complex partially effaces the ventral thecal sac.  Bilateral uncovertebral joint spurring.  Findings contribute to moderate bilateral neural foraminal narrowing.    C7-T1: Bilateral facet arthropathy and bilateral ligamentum flavum buckling..  Findings contribute to mild spinal canal stenosis and moderate to severe bilateral neural foraminal narrowing.  Impression: 1. Prevertebral soft tissue enhancement with near complete resolution of previously described fluid, likely sequela of a resolving infectious or inflammatory process.  No rim enhancing fluid collection to suggest abscess.  No convincing MR imaging findings to suggest discitis/osteomyelitis.  2. Advanced multilevel cervical degenerative changes, difficult to  accurately characterize secondary to patient motion artifact.    Electronically signed by: Lebron Carcamo MD  Date:    02/24/2022  Time:    12:18          Assessment/Plan:      * Neck pain  -abnormal C spine Xray with possible signs of C spine instability, MRi ordered and patient refused 2/17, on pain meds, discussed AT length, see 2/17 and 2/18 note above, ordered with premediation now. Concern for possible infection also given acuity of pain  -signs of C spine instability, prevertebral abscess likely with fluid collection with widening in C spine, NS recs C collar- patient refused, and Xrays, refused, and MRi full spine when stable. Cannot do any intervention now as unstable bc of kidneys.   -ent consulted for retropharyngeal abscess per ns and id recs, will likely scope if amenable at bedside (he refused). They rec MRI C spien with contrast- but cannot get due to kidneys  -refused c collar xrays  -NS recs MRI all spine, ID/ENT rec repeat MRI C spine for better pictures. (cannot do contrast). He is likely nto going to sit for all the spine right now 2/21 per my exam and nursing. Placed repeat flex xrays and C spine MRi orders in now. They called him at 3 pm but anesthesia is coming at 330 pm to do the line he said he would now do this afternoon after a cigarette, so nursing to tell them to come back. IR consulted to sample the vertebral involvement, they report they wanted to review further imaging once in to see if they needed - will f/u thoughts but likely suspect wont need sampling of the spine as this appear more chroinc, retropharyngeal collection however likely needs further addressed. CT also pending as rec for ENT as well.   -ID following- on dapto and Ceftriaxone  -MRI/CT showing similar fidings to before, ENt reports need contrast to really see retropharyngeal area well, nephro approved contrast on 2/24 before HD and order placed .  MRI today with contrast show no fluid collection,not require  intervention.      Prevertebral Fluid Collection  -ent consulted, will likely bedside scope. He reports trouble swallowing food to wife now but denies again to me  -refused bedside scope  -reattempt at MRI C spine- shows more chronic issues prevertebral area but retropharyngela area still prominent with fluid. Will f/u recs from specialitss regarding this, severe narrowing seen          Abnormal MRI, cervical spine  MRI today with contrast show no fluid collection      History of Salmonella septicemia  -+ blood CX at [facility] 2/1, with clearance immediately, treated with cipro for 14 days unclear source but presumed diarrheal infection (wife endorses this)  -denies sickle cell hx to predispose to this  -concern with neck pain and foot pain with recent bacteremia  -work up in progress  -ID consulted. On dapto/rocephin        Gout  -colchicine BID, light IVF x 10 hours 2/17 per renal recs. Avoid steroids with posisble infection. He denies he has gout      Folic acid deficiency  -low at 4.4.  -daily replacement      Increased anion gap metabolic acidosis  Secondary to ckd  -cont Na bicarb  -refusing HD      Hyperphosphatemia  -cont binders, elvated at 10-11  -refusing HD      Hyperkalemia  -shift PRN      Acute renal failure superimposed on stage 5 chronic kidney disease, not on chronic dialysis  Hyperkalemia  Hyperphosphatemia  Metabolic acidosis  Kidney transplant candidate    - 2/2 membranous glomerulopathy  - GFR was 13-16 up from baseline 4-6 previously in January 2022. Labs repeated today and GFR is 2.6, Cr 19.   - patient follows with Dr. Cardoza and Dr Lee and dialysis has been discussed multiple times, patient continues to refuse.  - on transplant list with [facility]  -cont phos binders, Na bicarb. Valencia for adequate I/O. Lasix held 2/17 per renal recs  -uric acid trending  -at severe risk of adverse events, BUN almost 200, asterixis on exam. Meets multiple criteria but refusing HD. Has capacity. Wife aware and  states is his decision  Continues to refuse on 2/19 after extensive discussions with all parties.   -2/20- may consent tomorrow per wife, if so anesthesia for IJ. No permacath now as active infection concerns in neck. If not, palliative consult. Wife will agree to HD for him if he says yes despite no capacity per psych. Wife will not do it against his will as poa  Agrees to HD on 2/21. Attempted line on 2/21 as he agreed but he accidentally pulled it.  reattempt pending 2/22. High risk to accidentally pull out so will need close monitoring while uremic and has sitter  -successful 2/22 line, HD 2/23 started. Plan for HD tomorrow after MRI imaging. Possible tunneled cath fri/monday per renal. He will agree to bi weekly HD per renal notes at home, need tos tart on OP chair    Subacute osteomyelitis  - 2/4 SIRS with HR>90, WBC 16  - lactic WNL  - ESR 97, CRP 33.4, procal 1.  - MRI w/ regions of focal abnormal signal involving the distal 1st metatarsal and proximal 1st phalanx as described above.  Findings are concerning for foci of subacute myelitis   - patient is hemodynamically stable, podiatry consulted, unclear if ifection but no plans to do biopsy now cellulitis worsened it appears 2/17 PM so vanc/zosyn initiated. ID consulted 2/18 as severely tender extremity, concern with recent bacteremia on 2/1 with salmonella with scaling on exam for possible staph/strep also scalded skin type look..   -treating gout, minimal improvement in pain, less red on doxy/rocephin per ID recs now  -no sign of osteo on repeat MRi 2/22 in neck  -ID reports 6 weeks antibiotics planned to cover foot    Benign prostatic hyperplasia  Valencia placed 2/18 overnight for incontinence    GERD (gastroesophageal reflux disease)  - continue PPI    S/P coronary artery stent placement  - continue ASA, statin    Type 2 diabetes mellitus  - home meds: lantus 5 units daily  - SSI   - ACHS accuchecks  - diabetic diet  -A1 C 7's    Anemia due to chronic  kidney disease  -hg with downtick to 6 on 2/18 from 7 on admit with 9 baseline  -unclear cause if another reason besides chronic kidney disease  -fobt ordered  -transfuse 1 U on 2/18. adamently denies bleeding. Will add protonix for coverage in interim in case of any bleeding  -tx 1 U on 2/19, Hg still higher 6's, unclear soure, fobt pending, possible from neck inflammation with fluid collection of unclear source.  -improved 2/20 to 8's and stable further 2/21.  -denies nsaid use  downtick again 2/23 and 1 U given. Do not see signs of bleeding, still concern could be in fluid collection /inflammation from this as source. bM yesterday no blood or melena    Membranous glomerulonephritis  -will bring acthar gel from home to do non formulary as 40K and got via prior auth-non form request addded  -reason for CKD 5.      Hyperlipidemia  - continue statin    Renovascular hypertension  -elevated to 180s on admit but improved on current regimen to 130s systolic on exam 2/18  -cont hydralazine, labetalol, norvassc, clonidine.      VTE Risk Mitigation (From admission, onward)         Ordered     IP VTE HIGH RISK PATIENT  Once         02/17/22 0151     Place sequential compression device  Until discontinued         02/17/22 0151                Discharge Planning   JOAN: 3/1/2022     Code Status: Full Code   Is the patient medically ready for discharge?: No    Reason for patient still in hospital (select all that apply): Patient trending condition  Discharge Plan A: Home   Discharge Delays: None known at this time              Jose Alberto Saba MD  Department of Hospital Medicine   [facility] - [facility] Step[facility]

## 2022-02-24 NOTE — PROGRESS NOTES
Pt back to unit from HD. Vital signs stable. No signs or symptoms of acute distress noted. Bed alarm set. Sitter at bedside. Medications caught up from being off unit

## 2022-02-24 NOTE — ASSESSMENT & PLAN NOTE
57 y/o M with renal failure and recent salmonella bacteremia , transiently endorsed neck pain but is now denies pain, weakness or myelopathy and is intact on exam despite radiographic features concerning for cervical instability with possible associated infection .    -- CT and xray flex ex reviewed: some motion of C2 above C3, but not significant  -- No significant instability requiring neurosurgical intervention, recommend c-collar alone  -- No further imaging required at this time  -- Recommending ENT/IR for drainage of prevertebral collection for source control       Discussed with Dr. Sandhu.

## 2022-02-24 NOTE — PROGRESS NOTES
Pt transported off unit to MRI. Caroline attended with him. Pt given premeds ativan and morphine prior to leaving the unit. No acute distress noted

## 2022-02-24 NOTE — ASSESSMENT & PLAN NOTE
Non-contrast MRI had to be discontinued due to patient request.  But available images shows retropharyngeal fluid collection with abscess not excluded.  Deeply concerned that this could represent infection.  His recent bacteremia makes me concerned this could be salmonella.   He is immunocompromised due to a combination of uremia and diabetes so invasive salmonellosis is possible. Active problems of concern are his cervical neck prevertebral fluid collection, left lower extremity cellulitis/ brodies abscess, and recent salmonella bacteremia.     Plan  - Continue empiric ceftriaxone IV. Continue daptomycin q 48 hours. Will continue empiric coverage until more information with regards to imaging and cultures of c-spine infection are obtained. Pt to likely need 3-6 weeks of antibiotics for brodies abscess/osteomyelitis of left foot. Can tailor treatment if retropharyngeal collection is cultured and if repeat imaging demonstrates cervical osteomyelitis.  - MRI of C-Spine without changes with regards to retropharyngeal fluid collection.   - ENT and NSGY consultation, appreciate their assistance and will f/u recommendations.   - Pt going for Repeat MRI c-spine w/wo contrast today. Will f/u results.  - Recommend getting baseline CK level while on daptomycin.

## 2022-02-24 NOTE — SUBJECTIVE & OBJECTIVE
Interval History: No acute nsgy events. Flex ex done with some motion at C2 above C3, but not significant, no surgical intervention required at this time, OK to continue c-collar alone    Medications:  Continuous Infusions:  Scheduled Meds:   sodium chloride 0.9%   Intravenous Once    acetaminophen  1,000 mg Oral Q8H    amLODIPine  10 mg Oral Daily    ARIPiprazole  15 mg Oral QHS    aspirin  81 mg Oral Daily    atorvastatin  80 mg Oral Daily    cefTRIAXone (ROCEPHIN) IVPB  2 g Intravenous Q24H    cloNIDine  0.4 mg Oral TID    colchicine  0.3 mg Oral BID    DAPTOmycin (CUBICIN)  IV  6 mg/kg Intravenous Q48H    folic acid  1 mg Oral Daily    corticotropin  80 Units Subcutaneous Every Mon, Fri    hydrALAZINE  50 mg Oral Q8H    labetalol  200 mg Oral Q12H    nystatin  500,000 Units Oral QID (WM & HS)    pantoprazole  40 mg Intravenous BID    polyethylene glycol  17 g Oral Daily    senna-docusate 8.6-50 mg  1 tablet Oral Daily    sevelamer carbonate  1,600 mg Oral TID    sodium bicarbonate  650 mg Oral TID     PRN Meds:sodium chloride, sodium chloride, albuterol-ipratropium, aluminum-magnesium hydroxide-simethicone, dextrose 10%, dextrose 10%, glucagon (human recombinant), glucose, glucose, lorazepam, melatonin, methocarbamoL, morphine, morphine, naloxone, OLANZapine, ondansetron, oxyCODONE, oxyCODONE, prochlorperazine, simethicone, sodium chloride 0.9%, sodium chloride 0.9%     Review of Systems  Objective:     Weight: 72 kg (158 lb 11.7 oz)  Body mass index is 25.62 kg/m².  Vital Signs (Most Recent):  Temp: 98.5 °F (36.9 °C) (02/23/22 1930)  Pulse: 95 (02/23/22 1930)  Resp: 18 (02/23/22 1930)  BP: 131/76 (02/23/22 1930)  SpO2: 98 % (02/23/22 1930)   Vital Signs (24h Range):  Temp:  [96.7 °F (35.9 °C)-98.8 °F (37.1 °C)] 98.5 °F (36.9 °C)  Pulse:  [2-105] 95  Resp:  [16-19] 18  SpO2:  [94 %-99 %] 98 %  BP: (127-175)/() 131/76     Date 02/23/22 0700 - 02/24/22 0659   Shift 3359-3240 5426-7929 0314-9319 24 Hour Total    INTAKE   P.O. 300 120  420   Blood 1350   1350   Other 800   800   IV Piggyback  50  50   Shift Total(mL/kg) 2450(34) 170(2.4)  2620(36.4)   OUTPUT   Urine(mL/kg/hr) 100(0.2)   100   Other 800   800   Shift Total(mL/kg) 900(12.5)   900(12.5)   Weight (kg) 72 72 72 72                     Male External Urinary Catheter 02/18/22 0418 Small (Active)   Collection Container Urimeter 02/19/22 2330   Securement Method secured to top of thigh w/ adhesive device 02/19/22 2330   Skin no redness;no breakdown 02/19/22 2330   Tolerance no signs/symptoms of discomfort;did not assist with appliance change 02/19/22 2330   Output (mL) 450 mL 02/19/22 1524   Catheter Change Date 02/19/22 02/19/22 2330   Catheter Change Time 2100 02/19/22 2330       Physical Exam    Neurosurgery Physical Exam    5/5 strength   LROM left shoulder       full Range of motion in neck   No hoffmans or clonus     Significant Labs:  Recent Labs   Lab 02/21/22  2311 02/22/22  0631 02/23/22  0600    105 105   * 146* 144   K 4.3 4.4 5.0    104 100   CO2 20* 20* 18*   * 170* 159*   CREATININE 19.4* 19.6* 20.8*   CALCIUM 9.3 9.4 9.4   MG 2.0  --   --        Recent Labs   Lab 02/22/22  0631 02/23/22  0600   WBC 8.10 6.76   HGB 7.9* 6.8*   HCT 25.2* 21.9*    200       No results for input(s): LABPT, INR, APTT in the last 48 hours.  Microbiology Results (last 7 days)       Procedure Component Value Units Date/Time    Blood Culture #2 **CANNOT BE ORDERED STAT** [[id number]] Collected: 02/16/22 1806    Order Status: Completed Specimen: Blood from Peripheral, Antecubital, Left Updated: 02/21/22 2012     Blood Culture, Routine No growth after 5 days.    Blood Culture #1 **CANNOT BE ORDERED STAT** [[id number]] Collected: 02/16/22 1807    Order Status: Completed Specimen: Blood from Peripheral, Hand, Right Updated: 02/21/22 2012     Blood Culture, Routine No growth after 5 days.          All pertinent labs from the last 24 hours have been  reviewed.    Significant Diagnostics:  CT: No results found in the last 24 hours.  I have reviewed all pertinent imaging results/findings within the past 24 hours.  I have reviewed and interpreted all pertinent imaging results/findings within the past 24 hours.

## 2022-02-24 NOTE — ASSESSMENT & PLAN NOTE
-abnormal C spine Xray with possible signs of C spine instability, MRi ordered and patient refused 2/17, on pain meds, discussed AT length, see 2/17 and 2/18 note above, ordered with premediation now. Concern for possible infection also given acuity of pain  -signs of C spine instability, prevertebral abscess likely with fluid collection with widening in C spine, NS recs C collar- patient refused, and Xrays, refused, and MRi full spine when stable. Cannot do any intervention now as unstable bc of kidneys.   -ent consulted for retropharyngeal abscess per ns and id recs, will likely scope if amenable at bedside (he refused). They rec MRI C spien with contrast- but cannot get due to kidneys  -refused c collar xrays  -NS recs MRI all spine, ID/ENT rec repeat MRI C spine for better pictures. (cannot do contrast). He is likely nto going to sit for all the spine right now 2/21 per my exam and nursing. Placed repeat flex xrays and C spine MRi orders in now. They called him at 3 pm but anesthesia is coming at 330 pm to do the line he said he would now do this afternoon after a cigarette, so nursing to tell them to come back. IR consulted to sample the vertebral involvement, they report they wanted to review further imaging once in to see if they needed - will f/u thoughts but likely suspect wont need sampling of the spine as this appear more chroinc, retropharyngeal collection however likely needs further addressed. CT also pending as rec for ENT as well.   -ID following- on dapto and Ceftriaxone  -MRI/CT showing similar fidings to before, ENt reports need contrast to really see retropharyngeal area well, nephro approved contrast on 2/24 before HD and order placed .  MRI today with contrast show no fluid collection,not require intervention.

## 2022-02-24 NOTE — PROGRESS NOTES
[facility] - Transplant Stepdown  Neurosurgery  Progress Note    Subjective:     History of Present Illness:  Enrique Martinez is a 56 y.o. male with a PMHx of CKD V glomerulonephropathy, insulin dependent diabetes mellitus, and CAD who presented to the ED for evaluation of worsening left foot pain and swelling. He was recently admitted at [facility] from 2/2-2/4/2022 from LLE cellulitis. He was found to have salmonella bacteremia suspected due to diarrheal illness. He was discharged with cipro for which he reports compliance. He has had no improvement in his pain, redness, or swelling of his left foot and leg. He has also developed a new blister on his left foot. He endorses chills. He denies fevers, chest pain, SOB, N/V, decreased urine output, dysuria, and flank pain.      NSGY was consulted as the patient transiently endorsed neck pain.  X ray C spine with increased LIZZY. MRI C non contrast with prevertebral stir signal and edema, C2-3 listhesis   with picture overall concerning for cervical instability. PT denies weakness, pain or limitation of motion. He further denies symptoms associated with myelopathy including but not  limited to dropping things, difficulty buttoning shirts..etc       Post-Op Info:  Procedure(s) (LRB):  MPU PROCEDURE (N/A)         Interval History: No acute nsgy events.     Medications:  Continuous Infusions:  Scheduled Meds:   sodium chloride 0.9%   Intravenous Once    sodium chloride 0.9%   Intravenous Once    acetaminophen  1,000 mg Oral Q8H    amLODIPine  10 mg Oral Daily    ARIPiprazole  15 mg Oral QHS    aspirin  81 mg Oral Daily    atorvastatin  80 mg Oral Daily    cefTRIAXone (ROCEPHIN) IVPB  2 g Intravenous Q24H    cloNIDine  0.4 mg Oral TID    colchicine  0.3 mg Oral BID    DAPTOmycin (CUBICIN)  IV  6 mg/kg Intravenous Q48H    folic acid  1 mg Oral Daily    corticotropin  80 Units Subcutaneous Every Mon, Fri    hydrALAZINE  50 mg Oral Q8H    labetalol  200 mg Oral Q12H     mupirocin   Nasal BID    nystatin  500,000 Units Oral QID (WM & HS)    pantoprazole  40 mg Intravenous BID    polyethylene glycol  17 g Oral Daily    senna-docusate 8.6-50 mg  1 tablet Oral Daily    sevelamer carbonate  1,600 mg Oral TID    sodium bicarbonate  650 mg Oral TID     PRN Meds:sodium chloride, sodium chloride, sodium chloride 0.9%, albuterol-ipratropium, aluminum-magnesium hydroxide-simethicone, dextrose 10%, dextrose 10%, glucagon (human recombinant), glucose, glucose, lorazepam, melatonin, methocarbamoL, morphine, morphine, naloxone, OLANZapine, ondansetron, oxyCODONE, oxyCODONE, prochlorperazine, simethicone, sodium chloride 0.9%, sodium chloride 0.9%, sodium chloride 0.9%     Review of Systems  Objective:     Weight: 72 kg (158 lb 11.7 oz)  Body mass index is 25.62 kg/m².  Vital Signs (Most Recent):  Temp: 97.5 °F (36.4 °C) (02/24/22 0745)  Pulse: 91 (02/24/22 1115)  Resp: 16 (02/24/22 1005)  BP: (!) 146/72 (02/24/22 0745)  SpO2: 96 % (02/24/22 1115)   Vital Signs (24h Range):  Temp:  [96.7 °F (35.9 °C)-98.5 °F (36.9 °C)] 97.5 °F (36.4 °C)  Pulse:  [] 91  Resp:  [16-18] 16  SpO2:  [92 %-98 %] 96 %  BP: (122-173)/(66-95) 146/72     Date 02/24/22 0700 - 02/25/22 0659   Shift 5861-2085 3223-5967 6675-6306 24 Hour Total   INTAKE   Shift Total(mL/kg)       OUTPUT   Urine(mL/kg/hr) 100   100   Shift Total(mL/kg) 100(1.4)   100(1.4)   Weight (kg) 72 72 72 72                     Male External Urinary Catheter 02/18/22 7230 Small (Active)   Collection Container Urimeter 02/19/22 1510   Securement Method secured to top of thigh w/ adhesive device 02/19/22 2330   Skin no redness;no breakdown 02/19/22 2330   Tolerance no signs/symptoms of discomfort;did not assist with appliance change 02/19/22 2330   Output (mL) 450 mL 02/19/22 1524   Catheter Change Date 02/19/22 02/19/22 2330   Catheter Change Time 2100 02/19/22 2330       Physical Exam    Neurosurgery Physical Exam    5/5 strength   LROM left  shoulder       full Range of motion in neck   No hoffmans or clonus     Significant Labs:  Recent Labs   Lab 02/23/22  0600 02/24/22  0607    159*    143   K 5.0 3.9    102   CO2 18* 24   * 103*   CREATININE 20.8* 13.1*   CALCIUM 9.4 9.3       Recent Labs   Lab 02/23/22  0600 02/24/22  0607   WBC 6.76 8.38   HGB 6.8* 7.5*   HCT 21.9* 23.6*    185       No results for input(s): LABPT, INR, APTT in the last 48 hours.  Microbiology Results (last 7 days)       Procedure Component Value Units Date/Time    Blood Culture #2 **CANNOT BE ORDERED STAT** [[id number]] Collected: 02/16/22 1806    Order Status: Completed Specimen: Blood from Peripheral, Antecubital, Left Updated: 02/21/22 2012     Blood Culture, Routine No growth after 5 days.    Blood Culture #1 **CANNOT BE ORDERED STAT** [[id number]] Collected: 02/16/22 1807    Order Status: Completed Specimen: Blood from Peripheral, Hand, Right Updated: 02/21/22 2012     Blood Culture, Routine No growth after 5 days.          All pertinent labs from the last 24 hours have been reviewed.    Significant Diagnostics:  CT: No results found in the last 24 hours.  I have reviewed all pertinent imaging results/findings within the past 24 hours.  I have reviewed and interpreted all pertinent imaging results/findings within the past 24 hours.    Assessment/Plan:     * Neck pain  55 y/o M with renal failure and recent salmonella bacteremia , transiently endorsed neck pain but is now denies pain, weakness or myelopathy and is intact on exam despite radiographic features concerning for cervical instability with possible associated infection .    -- CT and xray flex ex reviewed: some motion of C2 above C3, but not significant  -- No significant instability requiring neurosurgical intervention, recommend c-collar alone  -- No further imaging required at this time  -- Recommending ENT/IR for drainage of prevertebral collection for source control       Discussed  with Dr. Sandhu.             Mandy Barnes MD  Neurosurgery  [facility] - Transplant Stepdown

## 2022-02-24 NOTE — PROGRESS NOTES
[id number]  Per MRI protocol, consent is needed for MRI with contrast in patient with ESR. Attempted home number of patients wife Atif which was not in service. Attempted cell number, and left message for [facility] to call the unit back.  Will continue to monitor.

## 2022-02-24 NOTE — ASSESSMENT & PLAN NOTE
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 Previously, he has declined HD in the past, never receiving a fistula. He is on the transplant wait list. Patient exhibits signs of uremia including fatigue, poor appetite, and mental slowing. On labs, he is hyperkalemic, uremic, AGMA, hyperphosphatemic. GFR is 2-3, which is reduced from previous GFR 13. Home meds include sodium bicarb, Renvela, and calcitriol. 2+ pitting edema on B/L lower extremities. Renal US from Jan 2022 shows sonographic evidence of chronic medical renal disease and bilateral simple and minimally complex renal cysts.  sCr one year ago was 4.5, now 18.6. UPCr ratio 1.11 (Dec 2020)  2/18: Patient increasingly somnolent, lethargic. Hospital med in process of getting in touch with Dr. Lee, who the patient follows with outpatient.   2/23: first session of HD 2 hours, no net fluid, patient tolerated. Still confused, having visual hallucinations of mice.   2/24: second HD session 150 min, 1 L removed, patient tolerated.    Plan:   - Will need placement of tunneled catheter prior to discharge, aim for Fri/Mon if infectious workup is negative.   - CM to set up HD chair for outpt HD. Patient has expressed that he is willing to do 2 sessions weekly as opposed to the standard 3 per week.   - HD on T/Th/Sa schedule  - Okay to increase protein in diet now that he is on HD to 1 g/kg per day  - Notified patient's outpatient nephrologists Anton Cardoza/Rosa that patient has initiated HD during hospitalization   - DC sodium bicarb now that acidosis is resolved with HD   - Hold calcitriol in setting of hyperphosphatemia. Will re-start when phos reaches normal level.            - Continue phos binder sevelamer 1600 TID  - Trend Cr  - Strict I&Os  - Avoid nephrotoxic agents  - Renally adjust medications

## 2022-02-24 NOTE — SUBJECTIVE & OBJECTIVE
"Interval History: NAOE, MRI with contrast completed today, no rim enhancing fluid collection to suggest abscess. HD tolerated today with 1 L fluid removal. Patient's tremor improved, mentation improved, but still seeing "butterflies" in vision. Wanting to to HD at home.     Review of patient's allergies indicates:  No Known Allergies  Current Facility-Administered Medications   Medication Frequency    0.9%  NaCl infusion (for blood administration) Q24H PRN    0.9%  NaCl infusion (for blood administration) Q24H PRN    0.9%  NaCl infusion Once    0.9%  NaCl infusion PRN    acetaminophen tablet 1,000 mg Q8H    albuterol-ipratropium 2.5 mg-0.5 mg/3 mL nebulizer solution 3 mL Q4H PRN    aluminum-magnesium hydroxide-simethicone 200-200-20 mg/5 mL suspension 30 mL QID PRN    amLODIPine tablet 10 mg Daily    ARIPiprazole tablet 15 mg QHS    aspirin EC tablet 81 mg Daily    atorvastatin tablet 80 mg Daily    cefTRIAXone (ROCEPHIN) 2 g/50 mL D5W IVPB Q24H    cloNIDine tablet 0.4 mg TID    colchicine split tablet 0.3 mg BID    DAPTOmycin (CUBICIN) 445 mg in sodium chloride 0.9% IVPB Q48H    dextrose 10% bolus 125 mL PRN    dextrose 10% bolus 250 mL PRN    folic acid tablet 1 mg Daily    glucagon (human recombinant) injection 1 mg PRN    glucose chewable tablet 16 g PRN    glucose chewable tablet 24 g PRN    HP Acthar (corticotropin) injectable gel 80 units Every Mon, Fri    hydrALAZINE tablet 50 mg Q8H    labetaloL tablet 200 mg Q12H    lorazepam injection 1 mg On Call Procedure    melatonin tablet 6 mg Nightly PRN    methocarbamoL tablet 750 mg QID PRN    morphine injection 2 mg Once PRN    morphine injection 2 mg On Call Procedure    mupirocin 2 % ointment BID    naloxone 0.4 mg/mL injection 0.02 mg PRN    nystatin 100,000 unit/mL suspension 500,000 Units QID (WM & HS)    OLANZapine injection 2.5 mg Once PRN    ondansetron disintegrating tablet 8 mg Q8H PRN    oxyCODONE immediate release " tablet 5 mg Q6H PRN    oxyCODONE immediate release tablet Tab 10 mg Q4H PRN    pantoprazole injection 40 mg BID    polyethylene glycol packet 17 g Daily    prochlorperazine injection Soln 5 mg Q6H PRN    senna-docusate 8.6-50 mg per tablet 1 tablet Daily    sevelamer carbonate tablet 1,600 mg TID    simethicone chewable tablet 80 mg QID PRN    sodium bicarbonate tablet 650 mg TID    sodium chloride 0.9% bolus 250 mL PRN    sodium chloride 0.9% bolus 250 mL PRN    sodium chloride 0.9% flush 10 mL Q8H PRN       Objective:     Vital Signs (Most Recent):  Temp: 97.7 °F (36.5 °C) (02/24/22 1200)  Pulse: 83 (02/24/22 1558)  Resp: 18 (02/24/22 1555)  BP: (!) 145/84 (02/24/22 1555)  SpO2: 98 % (02/24/22 1200)  O2 Device (Oxygen Therapy): room air (02/24/22 1300)   Vital Signs (24h Range):  Temp:  [97.5 °F (36.4 °C)-98.5 °F (36.9 °C)] 97.7 °F (36.5 °C)  Pulse:  [] 83  Resp:  [16-18] 18  SpO2:  [92 %-98 %] 98 %  BP: (122-158)/(66-91) 145/84     Weight: 72 kg (158 lb 11.7 oz) (02/23/22 0600)  Body mass index is 25.62 kg/m².  Body surface area is 1.83 meters squared.    I/O last 3 completed shifts:  In: 2920 [P.O.:720; Blood:1350; Other:800; IV Piggyback:50]  Out: 1200 [Urine:400; Other:800]    Physical Exam  Vitals and nursing note reviewed.   Constitutional:       Appearance: He is well-developed and overweight.   HENT:      Head: Normocephalic and atraumatic.      Nose: Nose normal.      Mouth/Throat:      Mouth: Mucous membranes are moist.   Eyes:      General: No scleral icterus.     Conjunctiva/sclera: Conjunctivae normal.   Cardiovascular:      Rate and Rhythm: Normal rate and regular rhythm.      Heart sounds: Normal heart sounds.   Pulmonary:      Effort: Pulmonary effort is normal.      Breath sounds: Normal breath sounds.   Musculoskeletal:         General: Swelling and signs of injury present. Normal range of motion.      Cervical back: Normal range of motion. No rigidity or tenderness.      Right  lower leg: No edema.      Left lower leg: No edema.      Comments: Improved LE swelling and erythema and tenderness   Feet:      Right foot:      Skin integrity: Skin integrity normal.      Left foot:      Skin integrity: Skin breakdown, erythema, warmth and dry skin present.   Skin:     General: Skin is warm and dry.      Capillary Refill: Capillary refill takes less than 2 seconds.      Comments: L leg, ankle, foot skin sloughing.   Deroofed blister on dorsum of left foot underneath dressing   Neurological:      Mental Status: He is alert, oriented to person, place, and time and easily aroused.      GCS: GCS eye subscore is 4. GCS verbal subscore is 5. GCS motor subscore is 6.      Motor: Tremor present.      Comments: Tremor improved   Psychiatric:         Attention and Perception: He perceives visual hallucinations.         Behavior: Behavior normal. Behavior is cooperative.         Thought Content: Thought content normal.         Cognition and Memory: Cognition is impaired. Memory is impaired.         Judgment: Judgment normal.       Significant Labs:  CBC:   Recent Labs   Lab 02/24/22  0607   WBC 8.38   RBC 2.66*   HGB 7.5*   HCT 23.6*      MCV 89   MCH 28.2   MCHC 31.8*     CMP:   Recent Labs   Lab 02/21/22  2311 02/22/22  0631 02/24/22  0607      < > 159*   CALCIUM 9.3   < > 9.3   ALBUMIN 2.1*  --   --    PROT 5.5*  --   --    *   < > 143   K 4.3   < > 3.9   CO2 20*   < > 24      < > 102   *   < > 103*   CREATININE 19.4*   < > 13.1*   ALKPHOS 83  --   --    ALT 13  --   --    AST 24  --   --    BILITOT 0.2  --   --     < > = values in this interval not displayed.     All labs within the past 24 hours have been reviewed.     Significant Imaging:  Labs: Reviewed  MRI: no rim enhancing fluid collection to suggest abscess

## 2022-02-24 NOTE — PT/OT/SLP PROGRESS
Occupational Therapy      Patient Name:  Enrique Martinez   MRN:  [id number]    Patient not seen today secondary to Dialysis, Testing/imaging (xray/CT/MRI). Pt off the floor for MRI during both attempts this am and gone for dialysis during 3rd attempt this pm. Will follow-up on 2/25/22.    2/24/2022

## 2022-02-24 NOTE — PROGRESS NOTES
Pt is back in room from MRI. Awake, alert, oriented. No signs or symptoms of acute distress. Bed alarm activated. Sitter at bedside.

## 2022-02-24 NOTE — SUBJECTIVE & OBJECTIVE
Interval history:   He is more cooperative,agree with HD and MRI.                Review of patient's allergies indicates:  No Known Allergies    No current facility-administered medications on file prior to encounter.     Current Outpatient Medications on File Prior to Encounter   Medication Sig    insulin (LANTUS SOLOSTAR U-100 INSULIN) glargine 100 units/mL (3mL) SubQ pen Inject 5 Units into the skin once daily.    albuterol (PROVENTIL/VENTOLIN HFA) 90 mcg/actuation inhaler Inhale 2 puffs into the lungs every 6 (six) hours as needed for Wheezing or Shortness of Breath. Rescue    amlodipine (NORVASC) 10 MG tablet Take 10 mg by mouth once daily.    ARIPiprazole (ABILIFY) 15 MG Tab Take 1 tablet (15 mg total) by mouth every evening.    aspirin (ECOTRIN) 81 MG EC tablet Take 1 tablet (81 mg total) by mouth once daily.    atorvastatin (LIPITOR) 80 MG tablet Take 1 tablet (80 mg total) by mouth once daily.    blood-glucose meter kit Use as instructed    calcitRIOL (ROCALTROL) 0.5 MCG Cap Take 0.5 mcg by mouth once daily.    chlorthalidone (HYGROTEN) 50 MG Tab Take 50 mg by mouth once daily.     cloNIDine (CATAPRES) 0.2 MG tablet Take 0.4 mg by mouth 3 (three) times daily.     colchicine (COLCRYS) 0.6 mg tablet Take 0.6 mg by mouth daily as needed.    ergocalciferol (ERGOCALCIFEROL) 50,000 unit Cap Take 50,000 Units by mouth every 30 days.     famotidine (PEPCID) 20 MG tablet Take 20 mg by mouth daily as needed for Heartburn.     ferrous gluconate (FERGON) 324 MG tablet Take 324 mg by mouth 2 (two) times a day.     fluticasone (FLONASE) 50 mcg/actuation nasal spray 1 spray by Each Nare route once daily.    furosemide (LASIX) 40 MG tablet Take 40 mg by mouth 2 (two) times daily.    methocarbamoL (ROBAXIN) 500 MG Tab Take 500 mg by mouth 2 (two) times a day.    metoprolol tartrate (LOPRESSOR) 100 MG tablet Take 100 mg by mouth.    omeprazole (PRILOSEC) 20 MG capsule Take 20 mg by mouth once daily.    RENVELA 800 mg Tab  Take 800 mg by mouth 3 (three) times daily.    sodium bicarbonate 650 MG tablet Take 650 mg by mouth.    tadalafiL (CIALIS) 2.5 mg Tab Take 2.5 mg by mouth.    TRUE METRIX GLUCOSE TEST STRIP Strp      Family History       Problem Relation (Age of Onset)    Drug abuse Brother    Heart attack Father, Brother    Hyperlipidemia Father    Hypertension Father, Brother    No Known Problems Sister    Stroke Father          Tobacco Use    Smoking status: Current Some Day Smoker     Packs/day: 0.25     Years: 10.00     Pack years: 2.50    Smokeless tobacco: Never Used   Substance and Sexual Activity    Alcohol use: No    Drug use: No    Sexual activity: Not on file     Review of Systems   Constitutional:  Positive for chills. Negative for activity change and fever.   HENT:  Negative for congestion and trouble swallowing.    Eyes:  Negative for photophobia and visual disturbance.   Respiratory:  Negative for chest tightness, shortness of breath and wheezing.    Cardiovascular:  Positive for leg swelling. Negative for chest pain and palpitations.   Gastrointestinal:  Negative for abdominal pain, constipation, nausea and vomiting.   Genitourinary:  Negative for dysuria, flank pain, frequency, hematuria and urgency.   Musculoskeletal:  Positive for arthralgias. Negative for back pain and gait problem.   Skin:  Positive for color change and wound. Negative for rash.   Neurological:  Negative for dizziness, syncope, weakness, light-headedness, numbness and headaches.   Psychiatric/Behavioral:  Negative for agitation and confusion. The patient is not nervous/anxious.    Objective:     Vital Signs (Most Recent):  Temp: 97.7 °F (36.5 °C) (02/24/22 1200)  Pulse: 91 (02/24/22 1200)  Resp: 16 (02/24/22 1005)  BP: (!) 158/87 (02/24/22 1200)  SpO2: 98 % (02/24/22 1200) Vital Signs (24h Range):  Temp:  [97.5 °F (36.4 °C)-98.5 °F (36.9 °C)] 97.7 °F (36.5 °C)  Pulse:  [86-98] 91  Resp:  [16-18] 16  SpO2:  [92 %-98 %] 98 %  BP:  (122-158)/(66-87) 158/87     Weight: 72 kg (158 lb 11.7 oz)  Body mass index is 25.62 kg/m².    Physical Exam  Vitals and nursing note reviewed.   Constitutional:       Appearance: He is well-developed. He is ill-appearing.      Comments: On HDCalmer. Asterixis with tremors at rest and with movement    HENT:      Head: Normocephalic and atraumatic.      Mouth/Throat:      Mouth: Mucous membranes are moist.   Eyes:      General: No scleral icterus.     Conjunctiva/sclera: Conjunctivae normal.   Cardiovascular:      Rate and Rhythm: Normal rate and regular rhythm.      Heart sounds: Normal heart sounds.   Pulmonary:      Effort: Pulmonary effort is normal. No respiratory distress.      Breath sounds: Normal breath sounds. No wheezing.   Abdominal:      General: Bowel sounds are normal. There is no distension.      Palpations: Abdomen is soft.      Tenderness: There is no abdominal tenderness.   Musculoskeletal:         General: No tenderness. Normal range of motion.      Cervical back: Normal range of motion and neck supple.      Right lower le+ Pitting Edema present.      Left lower le+ Pitting Edema present.      Comments: LLE with burst blister. Severe pain with any light touch.  Signs of bunion on both big toes but also possible gout tophi deposit on right big toe. Edema I RLE >LLE. pitting   Feet:      Left foot:      Skin integrity: Skin breakdown, erythema and warmth present.   Skin:     General: Skin is warm and dry.      Capillary Refill: Capillary refill takes less than 2 seconds.   Neurological:      Mental Status: He is alert and oriented to person, place, and time.      Cranial Nerves: No cranial nerve deficit.      Comments: Pain improved with movement to neck    Psychiatric:         Behavior: Behavior normal.         Thought Content: Thought content normal.         Judgment: Judgment normal.               Significant Labs:   All pertinent labs within the past 24 hours have been reviewed.  CBC:    Recent Labs   Lab 02/23/22  0600 02/24/22  0607   WBC 6.76 8.38   HGB 6.8* 7.5*   HCT 21.9* 23.6*    185       CMP:   Recent Labs   Lab 02/23/22  0600 02/24/22  0607    143   K 5.0 3.9    102   CO2 18* 24    159*   * 103*   CREATININE 20.8* 13.1*   CALCIUM 9.4 9.3   ANIONGAP 26* 17*   EGFRNONAA 2.1* 3.7*       Lactic Acid:   No results for input(s): LACTATE in the last 48 hours.      Significant Imaging: I have reviewed all pertinent imaging results/findings within the past 24 hours.   MRI Cervical Spine W WO Cont  Narrative: EXAMINATION:  MRI CERVICAL SPINE W WO CONTRAST    CLINICAL HISTORY:  assess retropharyngeal collection with contrast, nephro cleared to do this 2/24 AM to dialyze contrast off after;.    TECHNIQUE:  Multiplanar, multisequence MR images of the cervical spine were acquired without the administration of contrast.    COMPARISON:  MRI 02/22/2022.  Radiograph 02/22/2022.  CT 02/22/2022.    FINDINGS:  Examination is degraded by motion artifact.    Cervical spine alignment demonstrates reversal of the normal lordosis with 4 mm of anterolisthesis of C2 on C3 and suspected borderline widening of the atlantodens interval, findings that were better evaluated on previous CT.  Occipital condyles, odontoid process and C1 lateral masses are intact.  Vertebral body heights are well maintained without evidence for fracture.  No marrow signal abnormality to suggest an infiltrative process.    There is advanced multilevel degenerative disc space narrowing most pronounced at C4-C5 and C5-C6.  Endplate edema at C3-C4 and C4-C5, favored to be degenerative in nature.  No endplate erosive changes.    Cervical spinal cord demonstrates normal signal intensity.  Cerebellar tonsils are in their expected location.  Focal area of T2/STIR signal hyperintensity at the level of the delores, likely a remote lacunar infarct.  No pachymeningeal or leptomeningeal enhancement.    There is prevertebral  soft tissue enhancement with mild residual fluid.  No rim enhancing fluid collections to suggest abscess.  Vertebral artery flow voids are present.  Partially visualized right-sided central venous catheter within the right internal jugular vein.  No lymphadenopathy.  Visualized parotid, submandibular and thyroid glands are normal.    C2-C3: 4 mm of anterolisthesis of C2 on C3.  Mild left facet arthropathy.  Findings contribute to mild right neural foraminal narrowing.  No spinal canal stenosis.    C3-C4: Broad-based posterior disc osteophyte complex effaces the ventral thecal sac.  Bilateral uncovertebral joint spurring.  Findings contribute to mild spinal canal stenosis and moderate to severe bilateral neural foraminal narrowing.    C4-C5: Broad-based posterior disc osteophyte complex effaces the ventral thecal sac.  Bilateral uncovertebral joint spurring.  Findings contribute to moderate spinal canal stenosis and severe bilateral neural foraminal narrowing.    C5-C6: Broad-based posterior disc osteophyte complex partially effaces the ventral thecal sac.  Bilateral uncovertebral joint spurring.  Findings contribute to mild spinal canal stenosis and mild-to-moderate bilateral neural foraminal narrowing.    C6-C7: Broad-based posterior disc osteophyte complex partially effaces the ventral thecal sac.  Bilateral uncovertebral joint spurring.  Findings contribute to moderate bilateral neural foraminal narrowing.    C7-T1: Bilateral facet arthropathy and bilateral ligamentum flavum buckling..  Findings contribute to mild spinal canal stenosis and moderate to severe bilateral neural foraminal narrowing.  Impression: 1. Prevertebral soft tissue enhancement with near complete resolution of previously described fluid, likely sequela of a resolving infectious or inflammatory process.  No rim enhancing fluid collection to suggest abscess.  No convincing MR imaging findings to suggest discitis/osteomyelitis.  2. Advanced  multilevel cervical degenerative changes, difficult to accurately characterize secondary to patient motion artifact.    Electronically signed by: Lebron Carcamo MD  Date:    02/24/2022  Time:    12:18

## 2022-02-24 NOTE — CARE UPDATE
BRE left a message for Violeta with [facility] admissions for an update on pt's referral. Will continue to follow.     Michael Maya  Nephrology [facility]  Ext. [phone]

## 2022-02-24 NOTE — PROGRESS NOTES
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 Previously, he has declined HD in the past, never receiving a fistula. He is on the transplant wait list. Patient exhibits signs of uremia including fatigue, poor appetite, and mental slowing. On labs, he is hyperkalemic, uremic, AGMA, hyperphosphatemic. GFR is 2-3, which is reduced from previous GFR 13. Home meds include sodium bicarb, Renvela, and calcitriol. 2+ pitting edema on B/L lower extremities. Renal US from Jan 2022 shows sonographic evidence of chronic medical renal disease and bilateral simple and minimally complex renal cysts.  sCr one year ago was 4.5, now 18.6. UPCr ratio 1.11 (Dec 2020)  2/18: Patient increasingly somnolent, lethargic. Hospital med in process of getting in touch with Dr. Lee, who the patient follows with outpatient.   2/23: first session of HD 2 hours, no net fluid, patient tolerated. Still confused, having visual hallucinations of mice.   2/24: second HD session 150 min, 1 L removed, patient tolerated.    Plan:   - Will need placement of tunneled catheter prior to discharge, aim for Fri/Mon if infectious workup is negative.   - CM to set up HD chair for outpt HD. Patient has expressed that he is willing to do 2 sessions weekly as opposed to the standard 3 per week.   - HD on T/Th/Sa schedule  - Okay to increase protein in diet now that he is on HD to 1 g/kg per day  - Notified  patient's outpatient nephrologists Anton Cardoza/Rosa that patient has initiated HD during hospitalization   - DC sodium bicarb now that acidosis is resolved with HD  - Hold calcitriol in setting of hyperphosphatemia. Will re-start when phos reaches normal level.            - Continue phos binder sevelamer 1600 TID  - Trend Cr  - Strict I&Os  - Avoid nephrotoxic agents  - Renally adjust medications             Thank you for your consult. I will follow-up with patient. Please contact us if you have any additional questions.    Subjective:     HPI: Mr. Martinez is a 56 y.o. 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 He was recently admitted at [facility] from 2/2-2/4/2022 from LLE cellulitis. He was found to have salmonella bacteremia suspected due to diarrheal illness. He was discharged with cipro for which he reports compliance. He has had no improvement in his pain, redness, or swelling of his left foot and leg. He has also developed a new blister on his left foot, with MRI foot concerning for osteomyelitis and showing soft tissue swelling c/w cellulitis. He is admitted for treatment of suspected osteomyelitis.     He follows with Dr. Cardoza (nephrology), has adamantly declined HD in the past and would rather wait for transplant. He reports being on the transplant list for the past 4 years. His last renal biopsy in 2013 showed 50% glomerulosclerosis. Started on calcitriol 0.5, hx of elevated PTH at 921. Patient complains of decreased appetite, neck cramps, fatigue. He still urinates 4-5 times/day. He reports his B/L LE edema has worsened over the past few weeks. No dyspnea, chest pain, dysuria, hematuria. Nephro consulted for HD initiation.      Labs are  "significant for hyperkalemia 5.5, bicarb 18, , phosphate 10.4, Cr 19.7, GFR 2.2, ESR 97, CRP 33.4. . CBC significant for Hgb of 7.2, white count 14. Previous echo showed EF of 65%.       Interval History: NAOE, MRI with contrast completed today, no rim enhancing fluid collection to suggest abscess. HD tolerated today with 1 L fluid removal. Patient's tremor improved, mentation improved, but still seeing "butterflies" in vision. Wanting to to HD at home.     Review of patient's allergies indicates:  No Known Allergies  Current Facility-Administered Medications   Medication Frequency    0.9%  NaCl infusion (for blood administration) Q24H PRN    0.9%  NaCl infusion (for blood administration) Q24H PRN    0.9%  NaCl infusion Once    0.9%  NaCl infusion PRN    acetaminophen tablet 1,000 mg Q8H    albuterol-ipratropium 2.5 mg-0.5 mg/3 mL nebulizer solution 3 mL Q4H PRN    aluminum-magnesium hydroxide-simethicone 200-200-20 mg/5 mL suspension 30 mL QID PRN    amLODIPine tablet 10 mg Daily    ARIPiprazole tablet 15 mg QHS    aspirin EC tablet 81 mg Daily    atorvastatin tablet 80 mg Daily    cefTRIAXone (ROCEPHIN) 2 g/50 mL D5W IVPB Q24H    cloNIDine tablet 0.4 mg TID    colchicine split tablet 0.3 mg BID    DAPTOmycin (CUBICIN) 445 mg in sodium chloride 0.9% IVPB Q48H    dextrose 10% bolus 125 mL PRN    dextrose 10% bolus 250 mL PRN    folic acid tablet 1 mg Daily    glucagon (human recombinant) injection 1 mg PRN    glucose chewable tablet 16 g PRN    glucose chewable tablet 24 g PRN    HP Acthar (corticotropin) injectable gel 80 units Every Mon, Fri    hydrALAZINE tablet 50 mg Q8H    labetaloL tablet 200 mg Q12H    lorazepam injection 1 mg On Call Procedure    melatonin tablet 6 mg Nightly PRN    methocarbamoL tablet 750 mg QID PRN    morphine injection 2 mg Once PRN    morphine injection 2 mg On Call Procedure    mupirocin 2 % ointment BID    naloxone 0.4 mg/mL injection 0.02 " mg PRN    nystatin 100,000 unit/mL suspension 500,000 Units QID (WM & HS)    OLANZapine injection 2.5 mg Once PRN    ondansetron disintegrating tablet 8 mg Q8H PRN    oxyCODONE immediate release tablet 5 mg Q6H PRN    oxyCODONE immediate release tablet Tab 10 mg Q4H PRN    pantoprazole injection 40 mg BID    polyethylene glycol packet 17 g Daily    prochlorperazine injection Soln 5 mg Q6H PRN    senna-docusate 8.6-50 mg per tablet 1 tablet Daily    sevelamer carbonate tablet 1,600 mg TID    simethicone chewable tablet 80 mg QID PRN    sodium bicarbonate tablet 650 mg TID    sodium chloride 0.9% bolus 250 mL PRN    sodium chloride 0.9% bolus 250 mL PRN    sodium chloride 0.9% flush 10 mL Q8H PRN       Objective:     Vital Signs (Most Recent):  Temp: 97.7 °F (36.5 °C) (02/24/22 1200)  Pulse: 83 (02/24/22 1558)  Resp: 18 (02/24/22 1555)  BP: (!) 145/84 (02/24/22 1555)  SpO2: 98 % (02/24/22 1200)  O2 Device (Oxygen Therapy): room air (02/24/22 1300)   Vital Signs (24h Range):  Temp:  [97.5 °F (36.4 °C)-98.5 °F (36.9 °C)] 97.7 °F (36.5 °C)  Pulse:  [] 83  Resp:  [16-18] 18  SpO2:  [92 %-98 %] 98 %  BP: (122-158)/(66-91) 145/84     Weight: 72 kg (158 lb 11.7 oz) (02/23/22 0600)  Body mass index is 25.62 kg/m².  Body surface area is 1.83 meters squared.    I/O last 3 completed shifts:  In: 2920 [P.O.:720; Blood:1350; Other:800; IV Piggyback:50]  Out: 1200 [Urine:400; Other:800]    Physical Exam  Vitals and nursing note reviewed.   Constitutional:       Appearance: He is well-developed and overweight.   HENT:      Head: Normocephalic and atraumatic.      Nose: Nose normal.      Mouth/Throat:      Mouth: Mucous membranes are moist.   Eyes:      General: No scleral icterus.     Conjunctiva/sclera: Conjunctivae normal.   Cardiovascular:      Rate and Rhythm: Normal rate and regular rhythm.      Heart sounds: Normal heart sounds.   Pulmonary:      Effort: Pulmonary effort is normal.      Breath sounds:  Normal breath sounds.   Musculoskeletal:         General: Swelling and signs of injury present. Normal range of motion.      Cervical back: Normal range of motion. No rigidity or tenderness.      Right lower leg: No edema.      Left lower leg: No edema.      Comments: Improved LE swelling and erythema and tenderness   Feet:      Right foot:      Skin integrity: Skin integrity normal.      Left foot:      Skin integrity: Skin breakdown, erythema, warmth and dry skin present.   Skin:     General: Skin is warm and dry.      Capillary Refill: Capillary refill takes less than 2 seconds.      Comments: L leg, ankle, foot skin sloughing.   Deroofed blister on dorsum of left foot underneath dressing   Neurological:      Mental Status: He is alert, oriented to person, place, and time and easily aroused.      GCS: GCS eye subscore is 4. GCS verbal subscore is 5. GCS motor subscore is 6.      Motor: Tremor present.      Comments: Tremor improved   Psychiatric:         Attention and Perception: He perceives visual hallucinations.         Behavior: Behavior normal. Behavior is cooperative.         Thought Content: Thought content normal.         Cognition and Memory: Cognition is impaired. Memory is impaired.         Judgment: Judgment normal.       Significant Labs:  CBC:   Recent Labs   Lab 02/24/22  0607   WBC 8.38   RBC 2.66*   HGB 7.5*   HCT 23.6*      MCV 89   MCH 28.2   MCHC 31.8*     CMP:   Recent Labs   Lab 02/21/22  2311 02/22/22  0631 02/24/22  0607      < > 159*   CALCIUM 9.3   < > 9.3   ALBUMIN 2.1*  --   --    PROT 5.5*  --   --    *   < > 143   K 4.3   < > 3.9   CO2 20*   < > 24      < > 102   *   < > 103*   CREATININE 19.4*   < > 13.1*   ALKPHOS 83  --   --    ALT 13  --   --    AST 24  --   --    BILITOT 0.2  --   --     < > = values in this interval not displayed.     All labs within the past 24 hours have been reviewed.     Significant Imaging:  Labs: Reviewed  MRI: no rim  enhancing fluid collection to suggest abscess        Harrison Acuna MD  Nephrology  [facility] - Trans[facility] Step[facility]

## 2022-02-24 NOTE — SUBJECTIVE & OBJECTIVE
Interval History: No acute nsgy events.     Medications:  Continuous Infusions:  Scheduled Meds:   sodium chloride 0.9%   Intravenous Once    sodium chloride 0.9%   Intravenous Once    acetaminophen  1,000 mg Oral Q8H    amLODIPine  10 mg Oral Daily    ARIPiprazole  15 mg Oral QHS    aspirin  81 mg Oral Daily    atorvastatin  80 mg Oral Daily    cefTRIAXone (ROCEPHIN) IVPB  2 g Intravenous Q24H    cloNIDine  0.4 mg Oral TID    colchicine  0.3 mg Oral BID    DAPTOmycin (CUBICIN)  IV  6 mg/kg Intravenous Q48H    folic acid  1 mg Oral Daily    corticotropin  80 Units Subcutaneous Every Mon, Fri    hydrALAZINE  50 mg Oral Q8H    labetalol  200 mg Oral Q12H    mupirocin   Nasal BID    nystatin  500,000 Units Oral QID (WM & HS)    pantoprazole  40 mg Intravenous BID    polyethylene glycol  17 g Oral Daily    senna-docusate 8.6-50 mg  1 tablet Oral Daily    sevelamer carbonate  1,600 mg Oral TID    sodium bicarbonate  650 mg Oral TID     PRN Meds:sodium chloride, sodium chloride, sodium chloride 0.9%, albuterol-ipratropium, aluminum-magnesium hydroxide-simethicone, dextrose 10%, dextrose 10%, glucagon (human recombinant), glucose, glucose, lorazepam, melatonin, methocarbamoL, morphine, morphine, naloxone, OLANZapine, ondansetron, oxyCODONE, oxyCODONE, prochlorperazine, simethicone, sodium chloride 0.9%, sodium chloride 0.9%, sodium chloride 0.9%     Review of Systems  Objective:     Weight: 72 kg (158 lb 11.7 oz)  Body mass index is 25.62 kg/m².  Vital Signs (Most Recent):  Temp: 97.5 °F (36.4 °C) (02/24/22 0745)  Pulse: 91 (02/24/22 1115)  Resp: 16 (02/24/22 1005)  BP: (!) 146/72 (02/24/22 0745)  SpO2: 96 % (02/24/22 1115)   Vital Signs (24h Range):  Temp:  [96.7 °F (35.9 °C)-98.5 °F (36.9 °C)] 97.5 °F (36.4 °C)  Pulse:  [] 91  Resp:  [16-18] 16  SpO2:  [92 %-98 %] 96 %  BP: (122-173)/(66-95) 146/72     Date 02/24/22 0700 - 02/25/22 0659   Shift 2568-3219 2926-1352 6814-8645 24 Hour Total   INTAKE   Shift  Total(mL/kg)       OUTPUT   Urine(mL/kg/hr) 100   100   Shift Total(mL/kg) 100(1.4)   100(1.4)   Weight (kg) 72 72 72 72                     Male External Urinary Catheter 02/18/22 0418 Small (Active)   Collection Container Urimeter 02/19/22 2330   Securement Method secured to top of thigh w/ adhesive device 02/19/22 2330   Skin no redness;no breakdown 02/19/22 2330   Tolerance no signs/symptoms of discomfort;did not assist with appliance change 02/19/22 2330   Output (mL) 450 mL 02/19/22 1524   Catheter Change Date 02/19/22 02/19/22 2330   Catheter Change Time 2100 02/19/22 2330       Physical Exam    Neurosurgery Physical Exam    5/5 strength   LROM left shoulder       full Range of motion in neck   No hoffmans or clonus     Significant Labs:  Recent Labs   Lab 02/23/22  0600 02/24/22  0607    159*    143   K 5.0 3.9    102   CO2 18* 24   * 103*   CREATININE 20.8* 13.1*   CALCIUM 9.4 9.3       Recent Labs   Lab 02/23/22  0600 02/24/22  0607   WBC 6.76 8.38   HGB 6.8* 7.5*   HCT 21.9* 23.6*    185       No results for input(s): LABPT, INR, APTT in the last 48 hours.  Microbiology Results (last 7 days)       Procedure Component Value Units Date/Time    Blood Culture #2 **CANNOT BE ORDERED STAT** [[id number]] Collected: 02/16/22 1806    Order Status: Completed Specimen: Blood from Peripheral, Antecubital, Left Updated: 02/21/22 2012     Blood Culture, Routine No growth after 5 days.    Blood Culture #1 **CANNOT BE ORDERED STAT** [[id number]] Collected: 02/16/22 1807    Order Status: Completed Specimen: Blood from Peripheral, Hand, Right Updated: 02/21/22 2012     Blood Culture, Routine No growth after 5 days.          All pertinent labs from the last 24 hours have been reviewed.    Significant Diagnostics:  CT: No results found in the last 24 hours.  I have reviewed all pertinent imaging results/findings within the past 24 hours.  I have reviewed and interpreted all pertinent imaging  results/findings within the past 24 hours.

## 2022-02-24 NOTE — PLAN OF CARE
Pt able to answer orientation questions. Off at times but cooperative. Sitter at bedside. Bed alarm activated. Pt went for MRI w/ contrast of spine. Post MRI pt went to HD 1L removed. Pt was given premeds of ativan and morphine prior to MRI. Healing sacral ulcer noted. No family at bedside today.

## 2022-02-24 NOTE — SUBJECTIVE & OBJECTIVE
Interval History: NAEO. Pt has remained afebrile overnight. Pt reporting decrease in appetite but otherwise denying fever, chills, trouble swallowing or neck pain.     Review of Systems   Constitutional:  Negative for activity change, chills and fever.   HENT:  Negative for congestion, trouble swallowing and voice change.    Eyes:  Negative for photophobia and visual disturbance.   Respiratory:  Negative for chest tightness, shortness of breath and wheezing.    Cardiovascular:  Positive for leg swelling. Negative for chest pain and palpitations.   Gastrointestinal:  Negative for abdominal pain, constipation, nausea and vomiting.   Genitourinary:  Negative for dysuria, flank pain, frequency, hematuria and urgency.   Musculoskeletal:  Positive for arthralgias. Negative for back pain and gait problem.        Left foot pain   Skin:  Positive for wound. Negative for rash.   Neurological:  Negative for syncope, speech difficulty and headaches.   Psychiatric/Behavioral:  Negative for agitation and confusion. The patient is not nervous/anxious.    Objective:     Vital Signs (Most Recent):  Temp: 97.5 °F (36.4 °C) (02/24/22 0745)  Pulse: 95 (02/24/22 0814)  Resp: 16 (02/24/22 1005)  BP: (!) 146/72 (02/24/22 0745)  SpO2: 95 % (02/24/22 0745)   Vital Signs (24h Range):  Temp:  [96.7 °F (35.9 °C)-98.5 °F (36.9 °C)] 97.5 °F (36.4 °C)  Pulse:  [] 95  Resp:  [16-18] 16  SpO2:  [92 %-98 %] 95 %  BP: (122-173)/(66-95) 146/72     Weight: 72 kg (158 lb 11.7 oz)  Body mass index is 25.62 kg/m².    Estimated Creatinine Clearance: 5.5 mL/min (A) (based on SCr of 13.1 mg/dL (H)).    Physical Exam  Vitals and nursing note reviewed.   Constitutional:       General: He is not in acute distress.     Appearance: He is well-developed and overweight. He is ill-appearing.   HENT:      Head: Normocephalic and atraumatic.      Nose: Nose normal.      Mouth/Throat:      Mouth: Mucous membranes are moist.   Eyes:      General: No scleral  icterus.     Conjunctiva/sclera: Conjunctivae normal.   Neck:      Comments: Right IJ trialysis line in place.   Cardiovascular:      Rate and Rhythm: Normal rate and regular rhythm.      Heart sounds: Normal heart sounds. No murmur heard.    No friction rub. No gallop.   Pulmonary:      Effort: Pulmonary effort is normal. No respiratory distress.      Breath sounds: Normal breath sounds. No wheezing or rales.   Abdominal:      General: Bowel sounds are normal. There is no distension.      Palpations: Abdomen is soft.      Tenderness: There is no abdominal tenderness. There is no guarding.   Musculoskeletal:         General: Swelling and signs of injury present. Normal range of motion.      Cervical back: Normal range of motion and neck supple.      Right lower leg: No edema.      Left lower leg: No edema.   Feet:      Right foot:      Skin integrity: Skin integrity normal.      Left foot:      Skin integrity: Skin breakdown, erythema, warmth and dry skin present.   Skin:     General: Skin is warm and dry.      Capillary Refill: Capillary refill takes less than 2 seconds.      Comments: L leg, ankle, foot skin sloughing.   Deroofed blister on dorsum of left foot   Neurological:      General: No focal deficit present.      Mental Status: He is alert and easily aroused.      GCS: GCS eye subscore is 4. GCS verbal subscore is 5. GCS motor subscore is 6.      Cranial Nerves: No cranial nerve deficit.      Sensory: No sensory deficit.      Motor: Tremor present.   Psychiatric:         Behavior: Behavior normal. Behavior is cooperative.         Thought Content: Thought content normal.         Judgment: Judgment normal.       Significant Labs: All pertinent labs within the past 24 hours have been reviewed.    Significant Imaging: I have reviewed all pertinent imaging results/findings within the past 24 hours.

## 2022-02-24 NOTE — PROGRESS NOTES
Patient arrived in NAD, two identifiers used, allergies reviewed, WAR notified of portable monitoring removed for scan, Patient placed on MRI compatible telemetry monitoring system, no S/S of discomfort, will continue to monitor throughout scan, WAR room will be notified once patient is placed back on portable telemetry.     NIGHT HOSPITALIST:  Patient UNKNOWN to me previously, assigned to me at this point via the ER to admit this 56 y/o M--followed by his hepatologist above at [facility], but with the most recent discharge from [facility] in Nov 2020--patient with a history of morbid obesity, hepatic cirrhosis with past ethanol use (last reported intake 3 years ago), ascites, thrombocytopaenia, undifferentiated CHF, past undifferentiated anaemia presumably for GI tract with admission at the time for confusion and suspected SBP, seen by consultants from GI, hematology, and nephrology, with the patient undergoing an EGD demonstrating gastritis, hematology evaluated the patient for pancytopaenia at the time, and nephrology for acute kidney injury, with patient now self refers following apparently 3 months of increased abdominal girth, worsening in the past month, along with exertional dyspnoea.   Patient sitting bolt upright in the gurney.

## 2022-02-24 NOTE — PROGRESS NOTES
02/24/22 1555   Post-Hemodialysis Assessment   Rinseback Volume (mL) 350 mL   Blood Volume Processed (Liters) 36.2 L   Dialyzer Clearance Lightly streaked   Duration of Treatment (minutes) 150 minutes   Hemodialysis Intake (mL) 250 mL   Total UF (mL) 1600 mL   Net Fluid Removal 1000   Patient Response to Treatment tolerated well   Post-Hemodialysis Comments see note     HD complete. Pt tolerated well. Net removal 1000 ml. Pt awake, alert, calm. Report given to primary nurse. Awaiting transport to escort pt to room.

## 2022-02-25 PROBLEM — M86.9 OSTEOMYELITIS OF LEFT FOOT: Status: ACTIVE | Noted: 2022-02-17

## 2022-02-25 LAB
ANION GAP SERPL CALC-SCNC: 12 MMOL/L (ref 8–16)
BASOPHILS # BLD AUTO: 0.02 K/UL (ref 0–0.2)
BASOPHILS NFR BLD: 0.2 % (ref 0–1.9)
BUN SERPL-MCNC: 56 MG/DL (ref 6–20)
CALCIUM SERPL-MCNC: 8.9 MG/DL (ref 8.7–10.5)
CHLORIDE SERPL-SCNC: 105 MMOL/L (ref 95–110)
CO2 SERPL-SCNC: 26 MMOL/L (ref 23–29)
CREAT SERPL-MCNC: 8.5 MG/DL (ref 0.5–1.4)
DIFFERENTIAL METHOD: ABNORMAL
EOSINOPHIL # BLD AUTO: 0 K/UL (ref 0–0.5)
EOSINOPHIL NFR BLD: 0.5 % (ref 0–8)
ERYTHROCYTE [DISTWIDTH] IN BLOOD BY AUTOMATED COUNT: 15.9 % (ref 11.5–14.5)
EST. GFR  (AFRICAN AMERICAN): 7.2 ML/MIN/1.73 M^2
EST. GFR  (NON AFRICAN AMERICAN): 6.2 ML/MIN/1.73 M^2
GLUCOSE SERPL-MCNC: 116 MG/DL (ref 70–110)
HCT VFR BLD AUTO: 23.7 % (ref 40–54)
HGB BLD-MCNC: 7.3 G/DL (ref 14–18)
IMM GRANULOCYTES # BLD AUTO: 0.05 K/UL (ref 0–0.04)
IMM GRANULOCYTES NFR BLD AUTO: 0.6 % (ref 0–0.5)
LYMPHOCYTES # BLD AUTO: 1 K/UL (ref 1–4.8)
LYMPHOCYTES NFR BLD: 11.4 % (ref 18–48)
MCH RBC QN AUTO: 28.2 PG (ref 27–31)
MCHC RBC AUTO-ENTMCNC: 30.8 G/DL (ref 32–36)
MCV RBC AUTO: 92 FL (ref 82–98)
MONOCYTES # BLD AUTO: 1.1 K/UL (ref 0.3–1)
MONOCYTES NFR BLD: 13.1 % (ref 4–15)
NEUTROPHILS # BLD AUTO: 6.2 K/UL (ref 1.8–7.7)
NEUTROPHILS NFR BLD: 74.2 % (ref 38–73)
NRBC BLD-RTO: 0 /100 WBC
PHOSPHATE SERPL-MCNC: 4.3 MG/DL (ref 2.7–4.5)
PLATELET # BLD AUTO: 165 K/UL (ref 150–450)
PMV BLD AUTO: 10.2 FL (ref 9.2–12.9)
POTASSIUM SERPL-SCNC: 3.9 MMOL/L (ref 3.5–5.1)
RBC # BLD AUTO: 2.59 M/UL (ref 4.6–6.2)
SODIUM SERPL-SCNC: 143 MMOL/L (ref 136–145)
WBC # BLD AUTO: 8.37 K/UL (ref 3.9–12.7)

## 2022-02-25 PROCEDURE — 99233 PR SUBSEQUENT HOSPITAL CARE,LEVL III: ICD-10-PCS | Mod: NTX,,, | Performed by: INTERNAL MEDICINE

## 2022-02-25 PROCEDURE — 97162 PT EVAL MOD COMPLEX 30 MIN: CPT | Mod: NTX

## 2022-02-25 PROCEDURE — 20600001 HC STEP DOWN PRIVATE ROOM: Mod: NTX

## 2022-02-25 PROCEDURE — 99232 PR SUBSEQUENT HOSPITAL CARE,LEVL II: ICD-10-PCS | Mod: NTX,,, | Performed by: HOSPITALIST

## 2022-02-25 PROCEDURE — 25000003 PHARM REV CODE 250: Mod: NTX | Performed by: HOSPITALIST

## 2022-02-25 PROCEDURE — C1751 CATH, INF, PER/CENT/MIDLINE: HCPCS | Mod: NTX

## 2022-02-25 PROCEDURE — 25000003 PHARM REV CODE 250: Mod: NTX | Performed by: INTERNAL MEDICINE

## 2022-02-25 PROCEDURE — 97165 OT EVAL LOW COMPLEX 30 MIN: CPT | Mod: NTX

## 2022-02-25 PROCEDURE — 63600175 PHARM REV CODE 636 W HCPCS: Mod: NTX | Performed by: HOSPITALIST

## 2022-02-25 PROCEDURE — 97530 THERAPEUTIC ACTIVITIES: CPT | Mod: NTX

## 2022-02-25 PROCEDURE — 99232 SBSQ HOSP IP/OBS MODERATE 35: CPT | Mod: NTX,,, | Performed by: HOSPITALIST

## 2022-02-25 PROCEDURE — 25000003 PHARM REV CODE 250: Mod: NTX | Performed by: PHYSICIAN ASSISTANT

## 2022-02-25 PROCEDURE — 85025 COMPLETE CBC W/AUTO DIFF WBC: CPT | Mod: NTX | Performed by: HOSPITALIST

## 2022-02-25 PROCEDURE — 97535 SELF CARE MNGMENT TRAINING: CPT | Mod: NTX

## 2022-02-25 PROCEDURE — 84100 ASSAY OF PHOSPHORUS: CPT | Mod: NTX | Performed by: HOSPITALIST

## 2022-02-25 PROCEDURE — C9113 INJ PANTOPRAZOLE SODIUM, VIA: HCPCS | Mod: NTX | Performed by: HOSPITALIST

## 2022-02-25 PROCEDURE — 63600175 PHARM REV CODE 636 W HCPCS: Mod: NTX | Performed by: INTERNAL MEDICINE

## 2022-02-25 PROCEDURE — 99233 SBSQ HOSP IP/OBS HIGH 50: CPT | Mod: NTX,,, | Performed by: INTERNAL MEDICINE

## 2022-02-25 PROCEDURE — 80048 BASIC METABOLIC PNL TOTAL CA: CPT | Mod: NTX | Performed by: HOSPITALIST

## 2022-02-25 PROCEDURE — 25000003 PHARM REV CODE 250: Mod: NTX

## 2022-02-25 RX ORDER — HEPARIN SODIUM 5000 [USP'U]/ML
5000 INJECTION, SOLUTION INTRAVENOUS; SUBCUTANEOUS EVERY 8 HOURS
Status: DISCONTINUED | OUTPATIENT
Start: 2022-02-25 | End: 2022-03-10 | Stop reason: HOSPADM

## 2022-02-25 RX ORDER — SEVELAMER CARBONATE 800 MG/1
800 TABLET, FILM COATED ORAL 3 TIMES DAILY
Status: DISCONTINUED | OUTPATIENT
Start: 2022-02-25 | End: 2022-02-27

## 2022-02-25 RX ORDER — SODIUM CHLORIDE 9 MG/ML
INJECTION, SOLUTION INTRAVENOUS ONCE
Status: COMPLETED | OUTPATIENT
Start: 2022-02-25 | End: 2022-02-26

## 2022-02-25 RX ORDER — SODIUM CHLORIDE 9 MG/ML
INJECTION, SOLUTION INTRAVENOUS
Status: DISCONTINUED | OUTPATIENT
Start: 2022-02-25 | End: 2022-03-01

## 2022-02-25 RX ORDER — ACETAMINOPHEN 325 MG/1
650 TABLET ORAL EVERY 6 HOURS PRN
Status: DISCONTINUED | OUTPATIENT
Start: 2022-02-25 | End: 2022-03-10 | Stop reason: HOSPADM

## 2022-02-25 RX ADMIN — OXYCODONE HYDROCHLORIDE 10 MG: 10 TABLET ORAL at 04:02

## 2022-02-25 RX ADMIN — LABETALOL HYDROCHLORIDE 200 MG: 100 TABLET, FILM COATED ORAL at 09:02

## 2022-02-25 RX ADMIN — HEPARIN SODIUM 5000 UNITS: 5000 INJECTION INTRAVENOUS; SUBCUTANEOUS at 09:02

## 2022-02-25 RX ADMIN — OXYCODONE HYDROCHLORIDE 10 MG: 10 TABLET ORAL at 09:02

## 2022-02-25 RX ADMIN — HYDRALAZINE HYDROCHLORIDE 50 MG: 50 TABLET ORAL at 09:02

## 2022-02-25 RX ADMIN — SEVELAMER CARBONATE 800 MG: 800 TABLET, FILM COATED ORAL at 09:02

## 2022-02-25 RX ADMIN — ACETAMINOPHEN 1000 MG: 500 TABLET ORAL at 05:02

## 2022-02-25 RX ADMIN — SODIUM BICARBONATE 650 MG TABLET 650 MG: at 09:02

## 2022-02-25 RX ADMIN — NYSTATIN 500000 UNITS: 500000 SUSPENSION ORAL at 09:02

## 2022-02-25 RX ADMIN — PANTOPRAZOLE SODIUM 40 MG: 40 INJECTION, POWDER, FOR SOLUTION INTRAVENOUS at 09:02

## 2022-02-25 RX ADMIN — ARIPIPRAZOLE 15 MG: 5 TABLET ORAL at 09:02

## 2022-02-25 RX ADMIN — SEVELAMER CARBONATE 800 MG: 800 TABLET, FILM COATED ORAL at 03:02

## 2022-02-25 RX ADMIN — COLCHICINE 0.3 MG: 0.6 TABLET, FILM COATED ORAL at 09:02

## 2022-02-25 RX ADMIN — AMLODIPINE BESYLATE 10 MG: 10 TABLET ORAL at 09:02

## 2022-02-25 RX ADMIN — MUPIROCIN: 20 OINTMENT TOPICAL at 09:02

## 2022-02-25 RX ADMIN — DAPTOMYCIN 445 MG: 350 INJECTION, POWDER, LYOPHILIZED, FOR SOLUTION INTRAVENOUS at 09:02

## 2022-02-25 RX ADMIN — SEVELAMER CARBONATE 1600 MG: 800 TABLET, FILM COATED ORAL at 09:02

## 2022-02-25 RX ADMIN — ACETAMINOPHEN 1000 MG: 500 TABLET ORAL at 09:02

## 2022-02-25 RX ADMIN — ASPIRIN 81 MG: 81 TABLET, COATED ORAL at 09:02

## 2022-02-25 RX ADMIN — ATORVASTATIN CALCIUM 80 MG: 20 TABLET, FILM COATED ORAL at 09:02

## 2022-02-25 RX ADMIN — ACETAMINOPHEN 650 MG: 325 TABLET ORAL at 11:02

## 2022-02-25 RX ADMIN — CEFTRIAXONE SODIUM 2 G: 2 INJECTION, SOLUTION INTRAVENOUS at 04:02

## 2022-02-25 RX ADMIN — FOLIC ACID 1 MG: 1 TABLET ORAL at 09:02

## 2022-02-25 NOTE — ASSESSMENT & PLAN NOTE
- 2/4 SIRS with HR>90, WBC 16  - lactic WNL  - ESR 97, CRP 33.4, procal 1.  - MRI w/ regions of focal abnormal signal involving the distal 1st metatarsal and proximal 1st phalanx as described above.  Findings are concerning for foci of subacute myelitis   - patient is hemodynamically stable, podiatry consulted, unclear if ifection but no plans to do biopsy now cellulitis worsened it appears 2/17 PM so vanc/zosyn initiated. ID consulted 2/18 as severely tender extremity, concern with recent bacteremia on 2/1 with salmonella with scaling on exam for possible staph/strep also scalded skin type look..   -treating gout, minimal improvement in pain, less red on doxy/rocephin per ID recs now  -no sign of osteo on repeat MRi 2/22 in neck  -ID reports 6 weeks antibiotics planned to cover foot.  MRI neck with contrast show no sign of fluid collection,no need for intervention,    Will do MRI left foot to see for osteo and duration of Abx per ID.  On daptomycin and rocephin at this time,check weekly CPK level.

## 2022-02-25 NOTE — ASSESSMENT & PLAN NOTE
Hyperkalemia  Hyperphosphatemia  Metabolic acidosis  Kidney transplant candidate    - 2/2 membranous glomerulopathy  - GFR was 13-16 up from baseline 4-6 previously in January 2022. Labs repeated today and GFR is 2.6, Cr 19.   - patient follows with Dr. Cardoza and Dr Lee and dialysis has been discussed multiple times, patient continues to refuse.  - on transplant list with [facility]  -cont phos binders, Na bicarb. [place] for adequate I/O. Lasix held 2/17 per renal recs  -uric acid trending  -at severe risk of adverse events, BUN almost 200, asterixis on exam. Meets multiple criteria but refusing HD. Has capacity. Wife aware and states is his decision  Continues to refuse on 2/19 after extensive discussions with all parties.   -2/20- may consent tomorrow per wife, if so anesthesia for IJ. No permacath now as active infection concerns in neck. If not, palliative consult. Wife will agree to HD for him if he says yes despite no capacity per psych. Wife will not do it against his will as poa  Agrees to HD on 2/21. Attempted line on 2/21 as he agreed but he accidentally pulled it.  reattempt pending 2/22. High risk to accidentally pull out so will need close monitoring while uremic and has sitter  -successful 2/22 line, HD 2/23 started. Plan for HD tomorrow after MRI imaging. Possible tunneled cath fri/monday per renal. He will agree to bi weekly HD per renal notes at home, need tos tart on OP chair  SW arranging out patient HD

## 2022-02-25 NOTE — PLAN OF CARE
Pt awake alert and oriented today. Still off a little. Kept getting out of bed or chair by himself. Set bed alarm and chair alarm for him. Vital signs stable. Transferred from chair to bed and back with one assist and tolerated well. Changed dressing to the wound on his left foot and had scant slough noted to the wound.  MRI of the neck showed fluid collection was resolving. Scheduled for MRI with contrast of the foot tomorrow before HD. NPO starting at midnight for perma cath placement tomorrow with IR. Consulted wound care currently no orders in for left foot.

## 2022-02-25 NOTE — PT/OT/SLP EVAL
"Occupational Therapy   Co-Evaluation and Treatment    Name: Enrique Martinez  MRN: [id number]  Admitting Diagnosis:  Neck pain    Recommendations:     Discharge Recommendations: rehabilitation facility  Discharge Equipment Recommendations: walker, rolling, shower chair  Barriers to discharge:  Inaccessible home environment    Assessment:     Enrique Martinez is a 56 y.o. male with a medical diagnosis of Neck pain. Neurosurgery evaluated patient and recommend c-collar.  He presents with the following performance deficits affecting function: weakness, impaired endurance, impaired self care skills, impaired functional mobilty, gait instability, impaired balance, decreased safety awareness, orthopedic precautions, decreased upper extremity function, decreased lower extremity function, decreased coordination. Patient agreeable to therapy evaluation and OOB activity. Patient presents with unsteady gait and L shoulder weakness. At this time, patient will continue to benefit from acute skilled therapy intervention to address deficits/underlying impairments and progress towards prior level of function. After discharge, patient will benefit from continuing therapy at rehab facility to increase independence in ADLs and functional mobility through skilled balance training and skilled therapeutic exercise to promote safety at home.    Rehab Prognosis: Good; patient would benefit from acute skilled OT services to address these deficits and reach maximum level of function.       Plan:     Patient to be seen 3 x/week to address the above listed problems via self-care/home management, therapeutic activities, therapeutic exercises, neuromuscular re-education  · Plan of Care Expires: 03/27/22  · Plan of Care Reviewed with: patient    Subjective     Patient stated "if it wasn't for this abrasion I'd be ok".    Occupational Profile:  Living Environment: Patient lives in an apartment on 2nd floor with no elevator.   Previous level of " "function: Independent  Roles and Routines: Drives; works- transporter  Equipment Used at Home:  none  Assistance upon Discharge: Patient stated he and his wife are "going through something" right now so he is living alone but she can help if needed    Pain/Comfort:  · Pain Rating 1: 0/10    Patients cultural, spiritual, Taoism conflicts given the current situation: no    Objective:     Communicated with: RN prior to session.  Patient found HOB elevated with telemetry upon OT entry to room.    Additional staff present: PT present for co-eval/tx as appropriate for patient 2* medical complexities, decreased activity tolerance for 2 sessions, and level of skilled assist needed for task completion.    General Precautions: Standard, fall   Orthopedic Precautions:spinal precautions   Braces: Aspen collar  Respiratory Status: Room air    Occupational Performance:    Bed Mobility:    · Patient completed Supine to Sit with stand by assistance    Functional Mobility/Transfers:  · Patient completed Sit <> Stand Transfer with contact guard assistance with no assistive device   · Patient completed Bed > Chair Transfer using Step Transfer technique with contact guard assistance with hand-held assist  · Functional Mobility: Patient ambulated within room with min assist with hand-held assist    Balance:   Sitting: supervision-SBA   Standing: CGA-min assist    Activities of Daily Living:  · Grooming: CGA-min assist for balance standing at sink  · Lower Body Dressing: SBA donning socks sitting EOB  · Toileting: CGA-min assist for balance standing at toilet to urinate    Cognitive/Visual Perceptual:  Cognitive/Psychosocial Skills:    -       Oriented to: Person, Place, Time and Situation   -       Follows Commands/attention: Follows multistep commands  -       Communication: clear/fluent  -       Memory: No Deficits noted  -       Safety awareness/insight to disability: intact   -       Mood/Affect/Coping skills/emotional " control: Appropriate to situation, Cooperative and Pleasant    Physical Exam:  Sensation:    -       Intact  Dominant hand:    -       Right  Upper Extremity Range of Motion:    -       Right Upper Extremity: WNL  -       Left Upper Extremity: limited AROM of shoulder; shoulder PROM WFL; elbow, wrist, and digits WNL    AMPAC 6 Click ADL:  AMPAC Total Score: 20    Treatment & Education:   Therapist provided facilitation and instruction of proper body mechanics and fall prevention strategies during tasks listed above.   Instructed patient to sit in bedside chair daily to increase OOB/activity tolerance.   Instructed patient to use call light to have nursing staff assist with needs/transfers.   Discussed OT POC and answered all questions within OT scope of practice.   Whiteboard updated   Education:    Patient left up in chair with all lines intact, call button in reach and RN notified    GOALS:   Multidisciplinary Problems     Occupational Therapy Goals        Problem: Occupational Therapy Goal    Goal Priority Disciplines Outcome Interventions   Occupational Therapy Goal     OT, PT/OT Ongoing, Progressing    Description: Goals to be met by: 3/11/2022     Patient will increase functional independence with ADLs by performing:    LE Dressing with Supervision.  Grooming while standing at sink with Supervision.  Toileting from toilet with Supervision for hygiene and clothing management.   Step transfer with Supervision  Toilet transfer to toilet with Supervision.                     History:     Past Medical History:   Diagnosis Date    Anemia     Coronary artery disease     Diabetes mellitus, type 2     GERD (gastroesophageal reflux disease)     Gout     Hydrocele in adult     bilateral    Hyperlipidemia     Hypertension     Inguinal hernia     bilateral    Membranous glomerulonephritis     Nephrotic range proteinuria     Nephrotic syndrome     Obesity     Umbilical hernia        Past Surgical History:    Procedure Laterality Date    ABDOMINAL SURGERY  1980s    CARDIAC CATHETERIZATION  2007    x 2    COLONOSCOPY N/A 4/5/2019    Procedure: COLONOSCOPY;  Surgeon: Jose L Carty MD;  Location: [facility] ([address] [place] [address]);  Service: Colon and Rectal;  Laterality: N/A;    CORONARY STENT PLACEMENT  2007       Time Tracking:     OT Date of Treatment: 02/25/22  OT Start Time: 0845  OT Stop Time: 0902  OT Total Time (min): 17 min    Billable Minutes:Evaluation 8  Self Care/Home Management 8    2/25/2022

## 2022-02-25 NOTE — PLAN OF CARE
Problem: Physical Therapy Goal  Goal: Physical Therapy Goal  Description: Goals to be met by: 3/11/2022     Patient will increase functional independence with mobility by performin. Supine to sit with [place]  2. Sit to supine with [place]  3. Sit to stand transfer with [place]  4. Gait  x 200 feet with Supervision using No Assistive Device.   5. Ascend/descend 9 stairs with right or left Handrails Stand-by Assistance using No Assistive Device.     Outcome: Ongoing, Progressing

## 2022-02-25 NOTE — ASSESSMENT & PLAN NOTE
-abnormal C spine Xray with possible signs of C spine instability, MRi ordered and patient refused 2/17, on pain meds, discussed AT length, see 2/17 and 2/18 note above, ordered with premediation now. Concern for possible infection also given acuity of pain  -signs of C spine instability, prevertebral abscess likely with fluid collection with widening in C spine, NS recs C collar- patient refused, and Xrays, refused, and MRi full spine when stable. Cannot do any intervention now as unstable bc of kidneys.   -ent consulted for retropharyngeal abscess per ns and id recs, will likely scope if amenable at bedside (he refused). They rec MRI C spien with contrast- but cannot get due to kidneys  -refused c collar xrays  -NS recs MRI all spine, ID/ENT rec repeat MRI C spine for better pictures. (cannot do contrast). He is likely nto going to sit for all the spine right now 2/21 per my exam and nursing. Placed repeat flex xrays and C spine MRi orders in now. They called him at 3 pm but anesthesia is coming at 330 pm to do the line he said he would now do this afternoon after a cigarette, so nursing to tell them to come back. IR consulted to sample the vertebral involvement, they report they wanted to review further imaging once in to see if they needed - will f/u thoughts but likely suspect wont need sampling of the spine as this appear more chroinc, retropharyngeal collection however likely needs further addressed. CT also pending as rec for ENT as well.   -ID following- on dapto and Ceftriaxone  -MRI/CT showing similar fidings to before, ENt reports need contrast to really see retropharyngeal area well, nephro approved contrast on 2/24 before HD and order placed .  MRI today with contrast show no fluid collection,not require intervention.  Pain is much better.

## 2022-02-25 NOTE — SUBJECTIVE & OBJECTIVE
Interval History: NAEO. Pt has remained afebrile and HD stable overnight. Repeat C-spine MRI yesterday showing improvement in retropharyngeal fluid collection.     Review of Systems   Constitutional:  Negative for activity change, chills and fever.   HENT:  Negative for congestion, trouble swallowing and voice change.    Eyes:  Negative for photophobia and visual disturbance.   Respiratory:  Negative for chest tightness, shortness of breath and wheezing.    Cardiovascular:  Positive for leg swelling. Negative for chest pain and palpitations.   Gastrointestinal:  Negative for abdominal pain, constipation, nausea and vomiting.   Genitourinary:  Negative for dysuria, flank pain, frequency, hematuria and urgency.   Musculoskeletal:  Positive for arthralgias. Negative for back pain and gait problem.        Left foot pain   Skin:  Positive for wound. Negative for rash.   Neurological:  Negative for syncope, speech difficulty and headaches.   Psychiatric/Behavioral:  Negative for agitation, behavioral problems and confusion. The patient is not nervous/anxious.    Objective:     Vital Signs (Most Recent):  Temp: 98.5 °F (36.9 °C) (02/25/22 0908)  Pulse: 103 (02/25/22 0908)  Resp: 16 (02/25/22 0908)  BP: 124/84 (02/25/22 0908)  SpO2: 95 % (02/25/22 0908) Vital Signs (24h Range):  Temp:  [97.7 °F (36.5 °C)-98.8 °F (37.1 °C)] 98.5 °F (36.9 °C)  Pulse:  [] 103  Resp:  [16-18] 16  SpO2:  [93 %-99 %] 95 %  BP: (107-158)/(57-91) 124/84     Weight: 72 kg (158 lb 11.7 oz)  Body mass index is 25.62 kg/m².    Estimated Creatinine Clearance: 8.5 mL/min (A) (based on SCr of 8.5 mg/dL (H)).    Physical Exam  Vitals and nursing note reviewed.   Constitutional:       General: He is not in acute distress.     Appearance: He is well-developed and overweight. He is ill-appearing.   HENT:      Head: Normocephalic and atraumatic.      Nose: Nose normal.      Mouth/Throat:      Mouth: Mucous membranes are moist.   Eyes:      General: No  scleral icterus.     Conjunctiva/sclera: Conjunctivae normal.   Neck:      Comments: Right IJ trialysis line in place.   Cardiovascular:      Rate and Rhythm: Normal rate and regular rhythm.      Heart sounds: Normal heart sounds. No murmur heard.    No friction rub. No gallop.   Pulmonary:      Effort: Pulmonary effort is normal. No respiratory distress.      Breath sounds: Normal breath sounds. No wheezing or rales.   Abdominal:      General: Bowel sounds are normal. There is no distension.      Palpations: Abdomen is soft.      Tenderness: There is no abdominal tenderness. There is no guarding.   Musculoskeletal:         General: Tenderness (left foot) present. Normal range of motion.      Cervical back: Normal range of motion and neck supple.      Right lower leg: No edema.      Left lower leg: No edema.   Feet:      Right foot:      Skin integrity: Skin integrity normal.      Left foot:      Skin integrity: Skin breakdown, erythema, warmth and dry skin present.   Skin:     General: Skin is warm and dry.      Capillary Refill: Capillary refill takes less than 2 seconds.      Comments: L leg, ankle, foot skin sloughing.   Deroofed blister on dorsum of left foot   Neurological:      General: No focal deficit present.      Mental Status: He is alert and easily aroused.      GCS: GCS eye subscore is 4. GCS verbal subscore is 5. GCS motor subscore is 6.      Cranial Nerves: No cranial nerve deficit.      Sensory: No sensory deficit.   Psychiatric:         Behavior: Behavior normal. Behavior is cooperative.         Thought Content: Thought content normal.         Judgment: Judgment normal.       Significant Labs: All pertinent labs within the past 24 hours have been reviewed.    Significant Imaging: I have reviewed all pertinent imaging results/findings within the past 24 hours.

## 2022-02-25 NOTE — PROGRESS NOTES
[facility] - Transplant Stepdown  Infectious Disease  Progress Note    Patient Name: Enrique Martinez  MRN: [id number]  Admission Date: 2/16/2022  Length of Stay: 8 days  Attending Physician: Jose Alberto Saba MD  Primary Care Provider: Primary Doctor No    Isolation Status: No active isolations  Assessment/Plan:      * Pain and swelling of left lower extremity  MRI foot shows brodies abscess (subacute osteomyelitis). Pt has been on empiric ceftriaxone and daptomycin for empiric coverage of foot infection along with concern for retropharyngeal fluid collection/abscess.     Plan:  - Continue daptomycin and ceftriaxone empiric therapy  - Recommend MRI with contrast (scheduled around dialysis for patient) of left foot to assess for osteomyelitis. Will tailor final antibiotic recommendations based off of imaging.   - Also recommend podiatry consult for assessment and possible biopsy of brodies abscess (subacute osteomyelitis)    Prevertebral Fluid Collection  As outlined above.    Neck pain  Non-contrast MRI had to be discontinued due to patient request.  But available images shows retropharyngeal fluid collection with abscess not excluded.  Deeply concerned that this could represent infection.  His recent bacteremia makes me concerned this could be salmonella.   He is immunocompromised due to a combination of uremia and diabetes so invasive salmonellosis is possible. Active problems of concern are his cervical neck prevertebral fluid collection, left lower extremity cellulitis/ brodies abscess, and recent salmonella bacteremia.     Repeat MRI c-spine yesterday showing improvement of retropharyngeal fluid collection. Appearing to not need any intervention at this time.     Plan  - No abx treatment needed for neck pain at this time. See pain and swelling of left lower extremity A/P                Anticipated Disposition: TBD    Thank you for your consult. I will follow-up with patient. Please contact us if you have any  additional questions.    Deena Maya MD  Infectious Disease  [facility] - Transplant Stepdown    Subjective:     Principal Problem:Pain and swelling of left lower extremity    HPI: Enrique Martinez is a 56 y.o. male with a PMH of CKD V glomerulonephropathy, insulin dependent diabetes mellitus, and CAD who presented to the ED for evaluation of worsening left foot pain and swelling. He was recently admitted at [facility] from 2/2-2/4/2022 from LLE cellulitis complicated by salmonella bacteremia after having diarrheal illness.  He was discharged home with ciprofloxacin. He reports no improvement in his cellulitis since being discharged home and no has new blister development. ID was consulted due to recent salmonella bacteremia and new findings on MRI suspicious for left foot myelitis and persistent cellulitis infection. On initial ID encounter, pt reporting pain in left foot and ankle but denying any fever, chills, N/V, diarrhea, chest pain, headaches, or SOB.     Interval History: NAEO. Pt has remained afebrile and HD stable overnight. Repeat C-spine MRI yesterday showing improvement in retropharyngeal fluid collection.     Review of Systems   Constitutional:  Negative for activity change, chills and fever.   HENT:  Negative for congestion, trouble swallowing and voice change.    Eyes:  Negative for photophobia and visual disturbance.   Respiratory:  Negative for chest tightness, shortness of breath and wheezing.    Cardiovascular:  Positive for leg swelling. Negative for chest pain and palpitations.   Gastrointestinal:  Negative for abdominal pain, constipation, nausea and vomiting.   Genitourinary:  Negative for dysuria, flank pain, frequency, hematuria and urgency.   Musculoskeletal:  Positive for arthralgias. Negative for back pain and gait problem.        Left foot pain   Skin:  Positive for wound. Negative for rash.   Neurological:  Negative for syncope, speech difficulty and headaches.   Psychiatric/Behavioral:   Negative for agitation, behavioral problems and confusion. The patient is not nervous/anxious.    Objective:     Vital Signs (Most Recent):  Temp: 98.5 °F (36.9 °C) (02/25/22 0908)  Pulse: 103 (02/25/22 0908)  Resp: 16 (02/25/22 0908)  BP: 124/84 (02/25/22 0908)  SpO2: 95 % (02/25/22 0908) Vital Signs (24h Range):  Temp:  [97.7 °F (36.5 °C)-98.8 °F (37.1 °C)] 98.5 °F (36.9 °C)  Pulse:  [] 103  Resp:  [16-18] 16  SpO2:  [93 %-99 %] 95 %  BP: (107-158)/(57-91) 124/84     Weight: 72 kg (158 lb 11.7 oz)  Body mass index is 25.62 kg/m².    Estimated Creatinine Clearance: 8.5 mL/min (A) (based on SCr of 8.5 mg/dL (H)).    Physical Exam  Vitals and nursing note reviewed.   Constitutional:       General: He is not in acute distress.     Appearance: He is well-developed and overweight. He is ill-appearing.   HENT:      Head: Normocephalic and atraumatic.      Nose: Nose normal.      Mouth/Throat:      Mouth: Mucous membranes are moist.   Eyes:      General: No scleral icterus.     Conjunctiva/sclera: Conjunctivae normal.   Neck:      Comments: Right IJ trialysis line in place.   Cardiovascular:      Rate and Rhythm: Normal rate and regular rhythm.      Heart sounds: Normal heart sounds. No murmur heard.    No friction rub. No gallop.   Pulmonary:      Effort: Pulmonary effort is normal. No respiratory distress.      Breath sounds: Normal breath sounds. No wheezing or rales.   Abdominal:      General: Bowel sounds are normal. There is no distension.      Palpations: Abdomen is soft.      Tenderness: There is no abdominal tenderness. There is no guarding.   Musculoskeletal:         General: Tenderness (left foot) present. Normal range of motion.      Cervical back: Normal range of motion and neck supple.      Right lower leg: No edema.      Left lower leg: No edema.   Feet:      Right foot:      Skin integrity: Skin integrity normal.      Left foot:      Skin integrity: Skin breakdown, erythema, warmth and dry skin  present.   Skin:     General: Skin is warm and dry.      Capillary Refill: Capillary refill takes less than 2 seconds.      Comments: L leg, ankle, foot skin sloughing.   Deroofed blister on dorsum of left foot   Neurological:      General: No focal deficit present.      Mental Status: He is alert and easily aroused.      GCS: GCS eye subscore is 4. GCS verbal subscore is 5. GCS motor subscore is 6.      Cranial Nerves: No cranial nerve deficit.      Sensory: No sensory deficit.   Psychiatric:         Behavior: Behavior normal. Behavior is cooperative.         Thought Content: Thought content normal.         Judgment: Judgment normal.       Significant Labs: All pertinent labs within the past 24 hours have been reviewed.    Significant Imaging: I have reviewed all pertinent imaging results/findings within the past 24 hours.

## 2022-02-25 NOTE — PROGRESS NOTES
Changed pt dressing to the left foot. Scant slough noted to wound. Cleaned with sterile saline. Added skin barrier around the area. Reapplied dressing and dated it. Scant amount of slough drainage on the old dressing

## 2022-02-25 NOTE — PHYSICIAN QUERY
PT Name: Enrique Martinez  MR #: [id number]    DOCUMENTATION CLARIFICATION     CDS/: Jesika BOOTHE, RN-BC  Contact information: [email or web address]  This form is a permanent document in the medical record.     Query Date: February 24, 2022    By submitting this query, we are merely seeking further clarification of documentation. Please utilize your independent clinical judgment when addressing the question(s) below.    The Medical Record contains the following:   Indicators   Supporting Clinical Findings Location in Medical Record   X AMS, Confusion, LOC, etc.  Patient exhibits signs of uremia including fatigue, poor appetite, and mental slowing. On labs, he is hyperkalemic, uremic, AGMA, hyperphosphatemia      Lethargic, asterixis, tremor      Uremic with lethargy, asterixis and confusion at times        Pt is becoming more encephalopathic and increasing poor mental status. Drowsier during exam and doses off in middle of conversation   Renal 2/17          Renal 2/18      [facility] 2/18 Plan of Care      ID 2/20         X Acute/Chronic Illness Neck pain, prevertebral fluid collection, gout, folic acid deficiency, AGMA, hyperphosphatemia, hyperkalemia, ERIK on CKD 5, kidney transplant candidate, subacute osteomyelitis, BPH, GERD, s/p coronary artery stent placement, T2 DM, anemia due to CKD, membranous glomerulonephritis, HLD Renovascular HTN [facility] 2/24    Radiology Findings     X Electrolyte Imbalance Sodium 147, 146  Potassium 5.5, 5.2, 5.3, 5.3  , 194, 186, 184, 183, 172, 172  Phosphorus 10.4, 11.2, 11.1, 10.6, 10.9, 10.2  2/21, 2/22   VS   2/16,2/18,2/19 2/16-2/21 2/16-2/21    Medication     X Treatment         Recommend HD if patient is amenable. Discussed HD with patient as emergent need in this setting and as a bridge to transplant. Patient continues to decline.   Continue phos binder sevelamer  Increase Na bicarb to 1300 TID  Follow up PTH level  Hold calcitriol in setting of  hyperphosphatemia. Will re-start when phos reaches normal level.   Trend Cr  Strict I&Os  Avoid nephrotoxic agents  Renally adjust medications  Renal 2/17   X Other Does not have capacity to make medical decisions on his behalf   Psychiatry 2/20     The noted clinical guidelines are only system guidelines and do not replace the providers clinical judgment.    The National [place] of Neurologic Disorders and Stroke (NINDS) of the NIH describes encephalopathy as any diffuse disease of the brain that alters brain function or structure.    Provider, please specify the Encephalopathy diagnosis associated with above clinical findings.  [  x ] Metabolic Encephalopathy - Due to electrolyte imbalance, metabolic derangements, or infectious processes, includes Septic Encephalopathy, Uremic Encephalopathy   [   ] Encephalopathy, unspecified      [   ] Other Encephalopathy (please specify): ____________________   [   ] Other neurological condition- Includes Post-ictal altered mental status (please specify condition): __________   [   ]  Clinically Undetermined     Please document in your progress notes daily for the duration of treatment until resolved, and include in your discharge summary.    References:  ZOYA Vail RN, CCDS. (2018, June 9). Notes from the Instructor: Encephalopathy tips. Retrieved October 22, 2020, from [email or web address]-tips    ICD-9-CM Coding Clinic First Quarter 2013, Effective with discharges: October 21, 2013 [facility] § Seizure with encephalopathy due to postictal state (2013).    ICD-10-CM/PCS [employer] Integrated Codebook (Version V.20.8.10.0) [Computer software]. (2020). Retrieved October 21, 2020.    National [place] of Neurological Disorders and Stroke. (2019, March 27). Retrieved October 22, 2020, from https://www.ninds.nih.gov/Disorders/All-Disorders/Zzubpkgbxghfow-Rhdwtgfyrko-Cvki    Form No. 20305

## 2022-02-25 NOTE — ASSESSMENT & PLAN NOTE
-ent consulted, will likely bedside scope. He reports trouble swallowing food to wife now but denies again to me  -refused bedside scope  -reattempt at MRI C spine- shows more chronic issues prevertebral area but retropharyngela area still prominent with fluid. Will f/u recs from specialitss regarding this, severe narrowing seen.  MRI neck with contrast show no sign of fluid collection,no need for intervention,

## 2022-02-25 NOTE — CONSULTS
Consult acknowledged, please see H&P.    Amandeep Frederick,   PGY-III  Diagnostic and Interventional Radiology  [facility]

## 2022-02-25 NOTE — ASSESSMENT & PLAN NOTE
Non-contrast MRI had to be discontinued due to patient request.  But available images shows retropharyngeal fluid collection with abscess not excluded.  Deeply concerned that this could represent infection.  His recent bacteremia makes me concerned this could be salmonella.   He is immunocompromised due to a combination of uremia and diabetes so invasive salmonellosis is possible. Active problems of concern are his cervical neck prevertebral fluid collection, left lower extremity cellulitis/ brodies abscess, and recent salmonella bacteremia.     Repeat MRI c-spine yesterday showing improvement of retropharyngeal fluid collection. Appearing to not need any intervention at this time.     Plan  - No abx treatment needed for neck pain at this time. See pain and swelling of left lower extremity A/P

## 2022-02-25 NOTE — PROGRESS NOTES
There are currently no orders for wound care to left foot. Wound care nurse stated to speak with podiatry. [facility] team and requested wound care orders.

## 2022-02-25 NOTE — H&P
Inpatient Radiology Pre-procedure Note    History of Present Illness:  Enrique Martinez is a 56 y.o. male CKD V, admitted with salmonella bacteremia. Nephrology recommends HD catheter for long term dialysis. Of note patient responded poorly to benzodiazipines upon prior CVC placement this admission, pulling out the line.  Admission H&P reviewed.  Past Medical History:   Diagnosis Date    Anemia     Coronary artery disease     Diabetes mellitus, type 2     GERD (gastroesophageal reflux disease)     Gout     Hydrocele in adult     bilateral    Hyperlipidemia     Hypertension     Inguinal hernia     bilateral    Membranous glomerulonephritis     Nephrotic range proteinuria     Nephrotic syndrome     Obesity     Umbilical hernia      Past Surgical History:   Procedure Laterality Date    ABDOMINAL SURGERY  1980s    CARDIAC CATHETERIZATION  2007    x 2    COLONOSCOPY N/A 4/5/2019    Procedure: COLONOSCOPY;  Surgeon: Jose L Carty MD;  [facility]O ([facility]);  Service: Colon and Rectal;  Laterality: N/A;    CORONARY STENT PLACEMENT  2007       Review of Systems:   As documented in primary team H&P    Home Meds:   Prior to Admission medications    Medication Sig Start Date End Date Taking? Authorizing Provider   insulin (LANTUS SOLOSTAR U-100 INSULIN) glargine 100 units/mL (3mL) SubQ pen Inject 5 Units into the skin once daily. 1/18/22  Yes Historical Provider   albuterol (PROVENTIL/VENTOLIN HFA) 90 mcg/actuation inhaler Inhale 2 puffs into the lungs every 6 (six) hours as needed for Wheezing or Shortness of Breath. Rescue    Historical Provider   amlodipine (NORVASC) 10 MG tablet Take 10 mg by mouth once daily.    Historical Provider   ARIPiprazole (ABILIFY) 15 MG Tab Take 1 tablet (15 mg total) by mouth every evening. 1/11/21 4/11/21  Claude Wang MD   aspirin (ECOTRIN) 81 MG EC tablet Take 1 tablet (81 mg total) by mouth once daily. 2/22/21 2/22/22  Bang Beauchamp MD   atorvastatin  (LIPITOR) 80 MG tablet Take 1 tablet (80 mg total) by mouth once daily. 2/22/21 2/22/22  Bang Beauchamp MD   blood-glucose meter kit Use as instructed 12/26/20 12/26/21  Historical Provider   calcitRIOL (ROCALTROL) 0.5 MCG Cap Take 0.5 mcg by mouth once daily. 1/27/22   Historical Provider   chlorthalidone (HYGROTEN) 50 MG Tab Take 50 mg by mouth once daily.     Historical Provider   cloNIDine (CATAPRES) 0.2 MG tablet Take 0.4 mg by mouth 3 (three) times daily.     Historical Provider   colchicine (COLCRYS) 0.6 mg tablet Take 0.6 mg by mouth daily as needed. 11/3/21   Historical Provider   ergocalciferol (ERGOCALCIFEROL) 50,000 unit Cap Take 50,000 Units by mouth every 30 days.  2/17/20   Historical Provider   famotidine (PEPCID) 20 MG tablet Take 20 mg by mouth daily as needed for Heartburn.  4/22/20   Historical Provider   ferrous gluconate (FERGON) 324 MG tablet Take 324 mg by mouth 2 (two) times a day.     Historical Provider   fluticasone (FLONASE) 50 mcg/actuation nasal spray 1 spray by Each Nare route once daily.    Historical Provider   furosemide (LASIX) 40 MG tablet Take 40 mg by mouth 2 (two) times daily.    Historical Provider   methocarbamoL (ROBAXIN) 500 MG Tab Take 500 mg by mouth 2 (two) times a day.    Historical Provider   metoprolol tartrate (LOPRESSOR) 100 MG tablet Take 100 mg by mouth. 9/2/20   Historical Provider   omeprazole (PRILOSEC) 20 MG capsule Take 20 mg by mouth once daily. 12/29/21   Historical Provider   RENVELA 800 mg Tab Take 800 mg by mouth 3 (three) times daily. 2/4/22   Historical Provider   sodium bicarbonate 650 MG tablet Take 650 mg by mouth. 9/2/20   Historical Provider   tadalafiL (CIALIS) 2.5 mg Tab Take 2.5 mg by mouth. 2/3/21 2/3/22  Historical Provider   TRUE METRIX GLUCOSE TEST STRIP Strp  12/27/20   Historical Provider     Scheduled Meds:    sodium chloride 0.9%   Intravenous Once    sodium chloride 0.9%   Intravenous Once    acetaminophen  1,000 mg Oral Q8H     amLODIPine  10 mg Oral Daily    ARIPiprazole  15 mg Oral QHS    aspirin  81 mg Oral Daily    atorvastatin  80 mg Oral Daily    cefTRIAXone (ROCEPHIN) IVPB  2 g Intravenous Q24H    colchicine  0.3 mg Oral BID    DAPTOmycin (CUBICIN)  IV  6 mg/kg Intravenous Q48H    folic acid  1 mg Oral Daily    heparin (porcine)  5,000 Units Subcutaneous Q8H    corticotropin  80 Units Subcutaneous Every Mon, Fri    hydrALAZINE  50 mg Oral Q8H    labetalol  200 mg Oral Q12H    mupirocin   Nasal BID    nystatin  500,000 Units Oral QID (WM & HS)    pantoprazole  40 mg Intravenous BID    polyethylene glycol  17 g Oral Daily    senna-docusate 8.6-50 mg  1 tablet Oral Daily    sevelamer carbonate  800 mg Oral TID     Continuous Infusions:   PRN Meds:sodium chloride, sodium chloride 0.9%, acetaminophen, albuterol-ipratropium, aluminum-magnesium hydroxide-simethicone, dextrose 10%, dextrose 10%, glucagon (human recombinant), glucose, glucose, lorazepam, melatonin, methocarbamoL, morphine, morphine, naloxone, OLANZapine, ondansetron, oxyCODONE, oxyCODONE, prochlorperazine, simethicone, sodium chloride 0.9%, sodium chloride 0.9%, sodium chloride 0.9%, sodium chloride 0.9%  Anticoagulants/Antiplatelets: aspirin    Allergies: Review of patient's allergies indicates:  No Known Allergies  Sedation Hx: have not been any systemic reactions    Labs:  No results for input(s): INR in the last 168 hours.    Invalid input(s):  PT,  PTT    Recent Labs   Lab 02/25/22  0525   WBC 8.37   HGB 7.3*   HCT 23.7*   MCV 92         Recent Labs   Lab 02/21/22  2311 02/22/22  0631 02/25/22  0525      < > 116*   *   < > 143   K 4.3   < > 3.9      < > 105   CO2 20*   < > 26   *   < > 56*   CREATININE 19.4*   < > 8.5*   CALCIUM 9.3   < > 8.9   MG 2.0  --   --    ALT 13  --   --    AST 24  --   --    ALBUMIN 2.1*  --   --    BILITOT 0.2  --   --     < > = values in this interval not displayed.         Vitals:  Temp: 98.4  °F (36.9 °C) (02/25/22 1600)  Pulse: 86 (02/25/22 1600)  Resp: 17 (02/25/22 1628)  BP: (!) 154/78 (02/25/22 1600)  SpO2: 97 % (02/25/22 1600)     Physical Exam:  ASA: per anesthesia  Mallampati: per anesthesia    General: no acute distress  Mental Status: alert and oriented to person, place and time  HEENT: normocephalic, atraumatic  Chest: unlabored breathing  Heart: regular heart rate  Abdomen: nondistended  Extremity: moves all extremities    Plan: Permcath placement, possibly 2/28/22.  Sedation Plan: per anesthesia    Amandeep Frederick, DO  PGY-III  Diagnostic and Interventional Radiology  [facility]

## 2022-02-25 NOTE — ASSESSMENT & PLAN NOTE
MRI foot shows brodies abscess (subacute osteomyelitis). Pt has been on empiric ceftriaxone and daptomycin for empiric coverage of foot infection along with concern for retropharyngeal fluid collection/abscess.     Plan:  - Continue daptomycin and ceftriaxone empiric therapy  - Recommend MRI of left foot to assess for osteomyelitis. Will tailor final antibiotic recommendations based off of imaging.

## 2022-02-25 NOTE — PT/OT/SLP EVAL
"Physical Therapy Co-Evaluation and Co-Treatment    Patient Name:  Enrique Martinez   MRN:  [id number]    Recommendations:     Discharge Recommendations:  rehabilitation facility   Discharge Equipment Recommendations: walker, rolling   Barriers to discharge: Increased level of assist, Inaccessible home and Decreased caregiver support    Assessment:     Enrique Martinez is a 56 y.o. male admitted with a medical diagnosis of Pain and swelling of left lower extremity.  He presents with the following impairments/functional limitations:  weakness, impaired endurance, impaired self care skills, impaired functional mobilty, gait instability, impaired balance, decreased upper extremity function, decreased lower extremity function, decreased safety awareness, orthopedic precautions. These deficits affect their roles and responsibilities in which they were able to complete prior to admit. Enrique Martinez would benefit from acute PT intervention to improve quality of life and focus on recovery of impairments. Once medically stable, recommending pt discharge to Inpatient Rehabilitation Facility.     Rehab Prognosis: Good; patient would benefit from acute skilled PT services 3 x/week to address these deficits and reach maximum level of function. Patient is most appropriate to go to rehabilitation facility.  Recent Surgery: Procedure(s) (LRB):  MPU PROCEDURE (N/A)      Plan:     During this hospitalization, patient to be seen 3 x/week to address the identified rehab impairments via gait training, therapeutic activities, therapeutic exercises, neuromuscular re-education and progress toward the following goals:    · Plan of Care Expires:  03/27/22    Subjective     Chief Complaint: Not being allowed to go outside and move around without assistance as pt is a fall risk.  Patient/Family Comments/Goals: "I feel like a prisoner."  Pt reports L side feels weaker than right.  Pain/Comfort:  · Pain Rating 1: 0/10  · Pain Rating " "Post-Intervention 1: 0/10    Patients cultural, spiritual, Hoahaoism conflicts given the current situation: no    Living Environment:  Living Environment: Patient lives in an apartment on 2nd floor with no elevator.   Previous level of function: Independent  Roles and Routines: Drives; works- transporter  Equipment Used at Home:  none  Assistance upon Discharge: Patient stated he and his wife are "going through something" right now so he is living alone but she can help if needed.    Objective:     Communicated with nursing prior to session.  Patient found right sidelying with HOB elevated with telemetry upon PT entry to room.    General Precautions: Standard, fall   Orthopedic Precautions:spinal precautions   Braces: Aspen collar    Exams:  · Cognitive Exam: Patient is oriented to Person, Place, Time, Situation  · RLE ROM: WFL  · RLE Strength: WFL, grossly 4/5  · LLE ROM: WFL  · LLE Strength: WFL, grossly 4/5, L hip flexion slightly weaker than R  · Sensation: Intact light touch to BLEs, denies numbness/tingling  · Fine Motor Coordination: Heel to shin WFL bilaterally     Functional Mobility:  · Bed Mobility:     · Rolling Left:  stand by assistance  · Scooting: stand by assistance  · Supine to Sit: stand by assistance  · Transfers:     · Sit to Stand: contact guard assistance with no AD  · Gait: Patient ambulated 6 ft, 6 ft, and 10 ft with no AD and minimum assistance. Patient demonstrates unsteady gait, decreased step length, antalgic gait and increased time in stance phase on unaffected leg.  All lines remained intact throughout ambulation trial.  · Balance:   · Static Sitting: Stand-By Assist   · Dynamic Sitting: Contact-Guard Assist   · Static Standing: Contact-Guard Assist   · Dynamic Standing: Min Assist     Therapeutic Activities and Exercises:  Patient educated on role of acute care PT and PT POC, safety while in hospital including calling nurse for mobility, call light usage and c-collar " wearing.  Whiteboard updated,   Patient clear to ambulate to/from bathroom with RN/PCT, assist x1    AM-PAC 6 CLICK MOBILITY  Turning over in bed (including adjusting bedclothes, sheets and blankets)?: 3  Sitting down on and standing up from a chair with arms (e.g., wheelchair, bedside commode, etc.): 3  Moving from lying on back to sitting on the side of the bed?: 3  Moving to and from a bed to a chair (including a wheelchair)?: 3  Need to walk in hospital room?: 3  Climbing 3-5 steps with a railing?: 2  Basic Mobility Total Score: 17     Patient left up in chair with all lines intact, call button in reach and RN notified.    GOALS:   Multidisciplinary Problems     Physical Therapy Goals        Problem: Physical Therapy Goal    Goal Priority Disciplines Outcome Goal Variances Interventions   Physical Therapy Goal     PT, PT/OT Ongoing, Progressing     Description: Goals to be met by: 3/11/2022     Patient will increase functional independence with mobility by performin. Supine to sit with [place]  2. Sit to supine with [place]  3. Sit to stand transfer with [place]  4. Gait  x 200 feet with Supervision using No Assistive Device.   5. Ascend/descend 9 stairs with right or left Handrails Stand-by Assistance using No Assistive Device.                      History:     Past Medical History:   Diagnosis Date    Anemia     Coronary artery disease     Diabetes mellitus, type 2     GERD (gastroesophageal reflux disease)     Gout     Hydrocele in adult     bilateral    Hyperlipidemia     Hypertension     Inguinal hernia     bilateral    Membranous glomerulonephritis     Nephrotic range proteinuria     Nephrotic syndrome     Obesity     Umbilical hernia        Past Surgical History:   Procedure Laterality Date    ABDOMINAL SURGERY  1980s    CARDIAC CATHETERIZATION  2007    x 2    COLONOSCOPY N/A 2019    Procedure: COLONOSCOPY;  Surgeon: Jose L Carty MD;  Location: [facility]  (4TH FLR);  Service: Colon and Rectal;  Laterality: N/A;    CORONARY STENT PLACEMENT  2007       Time Tracking:     PT Received On: 02/25/22  PT Start Time: 0844     PT Stop Time: 0902  PT Total Time (min): 18 min     Billable Minutes: Evaluation 10 Therapeutic Activity 8    02/25/2022    Co-evaluation and co-treatment performed for this visit due to suspected patient need for two skilled therapists to ensure patient and staff safety and to accommodate for patient activity tolerance/pain management

## 2022-02-25 NOTE — PROGRESS NOTES
[facility] Medicine  Progress Note    Patient Name: Enrique Martinez  MRN: [id number]  Patient Class: IP- Inpatient   Admission Date: 2/16/2022  Length of Stay: 8 days  Attending Physician: Jose Alberto Saba MD  Primary Care Provider: Primary Doctor No        Subjective:     Principal Problem:Pain and swelling of left lower extremity        HPI:  Enrique Martinez is a 56 y.o. male with a PMHx of CKD V glomerulonephropathy, insulin dependent diabetes mellitus, and CAD who presented tot  ED for evaluation of worsening left foot pain and swelling. He was recently admitted at [facility] from 2/2-2/4/2022 from LLE cellulitis. He was found to have salmonella bacteremia suspected due to diarrheal illness. He was discharged with cipro for which he reports compliance. He has had no improvement in his pain, redness, or swelling of his left foot and leg. He has also developed a new blister on his left foot. He endorses chills. He denies fevers, chest pain, SOB, N/V, decreased urine output, dysuria, and flank pain.    ED: HR 97, other VSSAF. CBC with leukocytosis of 16.01, Hgb 7.4, Hct 22.2.  ESR 97. CRP 33.4. K 5.5. Cr 19.7, . Phos 10.4. Lactic WNL. MRI L foot with cellulitis and osteomyelitis of distal 1st metatarsal and proximal 1st phalanx.      Overview/Hospital Course:  .Enrique Martinez is a 56 y.o. male with a PMHx of CKD V glomerulonephropathy, insulin dependent diabetes mellitus, and CAD who presented tot  ED for evaluation of worsening left foot pain and swelling. He was recently admitted at [facility] from 2/2-2/4/2022 from LLE cellulitis. He was found to have salmonella bacteremia suspected due to diarrheal illness. He was discharged with cipro for which he reports compliance. He has had no improvement in his pain, redness, or swelling of his left foot and leg. He has also developed a new blister on his left foot. He endorses chills. He denies fevers, chest pain, SOB, N/V, decreased urine  output, dysuria, and flank pain.  Nephrology is consulted for history of ESRD,he had finally HD,  Has abnormal C spine Xray with possible signs of C spine instability, MRi ordered and patient refused 2/17, on pain meds, was discussed AT length, see 2/17 and 2/18 note above, ordered with premediation now. Concern for possible infection also given acuity of pain  -signs of C spine instability, prevertebral abscess likely with fluid collection with widening in C spine, NS recs C collar- patient refused, and Xrays, refused, and MRi full spine when stable. Cannot do any intervention now as unstable bc of kidneys.   -ent consulted for retropharyngeal abscess per ns and id recs, will likely scope if amenable at bedside (he refused). They rec MRI C spien with contrast- but cannot get due to kidneys  -refused c collar xrays  -NS recs MRI all spine, ID/ENT rec repeat MRI C spine for better pictures. (cannot do contrast). He is likely nto going to sit for all the spine right now 2/21 per my exam and nursing. Placed repeat flex xrays and C spine MRi orders in now. They called him at 3 pm but anesthesia is coming at 330 pm to do the line he said he would now do this afternoon after a cigarette, so nursing to tell them to come back. IR consulted to sample the vertebral involvement, they report they wanted to review further imaging once in to see if they needed - will f/u thoughts but likely suspect wont need sampling of the spine as this appear more chroinc, retropharyngeal collection however likely needs further addressed. CT also pending as rec for ENT as well.   -ID following- on dapto and Ceftriaxone  -MRI/CT showing similar fidings to before, ENT  reports need contrast to really see retropharyngeal area well,  MRI neck with contrast show no sign of fluid collection,no need for intervention,  He complaint with HD and SW looking for out patient HD be set up.  Will do MRI left foot to see for osteo and duration of Abx per  ID.  On daptomycin and rocephin at this time,check weekly CPK level.        Interval history:   He is more cooperative,agree with HD and MRI.                Review of patient's allergies indicates:  No Known Allergies    No current facility-administered medications on file prior to encounter.     Current Outpatient Medications on File Prior to Encounter   Medication Sig    insulin (LANTUS SOLOSTAR U-100 INSULIN) glargine 100 units/mL (3mL) SubQ pen Inject 5 Units into the skin once daily.    albuterol (PROVENTIL/VENTOLIN HFA) 90 mcg/actuation inhaler Inhale 2 puffs into the lungs every 6 (six) hours as needed for Wheezing or Shortness of Breath. Rescue    amlodipine (NORVASC) 10 MG tablet Take 10 mg by mouth once daily.    ARIPiprazole (ABILIFY) 15 MG Tab Take 1 tablet (15 mg total) by mouth every evening.    aspirin (ECOTRIN) 81 MG EC tablet Take 1 tablet (81 mg total) by mouth once daily.    atorvastatin (LIPITOR) 80 MG tablet Take 1 tablet (80 mg total) by mouth once daily.    blood-glucose meter kit Use as instructed    calcitRIOL (ROCALTROL) 0.5 MCG Cap Take 0.5 mcg by mouth once daily.    chlorthalidone (HYGROTEN) 50 MG Tab Take 50 mg by mouth once daily.     cloNIDine (CATAPRES) 0.2 MG tablet Take 0.4 mg by mouth 3 (three) times daily.     colchicine (COLCRYS) 0.6 mg tablet Take 0.6 mg by mouth daily as needed.    ergocalciferol (ERGOCALCIFEROL) 50,000 unit Cap Take 50,000 Units by mouth every 30 days.     famotidine (PEPCID) 20 MG tablet Take 20 mg by mouth daily as needed for Heartburn.     ferrous gluconate (FERGON) 324 MG tablet Take 324 mg by mouth 2 (two) times a day.     fluticasone (FLONASE) 50 mcg/actuation nasal spray 1 spray by Each Nare route once daily.    furosemide (LASIX) 40 MG tablet Take 40 mg by mouth 2 (two) times daily.    methocarbamoL (ROBAXIN) 500 MG Tab Take 500 mg by mouth 2 (two) times a day.    metoprolol tartrate (LOPRESSOR) 100 MG tablet Take 100 mg by mouth.     omeprazole (PRILOSEC) 20 MG capsule Take 20 mg by mouth once daily.    RENVELA 800 mg Tab Take 800 mg by mouth 3 (three) times daily.    sodium bicarbonate 650 MG tablet Take 650 mg by mouth.    tadalafiL (CIALIS) 2.5 mg Tab Take 2.5 mg by mouth.    TRUE METRIX GLUCOSE TEST STRIP Strp      Family History       Problem Relation (Age of Onset)    Drug abuse Brother    Heart attack Father, Brother    Hyperlipidemia Father    Hypertension Father, Brother    No Known Problems Sister    Stroke Father          Tobacco Use    Smoking status: Current Some Day Smoker     Packs/day: 0.25     Years: 10.00     Pack years: 2.50    Smokeless tobacco: Never Used   Substance and Sexual Activity    Alcohol use: No    Drug use: No    Sexual activity: Not on file     Review of Systems   Constitutional:  Positive for chills. Negative for activity change and fever.   HENT:  Negative for congestion and trouble swallowing.    Eyes:  Negative for photophobia and visual disturbance.   Respiratory:  Negative for chest tightness, shortness of breath and wheezing.    Cardiovascular:  Positive for leg swelling. Negative for chest pain and palpitations.   Gastrointestinal:  Negative for abdominal pain, constipation, nausea and vomiting.   Genitourinary:  Negative for dysuria, flank pain, frequency, hematuria and urgency.   Musculoskeletal:  Positive for arthralgias. Negative for back pain and gait problem.   Skin:  Positive for color change and wound. Negative for rash.   Neurological:  Negative for dizziness, syncope, weakness, light-headedness, numbness and headaches.   Psychiatric/Behavioral:  Negative for agitation and confusion. The patient is not nervous/anxious.    Objective:     Vital Signs (Most Recent):  Temp: 98.1 °F (36.7 °C) (02/25/22 1158)  Pulse: 96 (02/25/22 1158)  Resp: 18 (02/25/22 1158)  BP: 136/83 (02/25/22 1158)  SpO2: 97 % (02/25/22 1158) Vital Signs (24h Range):  Temp:  [98.1 °F (36.7 °C)-98.8 °F (37.1 °C)]  98.1 °F (36.7 °C)  Pulse:  [] 96  Resp:  [16-18] 18  SpO2:  [93 %-99 %] 97 %  BP: (107-156)/(57-91) 136/83     Weight: 72 kg (158 lb 11.7 oz)  Body mass index is 25.62 kg/m².    Physical Exam  Vitals and nursing note reviewed.   Constitutional:       Appearance: He is well-developed. He is ill-appearing.      Comments: On HDCalmer. Asterixis with tremors at rest and with movement    HENT:      Head: Normocephalic and atraumatic.      Mouth/Throat:      Mouth: Mucous membranes are moist.   Eyes:      General: No scleral icterus.     Conjunctiva/sclera: Conjunctivae normal.   Cardiovascular:      Rate and Rhythm: Normal rate and regular rhythm.      Heart sounds: Normal heart sounds.   Pulmonary:      Effort: Pulmonary effort is normal. No respiratory distress.      Breath sounds: Normal breath sounds. No wheezing.   Abdominal:      General: Bowel sounds are normal. There is no distension.      Palpations: Abdomen is soft.      Tenderness: There is no abdominal tenderness.   Musculoskeletal:         General: No tenderness. Normal range of motion.      Cervical back: Normal range of motion and neck supple.      Right lower le+ Pitting Edema present.      Left lower le+ Pitting Edema present.      Comments: LLE with burst blister. Severe pain with any light touch.  Signs of bunion on both big toes but also possible gout tophi deposit on right big toe. Edema I RLE >LLE. pitting   Feet:      Left foot:      Skin integrity: Skin breakdown, erythema and warmth present.   Skin:     General: Skin is warm and dry.      Capillary Refill: Capillary refill takes less than 2 seconds.   Neurological:      Mental Status: He is alert and oriented to person, place, and time.      Cranial Nerves: No cranial nerve deficit.      Comments: Pain improved with movement to neck    Psychiatric:         Behavior: Behavior normal.         Thought Content: Thought content normal.         Judgment: Judgment normal.                Significant Labs:   All pertinent labs within the past 24 hours have been reviewed.  CBC:   Recent Labs   Lab 02/24/22  0607 02/25/22  0525   WBC 8.38 8.37   HGB 7.5* 7.3*   HCT 23.6* 23.7*    165       CMP:   Recent Labs   Lab 02/24/22  0607 02/25/22  0525    143   K 3.9 3.9    105   CO2 24 26   * 116*   * 56*   CREATININE 13.1* 8.5*   CALCIUM 9.3 8.9   ANIONGAP 17* 12   EGFRNONAA 3.7* 6.2*       Lactic Acid:   No results for input(s): LACTATE in the last 48 hours.      Significant Imaging: I have reviewed all pertinent imaging results/findings within the past 24 hours.   MRI Cervical Spine W WO Cont  Narrative: EXAMINATION:  MRI CERVICAL SPINE W WO CONTRAST    CLINICAL HISTORY:  assess retropharyngeal collection with contrast, nephro cleared to do this 2/24 AM to dialyze contrast off after;.    TECHNIQUE:  Multiplanar, multisequence MR images of the cervical spine were acquired without the administration of contrast.    COMPARISON:  MRI 02/22/2022.  Radiograph 02/22/2022.  CT 02/22/2022.    FINDINGS:  Examination is degraded by motion artifact.    Cervical spine alignment demonstrates reversal of the normal lordosis with 4 mm of anterolisthesis of C2 on C3 and suspected borderline widening of the atlantodens interval, findings that were better evaluated on previous CT.  Occipital condyles, odontoid process and C1 lateral masses are intact.  Vertebral body heights are well maintained without evidence for fracture.  No marrow signal abnormality to suggest an infiltrative process.    There is advanced multilevel degenerative disc space narrowing most pronounced at C4-C5 and C5-C6.  Endplate edema at C3-C4 and C4-C5, favored to be degenerative in nature.  No endplate erosive changes.    Cervical spinal cord demonstrates normal signal intensity.  Cerebellar tonsils are in their expected location.  Focal area of T2/STIR signal hyperintensity at the level of the delores, likely a  remote lacunar infarct.  No pachymeningeal or leptomeningeal enhancement.    There is prevertebral soft tissue enhancement with mild residual fluid.  No rim enhancing fluid collections to suggest abscess.  Vertebral artery flow voids are present.  Partially visualized right-sided central venous catheter within the right internal jugular vein.  No lymphadenopathy.  Visualized parotid, submandibular and thyroid glands are normal.    C2-C3: 4 mm of anterolisthesis of C2 on C3.  Mild left facet arthropathy.  Findings contribute to mild right neural foraminal narrowing.  No spinal canal stenosis.    C3-C4: Broad-based posterior disc osteophyte complex effaces the ventral thecal sac.  Bilateral uncovertebral joint spurring.  Findings contribute to mild spinal canal stenosis and moderate to severe bilateral neural foraminal narrowing.    C4-C5: Broad-based posterior disc osteophyte complex effaces the ventral thecal sac.  Bilateral uncovertebral joint spurring.  Findings contribute to moderate spinal canal stenosis and severe bilateral neural foraminal narrowing.    C5-C6: Broad-based posterior disc osteophyte complex partially effaces the ventral thecal sac.  Bilateral uncovertebral joint spurring.  Findings contribute to mild spinal canal stenosis and mild-to-moderate bilateral neural foraminal narrowing.    C6-C7: Broad-based posterior disc osteophyte complex partially effaces the ventral thecal sac.  Bilateral uncovertebral joint spurring.  Findings contribute to moderate bilateral neural foraminal narrowing.    C7-T1: Bilateral facet arthropathy and bilateral ligamentum flavum buckling..  Findings contribute to mild spinal canal stenosis and moderate to severe bilateral neural foraminal narrowing.  Impression: 1. Prevertebral soft tissue enhancement with near complete resolution of previously described fluid, likely sequela of a resolving infectious or inflammatory process.  No rim enhancing fluid collection to  suggest abscess.  No convincing MR imaging findings to suggest discitis/osteomyelitis.  2. Advanced multilevel cervical degenerative changes, difficult to accurately characterize secondary to patient motion artifact.    Electronically signed by: Lebron Carcamo MD  Date:    02/24/2022  Time:    12:18          Assessment/Plan:      Prevertebral Fluid Collection  -ent consulted, will likely bedside scope. He reports trouble swallowing food to wife now but denies again to me  -refused bedside scope  -reattempt at MRI C spine- shows more chronic issues prevertebral area but retropharyngela area still prominent with fluid. Will f/u recs from specialitss regarding this, severe narrowing seen.  MRI neck with contrast show no sign of fluid collection,no need for intervention,            Abnormal MRI, cervical spine  MRI  with contrast show no fluid collection      History of Salmonella septicemia  -+ blood CX at [facility] 2/1, with clearance immediately, treated with cipro for 14 days unclear source but presumed diarrheal infection (wife endorses this)  -denies sickle cell hx to predispose to this  -concern with neck pain and foot pain with recent bacteremia  -work up in progress  -ID consulted. On dapto/rocephin        Neck pain  -abnormal C spine Xray with possible signs of C spine instability, MRi ordered and patient refused 2/17, on pain meds, discussed AT length, see 2/17 and 2/18 note above, ordered with premediation now. Concern for possible infection also given acuity of pain  -signs of C spine instability, prevertebral abscess likely with fluid collection with widening in C spine, NS recs C collar- patient refused, and Xrays, refused, and MRi full spine when stable. Cannot do any intervention now as unstable bc of kidneys.   -ent consulted for retropharyngeal abscess per ns and id recs, will likely scope if amenable at bedside (he refused). They rec MRI C spien with contrast- but cannot get due to kidneys  -refused c  collar xrays  -NS recs MRI all spine, ID/ENT rec repeat MRI C spine for better pictures. (cannot do contrast). He is likely nto going to sit for all the spine right now 2/21 per my exam and nursing. Placed repeat flex xrays and C spine MRi orders in now. They called him at 3 pm but anesthesia is coming at 330 pm to do the line he said he would now do this afternoon after a cigarette, so nursing to tell them to come back. IR consulted to sample the vertebral involvement, they report they wanted to review further imaging once in to see if they needed - will f/u thoughts but likely suspect wont need sampling of the spine as this appear more chroinc, retropharyngeal collection however likely needs further addressed. CT also pending as rec for ENT as well.   -ID following- on dapto and Ceftriaxone  -MRI/CT showing similar fidings to before, ENt reports need contrast to really see retropharyngeal area well, nephro approved contrast on 2/24 before HD and order placed .  MRI today with contrast show no fluid collection,not require intervention.  Pain is much better.      Gout  -colchicine BID, light IVF x 10 hours 2/17 per renal recs. Avoid steroids with posisble infection. He denies he has gout      Folic acid deficiency  -low at 4.4.  -daily replacement      Increased anion gap metabolic acidosis  Secondary to ckd  -cont Na bicarb  -refusing HD      Hyperphosphatemia  -cont binders, elvated at 10-11  -refusing HD      Hyperkalemia  -shift PRN      Acute renal failure superimposed on stage 5 chronic kidney disease, not on chronic dialysis  Hyperkalemia  Hyperphosphatemia  Metabolic acidosis  Kidney transplant candidate    - 2/2 membranous glomerulopathy  - GFR was 13-16 up from baseline 4-6 previously in January 2022. Labs repeated today and GFR is 2.6, Cr 19.   - patient follows with Dr. Cardoza and Dr Lee and dialysis has been discussed multiple times, patient continues to refuse.  - on transplant list with  [facility]  -cont phos binders, Na bicarb. Valencia for adequate I/O. Lasix held 2/17 per renal recs  -uric acid trending  -at severe risk of adverse events, BUN almost 200, asterixis on exam. Meets multiple criteria but refusing HD. Has capacity. Wife aware and states is his decision  Continues to refuse on 2/19 after extensive discussions with all parties.   -2/20- may consent tomorrow per wife, if so anesthesia for IJ. No permacath now as active infection concerns in neck. If not, palliative consult. Wife will agree to HD for him if he says yes despite no capacity per psych. Wife will not do it against his will as poa  Agrees to HD on 2/21. Attempted line on 2/21 as he agreed but he accidentally pulled it.  reattempt pending 2/22. High risk to accidentally pull out so will need close monitoring while uremic and has sitter  -successful 2/22 line, HD 2/23 started. Plan for HD tomorrow after MRI imaging. Possible tunneled cath fri/monday per renal. He will agree to bi weekly HD per renal notes at home, need tos tart on OP chair  SW arranging out patient HD     Osteomyelitis of left foot  - 2/4 SIRS with HR>90, WBC 16  - lactic WNL  - ESR 97, CRP 33.4, procal 1.  - MRI w/ regions of focal abnormal signal involving the distal 1st metatarsal and proximal 1st phalanx as described above.  Findings are concerning for foci of subacute myelitis   - patient is hemodynamically stable, podiatry consulted, unclear if ifection but no plans to do biopsy now cellulitis worsened it appears 2/17 PM so vanc/zosyn initiated. ID consulted 2/18 as severely tender extremity, concern with recent bacteremia on 2/1 with salmonella with scaling on exam for possible staph/strep also scalded skin type look..   -treating gout, minimal improvement in pain, less red on doxy/rocephin per ID recs now  -no sign of osteo on repeat MRi 2/22 in neck  -ID reports 6 weeks antibiotics planned to cover foot.  MRI neck with contrast show no sign of fluid  collection,no need for intervention,    Will do MRI left foot to see for osteo and duration of Abx per ID.  On daptomycin and rocephin at this time,check weekly CPK level.    Benign prostatic hyperplasia  Valencia placed 2/18 overnight for incontinence    GERD (gastroesophageal reflux disease)  - continue PPI    S/P coronary artery stent placement  - continue ASA, statin    Type 2 diabetes mellitus  - home meds: lantus 5 units daily  - SSI   - ACHS accuchecks  - diabetic diet  -A1 C 7's    Anemia due to chronic kidney disease  -hg with downtick to 6 on 2/18 from 7 on admit with 9 baseline  -unclear cause if another reason besides chronic kidney disease  -fobt ordered  -transfuse 1 U on 2/18. adamently denies bleeding. Will add protonix for coverage in interim in case of any bleeding  -tx 1 U on 2/19, Hg still higher 6's, unclear soure, fobt pending, possible from neck inflammation with fluid collection of unclear source.  -improved 2/20 to 8's and stable further 2/21.  -denies nsaid use  downtick again 2/23 and 1 U given. Do not see signs of bleeding, still concern could be in fluid collection /inflammation from this as source. bM yesterday no blood or melena    Membranous glomerulonephritis  -will bring acthar gel from home to do non formulary as 40K and got via prior auth-non form request addded  -reason for CKD 5.      Hyperlipidemia  - continue statin    Renovascular hypertension  -elevated to 180s on admit but improved on current regimen to 130s systolic on exam 2/18  -cont hydralazine, labetalol, norvassc, clonidine.      VTE Risk Mitigation (From admission, onward)         Ordered     heparin (porcine) injection 5,000 Units  Every 8 hours         02/25/22 0717     IP VTE HIGH RISK PATIENT  Once         02/17/22 0151     Place sequential compression device  Until discontinued         02/17/22 0151                Discharge Planning   JOAN: 3/1/2022     Code Status: Full Code   Is the patient medically ready for  discharge?: No    Reason for patient still in hospital (select all that apply): Patient trending condition  Discharge Plan A: Home   Discharge Delays: None known at this time              Jose Alberto Saba MD  Department of Hospital Medicine   [facility] - [facility]

## 2022-02-25 NOTE — SUBJECTIVE & OBJECTIVE
Interval history:   He is more cooperative,agree with HD and MRI.                Review of patient's allergies indicates:  No Known Allergies    No current facility-administered medications on file prior to encounter.     Current Outpatient Medications on File Prior to Encounter   Medication Sig    insulin (LANTUS SOLOSTAR U-100 INSULIN) glargine 100 units/mL (3mL) SubQ pen Inject 5 Units into the skin once daily.    albuterol (PROVENTIL/VENTOLIN HFA) 90 mcg/actuation inhaler Inhale 2 puffs into the lungs every 6 (six) hours as needed for Wheezing or Shortness of Breath. Rescue    amlodipine (NORVASC) 10 MG tablet Take 10 mg by mouth once daily.    ARIPiprazole (ABILIFY) 15 MG Tab Take 1 tablet (15 mg total) by mouth every evening.    aspirin (ECOTRIN) 81 MG EC tablet Take 1 tablet (81 mg total) by mouth once daily.    atorvastatin (LIPITOR) 80 MG tablet Take 1 tablet (80 mg total) by mouth once daily.    blood-glucose meter kit Use as instructed    calcitRIOL (ROCALTROL) 0.5 MCG Cap Take 0.5 mcg by mouth once daily.    chlorthalidone (HYGROTEN) 50 MG Tab Take 50 mg by mouth once daily.     cloNIDine (CATAPRES) 0.2 MG tablet Take 0.4 mg by mouth 3 (three) times daily.     colchicine (COLCRYS) 0.6 mg tablet Take 0.6 mg by mouth daily as needed.    ergocalciferol (ERGOCALCIFEROL) 50,000 unit Cap Take 50,000 Units by mouth every 30 days.     famotidine (PEPCID) 20 MG tablet Take 20 mg by mouth daily as needed for Heartburn.     ferrous gluconate (FERGON) 324 MG tablet Take 324 mg by mouth 2 (two) times a day.     fluticasone (FLONASE) 50 mcg/actuation nasal spray 1 spray by Each Nare route once daily.    furosemide (LASIX) 40 MG tablet Take 40 mg by mouth 2 (two) times daily.    methocarbamoL (ROBAXIN) 500 MG Tab Take 500 mg by mouth 2 (two) times a day.    metoprolol tartrate (LOPRESSOR) 100 MG tablet Take 100 mg by mouth.    omeprazole (PRILOSEC) 20 MG capsule Take 20 mg by mouth once daily.    RENVELA 800 mg Tab  Take 800 mg by mouth 3 (three) times daily.    sodium bicarbonate 650 MG tablet Take 650 mg by mouth.    tadalafiL (CIALIS) 2.5 mg Tab Take 2.5 mg by mouth.    TRUE METRIX GLUCOSE TEST STRIP Strp      Family History       Problem Relation (Age of Onset)    Drug abuse Brother    Heart attack Father, Brother    Hyperlipidemia Father    Hypertension Father, Brother    No Known Problems Sister    Stroke Father          Tobacco Use    Smoking status: Current Some Day Smoker     Packs/day: 0.25     Years: 10.00     Pack years: 2.50    Smokeless tobacco: Never Used   Substance and Sexual Activity    Alcohol use: No    Drug use: No    Sexual activity: Not on file     Review of Systems   Constitutional:  Positive for chills. Negative for activity change and fever.   HENT:  Negative for congestion and trouble swallowing.    Eyes:  Negative for photophobia and visual disturbance.   Respiratory:  Negative for chest tightness, shortness of breath and wheezing.    Cardiovascular:  Positive for leg swelling. Negative for chest pain and palpitations.   Gastrointestinal:  Negative for abdominal pain, constipation, nausea and vomiting.   Genitourinary:  Negative for dysuria, flank pain, frequency, hematuria and urgency.   Musculoskeletal:  Positive for arthralgias. Negative for back pain and gait problem.   Skin:  Positive for color change and wound. Negative for rash.   Neurological:  Negative for dizziness, syncope, weakness, light-headedness, numbness and headaches.   Psychiatric/Behavioral:  Negative for agitation and confusion. The patient is not nervous/anxious.    Objective:     Vital Signs (Most Recent):  Temp: 98.1 °F (36.7 °C) (02/25/22 1158)  Pulse: 96 (02/25/22 1158)  Resp: 18 (02/25/22 1158)  BP: 136/83 (02/25/22 1158)  SpO2: 97 % (02/25/22 1158) Vital Signs (24h Range):  Temp:  [98.1 °F (36.7 °C)-98.8 °F (37.1 °C)] 98.1 °F (36.7 °C)  Pulse:  [] 96  Resp:  [16-18] 18  SpO2:  [93 %-99 %] 97 %  BP: (107-156)/(57-91)  136/83     Weight: 72 kg (158 lb 11.7 oz)  Body mass index is 25.62 kg/m².    Physical Exam  Vitals and nursing note reviewed.   Constitutional:       Appearance: He is well-developed. He is ill-appearing.      Comments: On HDCalmer. Asterixis with tremors at rest and with movement    HENT:      Head: Normocephalic and atraumatic.      Mouth/Throat:      Mouth: Mucous membranes are moist.   Eyes:      General: No scleral icterus.     Conjunctiva/sclera: Conjunctivae normal.   Cardiovascular:      Rate and Rhythm: Normal rate and regular rhythm.      Heart sounds: Normal heart sounds.   Pulmonary:      Effort: Pulmonary effort is normal. No respiratory distress.      Breath sounds: Normal breath sounds. No wheezing.   Abdominal:      General: Bowel sounds are normal. There is no distension.      Palpations: Abdomen is soft.      Tenderness: There is no abdominal tenderness.   Musculoskeletal:         General: No tenderness. Normal range of motion.      Cervical back: Normal range of motion and neck supple.      Right lower le+ Pitting Edema present.      Left lower le+ Pitting Edema present.      Comments: LLE with burst blister. Severe pain with any light touch.  Signs of bunion on both big toes but also possible gout tophi deposit on right big toe. Edema I RLE >LLE. pitting   Feet:      Left foot:      Skin integrity: Skin breakdown, erythema and warmth present.   Skin:     General: Skin is warm and dry.      Capillary Refill: Capillary refill takes less than 2 seconds.   Neurological:      Mental Status: He is alert and oriented to person, place, and time.      Cranial Nerves: No cranial nerve deficit.      Comments: Pain improved with movement to neck    Psychiatric:         Behavior: Behavior normal.         Thought Content: Thought content normal.         Judgment: Judgment normal.               Significant Labs:   All pertinent labs within the past 24 hours have been reviewed.  CBC:   Recent Labs   Lab  02/24/22  0607 02/25/22  0525   WBC 8.38 8.37   HGB 7.5* 7.3*   HCT 23.6* 23.7*    165       CMP:   Recent Labs   Lab 02/24/22  0607 02/25/22  0525    143   K 3.9 3.9    105   CO2 24 26   * 116*   * 56*   CREATININE 13.1* 8.5*   CALCIUM 9.3 8.9   ANIONGAP 17* 12   EGFRNONAA 3.7* 6.2*       Lactic Acid:   No results for input(s): LACTATE in the last 48 hours.      Significant Imaging: I have reviewed all pertinent imaging results/findings within the past 24 hours.   MRI Cervical Spine W WO Cont  Narrative: EXAMINATION:  MRI CERVICAL SPINE W WO CONTRAST    CLINICAL HISTORY:  assess retropharyngeal collection with contrast, nephro cleared to do this 2/24 AM to dialyze contrast off after;.    TECHNIQUE:  Multiplanar, multisequence MR images of the cervical spine were acquired without the administration of contrast.    COMPARISON:  MRI 02/22/2022.  Radiograph 02/22/2022.  CT 02/22/2022.    FINDINGS:  Examination is degraded by motion artifact.    Cervical spine alignment demonstrates reversal of the normal lordosis with 4 mm of anterolisthesis of C2 on C3 and suspected borderline widening of the atlantodens interval, findings that were better evaluated on previous CT.  Occipital condyles, odontoid process and C1 lateral masses are intact.  Vertebral body heights are well maintained without evidence for fracture.  No marrow signal abnormality to suggest an infiltrative process.    There is advanced multilevel degenerative disc space narrowing most pronounced at C4-C5 and C5-C6.  Endplate edema at C3-C4 and C4-C5, favored to be degenerative in nature.  No endplate erosive changes.    Cervical spinal cord demonstrates normal signal intensity.  Cerebellar tonsils are in their expected location.  Focal area of T2/STIR signal hyperintensity at the level of the delores, likely a remote lacunar infarct.  No pachymeningeal or leptomeningeal enhancement.    There is prevertebral soft tissue  enhancement with mild residual fluid.  No rim enhancing fluid collections to suggest abscess.  Vertebral artery flow voids are present.  Partially visualized right-sided central venous catheter within the right internal jugular vein.  No lymphadenopathy.  Visualized parotid, submandibular and thyroid glands are normal.    C2-C3: 4 mm of anterolisthesis of C2 on C3.  Mild left facet arthropathy.  Findings contribute to mild right neural foraminal narrowing.  No spinal canal stenosis.    C3-C4: Broad-based posterior disc osteophyte complex effaces the ventral thecal sac.  Bilateral uncovertebral joint spurring.  Findings contribute to mild spinal canal stenosis and moderate to severe bilateral neural foraminal narrowing.    C4-C5: Broad-based posterior disc osteophyte complex effaces the ventral thecal sac.  Bilateral uncovertebral joint spurring.  Findings contribute to moderate spinal canal stenosis and severe bilateral neural foraminal narrowing.    C5-C6: Broad-based posterior disc osteophyte complex partially effaces the ventral thecal sac.  Bilateral uncovertebral joint spurring.  Findings contribute to mild spinal canal stenosis and mild-to-moderate bilateral neural foraminal narrowing.    C6-C7: Broad-based posterior disc osteophyte complex partially effaces the ventral thecal sac.  Bilateral uncovertebral joint spurring.  Findings contribute to moderate bilateral neural foraminal narrowing.    C7-T1: Bilateral facet arthropathy and bilateral ligamentum flavum buckling..  Findings contribute to mild spinal canal stenosis and moderate to severe bilateral neural foraminal narrowing.  Impression: 1. Prevertebral soft tissue enhancement with near complete resolution of previously described fluid, likely sequela of a resolving infectious or inflammatory process.  No rim enhancing fluid collection to suggest abscess.  No convincing MR imaging findings to suggest discitis/osteomyelitis.  2. Advanced multilevel  cervical degenerative changes, difficult to accurately characterize secondary to patient motion artifact.    Electronically signed by: Lebron Carcamo MD  Date:    02/24/2022  Time:    12:18

## 2022-02-25 NOTE — PLAN OF CARE
OT eval completed and POC established 2/25/2022    Problem: Occupational Therapy Goal  Goal: Occupational Therapy Goal  Description: Goals to be met by: 3/11/2022     Patient will increase functional independence with ADLs by performing:    LE Dressing with Supervision.  Grooming while standing at sink with Supervision.  Toileting from toilet with Supervision for hygiene and clothing management.   Step transfer with Supervision  Toilet transfer to toilet with Supervision.    Outcome: Ongoing, Progressing

## 2022-02-25 NOTE — PLAN OF CARE
Patient AAO X 4, VSS. Does have agitation moments but easily redirected. Refuses PRN for pain. No visi but Telemonitor in place NS. Applied barrier cream and foam dressing to patient sacrum area (pressure ulcer). Wound consult in. Left lower extremity still redden but healing. Patient had HD yesterday and 1L was removed. MRI with contrast showed no fluid collection. Continued antibiotics for patient. Patient will get tunneled cath fri/mon for HD. Up ambulatory with two person assist, bed locked at lowest setting, yellow socks on, call bell in reach. Remains free from falls. Bed alarm is utilized.

## 2022-02-26 LAB
ABO + RH BLD: NORMAL
ANION GAP SERPL CALC-SCNC: 12 MMOL/L (ref 8–16)
BASOPHILS # BLD AUTO: 0.02 K/UL (ref 0–0.2)
BASOPHILS NFR BLD: 0.2 % (ref 0–1.9)
BLD GP AB SCN CELLS X3 SERPL QL: NORMAL
BUN SERPL-MCNC: 58 MG/DL (ref 6–20)
CALCIUM SERPL-MCNC: 8.6 MG/DL (ref 8.7–10.5)
CHLORIDE SERPL-SCNC: 105 MMOL/L (ref 95–110)
CK SERPL-CCNC: 156 U/L (ref 20–200)
CO2 SERPL-SCNC: 24 MMOL/L (ref 23–29)
CREAT SERPL-MCNC: 9.7 MG/DL (ref 0.5–1.4)
DIFFERENTIAL METHOD: ABNORMAL
EOSINOPHIL # BLD AUTO: 0.2 K/UL (ref 0–0.5)
EOSINOPHIL NFR BLD: 1.4 % (ref 0–8)
ERYTHROCYTE [DISTWIDTH] IN BLOOD BY AUTOMATED COUNT: 15.5 % (ref 11.5–14.5)
EST. GFR  (AFRICAN AMERICAN): 6.1 ML/MIN/1.73 M^2
EST. GFR  (NON AFRICAN AMERICAN): 5.3 ML/MIN/1.73 M^2
GLUCOSE SERPL-MCNC: 80 MG/DL (ref 70–110)
GRAM STN SPEC: NORMAL
GRAM STN SPEC: NORMAL
HCT VFR BLD AUTO: 24.3 % (ref 40–54)
HGB BLD-MCNC: 7.4 G/DL (ref 14–18)
IMM GRANULOCYTES # BLD AUTO: 0.07 K/UL (ref 0–0.04)
IMM GRANULOCYTES NFR BLD AUTO: 0.7 % (ref 0–0.5)
LYMPHOCYTES # BLD AUTO: 1.3 K/UL (ref 1–4.8)
LYMPHOCYTES NFR BLD: 12.4 % (ref 18–48)
MCH RBC QN AUTO: 28.2 PG (ref 27–31)
MCHC RBC AUTO-ENTMCNC: 30.5 G/DL (ref 32–36)
MCV RBC AUTO: 93 FL (ref 82–98)
MONOCYTES # BLD AUTO: 1.6 K/UL (ref 0.3–1)
MONOCYTES NFR BLD: 15.2 % (ref 4–15)
NEUTROPHILS # BLD AUTO: 7.3 K/UL (ref 1.8–7.7)
NEUTROPHILS NFR BLD: 70.1 % (ref 38–73)
NRBC BLD-RTO: 0 /100 WBC
PHOSPHATE SERPL-MCNC: 4.6 MG/DL (ref 2.7–4.5)
PLATELET # BLD AUTO: 174 K/UL (ref 150–450)
PMV BLD AUTO: 10.4 FL (ref 9.2–12.9)
POTASSIUM SERPL-SCNC: 3.8 MMOL/L (ref 3.5–5.1)
RBC # BLD AUTO: 2.62 M/UL (ref 4.6–6.2)
SODIUM SERPL-SCNC: 141 MMOL/L (ref 136–145)
WBC # BLD AUTO: 10.37 K/UL (ref 3.9–12.7)

## 2022-02-26 PROCEDURE — 90935 HEMODIALYSIS ONE EVALUATION: CPT | Mod: NTX

## 2022-02-26 PROCEDURE — 63600175 PHARM REV CODE 636 W HCPCS: Mod: NTX | Performed by: HOSPITALIST

## 2022-02-26 PROCEDURE — 87075 CULTR BACTERIA EXCEPT BLOOD: CPT | Mod: NTX

## 2022-02-26 PROCEDURE — C9113 INJ PANTOPRAZOLE SODIUM, VIA: HCPCS | Mod: NTX | Performed by: HOSPITALIST

## 2022-02-26 PROCEDURE — 87102 FUNGUS ISOLATION CULTURE: CPT | Mod: NTX

## 2022-02-26 PROCEDURE — 82550 ASSAY OF CK (CPK): CPT | Mod: NTX | Performed by: HOSPITALIST

## 2022-02-26 PROCEDURE — 87070 CULTURE OTHR SPECIMN AEROBIC: CPT | Mod: NTX

## 2022-02-26 PROCEDURE — 20600001 HC STEP DOWN PRIVATE ROOM: Mod: NTX

## 2022-02-26 PROCEDURE — 87116 MYCOBACTERIA CULTURE: CPT | Mod: NTX

## 2022-02-26 PROCEDURE — 99233 SBSQ HOSP IP/OBS HIGH 50: CPT | Mod: NTX,,, | Performed by: PODIATRIST

## 2022-02-26 PROCEDURE — 99233 PR SUBSEQUENT HOSPITAL CARE,LEVL III: ICD-10-PCS | Mod: NTX,,, | Performed by: INTERNAL MEDICINE

## 2022-02-26 PROCEDURE — 87206 SMEAR FLUORESCENT/ACID STAI: CPT | Mod: NTX

## 2022-02-26 PROCEDURE — 63600175 PHARM REV CODE 636 W HCPCS: Mod: JG,NTX

## 2022-02-26 PROCEDURE — 25000003 PHARM REV CODE 250: Mod: NTX | Performed by: INTERNAL MEDICINE

## 2022-02-26 PROCEDURE — 85025 COMPLETE CBC W/AUTO DIFF WBC: CPT | Mod: NTX | Performed by: HOSPITALIST

## 2022-02-26 PROCEDURE — 84100 ASSAY OF PHOSPHORUS: CPT | Mod: NTX | Performed by: HOSPITALIST

## 2022-02-26 PROCEDURE — 25000003 PHARM REV CODE 250: Mod: NTX | Performed by: HOSPITALIST

## 2022-02-26 PROCEDURE — 90935 PR HEMODIALYSIS, ONE EVALUATION: ICD-10-PCS | Mod: NTX,,, | Performed by: INTERNAL MEDICINE

## 2022-02-26 PROCEDURE — 99233 PR SUBSEQUENT HOSPITAL CARE,LEVL III: ICD-10-PCS | Mod: NTX,,, | Performed by: PODIATRIST

## 2022-02-26 PROCEDURE — 90935 HEMODIALYSIS ONE EVALUATION: CPT | Mod: NTX,,, | Performed by: INTERNAL MEDICINE

## 2022-02-26 PROCEDURE — 99233 SBSQ HOSP IP/OBS HIGH 50: CPT | Mod: NTX,,, | Performed by: INTERNAL MEDICINE

## 2022-02-26 PROCEDURE — 80048 BASIC METABOLIC PNL TOTAL CA: CPT | Mod: NTX | Performed by: HOSPITALIST

## 2022-02-26 PROCEDURE — 99233 PR SUBSEQUENT HOSPITAL CARE,LEVL III: ICD-10-PCS | Mod: NTX,,, | Performed by: HOSPITALIST

## 2022-02-26 PROCEDURE — 25000003 PHARM REV CODE 250: Mod: NTX | Performed by: PHYSICIAN ASSISTANT

## 2022-02-26 PROCEDURE — 87205 SMEAR GRAM STAIN: CPT | Mod: NTX

## 2022-02-26 PROCEDURE — 99233 SBSQ HOSP IP/OBS HIGH 50: CPT | Mod: NTX,,, | Performed by: HOSPITALIST

## 2022-02-26 PROCEDURE — 86920 COMPATIBILITY TEST SPIN: CPT | Mod: NTX | Performed by: FAMILY MEDICINE

## 2022-02-26 PROCEDURE — 86901 BLOOD TYPING SEROLOGIC RH(D): CPT | Mod: NTX | Performed by: HOSPITALIST

## 2022-02-26 PROCEDURE — 97535 SELF CARE MNGMENT TRAINING: CPT | Mod: NTX

## 2022-02-26 RX ORDER — AMOXICILLIN 250 MG
1 CAPSULE ORAL DAILY PRN
Status: DISCONTINUED | OUTPATIENT
Start: 2022-02-26 | End: 2022-03-10 | Stop reason: HOSPADM

## 2022-02-26 RX ORDER — POLYETHYLENE GLYCOL 3350 17 G/17G
17 POWDER, FOR SOLUTION ORAL DAILY PRN
Status: DISCONTINUED | OUTPATIENT
Start: 2022-02-26 | End: 2022-03-10 | Stop reason: HOSPADM

## 2022-02-26 RX ADMIN — OXYCODONE HYDROCHLORIDE 10 MG: 10 TABLET ORAL at 08:02

## 2022-02-26 RX ADMIN — SODIUM CHLORIDE: 0.9 INJECTION, SOLUTION INTRAVENOUS at 03:02

## 2022-02-26 RX ADMIN — SEVELAMER CARBONATE 800 MG: 800 TABLET, FILM COATED ORAL at 09:02

## 2022-02-26 RX ADMIN — HEPARIN SODIUM 5000 UNITS: 5000 INJECTION INTRAVENOUS; SUBCUTANEOUS at 06:02

## 2022-02-26 RX ADMIN — OXYCODONE HYDROCHLORIDE 10 MG: 10 TABLET ORAL at 12:02

## 2022-02-26 RX ADMIN — PANTOPRAZOLE SODIUM 40 MG: 40 INJECTION, POWDER, FOR SOLUTION INTRAVENOUS at 08:02

## 2022-02-26 RX ADMIN — HEPARIN SODIUM 5000 UNITS: 5000 INJECTION INTRAVENOUS; SUBCUTANEOUS at 09:02

## 2022-02-26 RX ADMIN — NYSTATIN 500000 UNITS: 500000 SUSPENSION ORAL at 09:02

## 2022-02-26 RX ADMIN — EPOETIN ALFA-EPBX 10000 UNITS: 10000 INJECTION, SOLUTION INTRAVENOUS; SUBCUTANEOUS at 06:02

## 2022-02-26 RX ADMIN — ACETAMINOPHEN 1000 MG: 500 TABLET ORAL at 06:02

## 2022-02-26 RX ADMIN — LABETALOL HYDROCHLORIDE 200 MG: 100 TABLET, FILM COATED ORAL at 03:02

## 2022-02-26 RX ADMIN — ACETAMINOPHEN 1000 MG: 500 TABLET ORAL at 09:02

## 2022-02-26 RX ADMIN — HYDRALAZINE HYDROCHLORIDE 50 MG: 50 TABLET ORAL at 06:02

## 2022-02-26 RX ADMIN — MUPIROCIN: 20 OINTMENT TOPICAL at 09:02

## 2022-02-26 RX ADMIN — Medication 6 MG: at 09:02

## 2022-02-26 RX ADMIN — NYSTATIN 500000 UNITS: 500000 SUSPENSION ORAL at 12:02

## 2022-02-26 RX ADMIN — LABETALOL HYDROCHLORIDE 200 MG: 100 TABLET, FILM COATED ORAL at 09:02

## 2022-02-26 RX ADMIN — PANTOPRAZOLE SODIUM 40 MG: 40 INJECTION, POWDER, FOR SOLUTION INTRAVENOUS at 09:02

## 2022-02-26 RX ADMIN — ARIPIPRAZOLE 15 MG: 5 TABLET ORAL at 09:02

## 2022-02-26 RX ADMIN — OXYCODONE HYDROCHLORIDE 10 MG: 10 TABLET ORAL at 06:02

## 2022-02-26 RX ADMIN — OXYCODONE HYDROCHLORIDE 10 MG: 10 TABLET ORAL at 04:02

## 2022-02-26 NOTE — PROGRESS NOTES
[facility] Stepdown  Infectious Disease  Progress Note    Patient Name: Enrique Martinez  MRN: [id number]  Admission Date: 2/16/2022  Length of Stay: 9 days  Attending Physician: Rosalinda Black MD  Primary Care Provider: Primary Doctor No    Isolation Status: No active isolations  Assessment/Plan:      * Pain and swelling of left lower extremity  MRI foot shows brodies abscess (subacute osteomyelitis). Pt has been on empiric ceftriaxone and daptomycin for empiric coverage of foot infection along with concern for retropharyngeal fluid collection/abscess.     Recommendations:  --Appreciate podiatry input  --If biopsy and cultures performed, please contact us if there are concerning results prior to discharge   --Would recommend continuing broad spectrum antibiotic coverage and will tentatively plan for 6 week osteomyelitis course pending results of bone biopsy pathology report   --As patient is on dialysis, would recommend switching to IV vancomycin per pharmacy dosing and IV cefazolin for more convenient dosing schedule, especially as an outpatient      Prevertebral Fluid Collection  As outlined above.    Neck pain  Non-contrast MRI had to be discontinued due to patient request.  But available images shows retropharyngeal fluid collection with abscess not excluded.  Deeply concerned that this could represent infection.  His recent bacteremia makes me concerned this could be salmonella.   He is immunocompromised due to a combination of uremia and diabetes so invasive salmonellosis is possible. Active problems of concern are his cervical neck prevertebral fluid collection, left lower extremity cellulitis/ brodies abscess, and recent salmonella bacteremia.     Repeat MRI c-spine showing improvement of retropharyngeal fluid collection. Appearing to not need any intervention at this time.     Plan  --No antibiotics indicated to address neck region at this time. See pain and swelling of left lower extremity  A/P.      Thank you for your consult. I will sign off. Please contact us if you have any additional questions.    Wagner Oconnell, DO  Infectious Disease  [facility] Stepdown    Subjective:     Principal Problem:Pain and swelling of left lower extremity    HPI: Enrique Martinez is a 56 y.o. male with a PMH of CKD V glomerulonephropathy, insulin dependent diabetes mellitus, and CAD who presented to the ED for evaluation of worsening left foot pain and swelling. He was recently admitted at [facility] from 2/2-2/4/2022 from LLE cellulitis complicated by salmonella bacteremia after having diarrheal illness.  He was discharged home with ciprofloxacin. He reports no improvement in his cellulitis since being discharged home and no has new blister development. ID was consulted due to recent salmonella bacteremia and new findings on MRI suspicious for left foot myelitis and persistent cellulitis infection. On initial ID encounter, pt reporting pain in left foot and ankle but denying any fever, chills, N/V, diarrhea, chest pain, headaches, or SOB.     Interval History: Patient seen at bedside. Says he is doing well and foot feels better today. Discussed plan to treat foot as having bone infection with six weeks of antibiotics with dialysis.     Review of Systems   Constitutional:  Negative for activity change, chills and fever.   HENT:  Negative for congestion, trouble swallowing and voice change.    Eyes:  Negative for photophobia and visual disturbance.   Respiratory:  Negative for chest tightness, shortness of breath and wheezing.    Cardiovascular:  Positive for leg swelling. Negative for chest pain and palpitations.   Gastrointestinal:  Negative for abdominal pain, constipation, nausea and vomiting.   Genitourinary:  Negative for dysuria, flank pain, frequency, hematuria and urgency.   Musculoskeletal:  Positive for arthralgias. Negative for back pain and gait problem.        Left foot pain   Skin:  Positive for wound.  Negative for rash.   Neurological:  Negative for syncope, speech difficulty and headaches.   Psychiatric/Behavioral:  Negative for agitation, behavioral problems and confusion. The patient is not nervous/anxious.    Objective:     Vital Signs (Most Recent):  Temp: 98.1 °F (36.7 °C) (02/26/22 1148)  Pulse: 100 (02/26/22 1148)  Resp: 16 (02/26/22 1216)  BP: (!) 173/99 (02/26/22 1148)  SpO2: 98 % (02/26/22 1148)   Vital Signs (24h Range):  Temp:  [98.1 °F (36.7 °C)-98.8 °F (37.1 °C)] 98.1 °F (36.7 °C)  Pulse:  [] 100  Resp:  [16-18] 16  SpO2:  [96 %-99 %] 98 %  BP: (136-173)/(77-99) 173/99     Weight: 72 kg (158 lb 11.7 oz)  Body mass index is 25.62 kg/m².    Estimated Creatinine Clearance: 7.5 mL/min (A) (based on SCr of 9.7 mg/dL (H)).    Physical Exam  Vitals and nursing note reviewed.   Constitutional:       General: He is not in acute distress.     Appearance: He is well-developed and overweight. He is ill-appearing.   HENT:      Head: Normocephalic and atraumatic.      Nose: Nose normal.      Mouth/Throat:      Mouth: Mucous membranes are moist.   Eyes:      General: No scleral icterus.     Conjunctiva/sclera: Conjunctivae normal.   Neck:      Comments: Right IJ trialysis line in place.   Cardiovascular:      Rate and Rhythm: Normal rate and regular rhythm.      Heart sounds: Normal heart sounds. No murmur heard.    No friction rub. No gallop.   Pulmonary:      Effort: Pulmonary effort is normal. No respiratory distress.      Breath sounds: Normal breath sounds. No wheezing or rales.   Abdominal:      General: Bowel sounds are normal. There is no distension.      Palpations: Abdomen is soft.      Tenderness: There is no abdominal tenderness. There is no guarding.   Musculoskeletal:         General: Tenderness (left foot) present. Normal range of motion.      Cervical back: Normal range of motion and neck supple.      Right lower leg: No edema.      Left lower leg: No edema.   Feet:      Right foot:       Skin integrity: Skin integrity normal.      Left foot:      Skin integrity: Skin breakdown, erythema, warmth and dry skin present.   Skin:     General: Skin is warm and dry.      Capillary Refill: Capillary refill takes less than 2 seconds.      Comments: L leg, ankle, foot skin sloughing.   Deroofed blister on dorsum of left foot   Neurological:      General: No focal deficit present.      Mental Status: He is alert and easily aroused.      GCS: GCS eye subscore is 4. GCS verbal subscore is 5. GCS motor subscore is 6.      Cranial Nerves: No cranial nerve deficit.      Sensory: No sensory deficit.   Psychiatric:         Behavior: Behavior normal. Behavior is cooperative.         Thought Content: Thought content normal.         Judgment: Judgment normal.       Significant Labs: All pertinent labs within the past 24 hours have been reviewed.    Significant Imaging: I have reviewed all pertinent imaging results/findings within the past 24 hours.

## 2022-02-26 NOTE — PT/OT/SLP PROGRESS
"Occupational Therapy   Treatment    Name: Enrique Martinez  MRN: [id number]  Admitting Diagnosis:  Pain and swelling of left lower extremity       Recommendations:     Discharge Recommendations: rehabilitation facility  Discharge Equipment Recommendations:  walker, rolling  Barriers to discharge:  Inaccessible home environment    Assessment:     Enrique Martinez is a 56 y.o. male with a medical diagnosis of Pain and swelling of left lower extremity.  He presents with urgent need for getting OOB to use BSC. Pt called therapist in room insisting on help. Therapist not assigned to patient but noticed Aspen collar but no spinal incision.  Pt refused to demetrio cervical collar due to severe urgency but maintained spinal precautions. He was assisted with min A from bed to BSC. OT reviewed patient chart for safe mobilization and to expand on session since it had been initiated and pt is a rehab candidate.  While chart reviewing right by door of pt's room (privacy given-pt agreed to alert OT when ready to get back) pt transferred himself alone.  Pt needed assist for repositioning and scooting up in bed but was unwilling to laterally scoot or stand and take steps. Because he was so low in the bed and wanted HOB elevated he was encouraged to consider my request again and responded "why are we fighting." Explained spinal precautions, positioning for watching TV and how the final position he was in was one of cervical rotation. Pt was comfortable and his wishes were respected. Pt thankful for the help and the offer for further mobility just not up for it right now. His call bell was placed in his hand and housekeeping alerted of the spill on the floor that was found upon initial entry.  Performance deficits affecting function are weakness, impaired endurance, impaired self care skills, impaired functional mobilty, gait instability, impaired balance, decreased upper extremity function, decreased lower extremity function, decreased " safety awareness, orthopedic precautions.     Rehab Prognosis:  Good; patient would benefit from acute skilled OT services to address these deficits and reach maximum level of function.       Plan:     Patient to be seen 3 x/week to address the above listed problems via self-care/home management, therapeutic activities, therapeutic exercises, neuromuscular re-education  · Plan of Care Expires: 03/27/22  · Plan of Care Reviewed with: patient    Subjective     Pain/Comfort:  · Pain Rating 1:  (did not rate, but c/o neck pain)    Objective:     Communicated with: RN after to session.  Patient found sitting edge of bed with telemetry upon OT entry to room.    General Precautions: Standard, fall   Orthopedic Precautions:spinal precautions   Braces:  (pt refused ASPEN collar due to s/s of bowel or bladder urgency)  Respiratory Status: Room air     Occupational Performance:     Bed Mobility:    · Patient completed Supine to Sit with independence but not log roll    Functional Mobility/Transfers:  · Patient completed Sit <> Stand Transfer with minimum assistance  with  no assistive device   · Patient completed Toilet Transfer Step Transfer technique with minimum assistance with  no AD  · Functional Mobility: Pt [place]    Activities of Daily Living:  · Toileting: minimum assistance for clothing management and transfers to [facility] 6 Click ADL: 20    Treatment & Education:  · Pt educated on role of OT in acute care setting.   · Assisted with ADLs and functional mobility with assist levels noted above  · Educated on spinal precautions and encouraged optimal positioning to reduce discomfort pt c/o.    Patient left HOB elevated with call button in reachEducation:      GOALS:   Multidisciplinary Problems     Occupational Therapy Goals        Problem: Occupational Therapy Goal    Goal Priority Disciplines Outcome Interventions   Occupational Therapy Goal     OT, PT/OT Ongoing, Progressing     Description: Goals to be met by: 3/11/2022     Patient will increase functional independence with ADLs by performing:    LE Dressing with Supervision.  Grooming while standing at sink with Supervision.  Toileting from toilet with Supervision for hygiene and clothing management.   Step transfer with Supervision  Toilet transfer to toilet with Supervision.                     Time Tracking:     OT Date of Treatment: 02/26/22  OT Start Time: 1405  OT Stop Time: 1415  OT Total Time (min): 10 min    Billable Minutes:Self Care/Home Management 10    OT/FELICITY: OT     FELICITY Visit Number: 0    2/26/2022

## 2022-02-26 NOTE — ASSESSMENT & PLAN NOTE
Non-contrast MRI had to be discontinued due to patient request.  But available images shows retropharyngeal fluid collection with abscess not excluded.  Deeply concerned that this could represent infection.  His recent bacteremia makes me concerned this could be salmonella.   He is immunocompromised due to a combination of uremia and diabetes so invasive salmonellosis is possible. Active problems of concern are his cervical neck prevertebral fluid collection, left lower extremity cellulitis/ brodies abscess, and recent salmonella bacteremia.     Repeat MRI c-spine showing improvement of retropharyngeal fluid collection. Appearing to not need any intervention at this time.     Plan  --No antibiotics indicated to address neck region at this time. See pain and swelling of left lower extremity A/P.

## 2022-02-26 NOTE — ASSESSMENT & PLAN NOTE
Hemoglobin 10.1 one month ago, now 7.2. Normocytic anemia with low iron, iron sat, elevated ferritin.     -  EPO on Saturdays with HD.

## 2022-02-26 NOTE — PROGRESS NOTES
[facility] - [facility]plant Stepdown  Nephrology  Progress Note    Patient Name: Enrique Martinez  MRN: [id number]  Admission Date: 2/16/2022  Hospital Length of Stay: 9 days  Attending Provider: Rosalinda Black MD   Primary Care Physician: Primary Doctor No  Principal Problem:Pain and swelling of left lower extremity    Subjective:     HPI: Mr. Martinez is a 56 y.o. 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 He was recently admitted at [facility] from 2/2-2/4/2022 from LLE cellulitis. He was found to have salmonella bacteremia suspected due to diarrheal illness. He was discharged with cipro for which he reports compliance. He has had no improvement in his pain, redness, or swelling of his left foot and leg. He has also developed a new blister on his left foot, with MRI foot concerning for osteomyelitis and showing soft tissue swelling c/w cellulitis. He is admitted for treatment of suspected osteomyelitis.     He follows with Dr. Cardoza (nephrology), has adamantly declined HD in the past and would rather wait for transplant. He reports being on the transplant list for the past 4 years. His last renal biopsy in 2013 showed 50% glomerulosclerosis. Started on calcitriol 0.5, hx of elevated PTH at 921. Patient complains of decreased appetite, neck cramps, fatigue. He still urinates 4-5 times/day. He reports his B/L LE edema has worsened over the past few weeks. No dyspnea, chest pain, dysuria, hematuria. Nephro consulted for HD initiation.      Labs are significant for hyperkalemia 5.5, bicarb 18, , phosphate 10.4, Cr 19.7, GFR 2.2, ESR 97, CRP 33.4. . CBC significant for Hgb of 7.2, white count 14. Previous echo showed EF of 65%.       Interval History:  CHIQUITA, patient okay with doing HD at [facility] for a time prior to at home HD. Mentation improved, tremor improved. Has some sacral skin breakdown. Still making urine, ready for HD today. IR tunneled cath planned for Monday. ID recommending Podiatry for subacute osteo of Left foot, and MRI with contrast of Left foot for tailoring final abx recs. No abx for neck fluid collection.     Review of patient's allergies indicates:  No Known Allergies  Current Facility-Administered Medications   Medication Frequency    0.9%  NaCl infusion (for blood administration) Q24H PRN    0.9%  NaCl infusion Once    0.9%  NaCl infusion PRN    0.9%  NaCl infusion Once    acetaminophen tablet 1,000 mg Q8H    acetaminophen tablet 650 mg Q6H PRN    albuterol-ipratropium 2.5 mg-0.5 mg/3 mL nebulizer solution 3 mL Q4H PRN    aluminum-magnesium hydroxide-simethicone 200-200-20 mg/5 mL suspension 30 mL QID PRN    amLODIPine tablet 10 mg Daily    ARIPiprazole tablet 15 mg QHS    aspirin EC tablet 81 mg Daily    atorvastatin tablet 80 mg Daily    cefTRIAXone (ROCEPHIN) 2 g/50 mL D5W IVPB Q24H    colchicine split tablet 0.3 mg BID    DAPTOmycin (CUBICIN) 445 mg in sodium chloride 0.9% IVPB Q48H    dextrose 10% bolus 125 mL PRN    dextrose 10% bolus 250 mL PRN    epoetin veronica-epbx injection 10,000 Units Q7 Days    folic acid tablet 1 mg Daily    glucagon (human recombinant) injection 1 mg PRN    glucose chewable tablet 16 g PRN    glucose chewable tablet 24 g PRN    heparin (porcine) injection 5,000 Units Q8H    HP Acthar (corticotropin) injectable gel 80 units Every Mon, Fri    hydrALAZINE tablet 50 mg Q8H    labetaloL tablet 200 mg Q12H    lorazepam injection 1 mg On Call Procedure    melatonin tablet 6 mg Nightly PRN    methocarbamoL tablet 750 mg QID PRN    morphine injection 2 mg Once PRN    morphine injection 2 mg On Call Procedure    mupirocin 2 % ointment BID    naloxone 0.4 mg/mL injection 0.02 mg PRN     nystatin 100,000 unit/mL suspension 500,000 Units QID (WM & HS)    OLANZapine injection 2.5 mg Once PRN    ondansetron disintegrating tablet 8 mg Q8H PRN    oxyCODONE immediate release tablet 5 mg Q6H PRN    oxyCODONE immediate release tablet Tab 10 mg Q4H PRN    pantoprazole injection 40 mg BID    polyethylene glycol packet 17 g Daily    prochlorperazine injection Soln 5 mg Q6H PRN    senna-docusate 8.6-50 mg per tablet 1 tablet Daily    sevelamer carbonate tablet 800 mg TID    simethicone chewable tablet 80 mg QID PRN    sodium chloride 0.9% bolus 250 mL PRN    sodium chloride 0.9% bolus 250 mL PRN    sodium chloride 0.9% bolus 250 mL PRN    sodium chloride 0.9% flush 10 mL Q8H PRN       Objective:     Vital Signs (Most Recent):  Temp: 98.8 °F (37.1 °C) (02/25/22 1930)  Pulse: 89 (02/26/22 0002)  Resp: 18 (02/26/22 0823)  BP: (!) 152/77 (02/26/22 0002)  SpO2: 96 % (02/26/22 0002)  O2 Device (Oxygen Therapy): room air (02/25/22 1930)   Vital Signs (24h Range):  Temp:  [98.1 °F (36.7 °C)-98.8 °F (37.1 °C)] 98.8 °F (37.1 °C)  Pulse:  [86-96] 89  Resp:  [17-18] 18  SpO2:  [96 %-99 %] 96 %  BP: (136-162)/(77-83) 152/77     Weight: 72 kg (158 lb 11.7 oz) (02/23/22 0600)  Body mass index is 25.62 kg/m².  Body surface area is 1.83 meters squared.    I/O last 3 completed shifts:  In: -   Out: 100 [Urine:100]    Physical Exam  Vitals and nursing note reviewed.   Constitutional:       Appearance: He is well-developed and overweight.   HENT:      Head: Normocephalic and atraumatic.      Nose: Nose normal.      Mouth/Throat:      Mouth: Mucous membranes are moist.   Eyes:      General: No scleral icterus.     Conjunctiva/sclera: Conjunctivae normal.   Cardiovascular:      Rate and Rhythm: Normal rate and regular rhythm.      Heart sounds: Normal heart sounds.   Pulmonary:      Effort: Pulmonary effort is normal.      Breath sounds: Normal breath sounds.   Musculoskeletal:         General: Signs of injury present.  Normal range of motion.      Cervical back: Normal range of motion. No rigidity or tenderness.      Right lower leg: No edema.      Left lower leg: No edema.      Comments: Improved LE swelling and erythema and tenderness   Feet:      Right foot:      Skin integrity: Skin integrity normal.      Left foot:      Skin integrity: Dry skin present. No erythema or warmth.   Skin:     General: Skin is warm and dry.      Capillary Refill: Capillary refill takes less than 2 seconds.      Comments: L leg, ankle, foot skin sloughing.   Deroofed blister on dorsum of left foot underneath dressing   Neurological:      Mental Status: He is alert, oriented to person, place, and time and easily aroused.      GCS: GCS eye subscore is 4. GCS verbal subscore is 5. GCS motor subscore is 6.      Comments: Tremor barely present   Psychiatric:         Attention and Perception: Attention normal.         Behavior: Behavior normal. Behavior is cooperative.         Thought Content: Thought content normal.         Judgment: Judgment normal.       Significant Labs:  CBC:   Recent Labs   Lab 02/26/22  0627   WBC 10.37   RBC 2.62*   HGB 7.4*   HCT 24.3*      MCV 93   MCH 28.2   MCHC 30.5*     CMP:   Recent Labs   Lab 02/21/22  2311 02/22/22  0631 02/26/22  0627      < > 80   CALCIUM 9.3   < > 8.6*   ALBUMIN 2.1*  --   --    PROT 5.5*  --   --    *   < > 141   K 4.3   < > 3.8   CO2 20*   < > 24      < > 105   *   < > 58*   CREATININE 19.4*   < > 9.7*   ALKPHOS 83  --   --    ALT 13  --   --    AST 24  --   --    BILITOT 0.2  --   --     < > = values in this interval not displayed.     All labs within the past 24 hours have been reviewed.     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 Previously, he has declined HD in the past, never receiving a fistula. He is on the transplant wait list. Patient exhibits signs of uremia including fatigue, poor appetite, and mental slowing. On labs, he is hyperkalemic, uremic, AGMA, hyperphosphatemic. GFR is 2-3, which is reduced from previous GFR 13. Home meds include sodium bicarb, Renvela, and calcitriol. 2+ pitting edema on B/L lower extremities. Renal US from Jan 2022 shows sonographic evidence of chronic medical renal disease and bilateral simple and minimally complex renal cysts.  sCr one year ago was 4.5, now 18.6. UPCr ratio 1.11 (Dec 2020)  2/18: Patient increasingly somnolent, lethargic. Hospital med in process of getting in touch with Dr. Lee, who the patient follows with outpatient.   2/23: first session of HD 2 hours, no net fluid, patient tolerated. Still confused, having visual hallucinations of mice.   2/24: second HD session 150 min, 1 L removed, patient tolerated.  2/26: HD planned for today    Plan:   - Will need placement of tunneled catheter prior to discharge, aiming for Monday depending on IR availability   - CM to set up HD chair for outpt HD. Patient has expressed that he is willing to do 2 sessions weekly as opposed to the standard 3 per week. Will need to pursue HD at dialysis center first prior to arranging possible at home dialysis.   - Will need AVF with vascular surgery, likely to be placed outpatient   - HD on T/Th/Sa schedule with EPO 10,000 units on saturdays  - Okay to increase protein in diet now that he is on HD to 1 g/kg per day  - Notified patient's outpatient nephrologists Anton Cardoza/Rosa that patient has initiated HD during hospitalization   - Sevelamer 800 TID   - Hold calcitriol in setting of hyperphosphatemia. Will re-start when phos reaches normal level.  - Trend Cr  - Strict I&Os  - Avoid nephrotoxic agents  - Renally adjust  medications         Hyperphosphatemia  Hyperphosphatemia at 10.9 >> 4.6.     - Continue Sevelamer 800 TID    Anemia due to chronic kidney disease  Hemoglobin 10.1 one month ago, now 7.2. Normocytic anemia with low iron, iron sat, elevated ferritin.     -  EPO on Saturdays with HD.         Thank you for your consult. I will follow-up with patient. Please contact us if you have any additional questions.    Harrison Acuna MD  Nephrology  [facility] - Transplant Stepdown

## 2022-02-26 NOTE — SUBJECTIVE & OBJECTIVE
Interval History: 2/26: NAEON, still not wearing C collar, continued full strength    Medications:  Continuous Infusions:  Scheduled Meds:   sodium chloride 0.9%   Intravenous Once    sodium chloride 0.9%   Intravenous Once    acetaminophen  1,000 mg Oral Q8H    amLODIPine  10 mg Oral Daily    ARIPiprazole  15 mg Oral QHS    aspirin  81 mg Oral Daily    atorvastatin  80 mg Oral Daily    cefTRIAXone (ROCEPHIN) IVPB  2 g Intravenous Q24H    colchicine  0.3 mg Oral BID    DAPTOmycin (CUBICIN)  IV  6 mg/kg Intravenous Q48H    epoetin veronica-epbx  10,000 Units Subcutaneous Q7 Days    folic acid  1 mg Oral Daily    heparin (porcine)  5,000 Units Subcutaneous Q8H    corticotropin  80 Units Subcutaneous Every Mon, Fri    hydrALAZINE  50 mg Oral Q8H    labetalol  200 mg Oral Q12H    mupirocin   Nasal BID    nystatin  500,000 Units Oral QID (WM & HS)    pantoprazole  40 mg Intravenous BID    sevelamer carbonate  800 mg Oral TID     PRN Meds:sodium chloride, sodium chloride 0.9%, acetaminophen, albuterol-ipratropium, aluminum-magnesium hydroxide-simethicone, dextrose 10%, dextrose 10%, glucagon (human recombinant), glucose, glucose, lorazepam, melatonin, methocarbamoL, morphine, morphine, naloxone, OLANZapine, ondansetron, oxyCODONE, oxyCODONE, polyethylene glycol, prochlorperazine, senna-docusate 8.6-50 mg, simethicone, sodium chloride 0.9%, sodium chloride 0.9%, sodium chloride 0.9%, sodium chloride 0.9%     Review of Systems  Objective:     Weight: 72 kg (158 lb 11.7 oz)  Body mass index is 25.62 kg/m².  Vital Signs (Most Recent):  Temp: 98.5 °F (36.9 °C) (02/26/22 0745)  Pulse: 85 (02/26/22 0745)  Resp: 18 (02/26/22 0823)  BP: 136/83 (02/26/22 0745)  SpO2: 98 % (02/26/22 0745)   Vital Signs (24h Range):  Temp:  [98.1 °F (36.7 °C)-98.8 °F (37.1 °C)] 98.5 °F (36.9 °C)  Pulse:  [85-96] 85  Resp:  [16-18] 18  SpO2:  [96 %-99 %] 98 %  BP: (136-162)/(77-83) 136/83                       Male External Urinary Catheter 02/18/22 0416  Small (Active)   Collection Container Urimeter 02/19/22 2330   Securement Method secured to top of thigh w/ adhesive device 02/19/22 2330   Skin no redness;no breakdown 02/19/22 2330   Tolerance no signs/symptoms of discomfort;did not assist with appliance change 02/19/22 2330   Output (mL) 450 mL 02/19/22 1524   Catheter Change Date 02/19/22 02/19/22 2330   Catheter Change Time 2100 02/19/22 2330       Physical Exam    Neurosurgery Physical Exam    5/5 strength   LROM left shoulder       full Range of motion in neck   No hoffmans or clonus     Significant Labs:  Recent Labs   Lab 02/25/22  0525 02/26/22  0627   * 80    141   K 3.9 3.8    105   CO2 26 24   BUN 56* 58*   CREATININE 8.5* 9.7*   CALCIUM 8.9 8.6*       Recent Labs   Lab 02/25/22 0525 02/26/22  0627   WBC 8.37 10.37   HGB 7.3* 7.4*   HCT 23.7* 24.3*    174       No results for input(s): LABPT, INR, APTT in the last 48 hours.  Microbiology Results (last 7 days)       Procedure Component Value Units Date/Time    Blood Culture #2 **CANNOT BE ORDERED STAT** [[id number]] Collected: 02/16/22 1806    Order Status: Completed Specimen: Blood from Peripheral, Antecubital, Left Updated: 02/21/22 2012     Blood Culture, Routine No growth after 5 days.    Blood Culture #1 **CANNOT BE ORDERED STAT** [[id number]] Collected: 02/16/22 1807    Order Status: Completed Specimen: Blood from Peripheral, Hand, Right Updated: 02/21/22 2012     Blood Culture, Routine No growth after 5 days.          All pertinent labs from the last 24 hours have been reviewed.    Significant Diagnostics:  CT: No results found in the last 24 hours.  I have reviewed all pertinent imaging results/findings within the past 24 hours.  I have reviewed and interpreted all pertinent imaging results/findings within the past 24 hours.

## 2022-02-26 NOTE — PROGRESS NOTES
HD treatment started. No complications with access to the right IJ catheter. Lines secured and telemetry in place. No complaints of discomfort at this time.

## 2022-02-26 NOTE — SUBJECTIVE & OBJECTIVE
Interval History: Patient seen at bedside. Says he is doing well and foot feels better today. Discussed plan to treat foot as having bone infection with six weeks of antibiotics with dialysis.     Review of Systems   Constitutional:  Negative for activity change, chills and fever.   HENT:  Negative for congestion, trouble swallowing and voice change.    Eyes:  Negative for photophobia and visual disturbance.   Respiratory:  Negative for chest tightness, shortness of breath and wheezing.    Cardiovascular:  Positive for leg swelling. Negative for chest pain and palpitations.   Gastrointestinal:  Negative for abdominal pain, constipation, nausea and vomiting.   Genitourinary:  Negative for dysuria, flank pain, frequency, hematuria and urgency.   Musculoskeletal:  Positive for arthralgias. Negative for back pain and gait problem.        Left foot pain   Skin:  Positive for wound. Negative for rash.   Neurological:  Negative for syncope, speech difficulty and headaches.   Psychiatric/Behavioral:  Negative for agitation, behavioral problems and confusion. The patient is not nervous/anxious.    Objective:     Vital Signs (Most Recent):  Temp: 98.1 °F (36.7 °C) (02/26/22 1148)  Pulse: 100 (02/26/22 1148)  Resp: 16 (02/26/22 1216)  BP: (!) 173/99 (02/26/22 1148)  SpO2: 98 % (02/26/22 1148)   Vital Signs (24h Range):  Temp:  [98.1 °F (36.7 °C)-98.8 °F (37.1 °C)] 98.1 °F (36.7 °C)  Pulse:  [] 100  Resp:  [16-18] 16  SpO2:  [96 %-99 %] 98 %  BP: (136-173)/(77-99) 173/99     Weight: 72 kg (158 lb 11.7 oz)  Body mass index is 25.62 kg/m².    Estimated Creatinine Clearance: 7.5 mL/min (A) (based on SCr of 9.7 mg/dL (H)).    Physical Exam  Vitals and nursing note reviewed.   Constitutional:       General: He is not in acute distress.     Appearance: He is well-developed and overweight. He is ill-appearing.   HENT:      Head: Normocephalic and atraumatic.      Nose: Nose normal.      Mouth/Throat:      Mouth: Mucous membranes  are moist.   Eyes:      General: No scleral icterus.     Conjunctiva/sclera: Conjunctivae normal.   Neck:      Comments: Right IJ trialysis line in place.   Cardiovascular:      Rate and Rhythm: Normal rate and regular rhythm.      Heart sounds: Normal heart sounds. No murmur heard.    No friction rub. No gallop.   Pulmonary:      Effort: Pulmonary effort is normal. No respiratory distress.      Breath sounds: Normal breath sounds. No wheezing or rales.   Abdominal:      General: Bowel sounds are normal. There is no distension.      Palpations: Abdomen is soft.      Tenderness: There is no abdominal tenderness. There is no guarding.   Musculoskeletal:         General: Tenderness (left foot) present. Normal range of motion.      Cervical back: Normal range of motion and neck supple.      Right lower leg: No edema.      Left lower leg: No edema.   Feet:      Right foot:      Skin integrity: Skin integrity normal.      Left foot:      Skin integrity: Skin breakdown, erythema, warmth and dry skin present.   Skin:     General: Skin is warm and dry.      Capillary Refill: Capillary refill takes less than 2 seconds.      Comments: L leg, ankle, foot skin sloughing.   Deroofed blister on dorsum of left foot   Neurological:      General: No focal deficit present.      Mental Status: He is alert and easily aroused.      GCS: GCS eye subscore is 4. GCS verbal subscore is 5. GCS motor subscore is 6.      Cranial Nerves: No cranial nerve deficit.      Sensory: No sensory deficit.   Psychiatric:         Behavior: Behavior normal. Behavior is cooperative.         Thought Content: Thought content normal.         Judgment: Judgment normal.       Significant Labs: All pertinent labs within the past 24 hours have been reviewed.    Significant Imaging: I have reviewed all pertinent imaging results/findings within the past 24 hours.

## 2022-02-26 NOTE — SUBJECTIVE & OBJECTIVE
Interval History: NAOE, patient okay with doing HD at [facility] for a time prior to at home HD. Mentation improved, tremor improved. Has some sacral skin breakdown. Still making urine, ready for HD today. IR tunneled cath planned for Monday. ID recommending Podiatry for subacute osteo of Left foot, and MRI with contrast of Left foot for tailoring final abx recs. No abx for neck fluid collection.     Review of patient's allergies indicates:  No Known Allergies  Current Facility-Administered Medications   Medication Frequency    0.9%  NaCl infusion (for blood administration) Q24H PRN    0.9%  NaCl infusion Once    0.9%  NaCl infusion PRN    0.9%  NaCl infusion Once    acetaminophen tablet 1,000 mg Q8H    acetaminophen tablet 650 mg Q6H PRN    albuterol-ipratropium 2.5 mg-0.5 mg/3 mL nebulizer solution 3 mL Q4H PRN    aluminum-magnesium hydroxide-simethicone 200-200-20 mg/5 mL suspension 30 mL QID PRN    amLODIPine tablet 10 mg Daily    ARIPiprazole tablet 15 mg QHS    aspirin EC tablet 81 mg Daily    atorvastatin tablet 80 mg Daily    cefTRIAXone (ROCEPHIN) 2 g/50 mL D5W IVPB Q24H    colchicine split tablet 0.3 mg BID    DAPTOmycin (CUBICIN) 445 mg in sodium chloride 0.9% IVPB Q48H    dextrose 10% bolus 125 mL PRN    dextrose 10% bolus 250 mL PRN    epoetin veronica-epbx injection 10,000 Units Q7 Days    folic acid tablet 1 mg Daily    glucagon (human recombinant) injection 1 mg PRN    glucose chewable tablet 16 g PRN    glucose chewable tablet 24 g PRN    heparin (porcine) injection 5,000 Units Q8H    HP Acthar (corticotropin) injectable gel 80 units Every Mon, Fri    hydrALAZINE tablet 50 mg Q8H    labetaloL tablet 200 mg Q12H    lorazepam injection 1 mg On Call Procedure    melatonin tablet 6 mg Nightly PRN    methocarbamoL tablet 750 mg QID PRN    morphine injection 2 mg Once PRN    morphine injection 2 mg On Call Procedure    mupirocin 2 % ointment BID    naloxone 0.4 mg/mL injection 0.02 mg PRN    nystatin 100,000  unit/mL suspension 500,000 Units QID (WM & HS)    OLANZapine injection 2.5 mg Once PRN    ondansetron disintegrating tablet 8 mg Q8H PRN    oxyCODONE immediate release tablet 5 mg Q6H PRN    oxyCODONE immediate release tablet Tab 10 mg Q4H PRN    pantoprazole injection 40 mg BID    polyethylene glycol packet 17 g Daily    prochlorperazine injection Soln 5 mg Q6H PRN    senna-docusate 8.6-50 mg per tablet 1 tablet Daily    sevelamer carbonate tablet 800 mg TID    simethicone chewable tablet 80 mg QID PRN    sodium chloride 0.9% bolus 250 mL PRN    sodium chloride 0.9% bolus 250 mL PRN    sodium chloride 0.9% bolus 250 mL PRN    sodium chloride 0.9% flush 10 mL Q8H PRN       Objective:     Vital Signs (Most Recent):  Temp: 98.8 °F (37.1 °C) (02/25/22 1930)  Pulse: 89 (02/26/22 0002)  Resp: 18 (02/26/22 0823)  BP: (!) 152/77 (02/26/22 0002)  SpO2: 96 % (02/26/22 0002)  O2 Device (Oxygen Therapy): room air (02/25/22 1930)   Vital Signs (24h Range):  Temp:  [98.1 °F (36.7 °C)-98.8 °F (37.1 °C)] 98.8 °F (37.1 °C)  Pulse:  [86-96] 89  Resp:  [17-18] 18  SpO2:  [96 %-99 %] 96 %  BP: (136-162)/(77-83) 152/77     Weight: 72 kg (158 lb 11.7 oz) (02/23/22 0600)  Body mass index is 25.62 kg/m².  Body surface area is 1.83 meters squared.    I/O last 3 completed shifts:  In: -   Out: 100 [Urine:100]    Physical Exam  Vitals and nursing note reviewed.   Constitutional:       Appearance: He is well-developed and overweight.   HENT:      Head: Normocephalic and atraumatic.      Nose: Nose normal.      Mouth/Throat:      Mouth: Mucous membranes are moist.   Eyes:      General: No scleral icterus.     Conjunctiva/sclera: Conjunctivae normal.   Cardiovascular:      Rate and Rhythm: Normal rate and regular rhythm.      Heart sounds: Normal heart sounds.   Pulmonary:      Effort: Pulmonary effort is normal.      Breath sounds: Normal breath sounds.   Musculoskeletal:         General: Signs of injury present. Normal range of motion.       Cervical back: Normal range of motion. No rigidity or tenderness.      Right lower leg: No edema.      Left lower leg: No edema.      Comments: Improved LE swelling and erythema and tenderness   Feet:      Right foot:      Skin integrity: Skin integrity normal.      Left foot:      Skin integrity: Dry skin present. No erythema or warmth.   Skin:     General: Skin is warm and dry.      Capillary Refill: Capillary refill takes less than 2 seconds.      Comments: L leg, ankle, foot skin sloughing.   Deroofed blister on dorsum of left foot underneath dressing   Neurological:      Mental Status: He is alert, oriented to person, place, and time and easily aroused.      GCS: GCS eye subscore is 4. GCS verbal subscore is 5. GCS motor subscore is 6.      Comments: Tremor barely present   Psychiatric:         Attention and Perception: Attention normal.         Behavior: Behavior normal. Behavior is cooperative.         Thought Content: Thought content normal.         Judgment: Judgment normal.       Significant Labs:  CBC:   Recent Labs   Lab 02/26/22  0627   WBC 10.37   RBC 2.62*   HGB 7.4*   HCT 24.3*      MCV 93   MCH 28.2   MCHC 30.5*     CMP:   Recent Labs   Lab 02/21/22  2311 02/22/22  0631 02/26/22  0627      < > 80   CALCIUM 9.3   < > 8.6*   ALBUMIN 2.1*  --   --    PROT 5.5*  --   --    *   < > 141   K 4.3   < > 3.8   CO2 20*   < > 24      < > 105   *   < > 58*   CREATININE 19.4*   < > 9.7*   ALKPHOS 83  --   --    ALT 13  --   --    AST 24  --   --    BILITOT 0.2  --   --     < > = values in this interval not displayed.     All labs within the past 24 hours have been reviewed.     Significant Imaging:  Labs: Reviewed

## 2022-02-26 NOTE — PLAN OF CARE
Patient AAO X 4, VSS. Does have agitation moments but easily redirected. Gave PRN pain. No visi but Telemonitor in place NS. Applied barrier cream and foam dressing to patient sacrum area (pressure ulcer). Left lower extremity still redden but healing. Patient has HD orders today and perm cath placement. NPO since midnight. MRI of the left foot with contrast will also be done today. Continued antibiotics for patient. Up ambulatory with two person assist, bed locked at lowest setting, yellow socks on, call bell in reach. Remains free from falls. Bed alarm is utilized.

## 2022-02-26 NOTE — ASSESSMENT & PLAN NOTE
MRI foot shows brodies abscess (subacute osteomyelitis). Pt has been on empiric ceftriaxone and daptomycin for empiric coverage of foot infection along with concern for retropharyngeal fluid collection/abscess.     Recommendations:  --Appreciate podiatry input  --If biopsy and cultures performed, please contact us if there are concerning results   --Would recommend continuing broad spectrum antibiotic coverage and will tentatively plan for 6 week osteomyelitis course  --As patient is on dialysis, would recommend switching to IV vancomycin per pharmacy dosing and IV cefazolin

## 2022-02-26 NOTE — ASSESSMENT & PLAN NOTE
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 Previously, he has declined HD in the past, never receiving a fistula. He is on the transplant wait list. Patient exhibits signs of uremia including fatigue, poor appetite, and mental slowing. On labs, he is hyperkalemic, uremic, AGMA, hyperphosphatemic. GFR is 2-3, which is reduced from previous GFR 13. Home meds include sodium bicarb, Renvela, and calcitriol. 2+ pitting edema on B/L lower extremities. Renal US from Jan 2022 shows sonographic evidence of chronic medical renal disease and bilateral simple and minimally complex renal cysts.  sCr one year ago was 4.5, now 18.6. UPCr ratio 1.11 (Dec 2020)  2/18: Patient increasingly somnolent, lethargic. Hospital med in process of getting in touch with Dr. Lee, who the patient follows with outpatient.   2/23: first session of HD 2 hours, no net fluid, patient tolerated. Still confused, having visual hallucinations of mice.   2/24: second HD session 150 min, 1 L removed, patient tolerated.  2/26: HD planned for today    Plan:   - Will need placement of tunneled catheter prior to discharge, aiming for Monday depending on IR availability   -  to set up HD chair for outpt HD. Patient has expressed that he is willing to do 2 sessions weekly as opposed to the standard 3 per week. Will need to pursue HD at dialysis center first prior to arranging possible at home dialysis.   - Will need AVF with vascular surgery, likely to be placed outpatient   - HD on T/Th/Sa schedule with EPO 10,000 units on Saturdays  - Okay to increase protein in diet now that he is on HD to 1 g/kg per day  - Sevelamer 800 TID   - Ok for contrasted studies for subacute osteo now that patient is on HD  - Hold calcitriol in setting of hyperphosphatemia. Will re-start when phos reaches normal  level.  - Strict I&Os  - Avoid nephrotoxic agents  - Renally adjust medications            - Notified patient's outpatient nephrologist Dr. Cardoza that patient has initiated HD during hospitalization

## 2022-02-26 NOTE — ASSESSMENT & PLAN NOTE
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 Previously, he has declined HD in the past, never receiving a fistula. He is on the transplant wait list. Patient exhibits signs of uremia including fatigue, poor appetite, and mental slowing. On labs, he is hyperkalemic, uremic, AGMA, hyperphosphatemic. GFR is 2-3, which is reduced from previous GFR 13. Home meds include sodium bicarb, Renvela, and calcitriol. 2+ pitting edema on B/L lower extremities. Renal US from Jan 2022 shows sonographic evidence of chronic medical renal disease and bilateral simple and minimally complex renal cysts.  sCr one year ago was 4.5, now 18.6. UPCr ratio 1.11 (Dec 2020)  2/18: Patient increasingly somnolent, lethargic. Hospital med in process of getting in touch with Dr. Lee, who the patient follows with outpatient.   2/23: first session of HD 2 hours, no net fluid, patient tolerated. Still confused, having visual hallucinations of mice.   2/24: second HD session 150 min, 1 L removed, patient tolerated.    Plan:   - Will need placement of tunneled catheter prior to discharge, aim for over the weekend or Monday depending on IR availability   -  to set up HD chair for outpt HD. Patient has expressed that he is willing to do 2 sessions weekly as opposed to the standard 3 per week. Will need to pursue HD at dialysis center first prior to arranging possible at home dialysis.   - Will need AVF with vascular surgery, likely to be placed outpatient   - HD on T/Th/Sa schedule with EPO 10,000 units on saturdays  - Okay to increase protein in diet now that he is on HD to 1 g/kg per day  - Notified patient's outpatient nephrologists Anton Cardoza/Rosa that patient has initiated HD during hospitalization   - DC sodium bicarb now that acidosis is resolved with HD   - Decrease Sevelamer dose  to 800 TID   - Hold calcitriol in setting of hyperphosphatemia. Will re-start when phos reaches normal level.  - Trend Cr  - Strict I&Os  - Avoid nephrotoxic agents  - Renally adjust medications

## 2022-02-26 NOTE — PROGRESS NOTES
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 Previously, he has declined HD in the past, never receiving a fistula. He is on the transplant wait list. Patient exhibits signs of uremia including fatigue, poor appetite, and mental slowing. On labs, he is hyperkalemic, uremic, AGMA, hyperphosphatemic. GFR is 2-3, which is reduced from previous GFR 13. Home meds include sodium bicarb, Renvela, and calcitriol. 2+ pitting edema on B/L lower extremities. Renal US from Jan 2022 shows sonographic evidence of chronic medical renal disease and bilateral simple and minimally complex renal cysts.  sCr one year ago was 4.5, now 18.6. UPCr ratio 1.11 (Dec 2020)  2/18: Patient increasingly somnolent, lethargic. Hospital med in process of getting in touch with Dr. Lee, who the patient follows with outpatient.   2/23: first session of HD 2 hours, no net fluid, patient tolerated. Still confused, having visual hallucinations of mice.   2/24: second HD session 150 min, 1 L removed, patient tolerated.    Plan:   - Will need placement of tunneled catheter prior to discharge, aim for over the weekend or Monday depending on IR availability   - CM to set up HD chair for outpt HD. Patient has expressed that he is willing to do 2 sessions weekly as opposed to the standard 3 per week. Will need to pursue HD at dialysis center first prior to arranging  possible at home dialysis.   - Will need AVF with vascular surgery, likely to be placed outpatient   - HD on T/Th/Sa schedule with EPO 10,000 units on saturdays  - Okay to increase protein in diet now that he is on HD to 1 g/kg per day  - Notified patient's outpatient nephrologists Anton Cardoza/Rosa that patient has initiated HD during hospitalization   - DC sodium bicarb now that acidosis is resolved with HD   - Decrease Sevelamer dose to 800 TID   - Hold calcitriol in setting of hyperphosphatemia. Will re-start when phos reaches normal level.  - Trend Cr  - Strict I&Os  - Avoid nephrotoxic agents  - Renally adjust medications         Thank you for your consult. I will follow-up with patient. Please contact us if you have any additional questions.          Subjective:     HPI: Mr. Martinez is a 56 y.o. 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 He was recently admitted at [facility] from 2/2-2/4/2022 from LLE cellulitis. He was found to have salmonella bacteremia suspected due to diarrheal illness. He was discharged with cipro for which he reports compliance. He has had no improvement in his pain, redness, or swelling of his left foot and leg. He has also developed a new blister on his left foot, with MRI foot concerning for osteomyelitis and showing soft tissue swelling c/w cellulitis. He is admitted for treatment of suspected osteomyelitis.     He follows with Dr. Cardoza (nephrology), has adamantly declined HD in the past and would rather wait for transplant. He reports being on the transplant list for the past 4 years. His last renal biopsy in 2013 showed 50% glomerulosclerosis. Started on calcitriol 0.5, hx of elevated PTH at 921. Patient complains of  decreased appetite, neck cramps, fatigue. He still urinates 4-5 times/day. He reports his B/L LE edema has worsened over the past few weeks. No dyspnea, chest pain, dysuria, hematuria. Nephro consulted for HD initiation.      Labs are significant for hyperkalemia 5.5, bicarb 18, , phosphate 10.4, Cr 19.7, GFR 2.2, ESR 97, CRP 33.4. . CBC significant for Hgb of 7.2, white count 14. Previous echo showed EF of 65%.       Interval History: Patient tolerated HD on 2/24, 1 L removed. MRI neck negative for abscess. Patient still wanting to do HD at home once discharged. He is more alert today, denies visual hallucinations, in good spirits.  cc.     Review of patient's allergies indicates:  No Known Allergies  Current Facility-Administered Medications   Medication Frequency    0.9%  NaCl infusion (for blood administration) Q24H PRN    0.9%  NaCl infusion Once    0.9%  NaCl infusion PRN    0.9%  NaCl infusion Once    acetaminophen tablet 1,000 mg Q8H    acetaminophen tablet 650 mg Q6H PRN    albuterol-ipratropium 2.5 mg-0.5 mg/3 mL nebulizer solution 3 mL Q4H PRN    aluminum-magnesium hydroxide-simethicone 200-200-20 mg/5 mL suspension 30 mL QID PRN    amLODIPine tablet 10 mg Daily    ARIPiprazole tablet 15 mg QHS    aspirin EC tablet 81 mg Daily    atorvastatin tablet 80 mg Daily    cefTRIAXone (ROCEPHIN) 2 g/50 mL D5W IVPB Q24H    colchicine split tablet 0.3 mg BID    DAPTOmycin (CUBICIN) 445 mg in sodium chloride 0.9% IVPB Q48H    dextrose 10% bolus 125 mL PRN    dextrose 10% bolus 250 mL PRN    [START ON 2/26/2022] epoetin veronica-epbx injection 10,000 Units Q7 Days    folic acid tablet 1 mg Daily    glucagon (human recombinant) injection 1 mg PRN    glucose chewable tablet 16 g PRN    glucose chewable tablet 24 g PRN    heparin (porcine) injection 5,000 Units Q8H    HP Acthar (corticotropin) injectable gel 80 units Every Mon, Fri    hydrALAZINE tablet 50 mg Q8H    labetaloL  tablet 200 mg Q12H    lorazepam injection 1 mg On Call Procedure    melatonin tablet 6 mg Nightly PRN    methocarbamoL tablet 750 mg QID PRN    morphine injection 2 mg Once PRN    morphine injection 2 mg On Call Procedure    mupirocin 2 % ointment BID    naloxone 0.4 mg/mL injection 0.02 mg PRN    nystatin 100,000 unit/mL suspension 500,000 Units QID (WM & HS)    OLANZapine injection 2.5 mg Once PRN    ondansetron disintegrating tablet 8 mg Q8H PRN    oxyCODONE immediate release tablet 5 mg Q6H PRN    oxyCODONE immediate release tablet Tab 10 mg Q4H PRN    pantoprazole injection 40 mg BID    polyethylene glycol packet 17 g Daily    prochlorperazine injection Soln 5 mg Q6H PRN    senna-docusate 8.6-50 mg per tablet 1 tablet Daily    sevelamer carbonate tablet 800 mg TID    simethicone chewable tablet 80 mg QID PRN    sodium chloride 0.9% bolus 250 mL PRN    sodium chloride 0.9% bolus 250 mL PRN    sodium chloride 0.9% bolus 250 mL PRN    sodium chloride 0.9% flush 10 mL Q8H PRN       Objective:     Vital Signs (Most Recent):  Temp: 98.4 °F (36.9 °C) (02/25/22 1600)  Pulse: 86 (02/25/22 1600)  Resp: 17 (02/25/22 1628)  BP: (!) 154/78 (02/25/22 1600)  SpO2: 97 % (02/25/22 1600)  O2 Device (Oxygen Therapy): room air (02/24/22 1300)   Vital Signs (24h Range):  Temp:  [98.1 °F (36.7 °C)-98.8 °F (37.1 °C)] 98.4 °F (36.9 °C)  Pulse:  [] 86  Resp:  [16-18] 17  SpO2:  [93 %-99 %] 97 %  BP: (107-154)/(57-84) 154/78     Weight: 72 kg (158 lb 11.7 oz) (02/23/22 0600)  Body mass index is 25.62 kg/m².  Body surface area is 1.83 meters squared.    I/O last 3 completed shifts:  In: 250 [Other:250]  Out: 1800 [Urine:200; Other:1600]    Physical Exam  Vitals and nursing note reviewed.   Constitutional:       Appearance: He is well-developed and overweight.   HENT:      Head: Normocephalic and atraumatic.      Nose: Nose normal.      Mouth/Throat:      Mouth: Mucous membranes are moist.   Eyes:      General:  No scleral icterus.     Conjunctiva/sclera: Conjunctivae normal.   Cardiovascular:      Rate and Rhythm: Normal rate and regular rhythm.      Heart sounds: Normal heart sounds.   Pulmonary:      Effort: Pulmonary effort is normal.      Breath sounds: Normal breath sounds.   Musculoskeletal:         General: Signs of injury present. Normal range of motion.      Cervical back: Normal range of motion. No rigidity or tenderness.      Right lower leg: No edema.      Left lower leg: No edema.      Comments: Improved LE swelling and erythema and tenderness   Feet:      Right foot:      Skin integrity: Skin integrity normal.      Left foot:      Skin integrity: Dry skin present. No erythema or warmth.   Skin:     General: Skin is warm and dry.      Capillary Refill: Capillary refill takes less than 2 seconds.      Comments: L leg, ankle, foot skin sloughing.   Deroofed blister on dorsum of left foot underneath dressing   Neurological:      Mental Status: He is alert, oriented to person, place, and time and easily aroused.      GCS: GCS eye subscore is 4. GCS verbal subscore is 5. GCS motor subscore is 6.      Motor: Tremor present.      Comments: Tremor improved   Psychiatric:         Attention and Perception: Attention normal.         Behavior: Behavior normal. Behavior is cooperative.         Thought Content: Thought content normal.         Judgment: Judgment normal.       Significant Labs:  CBC:   Recent Labs   Lab 02/25/22  0525   WBC 8.37   RBC 2.59*   HGB 7.3*   HCT 23.7*      MCV 92   MCH 28.2   MCHC 30.8*     CMP:   Recent Labs   Lab 02/21/22  2311 02/22/22  0631 02/25/22  0525      < > 116*   CALCIUM 9.3   < > 8.9   ALBUMIN 2.1*  --   --    PROT 5.5*  --   --    *   < > 143   K 4.3   < > 3.9   CO2 20*   < > 26      < > 105   *   < > 56*   CREATININE 19.4*   < > 8.5*   ALKPHOS 83  --   --    ALT 13  --   --    AST 24  --   --    BILITOT 0.2  --   --     < > = values in this interval  not displayed.     All labs within the past 24 hours have been reviewed.     Significant Imaging:  Labs: Reviewed      Harrison Acuna MD  Nephrology  [facility] - [facility] Step[facility]

## 2022-02-26 NOTE — ASSESSMENT & PLAN NOTE
55 y/o M with renal failure and recent salmonella bacteremia , transiently endorsed neck pain but is now denies pain, weakness or myelopathy and is intact on exam despite radiographic features concerning for cervical instability with possible associated infection .    -- CT and xray flex ex reviewed: some motion of C2 above C3, but not significant  -- No significant instability requiring neurosurgical intervention, recommend c-collar alone  -- No further imaging required at this time  -- Recommending ENT/IR for drainage of prevertebral collection for source control     -- nsgy will sign off at this time and plan for follow up outpatient in 3 weeks with rpt MRI w/wo contrast, to be set up by NSGY clinic      Discussed with Dr. Sandhu.

## 2022-02-26 NOTE — PLAN OF CARE
Problem: Adult Inpatient Plan of Care  Goal: Plan of Care Review  Outcome: Ongoing, Progressing  Goal: Patient-Specific Goal (Individualized)  Outcome: Ongoing, Progressing  Flowsheets (Taken 2/26/2022 1420)  Anxieties, Fears or Concerns: neck pain  Individualized Care Needs: continuing pain assessment  Patient-Specific Goals (Include Timeframe): Podiatry to evaluate left foot re: cyst vs infection  Goal: Absence of Hospital-Acquired Illness or Injury  Outcome: Ongoing, Progressing  Goal: Optimal Comfort and Wellbeing  Outcome: Ongoing, Progressing     Problem: Infection  Goal: Absence of Infection Signs and Symptoms  Outcome: Ongoing, Progressing     Problem: Diabetes Comorbidity  Goal: Blood Glucose Level Within Targeted Range  Outcome: Ongoing, Progressing     Problem: Fluid and Electrolyte Imbalance (Acute Kidney Injury/Impairment)  Goal: Fluid and Electrolyte Balance  Outcome: Ongoing, Progressing     Problem: Oral Intake Inadequate (Acute Kidney Injury/Impairment)  Goal: Optimal Nutrition Intake  Outcome: Met     Problem: Renal Function Impairment (Acute Kidney Injury/Impairment)  Goal: Effective Renal Function  Outcome: Ongoing, Progressing     Problem: Skin Injury Risk Increased  Goal: Skin Health and Integrity  Outcome: Ongoing, Progressing     Problem: Fall Injury Risk  Goal: Absence of Fall and Fall-Related Injury  Outcome: Ongoing, Progressing     Problem: Restraint, Nonbehavioral (Nonviolent)  Goal: Absence of Harm or Injury  Outcome: Met     Problem: Device-Related Complication Risk (Hemodialysis)  Goal: Safe, Effective Therapy Delivery  Outcome: Ongoing, Progressing     Problem: Hemodynamic Instability (Hemodialysis)  Goal: Effective Tissue Perfusion  Outcome: Ongoing, Progressing     Problem: Impaired Wound Healing  Goal: Optimal Wound Healing  Outcome: Ongoing, Progressing

## 2022-02-26 NOTE — SUBJECTIVE & OBJECTIVE
Interval History: Patient tolerated HD on 2/24, 1 L removed. MRI neck negative for abscess. Patient still wanting to do HD at home once discharged. He is more alert today, denies visual hallucinations, in good spirits.  cc.     Review of patient's allergies indicates:  No Known Allergies  Current Facility-Administered Medications   Medication Frequency    0.9%  NaCl infusion (for blood administration) Q24H PRN    0.9%  NaCl infusion Once    0.9%  NaCl infusion PRN    0.9%  NaCl infusion Once    acetaminophen tablet 1,000 mg Q8H    acetaminophen tablet 650 mg Q6H PRN    albuterol-ipratropium 2.5 mg-0.5 mg/3 mL nebulizer solution 3 mL Q4H PRN    aluminum-magnesium hydroxide-simethicone 200-200-20 mg/5 mL suspension 30 mL QID PRN    amLODIPine tablet 10 mg Daily    ARIPiprazole tablet 15 mg QHS    aspirin EC tablet 81 mg Daily    atorvastatin tablet 80 mg Daily    cefTRIAXone (ROCEPHIN) 2 g/50 mL D5W IVPB Q24H    colchicine split tablet 0.3 mg BID    DAPTOmycin (CUBICIN) 445 mg in sodium chloride 0.9% IVPB Q48H    dextrose 10% bolus 125 mL PRN    dextrose 10% bolus 250 mL PRN    [START ON 2/26/2022] epoetin veronica-epbx injection 10,000 Units Q7 Days    folic acid tablet 1 mg Daily    glucagon (human recombinant) injection 1 mg PRN    glucose chewable tablet 16 g PRN    glucose chewable tablet 24 g PRN    heparin (porcine) injection 5,000 Units Q8H    HP Acthar (corticotropin) injectable gel 80 units Every Mon, Fri    hydrALAZINE tablet 50 mg Q8H    labetaloL tablet 200 mg Q12H    lorazepam injection 1 mg On Call Procedure    melatonin tablet 6 mg Nightly PRN    methocarbamoL tablet 750 mg QID PRN    morphine injection 2 mg Once PRN    morphine injection 2 mg On Call Procedure    mupirocin 2 % ointment BID    naloxone 0.4 mg/mL injection 0.02 mg PRN    nystatin 100,000 unit/mL suspension 500,000 Units QID (WM & HS)    OLANZapine injection 2.5 mg Once PRN    ondansetron disintegrating tablet 8 mg Q8H PRN     oxyCODONE immediate release tablet 5 mg Q6H PRN    oxyCODONE immediate release tablet Tab 10 mg Q4H PRN    pantoprazole injection 40 mg BID    polyethylene glycol packet 17 g Daily    prochlorperazine injection Soln 5 mg Q6H PRN    senna-docusate 8.6-50 mg per tablet 1 tablet Daily    sevelamer carbonate tablet 800 mg TID    simethicone chewable tablet 80 mg QID PRN    sodium chloride 0.9% bolus 250 mL PRN    sodium chloride 0.9% bolus 250 mL PRN    sodium chloride 0.9% bolus 250 mL PRN    sodium chloride 0.9% flush 10 mL Q8H PRN       Objective:     Vital Signs (Most Recent):  Temp: 98.4 °F (36.9 °C) (02/25/22 1600)  Pulse: 86 (02/25/22 1600)  Resp: 17 (02/25/22 1628)  BP: (!) 154/78 (02/25/22 1600)  SpO2: 97 % (02/25/22 1600)  O2 Device (Oxygen Therapy): room air (02/24/22 1300)   Vital Signs (24h Range):  Temp:  [98.1 °F (36.7 °C)-98.8 °F (37.1 °C)] 98.4 °F (36.9 °C)  Pulse:  [] 86  Resp:  [16-18] 17  SpO2:  [93 %-99 %] 97 %  BP: (107-154)/(57-84) 154/78     Weight: 72 kg (158 lb 11.7 oz) (02/23/22 0600)  Body mass index is 25.62 kg/m².  Body surface area is 1.83 meters squared.    I/O last 3 completed shifts:  In: 250 [Other:250]  Out: 1800 [Urine:200; Other:1600]    Physical Exam  Vitals and nursing note reviewed.   Constitutional:       Appearance: He is well-developed and overweight.   HENT:      Head: Normocephalic and atraumatic.      Nose: Nose normal.      Mouth/Throat:      Mouth: Mucous membranes are moist.   Eyes:      General: No scleral icterus.     Conjunctiva/sclera: Conjunctivae normal.   Cardiovascular:      Rate and Rhythm: Normal rate and regular rhythm.      Heart sounds: Normal heart sounds.   Pulmonary:      Effort: Pulmonary effort is normal.      Breath sounds: Normal breath sounds.   Musculoskeletal:         General: Signs of injury present. Normal range of motion.      Cervical back: Normal range of motion. No rigidity or tenderness.      Right lower leg: No edema.      Left lower  leg: No edema.      Comments: Improved LE swelling and erythema and tenderness   Feet:      Right foot:      Skin integrity: Skin integrity normal.      Left foot:      Skin integrity: Dry skin present. No erythema or warmth.   Skin:     General: Skin is warm and dry.      Capillary Refill: Capillary refill takes less than 2 seconds.      Comments: L leg, ankle, foot skin sloughing.   Deroofed blister on dorsum of left foot underneath dressing   Neurological:      Mental Status: He is alert, oriented to person, place, and time and easily aroused.      GCS: GCS eye subscore is 4. GCS verbal subscore is 5. GCS motor subscore is 6.      Motor: Tremor present.      Comments: Tremor improved   Psychiatric:         Attention and Perception: Attention normal.         Behavior: Behavior normal. Behavior is cooperative.         Thought Content: Thought content normal.         Judgment: Judgment normal.       Significant Labs:  CBC:   Recent Labs   Lab 02/25/22  0525   WBC 8.37   RBC 2.59*   HGB 7.3*   HCT 23.7*      MCV 92   MCH 28.2   MCHC 30.8*     CMP:   Recent Labs   Lab 02/21/22  2311 02/22/22  0631 02/25/22  0525      < > 116*   CALCIUM 9.3   < > 8.9   ALBUMIN 2.1*  --   --    PROT 5.5*  --   --    *   < > 143   K 4.3   < > 3.9   CO2 20*   < > 26      < > 105   *   < > 56*   CREATININE 19.4*   < > 8.5*   ALKPHOS 83  --   --    ALT 13  --   --    AST 24  --   --    BILITOT 0.2  --   --     < > = values in this interval not displayed.     All labs within the past 24 hours have been reviewed.     Significant Imaging:  Labs: Reviewed

## 2022-02-26 NOTE — NURSING
- Off unit at this time for dialysis.  Pushed back from this AM in attempt to coordinate care & MD aware.  Trialysis in place, dressing CDI  - MRI of cervical spine ordered.  Per MRI they will prioritize order of imaging based on urgency, may be tonight or tomorrow  - IR placment of tunneled dialysis catheter to be placed on 2/28 per their note  - SB assist OOB.  BSC in use.  Keep chair locked, bed low & locked, nonslip socks in use, calls intermittently for assistance but refusing assist of bed alarm or telesitter for safety  - Podiatry to evaluate left foot wound for cyst vs infectious process.  Dressing CDI  - Existing skin tear to gluteal cleft.  Pt OOB multple times this shift for BM so unable to keep dressing on this area.  Barrier cream applied per patient preference  - Per OT patient refusing cervical spinal positioning  - Miralax & Sennakot made PRN.  3x soft/loose BM so far this shift

## 2022-02-27 PROBLEM — M1A.3720 CHRONIC GOUT OF LEFT FOOT DUE TO RENAL IMPAIRMENT WITHOUT TOPHUS: Status: ACTIVE | Noted: 2022-02-17

## 2022-02-27 PROBLEM — N18.6 ANEMIA DUE TO CHRONIC KIDNEY DISEASE, ON CHRONIC DIALYSIS: Status: ACTIVE | Noted: 2018-09-18

## 2022-02-27 PROBLEM — N18.6 ACUTE RENAL FAILURE SUPERIMPOSED ON CHRONIC KIDNEY DISEASE, ON CHRONIC DIALYSIS: Status: ACTIVE | Noted: 2022-02-17

## 2022-02-27 PROBLEM — M86.272 SUBACUTE OSTEOMYELITIS OF LEFT FOOT: Status: RESOLVED | Noted: 2022-02-17 | Resolved: 2022-02-27

## 2022-02-27 PROBLEM — R93.7 ABNORMAL MRI, CERVICAL SPINE: Status: RESOLVED | Noted: 2022-02-19 | Resolved: 2022-02-27

## 2022-02-27 PROBLEM — Z86.19: Status: RESOLVED | Noted: 2022-02-18 | Resolved: 2022-02-27

## 2022-02-27 PROBLEM — N18.6 TYPE 2 DIABETES MELLITUS WITH CHRONIC KIDNEY DISEASE ON CHRONIC DIALYSIS, WITHOUT LONG-TERM CURRENT USE OF INSULIN: Status: ACTIVE | Noted: 2018-09-18

## 2022-02-27 PROBLEM — M86.272 SUBACUTE OSTEOMYELITIS OF LEFT FOOT: Status: ACTIVE | Noted: 2022-02-17

## 2022-02-27 PROBLEM — Z99.2 ACUTE RENAL FAILURE SUPERIMPOSED ON CHRONIC KIDNEY DISEASE, ON CHRONIC DIALYSIS: Status: ACTIVE | Noted: 2022-02-17

## 2022-02-27 PROBLEM — Z99.2 TYPE 2 DIABETES MELLITUS WITH CHRONIC KIDNEY DISEASE ON CHRONIC DIALYSIS, WITHOUT LONG-TERM CURRENT USE OF INSULIN: Status: ACTIVE | Noted: 2018-09-18

## 2022-02-27 PROBLEM — Z99.2 ANEMIA DUE TO CHRONIC KIDNEY DISEASE, ON CHRONIC DIALYSIS: Status: ACTIVE | Noted: 2018-09-18

## 2022-02-27 PROBLEM — E87.5 HYPERKALEMIA: Status: RESOLVED | Noted: 2022-02-17 | Resolved: 2022-02-27

## 2022-02-27 PROBLEM — E83.39 HYPERPHOSPHATEMIA: Status: RESOLVED | Noted: 2022-02-17 | Resolved: 2022-02-27

## 2022-02-27 PROBLEM — Z79.4 TYPE 2 DIABETES MELLITUS WITH CHRONIC KIDNEY DISEASE ON CHRONIC DIALYSIS, WITH LONG-TERM CURRENT USE OF INSULIN: Status: ACTIVE | Noted: 2018-09-18

## 2022-02-27 PROBLEM — E11.22 TYPE 2 DIABETES MELLITUS WITH CHRONIC KIDNEY DISEASE ON CHRONIC DIALYSIS, WITHOUT LONG-TERM CURRENT USE OF INSULIN: Status: ACTIVE | Noted: 2018-09-18

## 2022-02-27 PROBLEM — M1A.39X0 CHRONIC GOUT DUE TO RENAL IMPAIRMENT OF MULTIPLE SITES WITHOUT TOPHUS: Status: ACTIVE | Noted: 2022-02-17

## 2022-02-27 PROBLEM — E87.29 INCREASED ANION GAP METABOLIC ACIDOSIS: Status: RESOLVED | Noted: 2022-02-17 | Resolved: 2022-02-27

## 2022-02-27 LAB
ANION GAP SERPL CALC-SCNC: 14 MMOL/L (ref 8–16)
BASOPHILS # BLD AUTO: 0.02 K/UL (ref 0–0.2)
BASOPHILS NFR BLD: 0.2 % (ref 0–1.9)
BUN SERPL-MCNC: 38 MG/DL (ref 6–20)
CALCIUM SERPL-MCNC: 8.7 MG/DL (ref 8.7–10.5)
CHLORIDE SERPL-SCNC: 108 MMOL/L (ref 95–110)
CO2 SERPL-SCNC: 18 MMOL/L (ref 23–29)
CREAT SERPL-MCNC: 7 MG/DL (ref 0.5–1.4)
DIFFERENTIAL METHOD: ABNORMAL
EOSINOPHIL # BLD AUTO: 0.1 K/UL (ref 0–0.5)
EOSINOPHIL NFR BLD: 0.9 % (ref 0–8)
ERYTHROCYTE [DISTWIDTH] IN BLOOD BY AUTOMATED COUNT: 15.5 % (ref 11.5–14.5)
EST. GFR  (AFRICAN AMERICAN): 9 ML/MIN/1.73 M^2
EST. GFR  (NON AFRICAN AMERICAN): 7.8 ML/MIN/1.73 M^2
GLUCOSE SERPL-MCNC: 77 MG/DL (ref 70–110)
HCT VFR BLD AUTO: 23.8 % (ref 40–54)
HGB BLD-MCNC: 7.3 G/DL (ref 14–18)
IMM GRANULOCYTES # BLD AUTO: 0.08 K/UL (ref 0–0.04)
IMM GRANULOCYTES NFR BLD AUTO: 0.9 % (ref 0–0.5)
LYMPHOCYTES # BLD AUTO: 1.3 K/UL (ref 1–4.8)
LYMPHOCYTES NFR BLD: 14.2 % (ref 18–48)
MCH RBC QN AUTO: 28 PG (ref 27–31)
MCHC RBC AUTO-ENTMCNC: 30.7 G/DL (ref 32–36)
MCV RBC AUTO: 91 FL (ref 82–98)
MONOCYTES # BLD AUTO: 1.2 K/UL (ref 0.3–1)
MONOCYTES NFR BLD: 13.4 % (ref 4–15)
NEUTROPHILS # BLD AUTO: 6.2 K/UL (ref 1.8–7.7)
NEUTROPHILS NFR BLD: 70.4 % (ref 38–73)
NRBC BLD-RTO: 0 /100 WBC
OB PNL STL: POSITIVE
PHOSPHATE SERPL-MCNC: 4.1 MG/DL (ref 2.7–4.5)
PLATELET # BLD AUTO: 198 K/UL (ref 150–450)
PMV BLD AUTO: 10.6 FL (ref 9.2–12.9)
POTASSIUM SERPL-SCNC: 3.9 MMOL/L (ref 3.5–5.1)
RBC # BLD AUTO: 2.61 M/UL (ref 4.6–6.2)
SODIUM SERPL-SCNC: 140 MMOL/L (ref 136–145)
WBC # BLD AUTO: 8.83 K/UL (ref 3.9–12.7)

## 2022-02-27 PROCEDURE — 20600001 HC STEP DOWN PRIVATE ROOM: Mod: NTX

## 2022-02-27 PROCEDURE — 82272 OCCULT BLD FECES 1-3 TESTS: CPT | Mod: NTX | Performed by: HOSPITALIST

## 2022-02-27 PROCEDURE — 25000003 PHARM REV CODE 250: Mod: NTX | Performed by: PHYSICIAN ASSISTANT

## 2022-02-27 PROCEDURE — 87045 FECES CULTURE AEROBIC BACT: CPT | Mod: NTX | Performed by: PHYSICIAN ASSISTANT

## 2022-02-27 PROCEDURE — 63600175 PHARM REV CODE 636 W HCPCS: Mod: NTX | Performed by: HOSPITALIST

## 2022-02-27 PROCEDURE — 25000003 PHARM REV CODE 250: Mod: NTX | Performed by: INTERNAL MEDICINE

## 2022-02-27 PROCEDURE — 87427 SHIGA-LIKE TOXIN AG IA: CPT | Mod: 59,NTX | Performed by: PHYSICIAN ASSISTANT

## 2022-02-27 PROCEDURE — 99232 PR SUBSEQUENT HOSPITAL CARE,LEVL II: ICD-10-PCS | Mod: NTX,,, | Performed by: INTERNAL MEDICINE

## 2022-02-27 PROCEDURE — 63600175 PHARM REV CODE 636 W HCPCS: Mod: NTX | Performed by: INTERNAL MEDICINE

## 2022-02-27 PROCEDURE — 88305 TISSUE EXAM BY PATHOLOGIST: CPT | Mod: NTX | Performed by: PATHOLOGY

## 2022-02-27 PROCEDURE — 99232 SBSQ HOSP IP/OBS MODERATE 35: CPT | Mod: NTX,,, | Performed by: INTERNAL MEDICINE

## 2022-02-27 PROCEDURE — 85025 COMPLETE CBC W/AUTO DIFF WBC: CPT | Mod: NTX | Performed by: HOSPITALIST

## 2022-02-27 PROCEDURE — 25000003 PHARM REV CODE 250: Mod: NTX | Performed by: HOSPITALIST

## 2022-02-27 PROCEDURE — 84100 ASSAY OF PHOSPHORUS: CPT | Mod: NTX | Performed by: HOSPITALIST

## 2022-02-27 PROCEDURE — 63600175 PHARM REV CODE 636 W HCPCS: Mod: NTX | Performed by: PHYSICIAN ASSISTANT

## 2022-02-27 PROCEDURE — 80048 BASIC METABOLIC PNL TOTAL CA: CPT | Mod: NTX | Performed by: HOSPITALIST

## 2022-02-27 PROCEDURE — 94761 N-INVAS EAR/PLS OXIMETRY MLT: CPT | Mod: NTX

## 2022-02-27 PROCEDURE — C9113 INJ PANTOPRAZOLE SODIUM, VIA: HCPCS | Mod: NTX | Performed by: HOSPITALIST

## 2022-02-27 PROCEDURE — 88305 TISSUE EXAM BY PATHOLOGIST: ICD-10-PCS | Mod: 26,NTX,, | Performed by: PATHOLOGY

## 2022-02-27 PROCEDURE — 88305 TISSUE EXAM BY PATHOLOGIST: CPT | Mod: 26,NTX,, | Performed by: PATHOLOGY

## 2022-02-27 PROCEDURE — 87046 STOOL CULTR AEROBIC BACT EA: CPT | Mod: 59,NTX | Performed by: PHYSICIAN ASSISTANT

## 2022-02-27 RX ORDER — PANTOPRAZOLE SODIUM 40 MG/1
40 TABLET, DELAYED RELEASE ORAL DAILY
Status: DISCONTINUED | OUTPATIENT
Start: 2022-02-28 | End: 2022-03-10 | Stop reason: HOSPADM

## 2022-02-27 RX ORDER — LOPERAMIDE HYDROCHLORIDE 2 MG/1
2 CAPSULE ORAL 4 TIMES DAILY PRN
Status: DISCONTINUED | OUTPATIENT
Start: 2022-02-27 | End: 2022-03-10 | Stop reason: HOSPADM

## 2022-02-27 RX ORDER — SEVELAMER CARBONATE 800 MG/1
800 TABLET, FILM COATED ORAL
Status: DISCONTINUED | OUTPATIENT
Start: 2022-02-28 | End: 2022-03-10 | Stop reason: HOSPADM

## 2022-02-27 RX ADMIN — ACETAMINOPHEN 1000 MG: 500 TABLET ORAL at 09:02

## 2022-02-27 RX ADMIN — NYSTATIN 500000 UNITS: 500000 SUSPENSION ORAL at 08:02

## 2022-02-27 RX ADMIN — HEPARIN SODIUM 5000 UNITS: 5000 INJECTION INTRAVENOUS; SUBCUTANEOUS at 02:02

## 2022-02-27 RX ADMIN — AMLODIPINE BESYLATE 10 MG: 10 TABLET ORAL at 08:02

## 2022-02-27 RX ADMIN — ASPIRIN 81 MG: 81 TABLET, COATED ORAL at 08:02

## 2022-02-27 RX ADMIN — OXYCODONE HYDROCHLORIDE 10 MG: 10 TABLET ORAL at 05:02

## 2022-02-27 RX ADMIN — HYDRALAZINE HYDROCHLORIDE 50 MG: 50 TABLET ORAL at 09:02

## 2022-02-27 RX ADMIN — SEVELAMER CARBONATE 800 MG: 800 TABLET, FILM COATED ORAL at 03:02

## 2022-02-27 RX ADMIN — ATORVASTATIN CALCIUM 80 MG: 20 TABLET, FILM COATED ORAL at 08:02

## 2022-02-27 RX ADMIN — SEVELAMER CARBONATE 800 MG: 800 TABLET, FILM COATED ORAL at 08:02

## 2022-02-27 RX ADMIN — FOLIC ACID 1 MG: 1 TABLET ORAL at 08:02

## 2022-02-27 RX ADMIN — ARIPIPRAZOLE 15 MG: 5 TABLET ORAL at 08:02

## 2022-02-27 RX ADMIN — HYDRALAZINE HYDROCHLORIDE 50 MG: 50 TABLET ORAL at 02:02

## 2022-02-27 RX ADMIN — MORPHINE SULFATE 2 MG: 2 INJECTION, SOLUTION INTRAMUSCULAR; INTRAVENOUS at 08:02

## 2022-02-27 RX ADMIN — DAPTOMYCIN 445 MG: 350 INJECTION, POWDER, LYOPHILIZED, FOR SOLUTION INTRAVENOUS at 09:02

## 2022-02-27 RX ADMIN — HYDRALAZINE HYDROCHLORIDE 50 MG: 50 TABLET ORAL at 05:02

## 2022-02-27 RX ADMIN — PANTOPRAZOLE SODIUM 40 MG: 40 INJECTION, POWDER, FOR SOLUTION INTRAVENOUS at 08:02

## 2022-02-27 RX ADMIN — NYSTATIN 500000 UNITS: 500000 SUSPENSION ORAL at 04:02

## 2022-02-27 RX ADMIN — MUPIROCIN: 20 OINTMENT TOPICAL at 08:02

## 2022-02-27 RX ADMIN — LABETALOL HYDROCHLORIDE 200 MG: 100 TABLET, FILM COATED ORAL at 08:02

## 2022-02-27 RX ADMIN — NYSTATIN 500000 UNITS: 500000 SUSPENSION ORAL at 09:02

## 2022-02-27 RX ADMIN — MUPIROCIN: 20 OINTMENT TOPICAL at 09:02

## 2022-02-27 RX ADMIN — OXYCODONE HYDROCHLORIDE 10 MG: 10 TABLET ORAL at 01:02

## 2022-02-27 RX ADMIN — LOPERAMIDE HYDROCHLORIDE 2 MG: 2 CAPSULE ORAL at 05:02

## 2022-02-27 RX ADMIN — Medication 6 MG: at 08:02

## 2022-02-27 RX ADMIN — LABETALOL HYDROCHLORIDE 200 MG: 100 TABLET, FILM COATED ORAL at 09:02

## 2022-02-27 RX ADMIN — OXYCODONE HYDROCHLORIDE 10 MG: 10 TABLET ORAL at 09:02

## 2022-02-27 RX ADMIN — HEPARIN SODIUM 5000 UNITS: 5000 INJECTION INTRAVENOUS; SUBCUTANEOUS at 05:02

## 2022-02-27 RX ADMIN — COLCHICINE 0.3 MG: 0.6 TABLET, FILM COATED ORAL at 08:02

## 2022-02-27 RX ADMIN — NYSTATIN 500000 UNITS: 500000 SUSPENSION ORAL at 12:02

## 2022-02-27 RX ADMIN — ACETAMINOPHEN 1000 MG: 500 TABLET ORAL at 02:02

## 2022-02-27 RX ADMIN — ACETAMINOPHEN 1000 MG: 500 TABLET ORAL at 05:02

## 2022-02-27 RX ADMIN — CEFTRIAXONE SODIUM 2 G: 2 INJECTION, SOLUTION INTRAVENOUS at 03:02

## 2022-02-27 RX ADMIN — HEPARIN SODIUM 5000 UNITS: 5000 INJECTION INTRAVENOUS; SUBCUTANEOUS at 09:02

## 2022-02-27 NOTE — SUBJECTIVE & OBJECTIVE
Interval History: Pt in good spirits.  MRI d/c-ed since we can not give contrast- discussed with ID Dr RODRIGUEZ. Podiatry consulted for eval for possible Brodies abscess (subacute OM), eval for Bx and Cx although yield may be low since he has been on ABx.  Will likely just Tx for extended course of ABx 6-8 weeks for probable OM, per Dr RODRIGUEZ.  Rex raincat by IR tentative date 2/28.     Review of Systems   Constitutional:  Negative for activity change, chills and fever.   HENT:  Negative for congestion, trouble swallowing and voice change.    Eyes:  Negative for photophobia and visual disturbance.   Respiratory:  Negative for chest tightness, shortness of breath and wheezing.    Cardiovascular:  Positive for leg swelling. Negative for chest pain and palpitations.   Gastrointestinal:  Negative for abdominal pain, constipation, nausea and vomiting.   Genitourinary:  Negative for dysuria, flank pain, frequency, hematuria and urgency.   Musculoskeletal:  Negative for arthralgias, back pain and gait problem.        Left foot pain   Skin:  Positive for wound. Negative for rash.   Neurological:  Negative for syncope, speech difficulty and headaches.   Psychiatric/Behavioral:  Negative for agitation, behavioral problems and confusion. The patient is not nervous/anxious.    Objective:     Vital Signs (Most Recent):  Temp: 98.7 °F (37.1 °C) (02/26/22 1930)  Pulse: 102 (02/26/22 1930)  Resp: 18 (02/26/22 1930)  BP: (!) 155/88 (02/26/22 1930)  SpO2: 97 % (02/26/22 1930)   Vital Signs (24h Range):  Temp:  [98.1 °F (36.7 °C)-98.7 °F (37.1 °C)] 98.7 °F (37.1 °C)  Pulse:  [] 102  Resp:  [16-22] 18  SpO2:  [96 %-98 %] 97 %  BP: (136-185)/() 155/88     Weight: 72 kg (158 lb 11.7 oz)  Body mass index is 25.62 kg/m².    Intake/Output Summary (Last 24 hours) at 2/26/2022 2013  Last data filed at 2/26/2022 1800  Gross per 24 hour   Intake 600 ml   Output 2123 ml   Net -1523 ml      Physical Exam  Vitals and nursing note reviewed.    Constitutional:       General: He is not in acute distress.     Appearance: He is well-developed and overweight. He is not ill-appearing or toxic-appearing.   HENT:      Head: Normocephalic and atraumatic.      Nose: Nose normal.      Mouth/Throat:      Mouth: Mucous membranes are moist.   Eyes:      General: No scleral icterus.     Conjunctiva/sclera: Conjunctivae normal.   Neck:      Comments: Right IJ trialysis line in place.   Cardiovascular:      Rate and Rhythm: Normal rate and regular rhythm.      Heart sounds: Normal heart sounds. No murmur heard.    No friction rub. No gallop.   Pulmonary:      Effort: Pulmonary effort is normal. No respiratory distress.      Breath sounds: Normal breath sounds. No wheezing or rales.   Abdominal:      General: Bowel sounds are normal. There is no distension.      Palpations: Abdomen is soft.      Tenderness: There is no abdominal tenderness. There is no guarding.   Musculoskeletal:         General: Tenderness (left foot) present. Normal range of motion.      Cervical back: Normal range of motion and neck supple.      Right lower leg: No edema.      Left lower leg: No edema.   Feet:      Right foot:      Skin integrity: Skin integrity normal.      Left foot:      Skin integrity: Skin breakdown, erythema, warmth and dry skin present.   Skin:     General: Skin is warm and dry.      Capillary Refill: Capillary refill takes less than 2 seconds.      Comments: L leg, ankle, foot skin sloughing.   Deroofed blister on dorsum of left foot   Neurological:      General: No focal deficit present.      Mental Status: He is alert and easily aroused.      GCS: GCS eye subscore is 4. GCS verbal subscore is 5. GCS motor subscore is 6.      Cranial Nerves: No cranial nerve deficit.      Sensory: No sensory deficit.   Psychiatric:         Behavior: Behavior normal. Behavior is cooperative.         Thought Content: Thought content normal.         Judgment: Judgment normal.       Significant  Labs: All pertinent labs within the past 24 hours have been reviewed.    Significant Imaging: I have reviewed all pertinent imaging results/findings within the past 24 hours.

## 2022-02-27 NOTE — ASSESSMENT & PLAN NOTE
Denies h/o gout  - s/p light IVF x 10 hours 2/17 per renal recs. Avoided steroids in setting of possible infection  - colchicine 0.3 BID for now, adjust asap

## 2022-02-27 NOTE — ASSESSMENT & PLAN NOTE
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  RPR 1:2.   Need to confirm treatment with [facility].  RPR here is pending.   Recommend repeat RPR 6 months from treatment - December 2018"  - Tx-ed  - RPR non-reactive 12/2018    "

## 2022-02-27 NOTE — PROGRESS NOTES
HD treatment complete. Duration of treatment 3 hours and 1.5 L removed. Treatment was tolerated well and no complication with access right IJ catheter. Catheter flushed with NS and locked with NS. Capped and taped. Bp 159/104 .

## 2022-02-27 NOTE — ASSESSMENT & PLAN NOTE
Patient with previous coronary stent for CAD in past in 2007. Continue Labetalol, Asprin 81 mg po daily and Lipitor 80 mg po daily to treat CAD.

## 2022-02-27 NOTE — ASSESSMENT & PLAN NOTE
Membranous glomerulonephritis  - Nephrology consulted on admit for  and Cr 20.9 and uremic symptoms of tremors. 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 Nephrology recommended HD on admit but patient repeated refused and finally agreed after multiple conversations to start on 2/22. Nephrology has been managing his HD needs [facility].   - GFR was 13-16 up from baseline 4-6 previously in January 2022. Labs repeated today and GFR is 2.6, Cr 19.   - Patient follows with Dr. Cardoza and Dr Lee and dialysis has been discussed multiple times, patient continues to refuse and on transplant list with [facility] but on this admit after multiple attempts patient finally agreed and trialysis line placed on 2/22 and patient started on HD on 2/23 for progressive CKD stage V that has now progressed to ESRD.   - SW arranging outpatient HD - [facility] [place] close to his house as patient is new to HD and needs HD chair and scheduled arranged prior to discharge from hospital.   - Patient needs Perm Cath prior to discharge and IR consulted on 2/25 and tentatively scheduled to have Perm Cath placed on 2/28.

## 2022-02-27 NOTE — ASSESSMENT & PLAN NOTE
- Elevated on admit but meds adjusted and now controlled.   - Goal BP < 140/90.  - Conitnue Norvasc 10 mg po daily + Hydralazine 50 mg po TID + Labetalol 200 mg po BID to treat and adjust as needed for tr=arget BP goal in hospital.

## 2022-02-27 NOTE — ASSESSMENT & PLAN NOTE
Present on admit. Folate low at 4.4. Patient started on Folic acid 1 mg po daily ion hospital and will continue.

## 2022-02-27 NOTE — ASSESSMENT & PLAN NOTE
- Patient admitted with Hgb in 7-8 range and related to his advanced CKD.   - Patient transfused 1 unit of PRBCs on 2/18 for Hgb 6.9 and trabnsfuse again 1 unit on 2/23 for Hgb 6.7 but Hgb stable since. No signs of bleeding noted and felt all related to his CKD in combination with chronic infection/inflammation.    - Hgb stable 7.3-7.5 since last transfusion and dimitrios monitor with CBC in hospital and consider transfusion if Hgb < 7.

## 2022-02-27 NOTE — SUBJECTIVE & OBJECTIVE
Subjective:     Interval History: Patient tachycardic and hypertensive today. Afebrile. No acute events over night. Patient was seen while he was receiving dialysis.     Follow-up For: Procedure(s) (LRB):  MPU PROCEDURE (N/A)    Post-Operative Day:      Scheduled Meds:   sodium chloride 0.9%   Intravenous Once    acetaminophen  1,000 mg Oral Q8H    amLODIPine  10 mg Oral Daily    ARIPiprazole  15 mg Oral QHS    aspirin  81 mg Oral Daily    atorvastatin  80 mg Oral Daily    cefTRIAXone (ROCEPHIN) IVPB  2 g Intravenous Q24H    colchicine  0.3 mg Oral BID    DAPTOmycin (CUBICIN)  IV  6 mg/kg Intravenous Q48H    folic acid  1 mg Oral Daily    heparin (porcine)  5,000 Units Subcutaneous Q8H    corticotropin  80 Units Subcutaneous Every Mon, Fri    hydrALAZINE  50 mg Oral Q8H    labetalol  200 mg Oral Q12H    mupirocin   Nasal BID    nystatin  500,000 Units Oral QID (WM & HS)    pantoprazole  40 mg Intravenous BID    sevelamer carbonate  800 mg Oral TID     Continuous Infusions:  PRN Meds:sodium chloride, sodium chloride 0.9%, acetaminophen, albuterol-ipratropium, aluminum-magnesium hydroxide-simethicone, dextrose 10%, dextrose 10%, glucagon (human recombinant), glucose, glucose, lorazepam, melatonin, methocarbamoL, morphine, morphine, naloxone, OLANZapine, ondansetron, oxyCODONE, oxyCODONE, polyethylene glycol, prochlorperazine, senna-docusate 8.6-50 mg, simethicone, sodium chloride 0.9%, sodium chloride 0.9%, sodium chloride 0.9%, sodium chloride 0.9%    Review of Systems   Constitutional:  Positive for activity change. Negative for appetite change, chills and fever.   Respiratory:  Negative for cough, shortness of breath and wheezing.    Cardiovascular:  Positive for leg swelling.   Gastrointestinal:  Negative for nausea and vomiting.   Musculoskeletal:  Positive for arthralgias and myalgias. Negative for gait problem.   Skin:  Positive for wound. Negative for color change.   Neurological:  Negative for weakness and  numbness.   Psychiatric/Behavioral:  Positive for agitation and behavioral problems.    Objective:     Vital Signs (Most Recent):  Temp: 98.7 °F (37.1 °C) (02/26/22 1930)  Pulse: (!) 115 (02/26/22 2300)  Resp: 18 (02/26/22 1930)  BP: (!) 155/88 (02/26/22 1930)  SpO2: 97 % (02/26/22 1930)   Vital Signs (24h Range):  Temp:  [98.1 °F (36.7 °C)-98.7 °F (37.1 °C)] 98.7 °F (37.1 °C)  Pulse:  [] 115  Resp:  [16-22] 18  SpO2:  [96 %-98 %] 97 %  BP: (136-185)/() 155/88     Weight: 72 kg (158 lb 11.7 oz)  Body mass index is 25.62 kg/m².    Foot Exam    General  Orientation: alert and oriented to person, place, and time       Right Foot/Ankle     Inspection and Palpation  Tenderness: none   Swelling: (pitting mild edema to bilateral lower extremities, L>R)  Skin Exam: skin intact; no drainage, no cellulitis, no abnormal color, no ulcer and no erythema     Neurovascular  Dorsalis pedis: 2+  Posterior tibial: 1+      Left Foot/Ankle      Inspection and Palpation  Swelling: (pitting edema to bilateral lower extremities, L>R)  Skin Exam: cellulitis and erythema; no drainage and no ulcer     Neurovascular  Dorsalis pedis: 2+  Posterior tibial: 1+      Laboratory:  CBC:   Recent Labs   Lab 02/26/22  0627   WBC 10.37   RBC 2.62*   HGB 7.4*   HCT 24.3*      MCV 93   MCH 28.2   MCHC 30.5*     CMP:   Recent Labs   Lab 02/21/22  2311 02/22/22  0631 02/26/22  0627      < > 80   CALCIUM 9.3   < > 8.6*   ALBUMIN 2.1*  --   --    PROT 5.5*  --   --    *   < > 141   K 4.3   < > 3.8   CO2 20*   < > 24      < > 105   *   < > 58*   CREATININE 19.4*   < > 9.7*   ALKPHOS 83  --   --    ALT 13  --   --    AST 24  --   --    BILITOT 0.2  --   --     < > = values in this interval not displayed.     CRP: No results for input(s): CRP in the last 168 hours.  ESR: No results for input(s): SEDRATE in the last 168 hours.  Microbiology Results (last 7 days)       Procedure Component Value Units Date/Time    Gram  stain [[id number]] Collected: 02/26/22 1825    Order Status: Completed Specimen: Bone from Toe, Left Foot Updated: 02/26/22 2319     Gram Stain Result No WBC's      No organisms seen    Culture, Anaerobe [[id number]] Collected: 02/26/22 1825    Order Status: Sent Specimen: Bone from Toe, Left Foot Updated: 02/26/22 2032    Aerobic culture [[id number]] Collected: 02/26/22 1825    Order Status: Sent Specimen: Bone from Toe, Left Foot Updated: 02/26/22 2031    Fungus culture [[id number]] Collected: 02/26/22 1825    Order Status: Sent Specimen: Bone from Toe, Left Foot Updated: 02/26/22 2031    AFB Culture & Smear [[id number]] Collected: 02/26/22 1825    Order Status: Sent Specimen: Bone from Toe, Left Foot Updated: 02/26/22 2031    Blood Culture #2 **CANNOT BE ORDERED STAT** [[id number]] Collected: 02/16/22 1806    Order Status: Completed Specimen: Blood from Peripheral, Antecubital, Left Updated: 02/21/22 2012     Blood Culture, Routine No growth after 5 days.    Blood Culture #1 **CANNOT BE ORDERED STAT** [[id number]] Collected: 02/16/22 1807    Order Status: Completed Specimen: Blood from Peripheral, Hand, Right Updated: 02/21/22 2012     Blood Culture, Routine No growth after 5 days.          Specimen (24h ago, onward)                None          All pertinent labs reviewed within the last 24 hours.    Diagnostic Results:  L foot MRI 02/16  Impression:  Regions of focal abnormal signal involving the distal 1st metatarsal and proximal 1st phalanx as described above.  Findings are concerning for foci of subacute myelitis (Yared's abscess).  Other less likely considerations include cystic degenerative change.     Diffuse skin thickening and subcutaneous soft tissue swelling throughout the left foot, likely representing component of cellulitis, with other non infectious inflammatory process is also considered.     Cystic degenerative change most prominent at the 1st metatarsophalangeal joint.     Reactive edema about the  flexor hallucis longus muscle.    X ray L foot 02/16:  Impression:  1. Use edema about the dorsal aspect of the foot extending to the great toe.  There is prominent cystic erosive change of the 1st metatarsophalangeal joint.  Given overlying edema, osteomyelitis cannot be excluded in this setting noting no previous exams are available for comparison.  Differential would include of previous infection, correlation with any history of gout.    Clinical Findings:

## 2022-02-27 NOTE — CARE UPDATE
Nursing reported patient having loose stools. Patient denies any cramping and afebrile. He is on antibiotics but also on Colchicine that can cause diarrhea. Will stop Colchicine and order Imodium to treat. If diarrhea not improving in next  hours could consider sending C. Diff but patient afebrile and stool partially formed so unlikely this is C. Diff.       ANISHA GIBSON MD  Attending Staff Physician   Department of Hospital Medicine, [facility]  Pager: [phone]  Spectralink: [phone]

## 2022-02-27 NOTE — ASSESSMENT & PLAN NOTE
-elevated to 180s on admit but improved on current regimen with SBP 150s-170s, DBP remains 90s-100s  - hydralazine 50 q8   - BB Labetalol 200 q12 (nonselective BB for more potent antihypertensive properties than selective BB such as metoprolol)  - CCB norvasc 10, consider switch to nifedipine CC in renal pt   - no longer on clonidine    Pt states he has had adverse Sx from hydralazine and labetalol in the past. If BP improves with further HD and able to d/c any meds, will d/c hydralazine

## 2022-02-27 NOTE — PROGRESS NOTES
[facility]  Progress Note    Patient Name: Enirque Martinez  MRN: [id number]  Patient Class: IP- Inpatient   Admission Date: 2/16/2022  Length of Stay: 9 days  Attending Physician: Rosalinda Black MD  Primary Care Provider: Primary Doctor No        Subjective:     Principal Problem:Pain and swelling of left lower extremity        HPI:  Enrique Martinez is a 56 y.o. male with a PMHx of CKD V glomerulonephropathy, insulin dependent diabetes mellitus, and CAD who presented tot  ED for evaluation of worsening left foot pain and swelling. He was recently admitted at [facility] from 2/2-2/4/2022 from LLE cellulitis. He was found to have salmonella bacteremia suspected due to diarrheal illness. He was discharged with cipro for which he reports compliance. He has had no improvement in his pain, redness, or swelling of his left foot and leg. He has also developed a new blister on his left foot. He endorses chills. He denies fevers, chest pain, SOB, N/V, decreased urine output, dysuria, and flank pain.    ED: HR 97, other VSSAF. CBC with leukocytosis of 16.01, Hgb 7.4, Hct 22.2.  ESR 97. CRP 33.4. K 5.5. Cr 19.7, . Phos 10.4. Lactic WNL. MRI L foot with cellulitis and osteomyelitis of distal 1st metatarsal and proximal 1st phalanx.      Overview/Hospital Course:  .Enrique Martinez is a 56 y.o. male with a PMHx of CKD V glomerulonephropathy, insulin dependent diabetes mellitus, and CAD who presented tot  ED for evaluation of worsening left foot pain and swelling. He was recently admitted at [facility] from 2/2-2/4/2022 from LLE cellulitis. He was found to have salmonella bacteremia suspected due to diarrheal illness. He was discharged with cipro for which he reports compliance. He has had no improvement in his pain, redness, or swelling of his left foot and leg. He has also developed a new blister on his left foot. He endorses chills. He denies fevers, chest pain, SOB, N/V, decreased urine output,  dysuria, and flank pain.  Nephrology is consulted for history of ESRD,he had finally HD,  Has abnormal C spine Xray with possible signs of C spine instability, MRi ordered and patient refused 2/17, on pain meds, was discussed AT length, see 2/17 and 2/18 note above, ordered with premediation now. Concern for possible infection also given acuity of pain  -signs of C spine instability, prevertebral abscess likely with fluid collection with widening in C spine, NS recs C collar- patient refused, and Xrays, refused, and MRi full spine when stable. Cannot do any intervention now as unstable bc of kidneys.   -ent consulted for retropharyngeal abscess per ns and id recs, will likely scope if amenable at bedside (he refused). They rec MRI C spien with contrast- but cannot get due to kidneys  -refused c collar xrays  -NS recs MRI all spine, ID/ENT rec repeat MRI C spine for better pictures. (cannot do contrast). He is likely nto going to sit for all the spine right now 2/21 per my exam and nursing. Placed repeat flex xrays and C spine MRi orders in now. They called him at 3 pm but anesthesia is coming at 330 pm to do the line he said he would now do this afternoon after a cigarette, so nursing to tell them to come back. IR consulted to sample the vertebral involvement, they report they wanted to review further imaging once in to see if they needed - will f/u thoughts but likely suspect wont need sampling of the spine as this appear more chroinc, retropharyngeal collection however likely needs further addressed. CT also pending as rec for ENT as well.   -ID following- on dapto and Ceftriaxone  -MRI/CT showing similar fidings to before, ENT  reports need contrast to really see retropharyngeal area well,  MRI neck with contrast show no sign of fluid collection,no need for intervention,  He complaint with HD and SW looking for out patient HD be set up.  Will do MRI left foot to see for osteo and duration of Abx per ID.  On  daptomycin and rocephin at this time,check weekly CPK level.      Interval History: Pt in good spirits.  MRI d/c-ed since we can not give contrast- discussed with ID Dr RODRIGUEZ. Podiatry consulted for eval for possible Brodies abscess (subacute OM), eval for Bx and Cx although yield may be low since he has been on ABx.  Will likely just Tx for extended course of ABx 6-8 weeks for probable OM, per Dr RODRIGUEZ.  Await permcath by IR tentative date 2/28.     Review of Systems   Constitutional:  Negative for activity change, chills and fever.   HENT:  Negative for congestion, trouble swallowing and voice change.    Eyes:  Negative for photophobia and visual disturbance.   Respiratory:  Negative for chest tightness, shortness of breath and wheezing.    Cardiovascular:  Positive for leg swelling. Negative for chest pain and palpitations.   Gastrointestinal:  Negative for abdominal pain, constipation, nausea and vomiting.   Genitourinary:  Negative for dysuria, flank pain, frequency, hematuria and urgency.   Musculoskeletal:  Negative for arthralgias, back pain and gait problem.        Left foot pain   Skin:  Positive for wound. Negative for rash.   Neurological:  Negative for syncope, speech difficulty and headaches.   Psychiatric/Behavioral:  Negative for agitation, behavioral problems and confusion. The patient is not nervous/anxious.    Objective:     Vital Signs (Most Recent):  Temp: 98.7 °F (37.1 °C) (02/26/22 1930)  Pulse: 102 (02/26/22 1930)  Resp: 18 (02/26/22 1930)  BP: (!) 155/88 (02/26/22 1930)  SpO2: 97 % (02/26/22 1930)   Vital Signs (24h Range):  Temp:  [98.1 °F (36.7 °C)-98.7 °F (37.1 °C)] 98.7 °F (37.1 °C)  Pulse:  [] 102  Resp:  [16-22] 18  SpO2:  [96 %-98 %] 97 %  BP: (136-185)/() 155/88     Weight: 72 kg (158 lb 11.7 oz)  Body mass index is 25.62 kg/m².    Intake/Output Summary (Last 24 hours) at 2/26/2022 2013  Last data filed at 2/26/2022 1800  Gross per 24 hour   Intake 600 ml   Output 2123 ml   Net  -1523 ml      Physical Exam  Vitals and nursing note reviewed.   Constitutional:       General: He is not in acute distress.     Appearance: He is well-developed and overweight. He is not ill-appearing or toxic-appearing.   HENT:      Head: Normocephalic and atraumatic.      Nose: Nose normal.      Mouth/Throat:      Mouth: Mucous membranes are moist.   Eyes:      General: No scleral icterus.     Conjunctiva/sclera: Conjunctivae normal.   Neck:      Comments: Right IJ trialysis line in place.   Cardiovascular:      Rate and Rhythm: Normal rate and regular rhythm.      Heart sounds: Normal heart sounds. No murmur heard.    No friction rub. No gallop.   Pulmonary:      Effort: Pulmonary effort is normal. No respiratory distress.      Breath sounds: Normal breath sounds. No wheezing or rales.   Abdominal:      General: Bowel sounds are normal. There is no distension.      Palpations: Abdomen is soft.      Tenderness: There is no abdominal tenderness. There is no guarding.   Musculoskeletal:         General: Tenderness (left foot) present. Normal range of motion.      Cervical back: Normal range of motion and neck supple.      Right lower leg: No edema.      Left lower leg: No edema.   Feet:      Right foot:      Skin integrity: Skin integrity normal.      Left foot:      Skin integrity: Skin breakdown, erythema, warmth and dry skin present.   Skin:     General: Skin is warm and dry.      Capillary Refill: Capillary refill takes less than 2 seconds.      Comments: L leg, ankle, foot skin sloughing.   Deroofed blister on dorsum of left foot   Neurological:      General: No focal deficit present.      Mental Status: He is alert and easily aroused.      GCS: GCS eye subscore is 4. GCS verbal subscore is 5. GCS motor subscore is 6.      Cranial Nerves: No cranial nerve deficit.      Sensory: No sensory deficit.   Psychiatric:         Behavior: Behavior normal. Behavior is cooperative.         Thought Content: Thought  content normal.         Judgment: Judgment normal.       Significant Labs: All pertinent labs within the past 24 hours have been reviewed.    Significant Imaging: I have reviewed all pertinent imaging results/findings within the past 24 hours.      Assessment/Plan:      * Pain and swelling of left lower extremity        Acute renal failure superimposed on stage 5 chronic kidney disease, not on chronic dialysis  Hyperkalemia  Hyperphosphatemia  Metabolic acidosis  Kidney transplant candidate    - 2/2 membranous glomerulopathy  - GFR was 13-16 up from baseline 4-6 previously in January 2022. Labs repeated today and GFR is 2.6, Cr 19.   - patient follows with Dr. Cardoza and Dr Lee and dialysis has been discussed multiple times, patient continues to refuse.  - on transplant list with [facility]  -cont phos binders, Na bicarb. Valencia for adequate I/O. Lasix held 2/17 per renal recs  -uric acid trending  -at severe risk of adverse events, BUN almost 200, asterixis on exam. Meets multiple criteria but refusing HD. Has capacity. Wife aware and states is his decision  Continues to refuse on 2/19 after extensive discussions with all parties.   -2/20- may consent tomorrow per wife, if so anesthesia for IJ. No permacath now as active infection concerns in neck. If not, palliative consult. Wife will agree to HD for him if he says yes despite no capacity per psych. Wife will not do it against his will as poa  Agrees to HD on 2/21. Attempted line on 2/21 as he agreed but he accidentally pulled it.  reattempt pending 2/22. High risk to accidentally pull out so will need close monitoring while uremic and has sitter  -successful 2/22 line, HD 2/23 started  SW arranging outpatient HD - [facility] close to his house  Await Permcath by IR    Osteomyelitis of left foot  SEPSIS: 2/4 SIRS with HR>90, WBC 16  - lactic WNL  - ESR 97, CRP 33.4, procal 1.  - MRI w/ regions of focal abnormal signal involving the distal 1st metatarsal and proximal  1st phalanx as described above.  Findings are concerning for foci of subacute osteomyelitis   - patient is hemodynamically stable, podiatry consulted, unclear if ifection but no plans to do biopsy now cellulitis worsened it appears 2/17 PM so vanc/zosyn initiated. ID consulted 2/18 as severely tender extremity, concern with recent bacteremia on 2/1 with salmonella with scaling on exam for possible staph/strep also scalded skin type look..   -treating gout, minimal improvement in pain, less red on doxy/rocephin per ID recs now  -no sign of osteo on repeat MRi 2/22 in neck  -ID reports 6 weeks antibiotics planned to cover foot.  MRI neck with contrast show no sign of fluid collection,no need for intervention,    - daptomycin and rocephin per ID, will be able to dose with HD as outpt, check weekly CPK level.    Neck pain  -abnormal C spine Xray with possible signs of C spine instability, MRI ordered and patient refused 2/17, on pain meds, discussed at length, see 2/17 and 2/18 note above, ordered with premediation now. Concern for possible infection also given acuity of pain  - prevertebral abscess likely with fluid collection with widening in C spine, NS recs C collar- patient refused, and Xrays, refused, and MRI full spine when stable. Cannot do any intervention now as unstable bc of kidneys.   -ent consulted for retropharyngeal abscess per ns and id recs, will likely scope if amenable at bedside (he refused). They rec MRI C spine with contrast- but cannot get due to kidneys  -refused c collar xrays  -NS recs MRI all spine, ID/ENT rec repeat MRI C spine for better pictures. (cannot do contrast). He is likely not going to sit for all the spine right now 2/21 per my exam and nursing. Placed repeat flex xrays and C spine MRi orders in now. They called him at 3 pm but anesthesia is coming at 330 pm to do the line he said he would now do this afternoon after a cigarette, so nursing to tell them to come back. IR consulted  to sample the vertebral involvement, they report they wanted to review further imaging once in to see if they needed - will f/u thoughts but likely suspect wont need sampling of the spine as this appear more chroinc, retropharyngeal collection however likely needs further addressed. CT also pending as rec for ENT as well.   -ID following- on dapto and Ceftriaxone  -MRI/CT showing similar fidings to before  - ENT reports need contrast to really see retropharyngeal area well  - nephro approved contrast on 2/24 before HD  - MRI with contrast show no fluid collection, will not require intervention.  Pain is much better.      Renovascular hypertension  -elevated to 180s on admit but improved on current regimen with SBP 150s-170s, DBP remains 90s-100s  - hydralazine 50 q8   - BB Labetalol 200 q12 (nonselective BB for more potent antihypertensive properties than selective BB such as metoprolol)  - CCB norvasc 10, consider switch to nifedipine CC in renal pt   - no longer on clonidine    Pt states he has had adverse Sx from hydralazine and labetalol in the past. If BP improves with further HD and able to d/c any meds, will d/c hydralazine     Prevertebral Fluid Collection  -ent consulted, will likely bedside scope. He reports trouble swallowing food to wife now but denies again to me  -refused bedside scope  -reattempt at MRI C spine- shows more chronic issues prevertebral area but retropharyngela area still prominent with fluid. Will f/u recs from specialitss regarding this, severe narrowing seen.  MRI neck with contrast show no sign of fluid collection,no need for intervention,            Abnormal MRI, cervical spine  MRI  with contrast show no fluid collection      History of Salmonella septicemia  -+ blood CX at [facility] 2/1, with clearance immediately, treated with cipro for 14 days unclear source but presumed diarrheal infection (wife endorses this)  -denies sickle cell hx to predispose to this  -concern with neck pain and  "foot pain with recent bacteremia  -work up in progress  -ID consulted. On dapto/rocephin        Gout  Denies h/o gout  - s/p light IVF x 10 hours 2/17 per renal recs. Avoided steroids in setting of possible infection  - colchicine 0.3 BID for now, adjust asap         Folic acid deficiency  -low at 4.4.  -daily replacement      Increased anion gap metabolic acidosis  Secondary to ckd  -cont Na bicarb  - HD      Hyperphosphatemia   elevated at 10-11  - binders per nephro  - HD      Hyperkalemia  -shift PRN      SHANNAN (iron deficiency anemia)        Benign prostatic hyperplasia  Valencia placed 2/18 overnight for incontinence    GERD (gastroesophageal reflux disease)  - on PPI IV BID for now, change to PO asap    Tobacco abuse   on cessation      Patient on waiting list for kidney transplant        Atherosclerosis of native coronary artery of native heart without angina pectoris        Hx of syphilis  Per Helio Garcia NP (ID) note 9/18/18, "Patient reports recent treatment with 3 doses of bicillin for + Treponema Pallidum antibody (5/16/2018) (per patient, this was a re-treatment because he couldn't get prior records of apparent prior treatment).  RPR 1:2.   Need to confirm treatment with [facility].  RPR here is pending.   Recommend repeat RPR 6 months from treatment - December 2018"  - Tx-ed  - RPR non-reactive 12/2018      S/P coronary artery stent placement  - home asa, statin    Type 2 diabetes mellitus  DM-2 on insulin, uncontrolled (A1C 7s) with renal manifestations.  home meds: lantus 5 units daily  - SSI   - ACHS accuchecks  - diabetic diet  - caution for insulin stacking in low GFR      Anemia due to chronic kidney disease  -hg with downtick to 6 on 2/18 from 7 on admit with 9 baseline  -unclear cause if another reason besides chronic kidney disease  -fobt ordered  -transfuse 1 U on 2/18. adamently denies bleeding. Will add protonix for coverage in interim in case of any bleeding  -tx 1 U on 2/19, Hg still higher " 6's, unclear soure, fobt pending, possible from neck inflammation with fluid collection of unclear source.  -improved 2/20 to 8's and stable further 2/21.  -denies nsaid use  downtick again 2/23 and 1 U given. Do not see signs of bleeding, still concern could be in fluid collection /inflammation from this as source. bM yesterday no blood or melena    Membranous glomerulonephritis  -will bring acthar gel from home to do non formulary as 40K and got via prior auth-non form request addded  -reason for CKD 5.      Hyperlipidemia  Home atorva       VTE Risk Mitigation (From admission, onward)         Ordered     heparin (porcine) injection 5,000 Units  Every 8 hours         02/25/22 0747     IP VTE HIGH RISK PATIENT  Once         02/17/22 0151     Place sequential compression device  Until discontinued         02/17/22 0151                Discharge Planning   JOAN: 3/1/2022     Code Status: Full Code   Is the patient medically ready for discharge?: No    Reason for patient still in hospital (select all that apply): Patient trending condition and Pending disposition  Discharge Plan A: Home   Discharge Delays: None known at this time              Rosalinda Black MD  Department of Hospital Medicine   [facility]

## 2022-02-27 NOTE — ASSESSMENT & PLAN NOTE
Hyperkalemia  Hyperphosphatemia  Metabolic acidosis  Kidney transplant candidate    - 2/2 membranous glomerulopathy  - GFR was 13-16 up from baseline 4-6 previously in January 2022. Labs repeated today and GFR is 2.6, Cr 19.   - patient follows with Dr. Cardoza and Dr Lee and dialysis has been discussed multiple times, patient continues to refuse.  - on transplant list with [facility]  -cont phos binders, Na bicarb. Valencia for adequate I/O. Lasix held 2/17 per renal recs  -uric acid trending  -at severe risk of adverse events, BUN almost 200, asterixis on exam. Meets multiple criteria but refusing HD. Has capacity. Wife aware and states is his decision  Continues to refuse on 2/19 after extensive discussions with all parties.   -2/20- may consent tomorrow per wife, if so anesthesia for IJ. No permacath now as active infection concerns in neck. If not, palliative consult. Wife will agree to HD for him if he says yes despite no capacity per psych. Wife will not do it against his will as poa  Agrees to HD on 2/21. Attempted line on 2/21 as he agreed but he accidentally pulled it.  reattempt pending 2/22. High risk to accidentally pull out so will need close monitoring while uremic and has sitter  -successful 2/22 line, HD 2/23 started  SW arranging outpatient [facility] close to his house  Await Permcath by IR

## 2022-02-27 NOTE — ASSESSMENT & PLAN NOTE
SEPSIS: 2/4 SIRS with HR>90, WBC 16  - lactic WNL  - ESR 97, CRP 33.4, procal 1.  - MRI w/ regions of focal abnormal signal involving the distal 1st metatarsal and proximal 1st phalanx as described above.  Findings are concerning for foci of subacute osteomyelitis   - patient is hemodynamically stable, podiatry consulted, unclear if ifection but no plans to do biopsy now cellulitis worsened it appears 2/17 PM so vanc/zosyn initiated. ID consulted 2/18 as severely tender extremity, concern with recent bacteremia on 2/1 with salmonella with scaling on exam for possible staph/strep also scalded skin type look..   -treating gout, minimal improvement in pain, less red on doxy/rocephin per ID recs now  -no sign of osteo on repeat MRi 2/22 in neck  -ID reports 6 weeks antibiotics planned to cover foot.  MRI neck with contrast show no sign of fluid collection,no need for intervention,    - daptomycin and rocephin per ID, will be able to dose with HD as outpt, check weekly CPK level.

## 2022-02-27 NOTE — PROGRESS NOTES
[facility]  Podiatry  Consult Note    Patient Name: Enrique Martinez  MRN: [id number]  Admission Date: 2/16/2022  Hospital Length of Stay: 9 days  Attending Physician: Rosalinda Black MD  Primary Care Provider: Primary Doctor No     Inpatient consult to Podiatry  Consult performed by: Laith Sidhu DPM  Consult ordered by: Jose Alberto Saba MD          Subjective:       History of Present Illness:  56 y.o. male with PMHx of CKD V glomerulonephropathy, insulin dependent diabetes mellitus, gout, and CAD who presented to [facility] 2/16/2022 with chief complaint of left ankle pain and swelling x 1 week. Patient was recently admitted to [facility] earlier this month for LLE cellulitis and was discharged home with PO ciprofloxacin which he reports compliance with. Also developed a new blister on his left foot with pain overlying the blister, but reports most of his pain, redness, and swelling is along his left ankle. Patient does not recall any prior trauma/injury to his foot and ankle. He reports waking up one morning with pain to the ankle.      On initial presentation, patient hypertensive with systolic BP in the 200s. WBC 16k ESR 97 CRP 33.4. Xray left foot demonstrating prominent cystic erosive change of the 1st metatarsophalangeal joint consistent with gouty arthropathy and gout history. MRI right forefoot demonstrating regions of focal abnormal signal involving the distal 1st metatarsal and proximal 1st phalanx concerning for foci of subacute osteomyelitis (Yared's abscess) vs cystic degenerative change.    Interval History: Patient tachycardic and hypertensive today. Afebrile. No acute events over night. Patient was seen while he was receiving dialysis.     Follow-up For: Procedure(s) (LRB):  MPU PROCEDURE (N/A)    Post-Operative Day:      Scheduled Meds:   sodium chloride 0.9%   Intravenous Once    acetaminophen  1,000 mg Oral Q8H    amLODIPine  10 mg Oral Daily    ARIPiprazole  15 mg  Oral QHS    aspirin  81 mg Oral Daily    atorvastatin  80 mg Oral Daily    cefTRIAXone (ROCEPHIN) IVPB  2 g Intravenous Q24H    colchicine  0.3 mg Oral BID    DAPTOmycin (CUBICIN)  IV  6 mg/kg Intravenous Q48H    folic acid  1 mg Oral Daily    heparin (porcine)  5,000 Units Subcutaneous Q8H    corticotropin  80 Units Subcutaneous Every Mon, Fri    hydrALAZINE  50 mg Oral Q8H    labetalol  200 mg Oral Q12H    mupirocin   Nasal BID    nystatin  500,000 Units Oral QID (WM & HS)    pantoprazole  40 mg Intravenous BID    sevelamer carbonate  800 mg Oral TID     Continuous Infusions:  PRN Meds:sodium chloride, sodium chloride 0.9%, acetaminophen, albuterol-ipratropium, aluminum-magnesium hydroxide-simethicone, dextrose 10%, dextrose 10%, glucagon (human recombinant), glucose, glucose, lorazepam, melatonin, methocarbamoL, morphine, morphine, naloxone, OLANZapine, ondansetron, oxyCODONE, oxyCODONE, polyethylene glycol, prochlorperazine, senna-docusate 8.6-50 mg, simethicone, sodium chloride 0.9%, sodium chloride 0.9%, sodium chloride 0.9%, sodium chloride 0.9%    Review of Systems   Constitutional:  Positive for activity change. Negative for appetite change, chills and fever.   Respiratory:  Negative for cough, shortness of breath and wheezing.    Cardiovascular:  Positive for leg swelling.   Gastrointestinal:  Negative for nausea and vomiting.   Musculoskeletal:  Positive for arthralgias and myalgias. Negative for gait problem.   Skin:  Positive for wound. Negative for color change.   Neurological:  Negative for weakness and numbness.   Psychiatric/Behavioral:  Positive for agitation and behavioral problems.    Objective:     Vital Signs (Most Recent):  Temp: 98.7 °F (37.1 °C) (02/26/22 1930)  Pulse: (!) 115 (02/26/22 2300)  Resp: 18 (02/26/22 1930)  BP: (!) 155/88 (02/26/22 1930)  SpO2: 97 % (02/26/22 1930)   Vital Signs (24h Range):  Temp:  [98.1 °F (36.7 °C)-98.7 °F (37.1 °C)] 98.7 °F (37.1 °C)  Pulse:   [] 115  Resp:  [16-22] 18  SpO2:  [96 %-98 %] 97 %  BP: (136-185)/() 155/88     Weight: 72 kg (158 lb 11.7 oz)  Body mass index is 25.62 kg/m².    Foot Exam    General  Orientation: alert and oriented to person, place, and time       Right Foot/Ankle     Inspection and Palpation  Tenderness: none   Swelling: (pitting mild edema to bilateral lower extremities, L>R)  Skin Exam: skin intact; no drainage, no cellulitis, no abnormal color, no ulcer and no erythema     Neurovascular  Dorsalis pedis: 2+  Posterior tibial: 1+      Left Foot/Ankle      Inspection and Palpation  Swelling: (pitting edema to bilateral lower extremities, L>R)  Skin Exam: cellulitis and erythema; no drainage and no ulcer     Neurovascular  Dorsalis pedis: 2+  Posterior tibial: 1+      Laboratory:  CBC:   Recent Labs   Lab 02/26/22 0627   WBC 10.37   RBC 2.62*   HGB 7.4*   HCT 24.3*      MCV 93   MCH 28.2   MCHC 30.5*     CMP:   Recent Labs   Lab 02/21/22 2311 02/22/22 0631 02/26/22 0627      < > 80   CALCIUM 9.3   < > 8.6*   ALBUMIN 2.1*  --   --    PROT 5.5*  --   --    *   < > 141   K 4.3   < > 3.8   CO2 20*   < > 24      < > 105   *   < > 58*   CREATININE 19.4*   < > 9.7*   ALKPHOS 83  --   --    ALT 13  --   --    AST 24  --   --    BILITOT 0.2  --   --     < > = values in this interval not displayed.     CRP: No results for input(s): CRP in the last 168 hours.  ESR: No results for input(s): SEDRATE in the last 168 hours.  Microbiology Results (last 7 days)       Procedure Component Value Units Date/Time    Gram stain [[id number]] Collected: 02/26/22 1825    Order Status: Completed Specimen: Bone from Toe, Left Foot Updated: 02/26/22 2319     Gram Stain Result No WBC's      No organisms seen    Culture, Anaerobe [[id number]] Collected: 02/26/22 1825    Order Status: Sent Specimen: Bone from Toe, Left Foot Updated: 02/26/22 2032    Aerobic culture [[id number]] Collected: 02/26/22 1825    Order  Status: Sent Specimen: Bone from Toe, Left Foot Updated: 02/26/22 2031    Fungus culture [[id number]] Collected: 02/26/22 1825    Order Status: Sent Specimen: Bone from Toe, Left Foot Updated: 02/26/22 2031    AFB Culture & Smear [[id number]] Collected: 02/26/22 1825    Order Status: Sent Specimen: Bone from Toe, Left Foot Updated: 02/26/22 2031    Blood Culture #2 **CANNOT BE ORDERED STAT** [[id number]] Collected: 02/16/22 1806    Order Status: Completed Specimen: Blood from Peripheral, Antecubital, Left Updated: 02/21/22 2012     Blood Culture, Routine No growth after 5 days.    Blood Culture #1 **CANNOT BE ORDERED STAT** [[id number]] Collected: 02/16/22 1807    Order Status: Completed Specimen: Blood from Peripheral, Hand, Right Updated: 02/21/22 2012     Blood Culture, Routine No growth after 5 days.          Specimen (24h ago, onward)                None          All pertinent labs reviewed within the last 24 hours.    Diagnostic Results:  L foot MRI 02/16  Impression:  Regions of focal abnormal signal involving the distal 1st metatarsal and proximal 1st phalanx as described above.  Findings are concerning for foci of subacute myelitis (Yared's abscess).  Other less likely considerations include cystic degenerative change.     Diffuse skin thickening and subcutaneous soft tissue swelling throughout the left foot, likely representing component of cellulitis, with other non infectious inflammatory process is also considered.     Cystic degenerative change most prominent at the 1st metatarsophalangeal joint.     Reactive edema about the flexor hallucis longus muscle.    X ray L foot 02/16:  Impression:  1. Use edema about the dorsal aspect of the foot extending to the great toe.  There is prominent cystic erosive change of the 1st metatarsophalangeal joint.  Given overlying edema, osteomyelitis cannot be excluded in this setting noting no previous exams are available for comparison.  Differential would include of  previous infection, correlation with any history of gout.    Clinical Findings:            Assessment/Plan:     * Pain and swelling of left lower extremity  Left foot Xray with prominent cystic erosive change of the 1st metatarsophalangeal joint.  Left forefoot MRI demonstrating regions of focal abnormal signal involving the distal 1st metatarsal and proximal 1st phalanx consistent with cystic degenerative change from gouty arthropathy versus osteomyelitis.     Bone biopsy specimen obtained (02/25/22) from L 1st proximal phalanx to help direct ABx therapy.   Specimen sent to path and micro    Gout  Chief complaint of left ankle pain with increased warmth, erythema, edema, and pain with ankle range of motion. Xray left ankle ordered noting no fractures or effusions. Ddx diagnosis include acute gout ankle arthropathy, cellulitis LLE, septic ankle arthritis low on the differentials.     Plan:   Continue colchicine  Follow up uric acid levels  Will consider left ankle joint aspiration with steroid injection if ankle pain does not improve  No weightbearing restrictions  Podiatry will continue to follow         Thank you for your consult. I will follow-up with patient. Please contact us if you have any additional questions.      Laith Sidhu DPM  Podiatry  [facility]

## 2022-02-27 NOTE — PLAN OF CARE
Pt is AAOx4 and in good spirits; afebrile; vital signs stable. Podiatry examined and changed dressing on foot today. Pain controlled with oxycodone and tylenol. He has had frequent diarrhea today. Dr. Pimentel notified and ordered immodium, as well as stopped colchicine. Outstanding stool culture also sent. He is up independently and has been steady on his feet.

## 2022-02-27 NOTE — ASSESSMENT & PLAN NOTE
Concern on initial imaging for abscess in cervical area and ENT and Neurosurgery consulted on admit. After several delays with repeat imaging due to patient refusal and coordination with anesthesia for sedation finally repeat MRI of cervical spine with contrast done on 2/24 and showed no abscess so ENT and Neurosurgery stated no intervention or further testing needed. ID stated no abx need for neck and continues on abx just for his left foot osteo.

## 2022-02-27 NOTE — PLAN OF CARE
Pt AOX4, VSS, afebrile.   Pt c/o 8/10 pain in L foot- relieved with PRN oxycodone 10 mg  Telemonitor NS to ST- pt HR increases to 120-130 when ambulating around room but 90s-100s when in bed.  Pt up with standby assist  R IJ Trialysis in place- dressing CDI  Pt refusing to take hydralazine despide systolic BP in 150s last night- BP at 4AM was in 170s systolic- Pt agreed to take AM hydralazine since BP was in 170s.   Left foot wound dressing- CDI  Barrier cream applied to gluteal cleft  Fall and infection precautions maintained throughout shift.   Pt in lowest settings, call light w/in reach, nonskid socks when ambulating, remains free from falls. NAD. WCTM.

## 2022-02-27 NOTE — ASSESSMENT & PLAN NOTE
- Patient admitted with left foot pain and recent left foot infection and salmonella bacteremia at an outside hopsital treated with Cipro.   - Lactic normal on admit.   - Admit labs: ESR 97, CRP 33.4, procal 1.0.  - MRI of left foot showed regions of focal abnormal signal involving the distal 1st metatarsal and proximal 1st phalanx.  Findings are concerning for foci of subacute osteomyelitis.   -Podiatry consulted and at first deferred bone biopsy and recommended just abx treatment and ID consulted and patient started on IV Daptomycin and Rocephin. Patient treated for gout by Podiatry as concerned some changes related to gout.   -treating gout, minimal improvement in pain. Podiatry a per ID request performed bone biopsy on 2/25 to left foot. Pathology and Micro from bone biopsy pending.   -ID reports likely need for 6 weeks IV antibiotics for osteo and likely can transition to IV Vanc/Ancef with HD to help with dosing as outpatient but awaiting final ID recs.

## 2022-02-27 NOTE — ASSESSMENT & PLAN NOTE
-abnormal C spine Xray with possible signs of C spine instability, MRI ordered and patient refused 2/17, on pain meds, discussed at length, see 2/17 and 2/18 note above, ordered with premediation now. Concern for possible infection also given acuity of pain  - prevertebral abscess likely with fluid collection with widening in C spine, NS recs C collar- patient refused, and Xrays, refused, and MRI full spine when stable. Cannot do any intervention now as unstable bc of kidneys.   -ent consulted for retropharyngeal abscess per ns and id recs, will likely scope if amenable at bedside (he refused). They rec MRI C spine with contrast- but cannot get due to kidneys  -refused c collar xrays  -NS recs MRI all spine, ID/ENT rec repeat MRI C spine for better pictures. (cannot do contrast). He is likely not going to sit for all the spine right now 2/21 per my exam and nursing. Placed repeat flex xrays and C spine MRi orders in now. They called him at 3 pm but anesthesia is coming at 330 pm to do the line he said he would now do this afternoon after a cigarette, so nursing to tell them to come back. IR consulted to sample the vertebral involvement, they report they wanted to review further imaging once in to see if they needed - will f/u thoughts but likely suspect wont need sampling of the spine as this appear more chroinc, retropharyngeal collection however likely needs further addressed. CT also pending as rec for ENT as well.   -ID following- on dapto and Ceftriaxone  -MRI/CT showing similar fidings to before  - ENT reports need contrast to really see retropharyngeal area well  - nephro approved contrast on 2/24 before HD  - MRI with contrast show no fluid collection, will not require intervention.  Pain is much better.

## 2022-02-27 NOTE — ASSESSMENT & PLAN NOTE
Chornci and controlled. Continue Colcichine 0.3 mg po BID to treat as per Podiatry recs. Left foot pain improved.

## 2022-02-27 NOTE — PROGRESS NOTES
[facility] - [facility] Medicine  Progress Note    Patient Name: Enrique Martinez  MRN: [id number]  Patient Class: IP- Inpatient   Admission Date: 2/16/2022  Length of Stay: 10 days  Attending Physician: Karena Pimentel MD  Primary Care Provider: Primary Doctor No        Subjective:     Principal Problem:Subacute osteomyelitis of left foot        HPI:  Enrique Martinez is a 56 y.o. male with a PMHx of CKD V glomerulonephropathy, insulin dependent diabetes mellitus, and CAD who presented tot  ED for evaluation of worsening left foot pain and swelling. He was recently admitted at [facility] from 2/2-2/4/2022 from LLE cellulitis. He was found to have salmonella bacteremia suspected due to diarrheal illness. He was discharged with cipro for which he reports compliance. He has had no improvement in his pain, redness, or swelling of his left foot and leg. He has also developed a new blister on his left foot. He endorses chills. He denies fevers, chest pain, SOB, N/V, decreased urine output, dysuria, and flank pain.    ED: HR 97, other VSSAF. CBC with leukocytosis of 16.01, Hgb 7.4, Hct 22.2.  ESR 97. CRP 33.4. K 5.5. Cr 19.7, . Phos 10.4. Lactic WNL. MRI L foot with cellulitis and osteomyelitis of distal 1st metatarsal and proximal 1st phalanx.      Overview/Hospital Course:  .Enrique Martinez is a 56 y.o. male with a PMHx of CKD V glomerulonephropathy, insulin dependent diabetes mellitus, and CAD who presented tot  ED for evaluation of worsening left foot pain and swelling. He was recently admitted at [facility] from 2/2-2/4/2022 from LLE cellulitis. He was found to have salmonella bacteremia suspected due to diarrheal illness. He was discharged with cipro for which he reports compliance. He has had no improvement in his pain, redness, or swelling of his left foot and leg. He has also developed a new blister on his left foot. He endorses chills. He denies fevers, chest pain, SOB, N/V, decreased urine output,  "dysuria, and flank pain. Patient admitted with worsening and progressive chronic kidney disease with  and Cr 20.9. Nephrology is consulted for his CKD stage V that has progressed ti ESRD and at first patient not amenable to starting HD but finally agreed after family involvement and patient had trialysis catheter placed and started on HD as per Nephrology. Patient was also complaining of neck pain on admit and had abnormal C spine Xray with possible signs of C spine instability, MRi ordered and patient refused 2/17, on pain meds, was discussed at length. Concern for possible infection also given acuity of pain. Imaging showed signs of C spine instability, prevertebral abscess likely with fluid collection with widening in C spine,. Neurosurgery consulted and recommended C collar- patient refused, and Xrays, refused, and MRI full spine when stable. Patient also had MRI of left foot on admit and showed "Regions of focal abnormal signal involving the distal 1st metatarsal and proximal 1st phalanx as described above.  Findings are concerning for foci of subacute myelitis (Yared's abscess).  Other less likely considerations include cystic degenerative change." ENT consulted for possible retropharyngeal abscess as per Neurosurgery and Infectious Disease consulted. At first patient kept refusing advanced imaging but finally agreed and repeat MRI of cervical spine done on 2/22 with anesthesia to help with sedation. MRI done without contrast and concern for possible retropharyngeal abscess. Patient placed on empiric IV Daptomycin and Ceftriaxone by ID.  ENT recommended needed study with contrast to really investigate the area better. Repeat MRI with contrast done of cervical spine on 2/24 and no obvious fluid collection noted and not obvious signs of infection so ENT and Neurosurgery signed off and stated nothing needed to be done. Patient kept on IV Daptomycin and Rocephin to cover for his left foot osteo. Podiatry " was consulted and performed bone biopsy as per ID recs on 2/25 to determine duration and type of abx needed to treat osteomyelitis of left foot. SW working on outpatient HD chair for patient who is new for HD and patient needs Perm Cath placed for long term HD needs as only has a temporary line and ID states likely will need 6 weeks of IV abx on discharge but likely can switch to Vanc/Ancef with HD to help with management of outpatient abx.         Interval History: Patient in good spirits and asking about discharge. I explained that right now he still needs final plans from ID and needs a Perm Cath placed and HD chair arranged prior to hospital discharge and hopefully can get all done in next 2-3 days. IR tentatively planning Perm Cath placement for long term HD on 2/28 as per their note from 2/25. ID final recs pending and awaiting cultures and pathology from bone biopsy done on 2/25. ID states likely will switch to IV Vanc/Ancef for better ease of treatment with HD as outpatient instead of Dapto/Rocephin combination he is currently on. Patient states neck pain and left foot pain are well controlled. Pateitn still having some tremors related to uremia but much b=improved and BUN/Cr much improved from admit with HD.     Review of Systems   Constitutional:  Negative for chills and fever.   Respiratory:  Negative for cough and shortness of breath.    Cardiovascular:  Negative for chest pain, palpitations and leg swelling.   Gastrointestinal:  Negative for abdominal pain, diarrhea, nausea and vomiting.   Musculoskeletal:  Positive for arthralgias (Left foot) and neck pain.   Neurological:  Negative for dizziness and light-headedness.   Psychiatric/Behavioral:  Negative for agitation, confusion and hallucinations.    Objective:     Vital Signs (Most Recent):  Temp: 98.3 °F (36.8 °C) (02/27/22 1143)  Pulse: 103 (02/27/22 1526)  Resp: 16 (02/27/22 1358)  BP: 122/81 (02/27/22 1143)  SpO2: 98 % (02/27/22 1327) on room air  Vital Signs (24h Range):  Temp:  [98.3 °F (36.8 °C)-98.7 °F (37.1 °C)] 98.3 °F (36.8 °C)  Pulse:  [] 103  Resp:  [16-22] 16  SpO2:  [97 %-98 %] 98 %  BP: (122-178)/() 122/81     Weight: 72 kg (158 lb 11.7 oz)  Body mass index is 25.62 kg/m².    Intake/Output Summary (Last 24 hours) at 2/27/2022 1542  Last data filed at 2/26/2022 1800  Gross per 24 hour   Intake 600 ml   Output 2123 ml   Net -1523 ml      Physical Exam  Vitals and nursing note reviewed.   Constitutional:       General: He is not in acute distress.     Appearance: Normal appearance. He is well-developed and normal weight. He is not ill-appearing.   Eyes:      Conjunctiva/sclera: Conjunctivae normal.   Neck:      Vascular: No JVD.   Cardiovascular:      Rate and Rhythm: Normal rate and regular rhythm.      Heart sounds: Normal heart sounds. No murmur heard.    No friction rub. No gallop.   Pulmonary:      Effort: Pulmonary effort is normal. No respiratory distress.      Breath sounds: Normal breath sounds. No wheezing or rales.   Abdominal:      General: Abdomen is flat. Bowel sounds are normal. There is no distension.      Palpations: Abdomen is soft.      Tenderness: There is no abdominal tenderness. There is no guarding.   Musculoskeletal:      Right lower leg: No edema.      Left lower leg: No edema.   Skin:     Findings: No erythema or rash.   Neurological:      Mental Status: He is alert and oriented to person, place, and time.   Psychiatric:         Mood and Affect: Mood normal.         Behavior: Behavior normal.         Thought Content: Thought content normal.         Judgment: Judgment normal.       Significant Labs: CBC:   Recent Labs   Lab 02/26/22  0627 02/27/22  0507   WBC 10.37 8.83   HGB 7.4* 7.3*   HCT 24.3* 23.8*    198     CMP:   Recent Labs   Lab 02/26/22  0627 02/27/22  0507    140   K 3.8 3.9    108   CO2 24 18*   GLU 80 77   BUN 58* 38*   CREATININE 9.7* 7.0*   CALCIUM 8.6* 8.7   ANIONGAP 12 14    EGFRNONAA 5.3* 7.8*       Significant Imaging: I have reviewed all pertinent imaging results/findings within the past 24 hours.      Assessment/Plan:      * Subacute osteomyelitis of left foot  - Patient admitted with left foot pain and recent left foot infection and salmonella bacteremia at an outside hopsital treated with Cipro.   - Lactic normal on admit.   - Admit labs: ESR 97, CRP 33.4, procal 1.0.  - MRI of left foot showed regions of focal abnormal signal involving the distal 1st metatarsal and proximal 1st phalanx.  Findings are concerning for foci of subacute osteomyelitis.   -Podiatry consulted and at first deferred bone biopsy and recommended just abx treatment and ID consulted and patient started on IV Daptomycin and Rocephin. Patient treated for gout by Podiatry as concerned some changes related to gout.   -treating gout, minimal improvement in pain. Podiatry a per ID request performed bone biopsy on 2/25 to left foot. Pathology and Micro from bone biopsy pending.   -ID reports likely need for 6 weeks IV antibiotics for osteo and likely can transition to IV Vanc/Ancef with HD to help with dosing as outpatient but awaiting final ID recs.       Acute renal failure superimposed on chronic kidney disease, on chronic dialysis  Membranous glomerulonephritis  - Nephrology consulted on admit for  and Cr 20.9 and uremic symptoms of tremors. 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 Nephrology recommended HD on admit but patient repeated refused and finally agreed after multiple conversations to start on 2/22. Nephrology has been managing his HD needs [facility].   - GFR was 13-16 up from baseline 4-6 previously in January 2022. Labs repeated today and GFR is 2.6, Cr 19.   - Patient follows with Dr. Cardoza and Dr Lee and dialysis has been discussed multiple times, patient continues to refuse and on transplant list with [facility] but on this admit after multiple  attempts patient finally agreed and trialysis line placed on 2/22 and patient started on HD on 2/23 for progressive CKD stage V that has now progressed to ESRD.   - SW arranging outpatient HD - [facility] [place] close to his house as patient is new to HD and needs HD chair and scheduled arranged prior to discharge from hospital.   - Patient needs Perm Cath prior to discharge and IR consulted on 2/25 and tentatively scheduled to have Perm Cath placed on 2/28.    Prevertebral Fluid Collection  Concern on initial imaging for abscess in cervical area and ENT and Neurosurgery consulted on admit. After several delays with repeat imaging due to patient refusal and coordination with anesthesia for sedation finally repeat MRI of cervical spine with contrast done on 2/24 and showed no abscess so ENT and Neurosurgery stated no intervention or further testing needed. ID stated no abx need for neck and continues on abx just for his left foot osteo.     Neck pain  - Patient had neck pain on admit and imaging done: Abnormal C spine Xray with possible signs of C spine instability, MRI ordered and patient refused 2/17, on pain meds, discussed at length, see 2/17 and 2/18.  -Concern for prevertebral abscess likely with fluid collection with widening in C spine, NS consulted and recomended C collar- patient refused, and Xrays, refused.   -ENTconsulted for retropharyngeal abscess per NS and ID recs, will likely scope if amenable at bedside (he refused). They rec MRI C spine with contrast.  -NS recs MRI all spine, ID/ENT rec repeat MRI C spine for better pictures. (cannot do contrast). He is likely not going to sit for all the spine right now 2/21 per my exam and nursing. Placed repeat flex xrays and C spine MRi orders in now. They called him at 3 pm but anesthesia is coming at 330 pm to do the line he said he would now do this afternoon after a cigarette, so nursing to tell them to come back. IR consulted to sample the vertebral involvement,  they report they wanted to review further imaging once in to see if they needed - will f/u thoughts but likely suspect wont need sampling of the spine as this appear more chroinc, retropharyngeal collection however likely needs further addressed. CT also pending as rec for ENT as well.   - MRI/CT finally done and complete on 2/22 and 2/24.  - MRI with contrast on 2/24 showed no fluid collection, will not require intervention as per Neurosurgery and ENT and signed off. ID stated did not need any antibiotics for his neck and remains on abx just to treat his left foot osteomyelitis not related to any suspected neck infection. No need for C- collar as per NS.   - Pain is much better.    Renovascular hypertension  - Elevated on admit but meds adjusted and now controlled.   - Goal BP < 140/90.  - Conitnue Norvasc 10 mg po daily + Hydralazine 50 mg po TID + Labetalol 200 mg po BID to treat and adjust as needed for tr=arget BP goal in hospital.     Folic acid deficiency  Present on admit. Folate low at 4.4. Patient started on Folic acid 1 mg po daily ion hospital and will continue.       Anemia due to chronic kidney disease, on chronic dialysis  - Patient admitted with Hgb in 7-8 range and related to his advanced CKD.   - Patient transfused 1 unit of PRBCs on 2/18 for Hgb 6.9 and trabnsfuse again 1 unit on 2/23 for Hgb 6.7 but Hgb stable since. No signs of bleeding noted and felt all related to his CKD in combination with chronic infection/inflammation.    - Hgb stable 7.3-7.5 since last transfusion and dimitrios monitor with CBC in hospital and consider transfusion if Hgb < 7.       Atherosclerosis of native coronary artery of native heart without angina pectoris  Patient with known CAD. Patient asymptomatic. Continue Labetalol, Asprin 81 mg po daily and Lipitor 80 mg po daily to treat CAD.       S/P coronary artery stent placement  Patient with previous coronary stent for CAD in past in 2007. Continue Labetalol, Asprin 81 mg po  "daily and Lipitor 80 mg po daily to treat CAD.     Type 2 diabetes mellitus with chronic kidney disease on chronic dialysis, with long-term current use of insulin  Good control in the hospital in past 24 hours.  · Plan is to continue to monitor POCT glucose 4 times a day with each meal and at bedtime and cover with Novolog low dose sliding scale insulin.   · Target pre-meal glucose goal is <140 with all random glucoses <180 in non-critically ill patient.    Hx of syphilis  Per Helio Garcia NP (ID) note 9/18/18, "Patient reports recent treatment with 3 doses of bicillin for + Treponema Pallidum antibody (5/16/2018) (per patient, this was a re-treatment because he couldn't get prior records of apparent prior treatment).  RPR 1:2.   Need to confirm treatment with [facility].  RPR here is pending.   Recommend repeat RPR 6 months from treatment - December 2018"  - Treated.   - RPR non-reactive 12/2018.      Pure hypercholesterolemia  Chronic and controlled. Continue Lipitor 80 mg po daily to treat.     Gastroesophageal reflux disease without esophagitis  Chronic and controlled. Continue Protonix 40 mg po daily to treat.     Chronic gout of left foot due to renal impairment without tophus  Chornci and controlled. Continue Colcichine 0.3 mg po BID to treat as per Podiatry recs. Left foot pain improved.       SHANNAN (iron deficiency anemia)        Benign prostatic hyperplasia  [place] placed 2/18 overnight for incontinence    Patient on waiting list for kidney transplant        Membranous glomerulonephritis  -will bring acthar gel from home to do non formulary as 40K and got via prior auth-non form request addded  -reason for CKD 5.        VTE Risk Mitigation (From admission, onward)         Ordered     heparin (porcine) injection 5,000 Units  Every 8 hours         02/25/22 0747     IP VTE HIGH RISK PATIENT  Once         02/17/22 0151     Place sequential compression device  Until discontinued         02/17/22 0151          "       Discharge Planning   JOAN: 3/1/2022     Code Status: Full Code   Is the patient medically ready for discharge?: No    Reason for patient still in hospital (select all that apply): Patient trending condition  Discharge Plan A: Home. Needs Perm Cath and tentatively scheduled for 2/28 with IR. Needs final ID recs on abx. Needs outpatient HD chair prior to discharge.    Discharge Delays: None known at this time              Karena Pimentel MD  Department of Hospital Medicine   [facility] - Transplant Stepdown

## 2022-02-27 NOTE — ASSESSMENT & PLAN NOTE
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  RPR 1:2.   Need to confirm treatment with [facility].  RPR here is pending.   Recommend repeat RPR 6 months from treatment - December 2018"  - Treated.   - RPR non-reactive 12/2018.    "

## 2022-02-27 NOTE — ASSESSMENT & PLAN NOTE
Patient with known CAD. Patient asymptomatic. Continue Labetalol, Asprin 81 mg po daily and Lipitor 80 mg po daily to treat CAD.

## 2022-02-27 NOTE — ASSESSMENT & PLAN NOTE
Left foot Xray with prominent cystic erosive change of the 1st metatarsophalangeal joint.  Left forefoot MRI demonstrating regions of focal abnormal signal involving the distal 1st metatarsal and proximal 1st phalanx consistent with cystic degenerative change from gouty arthropathy versus osteomyelitis.     Bone biopsy specimen obtained (02/25/22) from L 1st proximal phalanx to help direct ABx therapy.   Specimen sent to path and [place]

## 2022-02-27 NOTE — PROGRESS NOTES
[facility] - [facility]plant [facility] Medicine  Progress Note    Patient Name: Enrique Martinez  MRN: [id number]  Patient Class: IP- Inpatient   Admission Date: 2/16/2022  Length of Stay: 10 days  Attending Physician: Karena Pimentel MD  Primary Care Provider: Primary Doctor No        Subjective:     Principal Problem:Subacute osteomyelitis of left foot        HPI:  Enrique Martinez is a 56 y.o. male with a PMHx of CKD V glomerulonephropathy, insulin dependent diabetes mellitus, and CAD who presented tot  ED for evaluation of worsening left foot pain and swelling. He was recently admitted at [facility] from 2/2-2/4/2022 from LLE cellulitis. He was found to have salmonella bacteremia suspected due to diarrheal illness. He was discharged with cipro for which he reports compliance. He has had no improvement in his pain, redness, or swelling of his left foot and leg. He has also developed a new blister on his left foot. He endorses chills. He denies fevers, chest pain, SOB, N/V, decreased urine output, dysuria, and flank pain.    ED: HR 97, other VSSAF. CBC with leukocytosis of 16.01, Hgb 7.4, Hct 22.2.  ESR 97. CRP 33.4. K 5.5. Cr 19.7, . Phos 10.4. Lactic WNL. MRI L foot with cellulitis and osteomyelitis of distal 1st metatarsal and proximal 1st phalanx.      Overview/Hospital Course:  .Enrique Martinez is a 56 y.o. male with a PMHx of CKD V glomerulonephropathy, insulin dependent diabetes mellitus, and CAD who presented tot  ED for evaluation of worsening left foot pain and swelling. He was recently admitted at [facility] from 2/2-2/4/2022 from LLE cellulitis. He was found to have salmonella bacteremia suspected due to diarrheal illness. He was discharged with cipro for which he reports compliance. He has had no improvement in his pain, redness, or swelling of his left foot and leg. He has also developed a new blister on his left foot. He endorses chills. He denies fevers, chest pain, SOB, N/V, decreased urine output,  "dysuria, and flank pain. Patient admitted with worsening and progressive chronic kidney disease with  and Cr 20.9. Nephrology is consulted for his CKD stage V that has progressed ti ESRD and at first patient not amenable to starting HD but finally agreed after family involvement and patient had trialysis catheter placed and started on HD as per Nephrology. Patient was also complaining of neck pain on admit and had abnormal C spine Xray with possible signs of C spine instability, MRi ordered and patient refused 2/17, on pain meds, was discussed at length. Concern for possible infection also given acuity of pain. Imaging showed signs of C spine instability, prevertebral abscess likely with fluid collection with widening in C spine,. Neurosurgery consulted and recommended C collar- patient refused, and Xrays, refused, and MRI full spine when stable. Patient also had MRI of left foot on admit and showed "Regions of focal abnormal signal involving the distal 1st metatarsal and proximal 1st phalanx as described above.  Findings are concerning for foci of subacute myelitis (Yared's abscess).  Other less likely considerations include cystic degenerative change." ENT consulted for possible retropharyngeal abscess as per Neurosurgery and Infectious Disease consulted. At first patient kept refusing advanced imaging but finally agreed and repeat MRI of cervical spine done on 2/22 with anesthesia to help with sedation. MRI done without contrast and concern for possible retropharyngeal abscess. Patient placed on empiric IV Daptomycin and Ceftriaxone by ID.  ENT recommended needed study with contrast to really investigate the area better. Repeat MRI with contrast done of cervical spine on 2/24 and no obvious fluid collection noted and not obvious signs of infection so ENT and Neurosurgery signed off and stated nothing needed to be done. Patient kept on IV Daptomycin and Rocephin to cover for his left foot osteo. Podiatry " was consulted and performed bone biopsy as per ID recs on 2/25 to determine duration and type of abx needed to treat osteomyelitis of left foot. SW working on outpatient HD chair for patient who is new for HD and patient needs Perm Cath placed for long term HD needs as only has a temporary line and ID states likely will need 6 weeks of IV abx on discharge but likely can switch to Vanc/Ancef with HD to help with management of outpatient abx.         No new subjective & objective note has been filed under this hospital service since the last note was generated.      Assessment/Plan:      * Subacute osteomyelitis of left foot  - Patient admitted with left foot pain and recent left foot infection and salmonella bacteremia at an outside hopsital treated with Cipro.   - Lactic normal on admit.   - Admit labs: ESR 97, CRP 33.4, procal 1.0.  - MRI of left foot showed regions of focal abnormal signal involving the distal 1st metatarsal and proximal 1st phalanx.  Findings are concerning for foci of subacute osteomyelitis.   -Podiatry consulted and at first deferred bone biopsy and recommended just abx treatment and ID consulted and patient started on IV Daptomycin and Rocephin. Patient treated for gout by Podiatry as concerned some changes related to gout.   -treating gout, minimal improvement in pain. Podiatry a per ID request performed bone biopsy on 2/25 to left foot. Pathology and Micro from bone biopsy pending.   -ID reports likely need for 6 weeks IV antibiotics for osteo and likely can transition to IV Vanc/Ancef with HD to help with dosing as outpatient but awaiting final ID recs.       Acute renal failure superimposed on stage 5 chronic kidney disease on chronic dialysis  Membranous glomerulonephritis  - Nephrology consulted on admit for  and Cr 20.9 and uremic symptoms of tremors. 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 Nephrology recommended HD on admit but patient  repeated refused and finally agreed after multiple conversations to start on 2/22. Nephrology has been managing his HD needs ion [facility].   - GFR was 13-16 up from baseline 4-6 previously in January 2022. Labs repeated today and GFR is 2.6, Cr 19.   - Patient follows with Dr. Cardoza and Dr Lee and dialysis has been discussed multiple times, patient continues to refuse and on transplant list with [facility] but on this admit after multiple attempts patient finally agreed and trialysis line placed on 2/22 and patient started on HD on 2/23 for progressive CKD stage V that has now progressed to ESRD.   - SW arranging outpatient HD - [facility] very close to his house as patient is new to HD and needs HD chair and scheduled arranged prior to discharge from hospital.   - Patient needs Perm Cath prior to discharge and IR consulted on 2/25 and tentatively scheduled to have Perm Cath placed on 2/28.    Prevertebral Fluid Collection  Concern on initial imaging for abscess in cervical area and ENT and Neurosurgery consulted on admit. After several delays with repeat imaging due to patient refusal and coordination with anesthesia for sedation finally repeat MRI of cervical spine with contrast done on 2/24 and showed no abscess so ENT and Neurosurgery stated no intervention or further testing needed. ID stated no abx need for neck and continues on abx just for his left foot osteo.     Neck pain  - Patient had neck pain on admit and imaging done: Abnormal C spine Xray with possible signs of C spine instability, MRI ordered and patient refused 2/17, on pain meds, discussed at length, see 2/17 and 2/18.  -Concern for prevertebral abscess likely with fluid collection with widening in C spine, NS consulted and recomended C collar- patient refused, and Xrays, refused.   -ENTconsulted for retropharyngeal abscess per NS and ID recs, will likely scope if amenable at bedside (he refused). They rec MRI C spine with contrast.  -NS recs MRI all  spine, ID/ENT rec repeat MRI C spine for better pictures. (cannot do contrast). He is likely not going to sit for all the spine right now 2/21 per my exam and nursing. Placed repeat flex xrays and C spine MRi orders in now. They called him at 3 pm but anesthesia is coming at 330 pm to do the line he said he would now do this afternoon after a cigarette, so nursing to tell them to come back. IR consulted to sample the vertebral involvement, they report they wanted to review further imaging once in to see if they needed - will f/u thoughts but likely suspect wont need sampling of the spine as this appear more chroinc, retropharyngeal collection however likely needs further addressed. CT also pending as rec for ENT as well.   - MRI/CT finally done and complete on 2/22 and 2/24.  - MRI with contrast on 2/24 showed no fluid collection, will not require intervention as per Neurosurgery and ENT and signed off. ID stated did not need any antibiotics for his neck and remains on abx just to treat his left foot osteomyelitis not related to any suspected neck infection. No need for C- collar as per NS.   - Pain is much better.    Renovascular hypertension  - Elevated on admit but meds adjusted and now controlled.   - Goal BP < 140/90.  - Conitnue Norvasc 10 mg po daily + Hydralazine 50 mg po TID + Labetalol 200 mg po BID to treat and adjust as needed for tr=arget BP goal in hospital.     Folic acid deficiency  Present on admit. Folate low at 4.4. Patient started on Folic acid 1 mg po daily ion hospital and will continue.       Anemia due to chronic kidney disease, on chronic dialysis  - Patient admitted with Hgb in 7-8 range and related to his advanced CKD.   - Patient transfused 1 unit of PRBCs on 2/18 for Hgb 6.9 and trabnsfuse again 1 unit on 2/23 for Hgb 6.7 but Hgb stable since. No signs of bleeding noted and felt all related to his CKD in combination with chronic infection/inflammation.    - Hgb stable 7.3-7.5 since last  "transfusion and dimitrios monitor with CBC in hospital and consider transfusion if Hgb < 7.       Atherosclerosis of native coronary artery of native heart without angina pectoris  Patient with known CAD. Patient asymptomatic. Continue Labetalol, Asprin 81 mg po daily and Lipitor 80 mg po daily to treat CAD.       S/P coronary artery stent placement  Patient with previous coronary stent for CAD in past in 2007. Continue Labetalol, Asprin 81 mg po daily and Lipitor 80 mg po daily to treat CAD.     Type 2 diabetes mellitus with chronic kidney disease on chronic dialysis, with long-term current use of insulin  Good control in the hospital in past 24 hours.  · Plan is to continue to monitor POCT glucose 4 times a day with each meal and at bedtime and cover with Novolog low dose sliding scale insulin.   · Target pre-meal glucose goal is <140 with all random glucoses <180 in non-critically ill patient.    Hx of syphilis  Per Helio Garcia NP (ID) note 9/18/18, "Patient reports recent treatment with 3 doses of bicillin for + Treponema Pallidum antibody (5/16/2018) (per patient, this was a re-treatment because he couldn't get prior records of apparent prior treatment).  RPR 1:2.   Need to confirm treatment with [facility].  RPR here is pending.   Recommend repeat RPR 6 months from treatment - December 2018"  - Treated.   - RPR non-reactive 12/2018.      Pure hypercholesterolemia  Chronic and controlled. Continue Lipitor 80 mg po daily to treat.     Gastroesophageal reflux disease without esophagitis  Chronic and controlled. Continue Protonix 40 mg po daily to treat.     Chronic gout of left foot due to renal impairment without tophus  Chornci and controlled. Continue Colcichine 0.3 mg po BID to treat as per Podiatry recs. Left foot pain improved.       VTE Risk Mitigation (From admission, onward)         Ordered     heparin (porcine) injection 5,000 Units  Every 8 hours         02/25/22 0747     IP VTE HIGH RISK PATIENT  Once         " 02/17/22 0151     Place sequential compression device  Until discontinued         02/17/22 0151                Discharge Planning   JONA: 3/1/2022     Code Status: Full Code   Is the patient medically ready for discharge?: No    Reason for patient still in hospital (select all that apply): Patient trending condition  Discharge Plan A: Home. Needs Perm Cath placed by IR. Needs final ID recs on his long term abx for his left foot osteomyelitis and needs outpatient HD chair arranged prior to hospital discharge.   Discharge Delays: None known at this time        Karena Pimentel MD  Department of Hospital Medicine   [facility] - Transplant Stepdown

## 2022-02-27 NOTE — ASSESSMENT & PLAN NOTE
DM-2 on insulin, uncontrolled (A1C 7s) with renal manifestations.  home meds: lantus 5 units daily  - SSI   - ACHS accuchecks  - diabetic diet  - caution for insulin stacking in low GFR

## 2022-02-27 NOTE — ASSESSMENT & PLAN NOTE
- Patient had neck pain on admit and imaging done: Abnormal C spine Xray with possible signs of C spine instability, MRI ordered and patient refused 2/17, on pain meds, discussed at length, see 2/17 and 2/18.  -Concern for prevertebral abscess likely with fluid collection with widening in C spine, NS consulted and recomended C collar- patient refused, and Xrays, refused.   -ENTconsulted for retropharyngeal abscess per NS and ID recs, will likely scope if amenable at bedside (he refused). They rec MRI C spine with contrast.  -NS recs MRI all spine, ID/ENT rec repeat MRI C spine for better pictures. (cannot do contrast). He is likely not going to sit for all the spine right now 2/21 per my exam and nursing. Placed repeat flex xrays and C spine MRi orders in now. They called him at 3 pm but anesthesia is coming at 330 pm to do the line he said he would now do this afternoon after a cigarette, so nursing to tell them to come back. IR consulted to sample the vertebral involvement, they report they wanted to review further imaging once in to see if they needed - will f/u thoughts but likely suspect wont need sampling of the spine as this appear more chroinc, retropharyngeal collection however likely needs further addressed. CT also pending as rec for ENT as well.   - MRI/CT finally done and complete on 2/22 and 2/24.  - MRI with contrast on 2/24 showed no fluid collection, will not require intervention as per Neurosurgery and ENT and signed off. ID stated did not need any antibiotics for his neck and remains on abx just to treat his left foot osteomyelitis not related to any suspected neck infection. No need for C- collar as per NS.   - Pain is much better.

## 2022-02-27 NOTE — SUBJECTIVE & OBJECTIVE
Interval History: Patient in good spirits and asking about discharge. I explained that right now he still needs final plans from ID and needs a Perm Cath placed and HD chair arranged prior to hospital discharge and hopefully can get all done in next 2-3 days. IR tentatively planning Perm Cath placement for long term HD on 2/28 as per their note from 2/25. ID final recs pending and awaiting cultures and pathology from bone biopsy done on 2/25. ID states likely will switch to IV Vanc/Ancef for better ease of treatment with HD as outpatient instead of Dapto/Rocephin combination he is currently on. Patient states neck pain and left foot pain are well controlled. Pateitn still having some tremors related to uremia but much b=improved and BUN/Cr much improved from admit with HD.     Review of Systems   Constitutional:  Negative for chills and fever.   Respiratory:  Negative for cough and shortness of breath.    Cardiovascular:  Negative for chest pain, palpitations and leg swelling.   Gastrointestinal:  Negative for abdominal pain, diarrhea, nausea and vomiting.   Musculoskeletal:  Positive for arthralgias (Left foot) and neck pain.   Neurological:  Negative for dizziness and light-headedness.   Psychiatric/Behavioral:  Negative for agitation, confusion and hallucinations.    Objective:     Vital Signs (Most Recent):  Temp: 98.3 °F (36.8 °C) (02/27/22 1143)  Pulse: 103 (02/27/22 1526)  Resp: 16 (02/27/22 1358)  BP: 122/81 (02/27/22 1143)  SpO2: 98 % (02/27/22 1327) on room air Vital Signs (24h Range):  Temp:  [98.3 °F (36.8 °C)-98.7 °F (37.1 °C)] 98.3 °F (36.8 °C)  Pulse:  [] 103  Resp:  [16-22] 16  SpO2:  [97 %-98 %] 98 %  BP: (122-178)/() 122/81     Weight: 72 kg (158 lb 11.7 oz)  Body mass index is 25.62 kg/m².    Intake/Output Summary (Last 24 hours) at 2/27/2022 1542  Last data filed at 2/26/2022 1800  Gross per 24 hour   Intake 600 ml   Output 2123 ml   Net -1523 ml      Physical Exam  Vitals and nursing  note reviewed.   Constitutional:       General: He is not in acute distress.     Appearance: Normal appearance. He is well-developed and normal weight. He is not ill-appearing.   Eyes:      Conjunctiva/sclera: Conjunctivae normal.   Neck:      Vascular: No JVD.   Cardiovascular:      Rate and Rhythm: Normal rate and regular rhythm.      Heart sounds: Normal heart sounds. No murmur heard.    No friction rub. No gallop.   Pulmonary:      Effort: Pulmonary effort is normal. No respiratory distress.      Breath sounds: Normal breath sounds. No wheezing or rales.   Abdominal:      General: Abdomen is flat. Bowel sounds are normal. There is no distension.      Palpations: Abdomen is soft.      Tenderness: There is no abdominal tenderness. There is no guarding.   Musculoskeletal:      Right lower leg: No edema.      Left lower leg: No edema.   Skin:     Findings: No erythema or rash.   Neurological:      Mental Status: He is alert and oriented to person, place, and time.   Psychiatric:         Mood and Affect: Mood normal.         Behavior: Behavior normal.         Thought Content: Thought content normal.         Judgment: Judgment normal.       Significant Labs: CBC:   Recent Labs   Lab 02/26/22  0627 02/27/22  0507   WBC 10.37 8.83   HGB 7.4* 7.3*   HCT 24.3* 23.8*    198     CMP:   Recent Labs   Lab 02/26/22  0627 02/27/22  0507    140   K 3.8 3.9    108   CO2 24 18*   GLU 80 77   BUN 58* 38*   CREATININE 9.7* 7.0*   CALCIUM 8.6* 8.7   ANIONGAP 12 14   EGFRNONAA 5.3* 7.8*       Significant Imaging: I have reviewed all pertinent imaging results/findings within the past 24 hours.

## 2022-02-28 ENCOUNTER — ANESTHESIA (OUTPATIENT)
Dept: INTERVENTIONAL RADIOLOGY/VASCULAR | Facility: HOSPITAL | Age: 57
DRG: 673 | End: 2022-02-28
Payer: MEDICAID

## 2022-02-28 ENCOUNTER — TELEPHONE (OUTPATIENT)
Dept: NEUROSURGERY | Facility: CLINIC | Age: 57
End: 2022-02-28
Payer: MEDICAID

## 2022-02-28 DIAGNOSIS — D62 ACUTE BLOOD LOSS ANEMIA: Primary | ICD-10-CM

## 2022-02-28 PROBLEM — R19.7 DIARRHEA: Status: ACTIVE | Noted: 2022-02-28

## 2022-02-28 LAB
ANION GAP SERPL CALC-SCNC: 14 MMOL/L (ref 8–16)
BASOPHILS # BLD AUTO: 0.02 K/UL (ref 0–0.2)
BASOPHILS NFR BLD: 0.2 % (ref 0–1.9)
BUN SERPL-MCNC: 45 MG/DL (ref 6–20)
CALCIUM SERPL-MCNC: 8.9 MG/DL (ref 8.7–10.5)
CHLORIDE SERPL-SCNC: 109 MMOL/L (ref 95–110)
CO2 SERPL-SCNC: 19 MMOL/L (ref 23–29)
CREAT SERPL-MCNC: 9.3 MG/DL (ref 0.5–1.4)
DIFFERENTIAL METHOD: ABNORMAL
EOSINOPHIL # BLD AUTO: 0.2 K/UL (ref 0–0.5)
EOSINOPHIL NFR BLD: 1.9 % (ref 0–8)
ERYTHROCYTE [DISTWIDTH] IN BLOOD BY AUTOMATED COUNT: 15.6 % (ref 11.5–14.5)
EST. GFR  (AFRICAN AMERICAN): 6.4 ML/MIN/1.73 M^2
EST. GFR  (NON AFRICAN AMERICAN): 5.5 ML/MIN/1.73 M^2
GLUCOSE SERPL-MCNC: 91 MG/DL (ref 70–110)
HCT VFR BLD AUTO: 24 % (ref 40–54)
HGB BLD-MCNC: 7.3 G/DL (ref 14–18)
IMM GRANULOCYTES # BLD AUTO: 0.08 K/UL (ref 0–0.04)
IMM GRANULOCYTES NFR BLD AUTO: 0.9 % (ref 0–0.5)
INR PPP: 1.1 (ref 0.8–1.2)
LYMPHOCYTES # BLD AUTO: 1.3 K/UL (ref 1–4.8)
LYMPHOCYTES NFR BLD: 15.1 % (ref 18–48)
MCH RBC QN AUTO: 27.7 PG (ref 27–31)
MCHC RBC AUTO-ENTMCNC: 30.4 G/DL (ref 32–36)
MCV RBC AUTO: 91 FL (ref 82–98)
MONOCYTES # BLD AUTO: 1.2 K/UL (ref 0.3–1)
MONOCYTES NFR BLD: 13.6 % (ref 4–15)
NEUTROPHILS # BLD AUTO: 5.9 K/UL (ref 1.8–7.7)
NEUTROPHILS NFR BLD: 68.3 % (ref 38–73)
NRBC BLD-RTO: 0 /100 WBC
PHOSPHATE SERPL-MCNC: 5 MG/DL (ref 2.7–4.5)
PLATELET # BLD AUTO: 250 K/UL (ref 150–450)
PMV BLD AUTO: 10.5 FL (ref 9.2–12.9)
POTASSIUM SERPL-SCNC: 4.1 MMOL/L (ref 3.5–5.1)
PROTHROMBIN TIME: 11.3 SEC (ref 9–12.5)
RBC # BLD AUTO: 2.64 M/UL (ref 4.6–6.2)
SARS-COV-2 RDRP RESP QL NAA+PROBE: NEGATIVE
SODIUM SERPL-SCNC: 142 MMOL/L (ref 136–145)
WBC # BLD AUTO: 8.59 K/UL (ref 3.9–12.7)

## 2022-02-28 PROCEDURE — 25000003 PHARM REV CODE 250: Mod: NTX | Performed by: HOSPITALIST

## 2022-02-28 PROCEDURE — 99233 PR SUBSEQUENT HOSPITAL CARE,LEVL III: ICD-10-PCS | Mod: NTX,,, | Performed by: INTERNAL MEDICINE

## 2022-02-28 PROCEDURE — 99233 PR SUBSEQUENT HOSPITAL CARE,LEVL III: ICD-10-PCS | Mod: NTX,,, | Performed by: FAMILY MEDICINE

## 2022-02-28 PROCEDURE — 85025 COMPLETE CBC W/AUTO DIFF WBC: CPT | Mod: NTX | Performed by: HOSPITALIST

## 2022-02-28 PROCEDURE — 25000003 PHARM REV CODE 250: Mod: NTX | Performed by: NURSE ANESTHETIST, CERTIFIED REGISTERED

## 2022-02-28 PROCEDURE — 20600001 HC STEP DOWN PRIVATE ROOM: Mod: NTX

## 2022-02-28 PROCEDURE — 80048 BASIC METABOLIC PNL TOTAL CA: CPT | Mod: NTX | Performed by: HOSPITALIST

## 2022-02-28 PROCEDURE — 99233 SBSQ HOSP IP/OBS HIGH 50: CPT | Mod: NTX,,, | Performed by: FAMILY MEDICINE

## 2022-02-28 PROCEDURE — 99223 PR INITIAL HOSPITAL CARE,LEVL III: ICD-10-PCS | Mod: NTX,,, | Performed by: INTERNAL MEDICINE

## 2022-02-28 PROCEDURE — 25000003 PHARM REV CODE 250: Mod: NTX | Performed by: PHYSICIAN ASSISTANT

## 2022-02-28 PROCEDURE — 63600175 PHARM REV CODE 636 W HCPCS: Mod: NTX | Performed by: HOSPITALIST

## 2022-02-28 PROCEDURE — 99233 SBSQ HOSP IP/OBS HIGH 50: CPT | Mod: NTX,,, | Performed by: PODIATRIST

## 2022-02-28 PROCEDURE — 63600175 PHARM REV CODE 636 W HCPCS: Mod: NTX | Performed by: NURSE ANESTHETIST, CERTIFIED REGISTERED

## 2022-02-28 PROCEDURE — 99233 SBSQ HOSP IP/OBS HIGH 50: CPT | Mod: NTX,,, | Performed by: INTERNAL MEDICINE

## 2022-02-28 PROCEDURE — D9220A PRA ANESTHESIA: Mod: CRNA,NTX,, | Performed by: NURSE ANESTHETIST, CERTIFIED REGISTERED

## 2022-02-28 PROCEDURE — D9220A PRA ANESTHESIA: ICD-10-PCS | Mod: ANES,NTX,, | Performed by: STUDENT IN AN ORGANIZED HEALTH CARE EDUCATION/TRAINING PROGRAM

## 2022-02-28 PROCEDURE — 99233 PR SUBSEQUENT HOSPITAL CARE,LEVL III: ICD-10-PCS | Mod: NTX,,, | Performed by: PODIATRIST

## 2022-02-28 PROCEDURE — 99223 1ST HOSP IP/OBS HIGH 75: CPT | Mod: NTX,,, | Performed by: INTERNAL MEDICINE

## 2022-02-28 PROCEDURE — D9220A PRA ANESTHESIA: Mod: ANES,NTX,, | Performed by: STUDENT IN AN ORGANIZED HEALTH CARE EDUCATION/TRAINING PROGRAM

## 2022-02-28 PROCEDURE — 85610 PROTHROMBIN TIME: CPT | Mod: NTX | Performed by: RADIOLOGY

## 2022-02-28 PROCEDURE — 25000003 PHARM REV CODE 250: Mod: NTX | Performed by: FAMILY MEDICINE

## 2022-02-28 PROCEDURE — 25000003 PHARM REV CODE 250: Mod: NTX | Performed by: INTERNAL MEDICINE

## 2022-02-28 PROCEDURE — D9220A PRA ANESTHESIA: ICD-10-PCS | Mod: CRNA,NTX,, | Performed by: NURSE ANESTHETIST, CERTIFIED REGISTERED

## 2022-02-28 PROCEDURE — U0002 COVID-19 LAB TEST NON-CDC: HCPCS | Mod: NTX | Performed by: RADIOLOGY

## 2022-02-28 PROCEDURE — 84100 ASSAY OF PHOSPHORUS: CPT | Mod: NTX | Performed by: HOSPITALIST

## 2022-02-28 RX ORDER — CEFAZOLIN SODIUM 1 G/3ML
INJECTION, POWDER, FOR SOLUTION INTRAMUSCULAR; INTRAVENOUS
Status: DISCONTINUED | OUTPATIENT
Start: 2022-02-28 | End: 2022-02-28

## 2022-02-28 RX ORDER — FENTANYL CITRATE 50 UG/ML
INJECTION, SOLUTION INTRAMUSCULAR; INTRAVENOUS
Status: DISCONTINUED | OUTPATIENT
Start: 2022-02-28 | End: 2022-02-28

## 2022-02-28 RX ORDER — PROPOFOL 10 MG/ML
VIAL (ML) INTRAVENOUS CONTINUOUS PRN
Status: DISCONTINUED | OUTPATIENT
Start: 2022-02-28 | End: 2022-02-28

## 2022-02-28 RX ORDER — PHENYLEPHRINE HYDROCHLORIDE 10 MG/ML
INJECTION INTRAVENOUS
Status: DISCONTINUED | OUTPATIENT
Start: 2022-02-28 | End: 2022-02-28

## 2022-02-28 RX ORDER — MIDAZOLAM HYDROCHLORIDE 1 MG/ML
INJECTION, SOLUTION INTRAMUSCULAR; INTRAVENOUS
Status: DISCONTINUED | OUTPATIENT
Start: 2022-02-28 | End: 2022-02-28

## 2022-02-28 RX ORDER — ONDANSETRON 2 MG/ML
4 INJECTION INTRAMUSCULAR; INTRAVENOUS EVERY 6 HOURS PRN
Status: DISCONTINUED | OUTPATIENT
Start: 2022-02-28 | End: 2022-03-10 | Stop reason: HOSPADM

## 2022-02-28 RX ORDER — PROCHLORPERAZINE EDISYLATE 5 MG/ML
5 INJECTION INTRAMUSCULAR; INTRAVENOUS EVERY 30 MIN PRN
Status: DISCONTINUED | OUTPATIENT
Start: 2022-02-28 | End: 2022-03-01

## 2022-02-28 RX ORDER — SODIUM CHLORIDE 9 MG/ML
INJECTION, SOLUTION INTRAVENOUS
Status: DISCONTINUED | OUTPATIENT
Start: 2022-02-28 | End: 2022-03-05

## 2022-02-28 RX ORDER — SODIUM CHLORIDE 9 MG/ML
INJECTION, SOLUTION INTRAVENOUS ONCE
Status: COMPLETED | OUTPATIENT
Start: 2022-02-28 | End: 2022-03-01

## 2022-02-28 RX ORDER — FENTANYL CITRATE 50 UG/ML
25 INJECTION, SOLUTION INTRAMUSCULAR; INTRAVENOUS EVERY 5 MIN PRN
Status: DISCONTINUED | OUTPATIENT
Start: 2022-02-28 | End: 2022-03-01

## 2022-02-28 RX ORDER — HYDROMORPHONE HYDROCHLORIDE 1 MG/ML
0.3 INJECTION, SOLUTION INTRAMUSCULAR; INTRAVENOUS; SUBCUTANEOUS EVERY 10 MIN PRN
Status: DISCONTINUED | OUTPATIENT
Start: 2022-02-28 | End: 2022-03-01

## 2022-02-28 RX ADMIN — Medication 6 MG: at 09:02

## 2022-02-28 RX ADMIN — FENTANYL CITRATE 25 MCG: 50 INJECTION, SOLUTION INTRAMUSCULAR; INTRAVENOUS at 12:02

## 2022-02-28 RX ADMIN — ACETAMINOPHEN 1000 MG: 500 TABLET ORAL at 03:02

## 2022-02-28 RX ADMIN — LABETALOL HYDROCHLORIDE 200 MG: 100 TABLET, FILM COATED ORAL at 08:02

## 2022-02-28 RX ADMIN — PROPOFOL 50 MCG/KG/MIN: 10 INJECTION, EMULSION INTRAVENOUS at 12:02

## 2022-02-28 RX ADMIN — OXYCODONE HYDROCHLORIDE 10 MG: 10 TABLET ORAL at 01:02

## 2022-02-28 RX ADMIN — MIDAZOLAM HYDROCHLORIDE 1 MG: 1 INJECTION, SOLUTION INTRAMUSCULAR; INTRAVENOUS at 12:02

## 2022-02-28 RX ADMIN — ACETAMINOPHEN 1000 MG: 500 TABLET ORAL at 05:02

## 2022-02-28 RX ADMIN — CEFAZOLIN 1 G: 330 INJECTION, POWDER, FOR SOLUTION INTRAMUSCULAR; INTRAVENOUS at 12:02

## 2022-02-28 RX ADMIN — PANTOPRAZOLE SODIUM 40 MG: 40 TABLET, DELAYED RELEASE ORAL at 08:02

## 2022-02-28 RX ADMIN — AMLODIPINE BESYLATE 10 MG: 10 TABLET ORAL at 08:02

## 2022-02-28 RX ADMIN — LABETALOL HYDROCHLORIDE 200 MG: 100 TABLET, FILM COATED ORAL at 09:02

## 2022-02-28 RX ADMIN — MUPIROCIN: 20 OINTMENT TOPICAL at 09:02

## 2022-02-28 RX ADMIN — PHENYLEPHRINE HYDROCHLORIDE 100 MCG: 10 INJECTION INTRAVENOUS at 01:02

## 2022-02-28 RX ADMIN — OXYCODONE HYDROCHLORIDE 10 MG: 10 TABLET ORAL at 05:02

## 2022-02-28 RX ADMIN — CEFTRIAXONE SODIUM 2 G: 2 INJECTION, SOLUTION INTRAVENOUS at 03:02

## 2022-02-28 RX ADMIN — HEPARIN SODIUM 5000 UNITS: 5000 INJECTION INTRAVENOUS; SUBCUTANEOUS at 09:02

## 2022-02-28 RX ADMIN — HYDRALAZINE HYDROCHLORIDE 75 MG: 50 TABLET ORAL at 03:02

## 2022-02-28 RX ADMIN — ACETAMINOPHEN 650 MG: 325 TABLET ORAL at 08:02

## 2022-02-28 RX ADMIN — OXYCODONE HYDROCHLORIDE 10 MG: 10 TABLET ORAL at 09:02

## 2022-02-28 RX ADMIN — PHENYLEPHRINE HYDROCHLORIDE 100 MCG: 10 INJECTION INTRAVENOUS at 12:02

## 2022-02-28 RX ADMIN — ARIPIPRAZOLE 15 MG: 5 TABLET ORAL at 09:02

## 2022-02-28 RX ADMIN — HYDRALAZINE HYDROCHLORIDE 50 MG: 50 TABLET ORAL at 05:02

## 2022-02-28 RX ADMIN — FOLIC ACID 1 MG: 1 TABLET ORAL at 08:02

## 2022-02-28 RX ADMIN — MUPIROCIN: 20 OINTMENT TOPICAL at 08:02

## 2022-02-28 RX ADMIN — HYDRALAZINE HYDROCHLORIDE 75 MG: 50 TABLET ORAL at 09:02

## 2022-02-28 RX ADMIN — ATORVASTATIN CALCIUM 80 MG: 20 TABLET, FILM COATED ORAL at 08:02

## 2022-02-28 RX ADMIN — NYSTATIN 500000 UNITS: 500000 SUSPENSION ORAL at 07:02

## 2022-02-28 RX ADMIN — LOPERAMIDE HYDROCHLORIDE 2 MG: 2 CAPSULE ORAL at 12:02

## 2022-02-28 RX ADMIN — OXYCODONE HYDROCHLORIDE 10 MG: 10 TABLET ORAL at 03:02

## 2022-02-28 RX ADMIN — NYSTATIN 500000 UNITS: 500000 SUSPENSION ORAL at 06:02

## 2022-02-28 RX ADMIN — NYSTATIN 500000 UNITS: 500000 SUSPENSION ORAL at 09:02

## 2022-02-28 RX ADMIN — ACETAMINOPHEN 1000 MG: 500 TABLET ORAL at 09:02

## 2022-02-28 RX ADMIN — SEVELAMER CARBONATE 800 MG: 800 TABLET, FILM COATED ORAL at 06:02

## 2022-02-28 RX ADMIN — SODIUM CHLORIDE: 9 INJECTION, SOLUTION INTRAVENOUS at 12:02

## 2022-02-28 NOTE — PROCEDURES
Radiology Post-Procedure Note    Pre Op Diagnosis: Renal failure  Post Op Diagnosis: Same    Procedure: Tunneled HD catheter placement    Procedure performed by: Eben Moreira MD    Written Informed Consent Obtained: Yes  Specimen Removed: NO  Estimated Blood Loss: Minimal    Findings:   Right chest wall tunneled HD catheter placement without complications.    Patient tolerated procedure well.    Catheter is ready for use.    Eben Moreira MD  Diagnostic and Interventional Radiologist  Department of Radiology  Pager: [phone]

## 2022-02-28 NOTE — PLAN OF CARE
Patient is AAOx3. Right CW HD line placed today. Patient is scheduled for HD today. Gi team consulted and reccommended outpatient scopes. Patient is receiving IV antibiotics. Reminded the patient to call for assistance. Call light and personal items are within reach.

## 2022-02-28 NOTE — ASSESSMENT & PLAN NOTE
57 year old man with ESRD admitted with Hgb of 7.4.Patient has required 3u PRBC during this 11 day admission for hemoglobin less that 7. He reports having diarrhea for one week with dark brown stool. He is currently hemodynamically stable.  He denies loss of consciousness, syncope, hematemesis, hematuria, or hematochezia. His last colonoscopy was 4/2019 with Dr. Jose L Olvera who found 3mm polyp in ascending colon and 3-5mm polyps x3 in rectum. Only one polyp was tubular adenoma at that time and recommendations were made to repeat colonoscopy in 5 years (4/2024). No known family history of colorectal cancer.    Hgb stable 7.3-7.5 since last transfusion 2/23. Decrease in Hgb throughout the year can be associated with his worsening CKD, progressing to ESRD. Iron studies this admission consistent with anemia of chronic disease diagnosis. FOBT positive 2/27/2022 on day 10 of hospitalization with stable hemoglobin.     02/17/22 10:41   Iron 10 (L)   TIBC 258   Saturated Iron 4 (L)   Transferrin 174 (L)   Ferritin 384 (H)     - Recommend outpatient EGD and colonoscopy and close follow up with PCP  - Will arrange for follow up in GI clinic  - Continue to monitor with CBC in hospital  - Transfusion if Hgb less than 7

## 2022-02-28 NOTE — ANESTHESIA POSTPROCEDURE EVALUATION
Anesthesia Post Evaluation    Patient: Enrique Martinez    Procedure(s) Performed: IR tunneled catheter placement     Final Anesthesia Type: MAC      Patient location during evaluation: PACU  Patient participation: Yes- Able to Participate  Level of consciousness: awake and alert  Post-procedure vital signs: reviewed and stable  Pain management: adequate  Airway patency: patent    PONV status at discharge: No PONV  Anesthetic complications: no      Cardiovascular status: blood pressure returned to baseline and stable  Respiratory status: unassisted and nasal cannula  Hydration status: euvolemic  Follow-up not needed.          Vitals Value Taken Time   /78 02/28/22 1348   Temp 37.5 °C (99.5 °F) 02/28/22 1323   Pulse 94 02/28/22 1359   Resp 18 02/28/22 1359   SpO2 98 % 02/28/22 1359   Vitals shown include unvalidated device data.      No case tracking events are documented in the log.      Pain/Francesco Score: Pain Rating Prior to Med Admin: 7 (2/28/2022  8:23 AM)  Pain Rating Post Med Admin: 4 (2/28/2022  2:04 AM)  Francesco Score: 10 (2/28/2022  2:00 PM)  Modified Francesco Score: 20 (2/28/2022  2:00 PM)

## 2022-02-28 NOTE — PLAN OF CARE
Patient brought to  for HD cath. Patient AAOx3, no distress noted, respirations even and unlabored, will continue to monitor. Acceptance of education, consents signed, H/P done. Labs reviewed.   ANES AT BEDSIDE, ANES WILL MONITOR

## 2022-02-28 NOTE — ANESTHESIA PREPROCEDURE EVALUATION
[facility]  Anesthesia Pre-Operative Evaluation         Patient Name: Enrique Martinez  YOB: 1965  MRN: [id number]    SUBJECTIVE:     02/28/2022    * No procedures listed *    Enrique Martinez is a 57 y.o. male here for above procedure    Drips:     Patient Active Problem List   Diagnosis    Renovascular hypertension    Pure hypercholesterolemia    Membranous glomerulonephritis    Chronic kidney disease (CKD), active medical management without dialysis, stage 5    Anemia due to chronic kidney disease, on chronic dialysis    Hx of gout    Hydrocele in adult    Type 2 diabetes mellitus with chronic kidney disease on chronic dialysis, with long-term current use of insulin    S/P coronary artery stent placement    Proteinuria    Hx of syphilis    Epididymal cyst    Atherosclerosis of native coronary artery of native heart without angina pectoris    Patient on waiting list for kidney transplant    Gastroesophageal reflux disease without esophagitis    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    Benign prostatic hyperplasia    Bilateral inguinal hernia without obstruction or gangrene    Hematuria, microscopic    Hyperuricemia    SHANNAN (iron deficiency anemia)    Umbilical hernia without obstruction and without gangrene    Vitamin D deficiency    Subacute osteomyelitis of left foot    Acute renal failure superimposed on chronic kidney disease, on chronic dialysis    Folic acid deficiency    Chronic gout of left foot due to renal impairment without tophus    Neck pain    Prevertebral Fluid Collection       Review of patient's allergies indicates:  No Known Allergies    No current facility-administered medications on file prior to encounter.     Current Outpatient Medications on File Prior to Encounter   Medication Sig Dispense Refill    insulin (LANTUS SOLOSTAR U-100 INSULIN) glargine 100 units/mL (3mL) SubQ pen Inject 5 Units into the skin once daily.      albuterol  (PROVENTIL/VENTOLIN HFA) 90 mcg/actuation inhaler Inhale 2 puffs into the lungs every 6 (six) hours as needed for Wheezing or Shortness of Breath. Rescue      amlodipine (NORVASC) 10 MG tablet Take 10 mg by mouth once daily.      ARIPiprazole (ABILIFY) 15 MG Tab Take 1 tablet (15 mg total) by mouth every evening. 90 tablet 0    aspirin (ECOTRIN) 81 MG EC tablet Take 1 tablet (81 mg total) by mouth once daily. 360 tablet 0    atorvastatin (LIPITOR) 80 MG tablet Take 1 tablet (80 mg total) by mouth once daily. 90 tablet 3    blood-glucose meter kit Use as instructed      calcitRIOL (ROCALTROL) 0.5 MCG Cap Take 0.5 mcg by mouth once daily.      chlorthalidone (HYGROTEN) 50 MG Tab Take 50 mg by mouth once daily.       cloNIDine (CATAPRES) 0.2 MG tablet Take 0.4 mg by mouth 3 (three) times daily.       colchicine (COLCRYS) 0.6 mg tablet Take 0.6 mg by mouth daily as needed.      ergocalciferol (ERGOCALCIFEROL) 50,000 unit Cap Take 50,000 Units by mouth every 30 days.       famotidine (PEPCID) 20 MG tablet Take 20 mg by mouth daily as needed for Heartburn.       ferrous gluconate (FERGON) 324 MG tablet Take 324 mg by mouth 2 (two) times a day.       fluticasone (FLONASE) 50 mcg/actuation nasal spray 1 spray by Each Nare route once daily.      furosemide (LASIX) 40 MG tablet Take 40 mg by mouth 2 (two) times daily.      methocarbamoL (ROBAXIN) 500 MG Tab Take 500 mg by mouth 2 (two) times a day.      metoprolol tartrate (LOPRESSOR) 100 MG tablet Take 100 mg by mouth.      omeprazole (PRILOSEC) 20 MG capsule Take 20 mg by mouth once daily.      RENVELA 800 mg Tab Take 800 mg by mouth 3 (three) times daily.      sodium bicarbonate 650 MG tablet Take 650 mg by mouth.      tadalafiL (CIALIS) 2.5 mg Tab Take 2.5 mg by mouth.      TRUE METRIX GLUCOSE TEST STRIP Strp          Past Surgical History:   Procedure Laterality Date    ABDOMINAL SURGERY  1980s    CARDIAC CATHETERIZATION  2007    x 2     COLONOSCOPY N/A 4/5/2019    Procedure: COLONOSCOPY;  Surgeon: Jose L Carty MD;  Location: [facility] ([address] [place] [address]);  Service: Colon and Rectal;  Laterality: N/A;    CORONARY STENT PLACEMENT  2007       Social History     Socioeconomic History    Marital status:    Tobacco Use    Smoking status: Current Some Day Smoker     Packs/day: 0.25     Years: 10.00     Pack years: 2.50    Smokeless tobacco: Never Used   Substance and Sexual Activity    Alcohol use: No    Drug use: No         OBJECTIVE:     Vital Signs Range (Last 24H):  Temp:  [36.7 °C (98.1 °F)-37.2 °C (98.9 °F)] 37.2 °C (98.9 °F)  Pulse:  [100-119] 115  Resp:  [12-18] 16  SpO2:  [95 %-99 %] 96 %  BP: (122-179)/(80-92) 153/81    Significant Labs:  Lab Results   Component Value Date    WBC 8.59 02/28/2022    HGB 7.3 (L) 02/28/2022    HCT 24.0 (L) 02/28/2022     02/28/2022    CHOL 136 10/06/2021    TRIG 197 (H) 10/06/2021    HDL 26 (L) 10/06/2021    ALT 13 02/21/2022    AST 24 02/21/2022     02/28/2022    K 4.1 02/28/2022     02/28/2022    CREATININE 9.3 (H) 02/28/2022    BUN 45 (H) 02/28/2022    CO2 19 (L) 02/28/2022    TSH 0.276 (L) 12/26/2020    PSA 2.1 01/21/2022    INR 0.9 12/26/2020    HGBA1C 7.6 (H) 02/17/2022       Diagnostic Studies:    EKG:   Results for orders placed or performed during the hospital encounter of 02/16/22   EKG 12-lead    Collection Time: 02/16/22  8:16 PM    Narrative    Test Reason : E87.5,    Vent. Rate : 101 BPM     Atrial Rate : 101 BPM     P-R Int : 112 ms          QRS Dur : 090 ms      QT Int : 350 ms       P-R-T Axes : 061 026 110 degrees     QTc Int : 453 ms    Sinus tachycardia  Nonspecific ST and T wave abnormality  Abnormal ECG  When compared with ECG of 05-JAN-2021 05:22,  Vent. rate has increased BY  33 BPM  ST now depressed in Anterior leads  Confirmed by Shaggy TRUONG, Benjie YODER ([id number]) on 2/17/2022 11:01:26 AM    Referred By: DARCIE   SELF           Confirmed By:Benjie Coon MD  "      2D ECHO:    TTE 1/21/2022:   " · The left ventricle is mildly enlarged with mild eccentric hypertrophy and normal systolic function.  · The estimated ejection fraction is 65%.  · Indeterminate left ventricular diastolic function.  · Mild left atrial enlargement.  · Normal right ventricular size with normal right ventricular systolic function.  · Mild right atrial enlargement.  · Mild tricuspid regurgitation.  · Normal central venous pressure (3 mmHg).  · The estimated PA systolic pressure is 24 mmHg.  · Trivial posterior pericardial effusion."     NM stress test 12/10/2020: "  Equivocal myocardial perfusion scan.    There is a moderate intensity, small to moderate sized, equivocal defect that is mostly fixed with some reversible areas in the mid anterior wall(s) in the typical distribution of the D1 territory. This finding is equivocal due to patient motion.    Visually estimated ejection fraction is normal at rest and normal at stress.    There is normal wall motion at rest.    There is mid anterior wall hypokinesis at stress.    The EKG portion of this study is abnormal but not diagnostic.    The patient reported no chest pain during the stress test.    Arrhythmias during stress: occasional PACs.    Consider as candidate for PET stress test.    When compared to the previous study from 10/22/2018, there is now an anterior wall fixed equivocal defect associated with patient motion.  "     Previous airway:   06/25/21; 1232; Direct laryngoscopy; Oral Standard; 7.5 mm; Cuffed; Auscultation, Capnometry, Palpation of cuff, Symmetrical chest wall movement; Pink tape; Malaika; 3      Pre-op Assessment    I have reviewed the Patient Summary Reports.     I have reviewed the Nursing Notes. I have reviewed the NPO Status.   I have reviewed the Medications.     Review of Systems  Anesthesia Hx:  No problems with previous Anesthesia  History of prior surgery of interest to airway management or planning:  Denies " "Personal Hx of Anesthesia complications.   Social:  Smoker    Hematology/Oncology:     Oncology Normal    -- Anemia:   EENT/Dental:EENT/Dental Normal   Cardiovascular:   Exercise tolerance: good Hypertension, poorly controlled CAD  CABG/stent   Denies Angina.        Pulmonary:  Pulmonary Normal  Denies COPD.  Denies Asthma.  Denies Sleep Apnea.    Renal/:   Chronic Renal Disease, ESRD, Dialysis Last HD on 2/26/2022.  Reports that he had vomiting during treatment, but was able to complete therapy.    Hepatic/GI:   GERD, well controlled    Musculoskeletal:   LLE cellulitis and osteomyelitis    Neurological:   Denies TIA. Denies CVA. Denies Seizures. C-spine cleared.   Pt had neck pain during admission.      MRI C spine 2/24/2022: "1. Prevertebral soft tissue enhancement with near complete resolution of previously described fluid, likely sequela of a resolving infectious or inflammatory process.  No rim enhancing fluid collection to suggest abscess.  No convincing MR imaging findings to suggest discitis/osteomyelitis.  2. Advanced multilevel cervical degenerative changes, difficult to accurately characterize secondary to patient motion artifact.   "    Endocrine:   Diabetes, poorly controlled, type 2 Admitted with salmonella bacteremia. Bacteremia and osteo treated with daptomycin and ceftriaxone.        Physical Exam  General: Well nourished, Cooperative, Alert and Oriented    Airway:  Mallampati: III   Mouth Opening: < 3 cm  TM Distance: Normal  Neck ROM: Normal ROM    Dental:  Dentures    Chest/Lungs:  Normal Respiratory Rate        Anesthesia Plan  Type of Anesthesia, risks & benefits discussed:    Anesthesia Type: MAC  Intra-op Monitoring Plan: Standard ASA Monitors  Post Op Pain Control Plan: multimodal analgesia and IV/PO Opioids PRN  Induction:  IV  Informed Consent: Informed consent signed with the Patient and all parties understand the risks and agree with anesthesia plan.  All questions answered.   ASA " Score: 3  Day of Surgery Review of History & Physical: H&P Update referred to the surgeon/provider.  Anesthesia Plan Notes: Last ate 23:30.   Took hydralazine with water early in morning.     Ready For Surgery From Anesthesia Perspective.     .

## 2022-02-28 NOTE — SUBJECTIVE & OBJECTIVE
Subjective:     Interval History: No acute events overnight. Afebrile. Pulse remains slightly elevated, BP hypertensive and labile. Patient was in good spirits today, it [birth year]. Relayed some minor pain after procedure, but well controlled via hospital medicine team.     Follow-up For: Procedure(s) (LRB):  MPU PROCEDURE (N/A)    Post-Operative Day:      Scheduled Meds:   sodium chloride 0.9%   Intravenous Once    acetaminophen  1,000 mg Oral Q8H    amLODIPine  10 mg Oral Daily    ARIPiprazole  15 mg Oral QHS    aspirin  81 mg Oral Daily    atorvastatin  80 mg Oral Daily    cefTRIAXone (ROCEPHIN) IVPB  2 g Intravenous Q24H    DAPTOmycin (CUBICIN)  IV  6 mg/kg Intravenous Q48H    folic acid  1 mg Oral Daily    heparin (porcine)  5,000 Units Subcutaneous Q8H    corticotropin  80 Units Subcutaneous Every Mon, Fri    hydrALAZINE  50 mg Oral Q8H    labetalol  200 mg Oral Q12H    mupirocin   Nasal BID    nystatin  500,000 Units Oral QID (WM & HS)    pantoprazole  40 mg Oral Daily    sevelamer carbonate  800 mg Oral TID WM     Continuous Infusions:  PRN Meds:sodium chloride 0.9%, acetaminophen, albuterol-ipratropium, aluminum-magnesium hydroxide-simethicone, dextrose 10%, dextrose 10%, glucagon (human recombinant), glucose, glucose, loperamide, lorazepam, melatonin, methocarbamoL, morphine, naloxone, OLANZapine, ondansetron, oxyCODONE, oxyCODONE, polyethylene glycol, prochlorperazine, senna-docusate 8.6-50 mg, simethicone, sodium chloride 0.9%, sodium chloride 0.9%, sodium chloride 0.9%, sodium chloride 0.9%    Review of Systems   Constitutional:  Positive for activity change. Negative for appetite change, chills and fever.   Respiratory:  Negative for cough, shortness of breath and wheezing.    Cardiovascular:  Positive for leg swelling.   Gastrointestinal:  Negative for nausea and vomiting.   Musculoskeletal:  Positive for arthralgias and myalgias. Negative for gait problem.   Skin:  Positive for wound. Negative  for color change.   Neurological:  Negative for weakness and numbness.   Psychiatric/Behavioral:  Positive for agitation and behavioral problems.    Objective:     Vital Signs (Most Recent):  Temp: 98.9 °F (37.2 °C) (02/27/22 2325)  Pulse: (!) 119 (02/27/22 2339)  Resp: 16 (02/28/22 0516)  BP: (!) 179/91 (02/28/22 0513)  SpO2: 96 % (02/27/22 2325)   Vital Signs (24h Range):  Temp:  [98.1 °F (36.7 °C)-98.9 °F (37.2 °C)] 98.9 °F (37.2 °C)  Pulse:  [] 119  Resp:  [12-18] 16  SpO2:  [95 %-99 %] 96 %  BP: (122-179)/(80-98) 179/91     Weight: 72 kg (158 lb 11.7 oz)  Body mass index is 25.62 kg/m².    Foot Exam    General  Orientation: alert and oriented to person, place, and time       Right Foot/Ankle     Inspection and Palpation  Tenderness: none   Swelling: (pitting mild edema to bilateral lower extremities, L>R)  Skin Exam: skin intact; no drainage, no cellulitis, no abnormal color, no ulcer and no erythema     Neurovascular  Dorsalis pedis: 2+  Posterior tibial: 1+      Left Foot/Ankle      Inspection and Palpation  Swelling: (pitting edema to bilateral lower extremities, L>R)  Skin Exam: cellulitis and erythema; no drainage and no ulcer     Neurovascular  Dorsalis pedis: 2+  Posterior tibial: 1+        Laboratory:  CBC:   Recent Labs   Lab 02/28/22  0520   WBC 8.59   RBC 2.64*   HGB 7.3*   HCT 24.0*      MCV 91   MCH 27.7   MCHC 30.4*     CMP:   Recent Labs   Lab 02/21/22  2311 02/22/22  0631 02/28/22  0520      < > 91   CALCIUM 9.3   < > 8.9   ALBUMIN 2.1*  --   --    PROT 5.5*  --   --    *   < > 142   K 4.3   < > 4.1   CO2 20*   < > 19*      < > 109   *   < > 45*   CREATININE 19.4*   < > 9.3*   ALKPHOS 83  --   --    ALT 13  --   --    AST 24  --   --    BILITOT 0.2  --   --     < > = values in this interval not displayed.     CRP: No results for input(s): CRP in the last 168 hours.  ESR: No results for input(s): SEDRATE in the last 168 hours.  Microbiology Results (last 7  days)       Procedure Component Value Units Date/Time    Aerobic culture [[id number]] Collected: 02/26/22 1825    Order Status: Completed Specimen: Bone from Toe, Left Foot Updated: 02/28/22 0618     Aerobic Bacterial Culture No growth    Stool culture **CANNOT BE ORDERED STAT** [[id number]] Collected: 02/27/22 1337    Order Status: Sent Specimen: Stool Updated: 02/27/22 1442    E. coli 0157 antigen [[id number]] Collected: 02/27/22 1337    Order Status: No result Specimen: Stool Updated: 02/27/22 1442    Gram stain [[id number]] Collected: 02/26/22 1825    Order Status: Completed Specimen: Bone from Toe, Left Foot Updated: 02/26/22 2319     Gram Stain Result No WBC's      No organisms seen    Culture, Anaerobe [[id number]] Collected: 02/26/22 1825    Order Status: Sent Specimen: Bone from Toe, Left Foot Updated: 02/26/22 2032    Fungus culture [[id number]] Collected: 02/26/22 1825    Order Status: Sent Specimen: Bone from Toe, Left Foot Updated: 02/26/22 2031    AFB Culture & Smear [[id number]] Collected: 02/26/22 1825    Order Status: Sent Specimen: Bone from Toe, Left Foot Updated: 02/26/22 2031    Blood Culture #2 **CANNOT BE ORDERED STAT** [[id number]] Collected: 02/16/22 1806    Order Status: Completed Specimen: Blood from Peripheral, Antecubital, Left Updated: 02/21/22 2012     Blood Culture, Routine No growth after 5 days.    Blood Culture #1 **CANNOT BE ORDERED STAT** [[id number]] Collected: 02/16/22 1807    Order Status: Completed Specimen: Blood from Peripheral, Hand, Right Updated: 02/21/22 2012     Blood Culture, Routine No growth after 5 days.          Specimen (24h ago, onward)                 Start     Ordered    02/27/22 1003  Specimen to Pathology, Surgery Orthopedics  Once        Comments: Pre-op Diagnosis: Acute renal failure superimposed on stage 5 chronic kidney disease, not on chronic dialysis, unspecified acute renal failure type [N17.9, N18.5]    Procedure(s):  MPU PROCEDURE     Number of specimens: 1      Name of specimens: Left 1st proximal phalanx   References:    Click here for ordering Quick Tip   Question Answer Comment   Procedure Type: Orthopedics    Release to patient Immediate        02/27/22 1002                  All pertinent labs reviewed within the last 24 hours.    Diagnostic Results:  I have reviewed all pertinent imaging results/findings within the past 24 hours.    Clinical Findings:

## 2022-02-28 NOTE — NURSING TRANSFER
Nursing Transfer Note      2/28/2022     Reason patient is being transferred:recovery complete  Transfer To: [id number]    Transfer via stretcher    Transfer with cardiac monitoring    Transported by RN

## 2022-02-28 NOTE — TELEPHONE ENCOUNTER
----- Message from Mandy Barnes MD sent at 2/26/2022 11:37 AM CST -----  Pt needs follow up in clinic in 3 weeks with rpt MRI C spine w/wo contrast prior to appt.

## 2022-02-28 NOTE — PROGRESS NOTES
[facility]  Nephrology  Progress Note     Patient Name: Enrique Martinez  MRN: [id number]  Admission Date: 2/16/2022  Hospital Length of Stay: 11 days  Attending Provider: Cinthia Dow MD   Primary Care Physician: Primary Doctor No  Principal Problem: Subacute osteomyelitis of left foot    Subjective:     HPI: Mr. Martinez is a 56 y.o. 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 He was recently admitted at [facility] from 2/2-2/4/2022 from LLE cellulitis. He was found to have salmonella bacteremia suspected due to diarrheal illness. He was discharged with cipro for which he reports compliance. He has had no improvement in his pain, redness, or swelling of his left foot and leg. He has also developed a new blister on his left foot, with MRI foot concerning for osteomyelitis and showing soft tissue swelling c/w cellulitis. He is admitted for treatment of suspected osteomyelitis.     He follows with Dr. Cardoza (nephrology), has adamantly declined HD in the past and would rather wait for transplant. He reports being on the transplant list for the past 4 years. His last renal biopsy in 2013 showed 50% glomerulosclerosis. Started on calcitriol 0.5, hx of elevated PTH at 921. Patient complains of decreased appetite, neck cramps, fatigue. He still urinates 4-5 times/day. He reports his B/L LE edema has worsened over the past few weeks. No dyspnea, chest pain, dysuria, hematuria. Nephro consulted for HD initiation.      Labs are significant for hyperkalemia 5.5, bicarb 18, , phosphate 10.4, Cr 19.7, GFR 2.2, ESR 97, CRP 33.4. . CBC significant for Hgb of 7.2, white count 14. Previous echo showed EF of 65%.       Interval History:  Seen at the bedside no event overnight       Review of patient's allergies indicates:  No Known Allergies   Current Facility-Administered Medications   Medication Frequency    0.9%  NaCl infusion Once    0.9%  NaCl infusion PRN    acetaminophen tablet 1,000 mg Q8H    acetaminophen tablet 650 mg Q6H PRN    albuterol-ipratropium 2.5 mg-0.5 mg/3 mL nebulizer solution 3 mL Q4H PRN    aluminum-magnesium hydroxide-simethicone 200-200-20 mg/5 mL suspension 30 mL QID PRN    amLODIPine tablet 10 mg Daily    ARIPiprazole tablet 15 mg QHS    aspirin EC tablet 81 mg Daily    atorvastatin tablet 80 mg Daily    cefTRIAXone (ROCEPHIN) 2 g/50 mL D5W IVPB Q24H    DAPTOmycin (CUBICIN) 445 mg in sodium chloride 0.9% IVPB Q48H    dextrose 10% bolus 125 mL PRN    dextrose 10% bolus 250 mL PRN    folic acid tablet 1 mg Daily    glucagon (human recombinant) injection 1 mg PRN    glucose chewable tablet 16 g PRN    glucose chewable tablet 24 g PRN    heparin (porcine) injection 5,000 Units Q8H    HP Acthar (corticotropin) injectable gel 80 units Every Mon, Fri    hydrALAZINE tablet 75 mg Q8H    labetaloL tablet 200 mg Q12H    loperamide capsule 2 mg QID PRN    lorazepam injection 1 mg On Call Procedure    melatonin tablet 6 mg Nightly PRN    methocarbamoL tablet 750 mg QID PRN    morphine injection 2 mg On Call Procedure    mupirocin 2 % ointment BID    naloxone 0.4 mg/mL injection 0.02 mg PRN    nystatin 100,000 unit/mL suspension 500,000 Units QID (WM & HS)    OLANZapine injection 2.5 mg Once PRN    ondansetron disintegrating tablet 8 mg Q8H PRN    oxyCODONE immediate release tablet 5 mg Q6H PRN    oxyCODONE immediate release tablet Tab 10 mg Q4H PRN    pantoprazole EC tablet 40 mg Daily    polyethylene glycol packet 17 g Daily PRN    prochlorperazine injection Soln 5 mg Q6H PRN    senna-docusate 8.6-50 mg per tablet 1 tablet Daily PRN    sevelamer carbonate tablet 800 mg TID     simethicone  chewable tablet 80 mg QID PRN    sodium chloride 0.9% bolus 250 mL PRN    sodium chloride 0.9% bolus 250 mL PRN    sodium chloride 0.9% bolus 250 mL PRN    sodium chloride 0.9% flush 10 mL Q8H PRN       Objective:     Vital Signs (Most Recent):  Temp: 98.1 °F (36.7 °C) (02/28/22 0800)  Pulse: 84 (02/28/22 1000)  Resp: 17 (02/28/22 0800)  BP: (!) 153/81 (02/28/22 0810)  SpO2: 96 % (02/28/22 0800)  O2 Device (Oxygen Therapy): room air (02/28/22 0800) Vital Signs (24h Range):  Temp:  [98.1 °F (36.7 °C)-98.9 °F (37.2 °C)] 98.1 °F (36.7 °C)  Pulse:  [] 84  Resp:  [12-18] 17  SpO2:  [95 %-99 %] 96 %  BP: (122-179)/(80-92) 153/81   Weight: 72 kg (158 lb 11.7 oz) (02/23/22 0600)  Body mass index is 25.62 kg/m².  Body surface area is 1.83 meters squared.      Intake/Output Summary (Last 24 hours) at 2/28/2022 1130  Last data filed at 2/28/2022 0800  Gross per 24 hour   Intake 984 ml   Output --   Net 984 ml     I/O last 3 completed shifts:  In: 1284 [P.O.:1284]  Out: -   Net IO Since Admission: 2,480.45 mL [02/28/22 1130]     Physical Exam    Recent Labs   Lab 02/26/22  0627 02/27/22  0507 02/28/22  0520   WBC 10.37 8.83 8.59   HGB 7.4* 7.3* 7.3*   HCT 24.3* 23.8* 24.0*    198 250   MONO 15.2*  1.6* 13.4  1.2* 13.6  1.2*      Recent Labs   Lab 02/21/22  2311 02/22/22  0631 02/26/22  0627 02/27/22  0507 02/28/22  0520   *   < > 141 140 142   K 4.3   < > 3.8 3.9 4.1      < > 105 108 109   CO2 20*   < > 24 18* 19*   *   < > 58* 38* 45*   CREATININE 19.4*   < > 9.7* 7.0* 9.3*   CALCIUM 9.3   < > 8.6* 8.7 8.9   PROT 5.5*  --   --   --   --    BILITOT 0.2  --   --   --   --    ALKPHOS 83  --   --   --   --    ALT 13  --   --   --   --    AST 24  --   --   --   --     < > = values in this interval not displayed.      No results for input(s): PH, PCO2, PO2, HCO3, POCSATURATED, BE in the last 72 hours.    Assessment/Plan:       Subacute osteomyelitis of left foot    Renovascular hypertension     Pure hypercholesterolemia    Membranous glomerulonephritis    Anemia due to chronic kidney disease, on chronic dialysis    Type 2 diabetes mellitus with chronic kidney disease on chronic dialysis, with long-term current use of insulin    S/P coronary artery stent placement    Hx of syphilis    Atherosclerosis of native coronary artery of native heart without angina pectoris    Patient on waiting list for kidney transplant    Gastroesophageal reflux disease without esophagitis    Acute renal failure superimposed on chronic kidney disease, on chronic dialysis    Folic acid deficiency    Chronic gout of left foot due to renal impairment without tophus    Neck pain    Prevertebral Fluid Collection     Acute renal failure superimposed on stage 5 chronic kidney disease, not on chronic dialysis  57 yo M with history of CKD V due to idiopathic membranous glomerulonephritis, anemia of CKD, hx of neurosyphilis, insulin dependent DM, who presents with cellulitis and possible osteomyelitis of left foot. Previously, he has declined HD in the past, never receiving a fistula. He is on the transplant wait list. Patient exhibits signs of uremia including fatigue, poor appetite, and mental slowing. On labs, he is hyperkalemic, uremic, AGMA, hyperphosphatemic. GFR is 2-3, which is reduced from previous GFR 13. Home meds include sodium bicarb, Renvela, and calcitriol. 2+ pitting edema on B/L lower extremities. Renal US from Jan 2022 shows sonographic evidence of chronic medical renal disease and bilateral simple and minimally complex renal cysts.  sCr one year ago was 4.5, now 18.6. UPCr ratio 1.11 (Dec 2020)  2/18: Patient increasingly somnolent, lethargic. Hospital med in process of getting in touch with Dr. Lee, who the patient follows with outpatient.   2/23: first session of HD 2 hours, no net fluid, patient tolerated. Still confused, having visual hallucinations of mice.   2/24: second HD session 150 min, 1 L removed, patient  tolerated.  2/26: HD planned for today     Plan:   - plan for tunneled catheter placement today   - Will need AVF with vascular surgery, likely to be placed outpatient   - HD on T/Th/Sa schedule   - Sevelamer 800 TID   - Hold calcitriol in setting of hyperphosphatemia. Will re-start when phos reaches normal level.  - Trend Cr  - Strict I&Os  - Avoid nephrotoxic agents  - Renally adjust medications            Hyperphosphatemia  Hyperphosphatemia at 10.9 >> 4.6.      - Continue Sevelamer 800 TID     Anemia due to chronic kidney disease  Hemoglobin 10.1 one month ago, now 7.2. Normocytic anemia with low iron, iron sat, elevated ferritin.      -  EPO with HD.     Thank you for your consult. I will follow-up with patient. Please contact us if you have any additional questions.    Diana Cid MD  Nephrology  [facility]

## 2022-02-28 NOTE — H&P
Inpatient Radiology Pre-procedure Note    History of Present Illness:  Enrique Martinez is a 57 y.o. male CKD V, admitted with salmonella bacteremia. Nephrology recommends HD catheter for long term dialysis. Of note patient responded poorly to benzodiazipines upon prior CVC placement this admission, pulling out the line.  Admission H&P reviewed.  Past Medical History:   Diagnosis Date    Anemia     Coronary artery disease     Diabetes mellitus, type 2     GERD (gastroesophageal reflux disease)     Gout     Hydrocele in adult     bilateral    Hyperlipidemia     Hypertension     Inguinal hernia     bilateral    Membranous glomerulonephritis     Nephrotic range proteinuria     Nephrotic syndrome     Obesity     Umbilical hernia      Past Surgical History:   Procedure Laterality Date    ABDOMINAL SURGERY  1980s    CARDIAC CATHETERIZATION  2007    x 2    COLONOSCOPY N/A 4/5/2019    Procedure: COLONOSCOPY;  Surgeon: Jose L Carty MD;  Location: [facility] ENDO ([facility]);  Service: Colon and Rectal;  Laterality: N/A;    CORONARY STENT PLACEMENT  2007       Review of Systems:   As documented in primary team H&P    Home Meds:   Prior to Admission medications    Medication Sig Start Date End Date Taking? Authorizing Provider   insulin (LANTUS SOLOSTAR U-100 INSULIN) glargine 100 units/mL (3mL) SubQ pen Inject 5 Units into the skin once daily. 1/18/22  Yes Historical Provider   albuterol (PROVENTIL/VENTOLIN HFA) 90 mcg/actuation inhaler Inhale 2 puffs into the lungs every 6 (six) hours as needed for Wheezing or Shortness of Breath. Rescue    Historical Provider   amlodipine (NORVASC) 10 MG tablet Take 10 mg by mouth once daily.    Historical Provider   ARIPiprazole (ABILIFY) 15 MG Tab Take 1 tablet (15 mg total) by mouth every evening. 1/11/21 4/11/21  Claude Wang MD   aspirin (ECOTRIN) 81 MG EC tablet Take 1 tablet (81 mg total) by mouth once daily. 2/22/21 2/22/22  Bang Beauchamp MD   atorvastatin  (LIPITOR) 80 MG tablet Take 1 tablet (80 mg total) by mouth once daily. 2/22/21 2/22/22  Bang Beauchamp MD   blood-glucose meter kit Use as instructed 12/26/20 12/26/21  Historical Provider   calcitRIOL (ROCALTROL) 0.5 MCG Cap Take 0.5 mcg by mouth once daily. 1/27/22   Historical Provider   chlorthalidone (HYGROTEN) 50 MG Tab Take 50 mg by mouth once daily.     Historical Provider   cloNIDine (CATAPRES) 0.2 MG tablet Take 0.4 mg by mouth 3 (three) times daily.     Historical Provider   colchicine (COLCRYS) 0.6 mg tablet Take 0.6 mg by mouth daily as needed. 11/3/21   Historical Provider   ergocalciferol (ERGOCALCIFEROL) 50,000 unit Cap Take 50,000 Units by mouth every 30 days.  2/17/20   Historical Provider   famotidine (PEPCID) 20 MG tablet Take 20 mg by mouth daily as needed for Heartburn.  4/22/20   Historical Provider   ferrous gluconate (FERGON) 324 MG tablet Take 324 mg by mouth 2 (two) times a day.     Historical Provider   fluticasone (FLONASE) 50 mcg/actuation nasal spray 1 spray by Each Nare route once daily.    Historical Provider   furosemide (LASIX) 40 MG tablet Take 40 mg by mouth 2 (two) times daily.    Historical Provider   methocarbamoL (ROBAXIN) 500 MG Tab Take 500 mg by mouth 2 (two) times a day.    Historical Provider   metoprolol tartrate (LOPRESSOR) 100 MG tablet Take 100 mg by mouth. 9/2/20   Historical Provider   omeprazole (PRILOSEC) 20 MG capsule Take 20 mg by mouth once daily. 12/29/21   Historical Provider   RENVELA 800 mg Tab Take 800 mg by mouth 3 (three) times daily. 2/4/22   Historical Provider   sodium bicarbonate 650 MG tablet Take 650 mg by mouth. 9/2/20   Historical Provider   tadalafiL (CIALIS) 2.5 mg Tab Take 2.5 mg by mouth. 2/3/21 2/3/22  Historical Provider   TRUE METRIX GLUCOSE TEST STRIP Strp  12/27/20   Historical Provider     Scheduled Meds:    sodium chloride 0.9%   Intravenous Once    sodium chloride 0.9%   Intravenous Once    acetaminophen  1,000 mg Oral Q8H     amLODIPine  10 mg Oral Daily    ARIPiprazole  15 mg Oral QHS    aspirin  81 mg Oral Daily    atorvastatin  80 mg Oral Daily    cefTRIAXone (ROCEPHIN) IVPB  2 g Intravenous Q24H    DAPTOmycin (CUBICIN)  IV  6 mg/kg Intravenous Q48H    folic acid  1 mg Oral Daily    heparin (porcine)  5,000 Units Subcutaneous Q8H    corticotropin  80 Units Subcutaneous Every Mon, Fri    hydrALAZINE  75 mg Oral Q8H    labetalol  200 mg Oral Q12H    mupirocin   Nasal BID    nystatin  500,000 Units Oral QID (WM & HS)    pantoprazole  40 mg Oral Daily    sevelamer carbonate  800 mg Oral TID WM     Continuous Infusions:   PRN Meds:sodium chloride 0.9%, sodium chloride 0.9%, acetaminophen, albuterol-ipratropium, aluminum-magnesium hydroxide-simethicone, dextrose 10%, dextrose 10%, glucagon (human recombinant), glucose, glucose, loperamide, lorazepam, melatonin, methocarbamoL, morphine, naloxone, OLANZapine, ondansetron, oxyCODONE, oxyCODONE, polyethylene glycol, prochlorperazine, senna-docusate 8.6-50 mg, simethicone, sodium chloride 0.9%, sodium chloride 0.9%, sodium chloride 0.9%, sodium chloride 0.9%, sodium chloride 0.9%  Anticoagulants/Antiplatelets: aspirin    Allergies: Review of patient's allergies indicates:  No Known Allergies  Sedation Hx: have not been any systemic reactions    Labs:  Recent Labs   Lab 02/28/22  0756   INR 1.1       Recent Labs   Lab 02/28/22  0520   WBC 8.59   HGB 7.3*   HCT 24.0*   MCV 91         Recent Labs   Lab 02/21/22  2311 02/22/22  0631 02/28/22  0520      < > 91   *   < > 142   K 4.3   < > 4.1      < > 109   CO2 20*   < > 19*   *   < > 45*   CREATININE 19.4*   < > 9.3*   CALCIUM 9.3   < > 8.9   MG 2.0  --   --    ALT 13  --   --    AST 24  --   --    ALBUMIN 2.1*  --   --    BILITOT 0.2  --   --     < > = values in this interval not displayed.         Vitals:  Temp: 98.1 °F (36.7 °C) (02/28/22 0800)  Pulse: 84 (02/28/22 1000)  Resp: 17 (02/28/22 0800)  BP:  (!) 153/81 (02/28/22 0810)  SpO2: 96 % (02/28/22 0800)     Physical Exam:  ASA: per anesthesia  Mallampati: per anesthesia    General: no acute distress  Mental Status: alert and oriented to person, place and time  HEENT: normocephalic, atraumatic  Chest: unlabored breathing  Heart: regular heart rate  Abdomen: nondistended  Extremity: moves all extremities    Plan: Permcath placement  Sedation Plan: per anesthesia        Charlee Walker MD  Radiology PGY III

## 2022-02-28 NOTE — PROGRESS NOTES
[facility] - Transplant Stepdown  Podiatry  Progress Note    Patient Name: Enrique Martinez  MRN: [id number]  Admission Date: 2/16/2022  Hospital Length of Stay: 11 days  Attending Physician: Cinthia Dow MD  Primary Care Provider: Primary Doctor No     Subjective:     Interval History: No acute events overnight. Afebrile. Pulse remains slightly elevated, BP hypertensive and labile. Patient was in good spirits today, it [birth year]. Relayed some minor pain after procedure, but well controlled via hospital medicine team.     Follow-up For: Procedure(s) (LRB):  MPU PROCEDURE (N/A)    Post-Operative Day:      Scheduled Meds:   sodium chloride 0.9%   Intravenous Once    acetaminophen  1,000 mg Oral Q8H    amLODIPine  10 mg Oral Daily    ARIPiprazole  15 mg Oral QHS    aspirin  81 mg Oral Daily    atorvastatin  80 mg Oral Daily    cefTRIAXone (ROCEPHIN) IVPB  2 g Intravenous Q24H    DAPTOmycin (CUBICIN)  IV  6 mg/kg Intravenous Q48H    folic acid  1 mg Oral Daily    heparin (porcine)  5,000 Units Subcutaneous Q8H    corticotropin  80 Units Subcutaneous Every Mon, Fri    hydrALAZINE  50 mg Oral Q8H    labetalol  200 mg Oral Q12H    mupirocin   Nasal BID    nystatin  500,000 Units Oral QID (WM & HS)    pantoprazole  40 mg Oral Daily    sevelamer carbonate  800 mg Oral TID WM     Continuous Infusions:  PRN Meds:sodium chloride 0.9%, acetaminophen, albuterol-ipratropium, aluminum-magnesium hydroxide-simethicone, dextrose 10%, dextrose 10%, glucagon (human recombinant), glucose, glucose, loperamide, lorazepam, melatonin, methocarbamoL, morphine, naloxone, OLANZapine, ondansetron, oxyCODONE, oxyCODONE, polyethylene glycol, prochlorperazine, senna-docusate 8.6-50 mg, simethicone, sodium chloride 0.9%, sodium chloride 0.9%, sodium chloride 0.9%, sodium chloride 0.9%    Review of Systems   Constitutional:  Positive for activity change. Negative for appetite change, chills and fever.   Respiratory:  Negative  for cough, shortness of breath and wheezing.    Cardiovascular:  Positive for leg swelling.   Gastrointestinal:  Negative for nausea and vomiting.   Musculoskeletal:  Positive for arthralgias and myalgias. Negative for gait problem.   Skin:  Positive for wound. Negative for color change.   Neurological:  Negative for weakness and numbness.   Psychiatric/Behavioral:  Positive for agitation and behavioral problems.    Objective:     Vital Signs (Most Recent):  Temp: 98.9 °F (37.2 °C) (02/27/22 2325)  Pulse: (!) 119 (02/27/22 2339)  Resp: 16 (02/28/22 0516)  BP: (!) 179/91 (02/28/22 0513)  SpO2: 96 % (02/27/22 2325)   Vital Signs (24h Range):  Temp:  [98.1 °F (36.7 °C)-98.9 °F (37.2 °C)] 98.9 °F (37.2 °C)  Pulse:  [] 119  Resp:  [12-18] 16  SpO2:  [95 %-99 %] 96 %  BP: (122-179)/(80-98) 179/91     Weight: 72 kg (158 lb 11.7 oz)  Body mass index is 25.62 kg/m².    Foot Exam    General  Orientation: alert and oriented to person, place, and time       Right Foot/Ankle     Inspection and Palpation  Tenderness: none   Swelling: (pitting mild edema to bilateral lower extremities, L>R)  Skin Exam: skin intact; no drainage, no cellulitis, no abnormal color, no ulcer and no erythema     Neurovascular  Dorsalis pedis: 2+  Posterior tibial: 1+      Left Foot/Ankle      Inspection and Palpation  Swelling: (pitting edema to bilateral lower extremities, L>R)  Skin Exam: cellulitis and erythema; no drainage and no ulcer     Neurovascular  Dorsalis pedis: 2+  Posterior tibial: 1+        Laboratory:  CBC:   Recent Labs   Lab 02/28/22  0520   WBC 8.59   RBC 2.64*   HGB 7.3*   HCT 24.0*      MCV 91   MCH 27.7   MCHC 30.4*     CMP:   Recent Labs   Lab 02/21/22  2311 02/22/22  0631 02/28/22  0520      < > 91   CALCIUM 9.3   < > 8.9   ALBUMIN 2.1*  --   --    PROT 5.5*  --   --    *   < > 142   K 4.3   < > 4.1   CO2 20*   < > 19*      < > 109   *   < > 45*   CREATININE 19.4*   < > 9.3*   ALKPHOS 83  --    --    ALT 13  --   --    AST 24  --   --    BILITOT 0.2  --   --     < > = values in this interval not displayed.     CRP: No results for input(s): CRP in the last 168 hours.  ESR: No results for input(s): SEDRATE in the last 168 hours.  Microbiology Results (last 7 days)       Procedure Component Value Units Date/Time    Aerobic culture [[id number]] Collected: 02/26/22 1825    Order Status: Completed Specimen: Bone from Toe, Left Foot Updated: 02/28/22 0618     Aerobic Bacterial Culture No growth    Stool culture **CANNOT BE ORDERED STAT** [[id number]] Collected: 02/27/22 1337    Order Status: Sent Specimen: Stool Updated: 02/27/22 1442    E. coli 0157 antigen [[id number]] Collected: 02/27/22 1337    Order Status: No result Specimen: Stool Updated: 02/27/22 1442    Gram stain [[id number]] Collected: 02/26/22 1825    Order Status: Completed Specimen: Bone from Toe, Left Foot Updated: 02/26/22 2319     Gram Stain Result No WBC's      No organisms seen    Culture, Anaerobe [[id number]] Collected: 02/26/22 1825    Order Status: Sent Specimen: Bone from Toe, Left Foot Updated: 02/26/22 2032    Fungus culture [[id number]] Collected: 02/26/22 1825    Order Status: Sent Specimen: Bone from Toe, Left Foot Updated: 02/26/22 2031    AFB Culture & Smear [[id number]] Collected: 02/26/22 1825    Order Status: Sent Specimen: Bone from Toe, Left Foot Updated: 02/26/22 2031    Blood Culture #2 **CANNOT BE ORDERED STAT** [[id number]] Collected: 02/16/22 1806    Order Status: Completed Specimen: Blood from Peripheral, Antecubital, Left Updated: 02/21/22 2012     Blood Culture, Routine No growth after 5 days.    Blood Culture #1 **CANNOT BE ORDERED STAT** [[id number]] Collected: 02/16/22 1807    Order Status: Completed Specimen: Blood from Peripheral, Hand, Right Updated: 02/21/22 2012     Blood Culture, Routine No growth after 5 days.          Specimen (24h ago, onward)                 Start     Ordered    02/27/22 1003  Specimen to  Pathology, Surgery Orthopedics  Once        Comments: Pre-op Diagnosis: Acute renal failure superimposed on stage 5 chronic kidney disease, not on chronic dialysis, unspecified acute renal failure type [N17.9, N18.5]    Procedure(s):  MPU PROCEDURE     Number of specimens: 1     Name of specimens: Left 1st proximal phalanx   References:    Click here for ordering Quick Tip   Question Answer Comment   Procedure Type: Orthopedics    Release to patient Immediate        02/27/22 1002                  All pertinent labs reviewed within the last 24 hours.    Diagnostic Results:  I have reviewed all pertinent imaging results/findings within the past 24 hours.    Clinical Findings:        Assessment/Plan:     Chronic gout of left foot due to renal impairment without tophus  Chief complaint of left ankle pain with increased warmth, erythema, edema, and pain with ankle range of motion. Xray left ankle ordered noting no fractures or effusions. Ddx diagnosis include acute gout ankle arthropathy, cellulitis LLE, septic ankle arthritis low on the differentials. Appears to have luca's abscess on xray in 1st proximal phalanx of left, as well as destructive/erosive changes of the 1st metatarsal head.     Plan:   Obtained bone biopsy of proximal left 1st phalanx per resident of hospital team and ID   Continue colchicine  Follow up uric acid levels  Will consider left ankle joint aspiration with steroid injection if ankle pain does not improve  No weightbearing restrictions  Podiatry will continue to follow         Laith Sidhu DPM  Podiatry  [facility] - Transplant Stepdown

## 2022-02-28 NOTE — ASSESSMENT & PLAN NOTE
Chief complaint of left ankle pain with increased warmth, erythema, edema, and pain with ankle range of motion. Xray left ankle ordered noting no fractures or effusions. Ddx diagnosis include acute gout ankle arthropathy, cellulitis LLE, septic ankle arthritis low on the differentials. Appears to have luca's abscess on xray in 1st proximal phalanx of left, as well as destructive/erosive changes of the 1st metatarsal head.     Plan:   Obtained bone biopsy of proximal left 1st phalanx per resident of hospital team and ID   Continue colchicine  Follow up uric acid levels  Will consider left ankle joint aspiration with steroid injection if ankle pain does not improve  No weightbearing restrictions  Podiatry will continue to follow

## 2022-02-28 NOTE — SUBJECTIVE & OBJECTIVE
Past Medical History:   Diagnosis Date    Anemia     Coronary artery disease     Diabetes mellitus, type 2     GERD (gastroesophageal reflux disease)     Gout     Hydrocele in adult     bilateral    Hyperlipidemia     Hypertension     Inguinal hernia     bilateral    Membranous glomerulonephritis     Nephrotic range proteinuria     Nephrotic syndrome     Obesity     Umbilical hernia        Past Surgical History:   Procedure Laterality Date    ABDOMINAL SURGERY  1980s    CARDIAC CATHETERIZATION  2007    x 2    COLONOSCOPY N/A 4/5/2019    Procedure: COLONOSCOPY;  Surgeon: Jose L Carty MD;  Location: [facility] ([address] [place] [address]);  Service: Colon and Rectal;  Laterality: N/A;    CORONARY STENT PLACEMENT  2007       Review of patient's allergies indicates:  No Known Allergies  Family History       Problem Relation (Age of Onset)    Drug abuse Brother    Heart attack Father, Brother    Hyperlipidemia Father    Hypertension Father, Brother    No Known Problems Sister    Stroke Father          Tobacco Use    Smoking status: Current Some Day Smoker     Packs/day: 0.25     Years: 10.00     Pack years: 2.50    Smokeless tobacco: Never Used   Substance and Sexual Activity    Alcohol use: No    Drug use: No    Sexual activity: Not on file     Review of Systems   Constitutional:  Negative for chills and fever.   Respiratory:  Negative for cough and shortness of breath.    Cardiovascular:  Negative for chest pain, palpitations and leg swelling.   Gastrointestinal:  Positive for diarrhea. Negative for abdominal pain, anal bleeding, nausea and vomiting.        Dark brown stools for 1 week   Genitourinary:  Negative for hematuria.   Musculoskeletal:  Positive for arthralgias (Left foot) and neck pain.   Neurological:  Positive for tremors. Negative for dizziness, syncope and light-headedness.   Psychiatric/Behavioral:  Negative for agitation, confusion and hallucinations.    Objective:     Vital Signs (Most Recent):  Temp: 98.1 °F  (36.7 °C) (02/28/22 0800)  Pulse: 84 (02/28/22 1000)  Resp: 17 (02/28/22 0800)  BP: (!) 153/81 (02/28/22 0810)  SpO2: 96 % (02/28/22 0800)   Vital Signs (24h Range):  Temp:  [98.1 °F (36.7 °C)-98.9 °F (37.2 °C)] 98.1 °F (36.7 °C)  Pulse:  [] 84  Resp:  [12-18] 17  SpO2:  [95 %-99 %] 96 %  BP: (133-179)/(80-92) 153/81     Weight: 72 kg (158 lb 11.7 oz) (02/23/22 0600)  Body mass index is 25.62 kg/m².      Intake/Output Summary (Last 24 hours) at 2/28/2022 1256  Last data filed at 2/28/2022 0800  Gross per 24 hour   Intake 984 ml   Output --   Net 984 ml       Lines/Drains/Airways       Central Venous Catheter Line  Duration             Percutaneous Central Line Insertion/Assessment - Triple Lumen  02/22/22 1816 right internal jugular 5 days    Trialysis (Dialysis) Catheter 02/22/22 1806 5 days                    Physical Exam  Vitals and nursing note reviewed.   Constitutional:       General: He is not in acute distress.     Appearance: Normal appearance. He is normal weight.   HENT:      Head: Normocephalic and atraumatic.      Mouth/Throat:      Mouth: Mucous membranes are moist.      Pharynx: Oropharynx is clear.   Cardiovascular:      Rate and Rhythm: Normal rate and regular rhythm.   Pulmonary:      Effort: Pulmonary effort is normal. No respiratory distress.      Breath sounds: Normal breath sounds.   Abdominal:      General: Abdomen is flat.      Palpations: Abdomen is soft.      Tenderness: There is no abdominal tenderness.   Genitourinary:     Comments: Patient refused CYNTHIA  Musculoskeletal:         General: Normal range of motion.      Right lower leg: No edema.      Left lower leg: No edema.   Skin:     General: Skin is warm and dry.   Neurological:      Mental Status: He is alert. Mental status is at baseline.   Psychiatric:         Mood and Affect: Mood normal.         Behavior: Behavior normal.       Significant Labs:  CBC:   Recent Labs   Lab 02/27/22  0507 02/28/22  0520   WBC 8.83 8.59   HGB  7.3* 7.3*   HCT 23.8* 24.0*    250     CMP:   Recent Labs   Lab 02/28/22  0520   GLU 91   CALCIUM 8.9      K 4.1   CO2 19*      BUN 45*   CREATININE 9.3*       Significant Imaging:  Imaging results within the past 24 hours have been reviewed.

## 2022-02-28 NOTE — ASSESSMENT & PLAN NOTE
Patient reports having recurrent dark brown, non greasy, non watery stool for 3 weeks since admission at [facility] early February. FOBT positive on 2/27. Hgb currently stable. Recent antibiotic use.    - Outpatient EGD and colonoscopy  - Monitor stool frequency and quality while inpatient  - Can consider diarrhea work up for infectious etiology including if persists/becomes watery given recent antibiotic use

## 2022-02-28 NOTE — PROGRESS NOTES
[facility] Medicine  Progress Note    Patient Name: Enrique Martinez  MRN: [id number]  Patient Class: IP- Inpatient   Admission Date: 2/16/2022  Length of Stay: 11 days  Attending Physician: Cinthia Dow MD  Primary Care Provider: Primary Doctor No        Subjective:     Principal Problem:Subacute osteomyelitis of left foot        HPI:  Enrique Martinez is a 56 y.o. male with a PMHx of CKD V glomerulonephropathy, insulin dependent diabetes mellitus, and CAD who presented tot  ED for evaluation of worsening left foot pain and swelling. He was recently admitted at [facility] from 2/2-2/4/2022 from LLE cellulitis. He was found to have salmonella bacteremia suspected due to diarrheal illness. He was discharged with cipro for which he reports compliance. He has had no improvement in his pain, redness, or swelling of his left foot and leg. He has also developed a new blister on his left foot. He endorses chills. He denies fevers, chest pain, SOB, N/V, decreased urine output, dysuria, and flank pain.    ED: HR 97, other VSSAF. CBC with leukocytosis of 16.01, Hgb 7.4, Hct 22.2.  ESR 97. CRP 33.4. K 5.5. Cr 19.7, . Phos 10.4. Lactic WNL. MRI L foot with cellulitis and osteomyelitis of distal 1st metatarsal and proximal 1st phalanx.      Overview/Hospital Course:  .Enrique Martinez is a 56 y.o. male with a PMHx of CKD V glomerulonephropathy, insulin dependent diabetes mellitus, and CAD who presented tot  ED for evaluation of worsening left foot pain and swelling. He was recently admitted at [facility] from 2/2-2/4/2022 from LLE cellulitis. He was found to have salmonella bacteremia suspected due to diarrheal illness. He was discharged with cipro for which he reports compliance. He has had no improvement in his pain, redness, or swelling of his left foot and leg. He has also developed a new blister on his left foot. He endorses chills. He denies fevers, chest pain, SOB, N/V, decreased urine output,  "dysuria, and flank pain. Patient admitted with worsening and progressive chronic kidney disease with  and Cr 20.9. Nephrology is consulted for his CKD stage V that has progressed ti ESRD and at first patient not amenable to starting HD but finally agreed after family involvement and patient had trialysis catheter placed and started on HD as per Nephrology. Patient was also complaining of neck pain on admit and had abnormal C spine Xray with possible signs of C spine instability, MRi ordered and patient refused 2/17, on pain meds, was discussed at length. Concern for possible infection also given acuity of pain. Imaging showed signs of C spine instability, prevertebral abscess likely with fluid collection with widening in C spine,. Neurosurgery consulted and recommended C collar- patient refused, and Xrays, refused, and MRI full spine when stable. Patient also had MRI of left foot on admit and showed "Regions of focal abnormal signal involving the distal 1st metatarsal and proximal 1st phalanx as described above.  Findings are concerning for foci of subacute myelitis (Yared's abscess).  Other less likely considerations include cystic degenerative change." ENT consulted for possible retropharyngeal abscess as per Neurosurgery and Infectious Disease consulted. At first patient kept refusing advanced imaging but finally agreed and repeat MRI of cervical spine done on 2/22 with anesthesia to help with sedation. MRI done without contrast and concern for possible retropharyngeal abscess. Patient placed on empiric IV Daptomycin and Ceftriaxone by ID.  ENT recommended needed study with contrast to really investigate the area better. Repeat MRI with contrast done of cervical spine on 2/24 and no obvious fluid collection noted and not obvious signs of infection so ENT and Neurosurgery signed off and stated nothing needed to be done. Patient kept on IV Daptomycin and Rocephin to cover for his left foot osteo. Podiatry " was consulted and performed bone biopsy as per ID recs on 2/25 to determine duration and type of abx needed to treat osteomyelitis of left foot. SW working on outpatient HD chair for patient who is new for HD and patient needs Perm Cath placed for long term HD needs as only has a temporary line and ID states likely will need 6 weeks of IV abx on discharge but likely can switch to Vanc/Ancef with HD to help with management of outpatient abx.         Interval History: no acute events overnight. Just finished cath placement with IR. Requesting fixodent for his dentures.     Review of Systems   Respiratory:  Negative for shortness of breath.    Cardiovascular:  Negative for chest pain.   Gastrointestinal:  Positive for diarrhea. Negative for abdominal pain and blood in stool.   Objective:     Vital Signs (Most Recent):  Temp: 99.5 °F (37.5 °C) (02/28/22 1323)  Pulse: 94 (02/28/22 1400)  Resp: 17 (02/28/22 1524)  BP: (!) 147/78 (02/28/22 1400)  SpO2: 98 % (02/28/22 1400)   Vital Signs (24h Range):  Temp:  [98.1 °F (36.7 °C)-99.5 °F (37.5 °C)] 99.5 °F (37.5 °C)  Pulse:  [] 94  Resp:  [12-18] 17  SpO2:  [95 %-100 %] 98 %  BP: (124-179)/(74-92) 147/78     Weight: 72 kg (158 lb 11.7 oz)  Body mass index is 25.62 kg/m².    Intake/Output Summary (Last 24 hours) at 2/28/2022 1533  Last data filed at 2/28/2022 1306  Gross per 24 hour   Intake 1084 ml   Output --   Net 1084 ml      Physical Exam  Vitals and nursing note reviewed.   Constitutional:       General: He is not in acute distress.     Appearance: He is well-developed.   HENT:      Head: Normocephalic and atraumatic.   Eyes:      Conjunctiva/sclera: Conjunctivae normal.   Neck:      Vascular: No JVD.   Cardiovascular:      Rate and Rhythm: Normal rate and regular rhythm.      Heart sounds: Normal heart sounds.   Pulmonary:      Effort: Pulmonary effort is normal.      Breath sounds: Normal breath sounds.   Abdominal:      General: Bowel sounds are normal. There is  no distension.      Palpations: Abdomen is soft.   Musculoskeletal:      Cervical back: Neck supple.      Right lower leg: Edema present.      Left lower leg: Edema present.      Comments: trace   Neurological:      Mental Status: He is alert.   Psychiatric:         Behavior: Behavior normal.       Significant Labs: All pertinent labs within the past 24 hours have been reviewed.  CBC:   Recent Labs   Lab 02/27/22  0507 02/28/22  0520   WBC 8.83 8.59   HGB 7.3* 7.3*   HCT 23.8* 24.0*    250     CMP:   Recent Labs   Lab 02/27/22  0507 02/28/22  0520    142   K 3.9 4.1    109   CO2 18* 19*   GLU 77 91   BUN 38* 45*   CREATININE 7.0* 9.3*   CALCIUM 8.7 8.9   ANIONGAP 14 14   EGFRNONAA 7.8* 5.5*       Significant Imaging: I have reviewed all pertinent imaging results/findings within the past 24 hours.      Assessment/Plan:      * Subacute osteomyelitis of left foot  - Patient admitted with left foot pain and recent left foot infection   - had salmonella bacteremia at an outside hopsital treated with Cipro ( [facility] from 2/2-2/4/2022 for LLE cellulitis and found to have salmonella bacteremia suspected due to diarrheal illness).  - Lactic normal on admit.   - Admit labs: ESR 97, CRP 33.4, procal 1.0.  - MRI of left foot showed regions of focal abnormal signal involving the distal 1st metatarsal and proximal 1st phalanx.  Findings are concerning for foci of subacute osteomyelitis.   -Podiatry consulted and at first deferred bone biopsy and recommended just abx treatment and ID consult. Patient started on IV Daptomycin and Rocephin.   -Patient also treated for gout by Podiatry as concerned some changes related to gout.   - Podiatry, per ID request performed bone biopsy on 2/25 to  proximal left 1st phalanx of left foot. Pathology and Micro from bone biopsy pending.   -ID reports likely need for 6 weeks IV antibiotics for osteo and likely can transition to IV Vanc/Ancef with HD to help with dosing as outpatient  but awaiting final culture results.    -per ID: will tentatively plan for 6 week osteomyelitis course pending results of bone biopsy pathology report         ESRD  Membranous glomerulonephritis  Patient on waiting list for kidney transplant      - on acthar gel    - Nephrology consulted on admit for  and Cr 20.9 and uremic symptoms of tremors. 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 Nephrology recommended HD on admit but patient repeatedly refused and finally agreed after multiple conversations. Trialysis line placed on 2/22 and patient started on HD on 2/23 for progressive CKD stage V that has now progressed to ESRD.   - SW arranging outpatient HD - [facility] close to his house as patient is new to HD and needs HD chair and scheduled arranged prior to discharge from hospital.   - Patient s/p Perm Cath placement with IR consulted.   Per nephro:  - Will need AVF with vascular surgery, likely to be placed outpatient        Prevertebral Fluid Collection  Neck pain  Concern on initial imaging for abscess in cervical area and ENT and Neurosurgery consulted on admit. After several delays with repeat imaging due to patient refusal and coordination with anesthesia for sedation finally repeat MRI of cervical spine with contrast done on 2/24 and showed no abscess so ENT and Neurosurgery stated no intervention or further testing needed. ID stated no abx need for neck and continues on abx just for his left foot osteo.      Renovascular hypertension  - BP elevated  - Conitnue Norvasc 10 mg po daily + Hydralazine 75 mg po TID (increased today from 50 mg) + Labetalol 200 mg po BID. Monitor.      Folic acid deficiency  Present on admit. Folate low at 4.4. Patient started on Folic acid 1 mg po daily.     Anemia due to chronic kidney disease, on chronic dialysis  FOBT+  SHANNAN (iron deficiency anemia)  -multifactorial. Felt to be related to his CKD in combination with chronic  "infection/inflammation and GI loss    - Hgb stable 7.3-7.5 since last transfusion, but not staying up in appropriate level after transfusion. GI consulted.   - Discussed with GI; Outpt f/u rec.     Atherosclerosis of native coronary artery of native heart without angina pectoris  Patient with known CAD. Patient asymptomatic. Continue Labetalol, Asprin 81 mg po daily and Lipitor 80 mg po daily to treat CAD.      S/P coronary artery stent placement  Patient with previous coronary stent for CAD in past in 2007. Continue Labetalol, Asprin 81 mg po daily and Lipitor 80 mg po daily.     Type 2 diabetes mellitus with chronic kidney disease on chronic dialysis, with long-term current use of insulin  Patient's glucose levels are controlled.  Last A1c reviewed-   Lab Results   Component Value Date    HGBA1C 7.6 (H) 02/17/2022     Most recent fingerstick glucose reviewed- No results for input(s): POCTGLUCOSE in the last 24 hours.  Current correctional scale  none       Hx of syphilis  Per Helio Garcia NP (ID) note 9/18/18, "Patient reports recent treatment with 3 doses of bicillin for + Treponema Pallidum antibody (5/16/2018) (per patient, this was a re-treatment because he couldn't get prior records of apparent prior treatment).   - Treated.   - RPR non-reactive 12/2018.     Pure hypercholesterolemia  Chronic and controlled. Continue Lipitor 80 mg po daily.     Gastroesophageal reflux disease without esophagitis  Chronic and controlled. Continue Protonix 40 mg po daily.     Chronic gout of left foot due to renal impairment without tophus  Stable. [place]ine recently dc'd due to diarrhea.     Benign prostatic hyperplasia  [place] placed 2/18 overnight for incontinence    VTE Risk Mitigation (From admission, onward)         Ordered     heparin (porcine) injection 5,000 Units  Every 8 hours         02/25/22 0747     IP VTE HIGH RISK PATIENT  Once         02/17/22 0151     Place sequential compression device  Until discontinued      "    02/17/22 0151              Discharge Planning   JOAN: 3/2/2022     Code Status: Full Code   Is the patient medically ready for discharge?: No    Reason for patient still in hospital (select all that apply): Patient trending condition, Laboratory test and Treatment  Discharge Plan A: Home   Discharge Delays: None known at this time      Cinthia Dow MD  Department of Hospital Medicine   [facility] - Transplant Stepdown

## 2022-02-28 NOTE — SUBJECTIVE & OBJECTIVE
Interval History: no acute events overnight. Just finished cath placement with IR. Requesting fixodent for his dentures.     Review of Systems   Respiratory:  Negative for shortness of breath.    Cardiovascular:  Negative for chest pain.   Gastrointestinal:  Positive for diarrhea. Negative for abdominal pain and blood in stool.   Objective:     Vital Signs (Most Recent):  Temp: 99.5 °F (37.5 °C) (02/28/22 1323)  Pulse: 94 (02/28/22 1400)  Resp: 17 (02/28/22 1524)  BP: (!) 147/78 (02/28/22 1400)  SpO2: 98 % (02/28/22 1400)   Vital Signs (24h Range):  Temp:  [98.1 °F (36.7 °C)-99.5 °F (37.5 °C)] 99.5 °F (37.5 °C)  Pulse:  [] 94  Resp:  [12-18] 17  SpO2:  [95 %-100 %] 98 %  BP: (124-179)/(74-92) 147/78     Weight: 72 kg (158 lb 11.7 oz)  Body mass index is 25.62 kg/m².    Intake/Output Summary (Last 24 hours) at 2/28/2022 1533  Last data filed at 2/28/2022 1306  Gross per 24 hour   Intake 1084 ml   Output --   Net 1084 ml      Physical Exam  Vitals and nursing note reviewed.   Constitutional:       General: He is not in acute distress.     Appearance: He is well-developed.   HENT:      Head: Normocephalic and atraumatic.   Eyes:      Conjunctiva/sclera: Conjunctivae normal.   Neck:      Vascular: No JVD.   Cardiovascular:      Rate and Rhythm: Normal rate and regular rhythm.      Heart sounds: Normal heart sounds.   Pulmonary:      Effort: Pulmonary effort is normal.      Breath sounds: Normal breath sounds.   Abdominal:      General: Bowel sounds are normal. There is no distension.      Palpations: Abdomen is soft.   Musculoskeletal:      Cervical back: Neck supple.      Right lower leg: Edema present.      Left lower leg: Edema present.      Comments: trace   Neurological:      Mental Status: He is alert.   Psychiatric:         Behavior: Behavior normal.       Significant Labs: All pertinent labs within the past 24 hours have been reviewed.  CBC:   Recent Labs   Lab 02/27/22  0507 02/28/22  0520   WBC 8.83 8.59    HGB 7.3* 7.3*   HCT 23.8* 24.0*    250     CMP:   Recent Labs   Lab 02/27/22  0507 02/28/22  0520    142   K 3.9 4.1    109   CO2 18* 19*   GLU 77 91   BUN 38* 45*   CREATININE 7.0* 9.3*   CALCIUM 8.7 8.9   ANIONGAP 14 14   EGFRNONAA 7.8* 5.5*       Significant Imaging: I have reviewed all pertinent imaging results/findings within the past 24 hours.

## 2022-02-28 NOTE — HPI
Enrique Martinez is a 57 year old man admitted for recurrent LE infection with history of ESRD(newly on HD this admission TTS), DM2, HTN, and CAD. His complains of arthralgias and neck pain currently. He reports having dark brown diarrhea for one week. He denies loss of consciousness, syncope, hematemesis, hematuria, or hematochezia. His last colonoscopy was 4/2019 with Dr. Jose L Olvera who found 3mm polyp in ascending colon and 3-5mm polyps x3  in rectum. Only one polyp was tubular adenoma at that time and recommendations were made to repeat colonoscopy in 5 years (4/2024). No known family history of colorectal cancer. He was recently admitted to [facility] from 2/2-2/4/2022 for LLE cellulitis and found to have salmonella bacteremia suspected due to diarrheal illness. He was discharged with cipro for which he reports compliance. His hemoglobin at the time of discharge was 8.6. He currently takes ASA 81, omeprazole and famotidine as home medications.     During this current admission, he was admitted with hemoglobin of 7.4. He was transfused 3U RBCs (2/18, 2/19, 2/23) for hemoglobins less than 7. He has not required transfusions since. Hbg stable around 7.4 with no obvious signs of bleeding. FOBT positive on 2/27. He was started cheryle HD catheter placed on 2/28 with plans to begin EPO during HD.    Gastroenterology was consulted for anemia in the setting of positive FOBT.

## 2022-02-28 NOTE — CONSULTS
[facility] - Transplant Stepdown  Gastroenterology  Consult Note    Patient Name: Enrique Martinez  MRN: [id number]  Admission Date: 2/16/2022  Hospital Length of Stay: 11 days  Code Status: Full Code   Attending Provider: Cinthia Dow MD   Consulting Provider: Christiano Pena MD  Primary Care Physician: Primary Doctor No  Principal Problem:Subacute osteomyelitis of left foot    Inpatient consult to Gastroenterology  Consult performed by: Christiano Pena MD  Consult ordered by: Cinthia Dow MD  Reason for consult: FOBT + with anemia        Subjective:     HPI:  Enrique Martinez is a 57 year old man admitted for recurrent LE infection with history of ESRD(newly on HD this admission TTS), DM2, HTN, and CAD. His complains of arthralgias and neck pain currently. He reports having dark brown diarrhea for one week. He denies loss of consciousness, syncope, hematemesis, hematuria, or hematochezia. His last colonoscopy was 4/2019 with Dr. Jose L Olvera who found 3mm polyp in ascending colon and 3-5mm polyps x3  in rectum. Only one polyp was tubular adenoma at that time and recommendations were made to repeat colonoscopy in 5 years (4/2024). No known family history of colorectal cancer. He was recently admitted to [facility] from 2/2-2/4/2022 for LLE cellulitis and found to have salmonella bacteremia suspected due to diarrheal illness. He was discharged with cipro for which he reports compliance. His hemoglobin at the time of discharge was 8.6. He currently takes ASA 81, omeprazole and famotidine as home medications.     During this current admission, he was admitted with hemoglobin of 7.4. He was transfused 3U RBCs (2/18, 2/19, 2/23) for hemoglobins less than 7. He has not required transfusions since. Hbg stable around 7.4 with no obvious signs of bleeding. FOBT positive on 2/27. He was started cheryle HD catheter placed on 2/28 with plans to begin EPO during HD.    Gastroenterology was consulted for anemia in the setting of  positive FOBT.          Past Medical History:   Diagnosis Date    Anemia     Coronary artery disease     Diabetes mellitus, type 2     GERD (gastroesophageal reflux disease)     Gout     Hydrocele in adult     bilateral    Hyperlipidemia     Hypertension     Inguinal hernia     bilateral    Membranous glomerulonephritis     Nephrotic range proteinuria     Nephrotic syndrome     Obesity     Umbilical hernia        Past Surgical History:   Procedure Laterality Date    ABDOMINAL SURGERY  1980s    CARDIAC CATHETERIZATION  2007    x 2    COLONOSCOPY N/A 4/5/2019    Procedure: COLONOSCOPY;  Surgeon: Jose L Carty MD;  Location: [facility] ([address] [place] [address]);  Service: Colon and Rectal;  Laterality: N/A;    CORONARY STENT PLACEMENT  2007       Review of patient's allergies indicates:  No Known Allergies  Family History       Problem Relation (Age of Onset)    Drug abuse Brother    Heart attack Father, Brother    Hyperlipidemia Father    Hypertension Father, Brother    No Known Problems Sister    Stroke Father          Tobacco Use    Smoking status: Current Some Day Smoker     Packs/day: 0.25     Years: 10.00     Pack years: 2.50    Smokeless tobacco: Never Used   Substance and Sexual Activity    Alcohol use: No    Drug use: No    Sexual activity: Not on file     Review of Systems   Constitutional:  Negative for chills and fever.   Respiratory:  Negative for cough and shortness of breath.    Cardiovascular:  Negative for chest pain, palpitations and leg swelling.   Gastrointestinal:  Positive for diarrhea. Negative for abdominal pain, anal bleeding, nausea and vomiting.        Dark brown stools for 1 week   Genitourinary:  Negative for hematuria.   Musculoskeletal:  Positive for arthralgias (Left foot) and neck pain.   Neurological:  Positive for tremors. Negative for dizziness, syncope and light-headedness.   Psychiatric/Behavioral:  Negative for agitation, confusion and hallucinations.    Objective:      Vital Signs (Most Recent):  Temp: 98.1 °F (36.7 °C) (02/28/22 0800)  Pulse: 84 (02/28/22 1000)  Resp: 17 (02/28/22 0800)  BP: (!) 153/81 (02/28/22 0810)  SpO2: 96 % (02/28/22 0800)   Vital Signs (24h Range):  Temp:  [98.1 °F (36.7 °C)-98.9 °F (37.2 °C)] 98.1 °F (36.7 °C)  Pulse:  [] 84  Resp:  [12-18] 17  SpO2:  [95 %-99 %] 96 %  BP: (133-179)/(80-92) 153/81     Weight: 72 kg (158 lb 11.7 oz) (02/23/22 0600)  Body mass index is 25.62 kg/m².      Intake/Output Summary (Last 24 hours) at 2/28/2022 1256  Last data filed at 2/28/2022 0800  Gross per 24 hour   Intake 984 ml   Output --   Net 984 ml       Lines/Drains/Airways       Central Venous Catheter Line  Duration             Percutaneous Central Line Insertion/Assessment - Triple Lumen  02/22/22 1816 right internal jugular 5 days    Trialysis (Dialysis) Catheter 02/22/22 1806 5 days                    Physical Exam  Vitals and nursing note reviewed.   Constitutional:       General: He is not in acute distress.     Appearance: Normal appearance. He is normal weight.   HENT:      Head: Normocephalic and atraumatic.      Mouth/Throat:      Mouth: Mucous membranes are moist.      Pharynx: Oropharynx is clear.   Cardiovascular:      Rate and Rhythm: Normal rate and regular rhythm.   Pulmonary:      Effort: Pulmonary effort is normal. No respiratory distress.      Breath sounds: Normal breath sounds.   Abdominal:      General: Abdomen is flat.      Palpations: Abdomen is soft.      Tenderness: There is no abdominal tenderness.   Genitourinary:     Comments: Patient refused CYNTHIA  Musculoskeletal:         General: Normal range of motion.      Right lower leg: No edema.      Left lower leg: No edema.   Skin:     General: Skin is warm and dry.   Neurological:      Mental Status: He is alert. Mental status is at baseline.   Psychiatric:         Mood and Affect: Mood normal.         Behavior: Behavior normal.       Significant Labs:  CBC:   Recent Labs   Lab  02/27/22  0507 02/28/22  0520   WBC 8.83 8.59   HGB 7.3* 7.3*   HCT 23.8* 24.0*    250     CMP:   Recent Labs   Lab 02/28/22  0520   GLU 91   CALCIUM 8.9      K 4.1   CO2 19*      BUN 45*   CREATININE 9.3*       Significant Imaging:  Imaging results within the past 24 hours have been reviewed.    Assessment/Plan:     Anemia due to chronic kidney disease, on chronic dialysis  57 year old man with ESRD admitted with Hgb of 7.4.Patient has required 3u PRBC during this 11 day admission for hemoglobin less that 7. He reports having diarrhea for one week with dark brown stool. He is currently hemodynamically stable.  He denies loss of consciousness, syncope, hematemesis, hematuria, or hematochezia. His last colonoscopy was 4/2019 with Dr. Jose L Olvera who found 3mm polyp in ascending colon and 3-5mm polyps x3 in rectum. Only one polyp was tubular adenoma at that time and recommendations were made to repeat colonoscopy in 5 years (4/2024). No known family history of colorectal cancer.    Hgb stable 7.3-7.5 since last transfusion 2/23. Decrease in Hgb throughout the year can be associated with his worsening CKD, progressing to ESRD. Iron studies this admission consistent with anemia of chronic disease diagnosis. FOBT positive 2/27/2022 on day 10 of hospitalization with stable hemoglobin.     02/17/22 10:41   Iron 10 (L)   TIBC 258   Saturated Iron 4 (L)   Transferrin 174 (L)   Ferritin 384 (H)     - Recommend outpatient EGD and colonoscopy and close follow up with PCP  - Will arrange for follow up in GI clinic  - Continue to monitor with CBC in hospital  - Transfusion if Hgb less than 7        Diarrhea  Patient reports having recurrent dark brown, non greasy, non watery stool for 3 weeks since admission at [facility] early February. FOBT positive on 2/27. Hgb currently stable. Recent antibiotic use.    - Outpatient EGD and colonoscopy  - Monitor stool frequency and quality while inpatient  - Can consider  diarrhea work up for infectious etiology including if persists/becomes watery given recent antibiotic use      Acute renal failure superimposed on chronic kidney disease, on chronic dialysis  Deferred management of care to primary team.    Atherosclerosis of native coronary artery of native heart without angina pectoris  Deferred management of care to primary team.        Thank you for your consult. I will sign off. Please contact us if you have any additional questions.    Christiano Pena MD  PGY1  Gastroenterology  [facility] - Transplant Stepdown

## 2022-03-01 LAB
ANION GAP SERPL CALC-SCNC: 14 MMOL/L (ref 8–16)
BASOPHILS # BLD AUTO: 0.04 K/UL (ref 0–0.2)
BASOPHILS NFR BLD: 0.4 % (ref 0–1.9)
BLD PROD TYP BPU: NORMAL
BLOOD UNIT EXPIRATION DATE: NORMAL
BLOOD UNIT TYPE CODE: 9500
BLOOD UNIT TYPE: NORMAL
BUN SERPL-MCNC: 50 MG/DL (ref 6–20)
CALCIUM SERPL-MCNC: 8.6 MG/DL (ref 8.7–10.5)
CHLORIDE SERPL-SCNC: 109 MMOL/L (ref 95–110)
CO2 SERPL-SCNC: 18 MMOL/L (ref 23–29)
CODING SYSTEM: NORMAL
CREAT SERPL-MCNC: 10.8 MG/DL (ref 0.5–1.4)
DIFFERENTIAL METHOD: ABNORMAL
DISPENSE STATUS: NORMAL
EOSINOPHIL # BLD AUTO: 0.1 K/UL (ref 0–0.5)
EOSINOPHIL NFR BLD: 1.4 % (ref 0–8)
ERYTHROCYTE [DISTWIDTH] IN BLOOD BY AUTOMATED COUNT: 16 % (ref 11.5–14.5)
EST. GFR  (AFRICAN AMERICAN): 5.3 ML/MIN/1.73 M^2
EST. GFR  (NON AFRICAN AMERICAN): 4.6 ML/MIN/1.73 M^2
GLUCOSE SERPL-MCNC: 87 MG/DL (ref 70–110)
HCT VFR BLD AUTO: 22.2 % (ref 40–54)
HGB BLD-MCNC: 6.8 G/DL (ref 14–18)
IMM GRANULOCYTES # BLD AUTO: 0.07 K/UL (ref 0–0.04)
IMM GRANULOCYTES NFR BLD AUTO: 0.7 % (ref 0–0.5)
LYMPHOCYTES # BLD AUTO: 1 K/UL (ref 1–4.8)
LYMPHOCYTES NFR BLD: 10.8 % (ref 18–48)
MCH RBC QN AUTO: 28.3 PG (ref 27–31)
MCHC RBC AUTO-ENTMCNC: 30.6 G/DL (ref 32–36)
MCV RBC AUTO: 93 FL (ref 82–98)
MONOCYTES # BLD AUTO: 1.3 K/UL (ref 0.3–1)
MONOCYTES NFR BLD: 13.8 % (ref 4–15)
NEUTROPHILS # BLD AUTO: 6.9 K/UL (ref 1.8–7.7)
NEUTROPHILS NFR BLD: 72.9 % (ref 38–73)
NRBC BLD-RTO: 0 /100 WBC
NUM UNITS TRANS PACKED RBC: NORMAL
PHOSPHATE SERPL-MCNC: 6.2 MG/DL (ref 2.7–4.5)
PLATELET # BLD AUTO: 257 K/UL (ref 150–450)
PMV BLD AUTO: 10.1 FL (ref 9.2–12.9)
POTASSIUM SERPL-SCNC: 4.4 MMOL/L (ref 3.5–5.1)
RBC # BLD AUTO: 2.4 M/UL (ref 4.6–6.2)
SODIUM SERPL-SCNC: 141 MMOL/L (ref 136–145)
WBC # BLD AUTO: 9.43 K/UL (ref 3.9–12.7)

## 2022-03-01 PROCEDURE — 25000003 PHARM REV CODE 250: Mod: NTX | Performed by: PHYSICIAN ASSISTANT

## 2022-03-01 PROCEDURE — 99233 SBSQ HOSP IP/OBS HIGH 50: CPT | Mod: NTX,,, | Performed by: FAMILY MEDICINE

## 2022-03-01 PROCEDURE — 25000003 PHARM REV CODE 250: Mod: NTX | Performed by: INTERNAL MEDICINE

## 2022-03-01 PROCEDURE — 25000003 PHARM REV CODE 250: Mod: NTX | Performed by: FAMILY MEDICINE

## 2022-03-01 PROCEDURE — 80048 BASIC METABOLIC PNL TOTAL CA: CPT | Mod: NTX | Performed by: HOSPITALIST

## 2022-03-01 PROCEDURE — 85025 COMPLETE CBC W/AUTO DIFF WBC: CPT | Mod: NTX | Performed by: HOSPITALIST

## 2022-03-01 PROCEDURE — P9016 RBC LEUKOCYTES REDUCED: HCPCS | Mod: NTX | Performed by: FAMILY MEDICINE

## 2022-03-01 PROCEDURE — 90935 HEMODIALYSIS ONE EVALUATION: CPT | Mod: NTX

## 2022-03-01 PROCEDURE — 99233 PR SUBSEQUENT HOSPITAL CARE,LEVL III: ICD-10-PCS | Mod: NTX,,, | Performed by: FAMILY MEDICINE

## 2022-03-01 PROCEDURE — 63600175 PHARM REV CODE 636 W HCPCS: Mod: NTX | Performed by: HOSPITALIST

## 2022-03-01 PROCEDURE — 20600001 HC STEP DOWN PRIVATE ROOM: Mod: NTX

## 2022-03-01 PROCEDURE — 63600175 PHARM REV CODE 636 W HCPCS: Mod: NTX | Performed by: INTERNAL MEDICINE

## 2022-03-01 PROCEDURE — 84100 ASSAY OF PHOSPHORUS: CPT | Mod: NTX | Performed by: HOSPITALIST

## 2022-03-01 PROCEDURE — 25000003 PHARM REV CODE 250: Mod: NTX | Performed by: HOSPITALIST

## 2022-03-01 RX ORDER — HYDROCODONE BITARTRATE AND ACETAMINOPHEN 500; 5 MG/1; MG/1
TABLET ORAL
Status: DISCONTINUED | OUTPATIENT
Start: 2022-03-01 | End: 2022-03-07

## 2022-03-01 RX ADMIN — LABETALOL HYDROCHLORIDE 200 MG: 100 TABLET, FILM COATED ORAL at 08:03

## 2022-03-01 RX ADMIN — PANTOPRAZOLE SODIUM 40 MG: 40 TABLET, DELAYED RELEASE ORAL at 08:03

## 2022-03-01 RX ADMIN — SEVELAMER CARBONATE 800 MG: 800 TABLET, FILM COATED ORAL at 07:03

## 2022-03-01 RX ADMIN — DAPTOMYCIN 445 MG: 350 INJECTION, POWDER, LYOPHILIZED, FOR SOLUTION INTRAVENOUS at 09:03

## 2022-03-01 RX ADMIN — HYDRALAZINE HYDROCHLORIDE 75 MG: 50 TABLET ORAL at 08:03

## 2022-03-01 RX ADMIN — NYSTATIN 500000 UNITS: 500000 SUSPENSION ORAL at 08:03

## 2022-03-01 RX ADMIN — OXYCODONE HYDROCHLORIDE 10 MG: 10 TABLET ORAL at 08:03

## 2022-03-01 RX ADMIN — SEVELAMER CARBONATE 800 MG: 800 TABLET, FILM COATED ORAL at 11:03

## 2022-03-01 RX ADMIN — SODIUM CHLORIDE: 0.9 INJECTION, SOLUTION INTRAVENOUS at 03:03

## 2022-03-01 RX ADMIN — HEPARIN SODIUM 5000 UNITS: 5000 INJECTION INTRAVENOUS; SUBCUTANEOUS at 04:03

## 2022-03-01 RX ADMIN — ONDANSETRON 8 MG: 8 TABLET, ORALLY DISINTEGRATING ORAL at 08:03

## 2022-03-01 RX ADMIN — ARIPIPRAZOLE 15 MG: 5 TABLET ORAL at 08:03

## 2022-03-01 RX ADMIN — FOLIC ACID 1 MG: 1 TABLET ORAL at 08:03

## 2022-03-01 RX ADMIN — AMLODIPINE BESYLATE 10 MG: 10 TABLET ORAL at 08:03

## 2022-03-01 RX ADMIN — CEFTRIAXONE SODIUM 2 G: 2 INJECTION, SOLUTION INTRAVENOUS at 08:03

## 2022-03-01 RX ADMIN — ATORVASTATIN CALCIUM 80 MG: 20 TABLET, FILM COATED ORAL at 08:03

## 2022-03-01 RX ADMIN — HEPARIN SODIUM 5000 UNITS: 5000 INJECTION INTRAVENOUS; SUBCUTANEOUS at 08:03

## 2022-03-01 RX ADMIN — HYDRALAZINE HYDROCHLORIDE 75 MG: 50 TABLET ORAL at 04:03

## 2022-03-01 RX ADMIN — ACETAMINOPHEN 1000 MG: 500 TABLET ORAL at 08:03

## 2022-03-01 RX ADMIN — NYSTATIN 500000 UNITS: 500000 SUSPENSION ORAL at 07:03

## 2022-03-01 RX ADMIN — OXYCODONE 5 MG: 5 TABLET ORAL at 03:03

## 2022-03-01 RX ADMIN — ASPIRIN 81 MG: 81 TABLET, COATED ORAL at 08:03

## 2022-03-01 RX ADMIN — NYSTATIN 500000 UNITS: 500000 SUSPENSION ORAL at 11:03

## 2022-03-01 RX ADMIN — OXYCODONE HYDROCHLORIDE 10 MG: 10 TABLET ORAL at 04:03

## 2022-03-01 RX ADMIN — OXYCODONE HYDROCHLORIDE 10 MG: 10 TABLET ORAL at 09:03

## 2022-03-01 NOTE — PROGRESS NOTES
Patient transferred off unit to HD via stretcher per transport staff. Telemetry on patient. Mask on patient.

## 2022-03-01 NOTE — SUBJECTIVE & OBJECTIVE
Interval History: no acute events overnight. Had an episode of diarrhea this morning. Says stool was a mix of black/ brown.     Review of Systems   Respiratory:  Negative for shortness of breath.    Cardiovascular:  Negative for chest pain.   Gastrointestinal:  Positive for diarrhea. Negative for abdominal pain and blood in stool.     Objective:     Vital Signs (Most Recent):  Temp: 98.3 °F (36.8 °C) (03/01/22 1204)  Pulse: 80 (03/01/22 1204)  Resp: 18 (03/01/22 1204)  BP: 119/74 (03/01/22 1204)  SpO2: 98 % (03/01/22 1204)   Vital Signs (24h Range):  Temp:  [97.4 °F (36.3 °C)-98.5 °F (36.9 °C)] 98.3 °F (36.8 °C)  Pulse:  [] 80  Resp:  [16-18] 18  SpO2:  [95 %-99 %] 98 %  BP: (119-149)/(60-78) 119/74     Weight: 72 kg (158 lb 11.7 oz)  Body mass index is 25.62 kg/m².    Intake/Output Summary (Last 24 hours) at 3/1/2022 1431  Last data filed at 3/1/2022 0600  Gross per 24 hour   Intake 720 ml   Output --   Net 720 ml        Physical Exam  Vitals and nursing note reviewed.   Constitutional:       General: He is not in acute distress.     Appearance: He is well-developed.   HENT:      Head: Normocephalic and atraumatic.   Eyes:      Conjunctiva/sclera: Conjunctivae normal.   Neck:      Vascular: No JVD.   Cardiovascular:      Rate and Rhythm: Normal rate and regular rhythm.      Heart sounds: Normal heart sounds.   Pulmonary:      Effort: Pulmonary effort is normal.      Breath sounds: Normal breath sounds.   Abdominal:      General: Bowel sounds are normal. There is no distension.      Palpations: Abdomen is soft.   Musculoskeletal:      Cervical back: Neck supple.      Right lower leg: Edema present.      Left lower leg: Edema present.      Comments: trace   Neurological:      Mental Status: He is alert.   Psychiatric:         Behavior: Behavior normal.       Significant Labs: All pertinent labs within the past 24 hours have been reviewed.  CBC:   Recent Labs   Lab 02/28/22  0520 03/01/22  0447   WBC 8.59 9.43    HGB 7.3* 6.8*   HCT 24.0* 22.2*    257       CMP:   Recent Labs   Lab 02/28/22  0520 03/01/22  0447    141   K 4.1 4.4    109   CO2 19* 18*   GLU 91 87   BUN 45* 50*   CREATININE 9.3* 10.8*   CALCIUM 8.9 8.6*   ANIONGAP 14 14   EGFRNONAA 5.5* 4.6*         Significant Imaging: I have reviewed all pertinent imaging results/findings within the past 24 hours.

## 2022-03-01 NOTE — PROGRESS NOTES
AAOx4. VSS. H/H 6.8/22.2 on this mornings labs. Patient to get 1 unit PRBC transfused while in HD. Patient got HD done today 3/1/22 - 1.7L removed. R IJ trialysis removed prior to HD per MD verbal order. R wrist 22G PIV inserted today prior to HD for antibiotic administration use. Rocephin administration times adjusted  To 2000 q24hrs due to patient in HD. Wound care to L foot done - cleaned w/ betadine and covered with dressing - dressing is CDI. PRN oxycodone 10mg given x1 for c/o pain. Cultures from 2/26 pending. Patient up OOB w/ 1x assist. Non-skid socks on. Bed in low position.

## 2022-03-01 NOTE — PLAN OF CARE
Pt aaox4. VSS on RA. BP managed with scheduled medications. Pt had RCW perm cath placed for dialysis yesterday. Pt to get HD today. Pt c/o of L foot pain, prn oxy administered x2. Wound care to L toe to be completed once daily. Pt applying barrier cream to skin tear on sacrum. Encouraged to reposition frequently. Pt up with standby assistance. Nonskid socks in use, instructed to call staff for mobility. Cultures pending. VS and assessments in flowsheets.

## 2022-03-01 NOTE — PROGRESS NOTES
Patient arrived via stretcher.  Awake, alert and responsive.  No distress noted.  HD TX started via RIJ HD catheter.

## 2022-03-01 NOTE — PROGRESS NOTES
Manfred [facility] - Transplant [facility] Medicine  Progress Note    Patient Name: Enrique Martinez  MRN: [id number]  Patient Class: IP- Inpatient   Admission Date: 2/16/2022  Length of Stay: 12 days  Attending Physician: Cinthia Dow MD  Primary Care Provider: Primary Doctor No    Subjective:     Principal Problem:Subacute osteomyelitis of left foot    HPI:  Enrique Martinez is a 56 y.o. male with a PMHx of CKD V glomerulonephropathy, insulin dependent diabetes mellitus, and CAD who presented tot  ED for evaluation of worsening left foot pain and swelling. He was recently admitted at [facility] from 2/2-2/4/2022 from LLE cellulitis. He was found to have salmonella bacteremia suspected due to diarrheal illness. He was discharged with cipro for which he reports compliance. He has had no improvement in his pain, redness, or swelling of his left foot and leg. He has also developed a new blister on his left foot. He endorses chills. He denies fevers, chest pain, SOB, N/V, decreased urine output, dysuria, and flank pain.    ED: HR 97, other VSSAF. CBC with leukocytosis of 16.01, Hgb 7.4, Hct 22.2.  ESR 97. CRP 33.4. K 5.5. Cr 19.7, . Phos 10.4. Lactic WNL. MRI L foot with cellulitis and osteomyelitis of distal 1st metatarsal and proximal 1st phalanx.      Overview/Hospital Course:  .Enrique Martinez is a 56 y.o. male with a PMHx of CKD V glomerulonephropathy, insulin dependent diabetes mellitus, and CAD who presented tot  ED for evaluation of worsening left foot pain and swelling. He was recently admitted at [facility] from 2/2-2/4/2022 from LLE cellulitis. He was found to have salmonella bacteremia suspected due to diarrheal illness. He was discharged with cipro for which he reports compliance. He has had no improvement in his pain, redness, or swelling of his left foot and leg. He has also developed a new blister on his left foot. He endorses chills. He denies fevers, chest pain, SOB, N/V, decreased urine output, dysuria,  "and flank pain. Patient admitted with worsening and progressive chronic kidney disease with  and Cr 20.9. Nephrology is consulted for his CKD stage V that has progressed ti ESRD and at first patient not amenable to starting HD but finally agreed after family involvement and patient had trialysis catheter placed and started on HD as per Nephrology. Patient was also complaining of neck pain on admit and had abnormal C spine Xray with possible signs of C spine instability, MRi ordered and patient refused 2/17, on pain meds, was discussed at length. Concern for possible infection also given acuity of pain. Imaging showed signs of C spine instability, prevertebral abscess likely with fluid collection with widening in C spine,. Neurosurgery consulted and recommended C collar- patient refused, and Xrays, refused, and MRI full spine when stable. Patient also had MRI of left foot on admit and showed "Regions of focal abnormal signal involving the distal 1st metatarsal and proximal 1st phalanx as described above.  Findings are concerning for foci of subacute myelitis (Yared's abscess).  Other less likely considerations include cystic degenerative change." ENT consulted for possible retropharyngeal abscess as per Neurosurgery and Infectious Disease consulted. At first patient kept refusing advanced imaging but finally agreed and repeat MRI of cervical spine done on 2/22 with anesthesia to help with sedation. MRI done without contrast and concern for possible retropharyngeal abscess. Patient placed on empiric IV Daptomycin and Ceftriaxone by ID.  ENT recommended needed study with contrast to really investigate the area better. Repeat MRI with contrast done of cervical spine on 2/24 and no obvious fluid collection noted and not obvious signs of infection so ENT and Neurosurgery signed off and stated nothing needed to be done. Patient kept on IV Daptomycin and Rocephin to cover for his left foot osteo. Podiatry was " consulted and performed bone biopsy as per ID recs on 2/25 to determine duration and type of abx needed to treat osteomyelitis of left foot. SW working on outpatient HD chair for patient who is new for HD and patient needs Perm Cath placed for long term HD needs as only has a temporary line and ID states likely will need 6 weeks of IV abx on discharge but likely can switch to Vanc/Ancef with HD to help with management of outpatient abx.       Interval History: no acute events overnight. Had an episode of diarrhea this morning. Says stool was a mix of black/ brown.     Review of Systems   Respiratory:  Negative for shortness of breath.    Cardiovascular:  Negative for chest pain.   Gastrointestinal:  Positive for diarrhea. Negative for abdominal pain and blood in stool.     Objective:     Vital Signs (Most Recent):  Temp: 98.3 °F (36.8 °C) (03/01/22 1204)  Pulse: 80 (03/01/22 1204)  Resp: 18 (03/01/22 1204)  BP: 119/74 (03/01/22 1204)  SpO2: 98 % (03/01/22 1204)   Vital Signs (24h Range):  Temp:  [97.4 °F (36.3 °C)-98.5 °F (36.9 °C)] 98.3 °F (36.8 °C)  Pulse:  [] 80  Resp:  [16-18] 18  SpO2:  [95 %-99 %] 98 %  BP: (119-149)/(60-78) 119/74     Weight: 72 kg (158 lb 11.7 oz)  Body mass index is 25.62 kg/m².    Intake/Output Summary (Last 24 hours) at 3/1/2022 1431  Last data filed at 3/1/2022 0600  Gross per 24 hour   Intake 720 ml   Output --   Net 720 ml        Physical Exam  Vitals and nursing note reviewed.   Constitutional:       General: He is not in acute distress.     Appearance: He is well-developed.   HENT:      Head: Normocephalic and atraumatic.   Eyes:      Conjunctiva/sclera: Conjunctivae normal.   Neck:      Vascular: No JVD.   Cardiovascular:      Rate and Rhythm: Normal rate and regular rhythm.      Heart sounds: Normal heart sounds.   Pulmonary:      Effort: Pulmonary effort is normal.      Breath sounds: Normal breath sounds.   Abdominal:      General: Bowel sounds are normal. There is no  distension.      Palpations: Abdomen is soft.   Musculoskeletal:      Cervical back: Neck supple.      Right lower leg: Edema present.      Left lower leg: Edema present.      Comments: trace   Neurological:      Mental Status: He is alert.   Psychiatric:         Behavior: Behavior normal.       Significant Labs: All pertinent labs within the past 24 hours have been reviewed.  CBC:   Recent Labs   Lab 02/28/22 0520 03/01/22  0447   WBC 8.59 9.43   HGB 7.3* 6.8*   HCT 24.0* 22.2*    257       CMP:   Recent Labs   Lab 02/28/22 0520 03/01/22  0447    141   K 4.1 4.4    109   CO2 19* 18*   GLU 91 87   BUN 45* 50*   CREATININE 9.3* 10.8*   CALCIUM 8.9 8.6*   ANIONGAP 14 14   EGFRNONAA 5.5* 4.6*         Significant Imaging: I have reviewed all pertinent imaging results/findings within the past 24 hours.      Assessment/Plan:      * Subacute osteomyelitis of left foot  - Patient admitted with left foot pain and recent left foot infection   - had salmonella bacteremia at an outside hopsital treated with Cipro ( [facility] from 2/2-2/4/2022 for LLE cellulitis and found to have salmonella bacteremia suspected due to diarrheal illness).  - Lactic normal on admit.   - Admit labs: ESR 97, CRP 33.4, procal 1.0.  - MRI of left foot showed regions of focal abnormal signal involving the distal 1st metatarsal and proximal 1st phalanx.  Findings are concerning for foci of subacute osteomyelitis.   -Podiatry consulted and at first deferred bone biopsy and recommended just abx treatment and ID consult. Patient started on IV Daptomycin and Rocephin.   -Patient also treated for gout by Podiatry as concerned some changes related to gout.   - Podiatry, per ID request performed bone biopsy on 2/25 to  proximal left 1st phalanx of left foot. Pathology and Micro from bone biopsy pending.   -ID reports likely need for 6 weeks IV antibiotics for osteo and likely can transition to IV Vanc/Ancef with HD to help with dosing as  outpatient but awaiting final culture results.    -per ID: will tentatively plan for 6 week osteomyelitis course pending results of bone biopsy pathology report          ESRD  Membranous glomerulonephritis  Patient on waiting list for kidney transplant      - on acthar gel    - Nephrology consulted on admit for  and Cr 20.9 and uremic symptoms of tremors. 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 Nephrology recommended HD on admit but patient repeatedly refused and finally agreed after multiple conversations. Trialysis line placed on 2/22 and patient started on HD on 2/23 for progressive CKD stage V that has now progressed to ESRD.   - SW arranging outpatient HD - [facility] [place] to his house as patient is new to HD and needs HD chair and scheduled arranged prior to discharge from hospital.   - Patient s/p Perm Cath placement with IR consulted.   Per nephro:  - Will need AVF with vascular surgery, likely to be placed outpatient         Prevertebral Fluid Collection  Neck pain  Concern on initial imaging for abscess in cervical area and ENT and Neurosurgery consulted on admit. After several delays with repeat imaging due to patient refusal and coordination with anesthesia for sedation finally repeat MRI of cervical spine with contrast done on 2/24 and showed no abscess so ENT and Neurosurgery stated no intervention or further testing needed. ID stated no abx need for neck and continues on abx just for his left foot osteo.        Renovascular hypertension  - BP elevated  - Conitnue Norvasc 10 mg po daily + Hydralazine 75 mg po TID (increased 2/28 from 50 mg) + Labetalol 200 mg po BID. Continue to monitor.      Folic acid deficiency  Present on admit. Folate low at 4.4. Patient started on Folic acid 1 mg po daily.     Anemia due to chronic kidney disease, on chronic dialysis  Acute blood loss anemia  FOBT+  SHANNAN (iron deficiency anemia)  -multifactorial. Felt to be  "related to his CKD in combination with chronic infection/inflammation and GI loss    - Hgb was stable 7.3-7.5 since last transfusion, but not staying up.  - Discussed with GI yesterday who rec outpt f/u rec.  - hgb <7 today. Will need transfusion with HD     Atherosclerosis of native coronary artery of native heart without angina pectoris  Patient with known CAD. Patient asymptomatic. Continue Labetalol, Asprin 81 mg po daily and Lipitor 80 mg po daily to treat CAD.        S/P coronary artery stent placement  Patient with previous coronary stent for CAD in past in 2007. Continue Labetalol, Asprin 81 mg po daily and Lipitor 80 mg po daily.       Type 2 diabetes mellitus with chronic kidney disease on chronic dialysis, with long-term current use of insulin  Patient's glucose levels are controlled.  Last A1c reviewed-         Lab Results   Component Value Date     HGBA1C 7.6 (H) 02/17/2022      Current correctional scale  none        Hx of syphilis  Per Helio Garcia NP (ID) note 9/18/18, "Patient reports recent treatment with 3 doses of bicillin for + Treponema Pallidum antibody (5/16/2018) (per patient, this was a re-treatment because he couldn't get prior records of apparent prior treatment).   - Treated.   - RPR non-reactive 12/2018.     Pure hypercholesterolemia  Chronic and controlled. Continue Lipitor 80 mg po daily.     Gastroesophageal reflux disease without esophagitis  Chronic and controlled. Continue Protonix 40 mg po daily.     Chronic gout of left foot due to renal impairment without tophus  Stable. Colcichine recently dc'd due to diarrhea.     Benign prostatic hyperplasia  [place] placed 2/18 overnight for incontinence    VTE Risk Mitigation (From admission, onward)         Ordered     heparin (porcine) injection 5,000 Units  Every 8 hours         02/25/22 0747     IP VTE HIGH RISK PATIENT  Once         02/17/22 0151     Place sequential compression device  Until discontinued         02/17/22 0151          "       Discharge Planning   JOAN: 3/2/2022     Code Status: Full Code   Is the patient medically ready for discharge?: No    Reason for patient still in hospital (select all that apply): Patient trending condition, Treatment and Consult recommendations  Discharge Plan A: Home   Discharge Delays: None known at this time      Cinthia Dow MD  Department of Hospital Medicine   [facility] - Transplant [facility]

## 2022-03-02 LAB
ANION GAP SERPL CALC-SCNC: 14 MMOL/L (ref 8–16)
BACTERIA SPEC AEROBE CULT: NO GROWTH
BACTERIA STL CULT: NORMAL
BASOPHILS # BLD AUTO: 0.02 K/UL (ref 0–0.2)
BASOPHILS NFR BLD: 0.2 % (ref 0–1.9)
BUN SERPL-MCNC: 26 MG/DL (ref 6–20)
CALCIUM SERPL-MCNC: 8.8 MG/DL (ref 8.7–10.5)
CHLORIDE SERPL-SCNC: 103 MMOL/L (ref 95–110)
CO2 SERPL-SCNC: 23 MMOL/L (ref 23–29)
CREAT SERPL-MCNC: 7.2 MG/DL (ref 0.5–1.4)
DIFFERENTIAL METHOD: ABNORMAL
EOSINOPHIL # BLD AUTO: 0.1 K/UL (ref 0–0.5)
EOSINOPHIL NFR BLD: 1.3 % (ref 0–8)
ERYTHROCYTE [DISTWIDTH] IN BLOOD BY AUTOMATED COUNT: 16 % (ref 11.5–14.5)
EST. GFR  (AFRICAN AMERICAN): 8.7 ML/MIN/1.73 M^2
EST. GFR  (NON AFRICAN AMERICAN): 7.5 ML/MIN/1.73 M^2
GLUCOSE SERPL-MCNC: 82 MG/DL (ref 70–110)
HCT VFR BLD AUTO: 27.3 % (ref 40–54)
HGB BLD-MCNC: 8.9 G/DL (ref 14–18)
IMM GRANULOCYTES # BLD AUTO: 0.07 K/UL (ref 0–0.04)
IMM GRANULOCYTES NFR BLD AUTO: 0.8 % (ref 0–0.5)
LYMPHOCYTES # BLD AUTO: 1.3 K/UL (ref 1–4.8)
LYMPHOCYTES NFR BLD: 15.7 % (ref 18–48)
MCH RBC QN AUTO: 29.7 PG (ref 27–31)
MCHC RBC AUTO-ENTMCNC: 32.6 G/DL (ref 32–36)
MCV RBC AUTO: 91 FL (ref 82–98)
MONOCYTES # BLD AUTO: 1.7 K/UL (ref 0.3–1)
MONOCYTES NFR BLD: 20.3 % (ref 4–15)
NEUTROPHILS # BLD AUTO: 5.1 K/UL (ref 1.8–7.7)
NEUTROPHILS NFR BLD: 61.7 % (ref 38–73)
NRBC BLD-RTO: 0 /100 WBC
PHOSPHATE SERPL-MCNC: 3.7 MG/DL (ref 2.7–4.5)
PLATELET # BLD AUTO: 271 K/UL (ref 150–450)
PMV BLD AUTO: 10.5 FL (ref 9.2–12.9)
POTASSIUM SERPL-SCNC: 4.6 MMOL/L (ref 3.5–5.1)
RBC # BLD AUTO: 3 M/UL (ref 4.6–6.2)
SODIUM SERPL-SCNC: 140 MMOL/L (ref 136–145)
WBC # BLD AUTO: 8.34 K/UL (ref 3.9–12.7)

## 2022-03-02 PROCEDURE — 25000003 PHARM REV CODE 250: Mod: NTX | Performed by: FAMILY MEDICINE

## 2022-03-02 PROCEDURE — 99232 PR SUBSEQUENT HOSPITAL CARE,LEVL II: ICD-10-PCS | Mod: NTX,,, | Performed by: FAMILY MEDICINE

## 2022-03-02 PROCEDURE — 85025 COMPLETE CBC W/AUTO DIFF WBC: CPT | Mod: NTX | Performed by: HOSPITALIST

## 2022-03-02 PROCEDURE — 25000003 PHARM REV CODE 250: Mod: NTX | Performed by: INTERNAL MEDICINE

## 2022-03-02 PROCEDURE — 80048 BASIC METABOLIC PNL TOTAL CA: CPT | Mod: NTX | Performed by: HOSPITALIST

## 2022-03-02 PROCEDURE — 25000003 PHARM REV CODE 250: Mod: NTX | Performed by: PHYSICIAN ASSISTANT

## 2022-03-02 PROCEDURE — 99232 SBSQ HOSP IP/OBS MODERATE 35: CPT | Mod: NTX,,, | Performed by: FAMILY MEDICINE

## 2022-03-02 PROCEDURE — 87340 HEPATITIS B SURFACE AG IA: CPT | Mod: NTX | Performed by: HOSPITALIST

## 2022-03-02 PROCEDURE — 36415 COLL VENOUS BLD VENIPUNCTURE: CPT | Mod: NTX | Performed by: HOSPITALIST

## 2022-03-02 PROCEDURE — 84100 ASSAY OF PHOSPHORUS: CPT | Mod: NTX | Performed by: HOSPITALIST

## 2022-03-02 PROCEDURE — 20600001 HC STEP DOWN PRIVATE ROOM: Mod: NTX

## 2022-03-02 PROCEDURE — 63600175 PHARM REV CODE 636 W HCPCS: Mod: NTX | Performed by: HOSPITALIST

## 2022-03-02 PROCEDURE — 97116 GAIT TRAINING THERAPY: CPT | Mod: NTX

## 2022-03-02 PROCEDURE — 25000003 PHARM REV CODE 250: Mod: NTX | Performed by: HOSPITALIST

## 2022-03-02 PROCEDURE — 86706 HEP B SURFACE ANTIBODY: CPT | Mod: NTX | Performed by: HOSPITALIST

## 2022-03-02 PROCEDURE — 86704 HEP B CORE ANTIBODY TOTAL: CPT | Mod: NTX | Performed by: HOSPITALIST

## 2022-03-02 PROCEDURE — 99232 PR SUBSEQUENT HOSPITAL CARE,LEVL II: ICD-10-PCS | Mod: NTX,,, | Performed by: INTERNAL MEDICINE

## 2022-03-02 PROCEDURE — 99232 SBSQ HOSP IP/OBS MODERATE 35: CPT | Mod: NTX,,, | Performed by: INTERNAL MEDICINE

## 2022-03-02 RX ADMIN — ARIPIPRAZOLE 15 MG: 5 TABLET ORAL at 08:03

## 2022-03-02 RX ADMIN — PANTOPRAZOLE SODIUM 40 MG: 40 TABLET, DELAYED RELEASE ORAL at 08:03

## 2022-03-02 RX ADMIN — OXYCODONE HYDROCHLORIDE 10 MG: 10 TABLET ORAL at 11:03

## 2022-03-02 RX ADMIN — OXYCODONE HYDROCHLORIDE 10 MG: 10 TABLET ORAL at 01:03

## 2022-03-02 RX ADMIN — SEVELAMER CARBONATE 800 MG: 800 TABLET, FILM COATED ORAL at 11:03

## 2022-03-02 RX ADMIN — AMLODIPINE BESYLATE 10 MG: 10 TABLET ORAL at 08:03

## 2022-03-02 RX ADMIN — ASPIRIN 81 MG: 81 TABLET, COATED ORAL at 08:03

## 2022-03-02 RX ADMIN — FOLIC ACID 1 MG: 1 TABLET ORAL at 08:03

## 2022-03-02 RX ADMIN — LABETALOL HYDROCHLORIDE 200 MG: 100 TABLET, FILM COATED ORAL at 08:03

## 2022-03-02 RX ADMIN — SEVELAMER CARBONATE 800 MG: 800 TABLET, FILM COATED ORAL at 07:03

## 2022-03-02 RX ADMIN — HEPARIN SODIUM 5000 UNITS: 5000 INJECTION INTRAVENOUS; SUBCUTANEOUS at 06:03

## 2022-03-02 RX ADMIN — OXYCODONE HYDROCHLORIDE 10 MG: 10 TABLET ORAL at 10:03

## 2022-03-02 RX ADMIN — OXYCODONE HYDROCHLORIDE 10 MG: 10 TABLET ORAL at 02:03

## 2022-03-02 RX ADMIN — HYDRALAZINE HYDROCHLORIDE 75 MG: 50 TABLET ORAL at 10:03

## 2022-03-02 RX ADMIN — ATORVASTATIN CALCIUM 80 MG: 20 TABLET, FILM COATED ORAL at 08:03

## 2022-03-02 RX ADMIN — SEVELAMER CARBONATE 800 MG: 800 TABLET, FILM COATED ORAL at 04:03

## 2022-03-02 RX ADMIN — OXYCODONE HYDROCHLORIDE 10 MG: 10 TABLET ORAL at 06:03

## 2022-03-02 RX ADMIN — ACETAMINOPHEN 1000 MG: 500 TABLET ORAL at 06:03

## 2022-03-02 RX ADMIN — HYDRALAZINE HYDROCHLORIDE 75 MG: 50 TABLET ORAL at 06:03

## 2022-03-02 RX ADMIN — HEPARIN SODIUM 5000 UNITS: 5000 INJECTION INTRAVENOUS; SUBCUTANEOUS at 01:03

## 2022-03-02 RX ADMIN — HYDRALAZINE HYDROCHLORIDE 75 MG: 50 TABLET ORAL at 01:03

## 2022-03-02 RX ADMIN — NYSTATIN 500000 UNITS: 500000 SUSPENSION ORAL at 11:03

## 2022-03-02 RX ADMIN — NYSTATIN 500000 UNITS: 500000 SUSPENSION ORAL at 07:03

## 2022-03-02 RX ADMIN — NYSTATIN 500000 UNITS: 500000 SUSPENSION ORAL at 04:03

## 2022-03-02 RX ADMIN — CEFTRIAXONE SODIUM 2 G: 2 INJECTION, SOLUTION INTRAVENOUS at 09:03

## 2022-03-02 RX ADMIN — ACETAMINOPHEN 1000 MG: 500 TABLET ORAL at 01:03

## 2022-03-02 RX ADMIN — ACETAMINOPHEN 650 MG: 325 TABLET ORAL at 08:03

## 2022-03-02 NOTE — PLAN OF CARE
Spoke with BRYAN Douglas [phone]. They reported that they do have the dialysis referral for patient but that [facility] [place] [facility] but that they would talk to their manager to inquire. They stated that they would review and will return call for needed paperwork before patient can discharge. Will continue to follow.    Justine Da Silva, [facility]  [phone]

## 2022-03-02 NOTE — SUBJECTIVE & OBJECTIVE
Interval History: no acute events overnight. No further diarrhea or dark stool.       Review of Systems   Respiratory:  Negative for shortness of breath.    Cardiovascular:  Negative for chest pain.   Gastrointestinal:  Positive for diarrhea. Negative for abdominal pain and blood in stool.     Objective:     Vital Signs (Most Recent):  Temp: 98.5 °F (36.9 °C) (03/02/22 1642)  Pulse: 93 (03/02/22 1642)  Resp: 18 (03/02/22 1458)  BP: (!) 154/85 (03/02/22 1642)  SpO2: 95 % (03/02/22 1642)   Vital Signs (24h Range):  Temp:  [97.6 °F (36.4 °C)-98.8 °F (37.1 °C)] 98.5 °F (36.9 °C)  Pulse:  [] 93  Resp:  [17-18] 18  SpO2:  [95 %-97 %] 95 %  BP: (118-154)/(64-85) 154/85     Weight: 72 kg (158 lb 11.7 oz)  Body mass index is 25.62 kg/m².    Intake/Output Summary (Last 24 hours) at 3/2/2022 1724  Last data filed at 3/2/2022 1318  Gross per 24 hour   Intake 1320 ml   Output 0 ml   Net 1320 ml        Physical Exam  Vitals and nursing note reviewed.   Constitutional:       General: He is not in acute distress.     Appearance: He is well-developed.   HENT:      Head: Normocephalic and atraumatic.   Eyes:      Conjunctiva/sclera: Conjunctivae normal.   Neck:      Vascular: No JVD.   Cardiovascular:      Rate and Rhythm: Normal rate and regular rhythm.      Heart sounds: Normal heart sounds.   Pulmonary:      Effort: Pulmonary effort is normal.      Breath sounds: Normal breath sounds.   Abdominal:      General: Bowel sounds are normal. There is no distension.      Palpations: Abdomen is soft.   Musculoskeletal:      Cervical back: Neck supple.      Right lower leg: Edema present.      Left lower leg: Edema present.      Comments: trace   Neurological:      Mental Status: He is alert.   Psychiatric:         Behavior: Behavior normal.       Significant Labs: All pertinent labs within the past 24 hours have been reviewed.  CBC:   Recent Labs   Lab 03/01/22  0447 03/02/22  0616   WBC 9.43 8.34   HGB 6.8* 8.9*   HCT 22.2* 27.3*     271       CMP:   Recent Labs   Lab 03/01/22  0447 03/02/22  0616    140   K 4.4 4.6    103   CO2 18* 23   GLU 87 82   BUN 50* 26*   CREATININE 10.8* 7.2*   CALCIUM 8.6* 8.8   ANIONGAP 14 14   EGFRNONAA 4.6* 7.5*         Significant Imaging: I have reviewed all pertinent imaging results/findings within the past 24 hours.   No

## 2022-03-02 NOTE — PROGRESS NOTES
[facility]  Nephrology  Progress Note     Patient Name: Enrique Martinez  MRN: [id number]  Admission Date: 2/16/2022  Hospital Length of Stay: 13 days  Attending Provider: Cinthia Dow MD   Primary Care Physician: Primary Doctor No  Principal Problem: Subacute osteomyelitis of left foot    Subjective:     HPI: Mr. Martinez is a 56 y.o. 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 He was recently admitted at [facility] from 2/2-2/4/2022 from LLE cellulitis. He was found to have salmonella bacteremia suspected due to diarrheal illness. He was discharged with cipro for which he reports compliance. He has had no improvement in his pain, redness, or swelling of his left foot and leg. He has also developed a new blister on his left foot, with MRI foot concerning for osteomyelitis and showing soft tissue swelling c/w cellulitis. He is admitted for treatment of suspected osteomyelitis.     He follows with Dr. Cardoza (nephrology), has adamantly declined HD in the past and would rather wait for transplant. He reports being on the transplant list for the past 4 years. His last renal biopsy in 2013 showed 50% glomerulosclerosis. Started on calcitriol 0.5, hx of elevated PTH at 921. Patient complains of decreased appetite, neck cramps, fatigue. He still urinates 4-5 times/day. He reports his B/L LE edema has worsened over the past few weeks. No dyspnea, chest pain, dysuria, hematuria. Nephro consulted for HD initiation.      Labs are significant for hyperkalemia 5.5, bicarb 18, , phosphate 10.4, Cr 19.7, GFR 2.2, ESR 97, CRP 33.4. . CBC significant for Hgb of 7.2, white count 14. Previous echo showed EF of 65%.       Interval History:  Seen at the bedside no event overnight       Review of patient's allergies indicates:  No Known Allergies   Current Facility-Administered Medications   Medication Frequency    0.9%  NaCl infusion (for blood administration) Q24H PRN    0.9%  NaCl infusion Once    0.9%  NaCl infusion PRN    acetaminophen tablet 1,000 mg Q8H    acetaminophen tablet 650 mg Q6H PRN    albuterol-ipratropium 2.5 mg-0.5 mg/3 mL nebulizer solution 3 mL Q4H PRN    aluminum-magnesium hydroxide-simethicone 200-200-20 mg/5 mL suspension 30 mL QID PRN    amLODIPine tablet 10 mg Daily    ARIPiprazole tablet 15 mg QHS    aspirin EC tablet 81 mg Daily    atorvastatin tablet 80 mg Daily    cefTRIAXone (ROCEPHIN) 2 g/50 mL D5W IVPB Q24H    DAPTOmycin (CUBICIN) 445 mg in sodium chloride 0.9% IVPB Q48H    dextrose 10% bolus 125 mL PRN    dextrose 10% bolus 250 mL PRN    folic acid tablet 1 mg Daily    glucagon (human recombinant) injection 1 mg PRN    glucose chewable tablet 16 g PRN    glucose chewable tablet 24 g PRN    heparin (porcine) injection 5,000 Units Q8H    HP Acthar (corticotropin) injectable gel 80 units Every Mon, Fri    hydrALAZINE tablet 75 mg Q8H    labetaloL tablet 200 mg Q12H    loperamide capsule 2 mg QID PRN    lorazepam injection 1 mg On Call Procedure    melatonin tablet 6 mg Nightly PRN    methocarbamoL tablet 750 mg QID PRN    morphine injection 2 mg On Call Procedure    naloxone 0.4 mg/mL injection 0.02 mg PRN    nystatin 100,000 unit/mL suspension 500,000 Units QID (WM & HS)    OLANZapine injection 2.5 mg Once PRN    ondansetron disintegrating tablet 8 mg Q8H PRN    ondansetron injection 4 mg Q6H PRN    oxyCODONE immediate release tablet 5 mg Q6H PRN    oxyCODONE immediate release tablet Tab 10 mg Q4H PRN    pantoprazole EC tablet 40 mg Daily    polyethylene glycol packet 17 g Daily PRN    prochlorperazine injection Soln 5 mg Q6H PRN    senna-docusate 8.6-50 mg per tablet 1 tablet Daily  PRN    sevelamer carbonate tablet 800 mg TID WM    simethicone chewable tablet 80 mg QID PRN    sodium chloride 0.9% bolus 250 mL PRN    sodium chloride 0.9% flush 10 mL Q8H PRN       Objective:     Vital Signs (Most Recent):  Temp: 98.8 °F (37.1 °C) (03/02/22 1200)  Pulse: 90 (03/02/22 1200)  Resp: 18 (03/02/22 1200)  BP: 118/64 (03/02/22 1200)  SpO2: 97 % (03/02/22 1200)  O2 Device (Oxygen Therapy): room air (03/02/22 0703) Vital Signs (24h Range):  Temp:  [97.6 °F (36.4 °C)-98.8 °F (37.1 °C)] 98.8 °F (37.1 °C)  Pulse:  [] 90  Resp:  [17-18] 18  SpO2:  [95 %-98 %] 97 %  BP: (105-148)/(64-87) 118/64   Weight: 72 kg (158 lb 11.7 oz) (02/23/22 0600)  Body mass index is 25.62 kg/m².  Body surface area is 1.83 meters squared.      Intake/Output Summary (Last 24 hours) at 3/2/2022 1238  Last data filed at 3/2/2022 0911  Gross per 24 hour   Intake 1200 ml   Output 0 ml   Net 1200 ml     I/O last 3 completed shifts:  In: 1800 [P.O.:1800]  Out: 0   Net IO Since Admission: 4,500.45 mL [03/02/22 1238]     Physical Exam  Constitutional:       Appearance: He is ill-appearing.   Cardiovascular:      Rate and Rhythm: Normal rate.      Pulses: Normal pulses.   Pulmonary:      Effort: Pulmonary effort is normal.   Abdominal:      General: Abdomen is flat.   Musculoskeletal:         General: Normal range of motion.   Neurological:      Mental Status: He is alert.         Recent Labs   Lab 02/28/22  0520 03/01/22  0447 03/02/22  0616   WBC 8.59 9.43 8.34   HGB 7.3* 6.8* 8.9*   HCT 24.0* 22.2* 27.3*    257 271   MONO 13.6  1.2* 13.8  1.3* 20.3*  1.7*      Recent Labs   Lab 02/28/22  0520 03/01/22  0447 03/02/22  0616    141 140   K 4.1 4.4 4.6    109 103   CO2 19* 18* 23   BUN 45* 50* 26*   CREATININE 9.3* 10.8* 7.2*   CALCIUM 8.9 8.6* 8.8      No results for input(s): PH, PCO2, PO2, HCO3, POCSATURATED, BE in the last 72 hours.    Assessment/Plan:       Subacute osteomyelitis of left foot     Renovascular hypertension    Pure hypercholesterolemia    Membranous glomerulonephritis    Anemia due to chronic kidney disease, on chronic dialysis    Type 2 diabetes mellitus with chronic kidney disease on chronic dialysis, with long-term current use of insulin    S/P coronary artery stent placement    Hx of syphilis    Atherosclerosis of native coronary artery of native heart without angina pectoris    Patient on waiting list for kidney transplant    Gastroesophageal reflux disease without esophagitis    Acute renal failure superimposed on stage 5 chronic kidney disease, not on chronic dialysis    Folic acid deficiency    Chronic gout of left foot due to renal impairment without tophus    Neck pain    Prevertebral Fluid Collection    Diarrhea     Acute renal failure superimposed on stage 5 chronic kidney disease, not on chronic dialysis  57 yo M with history of CKD V due to idiopathic membranous glomerulonephritis, anemia of CKD, hx of neurosyphilis, insulin dependent DM, who presents with cellulitis and possible osteomyelitis of left foot. Previously, he has declined HD in the past, never receiving a fistula. He is on the transplant wait list. Patient exhibits signs of uremia including fatigue, poor appetite, and mental slowing. On labs, he is hyperkalemic, uremic, AGMA, hyperphosphatemic. GFR is 2-3, which is reduced from previous GFR 13. Home meds include sodium bicarb, Renvela, and calcitriol. 2+ pitting edema on B/L lower extremities. Renal US from Jan 2022 shows sonographic evidence of chronic medical renal disease and bilateral simple and minimally complex renal cysts.  sCr one year ago was 4.5, now 18.6. UPCr ratio 1.11 (Dec 2020)  2/18: Patient increasingly somnolent, lethargic. Hospital med in process of getting in touch with Dr. Lee, who the patient follows with outpatient.   2/23: first session of HD 2 hours, no net fluid, patient tolerated. Still confused, having visual hallucinations of mice.    2/24: second HD session 150 min, 1 L removed, patient tolerated.  2/26: HD planned for today     Plan:   - s/p  tunneled catheter placement by IR   - Will need AVF with vascular surgery, likely to be placed outpatient   - HD on T/Th/Sa schedule   - Sevelamer 800 TID   - Hold calcitriol in setting of hyperphosphatemia. Will re-start when phos reaches normal level.  - Trend Cr  - Strict I&Os  - Avoid nephrotoxic agents  - Renally adjust medications            Hyperphosphatemia  Hyperphosphatemia at 10.9 >> 4.6.      - Continue Sevelamer 800 TID     Anemia due to chronic kidney disease  Hemoglobin 10.1 one month ago, now 7.2. Normocytic anemia with low iron, iron sat, elevated ferritin.      -  EPO with HD.     Thank you for your consult. I will follow-up with patient. Please contact us if you have any additional questions.    Diana Cid MD  Nephrology  [facility]

## 2022-03-02 NOTE — PLAN OF CARE
Covering patient for assistance. Will follow for post-acute/discharge needs. Recs are rehab but states that he may progress. Reached out to PT and Attending physician via Secure Chat for updated recs and JOAN.      Justine Da Silva [facility] [place]  [phone]

## 2022-03-02 NOTE — PT/OT/SLP PROGRESS
"Physical Therapy Treatment    Patient Name:  Enrique Martinez   MRN:  [id number]    Recommendations:     Discharge Recommendations:  home health PT (with 24/7 assist)   Discharge Equipment Recommendations: walker, rolling   Barriers to discharge: Inaccessible home and Decreased caregiver support    Assessment:     Enrique Martinez is a 57 y.o. male admitted with a medical diagnosis of Subacute osteomyelitis of left foot. Patient demonstrates improved activity tolerance as demonstrated by increased gait distance. Demonstrates impaired safety awareness and no carryover of spinal precautions.       He presents with the following impairments/functional limitations: weakness, impaired endurance, impaired self care skills, impaired functional mobilty, gait instability, impaired balance, decreased safety awareness, orthopedic precautions. These deficits affect their roles and responsibilities in which they were able to complete prior to admit. Enrique Martinez would continue to benefit from acute PT intervention to improve quality of life and focus on recovery of impairments. Once medically stable, recommending pt discharge to Home Health PT only if 24/7 assistance available, if not, then rehab.    Rehab Prognosis: Good; patient continues to benefit from acute skilled PT services to address these deficits and reach maximum level of function.  Patient remains most appropriate to discharge to home health PT (with 24/7 assist).  Recent Surgery: Procedure(s) (LRB):  MPU PROCEDURE (N/A)      Plan:     During this hospitalization, patient to be seen 3 x/week to address the identified rehab impairments via gait training, therapeutic activities, therapeutic exercises, neuromuscular re-education and progress toward the following goals:    · Plan of Care Expires:  03/27/22    Subjective     Chief Complaint: None verbalized   Patient/Family Comments/Goals: "You want to see me walk? I'll walk"  Pain/Comfort:  · Pain Rating 1: " 0/10    Objective:     Communicated with RN prior to session. Patient found HOB elevated with telemetry, peripheral IV upon PT entry to room.     General Precautions: Standard, fall   Orthopedic Precautions:spinal precautions   Braces: Cervical collar    Functional Mobility:  · Bed Mobility:     · Supine to Sit: supervision  · Sit to Supine: supervision  · Transfers:     · Sit to Stand: stand by assistance with no AD, stands without cuing  · Gait: Patient ambulated 46 ft with rolling walker and stand by - contact guard assistance. Patient demonstrates occasional unsteady gait, ambulates outside JOANNA of RW and flexed posture. Patient required cues to ambulate within JOANNA of RW. All lines remained intact throughout ambulation trial.  · Balance:   · Static Sitting: Good, able to maintain for 3 minute(s) with supervision  · Dynamic Sitting: Fair: Patient accepts minimal challenge, stand by assistance  · Static Standing: Good, able to maintain for 10 seconds with contact guard assistance  · Dynamic Standing: Fair: Patient accepts minimal challenge, contact guard assistance    AM-PAC 6 CLICK MOBILITY  Turning over in bed (including adjusting bedclothes, sheets and blankets)?: 3  Sitting down on and standing up from a chair with arms (e.g., wheelchair, bedside commode, etc.): 3  Moving from lying on back to sitting on the side of the bed?: 3  Moving to and from a bed to a chair (including a wheelchair)?: 3  Need to walk in hospital room?: 3  Climbing 3-5 steps with a railing?: 2  Basic Mobility Total Score: 17     Therapeutic Activities and Exercises:  Patient educated on role of acute care PT and PT POC, safety while in hospital including calling nurse for mobility and call light usage  Whiteboard updated, Patient is clear to ambulate to/from bathroom with RN/PCT, assist x1   Significant time spent discussing patient desire for a walking boot, secure chatted podiatry to notify  Educated about rehab vs HHPT and need for 24/7  assistance, patient verbalized understanding    Patient left HOB elevated with all lines intact, call button in reach and RN present.    GOALS:   Multidisciplinary Problems     Physical Therapy Goals        Problem: Physical Therapy Goal    Goal Priority Disciplines Outcome Goal Variances Interventions   Physical Therapy Goal     PT, PT/OT Ongoing, Progressing     Description: Goals to be met by: 3/11/2022     Patient will increase functional independence with mobility by performin. Supine to sit with [place]  2. Sit to supine with [place]  3. Sit to stand transfer with [place]  4. Gait  x 200 feet with Supervision using No Assistive Device.   5. Ascend/descend 9 stairs with right or left Handrails Stand-by Assistance using No Assistive Device.                      Time Tracking:     PT Received On: 22  PT Start Time: 1047     PT Stop Time: 1103  PT Total Time (min): 16 min     Billable Minutes: Gait Training 8 min    Treatment Type: Treatment  PT/PTA: PT     PTA Visit Number: 0     2022

## 2022-03-02 NOTE — PLAN OF CARE
Pt aaox4. VSS on RA. BP managed with scheduled medications. Pt went to HD yesterday, 1.7 L removed. IV abx administered per order. R chest wall perm cath dry and intact. Pt c/o of L foot pain, prn oxy administered x3. Dressing to L toe dry and intact. Pt applying barrier cream to skin tear on sacrum. Encouraged to reposition frequently. Pt up with standby/x1 assistance. Nonskid socks in use, instructed to call staff for mobility. Cultures pending. VS and assessments in flowsheets.

## 2022-03-02 NOTE — PLAN OF CARE
Goals reviewed and remain appropriate. Discharge recommendation changed to HHPT only if patient has 24/7 assist, if not, then rehab recommended.     Problem: Physical Therapy Goal  Goal: Physical Therapy Goal  Description: Goals to be met by: 3/11/2022     Patient will increase functional independence with mobility by performin. Supine to sit with [place]  2. Sit to supine with [place]  3. Sit to stand transfer with [place]  4. Gait  x 200 feet with Supervision using No Assistive Device.   5. Ascend/descend 9 stairs with right or left Handrails Stand-by Assistance using No Assistive Device.     Outcome: Ongoing, Progressing

## 2022-03-02 NOTE — PROGRESS NOTES
AAOx4. VSS. Cr 7.2 (decreased from 10.8). Patient worked w/ PT today - ambulated in room w/ walker. Patient asking about foot brace - PT notified and will consult. PRN oxycodone 10mg given x2 for c/o pain. Wound care to L foot done - cleaned w/ betadine and covered w/ foam dressing - dressing is CDI. Cultures from 2/26 show NGTD. Patient OOB w/ standby assist. Non-skid socks on. Bed in low position.

## 2022-03-02 NOTE — PROGRESS NOTES
HD TX complete.  Ran for 3 hrs.  Blood transfusion 1 unit of PRBC completed during dialysis.  VSS.  Tolerated dialysis well.

## 2022-03-02 NOTE — PROGRESS NOTES
[facility] Medicine  Progress Note    Patient Name: Enrique Martinez  MRN: [id number]  Patient Class: IP- Inpatient   Admission Date: 2/16/2022  Length of Stay: 13 days  Attending Physician: Cinthia Dow MD  Primary Care Provider: Primary Doctor No      Subjective:     Principal Problem:Subacute osteomyelitis of left foot    HPI:  Enrique Martinez is a 56 y.o. male with a PMHx of CKD V glomerulonephropathy, insulin dependent diabetes mellitus, and CAD who presented tot  ED for evaluation of worsening left foot pain and swelling. He was recently admitted at [facility] from 2/2-2/4/2022 from LLE cellulitis. He was found to have salmonella bacteremia suspected due to diarrheal illness. He was discharged with cipro for which he reports compliance. He has had no improvement in his pain, redness, or swelling of his left foot and leg. He has also developed a new blister on his left foot. He endorses chills. He denies fevers, chest pain, SOB, N/V, decreased urine output, dysuria, and flank pain.    ED: HR 97, other VSSAF. CBC with leukocytosis of 16.01, Hgb 7.4, Hct 22.2.  ESR 97. CRP 33.4. K 5.5. Cr 19.7, . Phos 10.4. Lactic WNL. MRI L foot with cellulitis and osteomyelitis of distal 1st metatarsal and proximal 1st phalanx.      Overview/Hospital Course:  .Enrique Martinez is a 56 y.o. male with a PMHx of CKD V glomerulonephropathy, insulin dependent diabetes mellitus, and CAD who presented tot  ED for evaluation of worsening left foot pain and swelling. He was recently admitted at [facility] from 2/2-2/4/2022 from LLE cellulitis. He was found to have salmonella bacteremia suspected due to diarrheal illness. He was discharged with cipro for which he reports compliance. He has had no improvement in his pain, redness, or swelling of his left foot and leg. He has also developed a new blister on his left foot. He endorses chills. He denies fevers, chest pain, SOB, N/V, decreased urine output,  "dysuria, and flank pain. Patient admitted with worsening and progressive chronic kidney disease with  and Cr 20.9. Nephrology is consulted for his CKD stage V that has progressed ti ESRD and at first patient not amenable to starting HD but finally agreed after family involvement and patient had trialysis catheter placed and started on HD as per Nephrology. Patient was also complaining of neck pain on admit and had abnormal C spine Xray with possible signs of C spine instability, MRi ordered and patient refused 2/17, on pain meds, was discussed at length. Concern for possible infection also given acuity of pain. Imaging showed signs of C spine instability, prevertebral abscess likely with fluid collection with widening in C spine,. Neurosurgery consulted and recommended C collar- patient refused, and Xrays, refused, and MRI full spine when stable. Patient also had MRI of left foot on admit and showed "Regions of focal abnormal signal involving the distal 1st metatarsal and proximal 1st phalanx as described above.  Findings are concerning for foci of subacute myelitis (Yared's abscess).  Other less likely considerations include cystic degenerative change." ENT consulted for possible retropharyngeal abscess as per Neurosurgery and Infectious Disease consulted. At first patient kept refusing advanced imaging but finally agreed and repeat MRI of cervical spine done on 2/22 with anesthesia to help with sedation. MRI done without contrast and concern for possible retropharyngeal abscess. Patient placed on empiric IV Daptomycin and Ceftriaxone by ID.  ENT recommended needed study with contrast to really investigate the area better. Repeat MRI with contrast done of cervical spine on 2/24 and no obvious fluid collection noted and not obvious signs of infection so ENT and Neurosurgery signed off and stated nothing needed to be done. Patient kept on IV Daptomycin and Rocephin to cover for his left foot osteo. Podiatry " was consulted and performed bone biopsy as per ID recs on 2/25 to determine duration and type of abx needed to treat osteomyelitis of left foot. SW working on outpatient HD chair for patient who is new for HD and patient needs Perm Cath placed for long term HD needs as only has a temporary line and ID states likely will need 6 weeks of IV abx on discharge but likely can switch to Vanc/Ancef with HD to help with management of outpatient abx.       Interval History: no acute events overnight. No further diarrhea or dark stool.       Review of Systems   Respiratory:  Negative for shortness of breath.    Cardiovascular:  Negative for chest pain.   Gastrointestinal:  Positive for diarrhea. Negative for abdominal pain and blood in stool.     Objective:     Vital Signs (Most Recent):  Temp: 98.5 °F (36.9 °C) (03/02/22 1642)  Pulse: 93 (03/02/22 1642)  Resp: 18 (03/02/22 1458)  BP: (!) 154/85 (03/02/22 1642)  SpO2: 95 % (03/02/22 1642)   Vital Signs (24h Range):  Temp:  [97.6 °F (36.4 °C)-98.8 °F (37.1 °C)] 98.5 °F (36.9 °C)  Pulse:  [] 93  Resp:  [17-18] 18  SpO2:  [95 %-97 %] 95 %  BP: (118-154)/(64-85) 154/85     Weight: 72 kg (158 lb 11.7 oz)  Body mass index is 25.62 kg/m².    Intake/Output Summary (Last 24 hours) at 3/2/2022 1724  Last data filed at 3/2/2022 1318  Gross per 24 hour   Intake 1320 ml   Output 0 ml   Net 1320 ml        Physical Exam  Vitals and nursing note reviewed.   Constitutional:       General: He is not in acute distress.     Appearance: He is well-developed.   HENT:      Head: Normocephalic and atraumatic.   Eyes:      Conjunctiva/sclera: Conjunctivae normal.   Neck:      Vascular: No JVD.   Cardiovascular:      Rate and Rhythm: Normal rate and regular rhythm.      Heart sounds: Normal heart sounds.   Pulmonary:      Effort: Pulmonary effort is normal.      Breath sounds: Normal breath sounds.   Abdominal:      General: Bowel sounds are normal. There is no distension.      Palpations:  Abdomen is soft.   Musculoskeletal:      Cervical back: Neck supple.      Right lower leg: Edema present.      Left lower leg: Edema present.      Comments: trace   Neurological:      Mental Status: He is alert.   Psychiatric:         Behavior: Behavior normal.       Significant Labs: All pertinent labs within the past 24 hours have been reviewed.  CBC:   Recent Labs   Lab 03/01/22 0447 03/02/22  0616   WBC 9.43 8.34   HGB 6.8* 8.9*   HCT 22.2* 27.3*    271       CMP:   Recent Labs   Lab 03/01/22 0447 03/02/22  0616    140   K 4.4 4.6    103   CO2 18* 23   GLU 87 82   BUN 50* 26*   CREATININE 10.8* 7.2*   CALCIUM 8.6* 8.8   ANIONGAP 14 14   EGFRNONAA 4.6* 7.5*         Significant Imaging: I have reviewed all pertinent imaging results/findings within the past 24 hours.      Assessment/Plan:      * Subacute osteomyelitis of left foot  - Patient admitted with left foot pain and recent left foot infection   - had salmonella bacteremia at an outside hopsital treated with Cipro ( [facility] from 2/2-2/4/2022 for LLE cellulitis and found to have salmonella bacteremia suspected due to diarrheal illness).  - Lactic normal on admit.   - Admit labs: ESR 97, CRP 33.4, procal 1.0.  - MRI of left foot showed regions of focal abnormal signal involving the distal 1st metatarsal and proximal 1st phalanx.  Findings are concerning for foci of subacute osteomyelitis.   -Podiatry consulted and at first deferred bone biopsy and recommended just abx treatment and ID consult. Patient started on IV Daptomycin and Rocephin.   -Patient also treated for gout by Podiatry as concerned some changes related to gout.   - Podiatry, per ID request performed bone biopsy on 2/25 to  proximal left 1st phalanx of left foot. Pathology and Micro from bone biopsy pending.   -ID reports likely need for 6 weeks IV antibiotics for osteo and likely can transition to IV Vanc/Ancef with HD to help with dosing as outpatient but awaiting final culture  results.    -per ID: will tentatively plan for 6 week osteomyelitis course pending results of bone biopsy pathology report          ESRD  Membranous glomerulonephritis  Patient on waiting list for kidney transplant      - on acthar gel    - Nephrology consulted on admit for  and Cr 20.9 and uremic symptoms of tremors. 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 Nephrology recommended HD on admit but patient repeatedly refused and finally agreed after multiple conversations. Trialysis line placed on 2/22 and patient started on HD on 2/23 for progressive CKD stage V that has now progressed to ESRD.   - SW arranging outpatient HD - [facility] close to his house as patient is new to HD and needs HD chair and scheduled arranged prior to discharge from hospital.   - Patient s/p Perm Cath placement with IR consulted.   Per nephro:  - Will need AVF with vascular surgery, likely to be placed outpatient         Prevertebral Fluid Collection  Neck pain  Concern on initial imaging for abscess in cervical area and ENT and Neurosurgery consulted on admit. After several delays with repeat imaging due to patient refusal and coordination with anesthesia for sedation finally repeat MRI of cervical spine with contrast done on 2/24 and showed no abscess so ENT and Neurosurgery stated no intervention or further testing needed. ID stated no abx need for neck and continues on abx just for his left foot osteo.         Renovascular hypertension  - BP elevated  - Conitnue Norvasc 10 mg po daily + Hydralazine 75 mg po TID (increased 2/28 from 50 mg) + Labetalol 200 mg po BID. Continue to monitor.      Folic acid deficiency  Present on admit. Folate low at 4.4. Patient started on Folic acid 1 mg po daily.     Anemia due to chronic kidney disease, on chronic dialysis  Acute blood loss anemia  FOBT+  SHANNAN (iron deficiency anemia)  -multifactorial. Felt to be related to his CKD in combination with  "chronic infection/inflammation and GI loss    -requiring intermittent transfusions for hgb <7. Last transfusion 3/1. Hgb was 6.8, but up to 8.9 after 1 u. Hgb level was likely inaccurate.   - Discussed with GI yesterday who rec outpt f/u rec.       Atherosclerosis of native coronary artery of native heart without angina pectoris  Patient with known CAD. Patient asymptomatic. Continue Labetalol, Asprin 81 mg po daily and Lipitor 80 mg po daily to treat CAD.         S/P coronary artery stent placement  Patient with previous coronary stent for CAD in past in 2007. Continue Labetalol, Asprin 81 mg po daily and Lipitor 80 mg po daily.        Type 2 diabetes mellitus with chronic kidney disease on chronic dialysis, with long-term current use of insulin  Patient's glucose levels are controlled.  Last A1c reviewed-             Lab Results   Component Value Date     HGBA1C 7.6 (H) 02/17/2022      Current correctional scale  none        Hx of syphilis  Per Helio Garcia NP (ID) note 9/18/18, "Patient reports recent treatment with 3 doses of bicillin for + Treponema Pallidum antibody (5/16/2018) (per patient, this was a re-treatment because he couldn't get prior records of apparent prior treatment).   - Treated.   - RPR non-reactive 12/2018.     Pure hypercholesterolemia  Chronic and controlled. Continue Lipitor 80 mg po daily.     Gastroesophageal reflux disease without esophagitis  Chronic and controlled. Continue Protonix 40 mg po daily.     Chronic gout of left foot due to renal impairment without tophus  Stable. [place] recently dc'd due to diarrhea.     Benign prostatic hyperplasia  [place] placed 2/18 overnight for incontinence      VTE Risk Mitigation (From admission, onward)         Ordered     heparin (porcine) injection 5,000 Units  Every 8 hours         02/25/22 0747     IP VTE HIGH RISK PATIENT  Once         02/17/22 0151     Place sequential compression device  Until discontinued         02/17/22 0151          "     Discharge Planning   JOAN: 3/4/2022     Code Status: Full Code   Is the patient medically ready for discharge?: No    Reason for patient still in hospital (select all that apply): Patient trending condition, Laboratory test and Consult recommendations  Discharge Plan A: Home   Discharge Delays: None known at this time      Cinthia Dow MD  Department of Hospital Medicine   [facility] - Trans[facility] Step[facility]

## 2022-03-03 PROBLEM — G93.41 ENCEPHALOPATHY, METABOLIC: Status: ACTIVE | Noted: 2022-03-03

## 2022-03-03 PROBLEM — F06.8 PSYCHOSIS DUE TO INFECTION: Status: RESOLVED | Noted: 2021-01-05 | Resolved: 2022-03-03

## 2022-03-03 PROBLEM — N18.5 CHRONIC KIDNEY DISEASE (CKD), ACTIVE MEDICAL MANAGEMENT WITHOUT DIALYSIS, STAGE 5: Status: RESOLVED | Noted: 2018-09-18 | Resolved: 2022-03-03

## 2022-03-03 LAB
ANION GAP SERPL CALC-SCNC: 14 MMOL/L (ref 8–16)
BACTERIA SPEC ANAEROBE CULT: NORMAL
BASOPHILS # BLD AUTO: 0.03 K/UL (ref 0–0.2)
BASOPHILS NFR BLD: 0.4 % (ref 0–1.9)
BUN SERPL-MCNC: 38 MG/DL (ref 6–20)
CALCIUM SERPL-MCNC: 9.3 MG/DL (ref 8.7–10.5)
CHLORIDE SERPL-SCNC: 101 MMOL/L (ref 95–110)
CO2 SERPL-SCNC: 23 MMOL/L (ref 23–29)
CREAT SERPL-MCNC: 9.7 MG/DL (ref 0.5–1.4)
DIFFERENTIAL METHOD: ABNORMAL
EOSINOPHIL # BLD AUTO: 0.1 K/UL (ref 0–0.5)
EOSINOPHIL NFR BLD: 1 % (ref 0–8)
ERYTHROCYTE [DISTWIDTH] IN BLOOD BY AUTOMATED COUNT: 16.2 % (ref 11.5–14.5)
EST. GFR  (AFRICAN AMERICAN): 6.1 ML/MIN/1.73 M^2
EST. GFR  (NON AFRICAN AMERICAN): 5.3 ML/MIN/1.73 M^2
GLUCOSE SERPL-MCNC: 79 MG/DL (ref 70–110)
HBV CORE AB SERPL QL IA: NEGATIVE
HBV SURFACE AB SER-ACNC: POSITIVE M[IU]/ML
HBV SURFACE AG SERPL QL IA: NEGATIVE
HCT VFR BLD AUTO: 28.1 % (ref 40–54)
HGB BLD-MCNC: 8.8 G/DL (ref 14–18)
IMM GRANULOCYTES # BLD AUTO: 0.06 K/UL (ref 0–0.04)
IMM GRANULOCYTES NFR BLD AUTO: 0.7 % (ref 0–0.5)
LYMPHOCYTES # BLD AUTO: 1.2 K/UL (ref 1–4.8)
LYMPHOCYTES NFR BLD: 14.2 % (ref 18–48)
MAGNESIUM SERPL-MCNC: 1.6 MG/DL (ref 1.6–2.6)
MCH RBC QN AUTO: 28.4 PG (ref 27–31)
MCHC RBC AUTO-ENTMCNC: 31.3 G/DL (ref 32–36)
MCV RBC AUTO: 91 FL (ref 82–98)
MONOCYTES # BLD AUTO: 1.8 K/UL (ref 0.3–1)
MONOCYTES NFR BLD: 21.6 % (ref 4–15)
NEUTROPHILS # BLD AUTO: 5.2 K/UL (ref 1.8–7.7)
NEUTROPHILS NFR BLD: 62.1 % (ref 38–73)
NRBC BLD-RTO: 0 /100 WBC
PHOSPHATE SERPL-MCNC: 4.7 MG/DL (ref 2.7–4.5)
PLATELET # BLD AUTO: 286 K/UL (ref 150–450)
PMV BLD AUTO: 9.8 FL (ref 9.2–12.9)
POTASSIUM SERPL-SCNC: 5.1 MMOL/L (ref 3.5–5.1)
RBC # BLD AUTO: 3.1 M/UL (ref 4.6–6.2)
SODIUM SERPL-SCNC: 138 MMOL/L (ref 136–145)
WBC # BLD AUTO: 8.3 K/UL (ref 3.9–12.7)

## 2022-03-03 PROCEDURE — 99232 PR SUBSEQUENT HOSPITAL CARE,LEVL II: ICD-10-PCS | Mod: NTX,,, | Performed by: HOSPITALIST

## 2022-03-03 PROCEDURE — 80048 BASIC METABOLIC PNL TOTAL CA: CPT | Mod: NTX | Performed by: HOSPITALIST

## 2022-03-03 PROCEDURE — 25000003 PHARM REV CODE 250: Mod: NTX | Performed by: INTERNAL MEDICINE

## 2022-03-03 PROCEDURE — 63600175 PHARM REV CODE 636 W HCPCS: Mod: NTX | Performed by: INTERNAL MEDICINE

## 2022-03-03 PROCEDURE — 84100 ASSAY OF PHOSPHORUS: CPT | Mod: NTX | Performed by: HOSPITALIST

## 2022-03-03 PROCEDURE — 25000003 PHARM REV CODE 250: Mod: NTX | Performed by: PHYSICIAN ASSISTANT

## 2022-03-03 PROCEDURE — 97535 SELF CARE MNGMENT TRAINING: CPT | Mod: NTX

## 2022-03-03 PROCEDURE — 20600001 HC STEP DOWN PRIVATE ROOM: Mod: NTX

## 2022-03-03 PROCEDURE — 99232 PR SUBSEQUENT HOSPITAL CARE,LEVL II: ICD-10-PCS | Mod: NTX,,, | Performed by: INTERNAL MEDICINE

## 2022-03-03 PROCEDURE — 99232 SBSQ HOSP IP/OBS MODERATE 35: CPT | Mod: NTX,,, | Performed by: HOSPITALIST

## 2022-03-03 PROCEDURE — 90935 HEMODIALYSIS ONE EVALUATION: CPT | Mod: NTX

## 2022-03-03 PROCEDURE — 83735 ASSAY OF MAGNESIUM: CPT | Mod: NTX | Performed by: HOSPITALIST

## 2022-03-03 PROCEDURE — 85025 COMPLETE CBC W/AUTO DIFF WBC: CPT | Mod: NTX | Performed by: HOSPITALIST

## 2022-03-03 PROCEDURE — 25000003 PHARM REV CODE 250: Mod: NTX | Performed by: FAMILY MEDICINE

## 2022-03-03 PROCEDURE — 99232 SBSQ HOSP IP/OBS MODERATE 35: CPT | Mod: NTX,,, | Performed by: INTERNAL MEDICINE

## 2022-03-03 PROCEDURE — 63600175 PHARM REV CODE 636 W HCPCS: Mod: NTX | Performed by: HOSPITALIST

## 2022-03-03 PROCEDURE — 36415 COLL VENOUS BLD VENIPUNCTURE: CPT | Mod: NTX | Performed by: HOSPITALIST

## 2022-03-03 PROCEDURE — 97530 THERAPEUTIC ACTIVITIES: CPT | Mod: NTX

## 2022-03-03 PROCEDURE — 25000003 PHARM REV CODE 250: Mod: NTX | Performed by: HOSPITALIST

## 2022-03-03 RX ORDER — SODIUM CHLORIDE 9 MG/ML
INJECTION, SOLUTION INTRAVENOUS
Status: DISCONTINUED | OUTPATIENT
Start: 2022-03-03 | End: 2022-03-05

## 2022-03-03 RX ORDER — SODIUM CHLORIDE 9 MG/ML
INJECTION, SOLUTION INTRAVENOUS ONCE
Status: COMPLETED | OUTPATIENT
Start: 2022-03-03 | End: 2022-03-03

## 2022-03-03 RX ORDER — HEPARIN SODIUM 1000 [USP'U]/ML
1000 INJECTION, SOLUTION INTRAVENOUS; SUBCUTANEOUS
Status: DISPENSED | OUTPATIENT
Start: 2022-03-03 | End: 2022-03-04

## 2022-03-03 RX ADMIN — HYDRALAZINE HYDROCHLORIDE 75 MG: 50 TABLET ORAL at 05:03

## 2022-03-03 RX ADMIN — ARIPIPRAZOLE 15 MG: 5 TABLET ORAL at 10:03

## 2022-03-03 RX ADMIN — AMLODIPINE BESYLATE 10 MG: 10 TABLET ORAL at 10:03

## 2022-03-03 RX ADMIN — ATORVASTATIN CALCIUM 80 MG: 20 TABLET, FILM COATED ORAL at 10:03

## 2022-03-03 RX ADMIN — NYSTATIN 500000 UNITS: 500000 SUSPENSION ORAL at 12:03

## 2022-03-03 RX ADMIN — ACETAMINOPHEN 1000 MG: 500 TABLET ORAL at 10:03

## 2022-03-03 RX ADMIN — HYDRALAZINE HYDROCHLORIDE 75 MG: 50 TABLET ORAL at 01:03

## 2022-03-03 RX ADMIN — CEFTRIAXONE SODIUM 2 G: 2 INJECTION, SOLUTION INTRAVENOUS at 10:03

## 2022-03-03 RX ADMIN — HEPARIN SODIUM 1000 UNITS: 1000 INJECTION, SOLUTION INTRAVENOUS; SUBCUTANEOUS at 05:03

## 2022-03-03 RX ADMIN — PANTOPRAZOLE SODIUM 40 MG: 40 TABLET, DELAYED RELEASE ORAL at 10:03

## 2022-03-03 RX ADMIN — HEPARIN SODIUM 5000 UNITS: 5000 INJECTION INTRAVENOUS; SUBCUTANEOUS at 10:03

## 2022-03-03 RX ADMIN — SEVELAMER CARBONATE 800 MG: 800 TABLET, FILM COATED ORAL at 01:03

## 2022-03-03 RX ADMIN — DAPTOMYCIN 445 MG: 350 INJECTION, POWDER, LYOPHILIZED, FOR SOLUTION INTRAVENOUS at 10:03

## 2022-03-03 RX ADMIN — ACETAMINOPHEN 1000 MG: 500 TABLET ORAL at 05:03

## 2022-03-03 RX ADMIN — HEPARIN SODIUM 5000 UNITS: 5000 INJECTION INTRAVENOUS; SUBCUTANEOUS at 01:03

## 2022-03-03 RX ADMIN — OXYCODONE HYDROCHLORIDE 10 MG: 10 TABLET ORAL at 10:03

## 2022-03-03 RX ADMIN — METHOCARBAMOL 750 MG: 750 TABLET ORAL at 02:03

## 2022-03-03 RX ADMIN — OXYCODONE HYDROCHLORIDE 10 MG: 10 TABLET ORAL at 06:03

## 2022-03-03 RX ADMIN — HEPARIN SODIUM 5000 UNITS: 5000 INJECTION INTRAVENOUS; SUBCUTANEOUS at 05:03

## 2022-03-03 RX ADMIN — OXYCODONE HYDROCHLORIDE 10 MG: 10 TABLET ORAL at 02:03

## 2022-03-03 RX ADMIN — HYDRALAZINE HYDROCHLORIDE 75 MG: 50 TABLET ORAL at 10:03

## 2022-03-03 RX ADMIN — LABETALOL HYDROCHLORIDE 200 MG: 100 TABLET, FILM COATED ORAL at 10:03

## 2022-03-03 RX ADMIN — ACETAMINOPHEN 1000 MG: 500 TABLET ORAL at 01:03

## 2022-03-03 RX ADMIN — ASPIRIN 81 MG: 81 TABLET, COATED ORAL at 10:03

## 2022-03-03 RX ADMIN — FOLIC ACID 1 MG: 1 TABLET ORAL at 10:03

## 2022-03-03 RX ADMIN — SODIUM CHLORIDE: 0.9 INJECTION, SOLUTION INTRAVENOUS at 02:03

## 2022-03-03 RX ADMIN — NYSTATIN 500000 UNITS: 500000 SUSPENSION ORAL at 07:03

## 2022-03-03 RX ADMIN — NYSTATIN 500000 UNITS: 500000 SUSPENSION ORAL at 10:03

## 2022-03-03 NOTE — ASSESSMENT & PLAN NOTE
- Elevated on admit but meds adjusted and now controlled.   - Goal BP < 140/90.  - Conitnue Norvasc 10 mg po daily + Hydralazine 75 mg po TID + Labetalol 200 mg po BID to treat and adjust as needed for target BP goal in hospital.

## 2022-03-03 NOTE — PLAN OF CARE
New pt recs for home health. [facility] is reviewing per BRE [facility]. Some delay on getting orders for Heb panel placed. Orders put in yesterday. CM will contact [facility] today to see where referral stands.      Heidi Bermudez RN     [phone]

## 2022-03-03 NOTE — ASSESSMENT & PLAN NOTE
-brought acthar gel from home to do non formulary as 40K and got via prior auth-non form request addded  -reason for CKD 5.

## 2022-03-03 NOTE — ASSESSMENT & PLAN NOTE
On admission he was confused and was seen by psychiatry and determined not have capacity at that point to consent to HD (2/20). Subsequently, he has undergone HD and his mentation has cleared. In my evaluation, he is able to understand and manipulate the current medical situation and apply it to himself and thus has capacity

## 2022-03-03 NOTE — PLAN OF CARE
CM sent HH referral to [facility]. They cover [place] [employer]. CM to follow for acceptance.    Heidi Bermudez RN     [phone]

## 2022-03-03 NOTE — PT/OT/SLP PROGRESS
Physical Therapy      Patient Name:  Enrique Martinez   MRN:  [id number]    Patient not seen today. Pt leaving on stretcher for HD upon attempt. Will follow-up per POC.

## 2022-03-03 NOTE — ASSESSMENT & PLAN NOTE
- Patient admitted with Hgb in 7-8 range and related to his advanced CKD.   - Patient transfused 1 unit of PRBCs on 2/18 for Hgb 6.9 and trabnsfuse again 1 unit on 2/23 for Hgb 6.7 but Hgb stable since. No signs of bleeding noted and felt all related to his CKD in combination with chronic infection/inflammation.    - Hgb stable mid to high 8s since last transfusion and dimitrios monitor with CBC in hospital and consider transfusion if Hgb < 7.   -continue folic acid

## 2022-03-03 NOTE — PLAN OF CARE
**DISREGARD THIS NOTE, LABS WERE OLD RESULTS** NEW RESULTS TO BE SENT WHEN RECEIVED.      JOSÉ MIGUEL faxed Hep B panel and antigen results to Mimi at [facility] [phone](fx). Mimi had called back and advised she still hadn;t received the aforementioned paperwork. Still waiting on MD assignment to follow patient at [facility].  CM to follow for call back from Mimi at [facility]    Heidi Bermudez RN     [phone]

## 2022-03-03 NOTE — CARE UPDATE
BRE completed an additional HD referral with [facility]. Hep b labs still processing. Will continue to follow.     Michael Maya  Nephrology [facility]  Ext. [phone]

## 2022-03-03 NOTE — PROGRESS NOTES
[facility]  Nephrology  Progress Note     Patient Name: Enrique Martinez  MRN: [id number]  Admission Date: 2/16/2022  Hospital Length of Stay: 14 days  Attending Provider: Jory Ryan MD   Primary Care Physician: Primary Doctor No  Principal Problem: Subacute osteomyelitis of left foot    Subjective:     HPI: Mr. Martinez is a 56 y.o. 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 He was recently admitted at [facility] from 2/2-2/4/2022 from LLE cellulitis. He was found to have salmonella bacteremia suspected due to diarrheal illness. He was discharged with cipro for which he reports compliance. He has had no improvement in his pain, redness, or swelling of his left foot and leg. He has also developed a new blister on his left foot, with MRI foot concerning for osteomyelitis and showing soft tissue swelling c/w cellulitis. He is admitted for treatment of suspected osteomyelitis.     He follows with Dr. Cardoza (nephrology), has adamantly declined HD in the past and would rather wait for transplant. He reports being on the transplant list for the past 4 years. His last renal biopsy in 2013 showed 50% glomerulosclerosis. Started on calcitriol 0.5, hx of elevated PTH at 921. Patient complains of decreased appetite, neck cramps, fatigue. He still urinates 4-5 times/day. He reports his B/L LE edema has worsened over the past few weeks. No dyspnea, chest pain, dysuria, hematuria. Nephro consulted for HD initiation.      Labs are significant for hyperkalemia 5.5, bicarb 18, , phosphate 10.4, Cr 19.7, GFR 2.2, ESR 97, CRP 33.4. . CBC significant for Hgb of 7.2, white count 14. Previous echo showed EF of 65%.       Interval History: Seen  at the bedside no event overnight       Review of patient's allergies indicates:  No Known Allergies   Current Facility-Administered Medications   Medication Frequency    0.9%  NaCl infusion (for blood administration) Q24H PRN    0.9%  NaCl infusion Once    0.9%  NaCl infusion PRN    0.9%  NaCl infusion PRN    0.9%  NaCl infusion Once    acetaminophen tablet 1,000 mg Q8H    acetaminophen tablet 650 mg Q6H PRN    albuterol-ipratropium 2.5 mg-0.5 mg/3 mL nebulizer solution 3 mL Q4H PRN    aluminum-magnesium hydroxide-simethicone 200-200-20 mg/5 mL suspension 30 mL QID PRN    amLODIPine tablet 10 mg Daily    ARIPiprazole tablet 15 mg QHS    aspirin EC tablet 81 mg Daily    atorvastatin tablet 80 mg Daily    cefTRIAXone (ROCEPHIN) 2 g/50 mL D5W IVPB Q24H    DAPTOmycin (CUBICIN) 445 mg in sodium chloride 0.9% IVPB Q48H    dextrose 10% bolus 125 mL PRN    dextrose 10% bolus 250 mL PRN    folic acid tablet 1 mg Daily    glucagon (human recombinant) injection 1 mg PRN    glucose chewable tablet 16 g PRN    glucose chewable tablet 24 g PRN    heparin (porcine) injection 5,000 Units Q8H    HP Acthar (corticotropin) injectable gel 80 units Every Mon, Fri    hydrALAZINE tablet 75 mg Q8H    labetaloL tablet 200 mg Q12H    loperamide capsule 2 mg QID PRN    lorazepam injection 1 mg On Call Procedure    melatonin tablet 6 mg Nightly PRN    methocarbamoL tablet 750 mg QID PRN    morphine injection 2 mg On Call Procedure    naloxone 0.4 mg/mL injection 0.02 mg PRN    nystatin 100,000 unit/mL suspension 500,000 Units QID (WM & HS)    OLANZapine injection 2.5 mg Once PRN    ondansetron disintegrating tablet 8 mg Q8H PRN    ondansetron injection 4 mg Q6H PRN    oxyCODONE immediate release tablet 5 mg Q6H PRN    oxyCODONE immediate release tablet Tab 10 mg Q4H PRN    pantoprazole EC tablet 40 mg Daily    polyethylene glycol packet 17 g Daily PRN    prochlorperazine injection Soln 5 mg Q6H PRN     senna-docusate 8.6-50 mg per tablet 1 tablet Daily PRN    sevelamer carbonate tablet 800 mg TID WM    simethicone chewable tablet 80 mg QID PRN    sodium chloride 0.9% bolus 250 mL PRN    sodium chloride 0.9% bolus 250 mL PRN    sodium chloride 0.9% flush 10 mL Q8H PRN       Objective:     Vital Signs (Most Recent):  Temp: 97.8 °F (36.6 °C) (03/03/22 1138)  Pulse: 80 (03/03/22 1138)  Resp: 18 (03/03/22 1138)  BP: 131/74 (03/03/22 1138)  SpO2: 95 % (03/03/22 1138)  O2 Device (Oxygen Therapy): room air (03/03/22 0800) Vital Signs (24h Range):  Temp:  [97.8 °F (36.6 °C)-98.5 °F (36.9 °C)] 97.8 °F (36.6 °C)  Pulse:  [] 80  Resp:  [15-18] 18  SpO2:  [95 %-97 %] 95 %  BP: (130-154)/(74-89) 131/74   Weight: 72 kg (158 lb 11.7 oz) (02/23/22 0600)  Body mass index is 25.62 kg/m².  Body surface area is 1.83 meters squared.      Intake/Output Summary (Last 24 hours) at 3/3/2022 1244  Last data filed at 3/3/2022 1000  Gross per 24 hour   Intake 960 ml   Output 0 ml   Net 960 ml     I/O last 3 completed shifts:  In: 1920 [P.O.:1920]  Out: 0   Net IO Since Admission: 5,460.45 mL [03/03/22 1244]     Physical Exam  Constitutional:       Appearance: He is ill-appearing.   Cardiovascular:      Rate and Rhythm: Normal rate.      Pulses: Normal pulses.   Pulmonary:      Effort: Pulmonary effort is normal.   Abdominal:      General: Abdomen is flat.   Musculoskeletal:         General: Normal range of motion.   Neurological:      Mental Status: He is alert.         Recent Labs   Lab 03/01/22  0447 03/02/22  0616 03/03/22  0937   WBC 9.43 8.34 8.30   HGB 6.8* 8.9* 8.8*   HCT 22.2* 27.3* 28.1*    271 286   MONO 13.8  1.3* 20.3*  1.7* 21.6*  1.8*      Recent Labs   Lab 03/01/22  0447 03/02/22  0616 03/03/22  0937    140 138   K 4.4 4.6 5.1    103 101   CO2 18* 23 23   BUN 50* 26* 38*   CREATININE 10.8* 7.2* 9.7*   CALCIUM 8.6* 8.8 9.3      No results for input(s): PH, PCO2, PO2, HCO3, POCSATURATED, BE in the  last 72 hours.    Assessment/Plan:       Subacute osteomyelitis of left foot    Renovascular hypertension    Pure hypercholesterolemia    Membranous glomerulonephritis    Anemia due to chronic kidney disease, on chronic dialysis    Type 2 diabetes mellitus with chronic kidney disease on chronic dialysis, with long-term current use of insulin    S/P coronary artery stent placement    Hx of syphilis    Atherosclerosis of native coronary artery of native heart without angina pectoris    Patient on waiting list for kidney transplant    Gastroesophageal reflux disease without esophagitis    Acute renal failure superimposed on stage 5 chronic kidney disease, not on chronic dialysis    Folic acid deficiency    Chronic gout of left foot due to renal impairment without tophus    Neck pain    Prevertebral Fluid Collection    Diarrhea     Acute renal failure superimposed on stage 5 chronic kidney disease, not on chronic dialysis  57 yo M with history of CKD V due to idiopathic membranous glomerulonephritis, anemia of CKD, hx of neurosyphilis, insulin dependent DM, who presents with cellulitis and possible osteomyelitis of left foot. Previously, he has declined HD in the past, never receiving a fistula. He is on the transplant wait list. Patient exhibits signs of uremia including fatigue, poor appetite, and mental slowing. On labs, he is hyperkalemic, uremic, AGMA, hyperphosphatemic. GFR is 2-3, which is reduced from previous GFR 13. Home meds include sodium bicarb, Renvela, and calcitriol. 2+ pitting edema on B/L lower extremities. Renal US from Jan 2022 shows sonographic evidence of chronic medical renal disease and bilateral simple and minimally complex renal cysts.  sCr one year ago was 4.5, now 18.6. UPCr ratio 1.11 (Dec 2020)  2/18: Patient increasingly somnolent, lethargic. Hospital med in process of getting in touch with Dr. Lee, who the patient follows with outpatient.   2/23: first session of HD 2 hours, no net  fluid, patient tolerated. Still confused, having visual hallucinations of mice.   2/24: second HD session 150 min, 1 L removed, patient tolerated.  2/26: HD planned for today     Plan:   - s/p  tunneled catheter placement by IR   - Will need AVF with vascular surgery, likely to be placed outpatient   - HD on T/Th/Sa schedule   - Sevelamer 800 TID   - Hold calcitriol in setting of hyperphosphatemia. Will re-start when phos reaches normal level.  - Trend Cr  - Strict I&Os  - Avoid nephrotoxic agents  - Renally adjust medications            Hyperphosphatemia  Hyperphosphatemia at 10.9 >> 4.6.      - Continue Sevelamer 800 TID     Anemia due to chronic kidney disease  Hemoglobin 10.1 one month ago, now 7.2. Normocytic anemia with low iron, iron sat, elevated ferritin.      -  EPO with HD.     Thank you for your consult. I will follow-up with patient. Please contact us if you have any additional questions.    Diana Cid MD  Nephrology  [facility]

## 2022-03-03 NOTE — PLAN OF CARE
"NAEON  VSS  Room air, sats >92%  AOX4, although Pt makes odd inappropriate comments at times  He is fixated on going outside to smoke and claims that "we" are "holding him prisoner"  NSR/ST on telemetry  URIEL VARGAS OOB  PIVX1, LFA  RCW permacath for HD  Both access sites CDI  Pt anuric  Dressing to left foot CDI  Pain meds given PRN  Tucks pads ordered for burning sensation from "going to the bathroom too much"  Skin excoriated but intact  Safety/fall precautions maintained, non slip socks when OOB  Bed locked in lowest position  Call light within reach  WCTM      "

## 2022-03-03 NOTE — CARE UPDATE
BRE faxed note by Dr. Ryan, explaining patient's mental capacity to [facility] admissions and [facility]. BRE left next steps for CM to follow up on HD chair.     Michael Maya  Nephrology [facility]  Ext. [phone]

## 2022-03-03 NOTE — SUBJECTIVE & OBJECTIVE
Interval History: today he feels fairly well. Still with some pain in the left foot but denies pain elsewhere. No nausea or vomiting. Denies constipation. He is alert, oriented to situation and conversant about his plan of care    Review of Systems   Respiratory:  Negative for shortness of breath.    Cardiovascular:  Negative for chest pain.   Gastrointestinal:  Negative for abdominal pain, blood in stool and diarrhea.   Musculoskeletal:  Positive for myalgias.   Objective:     Vital Signs (Most Recent):  Temp: 97.8 °F (36.6 °C) (03/03/22 1138)  Pulse: 80 (03/03/22 1138)  Resp: 18 (03/03/22 1138)  BP: 131/74 (03/03/22 1138)  SpO2: 95 % (03/03/22 1138) Vital Signs (24h Range):  Temp:  [97.8 °F (36.6 °C)-98.5 °F (36.9 °C)] 97.8 °F (36.6 °C)  Pulse:  [] 80  Resp:  [15-18] 18  SpO2:  [95 %-97 %] 95 %  BP: (130-154)/(74-89) 131/74     Weight: 72 kg (158 lb 11.7 oz)  Body mass index is 25.62 kg/m².    Intake/Output Summary (Last 24 hours) at 3/3/2022 1240  Last data filed at 3/3/2022 1000  Gross per 24 hour   Intake 960 ml   Output 0 ml   Net 960 ml      Physical Exam  Vitals and nursing note reviewed.   Constitutional:       General: He is not in acute distress.     Appearance: He is well-developed.   HENT:      Head: Normocephalic and atraumatic.      Mouth/Throat:      Pharynx: Oropharynx is clear.   Eyes:      Conjunctiva/sclera: Conjunctivae normal.   Neck:      Vascular: No JVD.   Cardiovascular:      Rate and Rhythm: Normal rate and regular rhythm.      Heart sounds: Normal heart sounds.   Pulmonary:      Effort: Pulmonary effort is normal.      Breath sounds: Normal breath sounds.   Chest:      Comments: PC in RU chest/IJ, c/d/i  Abdominal:      General: Bowel sounds are normal. There is no distension.      Palpations: Abdomen is soft.   Musculoskeletal:      Cervical back: Neck supple.      Right lower leg: No edema.      Left lower leg: No edema.   Skin:     Comments: Dry flaking skin on left foot    Neurological:      Mental Status: He is alert.   Psychiatric:         Behavior: Behavior normal.       Significant Labs: All pertinent labs within the past 24 hours have been reviewed.    Significant Imaging: I have reviewed all pertinent imaging results/findings within the past 24 hours.

## 2022-03-03 NOTE — PROGRESS NOTES
[facility] Medicine  Progress Note    Patient Name: Enrique Martinez  MRN: [id number]  Patient Class: IP- Inpatient   Admission Date: 2/16/2022  Length of Stay: 14 days  Attending Physician: Jory Ryan MD  Primary Care Provider: Primary Doctor No        Subjective:     Principal Problem:Subacute osteomyelitis of left foot        HPI:  Enrique Martinez is a 56 y.o. male with a PMHx of CKD V glomerulonephropathy, insulin dependent diabetes mellitus, and CAD who presented tot  ED for evaluation of worsening left foot pain and swelling. He was recently admitted at [facility] from 2/2-2/4/2022 from LLE cellulitis. He was found to have salmonella bacteremia suspected due to diarrheal illness. He was discharged with cipro for which he reports compliance. He has had no improvement in his pain, redness, or swelling of his left foot and leg. He has also developed a new blister on his left foot. He endorses chills. He denies fevers, chest pain, SOB, N/V, decreased urine output, dysuria, and flank pain.    ED: HR 97, other VSSAF. CBC with leukocytosis of 16.01, Hgb 7.4, Hct 22.2.  ESR 97. CRP 33.4. K 5.5. Cr 19.7, . Phos 10.4. Lactic WNL. MRI L foot with cellulitis and osteomyelitis of distal 1st metatarsal and proximal 1st phalanx.      Overview/Hospital Course:  .nErique Martinez is a 56 y.o. male with a PMHx of CKD V glomerulonephropathy, insulin dependent diabetes mellitus, and CAD who presented tot  ED for evaluation of worsening left foot pain and swelling. He was recently admitted at [facility] from 2/2-2/4/2022 from LLE cellulitis. He was found to have salmonella bacteremia suspected due to diarrheal illness. He was discharged with cipro for which he reports compliance. He has had no improvement in his pain, redness, or swelling of his left foot and leg. He has also developed a new blister on his left foot. He endorses chills. He denies fevers, chest pain, SOB, N/V, decreased urine output,  "dysuria, and flank pain. Patient admitted with worsening and progressive chronic kidney disease with  and Cr 20.9. Nephrology is consulted for his CKD stage V that has progressed ti ESRD and at first patient not amenable to starting HD but finally agreed after family involvement and patient had trialysis catheter placed and started on HD as per Nephrology. Patient was also complaining of neck pain on admit and had abnormal C spine Xray with possible signs of C spine instability, MRi ordered and patient refused 2/17, on pain meds, was discussed at length. Concern for possible infection also given acuity of pain. Imaging showed signs of C spine instability, prevertebral abscess likely with fluid collection with widening in C spine,. Neurosurgery consulted and recommended C collar- patient refused, and Xrays, refused, and MRI full spine when stable. Patient also had MRI of left foot on admit and showed "Regions of focal abnormal signal involving the distal 1st metatarsal and proximal 1st phalanx as described above.  Findings are concerning for foci of subacute myelitis (Yared's abscess).  Other less likely considerations include cystic degenerative change." ENT consulted for possible retropharyngeal abscess as per Neurosurgery and Infectious Disease consulted. At first patient kept refusing advanced imaging but finally agreed and repeat MRI of cervical spine done on 2/22 with anesthesia to help with sedation. MRI done without contrast and concern for possible retropharyngeal abscess. Patient placed on empiric IV Daptomycin and Ceftriaxone by ID.  ENT recommended needed study with contrast to really investigate the area better. Repeat MRI with contrast done of cervical spine on 2/24 and no obvious fluid collection noted and not obvious signs of infection so ENT and Neurosurgery signed off and stated nothing needed to be done. Patient kept on IV Daptomycin and Rocephin to cover for his left foot osteo. Podiatry " was consulted and performed bone biopsy as per ID recs on 2/25 to determine duration and type of abx needed to treat osteomyelitis of left foot. SW working on outpatient HD chair for patient who is new for HD and patient needs Perm Cath placed for long term HD needs as only has a temporary line and ID states likely will need 6 weeks of IV abx on discharge but likely can switch to Vanc/Ancef with HD to help with management of outpatient abx.         Interval History: today he feels fairly well. Still with some pain in the left foot but denies pain elsewhere. No nausea or vomiting. Denies constipation. He is alert, oriented to situation and conversant about his plan of care    Review of Systems   Respiratory:  Negative for shortness of breath.    Cardiovascular:  Negative for chest pain.   Gastrointestinal:  Negative for abdominal pain, blood in stool and diarrhea.   Musculoskeletal:  Positive for myalgias.   Objective:     Vital Signs (Most Recent):  Temp: 97.8 °F (36.6 °C) (03/03/22 1138)  Pulse: 80 (03/03/22 1138)  Resp: 18 (03/03/22 1138)  BP: 131/74 (03/03/22 1138)  SpO2: 95 % (03/03/22 1138) Vital Signs (24h Range):  Temp:  [97.8 °F (36.6 °C)-98.5 °F (36.9 °C)] 97.8 °F (36.6 °C)  Pulse:  [] 80  Resp:  [15-18] 18  SpO2:  [95 %-97 %] 95 %  BP: (130-154)/(74-89) 131/74     Weight: 72 kg (158 lb 11.7 oz)  Body mass index is 25.62 kg/m².    Intake/Output Summary (Last 24 hours) at 3/3/2022 1240  Last data filed at 3/3/2022 1000  Gross per 24 hour   Intake 960 ml   Output 0 ml   Net 960 ml      Physical Exam  Vitals and nursing note reviewed.   Constitutional:       General: He is not in acute distress.     Appearance: He is well-developed.   HENT:      Head: Normocephalic and atraumatic.      Mouth/Throat:      Pharynx: Oropharynx is clear.   Eyes:      Conjunctiva/sclera: Conjunctivae normal.   Neck:      Vascular: No JVD.   Cardiovascular:      Rate and Rhythm: Normal rate and regular rhythm.      Heart  sounds: Normal heart sounds.   Pulmonary:      Effort: Pulmonary effort is normal.      Breath sounds: Normal breath sounds.   Chest:      Comments: PC in RU chest/IJ, c/d/i  Abdominal:      General: Bowel sounds are normal. There is no distension.      Palpations: Abdomen is soft.   Musculoskeletal:      Cervical back: Neck supple.      Right lower leg: No edema.      Left lower leg: No edema.   Skin:     Comments: Dry flaking skin on left foot   Neurological:      Mental Status: He is alert.   Psychiatric:         Behavior: Behavior normal.       Significant Labs: All pertinent labs within the past 24 hours have been reviewed.    Significant Imaging: I have reviewed all pertinent imaging results/findings within the past 24 hours.      Assessment/Plan:      * Subacute osteomyelitis of left foot  - Patient admitted with left foot pain and recent left foot infection and salmonella bacteremia at an outside hopsital treated with Cipro.   - Lactic normal on admit.   - Admit labs: ESR 97, CRP 33.4, procal 1.0.  - MRI of left foot showed regions of focal abnormal signal involving the distal 1st metatarsal and proximal 1st phalanx.  Findings are concerning for foci of subacute osteomyelitis.   -Podiatry consulted and at first deferred bone biopsy and recommended just abx treatment and ID consulted and patient started on IV Daptomycin and Rocephin. Patient treated for gout by Podiatry as concerned some changes related to gout.   -treating gout, minimal improvement in pain. Podiatry a per ID request performed bone biopsy on 2/25 to left foot. Pathology and Micro from bone biopsy pending.   -ID reports likely need for 6 weeks IV antibiotics for osteo and likely can transition to IV Vanc/Ancef with HD to help with dosing as outpatient but awaiting final ID recs.   -ID to see tomorrow for final recs      Acute renal failure superimposed on stage 5 chronic kidney disease, not on chronic dialysis  Membranous glomerulonephritis  -  Nephrology consulted on admit for  and Cr 20.9 and uremic symptoms of tremors. 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 Nephrology recommended HD on admit but patient repeated refused and finally agreed after multiple conversations to start on 2/22. Nephrology has been managing his HD needs [facility].   - GFR was 13-16 up from baseline 4-6 previously in January 2022. Labs repeated today and GFR is 2.6, Cr 19.   - Patient follows with Dr. Cardoza and Dr Lee and dialysis has been discussed multiple times, patient continues to refuse and on transplant list with [facility] but on this admit after multiple attempts patient finally agreed and trialysis line placed on 2/22 and patient started on HD on 2/23 for progressive CKD stage V that has now progressed to ESRD.   - SW arranging outpatient HD - [facility] close to his house as patient is new to HD and needs HD chair and scheduled arranged prior to discharge from hospital.   -Patient initially deemed not to have capacity by psychiatry (in setting of tereso on ckd and uremia) but now has capacity to make medical decisions. Outpatient center requesting repeat psych eval which I have requested  -Perm Cath placed on 2/28.    Encephalopathy, metabolic  On admission he was confused and was seen by psychiatry and determined not have capacity at that point to consent to HD (2/20). Subsequently, he has undergone HD and his mentation has cleared. In my evaluation, he is able to understand and manipulate the current medical situation and apply it to himself and thus has capacity      Prevertebral Fluid Collection  Concern on initial imaging for abscess in cervical area and ENT and Neurosurgery consulted on admit. After several delays with repeat imaging due to patient refusal and coordination with anesthesia for sedation finally repeat MRI of cervical spine with contrast done on 2/24 and showed no abscess so ENT and Neurosurgery  stated no intervention or further testing needed. ID stated no abx need for neck and continues on abx just for his left foot osteo.     Neck pain  - Patient had neck pain on admit and imaging done: Abnormal C spine Xray with possible signs of C spine instability, MRI ordered and patient refused 2/17, on pain meds, discussed at length, see 2/17 and 2/18.  -Concern for prevertebral abscess likely with fluid collection with widening in C spine, NS consulted and recomended C collar- patient refused, and Xrays, refused.   -ENTconsulted for retropharyngeal abscess per NS and ID recs, will likely scope if amenable at bedside (he refused). They rec MRI C spine with contrast.  -NS recs MRI all spine, ID/ENT rec repeat MRI C spine for better pictures - initially imaging delayed 2/2 pt discomfort and concerns but MRI/CT finally done and complete on 2/22 and 2/24.  - MRI with contrast on 2/24 showed no fluid collection, will not require intervention as per Neurosurgery and ENT and signed off. ID stated did not need any antibiotics for his neck and remains on abx just to treat his left foot osteomyelitis not related to any suspected neck infection. No need for C- collar as per NS.       Chronic gout of left foot due to renal impairment without tophus  Chornci and controlled. Continue Colcichine 0.3 mg po BID to treat as per Podiatry recs. Left foot pain improved.       Folic acid deficiency  Present on admit. Folate low at 4.4. Patient started on Folic acid 1 mg po daily ion hospital and will continue.       SHANNAN (iron deficiency anemia)        Benign prostatic hyperplasia  [place] placed 2/18 overnight for incontinence    Gastroesophageal reflux disease without esophagitis  Chronic and controlled. Continue Protonix 40 mg po daily to treat.     Patient on waiting list for kidney transplant        Atherosclerosis of native coronary artery of native heart without angina pectoris  Patient with known CAD. Patient asymptomatic. Continue  "Labetalol, Asprin 81 mg po daily and Lipitor 80 mg po daily to treat CAD.       Hx of syphilis  Per Helio Radha NP (ID) note 9/18/18, "Patient reports recent treatment with 3 doses of bicillin for + Treponema Pallidum antibody (5/16/2018) (per patient, this was a re-treatment because he couldn't get prior records of apparent prior treatment).  RPR 1:2.   Need to confirm treatment with [facility].  RPR here is pending.   Recommend repeat RPR 6 months from treatment - December 2018"  - Treated.   - RPR non-reactive 12/2018.      S/P coronary artery stent placement  Patient with previous coronary stent for CAD in past in 2007. Continue Labetalol, Asprin 81 mg po daily and Lipitor 80 mg po daily to treat CAD.     Type 2 diabetes mellitus with chronic kidney disease on chronic dialysis, with long-term current use of insulin  Good control in the hospital in past 24 hours.  · Plan is to continue to monitor POCT glucose 4 times a day with each meal and at bedtime and cover with Novolog low dose sliding scale insulin.   · Target pre-meal glucose goal is <140 with all random glucoses <180 in non-critically ill patient.  · Was previously on lantus but may not need this on discharge with new CKD    Anemia due to chronic kidney disease, on chronic dialysis  - Patient admitted with Hgb in 7-8 range and related to his advanced CKD.   - Patient transfused 1 unit of PRBCs on 2/18 for Hgb 6.9 and trabnsfuse again 1 unit on 2/23 for Hgb 6.7 but Hgb stable since. No signs of bleeding noted and felt all related to his CKD in combination with chronic infection/inflammation.    - Hgb stable mid to high 8s since last transfusion and dimitrios monitor with CBC in hospital and consider transfusion if Hgb < 7.   -continue folic acid      Membranous glomerulonephritis  -brought acthar gel from home to do non formulary as 40K and got via prior auth-non form request addded  -reason for CKD 5.      Pure hypercholesterolemia  Chronic and controlled. Continue " Lipitor 80 mg po daily to treat.     Renovascular hypertension  - Elevated on admit but meds adjusted and now controlled.   - Goal BP < 140/90.  - Conitnue Norvasc 10 mg po daily + Hydralazine 75 mg po TID + Labetalol 200 mg po BID to treat and adjust as needed for target BP goal in hospital.       VTE Risk Mitigation (From admission, onward)         Ordered     heparin (porcine) injection 5,000 Units  Every 8 hours         02/25/22 0747     IP VTE HIGH RISK PATIENT  Once         02/17/22 0151     Place sequential compression device  Until discontinued         02/17/22 0151                Discharge Planning   JOAN: 3/4/2022     Code Status: Full Code   Is the patient medically ready for discharge?: No    Reason for patient still in hospital (select all that apply): Patient trending condition and Consult recommendations  Discharge Plan A: Home Health   Discharge Delays: None known at this time              Jory Ryan MD  Department of Hospital Medicine   [facility] - [facility]

## 2022-03-03 NOTE — PLAN OF CARE
JOSÉ MIGUEL spoke to Mimi at [facility] [phone] this am.. She advised that she is waiting to see which MD comes to [facility] since Dr. Justice Lee does not. Mimi will call JOSÉ MIGUEL back when she gets response. She sent out email yesterday. CM to follow for call back from Mimi at [facility].    eHidi Bermudez RN     [phone]

## 2022-03-03 NOTE — PROGRESS NOTES
Report received from DARIUS Inman. Pt arrived from floor AAOx4 via stretcher. Acute dialysis initiated via [facility] permcat BFR@400.

## 2022-03-03 NOTE — ASSESSMENT & PLAN NOTE
Good control in the hospital in past 24 hours.  · Plan is to continue to monitor POCT glucose 4 times a day with each meal and at bedtime and cover with Novolog low dose sliding scale insulin.   · Target pre-meal glucose goal is <140 with all random glucoses <180 in non-critically ill patient.  · Was previously on lantus but may not need this on discharge with new CKD

## 2022-03-03 NOTE — ASSESSMENT & PLAN NOTE
Membranous glomerulonephritis  - Nephrology consulted on admit for  and Cr 20.9 and uremic symptoms of tremors. 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 Nephrology recommended HD on admit but patient repeated refused and finally agreed after multiple conversations to start on 2/22. Nephrology has been managing his HD needs [facility].   - GFR was 13-16 up from baseline 4-6 previously in January 2022. Labs repeated today and GFR is 2.6, Cr 19.   - Patient follows with Dr. Cardoza and Dr Lee and dialysis has been discussed multiple times, patient continues to refuse and on transplant list with [facility] but on this admit after multiple attempts patient finally agreed and trialysis line placed on 2/22 and patient started on HD on 2/23 for progressive CKD stage V that has now progressed to ESRD.   - SW arranging outpatient HD [facility] close to his house as patient is new to HD and needs HD chair and scheduled arranged prior to discharge from hospital.   -Patient initially deemed not to have capacity by psychiatry (in setting of tereso on ckd and uremia) but now has capacity to make medical decisions. Outpatient center requesting repeat psych eval which I have requested  -Perm Cath placed on 2/28.

## 2022-03-03 NOTE — PLAN OF CARE
[facility] - [facility]  Discharge Reassessment    Primary Care Provider: Primary Doctor No    Expected Discharge Date: 3/4/2022     Patient not medically ready to discharge per MD. Also waiting for final set up for HD chair at [facility].    Reassessment (most recent)     Discharge Reassessment - 03/03/22 1010        Discharge Reassessment    Assessment Type Discharge Planning Reassessment     Did the patient's condition or plan change since previous assessment? No     Discharge Plan A Home Health     Discharge Plan B Home with family     DME Needed Upon Discharge  walker, rolling     Discharge Barriers Identified DIalysis placement issues;Underinsured     Why the patient remains in the hospital Requires continued medical care        Post-Acute Status    Post-Acute Authorization Dialysis     Diaylsis Status Pending medical clearance/testing     Discharge Delays None known at this time               Heidi Bermudez RN     [phone]

## 2022-03-03 NOTE — ASSESSMENT & PLAN NOTE
- Patient admitted with left foot pain and recent left foot infection and salmonella bacteremia at an outside hopsital treated with Cipro.   - Lactic normal on admit.   - Admit labs: ESR 97, CRP 33.4, procal 1.0.  - MRI of left foot showed regions of focal abnormal signal involving the distal 1st metatarsal and proximal 1st phalanx.  Findings are concerning for foci of subacute osteomyelitis.   -Podiatry consulted and at first deferred bone biopsy and recommended just abx treatment and ID consulted and patient started on IV Daptomycin and Rocephin. Patient treated for gout by Podiatry as concerned some changes related to gout.   -treating gout, minimal improvement in pain. Podiatry a per ID request performed bone biopsy on 2/25 to left foot. Pathology and Micro from bone biopsy pending.   -ID reports likely need for 6 weeks IV antibiotics for osteo and likely can transition to IV Vanc/Ancef with HD to help with dosing as outpatient but awaiting final ID recs.   -ID to see tomorrow for final recs

## 2022-03-03 NOTE — PT/OT/SLP PROGRESS
"Occupational Therapy   Treatment    Name: Enrique Martinez  MRN: [id number]  Admitting Diagnosis:  Subacute osteomyelitis of left foot       Recommendations:     Discharge Recommendations: home health OT (and family assist)  Discharge Equipment Recommendations:  walker, rolling, shower chair  Barriers to discharge: requires assist for ADLs and mobility    Assessment:     Enrique Martinez is a 57 y.o. male with a medical diagnosis of Subacute osteomyelitis of left foot. He presents with the following performance deficits affecting function: weakness, impaired endurance, impaired self care skills, impaired functional mobilty, gait instability, impaired balance, pain, decreased safety awareness. Patient progressing well towards goals but continues to require cues for safety during ADLs and mobility. Patient requesting to go home and reports he will have assist from his sister.    Rehab Prognosis:  Good; patient would benefit from acute skilled OT services to address these deficits and reach maximum level of function.       Plan:     Patient to be seen 3 x/week to address the above listed problems via self-care/home management, therapeutic activities, therapeutic exercises, neuromuscular re-education  · Plan of Care Expires: 03/27/22  · Plan of Care Reviewed with: patient    Subjective     Patient stated "I feel 80% better".    Pain/Comfort:  · Pain Rating 1: 0/10    Objective:     Communicated with: RN prior to session. Patient found HOB elevated with telemetry, cervical collar upon OT entry to room.    General Precautions: Standard, fall   Orthopedic Precautions:spinal precautions   Braces: Cervical collar  Respiratory Status: Room air     Occupational Performance:     Bed Mobility:    · Patient completed Supine to Sit with supervision with HOB elevated    Functional Mobility/Transfers:  · Patient completed Sit <> Stand Transfer with stand by assistance  with RW  · Patient completed Toilet Transfer Step Transfer technique " with stand by assistance with RW  · Functional Mobility: Patient ambulated within room and ~125 ft in hallway with SBA-CGA with RW    Activities of Daily Living:  · Lower Body Dressing: independence donning socks using figure 4 technique  · Toileting: stand by assistance seated on commode    AMPAC 6 Click ADL: 21    Treatment & Education:   Therapist provided facilitation and instruction of proper body mechanics and fall prevention strategies during tasks listed above.   Instructed patient to sit in bedside chair daily to increase OOB/activity tolerance.   Instructed patient to use call light to have nursing staff assist with needs/transfers.   Discussed OT POC and answered all questions within OT scope of practice.   Whiteboard updated     Patient left sitting edge of bed with all lines intact and call button in reachEducation:      GOALS:   Multidisciplinary Problems     Occupational Therapy Goals        Problem: Occupational Therapy Goal    Goal Priority Disciplines Outcome Interventions   Occupational Therapy Goal     OT, PT/OT Ongoing, Progressing    Description: Goals to be met by: 3/11/2022     Patient will increase functional independence with ADLs by performing:    LE Dressing with Supervision.  Grooming while standing at sink with Supervision.  Toileting from toilet with Supervision for hygiene and clothing management.   Step transfer with Supervision  Toilet transfer to toilet with Supervision.                     Time Tracking:     OT Date of Treatment: 03/03/22  OT Start Time: 0848  OT Stop Time: 0914  OT Total Time (min): 26 min    Billable Minutes:Self Care/Home Management 12  Therapeutic Activity 14    OT/FELICITY: OT     FELICITY Visit Number: 0    3/3/2022

## 2022-03-03 NOTE — ASSESSMENT & PLAN NOTE
- Patient had neck pain on admit and imaging done: Abnormal C spine Xray with possible signs of C spine instability, MRI ordered and patient refused 2/17, on pain meds, discussed at length, see 2/17 and 2/18.  -Concern for prevertebral abscess likely with fluid collection with widening in C spine, NS consulted and recomended C collar- patient refused, and Xrays, refused.   -ENTconsulted for retropharyngeal abscess per NS and ID recs, will likely scope if amenable at bedside (he refused). They rec MRI C spine with contrast.  -NS recs MRI all spine, ID/ENT rec repeat MRI C spine for better pictures - initially imaging delayed 2/2 pt discomfort and concerns but MRI/CT finally done and complete on 2/22 and 2/24.  - MRI with contrast on 2/24 showed no fluid collection, will not require intervention as per Neurosurgery and ENT and signed off. ID stated did not need any antibiotics for his neck and remains on abx just to treat his left foot osteomyelitis not related to any suspected neck infection. No need for C- collar as per NS.

## 2022-03-03 NOTE — PLAN OF CARE
CM just spoke to ex-wife regarding signing consents for HD. She gave me an earful. She advised that pt is in his right mind now. And that they have not re-evaluated pt. He was not in his right mind before he had dialysis.  And now he is according to ex-wife. [facility] has read psych's note from 2/20/2022 and said that pt cannot sign his own consents. CM reached out to MD for assistance. CM to follow.    Heidi Bermudez RN     [phone]

## 2022-03-04 PROBLEM — R11.14 BILIOUS VOMITING WITH NAUSEA: Status: ACTIVE | Noted: 2022-03-04

## 2022-03-04 LAB
ANION GAP SERPL CALC-SCNC: 14 MMOL/L (ref 8–16)
BASOPHILS # BLD AUTO: 0.04 K/UL (ref 0–0.2)
BASOPHILS NFR BLD: 0.5 % (ref 0–1.9)
BUN SERPL-MCNC: 18 MG/DL (ref 6–20)
CALCIUM SERPL-MCNC: 9 MG/DL (ref 8.7–10.5)
CHLORIDE SERPL-SCNC: 100 MMOL/L (ref 95–110)
CO2 SERPL-SCNC: 18 MMOL/L (ref 23–29)
CREAT SERPL-MCNC: 6.1 MG/DL (ref 0.5–1.4)
DIFFERENTIAL METHOD: ABNORMAL
EOSINOPHIL # BLD AUTO: 0.1 K/UL (ref 0–0.5)
EOSINOPHIL NFR BLD: 1.1 % (ref 0–8)
ERYTHROCYTE [DISTWIDTH] IN BLOOD BY AUTOMATED COUNT: 15.8 % (ref 11.5–14.5)
EST. GFR  (AFRICAN AMERICAN): 10.7 ML/MIN/1.73 M^2
EST. GFR  (NON AFRICAN AMERICAN): 9.2 ML/MIN/1.73 M^2
GLUCOSE SERPL-MCNC: 74 MG/DL (ref 70–110)
HAV IGM SERPL QL IA: NEGATIVE
HBV CORE IGM SERPL QL IA: NEGATIVE
HBV SURFACE AG SERPL QL IA: NEGATIVE
HCT VFR BLD AUTO: 27.4 % (ref 40–54)
HCV AB SERPL QL IA: NEGATIVE
HGB BLD-MCNC: 8.5 G/DL (ref 14–18)
IMM GRANULOCYTES # BLD AUTO: 0.05 K/UL (ref 0–0.04)
IMM GRANULOCYTES NFR BLD AUTO: 0.6 % (ref 0–0.5)
LYMPHOCYTES # BLD AUTO: 1 K/UL (ref 1–4.8)
LYMPHOCYTES NFR BLD: 12.9 % (ref 18–48)
MCH RBC QN AUTO: 28.1 PG (ref 27–31)
MCHC RBC AUTO-ENTMCNC: 31 G/DL (ref 32–36)
MCV RBC AUTO: 90 FL (ref 82–98)
MONOCYTES # BLD AUTO: 1.4 K/UL (ref 0.3–1)
MONOCYTES NFR BLD: 17.5 % (ref 4–15)
NEUTROPHILS # BLD AUTO: 5.3 K/UL (ref 1.8–7.7)
NEUTROPHILS NFR BLD: 67.4 % (ref 38–73)
NRBC BLD-RTO: 0 /100 WBC
PHOSPHATE SERPL-MCNC: 4.5 MG/DL (ref 2.7–4.5)
PLATELET # BLD AUTO: 302 K/UL (ref 150–450)
PMV BLD AUTO: 9.9 FL (ref 9.2–12.9)
POTASSIUM SERPL-SCNC: 4.7 MMOL/L (ref 3.5–5.1)
RBC # BLD AUTO: 3.03 M/UL (ref 4.6–6.2)
SODIUM SERPL-SCNC: 132 MMOL/L (ref 136–145)
WBC # BLD AUTO: 7.89 K/UL (ref 3.9–12.7)

## 2022-03-04 PROCEDURE — 99233 PR SUBSEQUENT HOSPITAL CARE,LEVL III: ICD-10-PCS | Mod: NTX,,, | Performed by: INTERNAL MEDICINE

## 2022-03-04 PROCEDURE — 80074 ACUTE HEPATITIS PANEL: CPT | Mod: NTX | Performed by: HOSPITALIST

## 2022-03-04 PROCEDURE — 25000003 PHARM REV CODE 250: Mod: NTX | Performed by: PHYSICIAN ASSISTANT

## 2022-03-04 PROCEDURE — 99233 SBSQ HOSP IP/OBS HIGH 50: CPT | Mod: NTX,,, | Performed by: INTERNAL MEDICINE

## 2022-03-04 PROCEDURE — 99233 SBSQ HOSP IP/OBS HIGH 50: CPT | Mod: NTX,,, | Performed by: HOSPITALIST

## 2022-03-04 PROCEDURE — 36415 COLL VENOUS BLD VENIPUNCTURE: CPT | Mod: NTX | Performed by: HOSPITALIST

## 2022-03-04 PROCEDURE — 25000003 PHARM REV CODE 250: Mod: NTX | Performed by: HOSPITALIST

## 2022-03-04 PROCEDURE — 63600175 PHARM REV CODE 636 W HCPCS: Mod: NTX | Performed by: HOSPITALIST

## 2022-03-04 PROCEDURE — 25000003 PHARM REV CODE 250: Mod: NTX | Performed by: INTERNAL MEDICINE

## 2022-03-04 PROCEDURE — 25000003 PHARM REV CODE 250: Mod: NTX | Performed by: FAMILY MEDICINE

## 2022-03-04 PROCEDURE — 80048 BASIC METABOLIC PNL TOTAL CA: CPT | Mod: NTX | Performed by: HOSPITALIST

## 2022-03-04 PROCEDURE — 84100 ASSAY OF PHOSPHORUS: CPT | Mod: NTX | Performed by: HOSPITALIST

## 2022-03-04 PROCEDURE — 85025 COMPLETE CBC W/AUTO DIFF WBC: CPT | Mod: NTX | Performed by: HOSPITALIST

## 2022-03-04 PROCEDURE — 99233 PR SUBSEQUENT HOSPITAL CARE,LEVL III: ICD-10-PCS | Mod: NTX,,, | Performed by: HOSPITALIST

## 2022-03-04 PROCEDURE — 20600001 HC STEP DOWN PRIVATE ROOM: Mod: NTX

## 2022-03-04 RX ORDER — SODIUM CHLORIDE 9 MG/ML
INJECTION, SOLUTION INTRAVENOUS ONCE
Status: DISCONTINUED | OUTPATIENT
Start: 2022-03-04 | End: 2022-03-07

## 2022-03-04 RX ORDER — SODIUM CHLORIDE 9 MG/ML
INJECTION, SOLUTION INTRAVENOUS
Status: DISCONTINUED | OUTPATIENT
Start: 2022-03-04 | End: 2022-03-07

## 2022-03-04 RX ADMIN — ATORVASTATIN CALCIUM 80 MG: 20 TABLET, FILM COATED ORAL at 08:03

## 2022-03-04 RX ADMIN — NYSTATIN 500000 UNITS: 500000 SUSPENSION ORAL at 08:03

## 2022-03-04 RX ADMIN — METHOCARBAMOL 750 MG: 750 TABLET ORAL at 10:03

## 2022-03-04 RX ADMIN — PANTOPRAZOLE SODIUM 40 MG: 40 TABLET, DELAYED RELEASE ORAL at 08:03

## 2022-03-04 RX ADMIN — OXYCODONE HYDROCHLORIDE 10 MG: 10 TABLET ORAL at 09:03

## 2022-03-04 RX ADMIN — NYSTATIN 500000 UNITS: 500000 SUSPENSION ORAL at 09:03

## 2022-03-04 RX ADMIN — OXYCODONE HYDROCHLORIDE 10 MG: 10 TABLET ORAL at 04:03

## 2022-03-04 RX ADMIN — SEVELAMER CARBONATE 800 MG: 800 TABLET, FILM COATED ORAL at 01:03

## 2022-03-04 RX ADMIN — ARIPIPRAZOLE 15 MG: 5 TABLET ORAL at 09:03

## 2022-03-04 RX ADMIN — ONDANSETRON 8 MG: 8 TABLET, ORALLY DISINTEGRATING ORAL at 09:03

## 2022-03-04 RX ADMIN — FOLIC ACID 1 MG: 1 TABLET ORAL at 08:03

## 2022-03-04 RX ADMIN — METHOCARBAMOL 750 MG: 750 TABLET ORAL at 04:03

## 2022-03-04 RX ADMIN — CEFTRIAXONE SODIUM 2 G: 2 INJECTION, SOLUTION INTRAVENOUS at 09:03

## 2022-03-04 RX ADMIN — HEPARIN SODIUM 5000 UNITS: 5000 INJECTION INTRAVENOUS; SUBCUTANEOUS at 04:03

## 2022-03-04 RX ADMIN — ACETAMINOPHEN 1000 MG: 500 TABLET ORAL at 04:03

## 2022-03-04 RX ADMIN — HEPARIN SODIUM 5000 UNITS: 5000 INJECTION INTRAVENOUS; SUBCUTANEOUS at 02:03

## 2022-03-04 RX ADMIN — HYDRALAZINE HYDROCHLORIDE 75 MG: 50 TABLET ORAL at 04:03

## 2022-03-04 RX ADMIN — AMLODIPINE BESYLATE 10 MG: 10 TABLET ORAL at 08:03

## 2022-03-04 RX ADMIN — NYSTATIN 500000 UNITS: 500000 SUSPENSION ORAL at 01:03

## 2022-03-04 RX ADMIN — SEVELAMER CARBONATE 800 MG: 800 TABLET, FILM COATED ORAL at 08:03

## 2022-03-04 RX ADMIN — LABETALOL HYDROCHLORIDE 200 MG: 100 TABLET, FILM COATED ORAL at 08:03

## 2022-03-04 RX ADMIN — ACETAMINOPHEN 1000 MG: 500 TABLET ORAL at 09:03

## 2022-03-04 RX ADMIN — HEPARIN SODIUM 5000 UNITS: 5000 INJECTION INTRAVENOUS; SUBCUTANEOUS at 09:03

## 2022-03-04 RX ADMIN — ASPIRIN 81 MG: 81 TABLET, COATED ORAL at 08:03

## 2022-03-04 RX ADMIN — LABETALOL HYDROCHLORIDE 200 MG: 100 TABLET, FILM COATED ORAL at 09:03

## 2022-03-04 RX ADMIN — HYDRALAZINE HYDROCHLORIDE 75 MG: 50 TABLET ORAL at 09:03

## 2022-03-04 RX ADMIN — ONDANSETRON 8 MG: 8 TABLET, ORALLY DISINTEGRATING ORAL at 03:03

## 2022-03-04 NOTE — ASSESSMENT & PLAN NOTE
Prevertebral fluid collection  - Patient had neck pain on admit and imaging done: Abnormal C spine Xray with possible signs of C spine instability, MRI ordered and patient refused 2/17, on pain meds, discussed at length, see 2/17 and 2/18.  -Concern for prevertebral abscess likely with fluid collection with widening in C spine, NS consulted and recomended C collar- patient refused, and Xrays, refused.   -ENTconsulted for retropharyngeal abscess per NS and ID recs, will likely scope if amenable at bedside (he refused). They rec MRI C spine with contrast.  -NS recs MRI all spine, ID/ENT rec repeat MRI C spine for better pictures - initially imaging delayed 2/2 pt discomfort and concerns but MRI/CT finally done and complete on 2/22 and 2/24.  - MRI with contrast on 2/24 showed no fluid collection, will not require intervention as per Neurosurgery and ENT and signed off. ID stated did not need any antibiotics for his neck and remains on abx just to treat his left foot osteomyelitis not related to any suspected neck infection. No need for C- collar as per NS.

## 2022-03-04 NOTE — PROGRESS NOTES
[facility]  Progress Note    Patient Name: Enrique Martinez  MRN: [id number]  Patient Class: IP- Inpatient   Admission Date: 2/16/2022  Length of Stay: 15 days  Attending Physician: Jory Ryan MD  Primary Care Provider: Primary Doctor No        Subjective:     Principal Problem:Subacute osteomyelitis of left foot        HPI:  Enrique Martinez is a 56 y.o. male with a PMHx of CKD V glomerulonephropathy, insulin dependent diabetes mellitus, and CAD who presented tot  ED for evaluation of worsening left foot pain and swelling. He was recently admitted at [facility] from 2/2-2/4/2022 from LLE cellulitis. He was found to have salmonella bacteremia suspected due to diarrheal illness. He was discharged with cipro for which he reports compliance. He has had no improvement in his pain, redness, or swelling of his left foot and leg. He has also developed a new blister on his left foot. He endorses chills. He denies fevers, chest pain, SOB, N/V, decreased urine output, dysuria, and flank pain.    ED: HR 97, other VSSAF. CBC with leukocytosis of 16.01, Hgb 7.4, Hct 22.2.  ESR 97. CRP 33.4. K 5.5. Cr 19.7, . Phos 10.4. Lactic WNL. MRI L foot with cellulitis and osteomyelitis of distal 1st metatarsal and proximal 1st phalanx.      Overview/Hospital Course:  .Enrique Martinez is a 56 y.o. male with a PMHx of CKD V glomerulonephropathy, insulin dependent diabetes mellitus, and CAD who presented tot  ED for evaluation of worsening left foot pain and swelling. He was recently admitted at [facility] from 2/2-2/4/2022 from LLE cellulitis. He was found to have salmonella bacteremia suspected due to diarrheal illness. He was discharged with cipro for which he reports compliance. He has had no improvement in his pain, redness, or swelling of his left foot and leg. He has also developed a new blister on his left foot. He endorses chills. He denies fevers, chest pain, SOB, N/V, decreased urine output,  "dysuria, and flank pain. Patient admitted with worsening and progressive chronic kidney disease with  and Cr 20.9. Nephrology is consulted for his CKD stage V that has progressed ti ESRD and at first patient not amenable to starting HD but finally agreed after family involvement and patient had trialysis catheter placed and started on HD as per Nephrology. Patient was also complaining of neck pain on admit and had abnormal C spine Xray with possible signs of C spine instability, MRi ordered and patient refused 2/17, on pain meds, was discussed at length. Concern for possible infection also given acuity of pain. Imaging showed signs of C spine instability, prevertebral abscess likely with fluid collection with widening in C spine,. Neurosurgery consulted and recommended C collar- patient refused, and Xrays, refused, and MRI full spine when stable. Patient also had MRI of left foot on admit and showed "Regions of focal abnormal signal involving the distal 1st metatarsal and proximal 1st phalanx as described above.  Findings are concerning for foci of subacute myelitis (Yared's abscess).  Other less likely considerations include cystic degenerative change." ENT consulted for possible retropharyngeal abscess as per Neurosurgery and Infectious Disease consulted. At first patient kept refusing advanced imaging but finally agreed and repeat MRI of cervical spine done on 2/22 with anesthesia to help with sedation. MRI done without contrast and concern for possible retropharyngeal abscess. Patient placed on empiric IV Daptomycin and Ceftriaxone by ID.  ENT recommended needed study with contrast to really investigate the area better. Repeat MRI with contrast done of cervical spine on 2/24 and no obvious fluid collection noted and not obvious signs of infection so ENT and Neurosurgery signed off and stated nothing needed to be done. Patient kept on IV Daptomycin and Rocephin to cover for his left foot osteo. Podiatry " was consulted and performed bone biopsy as per ID recs on 2/25 to determine duration and type of abx needed to treat osteomyelitis of left foot. SW working on outpatient HD chair for patient who is new for HD and patient needs Perm Cath placed for long term HD needs as only has a temporary line and ID states likely will need 6 weeks of IV abx on discharge but likely can switch to Vanc/Ancef with HD to help with management of outpatient abx.         Interval History: nauseated today and threw up last night and again when I came in the room this am. No abd pain but no hungry. Normal BM earlier this am. Left foot pain relatively well controlled. No other complaints. Discussed discharge planning and he was in agreement and understood plan and was able to discuss back with me    Review of Systems   Respiratory:  Negative for shortness of breath.    Cardiovascular:  Negative for chest pain.   Gastrointestinal:  Positive for nausea and vomiting. Negative for abdominal pain, blood in stool and diarrhea.   Musculoskeletal:  Positive for myalgias.   Objective:     Vital Signs (Most Recent):  Temp: 98.4 °F (36.9 °C) (03/04/22 1231)  Pulse: 101 (03/04/22 1231)  Resp: 16 (03/04/22 1231)  BP: 137/82 (03/04/22 1231)  SpO2: (!) 94 % (03/04/22 1231)   Vital Signs (24h Range):  Temp:  [97.7 °F (36.5 °C)-98.7 °F (37.1 °C)] 98.4 °F (36.9 °C)  Pulse:  [] 101  Resp:  [16-20] 16  SpO2:  [94 %-99 %] 94 %  BP: (103-152)/(58-83) 137/82     Weight: 72 kg (158 lb 11.7 oz)  Body mass index is 25.62 kg/m².    Intake/Output Summary (Last 24 hours) at 3/4/2022 1424  Last data filed at 3/4/2022 0505  Gross per 24 hour   Intake 300 ml   Output 2252 ml   Net -1952 ml      Physical Exam  Vitals and nursing note reviewed.   Constitutional:       General: He is not in acute distress.     Appearance: He is well-developed.      Comments: Vomiting today during exam   HENT:      Head: Normocephalic and atraumatic.      Mouth/Throat:      Pharynx:  Oropharynx is clear.   Eyes:      Conjunctiva/sclera: Conjunctivae normal.   Neck:      Vascular: No JVD.   Cardiovascular:      Rate and Rhythm: Normal rate and regular rhythm.      Heart sounds: Normal heart sounds.   Pulmonary:      Effort: Pulmonary effort is normal.      Breath sounds: Normal breath sounds.   Chest:      Comments: PC in RU chest/IJ, c/d/i  Abdominal:      General: Bowel sounds are normal. There is no distension.      Palpations: Abdomen is soft.   Musculoskeletal:      Cervical back: Neck supple.      Right lower leg: No edema.      Left lower leg: No edema.   Skin:     Comments: Dry flaking skin on left foot   Neurological:      Mental Status: He is alert.   Psychiatric:         Behavior: Behavior normal.       Significant Labs: All pertinent labs within the past 24 hours have been reviewed.    Significant Imaging: I have reviewed all pertinent imaging results/findings within the past 24 hours.      Assessment/Plan:      * Subacute osteomyelitis of left foot  - Patient admitted with left foot pain and recent left foot infection and salmonella bacteremia at an outside hopsital treated with Cipro.   - Lactic normal on admit.   - Admit labs: ESR 97, CRP 33.4, procal 1.0.  - MRI of left foot showed regions of focal abnormal signal involving the distal 1st metatarsal and proximal 1st phalanx.  Findings are concerning for foci of subacute osteomyelitis.   -Podiatry consulted and at first deferred bone biopsy and recommended just abx treatment and ID consulted and patient started on IV Daptomycin and Rocephin. Patient treated for gout by Podiatry as concerned some changes related to gout.   -minimal improvement in pain with gout tx so Podiatry per ID request performed bone biopsy on 2/25 to left foot. Pathology and Micro from bone biopsy pending.   -ID reports likely need for 6 weeks IV antibiotics for osteo and likely can transition to IV Vanc/Ancef with HD to help with dosing as outpatient but  awaiting final ID recs.   -ID to see today for final recs      Acute renal failure superimposed on stage 5 chronic kidney disease, not on chronic dialysis  Membranous glomerulonephritis  - Nephrology consulted on admit for  and Cr 20.9 and uremic symptoms of tremors. 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 Nephrology recommended HD on admit but patient repeated refused and finally agreed after multiple conversations to start on 2/22. Nephrology has been managing his HD needs [facility].   - GFR was 13-16 up from baseline 4-6 previously in January 2022. Labs repeated today and GFR is 2.6, Cr 19.   - Patient follows with Dr. Cardoza and Dr Lee and dialysis has been discussed multiple times, patient continues to refuse and on transplant list with [facility] but on this admit after multiple attempts patient finally agreed and trialysis line placed on 2/22 and patient started on HD on 2/23 for progressive CKD stage V that has now progressed to ESRD.   - SW arranging outpatient HD - [facility] and has MWF chair starting next week  -Patient initially deemed not to have capacity by psychiatry (in setting of tereso on ckd and uremia) but now has capacity to make medical decisions. Outpatient center requesting repeat psych eval which I have requested  -Perm Cath placed on 2/28.    Bilious vomiting with nausea  Unclear etiology, just started last night. abd exam benign, had normal BM this am. Will treat with antiemetic and follow up for any emerging symtpoms.      Encephalopathy, metabolic  On admission he was confused and was seen by psychiatry and determined not have capacity at that point to consent to HD (2/20). Subsequently, he has undergone HD and his mentation has cleared. In my evaluation, he is able to understand and manipulate the current medical situation and apply it to himself and thus has capacity      Diarrhea  resolved      Prevertebral Fluid  Collection       Neck pain  Prevertebral fluid collection  - Patient had neck pain on admit and imaging done: Abnormal C spine Xray with possible signs of C spine instability, MRI ordered and patient refused 2/17, on pain meds, discussed at length, see 2/17 and 2/18.  -Concern for prevertebral abscess likely with fluid collection with widening in C spine, NS consulted and recomended C collar- patient refused, and Xrays, refused.   -ENTconsulted for retropharyngeal abscess per NS and ID recs, will likely scope if amenable at bedside (he refused). They rec MRI C spine with contrast.  -NS recs MRI all spine, ID/ENT rec repeat MRI C spine for better pictures - initially imaging delayed 2/2 pt discomfort and concerns but MRI/CT finally done and complete on 2/22 and 2/24.  - MRI with contrast on 2/24 showed no fluid collection, will not require intervention as per Neurosurgery and ENT and signed off. ID stated did not need any antibiotics for his neck and remains on abx just to treat his left foot osteomyelitis not related to any suspected neck infection. No need for C- collar as per NS.       Chronic gout of left foot due to renal impairment without tophus  Chornci and controlled. S/p Colcichine 0.3 mg po BID to treat as per Podiatry recs. Left foot pain improved.       Folic acid deficiency  Present on admit. Folate low at 4.4. Patient started on Folic acid 1 mg po daily ion hospital and will continue.       SHANNAN (iron deficiency anemia)        Benign prostatic hyperplasia  [place] placed 2/18 overnight for incontinence    Gastroesophageal reflux disease without esophagitis  Chronic and controlled. Continue Protonix 40 mg po daily to treat.     Patient on waiting list for kidney transplant        Atherosclerosis of native coronary artery of native heart without angina pectoris  Patient with known CAD. Patient asymptomatic. Continue Labetalol, Asprin 81 mg po daily and Lipitor 80 mg po daily to treat CAD.       Hx of  "syphilis  Per Helio Garcia NP (ID) note 9/18/18, "Patient reports recent treatment with 3 doses of bicillin for + Treponema Pallidum antibody (5/16/2018) (per patient, this was a re-treatment because he couldn't get prior records of apparent prior treatment).  RPR 1:2.   Need to confirm treatment with [facility].  RPR here is pending.   Recommend repeat RPR 6 months from treatment - December 2018"  - Treated.   - RPR non-reactive 12/2018.      S/P coronary artery stent placement  Patient with previous coronary stent for CAD in past in 2007. Continue Labetalol, Asprin 81 mg po daily and Lipitor 80 mg po daily to treat CAD.     Type 2 diabetes mellitus with chronic kidney disease on chronic dialysis, with long-term current use of insulin  Good control in the hospital in past 24 hours.  · Plan is to continue to monitor POCT glucose 4 times a day with each meal and at bedtime and cover with Novolog low dose sliding scale insulin.   · Target pre-meal glucose goal is <140 with all random glucoses <180 in non-critically ill patient.  · Was previously on lantus but may not need this on discharge with new CKD    Anemia due to chronic kidney disease, on chronic dialysis  - Patient admitted with Hgb in 7-8 range and related to his advanced CKD.   - Patient transfused 1 unit of PRBCs on 2/18 for Hgb 6.9 and trabnsfuse again 1 unit on 2/23 for Hgb 6.7 but Hgb stable since. No signs of bleeding noted and felt all related to his CKD in combination with chronic infection/inflammation.    - Hgb stable mid to high 8s since last transfusion and dimitrios monitor with CBC in hospital and consider transfusion if Hgb < 7.   -continue folic acid      Membranous glomerulonephritis  -brought acthar gel from home to do non formulary as 40K and got via prior auth-non form request addded  -reason for CKD 5.      Pure hypercholesterolemia  Chronic and controlled. Continue Lipitor 80 mg po daily to treat.     Renovascular hypertension  - Elevated on admit but " meds adjusted and now controlled.   - Goal BP < 140/90.  - Conitnue Norvasc 10 mg po daily + Hydralazine 75 mg po TID + Labetalol 200 mg po BID to treat and adjust as needed for target BP goal in hospital.       VTE Risk Mitigation (From admission, onward)         Ordered     heparin (porcine) injection 1,000 Units  As needed (PRN)         03/03/22 1546     heparin (porcine) injection 5,000 Units  Every 8 hours         02/25/22 0747     IP VTE HIGH RISK PATIENT  Once         02/17/22 0151     Place sequential compression device  Until discontinued         02/17/22 0151                Discharge Planning   JOAN: 3/5/2022     Code Status: Full Code   Is the patient medically ready for discharge?: No    Reason for patient still in hospital (select all that apply): Patient new problem and Patient trending condition  Discharge Plan A: Home Health   Discharge Delays: None known at this time              Jory Ryan MD  Department of Hospital Medicine   [facility] - Transplant Stepdown

## 2022-03-04 NOTE — SUBJECTIVE & OBJECTIVE
Interval History: nauseated today and threw up last night and again when I came in the room this am. No abd pain but no hungry. Normal BM earlier this am. Left foot pain relatively well controlled. No other complaints. Discussed discharge planning and he was in agreement and understood plan and was able to discuss back with me    Review of Systems   Respiratory:  Negative for shortness of breath.    Cardiovascular:  Negative for chest pain.   Gastrointestinal:  Positive for nausea and vomiting. Negative for abdominal pain, blood in stool and diarrhea.   Musculoskeletal:  Positive for myalgias.   Objective:     Vital Signs (Most Recent):  Temp: 98.4 °F (36.9 °C) (03/04/22 1231)  Pulse: 101 (03/04/22 1231)  Resp: 16 (03/04/22 1231)  BP: 137/82 (03/04/22 1231)  SpO2: (!) 94 % (03/04/22 1231)   Vital Signs (24h Range):  Temp:  [97.7 °F (36.5 °C)-98.7 °F (37.1 °C)] 98.4 °F (36.9 °C)  Pulse:  [] 101  Resp:  [16-20] 16  SpO2:  [94 %-99 %] 94 %  BP: (103-152)/(58-83) 137/82     Weight: 72 kg (158 lb 11.7 oz)  Body mass index is 25.62 kg/m².    Intake/Output Summary (Last 24 hours) at 3/4/2022 1424  Last data filed at 3/4/2022 0505  Gross per 24 hour   Intake 300 ml   Output 2252 ml   Net -1952 ml      Physical Exam  Vitals and nursing note reviewed.   Constitutional:       General: He is not in acute distress.     Appearance: He is well-developed.      Comments: Vomiting today during exam   HENT:      Head: Normocephalic and atraumatic.      Mouth/Throat:      Pharynx: Oropharynx is clear.   Eyes:      Conjunctiva/sclera: Conjunctivae normal.   Neck:      Vascular: No JVD.   Cardiovascular:      Rate and Rhythm: Normal rate and regular rhythm.      Heart sounds: Normal heart sounds.   Pulmonary:      Effort: Pulmonary effort is normal.      Breath sounds: Normal breath sounds.   Chest:      Comments: PC in RU chest/IJ, c/d/i  Abdominal:      General: Bowel sounds are normal. There is no distension.      Palpations:  Abdomen is soft.   Musculoskeletal:      Cervical back: Neck supple.      Right lower leg: No edema.      Left lower leg: No edema.   Skin:     Comments: Dry flaking skin on left foot   Neurological:      Mental Status: He is alert.   Psychiatric:         Behavior: Behavior normal.       Significant Labs: All pertinent labs within the past 24 hours have been reviewed.    Significant Imaging: I have reviewed all pertinent imaging results/findings within the past 24 hours.

## 2022-03-04 NOTE — PLAN OF CARE
CM attempted to schedule PCP appointment for patient with Dr. Jory Garza at [facility].  sent message to Dr. Garza's office as her first available was in April. CM to follow for call back from PCP office.    Heidi Bermudez RN     [phone]

## 2022-03-04 NOTE — PLAN OF CARE
Patient was accepted by [facility] and [facility]. Patient has decided on [facility]. CM reached out to [facility]. Tyree with [facility] advised confirmed HD chair for Monday 3/7/2022 at 2:30 pm.      Heidi Bermudez RN     [phone]

## 2022-03-04 NOTE — PLAN OF CARE
[phone] ([facility] [phone]. CM to follow for acceptance and HD schedule for patient.      Heidi Bermudez RN     [phone]

## 2022-03-04 NOTE — PLAN OF CARE
Pt AAOx4, VSS, afebrile. Call light within reach. Bedside commode in use. Fall risk precautions maintained.  Pt in lowest bed position setting, lighting adjusted, pt to wear nonskid socks when ambulating, side rails up x2.  Pt remain free from falls during shift.    Pt verbalize understanding to call when needed assistance.     unable to assess

## 2022-03-04 NOTE — PLAN OF CARE
JOSÉ MIGUEL faxed hemodialysis report to Mimi [phone] at  [facility] [phone].    Heidi Bermudez RN     [phone]

## 2022-03-04 NOTE — ASSESSMENT & PLAN NOTE
- Patient admitted with left foot pain and recent left foot infection and salmonella bacteremia at an outside hopsital treated with Cipro.   - Lactic normal on admit.   - Admit labs: ESR 97, CRP 33.4, procal 1.0.  - MRI of left foot showed regions of focal abnormal signal involving the distal 1st metatarsal and proximal 1st phalanx.  Findings are concerning for foci of subacute osteomyelitis.   -Podiatry consulted and at first deferred bone biopsy and recommended just abx treatment and ID consulted and patient started on IV Daptomycin and Rocephin. Patient treated for gout by Podiatry as concerned some changes related to gout.   -minimal improvement in pain with gout tx so Podiatry per ID request performed bone biopsy on 2/25 to left foot. Pathology and Micro from bone biopsy pending.   -ID reports likely need for 6 weeks IV antibiotics for osteo and likely can transition to IV Vanc/Ancef with HD to help with dosing as outpatient but awaiting final ID recs.   -ID to see today for final recs

## 2022-03-04 NOTE — PT/OT/SLP PROGRESS
Physical Therapy      Patient Name:  Enrique Martinez   MRN:  [id number]    Patient not seen today secondary to pt in care of nursing 1st attempt. I was unable to return for 2nd attempt. Will follow-up at next scheduled visit per PT POC.

## 2022-03-04 NOTE — ASSESSMENT & PLAN NOTE
Unclear etiology, just started last night. abd exam benign, had normal BM this am. Will treat with antiemetic and follow up for any emerging symtpoms.

## 2022-03-04 NOTE — PLAN OF CARE
Patient dialyzed today. Expressed dissatisfaction with staff and with our unit over not being taken outside to smoke. Hasn't eaten today secondary to not liking the food. Dressing to be changed tonight.

## 2022-03-04 NOTE — SUBJECTIVE & OBJECTIVE
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        Past Surgical History:   Procedure Laterality Date    ABDOMINAL SURGERY  1980s    CARDIAC CATHETERIZATION  2007    x 2    COLONOSCOPY N/A 4/5/2019    Procedure: COLONOSCOPY;  Surgeon: Jose L Carty MD;  Location: [facility] ([address] [place] [address]);  Service: Colon and Rectal;  Laterality: N/A;    CORONARY STENT PLACEMENT  2007       Review of patient's allergies indicates:  No Known Allergies    Medications:  Medications Prior to Admission   Medication Sig    insulin (LANTUS SOLOSTAR U-100 INSULIN) glargine 100 units/mL (3mL) SubQ pen Inject 5 Units into the skin once daily.    metoprolol tartrate (LOPRESSOR) 100 MG tablet Take 100 mg by mouth.    albuterol (PROVENTIL/VENTOLIN HFA) 90 mcg/actuation inhaler Inhale 2 puffs into the lungs every 6 (six) hours as needed for Wheezing or Shortness of Breath. Rescue    amlodipine (NORVASC) 10 MG tablet Take 10 mg by mouth once daily.    ARIPiprazole (ABILIFY) 15 MG Tab Take 1 tablet (15 mg total) by mouth every evening.    aspirin (ECOTRIN) 81 MG EC tablet Take 1 tablet (81 mg total) by mouth once daily.    atorvastatin (LIPITOR) 80 MG tablet Take 1 tablet (80 mg total) by mouth once daily.    blood-glucose meter kit Use as instructed    calcitRIOL (ROCALTROL) 0.5 MCG Cap Take 0.5 mcg by mouth once daily.    chlorthalidone (HYGROTEN) 50 MG Tab Take 50 mg by mouth once daily.     cloNIDine (CATAPRES) 0.2 MG tablet Take 0.4 mg by mouth 3 (three) times daily.     colchicine (COLCRYS) 0.6 mg tablet Take 0.6 mg by mouth daily as needed.    ergocalciferol  (ERGOCALCIFEROL) 50,000 unit Cap Take 50,000 Units by mouth every 30 days.     famotidine (PEPCID) 20 MG tablet Take 20 mg by mouth daily as needed for Heartburn.     ferrous gluconate (FERGON) 324 MG tablet Take 324 mg by mouth 2 (two) times a day.     fluticasone (FLONASE) 50 mcg/actuation nasal spray 1 spray by Each Nare route once daily.    furosemide (LASIX) 40 MG tablet Take 40 mg by mouth 2 (two) times daily.    methocarbamoL (ROBAXIN) 500 MG Tab Take 500 mg by mouth 2 (two) times a day.    omeprazole (PRILOSEC) 20 MG capsule Take 20 mg by mouth once daily.    RENVELA 800 mg Tab Take 800 mg by mouth 3 (three) times daily.    sodium bicarbonate 650 MG tablet Take 650 mg by mouth.    tadalafiL (CIALIS) 2.5 mg Tab Take 2.5 mg by mouth.    TRUE METRIX GLUCOSE TEST STRIP Strp      Antibiotics (From admission, onward)                Start     Stop Route Frequency Ordered    02/24/22 0900  mupirocin 2 % ointment         03/01 0859 Nasl 2 times daily 02/24/22 0833    02/19/22 2130  DAPTOmycin (CUBICIN) 445 mg in sodium chloride 0.9% IVPB         -- IV Every 48 hours (non-standard times) 02/19/22 2017 02/18/22 1530  cefTRIAXone (ROCEPHIN) 2 g/50 mL D5W IVPB        Question:  Is the patient competent?  Answer:  Yes    -- IV Every 24 hours (non-standard times) 02/18/22 1418          Antifungals (From admission, onward)                Start     Stop Route Frequency Ordered    02/20/22 1200  nystatin 100,000 unit/mL suspension 500,000 Units         -- Oral 4 times daily with meals & nightly 02/20/22 1014          Antivirals (From admission, onward)      None             Immunization History   Administered Date(s) Administered    Hepatitis A / Hepatitis B 09/18/2018    Hepatitis A, Adult 02/22/2021    Hepatitis B (recombinant) Adjuvanted, 2 dose 02/22/2021, 03/26/2021    Influenza 12/11/2012, 10/18/2019    Influenza - Quadrivalent - MDCK - PF 10/18/2019    Influenza - Quadrivalent - PF *Preferred* (6 months and older)  10/30/2013, 10/12/2016, 10/04/2017, 09/18/2018, 09/01/2020    Influenza - Trivalent - PF (ADULT) 10/30/2013, 09/09/2015    Pneumococcal Conjugate - 13 Valent 01/13/2017    Pneumococcal Polysaccharide - 23 Valent 12/20/2012, 09/18/2018    Tdap 12/20/2012, 10/12/2013    Zoster 11/15/2020    Zoster Recombinant 11/15/2020, 01/23/2021       Family History       Problem Relation (Age of Onset)    Drug abuse Brother    Heart attack Father, Brother    Hyperlipidemia Father    Hypertension Father, Brother    No Known Problems Sister    Stroke Father          Social History     Socioeconomic History    Marital status:    Tobacco Use    Smoking status: Current Some Day Smoker     Packs/day: 0.25     Years: 10.00     Pack years: 2.50    Smokeless tobacco: Never Used   Substance and Sexual Activity    Alcohol use: No    Drug use: No     Review of Systems   Constitutional:  Negative for activity change, chills and fever.   HENT:  Negative for congestion, trouble swallowing and voice change.    Eyes:  Negative for photophobia and visual disturbance.   Respiratory:  Negative for chest tightness, shortness of breath and wheezing.    Cardiovascular:  Positive for leg swelling. Negative for chest pain and palpitations.   Gastrointestinal:  Negative for abdominal pain, constipation, nausea and vomiting.   Genitourinary:  Negative for dysuria, flank pain, frequency, hematuria and urgency.   Musculoskeletal:  Positive for arthralgias. Negative for back pain and gait problem.        Left foot pain   Skin:  Positive for wound. Negative for rash.   Neurological:  Negative for syncope, speech difficulty and headaches.   Psychiatric/Behavioral:  Negative for agitation, behavioral problems and confusion. The patient is not nervous/anxious.    Objective:     Vital Signs (Most Recent):  Temp: 98.4 °F (36.9 °C) (03/04/22 1231)  Pulse: 101 (03/04/22 1231)  Resp: 16 (03/04/22 1231)  BP: 137/82 (03/04/22 1231)  SpO2: (!) 94 % (03/04/22  1231)   Vital Signs (24h Range):  Temp:  [97.7 °F (36.5 °C)-98.7 °F (37.1 °C)] 98.4 °F (36.9 °C)  Pulse:  [] 101  Resp:  [16-20] 16  SpO2:  [94 %-99 %] 94 %  BP: (103-152)/(58-83) 137/82     Weight: 72 kg (158 lb 11.7 oz)  Body mass index is 25.62 kg/m².    Estimated Creatinine Clearance: 11.8 mL/min (A) (based on SCr of 6.1 mg/dL (H)).    Physical Exam  Constitutional:       General: He is not in acute distress.     Appearance: He is well-developed and overweight. He is not ill-appearing.   Neck:      Comments: Right IJ trialysis line in place.   Cardiovascular:      Rate and Rhythm: Normal rate and regular rhythm.      Heart sounds: Normal heart sounds. No murmur heard.    No friction rub. No gallop.   Pulmonary:      Effort: Pulmonary effort is normal. No respiratory distress.      Breath sounds: Normal breath sounds. No wheezing or rales.   Abdominal:      General: There is no distension.      Palpations: Abdomen is soft.      Tenderness: There is no abdominal tenderness.   Musculoskeletal:         General: Tenderness (left foot) present. Normal range of motion.      Cervical back: Normal range of motion and neck supple.      Right lower leg: No edema.      Left lower leg: No edema.   Feet:      Right foot:      Skin integrity: Skin integrity normal.      Left foot:      Skin integrity: Skin breakdown, erythema, warmth and dry skin present.   Skin:     General: Skin is warm and dry.      Comments: L leg, ankle, foot skin sloughing.   Deroofed blister on dorsum of left foot   Neurological:      Mental Status: He is easily aroused.       Significant Labs: All pertinent labs within the past 24 hours have been reviewed.    Significant Imaging: I have reviewed all pertinent imaging results/findings within the past 24 hours.

## 2022-03-04 NOTE — CONSULTS
[facility] - Transplant Stepdown  Psychiatry  Progress Note    Patient Name: Enrique Martinez  MRN: [id number]   Code Status: Full Code  Admission Date: 2/16/2022  Hospital Length of Stay: 15 days  Expected Discharge Date: 3/4/2022  Attending Physician: Jory Ryan MD  Primary Care Provider: Primary Doctor No    Current Legal Status: Uncontested    Patient information was obtained from patient, past medical records and ER records.     HPI:   2/20/2022 9:41 AM  Enrique Martinez  1965  [id number]    Capacity Evaluation    History of Present Illness     HPI: Enrique Martinez is a 56 y.o. male with a PMH of CKD V glomerulonephropathy, insulin dependent diabetes mellitus, and CAD who presented to the ED for evaluation of worsening left foot pain and swelling. He was recently admitted at [facility] from 2/2-2/4/2022 from LLE cellulitis complicated by salmonella bacteremia after having diarrheal illness.  He was discharged home with ciprofloxacin. He reported no improvement in his cellulitis since being discharged home and no has new blister development. ID was consulted due to recent salmonella bacteremia and new findings on MRI suspicious for left foot myelitis and persistent cellulitis infection.     Psychiatry consulted for capacity to refuse dialysis on 2/20.  At that time he was deemed to not have capacity to refuse dialysis.      Patient has improved mentation since receiving dialysis.   psychiatry was re-consulted in order to assess patient's capacity to continue to accept dialysis as an outpatient.  Pt is agreeable to proposed treatment, so capacity is irrelevant.  However, primary team stated that the outpatient dialysis center is insisting on a capacity to accept dialysis consult prior to them scheduling him for outpatient treatment.    Pt napping in bed upon initiation of interview.  He states that his health is feeling much better.  He is happy that he has been able to receive dialysis in the hospital.  Pt says that  "he was told that his kidneys are not working property and "don't filter right".  He says that dialysis is necessary or else he may die.  Pt would like to continue dialysis as an outpatient, which is in agreement with proposed medical care by his treatment team.  He is especially happy that he heard there is a dialysis center close to his house.  Pt lists his sister as a social support who will help him when he isn't feeling well or on dialysis days.         Mental Status Exam     CAM-ICU:  · Acute change and/or fluctuating course of mental status: No  · Inattention (SAVEAHAART): No  · "Squeeze my hand, only when you hear, the letter 'A'."  · Altered Level of Consciousness: No  · Disorganized Thinking (Errors >1/6): NA  · "Will a stone float on water?"  · "Are there fish in the sea?"  · "Does one pound weigh more than two?"  · "Can you use a hammer to pound a nail?"  · Command(s):  · "Hold up 2 fingers."  · "Now do the same thing with the other hand."    Score: 1+2 AND, either 3 or 4 present = Positive CAM-ICU    Appearance: unremarkable, age appropriate   Level of Consciousness:  Awake, Alert    Grooming:  appropriate to situation   Psychomotor Behavior: cooperative   Speech: normal tone, normal rate, normal pitch, normal volume   Language: english, fluid   Orientation:   person, place, month of year, year   Attention Span/Concentration: Decreased   Memory: Recent impaired   Mood: "Good"   Affect: irritable and labile   Thought Process: circumstantial   Associations: circumstantial   Thought Content: normal, no suicidality, no homicidality, delusions, or paranoia   Fund of Knowledge: adequate to education level   Insight: good   Judgment:  good     Capacity Evaluation     Capacity question:  · Does the patient possess the ability to make an informed decision, regarding the proposed treatment/option for his care?    Capacity for a given decision requires:  · Understanding - the ability to state the meaning of the " relevant information (eg, diagnosis, risks and benefits of a treatment or procedure, indications, and options of care).  o The patient must be able to understand the proposed treatment, and/or options for his care.   Appreciation - the ability to explain how information (eg, diagnosis, benefit, and risk) applies to oneself.  o The patient must be able to appreciate his own medical situation, and abstract, as to how the treatments/proposed options for care, apply to his medical situation.   Reasoning - the ability to compare information and infer consequences of choice.  o The patient must be able to understand the consequences of his medical decision, in a reasonable manner, supported by the facts, and his own values, in a consistent fashion.   Expressing a choice - the ability to state a decision.  o The patient must demonstrate the ability to communicate a choice, clearly and consistently.    Hospital Course: No notes on file    No new subjective & objective note has been filed under this hospital service since the last note was generated.       Scheduled Medications:   sodium chloride 0.9%   Intravenous Once    acetaminophen  1,000 mg Oral Q8H    amLODIPine  10 mg Oral Daily    ARIPiprazole  15 mg Oral QHS    aspirin  81 mg Oral Daily    atorvastatin  80 mg Oral Daily    cefTRIAXone (ROCEPHIN) IVPB  2 g Intravenous Q24H    DAPTOmycin (CUBICIN)  IV  6 mg/kg Intravenous Q48H    folic acid  1 mg Oral Daily    heparin (porcine)  5,000 Units Subcutaneous Q8H    corticotropin  80 Units Subcutaneous Every Mon, Fri    hydrALAZINE  75 mg Oral Q8H    labetalol  200 mg Oral Q12H    nystatin  500,000 Units Oral QID (WM & HS)    pantoprazole  40 mg Oral Daily    sevelamer carbonate  800 mg Oral TID WM       PRN Medications:  sodium chloride, sodium chloride 0.9%, sodium chloride 0.9%, acetaminophen, albuterol-ipratropium, aluminum-magnesium hydroxide-simethicone, dextrose 10%, dextrose 10%, glucagon (human  recombinant), glucose, glucose, heparin (porcine), loperamide, lorazepam, melatonin, methocarbamoL, morphine, naloxone, OLANZapine, ondansetron, ondansetron, oxyCODONE, oxyCODONE, polyethylene glycol, prochlorperazine, senna-docusate 8.6-50 mg, simethicone, sodium chloride 0.9%, sodium chloride 0.9%, sodium chloride 0.9%    Review of patient's allergies indicates:  No Known Allergies      Assessment of capacity:  · The patient understands the clinical condition relation to this evaluation: Yes  · The patient understands the indication and nature of/reasoning behind the proposed treatment(s) and/or options for his care: Yes  · The patient understands and appreciates the risks and benefits of the proposed treatment(s) and/or options for his care: Yes  · The patient understands and appreciates the risks and benefits of refusing the proposed treatment(s) and/or options for his care: Yes.  · The patient is in in agreement with the assessment and his does not consent to treatment.  · The patient has evidence of impaired insight and/or judgment: No  Recommendations     · Based upon my evaluation of Enrique Martinez regarding his medical decision-making capacity for accepting hemodialysis, I found with reasonable certainty that Enrique Martinez does have capacity to make this decision on his behalf.  He is in agreement with the proposed care.      Need for Continued Hospitalization:  No need for inpatient psychiatric hospitalization. Continue medical care as per the primary team.    Anticipated Disposition:  Per primary team    Total time:  25 with greater than 50% of this time spent in counseling and/or coordination of care.     Ernestina Javier MD, PhD  [facility]  HO-2  03/04/2022

## 2022-03-04 NOTE — PLAN OF CARE
Alisia from [facility] has accepted patient to start HD on Monday 3/7/2022. CM notified MD. MD advised CM patient started having vomiting and will not discharge today. CM to follow for when patient is medically ready to discharge.    Heidi Bermudez RN     [phone]

## 2022-03-04 NOTE — CONSULTS
[facility] - Transplant Stepdown  Infectious Disease  Consult Note    Patient Name: Enrique Martinez  MRN: [id number]  Admission Date: 2/16/2022  Hospital Length of Stay: 15 days  Attending Physician: Jory Ryan MD  Primary Care Provider: Primary Doctor No     Isolation Status: No active isolations    Patient information was obtained from patient and ER records.      Inpatient consult to Infectious Diseases  Consult performed by: Wagner Oconnell DO  Consult ordered by: Jory Ryan MD        Assessment/Plan:        Pain and swelling of left lower extremity  Yared's abscess of left foot, suspected osteomyelitis   MRI foot shows brodies abscess (subacute osteomyelitis). Pt has been on empiric ceftriaxone and daptomycin for empiric coverage of foot infection. Bone biopsy of left 1st phalanx obtained by podiatry along with cultures, which have been sterile so far. Patient afebrile and hemodynamically stable        Recommendations:  --Podiatry follow up s/p bone biopsy   --Would recommend continuing broad spectrum antibiotic coverage and will tentatively plan for 6 week osteomyelitis course pending results of bone biopsy pathology report   --As patient is on dialysis, would recommend switching to IV vancomycin per pharmacy dosing and IV cefepime (previous Salmonella bacteremia) for more convenient dosing   --See OPAT below    Outpatient Antibiotic Therapy Plan:    Please send referral to [facility].    1) Infection: Osteomyelitis of left 1st toe     2) Discharge Antibiotics:    Intravenous antibiotics:   IV vancomycin per pharmacy dosing after HD sessions   IV cefepime 2 g after each HD session     3) Therapy Duration:      Estimated end date of IV antibiotics: 4/9/22    4) Outpatient Weekly Labs:    Order the following labs to be drawn on Mondays:    CBC   CMP    CRP   Vancomycin trough. Target 15-20    If discharged on vancomycin IV, order the following additional labs to  be drawn on Thursdays:   [facility]    Vancomycin trough. Target 15-20    If vancomycin trough is not at target (15-20) prior to discharge, schedule vancomycin trough to be drawn before their fourth outpatient dose.    5) Fax Lab Results to Infectious Diseases Provider: Dr. Oconnell     [facility] Fax Number: [phone]    6) Outpatient Infectious Diseases Follow-up     Follow-up appointment will be arranged by the ID clinic and will be found in the patient's appointments tab.     Prior to discharge, please ensure the patient's follow-up has been scheduled.     If there is still no follow-up scheduled prior to discharge, please send an EPIC message to Mary Anne Wong in Infectious Diseases.      Thank you for your consult. I will sign off. Please contact us if you have any additional questions.    Wagner Oconnell, DO  Infectious Disease  [facility] - Transplant Stepdown    Subjective:     Principal Problem: Subacute osteomyelitis of left foot    HPI: Enrique Martinez is a 56 y.o. male with a PMH of CKD V glomerulonephropathy, insulin dependent diabetes mellitus, and CAD who presented to the ED for evaluation of worsening left foot pain and swelling. He was recently admitted at [facility] from 2/2-2/4/2022 from LLE cellulitis complicated by salmonella bacteremia after having diarrheal illness.  He was discharged home with ciprofloxacin. He reports no improvement in his cellulitis since being discharged home and no has new blister development. ID was consulted due to recent salmonella bacteremia and new findings on MRI suspicious for left foot myelitis and persistent cellulitis infection. On initial ID encounter, pt reporting pain in left foot and ankle but denying any fever, chills, N/V, diarrhea, chest pain, headaches, or SOB.       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    Umbilical hernia        Past Surgical History:   Procedure Laterality Date    ABDOMINAL SURGERY  1980s    CARDIAC CATHETERIZATION  2007    x 2    COLONOSCOPY N/A 4/5/2019    Procedure: COLONOSCOPY;  Surgeon: Jose L Carty MD;  Location: [facility] ([address] [place] [address]);  Service: Colon and Rectal;  Laterality: N/A;    CORONARY STENT PLACEMENT  2007       Review of patient's allergies indicates:  No Known Allergies    Medications:  Medications Prior to Admission   Medication Sig    insulin (LANTUS SOLOSTAR U-100 INSULIN) glargine 100 units/mL (3mL) SubQ pen Inject 5 Units into the skin once daily.    metoprolol tartrate (LOPRESSOR) 100 MG tablet Take 100 mg by mouth.    albuterol (PROVENTIL/VENTOLIN HFA) 90 mcg/actuation inhaler Inhale 2 puffs into the lungs every 6 (six) hours as needed for Wheezing or Shortness of Breath. Rescue    amlodipine (NORVASC) 10 MG tablet Take 10 mg by mouth once daily.    ARIPiprazole (ABILIFY) 15 MG Tab Take 1 tablet (15 mg total) by mouth every evening.    aspirin (ECOTRIN) 81 MG EC tablet Take 1 tablet (81 mg total) by mouth once daily.    atorvastatin (LIPITOR) 80 MG tablet Take 1 tablet (80 mg total) by mouth once daily.    blood-glucose meter kit Use as instructed    calcitRIOL (ROCALTROL) 0.5 MCG Cap Take 0.5 mcg by mouth once daily.    chlorthalidone (HYGROTEN) 50 MG Tab Take 50 mg by mouth once daily.     cloNIDine (CATAPRES) 0.2 MG tablet Take 0.4 mg by mouth 3 (three) times daily.     colchicine (COLCRYS) 0.6 mg tablet Take 0.6 mg by mouth daily as needed.    ergocalciferol (ERGOCALCIFEROL) 50,000 unit Cap Take 50,000 Units by mouth every 30 days.     famotidine (PEPCID) 20 MG tablet Take 20 mg by mouth daily as needed for Heartburn.      ferrous gluconate (FERGON) 324 MG tablet Take 324 mg by mouth 2 (two) times a day.     fluticasone (FLONASE) 50 mcg/actuation nasal spray 1 spray by Each Nare route once daily.    furosemide (LASIX) 40 MG tablet Take 40 mg by mouth 2 (two) times daily.    methocarbamoL (ROBAXIN) 500 MG Tab Take 500 mg by mouth 2 (two) times a day.    omeprazole (PRILOSEC) 20 MG capsule Take 20 mg by mouth once daily.    RENVELA 800 mg Tab Take 800 mg by mouth 3 (three) times daily.    sodium bicarbonate 650 MG tablet Take 650 mg by mouth.    tadalafiL (CIALIS) 2.5 mg Tab Take 2.5 mg by mouth.    TRUE METRIX GLUCOSE TEST STRIP Strp      Antibiotics (From admission, onward)                Start     Stop Route Frequency Ordered    02/24/22 0900  mupirocin 2 % ointment         03/01 0859 Nasl 2 times daily 02/24/22 0833    02/19/22 2130  DAPTOmycin (CUBICIN) 445 mg in sodium chloride 0.9% IVPB         -- IV Every 48 hours (non-standard times) 02/19/22 2017 02/18/22 1530  cefTRIAXone (ROCEPHIN) 2 g/50 mL D5W IVPB        Question:  Is the patient competent?  Answer:  Yes    -- IV Every 24 hours (non-standard times) 02/18/22 1418          Antifungals (From admission, onward)                Start     Stop Route Frequency Ordered    02/20/22 1200  nystatin 100,000 unit/mL suspension 500,000 Units         -- Oral 4 times daily with meals & nightly 02/20/22 1014          Antivirals (From admission, onward)      None             Immunization History   Administered Date(s) Administered    Hepatitis A / Hepatitis B 09/18/2018    Hepatitis A, Adult 02/22/2021    Hepatitis B (recombinant) Adjuvanted, 2 dose 02/22/2021, 03/26/2021    Influenza 12/11/2012, 10/18/2019    Influenza - Quadrivalent - MDCK - PF 10/18/2019    Influenza - Quadrivalent - PF *Preferred* (6 months and older) 10/30/2013, 10/12/2016, 10/04/2017, 09/18/2018, 09/01/2020    Influenza - Trivalent - PF (ADULT) 10/30/2013, 09/09/2015    Pneumococcal Conjugate - 13  Valent 01/13/2017    Pneumococcal Polysaccharide - 23 Valent 12/20/2012, 09/18/2018    Tdap 12/20/2012, 10/12/2013    Zoster 11/15/2020    Zoster Recombinant 11/15/2020, 01/23/2021       Family History       Problem Relation (Age of Onset)    Drug abuse Brother    Heart attack Father, Brother    Hyperlipidemia Father    Hypertension Father, Brother    No Known Problems Sister    Stroke Father          Social History     Socioeconomic History    Marital status:    Tobacco Use    Smoking status: Current Some Day Smoker     Packs/day: 0.25     Years: 10.00     Pack years: 2.50    Smokeless tobacco: Never Used   Substance and Sexual Activity    Alcohol use: No    Drug use: No     Review of Systems   Constitutional:  Negative for activity change, chills and fever.   HENT:  Negative for congestion, trouble swallowing and voice change.    Eyes:  Negative for photophobia and visual disturbance.   Respiratory:  Negative for chest tightness, shortness of breath and wheezing.    Cardiovascular:  Positive for leg swelling. Negative for chest pain and palpitations.   Gastrointestinal:  Negative for abdominal pain, constipation, nausea and vomiting.   Genitourinary:  Negative for dysuria, flank pain, frequency, hematuria and urgency.   Musculoskeletal:  Positive for arthralgias. Negative for back pain and gait problem.        Left foot pain   Skin:  Positive for wound. Negative for rash.   Neurological:  Negative for syncope, speech difficulty and headaches.   Psychiatric/Behavioral:  Negative for agitation, behavioral problems and confusion. The patient is not nervous/anxious.    Objective:     Vital Signs (Most Recent):  Temp: 98.4 °F (36.9 °C) (03/04/22 1231)  Pulse: 101 (03/04/22 1231)  Resp: 16 (03/04/22 1231)  BP: 137/82 (03/04/22 1231)  SpO2: (!) 94 % (03/04/22 1231)   Vital Signs (24h Range):  Temp:  [97.7 °F (36.5 °C)-98.7 °F (37.1 °C)] 98.4 °F (36.9 °C)  Pulse:  [] 101  Resp:  [16-20] 16  SpO2:   [94 %-99 %] 94 %  BP: (103-152)/(58-83) 137/82     Weight: 72 kg (158 lb 11.7 oz)  Body mass index is 25.62 kg/m².    Estimated Creatinine Clearance: 11.8 mL/min (A) (based on SCr of 6.1 mg/dL (H)).    Physical Exam  Constitutional:       General: He is not in acute distress.     Appearance: He is well-developed and overweight. He is not ill-appearing.   Neck:      Comments: Right IJ trialysis line in place.   Cardiovascular:      Rate and Rhythm: Normal rate and regular rhythm.      Heart sounds: Normal heart sounds. No murmur heard.    No friction rub. No gallop.   Pulmonary:      Effort: Pulmonary effort is normal. No respiratory distress.      Breath sounds: Normal breath sounds. No wheezing or rales.   Abdominal:      General: There is no distension.      Palpations: Abdomen is soft.      Tenderness: There is no abdominal tenderness.   Musculoskeletal:         General: Tenderness (left foot) present. Normal range of motion.      Cervical back: Normal range of motion and neck supple.      Right lower leg: No edema.      Left lower leg: No edema.   Feet:      Right foot:      Skin integrity: Skin integrity normal.      Left foot:      Skin integrity: Skin breakdown, erythema, warmth and dry skin present.   Skin:     General: Skin is warm and dry.      Comments: L leg, ankle, foot skin sloughing.   Deroofed blister on dorsum of left foot   Neurological:      Mental Status: He is easily aroused.       Significant Labs: All pertinent labs within the past 24 hours have been reviewed.    Significant Imaging: I have reviewed all pertinent imaging results/findings within the past 24 hours.

## 2022-03-04 NOTE — PLAN OF CARE
CM delivered [facility] HD schedule to patient in room.    Heidi Bermudez RN     [phone]

## 2022-03-04 NOTE — PLAN OF CARE
CM emailed weekend CM/SW to be on lookout for script for Vancomycin and Cefepime for dialysis in patient's chart. When there, I asked them to fax to [facility] at [phone] (fx). So [facility] will have it for Monday 3/7/2022 for patient's first scheduled HD at 2:30 pm.    Heidi Bermudez RN     [phone]

## 2022-03-04 NOTE — ASSESSMENT & PLAN NOTE
Membranous glomerulonephritis  - Nephrology consulted on admit for  and Cr 20.9 and uremic symptoms of tremors. 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 Nephrology recommended HD on admit but patient repeated refused and finally agreed after multiple conversations to start on 2/22. Nephrology has been managing his HD needs [facility].   - GFR was 13-16 up from baseline 4-6 previously in January 2022. Labs repeated today and GFR is 2.6, Cr 19.   - Patient follows with Dr. Cardzoa and Dr Lee and dialysis has been discussed multiple times, patient continues to refuse and on transplant list with [facility] but on this admit after multiple attempts patient finally agreed and trialysis line placed on 2/22 and patient started on HD on 2/23 for progressive CKD stage V that has now progressed to ESRD.   - SW arranging outpatient HD - [facility] and has MWF chair starting next week  -Patient initially deemed not to have capacity by psychiatry (in setting of tereso on ckd and uremia) but now has capacity to make medical decisions. Outpatient center requesting repeat psych eval which I have requested  -Perm Cath placed on 2/28.

## 2022-03-04 NOTE — PT/OT/SLP PROGRESS
Occupational Therapy      Patient Name:  Enrique Martinez   MRN:  [id number]    Patient not seen today secondary to attempted to see patient. Patient supine in bed stated he had already worked with PT earlier and wanted to rest. Will follow-up per plan of care.    3/4/2022

## 2022-03-04 NOTE — ASSESSMENT & PLAN NOTE
Chornci and controlled. S/p Colcichine 0.3 mg po BID to treat as per Podiatry recs. Left foot pain improved.

## 2022-03-05 PROBLEM — R53.81 DEBILITY: Status: ACTIVE | Noted: 2022-03-05

## 2022-03-05 LAB
ALBUMIN SERPL BCP-MCNC: 2.2 G/DL (ref 3.5–5.2)
ANION GAP SERPL CALC-SCNC: 16 MMOL/L (ref 8–16)
BASOPHILS # BLD AUTO: 0.03 K/UL (ref 0–0.2)
BASOPHILS NFR BLD: 0.4 % (ref 0–1.9)
BUN SERPL-MCNC: 29 MG/DL (ref 6–20)
CALCIUM SERPL-MCNC: 8.8 MG/DL (ref 8.7–10.5)
CHLORIDE SERPL-SCNC: 104 MMOL/L (ref 95–110)
CK SERPL-CCNC: 124 U/L (ref 20–200)
CO2 SERPL-SCNC: 16 MMOL/L (ref 23–29)
CREAT SERPL-MCNC: 9.1 MG/DL (ref 0.5–1.4)
DIFFERENTIAL METHOD: ABNORMAL
EOSINOPHIL # BLD AUTO: 0.1 K/UL (ref 0–0.5)
EOSINOPHIL NFR BLD: 0.8 % (ref 0–8)
ERYTHROCYTE [DISTWIDTH] IN BLOOD BY AUTOMATED COUNT: 15.9 % (ref 11.5–14.5)
EST. GFR  (AFRICAN AMERICAN): 6.6 ML/MIN/1.73 M^2
EST. GFR  (NON AFRICAN AMERICAN): 5.7 ML/MIN/1.73 M^2
GLUCOSE SERPL-MCNC: 86 MG/DL (ref 70–110)
HCT VFR BLD AUTO: 26.8 % (ref 40–54)
HGB BLD-MCNC: 8.5 G/DL (ref 14–18)
IMM GRANULOCYTES # BLD AUTO: 0.04 K/UL (ref 0–0.04)
IMM GRANULOCYTES NFR BLD AUTO: 0.5 % (ref 0–0.5)
LYMPHOCYTES # BLD AUTO: 0.9 K/UL (ref 1–4.8)
LYMPHOCYTES NFR BLD: 10.6 % (ref 18–48)
MCH RBC QN AUTO: 28.6 PG (ref 27–31)
MCHC RBC AUTO-ENTMCNC: 31.7 G/DL (ref 32–36)
MCV RBC AUTO: 90 FL (ref 82–98)
MONOCYTES # BLD AUTO: 1.7 K/UL (ref 0.3–1)
MONOCYTES NFR BLD: 19.8 % (ref 4–15)
NEUTROPHILS # BLD AUTO: 5.7 K/UL (ref 1.8–7.7)
NEUTROPHILS NFR BLD: 67.9 % (ref 38–73)
NRBC BLD-RTO: 0 /100 WBC
PHOSPHATE SERPL-MCNC: 5.1 MG/DL (ref 2.7–4.5)
PLATELET # BLD AUTO: 279 K/UL (ref 150–450)
PMV BLD AUTO: 9.5 FL (ref 9.2–12.9)
POTASSIUM SERPL-SCNC: 4.7 MMOL/L (ref 3.5–5.1)
RBC # BLD AUTO: 2.97 M/UL (ref 4.6–6.2)
SODIUM SERPL-SCNC: 136 MMOL/L (ref 136–145)
WBC # BLD AUTO: 8.34 K/UL (ref 3.9–12.7)

## 2022-03-05 PROCEDURE — 25000003 PHARM REV CODE 250: Mod: NTX | Performed by: INTERNAL MEDICINE

## 2022-03-05 PROCEDURE — 25000003 PHARM REV CODE 250: Mod: NTX | Performed by: PHYSICIAN ASSISTANT

## 2022-03-05 PROCEDURE — 63600175 PHARM REV CODE 636 W HCPCS: Mod: NTX | Performed by: HOSPITALIST

## 2022-03-05 PROCEDURE — 63600175 PHARM REV CODE 636 W HCPCS: Mod: NTX | Performed by: INTERNAL MEDICINE

## 2022-03-05 PROCEDURE — 25000003 PHARM REV CODE 250: Mod: NTX | Performed by: FAMILY MEDICINE

## 2022-03-05 PROCEDURE — 99232 PR SUBSEQUENT HOSPITAL CARE,LEVL II: ICD-10-PCS | Mod: NTX,,, | Performed by: HOSPITALIST

## 2022-03-05 PROCEDURE — 99232 SBSQ HOSP IP/OBS MODERATE 35: CPT | Mod: NTX,,, | Performed by: HOSPITALIST

## 2022-03-05 PROCEDURE — 99900035 HC TECH TIME PER 15 MIN (STAT): Mod: NTX

## 2022-03-05 PROCEDURE — 36415 COLL VENOUS BLD VENIPUNCTURE: CPT | Mod: NTX | Performed by: HOSPITALIST

## 2022-03-05 PROCEDURE — 82040 ASSAY OF SERUM ALBUMIN: CPT | Mod: NTX | Performed by: HOSPITALIST

## 2022-03-05 PROCEDURE — 63600175 PHARM REV CODE 636 W HCPCS: Mod: NTX | Performed by: PHYSICIAN ASSISTANT

## 2022-03-05 PROCEDURE — 80048 BASIC METABOLIC PNL TOTAL CA: CPT | Mod: NTX | Performed by: HOSPITALIST

## 2022-03-05 PROCEDURE — 20600001 HC STEP DOWN PRIVATE ROOM: Mod: NTX

## 2022-03-05 PROCEDURE — 94660 CPAP INITIATION&MGMT: CPT | Mod: NTX

## 2022-03-05 PROCEDURE — 82550 ASSAY OF CK (CPK): CPT | Mod: NTX | Performed by: HOSPITALIST

## 2022-03-05 PROCEDURE — 85025 COMPLETE CBC W/AUTO DIFF WBC: CPT | Mod: NTX | Performed by: HOSPITALIST

## 2022-03-05 PROCEDURE — 25000003 PHARM REV CODE 250: Mod: NTX | Performed by: HOSPITALIST

## 2022-03-05 PROCEDURE — 90935 HEMODIALYSIS ONE EVALUATION: CPT | Mod: NTX

## 2022-03-05 PROCEDURE — 84100 ASSAY OF PHOSPHORUS: CPT | Mod: NTX | Performed by: HOSPITALIST

## 2022-03-05 RX ORDER — HEPARIN SODIUM 1000 [USP'U]/ML
1000 INJECTION, SOLUTION INTRAVENOUS; SUBCUTANEOUS
Status: DISCONTINUED | OUTPATIENT
Start: 2022-03-05 | End: 2022-03-10 | Stop reason: HOSPADM

## 2022-03-05 RX ADMIN — OXYCODONE HYDROCHLORIDE 10 MG: 10 TABLET ORAL at 05:03

## 2022-03-05 RX ADMIN — DAPTOMYCIN 445 MG: 350 INJECTION, POWDER, LYOPHILIZED, FOR SOLUTION INTRAVENOUS at 09:03

## 2022-03-05 RX ADMIN — HEPARIN SODIUM 5000 UNITS: 5000 INJECTION INTRAVENOUS; SUBCUTANEOUS at 09:03

## 2022-03-05 RX ADMIN — SEVELAMER CARBONATE 800 MG: 800 TABLET, FILM COATED ORAL at 05:03

## 2022-03-05 RX ADMIN — PROCHLORPERAZINE EDISYLATE 5 MG: 5 INJECTION INTRAMUSCULAR; INTRAVENOUS at 11:03

## 2022-03-05 RX ADMIN — LABETALOL HYDROCHLORIDE 200 MG: 100 TABLET, FILM COATED ORAL at 08:03

## 2022-03-05 RX ADMIN — NYSTATIN 500000 UNITS: 500000 SUSPENSION ORAL at 05:03

## 2022-03-05 RX ADMIN — ONDANSETRON 8 MG: 8 TABLET, ORALLY DISINTEGRATING ORAL at 08:03

## 2022-03-05 RX ADMIN — OXYCODONE HYDROCHLORIDE 10 MG: 10 TABLET ORAL at 03:03

## 2022-03-05 RX ADMIN — PROCHLORPERAZINE EDISYLATE 5 MG: 5 INJECTION INTRAMUSCULAR; INTRAVENOUS at 08:03

## 2022-03-05 RX ADMIN — HEPARIN SODIUM 1000 UNITS: 1000 INJECTION, SOLUTION INTRAVENOUS; SUBCUTANEOUS at 01:03

## 2022-03-05 RX ADMIN — METHOCARBAMOL 750 MG: 750 TABLET ORAL at 10:03

## 2022-03-05 RX ADMIN — OXYCODONE HYDROCHLORIDE 10 MG: 10 TABLET ORAL at 11:03

## 2022-03-05 RX ADMIN — HYDRALAZINE HYDROCHLORIDE 75 MG: 50 TABLET ORAL at 09:03

## 2022-03-05 RX ADMIN — Medication 80 MG: at 02:03

## 2022-03-05 RX ADMIN — METHOCARBAMOL 750 MG: 750 TABLET ORAL at 05:03

## 2022-03-05 RX ADMIN — PANTOPRAZOLE SODIUM 40 MG: 40 TABLET, DELAYED RELEASE ORAL at 08:03

## 2022-03-05 RX ADMIN — NYSTATIN 500000 UNITS: 500000 SUSPENSION ORAL at 08:03

## 2022-03-05 RX ADMIN — HEPARIN SODIUM 5000 UNITS: 5000 INJECTION INTRAVENOUS; SUBCUTANEOUS at 05:03

## 2022-03-05 RX ADMIN — OXYCODONE HYDROCHLORIDE 10 MG: 10 TABLET ORAL at 01:03

## 2022-03-05 RX ADMIN — Medication 6 MG: at 02:03

## 2022-03-05 RX ADMIN — OXYCODONE 5 MG: 5 TABLET ORAL at 08:03

## 2022-03-05 RX ADMIN — ACETAMINOPHEN 1000 MG: 500 TABLET ORAL at 09:03

## 2022-03-05 RX ADMIN — ARIPIPRAZOLE 15 MG: 5 TABLET ORAL at 08:03

## 2022-03-05 RX ADMIN — HYDRALAZINE HYDROCHLORIDE 75 MG: 50 TABLET ORAL at 05:03

## 2022-03-05 RX ADMIN — ASPIRIN 81 MG: 81 TABLET, COATED ORAL at 08:03

## 2022-03-05 RX ADMIN — SEVELAMER CARBONATE 800 MG: 800 TABLET, FILM COATED ORAL at 08:03

## 2022-03-05 RX ADMIN — CEFTRIAXONE SODIUM 2 G: 2 INJECTION, SOLUTION INTRAVENOUS at 08:03

## 2022-03-05 RX ADMIN — SODIUM CHLORIDE: 0.9 INJECTION, SOLUTION INTRAVENOUS at 10:03

## 2022-03-05 RX ADMIN — AMLODIPINE BESYLATE 10 MG: 10 TABLET ORAL at 08:03

## 2022-03-05 RX ADMIN — ATORVASTATIN CALCIUM 80 MG: 20 TABLET, FILM COATED ORAL at 08:03

## 2022-03-05 RX ADMIN — FOLIC ACID 1 MG: 1 TABLET ORAL at 08:03

## 2022-03-05 NOTE — PLAN OF CARE
"HD orders written for today- not done yet. Patient has not eaten today. C/o one episode of vomiting relieved by zofran, then another episode of nausea after being asleep most of the day. Plan is to go home tomorrow, receiving antibiotics with dialysis. Antibiotic prescriptions faxed to outside facility by - original scripts in chart. Patient angry that I didn't take him outside to smoke today- later in the evening, he called the desk demanding for floor to be cleaned. He stated he threw water on the floor because he was angry that "nobody took me outside to smoke."     "

## 2022-03-05 NOTE — PROGRESS NOTES
Patient arrived via wheelchair. Patient c/o leg/foot pain and nausea; message sent to pharmacy to  pain and nausea med. (1038) Acute Hemodialysis initiated to right IJ permcath. Lines secured.

## 2022-03-05 NOTE — ASSESSMENT & PLAN NOTE
Membranous glomerulonephritis  - Nephrology consulted on admit for  and Cr 20.9 and uremic symptoms of tremors. 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 Nephrology recommended HD on admit but patient repeated refused and finally agreed after multiple conversations to start on 2/22. Nephrology has been managing his HD needs [facility].   - GFR was 13-16 up from baseline 4-6 previously in January 2022. Labs repeated today and GFR is 2.6, Cr 19.   - Patient follows with Dr. Cardoza and Dr Lee and dialysis has been discussed multiple times, patient continues to refuse and on transplant list with [facility] but on this admit after multiple attempts patient finally agreed and trialysis line placed on 2/22 and patient started on HD on 2/23 for progressive CKD stage V that has now progressed to ESRD.   - SW arranging [facility] HD - [facility] and has [facility]WF chair starting next week  -Patient initially deemed not to have capacity by psychiatry (in setting of tereso on ckd and uremia) but now has capacity to make medical decisions.   -Outpatient center requesting repeat psych eval which has been done on 3/4  -Perm Cath placed on 2/28.

## 2022-03-05 NOTE — ASSESSMENT & PLAN NOTE
Due to current illness. PT rec 24/7 S/A but unclear if patient has this support at home. Will reach out to patient's ex wife and find out. Will also try to clarify with patient.   -asked PT to reval as he has continued to improve  -CM to explore rehab options  -patient's LTC may still be in process so may not yet have this support  -dc pending safe dc plan

## 2022-03-05 NOTE — ASSESSMENT & PLAN NOTE
- Patient admitted with left foot pain and recent left foot infection and salmonella bacteremia at an outside hopsital treated with Cipro.   - Lactic normal on admit.   - Admit labs: ESR 97, CRP 33.4, procal 1.0.  - MRI of left foot showed regions of focal abnormal signal involving the distal 1st metatarsal and proximal 1st phalanx.  Findings are concerning for foci of subacute osteomyelitis.   -Podiatry consulted and at first deferred bone biopsy and recommended just abx treatment and ID consulted and patient started on IV Daptomycin and Rocephin. Patient treated for gout by Podiatry as concerned some changes related to gout.   -minimal improvement in pain with gout tx so Podiatry per ID request performed bone biopsy on 2/25 to left foot. Pathology and Micro from bone biopsy pending.   -ID final recs for likely need for 6 weeks IV antibiotics for osteo and can transition to IV Vanc/Cefepime 2g with HD until 4/9 (see note from 3/4)  -weekly labs    on Mondays cbc, cmp, crp, vanc trough   also on Thursday CMP, vanc trough   Fax to ID clinic [phone]  -needs ambulatory referral to ID (placed)  -pharmacy consult placed to transition to vancomycin from daptomycin. Can transition to cefepime on discharge

## 2022-03-05 NOTE — PLAN OF CARE
Patient tolerated 3 hour treatment. 664 ml removed. UF was paused for 's-110's and 's twice during treatment. Patient rinsed back. Catheter flushed with NS and Heparin 1000 units/ml to fill volume instilled into each cath port. Catheter capped, clamped, and taped. Patient without complaint. Patient left with transport via  to return to room.

## 2022-03-05 NOTE — PROGRESS NOTES
[facility]  Progress Note    Patient Name: Enrique Martinez  MRN: [id number]  Patient Class: IP- Inpatient   Admission Date: 2/16/2022  Length of Stay: 16 days  Attending Physician: Jory Ryan MD  Primary Care Provider: Primary Doctor No        Subjective:     Principal Problem:Subacute osteomyelitis of left foot        HPI:  Enrique Martinez is a 56 y.o. male with a PMHx of CKD V glomerulonephropathy, insulin dependent diabetes mellitus, and CAD who presented tot  ED for evaluation of worsening left foot pain and swelling. He was recently admitted at [facility] from 2/2-2/4/2022 from LLE cellulitis. He was found to have salmonella bacteremia suspected due to diarrheal illness. He was discharged with cipro for which he reports compliance. He has had no improvement in his pain, redness, or swelling of his left foot and leg. He has also developed a new blister on his left foot. He endorses chills. He denies fevers, chest pain, SOB, N/V, decreased urine output, dysuria, and flank pain.    ED: HR 97, other VSSAF. CBC with leukocytosis of 16.01, Hgb 7.4, Hct 22.2.  ESR 97. CRP 33.4. K 5.5. Cr 19.7, . Phos 10.4. Lactic WNL. MRI L foot with cellulitis and osteomyelitis of distal 1st metatarsal and proximal 1st phalanx.      Overview/Hospital Course:  .Enrique Martinez is a 56 y.o. male with a PMHx of CKD V glomerulonephropathy, insulin dependent diabetes mellitus, and CAD who presented tot  ED for evaluation of worsening left foot pain and swelling. He was recently admitted at [facility] from 2/2-2/4/2022 from LLE cellulitis. He was found to have salmonella bacteremia suspected due to diarrheal illness. He was discharged with cipro for which he reports compliance. He has had no improvement in his pain, redness, or swelling of his left foot and leg. He has also developed a new blister on his left foot. He endorses chills. He denies fevers, chest pain, SOB, N/V, decreased urine output,  "dysuria, and flank pain. Patient admitted with worsening and progressive chronic kidney disease with  and Cr 20.9. Nephrology is consulted for his CKD stage V that has progressed ti ESRD and at first patient not amenable to starting HD but finally agreed after family involvement and patient had trialysis catheter placed and started on HD as per Nephrology. Patient was also complaining of neck pain on admit and had abnormal C spine Xray with possible signs of C spine instability, MRi ordered and patient refused 2/17, on pain meds, was discussed at length. Concern for possible infection also given acuity of pain. Imaging showed signs of C spine instability, prevertebral abscess likely with fluid collection with widening in C spine,. Neurosurgery consulted and recommended C collar- patient refused, and Xrays, refused, and MRI full spine when stable. Patient also had MRI of left foot on admit and showed "Regions of focal abnormal signal involving the distal 1st metatarsal and proximal 1st phalanx as described above.  Findings are concerning for foci of subacute myelitis (Yaerd's abscess).  Other less likely considerations include cystic degenerative change." ENT consulted for possible retropharyngeal abscess as per Neurosurgery and Infectious Disease consulted. At first patient kept refusing advanced imaging but finally agreed and repeat MRI of cervical spine done on 2/22 with anesthesia to help with sedation. MRI done without contrast and concern for possible retropharyngeal abscess. Patient placed on empiric IV Daptomycin and Ceftriaxone by ID.  ENT recommended needed study with contrast to really investigate the area better. Repeat MRI with contrast done of cervical spine on 2/24 and no obvious fluid collection noted and not obvious signs of infection so ENT and Neurosurgery signed off and stated nothing needed to be done. Patient kept on IV Daptomycin and Rocephin to cover for his left foot osteo. Podiatry " was consulted and performed bone biopsy as per ID recs on 2/25 to determine duration and type of abx needed to treat osteomyelitis of left foot. SW obtained outpatient HD chair at [facility] (new start HD this admit). ID finalized recs on discharge for cefepime/vanc until 4/9 with HD.  Pharmacy consulted to assist with vanc dosing.       Interval History: less nauseated today and able to eat a little but doesn't like our food.  BM this am. No abd pain. Would like to go home. Discussed PT recs for 24/7 S/A.  Pt first telling me that he is going to live with his sister but that she won't be their 24 hr and then that medicare is going to provide care. See below for discuss with CM/BRE and planning.     Review of Systems   Respiratory:  Negative for shortness of breath.    Cardiovascular:  Negative for chest pain.   Gastrointestinal:  Negative for abdominal pain, blood in stool, diarrhea, nausea and vomiting.   Musculoskeletal:  Positive for myalgias.   Objective:     Vital Signs (Most Recent):  Temp: 98.2 °F (36.8 °C) (03/05/22 1025)  Pulse: (!) 112 (03/05/22 1315)  Resp: 16 (03/05/22 1123)  BP: 105/80 (03/05/22 1315)  SpO2: 97 % (03/05/22 0800)   Vital Signs (24h Range):  Temp:  [98.2 °F (36.8 °C)-99.4 °F (37.4 °C)] 98.2 °F (36.8 °C)  Pulse:  [] 112  Resp:  [16-18] 16  SpO2:  [94 %-97 %] 97 %  BP: (105-160)/(59-88) 105/80     Weight: 72 kg (158 lb 11.7 oz)  Body mass index is 25.62 kg/m².    Intake/Output Summary (Last 24 hours) at 3/5/2022 1318  Last data filed at 3/5/2022 0800  Gross per 24 hour   Intake 720 ml   Output 0 ml   Net 720 ml      Physical Exam  Vitals and nursing note reviewed.   Constitutional:       General: He is not in acute distress.     Appearance: He is well-developed.      Comments: Sitting up in bed, in darkened room   HENT:      Head: Normocephalic and atraumatic.      Mouth/Throat:      Pharynx: Oropharynx is clear.   Eyes:      Conjunctiva/sclera: Conjunctivae normal.   Neck:       Vascular: No JVD.   Cardiovascular:      Rate and Rhythm: Normal rate and regular rhythm.      Heart sounds: Normal heart sounds.   Pulmonary:      Effort: Pulmonary effort is normal.      Breath sounds: Normal breath sounds.   Chest:      Comments: PC in RU chest/IJ, c/d/i  Abdominal:      General: Bowel sounds are normal. There is no distension.      Palpations: Abdomen is soft.   Musculoskeletal:      Cervical back: Neck supple.      Right lower leg: No edema.      Left lower leg: No edema.   Skin:     Comments: Dry flaking skin on left foot   Neurological:      Mental Status: He is alert.   Psychiatric:         Behavior: Behavior normal.       Significant Labs: All pertinent labs within the past 24 hours have been reviewed.    Significant Imaging: I have reviewed all pertinent imaging results/findings within the past 24 hours.      Assessment/Plan:      * Subacute osteomyelitis of left foot  - Patient admitted with left foot pain and recent left foot infection and salmonella bacteremia at an outside hopsital treated with Cipro.   - Lactic normal on admit.   - Admit labs: ESR 97, CRP 33.4, procal 1.0.  - MRI of left foot showed regions of focal abnormal signal involving the distal 1st metatarsal and proximal 1st phalanx.  Findings are concerning for foci of subacute osteomyelitis.   -Podiatry consulted and at first deferred bone biopsy and recommended just abx treatment and ID consulted and patient started on IV Daptomycin and Rocephin. Patient treated for gout by Podiatry as concerned some changes related to gout.   -minimal improvement in pain with gout tx so Podiatry per ID request performed bone biopsy on 2/25 to left foot. Pathology and Micro from bone biopsy pending.   -ID final recs for likely need for 6 weeks IV antibiotics for osteo and can transition to IV Vanc/Cefepime 2g with HD until 4/9 (see note from 3/4)  -weekly labs    on Mondays cbc, cmp, crp, vanc trough   also on Thursday CMP, vanc  trough   Fax to ID clinic [phone]  -needs ambulatory referral to ID (placed)  -pharmacy consult placed to transition to vancomycin from daptomycin. Can transition to cefepime on discharge    Acute renal failure superimposed on stage 5 chronic kidney disease, not on chronic dialysis  Membranous glomerulonephritis  - Nephrology consulted on admit for  and Cr 20.9 and uremic symptoms of tremors. 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 Nephrology recommended HD on admit but patient repeated refused and finally agreed after multiple conversations to start on 2/22. Nephrology has been managing his HD needs [facility].   - GFR was 13-16 up from baseline 4-6 previously in January 2022. Labs repeated today and GFR is 2.6, Cr 19.   - Patient follows with Dr. Cardoza and Dr Lee and dialysis has been discussed multiple times, patient continues to refuse and on transplant list with [facility] but on this admit after multiple attempts patient finally agreed and trialysis line placed on 2/22 and patient started on HD on 2/23 for progressive CKD stage V that has now progressed to ESRD.   - SW arranging outpatient HD - [facility] and has MWF chair starting next week  -Patient initially deemed not to have capacity by psychiatry (in setting of tereso on ckd and uremia) but now has capacity to make medical decisions.   -Outpatient center requesting repeat psych eval which has been done on 3/4  -Perm Cath placed on 2/28.    Bilious vomiting with nausea  Happened on 3/4. Unclear etiology,improved today, tolerating po. Will monitoring    Debility  Due to current illness. PT rec 24/7 S/A but unclear if patient has this support at home. Will reach out to patient's ex wife and find out. Will also try to clarify with patient.   -asked PT to reval as he has continued to improve  -CM to explore rehab options  -patient's LTC may still be in process so may not yet have this  support  -dc pending safe dc plan      Encephalopathy, metabolic  On admission he was confused and was seen by psychiatry and determined not have capacity at that point to consent to HD (2/20). Subsequently, he has undergone HD and his mentation has cleared. In my evaluation, he is able to understand and manipulate the current medical situation and apply it to himself and thus has capacity      Diarrhea  resolved      Prevertebral Fluid Collection       Neck pain  Prevertebral fluid collection  - Patient had neck pain on admit and imaging done: Abnormal C spine Xray with possible signs of C spine instability, MRI ordered and patient refused 2/17, on pain meds, discussed at length, see 2/17 and 2/18.  -Concern for prevertebral abscess likely with fluid collection with widening in C spine, NS consulted and recomended C collar- patient refused, and Xrays, refused.   -ENTconsulted for retropharyngeal abscess per NS and ID recs, will likely scope if amenable at bedside (he refused). They rec MRI C spine with contrast.  -NS recs MRI all spine, ID/ENT rec repeat MRI C spine for better pictures - initially imaging delayed 2/2 pt discomfort and concerns but MRI/CT finally done and complete on 2/22 and 2/24.  - MRI with contrast on 2/24 showed no fluid collection, will not require intervention as per Neurosurgery and ENT and signed off. ID stated did not need any antibiotics for his neck and remains on abx just to treat his left foot osteomyelitis not related to any suspected neck infection. No need for C- collar as per NS.       Chronic gout of left foot due to renal impairment without tophus  Chornci and controlled. S/p Colcichine 0.3 mg po BID to treat as per Podiatry recs. Left foot pain improved.       Folic acid deficiency  Present on admit. Folate low at 4.4. Patient started on Folic acid 1 mg po daily ion hospital and will continue.       SHANNAN (iron deficiency anemia)        Benign prostatic hyperplasia  [place] placed  "2/18 overnight for incontinence    Gastroesophageal reflux disease without esophagitis  Chronic and controlled. Continue Protonix 40 mg po daily to treat.     Patient on waiting list for kidney transplant        Atherosclerosis of native coronary artery of native heart without angina pectoris  Patient with known CAD. Patient asymptomatic. Continue Labetalol, Asprin 81 mg po daily and Lipitor 80 mg po daily to treat CAD.       Hx of syphilis  Per Helio Garcia NP (ID) note 9/18/18, "Patient reports recent treatment with 3 doses of bicillin for + Treponema Pallidum antibody (5/16/2018) (per patient, this was a re-treatment because he couldn't get prior records of apparent prior treatment).  RPR 1:2.   Need to confirm treatment with [facility].  RPR here is pending.   Recommend repeat RPR 6 months from treatment - December 2018"  - Treated.   - RPR non-reactive 12/2018.      S/P coronary artery stent placement  Patient with previous coronary stent for CAD in past in 2007. Continue Labetalol, Asprin 81 mg po daily and Lipitor 80 mg po daily to treat CAD.     Type 2 diabetes mellitus with chronic kidney disease on chronic dialysis, with long-term current use of insulin  Good control in the hospital in past 24 hours.  · Plan is to continue to monitor POCT glucose 4 times a day with each meal and at bedtime and cover with Novolog low dose sliding scale insulin.   · Target pre-meal glucose goal is <140 with all random glucoses <180 in non-critically ill patient.  · Was previously on lantus but may not need this on discharge with new CKD    Anemia due to chronic kidney disease, on chronic dialysis  - Patient admitted with Hgb in 7-8 range and related to his advanced CKD.   - Patient transfused 1 unit of PRBCs on 2/18 for Hgb 6.9 and trabnsfuse again 1 unit on 2/23 for Hgb 6.7 but Hgb stable since. No signs of bleeding noted and felt all related to his CKD in combination with chronic infection/inflammation.    - Hgb stable mid to high " 8s since last transfusion and dimitrios monitor with CBC in hospital and consider transfusion if Hgb < 7.   -continue folic acid      Membranous glomerulonephritis  -brought acthar gel from home to do non formulary as 40K and got via prior auth-non form request addded  -reason for CKD 5.      Pure hypercholesterolemia  Chronic and controlled. Continue Lipitor 80 mg po daily to treat.     Renovascular hypertension  - Elevated on admit but meds adjusted and now controlled.   - Goal BP < 140/90.  - Conitnue Norvasc 10 mg po daily + Hydralazine 75 mg po TID + Labetalol 200 mg po BID to treat and adjust as needed for target BP goal in hospital.       VTE Risk Mitigation (From admission, onward)         Ordered     heparin (porcine) injection 1,000 Units  As needed (PRN)         03/05/22 1119     heparin (porcine) injection 1,000 Units  As needed (PRN)         03/03/22 1546     heparin (porcine) injection 5,000 Units  Every 8 hours         02/25/22 0747     IP VTE HIGH RISK PATIENT  Once         02/17/22 0151     Place sequential compression device  Until discontinued         02/17/22 0151                Discharge Planning   JOAN: 3/5/2022     Code Status: Full Code   Is the patient medically ready for discharge?: No    Reason for patient still in hospital (select all that apply): PT / OT recommendations and Pending disposition  Discharge Plan A: Home Health   Discharge Delays: None known at this time              Jory Ryan MD  Department of Hospital Medicine   [facility]

## 2022-03-05 NOTE — PLAN OF CARE
NAEON  VSS  Room air, sats >92%  AOX4  Telemetry D/Cd  SAM, SBA OOB  PIVX1, LFA  RCW permacath for HD  Both access sites CDI  Pt anuric  Dressing to left foot CDI  Pain meds given PRN  HD scheduled for today and then D/C home  Skin excoriated but intact  Safety/fall precautions maintained, non slip socks when OOB  Bed locked in lowest position  Call light within reach  WCTM

## 2022-03-05 NOTE — PLAN OF CARE
Patient is AAOx3. Patient c/o pain and nausea. PRN meds given per orders. Left foot dressing changed. Patient had HD today. Reminded the patient to call for assistance. Call light and personal items are within reach.

## 2022-03-05 NOTE — SUBJECTIVE & OBJECTIVE
Interval History: less nauseated today and able to eat a little but doesn't like our food.  BM this am. No abd pain. Would like to go home. Discussed PT recs for 24/7 S/A.  Pt first telling me that he is going to live with his sister but that she won't be their 24 hr and then that medicare is going to provide care. See below for discuss with CM/SW and planning.     Review of Systems   Respiratory:  Negative for shortness of breath.    Cardiovascular:  Negative for chest pain.   Gastrointestinal:  Negative for abdominal pain, blood in stool, diarrhea, nausea and vomiting.   Musculoskeletal:  Positive for myalgias.   Objective:     Vital Signs (Most Recent):  Temp: 98.2 °F (36.8 °C) (03/05/22 1025)  Pulse: (!) 112 (03/05/22 1315)  Resp: 16 (03/05/22 1123)  BP: 105/80 (03/05/22 1315)  SpO2: 97 % (03/05/22 0800)   Vital Signs (24h Range):  Temp:  [98.2 °F (36.8 °C)-99.4 °F (37.4 °C)] 98.2 °F (36.8 °C)  Pulse:  [] 112  Resp:  [16-18] 16  SpO2:  [94 %-97 %] 97 %  BP: (105-160)/(59-88) 105/80     Weight: 72 kg (158 lb 11.7 oz)  Body mass index is 25.62 kg/m².    Intake/Output Summary (Last 24 hours) at 3/5/2022 1318  Last data filed at 3/5/2022 0800  Gross per 24 hour   Intake 720 ml   Output 0 ml   Net 720 ml      Physical Exam  Vitals and nursing note reviewed.   Constitutional:       General: He is not in acute distress.     Appearance: He is well-developed.      Comments: Sitting up in bed, in darkened room   HENT:      Head: Normocephalic and atraumatic.      Mouth/Throat:      Pharynx: Oropharynx is clear.   Eyes:      Conjunctiva/sclera: Conjunctivae normal.   Neck:      Vascular: No JVD.   Cardiovascular:      Rate and Rhythm: Normal rate and regular rhythm.      Heart sounds: Normal heart sounds.   Pulmonary:      Effort: Pulmonary effort is normal.      Breath sounds: Normal breath sounds.   Chest:      Comments: PC in RU chest/IJ, c/d/i  Abdominal:      General: Bowel sounds are normal. There is no  distension.      Palpations: Abdomen is soft.   Musculoskeletal:      Cervical back: Neck supple.      Right lower leg: No edema.      Left lower leg: No edema.   Skin:     Comments: Dry flaking skin on left foot   Neurological:      Mental Status: He is alert.   Psychiatric:         Behavior: Behavior normal.       Significant Labs: All pertinent labs within the past 24 hours have been reviewed.    Significant Imaging: I have reviewed all pertinent imaging results/findings within the past 24 hours.

## 2022-03-06 LAB
ANION GAP SERPL CALC-SCNC: 13 MMOL/L (ref 8–16)
BASOPHILS # BLD AUTO: 0.03 K/UL (ref 0–0.2)
BASOPHILS NFR BLD: 0.4 % (ref 0–1.9)
BUN SERPL-MCNC: 15 MG/DL (ref 6–20)
CALCIUM SERPL-MCNC: 8.4 MG/DL (ref 8.7–10.5)
CHLORIDE SERPL-SCNC: 94 MMOL/L (ref 95–110)
CO2 SERPL-SCNC: 30 MMOL/L (ref 23–29)
CREAT SERPL-MCNC: 6 MG/DL (ref 0.5–1.4)
DIFFERENTIAL METHOD: ABNORMAL
EOSINOPHIL # BLD AUTO: 0.1 K/UL (ref 0–0.5)
EOSINOPHIL NFR BLD: 0.8 % (ref 0–8)
ERYTHROCYTE [DISTWIDTH] IN BLOOD BY AUTOMATED COUNT: 15.7 % (ref 11.5–14.5)
EST. GFR  (AFRICAN AMERICAN): 10.9 ML/MIN/1.73 M^2
EST. GFR  (NON AFRICAN AMERICAN): 9.4 ML/MIN/1.73 M^2
GLUCOSE SERPL-MCNC: 89 MG/DL (ref 70–110)
HCT VFR BLD AUTO: 25.5 % (ref 40–54)
HGB BLD-MCNC: 8.2 G/DL (ref 14–18)
IMM GRANULOCYTES # BLD AUTO: 0.02 K/UL (ref 0–0.04)
IMM GRANULOCYTES NFR BLD AUTO: 0.3 % (ref 0–0.5)
LYMPHOCYTES # BLD AUTO: 1.4 K/UL (ref 1–4.8)
LYMPHOCYTES NFR BLD: 18.4 % (ref 18–48)
MCH RBC QN AUTO: 29 PG (ref 27–31)
MCHC RBC AUTO-ENTMCNC: 32.2 G/DL (ref 32–36)
MCV RBC AUTO: 90 FL (ref 82–98)
MONOCYTES # BLD AUTO: 1.8 K/UL (ref 0.3–1)
MONOCYTES NFR BLD: 24 % (ref 4–15)
NEUTROPHILS # BLD AUTO: 4.3 K/UL (ref 1.8–7.7)
NEUTROPHILS NFR BLD: 56.1 % (ref 38–73)
NRBC BLD-RTO: 0 /100 WBC
PHOSPHATE SERPL-MCNC: 4.5 MG/DL (ref 2.7–4.5)
PLATELET # BLD AUTO: 249 K/UL (ref 150–450)
PMV BLD AUTO: 9.5 FL (ref 9.2–12.9)
POTASSIUM SERPL-SCNC: 3.7 MMOL/L (ref 3.5–5.1)
RBC # BLD AUTO: 2.83 M/UL (ref 4.6–6.2)
SARS-COV-2 RDRP RESP QL NAA+PROBE: NEGATIVE
SODIUM SERPL-SCNC: 137 MMOL/L (ref 136–145)
WBC # BLD AUTO: 7.61 K/UL (ref 3.9–12.7)

## 2022-03-06 PROCEDURE — 99232 SBSQ HOSP IP/OBS MODERATE 35: CPT | Mod: NTX,,, | Performed by: INTERNAL MEDICINE

## 2022-03-06 PROCEDURE — 94760 N-INVAS EAR/PLS OXIMETRY 1: CPT | Mod: NTX

## 2022-03-06 PROCEDURE — 99232 PR SUBSEQUENT HOSPITAL CARE,LEVL II: ICD-10-PCS | Mod: NTX,,, | Performed by: INTERNAL MEDICINE

## 2022-03-06 PROCEDURE — 63600175 PHARM REV CODE 636 W HCPCS: Mod: NTX | Performed by: HOSPITALIST

## 2022-03-06 PROCEDURE — 25000003 PHARM REV CODE 250: Mod: NTX | Performed by: INTERNAL MEDICINE

## 2022-03-06 PROCEDURE — 25000003 PHARM REV CODE 250: Mod: NTX | Performed by: PHYSICIAN ASSISTANT

## 2022-03-06 PROCEDURE — 25000003 PHARM REV CODE 250: Mod: NTX | Performed by: HOSPITALIST

## 2022-03-06 PROCEDURE — 25000003 PHARM REV CODE 250: Mod: NTX | Performed by: FAMILY MEDICINE

## 2022-03-06 PROCEDURE — 36415 COLL VENOUS BLD VENIPUNCTURE: CPT | Mod: NTX | Performed by: HOSPITALIST

## 2022-03-06 PROCEDURE — 94761 N-INVAS EAR/PLS OXIMETRY MLT: CPT | Mod: NTX

## 2022-03-06 PROCEDURE — 80048 BASIC METABOLIC PNL TOTAL CA: CPT | Mod: NTX | Performed by: HOSPITALIST

## 2022-03-06 PROCEDURE — 85025 COMPLETE CBC W/AUTO DIFF WBC: CPT | Mod: NTX | Performed by: HOSPITALIST

## 2022-03-06 PROCEDURE — 84100 ASSAY OF PHOSPHORUS: CPT | Mod: NTX | Performed by: HOSPITALIST

## 2022-03-06 PROCEDURE — U0002 COVID-19 LAB TEST NON-CDC: HCPCS | Mod: NTX | Performed by: INTERNAL MEDICINE

## 2022-03-06 PROCEDURE — 20600001 HC STEP DOWN PRIVATE ROOM: Mod: NTX

## 2022-03-06 RX ORDER — GABAPENTIN 100 MG/1
100 CAPSULE ORAL ONCE
Status: COMPLETED | OUTPATIENT
Start: 2022-03-06 | End: 2022-03-06

## 2022-03-06 RX ORDER — GABAPENTIN 100 MG/1
100 CAPSULE ORAL DAILY
Status: DISCONTINUED | OUTPATIENT
Start: 2022-03-07 | End: 2022-03-10 | Stop reason: HOSPADM

## 2022-03-06 RX ADMIN — CEFTRIAXONE SODIUM 2 G: 2 INJECTION, SOLUTION INTRAVENOUS at 09:03

## 2022-03-06 RX ADMIN — ARIPIPRAZOLE 15 MG: 5 TABLET ORAL at 09:03

## 2022-03-06 RX ADMIN — HEPARIN SODIUM 5000 UNITS: 5000 INJECTION INTRAVENOUS; SUBCUTANEOUS at 09:03

## 2022-03-06 RX ADMIN — Medication 6 MG: at 10:03

## 2022-03-06 RX ADMIN — SEVELAMER CARBONATE 800 MG: 800 TABLET, FILM COATED ORAL at 07:03

## 2022-03-06 RX ADMIN — OXYCODONE HYDROCHLORIDE 10 MG: 10 TABLET ORAL at 10:03

## 2022-03-06 RX ADMIN — ASPIRIN 81 MG: 81 TABLET, COATED ORAL at 08:03

## 2022-03-06 RX ADMIN — GABAPENTIN 100 MG: 100 CAPSULE ORAL at 12:03

## 2022-03-06 RX ADMIN — HYDRALAZINE HYDROCHLORIDE 75 MG: 50 TABLET ORAL at 09:03

## 2022-03-06 RX ADMIN — HYDRALAZINE HYDROCHLORIDE 75 MG: 50 TABLET ORAL at 01:03

## 2022-03-06 RX ADMIN — ATORVASTATIN CALCIUM 80 MG: 20 TABLET, FILM COATED ORAL at 08:03

## 2022-03-06 RX ADMIN — AMLODIPINE BESYLATE 10 MG: 10 TABLET ORAL at 08:03

## 2022-03-06 RX ADMIN — OXYCODONE HYDROCHLORIDE 10 MG: 10 TABLET ORAL at 04:03

## 2022-03-06 RX ADMIN — LABETALOL HYDROCHLORIDE 200 MG: 100 TABLET, FILM COATED ORAL at 08:03

## 2022-03-06 RX ADMIN — NYSTATIN 500000 UNITS: 500000 SUSPENSION ORAL at 12:03

## 2022-03-06 RX ADMIN — LABETALOL HYDROCHLORIDE 200 MG: 100 TABLET, FILM COATED ORAL at 09:03

## 2022-03-06 RX ADMIN — NYSTATIN 500000 UNITS: 500000 SUSPENSION ORAL at 05:03

## 2022-03-06 RX ADMIN — HYDRALAZINE HYDROCHLORIDE 75 MG: 50 TABLET ORAL at 05:03

## 2022-03-06 RX ADMIN — NYSTATIN 500000 UNITS: 500000 SUSPENSION ORAL at 07:03

## 2022-03-06 RX ADMIN — HEPARIN SODIUM 5000 UNITS: 5000 INJECTION INTRAVENOUS; SUBCUTANEOUS at 05:03

## 2022-03-06 RX ADMIN — OXYCODONE HYDROCHLORIDE 10 MG: 10 TABLET ORAL at 09:03

## 2022-03-06 RX ADMIN — PANTOPRAZOLE SODIUM 40 MG: 40 TABLET, DELAYED RELEASE ORAL at 08:03

## 2022-03-06 RX ADMIN — OXYCODONE HYDROCHLORIDE 10 MG: 10 TABLET ORAL at 01:03

## 2022-03-06 RX ADMIN — NYSTATIN 500000 UNITS: 500000 SUSPENSION ORAL at 09:03

## 2022-03-06 RX ADMIN — SEVELAMER CARBONATE 800 MG: 800 TABLET, FILM COATED ORAL at 05:03

## 2022-03-06 RX ADMIN — ACETAMINOPHEN 1000 MG: 500 TABLET ORAL at 01:03

## 2022-03-06 RX ADMIN — HEPARIN SODIUM 5000 UNITS: 5000 INJECTION INTRAVENOUS; SUBCUTANEOUS at 01:03

## 2022-03-06 RX ADMIN — METHOCARBAMOL 750 MG: 750 TABLET ORAL at 10:03

## 2022-03-06 RX ADMIN — ACETAMINOPHEN 650 MG: 325 TABLET ORAL at 02:03

## 2022-03-06 RX ADMIN — FOLIC ACID 1 MG: 1 TABLET ORAL at 08:03

## 2022-03-06 RX ADMIN — SEVELAMER CARBONATE 800 MG: 800 TABLET, FILM COATED ORAL at 12:03

## 2022-03-06 RX ADMIN — Medication 6 MG: at 12:03

## 2022-03-06 NOTE — SUBJECTIVE & OBJECTIVE
Interval History:     Feeling well today   Some pain of LLE, trial low dose gabapentin (100mg once)  Agreeable to IPR  Discussed with CM/SW - referrals sent  PMR consulted  Facility transfer orders written    Review of Systems   Respiratory:  Negative for shortness of breath.    Cardiovascular:  Negative for chest pain.   Gastrointestinal:  Negative for abdominal pain, blood in stool, diarrhea, nausea and vomiting.   Musculoskeletal:  Positive for myalgias.   Objective:     Vital Signs (Most Recent):  Temp: 98 °F (36.7 °C) (03/06/22 1214)  Pulse: 109 (03/06/22 1214)  Resp: 18 (03/06/22 1214)  BP: 111/67 (03/06/22 1214)  SpO2: 98 % (03/06/22 1214)   Vital Signs (24h Range):  Temp:  [98 °F (36.7 °C)-99.1 °F (37.3 °C)] 98 °F (36.7 °C)  Pulse:  [] 109  Resp:  [17-18] 18  SpO2:  [92 %-98 %] 98 %  BP: (105-138)/(57-78) 111/67     Weight: 70.3 kg (155 lb)  Body mass index is 25.02 kg/m².    Intake/Output Summary (Last 24 hours) at 3/6/2022 1355  Last data filed at 3/6/2022 0800  Gross per 24 hour   Intake 840 ml   Output 0 ml   Net 840 ml        Physical Exam  Vitals and nursing note reviewed.   Constitutional:       General: He is not in acute distress.     Appearance: He is well-developed.   HENT:      Head: Normocephalic and atraumatic.      Mouth/Throat:      Pharynx: Oropharynx is clear.   Eyes:      Conjunctiva/sclera: Conjunctivae normal.   Neck:      Vascular: No JVD.   Cardiovascular:      Rate and Rhythm: Normal rate and regular rhythm.      Heart sounds: Normal heart sounds.   Pulmonary:      Effort: Pulmonary effort is normal.      Breath sounds: Normal breath sounds.   Chest:      Comments: PC in RU chest/IJ, c/d/i  Abdominal:      General: Bowel sounds are normal. There is no distension.      Palpations: Abdomen is soft.   Musculoskeletal:      Cervical back: Neck supple.      Right lower leg: No edema.      Left lower leg: No edema.   Skin:     Comments: Dry flaking skin on left foot   Neurological:       Mental Status: He is alert.   Psychiatric:         Behavior: Behavior normal.       Significant Labs: All pertinent labs within the past 24 hours have been reviewed.    Significant Imaging: I have reviewed all pertinent imaging results/findings within the past 24 hours.

## 2022-03-06 NOTE — ASSESSMENT & PLAN NOTE
-Due to current illness. PT rec 24/7 S/A but patient does not have home support  -Patient agreeable to IPR  -PMR consulted   -CM has sent referrals   -Transfer orders drafted  -COVID screen ordered 3/6/22

## 2022-03-06 NOTE — PLAN OF CARE
SHERI  VSS  Room air, sats >92%  AOX4  Telemetry D/Cd  SAM, SBA OOB  PIVX1, LFA  RCW permacath for HD  Both access sites CDI  Pt anuric  Dressing to left foot CDI  Pain meds given PRN  HD scheduled for today and then D/C home  Skin on BLE flakey, dry but intact  Safety/fall precautions maintained, non slip socks when OOB  More c/o pain tonight, pt states that he needs morphine, however upon entering the room the pt is laying in bed, resting  Bed locked in lowest position  Call light within reach  WCTM

## 2022-03-06 NOTE — PLAN OF CARE
[facility]    FACILITY TRANSFER ORDERS      Patient Name: Enrique Martinez  YOB: 1965    PCP: Primary Doctor No   PCP Address: None  PCP Phone Number: None  PCP Fax: None    Encounter Date: 03/06/2022    Admit to: IPR    Vital Signs:  Routine    Diagnoses:   Active Hospital Problems    Diagnosis  POA    *Subacute osteomyelitis of left foot [M86.272]  Yes    Debility [R53.81]  Unknown    Bilious vomiting with nausea [R11.14]  No    Encephalopathy, metabolic [G93.41]  Yes    Diarrhea [R19.7]  Yes    Prevertebral Fluid Collection [J39.0]  Yes    Neck pain [M54.2]  Yes    Acute renal failure superimposed on stage 5 chronic kidney disease, not on chronic dialysis [N17.9, N18.5]  Yes    Folic acid deficiency [E53.8]  Yes    Chronic gout of left foot due to renal impairment without tophus [M1A.3720]  Yes    Gastroesophageal reflux disease without esophagitis [K21.9]  Yes    Patient on waiting list for kidney transplant [Z76.82]  Yes    Atherosclerosis of native coronary artery of native heart without angina pectoris [I25.10]  Yes    Renovascular hypertension [I15.0]  Yes    Type 2 diabetes mellitus with chronic kidney disease on chronic dialysis, with long-term current use of insulin [E11.22, N18.6, Z99.2, Z79.4]  Not Applicable    S/P coronary artery stent placement [Z95.5]  Not Applicable     2007      Anemia due to chronic kidney disease, on chronic dialysis [N18.6, D63.1, Z99.2]  Not Applicable    Hx of syphilis [Z86.19]  Yes    Pure hypercholesterolemia [E78.00]  Yes     Overview:   dx update  Overview:   dx update      Membranous glomerulonephritis [N05.2]  Yes      Resolved Hospital Problems    Diagnosis Date Resolved POA    Abnormal MRI, cervical spine [R93.7] 02/27/2022 Yes    History of Salmonella septicemia [Z86.19] 02/27/2022 Yes    Hyperkalemia [E87.5] 02/27/2022 Yes    Hyperphosphatemia [E83.39] 02/27/2022 Yes    Increased anion gap metabolic acidosis  [E87.2] 02/27/2022 Yes    Subacute osteomyelitis of left foot [M86.272] 02/27/2022 Yes    Tobacco abuse [Z72.0] 02/27/2022 Yes       Allergies:Review of patient's allergies indicates:  No Known Allergies    Diet: renal diet    Activities: Activity as tolerated    Labs: BMP three times weekly before HD     CONSULTS:    Physical Therapy to evaluate and treat. , Occupational Therapy to evaluate and treat. and  to evaluate for community resources/long-range planning.    MISCELLANEOUS CARE:    Discharge Antibiotics:     Intravenous antibiotics:  · IV vancomycin per pharmacy dosing after HD sessions  · IV cefepime 2 g after each HD session      Therapy Duration:       Estimated end date of IV antibiotics: 4/9/22     Outpatient Weekly Labs:     Order the following labs to be drawn on Mondays:   · CBC  · CMP   · CRP  · Vancomycin trough. Target 15-20     If discharged on vancomycin IV, order the following additional labs to be drawn on Thursdays:  · CMP   · Vancomycin trough. Target 15-20     If vancomycin trough is not at target (15-20) prior to discharge, schedule vancomycin trough to be drawn before their fourth outpatient dose.     Fax Lab Results to Infectious Diseases Provider: Dr. Oconnell      [facility] Fax Number: [phone]       WOUND CARE ORDERS    [place] dorsal left foot wound with betadine, cover with mepilex border daily    Medications: Review discharge medications with patient and family and provide education.      Current Discharge Medication List      START taking these medications    Details   cefepime HCl (CEFEPIME 2 G/50 ML D5W, READY TO MIX SYSTEM,) Inject 50 mLs (2 g total) into the vein 3 (three) times a week. With dialysis  Qty: 600 mL, Refills: 1      vancomycin HCl (VANCOMYCIN 1 G/250 ML D5W, READY TO MIX SYSTEM,) Inject 125 mLs (500 mg total) into the vein 3 (three) times a week.  Qty: 1500 mL, Refills: 1    Comments: After dialysis         CONTINUE these medications which  have NOT CHANGED    Details   insulin (LANTUS SOLOSTAR U-100 INSULIN) glargine 100 units/mL (3mL) SubQ pen Inject 5 Units into the skin once daily.      metoprolol tartrate (LOPRESSOR) 100 MG tablet Take 100 mg by mouth.    Comments: .      albuterol (PROVENTIL/VENTOLIN HFA) 90 mcg/actuation inhaler Inhale 2 puffs into the lungs every 6 (six) hours as needed for Wheezing or Shortness of Breath. Rescue      amlodipine (NORVASC) 10 MG tablet Take 10 mg by mouth once daily.      ARIPiprazole (ABILIFY) 15 MG Tab Take 1 tablet (15 mg total) by mouth every evening.  Qty: 90 tablet, Refills: 0      aspirin (ECOTRIN) 81 MG EC tablet Take 1 tablet (81 mg total) by mouth once daily.  Qty: 360 tablet, Refills: 0    Associated Diagnoses: S/P coronary artery stent placement      atorvastatin (LIPITOR) 80 MG tablet Take 1 tablet (80 mg total) by mouth once daily.  Qty: 90 tablet, Refills: 3    Associated Diagnoses: Mixed hyperlipidemia      blood-glucose meter kit Use as instructed      calcitRIOL (ROCALTROL) 0.5 MCG Cap Take 0.5 mcg by mouth once daily.      chlorthalidone (HYGROTEN) 50 MG Tab Take 50 mg by mouth once daily.       cloNIDine (CATAPRES) 0.2 MG tablet Take 0.4 mg by mouth 3 (three) times daily.       colchicine (COLCRYS) 0.6 mg tablet Take 0.6 mg by mouth daily as needed.      ergocalciferol (ERGOCALCIFEROL) 50,000 unit Cap Take 50,000 Units by mouth every 30 days.       famotidine (PEPCID) 20 MG tablet Take 20 mg by mouth daily as needed for Heartburn.       ferrous gluconate (FERGON) 324 MG tablet Take 324 mg by mouth 2 (two) times a day.       fluticasone (FLONASE) 50 mcg/actuation nasal spray 1 spray by Each Nare route once daily.      furosemide (LASIX) 40 MG tablet Take 40 mg by mouth 2 (two) times daily.      methocarbamoL (ROBAXIN) 500 MG Tab Take 500 mg by mouth 2 (two) times a day.      omeprazole (PRILOSEC) 20 MG capsule Take 20 mg by mouth once daily.      RENVELA 800 mg Tab Take 800 mg by mouth 3  (three) times daily.      sodium bicarbonate 650 MG tablet Take 650 mg by mouth.      tadalafiL (CIALIS) 2.5 mg Tab Take 2.5 mg by mouth.      TRUE METRIX GLUCOSE TEST STRIP Strp          STOP taking these medications       clindamycin (CLEOCIN) 300 MG capsule Comments:   Reason for Stopping:                  Immunizations Administered as of 3/6/2022     No immunizations on file.          _________________________________  Marshall Leon MD  03/06/2022

## 2022-03-06 NOTE — PLAN OF CARE
Patient is AAOx3. Patient c/o pain. PRN meds given per orders. Patient asking for Morphine for pain. Patient received Neurontin x 1 dose.  Left foot dressing changed. . Reminded the patient to call for assistance. Call light and personal items are within reach.

## 2022-03-06 NOTE — PROGRESS NOTES
[facility] Medicine  Progress Note    Patient Name: Enrique Martinez  MRN: [id number]  Patient Class: IP- Inpatient   Admission Date: 2/16/2022  Length of Stay: 17 days  Attending Physician: Marshall Leon MD  Primary Care Provider: Primary Doctor No        Subjective:     Principal Problem:Subacute osteomyelitis of left foot        HPI:  Enrique Martinez is a 56 y.o. male with a PMHx of CKD V glomerulonephropathy, insulin dependent diabetes mellitus, and CAD who presented tot  ED for evaluation of worsening left foot pain and swelling. He was recently admitted at [facility] from 2/2-2/4/2022 from LLE cellulitis. He was found to have salmonella bacteremia suspected due to diarrheal illness. He was discharged with cipro for which he reports compliance. He has had no improvement in his pain, redness, or swelling of his left foot and leg. He has also developed a new blister on his left foot. He endorses chills. He denies fevers, chest pain, SOB, N/V, decreased urine output, dysuria, and flank pain.    ED: HR 97, other VSSAF. CBC with leukocytosis of 16.01, Hgb 7.4, Hct 22.2.  ESR 97. CRP 33.4. K 5.5. Cr 19.7, . Phos 10.4. Lactic WNL. MRI L foot with cellulitis and osteomyelitis of distal 1st metatarsal and proximal 1st phalanx.      Overview/Hospital Course:  .Enrique Martinez is a 56 y.o. male with a PMHx of CKD V glomerulonephropathy, insulin dependent diabetes mellitus, and CAD who presented tot  ED for evaluation of worsening left foot pain and swelling. He was recently admitted at [facility] from 2/2-2/4/2022 from LLE cellulitis. He was found to have salmonella bacteremia suspected due to diarrheal illness. He was discharged with cipro for which he reports compliance. He has had no improvement in his pain, redness, or swelling of his left foot and leg. He has also developed a new blister on his left foot. He endorses chills. He denies fevers, chest pain, SOB, N/V, decreased urine output,  "dysuria, and flank pain. Patient admitted with worsening and progressive chronic kidney disease with  and Cr 20.9. Nephrology is consulted for his CKD stage V that has progressed ti ESRD and at first patient not amenable to starting HD but finally agreed after family involvement and patient had trialysis catheter placed and started on HD as per Nephrology. Patient was also complaining of neck pain on admit and had abnormal C spine Xray with possible signs of C spine instability, MRi ordered and patient refused 2/17, on pain meds, was discussed at length. Concern for possible infection also given acuity of pain. Imaging showed signs of C spine instability, prevertebral abscess likely with fluid collection with widening in C spine,. Neurosurgery consulted and recommended C collar- patient refused, and Xrays, refused, and MRI full spine when stable. Patient also had MRI of left foot on admit and showed "Regions of focal abnormal signal involving the distal 1st metatarsal and proximal 1st phalanx as described above.  Findings are concerning for foci of subacute myelitis (Yared's abscess).  Other less likely considerations include cystic degenerative change." ENT consulted for possible retropharyngeal abscess as per Neurosurgery and Infectious Disease consulted. At first patient kept refusing advanced imaging but finally agreed and repeat MRI of cervical spine done on 2/22 with anesthesia to help with sedation. MRI done without contrast and concern for possible retropharyngeal abscess. Patient placed on empiric IV Daptomycin and Ceftriaxone by ID.  ENT recommended needed study with contrast to really investigate the area better. Repeat MRI with contrast done of cervical spine on 2/24 and no obvious fluid collection noted and not obvious signs of infection so ENT and Neurosurgery signed off and stated nothing needed to be done. Patient kept on IV Daptomycin and Rocephin to cover for his left foot osteo. Podiatry " was consulted and performed bone biopsy as per ID recs on 2/25 to determine duration and type of abx needed to treat osteomyelitis of left foot. SW obtained outpatient HD chair at [facility] (new start HD this admit). ID finalized recs on discharge for cefepime/vanc until 4/9 with HD.  Pharmacy consulted to assist with vanc dosing. Patient agreeable to IPR placement - referrals sent.       Interval History:      Feeling well today    Some pain of LLE, trial low dose gabapentin (100mg once)   Agreeable to IPR   Discussed with CM/SW - referrals sent   PMR consulted   Facility transfer orders written    Review of Systems   Respiratory:  Negative for shortness of breath.    Cardiovascular:  Negative for chest pain.   Gastrointestinal:  Negative for abdominal pain, blood in stool, diarrhea, nausea and vomiting.   Musculoskeletal:  Positive for myalgias.   Objective:     Vital Signs (Most Recent):  Temp: 98 °F (36.7 °C) (03/06/22 1214)  Pulse: 109 (03/06/22 1214)  Resp: 18 (03/06/22 1214)  BP: 111/67 (03/06/22 1214)  SpO2: 98 % (03/06/22 1214)   Vital Signs (24h Range):  Temp:  [98 °F (36.7 °C)-99.1 °F (37.3 °C)] 98 °F (36.7 °C)  Pulse:  [] 109  Resp:  [17-18] 18  SpO2:  [92 %-98 %] 98 %  BP: (105-138)/(57-78) 111/67     Weight: 70.3 kg (155 lb)  Body mass index is 25.02 kg/m².    Intake/Output Summary (Last 24 hours) at 3/6/2022 1355  Last data filed at 3/6/2022 0800  Gross per 24 hour   Intake 840 ml   Output 0 ml   Net 840 ml        Physical Exam  Vitals and nursing note reviewed.   Constitutional:       General: He is not in acute distress.     Appearance: He is well-developed.   HENT:      Head: Normocephalic and atraumatic.      Mouth/Throat:      Pharynx: Oropharynx is clear.   Eyes:      Conjunctiva/sclera: Conjunctivae normal.   Neck:      Vascular: No JVD.   Cardiovascular:      Rate and Rhythm: Normal rate and regular rhythm.      Heart sounds: Normal heart sounds.   Pulmonary:      Effort:  Pulmonary effort is normal.      Breath sounds: Normal breath sounds.   Chest:      Comments: PC in RU chest/IJ, c/d/i  Abdominal:      General: Bowel sounds are normal. There is no distension.      Palpations: Abdomen is soft.   Musculoskeletal:      Cervical back: Neck supple.      Right lower leg: No edema.      Left lower leg: No edema.   Skin:     Comments: Dry flaking skin on left foot   Neurological:      Mental Status: He is alert.   Psychiatric:         Behavior: Behavior normal.       Significant Labs: All pertinent labs within the past 24 hours have been reviewed.    Significant Imaging: I have reviewed all pertinent imaging results/findings within the past 24 hours.      Assessment/Plan:      * Subacute osteomyelitis of left foot  - Patient admitted with left foot pain and recent left foot infection and salmonella bacteremia at an outside hopsital treated with Cipro.   - Lactic normal on admit.   - Admit labs: ESR 97, CRP 33.4, procal 1.0.  - MRI of left foot showed regions of focal abnormal signal involving the distal 1st metatarsal and proximal 1st phalanx.  Findings are concerning for foci of subacute osteomyelitis.   -Podiatry consulted and at first deferred bone biopsy and recommended just abx treatment and ID consulted and patient started on IV Daptomycin and Rocephin. Patient treated for gout by Podiatry as concerned some changes related to gout.   -minimal improvement in pain with gout tx so Podiatry per ID request performed bone biopsy on 2/25 to left foot. Pathology and Micro from bone biopsy pending.   -ID final recs for likely need for 6 weeks IV antibiotics for osteo and can transition to IV Vanc/Cefepime 2g with HD until 4/9 (see note from 3/4)  -weekly labs    on Mondays cbc, cmp, crp, vanc trough   also on Thursday CMP, vanc trough   Fax to ID clinic [phone]  -needs ambulatory referral to ID (placed)  -pharmacy consult placed to transition to vancomycin from daptomycin. Can  transition to cefepime on discharge    Debility  -Due to current illness. PT rec 24/7 S/A but patient does not have home support  -Patient agreeable to IPR  -PMR consulted   -CM has sent referrals   -Transfer orders drafted  -COVID screen ordered 3/6/22        Bilious vomiting with nausea  Happened on 3/4. Unclear etiology,improved today, tolerating po. Will monitoring    Encephalopathy, metabolic  On admission he was confused and was seen by psychiatry and determined not have capacity at that point to consent to HD (2/20). Subsequently, he has undergone HD and his mentation has cleared. In my evaluation, he is able to understand and manipulate the current medical situation and apply it to himself and thus has capacity      Diarrhea  resolved      Prevertebral Fluid Collection       Neck pain  Prevertebral fluid collection  - Patient had neck pain on admit and imaging done: Abnormal C spine Xray with possible signs of C spine instability, MRI ordered and patient refused 2/17, on pain meds, discussed at length, see 2/17 and 2/18.  -Concern for prevertebral abscess likely with fluid collection with widening in C spine, NS consulted and recomended C collar- patient refused, and Xrays, refused.   -ENTconsulted for retropharyngeal abscess per NS and ID recs, will likely scope if amenable at bedside (he refused). They rec MRI C spine with contrast.  -NS recs MRI all spine, ID/ENT rec repeat MRI C spine for better pictures - initially imaging delayed 2/2 pt discomfort and concerns but MRI/CT finally done and complete on 2/22 and 2/24.  - MRI with contrast on 2/24 showed no fluid collection, will not require intervention as per Neurosurgery and ENT and signed off. ID stated did not need any antibiotics for his neck and remains on abx just to treat his left foot osteomyelitis not related to any suspected neck infection. No need for C- collar as per NS.       Chronic gout of left foot due to renal impairment without  tophus  Chornci and controlled. S/p Colcichine 0.3 mg po BID to treat as per Podiatry recs. Left foot pain improved.       Folic acid deficiency  Present on admit. Folate low at 4.4. Patient started on Folic acid 1 mg po daily [facility] and will continue.       Acute renal failure superimposed on stage 5 chronic kidney disease, not on chronic dialysis  Membranous glomerulonephritis  - Nephrology consulted on admit for  and Cr 20.9 and uremic symptoms of tremors. 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 Nephrology recommended HD on admit but patient repeated refused and finally agreed after multiple conversations to start on 2/22. Nephrology has been managing his HD needs [facility].   - GFR was 13-16 up from baseline 4-6 previously in January 2022. Labs repeated today and GFR is 2.6, Cr 19.   - Patient follows with Dr. Cardoza and Dr Lee and dialysis has been discussed multiple times, patient continues to refuse and on transplant list with [facility] but on this admit after multiple attempts patient finally agreed and trialysis line placed on 2/22 and patient started on HD on 2/23 for progressive CKD stage V that has now progressed to ESRD.   - SW arranging outpatient HD - [facility] and has MWF chair starting next week  -Patient initially deemed not to have capacity by psychiatry (in setting of tereso on ckd and uremia) but now has capacity to make medical decisions.   -Outpatient center requesting repeat psych eval which has been done on 3/4  -Perm Cath placed on 2/28.    SHANNAN (iron deficiency anemia)        Benign prostatic hyperplasia  [place] placed 2/18 overnight for incontinence    Gastroesophageal reflux disease without esophagitis  Chronic and controlled. Continue Protonix 40 mg po daily to treat.     Patient on waiting list for kidney transplant        Atherosclerosis of native coronary artery of native heart without angina pectoris  Patient with  "known CAD. Patient asymptomatic. Continue Labetalol, Asprin 81 mg po daily and Lipitor 80 mg po daily to treat CAD.       Hx of syphilis  Per Helio Garcia NP (ID) note 9/18/18, "Patient reports recent treatment with 3 doses of bicillin for + Treponema Pallidum antibody (5/16/2018) (per patient, this was a re-treatment because he couldn't get prior records of apparent prior treatment).  RPR 1:2.   Need to confirm treatment with [facility].  RPR here is pending.   Recommend repeat RPR 6 months from treatment - December 2018"  - Treated.   - RPR non-reactive 12/2018.      S/P coronary artery stent placement  Patient with previous coronary stent for CAD in past in 2007. Continue Labetalol, Asprin 81 mg po daily and Lipitor 80 mg po daily to treat CAD.     Type 2 diabetes mellitus with chronic kidney disease on chronic dialysis, with long-term current use of insulin  Good control in the hospital in past 24 hours.  · Plan is to continue to monitor POCT glucose 4 times a day with each meal and at bedtime and cover with Novolog low dose sliding scale insulin.   · Target pre-meal glucose goal is <140 with all random glucoses <180 in non-critically ill patient.  · Was previously on lantus but may not need this on discharge with new CKD    Anemia due to chronic kidney disease, on chronic dialysis  - Patient admitted with Hgb in 7-8 range and related to his advanced CKD.   - Patient transfused 1 unit of PRBCs on 2/18 for Hgb 6.9 and trabnsfuse again 1 unit on 2/23 for Hgb 6.7 but Hgb stable since. No signs of bleeding noted and felt all related to his CKD in combination with chronic infection/inflammation.    - Hgb stable mid to high 8s since last transfusion and dimitrios monitor with CBC in hospital and consider transfusion if Hgb < 7.   -continue folic acid      Membranous glomerulonephritis  -brought acthar gel from home to do non formulary as 40K and got via prior auth-non form request addded  -reason for CKD 5.      Pure " hypercholesterolemia  Chronic and controlled. Continue Lipitor 80 mg po daily to treat.     Renovascular hypertension  - Elevated on admit but meds adjusted and now controlled.   - Goal BP < 140/90.  - Conitnue Norvasc 10 mg po daily + Hydralazine 75 mg po TID + Labetalol 200 mg po BID to treat and adjust as needed for target BP goal in hospital.       VTE Risk Mitigation (From admission, onward)         Ordered     heparin (porcine) injection 1,000 Units  As needed (PRN)         03/05/22 1119     heparin (porcine) injection 1,000 Units  As needed (PRN)         03/03/22 1546     heparin (porcine) injection 5,000 Units  Every 8 hours         02/25/22 0747     IP VTE HIGH RISK PATIENT  Once         02/17/22 0151     Place sequential compression device  Until discontinued         02/17/22 0151                Discharge Planning   JOAN: 3/7/2022     Code Status: Full Code   Is the patient medically ready for discharge?: No    Reason for patient still in hospital (select all that apply): Patient trending condition and Pending disposition  Discharge Plan A: Home Health   Discharge Delays: None known at this time        Total visit time was 25 minutes or greater with greater than 50% of time spent in counseling and coordination of care.         Marshall Leon MD  Department of Hospital Medicine   [facility]

## 2022-03-06 NOTE — PLAN OF CARE
03/06/22 1305   Post-Acute Status   Post-Acute Authorization Placement  (Inpt rehab)   Post-Acute Placement Status Referrals Sent       Inpt rehab referrals initiated.    Weekday SW/CM to follow.      Donna Phillip LMSW  Case Management Social Worker   [facility] [address]

## 2022-03-07 PROCEDURE — 63600175 PHARM REV CODE 636 W HCPCS: Mod: NTX | Performed by: HOSPITALIST

## 2022-03-07 PROCEDURE — 25000003 PHARM REV CODE 250: Mod: NTX | Performed by: FAMILY MEDICINE

## 2022-03-07 PROCEDURE — 25000003 PHARM REV CODE 250: Mod: NTX | Performed by: HOSPITALIST

## 2022-03-07 PROCEDURE — 63600175 PHARM REV CODE 636 W HCPCS: Mod: NTX | Performed by: FAMILY MEDICINE

## 2022-03-07 PROCEDURE — 99232 PR SUBSEQUENT HOSPITAL CARE,LEVL II: ICD-10-PCS | Mod: NTX,,, | Performed by: INTERNAL MEDICINE

## 2022-03-07 PROCEDURE — 20600001 HC STEP DOWN PRIVATE ROOM: Mod: NTX

## 2022-03-07 PROCEDURE — 99232 SBSQ HOSP IP/OBS MODERATE 35: CPT | Mod: NTX,,, | Performed by: NURSE PRACTITIONER

## 2022-03-07 PROCEDURE — 99232 PR SUBSEQUENT HOSPITAL CARE,LEVL II: ICD-10-PCS | Mod: NTX,,, | Performed by: FAMILY MEDICINE

## 2022-03-07 PROCEDURE — 25000003 PHARM REV CODE 250: Mod: NTX | Performed by: INTERNAL MEDICINE

## 2022-03-07 PROCEDURE — 25000003 PHARM REV CODE 250: Mod: NTX | Performed by: PHYSICIAN ASSISTANT

## 2022-03-07 PROCEDURE — 99232 PR SUBSEQUENT HOSPITAL CARE,LEVL II: ICD-10-PCS | Mod: NTX,,, | Performed by: NURSE PRACTITIONER

## 2022-03-07 PROCEDURE — 99232 SBSQ HOSP IP/OBS MODERATE 35: CPT | Mod: NTX,,, | Performed by: FAMILY MEDICINE

## 2022-03-07 PROCEDURE — 99232 SBSQ HOSP IP/OBS MODERATE 35: CPT | Mod: NTX,,, | Performed by: INTERNAL MEDICINE

## 2022-03-07 RX ORDER — HYDRALAZINE HYDROCHLORIDE 50 MG/1
50 TABLET, FILM COATED ORAL EVERY 8 HOURS
Status: DISCONTINUED | OUTPATIENT
Start: 2022-03-07 | End: 2022-03-10 | Stop reason: HOSPADM

## 2022-03-07 RX ORDER — MORPHINE SULFATE 2 MG/ML
2 INJECTION, SOLUTION INTRAMUSCULAR; INTRAVENOUS ONCE
Status: COMPLETED | OUTPATIENT
Start: 2022-03-07 | End: 2022-03-07

## 2022-03-07 RX ORDER — SODIUM CHLORIDE 9 MG/ML
INJECTION, SOLUTION INTRAVENOUS
Status: DISCONTINUED | OUTPATIENT
Start: 2022-03-07 | End: 2022-03-10 | Stop reason: HOSPADM

## 2022-03-07 RX ADMIN — LABETALOL HYDROCHLORIDE 200 MG: 100 TABLET, FILM COATED ORAL at 08:03

## 2022-03-07 RX ADMIN — ACETAMINOPHEN 1000 MG: 500 TABLET ORAL at 08:03

## 2022-03-07 RX ADMIN — OXYCODONE HYDROCHLORIDE 10 MG: 10 TABLET ORAL at 08:03

## 2022-03-07 RX ADMIN — LABETALOL HYDROCHLORIDE 200 MG: 100 TABLET, FILM COATED ORAL at 09:03

## 2022-03-07 RX ADMIN — SODIUM CHLORIDE: 0.9 INJECTION, SOLUTION INTRAVENOUS at 08:03

## 2022-03-07 RX ADMIN — HEPARIN SODIUM 5000 UNITS: 5000 INJECTION INTRAVENOUS; SUBCUTANEOUS at 05:03

## 2022-03-07 RX ADMIN — PANTOPRAZOLE SODIUM 40 MG: 40 TABLET, DELAYED RELEASE ORAL at 09:03

## 2022-03-07 RX ADMIN — AMLODIPINE BESYLATE 10 MG: 10 TABLET ORAL at 09:03

## 2022-03-07 RX ADMIN — FOLIC ACID 1 MG: 1 TABLET ORAL at 09:03

## 2022-03-07 RX ADMIN — SEVELAMER CARBONATE 800 MG: 800 TABLET, FILM COATED ORAL at 04:03

## 2022-03-07 RX ADMIN — NYSTATIN 500000 UNITS: 500000 SUSPENSION ORAL at 01:03

## 2022-03-07 RX ADMIN — ARIPIPRAZOLE 15 MG: 5 TABLET ORAL at 08:03

## 2022-03-07 RX ADMIN — ASPIRIN 81 MG: 81 TABLET, COATED ORAL at 09:03

## 2022-03-07 RX ADMIN — NYSTATIN 500000 UNITS: 500000 SUSPENSION ORAL at 09:03

## 2022-03-07 RX ADMIN — MORPHINE SULFATE 2 MG: 2 INJECTION, SOLUTION INTRAMUSCULAR; INTRAVENOUS at 12:03

## 2022-03-07 RX ADMIN — ATORVASTATIN CALCIUM 80 MG: 20 TABLET, FILM COATED ORAL at 09:03

## 2022-03-07 RX ADMIN — NYSTATIN 500000 UNITS: 500000 SUSPENSION ORAL at 04:03

## 2022-03-07 RX ADMIN — OXYCODONE HYDROCHLORIDE 10 MG: 10 TABLET ORAL at 03:03

## 2022-03-07 RX ADMIN — NYSTATIN 500000 UNITS: 500000 SUSPENSION ORAL at 08:03

## 2022-03-07 RX ADMIN — GABAPENTIN 100 MG: 100 CAPSULE ORAL at 09:03

## 2022-03-07 RX ADMIN — ACETAMINOPHEN 1000 MG: 500 TABLET ORAL at 02:03

## 2022-03-07 RX ADMIN — OXYCODONE HYDROCHLORIDE 10 MG: 10 TABLET ORAL at 12:03

## 2022-03-07 RX ADMIN — SEVELAMER CARBONATE 800 MG: 800 TABLET, FILM COATED ORAL at 09:03

## 2022-03-07 RX ADMIN — HEPARIN SODIUM 5000 UNITS: 5000 INJECTION INTRAVENOUS; SUBCUTANEOUS at 01:03

## 2022-03-07 RX ADMIN — HYDRALAZINE HYDROCHLORIDE 50 MG: 50 TABLET ORAL at 08:03

## 2022-03-07 RX ADMIN — HEPARIN SODIUM 5000 UNITS: 5000 INJECTION INTRAVENOUS; SUBCUTANEOUS at 08:03

## 2022-03-07 RX ADMIN — VANCOMYCIN HYDROCHLORIDE 1500 MG: 1.5 INJECTION, POWDER, LYOPHILIZED, FOR SOLUTION INTRAVENOUS at 03:03

## 2022-03-07 RX ADMIN — SEVELAMER CARBONATE 800 MG: 800 TABLET, FILM COATED ORAL at 01:03

## 2022-03-07 RX ADMIN — ACETAMINOPHEN 1000 MG: 500 TABLET ORAL at 05:03

## 2022-03-07 RX ADMIN — CEFTRIAXONE SODIUM 2 G: 2 INJECTION, SOLUTION INTRAVENOUS at 08:03

## 2022-03-07 RX ADMIN — METHOCARBAMOL 750 MG: 750 TABLET ORAL at 09:03

## 2022-03-07 RX ADMIN — METHOCARBAMOL 750 MG: 750 TABLET ORAL at 11:03

## 2022-03-07 RX ADMIN — HYDRALAZINE HYDROCHLORIDE 75 MG: 50 TABLET ORAL at 05:03

## 2022-03-07 NOTE — HPI
Per chart review, Enrique Martinez is a 57-year-old male with PMHx of CKD V glomerulonephropathy on HD, insulin dependent diabetes mellitus, syphilis, gout, and CAD.  Patient presented to [facility] on 2/16 with L foot pain and swelling.  MRI L foot with cellulitis and osteomyelitis of distal 1st metatarsal and proximal 1st phalanx per primary team. Podiatry consulted and now s/p bone biopsy on 2/25 to left foot with path pending. ID recs 6 weeks IV antibiotics for osteo and can transition to IV Vanc/Cefepime 2g with HD until 4/9/22.  Hospital course further complicated by neck pain w/ Prevertebral fluid collection, impaired functional mobility, Encephalopathy, metabolic, tachycardia, and Bilious vomiting with nausea.    Functional History: Patient lives alone in a 2nd floor apt with steps to enter.  No elevator access. Prior to admission, (I) with ADLs and mobility. DME: none.

## 2022-03-07 NOTE — NURSING
Pt AAOx4, VSS, afebrile. Refused 1200 VS. Refused PT/OT. Awaiting Rehab approval, pending d/c. Bed in low/locked position, call light/personal belongings w/in reach, non-slip socks in place, pt remains free from falls, WCTM.

## 2022-03-07 NOTE — CONSULTS
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  Patient presented to [facility] on 2/16 with L foot pain and swelling.  MRI L foot with cellulitis and osteomyelitis of distal 1st metatarsal and proximal 1st phalanx per primary team. Podiatry consulted and now s/p bone biopsy on 2/25 to left foot with path pending. ID recs 6 weeks IV antibiotics for osteo and can transition to IV Vanc/Cefepime 2g with HD until 4/9/22.  Hospital course further complicated by neck pain w/ Prevertebral fluid collection, impaired functional mobility, Encephalopathy, metabolic, tachycardia, and Bilious vomiting with nausea.    Functional History: Patient lives alone in a 2nd floor apt with steps to enter.  No elevator access. Prior to admission, (I) with ADLs and mobility. DME: none.     Hospital Course: 03/2/2022: PT-Bed mobility SV.  Sit to stand SBA.  Ambulated 46 FT cga-sba & rw.    3/3: OT-BM SV. Sit to stand SBA & RW. Ambulated 125 ft CGA-SBA & RW. LBD (I). Toilet SBA.   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    Umbilical hernia      Past Surgical History:   Procedure Laterality Date    ABDOMINAL SURGERY  1980s    CARDIAC CATHETERIZATION  2007    x 2    COLONOSCOPY N/A 4/5/2019    Procedure: COLONOSCOPY;  Surgeon: Jose L Carty MD;  Location: [facility] ([address] [place] [address]);  Service: Colon and Rectal;  Laterality: N/A;    CORONARY STENT PLACEMENT  2007     Review of patient's allergies indicates:  No Known Allergies    Scheduled Medications:    sodium chloride 0.9%   Intravenous Once    sodium chloride 0.9%   Intravenous Once    acetaminophen  1,000 mg Oral Q8H    amLODIPine  10 mg Oral Daily    ARIPiprazole  15 mg Oral QHS    aspirin  81 mg Oral Daily    atorvastatin  80 mg Oral Daily    cefTRIAXone (ROCEPHIN) IVPB  2 g Intravenous Q24H    folic acid  1 mg Oral Daily    gabapentin  100 mg Oral Daily    heparin (porcine)  5,000 Units Subcutaneous Q8H    hydrALAZINE  75 mg Oral Q8H    labetalol  200 mg Oral Q12H    nystatin  500,000 Units Oral QID (WM & HS)    pantoprazole  40 mg Oral Daily    sevelamer carbonate  800 mg Oral TID WM    vancomycin (VANCOCIN) IVPB  20 mg/kg Intravenous Once       PRN Medications: sodium chloride, sodium chloride 0.9%, acetaminophen, albuterol-ipratropium, aluminum-magnesium hydroxide-simethicone, dextrose 10%, dextrose 10%, glucagon (human recombinant), glucose, glucose, heparin (porcine), loperamide, lorazepam, melatonin, methocarbamoL, morphine, naloxone, OLANZapine, ondansetron, ondansetron, oxyCODONE, oxyCODONE, polyethylene glycol, prochlorperazine, senna-docusate 8.6-50 mg, simethicone, sodium chloride 0.9%, sodium chloride 0.9%, sodium chloride 0.9%, sodium chloride 0.9%, Pharmacy to dose Vancomycin consult **AND** vancomycin - pharmacy to dose    Family History       Problem Relation (Age of Onset)    Drug abuse Brother    Heart attack Father, Brother    Hyperlipidemia Father    Hypertension Father,  Brother    No Known Problems Sister    Stroke Father          Tobacco Use    Smoking status: Current Some Day Smoker     Packs/day: 0.25     Years: 10.00     Pack years: 2.50    Smokeless tobacco: Never Used   Substance and Sexual Activity    Alcohol use: No    Drug use: No    Sexual activity: Not on file     Review of Systems   Constitutional:  Positive for activity change. Negative for fatigue and fever.   HENT:  Negative for trouble swallowing and voice change.    Eyes:  Negative for photophobia and visual disturbance.   Respiratory:  Negative for cough and shortness of breath.    Cardiovascular:  Negative for chest pain and palpitations.   Gastrointestinal:  Negative for nausea and vomiting.   Genitourinary:  Negative for difficulty urinating and flank pain.   Musculoskeletal:  Positive for arthralgias and gait problem.   Skin:  Positive for wound. Negative for color change.   Neurological:  Positive for weakness. Negative for facial asymmetry, speech difficulty and numbness.   Psychiatric/Behavioral:  Negative for agitation and confusion.    Objective:     Vital Signs (Most Recent):  Temp: 98.4 °F (36.9 °C) (03/07/22 0915)  Pulse: 107 (03/07/22 0920)  Resp: 18 (03/07/22 0915)  BP: 117/73 (03/07/22 0920)  SpO2: 97 % (03/07/22 0915)    Vital Signs (24h Range):  Temp:  [97.9 °F (36.6 °C)-98.6 °F (37 °C)] 98.4 °F (36.9 °C)  Pulse:  [102-120] 107  Resp:  [17-20] 18  SpO2:  [95 %-97 %] 97 %  BP: (104-164)/() 117/73     Body mass index is 25.02 kg/m².    Physical Exam  Constitutional:       General: He is not in acute distress.     Appearance: He is well-developed.   HENT:      Head: Normocephalic and atraumatic.   Eyes:      General:         Right eye: No discharge.         Left eye: No discharge.   Cardiovascular:      Pulses: Normal pulses.   Pulmonary:      Effort: Pulmonary effort is normal. No respiratory distress.   Abdominal:      General: There is no distension.      Palpations: Abdomen is soft.    Musculoskeletal:         General: No deformity.      Cervical back: Neck supple.      Comments: L foot wrapped in ACE bandage, tenderness, decreased ROM, and strength    Skin:     General: Skin is warm and dry.   Neurological:      Mental Status: He is alert and oriented to person, place, and time.      Comments: Follows commands    Psychiatric:         Behavior: Behavior normal. Behavior is cooperative.            Diagnostic Results:   Labs: Reviewed  MRI: Reviewed    Assessment/Plan:     * Subacute osteomyelitis of left foot  -podiatry consulted  -MRI L foot with cellulitis and osteomyelitis of distal 1st metatarsal and proximal 1st phalanx per primary team  -now s/p bone biopsy on 2/25 to left foot with path pending  -ID recs 6 weeks IV antibiotics for osteo and can transition to IV Vanc/Cefepime 2g with HD    Debility  See hospital course for functional status.      Recommendations  -  Encourage mobility, OOB in chair, and early ambulation as appropriate  -  PT/OT evaluate and treat  -  Pain management  -  DVT prophylaxis if appropriate   -  Monitor for and prevent skin breakdown and pressure ulcers  · Early mobility, repositioning/weight shifting every 20-30 minutes when sitting, turn patient every 2 hours, proper mattress/overlay and chair cushioning, pressure relief/heel protector boots      Acute renal failure superimposed on stage 5 chronic kidney disease, not on chronic dialysis  -now on HD    PM&R Recommendation:     At this time, the PM&R team has reviewed this patient's ongoing medical case including inpatient diagnosis, medical history, clinical examination, labs, vitals, current social and functional history to provide the post-acute recommendation as follows:     RECOMMENDATIONS: Inpatient rehabilitation due to good motivation/participation with therapies and good potential for recovery    MEDICAL STABILITY:     At this time, barriers for post-acute rehab admission include: PICC placement    We  will sign off due to discharge soon, sign off with the transfer of the patient to [facility] liaison who will continue to follow going forward until admission to [facility]      Thank you for your consult.     Jen Farrell NP  Department of Physical Medicine & Rehab  [facility]

## 2022-03-07 NOTE — ASSESSMENT & PLAN NOTE
-podiatry consulted  -MRI L foot with cellulitis and osteomyelitis of distal 1st metatarsal and proximal 1st phalanx per primary team  -now s/p bone biopsy on 2/25 to left foot with path pending  -ID recs 6 weeks IV antibiotics for osteo and can transition to IV Vanc/Cefepime 2g with HD

## 2022-03-07 NOTE — SUBJECTIVE & OBJECTIVE
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      Past Surgical History:   Procedure Laterality Date    ABDOMINAL SURGERY  1980s    CARDIAC CATHETERIZATION  2007    x 2    COLONOSCOPY N/A 4/5/2019    Procedure: COLONOSCOPY;  Surgeon: Jose L Carty MD;  Location: [facility] ([address] [place] [address]);  Service: Colon and Rectal;  Laterality: N/A;    CORONARY STENT PLACEMENT  2007     Review of patient's allergies indicates:  No Known Allergies    Scheduled Medications:    sodium chloride 0.9%   Intravenous Once    sodium chloride 0.9%   Intravenous Once    acetaminophen  1,000 mg Oral Q8H    amLODIPine  10 mg Oral Daily    ARIPiprazole  15 mg Oral QHS    aspirin  81 mg Oral Daily    atorvastatin  80 mg Oral Daily    cefTRIAXone (ROCEPHIN) IVPB  2 g Intravenous Q24H    folic acid  1 mg Oral Daily    gabapentin  100 mg Oral Daily    heparin (porcine)  5,000 Units Subcutaneous Q8H    hydrALAZINE  75 mg Oral Q8H    labetalol  200 mg Oral Q12H    nystatin  500,000 Units Oral QID (WM & HS)    pantoprazole  40 mg Oral Daily    sevelamer carbonate  800 mg Oral TID WM    vancomycin (VANCOCIN) IVPB  20 mg/kg Intravenous Once       PRN Medications: sodium chloride, sodium chloride 0.9%, acetaminophen, albuterol-ipratropium, aluminum-magnesium hydroxide-simethicone, dextrose 10%, dextrose 10%, glucagon (human recombinant), glucose, glucose, heparin (porcine), loperamide, lorazepam, melatonin, methocarbamoL, morphine, naloxone, OLANZapine, ondansetron, ondansetron, oxyCODONE, oxyCODONE, polyethylene glycol, prochlorperazine, senna-docusate 8.6-50 mg, simethicone,  sodium chloride 0.9%, sodium chloride 0.9%, sodium chloride 0.9%, sodium chloride 0.9%, Pharmacy to dose Vancomycin consult **AND** vancomycin - pharmacy to dose    Family History       Problem Relation (Age of Onset)    Drug abuse Brother    Heart attack Father, Brother    Hyperlipidemia Father    Hypertension Father, Brother    No Known Problems Sister    Stroke Father          Tobacco Use    Smoking status: Current Some Day Smoker     Packs/day: 0.25     Years: 10.00     Pack years: 2.50    Smokeless tobacco: Never Used   Substance and Sexual Activity    Alcohol use: No    Drug use: No    Sexual activity: Not on file     Review of Systems   Constitutional:  Positive for activity change. Negative for fatigue and fever.   HENT:  Negative for trouble swallowing and voice change.    Eyes:  Negative for photophobia and visual disturbance.   Respiratory:  Negative for cough and shortness of breath.    Cardiovascular:  Negative for chest pain and palpitations.   Gastrointestinal:  Negative for nausea and vomiting.   Genitourinary:  Negative for difficulty urinating and flank pain.   Musculoskeletal:  Positive for arthralgias and gait problem.   Skin:  Positive for wound. Negative for color change.   Neurological:  Positive for weakness. Negative for facial asymmetry, speech difficulty and numbness.   Psychiatric/Behavioral:  Negative for agitation and confusion.    Objective:     Vital Signs (Most Recent):  Temp: 98.4 °F (36.9 °C) (03/07/22 0915)  Pulse: 107 (03/07/22 0920)  Resp: 18 (03/07/22 0915)  BP: 117/73 (03/07/22 0920)  SpO2: 97 % (03/07/22 0915)    Vital Signs (24h Range):  Temp:  [97.9 °F (36.6 °C)-98.6 °F (37 °C)] 98.4 °F (36.9 °C)  Pulse:  [102-120] 107  Resp:  [17-20] 18  SpO2:  [95 %-97 %] 97 %  BP: (104-164)/() 117/73     Body mass index is 25.02 kg/m².    Physical Exam  Constitutional:       General: He is not in acute distress.     Appearance: He is well-developed.   HENT:      Head: Normocephalic  and atraumatic.   Eyes:      General:         Right eye: No discharge.         Left eye: No discharge.   Cardiovascular:      Pulses: Normal pulses.   Pulmonary:      Effort: Pulmonary effort is normal. No respiratory distress.   Abdominal:      General: There is no distension.      Palpations: Abdomen is soft.   Musculoskeletal:         General: No deformity.      Cervical back: Neck supple.      Comments: L foot wrapped in ACE bandage, tenderness, decreased ROM, and strength    Skin:     General: Skin is warm and dry.   Neurological:      Mental Status: He is alert and oriented to person, place, and time.      Comments: Follows commands    Psychiatric:         Behavior: Behavior normal. Behavior is cooperative.         Cognition and Memory: Cognition is not impaired.     NEUROLOGICAL EXAMINATION:     MENTAL STATUS   Oriented to person, place, and time.     Diagnostic Results:   Labs: Reviewed  MRI: Reviewed

## 2022-03-07 NOTE — PROGRESS NOTES
Man[facility] - Transplant [facility] Medicine  Progress Note    Patient Name: Enrique Martinez  MRN: [id number]  Patient Class: IP- Inpatient   Admission Date: 2/16/2022  Length of Stay: 18 days  Attending Physician: Cinthia Dow MD  Primary Care Provider: Primary Doctor No        Subjective:     Principal Problem:Subacute osteomyelitis of left foot        HPI:  Enrique Martinez is a 56 y.o. male with a PMHx of CKD V glomerulonephropathy, insulin dependent diabetes mellitus, and CAD who presented tot  ED for evaluation of worsening left foot pain and swelling. He was recently admitted at [facility] from 2/2-2/4/2022 from LLE cellulitis. He was found to have salmonella bacteremia suspected due to diarrheal illness. He was discharged with cipro for which he reports compliance. He has had no improvement in his pain, redness, or swelling of his left foot and leg. He has also developed a new blister on his left foot. He endorses chills. He denies fevers, chest pain, SOB, N/V, decreased urine output, dysuria, and flank pain.    ED: HR 97, other VSSAF. CBC with leukocytosis of 16.01, Hgb 7.4, Hct 22.2.  ESR 97. CRP 33.4. K 5.5. Cr 19.7, . Phos 10.4. Lactic WNL. MRI L foot with cellulitis and osteomyelitis of distal 1st metatarsal and proximal 1st phalanx.      Overview/Hospital Course:  .Enrique Martinez is a 56 y.o. male with a PMHx of CKD V glomerulonephropathy, insulin dependent diabetes mellitus, and CAD who presented tot  ED for evaluation of worsening left foot pain and swelling. He was recently admitted at [facility] from 2/2-2/4/2022 from LLE cellulitis. He was found to have salmonella bacteremia suspected due to diarrheal illness. He was discharged with cipro for which he reports compliance. He has had no improvement in his pain, redness, or swelling of his left foot and leg. He has also developed a new blister on his left foot. He endorses chills. He denies fevers, chest pain, SOB, N/V, decreased urine output,  "dysuria, and flank pain. Patient admitted with worsening and progressive chronic kidney disease with  and Cr 20.9. Nephrology is consulted for his CKD stage V that has progressed ti ESRD and at first patient not amenable to starting HD but finally agreed after family involvement and patient had trialysis catheter placed and started on HD as per Nephrology. Patient was also complaining of neck pain on admit and had abnormal C spine Xray with possible signs of C spine instability, MRi ordered and patient refused 2/17, on pain meds, was discussed at length. Concern for possible infection also given acuity of pain. Imaging showed signs of C spine instability, prevertebral abscess likely with fluid collection with widening in C spine,. Neurosurgery consulted and recommended C collar- patient refused, and Xrays, refused, and MRI full spine when stable. Patient also had MRI of left foot on admit and showed "Regions of focal abnormal signal involving the distal 1st metatarsal and proximal 1st phalanx as described above.  Findings are concerning for foci of subacute myelitis (Yared's abscess).  Other less likely considerations include cystic degenerative change." ENT consulted for possible retropharyngeal abscess as per Neurosurgery and Infectious Disease consulted. At first patient kept refusing advanced imaging but finally agreed and repeat MRI of cervical spine done on 2/22 with anesthesia to help with sedation. MRI done without contrast and concern for possible retropharyngeal abscess. Patient placed on empiric IV Daptomycin and Ceftriaxone by ID.  ENT recommended needed study with contrast to really investigate the area better. Repeat MRI with contrast done of cervical spine on 2/24 and no obvious fluid collection noted and not obvious signs of infection so ENT and Neurosurgery signed off and stated nothing needed to be done. Patient kept on IV Daptomycin and Rocephin to cover for his left foot osteo. Podiatry " was consulted and performed bone biopsy as per ID recs on 2/25 to determine duration and type of abx needed to treat osteomyelitis of left foot. SW obtained outpatient HD chair at [facility] (new start HD this admit). ID finalized recs on discharge for cefepime/vanc until 4/9 with HD.  Pharmacy consulted to assist with vanc dosing. Patient agreeable to IPR placement - referrals sent.       Interval History: no acute events overnight. No acute concerns.       Review of Systems   Respiratory:  Negative for shortness of breath.    Cardiovascular:  Negative for chest pain.   Gastrointestinal:  Negative for abdominal pain and blood in stool.     Objective:     Vital Signs (Most Recent):  Temp: 98.2 °F (36.8 °C) (03/07/22 1559)  Pulse: 99 (03/07/22 1559)  Resp: 17 (03/07/22 1559)  BP: 130/80 (03/07/22 1559)  SpO2: (!) 92 % (03/07/22 1559)   Vital Signs (24h Range):  Temp:  [98 °F (36.7 °C)-98.6 °F (37 °C)] 98.2 °F (36.8 °C)  Pulse:  [] 99  Resp:  [16-20] 17  SpO2:  [92 %-98 %] 92 %  BP: (104-143)/(67-80) 130/80     Weight: 70.3 kg (155 lb)  Body mass index is 25.02 kg/m².  No intake or output data in the 24 hours ending 03/07/22 1644     Physical Exam  Vitals and nursing note reviewed.   Constitutional:       General: He is not in acute distress.     Appearance: He is well-developed.   HENT:      Head: Normocephalic and atraumatic.   Eyes:      Conjunctiva/sclera: Conjunctivae normal.   Neck:      Vascular: No JVD.   Cardiovascular:      Rate and Rhythm: Normal rate and regular rhythm.      Heart sounds: Normal heart sounds.   Pulmonary:      Effort: Pulmonary effort is normal.      Breath sounds: Normal breath sounds.   Abdominal:      General: Bowel sounds are normal. There is no distension.      Palpations: Abdomen is soft.   Musculoskeletal:      Cervical back: Neck supple.   Neurological:      Mental Status: He is alert.   Psychiatric:         Behavior: Behavior normal.       Significant Labs: All pertinent  labs within the past 24 hours have been reviewed.  CBC:   Recent Labs   Lab 03/06/22  0723   WBC 7.61   HGB 8.2*   HCT 25.5*          CMP:   Recent Labs   Lab 03/06/22  0723      K 3.7   CL 94*   CO2 30*   GLU 89   BUN 15   CREATININE 6.0*   CALCIUM 8.4*   ANIONGAP 13   EGFRNONAA 9.4*         Significant Imaging: I have reviewed all pertinent imaging results/findings within the past 24 hours.      Assessment/Plan:      * Debility  -Due to current illness. PT rec 24/7 S/A but patient does not have home support  -Patient agreeable to IPR  -PMR consulted   -CM has sent referrals   -Transfer orders drafted  -COVID screen ordered 3/6/22    Subacute osteomyelitis of left foot  - Patient admitted with left foot pain and recent left foot infection and salmonella bacteremia at an outside hopsital treated with Cipro.   - Lactic normal on admit.   - Admit labs: ESR 97, CRP 33.4, procal 1.0.  - MRI of left foot showed regions of focal abnormal signal involving the distal 1st metatarsal and proximal 1st phalanx.  Findings are concerning for foci of subacute osteomyelitis.   -Podiatry consulted and at first deferred bone biopsy and recommended just abx treatment and ID consulted and patient started on IV Daptomycin and Rocephin. Patient treated for gout by Podiatry as concerned some changes related to gout.   -minimal improvement in pain with gout tx so Podiatry per ID request performed bone biopsy on 2/25 to left foot. Pathology and Micro from bone biopsy pending.   -ID final recs for likely need for 6 weeks IV antibiotics for osteo and can transition to IV Vanc/Cefepime 2g with HD until 4/9 (see note from 3/4)  -weekly labs           on Mondays cbc, cmp, crp, vanc trough          also on Thursday CMP, vanc trough          Fax to ID clinic [phone]  -needs ambulatory referral to ID (placed)  -pharmacy consult placed to transition to vancomycin from daptomycin. Can transition to cefepime on discharge        Neck  pain  Prevertebral fluid collection  - Patient had neck pain on admit and imaging done: Abnormal C spine Xray with possible signs of C spine instability, MRI ordered and patient refused 2/17, on pain meds, discussed at length, see 2/17 and 2/18.  -Concern for prevertebral abscess likely with fluid collection with widening in C spine, NS consulted and recomended C collar- patient refused, and Xrays, refused.   -ENTconsulted for retropharyngeal abscess per NS and ID recs, will likely scope if amenable at bedside (he refused). They rec MRI C spine with contrast.  -NS recs MRI all spine, ID/ENT rec repeat MRI C spine for better pictures - initially imaging delayed 2/2 pt discomfort and concerns but MRI/CT finally done and complete on 2/22 and 2/24.  - MRI with contrast on 2/24 showed no fluid collection, will not require intervention as per Neurosurgery and ENT and signed off. ID stated did not need any antibiotics for his neck and remains on abx just to treat his left foot osteomyelitis not related to any suspected neck infection. No need for C- collar as per NS.         Chronic gout of left foot due to renal impairment without tophus  Chronic and controlled. S/p Colcichine 0.3 mg po BID to treat as per Podiatry recs. Left foot pain improved.         Folic acid deficiency  Present on admit. Folate low at 4.4. Patient started on Folic acid 1 mg po daily [facility] and will continue.         Acute renal failure superimposed on stage 5 chronic kidney disease, not on chronic dialysis  Membranous glomerulonephritis  Patient on waiting list for kidney transplant  - Nephrology consulted on admit for  and Cr 20.9 and uremic symptoms of tremors. 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 Nephrology recommended HD on admit but patient repeated refused and finally agreed after multiple conversations to start on 2/22. Nephrology has been managing his HD needs [facility].   - GFR  was 13-16 up from baseline 4-6 previously in January 2022. Labs repeated today and GFR is 2.6, Cr 19.   - Patient follows with Dr. Cardoza and Dr Lee and dialysis has been discussed multiple times, patient continues to refuse and on transplant list with [facility] but on this admit after multiple attempts patient finally agreed and trialysis line placed on 2/22 and patient started on HD on 2/23 for progressive CKD stage V that has now progressed to ESRD.   - SW arranging outpatient HD - [facility] and has MWF chair starting next week  -Patient initially deemed not to have capacity by psychiatry (in setting of tereso on ckd and uremia) but now has capacity to make medical decisions.   -Outpatient center requesting repeat psych eval which has been done on 3/4  -Perm Cath placed on 2/28.        Benign prostatic hyperplasia  [place] placed 2/18 overnight for incontinence     Gastroesophageal reflux disease without esophagitis  Chronic and controlled. Continue Protonix 40 mg po daily to treat.       Atherosclerosis of native coronary artery of native heart without angina pectoris  Patient with known CAD. Patient asymptomatic. Continue Labetalol, Asprin 81 mg po daily and Lipitor 80 mg po daily to treat CAD.      Hx of syphilis  - Treated.   - RPR non-reactive 12/2018.        S/P coronary artery stent placement  Patient with previous coronary stent for CAD in past in 2007. Continue Labetalol, Asprin 81 mg po daily and Lipitor 80 mg po daily to treat CAD.        Type 2 diabetes mellitus with chronic kidney disease on chronic dialysis, with long-term current use of insulin  Good control in the hospital in past 24 hours.  · Plan is to continue to monitor POCT glucose 4 times a day with each meal and at bedtime and cover with Novolog low dose sliding scale insulin.   · Target pre-meal glucose goal is <140 with all random glucoses <180 in non-critically ill patient.  · Was previously on lantus but may not need this on  discharge with new CKD       Anemia due to chronic kidney disease, on chronic dialysis  SHANNAN (iron deficiency anemia)  - Patient admitted with Hgb in 7-8 range and related to his advanced CKD.   - Patient transfused 1 unit of PRBCs on 2/18 for Hgb 6.9 and trabnsfuse again 1 unit on 2/23 for Hgb 6.7 but Hgb stable since. No signs of bleeding noted and felt all related to his CKD in combination with chronic infection/inflammation.    - Hgb stable mid to high 8s since last transfusion and dimitrios monitor with CBC in hospital and consider transfusion if Hgb < 7.   -continue folic acid    Pure hypercholesterolemia  Chronic and controlled. Continue Lipitor 80 mg po daily to treat.      Renovascular hypertension  - Elevated on admit but meds adjusted and now controlled.   - Goal BP < 140/90.  - Conitnue Norvasc 10 mg po daily + Hydralazine 50 mg po TID + Labetalol 200 mg po BID to treat and adjust as needed for target BP goal in hospital.     Bilious vomiting with nausea  Happened on 3/4. Unclear etiology, resolved      Encephalopathy, metabolic- resolved   On admission he was confused and was seen by psychiatry and determined not have capacity at that point to consent to HD (2/20). Subsequently, he has undergone HD and his mentation has cleared.     Diarrhea  resolved        VTE Risk Mitigation (From admission, onward)         Ordered     heparin (porcine) injection 1,000 Units  As needed (PRN)         03/05/22 1119     heparin (porcine) injection 1,000 Units  As needed (PRN)         03/03/22 1546     heparin (porcine) injection 5,000 Units  Every 8 hours         02/25/22 0747     IP VTE HIGH RISK PATIENT  Once         02/17/22 0151     Place sequential compression device  Until discontinued         02/17/22 0151              Discharge Planning   JOAN: 3/9/2022     Code Status: Full Code   Is the patient medically ready for discharge?: No    Reason for patient still in hospital (select all that apply): Pending disposition.  Awaiting rehab bed.  Discharge Plan A: Rehab   Discharge Delays: None known at this time      Cinthia Dow MD  Department of Hospital Medicine   [facility] - Transplant Stepdown

## 2022-03-07 NOTE — PROGRESS NOTES
[facility]  Nephrology  Progress Note     Patient Name: Enrique Martinez  MRN: [id number]  Admission Date: 2/16/2022  Hospital Length of Stay: 18 days  Attending Provider: Cinthia Dow MD   Primary Care Physician: Primary Doctor No  Principal Problem: Subacute osteomyelitis of left foot    Subjective:     HPI: Mr. Martinez is a 56 y.o. 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 He was recently admitted at [facility] from 2/2-2/4/2022 from LLE cellulitis. He was found to have salmonella bacteremia suspected due to diarrheal illness. He was discharged with cipro for which he reports compliance. He has had no improvement in his pain, redness, or swelling of his left foot and leg. He has also developed a new blister on his left foot, with MRI foot concerning for osteomyelitis and showing soft tissue swelling c/w cellulitis. He is admitted for treatment of suspected osteomyelitis.     He follows with Dr. Cardoza (nephrology), has adamantly declined HD in the past and would rather wait for transplant. He reports being on the transplant list for the past 4 years. His last renal biopsy in 2013 showed 50% glomerulosclerosis. Started on calcitriol 0.5, hx of elevated PTH at 921. Patient complains of decreased appetite, neck cramps, fatigue. He still urinates 4-5 times/day. He reports his B/L LE edema has worsened over the past few weeks. No dyspnea, chest pain, dysuria, hematuria. Nephro consulted for HD initiation.      Labs are significant for hyperkalemia 5.5, bicarb 18, , phosphate 10.4, Cr 19.7, GFR 2.2, ESR 97, CRP 33.4. . CBC significant for Hgb of 7.2, white count 14. Previous echo showed EF of 65%.       Interval History:  Seen at the bedside no event overnight       Review of patient's allergies indicates:  No Known Allergies   Current Facility-Administered Medications   Medication Frequency    0.9%  NaCl infusion (for blood administration) Q24H PRN    0.9%  NaCl infusion Once    0.9%  NaCl infusion PRN    0.9%  NaCl infusion Once    acetaminophen tablet 1,000 mg Q8H    acetaminophen tablet 650 mg Q6H PRN    albuterol-ipratropium 2.5 mg-0.5 mg/3 mL nebulizer solution 3 mL Q4H PRN    aluminum-magnesium hydroxide-simethicone 200-200-20 mg/5 mL suspension 30 mL QID PRN    amLODIPine tablet 10 mg Daily    ARIPiprazole tablet 15 mg QHS    aspirin EC tablet 81 mg Daily    atorvastatin tablet 80 mg Daily    cefTRIAXone (ROCEPHIN) 2 g/50 mL D5W IVPB Q24H    dextrose 10% bolus 125 mL PRN    dextrose 10% bolus 250 mL PRN    folic acid tablet 1 mg Daily    gabapentin capsule 100 mg Daily    glucagon (human recombinant) injection 1 mg PRN    glucose chewable tablet 16 g PRN    glucose chewable tablet 24 g PRN    heparin (porcine) injection 1,000 Units PRN    heparin (porcine) injection 5,000 Units Q8H    hydrALAZINE tablet 75 mg Q8H    labetaloL tablet 200 mg Q12H    loperamide capsule 2 mg QID PRN    lorazepam injection 1 mg On Call Procedure    melatonin tablet 6 mg Nightly PRN    methocarbamoL tablet 750 mg QID PRN    morphine injection 2 mg On Call Procedure    naloxone 0.4 mg/mL injection 0.02 mg PRN    nystatin 100,000 unit/mL suspension 500,000 Units QID (WM & HS)    OLANZapine injection 2.5 mg Once PRN    ondansetron disintegrating tablet 8 mg Q8H PRN    ondansetron injection 4 mg Q6H PRN    oxyCODONE immediate release tablet 5 mg Q6H PRN    oxyCODONE immediate release tablet Tab 10 mg Q4H PRN    pantoprazole EC tablet 40 mg Daily    polyethylene glycol packet 17 g Daily PRN    prochlorperazine injection Soln 5 mg Q6H PRN    senna-docusate 8.6-50 mg per tablet 1 tablet Daily PRN    sevelamer  carbonate tablet 800 mg TID WM    simethicone chewable tablet 80 mg QID PRN    sodium chloride 0.9% bolus 250 mL PRN    sodium chloride 0.9% bolus 250 mL PRN    sodium chloride 0.9% bolus 250 mL PRN    sodium chloride 0.9% flush 10 mL Q8H PRN    vancomycin - pharmacy to dose pharmacy to manage frequency    vancomycin 1.5 g in dextrose 5 % 250 mL IVPB (ready to mix) Once       Objective:     Vital Signs (Most Recent):  Temp: 98.4 °F (36.9 °C) (03/07/22 0915)  Pulse: 107 (03/07/22 0920)  Resp: 18 (03/07/22 0915)  BP: 117/73 (03/07/22 0920)  SpO2: 97 % (03/07/22 0915)  O2 Device (Oxygen Therapy): room air (03/06/22 1214) Vital Signs (24h Range):  Temp:  [97.9 °F (36.6 °C)-98.6 °F (37 °C)] 98.4 °F (36.9 °C)  Pulse:  [102-120] 107  Resp:  [17-20] 18  SpO2:  [95 %-97 %] 97 %  BP: (104-164)/() 117/73   Weight: 70.3 kg (155 lb) (03/06/22 0400)  Body mass index is 25.02 kg/m².  Body surface area is 1.81 meters squared.    No intake or output data in the 24 hours ending 03/07/22 1214  I/O last 3 completed shifts:  In: 1200 [P.O.:1200]  Out: 0   Net IO Since Admission: 5,484.45 mL [03/07/22 1214]     Physical Exam  Constitutional:       Appearance: He is ill-appearing.   Cardiovascular:      Rate and Rhythm: Normal rate.      Pulses: Normal pulses.   Pulmonary:      Effort: Pulmonary effort is normal.   Abdominal:      General: Abdomen is flat.   Musculoskeletal:         General: Normal range of motion.   Neurological:      Mental Status: He is alert.         Recent Labs   Lab 03/04/22  0619 03/05/22  0748 03/06/22  0723   WBC 7.89 8.34 7.61   HGB 8.5* 8.5* 8.2*   HCT 27.4* 26.8* 25.5*    279 249   MONO 17.5*  1.4* 19.8*  1.7* 24.0*  1.8*      Recent Labs   Lab 03/04/22  0619 03/05/22  0748 03/06/22  0723   * 136 137   K 4.7 4.7 3.7    104 94*   CO2 18* 16* 30*   BUN 18 29* 15   CREATININE 6.1* 9.1* 6.0*   CALCIUM 9.0 8.8 8.4*      No results for input(s): PH, PCO2, PO2, HCO3, POCSATURATED,  BE in the last 72 hours.    Assessment/Plan:       Subacute osteomyelitis of left foot    Renovascular hypertension    Pure hypercholesterolemia    Membranous glomerulonephritis    Anemia due to chronic kidney disease, on chronic dialysis    Type 2 diabetes mellitus with chronic kidney disease on chronic dialysis, with long-term current use of insulin    S/P coronary artery stent placement    Hx of syphilis    Atherosclerosis of native coronary artery of native heart without angina pectoris    Patient on waiting list for kidney transplant    Gastroesophageal reflux disease without esophagitis    Acute renal failure superimposed on stage 5 chronic kidney disease, not on chronic dialysis    Folic acid deficiency    Chronic gout of left foot due to renal impairment without tophus    Neck pain    Prevertebral Fluid Collection    Diarrhea    Encephalopathy, metabolic    Bilious vomiting with nausea    Debility     Acute renal failure superimposed on stage 5 chronic kidney disease, not on chronic dialysis  55 yo M with history of CKD V due to idiopathic membranous glomerulonephritis, anemia of CKD, hx of neurosyphilis, insulin dependent DM, who presents with cellulitis and possible osteomyelitis of left foot. Previously, he has declined HD in the past, never receiving a fistula. He is on the transplant wait list. Patient exhibits signs of uremia including fatigue, poor appetite, and mental slowing. On labs, he is hyperkalemic, uremic, AGMA, hyperphosphatemic. GFR is 2-3, which is reduced from previous GFR 13. Home meds include sodium bicarb, Renvela, and calcitriol. 2+ pitting edema on B/L lower extremities. Renal US from Jan 2022 shows sonographic evidence of chronic medical renal disease and bilateral simple and minimally complex renal cysts.  sCr one year ago was 4.5, now 18.6. UPCr ratio 1.11 (Dec 2020)  2/18: Patient increasingly somnolent, lethargic. Hospital med in process of getting in touch with Dr. Lee, who  the patient follows with outpatient.   2/23: first session of HD 2 hours, no net fluid, patient tolerated. Still confused, having visual hallucinations of mice.   2/24: second HD session 150 min, 1 L removed, patient tolerated.  2/26: HD planned for today     Plan:   - s/p  tunneled catheter placement by IR   - Will need AVF with vascular surgery, likely to be placed outpatient   - HD on T/Th/Sa schedule   - Sevelamer 800 TID   - Hold calcitriol in setting of hyperphosphatemia. Will re-start when phos reaches normal level.  - Trend Cr  - Strict I&Os  - Avoid nephrotoxic agents  - Renally adjust medications            Hyperphosphatemia  Hyperphosphatemia at 10.9 >> 4.6.      - Continue Sevelamer 800 TID     Anemia due to chronic kidney disease  Hemoglobin 10.1 one month ago, now 7.2. Normocytic anemia with low iron, iron sat, elevated ferritin.      -  EPO with HD.     Thank you for your consult. I will follow-up with patient. Please contact us if you have any additional questions.    Diana Cid MD  Nephrology  [facility]

## 2022-03-07 NOTE — PT/OT/SLP PROGRESS
"Physical Therapy      Patient Name:  Enrique Martinez   MRN:  [id number]    Patient not seen today secondary to Patient fatigue. Pt wished to "rest" today, agreeable to participate tomorrow (3/8). Will follow-up 3/8/22.        "

## 2022-03-07 NOTE — NURSING
NAEON  VSS  Room air, sats >92%  AOX4  Telemetry D/Cd  SAM, SBA OOB  PIVX1  RCW permacath for HD  Both access sites CDI  Pt anuric  Dressing to left foot CDI  Pain meds given PRN, 1X dose IV morphine given, pt very pleased with this  Skin on BLE flakey, dry but intact  Safety/fall precautions maintained, non slip socks when OOB  Bed locked in lowest position  Call light within reach  WCTM

## 2022-03-07 NOTE — CONSULTS
Inpatient consult to Physical Medicine Rehab  Consult performed by: Jen Farrell NP  Consult ordered by: Marshall Leon MD  Reason for consult: assess rehab needs        Reviewed patient history and current admission.  PM&R following.     REGINA Smith, FNP-C  Physical Medicine & Rehabilitation   03/07/2022

## 2022-03-07 NOTE — PLAN OF CARE
03/07/22 1225   Post-Acute Status   Post-Acute Authorization Placement  ([facility])   Post-Acute Placement Status Referrals Sent   Diaylsis Status Pending medical clearance/testing   Coverage Medicaid - [facility]   Discharge Delays None known at this time   Discharge Plan   Discharge Plan A Rehab     Elijah Barfield LMSW  [facility]  [phone]

## 2022-03-07 NOTE — PT/OT/SLP PROGRESS
Occupational Therapy      Patient Name:  Enrique Martinez   MRN:  [id number]    Patient not seen today secondary to patient requesting to rest. Will follow-up for OT session.    3/7/2022

## 2022-03-07 NOTE — PLAN OF CARE
Spoke with Atiya Ruth, liaison for [facility]/cp# [phone], who conveyed that PM&R will evaluated pt today.  Atiya will check on HD available bed as well.      Will continue to follow.    Elijah Barfield LMSW  [facility]  [phone]

## 2022-03-07 NOTE — SUBJECTIVE & OBJECTIVE
Interval History: no acute events overnight. No acute concerns.       Review of Systems   Respiratory:  Negative for shortness of breath.    Cardiovascular:  Negative for chest pain.   Gastrointestinal:  Negative for abdominal pain and blood in stool.     Objective:     Vital Signs (Most Recent):  Temp: 98.2 °F (36.8 °C) (03/07/22 1559)  Pulse: 99 (03/07/22 1559)  Resp: 17 (03/07/22 1559)  BP: 130/80 (03/07/22 1559)  SpO2: (!) 92 % (03/07/22 1559)   Vital Signs (24h Range):  Temp:  [98 °F (36.7 °C)-98.6 °F (37 °C)] 98.2 °F (36.8 °C)  Pulse:  [] 99  Resp:  [16-20] 17  SpO2:  [92 %-98 %] 92 %  BP: (104-143)/(67-80) 130/80     Weight: 70.3 kg (155 lb)  Body mass index is 25.02 kg/m².  No intake or output data in the 24 hours ending 03/07/22 1644     Physical Exam  Vitals and nursing note reviewed.   Constitutional:       General: He is not in acute distress.     Appearance: He is well-developed.   HENT:      Head: Normocephalic and atraumatic.   Eyes:      Conjunctiva/sclera: Conjunctivae normal.   Neck:      Vascular: No JVD.   Cardiovascular:      Rate and Rhythm: Normal rate and regular rhythm.      Heart sounds: Normal heart sounds.   Pulmonary:      Effort: Pulmonary effort is normal.      Breath sounds: Normal breath sounds.   Abdominal:      General: Bowel sounds are normal. There is no distension.      Palpations: Abdomen is soft.   Musculoskeletal:      Cervical back: Neck supple.   Neurological:      Mental Status: He is alert.   Psychiatric:         Behavior: Behavior normal.       Significant Labs: All pertinent labs within the past 24 hours have been reviewed.  CBC:   Recent Labs   Lab 03/06/22 0723   WBC 7.61   HGB 8.2*   HCT 25.5*          CMP:   Recent Labs   Lab 03/06/22 0723      K 3.7   CL 94*   CO2 30*   GLU 89   BUN 15   CREATININE 6.0*   CALCIUM 8.4*   ANIONGAP 13   EGFRNONAA 9.4*         Significant Imaging: I have reviewed all pertinent imaging results/findings within the  past 24 hours.

## 2022-03-07 NOTE — CONSULTS
Pharmacokinetic Initial Assessment: IV Vancomycin    Assessment/Plan:    Initiate intravenous vancomycin with loading dose of 1500 mg once with subsequent doses when random concentrations are less than 25 mcg/mL  Desired empiric serum trough concentration is 15 to 20 mcg/mL  Draw vancomycin random level on 3/8/22 with AM labs before HD. ESRD on HD TTS.  Pharmacy will continue to follow and monitor vancomycin.        Thank you for the consult,   Zahra Rodriguez, PharmD, [facility]  [phone]       Patient brief summary:  Enrique Martinez is a 57 y.o. male initiated on antimicrobial therapy with IV Vancomycin for treatment of suspected bone/joint infection    Drug Allergies:   Review of patient's allergies indicates:  No Known Allergies    Actual Body Weight:   70kg    Renal Function:   Estimated Creatinine Clearance: 12 mL/min (A) (based on SCr of 6 mg/dL (H)).,     Dialysis Method (if applicable):  intermittent HD-TTS    CBC (last 72 hours):  Recent Labs   Lab Result Units 03/05/22  0748 03/06/22  0723   WBC K/uL 8.34 7.61   Hemoglobin g/dL 8.5* 8.2*   Hematocrit % 26.8* 25.5*   Platelets K/uL 279 249   Gran % % 67.9 56.1   Lymph % % 10.6* 18.4   Mono % % 19.8* 24.0*   Eosinophil % % 0.8 0.8   Basophil % % 0.4 0.4   Differential Method  Automated Automated       Metabolic Panel (last 72 hours):  Recent Labs   Lab Result Units 03/05/22  0748 03/06/22  0723   Sodium mmol/L 136 137   Potassium mmol/L 4.7 3.7   Chloride mmol/L 104 94*   CO2 mmol/L 16* 30*   Glucose mg/dL 86 89   BUN mg/dL 29* 15   Creatinine mg/dL 9.1* 6.0*   Albumin g/dL 2.2*  --    Phosphorus mg/dL 5.1* 4.5

## 2022-03-07 NOTE — HOSPITAL COURSE
03/2/2022: PT-Bed mobility SV.  Sit to stand SBA.  Ambulated 46 FT cga-sba & rw.    3/3: OT-BM SV. Sit to stand SBA & RW. Ambulated 125 ft CGA-SBA & RW. LBD (I). Toilet SBA.

## 2022-03-08 LAB
ALBUMIN SERPL BCP-MCNC: 2.1 G/DL (ref 3.5–5.2)
ANION GAP SERPL CALC-SCNC: 17 MMOL/L (ref 8–16)
BASOPHILS # BLD AUTO: 0.05 K/UL (ref 0–0.2)
BASOPHILS NFR BLD: 0.6 % (ref 0–1.9)
BUN SERPL-MCNC: 37 MG/DL (ref 6–20)
CALCIUM SERPL-MCNC: 8.9 MG/DL (ref 8.7–10.5)
CHLORIDE SERPL-SCNC: 92 MMOL/L (ref 95–110)
CO2 SERPL-SCNC: 26 MMOL/L (ref 23–29)
CREAT SERPL-MCNC: 9.9 MG/DL (ref 0.5–1.4)
DIFFERENTIAL METHOD: ABNORMAL
EOSINOPHIL # BLD AUTO: 0.1 K/UL (ref 0–0.5)
EOSINOPHIL NFR BLD: 1 % (ref 0–8)
ERYTHROCYTE [DISTWIDTH] IN BLOOD BY AUTOMATED COUNT: 15.4 % (ref 11.5–14.5)
EST. GFR  (AFRICAN AMERICAN): 5.9 ML/MIN/1.73 M^2
EST. GFR  (NON AFRICAN AMERICAN): 5.1 ML/MIN/1.73 M^2
GLUCOSE SERPL-MCNC: 68 MG/DL (ref 70–110)
HCT VFR BLD AUTO: 25.4 % (ref 40–54)
HGB BLD-MCNC: 8.1 G/DL (ref 14–18)
IMM GRANULOCYTES # BLD AUTO: 0.05 K/UL (ref 0–0.04)
IMM GRANULOCYTES NFR BLD AUTO: 0.6 % (ref 0–0.5)
LYMPHOCYTES # BLD AUTO: 1.4 K/UL (ref 1–4.8)
LYMPHOCYTES NFR BLD: 15.5 % (ref 18–48)
MCH RBC QN AUTO: 28.7 PG (ref 27–31)
MCHC RBC AUTO-ENTMCNC: 31.9 G/DL (ref 32–36)
MCV RBC AUTO: 90 FL (ref 82–98)
MONOCYTES # BLD AUTO: 1.6 K/UL (ref 0.3–1)
MONOCYTES NFR BLD: 17.9 % (ref 4–15)
NEUTROPHILS # BLD AUTO: 5.7 K/UL (ref 1.8–7.7)
NEUTROPHILS NFR BLD: 64.4 % (ref 38–73)
NRBC BLD-RTO: 0 /100 WBC
PHOSPHATE SERPL-MCNC: 6.3 MG/DL (ref 2.7–4.5)
PLATELET # BLD AUTO: 228 K/UL (ref 150–450)
PMV BLD AUTO: 9.6 FL (ref 9.2–12.9)
POTASSIUM SERPL-SCNC: 4.7 MMOL/L (ref 3.5–5.1)
RBC # BLD AUTO: 2.82 M/UL (ref 4.6–6.2)
SODIUM SERPL-SCNC: 135 MMOL/L (ref 136–145)
VANCOMYCIN SERPL-MCNC: 29.7 UG/ML
WBC # BLD AUTO: 8.78 K/UL (ref 3.9–12.7)

## 2022-03-08 PROCEDURE — 20600001 HC STEP DOWN PRIVATE ROOM: Mod: NTX

## 2022-03-08 PROCEDURE — 85025 COMPLETE CBC W/AUTO DIFF WBC: CPT | Mod: NTX | Performed by: FAMILY MEDICINE

## 2022-03-08 PROCEDURE — 99232 PR SUBSEQUENT HOSPITAL CARE,LEVL II: ICD-10-PCS | Mod: NTX,,, | Performed by: INTERNAL MEDICINE

## 2022-03-08 PROCEDURE — 63600175 PHARM REV CODE 636 W HCPCS: Mod: NTX | Performed by: FAMILY MEDICINE

## 2022-03-08 PROCEDURE — 36415 COLL VENOUS BLD VENIPUNCTURE: CPT | Mod: NTX | Performed by: FAMILY MEDICINE

## 2022-03-08 PROCEDURE — 25000003 PHARM REV CODE 250: Mod: NTX | Performed by: FAMILY MEDICINE

## 2022-03-08 PROCEDURE — 97530 THERAPEUTIC ACTIVITIES: CPT | Mod: NTX

## 2022-03-08 PROCEDURE — 80069 RENAL FUNCTION PANEL: CPT | Mod: NTX | Performed by: FAMILY MEDICINE

## 2022-03-08 PROCEDURE — 97535 SELF CARE MNGMENT TRAINING: CPT | Mod: NTX

## 2022-03-08 PROCEDURE — 99232 SBSQ HOSP IP/OBS MODERATE 35: CPT | Mod: NTX,,, | Performed by: HOSPITALIST

## 2022-03-08 PROCEDURE — 99232 SBSQ HOSP IP/OBS MODERATE 35: CPT | Mod: NTX,,, | Performed by: INTERNAL MEDICINE

## 2022-03-08 PROCEDURE — 25000003 PHARM REV CODE 250: Mod: NTX | Performed by: PHYSICIAN ASSISTANT

## 2022-03-08 PROCEDURE — 80202 ASSAY OF VANCOMYCIN: CPT | Mod: NTX | Performed by: FAMILY MEDICINE

## 2022-03-08 PROCEDURE — 63600175 PHARM REV CODE 636 W HCPCS: Mod: JG,NTX | Performed by: INTERNAL MEDICINE

## 2022-03-08 PROCEDURE — 99232 PR SUBSEQUENT HOSPITAL CARE,LEVL II: ICD-10-PCS | Mod: NTX,,, | Performed by: HOSPITALIST

## 2022-03-08 PROCEDURE — 97116 GAIT TRAINING THERAPY: CPT | Mod: NTX

## 2022-03-08 RX ORDER — SODIUM CHLORIDE 9 MG/ML
INJECTION, SOLUTION INTRAVENOUS
Status: DISCONTINUED | OUTPATIENT
Start: 2022-03-08 | End: 2022-03-09

## 2022-03-08 RX ORDER — SODIUM CHLORIDE 9 MG/ML
INJECTION, SOLUTION INTRAVENOUS ONCE
Status: DISCONTINUED | OUTPATIENT
Start: 2022-03-08 | End: 2022-03-09

## 2022-03-08 RX ADMIN — GABAPENTIN 100 MG: 100 CAPSULE ORAL at 08:03

## 2022-03-08 RX ADMIN — OXYCODONE HYDROCHLORIDE 10 MG: 10 TABLET ORAL at 07:03

## 2022-03-08 RX ADMIN — SODIUM CHLORIDE: 0.9 INJECTION, SOLUTION INTRAVENOUS at 09:03

## 2022-03-08 RX ADMIN — SEVELAMER CARBONATE 800 MG: 800 TABLET, FILM COATED ORAL at 04:03

## 2022-03-08 RX ADMIN — CEFTRIAXONE SODIUM 2 G: 2 INJECTION, SOLUTION INTRAVENOUS at 09:03

## 2022-03-08 RX ADMIN — PANTOPRAZOLE SODIUM 40 MG: 40 TABLET, DELAYED RELEASE ORAL at 08:03

## 2022-03-08 RX ADMIN — OXYCODONE HYDROCHLORIDE 10 MG: 10 TABLET ORAL at 06:03

## 2022-03-08 RX ADMIN — ASPIRIN 81 MG: 81 TABLET, COATED ORAL at 08:03

## 2022-03-08 RX ADMIN — ATORVASTATIN CALCIUM 80 MG: 20 TABLET, FILM COATED ORAL at 08:03

## 2022-03-08 RX ADMIN — ARIPIPRAZOLE 15 MG: 5 TABLET ORAL at 08:03

## 2022-03-08 RX ADMIN — NYSTATIN 500000 UNITS: 500000 SUSPENSION ORAL at 04:03

## 2022-03-08 RX ADMIN — NYSTATIN 500000 UNITS: 500000 SUSPENSION ORAL at 07:03

## 2022-03-08 RX ADMIN — ACETAMINOPHEN 1000 MG: 500 TABLET ORAL at 06:03

## 2022-03-08 RX ADMIN — ACETAMINOPHEN 1000 MG: 500 TABLET ORAL at 08:03

## 2022-03-08 RX ADMIN — OXYCODONE HYDROCHLORIDE 10 MG: 10 TABLET ORAL at 10:03

## 2022-03-08 RX ADMIN — HYDRALAZINE HYDROCHLORIDE 50 MG: 50 TABLET ORAL at 06:03

## 2022-03-08 RX ADMIN — SEVELAMER CARBONATE 800 MG: 800 TABLET, FILM COATED ORAL at 07:03

## 2022-03-08 RX ADMIN — HEPARIN SODIUM 5000 UNITS: 5000 INJECTION INTRAVENOUS; SUBCUTANEOUS at 06:03

## 2022-03-08 RX ADMIN — EPOETIN ALFA-EPBX 3180 UNITS: 2000 INJECTION, SOLUTION INTRAVENOUS; SUBCUTANEOUS at 02:03

## 2022-03-08 RX ADMIN — HEPARIN SODIUM 5000 UNITS: 5000 INJECTION INTRAVENOUS; SUBCUTANEOUS at 09:03

## 2022-03-08 RX ADMIN — FOLIC ACID 1 MG: 1 TABLET ORAL at 08:03

## 2022-03-08 RX ADMIN — AMLODIPINE BESYLATE 10 MG: 10 TABLET ORAL at 08:03

## 2022-03-08 RX ADMIN — LABETALOL HYDROCHLORIDE 200 MG: 100 TABLET, FILM COATED ORAL at 08:03

## 2022-03-08 NOTE — SUBJECTIVE & OBJECTIVE
Interval History: no acute events overnight. No acute concerns.       Review of Systems   Respiratory:  Negative for shortness of breath.    Cardiovascular:  Negative for chest pain.   Gastrointestinal:  Negative for abdominal pain and blood in stool.     Objective:     Vital Signs (Most Recent):  Temp: 97.9 °F (36.6 °C) (03/08/22 0729)  Pulse: 99 (03/08/22 0729)  Resp: 18 (03/08/22 0729)  BP: 139/85 (03/08/22 0729)  SpO2: (!) 92 % (03/08/22 0729)   Vital Signs (24h Range):  Temp:  [97.9 °F (36.6 °C)-98.4 °F (36.9 °C)] 97.9 °F (36.6 °C)  Pulse:  [] 99  Resp:  [16-18] 18  SpO2:  [92 %-98 %] 92 %  BP: (112-147)/(64-85) 139/85     Weight: 63.6 kg (140 lb 3.4 oz)  Body mass index is 22.63 kg/m².    Intake/Output Summary (Last 24 hours) at 3/8/2022 1006  Last data filed at 3/8/2022 0912  Gross per 24 hour   Intake 1281.99 ml   Output --   Net 1281.99 ml        Physical Exam  Vitals and nursing note reviewed.   Constitutional:       General: He is not in acute distress.     Appearance: He is well-developed.   HENT:      Head: Normocephalic and atraumatic.   Eyes:      Conjunctiva/sclera: Conjunctivae normal.   Neck:      Vascular: No JVD.   Cardiovascular:      Rate and Rhythm: Normal rate and regular rhythm.      Heart sounds: Normal heart sounds.   Pulmonary:      Effort: Pulmonary effort is normal.      Breath sounds: Normal breath sounds.   Abdominal:      General: Bowel sounds are normal. There is no distension.      Palpations: Abdomen is soft.   Musculoskeletal:      Cervical back: Neck supple.   Neurological:      Mental Status: He is alert.   Psychiatric:         Behavior: Behavior normal.       Significant Labs: All pertinent labs within the past 24 hours have been reviewed.  CBC:   Recent Labs   Lab 03/08/22 0721   WBC 8.78   HGB 8.1*   HCT 25.4*          CMP:   Recent Labs   Lab 03/08/22 0721   *   K 4.7   CL 92*   CO2 26   GLU 68*   BUN 37*   CREATININE 9.9*   CALCIUM 8.9   ALBUMIN 2.1*    ANIONGAP 17*   EGFRNONAA 5.1*         Significant Imaging: I have reviewed all pertinent imaging results/findings within the past 24 hours.

## 2022-03-08 NOTE — ASSESSMENT & PLAN NOTE
Membranous glomerulonephritis  - Nephrology consulted on admit for  and Cr 20.9 and uremic symptoms of tremors. 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 Nephrology recommended HD on admit but patient repeated refused and finally agreed after multiple conversations to start on 2/22. Nephrology has been managing his HD needs [facility].   - GFR was 13-16 up from baseline 4-6 previously in January 2022. Labs repeated today and GFR is 2.6, Cr 19.   - Patient follows with Dr. Cardoza and Dr Lee and dialysis has been discussed multiple times, patient continues to refuse and on transplant list with [facility] but on this admit after multiple attempts patient finally agreed and trialysis line placed on 2/22 and patient started on HD on 2/23 for progressive CKD stage V that has now progressed to ESRD.   - SW arranging outpatient HD - [facility] and has MWF chair starting next week  -Patient initially deemed not to have capacity by psychiatry (in setting of tereso on ckd and uremia) but now has capacity to make medical decisions.   -Outpatient center requesting repeat psych eval which has been done on 3/4  -Perm Cath placed on 2/28.  Has routine HD with no complain.

## 2022-03-08 NOTE — PT/OT/SLP PROGRESS
"Occupational Therapy   Treatment    Name: Enrique Martinez  MRN: [id number]  Admitting Diagnosis:  Subacute osteomyelitis of left foot       Recommendations:     Discharge Recommendations: rehabilitation facility  Discharge Equipment Recommendations:  walker, rolling, shower chair  Barriers to discharge:  Decreased caregiver support, Inaccessible home environment    Assessment:     Enrique Martinez is a 57 y.o. male with a medical diagnosis of Subacute osteomyelitis of left foot. He presents with the following performance deficits affecting function: weakness, impaired endurance, impaired self care skills, impaired functional mobilty, gait instability, impaired balance, pain, decreased lower extremity function, impaired skin, decreased safety awareness. Patient agreeable to therapy session and OOB activity. Patient with c/o pain in L foot with decreased weight bearing noticed from previous sessions requiring CGA for mobility when using RW during ADLs. Due to changes in functional mobility and decreased caregiver support, discharge recommendations changed from home health back to rehab.    Rehab Prognosis:  Good; patient would benefit from acute skilled OT services to address these deficits and reach maximum level of function.       Plan:     Patient to be seen 3 x/week to address the above listed problems via self-care/home management, therapeutic activities, therapeutic exercises  · Plan of Care Expires: 03/27/22  · Plan of Care Reviewed with: patient    Subjective     Patient stated "I keep telling people I need a boot".     Pain/Comfort:  · Pain Rating 1:  (did not rate)  · Location - Side 1: Left  · Location 1: foot  · Pain Addressed 1: Reposition, Distraction, Nurse notified    Objective:     Communicated with: RN prior to session.  Patient found HOB elevated with telemetry upon OT entry to room.    General Precautions: Standard, fall   Orthopedic Precautions:spinal precautions   Braces: Aspen collar  Respiratory " Status: Room air     Occupational Performance:     Bed Mobility:    · Patient completed Supine to Sit with independence     Functional Mobility/Transfers:  · Patient completed Sit <> Stand Transfer with stand by assistance with RW  · 2x from EOB   · Cues provided for correct hand placement   · Functional Mobility: Patient ambulated within room and ~100 ft in hallway with SBA-CGA with RW    Activities of Daily Living:  · Grooming: stand by assistance standing at sink with RW    AMPA 6 Click ADL: 21    Treatment & Education:   Therapist provided facilitation and instruction of proper body mechanics and fall prevention strategies during tasks listed above.   Instructed patient to sit in bedside chair daily to increase OOB/activity tolerance.   Instructed patient to use call light to have nursing staff assist with needs/transfers.   Discussed OT POC and answered all questions within OT scope of practice.   Whiteboard updated     Patient left sitting edge of bed with all lines intact, call button in reach and RN notifiedEducation:      GOALS:   Multidisciplinary Problems     Occupational Therapy Goals        Problem: Occupational Therapy Goal    Goal Priority Disciplines Outcome Interventions   Occupational Therapy Goal     OT, PT/OT Ongoing, Progressing    Description: Goals to be met by: 3/11/2022     Patient will increase functional independence with ADLs by performing:    LE Dressing with Supervision.  Grooming while standing at sink with Supervision.  Toileting from toilet with Supervision for hygiene and clothing management.   Step transfer with Supervision  Toilet transfer to toilet with Supervision.                     Time Tracking:     OT Date of Treatment: 03/08/22  OT Start Time: 0829  OT Stop Time: 0852  OT Total Time (min): 23 min    Billable Minutes:Self Care/Home Management 8  Therapeutic Activity 15    OT/FELICITY: OT     FELICITY Visit Number: 0    3/8/2022

## 2022-03-08 NOTE — PROGRESS NOTES
Pharmacokinetic Assessment Follow Up: IV Vancomycin    Vancomycin serum concentration assessment(s):    The random level was drawn correctly and can be used to guide therapy at this time. The measurement is above the desired definitive target range of 15 to 20 mcg/mL.    Vancomycin Regimen Plan:    Re-dose when the random level is less than 20 mcg/mL, next level to be drawn at 0430 on 3/9/22    Drug levels (last 3 results):  Recent Labs   Lab Result Units 03/08/22  0721   Vancomycin, Random ug/mL 29.7       Pharmacy will continue to follow and monitor vancomycin.    Please contact pharmacy at extension [phone] for questions regarding this assessment.    Thank you for the consult,   Nuha Brown       Patient brief summary:  Enrique Martinez is a 57 y.o. male initiated on antimicrobial therapy with IV Vancomycin for treatment of osteo    The patient's current regimen is vanc pharmacy to dose    Drug Allergies:   Review of patient's allergies indicates:  No Known Allergies    Actual Body Weight:   63.6kg    Renal Function:   Estimated Creatinine Clearance: 7.2 mL/min (A) (based on SCr of 9.9 mg/dL (H)).,     Dialysis Method (if applicable):  intermittent HD    CBC (last 72 hours):  Recent Labs   Lab Result Units 03/06/22  0723 03/08/22  0721   WBC K/uL 7.61 8.78   Hemoglobin g/dL 8.2* 8.1*   Hematocrit % 25.5* 25.4*   Platelets K/uL 249 228   Gran % % 56.1 64.4   Lymph % % 18.4 15.5*   Mono % % 24.0* 17.9*   Eosinophil % % 0.8 1.0   Basophil % % 0.4 0.6   Differential Method  Automated Automated       Metabolic Panel (last 72 hours):  Recent Labs   Lab Result Units 03/06/22  0723 03/08/22  0721   Sodium mmol/L 137 135*   Potassium mmol/L 3.7 4.7   Chloride mmol/L 94* 92*   CO2 mmol/L 30* 26   Glucose mg/dL 89 68*   BUN mg/dL 15 37*   Creatinine mg/dL 6.0* 9.9*   Albumin g/dL  --  2.1*   Phosphorus mg/dL 4.5 6.3*       Vancomycin Administrations:  vancomycin given in the last 96 hours                   vancomycin 1.5 g  in dextrose 5 % 250 mL IVPB (ready to mix) (mg) 1,500 mg New Bag 03/07/22 1504                Microbiologic Results:  Microbiology Results (last 7 days)     Procedure Component Value Units Date/Time    Culture, Anaerobe [[id number]] Collected: 02/26/22 1825    Order Status: Completed Specimen: Bone from Toe, Left Foot Updated: 03/03/22 0841     Anaerobic Culture No anaerobes isolated    Fungus culture [[id number]] Collected: 02/26/22 1825    Order Status: Completed Specimen: Bone from Toe, Left Foot Updated: 03/02/22 1447     Fungus (Mycology) Culture Culture in progress    Stool culture **CANNOT BE ORDERED STAT** [[id number]] Collected: 02/27/22 1337    Order Status: Completed Specimen: Stool Updated: 03/02/22 1140     Stool Culture No Salmonella,Shigella,Vibrio,Campylobacter,Yersinia isolated.    Aerobic culture [[id number]] Collected: 02/26/22 1825    Order Status: Completed Specimen: Bone from Toe, Left Foot Updated: 03/02/22 0858     Aerobic Bacterial Culture No growth

## 2022-03-08 NOTE — PROGRESS NOTES
AAOx4. VSS. Cr 9.9 (increased from 6.0) on most recent labs. Patient to get HD today 3/8 at bedside - awaiting HD nurse. Wound care done per orders to L foot. Patient worked with PT/OT today - ambulated in room and hallway w/ walker. 10mg oxycodone PRN given x1 for pain. Epoetin-veronica SubQ injection given per protocol. Plan for patient to be discharged to rehab pending placement acceptance - referrals sent and being reviewed - outpatient HD has been arranged according to most recent SW note. No complaints from p[atient throughout shift. Non-skid socks on when OOB. Standby assist when OOB. Bed in low position. Call light within reach.

## 2022-03-08 NOTE — PT/OT/SLP PROGRESS
"Physical Therapy Treatment    Patient Name:  Enrique Martinez   MRN:  [id number]    Recommendations:     Discharge Recommendations:  rehabilitation facility   Discharge Equipment Recommendations: walker, rolling, shower chair   Barriers to discharge: Inaccessible home and Decreased caregiver support    Assessment:     Enrique Martinez is a 57 y.o. male admitted with a medical diagnosis of Subacute osteomyelitis of left foot.  He presents with the following impairments/functional limitations:  weakness, impaired endurance, impaired self care skills, gait instability, impaired balance, decreased lower extremity function, impaired functional mobilty, decreased safety awareness, decreased coordination, pain, impaired skin, orthopedic precautions. Pt ambulating household distance using RW.  Antalgic gait pattern noted throughout with decreased weight-shifting to L 2* L foot pain which increased in gravity dependent positions. Pt demo'ing decreased attention to cues throughout session, which pt attributes to increased fatigue during session. Towards end of gait trial, pt becoming increasingly confused with difficulty following commands and blank stare noted, requiring return to supine to maintain pt safety- BP WNL; RN notified. Pt would continue to benefit from skilled acute PT in order to address current deficits and progress functional mobility.     Rehab Prognosis: Good; patient would benefit from acute skilled PT services to address these deficits and reach maximum level of function.    Recent Surgery: Procedure(s) (LRB):  MPU PROCEDURE (N/A)      Plan:     During this hospitalization, patient to be seen 3 x/week to address the identified rehab impairments via gait training, therapeutic activities, therapeutic exercises, neuromuscular re-education and progress toward the following goals:    · Plan of Care Expires:  03/27/22    Subjective     Chief Complaint: fatigue; L foot pain  Patient/Family Comments/goals: "You caught " "me while I was sleeping. Now I'm up." re: pt attributing episode of confusion at end of session to increased fatigue   Pain/Comfort:  · Pain Rating 1:  (did not rate)  · Location - Side 1: Left  · Location 1: foot  · Pain Addressed 1: Reposition, Distraction      Objective:     Communicated with RN prior to session.  Patient found supine with cervical collar (found doffed; donned upon sitting EOB) upon PT entry to room.     General Precautions: Standard, fall   Orthopedic Precautions:spinal precautions   Braces: Aspen collar  Respiratory Status: Room air     Functional Mobility:  · Bed Mobility:     · Supine to Sit: stand by assistance  · Sit to Supine: contact guard assistance  · Max cues provided for pt to return to supine at end of session 2* increasing confusion with pt demo'ing blank stare and difficulty following commands. BP following return to supine 123/78 (95)   · Transfers:     · Sit to Stand:  contact guard assistance with rolling walker, x3 reps from EOB  · Max cues provided for hand placement and transfer technique with RW; however, pt inattentive throughout and pulling himself up using RW instead  · Gait: ~80 ft. with RW and CGA-Manuel  · Mask donned prior to hallway ambulation  · demo'd antalgic gait pattern with decreased weight-shift to L, decreased WB LLE, excess ankle PF LLE, absent heel strike LLE, decreased step length, instability  · Max cues throughout for RW management, self-pacing, safety awareness, upright posture and appropriate weight-shifts  · Towards end of gait trial, pt becoming increasingly inattentive to therapist's cues with confusion noted. Pt unable to sequence steps despite max cues (walking forward instead of backwards, taking steps outside of RW, etc). Required max cues and facilitation to return to sit>supine. RN notified following session.      AM-PAC 6 CLICK MOBILITY  Turning over in bed (including adjusting bedclothes, sheets and blankets)?: 3  Sitting down on and standing " up from a chair with arms (e.g., wheelchair, bedside commode, etc.): 3  Moving from lying on back to sitting on the side of the bed?: 3  Moving to and from a bed to a chair (including a wheelchair)?: 3  Need to walk in hospital room?: 3  Climbing 3-5 steps with a railing?: 1  Basic Mobility Total Score: 16       Therapeutic Activities and Exercises:  Pt educated on role of PT and PT POC. Pt verbalized understanding.   C-collar donned while seated EOB; doffed upon return to supine per pt request.  Increased time provided for transitional movements in order to reduce the effects of OH and decrease fall risk.   Pt educated on the effects of bed rest and the importance of OOB activity. Pt encouraged to sit UIC majority of day as tolerated and continue daily OOB mobility with nursing assist. Pt verbalized understanding.  Pt oriented to call bell and instructed to call for staff assist as needed. Pt verbalized understanding.     Patient left supine with all lines intact, call button in reach, bed alarm on and RN notified..    GOALS:   Multidisciplinary Problems     Physical Therapy Goals        Problem: Physical Therapy Goal    Goal Priority Disciplines Outcome Goal Variances Interventions   Physical Therapy Goal     PT, PT/OT Ongoing, Progressing     Description: Goals to be met by: 3/11/2022     Patient will increase functional independence with mobility by performin. Supine to sit with [place]  2. Sit to supine with [place]  3. Sit to stand transfer with [place]  4. Gait  x 200 feet with Supervision using No Assistive Device.   5. Ascend/descend 9 stairs with right or left Handrails Stand-by Assistance using No Assistive Device.                      Time Tracking:     PT Received On: 22  PT Start Time: 1408     PT Stop Time: 1432  PT Total Time (min): 24 min     Billable Minutes: Gait Training 12 and Therapeutic Activity 12    Treatment Type: Treatment  PT/PTA: PT     PTA Visit Number: 0      03/08/2022

## 2022-03-08 NOTE — PLAN OF CARE
03/08/22 1037   Post-Acute Status   Post-Acute Authorization Placement   Post-Acute Placement Status Referrals Sent   Discharge Plan   Discharge Plan A Rehab   Discharge Plan B Rehab     The PRN BRE reviewed the referrals.  [facility] does not have an HD bed until the end of the week.  BRE called [facility] and was informed that they can not accept the pt.  The do not have a Medicaid bed and can not accept due to new HD.  BRE left a message for  rehab.  BRE looked at insurance website for additional options.  BRE sent to [facility] which was the only additional local rehab.    Lizzy Ceron LCSW   PRN

## 2022-03-08 NOTE — PLAN OF CARE
[facility] - Transplant Stepdown  Discharge Reassessment    Primary Care Provider: Primary Doctor No    Expected Discharge Date: 3/11/2022    Reassessment (most recent)     Discharge Reassessment - 03/08/22 1234        Discharge Reassessment    Assessment Type Discharge Planning Reassessment     Did the patient's condition or plan change since previous assessment? No     Communicated JOAN with patient/caregiver Date not available/Unable to determine     Discharge Plan A Rehab     Discharge Plan B Rehab     DME Needed Upon Discharge  other (see comments)   TBD    Why the patient remains in the hospital Requires continued medical care               Rehab referrals have been sent out and pt is being reviewed by several facilities.  Pt's outpt HD has been arranged.  SW/CM will f/u as needed.    ZAHEER McintyreW   PRN

## 2022-03-08 NOTE — PROGRESS NOTES
[facility] Medicine  Progress Note    Patient Name: Enrique Martinez  MRN: [id number]  Patient Class: IP- Inpatient   Admission Date: 2/16/2022  Length of Stay: 19 days  Attending Physician: Jose Alberto Saba MD  Primary Care Provider: Primary Doctor No        Subjective:     Principal Problem:Subacute osteomyelitis of left foot        HPI:  Enrique Martinez is a 56 y.o. male with a PMHx of CKD V glomerulonephropathy, insulin dependent diabetes mellitus, and CAD who presented tot  ED for evaluation of worsening left foot pain and swelling. He was recently admitted at [facility] from 2/2-2/4/2022 from LLE cellulitis. He was found to have salmonella bacteremia suspected due to diarrheal illness. He was discharged with cipro for which he reports compliance. He has had no improvement in his pain, redness, or swelling of his left foot and leg. He has also developed a new blister on his left foot. He endorses chills. He denies fevers, chest pain, SOB, N/V, decreased urine output, dysuria, and flank pain.    ED: HR 97, other VSSAF. CBC with leukocytosis of 16.01, Hgb 7.4, Hct 22.2.  ESR 97. CRP 33.4. K 5.5. Cr 19.7, . Phos 10.4. Lactic WNL. MRI L foot with cellulitis and osteomyelitis of distal 1st metatarsal and proximal 1st phalanx.      Overview/Hospital Course:  .Enrique Martinez is a 56 y.o. male with a PMHx of CKD V glomerulonephropathy, insulin dependent diabetes mellitus, and CAD who presented tot  ED for evaluation of worsening left foot pain and swelling. He was recently admitted at [facility] from 2/2-2/4/2022 from LLE cellulitis. He was found to have salmonella bacteremia suspected due to diarrheal illness. He was discharged with cipro for which he reports compliance. He has had no improvement in his pain, redness, or swelling of his left foot and leg. He has also developed a new blister on his left foot. He endorses chills. He denies fevers, chest pain, SOB, N/V, decreased urine  "output, dysuria, and flank pain. Patient admitted with worsening and progressive chronic kidney disease with  and Cr 20.9. Nephrology is consulted for his CKD stage V that has progressed ti ESRD and at first patient not amenable to starting HD but finally agreed after family involvement and patient had trialysis catheter placed and started on HD as per Nephrology. Patient was also complaining of neck pain on admit and had abnormal C spine Xray with possible signs of C spine instability, MRi ordered and patient refused 2/17, on pain meds, was discussed at length. Concern for possible infection also given acuity of pain. Imaging showed signs of C spine instability, prevertebral abscess likely with fluid collection with widening in C spine,. Neurosurgery consulted and recommended C collar- patient refused, and Xrays, refused, and MRI full spine when stable. Patient also had MRI of left foot on admit and showed "Regions of focal abnormal signal involving the distal 1st metatarsal and proximal 1st phalanx as described above.  Findings are concerning for foci of subacute myelitis (Yared's abscess).  Other less likely considerations include cystic degenerative change." ENT consulted for possible retropharyngeal abscess as per Neurosurgery and Infectious Disease consulted. At first patient kept refusing advanced imaging but finally agreed and repeat MRI of cervical spine done on 2/22 with anesthesia to help with sedation. MRI done without contrast and concern for possible retropharyngeal abscess. Patient placed on empiric IV Daptomycin and Ceftriaxone by ID.  ENT recommended needed study with contrast to really investigate the area better. Repeat MRI with contrast done of cervical spine on 2/24 and no obvious fluid collection noted and not obvious signs of infection so ENT and Neurosurgery signed off and stated nothing needed to be done. Patient kept on IV Daptomycin and Rocephin to cover for his left foot osteo. " Podiatry was consulted and performed bone biopsy as per ID recs on 2/25 to determine duration and type of abx needed to treat osteomyelitis of left foot. SW obtained outpatient HD chair at [facility] (new start HD this admit). ID finalized recs on discharge for cefepime/vanc until 4/9 with HD.  Pharmacy consulted to assist with vanc dosing. Patient agreeable to IPR placement - referrals sent.   Has routine HD with no complain.      Interval History: no acute events overnight. No acute concerns.       Review of Systems   Respiratory:  Negative for shortness of breath.    Cardiovascular:  Negative for chest pain.   Gastrointestinal:  Negative for abdominal pain and blood in stool.     Objective:     Vital Signs (Most Recent):  Temp: 97.9 °F (36.6 °C) (03/08/22 0729)  Pulse: 99 (03/08/22 0729)  Resp: 18 (03/08/22 0729)  BP: 139/85 (03/08/22 0729)  SpO2: (!) 92 % (03/08/22 0729)   Vital Signs (24h Range):  Temp:  [97.9 °F (36.6 °C)-98.4 °F (36.9 °C)] 97.9 °F (36.6 °C)  Pulse:  [] 99  Resp:  [16-18] 18  SpO2:  [92 %-98 %] 92 %  BP: (112-147)/(64-85) 139/85     Weight: 63.6 kg (140 lb 3.4 oz)  Body mass index is 22.63 kg/m².    Intake/Output Summary (Last 24 hours) at 3/8/2022 1006  Last data filed at 3/8/2022 0912  Gross per 24 hour   Intake 1281.99 ml   Output --   Net 1281.99 ml        Physical Exam  Vitals and nursing note reviewed.   Constitutional:       General: He is not in acute distress.     Appearance: He is well-developed.   HENT:      Head: Normocephalic and atraumatic.   Eyes:      Conjunctiva/sclera: Conjunctivae normal.   Neck:      Vascular: No JVD.   Cardiovascular:      Rate and Rhythm: Normal rate and regular rhythm.      Heart sounds: Normal heart sounds.   Pulmonary:      Effort: Pulmonary effort is normal.      Breath sounds: Normal breath sounds.   Abdominal:      General: Bowel sounds are normal. There is no distension.      Palpations: Abdomen is soft.   Musculoskeletal:      Cervical  back: Neck supple.   Neurological:      Mental Status: He is alert.   Psychiatric:         Behavior: Behavior normal.       Significant Labs: All pertinent labs within the past 24 hours have been reviewed.  CBC:   Recent Labs   Lab 03/08/22  0721   WBC 8.78   HGB 8.1*   HCT 25.4*          CMP:   Recent Labs   Lab 03/08/22  0721   *   K 4.7   CL 92*   CO2 26   GLU 68*   BUN 37*   CREATININE 9.9*   CALCIUM 8.9   ALBUMIN 2.1*   ANIONGAP 17*   EGFRNONAA 5.1*         Significant Imaging: I have reviewed all pertinent imaging results/findings within the past 24 hours.      Assessment/Plan:      * Subacute osteomyelitis of left foot  - Patient admitted with left foot pain and recent left foot infection and salmonella bacteremia at an outside hopsital treated with Cipro.   - Lactic normal on admit.   - Admit labs: ESR 97, CRP 33.4, procal 1.0.  - MRI of left foot showed regions of focal abnormal signal involving the distal 1st metatarsal and proximal 1st phalanx.  Findings are concerning for foci of subacute osteomyelitis.   -Podiatry consulted and at first deferred bone biopsy and recommended just abx treatment and ID consulted and patient started on IV Daptomycin and Rocephin. Patient treated for gout by Podiatry as concerned some changes related to gout.   -minimal improvement in pain with gout tx so Podiatry per ID request performed bone biopsy on 2/25 to left foot. Pathology and Micro from bone biopsy pending.   -ID final recs for likely need for 6 weeks IV antibiotics for osteo and can transition to IV Vanc/Cefepime 2g with HD until 4/9 (see note from 3/4)  -weekly labs    on Mondays cbc, cmp, crp, vanc trough   also on Thursday CMP, vanc trough   Fax to ID clinic [phone]  -needs ambulatory referral to ID (placed)  -pharmacy consult placed to transition to vancomycin from daptomycin. Can transition to cefepime on discharge    Debility  -Due to current illness. PT rec 24/7 S/A but patient does not  have home support  -Patient agreeable to IPR  -PMR consulted   -CM has sent referrals   -Transfer orders drafted  -COVID screen ordered 3/6/22        Bilious vomiting with nausea  Happened on 3/4. Unclear etiology,improved today, tolerating po. Will monitoring    Encephalopathy, metabolic  On admission he was confused and was seen by psychiatry and determined not have capacity at that point to consent to HD (2/20). Subsequently, he has undergone HD and his mentation has cleared. In my evaluation, he is able to understand and manipulate the current medical situation and apply it to himself and thus has capacity      Diarrhea  resolved      Prevertebral Fluid Collection       Neck pain  Prevertebral fluid collection  - Patient had neck pain on admit and imaging done: Abnormal C spine Xray with possible signs of C spine instability, MRI ordered and patient refused 2/17, on pain meds, discussed at length, see 2/17 and 2/18.  -Concern for prevertebral abscess likely with fluid collection with widening in C spine, NS consulted and recomended C collar- patient refused, and Xrays, refused.   -ENTconsulted for retropharyngeal abscess per NS and ID recs, will likely scope if amenable at bedside (he refused). They rec MRI C spine with contrast.  -NS recs MRI all spine, ID/ENT rec repeat MRI C spine for better pictures - initially imaging delayed 2/2 pt discomfort and concerns but MRI/CT finally done and complete on 2/22 and 2/24.  - MRI with contrast on 2/24 showed no fluid collection, will not require intervention as per Neurosurgery and ENT and signed off. ID stated did not need any antibiotics for his neck and remains on abx just to treat his left foot osteomyelitis not related to any suspected neck infection. No need for C- collar as per NS.       Chronic gout of left foot due to renal impairment without tophus  Chornci and controlled. S/p Colcichine 0.3 mg po BID to treat as per Podiatry recs. Left foot pain improved.        Folic acid deficiency  Present on admit. Folate low at 4.4. Patient started on Folic acid 1 mg po daily [facility] and will continue.       Acute renal failure superimposed on stage 5 chronic kidney disease, not on chronic dialysis  Membranous glomerulonephritis  - Nephrology consulted on admit for  and Cr 20.9 and uremic symptoms of tremors. 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 Nephrology recommended HD on admit but patient repeated refused and finally agreed after multiple conversations to start on 2/22. Nephrology has been managing his HD needs [facility].   - GFR was 13-16 up from baseline 4-6 previously in January 2022. Labs repeated today and GFR is 2.6, Cr 19.   - Patient follows with Dr. Cardoza and Dr Lee and dialysis has been discussed multiple times, patient continues to refuse and on transplant list with [facility] but on this admit after multiple attempts patient finally agreed and trialysis line placed on 2/22 and patient started on HD on 2/23 for progressive CKD stage V that has now progressed to ESRD.   - SW arranging outpatient HD - [facility] and has M[facility] chair starting next week  -Patient initially deemed not to have capacity by psychiatry (in setting of tereso on ckd and uremia) but now has capacity to make medical decisions.   -Outpatient center requesting repeat psych eval which has been done on 3/4  -Perm Cath placed on 2/28.  Has routine HD with no complain.    SHANNAN (iron deficiency anemia)        Benign prostatic hyperplasia  [place] placed 2/18 overnight for incontinence    Gastroesophageal reflux disease without esophagitis  Chronic and controlled. Continue Protonix 40 mg po daily to treat.     Patient on waiting list for kidney transplant        Atherosclerosis of native coronary artery of native heart without angina pectoris  Patient with known CAD. Patient asymptomatic. Continue Labetalol, Asprin 81 mg po daily and  "Lipitor 80 mg po daily to treat CAD.       Hx of syphilis  Per Helio Garcia NP (ID) note 9/18/18, "Patient reports recent treatment with 3 doses of bicillin for + Treponema Pallidum antibody (5/16/2018) (per patient, this was a re-treatment because he couldn't get prior records of apparent prior treatment).  RPR 1:2.   Need to confirm treatment with [facility].  RPR here is pending.   Recommend repeat RPR 6 months from treatment - December 2018"  - Treated.   - RPR non-reactive 12/2018.      S/P coronary artery stent placement  Patient with previous coronary stent for CAD in past in 2007. Continue Labetalol, Asprin 81 mg po daily and Lipitor 80 mg po daily to treat CAD.     Type 2 diabetes mellitus with chronic kidney disease on chronic dialysis, with long-term current use of insulin  Good control in the hospital in past 24 hours.  · Plan is to continue to monitor POCT glucose 4 times a day with each meal and at bedtime and cover with Novolog low dose sliding scale insulin.   · Target pre-meal glucose goal is <140 with all random glucoses <180 in non-critically ill patient.  · Was previously on lantus but may not need this on discharge with new CKD    Anemia due to chronic kidney disease, on chronic dialysis  - Patient admitted with Hgb in 7-8 range and related to his advanced CKD.   - Patient transfused 1 unit of PRBCs on 2/18 for Hgb 6.9 and trabnsfuse again 1 unit on 2/23 for Hgb 6.7 but Hgb stable since. No signs of bleeding noted and felt all related to his CKD in combination with chronic infection/inflammation.    - Hgb stable mid to high 8s since last transfusion and dimitrios monitor with CBC in hospital and consider transfusion if Hgb < 7.   -continue folic acid      Membranous glomerulonephritis  -brought acthar gel from home to do non formulary as 40K and got via prior auth-non form request addded  -reason for CKD 5.      Pure hypercholesterolemia  Chronic and controlled. Continue Lipitor 80 mg po daily to treat. "     Renovascular hypertension  - Elevated on admit but meds adjusted and now controlled.   - Goal BP < 140/90.  - Conitnue Norvasc 10 mg po daily + Hydralazine 75 mg po TID + Labetalol 200 mg po BID to treat and adjust as needed for target BP goal in hospital.       VTE Risk Mitigation (From admission, onward)         Ordered     heparin (porcine) injection 1,000 Units  As needed (PRN)         03/05/22 1119     heparin (porcine) injection 1,000 Units  As needed (PRN)         03/03/22 1546     heparin (porcine) injection 5,000 Units  Every 8 hours         02/25/22 0747     IP VTE HIGH RISK PATIENT  Once         02/17/22 0151     Place sequential compression device  Until discontinued         02/17/22 0151                Discharge Planning   JOAN: 3/9/2022     Code Status: Full Code   Is the patient medically ready for discharge?: No    Reason for patient still in hospital (select all that apply): Patient trending condition  Discharge Plan A: Rehab   Discharge Delays: None known at this time              Jose Alberto Saba MD  Department of Hospital Medicine   [facility]

## 2022-03-08 NOTE — PLAN OF CARE
- Patient admitted 2/16/22 with LLE wound (subacute osteomyelitis, ulcer to dorsal aspect of left foot) and worsening renal function.  - Patient is now on dialysis via permanent catheter on Tue/Thu/Sat schedule. Patient aware of plan for dialysis today.  - Wound care performed overnight to left foot. Patient c/o pain to left posterior calf/ankle. Scheduled Tylenol, PRN oxycodone, and PRN Robaxin given.  - IV Abx: Rocephin q24h.  - Patient is up independently - only able to stand/walk for short distances due to osteomyelitis.  - Plan for discharge to Rehab soon - have heard/read differing issues that may be delaying this (dialysis chair vs financial approval vs potential need for PICC line for antibiotics at Rehab facility).  - Patient cooperative with care overnight.

## 2022-03-09 PROBLEM — E43 SEVERE MALNUTRITION: Status: ACTIVE | Noted: 2022-03-09

## 2022-03-09 LAB — VANCOMYCIN SERPL-MCNC: 17.1 UG/ML

## 2022-03-09 PROCEDURE — 20600001 HC STEP DOWN PRIVATE ROOM: Mod: NTX

## 2022-03-09 PROCEDURE — 25000003 PHARM REV CODE 250: Mod: NTX | Performed by: FAMILY MEDICINE

## 2022-03-09 PROCEDURE — 80202 ASSAY OF VANCOMYCIN: CPT | Mod: NTX | Performed by: HOSPITALIST

## 2022-03-09 PROCEDURE — 99232 PR SUBSEQUENT HOSPITAL CARE,LEVL II: ICD-10-PCS | Mod: NTX,,, | Performed by: HOSPITALIST

## 2022-03-09 PROCEDURE — 99232 SBSQ HOSP IP/OBS MODERATE 35: CPT | Mod: NTX,,, | Performed by: HOSPITALIST

## 2022-03-09 PROCEDURE — 63600175 PHARM REV CODE 636 W HCPCS: Mod: NTX | Performed by: FAMILY MEDICINE

## 2022-03-09 PROCEDURE — 80100014 HC HEMODIALYSIS 1:1: Mod: NTX

## 2022-03-09 PROCEDURE — 99232 SBSQ HOSP IP/OBS MODERATE 35: CPT | Mod: NTX,,, | Performed by: INTERNAL MEDICINE

## 2022-03-09 PROCEDURE — 36415 COLL VENOUS BLD VENIPUNCTURE: CPT | Mod: NTX | Performed by: HOSPITALIST

## 2022-03-09 PROCEDURE — 99232 PR SUBSEQUENT HOSPITAL CARE,LEVL II: ICD-10-PCS | Mod: NTX,,, | Performed by: INTERNAL MEDICINE

## 2022-03-09 PROCEDURE — 12000002 HC ACUTE/MED SURGE SEMI-PRIVATE ROOM: Mod: NTX

## 2022-03-09 PROCEDURE — 63600175 PHARM REV CODE 636 W HCPCS: Mod: NTX | Performed by: HOSPITALIST

## 2022-03-09 PROCEDURE — 25000003 PHARM REV CODE 250: Mod: NTX | Performed by: PHYSICIAN ASSISTANT

## 2022-03-09 RX ADMIN — HYDRALAZINE HYDROCHLORIDE 50 MG: 50 TABLET ORAL at 09:03

## 2022-03-09 RX ADMIN — HEPARIN SODIUM 5000 UNITS: 5000 INJECTION INTRAVENOUS; SUBCUTANEOUS at 09:03

## 2022-03-09 RX ADMIN — SEVELAMER CARBONATE 800 MG: 800 TABLET, FILM COATED ORAL at 04:03

## 2022-03-09 RX ADMIN — OXYCODONE HYDROCHLORIDE 10 MG: 10 TABLET ORAL at 02:03

## 2022-03-09 RX ADMIN — NYSTATIN 500000 UNITS: 500000 SUSPENSION ORAL at 11:03

## 2022-03-09 RX ADMIN — OXYCODONE HYDROCHLORIDE 10 MG: 10 TABLET ORAL at 08:03

## 2022-03-09 RX ADMIN — SEVELAMER CARBONATE 800 MG: 800 TABLET, FILM COATED ORAL at 07:03

## 2022-03-09 RX ADMIN — OXYCODONE HYDROCHLORIDE 10 MG: 10 TABLET ORAL at 04:03

## 2022-03-09 RX ADMIN — HEPARIN SODIUM 5000 UNITS: 5000 INJECTION INTRAVENOUS; SUBCUTANEOUS at 01:03

## 2022-03-09 RX ADMIN — CEFTRIAXONE SODIUM 2 G: 2 INJECTION, SOLUTION INTRAVENOUS at 07:03

## 2022-03-09 RX ADMIN — VANCOMYCIN HYDROCHLORIDE 500 MG: 500 INJECTION, POWDER, LYOPHILIZED, FOR SOLUTION INTRAVENOUS at 02:03

## 2022-03-09 RX ADMIN — NYSTATIN 500000 UNITS: 500000 SUSPENSION ORAL at 09:03

## 2022-03-09 RX ADMIN — LABETALOL HYDROCHLORIDE 200 MG: 100 TABLET, FILM COATED ORAL at 08:03

## 2022-03-09 RX ADMIN — SODIUM CHLORIDE: 0.9 INJECTION, SOLUTION INTRAVENOUS at 07:03

## 2022-03-09 RX ADMIN — HEPARIN SODIUM 5000 UNITS: 5000 INJECTION INTRAVENOUS; SUBCUTANEOUS at 05:03

## 2022-03-09 RX ADMIN — ASPIRIN 81 MG: 81 TABLET, COATED ORAL at 08:03

## 2022-03-09 RX ADMIN — ARIPIPRAZOLE 15 MG: 5 TABLET ORAL at 09:03

## 2022-03-09 RX ADMIN — PANTOPRAZOLE SODIUM 40 MG: 40 TABLET, DELAYED RELEASE ORAL at 08:03

## 2022-03-09 RX ADMIN — HYDRALAZINE HYDROCHLORIDE 50 MG: 50 TABLET ORAL at 01:03

## 2022-03-09 RX ADMIN — METHOCARBAMOL 750 MG: 750 TABLET ORAL at 05:03

## 2022-03-09 RX ADMIN — ATORVASTATIN CALCIUM 80 MG: 20 TABLET, FILM COATED ORAL at 08:03

## 2022-03-09 RX ADMIN — NYSTATIN 500000 UNITS: 500000 SUSPENSION ORAL at 04:03

## 2022-03-09 RX ADMIN — AMLODIPINE BESYLATE 10 MG: 10 TABLET ORAL at 08:03

## 2022-03-09 RX ADMIN — ACETAMINOPHEN 1000 MG: 500 TABLET ORAL at 05:03

## 2022-03-09 RX ADMIN — GABAPENTIN 100 MG: 100 CAPSULE ORAL at 08:03

## 2022-03-09 RX ADMIN — ACETAMINOPHEN 1000 MG: 500 TABLET ORAL at 01:03

## 2022-03-09 RX ADMIN — OXYCODONE HYDROCHLORIDE 10 MG: 10 TABLET ORAL at 11:03

## 2022-03-09 RX ADMIN — FOLIC ACID 1 MG: 1 TABLET ORAL at 08:03

## 2022-03-09 RX ADMIN — OXYCODONE HYDROCHLORIDE 10 MG: 10 TABLET ORAL at 07:03

## 2022-03-09 RX ADMIN — LABETALOL HYDROCHLORIDE 200 MG: 100 TABLET, FILM COATED ORAL at 09:03

## 2022-03-09 RX ADMIN — SEVELAMER CARBONATE 800 MG: 800 TABLET, FILM COATED ORAL at 11:03

## 2022-03-09 RX ADMIN — ACETAMINOPHEN 1000 MG: 500 TABLET ORAL at 09:03

## 2022-03-09 NOTE — ASSESSMENT & PLAN NOTE
- Patient admitted with left foot pain and recent left foot infection and salmonella bacteremia at an outside hopsital treated with Cipro.   - Lactic normal on admit.   - Admit labs: ESR 97, CRP 33.4, procal 1.0.  - MRI of left foot showed regions of focal abnormal signal involving the distal 1st metatarsal and proximal 1st phalanx.  Findings are concerning for foci of subacute osteomyelitis.   -Podiatry consulted and at first deferred bone biopsy and recommended just abx treatment and ID consulted and patient started on IV Daptomycin and Rocephin. Patient treated for gout by Podiatry as concerned some changes related to gout.   -minimal improvement in pain with gout tx so Podiatry per ID request performed bone biopsy on 2/25 to left foot. Pathology and Micro from bone biopsy pending.   -ID final recs for likely need for 6 weeks IV antibiotics for osteo and can transition to IV Vanc/Cefepime 2g with HD until 4/9 (see note from 3/4)  -weekly labs    on Mondays cbc, cmp, crp, vanc trough   also on Thursday CMP, vanc trough   Fax to ID clinic [phone]  -needs ambulatory referral to ID (placed)  -pharmacy consult placed to transition to vancomycin from daptomycin. Can transition to cefepime on discharge.  pain appear to be controlled at this time.

## 2022-03-09 NOTE — PROGRESS NOTES
Treatment started at bedside without difficulty, primary nurse notified.  Patient was awake, alert and in stable condition on arrival.

## 2022-03-09 NOTE — PLAN OF CARE
Spoke with Dr. Jayant MD for tx team, who informed SW that pt will not discharge today as abx regimen will need to be clarified.   [facility] liaison for [facility] notified.    Elijah Barfield LMSW  [facility]  [phone]

## 2022-03-09 NOTE — PLAN OF CARE
Recommendations    1. Modify diet to Renal/Diabetic 2200 kcal     2. Continue ONS and encourage intake TID     3. Add Brannon BID x 14 days to aid in wound healing     4. RD to monitor and follow    Goals: Pt will meet and tolerate >75% EEN/EPN by RD f/u  Nutrition Goal Status: new    Communication of RD Recs:  (POC)    Sangita Rao MS, RD, LDN  Ext: [phone]

## 2022-03-09 NOTE — PROGRESS NOTES
Treatment completed, 1 liter of fluid removed.  Patient left awake and alert sitting on the side of the bed in stable condition.

## 2022-03-09 NOTE — PROGRESS NOTES
[facility]  Nephrology  Progress Note     Patient Name: Enrique Martinez  MRN: [id number]  Admission Date: 2/16/2022  Hospital Length of Stay: 20 days  Attending Provider: Jose Alberto Saba MD   Primary Care Physician: Primary Doctor No  Principal Problem: Subacute osteomyelitis of left foot    Subjective:     HPI: Mr. Martinez is a 56 y.o. 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 He was recently admitted at [facility] from 2/2-2/4/2022 from LLE cellulitis. He was found to have salmonella bacteremia suspected due to diarrheal illness. He was discharged with cipro for which he reports compliance. He has had no improvement in his pain, redness, or swelling of his left foot and leg. He has also developed a new blister on his left foot, with MRI foot concerning for osteomyelitis and showing soft tissue swelling c/w cellulitis. He is admitted for treatment of suspected osteomyelitis.     He follows with Dr. Cardoza (nephrology), has adamantly declined HD in the past and would rather wait for transplant. He reports being on the transplant list for the past 4 years. His last renal biopsy in 2013 showed 50% glomerulosclerosis. Started on calcitriol 0.5, hx of elevated PTH at 921. Patient complains of decreased appetite, neck cramps, fatigue. He still urinates 4-5 times/day. He reports his B/L LE edema has worsened over the past few weeks. No dyspnea, chest pain, dysuria, hematuria. Nephro consulted for HD initiation.      Labs are significant for hyperkalemia 5.5, bicarb 18, , phosphate 10.4, Cr 19.7, GFR 2.2, ESR 97, CRP 33.4. . CBC significant for Hgb of 7.2, white count 14. Previous echo showed EF of 65%.       Interval History:  Seen at the bedside no event overnight       Review of patient's allergies indicates:  No Known Allergies   Current Facility-Administered Medications   Medication Frequency    0.9%  NaCl infusion PRN    0.9%  NaCl infusion PRN    0.9%  NaCl infusion Once    acetaminophen tablet 1,000 mg Q8H    acetaminophen tablet 650 mg Q6H PRN    albuterol-ipratropium 2.5 mg-0.5 mg/3 mL nebulizer solution 3 mL Q4H PRN    aluminum-magnesium hydroxide-simethicone 200-200-20 mg/5 mL suspension 30 mL QID PRN    amLODIPine tablet 10 mg Daily    ARIPiprazole tablet 15 mg QHS    aspirin EC tablet 81 mg Daily    atorvastatin tablet 80 mg Daily    cefTRIAXone (ROCEPHIN) 2 g/50 mL D5W IVPB Q24H    dextrose 10% bolus 125 mL PRN    dextrose 10% bolus 250 mL PRN    epoetin veronica-epbx injection 3,180 Units Every Tues, Thurs, Sat    folic acid tablet 1 mg Daily    gabapentin capsule 100 mg Daily    glucagon (human recombinant) injection 1 mg PRN    glucose chewable tablet 16 g PRN    glucose chewable tablet 24 g PRN    heparin (porcine) injection 1,000 Units PRN    heparin (porcine) injection 5,000 Units Q8H    hydrALAZINE tablet 50 mg Q8H    labetaloL tablet 200 mg Q12H    loperamide capsule 2 mg QID PRN    lorazepam injection 1 mg On Call Procedure    melatonin tablet 6 mg Nightly PRN    methocarbamoL tablet 750 mg QID PRN    morphine injection 2 mg On Call Procedure    naloxone 0.4 mg/mL injection 0.02 mg PRN    nystatin 100,000 unit/mL suspension 500,000 Units QID (WM & HS)    OLANZapine injection 2.5 mg Once PRN    ondansetron disintegrating tablet 8 mg Q8H PRN    ondansetron injection 4 mg Q6H PRN    oxyCODONE immediate release tablet 5 mg Q6H PRN    oxyCODONE immediate release tablet Tab 10 mg Q4H PRN    pantoprazole EC tablet 40 mg Daily    polyethylene glycol packet 17 g Daily PRN    prochlorperazine injection Soln 5 mg Q6H PRN    senna-docusate 8.6-50 mg per tablet 1 tablet Daily PRN    sevelamer  carbonate tablet 800 mg TID WM    simethicone chewable tablet 80 mg QID PRN    sodium chloride 0.9% bolus 250 mL PRN    sodium chloride 0.9% flush 10 mL Q8H PRN    vancomycin - pharmacy to dose pharmacy to manage frequency       Objective:     Vital Signs (Most Recent):  Temp: 98 °F (36.7 °C) (03/09/22 0755)  Pulse: 109 (03/09/22 0755)  Resp: 18 (03/09/22 0837)  BP: 119/83 (03/09/22 0755)  SpO2: (!) 94 % (03/09/22 0755)  O2 Device (Oxygen Therapy): room air (03/09/22 0735) Vital Signs (24h Range):  Temp:  [97.5 °F (36.4 °C)-99.6 °F (37.6 °C)] 98 °F (36.7 °C)  Pulse:  [] 109  Resp:  [16-18] 18  SpO2:  [90 %-98 %] 94 %  BP: ()/(64-90) 119/83   Weight: 63.8 kg (140 lb 10.5 oz) (03/09/22 0430)  Body mass index is 22.7 kg/m².  Body surface area is 1.72 meters squared.      Intake/Output Summary (Last 24 hours) at 3/9/2022 0902  Last data filed at 3/9/2022 0755  Gross per 24 hour   Intake 1612.74 ml   Output 1500 ml   Net 112.74 ml     I/O last 3 completed shifts:  In: 1906.7 [P.O.:1760; I.V.:1.4; IV Piggyback:145.3]  Out: -   Net IO Since Admission: 8,037.55 mL [03/09/22 0902]     Physical Exam  Constitutional:       Appearance: He is ill-appearing.   Cardiovascular:      Rate and Rhythm: Normal rate.      Pulses: Normal pulses.   Pulmonary:      Effort: Pulmonary effort is normal.   Abdominal:      General: Abdomen is flat.   Musculoskeletal:         General: Normal range of motion.   Neurological:      Mental Status: He is alert.         Recent Labs   Lab 03/05/22  0748 03/06/22  0723 03/08/22  0721   WBC 8.34 7.61 8.78   HGB 8.5* 8.2* 8.1*   HCT 26.8* 25.5* 25.4*    249 228   MONO 19.8*  1.7* 24.0*  1.8* 17.9*  1.6*      Recent Labs   Lab 03/05/22  0748 03/06/22  0723 03/08/22  0721    137 135*   K 4.7 3.7 4.7    94* 92*   CO2 16* 30* 26   BUN 29* 15 37*   CREATININE 9.1* 6.0* 9.9*   CALCIUM 8.8 8.4* 8.9      No results for input(s): PH, PCO2, PO2, HCO3, POCSATURATED, BE in the  last 72 hours.    Assessment/Plan:       Subacute osteomyelitis of left foot    Renovascular hypertension    Pure hypercholesterolemia    Membranous glomerulonephritis    Anemia due to chronic kidney disease, on chronic dialysis    Type 2 diabetes mellitus with chronic kidney disease on chronic dialysis, with long-term current use of insulin    S/P coronary artery stent placement    Hx of syphilis    Atherosclerosis of native coronary artery of native heart without angina pectoris    Patient on waiting list for kidney transplant    Gastroesophageal reflux disease without esophagitis    Acute renal failure superimposed on stage 5 chronic kidney disease, not on chronic dialysis    Folic acid deficiency    Chronic gout of left foot due to renal impairment without tophus    Neck pain    Prevertebral Fluid Collection    Diarrhea    Encephalopathy, metabolic    Bilious vomiting with nausea    Debility     Acute renal failure superimposed on stage 5 chronic kidney disease, not on chronic dialysis  57 yo M with history of CKD V due to idiopathic membranous glomerulonephritis, anemia of CKD, hx of neurosyphilis, insulin dependent DM, who presents with cellulitis and possible osteomyelitis of left foot. Previously, he has declined HD in the past, never receiving a fistula. He is on the transplant wait list. Patient exhibits signs of uremia including fatigue, poor appetite, and mental slowing. On labs, he is hyperkalemic, uremic, AGMA, hyperphosphatemic. GFR is 2-3, which is reduced from previous GFR 13. Home meds include sodium bicarb, Renvela, and calcitriol. 2+ pitting edema on B/L lower extremities. Renal US from Jan 2022 shows sonographic evidence of chronic medical renal disease and bilateral simple and minimally complex renal cysts.  sCr one year ago was 4.5, now 18.6. UPCr ratio 1.11 (Dec 2020)  2/18: Patient increasingly somnolent, lethargic. Hospital med in process of getting in touch with Dr. Lee, who the patient  follows with outpatient.   2/23: first session of HD 2 hours, no net fluid, patient tolerated. Still confused, having visual hallucinations of mice.   2/24: second HD session 150 min, 1 L removed, patient tolerated.  2/26: HD planned for today     Plan:   - s/p  tunneled catheter placement by IR   - Will need AVF with vascular surgery, likely to be placed outpatient   - HD on T/Th/Sa schedule   - Sevelamer 800 TID   - Hold calcitriol in setting of hyperphosphatemia. Will re-start when phos reaches normal level.  - Trend Cr  - Strict I&Os  - Avoid nephrotoxic agents  - Renally adjust medications            Hyperphosphatemia  Hyperphosphatemia at 10.9 >> 4.6.      - Continue Sevelamer 800 TID     Anemia due to chronic kidney disease  Hemoglobin 10.1 one month ago, now 7.2. Normocytic anemia with low iron, iron sat, elevated ferritin.      -  EPO with HD.     Thank you for your consult. I will follow-up with patient. Please contact us if you have any additional questions.    Diana Cid MD  Nephrology  [facility]

## 2022-03-09 NOTE — PROGRESS NOTES
Pharmacokinetic Assessment Follow Up: IV Vancomycin    Vancomycin serum concentration assessment/plan:    · Vancomycin LD of 1500 mg administered on 3/7 at 1500.  · Last HD early this am, 3/9.  Scheduled for T/Th/Sat.   · Random level of 17.1 mg/dL this am after dialysis  · Vancomycin 500 mg x 1 at 1400 on 3/9  · Desired empiric serum trough concentration is 15 to 20 mcg/mL  · Re-dose when random level < 25 mg/dL  · Random level for 3/10/22 with AM labs before HD    Outpatient Plan until 4/9, per ID recs:  · IV vancomycin per pharmacy dosing after HD sessions  · IV cefepime 2 g after each HD session     Drug levels (last 3 results):  Recent Labs   Lab Result Units 03/08/22  0721 03/09/22  0827   Vancomycin, Random ug/mL 29.7 17.1       Pharmacy will continue to follow and monitor vancomycin.    Please contact pharmacy at extension [phone] for questions regarding this assessment.    Thank you for the consult,   Virgil Bustamante       Patient brief summary:  Enrique Martinez is a 57 y.o. male initiated on antimicrobial therapy with IV Vancomycin for treatment of bone/joint infection      Drug Allergies:   Review of patient's allergies indicates:  No Known Allergies    Actual Body Weight:   63 kg    Renal Function:   Estimated Creatinine Clearance: 7.3 mL/min (A) (based on SCr of 9.9 mg/dL (H)).,     Dialysis Method (if applicable):  intermittent HD TThSat schedule    CBC (last 72 hours):  Recent Labs   Lab Result Units 03/08/22  0721   WBC K/uL 8.78   Hemoglobin g/dL 8.1*   Hematocrit % 25.4*   Platelets K/uL 228   Gran % % 64.4   Lymph % % 15.5*   Mono % % 17.9*   Eosinophil % % 1.0   Basophil % % 0.6   Differential Method  Automated       Metabolic Panel (last 72 hours):  Recent Labs   Lab Result Units 03/08/22  0721   Sodium mmol/L 135*   Potassium mmol/L 4.7   Chloride mmol/L 92*   CO2 mmol/L 26   Glucose mg/dL 68*   BUN mg/dL 37*   Creatinine mg/dL 9.9*   Albumin g/dL 2.1*   Phosphorus mg/dL 6.3*       Vancomycin  Administrations:  vancomycin given in the last 96 hours                   vancomycin 1.5 g in dextrose 5 % 250 mL IVPB (ready to mix) (mg) 1,500 mg New Bag 03/07/22 1508                Microbiologic Results:  Microbiology Results (last 7 days)     Procedure Component Value Units Date/Time    Culture, Anaerobe [[id number]] Collected: 02/26/22 1825    Order Status: Completed Specimen: Bone from Toe, Left Foot Updated: 03/03/22 0841     Anaerobic Culture No anaerobes isolated    Fungus culture [[id number]] Collected: 02/26/22 1825    Order Status: Completed Specimen: Bone from Toe, Left Foot Updated: 03/02/22 1447     Fungus (Mycology) Culture Culture in progress

## 2022-03-09 NOTE — PT/OT/SLP PROGRESS
Occupational Therapy      Patient Name:  Enrique Martinez   MRN:  [id number]    Patient not seen today secondary to pain in L foot and patient requesting to rest. RN notified. Will follow-up for OT session.    3/9/2022

## 2022-03-09 NOTE — SUBJECTIVE & OBJECTIVE
Interval History: no acute events overnight. No acute concerns.       Review of Systems   Respiratory:  Negative for shortness of breath.    Cardiovascular:  Negative for chest pain.   Gastrointestinal:  Negative for abdominal pain and blood in stool.     Objective:     Vital Signs (Most Recent):  Temp: 98 °F (36.7 °C) (03/09/22 0755)  Pulse: 109 (03/09/22 0755)  Resp: 18 (03/09/22 0837)  BP: 119/83 (03/09/22 0755)  SpO2: (!) 94 % (03/09/22 0755)   Vital Signs (24h Range):  Temp:  [97.5 °F (36.4 °C)-99.6 °F (37.6 °C)] 98 °F (36.7 °C)  Pulse:  [] 109  Resp:  [16-18] 18  SpO2:  [90 %-98 %] 94 %  BP: ()/(64-90) 119/83     Weight: 63.8 kg (140 lb 10.5 oz)  Body mass index is 22.7 kg/m².    Intake/Output Summary (Last 24 hours) at 3/9/2022 1030  Last data filed at 3/9/2022 0947  Gross per 24 hour   Intake 1492.74 ml   Output 1500 ml   Net -7.26 ml        Physical Exam  Vitals and nursing note reviewed.   Constitutional:       General: He is not in acute distress.     Appearance: He is well-developed.   HENT:      Head: Normocephalic and atraumatic.   Eyes:      Conjunctiva/sclera: Conjunctivae normal.   Neck:      Vascular: No JVD.   Cardiovascular:      Rate and Rhythm: Normal rate and regular rhythm.      Heart sounds: Normal heart sounds.   Pulmonary:      Effort: Pulmonary effort is normal.      Breath sounds: Normal breath sounds.   Abdominal:      General: Bowel sounds are normal. There is no distension.      Palpations: Abdomen is soft.   Musculoskeletal:      Cervical back: Neck supple.   Neurological:      Mental Status: He is alert.   Psychiatric:         Behavior: Behavior normal.       Significant Labs: All pertinent labs within the past 24 hours have been reviewed.  CBC:   Recent Labs   Lab 03/08/22  0721   WBC 8.78   HGB 8.1*   HCT 25.4*          CMP:   Recent Labs   Lab 03/08/22  0721   *   K 4.7   CL 92*   CO2 26   GLU 68*   BUN 37*   CREATININE 9.9*   CALCIUM 8.9   ALBUMIN 2.1*    ANIONGAP 17*   EGFRNONAA 5.1*         Significant Imaging: I have reviewed all pertinent imaging results/findings within the past 24 hours.

## 2022-03-09 NOTE — PLAN OF CARE
- Patient admitted 2/16/22 with LLE wound (subacute osteomyelitis, ulcer to dorsal aspect of left foot) and worsening renal function.  - Patient is now on dialysis via permanent catheter on Tue/Thu/Sat schedule.  - Patient was supposed to go to dialysis yesterday (Tuesday), but did not; subsequently the plan was for patient to undergo HD at the bedside overnight. Patient still has not been dialyzed. Held BP medications in anticipation of HD; patient has not become hypertensive. Patient is anuric.  - Daily wound care ordered to dorsum of left foot. Patient c/o pain to left posterior calf/ankle. Patient tachycardic when in pain. Scheduled Tylenol and PRN oxycodone given.   - IV Abx: Rocephin q24h. New PIV placed overnight.  - Patient is up independently - only able to stand/walk for short distances due to osteomyelitis.  - Plan for discharge to Rehab soon - awaiting acceptance from potential facilities.  - Patient cooperative with care overnight.    Addendum 0440: Dialysis at the bedside.

## 2022-03-09 NOTE — PROGRESS NOTES
AAOx4. VSS. PRN oxycodone 10mg given x2 for c/o pain. Patient up w/ standby assist when OOB. L foot wound care done per orders - cleaned w/ betadine and covered with foam dressing - dressing is CDI. ID consulted to clarify antibiotics - vancomycin given IVPB once. HD completed at bedside early this morning - 1L removed. New PIV placed - 22G R wrist. Plan for patient to be discharged to rehab pending approval. No complaints from patient throughout shift. Bed in low position. Non-skid socks on when OOB. Call light within reach.

## 2022-03-09 NOTE — PROGRESS NOTES
[facility] - Transplant Stepdown  Adult Nutrition  Progress Note    SUMMARY       Recommendations    1. Modify diet to Renal/Diabetic 2200 kcal     2. Continue ONS and encourage intake TID     3. Add Brannon BID x 14 days to aid in wound healing     4. RD to monitor and follow    Goals: Pt will meet and tolerate >75% EEN/EPN by RD f/u  Nutrition Goal Status: new    Communication of RD Recs:  (POC)    Assessment and Plan    Severe malnutrition  Nutrition Problem:  Severe Protein-Calorie Malnutrition  Malnutrition in the context of Acute Illness/Injury    Related to (etiology):  Increased physiological demands r/t wound  Decreased appetite    Signs and Symptoms (as evidenced by):  Energy Intake: <50% of estimated energy requirement for 3 weeks  Body Fat Depletion: mild and moderate depletion of orbitals and triceps   Muscle Mass Depletion: mild and moderate depletion of temples, clavicle region, scapular region, interosseous muscle and lower extremities   Weight Loss: 22% x 3 months   Fluid Accumulation: moderate    Interventions(treatment strategy):  Collaboration with other providers  [employer]    Nutrition Diagnosis Status:  New     Malnutrition Assessment    Malnutrition Type: acute illness or injury  Energy Intake: severe energy intake          Weight Loss (Malnutrition): greater than 7.5% in 3 months  Energy Intake (Malnutrition): less than or equal to 50% for greater than or equal to 5 days   Orbital Region (Subcutaneous Fat Loss): mild depletion  Upper Arm Region (Subcutaneous Fat Loss): moderate depletion   [place] Region (Muscle Loss): mild depletion  Clavicle Bone Region (Muscle Loss): mild depletion  Clavicle and Acromion Bone Region (Muscle Loss): mild depletion  Dorsal Hand (Muscle Loss): moderate depletion  Patellar Region (Muscle Loss): moderate depletion  Anterior Thigh Region (Muscle Loss): moderate depletion  Posterior Calf Region (Muscle Loss): moderate depletion   Edema (Fluid Accumulation):  "3-->moderate   Subcutaneous Fat Loss (Final Summary): severe protein-calorie malnutrition  Muscle Loss Evaluation (Final Summary): severe protein-calorie malnutrition         Reason for Assessment    Reason For Assessment: length of stay  Diagnosis:  (osteomyelitis)  Relevant Medical History: HTN, HLD, T2DM, CAD, CKD  Interdisciplinary Rounds: did not attend    General Information Comments: Pt presents with osteomyelitis of L foot. Pt resting in bed with no caregiver present. Reports poor appetite now and PTA x 1 week. Tolerating 0-25% meals since admit. Amendable to adding ONS. Denies n/v/d/c or difficulties chewing/swallowing. UBW ~180#; 40# wt loss noted x 1.5 months (22%). NFPE completed 3/9; please see PES for details. Per ASPEN guidelines, pt meets criteria for severe malnutrition in the context of acute illness.    Nutrition Discharge Planning: Renal/Diabetic diet    Nutrition Risk Screen    Nutrition Risk Screen: no indicators present    Nutrition/Diet History    Patient Reported Diet/Restrictions/Preferences: renal  Spiritual, Cultural Beliefs, Holiness Practices, Values that Affect Care: no  Food Allergies: NKFA  Factors Affecting Nutritional Intake: decreased appetite, pain    Anthropometrics    Temp: 100 °F (37.8 °C)  Height Method: Stated  Height: 5' 6" (167.6 cm)  Height (inches): 66 in  Weight Method: Bed Scale  Weight: 63.8 kg (140 lb 10.5 oz)  Weight (lb): 140.65 lb  Ideal Body Weight (IBW), Male: 142 lb  % Ideal Body Weight, Male (lb): 99.05 %  BMI (Calculated): 22.7  BMI Grade: 18.5-24.9 - normal  Weight Loss: unintentional  Usual Body Weight (UBW), k.82 kg  % Usual Body Weight: 78.14       Lab/Procedures/Meds    Pertinent Labs Reviewed: reviewed  Pertinent Labs Comments: Labs pending  Pertinent Medications Reviewed: reviewed  Pertinent Medications Comments: rocephin, folic acid, heparin, pantoprazole    Estimated/Assessed Needs    Weight Used For Calorie Calculations: 63.5 kg (140 " lb)  Energy Calorie Requirements (kcal): 8637-6705 kcal/day  Energy Need Method: Kcal/kg (30-35 kcal/kg; malnutrition)  Protein Requirements:  g/day (1.0-1.2 g/kg; healing)  Weight Used For Protein Calculations: 63.5 kg (140 lb)  Fluid Requirements (mL): 1 mL/kcal or per MD  Estimated Fluid Requirement Method: RDA Method  RDA Method (mL): 1905  CHO Requirement: 250 g/day    Nutrition Prescription Ordered    Current Diet Order: Renal, Low Na (2 gm)    Evaluation of Received Nutrient/Fluid Intake    I/O: +8L since admit  Energy Calories Required: not meeting needs  Protein Required: not meeting needs  Comments: LBM 3/6  Tolerance: tolerating  % Intake of Estimated Energy Needs: 0 - 25 %  % Meal Intake: 0 - 25 %    Nutrition Risk    Level of Risk/Frequency of Follow-up: low     Monitor and Evaluation    Food and Nutrient Intake: energy intake, food and beverage intake  Food and Nutrient Adminstration: diet order  Knowledge/Beliefs/Attitudes: food and nutrition knowledge/skill  Physical Activity and Function: nutrition-related ADLs and IADLs  Anthropometric Measurements: weight, weight change  Biochemical Data, Medical Tests and Procedures: electrolyte and renal panel, gastrointestinal profile, glucose/endocrine profile, inflammatory profile, lipid profile  Nutrition-Focused Physical Findings: overall appearance, extremities, muscles and bones     Nutrition Follow-Up    RD Follow-up?: Yes

## 2022-03-09 NOTE — PROGRESS NOTES
[facility] Medicine  Progress Note    Patient Name: Enrique Martinez  MRN: [id number]  Patient Class: IP- Inpatient   Admission Date: 2/16/2022  Length of Stay: 20 days  Attending Physician: Jose Alberto Saba MD  Primary Care Provider: Primary Doctor No        Subjective:     Principal Problem:Subacute osteomyelitis of left foot        HPI:  Enrique Martinez is a 56 y.o. male with a PMHx of CKD V glomerulonephropathy, insulin dependent diabetes mellitus, and CAD who presented tot  ED for evaluation of worsening left foot pain and swelling. He was recently admitted at [facility] from 2/2-2/4/2022 from LLE cellulitis. He was found to have salmonella bacteremia suspected due to diarrheal illness. He was discharged with cipro for which he reports compliance. He has had no improvement in his pain, redness, or swelling of his left foot and leg. He has also developed a new blister on his left foot. He endorses chills. He denies fevers, chest pain, SOB, N/V, decreased urine output, dysuria, and flank pain.    ED: HR 97, other VSSAF. CBC with leukocytosis of 16.01, Hgb 7.4, Hct 22.2.  ESR 97. CRP 33.4. K 5.5. Cr 19.7, . Phos 10.4. Lactic WNL. MRI L foot with cellulitis and osteomyelitis of distal 1st metatarsal and proximal 1st phalanx.      Overview/Hospital Course:  .Enrique Martinez is a 56 y.o. male with a PMHx of CKD V glomerulonephropathy, insulin dependent diabetes mellitus, and CAD who presented tot  ED for evaluation of worsening left foot pain and swelling. He was recently admitted at [facility] from 2/2-2/4/2022 from LLE cellulitis. He was found to have salmonella bacteremia suspected due to diarrheal illness. He was discharged with cipro for which he reports compliance. He has had no improvement in his pain, redness, or swelling of his left foot and leg. He has also developed a new blister on his left foot. He endorses chills. He denies fevers, chest pain, SOB, N/V, decreased urine  "output, dysuria, and flank pain. Patient admitted with worsening and progressive chronic kidney disease with  and Cr 20.9. Nephrology is consulted for his CKD stage V that has progressed ti ESRD and at first patient not amenable to starting HD but finally agreed after family involvement and patient had trialysis catheter placed and started on HD as per Nephrology. Patient was also complaining of neck pain on admit and had abnormal C spine Xray with possible signs of C spine instability, MRi ordered and patient refused 2/17, on pain meds, was discussed at length. Concern for possible infection also given acuity of pain. Imaging showed signs of C spine instability, prevertebral abscess likely with fluid collection with widening in C spine,. Neurosurgery consulted and recommended C collar- patient refused, and Xrays, refused, and MRI full spine when stable. Patient also had MRI of left foot on admit and showed "Regions of focal abnormal signal involving the distal 1st metatarsal and proximal 1st phalanx as described above.  Findings are concerning for foci of subacute myelitis (Yared's abscess).  Other less likely considerations include cystic degenerative change." ENT consulted for possible retropharyngeal abscess as per Neurosurgery and Infectious Disease consulted. At first patient kept refusing advanced imaging but finally agreed and repeat MRI of cervical spine done on 2/22 with anesthesia to help with sedation. MRI done without contrast and concern for possible retropharyngeal abscess. Patient placed on empiric IV Daptomycin and Ceftriaxone by ID.  ENT recommended needed study with contrast to really investigate the area better. Repeat MRI with contrast done of cervical spine on 2/24 and no obvious fluid collection noted and not obvious signs of infection so ENT and Neurosurgery signed off and stated nothing needed to be done. Patient kept on IV Daptomycin and Rocephin to cover for his left foot osteo. " Podiatry was consulted and performed bone biopsy as per ID recs on 2/25 to determine duration and type of abx needed to treat osteomyelitis of left foot. SW obtained outpatient HD chair at [facility] (new start HD this admit). ID finalized recs on discharge for cefepime/vanc until 4/9 with HD.  Pharmacy consulted to assist with vanc dosing. Patient agreeable to IPR placement - referrals sent.   Has routine HD with no complain.pain appear to be controlled at this time.      Interval History: no acute events overnight. No acute concerns.       Review of Systems   Respiratory:  Negative for shortness of breath.    Cardiovascular:  Negative for chest pain.   Gastrointestinal:  Negative for abdominal pain and blood in stool.     Objective:     Vital Signs (Most Recent):  Temp: 98 °F (36.7 °C) (03/09/22 0755)  Pulse: 109 (03/09/22 0755)  Resp: 18 (03/09/22 0837)  BP: 119/83 (03/09/22 0755)  SpO2: (!) 94 % (03/09/22 0755)   Vital Signs (24h Range):  Temp:  [97.5 °F (36.4 °C)-99.6 °F (37.6 °C)] 98 °F (36.7 °C)  Pulse:  [] 109  Resp:  [16-18] 18  SpO2:  [90 %-98 %] 94 %  BP: ()/(64-90) 119/83     Weight: 63.8 kg (140 lb 10.5 oz)  Body mass index is 22.7 kg/m².    Intake/Output Summary (Last 24 hours) at 3/9/2022 1030  Last data filed at 3/9/2022 0947  Gross per 24 hour   Intake 1492.74 ml   Output 1500 ml   Net -7.26 ml        Physical Exam  Vitals and nursing note reviewed.   Constitutional:       General: He is not in acute distress.     Appearance: He is well-developed.   HENT:      Head: Normocephalic and atraumatic.   Eyes:      Conjunctiva/sclera: Conjunctivae normal.   Neck:      Vascular: No JVD.   Cardiovascular:      Rate and Rhythm: Normal rate and regular rhythm.      Heart sounds: Normal heart sounds.   Pulmonary:      Effort: Pulmonary effort is normal.      Breath sounds: Normal breath sounds.   Abdominal:      General: Bowel sounds are normal. There is no distension.      Palpations: Abdomen  is soft.   Musculoskeletal:      Cervical back: Neck supple.   Neurological:      Mental Status: He is alert.   Psychiatric:         Behavior: Behavior normal.       Significant Labs: All pertinent labs within the past 24 hours have been reviewed.  CBC:   Recent Labs   Lab 03/08/22  0721   WBC 8.78   HGB 8.1*   HCT 25.4*          CMP:   Recent Labs   Lab 03/08/22  0721   *   K 4.7   CL 92*   CO2 26   GLU 68*   BUN 37*   CREATININE 9.9*   CALCIUM 8.9   ALBUMIN 2.1*   ANIONGAP 17*   EGFRNONAA 5.1*         Significant Imaging: I have reviewed all pertinent imaging results/findings within the past 24 hours.      Assessment/Plan:      * Subacute osteomyelitis of left foot  - Patient admitted with left foot pain and recent left foot infection and salmonella bacteremia at an outside hopsital treated with Cipro.   - Lactic normal on admit.   - Admit labs: ESR 97, CRP 33.4, procal 1.0.  - MRI of left foot showed regions of focal abnormal signal involving the distal 1st metatarsal and proximal 1st phalanx.  Findings are concerning for foci of subacute osteomyelitis.   -Podiatry consulted and at first deferred bone biopsy and recommended just abx treatment and ID consulted and patient started on IV Daptomycin and Rocephin. Patient treated for gout by Podiatry as concerned some changes related to gout.   -minimal improvement in pain with gout tx so Podiatry per ID request performed bone biopsy on 2/25 to left foot. Pathology and Micro from bone biopsy pending.   -ID final recs for likely need for 6 weeks IV antibiotics for osteo and can transition to IV Vanc/Cefepime 2g with HD until 4/9 (see note from 3/4)  -weekly labs    on Mondays cbc, cmp, crp, vanc trough   also on Thursday CMP, vanc trough   Fax to ID clinic [phone]  -needs ambulatory referral to ID (placed)  -pharmacy consult placed to transition to vancomycin from daptomycin. Can transition to cefepime on discharge.  pain appear to be controlled at  this time.    Debility  -Due to current illness. PT rec 24/7 S/A but patient does not have home support  -Patient agreeable to IPR  -PMR consulted   -CM has sent referrals   -Transfer orders drafted  -COVID screen ordered 3/6/22        Bilious vomiting with nausea  Happened on 3/4. Unclear etiology,improved today, tolerating po. Will monitoring    Encephalopathy, metabolic  On admission he was confused and was seen by psychiatry and determined not have capacity at that point to consent to HD (2/20). Subsequently, he has undergone HD and his mentation has cleared. In my evaluation, he is able to understand and manipulate the current medical situation and apply it to himself and thus has capacity      Diarrhea  resolved      Prevertebral Fluid Collection       Neck pain  Prevertebral fluid collection  - Patient had neck pain on admit and imaging done: Abnormal C spine Xray with possible signs of C spine instability, MRI ordered and patient refused 2/17, on pain meds, discussed at length, see 2/17 and 2/18.  -Concern for prevertebral abscess likely with fluid collection with widening in C spine, NS consulted and recomended C collar- patient refused, and Xrays, refused.   -ENTconsulted for retropharyngeal abscess per NS and ID recs, will likely scope if amenable at bedside (he refused). They rec MRI C spine with contrast.  -NS recs MRI all spine, ID/ENT rec repeat MRI C spine for better pictures - initially imaging delayed 2/2 pt discomfort and concerns but MRI/CT finally done and complete on 2/22 and 2/24.  - MRI with contrast on 2/24 showed no fluid collection, will not require intervention as per Neurosurgery and ENT and signed off. ID stated did not need any antibiotics for his neck and remains on abx just to treat his left foot osteomyelitis not related to any suspected neck infection. No need for C- collar as per NS.       Chronic gout of left foot due to renal impairment without tophus  Chornci and controlled.  S/p Colcichine 0.3 mg po BID to treat as per Podiatry recs. Left foot pain improved.       Folic acid deficiency  Present on admit. Folate low at 4.4. Patient started on Folic acid 1 mg po daily [facility] and will continue.       Acute renal failure superimposed on stage 5 chronic kidney disease, not on chronic dialysis  Membranous glomerulonephritis  - Nephrology consulted on admit for  and Cr 20.9 and uremic symptoms of tremors. 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 Nephrology recommended HD on admit but patient repeated refused and finally agreed after multiple conversations to start on 2/22. Nephrology has been managing his HD needs [facility].   - GFR was 13-16 up from baseline 4-6 previously in January 2022. Labs repeated today and GFR is 2.6, Cr 19.   - Patient follows with Dr. Cardoza and Dr Lee and dialysis has been discussed multiple times, patient continues to refuse and on transplant list with [facility] but on this admit after multiple attempts patient finally agreed and trialysis line placed on 2/22 and patient started on HD on 2/23 for progressive CKD stage V that has now progressed to ESRD.   - SW arranging outpatient HD - [facility] and has [facility] chair starting next week  -Patient initially deemed not to have capacity by psychiatry (in setting of tereso on ckd and uremia) but now has capacity to make medical decisions.   -Outpatient center requesting repeat psych eval which has been done on 3/4  -Perm Cath placed on 2/28.  Has routine HD with no complain.    SHANNAN (iron deficiency anemia)        Benign prostatic hyperplasia  [place] placed 2/18 overnight for incontinence    Gastroesophageal reflux disease without esophagitis  Chronic and controlled. Continue Protonix 40 mg po daily to treat.     Patient on waiting list for kidney transplant        Atherosclerosis of native coronary artery of native heart without angina pectoris  Patient with  "known CAD. Patient asymptomatic. Continue Labetalol, Asprin 81 mg po daily and Lipitor 80 mg po daily to treat CAD.       Hx of syphilis  Per Helio Garcia NP (ID) note 9/18/18, "Patient reports recent treatment with 3 doses of bicillin for + Treponema Pallidum antibody (5/16/2018) (per patient, this was a re-treatment because he couldn't get prior records of apparent prior treatment).  RPR 1:2.   Need to confirm treatment with [facility].  RPR here is pending.   Recommend repeat RPR 6 months from treatment - December 2018"  - Treated.   - RPR non-reactive 12/2018.      S/P coronary artery stent placement  Patient with previous coronary stent for CAD in past in 2007. Continue Labetalol, Asprin 81 mg po daily and Lipitor 80 mg po daily to treat CAD.     Type 2 diabetes mellitus with chronic kidney disease on chronic dialysis, with long-term current use of insulin  Good control in the hospital in past 24 hours.  · Plan is to continue to monitor POCT glucose 4 times a day with each meal and at bedtime and cover with Novolog low dose sliding scale insulin.   · Target pre-meal glucose goal is <140 with all random glucoses <180 in non-critically ill patient.  · Was previously on lantus but may not need this on discharge with new CKD    Anemia due to chronic kidney disease, on chronic dialysis  - Patient admitted with Hgb in 7-8 range and related to his advanced CKD.   - Patient transfused 1 unit of PRBCs on 2/18 for Hgb 6.9 and trabnsfuse again 1 unit on 2/23 for Hgb 6.7 but Hgb stable since. No signs of bleeding noted and felt all related to his CKD in combination with chronic infection/inflammation.    - Hgb stable mid to high 8s since last transfusion and dimitrios monitor with CBC in hospital and consider transfusion if Hgb < 7.   -continue folic acid      Membranous glomerulonephritis  -brought acthar gel from home to do non formulary as 40K and got via prior auth-non form request addded  -reason for CKD 5.      Pure " hypercholesterolemia  Chronic and controlled. Continue Lipitor 80 mg po daily to treat.     Renovascular hypertension  - Elevated on admit but meds adjusted and now controlled.   - Goal BP < 140/90.  - Conitnue Norvasc 10 mg po daily + Hydralazine 75 mg po TID + Labetalol 200 mg po BID to treat and adjust as needed for target BP goal in hospital.       VTE Risk Mitigation (From admission, onward)         Ordered     heparin (porcine) injection 1,000 Units  As needed (PRN)         03/05/22 1119     heparin (porcine) injection 1,000 Units  As needed (PRN)         03/03/22 1546     heparin (porcine) injection 5,000 Units  Every 8 hours         02/25/22 0747     IP VTE HIGH RISK PATIENT  Once         02/17/22 0151     Place sequential compression device  Until discontinued         02/17/22 0151                Discharge Planning   JOAN: 3/11/2022     Code Status: Full Code   Is the patient medically ready for discharge?: No    Reason for patient still in hospital (select all that apply): Patient trending condition  Discharge Plan A: Rehab   Discharge Delays: None known at this time              Jose Alberto Saba MD  Department of Hospital Medicine   [facility]

## 2022-03-09 NOTE — PLAN OF CARE
BRE spoke with Atiya Ruth, liaison for [facility], who conveyed that there are available HD beds @ the facility.  BRE spoke with Dr. Jayant MD, to notify of availability and inquiry about pt readiness.  MD will placed facility orders.  Atiya will submit for auth.    BRE will continue to follow.    Elijah Barfield LMSW  [facility]  [phone]

## 2022-03-10 VITALS
RESPIRATION RATE: 20 BRPM | SYSTOLIC BLOOD PRESSURE: 92 MMHG | DIASTOLIC BLOOD PRESSURE: 57 MMHG | TEMPERATURE: 99 F | WEIGHT: 140.63 LBS | HEIGHT: 66 IN | BODY MASS INDEX: 22.6 KG/M2 | OXYGEN SATURATION: 98 % | HEART RATE: 94 BPM

## 2022-03-10 LAB
FINAL PATHOLOGIC DIAGNOSIS: NORMAL
GROSS: NORMAL
Lab: NORMAL
MICROSCOPIC EXAM: NORMAL
VANCOMYCIN SERPL-MCNC: 26.5 UG/ML

## 2022-03-10 PROCEDURE — 25000003 PHARM REV CODE 250: Mod: NTX | Performed by: FAMILY MEDICINE

## 2022-03-10 PROCEDURE — 63600175 PHARM REV CODE 636 W HCPCS: Mod: NTX | Performed by: FAMILY MEDICINE

## 2022-03-10 PROCEDURE — 99232 PR SUBSEQUENT HOSPITAL CARE,LEVL II: ICD-10-PCS | Mod: NTX,,, | Performed by: INTERNAL MEDICINE

## 2022-03-10 PROCEDURE — 99239 HOSP IP/OBS DSCHRG MGMT >30: CPT | Mod: NTX,,, | Performed by: HOSPITALIST

## 2022-03-10 PROCEDURE — 97535 SELF CARE MNGMENT TRAINING: CPT | Mod: NTX

## 2022-03-10 PROCEDURE — 80202 ASSAY OF VANCOMYCIN: CPT | Mod: NTX | Performed by: HOSPITALIST

## 2022-03-10 PROCEDURE — 99222 1ST HOSP IP/OBS MODERATE 55: CPT | Mod: NTX,,, | Performed by: STUDENT IN AN ORGANIZED HEALTH CARE EDUCATION/TRAINING PROGRAM

## 2022-03-10 PROCEDURE — 36415 COLL VENOUS BLD VENIPUNCTURE: CPT | Mod: NTX | Performed by: HOSPITALIST

## 2022-03-10 PROCEDURE — 99232 SBSQ HOSP IP/OBS MODERATE 35: CPT | Mod: NTX,,, | Performed by: INTERNAL MEDICINE

## 2022-03-10 PROCEDURE — 97530 THERAPEUTIC ACTIVITIES: CPT | Mod: NTX

## 2022-03-10 PROCEDURE — 99222 PR INITIAL HOSPITAL CARE,LEVL II: ICD-10-PCS | Mod: NTX,,, | Performed by: STUDENT IN AN ORGANIZED HEALTH CARE EDUCATION/TRAINING PROGRAM

## 2022-03-10 PROCEDURE — 25000003 PHARM REV CODE 250: Mod: NTX | Performed by: PHYSICIAN ASSISTANT

## 2022-03-10 PROCEDURE — 99239 PR HOSPITAL DISCHARGE DAY,>30 MIN: ICD-10-PCS | Mod: NTX,,, | Performed by: HOSPITALIST

## 2022-03-10 RX ORDER — LABETALOL 200 MG/1
200 TABLET, FILM COATED ORAL EVERY 12 HOURS
Qty: 60 TABLET | Refills: 11 | Status: SHIPPED | OUTPATIENT
Start: 2022-03-10 | End: 2022-11-14

## 2022-03-10 RX ORDER — GABAPENTIN 100 MG/1
100 CAPSULE ORAL DAILY
Qty: 30 CAPSULE | Refills: 11
Start: 2022-03-11 | End: 2023-03-11

## 2022-03-10 RX ORDER — POLYETHYLENE GLYCOL 3350 17 G/17G
17 POWDER, FOR SOLUTION ORAL DAILY PRN
Refills: 0
Start: 2022-03-10 | End: 2022-10-03

## 2022-03-10 RX ORDER — OXYCODONE HYDROCHLORIDE 5 MG/1
5 TABLET ORAL EVERY 6 HOURS PRN
Qty: 20 TABLET | Refills: 0 | Status: SHIPPED | OUTPATIENT
Start: 2022-03-10 | End: 2022-09-25 | Stop reason: SDUPTHER

## 2022-03-10 RX ORDER — HYDRALAZINE HYDROCHLORIDE 50 MG/1
50 TABLET, FILM COATED ORAL EVERY 8 HOURS
Qty: 90 TABLET | Refills: 11 | Status: ON HOLD | OUTPATIENT
Start: 2022-03-10 | End: 2022-10-06 | Stop reason: HOSPADM

## 2022-03-10 RX ORDER — SODIUM CHLORIDE 9 MG/ML
INJECTION, SOLUTION INTRAVENOUS ONCE
Status: DISCONTINUED | OUTPATIENT
Start: 2022-03-10 | End: 2022-03-10 | Stop reason: HOSPADM

## 2022-03-10 RX ORDER — METHOCARBAMOL 750 MG/1
750 TABLET, FILM COATED ORAL 4 TIMES DAILY PRN
Start: 2022-03-10 | End: 2022-03-20

## 2022-03-10 RX ORDER — SODIUM CHLORIDE 9 MG/ML
INJECTION, SOLUTION INTRAVENOUS
Status: DISCONTINUED | OUTPATIENT
Start: 2022-03-10 | End: 2022-03-10 | Stop reason: HOSPADM

## 2022-03-10 RX ORDER — TALC
6 POWDER (GRAM) TOPICAL NIGHTLY PRN
Refills: 0
Start: 2022-03-10

## 2022-03-10 RX ORDER — ONDANSETRON 8 MG/1
8 TABLET, ORALLY DISINTEGRATING ORAL EVERY 8 HOURS PRN
Start: 2022-03-10

## 2022-03-10 RX ORDER — FOLIC ACID 1 MG/1
1 TABLET ORAL DAILY
Refills: 0
Start: 2022-03-11 | End: 2022-10-03

## 2022-03-10 RX ADMIN — HEPARIN SODIUM 5000 UNITS: 5000 INJECTION INTRAVENOUS; SUBCUTANEOUS at 05:03

## 2022-03-10 RX ADMIN — AMLODIPINE BESYLATE 10 MG: 10 TABLET ORAL at 08:03

## 2022-03-10 RX ADMIN — HYDRALAZINE HYDROCHLORIDE 50 MG: 50 TABLET ORAL at 05:03

## 2022-03-10 RX ADMIN — PANTOPRAZOLE SODIUM 40 MG: 40 TABLET, DELAYED RELEASE ORAL at 08:03

## 2022-03-10 RX ADMIN — LABETALOL HYDROCHLORIDE 200 MG: 100 TABLET, FILM COATED ORAL at 08:03

## 2022-03-10 RX ADMIN — OXYCODONE HYDROCHLORIDE 10 MG: 10 TABLET ORAL at 05:03

## 2022-03-10 RX ADMIN — NYSTATIN 500000 UNITS: 500000 SUSPENSION ORAL at 08:03

## 2022-03-10 RX ADMIN — FOLIC ACID 1 MG: 1 TABLET ORAL at 08:03

## 2022-03-10 RX ADMIN — OXYCODONE 5 MG: 5 TABLET ORAL at 08:03

## 2022-03-10 RX ADMIN — OXYCODONE HYDROCHLORIDE 10 MG: 10 TABLET ORAL at 11:03

## 2022-03-10 RX ADMIN — ASPIRIN 81 MG: 81 TABLET, COATED ORAL at 08:03

## 2022-03-10 RX ADMIN — NYSTATIN 500000 UNITS: 500000 SUSPENSION ORAL at 11:03

## 2022-03-10 RX ADMIN — GABAPENTIN 100 MG: 100 CAPSULE ORAL at 08:03

## 2022-03-10 RX ADMIN — SEVELAMER CARBONATE 800 MG: 800 TABLET, FILM COATED ORAL at 11:03

## 2022-03-10 RX ADMIN — ATORVASTATIN CALCIUM 80 MG: 20 TABLET, FILM COATED ORAL at 08:03

## 2022-03-10 RX ADMIN — SEVELAMER CARBONATE 800 MG: 800 TABLET, FILM COATED ORAL at 08:03

## 2022-03-10 NOTE — ASSESSMENT & PLAN NOTE
Prevertebral fluid collection ruled out  Patient had neck pain on admit and imaging done: Abnormal C spine Xray with possible signs of C spine instability, MRI ordered and patient refused 2/17, on pain meds, discussed at length, see 2/17 and 2/18. Concern for prevertebral abscess likely with fluid collection with widening in C spine, NS consulted and recomended C collar- patient refused, and Xrays, refused.  ENTconsulted for retropharyngeal abscess per NS and ID recs, will likely scope if amenable at bedside (he refused).   Initially imaging delayed 2/2 pt discomfort and concerns but MRI/CT finally done and complete on 2/22 and 2/24. - MRI with contrast on 2/24 showed no fluid collection, will not require intervention as per Neurosurgery and ENT and signed off. ID stated did not need any antibiotics for his neck and remains on abx just to treat his left foot osteomyelitis not related to any suspected neck infection. No need for C- collar as per NS.

## 2022-03-10 NOTE — ASSESSMENT & PLAN NOTE
Patient admitted with left foot pain and recent left foot infection and salmonella bacteremia at an outside hopsital treated with Cipro.  Admit labs: ESR 97, CRP 33.4, procal 1.0.  MRI of left foot showed regions of focal abnormal signal involving the distal 1st metatarsal and proximal 1st phalanx.  Findings are concerning for foci of subacute osteomyelitis. Podiatry consulted and at first deferred bone biopsy and recommended just abx treatment and ID consulted and patient started on IV Daptomycin and Rocephin. Patient treated for gout by Podiatry as concerned some changes related to gout. Minimal improvement in pain with gout tx so Podiatry per ID request performed bone biopsy on 2/25 to left foot. Pathology and Micro from bone biopsy pending.   -ID final recs for likely need for 6 weeks IV antibiotics for osteo and can transition to IV Vanc/Cefepime 2g with HD until 4/9 (see note from 3/4)  -weekly labs    on Mondays cbc, cmp, crp, vanc trough   also on Thursday CMP, vanc trough   Fax to ID clinic [phone]  Pain controlled with oral oxycodone

## 2022-03-10 NOTE — ASSESSMENT & PLAN NOTE
Patient admitted with Hgb in 7-8 range and related to his advanced CKD.  Patient transfused 1 unit of PRBCs on 2/18 for Hgb 6.9 and trabnsfuse again 1 unit on 2/23 for Hgb 6.7 but Hgb stable since. No signs of bleeding noted and felt all related to his CKD in combination with chronic infection/inflammation.   Hgb stable mid to high 8s since last transfusion. continue folic acid

## 2022-03-10 NOTE — PROGRESS NOTES
[facility]  Nephrology  Progress Note     Patient Name: Enrique Martinez  MRN: [id number]  Admission Date: 2/16/2022  Hospital Length of Stay: 21 days  Attending Provider: Jory Ryan MD   Primary Care Physician: Primary Doctor No  Principal Problem: Subacute osteomyelitis of left foot    Subjective:     HPI: Mr. Martinez is a 56 y.o. 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 He was recently admitted at [facility] from 2/2-2/4/2022 from LLE cellulitis. He was found to have salmonella bacteremia suspected due to diarrheal illness. He was discharged with cipro for which he reports compliance. He has had no improvement in his pain, redness, or swelling of his left foot and leg. He has also developed a new blister on his left foot, with MRI foot concerning for osteomyelitis and showing soft tissue swelling c/w cellulitis. He is admitted for treatment of suspected osteomyelitis.     He follows with Dr. Cardoza (nephrology), has adamantly declined HD in the past and would rather wait for transplant. He reports being on the transplant list for the past 4 years. His last renal biopsy in 2013 showed 50% glomerulosclerosis. Started on calcitriol 0.5, hx of elevated PTH at 921. Patient complains of decreased appetite, neck cramps, fatigue. He still urinates 4-5 times/day. He reports his B/L LE edema has worsened over the past few weeks. No dyspnea, chest pain, dysuria, hematuria. Nephro consulted for HD initiation.      Labs are significant for hyperkalemia 5.5, bicarb 18, , phosphate 10.4, Cr 19.7, GFR 2.2, ESR 97, CRP 33.4. . CBC significant for Hgb of 7.2, white count 14. Previous echo showed EF of 65%.       Interval History: Seen  at the bedside no event overnight       Review of patient's allergies indicates:  No Known Allergies   Current Facility-Administered Medications   Medication Frequency    0.9%  NaCl infusion PRN    0.9%  NaCl infusion PRN    0.9%  NaCl infusion Once    acetaminophen tablet 1,000 mg Q8H    acetaminophen tablet 650 mg Q6H PRN    albuterol-ipratropium 2.5 mg-0.5 mg/3 mL nebulizer solution 3 mL Q4H PRN    aluminum-magnesium hydroxide-simethicone 200-200-20 mg/5 mL suspension 30 mL QID PRN    amLODIPine tablet 10 mg Daily    ARIPiprazole tablet 15 mg QHS    aspirin EC tablet 81 mg Daily    atorvastatin tablet 80 mg Daily    cefTRIAXone (ROCEPHIN) 2 g/50 mL D5W IVPB Q24H    dextrose 10% bolus 125 mL PRN    dextrose 10% bolus 250 mL PRN    epoetin veronica-epbx injection 3,180 Units Every Tues, Thurs, Sat    folic acid tablet 1 mg Daily    gabapentin capsule 100 mg Daily    glucagon (human recombinant) injection 1 mg PRN    glucose chewable tablet 16 g PRN    glucose chewable tablet 24 g PRN    heparin (porcine) injection 1,000 Units PRN    heparin (porcine) injection 5,000 Units Q8H    hydrALAZINE tablet 50 mg Q8H    labetaloL tablet 200 mg Q12H    loperamide capsule 2 mg QID PRN    lorazepam injection 1 mg On Call Procedure    melatonin tablet 6 mg Nightly PRN    methocarbamoL tablet 750 mg QID PRN    morphine injection 2 mg On Call Procedure    naloxone 0.4 mg/mL injection 0.02 mg PRN    nystatin 100,000 unit/mL suspension 500,000 Units QID (WM & HS)    OLANZapine injection 2.5 mg Once PRN    ondansetron disintegrating tablet 8 mg Q8H PRN    ondansetron injection 4 mg Q6H PRN    oxyCODONE immediate release tablet 5 mg Q6H PRN    oxyCODONE immediate release tablet Tab 10 mg Q4H PRN    pantoprazole EC tablet 40 mg Daily    polyethylene glycol packet 17 g Daily PRN    prochlorperazine injection Soln 5 mg Q6H PRN    senna-docusate 8.6-50 mg per tablet 1 tablet Daily PRN    sevelamer  carbonate tablet 800 mg TID WM    simethicone chewable tablet 80 mg QID PRN    sodium chloride 0.9% bolus 250 mL PRN    sodium chloride 0.9% flush 10 mL Q8H PRN    vancomycin - pharmacy to dose pharmacy to manage frequency       Objective:     Vital Signs (Most Recent):  Temp: 98.5 °F (36.9 °C) (03/10/22 1132)  Pulse: 94 (03/10/22 1132)  Resp: 20 (03/10/22 1147)  BP: (!) 92/57 (03/10/22 1132)  SpO2: 98 % (03/10/22 1132)  O2 Device (Oxygen Therapy): room air (03/10/22 0739) Vital Signs (24h Range):  Temp:  [97.6 °F (36.4 °C)-98.9 °F (37.2 °C)] 98.5 °F (36.9 °C)  Pulse:  [] 94  Resp:  [16-20] 20  SpO2:  [94 %-98 %] 98 %  BP: ()/(57-76) 92/57   Weight: 63.8 kg (140 lb 10.5 oz) (03/09/22 0430)  Body mass index is 22.7 kg/m².  Body surface area is 1.72 meters squared.      Intake/Output Summary (Last 24 hours) at 3/10/2022 1402  Last data filed at 3/9/2022 2200  Gross per 24 hour   Intake 740.75 ml   Output --   Net 740.75 ml     I/O last 3 completed shifts:  In: 2113.5 [P.O.:1365; I.V.:0.8; Other:500; IV Piggyback:247.7]  Out: 1500 [Other:1500]  Net IO Since Admission: 9,018.3 mL [03/10/22 1402]     Physical Exam  Constitutional:       Appearance: He is ill-appearing.   Cardiovascular:      Rate and Rhythm: Normal rate.      Pulses: Normal pulses.   Pulmonary:      Effort: Pulmonary effort is normal.   Abdominal:      General: Abdomen is flat.   Musculoskeletal:         General: Normal range of motion.   Neurological:      Mental Status: He is alert.         Recent Labs   Lab 03/05/22  0748 03/06/22  0723 03/08/22  0721   WBC 8.34 7.61 8.78   HGB 8.5* 8.2* 8.1*   HCT 26.8* 25.5* 25.4*    249 228   MONO 19.8*  1.7* 24.0*  1.8* 17.9*  1.6*      Recent Labs   Lab 03/05/22  0748 03/06/22  0723 03/08/22  0721    137 135*   K 4.7 3.7 4.7    94* 92*   CO2 16* 30* 26   BUN 29* 15 37*   CREATININE 9.1* 6.0* 9.9*   CALCIUM 8.8 8.4* 8.9      No results for input(s): PH, PCO2, PO2, HCO3,  POCSATURATED, BE in the last 72 hours.    Assessment/Plan:       Subacute osteomyelitis of left foot    Renovascular hypertension    Pure hypercholesterolemia    Membranous glomerulonephritis    Anemia due to chronic kidney disease, on chronic dialysis    Type 2 diabetes mellitus with chronic kidney disease on chronic dialysis, with long-term current use of insulin    S/P coronary artery stent placement    Hx of syphilis    Atherosclerosis of native coronary artery of native heart without angina pectoris    Patient on waiting list for kidney transplant    Gastroesophageal reflux disease without esophagitis    Acute renal failure superimposed on stage 5 chronic kidney disease, not on chronic dialysis    Folic acid deficiency    Chronic gout of left foot due to renal impairment without tophus    Neck pain    Prevertebral Fluid Collection    Diarrhea    Encephalopathy, metabolic    Bilious vomiting with nausea    Debility    Severe malnutrition     Acute renal failure superimposed on stage 5 chronic kidney disease, not on chronic dialysis  57 yo M with history of CKD V due to idiopathic membranous glomerulonephritis, anemia of CKD, hx of neurosyphilis, insulin dependent DM, who presents with cellulitis and possible osteomyelitis of left foot. Previously, he has declined HD in the past, never receiving a fistula. He is on the transplant wait list. Patient exhibits signs of uremia including fatigue, poor appetite, and mental slowing. On labs, he is hyperkalemic, uremic, AGMA, hyperphosphatemic. GFR is 2-3, which is reduced from previous GFR 13. Home meds include sodium bicarb, Renvela, and calcitriol. 2+ pitting edema on B/L lower extremities. Renal US from Jan 2022 shows sonographic evidence of chronic medical renal disease and bilateral simple and minimally complex renal cysts.  sCr one year ago was 4.5, now 18.6. UPCr ratio 1.11 (Dec 2020)  2/18: Patient increasingly somnolent, lethargic. Hospital med in process of  getting in touch with Dr. Lee, who the patient follows with outpatient.   2/23: first session of HD 2 hours, no net fluid, patient tolerated. Still confused, having visual hallucinations of mice.   2/24: second HD session 150 min, 1 L removed, patient tolerated.  2/26: HD planned for today     Plan:   - s/p  tunneled catheter placement by IR   - Will need AVF with vascular surgery as placed outpatient   - HD on T/Th/Sa schedule   - Sevelamer 800 TID   - Hold calcitriol in setting of hyperphosphatemia. Will re-start when phos reaches normal level.  - Trend Cr  - Strict I&Os  - Avoid nephrotoxic agents  - Renally adjust medications            Hyperphosphatemia  Hyperphosphatemia at 10.9 >> 4.6.      - Continue Sevelamer 800 TID     Anemia due to chronic kidney disease  Hemoglobin 10.1 one month ago, now 7.2. Normocytic anemia with low iron, iron sat, elevated ferritin.      -  EPO with HD.     Thank you for your consult. I will follow-up with patient. Please contact us if you have any additional questions.    Diana Cid MD  Nephrology  [facility]

## 2022-03-10 NOTE — PLAN OF CARE
Problem: Occupational Therapy Goal  Goal: Occupational Therapy Goal  Description: Goals to be met by: 3/11/2022     Patient will increase functional independence with ADLs by performing:    LE Dressing with Supervision.  Grooming while standing at sink with Supervision.  Toileting from toilet with Supervision for hygiene and clothing management.   Step transfer with Supervision  Toilet transfer to toilet with Supervision.    Outcome: Ongoing, Progressing    Dc rec: IPR. Limited by L foot pain but agreeable.

## 2022-03-10 NOTE — PLAN OF CARE
1698  JOSÉ MIGUEL was informed by Atiya dietz/[facility] that the patient will be accepted for admission today & that insurance authorization has been obtained. CM informed Dr. Ryan of above & requested Facilty Transfer orders.     1020  Patient awake & alert sitting in recliner with Drake from [facility] at the bedside when CM rounded. No family present. Patient in agreement with plan to discharge to [facility] today. JOSÉ MIGUEL informed nurse Em ([id number]) of discharge status.     1235  Request received from Atiya for nurse Em to call report to [phone] & schedule transportation. JOSÉ MIGUEL informed nurse Em of above & order WC van transportation with requested pickup at 1300.      Will continue to follow.

## 2022-03-10 NOTE — PROGRESS NOTES
Pharmacokinetic Assessment Follow Up: IV Vancomycin    Vancomycin serum concentration assessment/plan:    · ERIK on CKD5 requiring RRT, nephrology following and patient receiving HD scheduled T/Th/Sat, now off schedule, last dialysis 3/9 am (Wednesday).   · Vancomycin 500 mg given once yesterday after dialysis   · Random level of 26.5 mg/dL this am  · Desired empiric serum trough concentration is 15 to 20 mcg/mL  · Hold vancomycin today   · Re-dose when random level < 25 mg/dL  · Random level for 3/11/22 with AM labs    Outpatient Plan until 4/9, per ID recs:  · IV vancomycin per pharmacy dosing after HD sessions  · IV cefepime 2 g after each HD session     Drug levels (last 3 results):  Recent Labs   Lab Result Units 03/08/22  0721 03/09/22  0827 03/10/22  0252   Vancomycin, Random ug/mL 29.7 17.1 26.5       Pharmacy will continue to follow and monitor vancomycin.    Please contact pharmacy at extension [phone] for questions regarding this assessment.    Thank you for the consult,   Giuseppe Hedrick       Patient brief summary:  Enrique Martinez is a 57 y.o. male initiated on antimicrobial therapy with IV Vancomycin for treatment of bone/joint infection      Drug Allergies:   Review of patient's allergies indicates:  No Known Allergies    Actual Body Weight:   63.8 kg    Renal Function:   Estimated Creatinine Clearance: 7.3 mL/min (A) (based on SCr of 9.9 mg/dL (H)).,     Dialysis Method (if applicable):  intermittent HD TThSat schedule, off schedule, last HD 3/9 am    CBC (last 72 hours):  Recent Labs   Lab Result Units 03/08/22  0721   WBC K/uL 8.78   Hemoglobin g/dL 8.1*   Hematocrit % 25.4*   Platelets K/uL 228   Gran % % 64.4   Lymph % % 15.5*   Mono % % 17.9*   Eosinophil % % 1.0   Basophil % % 0.6   Differential Method  Automated       Metabolic Panel (last 72 hours):  Recent Labs   Lab Result Units 03/08/22  0721   Sodium mmol/L 135*   Potassium mmol/L 4.7   Chloride mmol/L 92*   CO2 mmol/L 26   Glucose mg/dL 68*    BUN mg/dL 37*   Creatinine mg/dL 9.9*   Albumin g/dL 2.1*   Phosphorus mg/dL 6.3*       Vancomycin Administrations:  vancomycin given in the last 96 hours                   vancomycin 1.5 g in dextrose 5 % 250 mL IVPB (ready to mix) (mg) 1,500 mg New Bag 03/07/22 1504                Microbiologic Results:  Microbiology Results (last 7 days)     ** No results found for the last 168 hours. **

## 2022-03-10 NOTE — ASSESSMENT & PLAN NOTE
Membranous glomerulonephritis  Nephrology consulted on admit for  and Cr 20.9 and uremic symptoms of tremors. GFR was 13-16 up from baseline 4-6 previously in January 2022. Labs repeated today and GFR is 2.6, Cr 19Patient with known CKD stage V as outpatient related to membranous glomerulopathy. Nephrology recommended HD on admit but patient repeated refused and finally agreed after multiple conversations to start on 2/22. Nephrology has been managing his HD needs in the hospital. Outpatient center requesting repeat psych eval which has been done on 3/4 and confirmed that patient has capacity. Perm Cath placed on 2/28. [facility]

## 2022-03-10 NOTE — PROGRESS NOTES
"Transport by room to take patient to [facility].  While calling report to nurse at [facility], patient is walking out room saying someone took one of his cards from his wallet/ table.  When walking in his room to ask him when his last BM was, pt starts to get agitated and demanding to know who took his sylvester card.  After explaining that the nurse assisted him only to dial a bank phone number and did not touch any cards, pt became more agitated and stated nurse is "rushing me when all I want to do is eat and find my sylvester card."  [facility] nurse on phone notified and w/c transport waiting outside for pt to talk to CM.    "

## 2022-03-10 NOTE — ASSESSMENT & PLAN NOTE
Elevated on admit but meds adjusted and now controlled for goal BP < 140/90. He is now on Norvasc 10 mg po daily + Hydralazine 75 mg po TID + Labetalol 200 mg po BID

## 2022-03-10 NOTE — ASSESSMENT & PLAN NOTE
Due to current illness. PT rec 24/7 S/A but patient does not have home support. Patient agreeable to IPR. COVID screen ordered 3/6/22

## 2022-03-10 NOTE — ASSESSMENT & PLAN NOTE
Good control in the hospital. Plan is to continue to monitor POCT glucose 4 times a day with each meal and at bedtime and cover with Novolog low dose sliding scale insulin. He was previously on lantus but has not required insulin currently given his new ESRD

## 2022-03-10 NOTE — PT/OT/SLP PROGRESS
Occupational Therapy   Treatment    Name: Enrique Martinez  MRN: [id number]  Admitting Diagnosis:  Subacute osteomyelitis of left foot       Recommendations:     Discharge Recommendations: rehabilitation facility  Discharge Equipment Recommendations:  walker, rolling, shower chair  Barriers to discharge:  Inaccessible home environment, Decreased caregiver support    Assessment:     Enrique Martinez is a 57 y.o. male with a medical diagnosis of Subacute osteomyelitis of left foot.  He presents with impaired endurance, weakness, impaired self care skills, impaired functional mobilty, impaired balance, decreased safety awareness, pain.     Rehab Prognosis:  Good; patient would benefit from acute skilled OT services to address these deficits and reach maximum level of function.       Plan:     Patient to be seen 3 x/week to address the above listed problems via self-care/home management, therapeutic activities, therapeutic exercises  · Plan of Care Expires: 03/27/22  · Plan of Care Reviewed with: patient    Subjective     Pain/Comfort:  · Pain Rating 1: 8/10  · Location - Side 1: Left  · Location 1: foot  · Pain Addressed 1: Nurse notified  · Pain Rating Post-Intervention 1: 6/10  · Location - Side 2: Left  · Location 2: foot    Objective:     Communicated with: RN prior to session.  Patient found up in chair with   upon OT entry to room.    General Precautions: Standard, fall   Orthopedic Precautions:spinal precautions   Braces:  (pt with Aspen collar in room but doffed. Per notes, pt with no needs for cervical collar.)  Respiratory Status: Room air     Occupational Performance:     Bed Mobility:    · Not performed; pt received and left sitting in chair     Functional Mobility/Transfers:  · Sit <> stand: CGA with RW, cues for hand placement and safety during transitions. Pt with decreased safety awareness.  · Toilet transfer: CGA with RW and cues for use of grab bars. Pt places both hands on RW during transfers, pt educated  on safety and verbalized but unable to demonstrate understanding during later transfers.    · Functional Mobility: Pt required CGA-SBA and RW for functional ambulation to/from bathroom. Pt with antalgic gait 2/2 L foot pain and required cues for safe positioning of RW, with a tendency to place RW too far in front.     Therapeutic Exercise:  · Pt performed 10 reps of B scapular protraction/retraction and 10 reps of alternating overhead reaching while seated in chair. HEP given. Pt verbalized understanding but requires review.      [facility] 6 Click ADL: 18    Treatment & Education:  OT role, plan of care, progression of goals, importance of continued OOB activity, fall prevention, ADL retraining, functional mobility/transfer retraining, safety techniques.    Patient left up in chair with all lines intact, call button in reach and RN notifiedEducation:      GOALS:   Multidisciplinary Problems     Occupational Therapy Goals        Problem: Occupational Therapy Goal    Goal Priority Disciplines Outcome Interventions   Occupational Therapy Goal     OT, PT/OT Ongoing, Progressing    Description: Goals to be met by: 3/11/2022     Patient will increase functional independence with ADLs by performing:    LE Dressing with Supervision.  Grooming while standing at sink with Supervision.  Toileting from toilet with Supervision for hygiene and clothing management.   Step transfer with Supervision  Toilet transfer to toilet with Supervision.                     Time Tracking:     OT Date of Treatment: 03/10/22  OT Start Time: 0944  OT Stop Time: 1004  OT Total Time (min): 20 min    Billable Minutes:Self Care/Home Management 10 minutes  Therapeutic Exercise 10 minutes    OT/FELICITY: OT     FELICITY Visit Number: 0    3/10/2022

## 2022-03-10 NOTE — DISCHARGE SUMMARY
[facility] - Intensive Care ([address] Medicine  Discharge Summary      Patient Name: Enrique Martinez  MRN: [id number]  Patient Class: IP- Inpatient  Admission Date: 2/16/2022  Hospital Length of Stay: 21 days  Discharge Date and Time:  03/10/2022 12:14 PM  Attending Physician: Jory Ryan MD   Discharging Provider: Jory Ryan MD  Primary Care Provider: Primary Doctor [facility] Medicine Team: [facility] MED S Jory Ryan MD    HPI:   Enrique Martinez is a 56 y.o. male with a PMHx of CKD V glomerulonephropathy, insulin dependent diabetes mellitus, and CAD who presented tot  ED for evaluation of worsening left foot pain and swelling. He was recently admitted at [facility] from 2/2-2/4/2022 from LLE cellulitis. He was found to have salmonella bacteremia suspected due to diarrheal illness. He was discharged with cipro for which he reports compliance. He has had no improvement in his pain, redness, or swelling of his left foot and leg. He has also developed a new blister on his left foot. He endorses chills. He denies fevers, chest pain, SOB, N/V, decreased urine output, dysuria, and flank pain.    ED: HR 97, other VSSAF. CBC with leukocytosis of 16.01, Hgb 7.4, Hct 22.2.  ESR 97. CRP 33.4. K 5.5. Cr 19.7, . Phos 10.4. Lactic WNL. MRI L foot with cellulitis and osteomyelitis of distal 1st metatarsal and proximal 1st phalanx.      Procedure(s) (LRB):  MPU PROCEDURE (N/A)      Hospital Course:   Enrique Martinez is a 56 y.o. male with a PMHx of CKD V glomerulonephropathy, insulin dependent diabetes mellitus, and CAD who presented tot he ED for evaluation of worsening left foot pain and swelling. He was recently admitted at [facility] from 2/2-2/4/2022 from LLE cellulitis. He was found to have salmonella bacteremia suspected due to diarrheal illness. He was discharged with cipro for which he reports compliance. He has had no improvement in his pain, redness, or swelling of his left foot and leg. He has  "also developed a new blister on his left foot. He endorses chills. He denies fevers, chest pain, SOB, N/V, decreased urine output, dysuria, and flank pain.     Patient admitted with worsening and progressive chronic kidney disease with  and Cr 20.9. Nephrology is consulted for his CKD stage V that has progressed ti ESRD and at first patient not amenable to starting HD but finally agreed after family involvement and patient had trialysis catheter placed and started on HD as per Nephrology. He is now stable on routine HD.    Patient was also complaining of neck pain on admit and had abnormal C spine Xray with possible signs of C spine instability, MRi ordered and patient refused 2/17, on pain meds, was discussed at length. Concern for possible infection also given acuity of pain. Imaging showed signs of C spine instability, prevertebral abscess likely with fluid collection with widening in C spine,. Neurosurgery consulted and recommended C collar- patient refused, and Xrays, refused, and MRI full spine when stable. Patient also had MRI of left foot on admit and showed "Regions of focal abnormal signal involving the distal 1st metatarsal and proximal 1st phalanx as described above.  Findings are concerning for foci of subacute myelitis (Yared's abscess).  Other less likely considerations include cystic degenerative change." ENT consulted for possible retropharyngeal abscess as per Neurosurgery and Infectious Disease consulted. At first patient kept refusing advanced imaging but finally agreed and repeat MRI of cervical spine done on 2/22 with anesthesia to help with sedation. MRI done without contrast and concern for possible retropharyngeal abscess. Patient placed on empiric IV Daptomycin and Ceftriaxone by ID.  ENT recommended needed study with contrast to really investigate the area better. Repeat MRI with contrast done of cervical spine on 2/24 and no obvious fluid collection noted and not obvious signs of " "infection so ENT and Neurosurgery signed off and stated nothing needed to be done.     Patient kept on IV Daptomycin and Rocephin to cover for his left foot osteo. Podiatry was consulted and performed bone biopsy as per ID recs on 2/25 to determine duration and type of abx needed to treat osteomyelitis of left foot. SW obtained outpatient HD chair at [facility] (new start HD this admit). ID finalized recs on discharge          Goals of Care Treatment Preferences:  Code Status: Full Code      Consults:   Consults (From admission, onward)        Status Ordering Provider     Inpatient consult to Infectious Diseases  Once        Provider:  Wagner Oconnell DO    Completed MILO ABDOLARINA     Pharmacy to dose Vancomycin consult  Once        Provider:  (Not yet assigned)   "And" Linked Group Details    Acknowledged THIEN BHATIA     Inpatient consult to Physical Medicine Rehab  Once        Provider:  (Not yet assigned)    Completed PANCHO COPE     Inpatient consult to Psychiatry  Once        Provider:  (Not yet assigned)    Completed AMARA FERRARA     Inpatient consult to Infectious Diseases  Once        Provider:  (Not yet assigned)    Completed AMARA FERRARA     Inpatient consult to Gastroenterology  Once        Provider:  (Not yet assigned)    Completed THIEN BHATIA     Inpatient consult to Podiatry  Once        Provider:  Lanny Elmore MD    Completed AKOUMOU ABDOLARINA     Inpatient consult to Interventional Radiology  Once        Provider:  (Not yet assigned)    Completed AKMAYELAZASHAVONNE ABDOLAZIM     Inpatient consult to Interventional Radiology  Once        Provider:  (Not yet assigned)    Completed ADEEL WILHELM     Inpatient consult to ENT  Once        Provider:  (Not yet assigned)    Completed ADEEL WILHELM     Inpatient consult to Psychiatry  Once        Provider:  (Not yet assigned)    Completed ADEEL WILHELM     Inpatient consult to Neurosurgery  Once     "    Provider:  (Not yet assigned)    Completed ADEEL WILHELM     Inpatient consult to Infectious Diseases  Once        Provider:  (Not yet assigned)    Completed ADEEL WILHELM     Inpatient consult to Nephrology  Once        Provider:  (Not yet assigned)    Completed ADEEL WILHELM     Inpatient consult to Podiatry  Once        Provider:  (Not yet assigned)    Completed JAH BEAR          * Subacute osteomyelitis of left foot  Patient admitted with left foot pain and recent left foot infection and salmonella bacteremia at an outside hopsital treated with Cipro.  Admit labs: ESR 97, CRP 33.4, procal 1.0.  MRI of left foot showed regions of focal abnormal signal involving the distal 1st metatarsal and proximal 1st phalanx.  Findings are concerning for foci of subacute osteomyelitis. Podiatry consulted and at first deferred bone biopsy and recommended just abx treatment and ID consulted and patient started on IV Daptomycin and Rocephin. Patient treated for gout by Podiatry as concerned some changes related to gout. Minimal improvement in pain with gout tx so Podiatry per ID request performed bone biopsy on 2/25 to left foot. Pathology and Micro from bone biopsy pending.   -ID final recs for likely need for 6 weeks IV antibiotics for osteo and can transition to IV Vanc/Cefepime 2g with HD until 4/9 (see note from 3/4)  -weekly labs    on Mondays cbc, cmp, crp, vanc trough   also on Thursday CMP, vanc trough   Fax to ID clinic [phone]  Pain controlled with oral oxycodone    Acute renal failure superimposed on stage 5 chronic kidney disease, not on chronic dialysis  Membranous glomerulonephritis  Nephrology consulted on admit for  and Cr 20.9 and uremic symptoms of tremors. GFR was 13-16 up from baseline 4-6 previously in January 2022. Labs repeated today and GFR is 2.6, Cr 19Patient with known CKD stage V as outpatient related to membranous glomerulopathy. Nephrology recommended HD  on admit but patient repeated refused and finally agreed after multiple conversations to start on 2/22. Nephrology has been managing his HD needs in the hospital. Outpatient center requesting repeat psych eval which has been done on 3/4 and confirmed that patient has capacity. Perm Cath placed on 2/28. Now has a chair at [facility]      Bilious vomiting with nausea  Happened on 3/4. Unclear etiology and now resolved    Debility  Due to current illness. PT rec 24/7 S/A but patient does not have home support. Patient agreeable to IPR. COVID screen ordered 3/6/22        Severe malnutrition  Nutrition Problem:  Severe Protein-Calorie Malnutrition  Malnutrition in the context of Acute Illness/Injury    Related to (etiology):  Increased physiological demands r/t wound  Decreased appetite    Signs and Symptoms (as evidenced by):  Energy Intake: <50% of estimated energy requirement for 3 weeks  Body Fat Depletion: mild and moderate depletion of orbitals and triceps   Muscle Mass Depletion: mild and moderate depletion of temples, clavicle region, scapular region, interosseous muscle and lower extremities   Weight Loss: 22% x 3 months   Fluid Accumulation: moderate    Interventions(treatment strategy):  Collaboration with other providers  [employer]    Nutrition Diagnosis Status:  New      Encephalopathy, metabolic  On admission he was confused and was seen by psychiatry and determined not have capacity at that point to consent to HD (2/20). Subsequently, he has undergone HD and his mentation has cleared. In my evaluation, he is able to understand and manipulate the current medical situation and apply it to himself and thus has capacity      Diarrhea  resolved      Prevertebral Fluid Collection       Neck pain  Prevertebral fluid collection ruled out  Patient had neck pain on admit and imaging done: Abnormal C spine Xray with possible signs of C spine instability, MRI ordered and patient refused 2/17, on pain  "meds, discussed at length, see 2/17 and 2/18. Concern for prevertebral abscess likely with fluid collection with widening in C spine, NS consulted and recomended C collar- patient refused, and Xrays, refused.  ENTconsulted for retropharyngeal abscess per NS and ID recs, will likely scope if amenable at bedside (he refused).   Initially imaging delayed 2/2 pt discomfort and concerns but MRI/CT finally done and complete on 2/22 and 2/24. - MRI with contrast on 2/24 showed no fluid collection, will not require intervention as per Neurosurgery and ENT and signed off. ID stated did not need any antibiotics for his neck and remains on abx just to treat his left foot osteomyelitis not related to any suspected neck infection. No need for C- collar as per NS.       Chronic gout of left foot due to renal impairment without tophus  Chornci and controlled. S/p Colcichine 0.3 mg po BID to treat as per Podiatry recs. Left foot pain improved.       Folic acid deficiency  Present on admit. Folate low at 4.4. Patient started on Folic acid 1 mg po daily ion hospital and will continue.       Gastroesophageal reflux disease without esophagitis  Chronic and controlled. Continue Protonix 40 mg po daily to treat.     Patient on waiting list for kidney transplant        Atherosclerosis of native coronary artery of native heart without angina pectoris  Patient with known CAD. Patient asymptomatic. Continue Labetalol, Asprin 81 mg po daily and Lipitor 80 mg po daily to treat CAD.       Hx of syphilis  Per Helio Garcia NP (ID) note 9/18/18, "Patient reports recent treatment with 3 doses of bicillin for + Treponema Pallidum antibody (5/16/2018) (per patient, this was a re-treatment because he couldn't get prior records of apparent prior treatment).  RPR 1:2.   Need to confirm treatment with [facility].  RPR here is pending.   Recommend repeat RPR 6 months from treatment - December 2018"  - Treated.   - RPR non-reactive 12/2018.      S/P coronary artery " stent placement  Patient with previous coronary stent for CAD in past in 2007. Continue Labetalol, Asprin 81 mg po daily and Lipitor 80 mg po daily to treat CAD.     Type 2 diabetes mellitus with chronic kidney disease on chronic dialysis, with long-term current use of insulin  Good control in the hospital. Plan is to continue to monitor POCT glucose 4 times a day with each meal and at bedtime and cover with Novolog low dose sliding scale insulin. He was previously on lantus but has not required insulin currently given his new ESRD    Anemia due to chronic kidney disease, on chronic dialysis  Patient admitted with Hgb in 7-8 range and related to his advanced CKD.  Patient transfused 1 unit of PRBCs on 2/18 for Hgb 6.9 and trabnsfuse again 1 unit on 2/23 for Hgb 6.7 but Hgb stable since. No signs of bleeding noted and felt all related to his CKD in combination with chronic infection/inflammation.   Hgb stable mid to high 8s since last transfusion. continue folic acid      Membranous glomerulonephritis  -brought acthar gel from home to do non formulary as 40K and got via prior auth-non form request addded  -reason for CKD 5.      Pure hypercholesterolemia  Chronic and controlled. Continue Lipitor 80 mg po daily to treat.     Renovascular hypertension  Elevated on admit but meds adjusted and now controlled for goal BP < 140/90. He is now on Norvasc 10 mg po daily + Hydralazine 75 mg po TID + Labetalol 200 mg po BID      Final Active Diagnoses:    Diagnosis Date Noted POA    PRINCIPAL PROBLEM:  Subacute osteomyelitis of left foot [M86.272] 02/17/2022 Yes    Acute renal failure superimposed on stage 5 chronic kidney disease, not on chronic dialysis [N17.9, N18.5] 02/17/2022 Yes    Bilious vomiting with nausea [R11.14] 03/04/2022 No    Debility [R53.81] 03/05/2022 Yes    Severe malnutrition [E43] 03/09/2022 Yes    Encephalopathy, metabolic [G93.41] 03/03/2022 Yes    Diarrhea [R19.7] 02/28/2022 Yes    Prevertebral  Fluid Collection [J39.0] 02/19/2022 Yes    Neck pain [M54.2] 02/18/2022 Yes    Folic acid deficiency [E53.8] 02/17/2022 Yes    Chronic gout of left foot due to renal impairment without tophus [M1A.3720] 02/17/2022 Yes    Gastroesophageal reflux disease without esophagitis [K21.9] 12/26/2020 Yes    Patient on waiting list for kidney transplant [Z76.82] 12/10/2020 Yes    Atherosclerosis of native coronary artery of native heart without angina pectoris [I25.10] 02/24/2020 Yes    Renovascular hypertension [I15.0] 09/18/2018 Yes    Type 2 diabetes mellitus with chronic kidney disease on chronic dialysis, with long-term current use of insulin [E11.22, N18.6, Z99.2, Z79.4] 09/18/2018 Not Applicable    S/P coronary artery stent placement [Z95.5] 09/18/2018 Not Applicable    Anemia due to chronic kidney disease, on chronic dialysis [N18.6, D63.1, Z99.2] 09/18/2018 Not Applicable    Hx of syphilis [Z86.19] 09/18/2018 Yes    Pure hypercholesterolemia [E78.00] 03/11/2014 Yes    Membranous glomerulonephritis [N05.2] 06/20/2013 Yes      Problems Resolved During this Admission:    Diagnosis Date Noted Date Resolved POA    Abnormal MRI, cervical spine [R93.7] 02/19/2022 02/27/2022 Yes    History of Salmonella septicemia [Z86.19] 02/18/2022 02/27/2022 Yes    Hyperkalemia [E87.5] 02/17/2022 02/27/2022 Yes    Hyperphosphatemia [E83.39] 02/17/2022 02/27/2022 Yes    Increased anion gap metabolic acidosis [E87.2] 02/17/2022 02/27/2022 Yes    Subacute osteomyelitis of left foot [M86.272] 02/17/2022 02/27/2022 Yes    Tobacco abuse [Z72.0] 10/01/2014 02/27/2022 Yes     Discharge Exam  Vitals reviewed  Sitting up in bed, in good spirits  CTAB  RRR  Left foot wrapped  Neuro nonfocal  PC in left upper chest c/d/i    Discharged Condition: fair    Disposition: Home or Self Care    Follow Up:   Follow-up Information     Primary Doctor No Follow up.                     Patient Instructions:      POST-SURGICAL BOOT/SHOE FOR HOME  "USE     Order Specific Question Answer Comments   Height: 5' 6" (1.676 m)    Weight: 72 kg (158 lb 11.7 oz)    Does patient have medical equipment at home? none    Quantity: 1    Size: large    Length of need (1-99 months): 12    Vendor: Other (use comments)    Expected Date of Delivery: 3/10/2022      MRI Cervical Spine W WO Cont   Standing Status: Future Standing Exp. Date: 02/26/23     Order Specific Question Answer Comments   Does the patient have a pacemaker or a defibrilator (Note: Some facilities may not be able to schedule an MRI for patients with pacemakers and defibrillators. You should contact your local radiology department to determine if this is the case.)? No    Does the patient have an aneurysm or surgical clip, pump, nerve/brain stimulator, middle/inner ear prosthesis, or other metal implant or foreign object (bullet, shrapnel)? If they have a card related to their implant, ask them to bring it. Issues related to the implant may cause the MRI to be delayed. No    Is the patient claustrophobic? No    Will the patient require sedation? No    Does the patient have any of the following conditions? Diabetes, History of Renal Disease or Hypertension requiring medical therapy? Yes    May the Radiologist modify the order per protocol to meet the clinical needs of the patient? Yes    Is this part of a Research Study? No    Recist criteria? Yes    Does the patient have on a skin patch for medication with aluminized backing? No      Ambulatory referral/consult to Wound Clinic   Standing Status: Future   Referral Priority: Routine Referral Type: Consultation   Referral Reason: Specialty Services Required   Requested Specialty: Wound Care   Number of Visits Requested: 1     Ambulatory referral/consult to Home Health   Standing Status: Future   Referral Priority: Routine Referral Type: Home Health   Referral Reason: Specialty Services Required   Requested Specialty: Home Health Services   Number of Visits " Requested: 1       Significant Diagnostic Studies: Labs: All labs within the past 24 hours have been reviewed    Pending Diagnostic Studies:     Procedure Component Value Units Date/Time    CBC with Automated Differential [[id number]] Collected: 02/17/22 0658    Order Status: Sent Lab Status: In process Updated: 02/17/22 0659    Specimen: Blood     Narrative:      Collection has been rescheduled by CDP at 02/17/2022 03:55 Reason:   Nurse Draw         Medications:  Reconciled Home Medications:      Medication List      START taking these medications    CEFEPIME 2 G/50 ML D5W (READY TO MIX SYSTEM)  Inject 50 mLs (2 g total) into the vein 3 (three) times a week. With dialysis     folic acid 1 MG tablet  Commonly known as: FOLVITE  Take 1 tablet (1 mg total) by mouth once daily.  Start taking on: March 11, 2022     gabapentin 100 MG capsule  Commonly known as: NEURONTIN  Take 1 capsule (100 mg total) by mouth once daily.  Start taking on: March 11, 2022     hydrALAZINE 50 MG tablet  Commonly known as: APRESOLINE  Take 1 tablet (50 mg total) by mouth every 8 (eight) hours.     labetaloL 200 MG tablet  Commonly known as: NORMODYNE  Take 1 tablet (200 mg total) by mouth every 12 (twelve) hours.     melatonin 3 mg tablet  Commonly known as: MELATIN  Take 2 tablets (6 mg total) by mouth nightly as needed for Insomnia.     ondansetron 8 MG Tbdl  Commonly known as: ZOFRAN-ODT  Take 1 tablet (8 mg total) by mouth every 8 (eight) hours as needed.     oxyCODONE 5 MG immediate release tablet  Commonly known as: ROXICODONE  Take 1 tablet (5 mg total) by mouth every 6 (six) hours as needed.     polyethylene glycol 17 gram Pwpk  Commonly known as: GLYCOLAX  Take 17 g by mouth daily as needed.     VANCOMYCIN 1 G/250 ML D5W (READY TO MIX SYSTEM)  Inject 125 mLs (500 mg total) into the vein 3 (three) times a week.        CHANGE how you take these medications    methocarbamoL 750 MG Tab  Commonly known as: ROBAXIN  Take 1 tablet (750 mg  total) by mouth 4 (four) times daily as needed (cramping).  What changed:   · medication strength  · how much to take  · when to take this  · reasons to take this        CONTINUE taking these medications    albuterol 90 mcg/actuation inhaler  Commonly known as: PROVENTIL/VENTOLIN HFA  Inhale 2 puffs into the lungs every 6 (six) hours as needed for Wheezing or Shortness of Breath. Rescue     amLODIPine 10 MG tablet  Commonly known as: NORVASC  Take 10 mg by mouth once daily.     ARIPiprazole 15 MG Tab  Commonly known as: ABILIFY  Take 1 tablet (15 mg total) by mouth every evening.     aspirin 81 MG EC tablet  Commonly known as: ECOTRIN  Take 1 tablet (81 mg total) by mouth once daily.     atorvastatin 80 MG tablet  Commonly known as: LIPITOR  Take 1 tablet (80 mg total) by mouth once daily.     blood-glucose meter kit  Use as instructed     colchicine 0.6 mg tablet  Commonly known as: COLCRYS  Take 0.6 mg by mouth daily as needed.     famotidine 20 MG tablet  Commonly known as: PEPCID  Take 20 mg by mouth daily as needed for Heartburn.     omeprazole 20 MG capsule  Commonly known as: PRILOSEC  Take 20 mg by mouth once daily.     RENVELA 800 mg Tab  Generic drug: sevelamer carbonate  Take 800 mg by mouth 3 (three) times daily.     tadalafiL 2.5 mg Tab  Commonly known as: CIALIS  Take 2.5 mg by mouth.     TRUE METRIX GLUCOSE TEST STRIP Strp  Generic drug: blood sugar diagnostic        STOP taking these medications    calcitRIOL 0.5 MCG Cap  Commonly known as: ROCALTROL     chlorthalidone 50 MG Tab  Commonly known as: HYGROTEN     cloNIDine 0.2 MG tablet  Commonly known as: CATAPRES     ergocalciferol 50,000 unit Cap  Commonly known as: ERGOCALCIFEROL     ferrous gluconate 324 MG tablet  Commonly known as: FERGON     fluticasone propionate 50 mcg/actuation nasal spray  Commonly known as: FLONASE     furosemide 40 MG tablet  Commonly known as: LASIX     LANTUS SOLOSTAR U-100 INSULIN glargine 100 units/mL (3mL) SubQ  pen  Generic drug: insulin     metoprolol tartrate 100 MG tablet  Commonly known as: LOPRESSOR     sodium bicarbonate 650 MG tablet            Indwelling Lines/Drains at time of discharge:   Lines/Drains/Airways     Central Venous Catheter Line  Duration                Hemodialysis Catheter 02/28/22 1300 right internal jugular 9 days                Time spent on the discharge of patient: 35 minutes         Jory Ryan MD  Department of Hospital Medicine  [facility] - Intensive Care ([place]-16)

## 2022-03-10 NOTE — PLAN OF CARE
RBE received a call from Atiya Ruth, liaison for [facility], who conveyed that pt has been approved by insurance and a bed is available.  BRE has notified JOSÉ MIGUEL Ferguson of the above.    Elijah Barfield, LMSW [facility]  [phone]

## 2022-03-10 NOTE — NURSING TRANSFER
Nursing Transfer Note      3/9/2022     Reason patient is being transferred: Need to admit transplant patients to [facility] beds; unit is full    Transfer to [facility] room 00396 from [facility] room 57374    Transfer via patient bed    Transfer with multiple patient belonging bags    Transported by Mendel Quiñones RN; RISSA Mccartney    Medicines sent: N/A    Any special needs or follow-up needed: No    Chart send with patient: Yes, given to unit secretary    Notified: Patient spoke to his sister on the phone    Patient reassessed at: 3/9/22 2355, 16WT nurse in room    Upon arrival to floor: Bed swapped into new room and patient's belongings secured in closet per his wishes.

## 2022-03-10 NOTE — PLAN OF CARE
[facility]    FACILITY TRANSFER ORDERS      Patient Name: Enrique Martinez  YOB: 1965    PCP: Primary Doctor No   PCP Address: None  PCP Phone Number: None  PCP Fax: None    Encounter Date: 03/10/2022    Admit to: inpatient rehab    Vital Signs:  Routine    Diagnoses:   Active Hospital Problems    Diagnosis  POA    *Subacute osteomyelitis of left foot [M86.272]  Yes     Priority: 2     Acute renal failure superimposed on stage 5 chronic kidney disease, not on chronic dialysis [N17.9, N18.5]  Yes     Priority: 1 - High    Bilious vomiting with nausea [R11.14]  No     Priority: 3     Debility [R53.81]  Yes     Priority: 4     Severe malnutrition [E43]  Yes     Nutrition Problem:  Severe Protein-Calorie Malnutrition  Malnutrition in the context of Acute Illness/Injury    Related to (etiology):  Increased physiological demands r/t wound  Decreased appetite    Signs and Symptoms (as evidenced by):  Energy Intake: <50% of estimated energy requirement for 3 weeks  Body Fat Depletion: mild and moderate depletion of orbitals and triceps   Muscle Mass Depletion: mild and moderate depletion of temples, clavicle region, scapular region, interosseous muscle and lower extremities   Weight Loss: 22% x 3 months   Fluid Accumulation: moderate    Interventions(treatment strategy):  Collaboration with other providers  Delivery Agent    Nutrition Diagnosis Status:  New        Encephalopathy, metabolic [G93.41]  Yes    Diarrhea [R19.7]  Yes    Prevertebral Fluid Collection [J39.0]  Yes    Neck pain [M54.2]  Yes    Folic acid deficiency [E53.8]  Yes    Chronic gout of left foot due to renal impairment without tophus [M1A.3720]  Yes    Gastroesophageal reflux disease without esophagitis [K21.9]  Yes    Patient on waiting list for kidney transplant [Z76.82]  Yes    Atherosclerosis of native coronary artery of native heart without angina pectoris [I25.10]  Yes    Renovascular hypertension  [I15.0]  Yes    Type 2 diabetes mellitus with chronic kidney disease on chronic dialysis, with long-term current use of insulin [E11.22, N18.6, Z99.2, Z79.4]  Not Applicable    S/P coronary artery stent placement [Z95.5]  Not Applicable     2007      Anemia due to chronic kidney disease, on chronic dialysis [N18.6, D63.1, Z99.2]  Not Applicable    Hx of syphilis [Z86.19]  Yes    Pure hypercholesterolemia [E78.00]  Yes     Overview:   dx update  Overview:   dx update      Membranous glomerulonephritis [N05.2]  Yes      Resolved Hospital Problems    Diagnosis Date Resolved POA    Abnormal MRI, cervical spine [R93.7] 02/27/2022 Yes    History of Salmonella septicemia [Z86.19] 02/27/2022 Yes    Hyperkalemia [E87.5] 02/27/2022 Yes    Hyperphosphatemia [E83.39] 02/27/2022 Yes    Increased anion gap metabolic acidosis [E87.2] 02/27/2022 Yes    Subacute osteomyelitis of left foot [M86.272] 02/27/2022 Yes    Tobacco abuse [Z72.0] 02/27/2022 Yes       Allergies:Review of patient's allergies indicates:  No Known Allergies    Diet: renal diet    Activities: Activity as tolerated    Nursing: per routine    Labs: CBC, CMP, CRP and vanc trough weekly on Mondays. Fax results to [facility] [id number], Dr. Oconnell     Also cmp and vanc trough on Thursday also to to Dr. Oconnell    CONSULTS:    Physical Therapy to evaluate and treat.  and Occupational Therapy to evaluate and treat.  Nephrology for dialysis    MISCELLANEOUS CARE:  Diabetes Care:   SN to perform and educate Diabetic management with blood glucose monitoring:    WOUND CARE ORDERS  Yes: Foot Ulcer:  Location: left foot    Dry Eschar: Pain with betadine twice daily.  And cover with mepilex border    Medications: Review discharge medications with patient and family and provide education.      Current Discharge Medication List      START taking these medications    Details   cefepime HCl (CEFEPIME 2 G/50 ML D5W, READY TO MIX SYSTEM,) Inject 50 mLs (2 g total) into  the vein 3 (three) times a week. With dialysis  Qty: 600 mL, Refills: 1      folic acid (FOLVITE) 1 MG tablet Take 1 tablet (1 mg total) by mouth once daily.  Refills: 0      gabapentin (NEURONTIN) 100 MG capsule Take 1 capsule (100 mg total) by mouth once daily.  Qty: 30 capsule, Refills: 11      hydrALAZINE (APRESOLINE) 50 MG tablet Take 1 tablet (50 mg total) by mouth every 8 (eight) hours.  Qty: 90 tablet, Refills: 11    Comments: .      labetaloL (NORMODYNE) 200 MG tablet Take 1 tablet (200 mg total) by mouth every 12 (twelve) hours.  Qty: 60 tablet, Refills: 11    Comments: .      melatonin (MELATIN) 3 mg tablet Take 2 tablets (6 mg total) by mouth nightly as needed for Insomnia.  Refills: 0      ondansetron (ZOFRAN-ODT) 8 MG TbDL Take 1 tablet (8 mg total) by mouth every 8 (eight) hours as needed.      oxyCODONE (ROXICODONE) 5 MG immediate release tablet Take 1 tablet (5 mg total) by mouth every 6 (six) hours as needed.  Qty: 20 tablet, Refills: 0    Comments: Quantity prescribed more than 7 day supply? No      polyethylene glycol (GLYCOLAX) 17 gram PwPk Take 17 g by mouth daily as needed.  Refills: 0      vancomycin HCl (VANCOMYCIN 1 G/250 ML D5W, READY TO MIX SYSTEM,) Inject 125 mLs (500 mg total) into the vein 3 (three) times a week.  Qty: 1500 mL, Refills: 1    Comments: After dialysis         CONTINUE these medications which have CHANGED    Details   methocarbamoL (ROBAXIN) 750 MG Tab Take 1 tablet (750 mg total) by mouth 4 (four) times daily as needed (cramping).         CONTINUE these medications which have NOT CHANGED    Details   albuterol (PROVENTIL/VENTOLIN HFA) 90 mcg/actuation inhaler Inhale 2 puffs into the lungs every 6 (six) hours as needed for Wheezing or Shortness of Breath. Rescue      amlodipine (NORVASC) 10 MG tablet Take 10 mg by mouth once daily.      ARIPiprazole (ABILIFY) 15 MG Tab Take 1 tablet (15 mg total) by mouth every evening.  Qty: 90 tablet, Refills: 0      aspirin (ECOTRIN)  81 MG EC tablet Take 1 tablet (81 mg total) by mouth once daily.  Qty: 360 tablet, Refills: 0    Associated Diagnoses: S/P coronary artery stent placement      atorvastatin (LIPITOR) 80 MG tablet Take 1 tablet (80 mg total) by mouth once daily.  Qty: 90 tablet, Refills: 3    Associated Diagnoses: Mixed hyperlipidemia      blood-glucose meter kit Use as instructed      colchicine (COLCRYS) 0.6 mg tablet Take 0.6 mg by mouth daily as needed.      famotidine (PEPCID) 20 MG tablet Take 20 mg by mouth daily as needed for Heartburn.       omeprazole (PRILOSEC) 20 MG capsule Take 20 mg by mouth once daily.      RENVELA 800 mg Tab Take 800 mg by mouth 3 (three) times daily.      tadalafiL (CIALIS) 2.5 mg Tab Take 2.5 mg by mouth.      TRUE METRIX GLUCOSE TEST STRIP Strp          STOP taking these medications       insulin (LANTUS SOLOSTAR U-100 INSULIN) glargine 100 units/mL (3mL) SubQ pen Comments:   Reason for Stopping:         metoprolol tartrate (LOPRESSOR) 100 MG tablet Comments:   Reason for Stopping:         calcitRIOL (ROCALTROL) 0.5 MCG Cap Comments:   Reason for Stopping:         chlorthalidone (HYGROTEN) 50 MG Tab Comments:   Reason for Stopping:         cloNIDine (CATAPRES) 0.2 MG tablet Comments:   Reason for Stopping:         ergocalciferol (ERGOCALCIFEROL) 50,000 unit Cap Comments:   Reason for Stopping:         ferrous gluconate (FERGON) 324 MG tablet Comments:   Reason for Stopping:         fluticasone (FLONASE) 50 mcg/actuation nasal spray Comments:   Reason for Stopping:         furosemide (LASIX) 40 MG tablet Comments:   Reason for Stopping:         sodium bicarbonate 650 MG tablet Comments:   Reason for Stopping:                  Immunizations Administered as of 3/10/2022     No immunizations on file.          unknown    Some patients may experience side effects after vaccination.  These may include fever, headache, muscle or joint aches.  Most symptoms resolve with 24-48 hours and do not require urgent  medical evaluation unless they persist for more than 72 hours or symptoms are concerning for an unrelated medical condition.          _________________________________  Jory Ryan MD  03/10/2022

## 2022-03-10 NOTE — NURSING
Pt stated he is missing his jewelry. Search patient's belonging in the closet and found the jewelry in the bag.    10

## 2022-03-11 NOTE — PLAN OF CARE
[facility] - Intensive Care ([facility])  Discharge Final Note    Primary Care Provider: Primary Doctor No    Expected Discharge Date: 3/10/2022    Final Discharge Note (most recent)       Final Note - 03/11/22 0705          Final Note    Assessment Type Final Discharge Note (P)      Anticipated Discharge Disposition Rehab Facility (P)    [facility]                    Important Message from Medicare             Contact Info       No, Primary Doctor   Relationship: PCP - General        Next Steps: Follow up

## 2022-03-14 ENCOUNTER — HOSPITAL ENCOUNTER (OUTPATIENT)
Dept: RADIOLOGY | Facility: HOSPITAL | Age: 57
Discharge: HOME | DRG: 673 | End: 2022-03-14
Attending: FAMILY MEDICINE
Payer: MEDICAID

## 2022-03-14 PROCEDURE — 71045 XR CHEST 1 VIEW: ICD-10-PCS | Mod: 26,TXP,, | Performed by: RADIOLOGY

## 2022-03-14 PROCEDURE — 71045 X-RAY EXAM CHEST 1 VIEW: CPT | Mod: 26,TXP,, | Performed by: RADIOLOGY

## 2022-03-14 PROCEDURE — 71045 X-RAY EXAM CHEST 1 VIEW: CPT | Mod: TC,TXP

## 2022-03-18 ENCOUNTER — HOSPITAL ENCOUNTER (OUTPATIENT)
Dept: RADIOLOGY | Facility: HOSPITAL | Age: 57
Discharge: HOME | End: 2022-03-18
Attending: NURSE PRACTITIONER
Payer: MEDICAID

## 2022-03-18 PROCEDURE — 93970 US LOWER EXTREMITY VEINS BILATERAL: ICD-10-PCS | Mod: 26,NTX,, | Performed by: RADIOLOGY

## 2022-03-18 PROCEDURE — 93970 EXTREMITY STUDY: CPT | Mod: TC,TXP

## 2022-03-18 PROCEDURE — 93970 EXTREMITY STUDY: CPT | Mod: 26,NTX,, | Performed by: RADIOLOGY

## 2022-03-24 PROCEDURE — G0180 PR HOME HEALTH MD CERTIFICATION: ICD-10-PCS | Mod: NTX,,, | Performed by: STUDENT IN AN ORGANIZED HEALTH CARE EDUCATION/TRAINING PROGRAM

## 2022-03-24 PROCEDURE — G0180 MD CERTIFICATION HHA PATIENT: HCPCS | Mod: NTX,,, | Performed by: STUDENT IN AN ORGANIZED HEALTH CARE EDUCATION/TRAINING PROGRAM

## 2022-03-29 ENCOUNTER — TELEPHONE (OUTPATIENT)
Dept: ADMINISTRATIVE | Facility: CLINIC | Age: 57
End: 2022-03-29
Payer: MEDICAID

## 2022-03-29 NOTE — TELEPHONE ENCOUNTER
Spoke with  nurse Nani Ibanez re: change to home health orders. Verbal order given to d/c medihoney and dress wound with kerlix gauze until provider follow-up.

## 2022-03-30 LAB — FUNGUS SPEC CULT: NORMAL

## 2022-03-30 NOTE — PROGRESS NOTES
Pt reported he has initiated dialysis at [facility]. DU nurse confirmed pt started HD MWF on 3/23/22. [id number] requested.

## 2022-04-01 ENCOUNTER — LAB VISIT (OUTPATIENT)
Dept: LAB | Facility: HOSPITAL | Age: 57
End: 2022-04-01
Attending: STUDENT IN AN ORGANIZED HEALTH CARE EDUCATION/TRAINING PROGRAM
Payer: MEDICAID

## 2022-04-01 DIAGNOSIS — M86.272 SUBACUTE OSTEOMYELITIS, LEFT ANKLE AND FOOT: Primary | ICD-10-CM

## 2022-04-01 LAB
ALBUMIN SERPL BCP-MCNC: 2.1 G/DL (ref 3.5–5.2)
ALP SERPL-CCNC: 124 U/L (ref 55–135)
ALT SERPL W/O P-5'-P-CCNC: 12 U/L (ref 10–44)
ANION GAP SERPL CALC-SCNC: 14 MMOL/L (ref 8–16)
AST SERPL-CCNC: 23 U/L (ref 10–40)
BASOPHILS # BLD AUTO: 0.06 K/UL (ref 0–0.2)
BASOPHILS NFR BLD: 0.5 % (ref 0–1.9)
BILIRUB SERPL-MCNC: 0.1 MG/DL (ref 0.1–1)
BUN SERPL-MCNC: 26 MG/DL (ref 6–20)
CALCIUM SERPL-MCNC: 8.8 MG/DL (ref 8.7–10.5)
CHLORIDE SERPL-SCNC: 100 MMOL/L (ref 95–110)
CO2 SERPL-SCNC: 24 MMOL/L (ref 23–29)
CREAT SERPL-MCNC: 5.7 MG/DL (ref 0.5–1.4)
CRP SERPL-MCNC: 21 MG/L (ref 0–8.2)
DIFFERENTIAL METHOD: ABNORMAL
EOSINOPHIL # BLD AUTO: 0.2 K/UL (ref 0–0.5)
EOSINOPHIL NFR BLD: 1.6 % (ref 0–8)
ERYTHROCYTE [DISTWIDTH] IN BLOOD BY AUTOMATED COUNT: 16.9 % (ref 11.5–14.5)
ERYTHROCYTE [SEDIMENTATION RATE] IN BLOOD BY WESTERGREN METHOD: 55 MM/HR (ref 0–23)
EST. GFR  (AFRICAN AMERICAN): 11.6 ML/MIN/1.73 M^2
EST. GFR  (NON AFRICAN AMERICAN): 10 ML/MIN/1.73 M^2
GLUCOSE SERPL-MCNC: 94 MG/DL (ref 70–110)
HCT VFR BLD AUTO: 23.3 % (ref 40–54)
HGB BLD-MCNC: 7.3 G/DL (ref 14–18)
IMM GRANULOCYTES # BLD AUTO: 0.05 K/UL (ref 0–0.04)
IMM GRANULOCYTES NFR BLD AUTO: 0.5 % (ref 0–0.5)
LYMPHOCYTES # BLD AUTO: 1.3 K/UL (ref 1–4.8)
LYMPHOCYTES NFR BLD: 12.3 % (ref 18–48)
MCH RBC QN AUTO: 27.7 PG (ref 27–31)
MCHC RBC AUTO-ENTMCNC: 31.3 G/DL (ref 32–36)
MCV RBC AUTO: 88 FL (ref 82–98)
MONOCYTES # BLD AUTO: 1.3 K/UL (ref 0.3–1)
MONOCYTES NFR BLD: 12.3 % (ref 4–15)
NEUTROPHILS # BLD AUTO: 8 K/UL (ref 1.8–7.7)
NEUTROPHILS NFR BLD: 72.8 % (ref 38–73)
NRBC BLD-RTO: 0 /100 WBC
PLATELET # BLD AUTO: 333 K/UL (ref 150–450)
PMV BLD AUTO: 9.6 FL (ref 9.2–12.9)
POTASSIUM SERPL-SCNC: 4.7 MMOL/L (ref 3.5–5.1)
PROT SERPL-MCNC: 5.3 G/DL (ref 6–8.4)
RBC # BLD AUTO: 2.64 M/UL (ref 4.6–6.2)
SODIUM SERPL-SCNC: 138 MMOL/L (ref 136–145)
VANCOMYCIN SERPL-MCNC: 27.3 UG/ML
WBC # BLD AUTO: 10.92 K/UL (ref 3.9–12.7)

## 2022-04-01 PROCEDURE — 85025 COMPLETE CBC W/AUTO DIFF WBC: CPT | Mod: TXP | Performed by: STUDENT IN AN ORGANIZED HEALTH CARE EDUCATION/TRAINING PROGRAM

## 2022-04-01 PROCEDURE — 80053 COMPREHEN METABOLIC PANEL: CPT | Mod: TXP | Performed by: STUDENT IN AN ORGANIZED HEALTH CARE EDUCATION/TRAINING PROGRAM

## 2022-04-01 PROCEDURE — 85652 RBC SED RATE AUTOMATED: CPT | Mod: NTX | Performed by: STUDENT IN AN ORGANIZED HEALTH CARE EDUCATION/TRAINING PROGRAM

## 2022-04-01 PROCEDURE — 80202 ASSAY OF VANCOMYCIN: CPT | Mod: NTX | Performed by: STUDENT IN AN ORGANIZED HEALTH CARE EDUCATION/TRAINING PROGRAM

## 2022-04-01 PROCEDURE — 86140 C-REACTIVE PROTEIN: CPT | Mod: NTX | Performed by: STUDENT IN AN ORGANIZED HEALTH CARE EDUCATION/TRAINING PROGRAM

## 2022-04-08 ENCOUNTER — DOCUMENT SCAN (OUTPATIENT)
Dept: HOME HEALTH SERVICES | Facility: HOSPITAL | Age: 57
End: 2022-04-08
Payer: MEDICAID

## 2022-04-12 ENCOUNTER — TELEPHONE (OUTPATIENT)
Dept: INFECTIOUS DISEASES | Facility: CLINIC | Age: 57
End: 2022-04-12
Payer: MEDICAID

## 2022-04-12 NOTE — TELEPHONE ENCOUNTER
"4/12/22-    Spoke w/patient wanted to reschedule appt because " in lot of pain and need medicine".  Patient states he "gets pain meds from PCP but cant see him till next month".    Patient was advised that pain medications should come from his PCP or Pain Mgmt provider as we dont treat chronic pain in ID dept.    Patient advised IV abx has been d/c at [facility]       ----- Message from Melani Chaparro MA sent at 4/12/2022  3:37 PM CDT -----  Regarding: FW: pt    ----- Message -----  From: Melani Chaparro MA  Sent: 4/12/2022   3:30 PM CDT  To: Melani Chaparro MA  Subject: FW: pt                                             ----- Message -----  From: Ernestina Marshall  Sent: 4/12/2022   3:27 PM CDT  To: Kourtney Edward Staff  Subject: pt                                               Patient Requesting Sooner Appointment.     Reason for sooner appt hospital fu   When is the first available appointment? N/A   Communication Preference: can you please call pt at [phone]  Additional Information: pt had an appt today that he was late for and wasn't seen today    GIRISH              "

## 2022-04-13 ENCOUNTER — EXTERNAL HOME HEALTH (OUTPATIENT)
Dept: HOME HEALTH SERVICES | Facility: HOSPITAL | Age: 57
End: 2022-04-13
Payer: MEDICAID

## 2022-04-14 NOTE — PROGRESS NOTES
Spoke with pt who stated he is ready to be re-activated. He has completed antibiotics for left foot osteomyelitis but has not had a follow-up appt. Pt also reports financial and social circumstances has changed. Advised pt to schedule Podiatry follow-up for clearance. Messages sent to Finance Coordinator, Sadie John and MALDONADO

## 2022-04-17 LAB
ACID FAST MOD KINY STN SPEC: NORMAL
MYCOBACTERIUM SPEC QL CULT: NORMAL

## 2022-04-18 ENCOUNTER — TELEPHONE (OUTPATIENT)
Dept: TRANSPLANT | Facility: CLINIC | Age: 57
End: 2022-04-18
Payer: MEDICAID

## 2022-04-18 NOTE — TELEPHONE ENCOUNTER
"Transplant SW returned pts call:    Per pt report today:     Financial Plan for Transplant: pt  has "SSI $800 that started January2022, has Medicaid full and no cost for meds." Transplant  consulted to confirm insurance information/approvals/coverage as needed.    At this time, pt  does not yet have a caregiver plan in place.    Caregivers for Transplant (pre-, hospital, post-transplant): pt  is planning to speak with members at his [employer] "[employer] [place] [address] near [facility]" and will have a primary caregiver (once identified) call the Transplant SW Team back to receive caregiver education,  learn what is needed for an adequate caregiver plan, and to assess and confirm caregiver plans.     Pts confirms wife needs to accrue paid time off to serve as a caregiver and estimates one year needed to accrue (since Jan 2022) due to wife having been out of work with Covid-Sofia for 6 months, and has since returned to work.     Pt agrees to call back with any questions and states has been on dialysis four and a half years. Pt reports is hoping to be transplanted. I assured pt we are working together with him to support him reaching his goals, including for transplant, and we are available to him - contact information reviewed, pt states understanding. Pt denies any other needs or concerns. Pt presented by phone,  was on dialysis and preferred to talk with me now instead of at a later time. Pt  gets off of dialysis at 6pm today. ([facility] schedule).    Informing Transplant Coordinator, Josephine, of above.    ----- Message from Marlene Bruce sent at 4/18/2022  2:10 PM CDT -----  Regarding: speak to SW  Contact: Enrique  Patient called to speak to SW.    Contact: [phone]       "

## 2022-04-19 ENCOUNTER — DOCUMENT SCAN (OUTPATIENT)
Dept: HOME HEALTH SERVICES | Facility: HOSPITAL | Age: 57
End: 2022-04-19
Payer: MEDICAID

## 2022-04-19 ENCOUNTER — TELEPHONE (OUTPATIENT)
Dept: TRANSPLANT | Facility: CLINIC | Age: 57
End: 2022-04-19
Payer: MEDICAID

## 2022-04-19 NOTE — PROGRESS NOTES
Spoke with pt to confirm he will need the following for re-activation:    1. ID or Podiatry clr 2ry recent admission for foot ulcer w/osteomyelitis. Pt states he has a follow-up appt scheduled.  2. Insurance auth received.  3. Confirm caregiver plan. Pt reports that ex-wife has agreed to be his caregiver. Message sent to  to address.  4. Dental appt & clr. VM left.  5. Re-schedule CT abd/pelvis.    Pt verbalized understanding.

## 2022-04-19 NOTE — TELEPHONE ENCOUNTER
Transplant SW Note:    Transplant  followed up with following message (see below).      to reach out to patient directly and inform the transplant team if pts insurance coverage is less than 100% due to pt indicating he has 0 cost for meds or medical if transplanted.      _____________________________________________________________________________________  Epic Message received from :    Colette Mina; Benedicto Hauser, ZAHEERW-SERINAS, MPH; Cipriano Moon, DARIUS; Bello Ware there,     Patient's [facility] Medicaid coverage is active.     Thanks

## 2022-04-20 ENCOUNTER — TELEPHONE (OUTPATIENT)
Dept: TRANSPLANT | Facility: CLINIC | Age: 57
End: 2022-04-20
Payer: MEDICAID

## 2022-04-20 NOTE — TELEPHONE ENCOUNTER
Per  message dated 4/19/2022, in Epic:    Message    Colette Mina; Benedicto Hauser LCSW-SERINAS, MPH; Cipriano Moon RN; Bello Medina    Mr. Martinez was contacted @ 9:20 am om 04/19/2022 as advised of the following:     He will be responsible for medication cost, specialty meds will be between .50 - 3.00.   However, any over the counter meds will reflect the cost of pharmacy (cost can not be estimated). Medical and transplant services will be covered at 100% of medicaid allowable as long as he has active [place] medicaid coverage.   Cost is subject to change depending on coverage at time of services rendered.

## 2022-04-22 ENCOUNTER — TELEPHONE (OUTPATIENT)
Dept: INFECTIOUS DISEASES | Facility: CLINIC | Age: 57
End: 2022-04-22
Payer: MEDICAID

## 2022-04-22 NOTE — TELEPHONE ENCOUNTER
Called patient with no answer. Left voicemail of earlier scheduled appointment and to call back with any questions.

## 2022-04-28 ENCOUNTER — TELEPHONE (OUTPATIENT)
Dept: TRANSPLANT | Facility: CLINIC | Age: 57
End: 2022-04-28
Payer: MEDICAID

## 2022-04-28 NOTE — TELEPHONE ENCOUNTER
Returned pt's call. Pt stated his ex-wife has agreed to be his caregiver.     ----- Message from Stephanie Hancock sent at 4/27/2022  2:06 PM CDT -----  Regarding: speak to coordinator  Patient calling to speak to coordinator. Requesting a call back. No details provided when asked.    Call: [phone] (Mobile

## 2022-05-05 ENCOUNTER — OFFICE VISIT (OUTPATIENT)
Dept: INFECTIOUS DISEASES | Facility: CLINIC | Age: 57
End: 2022-05-05
Payer: MEDICAID

## 2022-05-05 ENCOUNTER — HOSPITAL ENCOUNTER (OUTPATIENT)
Dept: RADIOLOGY | Facility: HOSPITAL | Age: 57
Discharge: HOME | End: 2022-05-05
Attending: NURSE PRACTITIONER
Payer: MEDICAID

## 2022-05-05 VITALS
HEART RATE: 74 BPM | HEIGHT: 66 IN | SYSTOLIC BLOOD PRESSURE: 200 MMHG | TEMPERATURE: 99 F | DIASTOLIC BLOOD PRESSURE: 118 MMHG | WEIGHT: 146.63 LBS | BODY MASS INDEX: 23.57 KG/M2

## 2022-05-05 DIAGNOSIS — M86.272 SUBACUTE OSTEOMYELITIS OF LEFT FOOT: Primary | ICD-10-CM

## 2022-05-05 DIAGNOSIS — Z76.82 ORGAN TRANSPLANT CANDIDATE: ICD-10-CM

## 2022-05-05 PROCEDURE — 3066F PR DOCUMENTATION OF TREATMENT FOR NEPHROPATHY: ICD-10-PCS | Mod: CPTII,NTX,, | Performed by: INTERNAL MEDICINE

## 2022-05-05 PROCEDURE — 3008F BODY MASS INDEX DOCD: CPT | Mod: CPTII,NTX,, | Performed by: INTERNAL MEDICINE

## 2022-05-05 PROCEDURE — 74176 CT ABD & PELVIS W/O CONTRAST: CPT | Mod: 26,TXP,, | Performed by: INTERNAL MEDICINE

## 2022-05-05 PROCEDURE — 3051F PR MOST RECENT HEMOGLOBIN A1C LEVEL 7.0 - < 8.0%: ICD-10-PCS | Mod: CPTII,NTX,, | Performed by: INTERNAL MEDICINE

## 2022-05-05 PROCEDURE — 3051F HG A1C>EQUAL 7.0%<8.0%: CPT | Mod: CPTII,NTX,, | Performed by: INTERNAL MEDICINE

## 2022-05-05 PROCEDURE — 3077F SYST BP >= 140 MM HG: CPT | Mod: CPTII,NTX,, | Performed by: INTERNAL MEDICINE

## 2022-05-05 PROCEDURE — 1159F PR MEDICATION LIST DOCUMENTED IN MEDICAL RECORD: ICD-10-PCS | Mod: CPTII,NTX,, | Performed by: INTERNAL MEDICINE

## 2022-05-05 PROCEDURE — 3066F NEPHROPATHY DOC TX: CPT | Mod: CPTII,NTX,, | Performed by: INTERNAL MEDICINE

## 2022-05-05 PROCEDURE — 99215 OFFICE O/P EST HI 40 MIN: CPT | Mod: S$PBB,NTX,, | Performed by: INTERNAL MEDICINE

## 2022-05-05 PROCEDURE — 99215 PR OFFICE/OUTPT VISIT, EST, LEVL V, 40-54 MIN: ICD-10-PCS | Mod: S$PBB,NTX,, | Performed by: INTERNAL MEDICINE

## 2022-05-05 PROCEDURE — 99999 PR PBB SHADOW E&M-EST. PATIENT-LVL IV: ICD-10-PCS | Mod: PBBFAC,TXP,, | Performed by: INTERNAL MEDICINE

## 2022-05-05 PROCEDURE — 1159F MED LIST DOCD IN RCRD: CPT | Mod: CPTII,NTX,, | Performed by: INTERNAL MEDICINE

## 2022-05-05 PROCEDURE — 3080F DIAST BP >= 90 MM HG: CPT | Mod: CPTII,NTX,, | Performed by: INTERNAL MEDICINE

## 2022-05-05 PROCEDURE — 3077F PR MOST RECENT SYSTOLIC BLOOD PRESSURE >= 140 MM HG: ICD-10-PCS | Mod: CPTII,NTX,, | Performed by: INTERNAL MEDICINE

## 2022-05-05 PROCEDURE — 99214 OFFICE O/P EST MOD 30 MIN: CPT | Mod: PBBFAC,25,TXP | Performed by: INTERNAL MEDICINE

## 2022-05-05 PROCEDURE — 74176 CT ABDOMEN PELVIS WITHOUT CONTRAST: ICD-10-PCS | Mod: 26,TXP,, | Performed by: INTERNAL MEDICINE

## 2022-05-05 PROCEDURE — 99999 PR PBB SHADOW E&M-EST. PATIENT-LVL IV: CPT | Mod: PBBFAC,TXP,, | Performed by: INTERNAL MEDICINE

## 2022-05-05 PROCEDURE — 3080F PR MOST RECENT DIASTOLIC BLOOD PRESSURE >= 90 MM HG: ICD-10-PCS | Mod: CPTII,NTX,, | Performed by: INTERNAL MEDICINE

## 2022-05-05 PROCEDURE — 3008F PR BODY MASS INDEX (BMI) DOCUMENTED: ICD-10-PCS | Mod: CPTII,NTX,, | Performed by: INTERNAL MEDICINE

## 2022-05-05 PROCEDURE — 74176 CT ABD & PELVIS W/O CONTRAST: CPT | Mod: TC,TXP

## 2022-05-05 RX ORDER — METOPROLOL TARTRATE 100 MG/1
100 TABLET ORAL
COMMUNITY
Start: 2022-01-19 | End: 2022-10-03

## 2022-05-05 RX ORDER — CLONIDINE HYDROCHLORIDE 0.2 MG/1
0.4 TABLET ORAL 3 TIMES DAILY
Status: ON HOLD | COMMUNITY
Start: 2022-05-05 | End: 2022-10-06 | Stop reason: HOSPADM

## 2022-05-05 NOTE — PROGRESS NOTES
Subjective:     Patient ID: Enrique Martinez is a 57 y.o. male    Chief Complaint: foot wound    HPI: 56M w/ ESRD on HD, insulin dependent diabetes mellitus, and CAD, admitted at [facility] from 2/2-2/4/2022 from LLE cellulitis complicated by salmonella bacteremia after having diarrheal illness, discharged on ciprofloxacin, and later admitted to [facility] in march for ongoing cellulitis w/ new blister development. ID was consulted due to recent salmonella bacteremia and new findings on MRI suspicious for left foot osteomyelitis and persistent cellulitis infection. Pt underwent bone biopsy, negative for osteo. Cultures unrevealing. He was treated w/ empiric vanc and cefepime for 6 weeks, with end date 4/9/22. Patient is here today for follow up. Completed several weeks of IV abx w/ HD. Follows w/ podiatry at [facility]. Endorses b/l LE swelling and LLE pain. No new issues. Wound care coming to house twice weekly.         Immunization History   Administered Date(s) Administered    Hepatitis A / Hepatitis B 09/18/2018    Hepatitis A, Adult 02/22/2021    Hepatitis B (recombinant) Adjuvanted, 2 dose 02/22/2021, 03/26/2021    Influenza 12/11/2012, 10/18/2019    Influenza - Quadrivalent - MDCK - PF 10/18/2019, 10/06/2021    Influenza - Quadrivalent - PF *Preferred* (6 months and older) 10/30/2013, 10/12/2016, 10/04/2017, 09/18/2018, 09/01/2020    Influenza - Trivalent - PF (ADULT) 10/30/2013, 09/09/2015    Pneumococcal Conjugate - 13 Valent 01/13/2017    Pneumococcal Polysaccharide - 23 Valent 12/20/2012, 09/18/2018    Tdap 12/20/2012, 10/12/2013    Zoster 11/15/2020    Zoster Recombinant 11/15/2020, 01/23/2021        Review of Systems   Constitutional: Negative for chills, decreased appetite, fever, malaise/fatigue and night sweats.   HENT: Negative for congestion and sore throat.    Eyes: Negative for blurred vision, double vision, vision loss in left eye, vision loss in right eye and visual disturbance.   Cardiovascular:  Positive for leg swelling. Negative for chest pain, dyspnea on exertion and irregular heartbeat.   Respiratory: Negative for cough, hemoptysis, shortness of breath and sputum production.    Hematologic/Lymphatic: Negative for adenopathy. Does not bruise/bleed easily.   Skin: Positive for poor wound healing. Negative for rash and suspicious lesions.   Musculoskeletal: Negative for arthritis, joint pain, muscle weakness and myalgias.   Gastrointestinal: Negative for abdominal pain, constipation, diarrhea, heartburn, nausea and vomiting.   Genitourinary: Negative for dysuria, flank pain, frequency and genital sores.   Neurological: Negative for dizziness, focal weakness, numbness, paresthesias, sensory change, tremors and weakness.   Psychiatric/Behavioral: Negative for altered mental status and depression. The patient is not nervous/anxious.    Allergic/Immunologic: Negative for environmental allergies and persistent infections.        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    Umbilical hernia      Past Surgical History:   Procedure Laterality Date    ABDOMINAL SURGERY  1980s    CARDIAC CATHETERIZATION  2007    x 2    COLONOSCOPY N/A 4/5/2019    Procedure: COLONOSCOPY;  Surgeon: Jose L Carty MD;  Location: [facility] ([address] [place] [address]);  Service: Colon and Rectal;  Laterality: N/A;    CORONARY STENT PLACEMENT  2007     Family History   Problem Relation Age of Onset    Hypertension Father     Stroke Father     Heart attack Father     Hyperlipidemia Father     No Known Problems Sister     Drug abuse Brother     Heart attack Brother      Hypertension Brother      Social History     Tobacco Use    Smoking status: Current Some Day Smoker     Packs/day: 0.25     Years: 10.00     Pack years: 2.50    Smokeless tobacco: Never Used   Substance Use Topics    Alcohol use: No    Drug use: No       Objective:     Physical Exam  Constitutional:       General: He is not in acute distress.     Appearance: Normal appearance. He is well-developed. He is not ill-appearing or diaphoretic.   HENT:      Head: Normocephalic and atraumatic.      Right Ear: External ear normal.      Left Ear: External ear normal.      Nose: Nose normal.   Eyes:      General: No scleral icterus.        Right eye: No discharge.         Left eye: No discharge.      Extraocular Movements: Extraocular movements intact.      Conjunctiva/sclera: Conjunctivae normal.   Pulmonary:      Effort: Pulmonary effort is normal. No respiratory distress.      Breath sounds: No stridor.   Skin:     General: Skin is dry.      Coloration: Skin is not jaundiced or pale.      Findings: No erythema.      Comments: L dorsal foot wound is healing well, very superficial no concerns for active infection at this time   Neurological:      General: No focal deficit present.      Mental Status: He is alert and oriented to person, place, and time. Mental status is at baseline.   Psychiatric:         Mood and Affect: Mood normal.         Behavior: Behavior normal.         Thought Content: Thought content normal.         Judgment: Judgment normal.         Data:    All data, including recent labs, radiology, and pathology, has been independently reviewed.    Labs:    Recent Labs   Lab Result Units 02/20/22  0815 02/21/22  0655 02/21/22  2311 02/22/22  0631 02/28/22  0756 03/01/22  0447 03/06/22  0723 03/08/22  0721 04/01/22  1427   WBC K/uL 9.58   < >  --    < >  --    < > 7.61 8.78 10.92   Hemoglobin g/dL 7.9*   < >  --    < >  --    < > 8.2* 8.1* 7.3*   Hematocrit % 25.0*   < >  --    < >  --    < > 25.5* 25.4* 23.3*    Sodium mmol/L 143   < > 147*   < >  --    < > 137 135* 138   Potassium mmol/L 4.4   < > 4.3   < >  --    < > 3.7 4.7 4.7   Chloride mmol/L 103   < > 105   < >  --    < > 94* 92* 100   BUN mg/dL 172*   < > 172*   < >  --    < > 15 37* 26*   Creatinine mg/dL 18.4*   < > 19.4*   < >  --    < > 6.0* 9.9* 5.7*   AST U/L 19  --  24  --   --   --   --   --  23   ALT U/L 8*  --  13  --   --   --   --   --  12   Alkaline Phosphatase U/L 76  --  83  --   --   --   --   --  124   Total Bilirubin mg/dL 0.2  --  0.2  --   --   --   --   --  0.1   CRP mg/L  --   --   --   --   --   --   --   --  21.0*   INR   --   --   --   --  1.1  --   --   --   --     < > = values in this interval not displayed.        Radiology:    No results found in the last 24 hours.     Assessment:    1. Subacute osteomyelitis of left foot  Completed 6 weeks empiric vanc and cefepime  No indication for further antibiotics at this time  Follow up w/ ID as needed        Follow up as needed    The total time for evaluation and management services performed on 5/5/22 was greater than  40 minutes.     Maddie Lambert DO  Transplant Infectious Disease

## 2022-05-09 NOTE — PROGRESS NOTES
Extensive diffuse Iliac calcifications are present.  Likely not a candidate. Sending to Dr. Leigh and Dr. Oliveira for their thoughts.   DS

## 2022-05-23 RX ORDER — SEVELAMER CARBONATE 800 MG/1
800 TABLET, FILM COATED ORAL
Qty: 270 TABLET | Refills: 1 | Status: ON HOLD | OUTPATIENT
Start: 2022-05-23 | End: 2022-10-06 | Stop reason: HOSPADM

## 2022-07-11 ENCOUNTER — TELEPHONE (OUTPATIENT)
Dept: TRANSPLANT | Facility: CLINIC | Age: 57
End: 2022-07-11
Payer: MEDICAID

## 2022-07-11 NOTE — TELEPHONE ENCOUNTER
I returned call to dialysis unit.I was placed on hold for over 5 minutes.   I printed the letter regarding inactive status and faxed to patient's dialysis unit since no one returned to the phone to accept my call.    ----- Message from Tk Holcomb sent at 7/11/2022 12:14 PM CDT -----  Regarding: speak to nurse  Contact: Rosalinda Brody is calling to speak to nurse in regard to patient txp list status. Status of inactive they are aware of but would like more info on it.    Contact: [phone]

## 2022-07-26 NOTE — PROGRESS NOTES
Spoke with pt who stated he has received dental and Podiatry clearances. He will fax to this coordinator from [facility]. Pt also reports that his cousin Janel has agreed to be his caregiver. Msg sent to BRE to confirm caregiver plan.

## 2022-08-01 ENCOUNTER — TELEPHONE (OUTPATIENT)
Dept: TRANSPLANT | Facility: CLINIC | Age: 57
End: 2022-08-01
Payer: MEDICAID

## 2022-08-01 ENCOUNTER — SOCIAL WORK (OUTPATIENT)
Dept: TRANSPLANT | Facility: CLINIC | Age: 57
End: 2022-08-01
Payer: MEDICAID

## 2022-08-01 NOTE — PROGRESS NOTES
" Pt contacted sw to inform that he had spoken with his cousin and she said no one should get a "mailbox full" message on her voicemail. SW verified cousin's phone number.  Pt stated he asked cousin to call sw to discuss caregiving.  She has not called as of this writing.  Pt stated he will call to remind her of the importance of verifying her as a caregiver. SW remains available.     "

## 2022-08-01 NOTE — TELEPHONE ENCOUNTER
SW attempted to call pt's cousin Janel at the number provided.  Voice mailbox full.  Left SMS message to main transplant line.   Informed patient.

## 2022-08-01 NOTE — TELEPHONE ENCOUNTER
----- Message from Cipriano Moon RN sent at 7/26/2022 10:03 AM CDT -----  Good Morning,    Pt is currently inactive for social and medical reasons. I will be adding him for discussion on Friday. Pt states he has a new caregiver that needs to be verified, cousin Janel [phone]. Can someone please give her a call to confirm caregiver plan.    Thanks  Josephine

## 2022-08-05 ENCOUNTER — TELEPHONE (OUTPATIENT)
Dept: TRANSPLANT | Facility: CLINIC | Age: 57
End: 2022-08-05
Payer: MEDICAID

## 2022-08-05 NOTE — TELEPHONE ENCOUNTER
----- Message from Betsy Brar LCSW sent at 8/1/2022  3:05 PM CDT -----  Message from Cipriano Moon RN sent at 7/26/2022 10:03 AM CDT -----  Good Morning,     Pt is currently inactive for social and medical reasons. I will be adding him for discussion on Friday. Pt states he has a new caregiver that needs to be verified, cousin Janel [phone]. Can someone please give her a call to confirm caregiver plan.     Thanks  Josephine STEVENS    8/1/22 3:01 PM  Note  SW attempted to call pt's cousin Janel at the number provided.  Voice mailbox full.  Left SMS message to main transplant line.   Informed patient.

## 2022-08-05 NOTE — TELEPHONE ENCOUNTER
DANTE contacted pt's cousin, Janel who lives in [place] [phone].  Janel is a nurse and still working but states she had made a promise to pt that she would be available to help him post transplant.  Janel reports all of pt's family are in [place] and [place] area.  Pt moved to [place] to be with wife and they are now .  He has no family living locally and former wife is not available to assist.    DANTE provided education regarding caregiving responsibilities, fundraising and living donation.  Cousin verbalized understanding and agreement with caregiving responsibilities.  She agreed to participate in pre transplant visits by phone and will enlist other family members to assist with pt's expenses.  Cousin thanked dante for info and stated all current questions are answered.  She stated she is aware of how to reach transplant team and will contact as needed.     DANTE remains available.

## 2022-08-11 ENCOUNTER — SOCIAL WORK (OUTPATIENT)
Dept: TRANSPLANT | Facility: CLINIC | Age: 57
End: 2022-08-11
Payer: MEDICAID

## 2022-08-19 ENCOUNTER — COMMITTEE REVIEW (OUTPATIENT)
Dept: TRANSPLANT | Facility: CLINIC | Age: 57
End: 2022-08-19
Payer: MEDICAID

## 2022-08-19 ENCOUNTER — TELEPHONE (OUTPATIENT)
Dept: TRANSPLANT | Facility: CLINIC | Age: 57
End: 2022-08-19
Payer: MEDICAID

## 2022-08-19 NOTE — COMMITTEE REVIEW
Native Organ Dx: Diabetes Mellitus - Type Other / Unknown      Not approved for LRD/CAD transplant due to extensive diffuse iliac calcifications prohibitive for transplantation.      Note written by Cipriano Moon RN    ===============================================

## 2022-08-23 DIAGNOSIS — N18.6 ESRD (END STAGE RENAL DISEASE): Primary | ICD-10-CM

## 2022-08-30 ENCOUNTER — HOSPITAL ENCOUNTER (EMERGENCY)
Facility: HOSPITAL | Age: 57
Discharge: LEFT BEFORE DISCHARGE | End: 2022-08-31
Attending: EMERGENCY MEDICINE
Payer: MEDICAID

## 2022-08-30 DIAGNOSIS — R06.02 SOB (SHORTNESS OF BREATH): ICD-10-CM

## 2022-08-30 DIAGNOSIS — R10.9 ACUTE ABDOMINAL PAIN: Primary | ICD-10-CM

## 2022-08-30 LAB
ALBUMIN SERPL BCP-MCNC: 3.4 G/DL (ref 3.5–5.2)
ALP SERPL-CCNC: 108 U/L (ref 55–135)
ALT SERPL W/O P-5'-P-CCNC: 12 U/L (ref 10–44)
ANION GAP SERPL CALC-SCNC: 17 MMOL/L (ref 8–16)
AST SERPL-CCNC: 17 U/L (ref 10–40)
BASOPHILS # BLD AUTO: 0.01 K/UL (ref 0–0.2)
BASOPHILS NFR BLD: 0.1 % (ref 0–1.9)
BILIRUB SERPL-MCNC: 0.5 MG/DL (ref 0.1–1)
BUN SERPL-MCNC: 61 MG/DL (ref 6–30)
BUN SERPL-MCNC: 67 MG/DL (ref 6–20)
CALCIUM SERPL-MCNC: 10.4 MG/DL (ref 8.7–10.5)
CHLORIDE SERPL-SCNC: 96 MMOL/L (ref 95–110)
CHLORIDE SERPL-SCNC: 98 MMOL/L (ref 95–110)
CO2 SERPL-SCNC: 19 MMOL/L (ref 23–29)
CREAT SERPL-MCNC: 8.6 MG/DL (ref 0.5–1.4)
CREAT SERPL-MCNC: 8.7 MG/DL (ref 0.5–1.4)
DIFFERENTIAL METHOD: ABNORMAL
EOSINOPHIL # BLD AUTO: 0 K/UL (ref 0–0.5)
EOSINOPHIL NFR BLD: 0.1 % (ref 0–8)
ERYTHROCYTE [DISTWIDTH] IN BLOOD BY AUTOMATED COUNT: 16.3 % (ref 11.5–14.5)
EST. GFR  (NO RACE VARIABLE): 6.5 ML/MIN/1.73 M^2
GLUCOSE SERPL-MCNC: 95 MG/DL (ref 70–110)
GLUCOSE SERPL-MCNC: 98 MG/DL (ref 70–110)
HCT VFR BLD AUTO: 33.8 % (ref 40–54)
HCT VFR BLD CALC: 37 %PCV (ref 36–54)
HGB BLD-MCNC: 11.4 G/DL (ref 14–18)
IMM GRANULOCYTES # BLD AUTO: 0.05 K/UL (ref 0–0.04)
IMM GRANULOCYTES NFR BLD AUTO: 0.4 % (ref 0–0.5)
LACTATE SERPL-SCNC: 0.8 MMOL/L (ref 0.5–2.2)
LIPASE SERPL-CCNC: 19 U/L (ref 4–60)
LYMPHOCYTES # BLD AUTO: 1 K/UL (ref 1–4.8)
LYMPHOCYTES NFR BLD: 8.2 % (ref 18–48)
MCH RBC QN AUTO: 28.8 PG (ref 27–31)
MCHC RBC AUTO-ENTMCNC: 33.7 G/DL (ref 32–36)
MCV RBC AUTO: 85 FL (ref 82–98)
MONOCYTES # BLD AUTO: 1 K/UL (ref 0.3–1)
MONOCYTES NFR BLD: 8.7 % (ref 4–15)
NEUTROPHILS # BLD AUTO: 9.6 K/UL (ref 1.8–7.7)
NEUTROPHILS NFR BLD: 82.5 % (ref 38–73)
NRBC BLD-RTO: 0 /100 WBC
PLATELET # BLD AUTO: 254 K/UL (ref 150–450)
PMV BLD AUTO: 9.6 FL (ref 9.2–12.9)
POC IONIZED CALCIUM: 1.16 MMOL/L (ref 1.06–1.42)
POC TCO2 (MEASURED): 21 MMOL/L (ref 23–29)
POTASSIUM BLD-SCNC: 4.6 MMOL/L (ref 3.5–5.1)
POTASSIUM SERPL-SCNC: 4.5 MMOL/L (ref 3.5–5.1)
PROT SERPL-MCNC: 7.6 G/DL (ref 6–8.4)
RBC # BLD AUTO: 3.96 M/UL (ref 4.6–6.2)
SAMPLE: ABNORMAL
SODIUM BLD-SCNC: 131 MMOL/L (ref 136–145)
SODIUM SERPL-SCNC: 132 MMOL/L (ref 136–145)
WBC # BLD AUTO: 11.64 K/UL (ref 3.9–12.7)

## 2022-08-30 PROCEDURE — 99284 EMERGENCY DEPT VISIT MOD MDM: CPT | Mod: ,,, | Performed by: EMERGENCY MEDICINE

## 2022-08-30 PROCEDURE — 63600175 PHARM REV CODE 636 W HCPCS: Performed by: EMERGENCY MEDICINE

## 2022-08-30 PROCEDURE — 80053 COMPREHEN METABOLIC PANEL: CPT | Performed by: EMERGENCY MEDICINE

## 2022-08-30 PROCEDURE — 86140 C-REACTIVE PROTEIN: CPT | Performed by: EMERGENCY MEDICINE

## 2022-08-30 PROCEDURE — 93005 ELECTROCARDIOGRAM TRACING: CPT

## 2022-08-30 PROCEDURE — 99284 PR EMERGENCY DEPT VISIT,LEVEL IV: ICD-10-PCS | Mod: ,,, | Performed by: EMERGENCY MEDICINE

## 2022-08-30 PROCEDURE — 93010 ELECTROCARDIOGRAM REPORT: CPT | Mod: ,,, | Performed by: INTERNAL MEDICINE

## 2022-08-30 PROCEDURE — 83605 ASSAY OF LACTIC ACID: CPT | Performed by: EMERGENCY MEDICINE

## 2022-08-30 PROCEDURE — 82330 ASSAY OF CALCIUM: CPT

## 2022-08-30 PROCEDURE — 83690 ASSAY OF LIPASE: CPT | Performed by: EMERGENCY MEDICINE

## 2022-08-30 PROCEDURE — 85025 COMPLETE CBC W/AUTO DIFF WBC: CPT | Performed by: EMERGENCY MEDICINE

## 2022-08-30 PROCEDURE — 93010 EKG 12-LEAD: ICD-10-PCS | Mod: ,,, | Performed by: INTERNAL MEDICINE

## 2022-08-30 PROCEDURE — 80047 BASIC METABLC PNL IONIZED CA: CPT

## 2022-08-30 PROCEDURE — 84145 PROCALCITONIN (PCT): CPT | Performed by: EMERGENCY MEDICINE

## 2022-08-30 PROCEDURE — 99285 EMERGENCY DEPT VISIT HI MDM: CPT | Mod: 25

## 2022-08-30 PROCEDURE — 96374 THER/PROPH/DIAG INJ IV PUSH: CPT

## 2022-08-30 RX ORDER — MORPHINE SULFATE 4 MG/ML
4 INJECTION, SOLUTION INTRAMUSCULAR; INTRAVENOUS
Status: COMPLETED | OUTPATIENT
Start: 2022-08-30 | End: 2022-08-30

## 2022-08-30 RX ADMIN — MORPHINE SULFATE 4 MG: 4 INJECTION INTRAVENOUS at 11:08

## 2022-08-31 VITALS
HEART RATE: 92 BPM | WEIGHT: 147 LBS | BODY MASS INDEX: 23.63 KG/M2 | SYSTOLIC BLOOD PRESSURE: 152 MMHG | RESPIRATION RATE: 20 BRPM | DIASTOLIC BLOOD PRESSURE: 71 MMHG | OXYGEN SATURATION: 95 % | HEIGHT: 66 IN | TEMPERATURE: 98 F

## 2022-08-31 VITALS
SYSTOLIC BLOOD PRESSURE: 137 MMHG | RESPIRATION RATE: 16 BRPM | TEMPERATURE: 99 F | DIASTOLIC BLOOD PRESSURE: 87 MMHG | OXYGEN SATURATION: 94 % | HEART RATE: 95 BPM

## 2022-08-31 DIAGNOSIS — R10.11 RIGHT UPPER QUADRANT ABDOMINAL PAIN: Primary | ICD-10-CM

## 2022-08-31 DIAGNOSIS — R06.02 SHORTNESS OF BREATH: ICD-10-CM

## 2022-08-31 LAB
ALBUMIN SERPL BCP-MCNC: 3.1 G/DL (ref 3.5–5.2)
ALP SERPL-CCNC: 108 U/L (ref 55–135)
ALT SERPL W/O P-5'-P-CCNC: 13 U/L (ref 10–44)
ANION GAP SERPL CALC-SCNC: 16 MMOL/L (ref 8–16)
AST SERPL-CCNC: 16 U/L (ref 10–40)
BASOPHILS # BLD AUTO: 0.01 K/UL (ref 0–0.2)
BASOPHILS NFR BLD: 0.1 % (ref 0–1.9)
BILIRUB SERPL-MCNC: 0.6 MG/DL (ref 0.1–1)
BUN SERPL-MCNC: 54 MG/DL (ref 6–20)
CALCIUM SERPL-MCNC: 10 MG/DL (ref 8.7–10.5)
CHLORIDE SERPL-SCNC: 97 MMOL/L (ref 95–110)
CO2 SERPL-SCNC: 19 MMOL/L (ref 23–29)
CREAT SERPL-MCNC: 6.4 MG/DL (ref 0.5–1.4)
CRP SERPL-MCNC: 376.9 MG/L (ref 0–8.2)
DIFFERENTIAL METHOD: ABNORMAL
EOSINOPHIL # BLD AUTO: 0.1 K/UL (ref 0–0.5)
EOSINOPHIL NFR BLD: 0.5 % (ref 0–8)
ERYTHROCYTE [DISTWIDTH] IN BLOOD BY AUTOMATED COUNT: 16.2 % (ref 11.5–14.5)
EST. GFR  (NO RACE VARIABLE): 9.3 ML/MIN/1.73 M^2
GLUCOSE SERPL-MCNC: 90 MG/DL (ref 70–110)
HCT VFR BLD AUTO: 30.1 % (ref 40–54)
HGB BLD-MCNC: 9.9 G/DL (ref 14–18)
IMM GRANULOCYTES # BLD AUTO: 0.04 K/UL (ref 0–0.04)
IMM GRANULOCYTES NFR BLD AUTO: 0.4 % (ref 0–0.5)
LYMPHOCYTES # BLD AUTO: 0.8 K/UL (ref 1–4.8)
LYMPHOCYTES NFR BLD: 7 % (ref 18–48)
MCH RBC QN AUTO: 27.7 PG (ref 27–31)
MCHC RBC AUTO-ENTMCNC: 32.9 G/DL (ref 32–36)
MCV RBC AUTO: 84 FL (ref 82–98)
MONOCYTES # BLD AUTO: 1 K/UL (ref 0.3–1)
MONOCYTES NFR BLD: 8.7 % (ref 4–15)
NEUTROPHILS # BLD AUTO: 9.2 K/UL (ref 1.8–7.7)
NEUTROPHILS NFR BLD: 83.3 % (ref 38–73)
NRBC BLD-RTO: 0 /100 WBC
PLATELET # BLD AUTO: 287 K/UL (ref 150–450)
PMV BLD AUTO: 9.2 FL (ref 9.2–12.9)
POTASSIUM SERPL-SCNC: 4.6 MMOL/L (ref 3.5–5.1)
PROCALCITONIN SERPL IA-MCNC: 27.21 NG/ML
PROT SERPL-MCNC: 7.2 G/DL (ref 6–8.4)
RBC # BLD AUTO: 3.58 M/UL (ref 4.6–6.2)
SODIUM SERPL-SCNC: 132 MMOL/L (ref 136–145)
WBC # BLD AUTO: 11 K/UL (ref 3.9–12.7)

## 2022-08-31 PROCEDURE — 80053 COMPREHEN METABOLIC PANEL: CPT | Performed by: NURSE PRACTITIONER

## 2022-08-31 PROCEDURE — 99284 EMERGENCY DEPT VISIT MOD MDM: CPT | Mod: 25

## 2022-08-31 PROCEDURE — 99284 EMERGENCY DEPT VISIT MOD MDM: CPT | Mod: ,,, | Performed by: EMERGENCY MEDICINE

## 2022-08-31 PROCEDURE — 85025 COMPLETE CBC W/AUTO DIFF WBC: CPT | Performed by: NURSE PRACTITIONER

## 2022-08-31 PROCEDURE — 96374 THER/PROPH/DIAG INJ IV PUSH: CPT

## 2022-08-31 PROCEDURE — 63600175 PHARM REV CODE 636 W HCPCS: Performed by: EMERGENCY MEDICINE

## 2022-08-31 PROCEDURE — 96376 TX/PRO/DX INJ SAME DRUG ADON: CPT

## 2022-08-31 PROCEDURE — 99284 PR EMERGENCY DEPT VISIT,LEVEL IV: ICD-10-PCS | Mod: ,,, | Performed by: EMERGENCY MEDICINE

## 2022-08-31 RX ORDER — MORPHINE SULFATE 4 MG/ML
4 INJECTION, SOLUTION INTRAMUSCULAR; INTRAVENOUS
Status: COMPLETED | OUTPATIENT
Start: 2022-08-31 | End: 2022-08-31

## 2022-08-31 RX ORDER — MORPHINE SULFATE 2 MG/ML
2 INJECTION, SOLUTION INTRAMUSCULAR; INTRAVENOUS
Status: COMPLETED | OUTPATIENT
Start: 2022-08-31 | End: 2022-08-31

## 2022-08-31 RX ADMIN — MORPHINE SULFATE 4 MG: 4 INJECTION INTRAVENOUS at 09:08

## 2022-08-31 RX ADMIN — MORPHINE SULFATE 2 MG: 2 INJECTION, SOLUTION INTRAMUSCULAR; INTRAVENOUS at 12:08

## 2022-08-31 NOTE — ED NOTES
Asked pt if he has transportation home. Pt states he will be able to drive himself. Pt appears SOB, RN offered to escorted pt outside in wheel chair, pt agrees. Pt brought outside by RN in wheelchair Pt states he is able to ambulate without assistance to car.

## 2022-08-31 NOTE — ED NOTES
Spoke with pt regarding continuing plan of care after MD left bedside. Pt refused any further treatment. PT requests additional pain medication and to go home. MD notified

## 2022-08-31 NOTE — ED PROVIDER NOTES
Encounter Date: 8/30/2022       History     Chief Complaint   Patient presents with    Abdominal Pain     Pt c/o RUQ abdominal pain and SOB x 2 days. Denies N/V/D. Denies cardiac hx. Dialysis pt, last dialysis yesterday.     This is a 56 yo male multiple medical problems including CAD, GERD, DMII, ERD on M,W,F dialyisis presents to the ED for evaluation of 1 day of ruq pain and shortness of breath. Never had anything like this before. Endorses compliance with medications. Came to the Ed for further evaluation.     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      Past Surgical History:   Procedure Laterality Date    ABDOMINAL SURGERY  1980s    CARDIAC CATHETERIZATION  2007    x 2    COLONOSCOPY N/A 4/5/2019    Procedure: COLONOSCOPY;  Surgeon: Jose L Carty MD;  Location: [facility] ([address] [place] [address]);  Service: Colon and Rectal;  Laterality: N/A;    CORONARY STENT PLACEMENT  2007     Family History   Problem Relation Age of Onset    Hypertension Father     Stroke Father     Heart attack Father     Hyperlipidemia Father     No Known Problems Sister     Drug abuse Brother     Heart attack Brother     Hypertension Brother      Social History     Tobacco Use    Smoking status: Some Days     Packs/day: 0.25     Years: 10.00     Pack years: 2.50     Types: Cigarettes    Smokeless tobacco: Never   Substance Use Topics    Alcohol use: No    Drug use: No     Review of Systems   Constitutional:  Positive for fatigue.   Respiratory:  Positive for shortness of breath.    Gastrointestinal:   Positive for abdominal pain. Negative for nausea and vomiting.   All other systems reviewed and are negative.    Physical Exam     Initial Vitals [08/30/22 2007]   BP Pulse Resp Temp SpO2   (!) 174/98 106 (!) 22 98.2 °F (36.8 °C) (!) 92 %      MAP       --         Physical Exam    Nursing note and vitals reviewed.  Constitutional:   Elderly, chronically i'll appearing   HENT:   Head: Normocephalic and atraumatic.   Eyes: Pupils are equal, round, and reactive to light.   Neck:   Normal range of motion.  Cardiovascular:  Normal rate and regular rhythm.           Pulmonary/Chest:   Tachypnic, increased respiratory effort.    Abdominal: Abdomen is soft.   Ruq pain with guarding    Musculoskeletal:         General: Normal range of motion.      Cervical back: Normal range of motion.     Neurological: He is alert and oriented to person, place, and time. He has normal strength. GCS score is 15. GCS eye subscore is 4. GCS verbal subscore is 5. GCS motor subscore is 6.   Skin: Skin is warm and dry. Capillary refill takes less than 2 seconds.   Psychiatric: He has a normal mood and affect. Thought content normal.       ED Course   Procedures  Labs Reviewed   CBC W/ AUTO DIFFERENTIAL - Abnormal; Notable for the following components:       Result Value    RBC 3.96 (*)     Hemoglobin 11.4 (*)     Hematocrit 33.8 (*)     RDW 16.3 (*)     Gran # (ANC) 9.6 (*)     Immature Grans (Abs) 0.05 (*)     Gran % 82.5 (*)     Lymph % 8.2 (*)     All other components within normal limits   COMPREHENSIVE METABOLIC PANEL - Abnormal; Notable for the following components:    Sodium 132 (*)     CO2 19 (*)     BUN 67 (*)     Creatinine 8.7 (*)     Albumin 3.4 (*)     Anion Gap 17 (*)     eGFR 6.5 (*)     All other components within normal limits   C-REACTIVE PROTEIN - Abnormal; Notable for the following components:    .9 (*)     All other components within normal limits   PROCALCITONIN - Abnormal; Notable for the following components:     Procalcitonin 27.21 (*)     All other components within normal limits   ISTAT PROCEDURE - Abnormal; Notable for the following components:    POC BUN 61 (*)     POC Creatinine 8.6 (*)     POC Sodium 131 (*)     POC TCO2 (MEASURED) 21 (*)     All other components within normal limits   LIPASE   LACTIC ACID, PLASMA        ECG Results              EKG 12-lead (Final result)  Result time 08/31/22 13:53:02      Final result by Interface, Lab In [facility] (08/31/22 13:53:02)                   Narrative:    Test Reason : R06.02,    Vent. Rate : 095 BPM     Atrial Rate : 095 BPM     P-R Int : 102 ms          QRS Dur : 084 ms      QT Int : 350 ms       P-R-T Axes : 063 -28 128 degrees     QTc Int : 439 ms    Sinus rhythm with short UT  Possible Left atrial enlargement  LVH with secondary ST-T wave changes  Abnormal ECG  When compared with ECG of 16-FEB-2022 20:16,  The axis Shifted left  ST no longer depressed in Anterior leads  T wave inversion no longer evident in Anterior leads  Confirmed by LINA GIBBS MD ([id number]) on 8/31/2022 1:52:56 PM    Referred By: [email or web address]   SELF           Confirmed By:LINA GIBBS MD                                  Imaging Results              X-Ray Chest AP Portable (Final result)  Result time 08/30/22 22:46:49      Final result by Gary Shaffer DO (08/30/22 22:46:49)                   Impression:      Right basilar opacity compatible with atelectasis, aspiration, or pneumonia.      Electronically signed by: Gary Shaffer  Date:    08/30/2022  Time:    22:46               Narrative:    EXAMINATION:  XR CHEST AP PORTABLE    CLINICAL HISTORY:  Shortness of breath    TECHNIQUE:  Single frontal view of the chest was performed.    COMPARISON:  03/14/2022.    FINDINGS:  There is a right internal jugular central venous catheter terminating the mid SVC, unchanged.    There is right basilar airspace opacity compatible with atelectasis, aspiration, or pneumonia.  There is mild perihilar interstitial  "prominence, similar to prior.  The pleural spaces are clear.    The cardiac silhouette is enlarged, unchanged.  There are atherosclerotic calcifications of the aortic arch.  The thoracic aorta is tortuous.    The visualized osseous structures are intact.                                       Medications   morphine injection 4 mg (4 mg Intravenous Given 8/30/22 3977)   morphine injection 2 mg (2 mg Intravenous Given 8/31/22 0045)     Medical Decision Making:   Initial Assessment:   58 yo female presents to the ED for RUQ pain and sob. Onset 1 day. Has RUQ pain on exam. Will plan bloodwork, ct, reasses.            ED Course as of 08/31/22 2015   Wed Aug 31, 2022   0038 Patient resting in bed no acute distress.  Has markedly elevated inflammatory markers and procalcitonin.  I discussed with patient at length.  I discussed with my concern for infection, however he wishes to leave.  He reports he has "things to do ".  At home.  He has dialysis in the morning.  I discussed with his markedly elevated CRP and his procalcitonin.  He likely has underlying intra-abdominal infection.  However patient understands my concern however he still wishes to leave.  I discussed patient risks of leaving up to including permanent disability and death.  He understands my concern he still wishes to leave.Patient is of sound mind and judgement.  Return precautions discussed.  Will leave ama [SE]      ED Course User Index  [SE] Danitza Amaya MD             Clinical Impression:   Final diagnoses:  [R06.02] SOB (shortness of breath)  [R10.9] Acute abdominal pain (Primary)        ED Disposition Condition    MIRELLA Amaya MD  08/31/22 2015    "

## 2022-08-31 NOTE — ED TRIAGE NOTES
"Enrique Martinez, an 57 y.o. male presents to the ED c/o R sided flank pain and SOB x 2 days. Dialysis pt(MWF). Restless during assessment, reluctant to lay down in bed and change into gown". States "I don't need oxygen and I don't need to stay here.I am not staying " SpO2 on room air 88%; Pt SpO2 95%on 2L [place].       Chief Complaint   Patient presents with    Abdominal Pain     Pt c/o RUQ abdominal pain and SOB x 2 days. Denies N/V/D. Denies cardiac hx. Dialysis pt, last dialysis yesterday.     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   APPEARANCE: Patient appears comfortable and in no acute distress, patient is clean and well groomed.  SKIN: The skin is warm and dry, color consistent with ethnicity.   MUSCULOSKELETAL: Patient moving all extremities spontaneously, no swelling noted.  RESPIRATORY: Airway is open and patent, respirations are spontaneous, patient has rapid respirations, no accessory muscle use noted.   CARDIAC: Patient has a normal rate and regular rhythm, no edema noted, capillary refill < 3 seconds.   GASTRO: Soft and non tender to palpation, no distention noted. Pt c/o R sided flank pain and associated SOB x 2 days   : Pt denies any pain or frequency with urination.  NEURO: Pt opens eyes spontaneously, behavior appropriate to situation, follows commands.    "

## 2022-08-31 NOTE — ED NOTES
Pt found trying to get out of bottom of the bed, side rails up x 2. Cardiac monitor attached. Pt educated to stay in bed. Pt states he wants to leave and does not need medical tx. Pt informed that MD will be in soon to assess him. Pt redirected and verbalized understanding. Charge notified

## 2022-08-31 NOTE — FIRST PROVIDER EVALUATION
Medical screening exam completed.  I have conducted a focused provider triage encounter, findings are as follows:    Brief history of present illness:  10/10 sharp right upper quadrant abdominal pain onset earlier today.  No nausea, vomiting or diarrhea.  Pain worse when he moves.  ESRD.  Monday Wednesday Friday.  Last dialyzed yesterday.    There were no vitals filed for this visit.    Pertinent physical exam:  Tenderness to palpation right upper quadrant    Brief workup plan:  Abdominal pain evaluation    Preliminary workup initiated; this workup will be continued and followed by the physician or advanced practice provider that is assigned to the patient when roomed.

## 2022-09-01 NOTE — FIRST PROVIDER EVALUATION
" Emergency Department TeleTriage Encounter Note      CHIEF COMPLAINT    Chief Complaint   Patient presents with    Abdominal Pain     RUQ pain. Here yesterday for same problem.        VITAL SIGNS   Initial Vitals [08/31/22 2127]   BP Pulse Resp Temp SpO2   137/87 95 15 98.6 °F (37 °C) (!) 94 %      MAP       --            ALLERGIES    Review of patient's allergies indicates:  No Known Allergies    PROVIDER TRIAGE NOTE  TeleTriage Note: Enrique Martinez, a nontoxic/well appearing, 57 y.o. male, presented to the ED with c/o "I need a morphine shot because dialysis washed all my morphine away." Pt states he is having the same RUQ abdominal pain and SOB that he had yesterday when he came. He had a work up in the ED earlier this morning.     All ED beds are full at present; patient notified of this status.  Patient seen and medically screened by Nurse Practitioner via teletriage. Orders initiated at triage to expedite care.  Patient is stable to return to the waiting room and will be placed in an ED bed when available.  Care will be transferred to an alternate provider when patient has been placed in an Exam Room from the [facility] for physical exam, additional orders, and disposition.  9:30 PM Meaghan Lin DNP, FNP-C        ORDERS  Labs Reviewed - No data to display    ED Orders (720h ago, onward)      Start Ordered     Status Ordering Provider    08/31/22 2133 08/31/22 2132  CBC Auto Differential  STAT         Ordered MEAGHAN LIN    08/31/22 2133 08/31/22 2132  Comprehensive Metabolic Panel  STAT         Ordered MEAGHAN LIN    08/31/22 2133 08/31/22 2132  Pulse Oximetry Continuous  Continuous         Ordered MEAGHAN LIN    08/31/22 2133 08/31/22 2132  Cardiac Monitoring - Adult  Continuous         Ordered MEAGHAN LIN    08/31/22 2133 08/31/22 2132  EKG 12-lead  Once         Ordered MEGAHAN LIN    08/31/22 2133 08/31/22 2132  X-Ray Chest 1 View  1 time imaging         Ordered LAYTON, " HAKAN MITTAL              Virtual Visit Note: The provider triage portion of this emergency department evaluation and documentation was performed via [employer], a HIPAA-compliant telemedicine application, in concert with a tele-presenter in the room. A face to face patient evaluation with one of my colleagues will occur once the patient is placed in an emergency department room.      DISCLAIMER: This note was prepared with [employer] voice recognition transcription software. Garbled syntax, mangled pronouns, and other bizarre constructions may be attributed to that software system.

## 2022-09-01 NOTE — ED PROVIDER NOTES
"Encounter Date: 8/31/2022    SCRIBE #1 NOTE: I, Stacey Rupert, am scribing for, and in the presence of,  Randy Lawson DO. I have scribed the following portions of the note - Other sections scribed: MONSERRAT, ALAINA, ELISA.   SCRIBE #2 NOTE: I, Jinny Florentino, am scribing for, and in the presence of,  Randy Lawson DO. I have scribed the following portions of the note - Other sections scribed: MONSERRAT, ELISA FOLEY.   History     Chief Complaint   Patient presents with    Abdominal Pain     RUQ pain. Here yesterday for same problem. States "I need another morphine shot"     Time patient was seen by the provider: 9:37 PM      The patient is a 57 y.o. male with past medical history of CAD, GERD, DMII, ERD on M,W,F dialysis who presents to the ED with a complaint of RUQ abdominal pain with onset 2 days ago. He was here yesterday for the same pain and was diagnosed with gallstones and was given pain medication in the ED. He says that the pain medicine that he was given has worn off. He denies fever and chills. He has a CT scan appointment on 9/6/22.  He describes his pain is moderate, located in the right upper quadrant, and associated with no nausea, fevers, chills or back pain.  He is requesting pain medication for his symptoms.      The history is provided by the patient and medical records. No  was used.   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      Past Surgical History:   Procedure Laterality Date    ABDOMINAL SURGERY  1980s    CARDIAC CATHETERIZATION  2007    x 2    " COLONOSCOPY N/A 4/5/2019    Procedure: COLONOSCOPY;  Surgeon: Jose L Carty MD;  Location: [facility] ([address] [place] [address]);  Service: Colon and Rectal;  Laterality: N/A;    CORONARY STENT PLACEMENT  2007     Family History   Problem Relation Age of Onset    Hypertension Father     Stroke Father     Heart attack Father     Hyperlipidemia Father     No Known Problems Sister     Drug abuse Brother     Heart attack Brother     Hypertension Brother      Social History     Tobacco Use    Smoking status: Some Days     Packs/day: 0.25     Years: 10.00     Pack years: 2.50     Types: Cigarettes    Smokeless tobacco: Never   Substance Use Topics    Alcohol use: No    Drug use: No     Review of Systems   Constitutional:  Negative for chills and fever.   HENT:  Negative for sore throat.    Respiratory:  Negative for shortness of breath.    Cardiovascular:  Negative for chest pain.   Gastrointestinal:  Positive for abdominal pain (RUQ). Negative for nausea.   Genitourinary:  Negative for dysuria.   Musculoskeletal:  Negative for back pain.   Skin:  Negative for rash.   Neurological:  Negative for weakness.   Hematological:  Does not bruise/bleed easily.     Physical Exam     Initial Vitals [08/31/22 2127]   BP Pulse Resp Temp SpO2   137/87 95 15 98.6 °F (37 °C) (!) 94 %      MAP       --         Physical Exam    Nursing note and vitals reviewed.      Gen/Constitutional: Interactive. No acute distress  Head: Normocephalic, Atraumatic  Neck: supple, no masses or LAD, no JVD  Eyes: PERRLA, conjunctiva clear  Ears, Nose and Throat: No rhinorrhea or stridor.  Cardiac:  Regular rate, Reg Rhythm, No murmur  Pulmonary: CTA Bilat, no wheezes, rhonchi, rales.  No increased work of breathing.  GI: Abdomen soft, slight tenderness in the right upper quadrant, non-distended; no rebound or guarding  : No CVA tenderness.  Musculoskeletal: Extremities warm, well perfused, no erythema, no edema  Skin: No rashes, cyanosis or jaundice.  Neuro: Alert  and Oriented x 3; No focal motor or sensory deficits.    Psych: Normal affect      ED Course   Procedures  Labs Reviewed   CBC W/ AUTO DIFFERENTIAL - Abnormal; Notable for the following components:       Result Value    RBC 3.58 (*)     Hemoglobin 9.9 (*)     Hematocrit 30.1 (*)     RDW 16.2 (*)     Gran # (ANC) 9.2 (*)     Lymph # 0.8 (*)     Gran % 83.3 (*)     Lymph % 7.0 (*)     All other components within normal limits   COMPREHENSIVE METABOLIC PANEL - Abnormal; Notable for the following components:    Sodium 132 (*)     CO2 19 (*)     BUN 54 (*)     Creatinine 6.4 (*)     Albumin 3.1 (*)     eGFR 9.3 (*)     All other components within normal limits          Imaging Results    None          Medications   morphine injection 4 mg (4 mg Intravenous Given 8/31/22 2158)     Medical Decision Making:   History:   Old Medical Records: I decided to obtain old medical records.  Initial Assessment:   The patient is a 57 y.o. male with past medical history of CAD, GERD, DMII, ERD on M,W, F dialysis who presents to the ED with a complaint of RUQ abdominal pain with onset 2 days ago  Differential Diagnosis:   Gallstones, liver failure, sepsis, bacteremia, perforation, intra-abdominal abscess, cholecystitis  Clinical Tests:   Lab Tests: Ordered and Reviewed  Radiological Study: Ordered  Medical Tests: Ordered     Emergent evaluation of patient presenting with right upper quadrant pain.  He was seen emergency department yesterday and diagnosed with concerns with elevated inflammatory markers including CRP and procalcitonin.  There was concern for intra-abdominal process and infection.  He left against medical advice despite recommendations for further imaging.  I discussed his pain symptoms today including his right upper quadrant tenderness.  He is afebrile vital signs are stable.  No peritoneal findings on my exam.  I ordered labs, ECG and abdominal CT to evaluate further his symptoms.  Patient does endorse moderate severe  pain.  An IV line was placed, patient was given 4 mg morphine, and then despite my discussion regarding risk factors, pain and further evaluation he chose to leave against medical advice.  Risks and benefits were explained including death, sepsis, bacteremia, intra-abdominal abscess the patient declined any further imaging, ECG or management and left against medical advice. Strict ED precautions and return instructions discussed at length and patient verbalized understanding. All questions were answered and ample time was given for questions.      Complexity:  High risk       Scribe Attestation:   Scribe #1: I performed the above scribed service and the documentation accurately describes the services I performed. I attest to the accuracy of the note.             I, Dr. Randy Lawson, personally performed the services described in this documentation. All medical record entries made by the scribe were at my direction and in my presence.  I have reviewed the chart and agree that the record reflects my personal performance and is accurate and complete.     Clinical Impression:   Final diagnoses:  [R06.02] Shortness of breath  [R10.11] Right upper quadrant abdominal pain (Primary)        ED Disposition Condition    AMA                Randy Lawson DO, [facility]  Emergency Staff Physician   Dept of Emergency Medicine   [facility]  Spectralink: [id number]        Disclaimer: This note has been generated using voice-recognition software. There may be typographical errors that have been missed during proof-reading.       Randy Lawson DO  09/01/22 2120

## 2022-09-01 NOTE — ED NOTES
AMA form signed, risks explained to pt, pt verbalized understanding. AMA form signed and placed in scan bin

## 2022-09-03 ENCOUNTER — HOSPITAL ENCOUNTER (EMERGENCY)
Facility: HOSPITAL | Age: 57
Discharge: LEFT BEFORE DISCHARGE | End: 2022-09-03
Attending: EMERGENCY MEDICINE
Payer: MEDICAID

## 2022-09-03 VITALS
HEIGHT: 66 IN | RESPIRATION RATE: 18 BRPM | OXYGEN SATURATION: 98 % | SYSTOLIC BLOOD PRESSURE: 148 MMHG | TEMPERATURE: 99 F | DIASTOLIC BLOOD PRESSURE: 82 MMHG | HEART RATE: 95 BPM | BODY MASS INDEX: 24.11 KG/M2 | WEIGHT: 150 LBS

## 2022-09-03 DIAGNOSIS — R10.9 ABDOMINAL PAIN, UNSPECIFIED ABDOMINAL LOCATION: Primary | ICD-10-CM

## 2022-09-03 PROCEDURE — 99281 EMR DPT VST MAYX REQ PHY/QHP: CPT

## 2022-09-03 PROCEDURE — 99499 UNLISTED E&M SERVICE: CPT | Mod: ,,, | Performed by: EMERGENCY MEDICINE

## 2022-09-03 PROCEDURE — 99499 NO LOS: ICD-10-PCS | Mod: ,,, | Performed by: EMERGENCY MEDICINE

## 2022-09-03 NOTE — ED NOTES
Enrique Martinez, a 57 y.o. male presents to the ED w/ complaint of       Triage note:  Chief Complaint   Patient presents with    Abdominal Pain     Requests morphine for his ongoing gallbladder pains, surgery sched tues     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    LOC: The patient is awake, alert and aware of environment with an appropriate affect, the patient is oriented x 4 and speaking appropriately.    APPEARANCE: Patient resting comfortably and in no acute distress, patient is clean and well groomed, patient's clothing is properly fastened.    SKIN: The skin is warm and dry, color consistent with ethnicity, patient has normal skin turgor and moist mucus membranes, skin intact, no breakdown or bruising noted.    MUSCULOSKELETAL: Patient moving all extremities well, no obvious swelling or deformities noted.    RESPIRATORY: Airway is open and patent, respirations are spontaneous and even, patient has a normal effort and rate.    CARDIAC: Patient has a normal rate and rhythm, no periphreal edema noted, capillary refill < 3 seconds. Normal +2 pedal pulses present.    ABDOMEN: Soft and tender to palpation, no distention noted. Patient denies any nausea, vomiting, diarrhea, or constipation.     NEUROLOGIC: Eyes open spontaneously, PERRL, behavior appropriate to situation, follows commands, facial expression symmetrical, bilateral hand grasp equal and even, purposeful motor response noted, normal sensation in all  extremities.     Allergies reported: Review of patient's allergies indicates:  No Known Allergies

## 2022-09-03 NOTE — ED PROVIDER NOTES
"Encounter Date: 9/3/2022       History     Chief Complaint   Patient presents with    Abdominal Pain     Requests morphine for his ongoing gallbladder pains, surgery sched tues HPI  57-year-old male with history of anemia, diabetes, hyperlipidemia, hypertension, ESRD on dialysis, presents to ED for right upper quadrant pain.  Patient states that he has severe right upper quadrant abdominal pain from it gallstone.  States that he has scheduled to have a CT scan of his abdomen and subsequent surgery on next Tuesday, patient stated that "you can see it in the computer."  Patient asked for IV pain medicine, does not cooperate with further interview, states that "you should know what is going on with me, everything is in the chart."Patient refused to have blood drawn for lab, or imaging today, states that the doctor yesterday gave him pain medicine and let him go, he expects to have the same treatment today.  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      Past Surgical History:   Procedure Laterality Date    ABDOMINAL SURGERY  1980s    CARDIAC CATHETERIZATION  2007    x 2    COLONOSCOPY N/A 4/5/2019    Procedure: COLONOSCOPY;  Surgeon: Jose L Carty MD;  Location: [facility] ([address] [place] [address]);  Service: Colon and Rectal;  Laterality: N/A;    CORONARY STENT PLACEMENT  2007     Family History   Problem Relation Age of Onset    Hypertension Father     Stroke Father     Heart attack Father     Hyperlipidemia Father     No Known Problems Sister     Drug abuse Brother     Heart attack " Brother     Hypertension Brother      Social History     Tobacco Use    Smoking status: Some Days     Packs/day: 0.25     Years: 10.00     Pack years: 2.50     Types: Cigarettes    Smokeless tobacco: Never   Substance Use Topics    Alcohol use: No    Drug use: No     Review of Systems   Unable to perform ROS: Other patient is un-cooperating with interview    Physical Exam     Initial Vitals [09/03/22 0253]   BP Pulse Resp Temp SpO2   (!) 148/82 95 18 98.9 °F (37.2 °C) 98 %      MAP       --         Physical Exam    Nursing note and vitals reviewed.  Constitutional: He appears well-developed. No distress.   HENT:   Head: Normocephalic and atraumatic.   Mouth/Throat: Oropharynx is clear and moist.   Eyes: Conjunctivae and EOM are normal.   Neck: No JVD present.   Normal range of motion.  Cardiovascular:  Normal rate and regular rhythm.           Pulmonary/Chest: No respiratory distress.   Abdominal: He exhibits no distension.   Musculoskeletal:         General: Normal range of motion.      Cervical back: Normal range of motion.     Neurological: He is alert and oriented to person, place, and time. He has normal strength.   Skin: Skin is warm and dry.       ED Course   Procedures  Labs Reviewed - No data to display       Imaging Results    None          Medications - No data to display  Medical Decision Making:   History:   Old Medical Records: I decided to obtain old medical records.  Initial Assessment:   57-year-old male, presents to the ED for right upper quadrant pain.  Patient is well-appearing, afebrile, hemodynamically stable. Patient is on call breathing with the interview or physical exam, states that he did need pain medicine, no need to do any labs or imaging today.  Patient become aggressive with nursing staff, and demanding for pain medicine.  For drop review patient has been coming to the ED multiple times last week.  He was found to have elevated inflammatory markers but consistently refused to have  further imaging after receiving morphine.  No history of gallstone was documented, no CT scan or surgery schedule on Tuesday as patient stated.  Differential Diagnosis:   Hepatitis, gallstone, cholecystitis, cholelithiasis, MSK, pneumonia.  ED Management:  I explained that his severe consistent pain can be a serious condition that needs further imaging to diagnose.  offered admission for pain control and surgery consultation for expedited surgery if needed.  However patient continues to refuse imaging or lab study, patient understand his risks including permanent disability, or death, but patient does not believe he will be okay until Tuesday, he just needs pain medicine now.  Patient decided to leave the ED department without further evaluation.  Given that patient is alert and oriented, having full capacity to make decision, I respected his decision to leave AMA before seeing my attending.          Attending Attestation:   Physician Attestation Statement for Resident:  As the supervising MD       -: On chart review pt has very concerning recent inflammatory markers, if he has ongoing pain likely needs additional medical evaluation to rule out emergency cause of pathology.  Patient eloped prior to me being able to evaluate him in person.                            Clinical Impression:   Final diagnoses:  [R10.9] Abdominal pain, unspecified abdominal location (Primary)      ED Disposition Condition    Eloped                 Pepe Flores MD  Resident  09/03/22 0811       Kike Nicole MD  09/04/22 0411

## 2022-09-06 ENCOUNTER — HOSPITAL ENCOUNTER (OUTPATIENT)
Dept: RADIOLOGY | Facility: HOSPITAL | Age: 57
Discharge: HOME | End: 2022-09-06
Attending: EMERGENCY MEDICINE
Payer: MEDICAID

## 2022-09-06 DIAGNOSIS — R10.9 ACUTE ABDOMINAL PAIN: ICD-10-CM

## 2022-09-06 PROCEDURE — 25500020 PHARM REV CODE 255: Performed by: EMERGENCY MEDICINE

## 2022-09-06 PROCEDURE — 74177 CT ABDOMEN PELVIS WITH CONTRAST: ICD-10-PCS | Mod: 26,,, | Performed by: RADIOLOGY

## 2022-09-06 PROCEDURE — 74177 CT ABD & PELVIS W/CONTRAST: CPT | Mod: TC

## 2022-09-06 PROCEDURE — 74177 CT ABD & PELVIS W/CONTRAST: CPT | Mod: 26,,, | Performed by: RADIOLOGY

## 2022-09-06 RX ADMIN — IOHEXOL 75 ML: 350 INJECTION, SOLUTION INTRAVENOUS at 07:09

## 2022-09-07 ENCOUNTER — PATIENT MESSAGE (OUTPATIENT)
Dept: EMERGENCY MEDICINE | Facility: HOSPITAL | Age: 57
End: 2022-09-07
Payer: MEDICAID

## 2022-09-07 PROCEDURE — 86833 HLA CLASS II HIGH DEFIN QUAL: CPT | Mod: PO | Performed by: NURSE PRACTITIONER

## 2022-09-07 PROCEDURE — 86832 HLA CLASS I HIGH DEFIN QUAL: CPT | Mod: PO | Performed by: NURSE PRACTITIONER

## 2022-09-08 NOTE — PROVIDER PROGRESS NOTES - EMERGENCY DEPT.
Encounter Date: 9/3/2022    ED Physician Progress Notes        Physician Note:   Received call from radiology about abnormal ct scan. Immediately tried to contact patient multiple times about results and recommendations to go the ED for further evaluation and admission. Phone calls went to voicemail. Left multiple voicemail as well as message via epic. Still no response. Will send certified mail to address on file.

## 2022-09-09 ENCOUNTER — LAB VISIT (OUTPATIENT)
Dept: LAB | Facility: HOSPITAL | Age: 57
End: 2022-09-09
Payer: MEDICAID

## 2022-09-09 DIAGNOSIS — Z76.82 ORGAN TRANSPLANT CANDIDATE: ICD-10-CM

## 2022-09-13 LAB — HPRA INTERPRETATION: NORMAL

## 2022-09-15 ENCOUNTER — NURSE TRIAGE (OUTPATIENT)
Dept: ADMINISTRATIVE | Facility: CLINIC | Age: 57
End: 2022-09-15
Payer: MEDICAID

## 2022-09-15 NOTE — TELEPHONE ENCOUNTER
Pt reports he had a CT scan done around 9/6/22, and he is wanting to know the results, states he doesn't know how to access his MyChart to see them. Pt offered to be given the MyChart support number, but he declined asking just to be told his results now. Pt informed that results have to be discussed with a doctor, but that a message is seen from 9/7/22 from Dr. Amaya advising that based off the CT scan results he recommends for pt to be seen in the ED for further evaluation. Pt verbalized understanding and hung up.    Reason for Disposition   Nursing judgment    Protocols used: No Guideline or Reference [id number]

## 2022-09-16 ENCOUNTER — HOSPITAL ENCOUNTER (EMERGENCY)
Facility: HOSPITAL | Age: 57
Discharge: HOME | End: 2022-09-16
Attending: EMERGENCY MEDICINE
Payer: MEDICAID

## 2022-09-16 VITALS
HEIGHT: 66 IN | TEMPERATURE: 99 F | OXYGEN SATURATION: 96 % | WEIGHT: 148 LBS | SYSTOLIC BLOOD PRESSURE: 155 MMHG | HEART RATE: 98 BPM | DIASTOLIC BLOOD PRESSURE: 87 MMHG | RESPIRATION RATE: 18 BRPM | BODY MASS INDEX: 23.78 KG/M2

## 2022-09-16 DIAGNOSIS — R05.9 COUGH: ICD-10-CM

## 2022-09-16 DIAGNOSIS — M79.642 LEFT HAND PAIN: Primary | ICD-10-CM

## 2022-09-16 DIAGNOSIS — M79.643 HAND PAIN: ICD-10-CM

## 2022-09-16 DIAGNOSIS — R22.32 LOCALIZED SWELLING ON LEFT HAND: ICD-10-CM

## 2022-09-16 LAB
BASOPHILS # BLD AUTO: 0.05 K/UL (ref 0–0.2)
BASOPHILS NFR BLD: 0.6 % (ref 0–1.9)
BUN SERPL-MCNC: 42 MG/DL (ref 6–30)
CHLORIDE SERPL-SCNC: 96 MMOL/L (ref 95–110)
CREAT SERPL-MCNC: 7.1 MG/DL (ref 0.5–1.4)
DIFFERENTIAL METHOD: ABNORMAL
EOSINOPHIL # BLD AUTO: 0.1 K/UL (ref 0–0.5)
EOSINOPHIL NFR BLD: 1.3 % (ref 0–8)
ERYTHROCYTE [DISTWIDTH] IN BLOOD BY AUTOMATED COUNT: 17.7 % (ref 11.5–14.5)
GLUCOSE SERPL-MCNC: 85 MG/DL (ref 70–110)
HCT VFR BLD AUTO: 24.4 % (ref 40–54)
HCT VFR BLD CALC: 23 %PCV (ref 36–54)
HCV AB SERPL QL IA: NORMAL
HGB BLD-MCNC: 7.8 G/DL (ref 14–18)
HIV 1+2 AB+HIV1 P24 AG SERPL QL IA: NORMAL
IMM GRANULOCYTES # BLD AUTO: 0.02 K/UL (ref 0–0.04)
IMM GRANULOCYTES NFR BLD AUTO: 0.2 % (ref 0–0.5)
LYMPHOCYTES # BLD AUTO: 1 K/UL (ref 1–4.8)
LYMPHOCYTES NFR BLD: 11.9 % (ref 18–48)
MCH RBC QN AUTO: 28.3 PG (ref 27–31)
MCHC RBC AUTO-ENTMCNC: 32 G/DL (ref 32–36)
MCV RBC AUTO: 88 FL (ref 82–98)
MONOCYTES # BLD AUTO: 0.8 K/UL (ref 0.3–1)
MONOCYTES NFR BLD: 9.1 % (ref 4–15)
NEUTROPHILS # BLD AUTO: 6.3 K/UL (ref 1.8–7.7)
NEUTROPHILS NFR BLD: 76.9 % (ref 38–73)
NRBC BLD-RTO: 0 /100 WBC
PLATELET # BLD AUTO: 408 K/UL (ref 150–450)
PMV BLD AUTO: 9.6 FL (ref 9.2–12.9)
POC IONIZED CALCIUM: 1.14 MMOL/L (ref 1.06–1.42)
POC TCO2 (MEASURED): 25 MMOL/L (ref 23–29)
POTASSIUM BLD-SCNC: 4.1 MMOL/L (ref 3.5–5.1)
RBC # BLD AUTO: 2.76 M/UL (ref 4.6–6.2)
SAMPLE: ABNORMAL
SARS-COV-2 RDRP RESP QL NAA+PROBE: NEGATIVE
SODIUM BLD-SCNC: 131 MMOL/L (ref 136–145)
WBC # BLD AUTO: 8.23 K/UL (ref 3.9–12.7)

## 2022-09-16 PROCEDURE — 99285 EMERGENCY DEPT VISIT HI MDM: CPT | Mod: 25

## 2022-09-16 PROCEDURE — 25000003 PHARM REV CODE 250: Performed by: PHYSICIAN ASSISTANT

## 2022-09-16 PROCEDURE — 87389 HIV-1 AG W/HIV-1&-2 AB AG IA: CPT | Performed by: PHYSICIAN ASSISTANT

## 2022-09-16 PROCEDURE — 85025 COMPLETE CBC W/AUTO DIFF WBC: CPT | Performed by: PHYSICIAN ASSISTANT

## 2022-09-16 PROCEDURE — 93010 ELECTROCARDIOGRAM REPORT: CPT | Mod: ,,, | Performed by: INTERNAL MEDICINE

## 2022-09-16 PROCEDURE — U0002 COVID-19 LAB TEST NON-CDC: HCPCS | Performed by: PHYSICIAN ASSISTANT

## 2022-09-16 PROCEDURE — 93010 EKG 12-LEAD: ICD-10-PCS | Mod: ,,, | Performed by: INTERNAL MEDICINE

## 2022-09-16 PROCEDURE — 99285 EMERGENCY DEPT VISIT HI MDM: CPT | Mod: CS,,, | Performed by: PHYSICIAN ASSISTANT

## 2022-09-16 PROCEDURE — 86803 HEPATITIS C AB TEST: CPT | Performed by: PHYSICIAN ASSISTANT

## 2022-09-16 PROCEDURE — 99285 PR EMERGENCY DEPT VISIT,LEVEL V: ICD-10-PCS | Mod: CS,,, | Performed by: PHYSICIAN ASSISTANT

## 2022-09-16 PROCEDURE — 93005 ELECTROCARDIOGRAM TRACING: CPT

## 2022-09-16 RX ORDER — BENZONATATE 100 MG/1
100 CAPSULE ORAL
Status: COMPLETED | OUTPATIENT
Start: 2022-09-16 | End: 2022-09-16

## 2022-09-16 RX ORDER — ACETAMINOPHEN 500 MG
1000 TABLET ORAL
Status: COMPLETED | OUTPATIENT
Start: 2022-09-16 | End: 2022-09-16

## 2022-09-16 RX ORDER — AZITHROMYCIN 250 MG/1
TABLET, FILM COATED ORAL
Qty: 6 TABLET | Refills: 0 | Status: SHIPPED | OUTPATIENT
Start: 2022-09-16 | End: 2022-10-03 | Stop reason: ALTCHOICE

## 2022-09-16 RX ORDER — HYDROCODONE BITARTRATE AND ACETAMINOPHEN 5; 325 MG/1; MG/1
1 TABLET ORAL EVERY 6 HOURS PRN
Qty: 11 TABLET | Refills: 0 | OUTPATIENT
Start: 2022-09-16 | End: 2022-09-25

## 2022-09-16 RX ORDER — BENZONATATE 100 MG/1
100 CAPSULE ORAL 3 TIMES DAILY PRN
Qty: 15 CAPSULE | Refills: 0 | Status: SHIPPED | OUTPATIENT
Start: 2022-09-16 | End: 2022-09-26

## 2022-09-16 RX ADMIN — BENZONATATE 100 MG: 100 CAPSULE ORAL at 08:09

## 2022-09-16 RX ADMIN — ACETAMINOPHEN 1000 MG: 500 TABLET ORAL at 06:09

## 2022-09-16 NOTE — FIRST PROVIDER EVALUATION
Emergency Department TeleTriage Encounter Note      CHIEF COMPLAINT    Chief Complaint   Patient presents with    Multiple Complaints     Swelling to r hand. And pain, had gout in past, wants results of ct scan last week, had dialysis today and access is to r upper chest       VITAL SIGNS   Initial Vitals [09/16/22 1741]   BP Pulse Resp Temp SpO2   (!) 155/87 98 18 99.3 °F (37.4 °C) 96 %      MAP       --            ALLERGIES    Review of patient's allergies indicates:  No Known Allergies    PROVIDER TRIAGE NOTE  This is a teletriage evaluation of a 57 y.o. male presenting to the ED with c/o atraumatic left hand swelling x several days, pain to left hand and left wrist. No fever, remote h/o gout.  Also requesting results of CT AP last week- scan concerning for enterocolitis though patient reports abd pain has resolved.      PE: swelling dorsum L hand. Non-toxic/well-appearing. No respiratory distress, speaks in full sentences without issue. No active emesis nor cough. Normal eye contact and mentation.     Plan: XR, meds. Further/augmented workup at discretion of examining provider.     All ED beds are full at present; patient notified of this status.  Patient seen and medically screened by CK via teletriage. Orders initiated at triage to expedite care.  Patient is stable and will be placed in an ED bed when available.  Care will be transferred to an alternate provider when patient has been placed in an Exam Room further exam, additional orders, and disposition.         ORDERS  Labs Reviewed   HIV 1 / 2 ANTIBODY   HEPATITIS C ANTIBODY       ED Orders (720h ago, onward)      Start Ordered     Status Ordering Provider    09/16/22 1847 09/16/22 1846  X-Ray Hand 3 view Left  1 time imaging         Ordered ESTER TAPIA    09/16/22 1746 09/16/22 1745  EKG 12-lead  Once         Completed by HIPOLITO WADSWORTH on 9/16/2022 at  5:54 PM MICHAEL BURK    09/16/22 1745 09/16/22 1745  HIV 1/2 Ag/Ab (4th Gen)  STAT          Ordered MICHAEL BURK    09/16/22 1745 09/16/22 1745  Hepatitis C Antibody  STAT         Ordered MICHAEL BURK              Virtual Visit Note: The provider triage portion of this emergency department evaluation and documentation was performed via [facility], a HIPAA-compliant telemedicine application, in concert with a tele-presenter in the room. A face to face patient evaluation with one of my colleagues will occur once the patient is placed in an emergency department room.      DISCLAIMER: This note was prepared with ISpeak voice recognition transcription software. Garbled syntax, mangled pronouns, and other bizarre constructions may be attributed to that software system.

## 2022-09-17 NOTE — ED PROVIDER NOTES
Encounter Date: 9/16/2022       History     Chief Complaint   Patient presents with    Multiple Complaints     Swelling to r hand. And pain, had gout in past, wants results of ct scan last week, had dialysis today and access is to r upper chest     57-year-old male with history of ESRD on HD MWF (had dialysis today), GERD, asthma, hyperlipidemia, gout presents to the ED complaining of left hand pain and swelling for the past 2 days.  He denies any known trauma, no erythema or warmth noted.  He also reports a cough productive of white sputum for the past 2 days.  He has associated chills, diarrhea, rhinorrhea.  He is not currently on any antibiotics.  No known sick contacts.  He has been vaccinated for COVID.  He denies fever, chest pain, shortness of breath, nausea without vomiting, headache, dysuria, ear pain, sore throat.    The history is provided by the patient.   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      Past Surgical History:   Procedure Laterality Date    ABDOMINAL SURGERY  1980s    CARDIAC CATHETERIZATION  2007    x 2    COLONOSCOPY N/A 4/5/2019    Procedure: COLONOSCOPY;  Surgeon: Jose L Carty MD;  Location: [facility] ([address] [place] [address]);  Service: Colon and Rectal;  Laterality: N/A;    CORONARY STENT PLACEMENT  2007     Family History   Problem Relation Age of Onset    Hypertension Father     Stroke Father     Heart attack Father     Hyperlipidemia Father     No Known Problems Sister     Drug abuse Brother     Heart attack Brother     Hypertension Brother      Social  History     Tobacco Use    Smoking status: Some Days     Packs/day: 0.25     Years: 10.00     Pack years: 2.50     Types: Cigarettes    Smokeless tobacco: Never   Substance Use Topics    Alcohol use: No    Drug use: No     Review of Systems   Constitutional:  Positive for chills. Negative for fever.   HENT:  Positive for rhinorrhea. Negative for congestion and sore throat.    Respiratory:  Positive for cough. Negative for shortness of breath.    Cardiovascular:  Negative for chest pain.   Gastrointestinal:  Positive for diarrhea. Negative for abdominal pain, constipation, nausea and vomiting.   Genitourinary:  Negative for dysuria and hematuria.   Musculoskeletal:  Negative for back pain.   Skin:  Negative for rash.   Neurological:  Negative for weakness, numbness and headaches.   Psychiatric/Behavioral:  Negative for confusion.      Physical Exam     Initial Vitals [09/16/22 1741]   BP Pulse Resp Temp SpO2   (!) 155/87 98 18 99.3 °F (37.4 °C) 96 %      MAP       --         Physical Exam    Nursing note and vitals reviewed.  Constitutional: He appears well-developed and well-nourished. He is not diaphoretic. No distress.   HENT:   Head: Normocephalic and atraumatic.   Neck: Neck supple.   Normal range of motion.  Cardiovascular:  Normal rate, regular rhythm and normal heart sounds.     Exam reveals no gallop and no friction rub.       No murmur heard.  Pulmonary/Chest: Breath sounds normal. No respiratory distress. He has no wheezes. He has no rhonchi. He has no rales.   Dialysis catheter noted to the right chest wall, no tenderness or signs of cellulitis.   Abdominal: Abdomen is soft. Bowel sounds are normal. There is no abdominal tenderness. There is no rebound and no guarding.   Musculoskeletal:         General: Edema present. Normal range of motion.      Cervical back: Normal range of motion and neck supple.      Comments: Edema noted to the dorsal aspect of the left hand.  No erythema or warmth.  No bony  tenderness.  Left radial pulse 2 +.     Neurological: He is alert and oriented to person, place, and time.   Skin: Skin is warm and dry. No rash noted. No erythema.   Psychiatric: He has a normal mood and affect.       ED Course   Procedures  Labs Reviewed   CBC W/ AUTO DIFFERENTIAL - Abnormal; Notable for the following components:       Result Value    RBC 2.76 (*)     Hemoglobin 7.8 (*)     Hematocrit 24.4 (*)     RDW 17.7 (*)     Gran % 76.9 (*)     Lymph % 11.9 (*)     All other components within normal limits   ISTAT PROCEDURE - Abnormal; Notable for the following components:    POC BUN 42 (*)     POC Creatinine 7.1 (*)     POC Sodium 131 (*)     POC Hematocrit 23 (*)     All other components within normal limits   HIV 1 / 2 ANTIBODY    Narrative:     Release to patient->Immediate   HEPATITIS C ANTIBODY    Narrative:     Release to patient->Immediate   SARS-COV-2 RNA AMPLIFICATION, QUAL   ISTAT CHEM8          Imaging Results              X-Ray Hand 3 view Left (Final result)  Result time 09/16/22 20:37:41      Final result by Rangel Duran MD (09/16/22 20:37:41)                   Impression:      Significant soft tissue edema, most pronounced along the dorsal aspect of the hand.  No convincing acute displaced fracture.  Correlation for history of trauma versus infectious/inflammatory process.  Further evaluation/follow-up as clinically warranted.      Electronically signed by: Rangel Duran MD  Date:    09/16/2022  Time:    20:37               Narrative:    EXAMINATION:  XR HAND COMPLETE 3 VIEW LEFT    CLINICAL HISTORY:  hand swelling;.    TECHNIQUE:  PA, lateral, and oblique views of the left hand were performed.    COMPARISON:  None    FINDINGS:  Evaluation of the 4th digit is limited by overlapping jewelry.  No convincing acute fracture.  There is a probable remote fracture deformity at the base of the 5th metacarpal bone.  No dislocation.  Moderate soft tissue edema about the hand, most pronounced  "along the dorsal aspect.  No unexpected radiopaque foreign body.                                       X-Ray Chest PA And Lateral (Final result)  Result time 09/16/22 20:33:07      Final result by Rangel Duran MD (09/16/22 20:33:07)                   Impression:      Right basilar atelectasis or consolidation with adjacent pleural effusion or pleural thickening.  Right lung aeration is worse when compared with 08/30/2022 but likely mildly improved when compared with abdominal CT dated 09/06/2022.      Electronically signed by: Rangel Duran MD  Date:    09/16/2022  Time:    20:33               Narrative:    EXAMINATION:  XR CHEST PA AND LATERAL    CLINICAL HISTORY:  Provided history is "cough;  ".    TECHNIQUE:  Frontal and lateral views of the chest were performed.    COMPARISON:  CT abdomen, 09/06/2022.  Chest radiograph, 08/30/2022.    FINDINGS:  Cardiac silhouette is stable in size.  Right-sided central venous catheter is present with the tip overlying the SVC.  There is a small right-sided pleural effusion versus scarring with atelectasis or consolidative changes in the right lung base.  Right basilar aeration is mildly worse when compared with 08/30/2022. Left lung and right upper lung zone are grossly clear.  No left-sided pleural effusion.  No pneumothorax.                                       Medications   acetaminophen tablet 1,000 mg (1,000 mg Oral Given 9/16/22 1850)   benzonatate capsule 100 mg (100 mg Oral Given 9/16/22 2034)           APC / Resident Notes:   57-year-old male with history of ESRD on HD MWF (had dialysis today), GERD, asthma, hyperlipidemia, gout presents to the ED complaining of left hand pain and swelling for the past 2 days.  Vital signs stable.  Well-appearing.  There is edema noted to the dorsal aspect of the left hand without any associated bony tenderness.  No erythema or warmth.  Left radial pulse 2 +.  No respiratory distress.  Lungs are clear.  Abdomen is nontender.  " Dialysis catheter noted to the right chest wall without any tenderness or signs of infection.  Differential diagnosis includes but is not limited to pneumonia, gout, fracture, dislocation.  No evidence of cellulitis at this time.  Will get labs, chest x-ray, x-ray left hand.    Potassium normal.  Chronic anemia.  COVID negative.    X-ray left hand shows soft tissue edema, no fracture.  CXR Right basilar atelectasis or consolidation with adjacent pleural effusion or pleural thickening.    I do not feel that he needs any further labs or imaging at this time. Stable for discharge.    He was discharged with prescriptions for azithromycin, tessalon, norco.  He will follow up with his PCP.  Strict ED return precautions given.  All of the patient's questions were answered.  I reviewed the patient's chart, labs, and imaging.                       Clinical Impression:   Final diagnoses:  [M79.643] Hand pain  [M79.642] Left hand pain (Primary)  [R22.32] Localized swelling on left hand  [R05.9] Cough        ED Disposition Condition    Discharge Stable          ED Prescriptions       Medication Sig Dispense Start Date End Date Auth. Provider    benzonatate (TESSALON) 100 MG capsule Take 1 capsule (100 mg total) by mouth 3 (three) times daily as needed for Cough. 15 capsule 9/16/2022 9/26/2022 Angelique Rollins PA-C    azithromycin (Z-JOHANNA) 250 MG tablet Take 2 tablets by mouth on day 1; Take 1 tablet by mouth on days 2-5 6 tablet 9/16/2022 -- Angelique Rollins PA-C    HYDROcodone-acetaminophen (NORCO) 5-325 mg per tablet Take 1 tablet by mouth every 6 (six) hours as needed for Pain. 11 tablet 9/16/2022 -- Angelique Rollins PA-C          Follow-up Information       Follow up With Specialties Details Why Contact Info Additional Information    [facility]   [address]  [facility] [address] [phone] [facility] Please park in surface lot and check  in at central registration keily Rollins PA-C  09/16/22 6129

## 2022-09-17 NOTE — ED TRIAGE NOTES
Enrique Martinez, a 57 y.o. male presents to the ED w/ complaint of left hand swelling that started a couple days ago. Pt also is SOB and stated that he has been coughing up white frothy mucus. Pt is on dialysis and has access on R chest     Triage note:  Chief Complaint   Patient presents with    Multiple Complaints     Swelling to r hand. 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

## 2022-09-22 LAB
CLASS I ANTIBODIES - LUMINEX: NEGATIVE
CLASS II ANTIBODIES - LUMINEX: NEGATIVE
CPRA %: 0
SERUM COLLECTION DT - LUMINEX CLASS I: NORMAL
SERUM COLLECTION DT - LUMINEX CLASS II: NORMAL
SPCL1 TESTING DATE: NORMAL
SPCL2 TESTING DATE: NORMAL
SPCLU TESTING DATE: NORMAL

## 2022-09-25 ENCOUNTER — HOSPITAL ENCOUNTER (EMERGENCY)
Facility: HOSPITAL | Age: 57
Discharge: HOME | End: 2022-09-25
Attending: EMERGENCY MEDICINE
Payer: MEDICAID

## 2022-09-25 VITALS
TEMPERATURE: 98 F | WEIGHT: 146 LBS | DIASTOLIC BLOOD PRESSURE: 98 MMHG | OXYGEN SATURATION: 95 % | HEART RATE: 97 BPM | RESPIRATION RATE: 16 BRPM | BODY MASS INDEX: 23.57 KG/M2 | SYSTOLIC BLOOD PRESSURE: 212 MMHG

## 2022-09-25 DIAGNOSIS — M79.642 LEFT HAND PAIN: Primary | ICD-10-CM
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
## 2022-09-25 PROCEDURE — 99284 EMERGENCY DEPT VISIT MOD MDM: CPT | Mod: ,,, | Performed by: EMERGENCY MEDICINE

## 2022-09-25 PROCEDURE — 99283 EMERGENCY DEPT VISIT LOW MDM: CPT

## 2022-09-25 PROCEDURE — 99284 PR EMERGENCY DEPT VISIT,LEVEL IV: ICD-10-PCS | Mod: ,,, | Performed by: EMERGENCY MEDICINE

## 2022-09-25 RX ORDER — HYDROCODONE BITARTRATE AND ACETAMINOPHEN 5; 325 MG/1; MG/1
1 TABLET ORAL EVERY 6 HOURS PRN
Qty: 12 TABLET | Refills: 0 | Status: SHIPPED | OUTPATIENT
Start: 2022-09-25 | End: 2022-10-23 | Stop reason: SDUPTHER

## 2022-09-25 NOTE — ED PROVIDER NOTES
Encounter Date: 9/25/2022       History     Chief Complaint   Patient presents with    Hand Pain     Pt reports L hand pain. Pt denies injury. States he has neuropathy.      58 yo dialysis patient presents to the ED with 1 day of hand pain and swelling. Pt has pmhx of Membranous glomerulonephritis, DM2, Gout, CAD. Pt was at dialysis when the swelling started and was told to come to the ED for the unresolving swelling. Pt thinks the pain and swelling is from his neuropathy. Pt states that tylenol 1200mg doesn't work for his pain and needs dilaudid or percocets. Pt denies numbness or tingling in the hands bilaterally. Pt denies fevers, chills, trauma to the hand, weakness or loss of sensation.     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      Past Surgical History:   Procedure Laterality Date    ABDOMINAL SURGERY  1980s    CARDIAC CATHETERIZATION  2007    x 2    COLONOSCOPY N/A 4/5/2019    Procedure: COLONOSCOPY;  Surgeon: Jose L Carty MD;  Location: [facility] ([address] [place] [address]);  Service: Colon and Rectal;  Laterality: N/A;    CORONARY STENT PLACEMENT  2007     Family History   Problem Relation Age of Onset    Hypertension Father     Stroke Father     Heart attack Father     Hyperlipidemia Father     No Known Problems Sister     Drug abuse Brother     Heart attack Brother     Hypertension Brother      Social History     Tobacco Use    Smoking status: Some Days     Packs/day: 0.25     Years: 10.00     Pack years: 2.50     Types: Cigarettes    Smokeless tobacco: Never   Substance  Use Topics    Alcohol use: No    Drug use: No     Review of Systems   Constitutional:  Negative for activity change, appetite change, chills, fatigue, fever and unexpected weight change.   HENT:  Negative for trouble swallowing and voice change.    Eyes: Negative.    Respiratory:  Negative for cough, chest tightness, shortness of breath and wheezing.    Cardiovascular:  Negative for chest pain, palpitations and leg swelling.   Gastrointestinal:  Negative for abdominal distention, abdominal pain, anal bleeding, blood in stool, constipation, diarrhea, nausea, rectal pain and vomiting.   Endocrine: Negative.    Genitourinary: Negative.    Musculoskeletal:  Negative for back pain, neck pain and neck stiffness.   Skin:  Negative for pallor, rash and wound.   Allergic/Immunologic: Negative.    Neurological:  Negative for dizziness, seizures, syncope, weakness, light-headedness, numbness and headaches.   Hematological: Negative.    Psychiatric/Behavioral:  Negative for confusion and sleep disturbance. The patient is not nervous/anxious.    All other systems reviewed and are negative.    Physical Exam     Initial Vitals [09/25/22 0101]   BP Pulse Resp Temp SpO2   (!) 212/98 97 16 98.1 °F (36.7 °C) 95 %      MAP       --         Physical Exam    Constitutional: He appears well-developed and well-nourished.   HENT:   Head: Normocephalic and atraumatic.   Eyes: EOM are normal. Pupils are equal, round, and reactive to light.   Neck: Neck supple.   Normal range of motion.  Cardiovascular:  Normal rate, regular rhythm, normal heart sounds and intact distal pulses.           No murmur heard.  Pulmonary/Chest: Breath sounds normal. No respiratory distress. He has no wheezes.   Abdominal: Abdomen is soft. Bowel sounds are normal. He exhibits no distension. There is no abdominal tenderness.   Musculoskeletal:      Left wrist: No swelling, deformity, effusion, lacerations, tenderness, bony tenderness or crepitus. Normal range of  motion. Normal pulse.      Left hand: Swelling and tenderness present. No deformity, lacerations or bony tenderness. Normal strength. Normal sensation. Normal capillary refill. Normal pulse.        Arms:       Cervical back: Normal range of motion and neck supple.     Neurological: He is alert and oriented to person, place, and time. GCS score is 15. GCS eye subscore is 4. GCS verbal subscore is 5. GCS motor subscore is 6.   Skin: Skin is dry. Capillary refill takes less than 2 seconds.   Psychiatric: He has a normal mood and affect. Thought content normal.       ED Course   Procedures  Labs Reviewed - No data to display       Imaging Results    None          Medications - No data to display  Medical Decision Making:   History:   Old Medical Records: I decided to obtain old medical records.  Old Records Summarized: records from previous admission(s).  Initial Assessment:   Pt with recurrent hand pain and diabetic neuropathy presents with L hand pain and swelling that he believes is from neuropathy. On physical exam patient shows signs of carpal tunnel. Pt will need pain medications and a follow-up with his PCP. No signs or indications of fracture in a non-traumatic L hand.   Differential Diagnosis:   Overuse injury  Carpal tunnel    ED Management:  Pt will be sent home with 12 Norco 5mg so that he can schedule follow-up appointment with his PCP.                         Clinical Impression:   Final diagnoses:  [M79.642] Left hand pain (Primary)  [X50.3XXA] Overuse injury      ED Disposition Condition    Discharge Stable          ED Prescriptions       Medication Sig Dispense Start Date End Date Auth. Provider    HYDROcodone-acetaminophen (NORCO) 5-325 mg per tablet Take 1 tablet by mouth every 6 (six) hours as needed for Pain. 12 tablet 9/25/2022 -- Felton Tineo MD          Follow-up Information    None          Felton Tineo MD  Resident  09/25/22 0148

## 2022-10-03 ENCOUNTER — HOSPITAL ENCOUNTER (INPATIENT)
Facility: HOSPITAL | Age: 57
LOS: 3 days | Discharge: HOME | DRG: 291 | End: 2022-10-06
Attending: EMERGENCY MEDICINE | Admitting: INTERNAL MEDICINE
Payer: MEDICAID

## 2022-10-03 DIAGNOSIS — R79.89 ELEVATED BRAIN NATRIURETIC PEPTIDE (BNP) LEVEL: ICD-10-CM

## 2022-10-03 DIAGNOSIS — I70.1 RENAL ARTERY STENOSIS: ICD-10-CM

## 2022-10-03 DIAGNOSIS — J96.01 ACUTE RESPIRATORY FAILURE WITH HYPOXIA: Primary | ICD-10-CM

## 2022-10-03 DIAGNOSIS — I16.1 HYPERTENSIVE EMERGENCY: ICD-10-CM

## 2022-10-03 DIAGNOSIS — R06.02 SHORTNESS OF BREATH: ICD-10-CM

## 2022-10-03 DIAGNOSIS — J96.01 ACUTE HYPOXEMIC RESPIRATORY FAILURE: ICD-10-CM

## 2022-10-03 PROBLEM — N18.5 ACUTE RENAL FAILURE SUPERIMPOSED ON STAGE 5 CHRONIC KIDNEY DISEASE, NOT ON CHRONIC DIALYSIS: Status: RESOLVED | Noted: 2022-02-17 | Resolved: 2022-10-03

## 2022-10-03 PROBLEM — N17.9 ACUTE RENAL FAILURE SUPERIMPOSED ON STAGE 5 CHRONIC KIDNEY DISEASE, NOT ON CHRONIC DIALYSIS: Status: RESOLVED | Noted: 2022-02-17 | Resolved: 2022-10-03

## 2022-10-03 PROBLEM — Z86.19 HISTORY OF NEUROSYPHILIS: Status: ACTIVE | Noted: 2021-02-26

## 2022-10-03 PROBLEM — N52.9 ED (ERECTILE DYSFUNCTION) OF ORGANIC ORIGIN: Status: ACTIVE | Noted: 2022-10-03

## 2022-10-03 PROBLEM — L03.116 CELLULITIS OF LEFT LOWER EXTREMITY: Status: ACTIVE | Noted: 2022-02-04

## 2022-10-03 PROBLEM — J44.9 CHRONIC OBSTRUCTIVE PULMONARY DISEASE: Status: ACTIVE | Noted: 2022-10-03

## 2022-10-03 PROBLEM — Z99.2 DEPENDENCE ON RENAL DIALYSIS: Status: ACTIVE | Noted: 2022-10-03

## 2022-10-03 PROBLEM — Z99.2 ESRD ON HEMODIALYSIS: Status: ACTIVE | Noted: 2022-10-03

## 2022-10-03 PROBLEM — N18.6 ESRD ON HEMODIALYSIS: Status: ACTIVE | Noted: 2022-10-03

## 2022-10-03 PROBLEM — R78.81 SALMONELLA BACTEREMIA: Status: ACTIVE | Noted: 2022-02-04

## 2022-10-03 LAB
ABO + RH BLD: NORMAL
ALBUMIN SERPL BCP-MCNC: 2.7 G/DL (ref 3.5–5.2)
ALBUMIN SERPL BCP-MCNC: 3.2 G/DL (ref 3.5–5.2)
ALLENS TEST: ABNORMAL
ALP SERPL-CCNC: 142 U/L (ref 55–135)
ALP SERPL-CCNC: 194 U/L (ref 55–135)
ALT SERPL W/O P-5'-P-CCNC: 36 U/L (ref 10–44)
ALT SERPL W/O P-5'-P-CCNC: 45 U/L (ref 10–44)
ANION GAP SERPL CALC-SCNC: 11 MMOL/L (ref 8–16)
ANION GAP SERPL CALC-SCNC: 13 MMOL/L (ref 8–16)
ASCENDING AORTA: 3.62 CM
AST SERPL-CCNC: 35 U/L (ref 10–40)
AST SERPL-CCNC: 58 U/L (ref 10–40)
AV INDEX (PROSTH): 0.75
AV MEAN GRADIENT: 8 MMHG
AV PEAK GRADIENT: 14 MMHG
AV VALVE AREA: 2.42 CM2
AV VELOCITY RATIO: 0.72
BASOPHILS # BLD AUTO: 0.02 K/UL (ref 0–0.2)
BASOPHILS # BLD AUTO: 0.06 K/UL (ref 0–0.2)
BASOPHILS NFR BLD: 0.2 % (ref 0–1.9)
BASOPHILS NFR BLD: 0.3 % (ref 0–1.9)
BILIRUB SERPL-MCNC: 0.4 MG/DL (ref 0.1–1)
BILIRUB SERPL-MCNC: 0.4 MG/DL (ref 0.1–1)
BLD GP AB SCN CELLS X3 SERPL QL: NORMAL
BLD PROD TYP BPU: NORMAL
BLOOD UNIT EXPIRATION DATE: NORMAL
BLOOD UNIT TYPE CODE: 9500
BLOOD UNIT TYPE: NORMAL
BNP SERPL-MCNC: 2797 PG/ML (ref 0–99)
BSA FOR ECHO PROCEDURE: 1.78 M2
BUN SERPL-MCNC: 26 MG/DL (ref 6–20)
BUN SERPL-MCNC: 51 MG/DL (ref 6–20)
BUN SERPL-MCNC: 51 MG/DL (ref 6–30)
CALCIUM SERPL-MCNC: 9.1 MG/DL (ref 8.7–10.5)
CALCIUM SERPL-MCNC: 9.7 MG/DL (ref 8.7–10.5)
CHLORIDE SERPL-SCNC: 103 MMOL/L (ref 95–110)
CHLORIDE SERPL-SCNC: 103 MMOL/L (ref 95–110)
CHLORIDE SERPL-SCNC: 105 MMOL/L (ref 95–110)
CO2 SERPL-SCNC: 21 MMOL/L (ref 23–29)
CO2 SERPL-SCNC: 21 MMOL/L (ref 23–29)
CODING SYSTEM: NORMAL
CREAT SERPL-MCNC: 3.5 MG/DL (ref 0.5–1.4)
CREAT SERPL-MCNC: 6.5 MG/DL (ref 0.5–1.4)
CREAT SERPL-MCNC: 6.6 MG/DL (ref 0.5–1.4)
CV ECHO LV RWT: 0.63 CM
DELSYS: ABNORMAL
DIFFERENTIAL METHOD: ABNORMAL
DIFFERENTIAL METHOD: ABNORMAL
DISPENSE STATUS: NORMAL
DOP CALC AO PEAK VEL: 1.86 M/S
DOP CALC AO VTI: 36.85 CM
DOP CALC LVOT AREA: 3.2 CM2
DOP CALC LVOT DIAMETER: 2.02 CM
DOP CALC LVOT PEAK VEL: 1.33 M/S
DOP CALC LVOT STROKE VOLUME: 89.11 CM3
DOP CALCLVOT PEAK VEL VTI: 27.82 CM
ECHO LV POSTERIOR WALL: 1.31 CM (ref 0.6–1.1)
EJECTION FRACTION: 60 %
EOSINOPHIL # BLD AUTO: 0.1 K/UL (ref 0–0.5)
EOSINOPHIL # BLD AUTO: 0.3 K/UL (ref 0–0.5)
EOSINOPHIL NFR BLD: 0.8 % (ref 0–8)
EOSINOPHIL NFR BLD: 1.4 % (ref 0–8)
EP: 10
EP: 6
ERYTHROCYTE [DISTWIDTH] IN BLOOD BY AUTOMATED COUNT: 20.2 % (ref 11.5–14.5)
ERYTHROCYTE [DISTWIDTH] IN BLOOD BY AUTOMATED COUNT: 21 % (ref 11.5–14.5)
ERYTHROCYTE [SEDIMENTATION RATE] IN BLOOD BY WESTERGREN METHOD: 20 MM/H
EST. GFR  (NO RACE VARIABLE): 19.3 ML/MIN/1.73 M^2
EST. GFR  (NO RACE VARIABLE): 9 ML/MIN/1.73 M^2
ESTIMATED AVG GLUCOSE: 85 MG/DL (ref 68–131)
FIO2: 50
FIO2: 80
FRACTIONAL SHORTENING: 24 % (ref 28–44)
GLUCOSE SERPL-MCNC: 103 MG/DL (ref 70–110)
GLUCOSE SERPL-MCNC: 123 MG/DL (ref 70–110)
GLUCOSE SERPL-MCNC: 133 MG/DL (ref 70–110)
HBA1C MFR BLD: 4.6 % (ref 4–5.6)
HCO3 UR-SCNC: 24.2 MMOL/L (ref 24–28)
HCO3 UR-SCNC: 24.8 MMOL/L (ref 24–28)
HCO3 UR-SCNC: 25.8 MMOL/L (ref 24–28)
HCT VFR BLD AUTO: 19.5 % (ref 40–54)
HCT VFR BLD AUTO: 24.2 % (ref 40–54)
HCT VFR BLD CALC: 24 %PCV (ref 36–54)
HCT VFR BLD CALC: 25 %PCV (ref 36–54)
HGB BLD-MCNC: 6.1 G/DL (ref 14–18)
HGB BLD-MCNC: 7.3 G/DL (ref 14–18)
IMM GRANULOCYTES # BLD AUTO: 0.02 K/UL (ref 0–0.04)
IMM GRANULOCYTES # BLD AUTO: 0.11 K/UL (ref 0–0.04)
IMM GRANULOCYTES NFR BLD AUTO: 0.2 % (ref 0–0.5)
IMM GRANULOCYTES NFR BLD AUTO: 0.5 % (ref 0–0.5)
INFLUENZA A, MOLECULAR: NEGATIVE
INFLUENZA B, MOLECULAR: NEGATIVE
INR PPP: 1.1 (ref 0.8–1.2)
INTERVENTRICULAR SEPTUM: 1.48 CM (ref 0.6–1.1)
IP: 16
IP: 22
LA MAJOR: 6.16 CM
LA MINOR: 5.58 CM
LA WIDTH: 5.14 CM
LACTATE SERPL-SCNC: 0.6 MMOL/L (ref 0.5–2.2)
LACTATE SERPL-SCNC: 1.2 MMOL/L (ref 0.5–2.2)
LEFT ATRIUM SIZE: 4.07 CM
LEFT ATRIUM VOLUME INDEX: 58.8 ML/M2
LEFT ATRIUM VOLUME: 104.12 CM3
LEFT INTERNAL DIMENSION IN SYSTOLE: 3.2 CM (ref 2.1–4)
LEFT VENTRICLE DIASTOLIC VOLUME INDEX: 44.21 ML/M2
LEFT VENTRICLE DIASTOLIC VOLUME: 78.25 ML
LEFT VENTRICLE MASS INDEX: 126 G/M2
LEFT VENTRICLE SYSTOLIC VOLUME INDEX: 23.1 ML/M2
LEFT VENTRICLE SYSTOLIC VOLUME: 40.84 ML
LEFT VENTRICULAR INTERNAL DIMENSION IN DIASTOLE: 4.19 CM (ref 3.5–6)
LEFT VENTRICULAR MASS: 222.33 G
LIPASE SERPL-CCNC: 27 U/L (ref 4–60)
LYMPHOCYTES # BLD AUTO: 0.6 K/UL (ref 1–4.8)
LYMPHOCYTES # BLD AUTO: 1.9 K/UL (ref 1–4.8)
LYMPHOCYTES NFR BLD: 6.8 % (ref 18–48)
LYMPHOCYTES NFR BLD: 8.9 % (ref 18–48)
MAGNESIUM SERPL-MCNC: 2.1 MG/DL (ref 1.6–2.6)
MCH RBC QN AUTO: 28 PG (ref 27–31)
MCH RBC QN AUTO: 28.3 PG (ref 27–31)
MCHC RBC AUTO-ENTMCNC: 30.2 G/DL (ref 32–36)
MCHC RBC AUTO-ENTMCNC: 31.3 G/DL (ref 32–36)
MCV RBC AUTO: 89 FL (ref 82–98)
MCV RBC AUTO: 94 FL (ref 82–98)
MODE: ABNORMAL
MODE: ABNORMAL
MONOCYTES # BLD AUTO: 0.6 K/UL (ref 0.3–1)
MONOCYTES # BLD AUTO: 1.2 K/UL (ref 0.3–1)
MONOCYTES NFR BLD: 5.6 % (ref 4–15)
MONOCYTES NFR BLD: 6.6 % (ref 4–15)
NEUTROPHILS # BLD AUTO: 18 K/UL (ref 1.8–7.7)
NEUTROPHILS # BLD AUTO: 7.7 K/UL (ref 1.8–7.7)
NEUTROPHILS NFR BLD: 83.3 % (ref 38–73)
NEUTROPHILS NFR BLD: 85.4 % (ref 38–73)
NRBC BLD-RTO: 0 /100 WBC
NRBC BLD-RTO: 0 /100 WBC
NUM UNITS TRANS PACKED RBC: NORMAL
PCO2 BLDA: 37.5 MMHG (ref 35–45)
PCO2 BLDA: 39.1 MMHG (ref 35–45)
PCO2 BLDA: 55.7 MMHG (ref 35–45)
PH SMN: 7.27 [PH] (ref 7.35–7.45)
PH SMN: 7.4 [PH] (ref 7.35–7.45)
PH SMN: 7.43 [PH] (ref 7.35–7.45)
PISA TR MAX VEL: 2.99 M/S
PLATELET # BLD AUTO: 286 K/UL (ref 150–450)
PLATELET # BLD AUTO: 314 K/UL (ref 150–450)
PMV BLD AUTO: 8.7 FL (ref 9.2–12.9)
PMV BLD AUTO: 8.8 FL (ref 9.2–12.9)
PO2 BLDA: 311 MMHG (ref 80–100)
PO2 BLDA: 49 MMHG (ref 40–60)
PO2 BLDA: 52 MMHG (ref 40–60)
POC BE: -1 MMOL/L
POC BE: -1 MMOL/L
POC BE: 0 MMOL/L
POC IONIZED CALCIUM: 1.3 MMOL/L (ref 1.06–1.42)
POC IONIZED CALCIUM: 1.34 MMOL/L (ref 1.06–1.42)
POC SATURATED O2: 100 % (ref 95–100)
POC SATURATED O2: 86 % (ref 95–100)
POC SATURATED O2: 86 % (ref 95–100)
POC TCO2 (MEASURED): 24 MMOL/L (ref 23–29)
POC TCO2: 25 MMOL/L (ref 24–29)
POC TCO2: 26 MMOL/L (ref 24–29)
POC TCO2: 27 MMOL/L (ref 23–27)
POCT GLUCOSE: 130 MG/DL (ref 70–110)
POTASSIUM BLD-SCNC: 5.6 MMOL/L (ref 3.5–5.1)
POTASSIUM BLD-SCNC: 5.7 MMOL/L (ref 3.5–5.1)
POTASSIUM SERPL-SCNC: 4.9 MMOL/L (ref 3.5–5.1)
POTASSIUM SERPL-SCNC: 5.7 MMOL/L (ref 3.5–5.1)
PROT SERPL-MCNC: 5.8 G/DL (ref 6–8.4)
PROT SERPL-MCNC: 7 G/DL (ref 6–8.4)
PROTHROMBIN TIME: 11.3 SEC (ref 9–12.5)
RA MAJOR: 5.71 CM
RA PRESSURE: 8 MMHG
RA WIDTH: 3.87 CM
RBC # BLD AUTO: 2.18 M/UL (ref 4.6–6.2)
RBC # BLD AUTO: 2.58 M/UL (ref 4.6–6.2)
RIGHT VENTRICULAR END-DIASTOLIC DIMENSION: 3.54 CM
SAMPLE: ABNORMAL
SARS-COV-2 RDRP RESP QL NAA+PROBE: NEGATIVE
SINUS: 3.17 CM
SITE: ABNORMAL
SODIUM BLD-SCNC: 136 MMOL/L (ref 136–145)
SODIUM BLD-SCNC: 136 MMOL/L (ref 136–145)
SODIUM SERPL-SCNC: 135 MMOL/L (ref 136–145)
SODIUM SERPL-SCNC: 137 MMOL/L (ref 136–145)
SP02: 100
SPECIMEN SOURCE: NORMAL
STJ: 2.79 CM
TDI LATERAL: 0.09 M/S
TDI SEPTAL: 0.06 M/S
TDI: 0.08 M/S
TR MAX PG: 36 MMHG
TRICUSPID ANNULAR PLANE SYSTOLIC EXCURSION: 2.52 CM
TROPONIN I SERPL DL<=0.01 NG/ML-MCNC: 0.04 NG/ML (ref 0–0.03)
TV REST PULMONARY ARTERY PRESSURE: 44 MMHG
WBC # BLD AUTO: 21.58 K/UL (ref 3.9–12.7)
WBC # BLD AUTO: 8.97 K/UL (ref 3.9–12.7)

## 2022-10-03 PROCEDURE — 25000003 PHARM REV CODE 250: Performed by: STUDENT IN AN ORGANIZED HEALTH CARE EDUCATION/TRAINING PROGRAM

## 2022-10-03 PROCEDURE — 87502 INFLUENZA DNA AMP PROBE: CPT

## 2022-10-03 PROCEDURE — 84484 ASSAY OF TROPONIN QUANT: CPT | Performed by: EMERGENCY MEDICINE

## 2022-10-03 PROCEDURE — 94660 CPAP INITIATION&MGMT: CPT

## 2022-10-03 PROCEDURE — P9016 RBC LEUKOCYTES REDUCED: HCPCS | Performed by: STUDENT IN AN ORGANIZED HEALTH CARE EDUCATION/TRAINING PROGRAM

## 2022-10-03 PROCEDURE — 83690 ASSAY OF LIPASE: CPT | Performed by: EMERGENCY MEDICINE

## 2022-10-03 PROCEDURE — 99223 1ST HOSP IP/OBS HIGH 75: CPT | Mod: ,,, | Performed by: INTERNAL MEDICINE

## 2022-10-03 PROCEDURE — 82803 BLOOD GASES ANY COMBINATION: CPT

## 2022-10-03 PROCEDURE — 86850 RBC ANTIBODY SCREEN: CPT | Performed by: STUDENT IN AN ORGANIZED HEALTH CARE EDUCATION/TRAINING PROGRAM

## 2022-10-03 PROCEDURE — 99223 PR INITIAL HOSPITAL CARE,LEVL III: ICD-10-PCS | Mod: ,,, | Performed by: INTERNAL MEDICINE

## 2022-10-03 PROCEDURE — 80047 BASIC METABLC PNL IONIZED CA: CPT

## 2022-10-03 PROCEDURE — U0002 COVID-19 LAB TEST NON-CDC: HCPCS | Performed by: EMERGENCY MEDICINE

## 2022-10-03 PROCEDURE — 83605 ASSAY OF LACTIC ACID: CPT | Mod: 91 | Performed by: EMERGENCY MEDICINE

## 2022-10-03 PROCEDURE — 83036 HEMOGLOBIN GLYCOSYLATED A1C: CPT | Performed by: STUDENT IN AN ORGANIZED HEALTH CARE EDUCATION/TRAINING PROGRAM

## 2022-10-03 PROCEDURE — 25500020 PHARM REV CODE 255: Performed by: EMERGENCY MEDICINE

## 2022-10-03 PROCEDURE — 99291 PR CRITICAL CARE, E/M 30-74 MINUTES: ICD-10-PCS | Mod: CS,,, | Performed by: EMERGENCY MEDICINE

## 2022-10-03 PROCEDURE — 87641 MR-STAPH DNA AMP PROBE: CPT | Performed by: STUDENT IN AN ORGANIZED HEALTH CARE EDUCATION/TRAINING PROGRAM

## 2022-10-03 PROCEDURE — 96365 THER/PROPH/DIAG IV INF INIT: CPT

## 2022-10-03 PROCEDURE — 87040 BLOOD CULTURE FOR BACTERIA: CPT | Mod: 59 | Performed by: EMERGENCY MEDICINE

## 2022-10-03 PROCEDURE — 83880 ASSAY OF NATRIURETIC PEPTIDE: CPT | Performed by: EMERGENCY MEDICINE

## 2022-10-03 PROCEDURE — G0257 UNSCHED DIALYSIS ESRD PT HOS: HCPCS

## 2022-10-03 PROCEDURE — 83735 ASSAY OF MAGNESIUM: CPT | Performed by: EMERGENCY MEDICINE

## 2022-10-03 PROCEDURE — 25000003 PHARM REV CODE 250

## 2022-10-03 PROCEDURE — 25000242 PHARM REV CODE 250 ALT 637 W/ HCPCS: Performed by: STUDENT IN AN ORGANIZED HEALTH CARE EDUCATION/TRAINING PROGRAM

## 2022-10-03 PROCEDURE — 86920 COMPATIBILITY TEST SPIN: CPT | Performed by: STUDENT IN AN ORGANIZED HEALTH CARE EDUCATION/TRAINING PROGRAM

## 2022-10-03 PROCEDURE — 63600175 PHARM REV CODE 636 W HCPCS: Performed by: STUDENT IN AN ORGANIZED HEALTH CARE EDUCATION/TRAINING PROGRAM

## 2022-10-03 PROCEDURE — 87486 CHLMYD PNEUM DNA AMP PROBE: CPT | Performed by: STUDENT IN AN ORGANIZED HEALTH CARE EDUCATION/TRAINING PROGRAM

## 2022-10-03 PROCEDURE — 63600175 PHARM REV CODE 636 W HCPCS

## 2022-10-03 PROCEDURE — 80053 COMPREHEN METABOLIC PANEL: CPT | Mod: 91 | Performed by: STUDENT IN AN ORGANIZED HEALTH CARE EDUCATION/TRAINING PROGRAM

## 2022-10-03 PROCEDURE — 63600175 PHARM REV CODE 636 W HCPCS: Performed by: EMERGENCY MEDICINE

## 2022-10-03 PROCEDURE — 25000003 PHARM REV CODE 250: Performed by: EMERGENCY MEDICINE

## 2022-10-03 PROCEDURE — 80053 COMPREHEN METABOLIC PANEL: CPT | Performed by: EMERGENCY MEDICINE

## 2022-10-03 PROCEDURE — 99291 CRITICAL CARE FIRST HOUR: CPT | Mod: CS,,, | Performed by: EMERGENCY MEDICINE

## 2022-10-03 PROCEDURE — 36600 WITHDRAWAL OF ARTERIAL BLOOD: CPT

## 2022-10-03 PROCEDURE — 99900035 HC TECH TIME PER 15 MIN (STAT)

## 2022-10-03 PROCEDURE — 96361 HYDRATE IV INFUSION ADD-ON: CPT | Mod: 59

## 2022-10-03 PROCEDURE — 36430 TRANSFUSION BLD/BLD COMPNT: CPT

## 2022-10-03 PROCEDURE — 85610 PROTHROMBIN TIME: CPT | Performed by: EMERGENCY MEDICINE

## 2022-10-03 PROCEDURE — 96367 TX/PROPH/DG ADDL SEQ IV INF: CPT | Mod: 59

## 2022-10-03 PROCEDURE — 96366 THER/PROPH/DIAG IV INF ADDON: CPT | Mod: 59

## 2022-10-03 PROCEDURE — 85025 COMPLETE CBC W/AUTO DIFF WBC: CPT | Mod: 91 | Performed by: STUDENT IN AN ORGANIZED HEALTH CARE EDUCATION/TRAINING PROGRAM

## 2022-10-03 PROCEDURE — 99285 EMERGENCY DEPT VISIT HI MDM: CPT | Mod: 25

## 2022-10-03 PROCEDURE — 85025 COMPLETE CBC W/AUTO DIFF WBC: CPT | Performed by: EMERGENCY MEDICINE

## 2022-10-03 PROCEDURE — 27000190 HC CPAP FULL FACE MASK W/VALVE

## 2022-10-03 PROCEDURE — 27000221 HC OXYGEN, UP TO 24 HOURS

## 2022-10-03 PROCEDURE — 20000000 HC ICU ROOM

## 2022-10-03 PROCEDURE — 87502 INFLUENZA DNA AMP PROBE: CPT | Performed by: STUDENT IN AN ORGANIZED HEALTH CARE EDUCATION/TRAINING PROGRAM

## 2022-10-03 RX ORDER — DOXYCYCLINE HYCLATE 100 MG
100 TABLET ORAL EVERY 12 HOURS
Status: DISCONTINUED | OUTPATIENT
Start: 2022-10-04 | End: 2022-10-04

## 2022-10-03 RX ORDER — HEPARIN SODIUM 1000 [USP'U]/ML
1000 INJECTION, SOLUTION INTRAVENOUS; SUBCUTANEOUS
Status: DISCONTINUED | OUTPATIENT
Start: 2022-10-03 | End: 2022-10-06 | Stop reason: HOSPADM

## 2022-10-03 RX ORDER — ACETAMINOPHEN 500 MG
1000 TABLET ORAL
Status: DISPENSED | OUTPATIENT
Start: 2022-10-03 | End: 2022-10-04

## 2022-10-03 RX ORDER — ALBUTEROL SULFATE 2.5 MG/.5ML
7.5 SOLUTION RESPIRATORY (INHALATION)
Status: DISCONTINUED | OUTPATIENT
Start: 2022-10-03 | End: 2022-10-03

## 2022-10-03 RX ORDER — LABETALOL HCL 20 MG/4 ML
SYRINGE (ML) INTRAVENOUS
Status: DISPENSED
Start: 2022-10-03 | End: 2022-10-04

## 2022-10-03 RX ORDER — FUROSEMIDE 40 MG/1
40 TABLET ORAL 2 TIMES DAILY
Status: DISCONTINUED | OUTPATIENT
Start: 2022-10-03 | End: 2022-10-06 | Stop reason: HOSPADM

## 2022-10-03 RX ORDER — HYDRALAZINE HYDROCHLORIDE 50 MG/1
50 TABLET, FILM COATED ORAL EVERY 8 HOURS
Status: DISCONTINUED | OUTPATIENT
Start: 2022-10-03 | End: 2022-10-04

## 2022-10-03 RX ORDER — HYDROCODONE BITARTRATE AND ACETAMINOPHEN 500; 5 MG/1; MG/1
TABLET ORAL
Status: DISCONTINUED | OUTPATIENT
Start: 2022-10-03 | End: 2022-10-06 | Stop reason: HOSPADM

## 2022-10-03 RX ORDER — HEPARIN SODIUM 5000 [USP'U]/ML
5000 INJECTION, SOLUTION INTRAVENOUS; SUBCUTANEOUS EVERY 8 HOURS
Status: DISCONTINUED | OUTPATIENT
Start: 2022-10-03 | End: 2022-10-06 | Stop reason: HOSPADM

## 2022-10-03 RX ORDER — ALBUTEROL SULFATE 2.5 MG/.5ML
2.5 SOLUTION RESPIRATORY (INHALATION)
Status: DISCONTINUED | OUTPATIENT
Start: 2022-10-03 | End: 2022-10-03

## 2022-10-03 RX ORDER — HEPARIN SODIUM 5000 [USP'U]/ML
5000 INJECTION, SOLUTION INTRAVENOUS; SUBCUTANEOUS EVERY 8 HOURS
Status: CANCELLED | OUTPATIENT
Start: 2022-10-03

## 2022-10-03 RX ORDER — GLUCAGON 1 MG
1 KIT INJECTION
Status: DISCONTINUED | OUTPATIENT
Start: 2022-10-03 | End: 2022-10-06 | Stop reason: HOSPADM

## 2022-10-03 RX ORDER — AMLODIPINE BESYLATE 10 MG/1
10 TABLET ORAL DAILY
Status: DISCONTINUED | OUTPATIENT
Start: 2022-10-04 | End: 2022-10-06

## 2022-10-03 RX ORDER — SEVELAMER CARBONATE 800 MG/1
800 TABLET, FILM COATED ORAL
Status: DISCONTINUED | OUTPATIENT
Start: 2022-10-04 | End: 2022-10-04

## 2022-10-03 RX ORDER — CALCIUM GLUCONATE 98 MG/ML
INJECTION, SOLUTION INTRAVENOUS
Status: COMPLETED
Start: 2022-10-03 | End: 2022-10-03

## 2022-10-03 RX ORDER — IPRATROPIUM BROMIDE AND ALBUTEROL SULFATE 2.5; .5 MG/3ML; MG/3ML
3 SOLUTION RESPIRATORY (INHALATION)
Status: DISCONTINUED | OUTPATIENT
Start: 2022-10-03 | End: 2022-10-06 | Stop reason: HOSPADM

## 2022-10-03 RX ORDER — NITROGLYCERIN 20 MG/100ML
INJECTION INTRAVENOUS
Status: COMPLETED
Start: 2022-10-03 | End: 2022-10-03

## 2022-10-03 RX ORDER — CLONIDINE HYDROCHLORIDE 0.1 MG/1
0.4 TABLET ORAL 3 TIMES DAILY
Status: DISCONTINUED | OUTPATIENT
Start: 2022-10-03 | End: 2022-10-04

## 2022-10-03 RX ORDER — LABETALOL HCL 20 MG/4 ML
20 SYRINGE (ML) INTRAVENOUS ONCE
Status: DISCONTINUED | OUTPATIENT
Start: 2022-10-03 | End: 2022-10-03

## 2022-10-03 RX ORDER — NITROGLYCERIN 20 MG/100ML
5 INJECTION INTRAVENOUS CONTINUOUS
Status: DISCONTINUED | OUTPATIENT
Start: 2022-10-03 | End: 2022-10-05

## 2022-10-03 RX ORDER — IBUPROFEN 200 MG
16 TABLET ORAL
Status: DISCONTINUED | OUTPATIENT
Start: 2022-10-03 | End: 2022-10-06 | Stop reason: HOSPADM

## 2022-10-03 RX ORDER — ASPIRIN 81 MG/1
81 TABLET ORAL DAILY
Status: DISCONTINUED | OUTPATIENT
Start: 2022-10-04 | End: 2022-10-06 | Stop reason: HOSPADM

## 2022-10-03 RX ORDER — PANTOPRAZOLE SODIUM 40 MG/1
40 TABLET, DELAYED RELEASE ORAL DAILY
Status: DISCONTINUED | OUTPATIENT
Start: 2022-10-04 | End: 2022-10-06 | Stop reason: HOSPADM

## 2022-10-03 RX ORDER — SODIUM CHLORIDE 0.9 % (FLUSH) 0.9 %
10 SYRINGE (ML) INJECTION
Status: DISCONTINUED | OUTPATIENT
Start: 2022-10-03 | End: 2022-10-06 | Stop reason: HOSPADM

## 2022-10-03 RX ORDER — SODIUM CHLORIDE 9 MG/ML
INJECTION, SOLUTION INTRAVENOUS ONCE
Status: COMPLETED | OUTPATIENT
Start: 2022-10-03 | End: 2022-10-03

## 2022-10-03 RX ORDER — NITROGLYCERIN 20 MG/100ML
INJECTION INTRAVENOUS
Status: DISPENSED
Start: 2022-10-03 | End: 2022-10-03

## 2022-10-03 RX ORDER — IBUPROFEN 200 MG
24 TABLET ORAL
Status: DISCONTINUED | OUTPATIENT
Start: 2022-10-03 | End: 2022-10-06 | Stop reason: HOSPADM

## 2022-10-03 RX ORDER — FUROSEMIDE 10 MG/ML
40 INJECTION INTRAMUSCULAR; INTRAVENOUS
Status: DISCONTINUED | OUTPATIENT
Start: 2022-10-03 | End: 2022-10-03

## 2022-10-03 RX ORDER — NITROGLYCERIN 20 MG/100ML
0-400 INJECTION INTRAVENOUS CONTINUOUS
Status: DISCONTINUED | OUTPATIENT
Start: 2022-10-03 | End: 2022-10-03

## 2022-10-03 RX ORDER — LABETALOL 100 MG/1
200 TABLET, FILM COATED ORAL EVERY 12 HOURS
Status: DISCONTINUED | OUTPATIENT
Start: 2022-10-03 | End: 2022-10-06 | Stop reason: HOSPADM

## 2022-10-03 RX ORDER — SODIUM CHLORIDE 0.9 % (FLUSH) 0.9 %
10 SYRINGE (ML) INJECTION
Status: CANCELLED | OUTPATIENT
Start: 2022-10-03

## 2022-10-03 RX ORDER — SODIUM CHLORIDE 9 MG/ML
INJECTION, SOLUTION INTRAVENOUS ONCE
Status: DISCONTINUED | OUTPATIENT
Start: 2022-10-03 | End: 2022-10-03

## 2022-10-03 RX ORDER — INSULIN ASPART 100 [IU]/ML
0-5 INJECTION, SOLUTION INTRAVENOUS; SUBCUTANEOUS
Status: DISCONTINUED | OUTPATIENT
Start: 2022-10-03 | End: 2022-10-06 | Stop reason: HOSPADM

## 2022-10-03 RX ORDER — ALBUTEROL SULFATE 90 UG/1
2 AEROSOL, METERED RESPIRATORY (INHALATION) EVERY 6 HOURS PRN
Status: DISCONTINUED | OUTPATIENT
Start: 2022-10-03 | End: 2022-10-06 | Stop reason: HOSPADM

## 2022-10-03 RX ADMIN — FUROSEMIDE 40 MG: 40 TABLET ORAL at 09:10

## 2022-10-03 RX ADMIN — HYDRALAZINE HYDROCHLORIDE 50 MG: 50 TABLET ORAL at 09:10

## 2022-10-03 RX ADMIN — HEPARIN SODIUM 5000 UNITS: 5000 INJECTION INTRAVENOUS; SUBCUTANEOUS at 09:10

## 2022-10-03 RX ADMIN — IOHEXOL 75 ML: 350 INJECTION, SOLUTION INTRAVENOUS at 04:10

## 2022-10-03 RX ADMIN — CALCIUM GLUCONATE 1 G: 98 INJECTION, SOLUTION INTRAVENOUS at 09:10

## 2022-10-03 RX ADMIN — NITROGLYCERIN 25 MCG/MIN: 20 INJECTION INTRAVENOUS at 06:10

## 2022-10-03 RX ADMIN — SODIUM CHLORIDE: 0.9 INJECTION, SOLUTION INTRAVENOUS at 11:10

## 2022-10-03 RX ADMIN — NITROGLYCERIN 50 MCG/MIN: 20 INJECTION INTRAVENOUS at 07:10

## 2022-10-03 RX ADMIN — IPRATROPIUM BROMIDE AND ALBUTEROL SULFATE 3 ML: 2.5; .5 SOLUTION RESPIRATORY (INHALATION) at 07:10

## 2022-10-03 RX ADMIN — NITROGLYCERIN 100 MCG/MIN: 20 INJECTION INTRAVENOUS at 09:10

## 2022-10-03 RX ADMIN — NITROGLYCERIN 145 MCG/MIN: 20 INJECTION INTRAVENOUS at 10:10

## 2022-10-03 RX ADMIN — DOXYCYCLINE 100 MG: 100 INJECTION, POWDER, LYOPHILIZED, FOR SOLUTION INTRAVENOUS at 04:10

## 2022-10-03 RX ADMIN — LABETALOL HYDROCHLORIDE 200 MG: 100 TABLET, FILM COATED ORAL at 09:10

## 2022-10-03 RX ADMIN — CEFTRIAXONE 1 G: 1 INJECTION, SOLUTION INTRAVENOUS at 02:10

## 2022-10-03 RX ADMIN — HEPARIN SODIUM 1000 UNITS: 1000 INJECTION, SOLUTION INTRAVENOUS; SUBCUTANEOUS at 11:10

## 2022-10-03 RX ADMIN — CLONIDINE HYDROCHLORIDE 0.4 MG: 0.1 TABLET ORAL at 09:10

## 2022-10-03 NOTE — ASSESSMENT & PLAN NOTE
Lab Results   Component Value Date    WBC 21.58 (H) 10/03/2022     If WBCs continue to trend up or pt develops a fever, a culture from his tunneled line should be collected.

## 2022-10-03 NOTE — ASSESSMENT & PLAN NOTE
2/2 Membranous nephropathy (bx proven), DM II, and HTN , on HD since 2/2022  Outpatient HD Information:  -Dialysis modality: Hemodialysis  -Outpatient HD unit: [facility]  -Nephrologist: Dr. Saez   -HD TX days: Monday/Wednesday/Friday, duration of treatment: 210 mins  -Last HD TX prior to hospital admission: 09/30/2022  -Dialysis access: dialysis catheter placed on R IJ   -Vascular surgeon: referral submitted 8//30 by Dr. Saez  -Residual urine: oliguric    -EDW: 61kg      Plan/Recommendations:   -pt is fluid overloaded but not significantly above his EDW, for which his usual dialysis treatment will done. His symptoms, clinical exam, ABG, and CXR point towards more towards a COPD exacerbation picture    iHD today (10/03/2022) with UF -3L as BP tolerates for metabolic clearance and volume management   -start some of his home blood pressure medications   -Mineral & Bone Disorder:    Phosphorus level: check level    PTH level: ~900 on 2/2022    Calcium 9.7  -ESKD anemia with acute blood loss anemia: hgb 7.3, goal hgb 10-11g/dL, pt to f/u outpatient for iron studies as his previous labs in February showed significant SHANNAN, transfuse if hgb <7  -Renal function panel & Mg level on HD days   -Renal diet with protein supplements, if not NPO   Nephrovitamins daily   -Strict I/O's and daily weights

## 2022-10-03 NOTE — ASSESSMENT & PLAN NOTE
Hx of anemia secondary to ESRD    -Patient's with Normocytic anemia..   -Etiology likely due to chronic disease .    Recent Labs     10/03/22  0921   HGB 7.3*           Component Value Date/Time    MCV 94 10/03/2022 0921    RDW 21.0 (H) 10/03/2022 0921    IRON 10 (L) 02/17/2022 1041    TIBC 258 02/17/2022 1041    FERRITIN 384 (H) 02/17/2022 1041    FOLATE 4.4 02/17/2022 1041    [id number] 02/17/2022 1041       RECOMMENDATIONS  - Monitor CBC and transfuse if Hgb < 7   - Recommend Hemolysis workup

## 2022-10-03 NOTE — HPI
"Enrique Martinez is a 57 y.o. male w/ known ESKD 2/2 CKD 2/2 idiopathic membranous nephropathy on HD since 2/2022 and CAD s/p stent (2007), hyperlipidemia, COPD, neurosyphilis (treated, 2021), GERD, BPH, cellulitis of L foot, salmonella bacteremia (2/2022), gout, hyperurecemia, anemia (iron, folate), vitamtamin D deficiency,  hydrocele, admitted on 10/3/2022 for the following:   Chief Complaint   Patient presents with    Respiratory Distress     On the way to dialysis today and became SOB, arrived on BiPAP, last dialysis on Friday         Pt states that he has been going to get his dialysis treatments on schedule every MWF and has been following a fluid restriction of <1.5L, he also reports compliance with medications (albuterol inhaler and blood pressure medication). Pt also endorses that his dialysis unit asked pt to f/u regarding low hgb. He reports worsening shortness of breath in the past 2 days, no chest pain, jaw pain or arm numbness.     Pt denies: Fever, chills, palpitations, nausea, vomiting, diarrhea, abd pain, hematuria, dysuria, urinary frequency or urgency, headaches, visual disturbances or weakness.      In the ED, pt presented with the following VS:   Initial Vitals   BP Pulse Resp Temp SpO2   10/03/22 0915 10/03/22 0909 10/03/22 0910 10/03/22 0931 10/03/22 0909   (!) 204/127 (!) 113 (!) 36 98.2 °F (36.8 °C) 100 %     In the ED his labs were notable for troponin WBC 21.58, hgb 7.3,  0.038, BNP 2797, K 5.7, bicarb 21, BUN 51, Cr 6.6, AST/ALT 58/45, and his lactic acid was 1.2.   Recent Labs   Lab 10/03/22  0915   PH 7.273*   PO2 311*   PCO2 55.7*   HCO3 25.8   BE -1   CXR not significant congestion or pulm edema, known cardiomegaly   Pt was placed on BiPAP as he was in respiratory distress    Nephrology was consulted for: "needs dialysis, respiratory distress"    Outpatient HD Information:  -Dialysis modality: Hemodialysis  -Outpatient HD unit: [facility]  -Nephrologist: Dr. Saez   -HD TX days: " Monday/Wednesday/Friday, duration of treatment: 210 mins  -Last HD TX prior to hospital admission: 09/30/2022  -Dialysis access: dialysis catheter placed on R IJ   -Vascular surgeon: referral submitted 8//30 by Dr. Saez  -Residual urine: oliguric    -EDW: 61kg    Last iHD done on 9/30 and had 2.6L of fluid removed and his post-HD weight was 67.4, no complications noted, he did have SBPs in the 160's-170's during his last treatment.

## 2022-10-03 NOTE — ASSESSMENT & PLAN NOTE
Mildly Hyperglycemic(113) on admission  Last A1c:   Lab Results   Component Value Date    HGBA1C 7.6 (H) 02/17/2022        RECOMMENDATIONS  - Diabetic diet  - POCT glucose ACHS. Monitor glucose results and adjust insulin regimen accordingly

## 2022-10-03 NOTE — CONSULTS
[facility] - Emergency Dept  Critical Care Medicine  Consult Note    Patient Name: Enrique Martinez  MRN: [id number]  Admission Date: 10/3/2022  Hospital Length of Stay: 0 days  Code Status: Prior  Attending Physician: Tete Suarez MD   Primary Care Provider: Primary Doctor No   Principal Problem: <principal problem not specified>    Inpatient consult to Critical Care Medicine  Consult performed by: Nikolas Layton MD  Consult ordered by: Samuel Keller MD        Subjective:     HPI:  Mr Martinez is a 57yom with a history of ESRD on HD, M/W/F, DMII, CAD w/ stents, gout and gerd presenting with CHF exacerbation likely 2/t CAP. The pt says he was seen in OP clinic with shortness of breath and cough and was diagnosed with CAP and given 4 days of azithromycin. He says that he attended his dialysis session on Friday without issues, but says that his shortness of breath got acutely worse today. Pt denies any CP, abdominal pain, n/v/d or dysuria.    In the ED: Labs were significant for WBC 21.58, BNP 2,797, Trop 0.038, VBG on bipap showed isolated respiratory acidosis. CXR showed vascular congestion and possible R Pleural effusion. CTA chest pending to r/o PE. Pt receiving HD in the ED with 3L UF goal.        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    Umbilical hernia        Past Surgical History:   Procedure Laterality Date    ABDOMINAL SURGERY  1980s    CARDIAC CATHETERIZATION  2007    x 2    COLONOSCOPY N/A  4/5/2019    Procedure: COLONOSCOPY;  Surgeon: Jose L Carty MD;  Location: [facility] ([address] [place] [address]);  Service: Colon and Rectal;  Laterality: N/A;    CORONARY STENT PLACEMENT  2007       Review of patient's allergies indicates:  No Known Allergies    Family History       Problem Relation (Age of Onset)    Drug abuse Brother    Heart attack Father, Brother    Hyperlipidemia Father    Hypertension Father, Brother    No Known Problems Sister    Stroke Father          Tobacco Use    Smoking status: Some Days     Packs/day: 0.25     Years: 10.00     Pack years: 2.50     Types: Cigarettes    Smokeless tobacco: Never   Substance and Sexual Activity    Alcohol use: No    Drug use: No    Sexual activity: Not on file      Review of Systems   Constitutional:  Negative for chills and fever.   HENT:  Negative for ear pain and sore throat.    Eyes:  Negative for redness and visual disturbance.   Respiratory:  Positive for cough and shortness of breath. Negative for wheezing.    Cardiovascular:  Positive for leg swelling. Negative for chest pain.   Gastrointestinal:  Negative for abdominal pain, nausea and vomiting.   Genitourinary:  Negative for flank pain.   Musculoskeletal:  Negative for back pain, gait problem, neck pain and neck stiffness.   Skin:  Negative for pallor and rash.   Neurological:  Negative for dizziness, syncope, weakness and numbness.   Psychiatric/Behavioral:  Negative for confusion and dysphoric mood.    Objective:     Vital Signs (Most Recent):  Temp: 97.6 °F (36.4 °C) (10/03/22 1115)  Pulse: 76 (10/03/22 1345)  Resp: (!) 29 (10/03/22 0948)  BP: (!) 164/86 (10/03/22 1345)  SpO2: 100 % (10/03/22 1345)   Vital Signs (24h Range):  Temp:  [97.6 °F (36.4 °C)-98.2 °F (36.8 °C)] 97.6 °F (36.4 °C)  Pulse:  [] 76  Resp:  [17-36] 29  SpO2:  [100 %] 100 %  BP: (153-204)/() 164/86   Weight: 68 kg (150 lb)  Body mass index is 24.21 kg/m².    No intake or output data in the 24 hours ending 10/03/22  1359    Physical Exam  Vitals and nursing note reviewed.   Constitutional:       Appearance: He is not toxic-appearing.   HENT:      Head: Normocephalic and atraumatic.   Eyes:      Extraocular Movements: Extraocular movements intact.      Pupils: Pupils are equal, round, and reactive to light.   Pulmonary:      Comments: Pt is mildly tachypneic but comfortable on BiPAP  Chest:      Chest wall: No tenderness.   Abdominal:      General: Abdomen is flat.      Palpations: Abdomen is soft.   Musculoskeletal:         General: Swelling present. Normal range of motion.      Cervical back: Normal range of motion and neck supple.      Comments: BLE tense, chronic venous stasis changes, non-pitting, no ulceration   Skin:     General: Skin is warm and dry.   Neurological:      General: No focal deficit present.      Mental Status: He is alert and oriented to person, place, and time.   Psychiatric:         Mood and Affect: Mood normal.         Behavior: Behavior normal.         Thought Content: Thought content normal.         Judgment: Judgment normal.       Vents:  Oxygen Concentration (%): 80 (10/03/22 0905)  Lines/Drains/Airways       Central Venous Catheter Line  Duration                  Hemodialysis Catheter 02/28/22 1300 right internal jugular 216 days              Peripheral Intravenous Line  Duration                  Peripheral IV - Single Lumen 10/03/22 0911 18 G Left Antecubital <1 day         Peripheral IV - Single Lumen 10/03/22 18 G Left Hand <1 day                  Significant Labs:    CBC/Anemia Profile:  Recent Labs   Lab 10/03/22  0915 10/03/22  0917 10/03/22  0921   WBC  --   --  21.58*   HGB  --   --  7.3*   HCT 24* 25* 24.2*   PLT  --   --  314   MCV  --   --  94   RDW  --   --  21.0*        Chemistries:  Recent Labs   Lab 10/03/22  0921      K 5.7*      CO2 21*   BUN 51*   CREATININE 6.6*   CALCIUM 9.7   ALBUMIN 3.2*   PROT 7.0   BILITOT 0.4   ALKPHOS 194*   ALT 45*   AST 58*   MG 2.1       All  pertinent labs within the past 24 hours have been reviewed.    Significant Imaging: I have reviewed all pertinent imaging results/findings within the past 24 hours.      ABG  Recent Labs   Lab 10/03/22  1505   PH 7.429   PO2 49   PCO2 37.5   HCO3 24.8   BE 0     Assessment/Plan:     Pulmonary  Acute hypoxemic respiratory failure  Patient with Hypercapnic and Hypoxic Respiratory failure which is Acute.  he is not on home oxygen. Supplemental oxygen was provided and noted- Oxygen Concentration (%):  [80] 80.   Signs/symptoms of respiratory failure include- tachypnea, increased work of breathing and respiratory distress.   -He reports being treated for respiratory infection recently and was prescribed a course of Azithromycin on 09/16.  -CXR (10/3/22) noted no significant superimposed airspace consolidation, but did note Continued blunting of the right costophrenic sulcus is seen, which may represent a persistent small volume of pleural fluid on this side.    -CTA (10/03/22) did not show any concerns for pulmonary thromboembolism     Recent Labs     10/03/22  0921   BNP 2,797*       Recent Labs     10/03/22  1505   PH 7.429   PCO2 37.5   PO2 49   HCO3 24.8   POCSATURATED 86*   BE 0     Differential diagnoses include, but not limited to:  CHF, Pneumonia, Fluid overload     -Critical Care medicine was consulted for increasing respiratory distress requiring supplemental oxygen support via BiPap.  -After dialysis, patient was able to be weaned to room air. No indication for MICU admission at this time.     RECOMMENDATIONS  -Rule out CHF with TTE. Trend troponin  -Nephrology following for dialysis/volume removal  -Wean supplemental O2 as tolerated.  -CAP coverage with Ceftriaxone and Doxycycline. Followup cultures and viral testing  -F/u BLE Dopplers to r/o DVT    Renal/  ESRD on hemodialysis  Nephrology consulted and following for inpatient dialysis. See Nephrology consult note    Oncology  Anemia due to chronic kidney  disease, on chronic dialysis  Hx of anemia secondary to ESRD    -Patient's with Normocytic anemia..   -Etiology likely due to chronic disease .    Recent Labs     10/03/22  0921   HGB 7.3*           Component Value Date/Time    MCV 94 10/03/2022 0921    RDW 21.0 (H) 10/03/2022 0921    IRON 10 (L) 02/17/2022 1041    TIBC 258 02/17/2022 1041    FERRITIN 384 (H) 02/17/2022 1041    FOLATE 4.4 02/17/2022 1041    [id number] 02/17/2022 1041       RECOMMENDATIONS  - Monitor CBC and transfuse if Hgb < 7   - Recommend Hemolysis workup     Critical secondary to Patient has a condition that poses threat to life and bodily function: Severe Respiratory Distress     Critical care was time spent personally by me on the following activities: development of treatment plan with patient or surrogate and bedside caregivers, discussions with consultants, evaluation of patient's response to treatment, examination of patient, ordering and performing treatments and interventions, ordering and review of laboratory studies, ordering and review of radiographic studies, pulse oximetry, re-evaluation of patient's condition. This critical care time did not overlap with that of any other provider or involve time for any procedures.    Thank you for your consult. I will sign off. Please contact us if you have any additional questions.     Nikolas Layton MD  Critical Care Medicine  [facility] - Emergency Dept

## 2022-10-03 NOTE — ED NOTES
"Pt requesting pain medication for his neuropathy stating "dont give me any of that gabapentin that doesn't work" MD notified. Pt requesting food and it was explained to him that he needs a CT and patient states "I dont got no blood clot, and im not getting a catheter to pee, I can pee on my own".   "

## 2022-10-03 NOTE — ED NOTES
Bed: [facility]  Expected date:   Expected time:   Means of arrival:   Comments:

## 2022-10-03 NOTE — ASSESSMENT & PLAN NOTE
Patient with Hypercapnic and Hypoxic Respiratory failure which is Acute.  he is not on home oxygen. Supplemental oxygen was provided and noted- Oxygen Concentration (%):  [80] 80.   Signs/symptoms of respiratory failure include- tachypnea, increased work of breathing and respiratory distress.   -He reports being treated for respiratory infection recently and was prescribed a course of Azithromycin on 09/16.  -CXR (10/3/22) noted no significant superimposed airspace consolidation, but did note Continued blunting of the right costophrenic sulcus is seen, which may represent a persistent small volume of pleural fluid on this side.    -CTA (10/03/22) did not show any concerns for pulmonary thromboembolism     Recent Labs     10/03/22  0921   BNP 2,797*       Recent Labs     10/03/22  1505   PH 7.429   PCO2 37.5   PO2 49   HCO3 24.8   POCSATURATED 86*   BE 0     Differential diagnoses include, but not limited to:  CHF, Pneumonia, Fluid overload     -Critical Care medicine was consulted for increasing respiratory distress requiring supplemental oxygen support via BiPap.  -After dialysis, patient was able to be weaned to room air. No indication for MICU admission at this time.     RECOMMENDATIONS  -Rule out CHF with TTE. Trend troponin  -Nephrology following for dialysis/volume removal  -Wean supplemental O2 as tolerated.  -CAP coverage with Ceftriaxone and Doxycycline. Followup cultures and viral testing  -F/u BLE Dopplers to r/o DVT

## 2022-10-03 NOTE — SUBJECTIVE & OBJECTIVE
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        Past Surgical History:   Procedure Laterality Date    ABDOMINAL SURGERY  1980s    CARDIAC CATHETERIZATION  2007    x 2    COLONOSCOPY N/A 4/5/2019    Procedure: COLONOSCOPY;  Surgeon: Jose L Carty MD;  Location: [facility] ENDO ([address] [place] [address]);  Service: Colon and Rectal;  Laterality: N/A;    CORONARY STENT PLACEMENT  2007       Review of patient's allergies indicates:  No Known Allergies    Family History       Problem Relation (Age of Onset)    Drug abuse Brother    Heart attack Father, Brother    Hyperlipidemia Father    Hypertension Father, Brother    No Known Problems Sister    Stroke Father          Tobacco Use    Smoking status: Some Days     Packs/day: 0.25     Years: 10.00     Pack years: 2.50     Types: Cigarettes    Smokeless tobacco: Never   Substance and Sexual Activity    Alcohol use: No    Drug use: No    Sexual activity: Not on file      Review of Systems   Constitutional:  Negative for chills and fever.   HENT:  Negative for ear pain and sore throat.    Eyes:  Negative for redness and visual disturbance.   Respiratory:  Positive for cough and shortness of breath. Negative for wheezing.    Cardiovascular:  Positive for leg swelling. Negative for chest pain.   Gastrointestinal:  Negative for abdominal pain, nausea and vomiting.   Genitourinary:  Negative for flank pain.   Musculoskeletal:  Negative for back pain, gait problem, neck pain and neck stiffness.   Skin:  Negative for  pallor and rash.   Neurological:  Negative for dizziness, syncope, weakness and numbness.   Psychiatric/Behavioral:  Negative for confusion and dysphoric mood.    Objective:     Vital Signs (Most Recent):  Temp: 97.6 °F (36.4 °C) (10/03/22 1115)  Pulse: 76 (10/03/22 1345)  Resp: (!) 29 (10/03/22 0948)  BP: (!) 164/86 (10/03/22 1345)  SpO2: 100 % (10/03/22 1345)   Vital Signs (24h Range):  Temp:  [97.6 °F (36.4 °C)-98.2 °F (36.8 °C)] 97.6 °F (36.4 °C)  Pulse:  [] 76  Resp:  [17-36] 29  SpO2:  [100 %] 100 %  BP: (153-204)/() 164/86   Weight: 68 kg (150 lb)  Body mass index is 24.21 kg/m².    No intake or output data in the 24 hours ending 10/03/22 1359    Physical Exam  Vitals and nursing note reviewed.   Constitutional:       Appearance: He is not toxic-appearing.   HENT:      Head: Normocephalic and atraumatic.   Eyes:      Extraocular Movements: Extraocular movements intact.      Pupils: Pupils are equal, round, and reactive to light.   Pulmonary:      Comments: Pt is mildly tachypneic but comfortable on BiPAP  Chest:      Chest wall: No tenderness.   Abdominal:      General: Abdomen is flat.      Palpations: Abdomen is soft.   Musculoskeletal:         General: Swelling present. Normal range of motion.      Cervical back: Normal range of motion and neck supple.      Comments: BLE tense, chronic venous stasis changes, non-pitting, no ulceration   Skin:     General: Skin is warm and dry.   Neurological:      General: No focal deficit present.      Mental Status: He is alert and oriented to person, place, and time.   Psychiatric:         Mood and Affect: Mood normal.         Behavior: Behavior normal.         Thought Content: Thought content normal.         Judgment: Judgment normal.       Vents:  Oxygen Concentration (%): 80 (10/03/22 0905)  Lines/Drains/Airways       Central Venous Catheter Line  Duration                  Hemodialysis Catheter 02/28/22 1300 right internal jugular 216 days               Peripheral Intravenous Line  Duration                  Peripheral IV - Single Lumen 10/03/22 0911 18 G Left Antecubital <1 day         Peripheral IV - Single Lumen 10/03/22 18 G Left Hand <1 day                  Significant Labs:    CBC/Anemia Profile:  Recent Labs   Lab 10/03/22  0915 10/03/22  0917 10/03/22  0921   WBC  --   --  21.58*   HGB  --   --  7.3*   HCT 24* 25* 24.2*   PLT  --   --  314   MCV  --   --  94   RDW  --   --  21.0*        Chemistries:  Recent Labs   Lab 10/03/22  0921      K 5.7*      CO2 21*   BUN 51*   CREATININE 6.6*   CALCIUM 9.7   ALBUMIN 3.2*   PROT 7.0   BILITOT 0.4   ALKPHOS 194*   ALT 45*   AST 58*   MG 2.1       All pertinent labs within the past 24 hours have been reviewed.    Significant Imaging: I have reviewed all pertinent imaging results/findings within the past 24 hours.

## 2022-10-03 NOTE — Clinical Note
Date: 10/3/2022  Patient: Enrique Martinez  Admitted: 10/3/2022  9:08 AM  Attending Provider: Tete Suarez MD    Enrique Martinez or his authorized caregiver has made the decision for the patient to leave the emergency department against the advice of [facility] emergency department staff. He or his authorized caregiver has been informed and understands the inherent risks, including death, permanent disability, pneumonia, volume overload, electrolyte abnormality.  He or his authorized caregiver has decided  to accept the responsibility for this decision. Enrique Martinez and all necessary parties have been advised that he may return for further evaluation or treatment. His condition at time of discharge was stable, satting well on room air.  Enrique pena had current vital signs as follows:  BP (!) 169/88   Pulse 82   Temp 97.6 °F (36.4 °C) (Oral)   Resp (!) 29   Wt 68 kg (150 lb)

## 2022-10-03 NOTE — CARE UPDATE
Consult acknowledged, immediate recs: cont BiPAP, hold K shifting agents, get CXR, will start dialysis ASAP in room ED01.   Full consult note to follow   
0

## 2022-10-03 NOTE — CONSULTS
"[facility] - Emergency Dept  Nephrology  Consult Note    Patient Name: Enrique Martinez  MRN: [id number]  Admission Date: 10/3/2022  Hospital Length of Stay: 0 days  Attending Provider: Tete Suarez MD   Primary Care Physician: Primary Doctor No  Principal Problem:<principal problem not specified>    Inpatient consult to Nephrology  Consult performed by: Rebekah Gutierrez MD  Consult ordered by: Samuel Keller MD  Reason for consult: "needs dialysis, respiratory distress"  Assessment/Recommendations: Please see consult note and staff attestation for full recommendations. Thank you!         Subjective:     HPI:   Enrique Martinez is a 57 y.o. male w/ known ESKD 2/2 CKD 2/2 idiopathic membranous nephropathy on HD since 2/2022 and CAD s/p stent (2007), hyperlipidemia, COPD, neurosyphilis (treated, 2021), GERD, BPH, cellulitis of L foot, salmonella bacteremia (2/2022), gout, hyperurecemia, anemia (iron, folate), vitamtamin D deficiency,  hydrocele, admitted on 10/3/2022 for the following:   Chief Complaint   Patient presents with    Respiratory Distress     On the way to dialysis today and became SOB, arrived on BiPAP, last dialysis on Friday         Pt states that he has been going to get his dialysis treatments on schedule every MWF and has been following a fluid restriction of <1.5L, he also reports compliance with medications (albuterol inhaler and blood pressure medication). Pt also endorses that his dialysis unit asked pt to f/u regarding low hgb. He reports worsening shortness of breath in the past 2 days, no chest pain, jaw pain or arm numbness.     Pt denies: Fever, chills, palpitations, nausea, vomiting, diarrhea, abd pain, hematuria, dysuria, urinary frequency or urgency, headaches, visual disturbances or weakness.      In the ED, pt presented with the following VS:   Initial Vitals   BP Pulse Resp Temp SpO2   10/03/22 0915 10/03/22 0909 10/03/22 0910 10/03/22 0931 10/03/22 0909   (!) 204/127 (!) 113 (!) 36 98.2 °F (36.8 °C) " "100 %     In the ED his labs were notable for troponin WBC 21.58, hgb 7.3,  0.038, BNP 2797, K 5.7, bicarb 21, BUN 51, Cr 6.6, AST/ALT 58/45, and his lactic acid was 1.2.   Recent Labs   Lab 10/03/22  0915   PH 7.273*   PO2 311*   PCO2 55.7*   HCO3 25.8   BE -1   CXR not significant congestion or pulm edema, known cardiomegaly   Pt was placed on BiPAP as he was in respiratory distress    Nephrology was consulted for: "needs dialysis, respiratory distress"    Outpatient HD Information:  -Dialysis modality: Hemodialysis  -Outpatient HD unit: [facility]  -Nephrologist: Dr. Saez   -HD TX days: Monday/Wednesday/Friday, duration of treatment: 210 mins  -Last HD TX prior to hospital admission: 09/30/2022  -Dialysis access: dialysis catheter placed on R IJ   -Vascular surgeon: referral submitted 8//30 by Dr. Saez  -Residual urine: oliguric    -EDW: 61kg    Last iHD done on 9/30 and had 2.6L of fluid removed and his post-HD weight was 67.4, no complications noted, he did have SBPs in the 160's-170's during his last treatment.         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        Past Surgical History:   Procedure Laterality Date    ABDOMINAL SURGERY  1980s    CARDIAC CATHETERIZATION  2007    x 2    COLONOSCOPY N/A 4/5/2019    Procedure: COLONOSCOPY;  Surgeon: Jose L Carty MD;  Location: [facility] ENDO ([address] [place] [address]);  Service: Colon and Rectal;  Laterality: N/A;    CORONARY STENT PLACEMENT  2007       Review of patient's allergies indicates:  No Known Allergies  Current Facility-Administered Medications   Medication Frequency    0.9%  " NaCl infusion Once    heparin (porcine) injection 1,000 Units PRN    sodium chloride 0.9% bolus 250 mL PRN     Current Outpatient Medications   Medication    albuterol (PROVENTIL/VENTOLIN HFA) 90 mcg/actuation inhaler    amlodipine (NORVASC) 10 MG tablet    ARIPiprazole (ABILIFY) 15 MG Tab    aspirin (ECOTRIN) 81 MG EC tablet    atorvastatin (LIPITOR) 80 MG tablet    azithromycin (Z-JOHANNA) 250 MG tablet    blood-glucose meter kit    cloNIDine (CATAPRES) 0.2 MG tablet    colchicine (COLCRYS) 0.6 mg tablet    famotidine (PEPCID) 20 MG tablet    folic acid (FOLVITE) 1 MG tablet    gabapentin (NEURONTIN) 100 MG capsule    hydrALAZINE (APRESOLINE) 50 MG tablet    HYDROcodone-acetaminophen (NORCO) 5-325 mg per tablet    labetaloL (NORMODYNE) 200 MG tablet    melatonin (MELATIN) 3 mg tablet    metoprolol tartrate (LOPRESSOR) 100 MG tablet    omeprazole (PRILOSEC) 20 MG capsule    ondansetron (ZOFRAN-ODT) 8 MG TbDL    polyethylene glycol (GLYCOLAX) 17 gram PwPk    RENVELA 800 mg Tab    tadalafiL (CIALIS) 2.5 mg Tab    TRUE METRIX GLUCOSE TEST STRIP Strp     Family History       Problem Relation (Age of Onset)    Drug abuse Brother    Heart attack Father, Brother    Hyperlipidemia Father    Hypertension Father, Brother    No Known Problems Sister    Stroke Father          Tobacco Use    Smoking status: Some Days     Packs/day: 0.25     Years: 10.00     Pack years: 2.50     Types: Cigarettes    Smokeless tobacco: Never   Substance and Sexual Activity    Alcohol use: No    Drug use: No    Sexual activity: Not on file     Review of Systems  Objective:     Vital Signs (Most Recent):  Temp: 98.2 °F (36.8 °C) (10/03/22 0931)  Pulse: 76 (10/03/22 1133)  Resp: (!) 29 (10/03/22 0948)  BP: (!) 155/87 (10/03/22 1132)  SpO2: 100 % (10/03/22 1133)  O2 Device (Oxygen Therapy): BiPAP (10/03/22 1132)   Vital Signs (24h Range):  Temp:  [98.2 °F (36.8 °C)] 98.2 °F (36.8 °C)  Pulse:  [] 76  Resp:  [17-36] 29  SpO2:  [100 %] 100 %  BP:  (153-204)/() 155/87     Weight: 68 kg (150 lb) (10/03/22 0943)  Body mass index is 24.21 kg/m².  Body surface area is 1.78 meters squared.    No intake/output data recorded.    Physical Exam  Vitals and nursing note reviewed.   Constitutional:       General: He is in acute distress.      Appearance: He is ill-appearing. He is not toxic-appearing or diaphoretic.   HENT:      Mouth/Throat:      Mouth: Mucous membranes are moist.   Cardiovascular:      Rate and Rhythm: Normal rate and regular rhythm.      Pulses: Normal pulses.      Heart sounds: Murmur heard.      Comments: R IJ TDC w/o discharge or pain at the site   Pulmonary:      Effort: Respiratory distress present.      Breath sounds: No stridor. Rhonchi and rales present. No wheezing.      Comments: On BiPAP, using accessory respiratory muscles   Abdominal:      General: Abdomen is flat. Bowel sounds are normal. There is no distension.      Palpations: Abdomen is soft. There is no mass.      Tenderness: There is no abdominal tenderness. There is no guarding or rebound.      Hernia: No hernia is present.   Musculoskeletal:         General: Swelling present. No tenderness, deformity or signs of injury.      Right lower leg: Edema present.      Left lower leg: Edema present.      Comments: 2+ pitting LE edema    Skin:     General: Skin is warm.      Capillary Refill: Capillary refill takes less than 2 seconds.      Coloration: Skin is not jaundiced or pale.      Findings: No bruising, erythema, lesion or rash.   Neurological:      Mental Status: He is alert and oriented to person, place, and time.      Comments: anxious   Psychiatric:         Thought Content: Thought content normal.         Judgment: Judgment normal.       Significant Labs:  CBC:   Recent Labs   Lab 10/03/22  0921   WBC 21.58*   RBC 2.58*   HGB 7.3*   HCT 24.2*      MCV 94   MCH 28.3   MCHC 30.2*     CMP:   Recent Labs   Lab 10/03/22  0921   *   CALCIUM 9.7   ALBUMIN 3.2*    PROT 7.0      K 5.7*   CO2 21*      BUN 51*   CREATININE 6.6*   ALKPHOS 194*   ALT 45*   AST 58*   BILITOT 0.4     All labs within the past 24 hours have been reviewed.    Significant Imaging:  Labs: Reviewed  X-Ray: Reviewed  Echo: Reviewed    Assessment/Plan:     ESRD on hemodialysis  2/2 Membranous nephropathy (bx proven), DM II, and HTN , on HD since 2/2022  Outpatient HD Information:  -Dialysis modality: Hemodialysis  -Outpatient HD unit: [facility]  -Nephrologist: Dr. Saez   -HD TX days: Monday/Wednesday/Friday, duration of treatment: 210 mins  -Last HD TX prior to hospital admission: 09/30/2022  -Dialysis access: dialysis catheter placed on R IJ   -Vascular surgeon: referral submitted 8//30 by Dr. Saez  -Residual urine: oliguric    -EDW: 61kg      Plan/Recommendations:   -pt is fluid overloaded but not significantly above his EDW, for which his usual dialysis treatment will done. His symptoms, clinical exam, ABG, and CXR point towards more towards a COPD exacerbation picture    iHD today (10/03/2022) with UF -3L as BP tolerates for metabolic clearance and volume management   -start some of his home blood pressure medications   -Mineral & Bone Disorder:   Phosphorus level: check level   PTH level: ~900 on 2/2022   Calcium 9.7  -ESKD anemia with acute blood loss anemia: hgb 7.3, goal hgb 10-11g/dL, pt to f/u outpatient for iron studies as his previous labs in February showed significant SHANNAN, transfuse if hgb <7  -Renal function panel & Mg level on HD days   -Renal diet with protein supplements, if not NPO  Nephrovitamins daily   -Strict I/O's and daily weights    Chronic obstructive pulmonary disease  On BiPAP    -Plan per primary team         Leukocytosis  Lab Results   Component Value Date    WBC 21.58 (H) 10/03/2022     If WBCs continue to trend up or pt develops a fever, a culture from his tunneled line should be collected.     Type 2 diabetes mellitus with chronic kidney disease on  chronic dialysis, with long-term current use of insulin  Lab Results   Component Value Date    HGBA1C 7.6 (H) 02/17/2022     -Plan per primary team       Anemia due to chronic kidney disease, on chronic dialysis  See ESKD     Membranous glomerulonephritis  Bx proven, now ESRD on HD, he is working with kidney transplant team      Consent was obtained from patient for KRT, signed consent was given to nurse to place in patient's EMR        Thank you for your consult. I will follow-up with patient. Please contact us if you have any additional questions.    Rebekah Gutierrez MD  Nephrology  [facility] - Emergency Dept    ATTENDING PHYSICIAN ATTESTATION  I have personally verified the history and examined the patient. I thoroughly reviewed the demographic, clinical, laboratorial and imaging information available in medical records. I agree with the assessment and recommendations provided by the subspecialty resident who was under my supervision.      HEMODIALYSIS NOTE  Patient evaluated while undergoing hemodialysis indicated for ESRD. Tolerating session with current UFR, no complications.

## 2022-10-03 NOTE — SUBJECTIVE & OBJECTIVE
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        Past Surgical History:   Procedure Laterality Date    ABDOMINAL SURGERY  1980s    CARDIAC CATHETERIZATION  2007    x 2    COLONOSCOPY N/A 4/5/2019    Procedure: COLONOSCOPY;  Surgeon: Jose L Carty MD;  Location: [facility] ENDO ([address] [place] [address]);  Service: Colon and Rectal;  Laterality: N/A;    CORONARY STENT PLACEMENT  2007       Review of patient's allergies indicates:  No Known Allergies  Current Facility-Administered Medications   Medication Frequency    0.9%  NaCl infusion Once    heparin (porcine) injection 1,000 Units PRN    sodium chloride 0.9% bolus 250 mL PRN     Current Outpatient Medications   Medication    albuterol (PROVENTIL/VENTOLIN HFA) 90 mcg/actuation inhaler    amlodipine (NORVASC) 10 MG tablet    ARIPiprazole (ABILIFY) 15 MG Tab    aspirin (ECOTRIN) 81 MG EC tablet    atorvastatin (LIPITOR) 80 MG tablet    azithromycin (Z-JOHANNA) 250 MG tablet    blood-glucose meter kit    cloNIDine (CATAPRES) 0.2 MG tablet    colchicine (COLCRYS) 0.6 mg tablet    famotidine (PEPCID) 20 MG tablet    folic acid (FOLVITE) 1 MG tablet    gabapentin (NEURONTIN) 100 MG capsule    hydrALAZINE (APRESOLINE) 50 MG tablet    HYDROcodone-acetaminophen (NORCO) 5-325 mg per tablet    labetaloL (NORMODYNE) 200 MG tablet    melatonin (MELATIN) 3 mg tablet    metoprolol tartrate (LOPRESSOR) 100 MG tablet    omeprazole (PRILOSEC) 20 MG capsule    ondansetron (ZOFRAN-ODT) 8 MG TbDL    polyethylene glycol (GLYCOLAX) 17 gram PwPk    RENVELA 800 mg Tab    tadalafiL (CIALIS) 2.5 mg Tab    TRUE METRIX GLUCOSE TEST STRIP  Strp     Family History       Problem Relation (Age of Onset)    Drug abuse Brother    Heart attack Father, Brother    Hyperlipidemia Father    Hypertension Father, Brother    No Known Problems Sister    Stroke Father          Tobacco Use    Smoking status: Some Days     Packs/day: 0.25     Years: 10.00     Pack years: 2.50     Types: Cigarettes    Smokeless tobacco: Never   Substance and Sexual Activity    Alcohol use: No    Drug use: No    Sexual activity: Not on file     Review of Systems  Objective:     Vital Signs (Most Recent):  Temp: 98.2 °F (36.8 °C) (10/03/22 0931)  Pulse: 76 (10/03/22 1133)  Resp: (!) 29 (10/03/22 0948)  BP: (!) 155/87 (10/03/22 1132)  SpO2: 100 % (10/03/22 1133)  O2 Device (Oxygen Therapy): BiPAP (10/03/22 1132)   Vital Signs (24h Range):  Temp:  [98.2 °F (36.8 °C)] 98.2 °F (36.8 °C)  Pulse:  [] 76  Resp:  [17-36] 29  SpO2:  [100 %] 100 %  BP: (153-204)/() 155/87     Weight: 68 kg (150 lb) (10/03/22 0943)  Body mass index is 24.21 kg/m².  Body surface area is 1.78 meters squared.    No intake/output data recorded.    Physical Exam  Vitals and nursing note reviewed.   Constitutional:       General: He is in acute distress.      Appearance: He is ill-appearing. He is not toxic-appearing or diaphoretic.   HENT:      Mouth/Throat:      Mouth: Mucous membranes are moist.   Cardiovascular:      Rate and Rhythm: Normal rate and regular rhythm.      Pulses: Normal pulses.      Heart sounds: Murmur heard.      Comments: R IJ TDC w/o discharge or pain at the site   Pulmonary:      Effort: Respiratory distress present.      Breath sounds: No stridor. Rhonchi and rales present. No wheezing.      Comments: On BiPAP, using accessory respiratory muscles   Abdominal:      General: Abdomen is flat. Bowel sounds are normal. There is no distension.      Palpations: Abdomen is soft. There is no mass.      Tenderness: There is no abdominal tenderness. There is no guarding or rebound.      Hernia:  No hernia is present.   Musculoskeletal:         General: Swelling present. No tenderness, deformity or signs of injury.      Right lower leg: Edema present.      Left lower leg: Edema present.      Comments: 2+ pitting LE edema    Skin:     General: Skin is warm.      Capillary Refill: Capillary refill takes less than 2 seconds.      Coloration: Skin is not jaundiced or pale.      Findings: No bruising, erythema, lesion or rash.   Neurological:      Mental Status: He is alert and oriented to person, place, and time.      Comments: anxious   Psychiatric:         Thought Content: Thought content normal.         Judgment: Judgment normal.       Significant Labs:  CBC:   Recent Labs   Lab 10/03/22  0921   WBC 21.58*   RBC 2.58*   HGB 7.3*   HCT 24.2*      MCV 94   MCH 28.3   MCHC 30.2*     CMP:   Recent Labs   Lab 10/03/22  0921   *   CALCIUM 9.7   ALBUMIN 3.2*   PROT 7.0      K 5.7*   CO2 21*      BUN 51*   CREATININE 6.6*   ALKPHOS 194*   ALT 45*   AST 58*   BILITOT 0.4     All labs within the past 24 hours have been reviewed.    Significant Imaging:  Labs: Reviewed  X-Ray: Reviewed  Echo: Reviewed

## 2022-10-03 NOTE — ED NOTES
I-STAT Chem-8+ Results:   Value Reference Range   Sodium 136 136-145 mmol/L   Potassium  5.6 3.5-5.1 mmol/L   Chloride 105  mmol/L   Ionized Calcium 1.30 1.06-1.42 mmol/L   CO2 (measured) 24 23-29 mmol/L   Glucose 133  mg/dL   BUN 51 6-30 mg/dL   Creatinine 6.5 0.5-1.4 mg/dL   Hematocrit 25 36-54%

## 2022-10-03 NOTE — HPI
Mr Juan is a 57yom with a history of ESRD on HD, M/W/F, DMII, CAD w/ stents, gout and gerd presenting with CHF exacerbation likely 2/t CAP. The pt says he was seen in OP clinic with shortness of breath and cough and was diagnosed with CAP and given 4 days of azithromycin. He says that he attended his dialysis session on Friday without issues, but says that his shortness of breath got acutely worse today. Pt denies any CP, abdominal pain, n/v/d or dysuria.    In the ED: Labs were significant for WBC 21.58, BNP 2,797, Trop 0.038, VBG on bipap showed isolated respiratory acidosis. CXR showed vascular congestion and possible R Pleural effusion. CTA chest pending to r/o PE. Pt receiving HD in the ED with 3L UF goal. After HD and 3L removal, pt was weaned to RA.    Critical care was reconsulted for hypertensive emergency with pulmonary edema, on BiPAP and nitro gtt. The pt's blood pressure was controlled with nitro and the pts respiratory status improved. He was brought up to the unit and started on his oral antihypertensives.

## 2022-10-03 NOTE — ED PROVIDER NOTES
Encounter Date: 10/3/2022       History     Chief Complaint   Patient presents with    Respiratory Distress     On the way to dialysis today and became SOB, arrived on BiPAP, last dialysis on Friday     57-year-old male with history of diabetes, CKD (M/W/F), and hypertension who presents to the ED via EMS for respiratory distress.  Patient was on his way to dialysis today when he became short of breath.  His oxygen saturation dropped to the 40s to 50s.  Patient was then put on BiPAP.  He does not normally use oxygen at home.  Patient uses albuterol which normally helps with his shortness of breath.  He also endorses cough.  Patient patient denies fevers, chills, abdominal pain, nausea, vomiting, and difficulty urinating.    The history is provided by the EMS personnel. No  was used.   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      Past Surgical History:   Procedure Laterality Date    ABDOMINAL SURGERY  1980s    CARDIAC CATHETERIZATION  2007    x 2    COLONOSCOPY N/A 4/5/2019    Procedure: COLONOSCOPY;  Surgeon: Jose L Carty MD;  Location: [facility] ([address] [place] [address]);  Service: Colon and Rectal;  Laterality: N/A;    CORONARY STENT PLACEMENT  2007     Family History   Problem Relation Age of Onset    Hypertension Father     Stroke Father     Heart attack Father     Hyperlipidemia Father     No Known Problems Sister     Drug abuse Brother     Heart attack  Brother     Hypertension Brother      Social History     Tobacco Use    Smoking status: Some Days     Packs/day: 0.25     Years: 10.00     Pack years: 2.50     Types: Cigarettes    Smokeless tobacco: Never   Substance Use Topics    Alcohol use: No    Drug use: No     Review of Systems   Constitutional:  Negative for chills and fever.   HENT:  Negative for congestion and rhinorrhea.    Respiratory:  Positive for cough and shortness of breath.    Cardiovascular:  Negative for chest pain.   Gastrointestinal:  Negative for abdominal pain, nausea and vomiting.   Genitourinary:  Negative for difficulty urinating and dysuria.   Skin:  Negative for rash.   Allergic/Immunologic: Negative for environmental allergies and food allergies.     Physical Exam     Initial Vitals   BP Pulse Resp Temp SpO2   10/03/22 0915 10/03/22 0909 10/03/22 0910 10/03/22 0931 10/03/22 0909   (!) 204/127 (!) 113 (!) 36 98.2 °F (36.8 °C) 100 %      MAP       --                Physical Exam    Nursing note and vitals reviewed.  Constitutional: He appears distressed.   Patient is on BiPAP but is able to answer questions.   HENT:   Head: Normocephalic.   Eyes: Conjunctivae are normal. No scleral icterus.   Neck: Neck supple.   Normal range of motion.  Cardiovascular:  Normal rate, regular rhythm and normal heart sounds.           Pulmonary/Chest: Breath sounds normal. He is in respiratory distress.   Crackles heard on auscultation.   Abdominal: Abdomen is soft. He exhibits no distension. There is no abdominal tenderness. There is no rebound and no guarding.   Musculoskeletal:         General: Normal range of motion.      Cervical back: Normal range of motion and neck supple.     Neurological: He is alert and oriented to person, place, and time. GCS score is 15. GCS eye subscore is 4. GCS verbal subscore is 5. GCS motor subscore is 6.   Skin: Skin is warm. Capillary refill takes less than 2 seconds. No rash noted.   Psychiatric: He has a normal mood and  affect.       ED Course   Procedures  Labs Reviewed   CBC W/ AUTO DIFFERENTIAL - Abnormal; Notable for the following components:       Result Value    WBC 21.58 (*)     RBC 2.58 (*)     Hemoglobin 7.3 (*)     Hematocrit 24.2 (*)     MCHC 30.2 (*)     RDW 21.0 (*)     MPV 8.8 (*)     Gran # (ANC) 18.0 (*)     Immature Grans (Abs) 0.11 (*)     Mono # 1.2 (*)     Gran % 83.3 (*)     Lymph % 8.9 (*)     All other components within normal limits   COMPREHENSIVE METABOLIC PANEL - Abnormal; Notable for the following components:    Potassium 5.7 (*)     CO2 21 (*)     Glucose 123 (*)     BUN 51 (*)     Creatinine 6.6 (*)     Albumin 3.2 (*)     Alkaline Phosphatase 194 (*)     AST 58 (*)     ALT 45 (*)     eGFR 9.0 (*)     All other components within normal limits   TROPONIN I - Abnormal; Notable for the following components:    Troponin I 0.038 (*)     All other components within normal limits   B-TYPE NATRIURETIC PEPTIDE - Abnormal; Notable for the following components:    BNP 2,797 (*)     All other components within normal limits   ISTAT PROCEDURE - Abnormal; Notable for the following components:    POC PH 7.273 (*)     POC PCO2 55.7 (*)     POC PO2 311 (*)     POC Potassium 5.7 (*)     POC Hematocrit 24 (*)     All other components within normal limits   ISTAT PROCEDURE - Abnormal; Notable for the following components:    POC Glucose 133 (*)     POC BUN 51 (*)     POC Creatinine 6.5 (*)     POC Potassium 5.6 (*)     POC Hematocrit 25 (*)     All other components within normal limits   ISTAT PROCEDURE - Abnormal; Notable for the following components:    POC SATURATED O2 86 (*)     All other components within normal limits   CULTURE, BLOOD    Narrative:     Aerobic and anaerobic   CULTURE, BLOOD    Narrative:     Aerobic and anaerobic   INFLUENZA A & B BY MOLECULAR   LACTIC ACID, PLASMA   PROTIME-INR   MAGNESIUM   SARS-COV-2 RNA AMPLIFICATION, QUAL   LIPASE   LACTIC ACID, PLASMA   URINALYSIS, REFLEX TO URINE CULTURE    RESPIRATORY PATHOGENS PANEL (TEM-PCR)   HEMOGLOBIN A1C   POCT GLUCOSE MONITORING CONTINUOUS          Imaging Results              CTA Chest Non-Coronary (PE Studies) (Final result)  Result time 10/03/22 17:06:12      Final result by Mendel Shah MD (10/03/22 17:06:12)                   Impression:      No pulmonary thromboembolism to the level of the segmental arteries.    Bilateral small pleural effusions with atelectasis and/or consolidation at the lung bases.    Cardiomegaly.    Emphysema.    Electronically signed by resident: Sy Jacob  Date:    10/03/2022  Time:    16:31    Electronically signed by: Mendel Shah MD  Date:    10/03/2022  Time:    17:06               Narrative:    EXAMINATION:  CTA CHEST NON CORONARY (PE STUDIES)    CLINICAL HISTORY:  Pulmonary embolism (PE) suspected, high prob;    TECHNIQUE:  During intravenous bolus injection of 75 ml of Omnipaque 350 contrast medium and using low dose technique, the chest was surveyed from above the pulmonary apices through the posterior costophrenic angles data was reconstructed for multiplanar images in axial, sagittal and coronal planes and for maximal intensity projection images in the the axial, sagittal and coronal planes.    COMPARISON:  None    FINDINGS:  Base of Neck: Extensive metallic artifact at the base of the neck.  Tunneled right IJ central venous catheter with tip in the SVC.    Aorta: Left-sided aortic arch with 3 arterial branches.  The aorta maintains normal caliber, contour and course. There is moderate calcifications of the thoracic aorta.    Heart/pericardium: Enlarged.  There is mild calcifications of the coronary arteries.  No pericardial effusion.    Pulmonary arteries: Pulmonary arteries distribute normally.  Pulmonary arteries are sufficiently opacified for diagnostic assessment.  There is no pulmonary thromboembolism or other filling defect to the level of the segmental arteries.    Pulmonary veins: Within normal  limits.    Sloane/Mediastinum: No pathologic jorge enlargement.    Esophagus: No significant abnormality.    Thoracic soft tissues: No significant abnormality.    Upper Abdomen: Left renal simple cyst.  Trace ascites.    Bones: Degenerative changes of the spine , including vacuum disc phenomenon and osteophyte production of the lower thoracic spine.  No acute fracture. No suspicious lytic or sclerotic lesion.    Airways: Trachea and proximal airways are patent.    Lungs/Pleura: There is an area of ground-glass attenuation within the anterior aspect of the left upper lobe suggest mild airways inflammation..  Bilateral small pleural effusions with associated compressive atelectasis.  Pleural fluid extends along the thoracic aorta the left.  Bilateral bandlike opacities, likely representing subsegmental atelectasis versus scarring.    Moderate centrilobular and paraseptal emphysematous changes of the lungs.                                       X-Ray Chest AP Portable (Final result)  Result time 10/03/22 10:28:56      Final result by Amandeep Diamond MD (10/03/22 10:28:56)                   Impression:      Allowing for differences in projection and patient positioning, there has been no significant detrimental interval change in the appearance of the chest since 09/16/2022 at 20:13.      Electronically signed by: Amandeep Diamond MD  Date:    10/03/2022  Time:    10:28               Narrative:    EXAMINATION:  XR CHEST AP PORTABLE    CLINICAL HISTORY:  Sepsis;    COMPARISON:  Comparison is made to 09/16/2022 at 20:13.    FINDINGS:  Vascular access catheter has its tip in the superior vena cava.  Allowing for magnification of the cardiomediastinal silhouette related to projection, the heart size and the appearance of the cardiomediastinal silhouette are unchanged since the examination referenced above, note again being made of some prominent tortuosity of the thoracic aorta.  Lung zones also appear stable, with no significant  superimposed airspace consolidation or volume loss evident.  Continued blunting of the right costophrenic sulcus is seen, which may represent a persistent small volume of pleural fluid on this side.  No pleural fluid on the left.  No pneumothorax.                                       Medications   sodium chloride 0.9% bolus 250 mL (has no administration in time range)   heparin (porcine) injection 1,000 Units (1,000 Units Intra-Catheter Given 10/3/22 1129)   acetaminophen tablet 1,000 mg (1,000 mg Oral Not Given 10/3/22 1530)   nitroGLYCERIN 50 mg in dextrose 5 % 250 mL infusion (TITRATING) (175 mcg/min Intravenous Rate/Dose Change 10/3/22 1931)   sodium chloride 0.9% flush 10 mL (has no administration in time range)   heparin (porcine) injection 5,000 Units (has no administration in time range)   amLODIPine tablet 10 mg (has no administration in time range)   aspirin EC tablet 81 mg (has no administration in time range)   cloNIDine tablet 0.4 mg (has no administration in time range)   furosemide tablet 40 mg (has no administration in time range)   hydrALAZINE tablet 50 mg (has no administration in time range)   labetaloL tablet 200 mg (has no administration in time range)   pantoprazole EC tablet 40 mg (has no administration in time range)   sevelamer carbonate tablet 800 mg (has no administration in time range)   albuterol inhaler 2 puff (has no administration in time range)   albuterol-ipratropium 2.5 mg-0.5 mg/3 mL nebulizer solution 3 mL (3 mLs Nebulization Given 10/3/22 1929)   glucose chewable tablet 16 g (has no administration in time range)   glucose chewable tablet 24 g (has no administration in time range)   dextrose 10% bolus 125 mL (has no administration in time range)   dextrose 10% bolus 250 mL (has no administration in time range)   glucagon (human recombinant) injection 1 mg (has no administration in time range)   cefTRIAXone (ROCEPHIN) 1 g/50 mL D5W IVPB (has no administration in time range)    insulin aspart U-100 pen 0-5 Units (has no administration in time range)   doxycycline tablet 100 mg (has no administration in time range)   calcium gluconate 1g in NS 50mL (ready to mix system) (0 g Intravenous Stopped 10/3/22 1142)   0.9%  NaCl infusion (0 mL/hr Intravenous Stopped 10/3/22 1506)   cefTRIAXone (ROCEPHIN) 1 g/50 mL D5W IVPB (0 g Intravenous Stopped 10/3/22 1526)   doxycycline (VIBRAMYCIN) 100 mg in dextrose 5 % 250 mL IVPB (0 mg Intravenous Stopped 10/3/22 1814)   iohexoL (OMNIPAQUE 350) injection 75 mL (75 mLs Intravenous Given 10/3/22 1615)     Medical Decision Making:   Initial Assessment:   57-year-old male with history of diabetes, CKD (M/W/F), and hypertension presents to the ED via EMS for respiratory distress.  Patient is currently on BiPAP.  He is afebrile.  Differential Diagnosis:   - Fluid overload:  Patient has not received dialysis since Friday and his lungs may be congested.  Will evaluate with a chest x-ray and BNP.  - Electrolyte imbalance:  Will evaluate with a CMP.  - PE:  Low suspicion for this diagnosis.  Patient has no history of DVT or unilateral leg swelling on exam.  - ACS:  Low suspicion for this diagnosis.  Patient has SOB, but no chest pain, nausea, or vomiting.  Clinical Tests:   Lab Tests: Ordered and Reviewed  Radiological Study: Ordered and Reviewed  Medical Tests: Ordered and Reviewed  ED Management:  Patient presents with respiratory distress.  Obtained history and physical exam.  Ordered an EKG which shows sinus rhythm with no ST elevations.  Ordered labs for CBC, CMP, magnesium, troponin, and BNP.  Troponin and BNP are elevated which is consistent for fluid overload.  Patient has hyperkalemia.  Ordered chest x-ray which shows possible right pleural effusion.  Administered doxycycline and Rocephin for antibacterial coverage.  Discussed patient with Nephrology.  Plan for patient to receive dialysis.  Please refer to ED Course for additional details.    Consulted  and discussed patient with the critical care team.  Plan for patient to be weaned off of BiPAP.  Patient was switched to oxygen on nasal cannula and was saturating well at %.  Patient was reassessed.  Upon talking with patient, he started to desaturate and had increased work of breathing.  Patient was immediately put back on BiPAP with a nitroglycerin drip.  Patient will be admitted to MICU in the setting of his hypoxia.           ED Course as of 10/03/22 2001   Mon Oct 03, 2022   0959 Consulted and discussed patient with Nephrology.  Nephrology team recommends not to shift potassium since plan for patient is to be dialyzed.  Nephrology team will evaluate the patient. [MD]   1131 WBC(!): 21.58 [MD]   1131 Potassium(!): 5.7 [MD]   1131 BNP(!): 2,797 [MD]   1132 Troponin I(!): 0.038 [MD]   1132 Discussed patient with critical care team.  Critical care care team will come to evaluate patient. [MD]      ED Course User Index  [MD] Samuel Keller MD                 Clinical Impression:   Final diagnoses:  [R06.02] Shortness of breath  [J96.01] Acute respiratory failure with hypoxia (Primary)        ED Disposition Condition    Admit                 Samuel Kellre MD  Resident  10/03/22 2002

## 2022-10-04 LAB
ALBUMIN SERPL BCP-MCNC: 2.6 G/DL (ref 3.5–5.2)
ALP SERPL-CCNC: 148 U/L (ref 55–135)
ALT SERPL W/O P-5'-P-CCNC: 31 U/L (ref 10–44)
ANION GAP SERPL CALC-SCNC: 12 MMOL/L (ref 8–16)
AST SERPL-CCNC: 27 U/L (ref 10–40)
BASOPHILS # BLD AUTO: 0.03 K/UL (ref 0–0.2)
BASOPHILS # BLD AUTO: 0.03 K/UL (ref 0–0.2)
BASOPHILS NFR BLD: 0.4 % (ref 0–1.9)
BASOPHILS NFR BLD: 0.5 % (ref 0–1.9)
BILIRUB SERPL-MCNC: 0.3 MG/DL (ref 0.1–1)
BLD PROD TYP BPU: NORMAL
BLOOD UNIT EXPIRATION DATE: NORMAL
BLOOD UNIT TYPE CODE: 9500
BLOOD UNIT TYPE: NORMAL
BUN SERPL-MCNC: 32 MG/DL (ref 6–20)
CALCIUM SERPL-MCNC: 8.9 MG/DL (ref 8.7–10.5)
CHLORIDE SERPL-SCNC: 103 MMOL/L (ref 95–110)
CO2 SERPL-SCNC: 21 MMOL/L (ref 23–29)
CODING SYSTEM: NORMAL
CREAT SERPL-MCNC: 4.2 MG/DL (ref 0.5–1.4)
DIFFERENTIAL METHOD: ABNORMAL
DIFFERENTIAL METHOD: ABNORMAL
DISPENSE STATUS: NORMAL
EOSINOPHIL # BLD AUTO: 0.2 K/UL (ref 0–0.5)
EOSINOPHIL # BLD AUTO: 0.2 K/UL (ref 0–0.5)
EOSINOPHIL NFR BLD: 2.3 % (ref 0–8)
EOSINOPHIL NFR BLD: 2.4 % (ref 0–8)
ERYTHROCYTE [DISTWIDTH] IN BLOOD BY AUTOMATED COUNT: 18.7 % (ref 11.5–14.5)
ERYTHROCYTE [DISTWIDTH] IN BLOOD BY AUTOMATED COUNT: 19.2 % (ref 11.5–14.5)
EST. GFR  (NO RACE VARIABLE): 15.5 ML/MIN/1.73 M^2
GLUCOSE SERPL-MCNC: 134 MG/DL (ref 70–110)
HCT VFR BLD AUTO: 21.3 % (ref 40–54)
HCT VFR BLD AUTO: 22.9 % (ref 40–54)
HGB BLD-MCNC: 6.8 G/DL (ref 14–18)
HGB BLD-MCNC: 7.2 G/DL (ref 14–18)
IMM GRANULOCYTES # BLD AUTO: 0.02 K/UL (ref 0–0.04)
IMM GRANULOCYTES # BLD AUTO: 0.02 K/UL (ref 0–0.04)
IMM GRANULOCYTES NFR BLD AUTO: 0.2 % (ref 0–0.5)
IMM GRANULOCYTES NFR BLD AUTO: 0.3 % (ref 0–0.5)
LYMPHOCYTES # BLD AUTO: 0.8 K/UL (ref 1–4.8)
LYMPHOCYTES # BLD AUTO: 1.1 K/UL (ref 1–4.8)
LYMPHOCYTES NFR BLD: 12.6 % (ref 18–48)
LYMPHOCYTES NFR BLD: 13 % (ref 18–48)
MAGNESIUM SERPL-MCNC: 1.9 MG/DL (ref 1.6–2.6)
MCH RBC QN AUTO: 28.6 PG (ref 27–31)
MCH RBC QN AUTO: 28.7 PG (ref 27–31)
MCHC RBC AUTO-ENTMCNC: 31.4 G/DL (ref 32–36)
MCHC RBC AUTO-ENTMCNC: 31.9 G/DL (ref 32–36)
MCV RBC AUTO: 90 FL (ref 82–98)
MCV RBC AUTO: 91 FL (ref 82–98)
MONOCYTES # BLD AUTO: 0.5 K/UL (ref 0.3–1)
MONOCYTES # BLD AUTO: 0.8 K/UL (ref 0.3–1)
MONOCYTES NFR BLD: 10.1 % (ref 4–15)
MONOCYTES NFR BLD: 7.4 % (ref 4–15)
NEUTROPHILS # BLD AUTO: 5.1 K/UL (ref 1.8–7.7)
NEUTROPHILS # BLD AUTO: 6.2 K/UL (ref 1.8–7.7)
NEUTROPHILS NFR BLD: 73.9 % (ref 38–73)
NEUTROPHILS NFR BLD: 76.9 % (ref 38–73)
NRBC BLD-RTO: 0 /100 WBC
NRBC BLD-RTO: 0 /100 WBC
NUM UNITS TRANS PACKED RBC: NORMAL
PHOSPHATE SERPL-MCNC: 2.4 MG/DL (ref 2.7–4.5)
PLATELET # BLD AUTO: 208 K/UL (ref 150–450)
PLATELET # BLD AUTO: 226 K/UL (ref 150–450)
PMV BLD AUTO: 8.7 FL (ref 9.2–12.9)
PMV BLD AUTO: 9.2 FL (ref 9.2–12.9)
POCT GLUCOSE: 138 MG/DL (ref 70–110)
POCT GLUCOSE: 186 MG/DL (ref 70–110)
POTASSIUM SERPL-SCNC: 4.8 MMOL/L (ref 3.5–5.1)
PROT SERPL-MCNC: 5.7 G/DL (ref 6–8.4)
RBC # BLD AUTO: 2.38 M/UL (ref 4.6–6.2)
RBC # BLD AUTO: 2.51 M/UL (ref 4.6–6.2)
SODIUM SERPL-SCNC: 136 MMOL/L (ref 136–145)
T4 FREE SERPL-MCNC: 0.88 NG/DL (ref 0.71–1.51)
TSH SERPL DL<=0.005 MIU/L-ACNC: 0.33 UIU/ML (ref 0.4–4)
WBC # BLD AUTO: 6.58 K/UL (ref 3.9–12.7)
WBC # BLD AUTO: 8.33 K/UL (ref 3.9–12.7)

## 2022-10-04 PROCEDURE — 94761 N-INVAS EAR/PLS OXIMETRY MLT: CPT

## 2022-10-04 PROCEDURE — 99233 PR SUBSEQUENT HOSPITAL CARE,LEVL III: ICD-10-PCS | Mod: ,,, | Performed by: INTERNAL MEDICINE

## 2022-10-04 PROCEDURE — 99233 PR SUBSEQUENT HOSPITAL CARE,LEVL III: ICD-10-PCS | Mod: ,,, | Performed by: NURSE PRACTITIONER

## 2022-10-04 PROCEDURE — 20000000 HC ICU ROOM

## 2022-10-04 PROCEDURE — 63600175 PHARM REV CODE 636 W HCPCS: Performed by: STUDENT IN AN ORGANIZED HEALTH CARE EDUCATION/TRAINING PROGRAM

## 2022-10-04 PROCEDURE — 99233 SBSQ HOSP IP/OBS HIGH 50: CPT | Mod: ,,, | Performed by: NURSE PRACTITIONER

## 2022-10-04 PROCEDURE — 82088 ASSAY OF ALDOSTERONE: CPT | Performed by: STUDENT IN AN ORGANIZED HEALTH CARE EDUCATION/TRAINING PROGRAM

## 2022-10-04 PROCEDURE — 83835 ASSAY OF METANEPHRINES: CPT | Performed by: STUDENT IN AN ORGANIZED HEALTH CARE EDUCATION/TRAINING PROGRAM

## 2022-10-04 PROCEDURE — 25000242 PHARM REV CODE 250 ALT 637 W/ HCPCS: Performed by: STUDENT IN AN ORGANIZED HEALTH CARE EDUCATION/TRAINING PROGRAM

## 2022-10-04 PROCEDURE — 84100 ASSAY OF PHOSPHORUS: CPT | Performed by: STUDENT IN AN ORGANIZED HEALTH CARE EDUCATION/TRAINING PROGRAM

## 2022-10-04 PROCEDURE — 80053 COMPREHEN METABOLIC PANEL: CPT | Performed by: STUDENT IN AN ORGANIZED HEALTH CARE EDUCATION/TRAINING PROGRAM

## 2022-10-04 PROCEDURE — 25000003 PHARM REV CODE 250: Performed by: INTERNAL MEDICINE

## 2022-10-04 PROCEDURE — 99900035 HC TECH TIME PER 15 MIN (STAT)

## 2022-10-04 PROCEDURE — 27000221 HC OXYGEN, UP TO 24 HOURS

## 2022-10-04 PROCEDURE — 84443 ASSAY THYROID STIM HORMONE: CPT | Performed by: STUDENT IN AN ORGANIZED HEALTH CARE EDUCATION/TRAINING PROGRAM

## 2022-10-04 PROCEDURE — P9016 RBC LEUKOCYTES REDUCED: HCPCS | Performed by: STUDENT IN AN ORGANIZED HEALTH CARE EDUCATION/TRAINING PROGRAM

## 2022-10-04 PROCEDURE — 25000003 PHARM REV CODE 250: Performed by: STUDENT IN AN ORGANIZED HEALTH CARE EDUCATION/TRAINING PROGRAM

## 2022-10-04 PROCEDURE — 83735 ASSAY OF MAGNESIUM: CPT | Performed by: STUDENT IN AN ORGANIZED HEALTH CARE EDUCATION/TRAINING PROGRAM

## 2022-10-04 PROCEDURE — 85025 COMPLETE CBC W/AUTO DIFF WBC: CPT | Performed by: STUDENT IN AN ORGANIZED HEALTH CARE EDUCATION/TRAINING PROGRAM

## 2022-10-04 PROCEDURE — 84439 ASSAY OF FREE THYROXINE: CPT | Performed by: STUDENT IN AN ORGANIZED HEALTH CARE EDUCATION/TRAINING PROGRAM

## 2022-10-04 PROCEDURE — 80100014 HC HEMODIALYSIS 1:1

## 2022-10-04 PROCEDURE — 25000003 PHARM REV CODE 250: Performed by: EMERGENCY MEDICINE

## 2022-10-04 PROCEDURE — 99233 SBSQ HOSP IP/OBS HIGH 50: CPT | Mod: ,,, | Performed by: INTERNAL MEDICINE

## 2022-10-04 PROCEDURE — 94640 AIRWAY INHALATION TREATMENT: CPT

## 2022-10-04 PROCEDURE — 85025 COMPLETE CBC W/AUTO DIFF WBC: CPT | Mod: 91 | Performed by: INTERNAL MEDICINE

## 2022-10-04 RX ORDER — MUPIROCIN 20 MG/G
OINTMENT TOPICAL 2 TIMES DAILY
Status: DISCONTINUED | OUTPATIENT
Start: 2022-10-04 | End: 2022-10-06 | Stop reason: HOSPADM

## 2022-10-04 RX ORDER — LIDOCAINE AND PRILOCAINE 25; 25 MG/G; MG/G
CREAM TOPICAL 2 TIMES DAILY PRN
Status: DISCONTINUED | OUTPATIENT
Start: 2022-10-04 | End: 2022-10-06 | Stop reason: HOSPADM

## 2022-10-04 RX ORDER — HYDROCODONE BITARTRATE AND ACETAMINOPHEN 5; 325 MG/1; MG/1
1 TABLET ORAL ONCE
Status: COMPLETED | OUTPATIENT
Start: 2022-10-04 | End: 2022-10-04

## 2022-10-04 RX ORDER — VALSARTAN 80 MG/1
80 TABLET ORAL DAILY
Status: DISCONTINUED | OUTPATIENT
Start: 2022-10-04 | End: 2022-10-05

## 2022-10-04 RX ORDER — HYDRALAZINE HYDROCHLORIDE 50 MG/1
50 TABLET, FILM COATED ORAL ONCE
Status: COMPLETED | OUTPATIENT
Start: 2022-10-04 | End: 2022-10-04

## 2022-10-04 RX ORDER — SODIUM CHLORIDE 9 MG/ML
INJECTION, SOLUTION INTRAVENOUS ONCE
Status: DISCONTINUED | OUTPATIENT
Start: 2022-10-04 | End: 2022-10-05

## 2022-10-04 RX ORDER — HYDRALAZINE HYDROCHLORIDE 50 MG/1
100 TABLET, FILM COATED ORAL EVERY 8 HOURS
Status: DISCONTINUED | OUTPATIENT
Start: 2022-10-04 | End: 2022-10-06 | Stop reason: HOSPADM

## 2022-10-04 RX ORDER — LIDOCAINE AND PRILOCAINE 25; 25 MG/G; MG/G
CREAM TOPICAL 2 TIMES DAILY PRN
COMMUNITY
Start: 2022-09-09

## 2022-10-04 RX ORDER — CLONIDINE 0.3 MG/24H
1 PATCH, EXTENDED RELEASE TRANSDERMAL
Status: DISCONTINUED | OUTPATIENT
Start: 2022-10-04 | End: 2022-10-06 | Stop reason: HOSPADM

## 2022-10-04 RX ORDER — GABAPENTIN 100 MG/1
100 CAPSULE ORAL DAILY
Status: DISCONTINUED | OUTPATIENT
Start: 2022-10-04 | End: 2022-10-06 | Stop reason: HOSPADM

## 2022-10-04 RX ADMIN — MUPIROCIN: 20 OINTMENT TOPICAL at 08:10

## 2022-10-04 RX ADMIN — HYDROCODONE BITARTRATE AND ACETAMINOPHEN 1 TABLET: 5; 325 TABLET ORAL at 05:10

## 2022-10-04 RX ADMIN — HEPARIN SODIUM 5000 UNITS: 5000 INJECTION INTRAVENOUS; SUBCUTANEOUS at 01:10

## 2022-10-04 RX ADMIN — FUROSEMIDE 40 MG: 40 TABLET ORAL at 08:10

## 2022-10-04 RX ADMIN — LIDOCAINE AND PRILOCAINE: 25; 25 CREAM TOPICAL at 09:10

## 2022-10-04 RX ADMIN — CLONIDINE HYDROCHLORIDE 0.4 MG: 0.1 TABLET ORAL at 02:10

## 2022-10-04 RX ADMIN — ASPIRIN 81 MG: 81 TABLET, COATED ORAL at 08:10

## 2022-10-04 RX ADMIN — IPRATROPIUM BROMIDE AND ALBUTEROL SULFATE 3 ML: 2.5; .5 SOLUTION RESPIRATORY (INHALATION) at 03:10

## 2022-10-04 RX ADMIN — HYDRALAZINE HYDROCHLORIDE 50 MG: 50 TABLET ORAL at 03:10

## 2022-10-04 RX ADMIN — LABETALOL HYDROCHLORIDE 200 MG: 100 TABLET, FILM COATED ORAL at 08:10

## 2022-10-04 RX ADMIN — CEFTRIAXONE 1 G: 1 INJECTION, SOLUTION INTRAVENOUS at 08:10

## 2022-10-04 RX ADMIN — IPRATROPIUM BROMIDE AND ALBUTEROL SULFATE 3 ML: 2.5; .5 SOLUTION RESPIRATORY (INHALATION) at 08:10

## 2022-10-04 RX ADMIN — HYDRALAZINE HYDROCHLORIDE 50 MG: 50 TABLET ORAL at 01:10

## 2022-10-04 RX ADMIN — PANTOPRAZOLE SODIUM 40 MG: 40 TABLET, DELAYED RELEASE ORAL at 08:10

## 2022-10-04 RX ADMIN — LIDOCAINE AND PRILOCAINE: 25; 25 CREAM TOPICAL at 11:10

## 2022-10-04 RX ADMIN — HEPARIN SODIUM 5000 UNITS: 5000 INJECTION INTRAVENOUS; SUBCUTANEOUS at 05:10

## 2022-10-04 RX ADMIN — VALSARTAN 80 MG: 80 TABLET, FILM COATED ORAL at 05:10

## 2022-10-04 RX ADMIN — HYDROCODONE BITARTRATE AND ACETAMINOPHEN 1 TABLET: 5; 325 TABLET ORAL at 08:10

## 2022-10-04 RX ADMIN — DOXYCYCLINE HYCLATE 100 MG: 100 TABLET, COATED ORAL at 03:10

## 2022-10-04 RX ADMIN — HYDRALAZINE HYDROCHLORIDE 50 MG: 50 TABLET ORAL at 05:10

## 2022-10-04 RX ADMIN — IPRATROPIUM BROMIDE AND ALBUTEROL SULFATE 3 ML: 2.5; .5 SOLUTION RESPIRATORY (INHALATION) at 11:10

## 2022-10-04 RX ADMIN — HEPARIN SODIUM 1000 UNITS: 1000 INJECTION, SOLUTION INTRAVENOUS; SUBCUTANEOUS at 12:10

## 2022-10-04 RX ADMIN — AMLODIPINE BESYLATE 10 MG: 10 TABLET ORAL at 08:10

## 2022-10-04 RX ADMIN — CLONIDINE HYDROCHLORIDE 0.4 MG: 0.1 TABLET ORAL at 08:10

## 2022-10-04 RX ADMIN — NITROGLYCERIN 160 MCG/MIN: 20 INJECTION INTRAVENOUS at 09:10

## 2022-10-04 RX ADMIN — SEVELAMER CARBONATE 800 MG: 800 TABLET, FILM COATED ORAL at 08:10

## 2022-10-04 RX ADMIN — CLONIDINE 1 PATCH: 0.3 PATCH TRANSDERMAL at 09:10

## 2022-10-04 RX ADMIN — HYDRALAZINE HYDROCHLORIDE 100 MG: 50 TABLET ORAL at 09:10

## 2022-10-04 RX ADMIN — GABAPENTIN 100 MG: 100 CAPSULE ORAL at 05:10

## 2022-10-04 RX ADMIN — HEPARIN SODIUM 5000 UNITS: 5000 INJECTION INTRAVENOUS; SUBCUTANEOUS at 09:10

## 2022-10-04 NOTE — SUBJECTIVE & OBJECTIVE
Interval History:   HD completed on yesterday. Net UF 2 L. SOB initially improved, but decompensated requiring BiPAP and Nitro for BP control (SBP > 270s).   Patient agitated on exam, threatening to leave AMA today. Appear tachypneic on exam. Remain on Nitro infusion.    Review of patient's allergies indicates:  No Known Allergies  Current Facility-Administered Medications   Medication Frequency    0.9%  NaCl infusion (for blood administration) Q24H PRN    0.9%  NaCl infusion Once    albuterol inhaler 2 puff Q6H PRN    albuterol-ipratropium 2.5 mg-0.5 mg/3 mL nebulizer solution 3 mL Q4H WAKE    amLODIPine tablet 10 mg Daily    aspirin EC tablet 81 mg Daily    cefTRIAXone (ROCEPHIN) 1 g/50 mL D5W IVPB Q24H    cloNIDine tablet 0.4 mg TID    dextrose 10% bolus 125 mL PRN    dextrose 10% bolus 250 mL PRN    doxycycline tablet 100 mg Q12H    furosemide tablet 40 mg BID    glucagon (human recombinant) injection 1 mg PRN    glucose chewable tablet 16 g PRN    glucose chewable tablet 24 g PRN    heparin (porcine) injection 1,000 Units PRN    heparin (porcine) injection 5,000 Units Q8H    hydrALAZINE tablet 50 mg Q8H    influenza (QUADRIVALENT PF) vaccine 0.5 mL Prior to discharge    insulin aspart U-100 pen 0-5 Units QID (AC + HS) PRN    labetaloL tablet 200 mg Q12H    mupirocin 2 % ointment BID    nitroGLYCERIN 50 mg in dextrose 5 % 250 mL infusion (TITRATING) Continuous    pantoprazole EC tablet 40 mg Daily    sodium chloride 0.9% bolus 250 mL PRN    sodium chloride 0.9% flush 10 mL PRN       Objective:     Vital Signs (Most Recent):  Temp: 98.1 °F (36.7 °C) (10/04/22 0701)  Pulse: 80 (10/04/22 0900)  Resp: (!) 24 (10/04/22 0900)  BP: (!) 165/85 (10/04/22 0900)  SpO2: (!) 94 % (10/04/22 0900)  O2 Device (Oxygen Therapy): nasal cannula (10/04/22 0809)   Vital Signs (24h Range):  Temp:  [96.6 °F (35.9 °C)-98.7 °F (37.1 °C)] 98.1 °F (36.7 °C)  Pulse:  [] 80  Resp:  [12-79] 24  SpO2:  [92 %-100 %] 94 %  BP:  (128-284)/() 165/85     Weight: 68 kg (149 lb 14.6 oz) (10/04/22 0006)  Body mass index is 24.2 kg/m².  Body surface area is 1.78 meters squared.    I/O last 3 completed shifts:  In: 2525 [P.O.:860; I.V.:640; Blood:420; Other:300; IV Piggyback:305]  Out: 3840 [Urine:240; Other:3600]    Physical Exam  Vitals and nursing note reviewed.   Constitutional:       General: He is not in acute distress.     Appearance: He is ill-appearing. He is not toxic-appearing or diaphoretic.      Interventions: Nasal cannula in place.   HENT:      Head: Normocephalic and atraumatic.      Mouth/Throat:      Mouth: Mucous membranes are moist.   Cardiovascular:      Rate and Rhythm: Normal rate and regular rhythm.      Pulses: Normal pulses.      Comments: R IJ TDC w/o discharge or pain at the site   Pulmonary:      Effort: Accessory muscle usage and respiratory distress present.      Breath sounds: No stridor. Rales present. No wheezing.   Abdominal:      General: Abdomen is flat. Bowel sounds are normal. There is no distension.      Palpations: Abdomen is soft. There is no mass.      Tenderness: There is no abdominal tenderness. There is no guarding or rebound.      Hernia: No hernia is present.   Musculoskeletal:         General: Swelling present. No tenderness, deformity or signs of injury.      Right lower leg: Edema present.      Left lower leg: Edema present.      Comments: 2+ pitting LE edema    Skin:     General: Skin is warm.      Capillary Refill: Capillary refill takes less than 2 seconds.      Coloration: Skin is not jaundiced or pale.      Findings: No bruising, erythema, lesion or rash.   Neurological:      Mental Status: He is alert and oriented to person, place, and time.      Comments: anxious   Psychiatric:         Attention and Perception: He is inattentive.         Mood and Affect: Mood is anxious. Affect is angry.       Significant Labs:  CBC:   Recent Labs   Lab 10/04/22  0854   WBC 8.33   RBC 2.51*   HGB 7.2*    HCT 22.9*      MCV 91   MCH 28.7   MCHC 31.4*     CMP:   Recent Labs   Lab 10/04/22  0350   *   CALCIUM 8.9   ALBUMIN 2.6*   PROT 5.7*      K 4.8   CO2 21*      BUN 32*   CREATININE 4.2*   ALKPHOS 148*   ALT 31   AST 27   BILITOT 0.3     All labs within the past 24 hours have been reviewed.

## 2022-10-04 NOTE — PLAN OF CARE
[facility]  Initial Discharge Assessment       Primary Care Provider: Primary Doctor No    Admission Diagnosis: Shortness of breath [R06.02]  Elevated brain natriuretic peptide (BNP) level [R79.89]  Acute respiratory failure with hypoxia [J96.01]    Admission Date: 10/3/2022  Expected Discharge Date: 10/7/2022    Discharge Barriers Identified: Underinsured    Payor: MEDICA[place] / Plan: [place] [employer] / Product Type: Managed Medicaid /     Extended Emergency Contact Information  Primary Emergency Contact: Tarah Helmxi  Address: [address]           [place] [address] [facility]  Home Phone: [phone]  Mobile Phone: [phone]  Relation: Spouse  Secondary Emergency Contact: Hemant Martinez  Mobile Phone: [phone]  Relation: Son    Discharge Plan A: Home with family  Discharge Plan B: Home      [id number] - [place] - [address] AT [facility] [address]  [address] [address]  Phone: [phone] Fax: [phone]      Transferred from:     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  Patient was able to answer questions.  Per patient, he lives with Atif Helm (spouse) [phone] in a 1-story home with 0 step(s) to enter.   Per patient, he was independent with ADLS and used no DME for ambulation. Per patient, he is on dialysis at [facility] on M-W-F.  Patient  advised CM that he was going to sign himself out AMA today. CM had lengthy discussion of the cons of signing out AMA. Patient still advised he was leaving AMA.  Discharge Planning discussed with patient. Patient also advised he has a PCP through [facility] and will make his own follow up appointment.  All questions addressed. CM notified Medical Team of patient's decision to sign himself out AMA. CM will follow for needs.      Initial Assessment (most recent)       Adult Discharge Assessment - 10/04/22 1522          Discharge Assessment    Confirmed/corrected address, phone number and insurance Yes     Confirmed Demographics Correct on Facesheet     Source of Information patient     When was your last doctors appointment? 09/30/22     Communicated JOAN with patient/caregiver Date not available/Unable to determine     Reason For Admission Acute hypoxemic respiratory failure     Lives With spouse     Facility Arrived From: was enroute to his dialysis session     Do you expect to return to your current living situation? Yes     Do you have help at home or someone to help you manage your care at home? Yes     Who are your caregiver(s) and their phone number(s)? Atif Helm (spouse) [phone]     Prior to hospitilization cognitive status: Alert/Oriented     Current cognitive status: Alert/Oriented     Walking or Climbing Stairs Difficulty none     Dressing/Bathing Difficulty none     Equipment Currently Used at Home none     Readmission within 30 days? No     Do you currently have service(s) that help you manage your care at home? No     Do you take prescription medications? Yes     Do you have prescription coverage? Yes     Coverage [place] [facility] [place] [facility]     Do you have any problems affording any of your prescribed medications? No     Is the patient taking medications as prescribed? yes     Who is going to help you get home at discharge? Atif Helm (spouse) [phone]     How do you get to doctors  appointments? family or friend will provide     Are you on dialysis? Yes     Dialysis Name and Scheduled days [facility] M-[facility]     Do you take coumadin? No     Discharge Plan A Home with family     Discharge Plan B Home     DME Needed Upon Discharge  none     Discharge Plan discussed with: Patient     Discharge Barriers Identified Underinsured        Relationship/Environment    Name(s) of Who Lives With Patient Atif Helm (spouse) [phone]                            PCP:  Primary Doctor No  None        Pharmacy:    [id number] - [place]  [facility] [address] AT [facility] [address] [place]  [address]  [place] [address]  Phone: [phone] Fax: [phone]        Emergency Contacts:  Extended Emergency Contact Information  Primary Emergency Contact: Tarah Helmxi  Address: [address]           [place] [address] [facility]  Home Phone: [phone]  Mobile Phone: [phone]  Relation: Spouse  Secondary Emergency Contact: Hemant Martinez  Mobile Phone: [phone]  Relation: Son      Insurance:    Payor: MEDICAID / [place] [facility] / Product Type: Managed Medicaid /     Heidi Bermudez RN     [phone]      10/04/2022  3:38 PM

## 2022-10-04 NOTE — ASSESSMENT & PLAN NOTE
Hypertensive to 270s requiring nitro gtt with resolution of symptoms    PLAN:  -Wean BiPAP  -Resume home meds: amlodipine, clonidine, hydralazine, labetolol  -Workup secondary causes of htn

## 2022-10-04 NOTE — PLAN OF CARE
Problem: Fluid Volume Excess (Chronic Kidney Disease)  Goal: Fluid Balance  Outcome: Ongoing, Progressing    UF only dialysis tx ended. R IJ dialysis cath: heparin locked and capped.        Net fluid removed: 2.5L    Report given to nurse Hawthorne at bedside. No complaints from patient.

## 2022-10-04 NOTE — ASSESSMENT & PLAN NOTE
Hx of anemia secondary to ESRD    -Patient's with Normocytic anemia..   -Etiology likely due to chronic disease .    Recent Labs     10/03/22  0921 10/03/22  2054 10/04/22  0350 10/04/22  0854   HGB 7.3* 6.1* 6.8* 7.2*           Component Value Date/Time    MCV 94 10/03/2022 0921    RDW 21.0 (H) 10/03/2022 0921    IRON 10 (L) 02/17/2022 1041    TIBC 258 02/17/2022 1041    FERRITIN 384 (H) 02/17/2022 1041    FOLATE 4.4 02/17/2022 1041    [id number] 863 02/17/2022 1041       RECOMMENDATIONS  - Monitor CBC and transfuse if Hgb < 7   - Recommend Hemolysis workup

## 2022-10-04 NOTE — PROGRESS NOTES
[facility] - Cardiac Medical [facility]  Critical Care Medicine  Progress Note    Patient Name: Enrique Martinez  MRN: [id number]  Admission Date: 10/3/2022  Hospital Length of Stay: 1 days  Code Status: Full Code  Attending Provider: Charisse Norris MD  Primary Care Provider: Primary Doctor No   Principal Problem: Acute hypoxemic respiratory failure    Subjective:     HPI:  Mr Martinez is a 57yom with a history of ESRD on HD, M/W/F, DMII, CAD w/ stents, gout and gerd presenting with CHF exacerbation likely 2/t CAP. The pt says he was seen in OP clinic with shortness of breath and cough and was diagnosed with CAP and given 4 days of azithromycin. He says that he attended his dialysis session on Friday without issues, but says that his shortness of breath got acutely worse today. Pt denies any CP, abdominal pain, n/v/d or dysuria.    In the ED: Labs were significant for WBC 21.58, BNP 2,797, Trop 0.038, VBG on bipap showed isolated respiratory acidosis. CXR showed vascular congestion and possible R Pleural effusion. CTA chest pending to r/o PE. Pt receiving HD in the ED with 3L UF goal. After HD and 3L removal, pt was weaned to RA.    Critical care was reconsulted for hypertensive emergency with pulmonary edema, on BiPAP and nitro gtt. The pt's blood pressure was controlled with nitro and the pts respiratory status improved. He was brought up to the unit and started on his oral antihypertensives.      Hospital/ICU Course:  The pt was admitted to MICU on nitro gtt and BiPAP which was weaned overnight. HOD2, the pt had HD again and oral antihypertensives titrated.      Interval History/Significant Events:   NAEON.  BLE doppler neg for DVT.  NTG gtt weaned off.  HD again today.      Objective:     Vital Signs (Most Recent):  Temp: 97.1 °F (36.2 °C) (10/04/22 1258)  Pulse: 84 (10/04/22 1400)  Resp: (!) 22 (10/04/22 1400)  BP: (!) 180/95 (10/04/22 1425)  SpO2: (!) 94 % (10/04/22 1400)   Vital Signs (24h Range):  Temp:  [96.6 °F  (35.9 °C)-98.7 °F (37.1 °C)] 97.1 °F (36.2 °C)  Pulse:  [] 84  Resp:  [12-79] 22  SpO2:  [92 %-100 %] 94 %  BP: (128-284)/() 180/95   Weight: 68 kg (149 lb 14.6 oz)  Body mass index is 24.2 kg/m².      Intake/Output Summary (Last 24 hours) at 10/4/2022 1527  Last data filed at 10/4/2022 1400  Gross per 24 hour   Intake 3911.35 ml   Output 3408 ml   Net 503.35 ml       Physical Exam  Vitals and nursing note reviewed.   Constitutional:       Appearance: He is not toxic-appearing.   HENT:      Head: Normocephalic and atraumatic.      Mouth/Throat:      Mouth: Mucous membranes are moist.   Eyes:      Extraocular Movements: Extraocular movements intact.      Pupils: Pupils are equal, round, and reactive to light.   Cardiovascular:      Rate and Rhythm: Regular rhythm. Tachycardia present.      Pulses: Normal pulses.   Pulmonary:      Effort: Pulmonary effort is normal.      Breath sounds: Normal breath sounds.      Comments: Pt is mildly tachypneic but comfortable on BiPAP  Chest:      Chest wall: No tenderness.   Abdominal:      General: Abdomen is flat.      Palpations: Abdomen is soft.   Musculoskeletal:         General: Normal range of motion.      Cervical back: Normal range of motion and neck supple.      Comments: BLE tense, chronic venous stasis changes, non-pitting, no ulceration   Skin:     General: Skin is warm and dry.      Capillary Refill: Capillary refill takes less than 2 seconds.   Neurological:      General: No focal deficit present.      Mental Status: He is alert and oriented to person, place, and time.   Psychiatric:         Mood and Affect: Mood normal.         Behavior: Behavior normal.         Thought Content: Thought content normal.         Judgment: Judgment normal.       Vents:  Oxygen Concentration (%): 80 (10/03/22 0905)  Lines/Drains/Airways       Central Venous Catheter Line  Duration                  Hemodialysis Catheter 02/28/22 1300 right internal jugular 218 days               Drain  Duration             Male External Urinary Catheter 10/03/22 2100 Medium <1 day              Peripheral Intravenous Line  Duration                  Peripheral IV - Single Lumen 10/03/22 0911 18 G Left Antecubital 1 day         Peripheral IV - Single Lumen 10/03/22 18 G Left Hand 1 day                  Significant Labs:    CBC/Anemia Profile:  Recent Labs   Lab 10/03/22  2054 10/04/22  0350 10/04/22  0854   WBC 8.97 6.58 8.33   HGB 6.1* 6.8* 7.2*   HCT 19.5* 21.3* 22.9*    226 208   MCV 89 90 91   RDW 20.2* 19.2* 18.7*        Chemistries:  Recent Labs   Lab 10/03/22  0921 10/03/22  2054 10/04/22  0350    135* 136   K 5.7* 4.9 4.8    103 103   CO2 21* 21* 21*   BUN 51* 26* 32*   CREATININE 6.6* 3.5* 4.2*   CALCIUM 9.7 9.1 8.9   ALBUMIN 3.2* 2.7* 2.6*   PROT 7.0 5.8* 5.7*   BILITOT 0.4 0.4 0.3   ALKPHOS 194* 142* 148*   ALT 45* 36 31   AST 58* 35 27   MG 2.1  --  1.9   PHOS  --   --  2.4*       All pertinent labs within the past 24 hours have been reviewed.    Significant Imaging:  I have reviewed all pertinent imaging results/findings within the past 24 hours.      ABG  Recent Labs   Lab 10/03/22  2040   PH 7.400   PO2 52   PCO2 39.1   HCO3 24.2   BE -1     Assessment/Plan:     Pulmonary  * Acute hypoxemic respiratory failure  Patient with Hypercapnic and Hypoxic Respiratory failure which is Acute.  he is not on home oxygen. Supplemental oxygen was provided and noted-  .   Signs/symptoms of respiratory failure include- tachypnea, increased work of breathing and respiratory distress.   -He reports being treated for respiratory infection recently and was prescribed a course of Azithromycin on 09/16.  -CXR (10/3/22) noted no significant superimposed airspace consolidation, but did note Continued blunting of the right costophrenic sulcus is seen, which may represent a persistent small volume of pleural fluid on this side.    -CTA (10/03/22) did not show any concerns for pulmonary  thromboembolism     Recent Labs     10/03/22  0921   BNP 2,797*       Recent Labs     10/03/22  2040   PH 7.400   PCO2 39.1   PO2 52   HCO3 24.2   POCSATURATED 86*   BE -1     Differential diagnoses include, but not limited to:  CHF, Pneumonia, Fluid overload     -Critical Care medicine was consulted for increasing respiratory distress requiring supplemental oxygen support via BiPap.  -After dialysis, patient was able to be weaned to room air. However, pt went into flash pulmonary edema requiring BiPAP and nitro gtt.    RECOMMENDATIONS  -Rule out CHF with TTE. Trend troponin  -Nephrology following for dialysis/volume removal  -DuoNebs q4h PRN  -Wean supplemental O2 as tolerated.  -D/c CAP coverage with Ceftriaxone and Doxycycline. Followup cultures and viral testing  -BLE Dopplers to r/o DVT negative    Cardiac/Vascular  Hypertensive emergency  Hypertensive to 270s requiring nitro gtt with resolution of symptoms    PLAN:  -Wean BiPAP  -Resume home meds: amlodipine, clonidine, hydralazine, labetolol  -Workup secondary causes of htn        Renal/  ESRD on hemodialysis  Nephrology consulted and following for inpatient dialysis. See Nephrology consult note    Membranous glomerulonephritis  See ESRD    Oncology  Leukocytosis  Resolved    Anemia due to chronic kidney disease, on chronic dialysis  Hx of anemia secondary to ESRD    -Patient's with Normocytic anemia..   -Etiology likely due to chronic disease .    Recent Labs     10/03/22  0921 10/03/22  2054 10/04/22  0350 10/04/22  0854   HGB 7.3* 6.1* 6.8* 7.2*           Component Value Date/Time    MCV 94 10/03/2022 0921    RDW 21.0 (H) 10/03/2022 0921    IRON 10 (L) 02/17/2022 1041    TIBC 258 02/17/2022 1041    FERRITIN 384 (H) 02/17/2022 1041    FOLATE 4.4 02/17/2022 1041    [id number] 863 02/17/2022 1041       RECOMMENDATIONS  - Monitor CBC and transfuse if Hgb < 7   - Recommend Hemolysis workup       Critical Care Daily Checklist:    A: Awake: RASS Goal/Actual  Goal:    Actual: Booth Agitation Sedation Scale (RASS): Alert and calm   B: Spontaneous Breathing Trial Performed?     C: SAT & SBT Coordinated?  N/A                      D: Delirium: CAM-ICU Overall CAM-ICU: Negative   E: Early Mobility Performed? Yes   F: Feeding Goal:    Status:     Current Diet Order   Procedures    Diet Adult Regular (IDDSI Level 7)      AS: Analgesia/Sedation N/A   T: Thromboembolic Prophylaxis Heparin sq   H: HOB > 300 Yes   U: Stress Ulcer Prophylaxis (if needed) Yes   G: Glucose Control SSI   B: Bowel Function     I: Indwelling Catheter (Lines & Valencia) Necessity N/A   D: De-escalation of Antimicrobials/Pharmacotherapies As able    Plan for the day/ETD HD, titrate BP meds    Code Status:  Family/Goals of Care: Full Code  Continue care       Critical secondary to Patient has a condition that poses threat to life and bodily function: Severe Respiratory Distress      Critical care was time spent personally by me on the following activities: development of treatment plan with patient or surrogate and bedside caregivers, discussions with consultants, evaluation of patient's response to treatment, examination of patient, ordering and performing treatments and interventions, ordering and review of laboratory studies, ordering and review of radiographic studies, pulse oximetry, re-evaluation of patient's condition. This critical care time did not overlap with that of any other provider or involve time for any procedures.     Nikolas Layton MD  Critical Care Medicine  [facility] - Cardiac Medical ICU

## 2022-10-04 NOTE — PROGRESS NOTES
[facility] - Cardiac Medical [facility]  Nephrology  Progress Note    Patient Name: Enrique Martinez  MRN: [id number]  Admission Date: 10/3/2022  Hospital Length of Stay: 1 days  Attending Provider: Charisse Norris MD   Primary Care Physician: Primary Doctor No  Principal Problem:Acute hypoxemic respiratory failure    Subjective:     Interval History:   HD completed on yesterday. Net UF 2 L. SOB initially improved, but decompensated requiring BiPAP and Nitro for BP control (SBP > 270s).   Patient agitated on exam, threatening to leave AMA today. Appear tachypneic on exam. Remain on Nitro infusion.    Review of patient's allergies indicates:  No Known Allergies  Current Facility-Administered Medications   Medication Frequency    0.9%  NaCl infusion (for blood administration) Q24H PRN    0.9%  NaCl infusion Once    albuterol inhaler 2 puff Q6H PRN    albuterol-ipratropium 2.5 mg-0.5 mg/3 mL nebulizer solution 3 mL Q4H WAKE    amLODIPine tablet 10 mg Daily    aspirin EC tablet 81 mg Daily    cefTRIAXone (ROCEPHIN) 1 g/50 mL D5W IVPB Q24H    cloNIDine tablet 0.4 mg TID    dextrose 10% bolus 125 mL PRN    dextrose 10% bolus 250 mL PRN    doxycycline tablet 100 mg Q12H    furosemide tablet 40 mg BID    glucagon (human recombinant) injection 1 mg PRN    glucose chewable tablet 16 g PRN    glucose chewable tablet 24 g PRN    heparin (porcine) injection 1,000 Units PRN    heparin (porcine) injection 5,000 Units Q8H    hydrALAZINE tablet 50 mg Q8H    influenza (QUADRIVALENT PF) vaccine 0.5 mL Prior to discharge    insulin aspart U-100 pen 0-5 Units QID (AC + HS) PRN    labetaloL tablet 200 mg Q12H    mupirocin 2 % ointment BID    nitroGLYCERIN 50 mg in dextrose 5 % 250 mL infusion (TITRATING) Continuous    pantoprazole EC tablet 40 mg Daily    sodium chloride 0.9% bolus 250 mL PRN    sodium chloride 0.9% flush 10 mL PRN       Objective:     Vital Signs (Most Recent):  Temp: 98.1 °F (36.7 °C) (10/04/22  0701)  Pulse: 80 (10/04/22 0900)  Resp: (!) 24 (10/04/22 0900)  BP: (!) 165/85 (10/04/22 0900)  SpO2: (!) 94 % (10/04/22 0900)  O2 Device (Oxygen Therapy): nasal cannula (10/04/22 0809)   Vital Signs (24h Range):  Temp:  [96.6 °F (35.9 °C)-98.7 °F (37.1 °C)] 98.1 °F (36.7 °C)  Pulse:  [] 80  Resp:  [12-79] 24  SpO2:  [92 %-100 %] 94 %  BP: (128-284)/() 165/85     Weight: 68 kg (149 lb 14.6 oz) (10/04/22 0006)  Body mass index is 24.2 kg/m².  Body surface area is 1.78 meters squared.    I/O last 3 completed shifts:  In: 2525 [P.O.:860; I.V.:640; Blood:420; Other:300; IV Piggyback:305]  Out: 3840 [Urine:240; Other:3600]    Physical Exam  Vitals and nursing note reviewed.   Constitutional:       General: He is not in acute distress.     Appearance: He is ill-appearing. He is not toxic-appearing or diaphoretic.      Interventions: Nasal cannula in place.   HENT:      Head: Normocephalic and atraumatic.      Mouth/Throat:      Mouth: Mucous membranes are moist.   Cardiovascular:      Rate and Rhythm: Normal rate and regular rhythm.      Pulses: Normal pulses.      Comments: R IJ TDC w/o discharge or pain at the site   Pulmonary:      Effort: Accessory muscle usage and respiratory distress present.      Breath sounds: No stridor. Rales present. No wheezing.   Abdominal:      General: Abdomen is flat. Bowel sounds are normal. There is no distension.      Palpations: Abdomen is soft. There is no mass.      Tenderness: There is no abdominal tenderness. There is no guarding or rebound.      Hernia: No hernia is present.   Musculoskeletal:         General: Swelling present. No tenderness, deformity or signs of injury.      Right lower leg: Edema present.      Left lower leg: Edema present.      Comments: 2+ pitting LE edema    Skin:     General: Skin is warm.      Capillary Refill: Capillary refill takes less than 2 seconds.      Coloration: Skin is not jaundiced or pale.      Findings: No bruising, erythema,  lesion or rash.   Neurological:      Mental Status: He is alert and oriented to person, place, and time.      Comments: anxious   Psychiatric:         Attention and Perception: He is inattentive.         Mood and Affect: Mood is anxious. Affect is angry.       Significant Labs:  CBC:   Recent Labs   Lab 10/04/22  0854   WBC 8.33   RBC 2.51*   HGB 7.2*   HCT 22.9*      MCV 91   MCH 28.7   MCHC 31.4*     CMP:   Recent Labs   Lab 10/04/22  0350   *   CALCIUM 8.9   ALBUMIN 2.6*   PROT 5.7*      K 4.8   CO2 21*      BUN 32*   CREATININE 4.2*   ALKPHOS 148*   ALT 31   AST 27   BILITOT 0.3     All labs within the past 24 hours have been reviewed.       Assessment/Plan:     * Acute hypoxemic respiratory failure  -Plan per primary team         ESRD on hemodialysis  2/2 Membranous nephropathy (bx proven), DM II, and HTN , on HD since 2/2022  Outpatient HD Information:  -Dialysis modality: Hemodialysis  -Outpatient HD unit: [facility]  -Nephrologist: Dr. Saez   -HD TX days: Monday/Wednesday/Friday, duration of treatment: 210 mins  -Last HD TX prior to hospital admission: 09/30/2022  -Dialysis access: dialysis catheter placed on R IJ   -Vascular surgeon: referral submitted 8//30 by Dr. Saez  -Residual urine: oliguric    -EDW: 61kg      Plan/Recommendations:   -Patient remain hypervolemic and tachypneic on exam despite HD on yesterday. EDW 61 kg; post-HD weight was 67.4 on 9/30. 3 L removed on yesterday. Needs additional volume removal.  -UF only treatment today (10/04/2022) with UF 2-2.5 L as BP tolerates for volume management   -Adjust home blood pressure medications   -Mineral & Bone Disorder:    Phosphorus level: 2.4, hold binders (dc'd)    PTH level: ~900 on 2/2022    Calcium 8.9  -ESKD anemia with acute blood loss anemia: hgb 6.8, goal hgb 10-11g/dL, transfusion given overnight  -Renal function panel & Mg level on HD days   -Renal diet with protein supplements, if not  NPO   Nephrovitamins daily   -Strict I/O's and daily weights    Anemia due to chronic kidney disease, on chronic dialysis  See ESKD     Membranous glomerulonephritis  - Bx proven, now ESRD on HD, he is working with kidney transplant team        Thank you for your consult. I will follow-up with patient. Please contact us if you have any additional questions.    Shelia Mejia DNP, FNP-C  Nephrology  [facility] - Card[facility] Medical [facility]

## 2022-10-04 NOTE — PLAN OF CARE
CMICU DAILY GOALS       A: Awake    RASS: Goal -    Actual -     Restraint necessity:    B: Breathe   SBT: Not intubated   C: Coordinate A & B, analgesics/sedatives   Pain: managed    SAT: Not intubated  D: Delirium   CAM-ICU: Overall CAM-ICU: Negative  E: Early(intubated/ Progressive (non-intubated) Mobility   MOVE Screen: Pass   Activity: Activity Management: Rolling - L1  FAS: Feeding/Nutrition   Diet order: Diet/Nutrition Received: regular,    T: Thrombus   DVT prophylaxis: VTE Required Core Measure: Pharmacological prophylaxis initiated/maintained  H: HOB Elevation   Head of Bed (HOB) Positioning: HOB at 30-45 degrees  U: Ulcer Prophylaxis   GI: yes  G: Glucose control   managed    S: Skin                  B: Bowel Function   no issues   I: Indwelling Catheters   Valencia necessity:     CVC necessity: Yes  D: De-escalation Antibiotics   Yes    Family/Goals of care/Code Status   Code Status: Full Code    24H Vital Sign Range  Temp:  [96.6 °F (35.9 °C)-98.7 °F (37.1 °C)]   Pulse:  []   Resp:  [12-79]   BP: (128-284)/()   SpO2:  [92 %-100 %]      Shift Events   Pt arrived to unit from ED last night on nitroglycerin drip for hypertension. Pt was off of nitro drip momentarily with SBP in 120s but was restarted due SBP >160. Hgb was <7 and two unit of RBCs transfused overnight; pt tolerating infusions. Team notified of AM phos 2.4 but no further orders at this time.     BROOKE MAI

## 2022-10-04 NOTE — SUBJECTIVE & OBJECTIVE
Interval History/Significant Events:   NAEON.  BLE doppler neg for DVT.  NTG gtt weaned off.  HD again today.      Objective:     Vital Signs (Most Recent):  Temp: 97.1 °F (36.2 °C) (10/04/22 1258)  Pulse: 84 (10/04/22 1400)  Resp: (!) 22 (10/04/22 1400)  BP: (!) 180/95 (10/04/22 1425)  SpO2: (!) 94 % (10/04/22 1400)   Vital Signs (24h Range):  Temp:  [96.6 °F (35.9 °C)-98.7 °F (37.1 °C)] 97.1 °F (36.2 °C)  Pulse:  [] 84  Resp:  [12-79] 22  SpO2:  [92 %-100 %] 94 %  BP: (128-284)/() 180/95   Weight: 68 kg (149 lb 14.6 oz)  Body mass index is 24.2 kg/m².      Intake/Output Summary (Last 24 hours) at 10/4/2022 1527  Last data filed at 10/4/2022 1400  Gross per 24 hour   Intake 3911.35 ml   Output 3408 ml   Net 503.35 ml       Physical Exam  Vitals and nursing note reviewed.   Constitutional:       Appearance: He is not toxic-appearing.   HENT:      Head: Normocephalic and atraumatic.      Mouth/Throat:      Mouth: Mucous membranes are moist.   Eyes:      Extraocular Movements: Extraocular movements intact.      Pupils: Pupils are equal, round, and reactive to light.   Cardiovascular:      Rate and Rhythm: Regular rhythm. Tachycardia present.      Pulses: Normal pulses.   Pulmonary:      Effort: Pulmonary effort is normal.      Breath sounds: Normal breath sounds.      Comments: Pt is mildly tachypneic but comfortable on BiPAP  Chest:      Chest wall: No tenderness.   Abdominal:      General: Abdomen is flat.      Palpations: Abdomen is soft.   Musculoskeletal:         General: Normal range of motion.      Cervical back: Normal range of motion and neck supple.      Comments: BLE tense, chronic venous stasis changes, non-pitting, no ulceration   Skin:     General: Skin is warm and dry.      Capillary Refill: Capillary refill takes less than 2 seconds.   Neurological:      General: No focal deficit present.      Mental Status: He is alert and oriented to person, place, and time.   Psychiatric:         Mood  and Affect: Mood normal.         Behavior: Behavior normal.         Thought Content: Thought content normal.         Judgment: Judgment normal.       Vents:  Oxygen Concentration (%): 80 (10/03/22 0905)  Lines/Drains/Airways       Central Venous Catheter Line  Duration                  Hemodialysis Catheter 02/28/22 1300 right internal jugular 218 days              Drain  Duration             Male External Urinary Catheter 10/03/22 2100 Medium <1 day              Peripheral Intravenous Line  Duration                  Peripheral IV - Single Lumen 10/03/22 0911 18 G Left Antecubital 1 day         Peripheral IV - Single Lumen 10/03/22 18 G Left Hand 1 day                  Significant Labs:    CBC/Anemia Profile:  Recent Labs   Lab 10/03/22  2054 10/04/22  0350 10/04/22  0854   WBC 8.97 6.58 8.33   HGB 6.1* 6.8* 7.2*   HCT 19.5* 21.3* 22.9*    226 208   MCV 89 90 91   RDW 20.2* 19.2* 18.7*        Chemistries:  Recent Labs   Lab 10/03/22  0921 10/03/22  2054 10/04/22  0350    135* 136   K 5.7* 4.9 4.8    103 103   CO2 21* 21* 21*   BUN 51* 26* 32*   CREATININE 6.6* 3.5* 4.2*   CALCIUM 9.7 9.1 8.9   ALBUMIN 3.2* 2.7* 2.6*   PROT 7.0 5.8* 5.7*   BILITOT 0.4 0.4 0.3   ALKPHOS 194* 142* 148*   ALT 45* 36 31   AST 58* 35 27   MG 2.1  --  1.9   PHOS  --   --  2.4*       All pertinent labs within the past 24 hours have been reviewed.    Significant Imaging:  I have reviewed all pertinent imaging results/findings within the past 24 hours.

## 2022-10-04 NOTE — SUBJECTIVE & OBJECTIVE
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        Past Surgical History:   Procedure Laterality Date    ABDOMINAL SURGERY  1980s    CARDIAC CATHETERIZATION  2007    x 2    COLONOSCOPY N/A 4/5/2019    Procedure: COLONOSCOPY;  Surgeon: Jose L Carty MD;  Location: [facility] ([address] [place] [address]);  Service: Colon and Rectal;  Laterality: N/A;    CORONARY STENT PLACEMENT  2007       Review of patient's allergies indicates:  No Known Allergies    Family History       Problem Relation (Age of Onset)    Drug abuse Brother    Heart attack Father, Brother    Hyperlipidemia Father    Hypertension Father, Brother    No Known Problems Sister    Stroke Father          Tobacco Use    Smoking status: Some Days     Packs/day: 0.25     Years: 10.00     Pack years: 2.50     Types: Cigarettes    Smokeless tobacco: Never   Substance and Sexual Activity    Alcohol use: No    Drug use: No    Sexual activity: Not on file      Review of Systems   Constitutional:  Negative for chills and fever.   HENT:  Negative for ear pain and sore throat.    Eyes:  Negative for redness and visual disturbance.   Respiratory:  Positive for cough and shortness of breath. Negative for wheezing.    Cardiovascular:  Positive for leg swelling. Negative for chest pain.   Gastrointestinal:  Negative for abdominal pain, nausea and vomiting.   Genitourinary:  Negative for flank pain.   Musculoskeletal:  Negative for back pain, gait problem, neck pain and neck stiffness.   Skin:  Negative for  pallor and rash.   Neurological:  Negative for dizziness, syncope, weakness and numbness.   Psychiatric/Behavioral:  Negative for confusion and dysphoric mood.    Objective:     Vital Signs (Most Recent):  Temp: 97.6 °F (36.4 °C) (10/03/22 1115)  Pulse: 95 (10/03/22 1929)  Resp: (!) 38 (10/03/22 1929)  BP: (!) 148/88 (10/03/22 1900)  SpO2: (!) 92 % (10/03/22 1929)   Vital Signs (24h Range):  Temp:  [97.6 °F (36.4 °C)-98.2 °F (36.8 °C)] 97.6 °F (36.4 °C)  Pulse:  [] 95  Resp:  [17-38] 38  SpO2:  [92 %-100 %] 92 %  BP: (148-284)/() 148/88   Weight: 68 kg (150 lb)  Body mass index is 24.21 kg/m².      Intake/Output Summary (Last 24 hours) at 10/3/2022 1935  Last data filed at 10/3/2022 1420  Gross per 24 hour   Intake 300 ml   Output 3600 ml   Net -3300 ml       Physical Exam  Vitals and nursing note reviewed.   Constitutional:       General: He is in acute distress.      Appearance: He is not toxic-appearing.   HENT:      Head: Normocephalic and atraumatic.   Eyes:      Extraocular Movements: Extraocular movements intact.      Pupils: Pupils are equal, round, and reactive to light.   Cardiovascular:      Rate and Rhythm: Regular rhythm. Tachycardia present.   Pulmonary:      Effort: Respiratory distress present.      Breath sounds: Rales present.      Comments: Pt is mildly tachypneic but comfortable on BiPAP  Chest:      Chest wall: No tenderness.   Abdominal:      General: Abdomen is flat.      Palpations: Abdomen is soft.   Musculoskeletal:         General: Swelling present. Normal range of motion.      Cervical back: Normal range of motion and neck supple.      Comments: BLE tense, chronic venous stasis changes, non-pitting, no ulceration   Skin:     General: Skin is warm and dry.   Neurological:      General: No focal deficit present.      Mental Status: He is alert and oriented to person, place, and time.   Psychiatric:         Mood and Affect: Mood normal.         Behavior: Behavior normal.          Thought Content: Thought content normal.         Judgment: Judgment normal.       Vents:  Oxygen Concentration (%): 80 (10/03/22 0905)  Lines/Drains/Airways       Central Venous Catheter Line  Duration                  Hemodialysis Catheter 02/28/22 1300 right internal jugular 217 days              Peripheral Intravenous Line  Duration                  Peripheral IV - Single Lumen 10/03/22 0911 18 G Left Antecubital <1 day         Peripheral IV - Single Lumen 10/03/22 18 G Left Hand <1 day                  Significant Labs:    CBC/Anemia Profile:  Recent Labs   Lab 10/03/22  0915 10/03/22  0917 10/03/22  0921   WBC  --   --  21.58*   HGB  --   --  7.3*   HCT 24* 25* 24.2*   PLT  --   --  314   MCV  --   --  94   RDW  --   --  21.0*        Chemistries:  Recent Labs   Lab 10/03/22  0921      K 5.7*      CO2 21*   BUN 51*   CREATININE 6.6*   CALCIUM 9.7   ALBUMIN 3.2*   PROT 7.0   BILITOT 0.4   ALKPHOS 194*   ALT 45*   AST 58*   MG 2.1       All pertinent labs within the past 24 hours have been reviewed.    Significant Imaging: I have reviewed all pertinent imaging results/findings within the past 24 hours.

## 2022-10-04 NOTE — ASSESSMENT & PLAN NOTE
Patient with Hypercapnic and Hypoxic Respiratory failure which is Acute.  he is not on home oxygen. Supplemental oxygen was provided and noted-  .   Signs/symptoms of respiratory failure include- tachypnea, increased work of breathing and respiratory distress.   -He reports being treated for respiratory infection recently and was prescribed a course of Azithromycin on 09/16.  -CXR (10/3/22) noted no significant superimposed airspace consolidation, but did note Continued blunting of the right costophrenic sulcus is seen, which may represent a persistent small volume of pleural fluid on this side.    -CTA (10/03/22) did not show any concerns for pulmonary thromboembolism     Recent Labs     10/03/22  0921   BNP 2,797*       Recent Labs     10/03/22  2040   PH 7.400   PCO2 39.1   PO2 52   HCO3 24.2   POCSATURATED 86*   BE -1     Differential diagnoses include, but not limited to:  CHF, Pneumonia, Fluid overload     -Critical Care medicine was consulted for increasing respiratory distress requiring supplemental oxygen support via BiPap.  -After dialysis, patient was able to be weaned to room air. However, pt went into flash pulmonary edema requiring BiPAP and nitro gtt.    RECOMMENDATIONS  -Rule out CHF with TTE. Trend troponin  -Nephrology following for dialysis/volume removal  -DuoNebs q4h PRN  -Wean supplemental O2 as tolerated.  -D/c CAP coverage with Ceftriaxone and Doxycycline. Followup cultures and viral testing  -BLE Dopplers to r/o DVT negative

## 2022-10-04 NOTE — ASSESSMENT & PLAN NOTE
2/2 Membranous nephropathy (bx proven), DM II, and HTN , on HD since 2/2022  Outpatient HD Information:  -Dialysis modality: Hemodialysis  -Outpatient HD unit: [facility]  -Nephrologist: Dr. Saez   -HD TX days: Monday/Wednesday/Friday, duration of treatment: 210 mins  -Last HD TX prior to hospital admission: 09/30/2022  -Dialysis access: dialysis catheter placed on R IJ   -Vascular surgeon: referral submitted 8//30 by Dr. Saez  -Residual urine: oliguric    -EDW: 61kg      Plan/Recommendations:   -Patient remain hypervolemic and tachypneic on exam despite HD on yesterday. EDW 61 kg; post-HD weight was 67.4 on 9/30. 3 L removed on yesterday. Needs additional volume removal.  -UF only treatment today (10/04/2022) with UF 2-2.5 L as BP tolerates for volume management   -Adjust home blood pressure medications   -Mineral & Bone Disorder:    Phosphorus level: 2.4, hold binders (dc'd)    PTH level: ~900 on 2/2022    Calcium 8.9  -ESKD anemia with acute blood loss anemia: hgb 6.8, goal hgb 10-11g/dL, transfusion given overnight  -Renal function panel & Mg level on HD days   -Renal diet with protein supplements, if not NPO   Nephrovitamins daily   -Strict I/O's and daily weights

## 2022-10-04 NOTE — PLAN OF CARE
[facility] DAILY GOALS       A: Awake    RASS: Goal -    Actual -     Restraint necessity:    B: Breathe   SBT: Not intubated   C: Coordinate A & B, analgesics/sedatives   Pain: managed    SAT: Not intubated  D: Delirium   CAM-ICU: Overall CAM-ICU: Negative  E: Early(intubated/ Progressive (non-intubated) Mobility   MOVE Screen: Pass   Activity: Activity Management: Rolling - L1  FAS: Feeding/Nutrition   Diet order: Diet/Nutrition Received: regular,    T: Thrombus   DVT prophylaxis: VTE Required Core Measure: Pharmacological prophylaxis initiated/maintained  H: HOB Elevation   Head of Bed (HOB) Positioning: HOB elevated  U: Ulcer Prophylaxis   GI: yes  G: Glucose control   managed Glycemic Management: blood glucose monitored  S: Skin         Pressure Reduction Devices: heel offloading device utilized  Pressure Reduction Techniques: pressure points protected, heels elevated off bed  Skin Protection: tubing/devices free from skin contact  B: Bowel Function   no issues   I: Indwelling Catheters   [place] necessity:     CVC necessity: No  D: De-escalation Antibiotics   Yes    Family/Goals of care/Code Status   Code Status: Full Code    24H Vital Sign Range  Temp:  [96.6 °F (35.9 °C)-98.7 °F (37.1 °C)]   Pulse:  []   Resp:  [12-79]   BP: (128-284)/()   SpO2:  [92 %-100 %]      Shift Events   Pt's BP remains uncontrolled, BP medications adjusted throughout the day, nitroglycerin drip currently off. Pt has stated repeatedly throughout the day that he wishes to go home, but after discussing with the team he stated that he would stay until tomorrow.  Currently in NAD.    VS and assessment per flow sheet, patient progressing towards goals as tolerated, plan of care reviewed with  pt Enrique Martinez , all concerns addressed, will continue to monitor.    Christoph Medrano

## 2022-10-04 NOTE — ASSESSMENT & PLAN NOTE
Patient with Hypercapnic and Hypoxic Respiratory failure which is Acute.  he is not on home oxygen. Supplemental oxygen was provided and noted- Oxygen Concentration (%):  [80] 80.   Signs/symptoms of respiratory failure include- tachypnea, increased work of breathing and respiratory distress.   -He reports being treated for respiratory infection recently and was prescribed a course of Azithromycin on 09/16.  -CXR (10/3/22) noted no significant superimposed airspace consolidation, but did note Continued blunting of the right costophrenic sulcus is seen, which may represent a persistent small volume of pleural fluid on this side.    -CTA (10/03/22) did not show any concerns for pulmonary thromboembolism     Recent Labs     10/03/22  0921   BNP 2,797*       Recent Labs     10/03/22  1505   PH 7.429   PCO2 37.5   PO2 49   HCO3 24.8   POCSATURATED 86*   BE 0     Differential diagnoses include, but not limited to:  CHF, Pneumonia, Fluid overload     -Critical Care medicine was consulted for increasing respiratory distress requiring supplemental oxygen support via BiPap.  -After dialysis, patient was able to be weaned to room air. However, pt went into flash pulmonary edema requiring BiPAP and nitro gtt.    RECOMMENDATIONS  -Rule out CHF with TTE. Trend troponin  -Nephrology following for dialysis/volume removal  -DuoNebs q4h PRN  -Wean supplemental O2 as tolerated.  -CAP coverage with Ceftriaxone and Doxycycline. Followup cultures and viral testing  -F/u BLE Dopplers to r/o DVT

## 2022-10-04 NOTE — H&P
[facility] - Emergency Dept  Critical Care Medicine  History & Physical    Patient Name: Enrique Martinez  MRN: [id number]  Admission Date: 10/3/2022  Hospital Length of Stay: 0 days  Code Status: Full Code  Attending Physician: Charisse Norris MD   Primary Care Provider: Primary Doctor No   Principal Problem: <principal problem not specified>    Subjective:     HPI:  Mr Martinez is a 57yom with a history of ESRD on HD, M/W/F, DMII, CAD w/ stents, gout and gerd presenting with CHF exacerbation likely 2/t CAP. The pt says he was seen in OP clinic with shortness of breath and cough and was diagnosed with CAP and given 4 days of azithromycin. He says that he attended his dialysis session on Friday without issues, but says that his shortness of breath got acutely worse today. Pt denies any CP, abdominal pain, n/v/d or dysuria.    In the ED: Labs were significant for WBC 21.58, BNP 2,797, Trop 0.038, VBG on bipap showed isolated respiratory acidosis. CXR showed vascular congestion and possible R Pleural effusion. CTA chest pending to r/o PE. Pt receiving HD in the ED with 3L UF goal. After HD and 3L removal, pt was weaned to RA.    Critical care was reconsulted for hypertensive emergency with pulmonary edema, on BiPAP and nitro gtt. The pt's blood pressure was controlled with nitro and the pts respiratory status improved. He was brought up to the unit and started on his oral antihypertensives.      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    Umbilical hernia        Past Surgical History:   Procedure Laterality Date    ABDOMINAL SURGERY  1980s    CARDIAC CATHETERIZATION  2007    x 2    COLONOSCOPY N/A 4/5/2019    Procedure: COLONOSCOPY;  Surgeon: Jose L Carty MD;  Location: [facility] ([address] [place] [address]);  Service: Colon and Rectal;  Laterality: N/A;    CORONARY STENT PLACEMENT  2007       Review of patient's allergies indicates:  No Known Allergies    Family History       Problem Relation (Age of Onset)    Drug abuse Brother    Heart attack Father, Brother    Hyperlipidemia Father    Hypertension Father, Brother    No Known Problems Sister    Stroke Father          Tobacco Use    Smoking status: Some Days     Packs/day: 0.25     Years: 10.00     Pack years: 2.50     Types: Cigarettes    Smokeless tobacco: Never   Substance and Sexual Activity    Alcohol use: No    Drug use: No    Sexual activity: Not on file      Review of Systems   Constitutional:  Negative for chills and fever.   HENT:  Negative for ear pain and sore throat.    Eyes:  Negative for redness and visual disturbance.   Respiratory:  Positive for cough and shortness of breath. Negative for wheezing.    Cardiovascular:  Positive for leg swelling. Negative for chest pain.   Gastrointestinal:  Negative for abdominal pain, nausea and vomiting.   Genitourinary:  Negative for flank pain.   Musculoskeletal:  Negative for back pain, gait problem, neck pain and neck stiffness.   Skin:  Negative for pallor and rash.   Neurological:  Negative for dizziness, syncope, weakness and numbness.   Psychiatric/Behavioral:  Negative for confusion and dysphoric mood.    Objective:     Vital Signs (Most Recent):  Temp: 97.6 °F (36.4 °C) (10/03/22 1115)  Pulse: 95 (10/03/22 1929)  Resp: (!) 38 (10/03/22 1929)  BP: (!) 148/88 (10/03/22 1900)  SpO2: (!) 92 % (10/03/22 1929)   Vital Signs (24h Range):  Temp:  [97.6 °F (36.4 °C)-98.2 °F (36.8 °C)] 97.6 °F (36.4  °C)  Pulse:  [] 95  Resp:  [17-38] 38  SpO2:  [92 %-100 %] 92 %  BP: (148-284)/() 148/88   Weight: 68 kg (150 lb)  Body mass index is 24.21 kg/m².      Intake/Output Summary (Last 24 hours) at 10/3/2022 1935  Last data filed at 10/3/2022 1420  Gross per 24 hour   Intake 300 ml   Output 3600 ml   Net -3300 ml       Physical Exam  Vitals and nursing note reviewed.   Constitutional:       General: He is in acute distress.      Appearance: He is not toxic-appearing.   HENT:      Head: Normocephalic and atraumatic.   Eyes:      Extraocular Movements: Extraocular movements intact.      Pupils: Pupils are equal, round, and reactive to light.   Cardiovascular:      Rate and Rhythm: Regular rhythm. Tachycardia present.   Pulmonary:      Effort: Respiratory distress present.      Breath sounds: Rales present.      Comments: Pt is mildly tachypneic but comfortable on BiPAP  Chest:      Chest wall: No tenderness.   Abdominal:      General: Abdomen is flat.      Palpations: Abdomen is soft.   Musculoskeletal:         General: Swelling present. Normal range of motion.      Cervical back: Normal range of motion and neck supple.      Comments: BLE tense, chronic venous stasis changes, non-pitting, no ulceration   Skin:     General: Skin is warm and dry.   Neurological:      General: No focal deficit present.      Mental Status: He is alert and oriented to person, place, and time.   Psychiatric:         Mood and Affect: Mood normal.         Behavior: Behavior normal.         Thought Content: Thought content normal.         Judgment: Judgment normal.       Vents:  Oxygen Concentration (%): 80 (10/03/22 0905)  Lines/Drains/Airways       Central Venous Catheter Line  Duration                  Hemodialysis Catheter 02/28/22 1300 right internal jugular 217 days              Peripheral Intravenous Line  Duration                  Peripheral IV - Single Lumen 10/03/22 0911 18 G Left Antecubital <1 day         Peripheral IV -  Single Lumen 10/03/22 18 G Left Hand <1 day                  Significant Labs:    CBC/Anemia Profile:  Recent Labs   Lab 10/03/22  0915 10/03/22  0917 10/03/22  0921   WBC  --   --  21.58*   HGB  --   --  7.3*   HCT 24* 25* 24.2*   PLT  --   --  314   MCV  --   --  94   RDW  --   --  21.0*        Chemistries:  Recent Labs   Lab 10/03/22  0921      K 5.7*      CO2 21*   BUN 51*   CREATININE 6.6*   CALCIUM 9.7   ALBUMIN 3.2*   PROT 7.0   BILITOT 0.4   ALKPHOS 194*   ALT 45*   AST 58*   MG 2.1       All pertinent labs within the past 24 hours have been reviewed.    Significant Imaging: I have reviewed all pertinent imaging results/findings within the past 24 hours.    Assessment/Plan:     Pulmonary  Acute hypoxemic respiratory failure  Patient with Hypercapnic and Hypoxic Respiratory failure which is Acute.  he is not on home oxygen. Supplemental oxygen was provided and noted- Oxygen Concentration (%):  [80] 80.   Signs/symptoms of respiratory failure include- tachypnea, increased work of breathing and respiratory distress.   -He reports being treated for respiratory infection recently and was prescribed a course of Azithromycin on 09/16.  -CXR (10/3/22) noted no significant superimposed airspace consolidation, but did note Continued blunting of the right costophrenic sulcus is seen, which may represent a persistent small volume of pleural fluid on this side.    -CTA (10/03/22) did not show any concerns for pulmonary thromboembolism     Recent Labs     10/03/22  0921   BNP 2,797*       Recent Labs     10/03/22  1505   PH 7.429   PCO2 37.5   PO2 49   HCO3 24.8   POCSATURATED 86*   BE 0     Differential diagnoses include, but not limited to:  CHF, Pneumonia, Fluid overload     -Critical Care medicine was consulted for increasing respiratory distress requiring supplemental oxygen support via BiPap.  -After dialysis, patient was able to be weaned to room air. However, pt went into flash pulmonary edema  requiring BiPAP and nitro gtt.    RECOMMENDATIONS  -Rule out CHF with TTE. Trend troponin  -Nephrology following for dialysis/volume removal  -DuoNebs q4h PRN  -Wean supplemental O2 as tolerated.  -CAP coverage with Ceftriaxone and Doxycycline. Followup cultures and viral testing  -F/u BLE Dopplers to r/o DVT    Cardiac/Vascular  Hypertensive emergency  Hypertensive to 270s requiring nitro gtt with resolution of symptoms    PLAN:  -Wean BiPAP  -Resume home meds: amlodipine, clonidine, hydralazine, labetolol  -Workup secondary causes of htn      Renal/  ESRD on hemodialysis  Nephrology consulted and following for inpatient dialysis. See Nephrology consult note    Oncology  Anemia due to chronic kidney disease, on chronic dialysis  Hx of anemia secondary to ESRD    -Patient's with Normocytic anemia..   -Etiology likely due to chronic disease .    Recent Labs     10/03/22  0921   HGB 7.3*           Component Value Date/Time    MCV 94 10/03/2022 0921    RDW 21.0 (H) 10/03/2022 0921    IRON 10 (L) 02/17/2022 1041    TIBC 258 02/17/2022 1041    FERRITIN 384 (H) 02/17/2022 1041    FOLATE 4.4 02/17/2022 1041    [id number] 02/17/2022 1041       RECOMMENDATIONS  - Monitor CBC and transfuse if Hgb < 7   - Recommend Hemolysis workup      Critical Care Daily Checklist:    A: Awake: RASS Goal/Actual Goal:    Actual:     B: Spontaneous Breathing Trial Performed?     C: SAT & SBT Coordinated?  N/A                      D: Delirium: CAM-ICU     E: Early Mobility Performed? Yes   F: Feeding Goal:    Status:     Current Diet Order   No orders of the defined types were placed in this encounter.      AS: Analgesia/Sedation N/A   T: Thromboembolic Prophylaxis Sq Heparin   H: HOB > 300 Yes   U: Stress Ulcer Prophylaxis (if needed) Protonix   G: Glucose Control SSI   B: Bowel Function     I: Indwelling Catheter (Lines & [place]) Necessity No   D: De-escalation of Antimicrobials/Pharmacotherapies Ceftriaxone/Doxy    Plan for the day/ETD  BP control, med management    Code Status:  Family/Goals of Care: Full Code  Continue care       Critical secondary to Patient has a condition that poses threat to life and bodily function: Severe Respiratory Distress     Critical care was time spent personally by me on the following activities: development of treatment plan with patient or surrogate and bedside caregivers, discussions with consultants, evaluation of patient's response to treatment, examination of patient, ordering and performing treatments and interventions, ordering and review of laboratory studies, ordering and review of radiographic studies, pulse oximetry, re-evaluation of patient's condition. This critical care time did not overlap with that of any other provider or involve time for any procedures.     Nikolas Layton MD  Critical Care Medicine  [facility] - Emergency Dept

## 2022-10-04 NOTE — HOSPITAL COURSE
The pt was admitted to MICU on nitro gtt and BiPAP which was weaned overnight. HOD2, the pt had HD again and oral antihypertensives titrated. HOD pt was still requiring BiPAP overnight but remained off nitro gtt. Pt considered suitable for stepdown.

## 2022-10-05 LAB
ALBUMIN SERPL BCP-MCNC: 2.8 G/DL (ref 3.5–5.2)
ALP SERPL-CCNC: 150 U/L (ref 55–135)
ALT SERPL W/O P-5'-P-CCNC: 26 U/L (ref 10–44)
ANION GAP SERPL CALC-SCNC: 11 MMOL/L (ref 8–16)
AST SERPL-CCNC: 19 U/L (ref 10–40)
BASOPHILS # BLD AUTO: 0.05 K/UL (ref 0–0.2)
BASOPHILS NFR BLD: 0.6 % (ref 0–1.9)
BILIRUB SERPL-MCNC: 0.3 MG/DL (ref 0.1–1)
BUN SERPL-MCNC: 45 MG/DL (ref 6–20)
CALCIUM SERPL-MCNC: 9.5 MG/DL (ref 8.7–10.5)
CHLORIDE SERPL-SCNC: 106 MMOL/L (ref 95–110)
CO2 SERPL-SCNC: 17 MMOL/L (ref 23–29)
CREAT SERPL-MCNC: 5.4 MG/DL (ref 0.5–1.4)
DIFFERENTIAL METHOD: ABNORMAL
EOSINOPHIL # BLD AUTO: 0.2 K/UL (ref 0–0.5)
EOSINOPHIL NFR BLD: 2.5 % (ref 0–8)
ERYTHROCYTE [DISTWIDTH] IN BLOOD BY AUTOMATED COUNT: 18.6 % (ref 11.5–14.5)
EST. GFR  (NO RACE VARIABLE): 11.5 ML/MIN/1.73 M^2
GLUCOSE SERPL-MCNC: 95 MG/DL (ref 70–110)
HCT VFR BLD AUTO: 24.6 % (ref 40–54)
HGB BLD-MCNC: 7.8 G/DL (ref 14–18)
IMM GRANULOCYTES # BLD AUTO: 0.02 K/UL (ref 0–0.04)
IMM GRANULOCYTES NFR BLD AUTO: 0.2 % (ref 0–0.5)
LYMPHOCYTES # BLD AUTO: 1.2 K/UL (ref 1–4.8)
LYMPHOCYTES NFR BLD: 13.3 % (ref 18–48)
MAGNESIUM SERPL-MCNC: 1.7 MG/DL (ref 1.6–2.6)
MCH RBC QN AUTO: 28.9 PG (ref 27–31)
MCHC RBC AUTO-ENTMCNC: 31.7 G/DL (ref 32–36)
MCV RBC AUTO: 91 FL (ref 82–98)
MONOCYTES # BLD AUTO: 0.8 K/UL (ref 0.3–1)
MONOCYTES NFR BLD: 9.5 % (ref 4–15)
NEUTROPHILS # BLD AUTO: 6.5 K/UL (ref 1.8–7.7)
NEUTROPHILS NFR BLD: 73.9 % (ref 38–73)
NRBC BLD-RTO: 0 /100 WBC
PHOSPHATE SERPL-MCNC: 3.2 MG/DL (ref 2.7–4.5)
PLATELET # BLD AUTO: 220 K/UL (ref 150–450)
PMV BLD AUTO: 9.2 FL (ref 9.2–12.9)
POCT GLUCOSE: 103 MG/DL (ref 70–110)
POCT GLUCOSE: 111 MG/DL (ref 70–110)
POCT GLUCOSE: 94 MG/DL (ref 70–110)
POTASSIUM SERPL-SCNC: 5 MMOL/L (ref 3.5–5.1)
PROT SERPL-MCNC: 6.1 G/DL (ref 6–8.4)
RBC # BLD AUTO: 2.7 M/UL (ref 4.6–6.2)
SODIUM SERPL-SCNC: 134 MMOL/L (ref 136–145)
WBC # BLD AUTO: 8.77 K/UL (ref 3.9–12.7)

## 2022-10-05 PROCEDURE — 25000003 PHARM REV CODE 250: Performed by: STUDENT IN AN ORGANIZED HEALTH CARE EDUCATION/TRAINING PROGRAM

## 2022-10-05 PROCEDURE — 94640 AIRWAY INHALATION TREATMENT: CPT

## 2022-10-05 PROCEDURE — 94660 CPAP INITIATION&MGMT: CPT

## 2022-10-05 PROCEDURE — 63600175 PHARM REV CODE 636 W HCPCS: Performed by: STUDENT IN AN ORGANIZED HEALTH CARE EDUCATION/TRAINING PROGRAM

## 2022-10-05 PROCEDURE — 99233 PR SUBSEQUENT HOSPITAL CARE,LEVL III: ICD-10-PCS | Mod: ,,, | Performed by: NURSE PRACTITIONER

## 2022-10-05 PROCEDURE — 80100014 HC HEMODIALYSIS 1:1

## 2022-10-05 PROCEDURE — 83735 ASSAY OF MAGNESIUM: CPT | Performed by: STUDENT IN AN ORGANIZED HEALTH CARE EDUCATION/TRAINING PROGRAM

## 2022-10-05 PROCEDURE — 85025 COMPLETE CBC W/AUTO DIFF WBC: CPT | Performed by: STUDENT IN AN ORGANIZED HEALTH CARE EDUCATION/TRAINING PROGRAM

## 2022-10-05 PROCEDURE — 80053 COMPREHEN METABOLIC PANEL: CPT | Performed by: STUDENT IN AN ORGANIZED HEALTH CARE EDUCATION/TRAINING PROGRAM

## 2022-10-05 PROCEDURE — 99233 SBSQ HOSP IP/OBS HIGH 50: CPT | Mod: GC,,, | Performed by: INTERNAL MEDICINE

## 2022-10-05 PROCEDURE — 84100 ASSAY OF PHOSPHORUS: CPT | Performed by: STUDENT IN AN ORGANIZED HEALTH CARE EDUCATION/TRAINING PROGRAM

## 2022-10-05 PROCEDURE — 63600175 PHARM REV CODE 636 W HCPCS

## 2022-10-05 PROCEDURE — 99233 PR SUBSEQUENT HOSPITAL CARE,LEVL III: ICD-10-PCS | Mod: GC,,, | Performed by: INTERNAL MEDICINE

## 2022-10-05 PROCEDURE — 25000003 PHARM REV CODE 250: Performed by: HOSPITALIST

## 2022-10-05 PROCEDURE — 25000003 PHARM REV CODE 250

## 2022-10-05 PROCEDURE — 99900035 HC TECH TIME PER 15 MIN (STAT)

## 2022-10-05 PROCEDURE — 99233 SBSQ HOSP IP/OBS HIGH 50: CPT | Mod: ,,, | Performed by: NURSE PRACTITIONER

## 2022-10-05 PROCEDURE — 94761 N-INVAS EAR/PLS OXIMETRY MLT: CPT

## 2022-10-05 PROCEDURE — 25000242 PHARM REV CODE 250 ALT 637 W/ HCPCS: Performed by: STUDENT IN AN ORGANIZED HEALTH CARE EDUCATION/TRAINING PROGRAM

## 2022-10-05 PROCEDURE — 27000221 HC OXYGEN, UP TO 24 HOURS

## 2022-10-05 PROCEDURE — 20600001 HC STEP DOWN PRIVATE ROOM

## 2022-10-05 RX ORDER — DOXAZOSIN 1 MG/1
1 TABLET ORAL DAILY
Status: DISCONTINUED | OUTPATIENT
Start: 2022-10-05 | End: 2022-10-05

## 2022-10-05 RX ORDER — GABAPENTIN 100 MG/1
100 CAPSULE ORAL ONCE
Status: COMPLETED | OUTPATIENT
Start: 2022-10-05 | End: 2022-10-05

## 2022-10-05 RX ORDER — CLONIDINE 0.3 MG/24H
1 PATCH, EXTENDED RELEASE TRANSDERMAL ONCE
Status: DISCONTINUED | OUTPATIENT
Start: 2022-10-05 | End: 2022-10-05

## 2022-10-05 RX ORDER — NYSTATIN 100000 [USP'U]/ML
100000 SUSPENSION ORAL 4 TIMES DAILY
Status: DISCONTINUED | OUTPATIENT
Start: 2022-10-05 | End: 2022-10-06 | Stop reason: HOSPADM

## 2022-10-05 RX ORDER — MAGNESIUM SULFATE HEPTAHYDRATE 40 MG/ML
2 INJECTION, SOLUTION INTRAVENOUS ONCE
Status: COMPLETED | OUTPATIENT
Start: 2022-10-05 | End: 2022-10-05

## 2022-10-05 RX ORDER — SODIUM CHLORIDE 9 MG/ML
INJECTION, SOLUTION INTRAVENOUS ONCE
Status: DISCONTINUED | OUTPATIENT
Start: 2022-10-05 | End: 2022-10-06 | Stop reason: HOSPADM

## 2022-10-05 RX ORDER — LOSARTAN POTASSIUM 50 MG/1
50 TABLET ORAL DAILY
Status: DISCONTINUED | OUTPATIENT
Start: 2022-10-05 | End: 2022-10-06 | Stop reason: HOSPADM

## 2022-10-05 RX ORDER — ACETAMINOPHEN 500 MG
1000 TABLET ORAL ONCE
Status: COMPLETED | OUTPATIENT
Start: 2022-10-05 | End: 2022-10-05

## 2022-10-05 RX ADMIN — IPRATROPIUM BROMIDE AND ALBUTEROL SULFATE 3 ML: 2.5; .5 SOLUTION RESPIRATORY (INHALATION) at 11:10

## 2022-10-05 RX ADMIN — MAGNESIUM SULFATE 2 G: 2 INJECTION INTRAVENOUS at 08:10

## 2022-10-05 RX ADMIN — MUPIROCIN: 20 OINTMENT TOPICAL at 08:10

## 2022-10-05 RX ADMIN — LIDOCAINE AND PRILOCAINE: 25; 25 CREAM TOPICAL at 11:10

## 2022-10-05 RX ADMIN — FUROSEMIDE 40 MG: 40 TABLET ORAL at 08:10

## 2022-10-05 RX ADMIN — ACETAMINOPHEN 1000 MG: 500 TABLET ORAL at 11:10

## 2022-10-05 RX ADMIN — HYDRALAZINE HYDROCHLORIDE 100 MG: 50 TABLET ORAL at 05:10

## 2022-10-05 RX ADMIN — NYSTATIN 100000 UNITS: 500000 SUSPENSION ORAL at 01:10

## 2022-10-05 RX ADMIN — PANTOPRAZOLE SODIUM 40 MG: 40 TABLET, DELAYED RELEASE ORAL at 08:10

## 2022-10-05 RX ADMIN — CLONIDINE 1 PATCH: 0.3 PATCH TRANSDERMAL at 02:10

## 2022-10-05 RX ADMIN — GABAPENTIN 100 MG: 100 CAPSULE ORAL at 11:10

## 2022-10-05 RX ADMIN — GABAPENTIN 100 MG: 100 CAPSULE ORAL at 08:10

## 2022-10-05 RX ADMIN — IPRATROPIUM BROMIDE AND ALBUTEROL SULFATE 3 ML: 2.5; .5 SOLUTION RESPIRATORY (INHALATION) at 08:10

## 2022-10-05 RX ADMIN — LIDOCAINE AND PRILOCAINE: 25; 25 CREAM TOPICAL at 03:10

## 2022-10-05 RX ADMIN — IPRATROPIUM BROMIDE AND ALBUTEROL SULFATE 3 ML: 2.5; .5 SOLUTION RESPIRATORY (INHALATION) at 07:10

## 2022-10-05 RX ADMIN — HEPARIN SODIUM 5000 UNITS: 5000 INJECTION INTRAVENOUS; SUBCUTANEOUS at 01:10

## 2022-10-05 RX ADMIN — HEPARIN SODIUM 5000 UNITS: 5000 INJECTION INTRAVENOUS; SUBCUTANEOUS at 11:10

## 2022-10-05 RX ADMIN — IPRATROPIUM BROMIDE AND ALBUTEROL SULFATE 3 ML: 2.5; .5 SOLUTION RESPIRATORY (INHALATION) at 03:10

## 2022-10-05 RX ADMIN — LABETALOL HYDROCHLORIDE 200 MG: 100 TABLET, FILM COATED ORAL at 08:10

## 2022-10-05 RX ADMIN — ASPIRIN 81 MG: 81 TABLET, COATED ORAL at 08:10

## 2022-10-05 RX ADMIN — ALBUTEROL SULFATE 2 PUFF: 108 INHALANT RESPIRATORY (INHALATION) at 01:10

## 2022-10-05 RX ADMIN — ALBUTEROL SULFATE 2 PUFF: 108 INHALANT RESPIRATORY (INHALATION) at 06:10

## 2022-10-05 RX ADMIN — NYSTATIN 100000 UNITS: 500000 SUSPENSION ORAL at 04:10

## 2022-10-05 RX ADMIN — LOSARTAN POTASSIUM 50 MG: 50 TABLET, FILM COATED ORAL at 08:10

## 2022-10-05 RX ADMIN — AMLODIPINE BESYLATE 10 MG: 10 TABLET ORAL at 08:10

## 2022-10-05 RX ADMIN — LIDOCAINE AND PRILOCAINE: 25; 25 CREAM TOPICAL at 08:10

## 2022-10-05 RX ADMIN — NYSTATIN 100000 UNITS: 500000 SUSPENSION ORAL at 08:10

## 2022-10-05 RX ADMIN — HYDRALAZINE HYDROCHLORIDE 100 MG: 50 TABLET ORAL at 11:10

## 2022-10-05 RX ADMIN — HEPARIN SODIUM 5000 UNITS: 5000 INJECTION INTRAVENOUS; SUBCUTANEOUS at 05:10

## 2022-10-05 RX ADMIN — LIDOCAINE AND PRILOCAINE: 25; 25 CREAM TOPICAL at 04:10

## 2022-10-05 RX ADMIN — HYDRALAZINE HYDROCHLORIDE 100 MG: 50 TABLET ORAL at 01:10

## 2022-10-05 NOTE — ASSESSMENT & PLAN NOTE
Hx of anemia secondary to ESRD    -Patient's with Normocytic anemia..   -Etiology likely due to chronic disease .    Recent Labs     10/03/22  2054 10/04/22  0350 10/04/22  0854 10/05/22  0313   HGB 6.1* 6.8* 7.2* 7.8*           Component Value Date/Time    MCV 94 10/03/2022 0921    RDW 21.0 (H) 10/03/2022 0921    IRON 10 (L) 02/17/2022 1041    TIBC 258 02/17/2022 1041    FERRITIN 384 (H) 02/17/2022 1041    FOLATE 4.4 02/17/2022 1041    [id number] 02/17/2022 1041       RECOMMENDATIONS  - Monitor CBC and transfuse if Hgb < 7   - Recommend Hemolysis workup

## 2022-10-05 NOTE — PROGRESS NOTES
Bedside HD completed, net uf removed 3.5 liters. Post B/P 157/79, pulse 81. O2 sat on room air 95%. Blood returned and catheter ports flushed with normal saline ,  ports instilled with heparin as indicated. . Ports capped and secured . Patient alert and stable

## 2022-10-05 NOTE — SUBJECTIVE & OBJECTIVE
Interval History/Significant Events:   -NAEON.  -Pt remains off nitro gtt, still requiring BiPAP overnight.   -BP remains difficult to control, will add doxazosin and consider further adjustments to accommodate for the pt's capacity for home compliance.  -Pt suitable for stepdown.       Objective:     Vital Signs (Most Recent):  Temp: 98 °F (36.7 °C) (10/05/22 0701)  Pulse: 81 (10/05/22 1110)  Resp: (!) 28 (10/05/22 1110)  BP: (!) 176/86 (10/05/22 1000)  SpO2: (!) 94 % (10/05/22 1110)   Vital Signs (24h Range):  Temp:  [97.1 °F (36.2 °C)-98.4 °F (36.9 °C)] 98 °F (36.7 °C)  Pulse:  [71-97] 81  Resp:  [12-39] 28  SpO2:  [91 %-100 %] 94 %  BP: (145-228)/() 176/86   Weight: 68 kg (149 lb 14.6 oz)  Body mass index is 24.2 kg/m².      Intake/Output Summary (Last 24 hours) at 10/5/2022 1125  Last data filed at 10/5/2022 1000  Gross per 24 hour   Intake 1906.3 ml   Output 4163 ml   Net -2256.7 ml       Physical Exam  Vitals and nursing note reviewed.   Constitutional:       Appearance: He is not toxic-appearing.   HENT:      Head: Normocephalic and atraumatic.      Mouth/Throat:      Mouth: Mucous membranes are moist.   Eyes:      Extraocular Movements: Extraocular movements intact.      Pupils: Pupils are equal, round, and reactive to light.   Cardiovascular:      Rate and Rhythm: Regular rhythm. Tachycardia present.      Pulses: Normal pulses.   Pulmonary:      Effort: Pulmonary effort is normal.      Breath sounds: Normal breath sounds.      Comments: Pt still showing increased WOB, but comfortable on NC  Chest:      Chest wall: No tenderness.   Abdominal:      General: Abdomen is flat.      Palpations: Abdomen is soft.   Musculoskeletal:         General: Normal range of motion.      Cervical back: Normal range of motion and neck supple.      Comments: BLE tense, chronic venous stasis changes, non-pitting, no ulceration   Skin:     General: Skin is warm and dry.      Capillary Refill: Capillary refill takes less  than 2 seconds.   Neurological:      General: No focal deficit present.      Mental Status: He is alert and oriented to person, place, and time.   Psychiatric:         Mood and Affect: Mood normal.         Behavior: Behavior normal.         Thought Content: Thought content normal.         Judgment: Judgment normal.       Vents:  Oxygen Concentration (%): 50 (10/05/22 0600)  Lines/Drains/Airways       Central Venous Catheter Line  Duration                  Hemodialysis Catheter 02/28/22 1300 right internal jugular 218 days              Drain  Duration             Male External Urinary Catheter 10/03/22 2100 Medium 1 day              Peripheral Intravenous Line  Duration                  Peripheral IV - Single Lumen 10/03/22 0911 18 G Left Antecubital 2 days         Peripheral IV - Single Lumen 10/03/22 18 G Left Hand 2 days                  Significant Labs:    CBC/Anemia Profile:  Recent Labs   Lab 10/04/22  0350 10/04/22  0854 10/05/22  0313   WBC 6.58 8.33 8.77   HGB 6.8* 7.2* 7.8*   HCT 21.3* 22.9* 24.6*    208 220   MCV 90 91 91   RDW 19.2* 18.7* 18.6*        Chemistries:  Recent Labs   Lab 10/03/22  2054 10/04/22  0350 10/05/22  0313   * 136 134*   K 4.9 4.8 5.0    103 106   CO2 21* 21* 17*   BUN 26* 32* 45*   CREATININE 3.5* 4.2* 5.4*   CALCIUM 9.1 8.9 9.5   ALBUMIN 2.7* 2.6* 2.8*   PROT 5.8* 5.7* 6.1   BILITOT 0.4 0.3 0.3   ALKPHOS 142* 148* 150*   ALT 36 31 26   AST 35 27 19   MG  --  1.9 1.7   PHOS  --  2.4* 3.2       All pertinent labs within the past 24 hours have been reviewed.    Significant Imaging:  I have reviewed all pertinent imaging results/findings within the past 24 hours.

## 2022-10-05 NOTE — PROGRESS NOTES
Bedside HD initiated, /, uf net goal set for 3liters. B/P 155/85, pulse 81, o2 sat on room air 95%. . Patient alert and stable

## 2022-10-05 NOTE — ASSESSMENT & PLAN NOTE
2/2 Membranous nephropathy (bx proven), DM II, and HTN , on HD since 2/2022  Outpatient HD Information:  -Dialysis modality: Hemodialysis  -Outpatient HD unit: [facility]  -Nephrologist: Dr. Saez   -HD TX days: Monday/Wednesday/Friday, duration of treatment: 210 mins  -Last HD TX prior to hospital admission: 09/30/2022  -Dialysis access: dialysis catheter placed on R IJ   -Vascular surgeon: referral submitted 8//30 by Dr. Saez  -Residual urine: oliguric    -EDW: 61kg      Plan/Recommendations:   -Will plan for HD today (10/05/2022) per OP schedule with UF 2-2.5 L as BP tolerates for metabolic clearance and volume management.   -Adjust home blood pressure medications   -Mineral & Bone Disorder:    Phosphorus level: 3.2, hold binders   PTH level: ~900 on 2/2022    Calcium 9.5  -ESKD anemia with acute blood loss anemia: hgb 7.8, goal hgb 10-11g/dL, transfusion given overnight  -Renal function panel & Mg level on HD days   -Renal diet with protein supplements, if not NPO   Nephrovitamins daily   -Strict I/O's and daily weights

## 2022-10-05 NOTE — PROGRESS NOTES
[facility]  Critical Care Medicine  Progress Note    Patient Name: Enrique Martinez  MRN: [id number]  Admission Date: 10/3/2022  Hospital Length of Stay: 2 days  Code Status: Full Code  Attending Provider: Charisse Norris MD  Primary Care Provider: Primary Doctor No   Principal Problem: Hypertensive emergency    Subjective:     HPI:  Mr Martinez is a 57yom with a history of ESRD on HD, M/W/F, DMII, CAD w/ stents, gout and gerd presenting with CHF exacerbation likely 2/t CAP. The pt says he was seen in OP clinic with shortness of breath and cough and was diagnosed with CAP and given 4 days of azithromycin. He says that he attended his dialysis session on Friday without issues, but says that his shortness of breath got acutely worse today. Pt denies any CP, abdominal pain, n/v/d or dysuria.    In the ED: Labs were significant for WBC 21.58, BNP 2,797, Trop 0.038, VBG on bipap showed isolated respiratory acidosis. CXR showed vascular congestion and possible R Pleural effusion. CTA chest pending to r/o PE. Pt receiving HD in the ED with 3L UF goal. After HD and 3L removal, pt was weaned to RA.    Critical care was reconsulted for hypertensive emergency with pulmonary edema, on BiPAP and nitro gtt. The pt's blood pressure was controlled with nitro and the pts respiratory status improved. He was brought up to the unit and started on his oral antihypertensives.      Hospital/ICU Course:  The pt was admitted to MICU on nitro gtt and BiPAP which was weaned overnight. HOD2, the pt had HD again and oral antihypertensives titrated. HOD pt was still requiring BiPAP overnight but remained off nitro gtt. Pt considered suitable for stepdown.      Interval History/Significant Events:   -NAEON.  -Pt remains off nitro gtt, still requiring BiPAP overnight.   -BP remains difficult to control, will add doxazosin and consider further adjustments to accommodate for the pt's capacity for home compliance.  -Pt suitable  for stepdown.       Objective:     Vital Signs (Most Recent):  Temp: 98 °F (36.7 °C) (10/05/22 0701)  Pulse: 81 (10/05/22 1110)  Resp: (!) 28 (10/05/22 1110)  BP: (!) 176/86 (10/05/22 1000)  SpO2: (!) 94 % (10/05/22 1110)   Vital Signs (24h Range):  Temp:  [97.1 °F (36.2 °C)-98.4 °F (36.9 °C)] 98 °F (36.7 °C)  Pulse:  [71-97] 81  Resp:  [12-39] 28  SpO2:  [91 %-100 %] 94 %  BP: (145-228)/() 176/86   Weight: 68 kg (149 lb 14.6 oz)  Body mass index is 24.2 kg/m².      Intake/Output Summary (Last 24 hours) at 10/5/2022 1125  Last data filed at 10/5/2022 1000  Gross per 24 hour   Intake 1906.3 ml   Output 4163 ml   Net -2256.7 ml       Physical Exam  Vitals and nursing note reviewed.   Constitutional:       Appearance: He is not toxic-appearing.   HENT:      Head: Normocephalic and atraumatic.      Mouth/Throat:      Mouth: Mucous membranes are moist.   Eyes:      Extraocular Movements: Extraocular movements intact.      Pupils: Pupils are equal, round, and reactive to light.   Cardiovascular:      Rate and Rhythm: Regular rhythm. Tachycardia present.      Pulses: Normal pulses.   Pulmonary:      Effort: Pulmonary effort is normal.      Breath sounds: Normal breath sounds.      Comments: Pt still showing increased WOB, but comfortable on NC  Chest:      Chest wall: No tenderness.   Abdominal:      General: Abdomen is flat.      Palpations: Abdomen is soft.   Musculoskeletal:         General: Normal range of motion.      Cervical back: Normal range of motion and neck supple.      Comments: BLE tense, chronic venous stasis changes, non-pitting, no ulceration   Skin:     General: Skin is warm and dry.      Capillary Refill: Capillary refill takes less than 2 seconds.   Neurological:      General: No focal deficit present.      Mental Status: He is alert and oriented to person, place, and time.   Psychiatric:         Mood and Affect: Mood normal.         Behavior: Behavior normal.         Thought Content: Thought  content normal.         Judgment: Judgment normal.       Vents:  Oxygen Concentration (%): 50 (10/05/22 0600)  Lines/Drains/Airways       Central Venous Catheter Line  Duration                  Hemodialysis Catheter 02/28/22 1300 right internal jugular 218 days              Drain  Duration             Male External Urinary Catheter 10/03/22 2100 Medium 1 day              Peripheral Intravenous Line  Duration                  Peripheral IV - Single Lumen 10/03/22 0911 18 G Left Antecubital 2 days         Peripheral IV - Single Lumen 10/03/22 18 G Left Hand 2 days                  Significant Labs:    CBC/Anemia Profile:  Recent Labs   Lab 10/04/22  0350 10/04/22  0854 10/05/22  0313   WBC 6.58 8.33 8.77   HGB 6.8* 7.2* 7.8*   HCT 21.3* 22.9* 24.6*    208 220   MCV 90 91 91   RDW 19.2* 18.7* 18.6*        Chemistries:  Recent Labs   Lab 10/03/22  2054 10/04/22  0350 10/05/22  0313   * 136 134*   K 4.9 4.8 5.0    103 106   CO2 21* 21* 17*   BUN 26* 32* 45*   CREATININE 3.5* 4.2* 5.4*   CALCIUM 9.1 8.9 9.5   ALBUMIN 2.7* 2.6* 2.8*   PROT 5.8* 5.7* 6.1   BILITOT 0.4 0.3 0.3   ALKPHOS 142* 148* 150*   ALT 36 31 26   AST 35 27 19   MG  --  1.9 1.7   PHOS  --  2.4* 3.2       All pertinent labs within the past 24 hours have been reviewed.    Significant Imaging:  I have reviewed all pertinent imaging results/findings within the past 24 hours.      ABG  Recent Labs   Lab 10/03/22  2040   PH 7.400   PO2 52   PCO2 39.1   HCO3 24.2   BE -1     Assessment/Plan:     Pulmonary  Acute hypoxemic respiratory failure  Patient with Hypercapnic and Hypoxic Respiratory failure which is Acute.  he is not on home oxygen. Supplemental oxygen was provided and noted- Oxygen Concentration (%):  [50] 50.   Signs/symptoms of respiratory failure include- tachypnea, increased work of breathing and respiratory distress.   -He reports being treated for respiratory infection recently and was prescribed a course of Azithromycin on  09/16.  -CXR (10/3/22) noted no significant superimposed airspace consolidation, but did note Continued blunting of the right costophrenic sulcus is seen, which may represent a persistent small volume of pleural fluid on this side.    -CTA (10/03/22) did not show any concerns for pulmonary thromboembolism     Recent Labs     10/03/22  0921   BNP 2,797*       Recent Labs     10/03/22  2040   PH 7.400   PCO2 39.1   PO2 52   HCO3 24.2   POCSATURATED 86*   BE -1     Differential diagnoses include, but not limited to:  CHF, Pneumonia, Fluid overload     -Critical Care medicine was consulted for increasing respiratory distress requiring supplemental oxygen support via BiPap.  -After dialysis, patient was able to be weaned to room air. However, pt went into flash pulmonary edema requiring BiPAP and nitro gtt.    RECOMMENDATIONS  -Rule out CHF with TTE. Trend troponin  -Nephrology following for dialysis/volume removal  -DuoNebs q4h PRN  -Wean supplemental O2 as tolerated.  -D/c CAP coverage with Ceftriaxone and Doxycycline. Followup cultures and viral testing  -BLE Dopplers to r/o DVT negative    Cardiac/Vascular  * Hypertensive emergency  Hypertensive to 270s requiring nitro gtt with resolution of symptoms    PLAN:  -Wean BiPAP  -Resume home meds: amlodipine, clonidine, hydralazine, labetolol  -Workup secondary causes of htn  -Add losartan and doxazosin        Renal/  ESRD on hemodialysis  Nephrology consulted and following for inpatient dialysis. See Nephrology consult note    Membranous glomerulonephritis  See ESRD    Oncology  Leukocytosis  Resolved    Anemia due to chronic kidney disease, on chronic dialysis  Hx of anemia secondary to ESRD    -Patient's with Normocytic anemia..   -Etiology likely due to chronic disease .    Recent Labs     10/03/22  2054 10/04/22  0350 10/04/22  0854 10/05/22  0313   HGB 6.1* 6.8* 7.2* 7.8*           Component Value Date/Time    MCV 94 10/03/2022 0921    RDW 21.0 (H) 10/03/2022 0921     IRON 10 (L) 02/17/2022 1041    TIBC 258 02/17/2022 1041    FERRITIN 384 (H) 02/17/2022 1041    FOLATE 4.4 02/17/2022 1041    [id number] 863 02/17/2022 1041       RECOMMENDATIONS  - Monitor CBC and transfuse if Hgb < 7   - Recommend Hemolysis workup         Nikolas Layton MD  Critical Care Medicine  [facility] - [facility]

## 2022-10-05 NOTE — PROGRESS NOTES
[facility] - [facility]  Nephrology  Progress Note    Patient Name: Enrique Martinez  MRN: [id number]  Admission Date: 10/3/2022  Hospital Length of Stay: 2 days  Attending Provider: Charisse Norris MD   Primary Care Physician: Primary Doctor No  Principal Problem:Hypertensive emergency    Subjective:     Interval History:   Additional HD treatment completed on yesterday. Net UF 2.5 L. Patient remain hypertensive but off Cardene drip this AM.   Net negative 1.5 L/24 hrs.     Review of patient's allergies indicates:  No Known Allergies  Current Facility-Administered Medications   Medication Frequency    0.9%  NaCl infusion (for blood administration) Q24H PRN    0.9%  NaCl infusion Once    albuterol inhaler 2 puff Q6H PRN    albuterol-ipratropium 2.5 mg-0.5 mg/3 mL nebulizer solution 3 mL Q4H WAKE    amLODIPine tablet 10 mg Daily    aspirin EC tablet 81 mg Daily    cloNIDine 0.3 mg/24 hr td ptwk 1 patch Q7 Days    cloNIDine 0.3 mg/24 hr td ptwk 1 patch Once    dextrose 10% bolus 125 mL PRN    dextrose 10% bolus 250 mL PRN    furosemide tablet 40 mg BID    gabapentin capsule 100 mg Daily    glucagon (human recombinant) injection 1 mg PRN    glucose chewable tablet 16 g PRN    glucose chewable tablet 24 g PRN    heparin (porcine) injection 1,000 Units PRN    heparin (porcine) injection 5,000 Units Q8H    hydrALAZINE tablet 100 mg Q8H    influenza (QUADRIVALENT PF) vaccine 0.5 mL Prior to discharge    insulin aspart U-100 pen 0-5 Units QID (AC + HS) PRN    labetaloL tablet 200 mg Q12H    LIDOcaine-prilocaine cream BID PRN    losartan tablet 50 mg Daily    magnesium sulfate 2g in water 50mL IVPB (premix) Once    mupirocin 2 % ointment BID    nitroGLYCERIN 50 mg in dextrose 5 % 250 mL infusion (TITRATING) Continuous    pantoprazole EC tablet 40 mg Daily    sodium chloride 0.9% bolus 250 mL PRN    sodium chloride 0.9% flush 10 mL PRN       Objective:     Vital Signs (Most  Recent):  Temp: 98 °F (36.7 °C) (10/05/22 0701)  Pulse: 75 (10/05/22 0900)  Resp: 12 (10/05/22 0900)  BP: (!) 185/94 (10/05/22 0900)  SpO2: (!) 93 % (10/05/22 0900)  O2 Device (Oxygen Therapy): room air (10/05/22 0800)   Vital Signs (24h Range):  Temp:  [97.1 °F (36.2 °C)-98.4 °F (36.9 °C)] 98 °F (36.7 °C)  Pulse:  [71-97] 75  Resp:  [12-39] 12  SpO2:  [91 %-100 %] 93 %  BP: (144-228)/() 185/94     Weight: 68 kg (149 lb 14.6 oz) (10/04/22 0006)  Body mass index is 24.2 kg/m².  Body surface area is 1.78 meters squared.    I/O last 3 completed shifts:  In: 4822.7 [P.O.:2300; I.V.:861.3; Blood:810; Other:500; IV Piggyback:351.4]  Out: 4418 [Urine:1416; Other:3002]    Physical Exam  Vitals and nursing note reviewed.   Constitutional:       General: He is not in acute distress.     Appearance: He is ill-appearing. He is not toxic-appearing or diaphoretic.      Interventions: Nasal cannula in place.   HENT:      Head: Normocephalic and atraumatic.      Mouth/Throat:      Mouth: Mucous membranes are moist.   Cardiovascular:      Rate and Rhythm: Normal rate and regular rhythm.      Pulses: Normal pulses.      Comments: R IJ TDC w/o discharge or pain at the site   Pulmonary:      Effort: Accessory muscle usage present. No respiratory distress.      Breath sounds: No stridor. Rales present. No wheezing.   Abdominal:      General: Abdomen is flat. Bowel sounds are normal. There is no distension.      Palpations: Abdomen is soft. There is no mass.      Tenderness: There is no abdominal tenderness. There is no guarding or rebound.      Hernia: No hernia is present.   Musculoskeletal:         General: Swelling present. No tenderness, deformity or signs of injury.      Right lower leg: Edema present.      Left lower leg: Edema present.   Skin:     General: Skin is warm.      Capillary Refill: Capillary refill takes less than 2 seconds.      Coloration: Skin is not jaundiced or pale.      Findings: No bruising, erythema,  lesion or rash.   Neurological:      Mental Status: He is alert and oriented to person, place, and time.   Psychiatric:         Mood and Affect: Mood is anxious. Affect is labile.         Cognition and Memory: Cognition normal.       Significant Labs:  CBC:   Recent Labs   Lab 10/05/22  0313   WBC 8.77   RBC 2.70*   HGB 7.8*   HCT 24.6*      MCV 91   MCH 28.9   MCHC 31.7*     CMP:   Recent Labs   Lab 10/05/22  0313   GLU 95   CALCIUM 9.5   ALBUMIN 2.8*   PROT 6.1   *   K 5.0   CO2 17*      BUN 45*   CREATININE 5.4*   ALKPHOS 150*   ALT 26   AST 19   BILITOT 0.3     All labs within the past 24 hours have been reviewed.       Assessment/Plan:     * Hypertensive emergency  - defer to primary care team / HD for volume removal     ESRD on hemodialysis  2/2 Membranous nephropathy (bx proven), DM II, and HTN , on HD since 2/2022  Outpatient HD Information:  -Dialysis modality: Hemodialysis  -Outpatient HD unit: [facility]  -Nephrologist: Dr. Saez   -HD TX days: Monday/Wednesday/Friday, duration of treatment: 210 mins  -Last HD TX prior to hospital admission: 09/30/2022  -Dialysis access: dialysis catheter placed on R IJ   -Vascular surgeon: referral submitted 8//30 by Dr. Saez  -Residual urine: oliguric    -EDW: 61kg      Plan/Recommendations:   -Will plan for HD today (10/05/2022) per OP schedule with UF 2-2.5 L as BP tolerates for metabolic clearance and volume management.   -Adjust home blood pressure medications   -Mineral & Bone Disorder:    Phosphorus level: 3.2, hold binders   PTH level: ~900 on 2/2022    Calcium 9.5  -ESKD anemia with acute blood loss anemia: hgb 7.8, goal hgb 10-11g/dL, transfusion given overnight  -Renal function panel & Mg level on HD days   -Renal diet with protein supplements, if not NPO   Nephrovitamins daily   -Strict I/O's and daily weights    Acute hypoxemic respiratory failure  -Plan per primary team         Membranous glomerulonephritis  - Bx proven, now  ESRD on HD, he is working with kidney transplant team      Thank you for your consult. I will follow-up with patient. Please contact us if you have any additional questions.    Shelia Mejia DNP, FNP-C  Nephrology  [facility] - Cardiac Medical ICU

## 2022-10-05 NOTE — ASSESSMENT & PLAN NOTE
Hypertensive to 270s requiring nitro gtt with resolution of symptoms    PLAN:  -Wean BiPAP  -Resume home meds: amlodipine, clonidine, hydralazine, labetolol  -Workup secondary causes of htn  -Add losartan and doxazosin

## 2022-10-05 NOTE — RESIDENT HANDOFF
Handoff     Primary Team: Networked reference to record PCT  Room Number: 6089/6089 A     Patient Name: Enrique Martinez MRN: [id number]     Date of Birth: [birth year] Allergies: Patient has no known allergies.     Age: 57 y.o. Admit Date: 10/3/2022     Sex: male  BMI: Body mass index is 24.2 kg/m².     Code Status: Full Code        Illness Level (current clinical status): Watcher - No    Reason for Admission: Hypertensive emergency    Brief HPI (pertinent PMH and diagnosis or differential diagnosis): Mr Martinez is a 57yom with a history of ESRD on HD, M/W/F, DMII, CAD w/ stents, gout and gerd presenting with CHF exacerbation likely 2/t CAP. The pt says he was seen in OP clinic with shortness of breath and cough and was diagnosed with CAP and given 4 days of azithromycin. He says that he attended his dialysis session on Friday without issues, but says that his shortness of breath got acutely worse today. Pt denies any CP, abdominal pain, n/v/d or dysuria.     In the ED: Labs were significant for WBC 21.58, BNP 2,797, Trop 0.038, VBG on bipap showed isolated respiratory acidosis. CXR showed vascular congestion and possible R Pleural effusion. CTA chest pending to r/o PE. Pt receiving HD in the ED with 3L UF goal. After HD and 3L removal, pt was weaned to RA.     Critical care was reconsulted for hypertensive emergency with pulmonary edema, on BiPAP and nitro gtt. The pt's blood pressure was controlled with nitro and the pts respiratory status improved. He was brought up to the unit and started on his oral antihypertensives.       Procedure Date: N/A    Hospital Course (updated, brief assessment by system or problem, significant events): The pt was admitted to MICU on nitro gtt and BiPAP which was weaned overnight. HOD2, the pt had HD again and oral antihypertensives titrated. HOD pt was still requiring BiPAP overnight but remained off nitro gtt. Pt considered suitable for stepdown.    Tasks (specific, using if-then  statements): Titrate home BP meds, follow-up OP for likely COPD    Contingency Plan (special circumstances anticipated and plan): Reconsult MICU if he decompensates    Estimated Discharge Date: 10/6/22    Discharge Disposition: Home or Self Care    Mentored By: Dr. Norris

## 2022-10-05 NOTE — ASSESSMENT & PLAN NOTE
Patient with Hypercapnic and Hypoxic Respiratory failure which is Acute.  he is not on home oxygen. Supplemental oxygen was provided and noted- Oxygen Concentration (%):  [50] 50.   Signs/symptoms of respiratory failure include- tachypnea, increased work of breathing and respiratory distress.   -He reports being treated for respiratory infection recently and was prescribed a course of Azithromycin on 09/16.  -CXR (10/3/22) noted no significant superimposed airspace consolidation, but did note Continued blunting of the right costophrenic sulcus is seen, which may represent a persistent small volume of pleural fluid on this side.    -CTA (10/03/22) did not show any concerns for pulmonary thromboembolism     Recent Labs     10/03/22  0921   BNP 2,797*       Recent Labs     10/03/22  2040   PH 7.400   PCO2 39.1   PO2 52   HCO3 24.2   POCSATURATED 86*   BE -1     Differential diagnoses include, but not limited to:  CHF, Pneumonia, Fluid overload     -Critical Care medicine was consulted for increasing respiratory distress requiring supplemental oxygen support via BiPap.  -After dialysis, patient was able to be weaned to room air. However, pt went into flash pulmonary edema requiring BiPAP and nitro gtt.    RECOMMENDATIONS  -Rule out CHF with TTE. Trend troponin  -Nephrology following for dialysis/volume removal  -DuoNebs q4h PRN  -Wean supplemental O2 as tolerated.  -D/c CAP coverage with Ceftriaxone and Doxycycline. Followup cultures and viral testing  -BLE Dopplers to r/o DVT negative

## 2022-10-05 NOTE — PLAN OF CARE
CMICU DAILY GOALS       A: Awake    RASS: Goal - RASS Goal: 0-->alert and calm  Actual - RASS (Booth Agitation-Sedation Scale): 0-->alert and calm   Restraint necessity:    B: Breathe   SBT: Not intubated   C: Coordinate A & B, analgesics/sedatives   Pain: managed    SAT: Not intubated  D: Delirium   CAM-ICU: Overall CAM-ICU: Negative  E: Early(intubated/ Progressive (non-intubated) Mobility   MOVE Screen: Pass   Activity: Activity Management: Rolling - L1  FAS: Feeding/Nutrition   Diet order: Diet/Nutrition Received: regular,    T: Thrombus   DVT prophylaxis: VTE Required Core Measure: Pharmacological prophylaxis initiated/maintained  H: HOB Elevation   Head of Bed (HOB) Positioning: HOB elevated  U: Ulcer Prophylaxis   GI: yes  G: Glucose control   managed Glycemic Management: blood glucose monitored  S: Skin   Bathing/Skin Care: bath, complete, dressed/undressed, incontinence care, linen changed  Device Skin Pressure Protection: absorbent pad utilized/changed, adhesive use limited  Pressure Reduction Devices: foam padding utilized  Pressure Reduction Techniques: frequent weight shift encouraged  Skin Protection: adhesive use limited  B: Bowel Function   no issues   I: Indwelling Catheters   [place] necessity:     CVC necessity: No  D: De-escalation Antibiotics   No    Family/Goals of care/Code Status   Code Status: Full Code    24H Vital Sign Range  Temp:  [97.7 °F (36.5 °C)-98.4 °F (36.9 °C)]   Pulse:  [71-97]   Resp:  [12-39]   BP: (146-228)/()   SpO2:  [91 %-100 %]      Shift Events   No acute events throughout shift. Pt currently getting dialyzed at the bedside. BP somewhat better controlled today on pt's home BP medicine regimen. Transfer orders placed, awaiting room to become available.     VS and assessment per flow sheet, patient progressing towards goals as tolerated, plan of care reviewed with  pt Enrique Martinez , all concerns addressed, will continue to monitor.    Christoph Medrano

## 2022-10-05 NOTE — PLAN OF CARE
Problem: Device-Related Complication Risk (Hemodialysis)  Goal: Safe, Effective Therapy Delivery  Outcome: Ongoing, Progressing     Problem: Hemodynamic Instability (Hemodialysis)  Goal: Effective Tissue Perfusion  Outcome: Ongoing, Progressing     Problem: Infection (Hemodialysis)  Goal: Absence of Infection Signs and Symptoms  Outcome: Ongoing, Progressing     Problem: Electrolyte Imbalance (Chronic Kidney Disease)  Goal: Electrolyte Balance  Outcome: Ongoing, Progressing     Problem: Fluid Volume Excess (Chronic Kidney Disease)  Goal: Fluid Balance  Outcome: Ongoing, Progressing     Problem: Adult Inpatient Plan of Care  Goal: Plan of Care Review  Outcome: Ongoing, Progressing  Goal: Patient-Specific Goal (Individualized)  Outcome: Ongoing, Progressing  Goal: Absence of Hospital-Acquired Illness or Injury  Outcome: Ongoing, Progressing  Goal: Optimal Comfort and Wellbeing  Outcome: Ongoing, Progressing  Goal: Readiness for Transition of Care  Outcome: Ongoing, Progressing     Problem: Diabetes Comorbidity  Goal: Blood Glucose Level Within Targeted Range  Outcome: Ongoing, Progressing     Problem: Infection  Goal: Absence of Infection Signs and Symptoms  Outcome: Ongoing, Progressing     Problem: Impaired Wound Healing  Goal: Optimal Wound Healing  Outcome: Ongoing, Progressing    Patient awake in bed most of night complaints about about blood pressure, noncomplaint Bipap use but reports some increased work of breathing. Went into room to place patient back on bipap and educate many times. O2 sats have stayed above 90 but patient using some accessory muscles to breathe. BP continues to stay increase despite orders, continuing to monitor.

## 2022-10-05 NOTE — SUBJECTIVE & OBJECTIVE
Interval History:   Additional HD treatment completed on yesterday. Net UF 2.5 L. Patient remain hypertensive but off Cardene drip this AM.   Net negative 1.5 L/24 hrs.     Review of patient's allergies indicates:  No Known Allergies  Current Facility-Administered Medications   Medication Frequency    0.9%  NaCl infusion (for blood administration) Q24H PRN    0.9%  NaCl infusion Once    albuterol inhaler 2 puff Q6H PRN    albuterol-ipratropium 2.5 mg-0.5 mg/3 mL nebulizer solution 3 mL Q4H WAKE    amLODIPine tablet 10 mg Daily    aspirin EC tablet 81 mg Daily    cloNIDine 0.3 mg/24 hr td ptwk 1 patch Q7 Days    cloNIDine 0.3 mg/24 hr td ptwk 1 patch Once    dextrose 10% bolus 125 mL PRN    dextrose 10% bolus 250 mL PRN    furosemide tablet 40 mg BID    gabapentin capsule 100 mg Daily    glucagon (human recombinant) injection 1 mg PRN    glucose chewable tablet 16 g PRN    glucose chewable tablet 24 g PRN    heparin (porcine) injection 1,000 Units PRN    heparin (porcine) injection 5,000 Units Q8H    hydrALAZINE tablet 100 mg Q8H    influenza (QUADRIVALENT PF) vaccine 0.5 mL Prior to discharge    insulin aspart U-100 pen 0-5 Units QID (AC + HS) PRN    labetaloL tablet 200 mg Q12H    LIDOcaine-prilocaine cream BID PRN    losartan tablet 50 mg Daily    magnesium sulfate 2g in water 50mL IVPB (premix) Once    mupirocin 2 % ointment BID    nitroGLYCERIN 50 mg in dextrose 5 % 250 mL infusion (TITRATING) Continuous    pantoprazole EC tablet 40 mg Daily    sodium chloride 0.9% bolus 250 mL PRN    sodium chloride 0.9% flush 10 mL PRN       Objective:     Vital Signs (Most Recent):  Temp: 98 °F (36.7 °C) (10/05/22 0701)  Pulse: 75 (10/05/22 0900)  Resp: 12 (10/05/22 0900)  BP: (!) 185/94 (10/05/22 0900)  SpO2: (!) 93 % (10/05/22 0900)  O2 Device (Oxygen Therapy): room air (10/05/22 0800)   Vital Signs (24h Range):  Temp:  [97.1 °F (36.2 °C)-98.4 °F (36.9 °C)] 98 °F (36.7 °C)  Pulse:  [71-97] 75  Resp:  [12-39] 12  SpO2:  [91  %-100 %] 93 %  BP: (144-228)/() 185/94     Weight: 68 kg (149 lb 14.6 oz) (10/04/22 0006)  Body mass index is 24.2 kg/m².  Body surface area is 1.78 meters squared.    I/O last 3 completed shifts:  In: 4822.7 [P.O.:2300; I.V.:861.3; Blood:810; Other:500; IV Piggyback:351.4]  Out: 4418 [Urine:1416; Other:3002]    Physical Exam  Vitals and nursing note reviewed.   Constitutional:       General: He is not in acute distress.     Appearance: He is ill-appearing. He is not toxic-appearing or diaphoretic.      Interventions: Nasal cannula in place.   HENT:      Head: Normocephalic and atraumatic.      Mouth/Throat:      Mouth: Mucous membranes are moist.   Cardiovascular:      Rate and Rhythm: Normal rate and regular rhythm.      Pulses: Normal pulses.      Comments: R IJ TDC w/o discharge or pain at the site   Pulmonary:      Effort: Accessory muscle usage present. No respiratory distress.      Breath sounds: No stridor. Rales present. No wheezing.   Abdominal:      General: Abdomen is flat. Bowel sounds are normal. There is no distension.      Palpations: Abdomen is soft. There is no mass.      Tenderness: There is no abdominal tenderness. There is no guarding or rebound.      Hernia: No hernia is present.   Musculoskeletal:         General: Swelling present. No tenderness, deformity or signs of injury.      Right lower leg: Edema present.      Left lower leg: Edema present.   Skin:     General: Skin is warm.      Capillary Refill: Capillary refill takes less than 2 seconds.      Coloration: Skin is not jaundiced or pale.      Findings: No bruising, erythema, lesion or rash.   Neurological:      Mental Status: He is alert and oriented to person, place, and time.   Psychiatric:         Mood and Affect: Mood is anxious. Affect is labile.         Cognition and Memory: Cognition normal.       Significant Labs:  CBC:   Recent Labs   Lab 10/05/22  0313   WBC 8.77   RBC 2.70*   HGB 7.8*   HCT 24.6*      MCV 91    MCH 28.9   MCHC 31.7*     CMP:   Recent Labs   Lab 10/05/22  0313   GLU 95   CALCIUM 9.5   ALBUMIN 2.8*   PROT 6.1   *   K 5.0   CO2 17*      BUN 45*   CREATININE 5.4*   ALKPHOS 150*   ALT 26   AST 19   BILITOT 0.3     All labs within the past 24 hours have been reviewed.

## 2022-10-06 VITALS
WEIGHT: 149.94 LBS | TEMPERATURE: 99 F | DIASTOLIC BLOOD PRESSURE: 94 MMHG | RESPIRATION RATE: 20 BRPM | SYSTOLIC BLOOD PRESSURE: 183 MMHG | HEIGHT: 66 IN | HEART RATE: 82 BPM | OXYGEN SATURATION: 98 % | BODY MASS INDEX: 24.1 KG/M2

## 2022-10-06 PROBLEM — N18.9 CHRONIC KIDNEY DISEASE-MINERAL AND BONE DISORDER: Status: ACTIVE | Noted: 2022-10-06

## 2022-10-06 PROBLEM — E83.9 CHRONIC KIDNEY DISEASE-MINERAL AND BONE DISORDER: Status: ACTIVE | Noted: 2022-10-06

## 2022-10-06 PROBLEM — M89.9 CHRONIC KIDNEY DISEASE-MINERAL AND BONE DISORDER: Status: ACTIVE | Noted: 2022-10-06

## 2022-10-06 LAB
ALBUMIN SERPL BCP-MCNC: 2.8 G/DL (ref 3.5–5.2)
ALDOST SERPL-MCNC: 44 NG/DL
ALP SERPL-CCNC: 149 U/L (ref 55–135)
ALT SERPL W/O P-5'-P-CCNC: 19 U/L (ref 10–44)
ANION GAP SERPL CALC-SCNC: 11 MMOL/L (ref 8–16)
AST SERPL-CCNC: 16 U/L (ref 10–40)
BASOPHILS # BLD AUTO: 0.04 K/UL (ref 0–0.2)
BASOPHILS NFR BLD: 0.5 % (ref 0–1.9)
BILIRUB SERPL-MCNC: 0.4 MG/DL (ref 0.1–1)
BUN SERPL-MCNC: 38 MG/DL (ref 6–20)
CALCIUM SERPL-MCNC: 9.6 MG/DL (ref 8.7–10.5)
CHLORIDE SERPL-SCNC: 101 MMOL/L (ref 95–110)
CO2 SERPL-SCNC: 22 MMOL/L (ref 23–29)
CREAT SERPL-MCNC: 4.2 MG/DL (ref 0.5–1.4)
DIFFERENTIAL METHOD: ABNORMAL
ENTEROVIRUS/RHINOVIRUS: NOT DETECTED
EOSINOPHIL # BLD AUTO: 0.2 K/UL (ref 0–0.5)
EOSINOPHIL NFR BLD: 2.9 % (ref 0–8)
ERYTHROCYTE [DISTWIDTH] IN BLOOD BY AUTOMATED COUNT: 18.4 % (ref 11.5–14.5)
EST. GFR  (NO RACE VARIABLE): 15.5 ML/MIN/1.73 M^2
GLUCOSE SERPL-MCNC: 87 MG/DL (ref 70–110)
HCT VFR BLD AUTO: 26.5 % (ref 40–54)
HGB BLD-MCNC: 8.3 G/DL (ref 14–18)
HUMAN BOCAVIRUS: NOT DETECTED
HUMAN CORONAVIRUS, COMMON COLD VIRUS: NOT DETECTED
IMM GRANULOCYTES # BLD AUTO: 0.02 K/UL (ref 0–0.04)
IMM GRANULOCYTES NFR BLD AUTO: 0.3 % (ref 0–0.5)
INFLUENZA A - H1N1-09: NOT DETECTED
LEGIONELLA PNEUMOPHILA: NOT DETECTED
LYMPHOCYTES # BLD AUTO: 1.3 K/UL (ref 1–4.8)
LYMPHOCYTES NFR BLD: 16.2 % (ref 18–48)
MAGNESIUM SERPL-MCNC: 2 MG/DL (ref 1.6–2.6)
MCH RBC QN AUTO: 28.1 PG (ref 27–31)
MCHC RBC AUTO-ENTMCNC: 31.3 G/DL (ref 32–36)
MCV RBC AUTO: 90 FL (ref 82–98)
MONOCYTES # BLD AUTO: 0.9 K/UL (ref 0.3–1)
MONOCYTES NFR BLD: 10.9 % (ref 4–15)
MORAXELLA CATARRHALIS: NOT DETECTED
NEUTROPHILS # BLD AUTO: 5.5 K/UL (ref 1.8–7.7)
NEUTROPHILS NFR BLD: 69.2 % (ref 38–73)
NRBC BLD-RTO: 0 /100 WBC
PARAINFLUENZA: NOT DETECTED
PHOSPHATE SERPL-MCNC: 3.6 MG/DL (ref 2.7–4.5)
PLATELET # BLD AUTO: 220 K/UL (ref 150–450)
PMV BLD AUTO: 9.1 FL (ref 9.2–12.9)
POCT GLUCOSE: 88 MG/DL (ref 70–110)
POCT GLUCOSE: 99 MG/DL (ref 70–110)
POTASSIUM SERPL-SCNC: 4.3 MMOL/L (ref 3.5–5.1)
PROT SERPL-MCNC: 6.1 G/DL (ref 6–8.4)
RBC # BLD AUTO: 2.95 M/UL (ref 4.6–6.2)
RVP - ADENOVIRUS: NOT DETECTED
RVP - HUMAN METAPNEUMOVIRUS (HMPV): NOT DETECTED
RVP - INFLUENZA A: NOT DETECTED
RVP - INFLUENZA B: NOT DETECTED
RVP - RESPIRATORY SYNCTIAL VIRUS (RSV) A: NOT DETECTED
RVP - RESPIRATORY VIRAL PANEL, SOURCE: ABNORMAL
SODIUM SERPL-SCNC: 134 MMOL/L (ref 136–145)
TEM - ACINETOBACTER BAUMANNII: NOT DETECTED
TEM - BORDETELLA PERTUSSIS: NOT DETECTED
TEM - CHLAMYDOPHILA PNEUMONIAE: NOT DETECTED
TEM - KLEBSIELLA PNEUMONIAE: NOT DETECTED
TEM - MRSA: POSITIVE
TEM - MYCOPLASMA PNEUMONIAE: NOT DETECTED
TEM - NEISSERIA MENINGITIDIS: NOT DETECTED
TEM - PANTON-VALENTINE: POSITIVE
TEM - PSEUDOMONAS AERUGINOSA: NOT DETECTED
TEM - STAPHYLOCOCCUS AUREUS: NOT DETECTED
TEM - STREPTOCOCCUS PNEUMONIAE: NOT DETECTED
TEM - STREPTOCOCCUS PYOGENES A: NOT DETECTED
TEM- HAEMOPHILUS INFLUENZAE B: NOT DETECTED
TEM- HAEMOPHILUS INFLUENZAE: NOT DETECTED
WBC # BLD AUTO: 7.98 K/UL (ref 3.9–12.7)

## 2022-10-06 PROCEDURE — 63600175 PHARM REV CODE 636 W HCPCS: Performed by: STUDENT IN AN ORGANIZED HEALTH CARE EDUCATION/TRAINING PROGRAM

## 2022-10-06 PROCEDURE — 80053 COMPREHEN METABOLIC PANEL: CPT | Performed by: STUDENT IN AN ORGANIZED HEALTH CARE EDUCATION/TRAINING PROGRAM

## 2022-10-06 PROCEDURE — 83735 ASSAY OF MAGNESIUM: CPT | Performed by: STUDENT IN AN ORGANIZED HEALTH CARE EDUCATION/TRAINING PROGRAM

## 2022-10-06 PROCEDURE — 25000003 PHARM REV CODE 250: Performed by: STUDENT IN AN ORGANIZED HEALTH CARE EDUCATION/TRAINING PROGRAM

## 2022-10-06 PROCEDURE — 25000242 PHARM REV CODE 250 ALT 637 W/ HCPCS: Performed by: STUDENT IN AN ORGANIZED HEALTH CARE EDUCATION/TRAINING PROGRAM

## 2022-10-06 PROCEDURE — 99233 SBSQ HOSP IP/OBS HIGH 50: CPT | Mod: ,,, | Performed by: NURSE PRACTITIONER

## 2022-10-06 PROCEDURE — 94640 AIRWAY INHALATION TREATMENT: CPT

## 2022-10-06 PROCEDURE — 36415 COLL VENOUS BLD VENIPUNCTURE: CPT | Performed by: STUDENT IN AN ORGANIZED HEALTH CARE EDUCATION/TRAINING PROGRAM

## 2022-10-06 PROCEDURE — 94660 CPAP INITIATION&MGMT: CPT

## 2022-10-06 PROCEDURE — 99239 PR HOSPITAL DISCHARGE DAY,>30 MIN: ICD-10-PCS | Mod: ,,, | Performed by: INTERNAL MEDICINE

## 2022-10-06 PROCEDURE — 99233 PR SUBSEQUENT HOSPITAL CARE,LEVL III: ICD-10-PCS | Mod: ,,, | Performed by: NURSE PRACTITIONER

## 2022-10-06 PROCEDURE — 99239 HOSP IP/OBS DSCHRG MGMT >30: CPT | Mod: ,,, | Performed by: INTERNAL MEDICINE

## 2022-10-06 PROCEDURE — 25000003 PHARM REV CODE 250: Performed by: INTERNAL MEDICINE

## 2022-10-06 PROCEDURE — 84100 ASSAY OF PHOSPHORUS: CPT | Performed by: STUDENT IN AN ORGANIZED HEALTH CARE EDUCATION/TRAINING PROGRAM

## 2022-10-06 PROCEDURE — 99900035 HC TECH TIME PER 15 MIN (STAT)

## 2022-10-06 PROCEDURE — 25000003 PHARM REV CODE 250

## 2022-10-06 PROCEDURE — 85025 COMPLETE CBC W/AUTO DIFF WBC: CPT | Performed by: STUDENT IN AN ORGANIZED HEALTH CARE EDUCATION/TRAINING PROGRAM

## 2022-10-06 PROCEDURE — 94761 N-INVAS EAR/PLS OXIMETRY MLT: CPT

## 2022-10-06 RX ORDER — CLONIDINE 0.3 MG/24H
1 PATCH, EXTENDED RELEASE TRANSDERMAL
Qty: 4 PATCH | Refills: 2 | Status: SHIPPED | OUTPATIENT
Start: 2022-10-11 | End: 2023-04-17

## 2022-10-06 RX ORDER — NIFEDIPINE 30 MG/1
90 TABLET, EXTENDED RELEASE ORAL 2 TIMES DAILY
Status: DISCONTINUED | OUTPATIENT
Start: 2022-10-07 | End: 2022-10-06 | Stop reason: HOSPADM

## 2022-10-06 RX ORDER — FUROSEMIDE 40 MG/1
40 TABLET ORAL 2 TIMES DAILY
Qty: 60 TABLET | Refills: 2 | Status: SHIPPED | OUTPATIENT
Start: 2022-10-06 | End: 2023-02-10 | Stop reason: SDUPTHER

## 2022-10-06 RX ORDER — NIFEDIPINE 90 MG/1
90 TABLET, EXTENDED RELEASE ORAL 2 TIMES DAILY
Qty: 60 TABLET | Refills: 2 | Status: SHIPPED | OUTPATIENT
Start: 2022-10-07 | End: 2023-01-05

## 2022-10-06 RX ORDER — LOSARTAN POTASSIUM 50 MG/1
50 TABLET ORAL DAILY
Qty: 30 TABLET | Refills: 2 | Status: SHIPPED | OUTPATIENT
Start: 2022-10-07 | End: 2022-11-14

## 2022-10-06 RX ORDER — HYDRALAZINE HYDROCHLORIDE 100 MG/1
100 TABLET, FILM COATED ORAL EVERY 8 HOURS
Qty: 90 TABLET | Refills: 2 | Status: SHIPPED | OUTPATIENT
Start: 2022-10-06 | End: 2023-04-17

## 2022-10-06 RX ORDER — NYSTATIN 100000 [USP'U]/ML
100000 SUSPENSION ORAL 4 TIMES DAILY
Qty: 60 ML | Refills: 0 | Status: SHIPPED | OUTPATIENT
Start: 2022-10-06 | End: 2022-10-14

## 2022-10-06 RX ADMIN — GABAPENTIN 100 MG: 100 CAPSULE ORAL at 09:10

## 2022-10-06 RX ADMIN — PANTOPRAZOLE SODIUM 40 MG: 40 TABLET, DELAYED RELEASE ORAL at 09:10

## 2022-10-06 RX ADMIN — LABETALOL HYDROCHLORIDE 200 MG: 100 TABLET, FILM COATED ORAL at 09:10

## 2022-10-06 RX ADMIN — HYDRALAZINE HYDROCHLORIDE 100 MG: 50 TABLET ORAL at 06:10

## 2022-10-06 RX ADMIN — FUROSEMIDE 40 MG: 40 TABLET ORAL at 09:10

## 2022-10-06 RX ADMIN — IPRATROPIUM BROMIDE AND ALBUTEROL SULFATE 3 ML: 2.5; .5 SOLUTION RESPIRATORY (INHALATION) at 09:10

## 2022-10-06 RX ADMIN — NYSTATIN 100000 UNITS: 500000 SUSPENSION ORAL at 09:10

## 2022-10-06 RX ADMIN — HYDRALAZINE HYDROCHLORIDE 100 MG: 50 TABLET ORAL at 01:10

## 2022-10-06 RX ADMIN — IPRATROPIUM BROMIDE AND ALBUTEROL SULFATE 3 ML: 2.5; .5 SOLUTION RESPIRATORY (INHALATION) at 12:10

## 2022-10-06 RX ADMIN — AMLODIPINE BESYLATE 10 MG: 10 TABLET ORAL at 09:10

## 2022-10-06 RX ADMIN — MUPIROCIN: 20 OINTMENT TOPICAL at 09:10

## 2022-10-06 RX ADMIN — HEPARIN SODIUM 5000 UNITS: 5000 INJECTION INTRAVENOUS; SUBCUTANEOUS at 06:10

## 2022-10-06 RX ADMIN — NYSTATIN 100000 UNITS: 500000 SUSPENSION ORAL at 01:10

## 2022-10-06 RX ADMIN — LOSARTAN POTASSIUM 50 MG: 50 TABLET, FILM COATED ORAL at 09:10

## 2022-10-06 RX ADMIN — ASPIRIN 81 MG: 81 TABLET, COATED ORAL at 09:10

## 2022-10-06 NOTE — SUBJECTIVE & OBJECTIVE
Interval History: Tolerated HD yesterday, with a net UF of 3.5L. Transferred from ICU to floor.     Review of patient's allergies indicates:  No Known Allergies  Current Facility-Administered Medications   Medication Frequency    0.9%  NaCl infusion (for blood administration) Q24H PRN    0.9%  NaCl infusion Once    albuterol inhaler 2 puff Q6H PRN    albuterol-ipratropium 2.5 mg-0.5 mg/3 mL nebulizer solution 3 mL Q4H WAKE    aspirin EC tablet 81 mg Daily    cloNIDine 0.3 mg/24 hr td ptwk 1 patch Q7 Days    dextrose 10% bolus 125 mL PRN    dextrose 10% bolus 250 mL PRN    furosemide tablet 40 mg BID    gabapentin capsule 100 mg Daily    glucagon (human recombinant) injection 1 mg PRN    glucose chewable tablet 16 g PRN    glucose chewable tablet 24 g PRN    heparin (porcine) injection 1,000 Units PRN    heparin (porcine) injection 5,000 Units Q8H    hydrALAZINE tablet 100 mg Q8H    influenza (QUADRIVALENT PF) vaccine 0.5 mL Prior to discharge    insulin aspart U-100 pen 0-5 Units QID (AC + HS) PRN    labetaloL tablet 200 mg Q12H    LIDOcaine-prilocaine cream BID PRN    losartan tablet 50 mg Daily    mupirocin 2 % ointment BID    [START ON 10/7/2022] NIFEdipine 24 hr tablet 90 mg BID    nystatin 100,000 unit/mL suspension 100,000 Units QID    pantoprazole EC tablet 40 mg Daily    sodium chloride 0.9% bolus 250 mL PRN    sodium chloride 0.9% flush 10 mL PRN       Objective:     Vital Signs (Most Recent):  Temp: 98.8 °F (37.1 °C) (10/06/22 1220)  Pulse: 82 (10/06/22 1230)  Resp: 20 (10/06/22 1230)  BP: (!) 183/94 (10/06/22 1220)  SpO2: 98 % (10/06/22 1230)  O2 Device (Oxygen Therapy): room air (10/06/22 0934)   Vital Signs (24h Range):  Temp:  [97.8 °F (36.6 °C)-98.8 °F (37.1 °C)] 98.8 °F (37.1 °C)  Pulse:  [79-95] 82  Resp:  [2-39] 20  SpO2:  [92 %-98 %] 98 %  BP: (146-196)/(79-95) 183/94     Weight: 68 kg (149 lb 14.6 oz) (10/04/22 0006)  Body mass index is 24.2 kg/m².  Body surface area is 1.78 meters  squared.    I/O last 3 completed shifts:  In: 1460 [P.O.:1200; I.V.:10; Other:250]  Out: 4960 [Urine:960; Other:4000]    Physical Exam  Vitals and nursing note reviewed.   Constitutional:       Appearance: Normal appearance.      Interventions: Nasal cannula in place.   HENT:      Head: Normocephalic and atraumatic.      Nose: Nose normal.      Mouth/Throat:      Mouth: Mucous membranes are moist.      Pharynx: Oropharynx is clear.   Eyes:      Conjunctiva/sclera: Conjunctivae normal.   Cardiovascular:      Rate and Rhythm: Normal rate and regular rhythm.      Comments: R IJ TDC  Pulmonary:      Effort: Pulmonary effort is normal.      Breath sounds: Rales present.   Abdominal:      General: Abdomen is flat.      Palpations: Abdomen is soft.   Musculoskeletal:      Cervical back: Normal range of motion and neck supple.      Right lower leg: Edema present.      Left lower leg: Edema present.   Skin:     General: Skin is warm and dry.   Neurological:      General: No focal deficit present.      Mental Status: He is alert and oriented to person, place, and time.   Psychiatric:         Mood and Affect: Mood normal.         Behavior: Behavior normal.         Cognition and Memory: Cognition normal.       Significant Labs:  CBC:   Recent Labs   Lab 10/06/22  0821   WBC 7.98   RBC 2.95*   HGB 8.3*   HCT 26.5*      MCV 90   MCH 28.1   MCHC 31.3*       CMP:   Recent Labs   Lab 10/06/22  0821   GLU 87   CALCIUM 9.6   ALBUMIN 2.8*   PROT 6.1   *   K 4.3   CO2 22*      BUN 38*   CREATININE 4.2*   ALKPHOS 149*   ALT 19   AST 16   BILITOT 0.4       All labs within the past 24 hours have been reviewed.

## 2022-10-06 NOTE — DISCHARGE SUMMARY
[facility] - Intensive [facility] ([place] [address])  [facility] Medicine  Discharge Summary      Patient Name: Enrique Martinez  MRN: [id number]  Admission Date: 10/3/2022  Hospital Length of Stay: 3 days  Discharge Date and Time:  10/06/2022 12:53 PM  Attending Physician: Eliceo Barros MD   Discharging Provider: Eliceo Barros MD  Primary Care Provider: Primary Doctor No        HPI:     Mr Martinez is a 57yom with a history of ESRD on HD, M/W/F, DMII, CAD w/ stents, gout and gerd presenting with CHF exacerbation likely 2/t CAP. The pt says he was seen in OP clinic with shortness of breath and cough and was diagnosed with CAP and given 4 days of azithromycin. He says that he attended his dialysis session on Friday without issues, but says that his shortness of breath got acutely worse today. Pt denies any CP, abdominal pain, n/v/d or dysuria.     In the ED: Labs were significant for WBC 21.58, BNP 2,797, Trop 0.038, VBG on bipap showed isolated respiratory acidosis. CXR showed vascular congestion and possible R Pleural effusion. CTA chest pending to r/o PE. Pt receiving HD in the ED with 3L UF goal. After HD and 3L removal, pt was weaned to RA.     Critical care was reconsulted for hypertensive emergency with pulmonary edema, on BiPAP and nitro gtt. The pt's blood pressure was controlled with nitro and the pts respiratory status improved. He was brought up to the unit and started on his oral antihypertensives    * No surgery found *      Hospital Course:    The patient was admitted for worsening shortness of breath found to be in heart failure decompensation in addition to being severely hypertensive.      On admission the patient was admitted to the ICU and started on nitroglycerin drip.  Home medications were resumed, dialysis continued and Lasix initiated.  With these interventions he diuresed well and blood pressure improved such that he was no longer short of breath with ambulation nor did he require supplemental  oxygen.    Regarding his blood pressure regimen, nephrology has recommended discontinuation of amlodipine and initiation of nifedipine 90 milligrams twice a day to begin on 10/07.  He will follow with that service for further titration of blood pressure medications and diuretics.  At the time of discharge he is asymptomatic from his hypertension.      Workup of his hypertension revealed unilateral renal artery stenosis.  He will be referred to vascular surgery at discharge.    Pulmonary  Acute hypoxemic respiratory failure  Patient with Hypercapnic and Hypoxic Respiratory failure which is Acute.  he is not on home oxygen. Supplemental oxygen was provided and noted- Oxygen Concentration (%):  [50] 50.   Signs/symptoms of respiratory failure include- tachypnea, increased work of breathing and respiratory distress.   -He reports being treated for respiratory infection recently and was prescribed a course of Azithromycin on 09/16.  -2/2 acute on chronic diastolic congestive heart failure decompensation  -CXR (10/3/22) noted no significant superimposed airspace consolidation, but did note Continued blunting of the right costophrenic sulcus is seen, which may represent a persistent small volume of pleural fluid on this side.    -CTA (10/03/22) did not show any concerns for pulmonary thromboembolism          Recent Labs     10/03/22  0921   BNP 2,797*             Recent Labs     10/03/22  2040   PH 7.400   PCO2 39.1   PO2 52   HCO3 24.2   POCSATURATED 86*   BE -1      Differential diagnoses include, but not limited to:  CHF, Pneumonia, Fluid overload      -Critical Care medicine was consulted for increasing respiratory distress requiring supplemental oxygen support via BiPap.  -After dialysis, patient was able to be weaned to room air. However, pt went into flash pulmonary edema requiring BiPAP and nitro gtt.  Now resolved       -BLE Dopplers to r/o DVT negative     Cardiac/Vascular  * Hypertensive emergency  Hypertensive  to 270s requiring nitro gtt with resolution of symptoms     PLAN:    -Resume home meds: amlodipine, clonidine (changed to a patch), hydralazine, labetolol  -see med rec below           Renal/  ESRD on hemodialysis  Nephrology consulted and following for inpatient dialysis. See Nephrology consult note     Membranous glomerulonephritis  See ESRD     Oncology  Leukocytosis  Resolved     Anemia due to chronic kidney disease, on chronic dialysis  Hx of anemia secondary to ESRD     -Patient's with Normocytic anemia..   -Etiology likely due to chronic disease .            Recent Labs     10/03/22  2054 10/04/22  0350 10/04/22  0854 10/05/22  0313   HGB 6.1* 6.8* 7.2* 7.8*                   Component Value Date/Time     MCV 94 10/03/2022 0921     RDW 21.0 (H) 10/03/2022 0921     IRON 10 (L) 02/17/2022 1041     TIBC 258 02/17/2022 1041     FERRITIN 384 (H) 02/17/2022 1041     FOLATE 4.4 02/17/2022 1041     [id number] 863 02/17/2022 1041        Unilateral, right-sided renal artery stenosis   -referral to vascular surgery      Consults:   Consults (From admission, onward)          Status Ordering Provider     Inpatient consult to Critical Care Medicine  Once        Provider:  (Not yet assigned)    DEYVI Quiroz     Inpatient consult to Nephrology  Once        Provider:  (Not yet assigned)    DEYVI Quiroz            Final Active Diagnoses:    Diagnosis Date Noted POA    PRINCIPAL PROBLEM:  Hypertensive emergency [I16.1] 10/03/2022 Yes    Acute hypoxemic respiratory failure [J96.01] 10/03/2022 Yes    ESRD on hemodialysis [N18.6, Z99.2] 10/03/2022 Not Applicable    Anemia due to chronic kidney disease, on chronic dialysis [N18.6, D63.1, Z99.2] 09/18/2018 Not Applicable    Type 2 diabetes mellitus with chronic kidney disease on chronic dialysis, with long-term current use of insulin [E11.22, N18.6, Z99.2, Z79.4] 09/18/2018 Not Applicable    Leukocytosis [D72.829] 09/18/2018 Yes    Membranous glomerulonephritis  [N05.2] 06/20/2013 Yes      Problems Resolved During this Admission:      Discharged Condition: stable    Disposition: Home or Self Care    Follow Up:   Follow-up Information       Primary Doctor No Follow up in 1 week(s).    Why: For management of BP and volume status (lasix dosing)                         Patient Instructions:      Notify your health care provider if you experience any of the following:  temperature >100.4     Notify your health care provider if you experience any of the following:  persistent nausea and vomiting or diarrhea     Notify your health care provider if you experience any of the following:  severe uncontrolled pain     Notify your health care provider if you experience any of the following:  difficulty breathing or increased cough     Notify your health care provider if you experience any of the following:  severe persistent headache     Notify your health care provider if you experience any of the following:  worsening rash     Notify your health care provider if you experience any of the following:  persistent dizziness, light-headedness, or visual disturbances     Notify your health care provider if you experience any of the following:  increased confusion or weakness     Notify your health care provider if you experience any of the following:     Medications:  Reconciled Home Medications:      Medication List        START taking these medications      cloNIDine 0.3 mg/24 hr td ptwk 0.3 mg/24 hr  Commonly known as: CATAPRES  Place 1 patch onto the skin every 7 days.  Start taking on: October 11, 2022     losartan 50 MG tablet  Commonly known as: COZAAR  Take 1 tablet (50 mg total) by mouth once daily.  Start taking on: October 7, 2022     NIFEdipine 90 MG (OSM) 24 hr tablet  Commonly known as: PROCARDIA-XL  Take 1 tablet (90 mg total) by mouth 2 (two) times a day.  Start taking on: October 7, 2022     nystatin 100,000 unit/mL suspension  Commonly known as: MYCOSTATIN  Take 1 mL  (100,000 Units total) by mouth 4 (four) times daily. for 8 days            CHANGE how you take these medications      hydrALAZINE 100 MG tablet  Commonly known as: APRESOLINE  Take 1 tablet (100 mg total) by mouth every 8 (eight) hours.  What changed:   medication strength  how much to take            CONTINUE taking these medications      albuterol 90 mcg/actuation inhaler  Commonly known as: PROVENTIL/VENTOLIN HFA  Inhale 2 puffs into the lungs every 6 (six) hours as needed for Wheezing or Shortness of Breath. Rescue     aspirin 81 MG EC tablet  Commonly known as: ECOTRIN  Take 1 tablet (81 mg total) by mouth once daily.     blood-glucose meter kit  Use as instructed     colchicine 0.6 mg tablet  Commonly known as: COLCRYS  Take 0.6 mg by mouth daily as needed.     famotidine 20 MG tablet  Commonly known as: PEPCID  Take 20 mg by mouth daily as needed for Heartburn.     furosemide 40 MG tablet  Commonly known as: LASIX  Take 1 tablet (40 mg total) by mouth 2 (two) times daily.     HYDROcodone-acetaminophen 5-325 mg per tablet  Commonly known as: NORCO  Take 1 tablet by mouth every 6 (six) hours as needed for Pain.     labetaloL 200 MG tablet  Commonly known as: NORMODYNE  Take 1 tablet (200 mg total) by mouth every 12 (twelve) hours.     LIDOcaine-prilocaine cream  Commonly known as: EMLA  Apply topically 2 (two) times daily as needed.     melatonin 3 mg tablet  Commonly known as: MELATIN  Take 2 tablets (6 mg total) by mouth nightly as needed for Insomnia.     omeprazole 20 MG capsule  Commonly known as: PRILOSEC  Take 20 mg by mouth once daily.     ondansetron 8 MG Tbdl  Commonly known as: ZOFRAN-ODT  Take 1 tablet (8 mg total) by mouth every 8 (eight) hours as needed.     tadalafiL 2.5 mg Tab  Commonly known as: CIALIS  Take 2.5 mg by mouth.     TRUE METRIX GLUCOSE TEST STRIP Strp  Generic drug: blood sugar diagnostic            STOP taking these medications      amLODIPine 10 MG tablet  Commonly known as:  NORVASC     cloNIDine 0.2 MG tablet  Commonly known as: CATAPRES     RENVELA 800 mg Tab  Generic drug: sevelamer carbonate            ASK your doctor about these medications      gabapentin 100 MG capsule  Commonly known as: NEURONTIN  Take 1 capsule (100 mg total) by mouth once daily.              Pending Diagnostic Studies:       Procedure Component Value Units Date/Time    Aldosterone [[id number]] Collected: 10/04/22 0350    Order Status: Sent Lab Status: In process Updated: 10/04/22 0408    Specimen: Blood     Metanephrines, Plasma Free [[id number]] Collected: 10/04/22 0350    Order Status: Sent Lab Status: In process Updated: 10/04/22 0405    Specimen: Blood           Indwelling Lines/Drains at time of discharge:   Lines/Drains/Airways       Central Venous Catheter Line  Duration                  Hemodialysis Catheter 02/28/22 1300 right internal jugular 219 days              Drain  Duration             Male External Urinary Catheter 10/03/22 2100 Medium 2 days                    Time spent on the discharge of patient: 35 minutes         Eliceo Barros MD  Department of Hospital Medicine  [facility] - Intensive Care ([place]-14)

## 2022-10-06 NOTE — ASSESSMENT & PLAN NOTE
2/2 Membranous nephropathy (bx proven), DM II, and HTN , on HD since 2/2022  Outpatient HD Information:  -Dialysis modality: Hemodialysis  -Outpatient HD unit: [facility]  -Nephrologist: Dr. Saez   -HD TX days: Monday/Wednesday/Friday, duration of treatment: 210 mins  -Last HD TX prior to hospital admission: 09/30/2022  -Dialysis access: dialysis catheter placed on R IJ   -Vascular surgeon: referral submitted 8//30 by Dr. Saez  -Residual urine: oliguric    -EDW: 61kg      Plan/Recommendations:     -HD tomorrow for metabolic clearance and volume management   -Agree with lasix 40mg po bid   -Strict I/O's   -Trend renal function panels daily   -Renally dose meds/avoid nephrotoxic meds   -Renal diet/formulations, if not NPO

## 2022-10-06 NOTE — PLAN OF CARE
Problem: Device-Related Complication Risk (Hemodialysis)  Goal: Safe, Effective Therapy Delivery  10/6/2022 1347 by Wagner Sanchez RN  Outcome: Met  10/6/2022 1346 by Wagner Sanchez RN  Outcome: Adequate for Care Transition     Problem: Hemodynamic Instability (Hemodialysis)  Goal: Effective Tissue Perfusion  10/6/2022 1347 by Wagner Sanchez RN  Outcome: Met  10/6/2022 1346 by Wagner Sanchez RN  Outcome: Adequate for Care Transition     Problem: Infection (Hemodialysis)  Goal: Absence of Infection Signs and Symptoms  10/6/2022 1347 by Wagner Sanchez RN  Outcome: Met  10/6/2022 1346 by Wagner Sanchez RN  Outcome: Adequate for Care Transition     Problem: Electrolyte Imbalance (Chronic Kidney Disease)  Goal: Electrolyte Balance  10/6/2022 1347 by Wagner Sanchez RN  Outcome: Met  10/6/2022 1346 by Wagner Sanchez RN  Outcome: Adequate for Care Transition     Problem: Fluid Volume Excess (Chronic Kidney Disease)  Goal: Fluid Balance  10/6/2022 1347 by Wagner Sanchez RN  Outcome: Met  10/6/2022 1346 by Wagner Sanchez RN  Outcome: Adequate for Care Transition     Problem: Adult Inpatient Plan of Care  Goal: Plan of Care Review  10/6/2022 1347 by Wagner Sanchez RN  Outcome: Met  10/6/2022 1346 by Wagner Sanchez RN  Outcome: Adequate for Care Transition  Goal: Patient-Specific Goal (Individualized)  10/6/2022 1347 by Wagner Sanchez RN  Outcome: Met  10/6/2022 1346 by Wagner Sanchez RN  Outcome: Adequate for Care Transition  Goal: Absence of Hospital-Acquired Illness or Injury  10/6/2022 1347 by Wagner Sanchez RN  Outcome: Met  10/6/2022 1346 by Wagner Sanchez RN  Outcome: Adequate for Care Transition  Goal: Optimal Comfort and Wellbeing  10/6/2022 1347 by Wagner Sanchez RN  Outcome: Met  10/6/2022 1346 by Wagner Sanchez RN  Outcome: Adequate for Care Transition  Goal: Readiness for Transition of Care  10/6/2022 1347 by Wagner Sanchez RN  Outcome: Met  10/6/2022 1346 by Wagner Sanchez RN  Outcome: Adequate for Care Transition     Problem:  Diabetes Comorbidity  Goal: Blood Glucose Level Within Targeted Range  10/6/2022 1347 by Wagner Sanchez RN  Outcome: Met  10/6/2022 1346 by Wagner Sanchez RN  Outcome: Adequate for Care Transition     Problem: Infection  Goal: Absence of Infection Signs and Symptoms  10/6/2022 1347 by Wagner Sanchez RN  Outcome: Met  10/6/2022 1346 by Wagner Sanchez RN  Outcome: Adequate for Care Transition     Problem: Impaired Wound Healing  Goal: Optimal Wound Healing  10/6/2022 1347 by Wagner Sanchez RN  Outcome: Met  10/6/2022 1346 by Wagner Sanchez RN  Outcome: Adequate for Care Transition     Problem: Skin Injury Risk Increased  Goal: Skin Health and Integrity  10/6/2022 1347 by Wagner Sanchez RN  Outcome: Met  10/6/2022 1346 by Wagner Sanchez RN  Outcome: Adequate for Care Transition

## 2022-10-06 NOTE — NURSING TRANSFER
Nursing Transfer Note      10/5/2022     Reason patient is being transferred: stepdown    Transfer To: [id number]    Transfer via wheelchair    Transfer with cardiac monitoring    Transported by RN and pct Kylah    Medicines sent: no    Any special needs or follow-up needed:   no  Chart send with patient: Yes    Notified: nurse    Patient reassessed at: 10/5/2022 @ 2120    Upon arrival to floor: cardiac monitor applied, patient oriented to room, call bell in reach, and bed in lowest position      Tx patient to unit via wheelchair with cardiac monitoring, no problems identified. Nurse notified on arrival to floor. Patient ambulated to bed. VSS on arrival.

## 2022-10-06 NOTE — NURSING
Patient discharging to home. Instructions provided including follow up needs. Medications discussed. No additional concerns or needs at this time.

## 2022-10-06 NOTE — PLAN OF CARE
[facility] ([facility]-)  Discharge Final Note    Primary Care Provider: Primary Doctor No  Expected Discharge Date: 10/6/2022    Patient medically ready for discharge to home with resumption of MWF HD scheduled at [facility] .    Transportation home per JOSÉ MIGUEL [place] arranged Is family/patient aware of discharge yes - patient aware and in agreement of DC plan .  Hospital follow up scheduled per on AVS.  Vascular Neurology to schedule follow up if necessary.    Final Discharge Note (most recent)       Final Note - 10/06/22 1449          Final Note    Assessment Type Final Discharge Note     Anticipated Discharge Disposition Home or Self Care     Hospital Resources/Appts/Education Provided Appointments scheduled and added to AVS        Post-Acute Status    Post-Acute Authorization Dialysis     Diaylsis Status Set-up Complete/Auth obtained     Discharge Delays None known at this time                     Important Message from Medicare         Referral Info (most recent)       Referral Info    No documentation.                 Contact Info       No, Primary Doctor   Relationship: PCP - General        Next Steps: Follow up in 1 week(s)    Instructions: For management of BP and volume status (lasix dosing)          No future appointments.  Eben Lee RN  Case Management  Ext: [phone]  10/06/2022

## 2022-10-06 NOTE — PROGRESS NOTES
[facility] ([place] [address])  Nephrology  Progress Note    Patient Name: Enrique Martinez  MRN: [id number]  Admission Date: 10/3/2022  Hospital Length of Stay: 3 days  Attending Provider: Eliceo Barros MD   Primary Care Physician: Primary Doctor No  Principal Problem:Hypertensive emergency      Interval History: Tolerated HD yesterday, with a net UF of 3.5L. Transferred from ICU to floor.     Review of patient's allergies indicates:  No Known Allergies  Current Facility-Administered Medications   Medication Frequency    0.9%  NaCl infusion (for blood administration) Q24H PRN    0.9%  NaCl infusion Once    albuterol inhaler 2 puff Q6H PRN    albuterol-ipratropium 2.5 mg-0.5 mg/3 mL nebulizer solution 3 mL Q4H WAKE    aspirin EC tablet 81 mg Daily    cloNIDine 0.3 mg/24 hr td ptwk 1 patch Q7 Days    dextrose 10% bolus 125 mL PRN    dextrose 10% bolus 250 mL PRN    furosemide tablet 40 mg BID    gabapentin capsule 100 mg Daily    glucagon (human recombinant) injection 1 mg PRN    glucose chewable tablet 16 g PRN    glucose chewable tablet 24 g PRN    heparin (porcine) injection 1,000 Units PRN    heparin (porcine) injection 5,000 Units Q8H    hydrALAZINE tablet 100 mg Q8H    influenza (QUADRIVALENT PF) vaccine 0.5 mL Prior to discharge    insulin aspart U-100 pen 0-5 Units QID (AC + HS) PRN    labetaloL tablet 200 mg Q12H    LIDOcaine-prilocaine cream BID PRN    losartan tablet 50 mg Daily    mupirocin 2 % ointment BID    [START ON 10/7/2022] NIFEdipine 24 hr tablet 90 mg BID    nystatin 100,000 unit/mL suspension 100,000 Units QID    pantoprazole EC tablet 40 mg Daily    sodium chloride 0.9% bolus 250 mL PRN    sodium chloride 0.9% flush 10 mL PRN       Objective:     Vital Signs (Most Recent):  Temp: 98.8 °F (37.1 °C) (10/06/22 1220)  Pulse: 82 (10/06/22 1230)  Resp: 20 (10/06/22 1230)  BP: (!) 183/94 (10/06/22 1220)  SpO2: 98 % (10/06/22 1230)  O2 Device (Oxygen Therapy): room  air (10/06/22 0934)   Vital Signs (24h Range):  Temp:  [97.8 °F (36.6 °C)-98.8 °F (37.1 °C)] 98.8 °F (37.1 °C)  Pulse:  [79-95] 82  Resp:  [2-39] 20  SpO2:  [92 %-98 %] 98 %  BP: (146-196)/(79-95) 183/94     Weight: 68 kg (149 lb 14.6 oz) (10/04/22 0006)  Body mass index is 24.2 kg/m².  Body surface area is 1.78 meters squared.    I/O last 3 completed shifts:  In: 1460 [P.O.:1200; I.V.:10; Other:250]  Out: 4960 [Urine:960; Other:4000]    Physical Exam  Vitals and nursing note reviewed.   Constitutional:       Appearance: Normal appearance.      Interventions: Nasal cannula in place.   HENT:      Head: Normocephalic and atraumatic.      Nose: Nose normal.      Mouth/Throat:      Mouth: Mucous membranes are moist.      Pharynx: Oropharynx is clear.   Eyes:      Conjunctiva/sclera: Conjunctivae normal.   Cardiovascular:      Rate and Rhythm: Normal rate and regular rhythm.      Comments: R IJ TDC  Pulmonary:      Effort: Pulmonary effort is normal.      Breath sounds: Rales present.   Abdominal:      General: Abdomen is flat.      Palpations: Abdomen is soft.   Musculoskeletal:      Cervical back: Normal range of motion and neck supple.      Right lower leg: Edema present.      Left lower leg: Edema present.   Skin:     General: Skin is warm and dry.   Neurological:      General: No focal deficit present.      Mental Status: He is alert and oriented to person, place, and time.   Psychiatric:         Mood and Affect: Mood normal.         Behavior: Behavior normal.         Cognition and Memory: Cognition normal.       Significant Labs:  CBC:   Recent Labs   Lab 10/06/22  0821   WBC 7.98   RBC 2.95*   HGB 8.3*   HCT 26.5*      MCV 90   MCH 28.1   MCHC 31.3*       CMP:   Recent Labs   Lab 10/06/22  0821   GLU 87   CALCIUM 9.6   ALBUMIN 2.8*   PROT 6.1   *   K 4.3   CO2 22*      BUN 38*   CREATININE 4.2*   ALKPHOS 149*   ALT 19   AST 16   BILITOT 0.4       All labs within the past 24 hours have been  reviewed.       Assessment/Plan:     Chronic kidney disease-mineral and bone disorder  -No indication for phos binders at this time     ESRD on hemodialysis  2/2 Membranous nephropathy (bx proven), DM II, and HTN , on HD since 2/2022  Outpatient HD Information:  -Dialysis modality: Hemodialysis  -Outpatient HD unit: [facility] [place]  -Nephrologist: Dr. Saez   -HD TX days: Monday/Wednesday/Friday, duration of treatment: 210 mins  -Last HD TX prior to hospital admission: 09/30/2022  -Dialysis access: dialysis catheter placed on R IJ   -Vascular surgeon: referral submitted 8//30 by Dr. Saez  -Residual urine: oliguric    -EDW: 61kg      Plan/Recommendations:     -HD tomorrow for metabolic clearance and volume management   -Agree with lasix 40mg po bid   -Strict I/O's   -Trend renal function panels daily   -Renally dose meds/avoid nephrotoxic meds   -Renal diet/formulations, if not NPO       Anemia due to chronic kidney disease, on chronic dialysis  -Transfuse for Hg <7.0    Membranous glomerulonephritis  - Bx proven, now ESRD on HD, he is working with kidney transplant team        Thank you for your consult. I will follow-up with patient. Please contact us if you have any additional questions.    Filemon Jimenez, NP  Nephrology  [facility] ([place]-)

## 2022-10-06 NOTE — PLAN OF CARE
Problem: Device-Related Complication Risk (Hemodialysis)  Goal: Safe, Effective Therapy Delivery  Outcome: Adequate for Care Transition     Problem: Hemodynamic Instability (Hemodialysis)  Goal: Effective Tissue Perfusion  Outcome: Adequate for Care Transition     Problem: Infection (Hemodialysis)  Goal: Absence of Infection Signs and Symptoms  Outcome: Adequate for Care Transition     Problem: Electrolyte Imbalance (Chronic Kidney Disease)  Goal: Electrolyte Balance  Outcome: Adequate for Care Transition     Problem: Fluid Volume Excess (Chronic Kidney Disease)  Goal: Fluid Balance  Outcome: Adequate for Care Transition     Problem: Adult Inpatient Plan of Care  Goal: Plan of Care Review  Outcome: Adequate for Care Transition  Goal: Patient-Specific Goal (Individualized)  Outcome: Adequate for Care Transition  Goal: Absence of Hospital-Acquired Illness or Injury  Outcome: Adequate for Care Transition  Goal: Optimal Comfort and Wellbeing  Outcome: Adequate for Care Transition  Goal: Readiness for Transition of Care  Outcome: Adequate for Care Transition     Problem: Diabetes Comorbidity  Goal: Blood Glucose Level Within Targeted Range  Outcome: Adequate for Care Transition     Problem: Infection  Goal: Absence of Infection Signs and Symptoms  Outcome: Adequate for Care Transition     Problem: Impaired Wound Healing  Goal: Optimal Wound Healing  Outcome: Adequate for Care Transition     Problem: Skin Injury Risk Increased  Goal: Skin Health and Integrity  Outcome: Adequate for Care Transition

## 2022-10-07 ENCOUNTER — PATIENT OUTREACH (OUTPATIENT)
Dept: ADMINISTRATIVE | Facility: CLINIC | Age: 57
End: 2022-10-07
Payer: MEDICAID

## 2022-10-07 DIAGNOSIS — J96.01 ACUTE HYPOXEMIC RESPIRATORY FAILURE: Primary | ICD-10-CM

## 2022-10-07 DIAGNOSIS — I16.1 HYPERTENSIVE EMERGENCY: ICD-10-CM

## 2022-10-07 NOTE — PROGRESS NOTES
C3 nurse spoke with Enrique Martinez for a TCC post hospital discharge follow up call. The patient reports does not have a scheduled HOSFU appointment. C3 nurse was unable to schedule HOSFU appointment for [facility]. Patient advised to contact their PCP to schedule a HOSPFU within 5-7 days.     NP Home Referral Placed.

## 2022-10-08 LAB
BACTERIA BLD CULT: NORMAL
BACTERIA BLD CULT: NORMAL

## 2022-10-11 LAB
METANEPH FREE SERPL-MCNC: 132 PG/ML
METANEPHS SERPL-MCNC: 198 PG/ML
NORMETANEPH FREE SERPL-MCNC: 66 PG/ML

## 2022-10-12 ENCOUNTER — PES CALL (OUTPATIENT)
Dept: ADMINISTRATIVE | Facility: CLINIC | Age: 57
End: 2022-10-12
Payer: MEDICAID

## 2022-10-22 ENCOUNTER — HOSPITAL ENCOUNTER (EMERGENCY)
Facility: HOSPITAL | Age: 57
Discharge: HOME | End: 2022-10-23
Attending: EMERGENCY MEDICINE
Payer: MEDICAID

## 2022-10-22 DIAGNOSIS — M25.429 ELBOW SWELLING: ICD-10-CM

## 2022-10-22 PROCEDURE — 99284 PR EMERGENCY DEPT VISIT,LEVEL IV: ICD-10-PCS | Mod: ,,, | Performed by: EMERGENCY MEDICINE

## 2022-10-22 PROCEDURE — 99284 EMERGENCY DEPT VISIT MOD MDM: CPT | Mod: ,,, | Performed by: EMERGENCY MEDICINE

## 2022-10-22 PROCEDURE — 99283 EMERGENCY DEPT VISIT LOW MDM: CPT

## 2022-10-23 VITALS
WEIGHT: 150 LBS | BODY MASS INDEX: 24.21 KG/M2 | DIASTOLIC BLOOD PRESSURE: 85 MMHG | SYSTOLIC BLOOD PRESSURE: 180 MMHG | OXYGEN SATURATION: 96 % | HEART RATE: 91 BPM | TEMPERATURE: 97 F | RESPIRATION RATE: 18 BRPM

## 2022-10-23 PROCEDURE — 25000003 PHARM REV CODE 250: Performed by: EMERGENCY MEDICINE

## 2022-10-23 RX ORDER — HYDROCODONE BITARTRATE AND ACETAMINOPHEN 5; 325 MG/1; MG/1
1 TABLET ORAL EVERY 6 HOURS PRN
Qty: 12 TABLET | Refills: 0 | Status: SHIPPED | OUTPATIENT
Start: 2022-10-23 | End: 2022-10-26

## 2022-10-23 RX ORDER — HYDROCODONE BITARTRATE AND ACETAMINOPHEN 10; 325 MG/1; MG/1
1 TABLET ORAL
Status: COMPLETED | OUTPATIENT
Start: 2022-10-23 | End: 2022-10-23

## 2022-10-23 RX ADMIN — HYDROCODONE BITARTRATE AND ACETAMINOPHEN 1 TABLET: 10; 325 TABLET ORAL at 12:10

## 2022-10-23 NOTE — ED TRIAGE NOTES
"Enrique Martinez, an 57 y.o. male presents to the ED with c/o right 1st knuckle and right elbow pain. Pt states " I have neuropathy all over and its neuropathy pain and need some stronger pain meds". Swelling present on the knuckle and elbow.  Pt on Dialysis      Review of patient's allergies indicates:  No Known Allergies  Chief Complaint   Patient presents with    Joint Pain     Pt c/o R elbow and R first knuckle pain. Swelling noted. Hx dialysis and HTN.      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

## 2022-10-23 NOTE — ED PROVIDER NOTES
"Encounter Date: 10/22/2022       History     Chief Complaint   Patient presents with    Joint Pain     Pt c/o R elbow and R first knuckle pain. Swelling noted. Hx dialysis and HTN.      58 yo M with extensive pmhx including ESRD-HD (last dialyzed yesterday), HTN, neuropathy, CAD, DM, HLD, obesity, nephrotic syndrome presents with a chief complaint of right elbow and right 2nd MCP joint swelling.  Patient initially said symptoms began a few days ago.  On further questioning, he notes he has had swelling for months but it has been more severe over the past few days.  Associated with pain.  No decreased ability to move his joints.  Absolutely no injuries or increased use.  No fevers.  No history of gout. He reports that his symptoms were attributed to "neuropathy". He has been taking gabapentin 600 BID without relief.  He has previously been on hydrocodone-acetaminophen and tramadol with incomplete relief of symptoms.  He is requesting tramadol 100s. He does has a history of salmonella bacteremia, remotely. Overall he reports no generalized swelling and he feels that he is at his dry weight.    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 ([address] [place] [address]);  " Service: Colon and Rectal;  Laterality: N/A;    CORONARY STENT PLACEMENT  2007     Family History   Problem Relation Age of Onset    Hypertension Father     Stroke Father     Heart attack Father     Hyperlipidemia Father     No Known Problems Sister     Drug abuse Brother     Heart attack Brother     Hypertension Brother      Social History     Tobacco Use    Smoking status: Some Days     Packs/day: 0.25     Years: 10.00     Pack years: 2.50     Types: Cigarettes    Smokeless tobacco: Never   Substance Use Topics    Alcohol use: No    Drug use: No     Review of Systems   Constitutional:  Negative for fever.   HENT:  Negative for sore throat.    Respiratory:  Negative for shortness of breath.    Cardiovascular:  Negative for chest pain.   Gastrointestinal:  Negative for nausea.   Genitourinary:  Negative for dysuria.   Musculoskeletal:  Positive for arthralgias and joint swelling (R elbow and R 2nd MCP joint). Negative for back pain.   Skin:  Negative for rash.   Neurological:  Negative for weakness.   Hematological:  Does not bruise/bleed easily.     Physical Exam     Initial Vitals [10/22/22 2205]   BP Pulse Resp Temp SpO2   (!) 201/95 91 18 97.3 °F (36.3 °C) 96 %      MAP       --         Physical Exam    Nursing note and vitals reviewed.  Constitutional: He appears well-developed and well-nourished. He is not diaphoretic. No distress.   HENT:   Head: Normocephalic and atraumatic.   Eyes: Right eye exhibits no discharge. Left eye exhibits no discharge. No scleral icterus.   Neck: Neck supple. No JVD present.   Normal range of motion.  Cardiovascular:  Normal rate, regular rhythm and intact distal pulses.     Exam reveals no gallop and no friction rub.       Murmur heard.  Pulmonary/Chest: Breath sounds normal. No respiratory distress. He has no wheezes. He has no rhonchi. He has no rales. He exhibits no tenderness.   Abdominal: Abdomen is soft. Bowel sounds are normal. He exhibits no distension and no mass. There  "is no abdominal tenderness. There is no rebound and no guarding.   Musculoskeletal:         General: No tenderness or edema. Normal range of motion.      Right elbow: Swelling (R olecranon bursitis, no overlying skin changes, no significant tenderness to palpation) present. Normal range of motion. No tenderness.      Cervical back: Normal range of motion and neck supple.      Comments: R 2nd MCP joint swollen, no appreciable tenderness, full active range of motion, overlying skin normal     Neurological: He is alert and oriented to person, place, and time. No sensory deficit.   Skin: Skin is warm and dry. Capillary refill takes less than 2 seconds.   Psychiatric: Thought content normal. His speech is rapid and/or pressured.   Irritable       ED Course   Procedures  Labs Reviewed   CULTURE, BLOOD   CULTURE, BLOOD   CBC W/ AUTO DIFFERENTIAL   COMPREHENSIVE METABOLIC PANEL          Imaging Results    None          Medications   HYDROcodone-acetaminophen  mg per tablet 1 tablet (1 tablet Oral Given 10/23/22 0039)     Medical Decision Making:   History:   I obtained history from: someone other than patient.  Old Medical Records: I decided to obtain old medical records.  Initial Assessment:   58 yo M with extensive pmhx including ESRD-HD (last dialyzed yesterday), HTN, neuropathy, CAD, DM, HLD, obesity, nephrotic syndrome presents with a chief complaint of right elbow and right 2nd MCP joint swelling.   Differential Diagnosis:   Fracture, bursitis, bacteremia, electrolyte abnormalities  ED Management:  I explained to the patient my concern for serious pathology including infection and requested labs, cultures, x-rays.  Patient does not wish to have that evaluation as he knows his symptoms are due to "neuropathy".  I explained that neuropathy does not develop bony changes within a few days but patient does admit that it has been months and just more severe pain over the past few days since being out of his " "medication.  He is requesting refill hydrocodone 10 or tramadol 100s.  I explained that the ER does not provide refills for chronic opioids and I provided him with our chronic opioid policy.  Despite that the patient became irritable and said "I waited for you for over an hour and I am in pain." He wishes to leave AMA.  He is oriented x3 and has capacity to make his medical decisions.  He was provided with a short refill and instructed to follow-up with PCP for further evaluation.  He was explained that should he wish to be evaluated him return to the ER at any time.                        Clinical Impression:   Final diagnoses:  [M25.429] Elbow swelling        ED Disposition Condition    AMA Stable                Hemanth Zhou MD  10/23/22 0046    "

## 2022-10-23 NOTE — DISCHARGE INSTRUCTIONS
As I explained, you are leaving the emergency department against medical advice.  Your refused your blood work and x-rays and I am uncertain the exact etiology of the swelling.  I also explained that there are dangers to opiates and that the emergency department does not typically refill long-term opioid prescriptions.  Further opioid refills must be provided by your primary care physician.  Return to the ER for any worsening symptoms.

## 2022-10-23 NOTE — ED NOTES
"Pt refused to get any testes to blood work done.pt states " I just want pain med and wants to leave from here"  "

## 2022-10-25 ENCOUNTER — OFFICE VISIT (OUTPATIENT)
Dept: HOME HEALTH SERVICES | Facility: CLINIC | Age: 57
End: 2022-10-25
Payer: MEDICAID

## 2022-10-25 VITALS — WEIGHT: 150 LBS | BODY MASS INDEX: 24.11 KG/M2 | HEIGHT: 66 IN

## 2022-10-25 DIAGNOSIS — J96.01 ACUTE HYPOXEMIC RESPIRATORY FAILURE: ICD-10-CM

## 2022-10-25 DIAGNOSIS — I16.1 HYPERTENSIVE EMERGENCY: ICD-10-CM

## 2022-10-25 PROCEDURE — 99214 OFFICE O/P EST MOD 30 MIN: CPT | Mod: 95,,, | Performed by: NURSE PRACTITIONER

## 2022-10-25 PROCEDURE — 1160F PR REVIEW ALL MEDS BY PRESCRIBER/CLIN PHARMACIST DOCUMENTED: ICD-10-PCS | Mod: CPTII,S$GLB,, | Performed by: NURSE PRACTITIONER

## 2022-10-25 PROCEDURE — 3044F HG A1C LEVEL LT 7.0%: CPT | Mod: CPTII,S$GLB,, | Performed by: NURSE PRACTITIONER

## 2022-10-25 PROCEDURE — 3066F NEPHROPATHY DOC TX: CPT | Mod: CPTII,S$GLB,, | Performed by: NURSE PRACTITIONER

## 2022-10-25 PROCEDURE — 4010F ACE/ARB THERAPY RXD/TAKEN: CPT | Mod: CPTII,S$GLB,, | Performed by: NURSE PRACTITIONER

## 2022-10-25 PROCEDURE — 1159F MED LIST DOCD IN RCRD: CPT | Mod: CPTII,S$GLB,, | Performed by: NURSE PRACTITIONER

## 2022-10-25 PROCEDURE — 3066F PR DOCUMENTATION OF TREATMENT FOR NEPHROPATHY: ICD-10-PCS | Mod: CPTII,S$GLB,, | Performed by: NURSE PRACTITIONER

## 2022-10-25 PROCEDURE — 4010F PR ACE/ARB THEARPY RXD/TAKEN: ICD-10-PCS | Mod: CPTII,S$GLB,, | Performed by: NURSE PRACTITIONER

## 2022-10-25 PROCEDURE — 3044F PR MOST RECENT HEMOGLOBIN A1C LEVEL <7.0%: ICD-10-PCS | Mod: CPTII,S$GLB,, | Performed by: NURSE PRACTITIONER

## 2022-10-25 PROCEDURE — 1159F PR MEDICATION LIST DOCUMENTED IN MEDICAL RECORD: ICD-10-PCS | Mod: CPTII,S$GLB,, | Performed by: NURSE PRACTITIONER

## 2022-10-25 PROCEDURE — 1160F RVW MEDS BY RX/DR IN RCRD: CPT | Mod: CPTII,S$GLB,, | Performed by: NURSE PRACTITIONER

## 2022-10-25 PROCEDURE — 99214 PR OFFICE/OUTPT VISIT, EST, LEVL IV, 30-39 MIN: ICD-10-PCS | Mod: 95,,, | Performed by: NURSE PRACTITIONER

## 2022-10-25 NOTE — PATIENT INSTRUCTIONS
Instructions:  - [facility] Nurse Practitioner to schedule home follow-up visit with patient as needed.  - Continue all medications, treatments and therapies as ordered.   - Follow all instructions, recommendations as discussed.  - Maintain Safety Precautions at all times.  - Attend all medical appointments as scheduled.  - For worsening symptoms: call Primary Care Physician or Nurse Practitioner.  - For emergencies, call 911 or immediately report to the nearest emergency room.  - Limit Risks of environmental exposure to coronavirus/COVID-19 as discussed including: social distancing, hand hygiene, and limiting departures from the home for necessities only.

## 2022-10-25 NOTE — PROGRESS NOTES
[facility]  Established Patient - Audio Only Post-Hospitalization Telehealth [facility] [place] [facility]    Visit Date: 10/25/22   Encounter Provider: Radhika Mcknight NP  PCP:  Primary Doctor No    PRESENTING HISTORY      Patient ID: Enrique Martinez     Consult Requested By:    Reason for Consult:  Post-Hospitalization Telehealth Visit    Chief Complaint: Transitional Care     History of Present Illness:       Admission Date: 10/3/2022  Hospital Length of Stay: 3 days  Discharge Date and Time:  10/06/2022     HPI:      Mr Martinez is a 57yom with a history of ESRD on HD, M/W/F, DMII, CAD w/ stents, gout and gerd presenting with CHF exacerbation likely 2/t CAP. The pt says he was seen in OP clinic with shortness of breath and cough and was diagnosed with CAP and given 4 days of azithromycin. He says that he attended his dialysis session on Friday without issues, but says that his shortness of breath got acutely worse today. Pt denies any CP, abdominal pain, n/v/d or dysuria.     In the ED: Labs were significant for WBC 21.58, BNP 2,797, Trop 0.038, VBG on bipap showed isolated respiratory acidosis. CXR showed vascular congestion and possible R Pleural effusion. CTA chest pending to r/o PE. Pt receiving HD in the ED with 3L UF goal. After HD and 3L removal, pt was weaned to RA.     Critical care was reconsulted for hypertensive emergency with pulmonary edema, on BiPAP and nitro gtt. The pt's blood pressure was controlled with nitro and the pts respiratory status improved. He was brought up to the unit and started on his oral antihypertensives     * No surgery found *      Hospital Course:     The patient was admitted for worsening shortness of breath found to be in heart failure decompensation in addition to being severely hypertensive.      On admission the patient was admitted to the ICU and started on nitroglycerin drip.  Home medications were resumed, dialysis continued and Lasix initiated.  With  these interventions he diuresed well and blood pressure improved such that he was no longer short of breath with ambulation nor did he require supplemental oxygen.    Regarding his blood pressure regimen, nephrology has recommended discontinuation of amlodipine and initiation of nifedipine 90 milligrams twice a day to begin on 10/07.  He will follow with that service for further titration of blood pressure medications and diuretics.  At the time of discharge he is asymptomatic from his hypertension.        Workup of his hypertension revealed unilateral renal artery stenosis.  He will be referred to vascular surgery at discharge.     Pulmonary  Acute hypoxemic respiratory failure  Patient with Hypercapnic and Hypoxic Respiratory failure which is Acute.  he is not on home oxygen. Supplemental oxygen was provided and noted- Oxygen Concentration (%):  [50] 50.   Signs/symptoms of respiratory failure include- tachypnea, increased work of breathing and respiratory distress.   -He reports being treated for respiratory infection recently and was prescribed a course of Azithromycin on 09/16.  -2/2 acute on chronic diastolic congestive heart failure decompensation  -CXR (10/3/22) noted no significant superimposed airspace consolidation, but did note Continued blunting of the right costophrenic sulcus is seen, which may represent a persistent small volume of pleural fluid on this side.    -CTA (10/03/22) did not show any concerns for pulmonary thromboembolism            _______________________________    Date of Service: 10/25/22        The patient location is: HOME  The chief complaint leading to consultation is: routine care for evaluation and management of chronic medial issues and medication review.     Visit type: Virtual visit with audio only (telephone)     The reason for the audio only service rather than synchronous audio and video virtual visit was related to technical difficulties or patient preference/necessity.      Each patient to whom I provide medical services by telemedicine is:  (1) informed of the relationship between the physician and patient and the respective role of any other health care provider with respect to management of the patient; and (2) notified that they may decline to receive medical services by telemedicine and may withdraw from such care at any time. Patient verbally consented to receive this service via voice-only telephone call.     Subjective:   Today:  Mr. Enrique Martinez is a 57 y.o. male being evaluated by telephone today for  transitional care visit to the home environment post-discharge from inpatient hospitalization encounter described above. Patient presents at baseline state of health as reported by patient and caregiver. Denies any acute issues, concerns or complaints to address on today's visit. Reports taking all medications as prescribed. No other needs identified at this time. Risks of environmental exposure to coronavirus discussed including: social distancing, hand hygiene, and limiting departures from the home for necessities only. Reports understanding and willingness to comevaluation and management of chronic medical issues and medication review. Patient denies any chest pain, SOB, nausea, vomiting, diarrhea, constipation, fever, chills, cough, known COVID exposure or illness. Reports taking all medications as prescribed. No other acute needs identified at this time. The patient is provided contact information to call to discuss any presenting concerns, questions or complaints until our next visit. Refills for all maintenance medications are confirmed on file at the patient's pharrmacy of choice.     OBJECTIVE:   Vital Signs: None Taken for this visit      Physical Exam       Laboratory  Lab Results   Component Value Date    WBC 7.98 10/06/2022    HGB 8.3 (L) 10/06/2022    HCT 26.5 (L) 10/06/2022    MCV 90 10/06/2022     10/06/2022     Lab Results   Component Value Date     INR 1.1 10/03/2022    INR 1.1 02/28/2022    INR 0.9 12/26/2020     Lab Results   Component Value Date    HGBA1C 4.6 10/03/2022     No results for input(s): POCTGLUCOSE in the last 72 hours.    TRANSITION OF CARE:     Family and/or Caretaker present at visit?  No.  Diagnostic tests reviewed/disposition: No diagnosic tests pending after this hospitalization.  Disease/illness education: Importance of Compliance with all prescribed medications and treatments, COVID Precautions/Social Distancing/Mask Use  Home health/community services discussion/referrals: Patient does not have home health established from hospital visit.  They do not need home health.  If needed, we will set up home health for the patient.   Establishment or re-establishment of referral orders for community resources: No other necessary community resources.   Discussion with other health care providers: No discussion with other health care providers necessary.     Transition of Care Visit:  I have reviewed and updated the history and problem list. I have reconciled the medication list. I have discussed the hospitalization and current medical issues, prognosis and plans with the patient/family.     Medications Reconciliation:   I have reconciled the patient's home medications and discharge medications with the patient/family. I have updated all changes. Refer to After-Visit Medication List.    Discharge plans, follow-up instructions, future appointments, provider contact information, indicators to seek emergency treatment and encouragement to call for any questions, concerns or clarification of the patient's plan of care explained to patient and/or caregiver(s), whom confirm understanding of provided information and endorse willingness to comply.     Assessment:   Problem List Items Addressed on this Visit:  1. Acute hypoxemic respiratory failure    2. Hypertensive emergency      Plan:     1. Acute hypoxemic respiratory failure   Resolved     2.  Hypertensive emergency   Continue home meds  Monitor BP  Notify MD with HA, blurred vision, diplopia          Encounter for Medical Follow-Up and Medication Review  - [facility] to schedule follow-up visit with patient in 4-6 weeks or PRN.    Patient Instructions Given:  - Continue all medications, treatments and therapies as ordered.   - Follow all instructions, recommendations as discussed.  - Maintain Safety Precautions at all times.  - Attend all medical appointments as scheduled.  - For worsening symptoms: call Primary Care Physician or Nurse Practitioner.  - For emergencies, call 911 or immediately report to the nearest emergency room.    After Visit Medication List :     Medication List            Accurate as of October 25, 2022  5:47 PM. If you have any questions, ask your nurse or doctor.                CONTINUE taking these medications      albuterol 90 mcg/actuation inhaler  Commonly known as: PROVENTIL/VENTOLIN HFA     aspirin 81 MG EC tablet  Commonly known as: ECOTRIN  Take 1 tablet (81 mg total) by mouth once daily.     blood-glucose meter kit     cloNIDine 0.3 mg/24 hr td ptwk 0.3 mg/24 hr  Commonly known as: CATAPRES  Place 1 patch onto the skin every 7 days.     colchicine 0.6 mg tablet  Commonly known as: COLCRYS     famotidine 20 MG tablet  Commonly known as: PEPCID     furosemide 40 MG tablet  Commonly known as: LASIX  Take 1 tablet (40 mg total) by mouth 2 (two) times daily.     gabapentin 100 MG capsule  Commonly known as: NEURONTIN  Take 1 capsule (100 mg total) by mouth once daily.     hydrALAZINE 100 MG tablet  Commonly known as: APRESOLINE  Take 1 tablet (100 mg total) by mouth every 8 (eight) hours.     HYDROcodone-acetaminophen 5-325 mg per tablet  Commonly known as: NORCO  Take 1 tablet by mouth every 6 (six) hours as needed for Pain.     labetaloL 200 MG tablet  Commonly known as: NORMODYNE  Take 1 tablet (200 mg total) by mouth every 12 (twelve) hours.      LIDOcaine-prilocaine cream  Commonly known as: EMLA     losartan 50 MG tablet  Commonly known as: COZAAR  Take 1 tablet (50 mg total) by mouth once daily.     melatonin 3 mg tablet  Commonly known as: MELATIN  Take 2 tablets (6 mg total) by mouth nightly as needed for Insomnia.     NIFEdipine 90 MG (OSM) 24 hr tablet  Commonly known as: PROCARDIA-XL  Take 1 tablet (90 mg total) by mouth 2 (two) times a day.     omeprazole 20 MG capsule  Commonly known as: PRILOSEC     ondansetron 8 MG Tbdl  Commonly known as: ZOFRAN-ODT  Take 1 tablet (8 mg total) by mouth every 8 (eight) hours as needed.     tadalafiL 2.5 mg Tab  Commonly known as: CIALIS     TRUE METRIX GLUCOSE TEST STRIP Strp  Generic drug: blood sugar diagnostic            Future Appointments   Date Time Provider Department Center   11/14/2022 10:00 AM ROHITH Pickard II, MD [facility] JACIANTHONY Benjamin     Risks of environmental exposure to coronavirus discussed including: social distancing, hand hygiene, and limiting departures from the home for necessities only. Reports understanding and willingness to comply.     Signature:    Radhika Mcknight, MSN, APRN, FNP-C  [facility]     Total time for this telephone encounter was 25 minutes. The following issues were discussed: primary and secondary diagnoses, co-morbidities, prescribed medications, treatment modalities, importance of compliance with medical advice and directives for follow-up care.    This service was not originating from a related E/M service provided within the previous 7 days nor will  to an E/M service or procedure within the next 24 hours or my soonest available appointment.  Prevailing standard of care was able to be met in this audio-only visit.

## 2022-11-06 ENCOUNTER — HOSPITAL ENCOUNTER (EMERGENCY)
Facility: HOSPITAL | Age: 57
Discharge: HOME | End: 2022-11-06
Attending: EMERGENCY MEDICINE
Payer: MEDICAID

## 2022-11-06 ENCOUNTER — HOSPITAL ENCOUNTER (EMERGENCY)
Facility: HOSPITAL | Age: 57
End: 2022-11-06
Attending: EMERGENCY MEDICINE
Payer: MEDICAID

## 2022-11-06 VITALS
TEMPERATURE: 98 F | HEART RATE: 71 BPM | DIASTOLIC BLOOD PRESSURE: 91 MMHG | SYSTOLIC BLOOD PRESSURE: 177 MMHG | RESPIRATION RATE: 18 BRPM | BODY MASS INDEX: 24.11 KG/M2 | HEIGHT: 66 IN | WEIGHT: 150 LBS | OXYGEN SATURATION: 95 %

## 2022-11-06 VITALS
HEART RATE: 85 BPM | SYSTOLIC BLOOD PRESSURE: 205 MMHG | BODY MASS INDEX: 24.11 KG/M2 | HEIGHT: 66 IN | WEIGHT: 150 LBS | RESPIRATION RATE: 18 BRPM | TEMPERATURE: 99 F | DIASTOLIC BLOOD PRESSURE: 100 MMHG | OXYGEN SATURATION: 95 %

## 2022-11-06 DIAGNOSIS — J06.9 VIRAL URI WITH COUGH: Primary | ICD-10-CM

## 2022-11-06 DIAGNOSIS — R01.1 HEART MURMUR: ICD-10-CM

## 2022-11-06 DIAGNOSIS — M25.529 ELBOW PAIN, UNSPECIFIED LATERALITY: ICD-10-CM

## 2022-11-06 DIAGNOSIS — J06.9 UPPER RESPIRATORY TRACT INFECTION, UNSPECIFIED TYPE: Primary | ICD-10-CM

## 2022-11-06 DIAGNOSIS — M79.641 HAND PAIN, RIGHT: ICD-10-CM

## 2022-11-06 LAB
INFLUENZA A, MOLECULAR: NOT DETECTED
INFLUENZA B, MOLECULAR: NOT DETECTED
RSV AG BY MOLECULAR METHOD: NOT DETECTED
SARS-COV-2 RNA RESP QL NAA+PROBE: NOT DETECTED

## 2022-11-06 PROCEDURE — 99283 EMERGENCY DEPT VISIT LOW MDM: CPT | Mod: 27

## 2022-11-06 PROCEDURE — 25000003 PHARM REV CODE 250: Performed by: EMERGENCY MEDICINE

## 2022-11-06 PROCEDURE — 99284 PR EMERGENCY DEPT VISIT,LEVEL IV: ICD-10-PCS | Mod: CS,,, | Performed by: EMERGENCY MEDICINE

## 2022-11-06 PROCEDURE — 99282 EMERGENCY DEPT VISIT SF MDM: CPT

## 2022-11-06 PROCEDURE — 25000242 PHARM REV CODE 250 ALT 637 W/ HCPCS: Performed by: EMERGENCY MEDICINE

## 2022-11-06 PROCEDURE — 99499 UNLISTED E&M SERVICE: CPT | Mod: ,,, | Performed by: EMERGENCY MEDICINE

## 2022-11-06 PROCEDURE — 99284 EMERGENCY DEPT VISIT MOD MDM: CPT | Mod: CS,,, | Performed by: EMERGENCY MEDICINE

## 2022-11-06 PROCEDURE — 0241U SARS-COV2 (COVID) WITH FLU/RSV BY PCR: CPT | Performed by: EMERGENCY MEDICINE

## 2022-11-06 PROCEDURE — 99499 NO LOS: ICD-10-PCS | Mod: ,,, | Performed by: EMERGENCY MEDICINE

## 2022-11-06 RX ORDER — HYDROCODONE BITARTRATE AND ACETAMINOPHEN 5; 325 MG/1; MG/1
2 TABLET ORAL
Status: COMPLETED | OUTPATIENT
Start: 2022-11-06 | End: 2022-11-06

## 2022-11-06 RX ORDER — BENZONATATE 100 MG/1
100 CAPSULE ORAL 3 TIMES DAILY PRN
Qty: 12 CAPSULE | Refills: 0 | Status: SHIPPED | OUTPATIENT
Start: 2022-11-06 | End: 2022-11-16

## 2022-11-06 RX ORDER — GUAIFENESIN 100 MG/5ML
200 SOLUTION ORAL 3 TIMES DAILY PRN
Qty: 180 ML | Refills: 0 | Status: SHIPPED | OUTPATIENT
Start: 2022-11-06

## 2022-11-06 RX ORDER — LIDOCAINE 50 MG/G
1 PATCH TOPICAL
Status: DISCONTINUED | OUTPATIENT
Start: 2022-11-06 | End: 2022-11-06 | Stop reason: HOSPADM

## 2022-11-06 RX ORDER — ACETAMINOPHEN 325 MG/1
650 TABLET ORAL
Status: COMPLETED | OUTPATIENT
Start: 2022-11-06 | End: 2022-11-06

## 2022-11-06 RX ORDER — ALBUTEROL SULFATE 90 UG/1
2 AEROSOL, METERED RESPIRATORY (INHALATION) ONCE
Status: COMPLETED | OUTPATIENT
Start: 2022-11-06 | End: 2022-11-06

## 2022-11-06 RX ADMIN — ACETAMINOPHEN 650 MG: 325 TABLET ORAL at 09:11

## 2022-11-06 RX ADMIN — LIDOCAINE 5% 1 PATCH: 700 PATCH TOPICAL at 09:11

## 2022-11-06 RX ADMIN — HYDROCODONE BITARTRATE AND ACETAMINOPHEN 2 TABLET: 5; 325 TABLET ORAL at 04:11

## 2022-11-06 RX ADMIN — ALBUTEROL SULFATE 2 PUFF: 108 INHALANT RESPIRATORY (INHALATION) at 04:11

## 2022-11-06 NOTE — ED NOTES
"Enrique Martinez, an 57 y.o. male presents to the ED     Chief Complaint   Patient presents with    Cough     " I got a flu and pneumonia shot last week now I have started coughing up mucous. I need some of those tesslon perles and I need a antibiotic shot because the pills they gave me will just wash out with diaylsis on monday     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 Oriented x4, speaking appropriately  APPEARANCE: Pt resting comfortably, in no acute distress, pt is clean and well groomed, clothing properly fastened  SKIN:The skin is warm and dry, color consistent with ethnicity, patient has normal skin turgor and moist mucus membranes  RESPIRATORY:Airway is open and patent, respirations are spontaneous, patient has a normal effort and rate, no accessory muscle use noted. + productive cough (white phlegm), runny nose. Denies sob  CARDIAC: Normal rate and rhythm, no peripheral edema noted, capillary refill < 3 seconds, bilateral radial pulses 2+.  ABDOMEN: Soft, non tender, non distended. Denies nausea/vomiting   NEUROLOGIC: PERRLA, facial expression is symmetrical, patient moving all extremities spontaneously, normal sensation in all extremities when touched with a finger.  Follows all commands appropriately  MUSCULOSKELETAL: Patient moving all extremities spontaneously, no obvious " swelling or deformities noted.

## 2022-11-06 NOTE — ED NOTES
Patient identifiers verified and correct for Enrique Martinez  LOC: The patient is awake, alert and aware of environment with an appropriate affect, the patient is oriented x 3 and speaking appropriately.   APPEARANCE: Patient appears comfortable and in no acute distress, patient is clean and well groomed.  SKIN: The skin is warm and dry, color consistent with ethnicity, patient has normal skin turgor and moist mucus membranes, skin intact, no breakdown or bruising noted.   MUSCULOSKELETAL: Patient moving all extremities spontaneously, no swelling noted.  RESPIRATORY: Airway is open and patent, respirations are spontaneous, patient has a normal effort and rate, no accessory muscle use noted, pt placed on continuous pulse ox with O2 sats noted at 97% on room air. Pt reports cough and congestion  CARDIAC: Pt placed on cardiac monitor. Patient has a normal rate and regular rhythm, no edema noted, capillary refill < 3 seconds.   GASTRO: Soft and non tender to palpation, no distention noted, normoactive bowel sounds present in all four quadrants. Pt states bowel movements have been regular.  : Pt denies any pain or frequency with urination.  NEURO: Pt opens eyes spontaneously, behavior appropriate to situation, follows commands, facial expression symmetrical, bilateral hand grasp equal and even, purposeful motor response noted, normal sensation in all extremities when touched with a finger.

## 2022-11-06 NOTE — ED TRIAGE NOTES
Multiple Complaints (Dc'd < hr ago upset dr gave tessalon pearls and cost him $12, googled it and not for mucous , wanting something for thick mucous)

## 2022-11-06 NOTE — ED PROVIDER NOTES
"Encounter Date: 11/6/2022    SCRIBE #1 NOTE: I, Tierra Mac, am scribing for, and in the presence of,  Joleen Martino MD. I have scribed the following portions of the note - Other sections scribed: HPI, ROS, PE.     History     Chief Complaint   Patient presents with    Multiple Complaints     Dc'd < hr ago upset dr gave tessalon pearls and cost him $12, googled it and not for mucous , wanting something for thick mucous     Time patient was seen by the provider: 9:08 AM      The patient is a 57 y.o. male with a past medical history of diabetes mellitus,  ESRD on HD, and gout, hypertension who presents to the ED with a complaint of coughing . The patient was seen here in the ED just a few hours ago by Dr Harrison and received negative COVID-19 and influenza swab test. He had a clear lung exam. The patient tells me that he has been experiencing a cough for the past 3 days, which has been fairly persistent. He affirms coughing up "thick mucus" which appears, white and beige (without hemoptysis). He states he has had no difference in the symptoms upon presentation this time from the time prior just a bit ago. He tried the tessalon he was prescribed before arrival to help alleviate his cough, with mild improvement. He feels that he needs something additionally that will thin his mucous in addition to the cough suppressant. No other exacerbating or alleviating factors. Patient denies fever or other associated symptoms. No systemic symptoms of note - no fevers or chills. No chest pain or shortness of breath. No abdominal pain, vomiting or diarrhea.   Also he dialyzes MWF and had normal dialysis on Friday. He does not feel that he is more fluid overloaded. He does not have any complaints from that standpoint.     The history is provided by the patient and medical records. No  was used.   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      Past Surgical History:   Procedure Laterality Date    ABDOMINAL SURGERY  1980s    CARDIAC CATHETERIZATION  2007    x 2    COLONOSCOPY N/A 4/5/2019    Procedure: COLONOSCOPY;  Surgeon: Jose L Carty MD;  Location: [facility] ([address] [place] [address]);  Service: Colon and Rectal;  Laterality: N/A;    CORONARY STENT PLACEMENT  2007     Family History   Problem Relation Age of Onset    Hypertension Father     Stroke Father     Heart attack Father     Hyperlipidemia Father     No Known Problems Sister     Drug abuse Brother     Heart attack Brother     Hypertension Brother      Social History     Tobacco Use    Smoking status: Some Days     Packs/day: 0.25     Years: 10.00     Pack years: 2.50     Types: Cigarettes    Smokeless tobacco: Never   Substance Use Topics    Alcohol use: No    Drug use: No     Review of Systems   Constitutional:  Negative for fever.   HENT:  Negative for sore throat.    Eyes:  Negative for visual disturbance.   Respiratory:  Positive for cough. Negative for shortness of breath.    Cardiovascular:  Negative for chest pain.   Gastrointestinal:  Negative for diarrhea, nausea and vomiting.   Genitourinary:  Negative for dysuria.   Musculoskeletal:  Negative for back pain.   Skin:  Negative for rash.   Neurological:  Negative for weakness.   Hematological:  Does not bruise/bleed easily.   Psychiatric/Behavioral:  Negative for confusion.      Physical Exam     Initial Vitals [11/06/22 0815]   BP Pulse Resp Temp SpO2   (!) 205/100 85 18  99.2 °F (37.3 °C) 95 %      MAP       --         Physical Exam    Nursing note and vitals reviewed.  Constitutional: He appears well-developed and well-nourished. No distress.   He does have an occassional cough but no respiratory distress.    HENT:   Head: Normocephalic and atraumatic.   Nose: Nose normal.   Mouth/Throat: Posterior oropharyngeal erythema present. No oropharyngeal exudate.   Oropharyngeal red w/o exudate.   Eyes: Conjunctivae and EOM are normal. Pupils are equal, round, and reactive to light.   Neck: Phonation normal. Neck supple.   Normal range of motion.  Cardiovascular:  Normal rate, regular rhythm, intact distal pulses and normal pulses.     Exam reveals no gallop and no friction rub.       Murmur heard.  Early systolic murmur 3/6 as far as intensity.   Pulmonary/Chest: Breath sounds normal. No respiratory distress. He has no wheezes. He has no rhonchi. He has no rales.   Port in right chest wall w/o redness or induration   Abdominal: Abdomen is soft. Bowel sounds are normal. He exhibits no distension. There is no abdominal tenderness. There is no rebound and no guarding.   Musculoskeletal:         General: Edema present. Normal range of motion.      Cervical back: Normal range of motion and neck supple.      Right lower leg: Edema present.      Left lower leg: Edema present.      Comments: Bilateral LE edema is quite subtle but symmetric. Does have some brawny changes there that appear to be chronic.      Neurological: He is alert and oriented to person, place, and time. He has normal strength. No sensory deficit. GCS score is 15. GCS eye subscore is 4. GCS verbal subscore is 5. GCS motor subscore is 6.   Skin: Skin is warm and dry. Capillary refill takes less than 2 seconds.   Patient has no redness or induration around dialysis access in right chest.    Psychiatric: He has a normal mood and affect.       ED Course   Procedures  Labs Reviewed - No data to display       Imaging Results     None          Medications   acetaminophen tablet 650 mg (650 mg Oral Given 11/6/22 4884)     Medical Decision Making:   History:   Old Medical Records: I decided to obtain old medical records.  Old Records Summarized: records from previous admission(s).       <> Summary of Records: The patient was seen here in the ED yesterday by Dr frank and received negative COVID-19 and influenza swab test  Initial Assessment:   This is a 57-year-old male who presents to the emergency department having just been seen for cough, requesting additional medications for his continued symptoms.  Differential Diagnosis:   The patient does not have any significant change in his symptoms from when he was just evaluated a few hours ago.  Is concerned regarding the fact that he was given a cough suppressant but also want something to thin his mucous.  He has a benign lung exam with clear breath sounds and a rare cough without respiratory distress.  I do not feel that radiographic imaging is needed to rule out pneumonia given this reassuring examination.  He also systemically appears well.  He has also had a negative COVID and flu test during this previous evaluation.  From the standpoint of other findings today, continues to have elevated blood pressures as he did on his recent evaluation here in the emergency department and he also continues to have murmur.  We have discussed this and he knows that he needs to follow-up with his primary physician regarding both of these findings.  However, from the standpoint of dialysis, he has mild edema at this time and once again a clear lung exam and I do not feel that he is in need of emergent dialysis at this juncture.  ED Management:  Given all of the above, I do feel that we can continue to manage the patient in the outpatient setting and will add guaifenesin to his regimen to thin his mucous as he asked.  Once again, I have stressed to him the importance of follow-up regarding his blood  pressure, regarding his murmur, and regarding his cough.  He is voiced understanding and is discharged in satisfactory condition.  Of note, just prior to discharge, the patient also stated that he had some chronic ganglion cyst pain that he was asking for pain control.  We are giving him Tylenol and placing a lidocaine patch to that area.  ED diagnosis:  1. Acute cough.  2. Acute viral syndrome.  3. Above diagnoses complicated by known end-stage renal disease on hemodialysis.        Scribe Attestation:   Scribe #1: I performed the above scribed service and the documentation accurately describes the services I performed. I attest to the accuracy of the note.    Attending Attestation:           Physician Attestation for Scribe:  Physician Attestation Statement for Scribe #1: I, Joleen Martino MD, reviewed documentation, as scribed by Tierra Mac in my presence, and it is both accurate and complete.                        Clinical Impression:   Final diagnoses:  [J06.9] Upper respiratory tract infection, unspecified type (Primary)      ED Disposition Condition    Discharge Stable          ED Prescriptions       Medication Sig Dispense Start Date End Date Auth. Provider    guaiFENesin 100 mg/5 ml (ROBITUSSIN) 100 mg/5 mL syrup Take 10 mLs (200 mg total) by mouth 3 (three) times daily as needed for Cough. 180 mL 11/6/2022 -- Joleen Martino MD          Follow-up Information       Follow up With Specialties Details Why Contact Info        Follow up with your primary physician in three days to assure that you are continuing to improve and to discuss your murmur and blood pressure.             Joleen Martino MD  11/06/22 0901

## 2022-11-06 NOTE — Clinical Note
"Enrique Matosdarline Martinez was seen and treated in our emergency department on 11/6/2022.  He may return to work on 11/07/2022.       If you have any questions or concerns, please don't hesitate to call.      Joleen Martino MD"

## 2022-11-06 NOTE — ED PROVIDER NOTES
"History:  Enrique Martinez is a 57 y.o. male who presents to the ED with Cough (" I got a flu and pneumonia shot last week now I have started coughing up mucous. I need some of those tesslon perles and I need a antibiotic shot because the pills they gave me will just wash out with diaylsis on monday)    Described as 57-year-old male with a history of ESRD on HD, diabetes, hypertension, gout presenting to the emergency department with multiple complaints.  He reports for the past 2-3 days he has had a cough with productive phlegm and believes he needs an antibiotic.  He denies any fevers or chills.  He denies any chest pain or shortness of breath.  He reports this does not feel like pneumonia.  He also requests a refill for his albuterol, which he reports he is unable to  at the pharmacy for another 10 days.  He also request pain medication for his chronic pain in his hand and elbow, denies any acute changes tonight.    Review of Systems: All systems reviewed and are negative except as per history of present illness.  Constitutional: Negative for fever.   HENT: Negative for congestion.  Positive for cough  Respiratory: Negative for shortness of breath.    Cardiovascular: Negative for chest pain.   Gastrointestinal: Negative for abdominal pain.   Genitourinary: Negative for dysuria.   Musculoskeletal: Negative for myalgias. + hand and elbow pain  Skin: Negative for rash.   Neurological: Negative for focal weakness.   Psychiatric/Behavioral: Negative for memory loss.     Medications:   Discharge Medication List as of 11/6/2022  6:16 AM        CONTINUE these medications which have NOT CHANGED    Details   albuterol (PROVENTIL/VENTOLIN HFA) 90 mcg/actuation inhaler Inhale 2 puffs into the lungs every 6 (six) hours as needed for Wheezing or Shortness of Breath. Rescue, Historical Med      aspirin (ECOTRIN) 81 MG EC tablet Take 1 tablet (81 mg total) by mouth once daily., Starting Mon 2/22/2021, Normal    "   blood-glucose meter kit Use as instructed, Historical Med      cloNIDine 0.3 mg/24 hr td ptwk (CATAPRES) 0.3 mg/24 hr Place 1 patch onto the skin every 7 days., Starting Tue 10/11/2022, Until Mon 1/9/2023, Normal      colchicine (COLCRYS) 0.6 mg tablet Take 0.6 mg by mouth daily as needed., Starting Wed 11/3/2021, Historical Med      famotidine (PEPCID) 20 MG tablet Take 20 mg by mouth daily as needed for Heartburn. , Starting Wed 4/22/2020, Historical Med      furosemide (LASIX) 40 MG tablet Take 1 tablet (40 mg total) by mouth 2 (two) times daily., Starting Thu 10/6/2022, Until Wed 1/4/2023, Normal      gabapentin (NEURONTIN) 100 MG capsule Take 1 capsule (100 mg total) by mouth once daily., Starting Fri 3/11/2022, Until Sat 3/11/2023, No Print      hydrALAZINE (APRESOLINE) 100 MG tablet Take 1 tablet (100 mg total) by mouth every 8 (eight) hours., Starting Thu 10/6/2022, Until Wed 1/4/2023, Normal      labetaloL (NORMODYNE) 200 MG tablet Take 1 tablet (200 mg total) by mouth every 12 (twelve) hours., Starting Thu 3/10/2022, Until Fri 3/10/2023, Normal      LIDOcaine-prilocaine (EMLA) cream Apply topically 2 (two) times daily as needed., Starting Fri 9/9/2022, Historical Med      losartan (COZAAR) 50 MG tablet Take 1 tablet (50 mg total) by mouth once daily., Starting Fri 10/7/2022, Until Thu 1/5/2023, Normal      melatonin (MELATIN) 3 mg tablet Take 2 tablets (6 mg total) by mouth nightly as needed for Insomnia., Starting Thu 3/10/2022, No Print      NIFEdipine (PROCARDIA-XL) 90 MG (OSM) 24 hr tablet Take 1 tablet (90 mg total) by mouth 2 (two) times a day., Starting Fri 10/7/2022, Until Thu 1/5/2023, Normal      omeprazole (PRILOSEC) 20 MG capsule Take 20 mg by mouth once daily., Starting Wed 12/29/2021, Historical Med      ondansetron (ZOFRAN-ODT) 8 MG TbDL Take 1 tablet (8 mg total) by mouth every 8 (eight) hours as needed., Starting Thu 3/10/2022, No Print      tadalafiL (CIALIS) 2.5 mg Tab Take 2.5 mg by  "mouth., Starting Wed 2/3/2021, Historical Med      TRUE METRIX GLUCOSE TEST STRIP Strp Starting Sun 12/27/2020, Historical Med             PMH:   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  Social History: Marital Status: . He  reports that he has been smoking. He has a 2.50 pack-year smoking history. He has never used smokeless tobacco.. He  reports no history of alcohol use..       Exam:  VITAL SIGNS:   Vitals:    11/06/22 0344 11/06/22 0422 11/06/22 0432 11/06/22 0600   BP: (!) 163/83   (!) 177/91   BP Location:    Right arm   Patient Position:    Sitting   Pulse: 83  70 71   Resp: 18 20 20 18   Temp: 98.1 °F (36.7 °C)      SpO2: 99%  (!) 94% 95%   Weight: 68 kg (150 lb)      Height: 5' 6" (1.676 m)        Const: Awake and alert, NAD   Head: Atraumatic  Eyes: Normal Conjunctiva  ENT: Normal External Ears, Nose and Mouth.  Neck: Full range of motion. No meningismus.  Resp: Normal respiratory effort, No distress, clear to auscultation bilaterally  Cardio: Equal and intact distal pulses, HD catheter right upper chest, +murmur, regular rate and rhythm.  Abd: Soft, " non tender, non distended.   Skin: No petechiae or rashes  Ext: No cyanosis, or edema  Neur: Awake and alert  Psych: Normal Mood and Affect    Data:  Results for orders placed or performed during the hospital encounter of 22   SARS-Cov2 (COVID) with FLU/RSV by PCR   Result Value Ref Range    SARS-CoV2 (COVID-19) Qualitative PCR Not Detected Not Detected    Influenza A, Molecular Not Detected Not Detected    Influenza B, Molecular Not Detected Not Detected    RSV Ag by Molecular Method Not Detected Not Detected       Labs & Imaging studies were reviewed independently by me.     Medical Decision Makin-year-old male presenting to the emergency department with a cough.  Lungs are clear, denies any fevers at home, low suspicion for pneumonia.  I suspect this is likely due to a viral illness.  Doubt CHF exacerbation or ACS, doubt PE.  Viral swabs are negative.  He also complained about chronic hand and elbow pain, was given his home dose of oxycodone, instructed to see his PCP for refill of this medication.  Also reports he is unable to  another albuterol inhaler at home due to insurance issues, was given 2 puffs in the emergency room and an inhaler to take home.  Incidentally, was found have a heart murmur, seems new in his chart since October, though echo from 10/03/2022 shows mild mitral regurg and moderate tricuspid regurg, likely the cause of his currently asymptomatic murmur.  Patient stable for discharge home with outpatient follow-up.    Though the patient's latest blood pressure was elevated (>120/80), the patient has a known history of hypertension and urged to pursue adjustment of their medical therapy within a week with their primary care physician. Please refer to the medication reconciliation form for the current list of hypertensive medications.      Clinical Impression:  1. Viral URI with cough    2. Elbow pain, unspecified laterality    3. Hand pain, right    4. Heart murmur              Medication List        START taking these medications      benzonatate 100 MG capsule  Commonly known as: TESSALON  Take 1 capsule (100 mg total) by mouth 3 (three) times daily as needed for Cough.            ASK your doctor about these medications      albuterol 90 mcg/actuation inhaler  Commonly known as: PROVENTIL/VENTOLIN HFA     aspirin 81 MG EC tablet  Commonly known as: ECOTRIN  Take 1 tablet (81 mg total) by mouth once daily.     blood-glucose meter kit     cloNIDine 0.3 mg/24 hr td ptwk 0.3 mg/24 hr  Commonly known as: CATAPRES  Place 1 patch onto the skin every 7 days.     colchicine 0.6 mg tablet  Commonly known as: COLCRYS     famotidine 20 MG tablet  Commonly known as: PEPCID     furosemide 40 MG tablet  Commonly known as: LASIX  Take 1 tablet (40 mg total) by mouth 2 (two) times daily.     gabapentin 100 MG capsule  Commonly known as: NEURONTIN  Take 1 capsule (100 mg total) by mouth once daily.     hydrALAZINE 100 MG tablet  Commonly known as: APRESOLINE  Take 1 tablet (100 mg total) by mouth every 8 (eight) hours.     labetaloL 200 MG tablet  Commonly known as: NORMODYNE  Take 1 tablet (200 mg total) by mouth every 12 (twelve) hours.     LIDOcaine-prilocaine cream  Commonly known as: EMLA     losartan 50 MG tablet  Commonly known as: COZAAR  Take 1 tablet (50 mg total) by mouth once daily.     melatonin 3 mg tablet  Commonly known as: MELATIN  Take 2 tablets (6 mg total) by mouth nightly as needed for Insomnia.     NIFEdipine 90 MG (OSM) 24 hr tablet  Commonly known as: PROCARDIA-XL  Take 1 tablet (90 mg total) by mouth 2 (two) times a day.     omeprazole 20 MG capsule  Commonly known as: PRILOSEC     ondansetron 8 MG Tbdl  Commonly known as: ZOFRAN-ODT  Take 1 tablet (8 mg total) by mouth every 8 (eight) hours as needed.     tadalafiL 2.5 mg Tab  Commonly known as: CIALIS     TRUE METRIX GLUCOSE TEST STRIP Strp  Generic drug: blood sugar diagnostic               Where to Get Your Medications         You can get these medications from any pharmacy    Bring a paper prescription for each of these medications  benzonatate 100 MG capsule         Follow-up Information       [facility]    Specialty: Internal Medicine  Contact information:  [address]  [facility] [address] [phone]  Additional information:  [facility]   Please park in surface lot and check in at central registration desk             [facility] - Cardiology - [facility].    Specialty: Cardiology  Why: See a cardiologist about your heart murmur.  Contact information:  [address]  [facility] [address] [phone]  Additional information:  [facility]iology Services Clinics - 3rd floor                             Abigail Harrison MD  11/06/22 0666

## 2022-11-14 ENCOUNTER — INITIAL CONSULT (OUTPATIENT)
Dept: VASCULAR SURGERY | Facility: CLINIC | Age: 57
End: 2022-11-14
Attending: SURGERY
Payer: MEDICAID

## 2022-11-14 VITALS
SYSTOLIC BLOOD PRESSURE: 174 MMHG | HEART RATE: 80 BPM | HEIGHT: 66 IN | BODY MASS INDEX: 22.46 KG/M2 | WEIGHT: 139.75 LBS | TEMPERATURE: 99 F | DIASTOLIC BLOOD PRESSURE: 86 MMHG

## 2022-11-14 DIAGNOSIS — I70.1 RENAL ARTERY STENOSIS: ICD-10-CM

## 2022-11-14 DIAGNOSIS — I15.0 RENOVASCULAR HYPERTENSION: Primary | ICD-10-CM

## 2022-11-14 DIAGNOSIS — N18.6 ESRD (END STAGE RENAL DISEASE): ICD-10-CM

## 2022-11-14 PROCEDURE — 99214 OFFICE O/P EST MOD 30 MIN: CPT | Mod: PBBFAC | Performed by: SURGERY

## 2022-11-14 PROCEDURE — 99999 PR PBB SHADOW E&M-EST. PATIENT-LVL IV: ICD-10-PCS | Mod: PBBFAC,,, | Performed by: SURGERY

## 2022-11-14 PROCEDURE — 99999 PR PBB SHADOW E&M-EST. PATIENT-LVL IV: CPT | Mod: PBBFAC,,, | Performed by: SURGERY

## 2022-11-14 PROCEDURE — 99202 OFFICE O/P NEW SF 15 MIN: CPT | Mod: S$PBB,,, | Performed by: SURGERY

## 2022-11-14 PROCEDURE — 99202 PR OFFICE/OUTPT VISIT, NEW, LEVL II, 15-29 MIN: ICD-10-PCS | Mod: S$PBB,,, | Performed by: SURGERY

## 2022-11-14 RX ORDER — LOSARTAN POTASSIUM 100 MG/1
100 TABLET ORAL DAILY
Qty: 90 TABLET | Refills: 3 | Status: SHIPPED | OUTPATIENT
Start: 2022-11-14 | End: 2023-11-14

## 2022-11-14 RX ORDER — LABETALOL 300 MG/1
300 TABLET, FILM COATED ORAL 2 TIMES DAILY
Qty: 60 TABLET | Refills: 11 | Status: SHIPPED | OUTPATIENT
Start: 2022-11-14 | End: 2023-11-14

## 2022-11-14 NOTE — PROGRESS NOTES
VASCULAR SURGERY NOTE    Patient ID: Enrique Martinez is a 57 y.o. male.    I. HISTORY     Chief Complaint: renal artery stenosis    HPI: Enrique Martinez is a 57 y.o. male who is here today for new patient initial appointment. Here to discuss renal artery stenosis. He has HTN and ESRD and is on HD MWF.   He is managing his hypertension with multiple medications; however, he does not take all of the medications that are currently listed as or at these exact dosages/frequencies. We discussed what he actually takes and made some adjustments today. He would like to follow up with his nephrologist Dr. Ronquillo to discuss further.    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         Past Surgical History:   Procedure Laterality Date    ABDOMINAL SURGERY  1980s    CARDIAC CATHETERIZATION  2007    x 2    COLONOSCOPY N/A 4/5/2019    Procedure: COLONOSCOPY;  Surgeon: Jose L Carty MD;  Location: [facility] ([address] [place] [address]);  Service: Colon and Rectal;  Laterality: N/A;    CORONARY STENT PLACEMENT  2007       Social History     Occupational History    Not on file   Tobacco Use    Smoking status: Some Days     Packs/day: 0.25     Years: 10.00     Pack years: 2.50     Types: Cigarettes    Smokeless tobacco: Never   Substance and Sexual Activity    Alcohol use: No    Drug use: No    Sexual activity: Not on file         Review of Systems   Constitutional: Negative for weight loss.   HENT:  Negative for  ear pain and nosebleeds.    Eyes:  Negative for discharge and pain.   Cardiovascular:  Negative for chest pain and palpitations.   Respiratory:  Negative for cough, shortness of breath and wheezing.    Endocrine: Negative for cold intolerance, heat intolerance and polyphagia.   Hematologic/Lymphatic: Negative for adenopathy. Does not bruise/bleed easily.   Skin:  Negative for itching and rash.   Musculoskeletal:  Negative for joint swelling and muscle cramps.   Gastrointestinal:  Negative for abdominal pain, diarrhea, nausea and vomiting.   Genitourinary:  Negative for dysuria and flank pain.   Neurological:  Negative for numbness and seizures.       II. PHYSICAL EXAM     Physical Exam  Constitutional:       Appearance: Normal appearance. He is not ill-appearing or diaphoretic.   HENT:      Head: Normocephalic and atraumatic.   Eyes:      General: No scleral icterus.        Right eye: No discharge.         Left eye: No discharge.      Extraocular Movements: Extraocular movements intact.      Conjunctiva/sclera: Conjunctivae normal.   Cardiovascular:      Rate and Rhythm: Normal rate and regular rhythm.      Pulses: Normal pulses.   Pulmonary:      Effort: Pulmonary effort is normal. No respiratory distress.   Musculoskeletal:         General: Normal range of motion.      Cervical back: Normal range of motion and neck supple.   Skin:     General: Skin is warm and dry.   Neurological:      General: No focal deficit present.      Mental Status: He is alert and oriented to person, place, and time.   Psychiatric:         Mood and Affect: Mood normal.         Behavior: Behavior normal.         III. ASSESSMENT & PLAN (MEDICAL DECISION MAKING)       Imaging Results: (I have personally reviewed all images and provided interpretation below)  Renal ultrasound 11/04/2022:  PSV elevation to 490 in the right renal artery with RAR greater than 3.5.  RI is greater than 0.8 suggesting intrinsic renal disease.         Assessment/Diagnosis and Plan:    1. ESRD (end stage renal disease)    2. Renal artery stenosis        57 y.o. male with severe CKD and right renal artery stenosis on 4 BP medications but not on maximum dosages. I prescribed increased dosages of his losartan and labetalol to help control his blood pressure and He would like to follow up with his nephrologist Dr. Ronquillo to discuss further. If he needs permanent HD access in the future or would like to pursue renal artery stenting I instructed him to return to clinic and we can discuss this.    -labetalol increased to 300mg  -losartan increased to 100mg  -f/u PRN    ROHITH Pickard II, MD, VI  Vascular Surgery  [facility]

## 2022-11-30 DIAGNOSIS — N18.6 ESRD (END STAGE RENAL DISEASE): Primary | ICD-10-CM

## 2022-12-09 ENCOUNTER — OFFICE VISIT (OUTPATIENT)
Dept: VASCULAR SURGERY | Facility: CLINIC | Age: 57
End: 2022-12-09
Attending: SURGERY
Payer: MEDICAID

## 2022-12-09 ENCOUNTER — HOSPITAL ENCOUNTER (OUTPATIENT)
Dept: VASCULAR SURGERY | Facility: CLINIC | Age: 57
Discharge: HOME | End: 2022-12-09
Attending: SURGERY
Payer: MEDICAID

## 2022-12-09 VITALS
HEART RATE: 82 BPM | DIASTOLIC BLOOD PRESSURE: 69 MMHG | WEIGHT: 141.13 LBS | TEMPERATURE: 99 F | HEIGHT: 66 IN | BODY MASS INDEX: 22.68 KG/M2 | SYSTOLIC BLOOD PRESSURE: 110 MMHG

## 2022-12-09 DIAGNOSIS — N18.6 ESRD (END STAGE RENAL DISEASE): Primary | ICD-10-CM

## 2022-12-09 DIAGNOSIS — Z01.818 PRE-OP EVALUATION: ICD-10-CM

## 2022-12-09 DIAGNOSIS — Z01.818 PRE-OP EXAM: Primary | ICD-10-CM

## 2022-12-09 DIAGNOSIS — Z01.818 PRE-OP EXAM: ICD-10-CM

## 2022-12-09 DIAGNOSIS — N18.6 ESRD (END STAGE RENAL DISEASE): ICD-10-CM

## 2022-12-09 PROCEDURE — 3078F PR MOST RECENT DIASTOLIC BLOOD PRESSURE < 80 MM HG: ICD-10-PCS | Mod: CPTII,,, | Performed by: SURGERY

## 2022-12-09 PROCEDURE — 3008F BODY MASS INDEX DOCD: CPT | Mod: CPTII,,, | Performed by: SURGERY

## 2022-12-09 PROCEDURE — 3044F HG A1C LEVEL LT 7.0%: CPT | Mod: CPTII,,, | Performed by: SURGERY

## 2022-12-09 PROCEDURE — 99999 PR PBB SHADOW E&M-EST. PATIENT-LVL III: ICD-10-PCS | Mod: PBBFAC,,, | Performed by: SURGERY

## 2022-12-09 PROCEDURE — 93970 PR US DUPLEX, UPPER OR LOWER EXT VENOUS,COMPLETE BILAT: ICD-10-PCS | Mod: 26,S$PBB,, | Performed by: SURGERY

## 2022-12-09 PROCEDURE — 93970 EXTREMITY STUDY: CPT | Mod: PBBFAC | Performed by: SURGERY

## 2022-12-09 PROCEDURE — 99213 OFFICE O/P EST LOW 20 MIN: CPT | Mod: PBBFAC,25 | Performed by: SURGERY

## 2022-12-09 PROCEDURE — 3074F SYST BP LT 130 MM HG: CPT | Mod: CPTII,,, | Performed by: SURGERY

## 2022-12-09 PROCEDURE — 3066F NEPHROPATHY DOC TX: CPT | Mod: CPTII,,, | Performed by: SURGERY

## 2022-12-09 PROCEDURE — 1159F MED LIST DOCD IN RCRD: CPT | Mod: CPTII,,, | Performed by: SURGERY

## 2022-12-09 PROCEDURE — 99213 PR OFFICE/OUTPT VISIT, EST, LEVL III, 20-29 MIN: ICD-10-PCS | Mod: S$PBB,,, | Performed by: SURGERY

## 2022-12-09 PROCEDURE — 93970 EXTREMITY STUDY: CPT | Mod: 26,S$PBB,, | Performed by: SURGERY

## 2022-12-09 PROCEDURE — 99213 OFFICE O/P EST LOW 20 MIN: CPT | Mod: S$PBB,,, | Performed by: SURGERY

## 2022-12-09 PROCEDURE — 3078F DIAST BP <80 MM HG: CPT | Mod: CPTII,,, | Performed by: SURGERY

## 2022-12-09 PROCEDURE — 4010F ACE/ARB THERAPY RXD/TAKEN: CPT | Mod: CPTII,,, | Performed by: SURGERY

## 2022-12-09 PROCEDURE — 1159F PR MEDICATION LIST DOCUMENTED IN MEDICAL RECORD: ICD-10-PCS | Mod: CPTII,,, | Performed by: SURGERY

## 2022-12-09 PROCEDURE — 3066F PR DOCUMENTATION OF TREATMENT FOR NEPHROPATHY: ICD-10-PCS | Mod: CPTII,,, | Performed by: SURGERY

## 2022-12-09 PROCEDURE — 4010F PR ACE/ARB THEARPY RXD/TAKEN: ICD-10-PCS | Mod: CPTII,,, | Performed by: SURGERY

## 2022-12-09 PROCEDURE — 3074F PR MOST RECENT SYSTOLIC BLOOD PRESSURE < 130 MM HG: ICD-10-PCS | Mod: CPTII,,, | Performed by: SURGERY

## 2022-12-09 PROCEDURE — 3008F PR BODY MASS INDEX (BMI) DOCUMENTED: ICD-10-PCS | Mod: CPTII,,, | Performed by: SURGERY

## 2022-12-09 PROCEDURE — 3044F PR MOST RECENT HEMOGLOBIN A1C LEVEL <7.0%: ICD-10-PCS | Mod: CPTII,,, | Performed by: SURGERY

## 2022-12-09 PROCEDURE — 99999 PR PBB SHADOW E&M-EST. PATIENT-LVL III: CPT | Mod: PBBFAC,,, | Performed by: SURGERY

## 2022-12-09 NOTE — PROGRESS NOTES
VASCULAR SURGERY NOTE    Patient ID: Enrique Martinez is a 57 y.o. male.    I. HISTORY     Chief Complaint: permanent AV access creation    12/09/22 Interval Hx:  Pt presents to clinic today for evaluation for permanent HD access. Since his last appt his BP has been better controlled but still needs some improvement. Pt is currently using right sided subclavian catheter for dialysis MWF. He was last dialyzed today. Pt complains of pain in his right hand over his first MCPJ and elbow. Pt states that he is still making urine 4-5x per day.  He says he is pretty ambidextrous but states that he uses his right hand mostly to control his phone.      Initial HPI 11/14/22: Enrique Martinez is a RHD 57 y.o. male who is here today to discuss renal artery stenosis. He has HTN and ESRD and is on HD MWF.   He is managing his hypertension with multiple medications; however, he does not take all of the medications that are currently listed as or at these exact dosages/frequencies. We discussed what he actually takes and made some adjustments today. He would like to follow up with his nephrologist Dr. Ronquillo to discuss further.    Denies MI/stroke.   Denies tobacco use.   Denies alcohol use.   Denies recreational drug use.     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         Past Surgical History:   Procedure Laterality Date    ABDOMINAL SURGERY  1980s    CARDIAC CATHETERIZATION  2007    x 2     COLONOSCOPY N/A 4/5/2019    Procedure: COLONOSCOPY;  Surgeon: Jose L Carty MD;  Location: [facility] ([address] [place] [address]);  Service: Colon and Rectal;  Laterality: N/A;    CORONARY STENT PLACEMENT  2007       Social History     Occupational History    Not on file   Tobacco Use    Smoking status: Some Days     Packs/day: 0.25     Years: 10.00     Pack years: 2.50     Types: Cigarettes    Smokeless tobacco: Never   Substance and Sexual Activity    Alcohol use: No    Drug use: No    Sexual activity: Not on file         Review of Systems   Constitutional: Negative for weight loss.   HENT:  Negative for ear pain and nosebleeds.    Eyes:  Negative for discharge and pain.   Cardiovascular:  Negative for chest pain and palpitations.   Respiratory:  Negative for cough, shortness of breath and wheezing.    Endocrine: Negative for cold intolerance, heat intolerance and polyphagia.   Hematologic/Lymphatic: Negative for adenopathy. Does not bruise/bleed easily.   Skin:  Negative for itching and rash.   Musculoskeletal:  Negative for joint swelling and muscle cramps.   Gastrointestinal:  Negative for abdominal pain, diarrhea, nausea and vomiting.   Genitourinary:  Negative for dysuria and flank pain.   Neurological:  Negative for numbness and seizures.       II. PHYSICAL EXAM     Physical Exam  Constitutional:       Appearance: Normal appearance. He is not ill-appearing or diaphoretic.   HENT:      Head: Normocephalic and atraumatic.   Eyes:      General: No scleral icterus.        Right eye: No discharge.         Left eye: No discharge.      Extraocular Movements: Extraocular movements intact.      Conjunctiva/sclera: Conjunctivae normal.   Cardiovascular:      Rate and Rhythm: Normal rate and regular rhythm.      Comments: See detailed peripheral vascular exam below  Pulmonary:      Effort: Pulmonary effort is normal. No respiratory distress.   Abdominal:      General: Abdomen is flat. There is no distension.      Palpations:  Abdomen is soft.      Tenderness: There is no abdominal tenderness. There is no guarding.   Musculoskeletal:         General: Normal range of motion.      Cervical back: Normal range of motion and neck supple.      Right lower leg: No edema.      Left lower leg: No edema.   Skin:     General: Skin is warm and dry.      Coloration: Skin is not jaundiced or pale.      Findings: No erythema or rash.   Neurological:      General: No focal deficit present.      Mental Status: He is alert and oriented to person, place, and time.   Psychiatric:         Mood and Affect: Mood normal.         Behavior: Behavior normal.     Vascular PE  2+ radial pulses bilaterally  Negative for carotid bruit bilaterally  No aortic thrill palpable.   RLE  Pedal pulses not palpable  2+ popliteal  2+ femoral    LLE  Pedal pulses not palpable  2+ popliteal  2+ femoral      III. ASSESSMENT & PLAN (MEDICAL DECISION MAKING)       Imaging Results: (I have personally reviewed all images and provided interpretation below)  Renal ultrasound 11/04/2022:  PSV elevation to 490 in the right renal artery with RAR greater than 3.5.  RI is greater than 0.8 suggesting intrinsic renal disease.      Upper Extremity Vein Mapping 12/9/22:  LEFT:  Cephalic vein is greater than 3 mm all the way down to wrist  RIGHT:  cephalic vein > 3mm down to distal forearm      Assessment/Diagnosis and Plan:    Encounter Diagnosis   Name Primary?    ESRD (end stage renal disease) Yes         57 y.o. male with severe CKD and right renal artery stenosis on 4 BP medications. He presents to clinic today for evaluation of permanent HD access.  He has adequate cephalic vein on both upper extremities for autogenous access creation.  Risks, benefits, alternatives were explained to the patient who expressed full understanding and agreed to proceed.  He had no further questions at the end of our visit.  Plan left wrist radiocephalic AVF creation.     -Plan for left sided radiocephalic AV  fistula creation on Thursday Jan. 3rd. with local/MAC (no regional)      ROHITH Pickard II, MD, [facility]  Vascular Surgery  [facility]

## 2022-12-09 NOTE — H&P (VIEW-ONLY)
VASCULAR SURGERY NOTE    Patient ID: Enrique Martinez is a 57 y.o. male.    I. HISTORY     Chief Complaint: permanent AV access creation    12/09/22 Interval Hx:  Pt presents to clinic today for evaluation for permanent HD access. Since his last appt his BP has been better controlled but still needs some improvement. Pt is currently using right sided subclavian catheter for dialysis MWF. He was last dialyzed today. Pt complains of pain in his right hand over his first MCPJ and elbow. Pt states that he is still making urine 4-5x per day.  He says he is pretty ambidextrous but states that he uses his right hand mostly to control his phone.      Initial HPI 11/14/22: Enrique Martinez is a RHD 57 y.o. male who is here today to discuss renal artery stenosis. He has HTN and ESRD and is on HD MWF.   He is managing his hypertension with multiple medications; however, he does not take all of the medications that are currently listed as or at these exact dosages/frequencies. We discussed what he actually takes and made some adjustments today. He would like to follow up with his nephrologist Dr. Ronquillo to discuss further.    Denies MI/stroke.   Denies tobacco use.   Denies alcohol use.   Denies recreational drug use.     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         Past Surgical History:   Procedure Laterality Date    ABDOMINAL SURGERY  1980s    CARDIAC CATHETERIZATION  2007    x 2     COLONOSCOPY N/A 4/5/2019    Procedure: COLONOSCOPY;  Surgeon: Jose L Carty MD;  Location: [facility] ([address] [place] [address]);  Service: Colon and Rectal;  Laterality: N/A;    CORONARY STENT PLACEMENT  2007       Social History     Occupational History    Not on file   Tobacco Use    Smoking status: Some Days     Packs/day: 0.25     Years: 10.00     Pack years: 2.50     Types: Cigarettes    Smokeless tobacco: Never   Substance and Sexual Activity    Alcohol use: No    Drug use: No    Sexual activity: Not on file         Review of Systems   Constitutional: Negative for weight loss.   HENT:  Negative for ear pain and nosebleeds.    Eyes:  Negative for discharge and pain.   Cardiovascular:  Negative for chest pain and palpitations.   Respiratory:  Negative for cough, shortness of breath and wheezing.    Endocrine: Negative for cold intolerance, heat intolerance and polyphagia.   Hematologic/Lymphatic: Negative for adenopathy. Does not bruise/bleed easily.   Skin:  Negative for itching and rash.   Musculoskeletal:  Negative for joint swelling and muscle cramps.   Gastrointestinal:  Negative for abdominal pain, diarrhea, nausea and vomiting.   Genitourinary:  Negative for dysuria and flank pain.   Neurological:  Negative for numbness and seizures.       II. PHYSICAL EXAM     Physical Exam  Constitutional:       Appearance: Normal appearance. He is not ill-appearing or diaphoretic.   HENT:      Head: Normocephalic and atraumatic.   Eyes:      General: No scleral icterus.        Right eye: No discharge.         Left eye: No discharge.      Extraocular Movements: Extraocular movements intact.      Conjunctiva/sclera: Conjunctivae normal.   Cardiovascular:      Rate and Rhythm: Normal rate and regular rhythm.      Comments: See detailed peripheral vascular exam below  Pulmonary:      Effort: Pulmonary effort is normal. No respiratory distress.   Abdominal:      General: Abdomen is flat. There is no distension.      Palpations:  Abdomen is soft.      Tenderness: There is no abdominal tenderness. There is no guarding.   Musculoskeletal:         General: Normal range of motion.      Cervical back: Normal range of motion and neck supple.      Right lower leg: No edema.      Left lower leg: No edema.   Skin:     General: Skin is warm and dry.      Coloration: Skin is not jaundiced or pale.      Findings: No erythema or rash.   Neurological:      General: No focal deficit present.      Mental Status: He is alert and oriented to person, place, and time.   Psychiatric:         Mood and Affect: Mood normal.         Behavior: Behavior normal.     Vascular PE  2+ radial pulses bilaterally  Negative for carotid bruit bilaterally  No aortic thrill palpable.   RLE  Pedal pulses not palpable  2+ popliteal  2+ femoral    LLE  Pedal pulses not palpable  2+ popliteal  2+ femoral      III. ASSESSMENT & PLAN (MEDICAL DECISION MAKING)       Imaging Results: (I have personally reviewed all images and provided interpretation below)  Renal ultrasound 11/04/2022:  PSV elevation to 490 in the right renal artery with RAR greater than 3.5.  RI is greater than 0.8 suggesting intrinsic renal disease.      Upper Extremity Vein Mapping 12/9/22:  LEFT:  Cephalic vein is greater than 3 mm all the way down to wrist  RIGHT:  cephalic vein > 3mm down to distal forearm      Assessment/Diagnosis and Plan:    Encounter Diagnosis   Name Primary?    ESRD (end stage renal disease) Yes         57 y.o. male with severe CKD and right renal artery stenosis on 4 BP medications. He presents to clinic today for evaluation of permanent HD access.  He has adequate cephalic vein on both upper extremities for autogenous access creation.  Risks, benefits, alternatives were explained to the patient who expressed full understanding and agreed to proceed.  He had no further questions at the end of our visit.  Plan left wrist radiocephalic AVF creation.     -Plan for left sided radiocephalic AV  fistula creation on Thursday Jan. 3rd. with local/MAC (no regional)      ROHITH Pickard II, MD, [facility]  Vascular Surgery  [facility]

## 2023-01-04 ENCOUNTER — TELEPHONE (OUTPATIENT)
Dept: VASCULAR SURGERY | Facility: CLINIC | Age: 58
End: 2023-01-04
Payer: MEDICAID

## 2023-01-04 NOTE — PRE-PROCEDURE INSTRUCTIONS
PreOp Instructions given:   - Verbal medication information (what to hold and what to take)   - NPO guidelines   - Arrival place directions given; time to be given the day before procedure by the   Surgeon's Office DOSC  - Bathing with antibacterial soap   - Don't wear any jewelry or bring any valuables AM of surgery   - No makeup or moisturizer to face   - No perfume/cologne, powder, lotions or aftershave   Pt. verbalized understanding.   Pt denies any h/o Anesthesia/Sedation complications or side effects.  Patient does not know arrival time.  Explained that this information comes from the surgeon's office and if they haven't heard from them by 2 or 3 pm to call the office.  Patient stated an understanding.

## 2023-01-05 ENCOUNTER — HOSPITAL ENCOUNTER (OUTPATIENT)
Facility: HOSPITAL | Age: 58
Discharge: HOME | End: 2023-01-05
Attending: SURGERY | Admitting: SURGERY
Payer: MEDICAID

## 2023-01-05 ENCOUNTER — ANESTHESIA (OUTPATIENT)
Dept: SURGERY | Facility: HOSPITAL | Age: 58
End: 2023-01-05
Payer: MEDICAID

## 2023-01-05 ENCOUNTER — ANESTHESIA EVENT (OUTPATIENT)
Dept: SURGERY | Facility: HOSPITAL | Age: 58
End: 2023-01-05
Payer: MEDICAID

## 2023-01-05 VITALS
RESPIRATION RATE: 16 BRPM | HEART RATE: 68 BPM | HEIGHT: 66 IN | TEMPERATURE: 98 F | OXYGEN SATURATION: 91 % | WEIGHT: 140 LBS | DIASTOLIC BLOOD PRESSURE: 68 MMHG | SYSTOLIC BLOOD PRESSURE: 135 MMHG | BODY MASS INDEX: 22.5 KG/M2

## 2023-01-05 DIAGNOSIS — N18.6 ESRD (END STAGE RENAL DISEASE): Primary | ICD-10-CM

## 2023-01-05 DIAGNOSIS — Z98.890 S/P ARTERIOVENOUS (AV) FISTULA CREATION: ICD-10-CM

## 2023-01-05 PROCEDURE — 63600175 PHARM REV CODE 636 W HCPCS: Performed by: NURSE ANESTHETIST, CERTIFIED REGISTERED

## 2023-01-05 PROCEDURE — D9220A PRA ANESTHESIA: Mod: CRNA,,, | Performed by: NURSE ANESTHETIST, CERTIFIED REGISTERED

## 2023-01-05 PROCEDURE — 36821 AV FUSION DIRECT ANY SITE: CPT | Mod: ,,, | Performed by: SURGERY

## 2023-01-05 PROCEDURE — 71000015 HC POSTOP RECOV 1ST HR: Performed by: SURGERY

## 2023-01-05 PROCEDURE — 63600175 PHARM REV CODE 636 W HCPCS: Performed by: SURGERY

## 2023-01-05 PROCEDURE — 01844 ANES VASC SHUNT/SHUNT REVJ: CPT | Performed by: SURGERY

## 2023-01-05 PROCEDURE — D9220A PRA ANESTHESIA: ICD-10-PCS | Mod: CRNA,,, | Performed by: NURSE ANESTHETIST, CERTIFIED REGISTERED

## 2023-01-05 PROCEDURE — 36821 PR ANASTOMOSIS,AV,ANY SITE: ICD-10-PCS | Mod: ,,, | Performed by: SURGERY

## 2023-01-05 PROCEDURE — D9220A PRA ANESTHESIA: ICD-10-PCS | Mod: ANES,,, | Performed by: ANESTHESIOLOGY

## 2023-01-05 PROCEDURE — 36000707: Performed by: SURGERY

## 2023-01-05 PROCEDURE — 36000706: Performed by: SURGERY

## 2023-01-05 PROCEDURE — 37000008 HC ANESTHESIA 1ST 15 MINUTES: Performed by: SURGERY

## 2023-01-05 PROCEDURE — 37000009 HC ANESTHESIA EA ADD 15 MINS: Performed by: SURGERY

## 2023-01-05 PROCEDURE — 63600175 PHARM REV CODE 636 W HCPCS

## 2023-01-05 PROCEDURE — 71000044 HC DOSC ROUTINE RECOVERY FIRST HOUR: Performed by: SURGERY

## 2023-01-05 PROCEDURE — 25000003 PHARM REV CODE 250: Performed by: SURGERY

## 2023-01-05 PROCEDURE — D9220A PRA ANESTHESIA: Mod: ANES,,, | Performed by: ANESTHESIOLOGY

## 2023-01-05 PROCEDURE — 25000003 PHARM REV CODE 250

## 2023-01-05 RX ORDER — DROPERIDOL 2.5 MG/ML
0.62 INJECTION, SOLUTION INTRAMUSCULAR; INTRAVENOUS ONCE AS NEEDED
Status: DISCONTINUED | OUTPATIENT
Start: 2023-01-05 | End: 2023-01-05 | Stop reason: HOSPADM

## 2023-01-05 RX ORDER — LIDOCAINE HYDROCHLORIDE 10 MG/ML
INJECTION INFILTRATION; PERINEURAL
Status: DISCONTINUED | OUTPATIENT
Start: 2023-01-05 | End: 2023-01-05 | Stop reason: HOSPADM

## 2023-01-05 RX ORDER — HYDROMORPHONE HYDROCHLORIDE 1 MG/ML
0.2 INJECTION, SOLUTION INTRAMUSCULAR; INTRAVENOUS; SUBCUTANEOUS EVERY 5 MIN PRN
Status: DISCONTINUED | OUTPATIENT
Start: 2023-01-05 | End: 2023-01-05 | Stop reason: HOSPADM

## 2023-01-05 RX ORDER — MIDAZOLAM HYDROCHLORIDE 1 MG/ML
INJECTION, SOLUTION INTRAMUSCULAR; INTRAVENOUS
Status: DISCONTINUED | OUTPATIENT
Start: 2023-01-05 | End: 2023-01-05

## 2023-01-05 RX ORDER — HEPARIN SODIUM 1000 [USP'U]/ML
INJECTION, SOLUTION INTRAVENOUS; SUBCUTANEOUS
Status: DISCONTINUED | OUTPATIENT
Start: 2023-01-05 | End: 2023-01-05

## 2023-01-05 RX ORDER — HYDROCODONE BITARTRATE AND ACETAMINOPHEN 5; 325 MG/1; MG/1
1 TABLET ORAL EVERY 6 HOURS PRN
Qty: 15 TABLET | Refills: 0 | Status: SHIPPED | OUTPATIENT
Start: 2023-01-05 | End: 2023-02-09 | Stop reason: SDUPTHER

## 2023-01-05 RX ORDER — ONDANSETRON 2 MG/ML
4 INJECTION INTRAMUSCULAR; INTRAVENOUS ONCE AS NEEDED
Status: DISCONTINUED | OUTPATIENT
Start: 2023-01-05 | End: 2023-01-05 | Stop reason: HOSPADM

## 2023-01-05 RX ORDER — FENTANYL CITRATE 50 UG/ML
INJECTION, SOLUTION INTRAMUSCULAR; INTRAVENOUS
Status: DISCONTINUED | OUTPATIENT
Start: 2023-01-05 | End: 2023-01-05

## 2023-01-05 RX ORDER — SODIUM CHLORIDE 9 MG/ML
INJECTION, SOLUTION INTRAVENOUS CONTINUOUS
Status: DISCONTINUED | OUTPATIENT
Start: 2023-01-05 | End: 2023-01-05 | Stop reason: HOSPADM

## 2023-01-05 RX ORDER — HEPARIN SODIUM 1000 [USP'U]/ML
INJECTION, SOLUTION INTRAVENOUS; SUBCUTANEOUS
Status: DISCONTINUED | OUTPATIENT
Start: 2023-01-05 | End: 2023-01-05 | Stop reason: HOSPADM

## 2023-01-05 RX ORDER — PROPOFOL 10 MG/ML
VIAL (ML) INTRAVENOUS CONTINUOUS PRN
Status: DISCONTINUED | OUTPATIENT
Start: 2023-01-05 | End: 2023-01-05

## 2023-01-05 RX ADMIN — FENTANYL CITRATE 50 MCG: 50 INJECTION, SOLUTION INTRAMUSCULAR; INTRAVENOUS at 07:01

## 2023-01-05 RX ADMIN — PROPOFOL 100 MCG/KG/MIN: 10 INJECTION, EMULSION INTRAVENOUS at 07:01

## 2023-01-05 RX ADMIN — HEPARIN SODIUM 3000 UNITS: 1000 INJECTION, SOLUTION INTRAVENOUS; SUBCUTANEOUS at 07:01

## 2023-01-05 RX ADMIN — SODIUM CHLORIDE: 0.9 INJECTION, SOLUTION INTRAVENOUS at 07:01

## 2023-01-05 RX ADMIN — MIDAZOLAM HYDROCHLORIDE 2 MG: 1 INJECTION, SOLUTION INTRAMUSCULAR; INTRAVENOUS at 07:01

## 2023-01-05 RX ADMIN — CEFAZOLIN 2 G: 2 INJECTION, POWDER, FOR SOLUTION INTRAMUSCULAR; INTRAVENOUS at 07:01

## 2023-01-05 NOTE — PROGRESS NOTES
Pt drove self to hospital this AM. Pt has given this nurse two numbers for ride pickup through [employer] (SEE PRE-OP CHECKLIST).  Also received approval from BOTH Anesthesia MD Adams and Surgery MD Pickard that it is OK for pt to go home in uber WITH spouse. Spouse does not drive and is at work. Spouse made aware and would prefer [employer] to be called first.

## 2023-01-05 NOTE — OP NOTE
Operative Report    Date of Operation: 1/5/22    Pre-operative Diagnosis: ESRD    Post-operative Diagnosis: same    Attending Surgeon: ROHITH Pickard II, MD    Resident/Fellow:     Operation/Procedure Performed:  left radiocephalic arteriovenous fistula creation    Anesthesia: local/MAC    Indications:  56yo M with ESRD on HD via TDC in need of permanent HD access.    Procedure in Detail:  After informed consent was obtained the patient was taken to the OR in supine position. Pre-operative antibiotics were administered. Moderate sedation was administered by the anesthesia team. The left arm was prepped and draped in normal sterile fashion with chloraprep.  A time out was performed to confirm appropriate patient, site, positioning, and laterality. 1% lidocaine was injected in the subcutaneous tissues along the area of our planned incision. An longitudinal incision was made at the wrist. The incision was deepened with a combination of electrocautery, blunt dissection, and sharp dissection. The cephalic vein was identified on the lateral aspect of the incision and measured at least 4mm. It was encircled with a large vessel loop and dissected proximally and distally within the incision. Branches were ligated with 3-0 silk ties and small hand-applied clips. Next we dissected  medially within the incision and identified the radial artery. The artery was healthy and non-calcified and measured 3mm. The artery was dissected circumferentially and controlled proximally and distally with small vessel loops. 3000 units of heparin were then administered intravenously. The vein was marked with a marker to for orientation to ensure no twisting occurred. A small bulldog clamp was then applied to the proximal cephalic vein. The distal cephalic vein was then clamped and divided and its distal end suture ligated with 3-0 silk suture. The bulldog clamp was released from the vein and the vein was flushed with heparinized saline. The  vein flushed easily and distended well. The bulldog clamp was reapplied to the proximal vein and the distal end of the vein was spatulated. Clamps were applied distally and proximally to the radial artery and a 6mm longitudinal arteriotomy was made in the radial artery. The vein was sewn to the artery using a running 6-0 prolene suture and parachute technique. The artery was back bleed proximally and distally prior to completion of the anastamosis and had brisk flow with no thrombus visualized. The lumen was flushed with heparinized saline and the anastomosis was completed. The distal arterial clamp was released followed by the vein clamp and finally the proximal arterial clamp. No repair sutures were necessary. There was a palpable continuous thrill over the fistula. The radial artery had a palpable pulse distally. No protamine was administered. The wound was thoroughly irrigated with saline irrigation. The deep dermis was closed with interrupted vicryl sutures and the skin closed with interrupted vertical mattress 3-0 nylon sutures. The incisions were dressed with 4x4 gauze secured in place with burn netting. All sponge needle and instrument counts were correct.    The patient tolerated the procedure well and was transported to the post-op area for recovery.    EBL: 30ml    Complications: none    ROHITH Pickard II, MD, RPVI  Vascular Surgeon  [facility]

## 2023-01-05 NOTE — TRANSFER OF CARE
"Anesthesia Transfer of Care Note    Patient: Enrique Martinez    Procedure(s) Performed: Procedure(s) (LRB):  CREATION, AV FISTULA RADIOCEPHALIC (Left)    Patient location: [facility]    Anesthesia Type: MAC    Transport from OR: Transported from OR on room air with adequate spontaneous ventilation    Post pain: adequate analgesia    Post assessment: no apparent anesthetic complications    Post vital signs: stable    Level of consciousness: awake, alert and oriented    Nausea/Vomiting: no nausea/vomiting    Complications: none    Transfer of care protocol was followed      Last vitals:   Visit Vitals  BP (!) 147/77 (BP Location: Left arm, Patient Position: Lying)   Pulse 76   Temp 36.9 °C (98.4 °F) (Temporal)   Resp 18   Ht 5' 6" (1.676 m)   Wt 63.5 kg (140 lb)   SpO2 96%   BMI 22.60 kg/m²     "

## 2023-01-05 NOTE — BRIEF OP NOTE
Manfred Benjamin - Surgery ([facility])  Brief Operative Note    Surgery Date: 1/5/2023     Surgeon(s) and Role:     * ROHITH Pickard II, MD - Primary     * Diego Roberts MD - Fellow    Assisting Surgeon: None    Pre-op Diagnosis:  ESRD (end stage renal disease) [N18.6]    Post-op Diagnosis:  Post-Op Diagnosis Codes:     * ESRD (end stage renal disease) [N18.6]    Procedures:  Left radiocephalic AV fistula     Anesthesia: Local MAC    Operative Findings: Anastomosis approximately 8 cm proximal to wrist, radial artery 4mm in diameter, distal cephalic vein 6mm in diameter, 3000 units heparin, no protamine, strong thrill, 1+ radial pulse at end of case.      Estimated Blood Loss: * No values recorded between 1/5/2023  7:30 AM and 1/5/2023  8:41 AM *         Specimens:   Specimen (24h ago, onward)      None              Discharge Note    OUTCOME: Patient tolerated treatment/procedure well without complication and is now ready for discharge.    DISPOSITION: Home or Self Care    FINAL DIAGNOSIS:  ESRD    FOLLOWUP: In clinic    DISCHARGE INSTRUCTIONS:    VASCULAR SURGERY DISCHARGE INSTRUCTIONS    Woundcare:  - Take your incision dressing off 2 days after your surgery and gently rinse your incision with soap and water daily. Pad the incision dry afterward  - If you have a dialysis catheter in place, keep your catheter dressing clean and dry  - If you do not have a catheter, take a full shower daily beginning 2 days after the surgery. Allow soap and water to run over your incision. Pad the incision dry afterward  - When resting or sleeping, try to keep your arm elevated to shoulder level on pillows to reduce swelling  - If you notice clear drainage from your incision, you can apply dry gauze daily and secure in place with tape or gentle elastic wrap    Activity:  - Avoid prolonged exertion of the affected arm  - Avoid keeping your arm down below your chest for prolonged periods of time (this could lead to increased  swelling)  - No heavy lifting with the affected arm  - Sleep with your arm elevated on pillows at night to reduce swelling  -- No swimming in pools, [employer], [employer] etc. for 6 weeks after your surgery    Diet:  -Resume your pre-operative home diet    Follow up:  -Refer to follow up instructions     Call Vascular Surgery Office if you experience:  -Increased redness, warmth, tenderness, or draining pus at your incision(s)  -Worsening fevers, chills, nausea/vomiting  -Pain, weakness, coldness, or numbness in your hand  -Uncontrolled pain  -Your call will be returned within 24 hours and further instructions will be provided    Go to ER/Urgent Care if you experience:  -Worsening shortness of breath or chest pain

## 2023-01-05 NOTE — INTERVAL H&P NOTE
The patient has been examined and the H&P has been reviewed:    I concur with the findings and no changes have occurred since H&P was written.    Surgery risks, benefits and alternative options discussed and understood by patient/family.        Patient Active Problem List   Diagnosis    Pre-op exam    Renovascular hypertension    Pure hypercholesterolemia    Membranous glomerulonephritis    Anemia due to chronic kidney disease, on chronic dialysis    Hx of gout    Hydrocele in adult    Type 2 diabetes mellitus with chronic kidney disease on chronic dialysis, with long-term current use of insulin    S/P coronary artery stent placement    Proteinuria    Leukocytosis    Hx of syphilis    Epididymal cyst    Atherosclerosis of native coronary artery of native heart without angina pectoris    Patient on waiting list for kidney transplant    Gastroesophageal reflux disease without esophagitis    Benign prostatic hyperplasia    Bilateral inguinal hernia without obstruction or gangrene    Hematuria, microscopic    Hyperuricemia    SHANNAN (iron deficiency anemia)    Umbilical hernia without obstruction and without gangrene    Vitamin D deficiency    Subacute osteomyelitis of left foot    Folic acid deficiency    Chronic gout of left foot due to renal impairment without tophus    Neck pain    Prevertebral Fluid Collection    Diarrhea    Encephalopathy, metabolic    Bilious vomiting with nausea    Debility    Severe malnutrition    Cellulitis of left lower extremity    Acute hypoxemic respiratory failure    Dependence on renal dialysis    ED (erectile dysfunction) of organic origin    History of neurosyphilis    Salmonella bacteremia    ESRD (end stage renal disease)    Hypertensive emergency    Chronic kidney disease-mineral and bone disorder

## 2023-01-05 NOTE — ANESTHESIA PREPROCEDURE EVALUATION
01/05/2023  Enrique Martinez is a 57 y.o., male.      Pre-op Assessment          Review of Systems  Anesthesia Hx:  No problems with previous Anesthesia    Cardiovascular:   Hypertension Denies CAD.     Functional Capacity Can you climb two flights of stairs? ==> Yes    Pulmonary:   Denies Asthma.  Denies Sleep Apnea.    Renal/:   Chronic Renal Disease, ESRD, Dialysis    Hepatic/GI:   Denies PUD. GERD Denies Liver Disease.    Neurological:   Denies CVA. Denies Seizures.    Endocrine:   Denies Diabetes. Denies Hypothyroidism.        Physical Exam  General: Alert    Airway:  Mallampati: I   Mouth Opening: Normal  TM Distance: Normal  Tongue: Normal  Neck ROM: Normal ROM    Dental:  Dentures        Anesthesia Plan  Type of Anesthesia, risks & benefits discussed:    Anesthesia Type: Gen Natural Airway, MAC  Intra-op Monitoring Plan: Standard ASA Monitors  Post Op Pain Control Plan: multimodal analgesia and IV/PO Opioids PRN  Induction:  IV  Airway Plan: Direct  Informed Consent: Informed consent signed with the Patient and all parties understand the risks and agree with anesthesia plan.  All questions answered.   ASA Score: 4    Ready For Surgery From Anesthesia Perspective.     .

## 2023-01-05 NOTE — PLAN OF CARE
Patient discharged to home with all belongings. Alert and oriented. Pain controlled Vital signs stable.

## 2023-01-05 NOTE — PATIENT INSTRUCTIONS
VASCULAR SURGERY DISCHARGE INSTRUCTIONS    Woundcare:  - Take your incision dressing off 2 days after your surgery and gently rinse your incision with soap and water daily. Pad the incision dry afterward  - If you have a dialysis catheter in place, keep your catheter dressing clean and dry  - If you do not have a catheter, take a full shower daily beginning 2 days after the surgery. Allow soap and water to run over your incision. Pad the incision dry afterward  - When resting or sleeping, try to keep your arm elevated to shoulder level on pillows to reduce swelling  - If you notice clear drainage from your incision, you can apply dry gauze daily and secure in place with tape or gentle elastic wrap    Activity:  - Avoid prolonged exertion of the affected arm  - Avoid keeping your arm down below your chest for prolonged periods of time (this could lead to increased swelling)  - No heavy lifting with the affected arm  - Sleep with your arm elevated on pillows at night to reduce swelling  -- No swimming in pools, [employer], [employer] etc. for 6 weeks after your surgery    Diet:  -Resume your pre-operative home diet    Follow up:  -Refer to follow up instructions     Call Vascular Surgery Office if you experience:  -Increased redness, warmth, tenderness, or draining pus at your incision(s)  -Worsening fevers, chills, nausea/vomiting  -Pain, weakness, coldness, or numbness in your hand  -Uncontrolled pain  -Your call will be returned within 24 hours and further instructions will be provided    Go to ER/Urgent Care if you experience:  -Worsening shortness of breath or chest pain

## 2023-01-06 NOTE — ANESTHESIA POSTPROCEDURE EVALUATION
Anesthesia Post Evaluation    Patient: Enrique Martinez    Procedure(s) Performed: Procedure(s) (LRB):  CREATION, AV FISTULA RADIOCEPHALIC (Left)    Final Anesthesia Type: MAC      Patient location during evaluation: PACU  Patient participation: Yes- Able to Participate  Level of consciousness: awake and alert  Post-procedure vital signs: reviewed and stable  Pain management: adequate  Airway patency: patent    PONV status at discharge: No PONV  Anesthetic complications: no      Cardiovascular status: blood pressure returned to baseline and stable  Respiratory status: unassisted and nasal cannula  Hydration status: euvolemic  Follow-up not needed.          Vitals Value Taken Time   /68 01/05/23 1002   Temp 36.7 °C (98.1 °F) 01/05/23 0845   Pulse 69 01/05/23 1009   Resp 29 01/05/23 1005   SpO2 94 % 01/05/23 1009   Vitals shown include unvalidated device data.      No case tracking events are documented in the log.      Pain/Francesco Score: Francesco Score: 9 (1/5/2023  9:15 AM)

## 2023-01-09 PROBLEM — J96.01 ACUTE HYPOXEMIC RESPIRATORY FAILURE: Status: RESOLVED | Noted: 2022-10-03 | Resolved: 2023-01-09

## 2023-01-20 ENCOUNTER — OFFICE VISIT (OUTPATIENT)
Dept: VASCULAR SURGERY | Facility: CLINIC | Age: 58
End: 2023-01-20
Attending: SURGERY
Payer: MEDICAID

## 2023-01-20 VITALS
DIASTOLIC BLOOD PRESSURE: 85 MMHG | HEART RATE: 78 BPM | TEMPERATURE: 98 F | HEIGHT: 66 IN | SYSTOLIC BLOOD PRESSURE: 161 MMHG | BODY MASS INDEX: 24.1 KG/M2 | WEIGHT: 149.94 LBS

## 2023-01-20 DIAGNOSIS — N18.6 ESRD (END STAGE RENAL DISEASE): ICD-10-CM

## 2023-01-20 DIAGNOSIS — Z99.2 ARTERIOVENOUS FISTULA FOR HEMODIALYSIS IN PLACE, PRIMARY: Primary | ICD-10-CM

## 2023-01-20 PROCEDURE — 3079F DIAST BP 80-89 MM HG: CPT | Mod: CPTII,,, | Performed by: SURGERY

## 2023-01-20 PROCEDURE — 99999 PR PBB SHADOW E&M-EST. PATIENT-LVL IV: CPT | Mod: PBBFAC,,, | Performed by: SURGERY

## 2023-01-20 PROCEDURE — 3077F SYST BP >= 140 MM HG: CPT | Mod: CPTII,,, | Performed by: SURGERY

## 2023-01-20 PROCEDURE — 3077F PR MOST RECENT SYSTOLIC BLOOD PRESSURE >= 140 MM HG: ICD-10-PCS | Mod: CPTII,,, | Performed by: SURGERY

## 2023-01-20 PROCEDURE — 3008F BODY MASS INDEX DOCD: CPT | Mod: CPTII,,, | Performed by: SURGERY

## 2023-01-20 PROCEDURE — 3008F PR BODY MASS INDEX (BMI) DOCUMENTED: ICD-10-PCS | Mod: CPTII,,, | Performed by: SURGERY

## 2023-01-20 PROCEDURE — 99214 OFFICE O/P EST MOD 30 MIN: CPT | Mod: PBBFAC | Performed by: SURGERY

## 2023-01-20 PROCEDURE — 99024 PR POST-OP FOLLOW-UP VISIT: ICD-10-PCS | Mod: ,,, | Performed by: SURGERY

## 2023-01-20 PROCEDURE — 99999 PR PBB SHADOW E&M-EST. PATIENT-LVL IV: ICD-10-PCS | Mod: PBBFAC,,, | Performed by: SURGERY

## 2023-01-20 PROCEDURE — 3079F PR MOST RECENT DIASTOLIC BLOOD PRESSURE 80-89 MM HG: ICD-10-PCS | Mod: CPTII,,, | Performed by: SURGERY

## 2023-01-20 PROCEDURE — 99024 POSTOP FOLLOW-UP VISIT: CPT | Mod: ,,, | Performed by: SURGERY

## 2023-01-28 ENCOUNTER — HOSPITAL ENCOUNTER (EMERGENCY)
Facility: HOSPITAL | Age: 58
Discharge: HOME | End: 2023-01-28
Attending: EMERGENCY MEDICINE
Payer: MEDICAID

## 2023-01-28 VITALS
HEIGHT: 66 IN | HEART RATE: 89 BPM | TEMPERATURE: 99 F | BODY MASS INDEX: 24.11 KG/M2 | SYSTOLIC BLOOD PRESSURE: 122 MMHG | DIASTOLIC BLOOD PRESSURE: 84 MMHG | WEIGHT: 150 LBS | OXYGEN SATURATION: 98 % | RESPIRATION RATE: 16 BRPM

## 2023-01-28 DIAGNOSIS — R22.0 FACIAL SWELLING: Primary | ICD-10-CM

## 2023-01-28 PROCEDURE — 10060 PR DRAIN SKIN ABSCESS SIMPLE: ICD-10-PCS | Mod: ,,, | Performed by: EMERGENCY MEDICINE

## 2023-01-28 PROCEDURE — 99283 PR EMERGENCY DEPT VISIT,LEVEL III: ICD-10-PCS | Mod: 25,,, | Performed by: EMERGENCY MEDICINE

## 2023-01-28 PROCEDURE — 10060 I&D ABSCESS SIMPLE/SINGLE: CPT

## 2023-01-28 PROCEDURE — 99283 EMERGENCY DEPT VISIT LOW MDM: CPT | Mod: 25,,, | Performed by: EMERGENCY MEDICINE

## 2023-01-28 PROCEDURE — 99283 EMERGENCY DEPT VISIT LOW MDM: CPT | Mod: 25

## 2023-01-28 PROCEDURE — 25000003 PHARM REV CODE 250: Performed by: STUDENT IN AN ORGANIZED HEALTH CARE EDUCATION/TRAINING PROGRAM

## 2023-01-28 PROCEDURE — 10060 I&D ABSCESS SIMPLE/SINGLE: CPT | Mod: ,,, | Performed by: EMERGENCY MEDICINE

## 2023-01-28 RX ORDER — ACETAMINOPHEN 500 MG
1000 TABLET ORAL
Status: COMPLETED | OUTPATIENT
Start: 2023-01-28 | End: 2023-01-28

## 2023-01-28 RX ADMIN — ACETAMINOPHEN 1000 MG: 500 TABLET ORAL at 06:01

## 2023-01-28 NOTE — ED TRIAGE NOTES
Enrique Martinez, a 57 y.o. male presents to the ED w/ complaint of bump to L eyebrow bone. Pt reports he tried to pop it but nothing came out and presents here because it hurts a lot. No redness or s/s infection noted.    Triage note:  Chief Complaint   Patient presents with    Mass     Small bump to L eyebrow x couple days, tried to pop it but no drainage noted       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

## 2023-01-28 NOTE — ED PROVIDER NOTES
Encounter Date: 1/28/2023       History     Chief Complaint   Patient presents with    Mass     Small bump to L eyebrow x couple days, tried to pop it but no drainage noted       Patient is a 57-year-old male with a past medical history of hypertension, hyperlipidemia, CAD s/p stent placement, and ESRD on dialysis presenting to the ED for facial swelling that began 2 days ago.  He reports he had a small, painful pimple like growth over his left eyebrow that he initially noticed 2 days ago, and has progressively enlarged with swelling.  He denies any fevers/chills or drainage from the area--even with intent to drain it. No vision changes.    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      Past Surgical History:   Procedure Laterality Date    ABDOMINAL SURGERY  1980s    AV FISTULA PLACEMENT Left 1/5/2023    Procedure: CREATION, AV FISTULA RADIOCEPHALIC;  Surgeon: ROHITH Pickard II, MD;  Location: [facility] OR 2ND FLR;  Service: Vascular;  Laterality: Left;    CARDIAC CATHETERIZATION  2007    x 2    COLONOSCOPY N/A 4/5/2019    Procedure: COLONOSCOPY;  Surgeon: Jose L Carty MD;  Location: [facility] (4TH FLR);  Service: Colon and Rectal;  Laterality: N/A;    CORONARY STENT PLACEMENT  2007     Family History   Problem Relation Age of Onset    Hypertension Father     Stroke Father     Heart attack Father     Hyperlipidemia Father     No Known Problems Sister     Drug abuse  Brother     Heart attack Brother     Hypertension Brother      Social History     Tobacco Use    Smoking status: Some Days     Packs/day: 0.25     Years: 10.00     Pack years: 2.50     Types: Cigarettes    Smokeless tobacco: Never   Substance Use Topics    Alcohol use: No    Drug use: No     Review of Systems   Constitutional:  Negative for activity change, chills and fever.   HENT:  Positive for facial swelling. Negative for congestion, ear pain and sore throat.    Eyes:  Negative for visual disturbance.   Respiratory:  Negative for shortness of breath and stridor.    Cardiovascular:  Negative for chest pain and palpitations.   Gastrointestinal:  Negative for abdominal pain, nausea and vomiting.   Genitourinary:  Negative for dysuria and urgency.   Musculoskeletal:  Negative for back pain.   Skin:  Negative for rash.   Neurological:  Negative for dizziness, syncope, weakness and headaches.   Hematological:  Does not bruise/bleed easily.     Physical Exam     Initial Vitals   BP Pulse Resp Temp SpO2   01/28/23 0511 01/28/23 0511 01/28/23 0511 01/28/23 0513 01/28/23 0511   133/75 85 18 98 °F (36.7 °C) 97 %      MAP       --                Physical Exam    Nursing note and vitals reviewed.  Constitutional: He appears well-developed and well-nourished. He is not diaphoretic. No distress.   Well-appearing.  Speaking full sentences.  No acute distress.   HENT:   Head: Normocephalic and atraumatic.       Right Ear: External ear normal.   Left Ear: External ear normal.   Neck: Neck supple.   Cardiovascular:  Normal rate, regular rhythm, normal heart sounds and intact distal pulses.           Pulmonary/Chest: Breath sounds normal. No respiratory distress. He has no wheezes. He has no rhonchi. He has no rales.   Abdominal: Abdomen is soft. He exhibits no distension. There is no abdominal tenderness. There is no rebound and no guarding.   Musculoskeletal:      Cervical back: Neck supple.     Neurological: He is alert and  oriented to person, place, and time. GCS score is 15. GCS eye subscore is 4. GCS verbal subscore is 5. GCS motor subscore is 6.   Skin: Skin is warm. Capillary refill takes less than 2 seconds. No rash noted.   Psychiatric: He has a normal mood and affect.       ED Course   I & D - Incision and Drainage    Date/Time: 1/28/2023 7:49 AM  Location procedure was performed: [facility] EMERGENCY DEPARTMENT  Performed by: Anthony Sotelo MD  Authorized by: Abigail Harrison MD   Consent Done: Yes  Consent: Verbal consent obtained.  Consent given by: patient  Type: cyst  Body area: head/neck  Location details: scalp  Anesthesia: local infiltration    Anesthesia:  Local Anesthetic: lidocaine 1% without epinephrine  Anesthetic total: 2 mL  Scalpel size: Needle aspiration.  Incision type: Needle aspiration.  Drainage characteristics: No drainage.  Drainage amount: No drainage.  Wound treatment: no return  Packing material: none  Patient tolerance: Patient tolerated the procedure well with no immediate complications      Labs Reviewed - No data to display       Imaging Results    None          Medications   acetaminophen tablet 1,000 mg (1,000 mg Oral Given 1/28/23 0636)     Medical Decision Making:   History:   I obtained history from: someone other than patient.  Initial Assessment:   Emergent evaluation of focal facial swelling.  He is afebrile and hemodynamically stable.  Differential Diagnosis:   Hematoma, cyst, unlikely abscess  ED Management:  Bedside US showed anechoic area over site of facial swelling. There was no return on attempted needle aspiration. Encouraged continued supportive care and management.  Discussed strict ED return precautions, and he expressed understanding.                        Clinical Impression:   Final diagnoses:  [R22.0] Facial swelling (Primary)        ED Disposition Condition    Discharge Stable          ED Prescriptions    None       Follow-up Information    None          Anthony Sotelo,  MD  Resident  01/28/23 0754

## 2023-01-28 NOTE — DISCHARGE INSTRUCTIONS
Please return to the ER if there is worsening of your facial swelling especially if you have associated fevers (temperature of 100.4° and over).  Place ice over the area as needed/for comfort.

## 2023-01-29 ENCOUNTER — HOSPITAL ENCOUNTER (EMERGENCY)
Facility: HOSPITAL | Age: 58
Discharge: HOME | End: 2023-01-30
Attending: EMERGENCY MEDICINE
Payer: MEDICAID

## 2023-01-29 DIAGNOSIS — L72.3 SEBACEOUS CYST: ICD-10-CM

## 2023-01-29 DIAGNOSIS — L03.211 FACIAL CELLULITIS: Primary | ICD-10-CM

## 2023-01-29 PROCEDURE — 10060 PR DRAIN SKIN ABSCESS SIMPLE: ICD-10-PCS | Mod: 54,,, | Performed by: EMERGENCY MEDICINE

## 2023-01-29 PROCEDURE — 10060 I&D ABSCESS SIMPLE/SINGLE: CPT | Mod: 54,,, | Performed by: EMERGENCY MEDICINE

## 2023-01-29 PROCEDURE — 10160 PNXR ASPIR ABSC HMTMA BULLA: CPT

## 2023-01-29 PROCEDURE — 99284 EMERGENCY DEPT VISIT MOD MDM: CPT | Mod: 25,,, | Performed by: EMERGENCY MEDICINE

## 2023-01-29 PROCEDURE — 99283 EMERGENCY DEPT VISIT LOW MDM: CPT | Mod: 25

## 2023-01-29 PROCEDURE — 99284 PR EMERGENCY DEPT VISIT,LEVEL IV: ICD-10-PCS | Mod: 25,,, | Performed by: EMERGENCY MEDICINE

## 2023-01-30 VITALS
HEART RATE: 78 BPM | DIASTOLIC BLOOD PRESSURE: 85 MMHG | TEMPERATURE: 98 F | RESPIRATION RATE: 16 BRPM | OXYGEN SATURATION: 98 % | SYSTOLIC BLOOD PRESSURE: 180 MMHG

## 2023-01-30 RX ORDER — CEPHALEXIN 500 MG/1
500 CAPSULE ORAL 4 TIMES DAILY
Qty: 40 CAPSULE | Refills: 0 | Status: SHIPPED | OUTPATIENT
Start: 2023-01-30 | End: 2023-02-09

## 2023-01-30 NOTE — DISCHARGE INSTRUCTIONS
Take your antibiotics as prescribed. Take tylenol for pain. Use warm compresses (wet washcloth) at least 3 times per day.

## 2023-01-30 NOTE — ED PROVIDER NOTES
History:  Enrique Martinez is a 57 y.o. male who presents to the ED with Facial Swelling (Left eye swelling. Seen yesterday for similar)    Described as 56yo M again returning with swelling about his L eye, now worsening with continued pain. Patient reports he had a pimple a few days ago, tried to pop it but it became more swollen. In the ER, need aspiration was attempted without any purulent drainage. Encouraged warm compresses at home upon discharge. He denies any vision changes, fevers, or chills.     Review of Systems: All systems reviewed and are negative except as per history of present illness.    Medications:   Discharge Medication List as of 1/30/2023  1:16 AM        CONTINUE these medications which have NOT CHANGED    Details   albuterol (PROVENTIL/VENTOLIN HFA) 90 mcg/actuation inhaler Inhale 2 puffs into the lungs every 6 (six) hours as needed for Wheezing or Shortness of Breath. Rescue, Historical Med      aspirin (ECOTRIN) 81 MG EC tablet Take 1 tablet (81 mg total) by mouth once daily., Starting Mon 2/22/2021, Normal      blood-glucose meter kit Use as instructed, Historical Med      cloNIDine 0.3 mg/24 hr td ptwk (CATAPRES) 0.3 mg/24 hr Place 1 patch onto the skin every 7 days., Starting Tue 10/11/2022, Until Mon 1/9/2023, Normal      colchicine (COLCRYS) 0.6 mg tablet Take 0.6 mg by mouth daily as needed., Starting Wed 11/3/2021, Historical Med      famotidine (PEPCID) 20 MG tablet Take 20 mg by mouth daily as needed for Heartburn. , Starting Wed 4/22/2020, Historical Med      furosemide (LASIX) 40 MG tablet Take 1 tablet (40 mg total) by mouth 2 (two) times daily., Starting Thu 10/6/2022, Until Wed 1/4/2023, Normal      gabapentin (NEURONTIN) 100 MG capsule Take 1 capsule (100 mg total) by mouth once daily., Starting Fri 3/11/2022, Until Sat 3/11/2023, No Print      guaiFENesin 100 mg/5 ml (ROBITUSSIN) 100 mg/5 mL syrup Take 10 mLs (200 mg total) by mouth 3 (three) times daily as needed for Cough.,  Starting Sun 11/6/2022, Print      hydrALAZINE (APRESOLINE) 100 MG tablet Take 1 tablet (100 mg total) by mouth every 8 (eight) hours., Starting Thu 10/6/2022, Until Wed 1/4/2023, Normal      HYDROcodone-acetaminophen (NORCO) 5-325 mg per tablet Take 1 tablet by mouth every 6 (six) hours as needed for Pain., Starting Thu 1/5/2023, Normal      labetaloL (NORMODYNE) 300 MG tablet Take 1 tablet (300 mg total) by mouth 2 (two) times daily., Starting Mon 11/14/2022, Until Tue 11/14/2023, Normal      LIDOcaine-prilocaine (EMLA) cream Apply topically 2 (two) times daily as needed., Starting Fri 9/9/2022, Historical Med      losartan (COZAAR) 100 MG tablet Take 1 tablet (100 mg total) by mouth once daily., Starting Mon 11/14/2022, Until Tue 11/14/2023, Normal      melatonin (MELATIN) 3 mg tablet Take 2 tablets (6 mg total) by mouth nightly as needed for Insomnia., Starting Thu 3/10/2022, No Print      NIFEdipine (PROCARDIA-XL) 90 MG (OSM) 24 hr tablet Take 1 tablet (90 mg total) by mouth 2 (two) times a day., Starting Fri 10/7/2022, Until Thu 1/5/2023, Normal      omeprazole (PRILOSEC) 20 MG capsule Take 20 mg by mouth once daily., Starting Wed 12/29/2021, Historical Med      ondansetron (ZOFRAN-ODT) 8 MG TbDL Take 1 tablet (8 mg total) by mouth every 8 (eight) hours as needed., Starting Thu 3/10/2022, No Print      tadalafiL (CIALIS) 2.5 mg Tab Take 2.5 mg by mouth., Starting Wed 2/3/2021, Historical Med      TRUE METRIX GLUCOSE TEST STRIP Strp Starting Sun 12/27/2020, Historical Med             PMH:   Past Medical History:   Diagnosis Date    Acute renal failure superimposed on stage 5 chronic kidney disease, not on chronic dialysis 2/17/2022    Anemia     Coronary artery disease     Diabetes mellitus, type 2     GERD (gastroesophageal reflux disease)     Gout     Hydrocele in adult     bilateral    Hyperlipidemia     Hypertension     Inguinal hernia     bilateral    Membranous glomerulonephritis     Nephrotic range  proteinuria     Nephrotic syndrome     Obesity     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 OR 2ND FLR;  Service: Vascular;  Laterality: Left;    CARDIAC CATHETERIZATION  2007    x 2    COLONOSCOPY N/A 4/5/2019    Procedure: COLONOSCOPY;  Surgeon: Jose L Carty MD;  Location: [facility] ENDO (4TH FLR);  Service: Colon and Rectal;  Laterality: N/A;    CORONARY STENT PLACEMENT  2007     Allergies: He has No Known Allergies.  Social History: Marital Status: . He  reports that he has been smoking. He has a 2.50 pack-year smoking history. He has never used smokeless tobacco.. He  reports no history of alcohol use..       Exam:  VITAL SIGNS:   Vitals:    01/29/23 2342 01/30/23 0020   BP: (!) 200/90 (!) 180/85   BP Location: Right arm    Patient Position: Sitting    Pulse: 84 78   Resp: 16    Temp: 98.2 °F (36.8 °C)    TempSrc: Oral    SpO2: 98%      Const: Awake and alert, NAD   Head: Atraumatic  Eyes: Normal Conjunctiva, EOMI. Slight swelling and erythema about L eyebrow now tracking down to upper eyelid. +induration. No fluctuance. Small central pustule.   ENT: Normal External Ears, Nose and Mouth.  Neck: Full range of motion. No meningismus.  Resp: Normal respiratory effort, No distress  Cardio: Equal and intact distal pulses, AV fistula with palpable thrill LUE  Skin: Skin changes as above.  Ext: No cyanosis, or edema  Neur: Awake and alert  Psych: Normal Mood and Affect    I & D - Incision and Drainage    Date/Time: 2/4/2023 3:32 PM  Location procedure was performed: [facility] EMERGENCY DEPARTMENT  Performed by: Abigail Harrison MD  Authorized by: Abigail Harrison MD   Type: abscess  Body area: head/neck  Location details: face  Incision type: pustule unroofed with  18g needle.  Complexity: simple  Drainage: pus  Drainage amount: scant  Wound treatment: expression of material and wound left open  Complications: No  Specimens: No  Implants: No  Patient tolerance: Patient tolerated the procedure well with no immediate complications       Medical Decision Makin-year-old male again returning with swelling about his left eyebrow.  He continues to have small pustule and now has surrounding erythema and continued soft tissue swelling.  Will treat for mild cellulitis.  Doubt orbital cellulitis or periorbital cellulitis at this time.  There is no fluctuance, though this continues to be indurated.  Considered possible sebaceous cyst - referred to Dermatology.  Pustule deroofed and purulence drained.  He was encouraged to continue warm compresses at home and complete a course of antibiotics and return to the ER any new worsening symptoms.  Blood pressures elevated likely due the patient's history of ESRD, doubt hypertensive urgency or emergency.    Though the patient's latest blood pressure was elevated (>120/80), the patient has a known history of hypertension and urged to pursue adjustment of their medical therapy within a week with their primary care physician. Please refer to the medication reconciliation form for the current list of hypertensive medications.     Clinical Impression:  1. Facial cellulitis    2. Sebaceous cyst             Medication List        START taking these medications      cephALEXin 500 MG capsule  Commonly known as: KEFLEX  Take 1 capsule (500 mg total) by mouth 4 (four) times daily. for 10 days            ASK your doctor about these medications      albuterol 90 mcg/actuation inhaler  Commonly known as: PROVENTIL/VENTOLIN HFA     aspirin 81 MG EC tablet  Commonly known as: ECOTRIN  Take 1 tablet (81 mg total) by mouth once daily.     blood-glucose meter kit     cloNIDine 0.3 mg/24 hr td ptwk 0.3 mg/24 hr  Commonly known as: CATAPRES  Place 1 patch onto  the skin every 7 days.     colchicine 0.6 mg tablet  Commonly known as: COLCRYS     famotidine 20 MG tablet  Commonly known as: PEPCID     furosemide 40 MG tablet  Commonly known as: LASIX  Take 1 tablet (40 mg total) by mouth 2 (two) times daily.     gabapentin 100 MG capsule  Commonly known as: NEURONTIN  Take 1 capsule (100 mg total) by mouth once daily.     guaiFENesin 100 mg/5 ml 100 mg/5 mL syrup  Commonly known as: ROBITUSSIN  Take 10 mLs (200 mg total) by mouth 3 (three) times daily as needed for Cough.     hydrALAZINE 100 MG tablet  Commonly known as: APRESOLINE  Take 1 tablet (100 mg total) by mouth every 8 (eight) hours.     HYDROcodone-acetaminophen 5-325 mg per tablet  Commonly known as: NORCO  Take 1 tablet by mouth every 6 (six) hours as needed for Pain.     labetaloL 300 MG tablet  Commonly known as: NORMODYNE  Take 1 tablet (300 mg total) by mouth 2 (two) times daily.     LIDOcaine-prilocaine cream  Commonly known as: EMLA     losartan 100 MG tablet  Commonly known as: COZAAR  Take 1 tablet (100 mg total) by mouth once daily.     melatonin 3 mg tablet  Commonly known as: MELATIN  Take 2 tablets (6 mg total) by mouth nightly as needed for Insomnia.     NIFEdipine 90 MG (OSM) 24 hr tablet  Commonly known as: PROCARDIA-XL  Take 1 tablet (90 mg total) by mouth 2 (two) times a day.     omeprazole 20 MG capsule  Commonly known as: PRILOSEC     ondansetron 8 MG Tbdl  Commonly known as: ZOFRAN-ODT  Take 1 tablet (8 mg total) by mouth every 8 (eight) hours as needed.     tadalafiL 2.5 mg Tab  Commonly known as: CIALIS     TRUE METRIX GLUCOSE TEST STRIP Strp  Generic drug: blood sugar diagnostic               Where to Get Your Medications        These medications were sent to [id number] - [address] AT [facility] [address] [id number]      Phone: [phone]   cephALEXin 500 MG capsule         Follow-up Information       [facility]  Dermatology 11th Fl.    Specialty: Dermatology  Why: See a dermatologist. You have been referred to [facility] dermatology, but you may see dermatology at [facility] if you wish.  Contact information:  [address]  [facility] [address] [phone]  Additional information:  Dermatology - Main Building, Clinic 11th Floor   Please park in [facility]. Use Clinic elevators 12 & 13 to get to the 11th floor                             Abigail Harrison MD  02/04/23 0464

## 2023-01-30 NOTE — ED TRIAGE NOTES
Pt reports he is having increased pain to his left eye abscess. Denies drainage or fevers.   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        Past Surgical History:   Procedure Laterality Date    ABDOMINAL SURGERY  1980s    AV FISTULA PLACEMENT Left 1/5/2023    Procedure: CREATION, AV FISTULA RADIOCEPHALIC;  Surgeon: ROHITH Pickard II, MD;  Location: [facility] OR 2ND FLR;  Service: Vascular;  Laterality: Left;    CARDIAC CATHETERIZATION  2007    x 2    COLONOSCOPY N/A 4/5/2019    Procedure: COLONOSCOPY;  Surgeon: Jose L Carty MD;  Location: [facility] ENDO (4TH FLR);  Service: Colon and Rectal;  Laterality: N/A;    CORONARY STENT PLACEMENT  2007       Family History   Problem Relation Age of Onset    Hypertension Father     Stroke Father     Heart attack Father     Hyperlipidemia Father     No Known Problems Sister     Drug abuse Brother     Heart attack Brother     Hypertension Brother        Social History     Socioeconomic History    Marital status:    Tobacco Use    Smoking status: Some Days     Packs/day: 0.25     Years: 10.00     Pack years: 2.50     Types: Cigarettes    Smokeless tobacco: Never   Substance and Sexual Activity    Alcohol use: No    Drug use: No       No current facility-administered medications for this encounter.     Current Outpatient Medications   Medication Sig Dispense Refill    albuterol (PROVENTIL/VENTOLIN HFA) 90 mcg/actuation inhaler Inhale 2 puffs into the lungs every 6 (six) hours as needed for  Wheezing or Shortness of Breath. Rescue      aspirin (ECOTRIN) 81 MG EC tablet Take 1 tablet (81 mg total) by mouth once daily. 360 tablet 0    blood-glucose meter kit Use as instructed      cloNIDine 0.3 mg/24 hr td ptwk (CATAPRES) 0.3 mg/24 hr Place 1 patch onto the skin every 7 days. 4 patch 2    colchicine (COLCRYS) 0.6 mg tablet Take 0.6 mg by mouth daily as needed.      famotidine (PEPCID) 20 MG tablet Take 20 mg by mouth daily as needed for Heartburn.       furosemide (LASIX) 40 MG tablet Take 1 tablet (40 mg total) by mouth 2 (two) times daily. 60 tablet 2    gabapentin (NEURONTIN) 100 MG capsule Take 1 capsule (100 mg total) by mouth once daily. (Patient taking differently: Take 300 mg by mouth 3 (three) times daily.) 30 capsule 11    guaiFENesin 100 mg/5 ml (ROBITUSSIN) 100 mg/5 mL syrup Take 10 mLs (200 mg total) by mouth 3 (three) times daily as needed for Cough. 180 mL 0    hydrALAZINE (APRESOLINE) 100 MG tablet Take 1 tablet (100 mg total) by mouth every 8 (eight) hours. 90 tablet 2    HYDROcodone-acetaminophen (NORCO) 5-325 mg per tablet Take 1 tablet by mouth every 6 (six) hours as needed for Pain. 15 tablet 0    labetaloL (NORMODYNE) 300 MG tablet Take 1 tablet (300 mg total) by mouth 2 (two) times daily. 60 tablet 11    LIDOcaine-prilocaine (EMLA) cream Apply topically 2 (two) times daily as needed.      losartan (COZAAR) 100 MG tablet Take 1 tablet (100 mg total) by mouth once daily. 90 tablet 3    melatonin (MELATIN) 3 mg tablet Take 2 tablets (6 mg total) by mouth nightly as needed for Insomnia.  0    NIFEdipine (PROCARDIA-XL) 90 MG (OSM) 24 hr tablet Take 1 tablet (90 mg total) by mouth 2 (two) times a day. 60 tablet 2    omeprazole (PRILOSEC) 20 MG capsule Take 20 mg by mouth once daily.      ondansetron (ZOFRAN-ODT) 8 MG TbDL Take 1 tablet (8 mg total) by mouth every 8 (eight) hours as needed.      tadalafiL (CIALIS) 2.5 mg Tab Take 2.5 mg by mouth.      TRUE METRIX GLUCOSE TEST STRIP Strp           Review of patient's allergies indicates:  No Known Allergies

## 2023-01-31 ENCOUNTER — TELEPHONE (OUTPATIENT)
Dept: DERMATOLOGY | Facility: CLINIC | Age: 58
End: 2023-01-31
Payer: MEDICARE

## 2023-01-31 NOTE — PROGRESS NOTES
VASCULAR SURGERY NOTE    Patient ID: Enrique Martinez is a 57 y.o. male.    I. HISTORY     Chief Complaint: AV fistula    HPI:  Pt returns for post-op eval of LUE radiocephalic AVF at the wrist on 1/5/23. Pt is currently using right sided subclavian catheter for dialysis MWF. He denies any issues with his fistula since his surgery. Feels like it is healing well without issues.      Denies MI/stroke.   Denies tobacco use.   Denies alcohol use.   Denies recreational drug use.     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         Past Surgical History:   Procedure Laterality Date    ABDOMINAL SURGERY  1980s    AV FISTULA PLACEMENT Left 1/5/2023    Procedure: CREATION, AV FISTULA RADIOCEPHALIC;  Surgeon: ROHITH Pickard II, MD;  [facility];  Service: Vascular;  Laterality: Left;    CARDIAC CATHETERIZATION  2007    x 2    COLONOSCOPY N/A 4/5/2019    Procedure: COLONOSCOPY;  Surgeon: Jose L Carty MD;  Location: [facility] ENDO (4TH FLR);  Service: Colon and Rectal;  Laterality: N/A;    CORONARY STENT PLACEMENT  2007       Social History     Occupational History    Not on file   Tobacco Use    Smoking status: Some Days     Packs/day: 0.25     Years: 10.00     Pack years: 2.50     Types: Cigarettes    Smokeless tobacco: Never   Substance and Sexual Activity    Alcohol use: No    Drug use: No    Sexual activity: Not on file         Review of Systems   Constitutional: Negative  for weight loss.   HENT:  Negative for ear pain and nosebleeds.    Eyes:  Negative for discharge and pain.   Cardiovascular:  Negative for chest pain and palpitations.   Respiratory:  Negative for cough, shortness of breath and wheezing.    Endocrine: Negative for cold intolerance, heat intolerance and polyphagia.   Hematologic/Lymphatic: Negative for adenopathy. Does not bruise/bleed easily.   Skin:  Negative for itching and rash.   Musculoskeletal:  Negative for joint swelling and muscle cramps.   Gastrointestinal:  Negative for abdominal pain, diarrhea, nausea and vomiting.   Genitourinary:  Negative for dysuria and flank pain.   Neurological:  Negative for numbness and seizures.       II. PHYSICAL EXAM     Physical Exam  Continuous thrill over LUE AVF  Incision CDI with sutures in place, small scab in mid incision    III. ASSESSMENT & PLAN (MEDICAL DECISION MAKING)       Imaging Results: (I have personally reviewed all images and provided interpretation below)  Renal ultrasound 11/04/2022:  PSV elevation to 490 in the right renal artery with RAR greater than 3.5.  RI is greater than 0.8 suggesting intrinsic renal disease.      Upper Extremity Vein Mapping 12/9/22:  LEFT:  Cephalic vein is greater than 3 mm all the way down to wrist  RIGHT:  cephalic vein > 3mm down to distal forearm      Assessment/Diagnosis and Plan:    Encounter Diagnoses   Name Primary?    Arteriovenous fistula for hemodialysis in place, primary Yes    ESRD (end stage renal disease)        57 y.o. male with ESRD on HD s/p radiocephalic AVF creation at the wrist doing well. AVF is patent.     -Will have him return for ultrasound of the AVF in 2 weeks.    ROHITH Pickard II, MD, [facility]  Vascular Surgery  [facility]

## 2023-01-31 NOTE — TELEPHONE ENCOUNTER
Spoke with patient and explained to patient that we do not have any medicaid slot available at the moment and offered [employer] phone number, patient stated that he already has their number.       ----- Message from Zully West CMA sent at 1/31/2023  8:20 AM CST -----  Regarding: Urgent same day  Contact: [phone]  Pt states he was in the ER on 1/29/23 and they tried to drain cyst over left eye he states its painful and it started out small and grew fast, pt states the ER staff was unable to drain. Pt states he is sleeping sitting up. Please call.      Thank you

## 2023-02-03 ENCOUNTER — HOSPITAL ENCOUNTER (OUTPATIENT)
Dept: VASCULAR SURGERY | Facility: CLINIC | Age: 58
Discharge: HOME | End: 2023-02-03
Attending: SURGERY
Payer: MEDICAID

## 2023-02-03 ENCOUNTER — OFFICE VISIT (OUTPATIENT)
Dept: VASCULAR SURGERY | Facility: CLINIC | Age: 58
End: 2023-02-03
Attending: SURGERY
Payer: MEDICAID

## 2023-02-03 VITALS
SYSTOLIC BLOOD PRESSURE: 166 MMHG | OXYGEN SATURATION: 97 % | TEMPERATURE: 99 F | DIASTOLIC BLOOD PRESSURE: 77 MMHG | BODY MASS INDEX: 24.45 KG/M2 | HEIGHT: 66 IN | WEIGHT: 152.13 LBS | HEART RATE: 74 BPM

## 2023-02-03 DIAGNOSIS — Z99.2 ARTERIOVENOUS FISTULA FOR HEMODIALYSIS IN PLACE, PRIMARY: Primary | ICD-10-CM

## 2023-02-03 DIAGNOSIS — N18.6 ESRD (END STAGE RENAL DISEASE): ICD-10-CM

## 2023-02-03 DIAGNOSIS — N18.6 ESRD ON HEMODIALYSIS: ICD-10-CM

## 2023-02-03 DIAGNOSIS — Z99.2 ESRD ON HEMODIALYSIS: ICD-10-CM

## 2023-02-03 PROCEDURE — 99999 PR PBB SHADOW E&M-EST. PATIENT-LVL V: CPT | Mod: PBBFAC,,, | Performed by: SURGERY

## 2023-02-03 PROCEDURE — 3008F BODY MASS INDEX DOCD: CPT | Mod: CPTII,,, | Performed by: SURGERY

## 2023-02-03 PROCEDURE — 3077F SYST BP >= 140 MM HG: CPT | Mod: CPTII,,, | Performed by: SURGERY

## 2023-02-03 PROCEDURE — 99999 PR PBB SHADOW E&M-EST. PATIENT-LVL V: ICD-10-PCS | Mod: PBBFAC,,, | Performed by: SURGERY

## 2023-02-03 PROCEDURE — 99215 OFFICE O/P EST HI 40 MIN: CPT | Mod: PBBFAC | Performed by: SURGERY

## 2023-02-03 PROCEDURE — 4010F PR ACE/ARB THEARPY RXD/TAKEN: ICD-10-PCS | Mod: CPTII,,, | Performed by: SURGERY

## 2023-02-03 PROCEDURE — 99024 POSTOP FOLLOW-UP VISIT: CPT | Mod: ,,, | Performed by: SURGERY

## 2023-02-03 PROCEDURE — 3078F PR MOST RECENT DIASTOLIC BLOOD PRESSURE < 80 MM HG: ICD-10-PCS | Mod: CPTII,,, | Performed by: SURGERY

## 2023-02-03 PROCEDURE — 3008F PR BODY MASS INDEX (BMI) DOCUMENTED: ICD-10-PCS | Mod: CPTII,,, | Performed by: SURGERY

## 2023-02-03 PROCEDURE — 93990 DOPPLER FLOW TESTING: CPT | Mod: 26,S$PBB,, | Performed by: SURGERY

## 2023-02-03 PROCEDURE — 3077F PR MOST RECENT SYSTOLIC BLOOD PRESSURE >= 140 MM HG: ICD-10-PCS | Mod: CPTII,,, | Performed by: SURGERY

## 2023-02-03 PROCEDURE — 4010F ACE/ARB THERAPY RXD/TAKEN: CPT | Mod: CPTII,,, | Performed by: SURGERY

## 2023-02-03 PROCEDURE — 99024 PR POST-OP FOLLOW-UP VISIT: ICD-10-PCS | Mod: ,,, | Performed by: SURGERY

## 2023-02-03 PROCEDURE — 93990 PR DUPLEX HEMODIALYSIS ACCESS: ICD-10-PCS | Mod: 26,S$PBB,, | Performed by: SURGERY

## 2023-02-03 PROCEDURE — 3078F DIAST BP <80 MM HG: CPT | Mod: CPTII,,, | Performed by: SURGERY

## 2023-02-03 PROCEDURE — 93990 DOPPLER FLOW TESTING: CPT | Mod: PBBFAC | Performed by: SURGERY

## 2023-02-03 RX ORDER — TAMSULOSIN HYDROCHLORIDE 0.4 MG/1
1 CAPSULE ORAL
COMMUNITY
Start: 2023-01-03

## 2023-02-03 RX ORDER — SEVELAMER CARBONATE 800 MG/1
800 TABLET, FILM COATED ORAL 3 TIMES DAILY
COMMUNITY
Start: 2023-01-10

## 2023-02-03 RX ORDER — TRAMADOL HYDROCHLORIDE 50 MG/1
50 TABLET ORAL DAILY PRN
COMMUNITY
Start: 2023-01-24 | End: 2023-04-17

## 2023-02-03 RX ORDER — SILDENAFIL 100 MG/1
100 TABLET, FILM COATED ORAL
COMMUNITY
Start: 2023-01-03

## 2023-02-03 NOTE — PROGRESS NOTES
VASCULAR SURGERY NOTE    Patient ID: Enrique Martinez is a 57 y.o. male.    I. HISTORY     Chief Complaint: permanent AV access creation    2/3/23 Interval Hx: Patient presents today for follow up after LUE radiocephalic AVF on 1/5. Currently using R subclavian catheter for dialysis MWF. No changes since seen last time. He was last dialyzed today. No current issues with dialysis with R subclavian catheter, does report tenderness still at LUE radiocephalic AVF site.     12/09/22 Interval Hx:  Pt presents to clinic today for evaluation for permanent HD access. Since his last appt his BP has been better controlled but still needs some improvement. Pt is currently using right sided subclavian catheter for dialysis MWF. He was last dialyzed today. Pt complains of pain in his right hand over his first MCPJ and elbow. Pt states that he is still making urine 4-5x per day.  He says he is pretty ambidextrous but states that he uses his right hand mostly to control his phone.      Initial HPI 11/14/22: Enrique Martinez is a RHD 57 y.o. male who is here today to discuss renal artery stenosis. He has HTN and ESRD and is on HD MWF.   He is managing his hypertension with multiple medications; however, he does not take all of the medications that are currently listed as or at these exact dosages/frequencies. We discussed what he actually takes and made some adjustments today. He would like to follow up with his nephrologist Dr. Ronquillo to discuss further.    Denies MI/stroke.   Denies tobacco use.   Denies alcohol use.   Denies recreational drug use.     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         Past Surgical History:   Procedure Laterality Date    ABDOMINAL SURGERY  1980s    AV FISTULA PLACEMENT Left 1/5/2023    Procedure: CREATION, AV FISTULA RADIOCEPHALIC;  Surgeon: ROHITH Pickard II, MD;  Location: [facility] OR 2ND FLR;  Service: Vascular;  Laterality: Left;    CARDIAC CATHETERIZATION  2007    x 2    COLONOSCOPY N/A 4/5/2019    Procedure: COLONOSCOPY;  Surgeon: Jose L Carty MD;  Location: [facility] ENDO (4TH FLR);  Service: Colon and Rectal;  Laterality: N/A;    CORONARY STENT PLACEMENT  2007       Social History     Occupational History    Not on file   Tobacco Use    Smoking status: Some Days     Packs/day: 0.25     Years: 10.00     Pack years: 2.50     Types: Cigarettes    Smokeless tobacco: Never   Substance and Sexual Activity    Alcohol use: No    Drug use: No    Sexual activity: Not on file         Review of Systems   Constitutional: Negative for weight loss.   HENT:  Negative for ear pain and nosebleeds.    Eyes:  Negative for discharge and pain.   Cardiovascular:  Negative for chest pain and palpitations.   Respiratory:  Negative for cough, shortness of breath and wheezing.    Endocrine: Negative for cold intolerance, heat intolerance and polyphagia.   Hematologic/Lymphatic: Negative for adenopathy. Does not bruise/bleed easily.   Skin:  Negative for itching and rash.   Musculoskeletal:  Negative for joint swelling and muscle cramps.   Gastrointestinal:  Negative for abdominal pain, diarrhea, nausea and vomiting.   Genitourinary:  Negative for dysuria and flank pain.   Neurological:  Negative for numbness and seizures.       II. PHYSICAL EXAM     Physical Exam  Constitutional:       Appearance: Normal appearance. He is not ill-appearing or diaphoretic.   HENT:      Head:  Normocephalic and atraumatic.   Eyes:      General: No scleral icterus.        Right eye: No discharge.         Left eye: No discharge.      Extraocular Movements: Extraocular movements intact.      Conjunctiva/sclera: Conjunctivae normal.   Cardiovascular:      Rate and Rhythm: Normal rate and regular rhythm.      Comments: See detailed peripheral vascular exam below  Pulmonary:      Effort: Pulmonary effort is normal. No respiratory distress.   Abdominal:      General: Abdomen is flat. There is no distension.      Palpations: Abdomen is soft.      Tenderness: There is no abdominal tenderness. There is no guarding.   Musculoskeletal:         General: Normal range of motion.      Cervical back: Normal range of motion and neck supple.      Right lower leg: No edema.      Left lower leg: No edema.   Skin:     General: Skin is warm and dry.      Coloration: Skin is not jaundiced or pale.      Findings: No erythema or rash.   Neurological:      General: No focal deficit present.      Mental Status: He is alert and oriented to person, place, and time.   Psychiatric:         Mood and Affect: Mood normal.         Behavior: Behavior normal.     Vascular PE  Palpable thrill on L radiocephalic fistula  1+ radial pulse on L, 2+ on R  Negative for carotid bruit bilaterally  No aortic thrill palpable.   RLE  Pedal pulses not palpable  2+ popliteal  2+ femoral    LLE  Pedal pulses not palpable  2+ popliteal  2+ femoral      III. ASSESSMENT & PLAN (MEDICAL DECISION MAKING)       Imaging Results: (I have personally reviewed all images and provided interpretation below)  LUE AVF US 2/3/23:  Volume flow 1.1L/min. Diameter >6mm throughout and depth <6mm throughout. No evidence of stenosis.    Assessment/Diagnosis and Plan:    Encounter Diagnoses   Name Primary?    Arteriovenous fistula for hemodialysis in place, primary Yes    ESRD on hemodialysis            57 y.o. male s/p above doing well. Fistula has adequate flow volume and  diameter for cannulation but needs 2 more weeks to allow additional wall thickening.    Plan:  --Ok to cannulate beginning 2/16/23  -RTC 2/27/23 for catheter removal        ROHITH Pickard II, MD, VI  Vascular Surgery  [facility]

## 2023-02-09 ENCOUNTER — HOSPITAL ENCOUNTER (EMERGENCY)
Facility: HOSPITAL | Age: 58
Discharge: HOME | End: 2023-02-09
Attending: EMERGENCY MEDICINE
Payer: MEDICAID

## 2023-02-09 VITALS
HEART RATE: 84 BPM | SYSTOLIC BLOOD PRESSURE: 172 MMHG | DIASTOLIC BLOOD PRESSURE: 79 MMHG | OXYGEN SATURATION: 97 % | TEMPERATURE: 99 F | RESPIRATION RATE: 18 BRPM

## 2023-02-09 DIAGNOSIS — M79.89 SWELLING OF LEFT HAND: Primary | ICD-10-CM

## 2023-02-09 PROCEDURE — 99284 PR EMERGENCY DEPT VISIT,LEVEL IV: ICD-10-PCS | Mod: ,,, | Performed by: EMERGENCY MEDICINE

## 2023-02-09 PROCEDURE — 99283 EMERGENCY DEPT VISIT LOW MDM: CPT

## 2023-02-09 PROCEDURE — 25000003 PHARM REV CODE 250: Performed by: EMERGENCY MEDICINE

## 2023-02-09 PROCEDURE — 99284 EMERGENCY DEPT VISIT MOD MDM: CPT | Mod: ,,, | Performed by: EMERGENCY MEDICINE

## 2023-02-09 RX ORDER — HYDROCODONE BITARTRATE AND ACETAMINOPHEN 5; 325 MG/1; MG/1
1 TABLET ORAL
Status: COMPLETED | OUTPATIENT
Start: 2023-02-09 | End: 2023-02-09

## 2023-02-09 RX ORDER — HYDROCODONE BITARTRATE AND ACETAMINOPHEN 5; 325 MG/1; MG/1
1 TABLET ORAL EVERY 6 HOURS PRN
Qty: 3 TABLET | Refills: 0 | Status: SHIPPED | OUTPATIENT
Start: 2023-02-09 | End: 2023-03-05 | Stop reason: SDUPTHER

## 2023-02-09 RX ADMIN — HYDROCODONE BITARTRATE AND ACETAMINOPHEN 1 TABLET: 5; 325 TABLET ORAL at 05:02

## 2023-02-09 NOTE — ED PROVIDER NOTES
Encounter Date: 2/9/2023       History     Chief Complaint   Patient presents with    Hand Problem     Pt. C L hand swelling for two days.  Denies trauma.  No swelling anywhere else.  Pt. Reports he had surgery to place dialysis shunt 2 weeks ago but just started having swelling 2 days ago.  No other s/s or complaints.  Pt. Reports he took 1500mg of tylenol around 2300       HPI      57-year-old male presents the ER for evaluation 2 days of atraumatic left hand swelling.  This has happened to him multiple x4 no fever chills chest pain shortness of breath, came the ER for further evaluation.    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      Past Surgical History:   Procedure Laterality Date    ABDOMINAL SURGERY  1980s    AV FISTULA PLACEMENT Left 1/5/2023    Procedure: CREATION, AV FISTULA RADIOCEPHALIC;  Surgeon: ROHITH Pickard II, MD;  [facility];  Service: Vascular;  Laterality: Left;    CARDIAC CATHETERIZATION  2007    x 2    COLONOSCOPY N/A 4/5/2019    Procedure: COLONOSCOPY;  Surgeon: Jose L Carty MD;  Location: [facility] (4TH FLR);  Service: Colon and Rectal;  Laterality: N/A;    CORONARY STENT PLACEMENT  2007     Family History   Problem Relation Age of Onset    Hypertension Father     Stroke Father     Heart attack Father     Hyperlipidemia Father     No Known Problems Sister     Drug abuse Brother     Heart attack Brother     Hypertension  Brother      Social History     Tobacco Use    Smoking status: Some Days     Packs/day: 0.25     Years: 10.00     Pack years: 2.50     Types: Cigarettes    Smokeless tobacco: Never   Substance Use Topics    Alcohol use: No    Drug use: No     Review of Systems   Constitutional:  Negative for fatigue and fever.   Respiratory:  Negative for shortness of breath.    All other systems reviewed and are negative.    Physical Exam     Initial Vitals [02/09/23 0512]   BP Pulse Resp Temp SpO2   (!) 172/79 84 18 98.8 °F (37.1 °C) 97 %      MAP       --         Physical Exam    Nursing note and vitals reviewed.  Constitutional: He appears well-developed and well-nourished.   HENT:   Head: Normocephalic and atraumatic.   Eyes: Pupils are equal, round, and reactive to light.   Neck:   Normal range of motion.  Pulmonary/Chest: No respiratory distress.   Musculoskeletal:      Cervical back: Normal range of motion.      Comments: Right upper extremity normal     Left upper extremity there is swelling along the dorsal aspect of the left hand there is no warmth there is no cellulitic changes, left forearm fistula has a strong palpable thrill without any cellulitic changes, no swelling noted proximally to the wrist or up the arm no cellulitic changes     Neurological: He is alert and oriented to person, place, and time. He has normal strength. GCS score is 15. GCS eye subscore is 4. GCS verbal subscore is 5. GCS motor subscore is 6.   Skin: Skin is warm and dry. Capillary refill takes less than 2 seconds.   Psychiatric: He has a normal mood and affect. Thought content normal.       ED Course   Procedures  Labs Reviewed - No data to display       Imaging Results    None          Medications   HYDROcodone-acetaminophen 5-325 mg per tablet 1 tablet (1 tablet Oral Given 2/9/23 0550)     Medical Decision Making:   Initial Assessment:   This is a pleasant 57-year-old male multiple medical problems including end-stage renal disease on  dialysis, hypertension, hyperlipidemia, nephrotic syndrome presents the ER for evaluation 2 days of atraumatic left hand swelling.  Has had this swelling multiple times previously.  Workups have been negative.  He denies any fever chills, any systemic complaints.  He is well appearing no acute distress.  He does have isolated swelling to the left dorsum his hand, there is no cellulitic changes no warmth, neurovascularly intact, his forearm AV fistula has a strong palpable thrill without any signs of infection.  Discussed with patient, possible dependent edema versus other cause.  Will plan symptomatic support ice compression few pain pills, strict return precautions discussed patient will be discharged.                        Clinical Impression:   Final diagnoses:  [M79.89] Swelling of left hand (Primary)        ED Disposition Condition    Discharge Stable          ED Prescriptions       Medication Sig Dispense Start Date End Date Auth. Provider    HYDROcodone-acetaminophen (NORCO) 5-325 mg per tablet Take 1 tablet by mouth every 6 (six) hours as needed for Pain. 3 tablet 2/9/2023 -- Danitza Amaya MD          Follow-up Information    None          Danitza Amaya MD  02/09/23 0675

## 2023-02-09 NOTE — ED TRIAGE NOTES
Chief Complaint   Patient presents with    Hand Problem     Pt. C L hand swelling for two days.  Denies trauma.  No swelling anywhere else.  Pt. Reports he had surgery to place dialysis shunt 2 weeks ago but just started having swelling 2 days ago.  No other s/s or complaints.  Pt. Reports he took 1500mg of tylenol around 2300       APPEARANCE: No acute distress.    NEURO: Awake, alert, appropriate for age  HEENT: Head symmetrical. No obvious deformity  RESPIRATORY: Airway is open and patent. Respirations are spontaneous on room air.   NEUROVASCULAR: All extremities are warm and pink with capillary refill less than 3 seconds. L hand very swollen  MUSCULOSKELETAL: Moves all extremities, wiggling toes and moving hands.   SKIN: Warm and dry, adequate turgor, mucus membranes moist and pink  SOCIAL: Patient is accompanied by family.   Will continue to monitor.

## 2023-02-09 NOTE — DISCHARGE INSTRUCTIONS
Please ice, wear Ace wrap, Tylenol as needed for pain follow-up with primary care doctor return the ER for any concerning reason.

## 2023-02-10 DIAGNOSIS — N18.6 ESRD (END STAGE RENAL DISEASE): Primary | ICD-10-CM

## 2023-02-10 RX ORDER — FUROSEMIDE 40 MG/1
40 TABLET ORAL 2 TIMES DAILY
Qty: 180 TABLET | Refills: 1 | Status: SHIPPED | OUTPATIENT
Start: 2023-02-10 | End: 2023-04-17

## 2023-02-16 ENCOUNTER — HOSPITAL ENCOUNTER (EMERGENCY)
Facility: HOSPITAL | Age: 58
Discharge: HOME | End: 2023-02-16
Attending: EMERGENCY MEDICINE
Payer: MEDICARE

## 2023-02-16 VITALS
TEMPERATURE: 98 F | BODY MASS INDEX: 24.11 KG/M2 | RESPIRATION RATE: 14 BRPM | HEIGHT: 66 IN | HEART RATE: 86 BPM | WEIGHT: 150 LBS | DIASTOLIC BLOOD PRESSURE: 78 MMHG | OXYGEN SATURATION: 98 % | SYSTOLIC BLOOD PRESSURE: 158 MMHG

## 2023-02-16 DIAGNOSIS — M25.539 WRIST PAIN: ICD-10-CM

## 2023-02-16 DIAGNOSIS — M79.603 ARM PAIN: ICD-10-CM

## 2023-02-16 DIAGNOSIS — M25.529 ELBOW PAIN: ICD-10-CM
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
## 2023-02-16 DIAGNOSIS — M25.519 SHOULDER PAIN: ICD-10-CM

## 2023-02-16 PROCEDURE — 99284 PR EMERGENCY DEPT VISIT,LEVEL IV: ICD-10-PCS | Mod: ,,, | Performed by: EMERGENCY MEDICINE

## 2023-02-16 PROCEDURE — 25000003 PHARM REV CODE 250: Performed by: EMERGENCY MEDICINE

## 2023-02-16 PROCEDURE — 99283 EMERGENCY DEPT VISIT LOW MDM: CPT

## 2023-02-16 PROCEDURE — 99284 EMERGENCY DEPT VISIT MOD MDM: CPT | Mod: ,,, | Performed by: EMERGENCY MEDICINE

## 2023-02-16 RX ORDER — HYDROCODONE BITARTRATE AND ACETAMINOPHEN 5; 325 MG/1; MG/1
1 TABLET ORAL EVERY 4 HOURS PRN
Qty: 12 TABLET | Refills: 0 | Status: SHIPPED | OUTPATIENT
Start: 2023-02-16 | End: 2023-02-19

## 2023-02-16 RX ORDER — HYDROCODONE BITARTRATE AND ACETAMINOPHEN 5; 325 MG/1; MG/1
1 TABLET ORAL
Status: COMPLETED | OUTPATIENT
Start: 2023-02-16 | End: 2023-02-16

## 2023-02-16 RX ADMIN — HYDROCODONE BITARTRATE AND ACETAMINOPHEN 1 TABLET: 5; 325 TABLET ORAL at 04:02

## 2023-02-16 NOTE — DISCHARGE INSTRUCTIONS
You have been prescribed Norco (acetaminophen-hydrocodone).  - Take this medication only when needed for breakthrough pain.   - Do not take more than the prescribed amount to control your pain.  - Do not operate machinery, drive, exercise, perform caregiving, make important decisions or perform important tasks while taking Norco. It can make you drowsy or forgetful.  - Norco is addictive in as little as 3 days, so take only as needed.  - Norco can dangerously slow or stop your breathing if you take too much, or if you combine it with alcohol or sedating medicines (including Xanex, Ativan) or illegal drugs.   - Norco can make you constipated (hard to poop), so take fiber supplements and a stool softener while taking this medicine.   - This medication contains acetaminophen (Tylenol). Each pill has 325 mg of acetaminophen. Do not take more than 4,000 mg of acetaminophen within 24 hours as this may lead to liver damage.      Return to the Emergency Department for symptoms including but not limited to: worsening symptoms, shortness of breath or chest pain, vomiting with inability to hold down fluids, fevers greater than 100.4°F, passing out/fainting/unconsciousness, or other concerning symptoms.
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
## 2023-02-17 ENCOUNTER — TELEPHONE (OUTPATIENT)
Dept: ORTHOPEDICS | Facility: CLINIC | Age: 58
End: 2023-02-17
Payer: MEDICARE
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
## 2023-02-27 ENCOUNTER — OFFICE VISIT (OUTPATIENT)
Dept: VASCULAR SURGERY | Facility: CLINIC | Age: 58
End: 2023-02-27
Attending: SURGERY
Payer: MEDICAID

## 2023-02-27 ENCOUNTER — TELEPHONE (OUTPATIENT)
Dept: ORTHOPEDICS | Facility: CLINIC | Age: 58
End: 2023-02-27
Payer: MEDICARE

## 2023-02-27 VITALS
DIASTOLIC BLOOD PRESSURE: 73 MMHG | BODY MASS INDEX: 25.97 KG/M2 | HEART RATE: 81 BPM | OXYGEN SATURATION: 90 % | WEIGHT: 161.63 LBS | HEIGHT: 66 IN | SYSTOLIC BLOOD PRESSURE: 146 MMHG

## 2023-02-27 DIAGNOSIS — Z99.2 ESRD ON HEMODIALYSIS: ICD-10-CM

## 2023-02-27 DIAGNOSIS — N18.6 ESRD ON HEMODIALYSIS: ICD-10-CM

## 2023-02-27 DIAGNOSIS — Z99.2 ARTERIOVENOUS FISTULA FOR HEMODIALYSIS IN PLACE, PRIMARY: Primary | ICD-10-CM

## 2023-02-27 PROCEDURE — 3078F PR MOST RECENT DIASTOLIC BLOOD PRESSURE < 80 MM HG: ICD-10-PCS | Mod: CPTII,,, | Performed by: SURGERY

## 2023-02-27 PROCEDURE — 3077F PR MOST RECENT SYSTOLIC BLOOD PRESSURE >= 140 MM HG: ICD-10-PCS | Mod: CPTII,,, | Performed by: SURGERY

## 2023-02-27 PROCEDURE — 3008F PR BODY MASS INDEX (BMI) DOCUMENTED: ICD-10-PCS | Mod: CPTII,,, | Performed by: SURGERY

## 2023-02-27 PROCEDURE — 99999 PR PBB SHADOW E&M-EST. PATIENT-LVL IV: CPT | Mod: PBBFAC,,, | Performed by: SURGERY

## 2023-02-27 PROCEDURE — 3008F BODY MASS INDEX DOCD: CPT | Mod: CPTII,,, | Performed by: SURGERY

## 2023-02-27 PROCEDURE — 99024 POSTOP FOLLOW-UP VISIT: CPT | Mod: ,,, | Performed by: SURGERY

## 2023-02-27 PROCEDURE — 3078F DIAST BP <80 MM HG: CPT | Mod: CPTII,,, | Performed by: SURGERY

## 2023-02-27 PROCEDURE — 99024 PR POST-OP FOLLOW-UP VISIT: ICD-10-PCS | Mod: ,,, | Performed by: SURGERY

## 2023-02-27 PROCEDURE — 99214 OFFICE O/P EST MOD 30 MIN: CPT | Mod: PBBFAC | Performed by: SURGERY

## 2023-02-27 PROCEDURE — 3077F SYST BP >= 140 MM HG: CPT | Mod: CPTII,,, | Performed by: SURGERY

## 2023-02-27 PROCEDURE — 4010F ACE/ARB THERAPY RXD/TAKEN: CPT | Mod: CPTII,,, | Performed by: SURGERY

## 2023-02-27 PROCEDURE — 99999 PR PBB SHADOW E&M-EST. PATIENT-LVL IV: ICD-10-PCS | Mod: PBBFAC,,, | Performed by: SURGERY

## 2023-02-27 PROCEDURE — 4010F PR ACE/ARB THEARPY RXD/TAKEN: ICD-10-PCS | Mod: CPTII,,, | Performed by: SURGERY
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
## 2023-02-27 NOTE — PROGRESS NOTES
VASCULAR SURGERY NOTE      Patient returns to clinic for tunneled catheter removal.  He has not used his AV fistula for dialysis yet. Instructed him to cannulate for HD and return next week for catheter removal.      ROHITH Pickard II, MD, [facility]  Vascular Surgery  [facility]

## 2023-03-04 ENCOUNTER — HOSPITAL ENCOUNTER (EMERGENCY)
Facility: HOSPITAL | Age: 58
Discharge: HOME | End: 2023-03-04
Attending: STUDENT IN AN ORGANIZED HEALTH CARE EDUCATION/TRAINING PROGRAM
Payer: MEDICAID

## 2023-03-04 VITALS
HEIGHT: 66 IN | WEIGHT: 146 LBS | BODY MASS INDEX: 23.46 KG/M2 | DIASTOLIC BLOOD PRESSURE: 85 MMHG | TEMPERATURE: 98 F | HEART RATE: 75 BPM | OXYGEN SATURATION: 95 % | SYSTOLIC BLOOD PRESSURE: 152 MMHG | RESPIRATION RATE: 18 BRPM

## 2023-03-04 DIAGNOSIS — B02.9 HERPES ZOSTER WITHOUT COMPLICATION: Primary | ICD-10-CM

## 2023-03-04 DIAGNOSIS — M75.100 TEAR OF ROTATOR CUFF, UNSPECIFIED LATERALITY, UNSPECIFIED TEAR EXTENT, UNSPECIFIED WHETHER TRAUMATIC: ICD-10-CM

## 2023-03-04 LAB
ALBUMIN SERPL BCP-MCNC: 3.5 G/DL (ref 3.5–5.2)
ALP SERPL-CCNC: 164 U/L (ref 55–135)
ALT SERPL W/O P-5'-P-CCNC: 12 U/L (ref 10–44)
ANION GAP SERPL CALC-SCNC: 11 MMOL/L (ref 8–16)
AST SERPL-CCNC: 24 U/L (ref 10–40)
BASOPHILS # BLD AUTO: 0.04 K/UL (ref 0–0.2)
BASOPHILS NFR BLD: 0.7 % (ref 0–1.9)
BILIRUB SERPL-MCNC: 0.4 MG/DL (ref 0.1–1)
BUN SERPL-MCNC: 31 MG/DL (ref 6–20)
CALCIUM SERPL-MCNC: 9.8 MG/DL (ref 8.7–10.5)
CHLORIDE SERPL-SCNC: 96 MMOL/L (ref 95–110)
CO2 SERPL-SCNC: 28 MMOL/L (ref 23–29)
CREAT SERPL-MCNC: 5.9 MG/DL (ref 0.5–1.4)
DIFFERENTIAL METHOD: ABNORMAL
EOSINOPHIL # BLD AUTO: 0.1 K/UL (ref 0–0.5)
EOSINOPHIL NFR BLD: 2 % (ref 0–8)
ERYTHROCYTE [DISTWIDTH] IN BLOOD BY AUTOMATED COUNT: 16 % (ref 11.5–14.5)
EST. GFR  (NO RACE VARIABLE): 10.2 ML/MIN/1.73 M^2
GLUCOSE SERPL-MCNC: 104 MG/DL (ref 70–110)
HCT VFR BLD AUTO: 34.8 % (ref 40–54)
HGB BLD-MCNC: 10.9 G/DL (ref 14–18)
IMM GRANULOCYTES # BLD AUTO: 0.01 K/UL (ref 0–0.04)
IMM GRANULOCYTES NFR BLD AUTO: 0.2 % (ref 0–0.5)
LYMPHOCYTES # BLD AUTO: 1.4 K/UL (ref 1–4.8)
LYMPHOCYTES NFR BLD: 24.4 % (ref 18–48)
MCH RBC QN AUTO: 31.1 PG (ref 27–31)
MCHC RBC AUTO-ENTMCNC: 31.3 G/DL (ref 32–36)
MCV RBC AUTO: 99 FL (ref 82–98)
MONOCYTES # BLD AUTO: 1 K/UL (ref 0.3–1)
MONOCYTES NFR BLD: 16.3 % (ref 4–15)
NEUTROPHILS # BLD AUTO: 3.3 K/UL (ref 1.8–7.7)
NEUTROPHILS NFR BLD: 56.4 % (ref 38–73)
NRBC BLD-RTO: 0 /100 WBC
PLATELET # BLD AUTO: 169 K/UL (ref 150–450)
PMV BLD AUTO: 8.5 FL (ref 9.2–12.9)
POCT GLUCOSE: 93 MG/DL (ref 70–110)
POTASSIUM SERPL-SCNC: 4 MMOL/L (ref 3.5–5.1)
PROT SERPL-MCNC: 7 G/DL (ref 6–8.4)
RBC # BLD AUTO: 3.5 M/UL (ref 4.6–6.2)
SODIUM SERPL-SCNC: 135 MMOL/L (ref 136–145)
WBC # BLD AUTO: 5.89 K/UL (ref 3.9–12.7)

## 2023-03-04 PROCEDURE — 25000003 PHARM REV CODE 250: Performed by: STUDENT IN AN ORGANIZED HEALTH CARE EDUCATION/TRAINING PROGRAM

## 2023-03-04 PROCEDURE — 85025 COMPLETE CBC W/AUTO DIFF WBC: CPT | Performed by: STUDENT IN AN ORGANIZED HEALTH CARE EDUCATION/TRAINING PROGRAM

## 2023-03-04 PROCEDURE — 99284 PR EMERGENCY DEPT VISIT,LEVEL IV: ICD-10-PCS | Mod: ,,, | Performed by: STUDENT IN AN ORGANIZED HEALTH CARE EDUCATION/TRAINING PROGRAM

## 2023-03-04 PROCEDURE — 80053 COMPREHEN METABOLIC PANEL: CPT | Performed by: STUDENT IN AN ORGANIZED HEALTH CARE EDUCATION/TRAINING PROGRAM

## 2023-03-04 PROCEDURE — 99284 EMERGENCY DEPT VISIT MOD MDM: CPT

## 2023-03-04 PROCEDURE — 99284 EMERGENCY DEPT VISIT MOD MDM: CPT | Mod: ,,, | Performed by: STUDENT IN AN ORGANIZED HEALTH CARE EDUCATION/TRAINING PROGRAM

## 2023-03-04 PROCEDURE — 82962 GLUCOSE BLOOD TEST: CPT

## 2023-03-04 RX ORDER — OXYCODONE HYDROCHLORIDE 5 MG/1
5 TABLET ORAL
Status: COMPLETED | OUTPATIENT
Start: 2023-03-04 | End: 2023-03-04

## 2023-03-04 RX ORDER — VALACYCLOVIR HYDROCHLORIDE 1 G/1
500 TABLET, FILM COATED ORAL DAILY
Qty: 4 TABLET | Refills: 0 | Status: SHIPPED | OUTPATIENT
Start: 2023-03-04 | End: 2023-03-11

## 2023-03-04 RX ADMIN — OXYCODONE 5 MG: 5 TABLET ORAL at 06:03

## 2023-03-04 NOTE — DISCHARGE INSTRUCTIONS
You were evaluated in the emergency department today for shoulder pain and a shingles rash.  Although there were no findings of concern to necessitate admission to the hospital or warrant immediate surgical intervention, disease exists on a spectrum and your disease process may progress.  If this is the case, please watch your symptoms and return to the emergency department if you feel worse and are unable to discuss care with your primary care doctor in follow up in the next several days.  Specific information regarding your complaint has been provided.  Thank you for choosing [facility]!     You need to take your antivirals as you have been prescribed.  Continue to take the gabapentin and your hydrocodone at home for pain control.  You can also use lidocaine patch a lidocaine cream which can be purchased over-the-counter.    You need to follow up with the primary care physician and orthopedic surgeon for your shoulder, which was discussed with you.  You have an appointment coming up but you can also call [facility] to see if there is a sooner appointment.  Return to the emergency department for worsening pain, difficulty breathing, development of a fever or any other concerning symptoms.    Please take your dose on dialysis days AFTER your dialysis session.

## 2023-03-04 NOTE — ED NOTES
Pt lying on stretcher w/ call bell in reach, bed in low position with brake engaged.  AAox4, skin w/d / resp wnl. Dialysis access left arm & rt subclavian permacath. Waiting for xray.

## 2023-03-04 NOTE — ED NOTES
Pt sleeping on stretcher, brake engaged ,call bell in reach. In no apparent distress at this time.

## 2023-03-04 NOTE — ED NOTES
MD at bedside- explains test results, POC  and dc instrx to pt. Pt c/o pain to MD - requesting pain med.
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
## 2023-03-05 ENCOUNTER — HOSPITAL ENCOUNTER (EMERGENCY)
Facility: HOSPITAL | Age: 58
Discharge: HOME | End: 2023-03-05
Attending: EMERGENCY MEDICINE
Payer: MEDICAID

## 2023-03-05 VITALS
RESPIRATION RATE: 18 BRPM | OXYGEN SATURATION: 97 % | HEART RATE: 85 BPM | TEMPERATURE: 98 F | SYSTOLIC BLOOD PRESSURE: 175 MMHG | DIASTOLIC BLOOD PRESSURE: 81 MMHG

## 2023-03-05 DIAGNOSIS — B02.9 HERPES ZOSTER WITHOUT COMPLICATION: Primary | ICD-10-CM

## 2023-03-05 PROCEDURE — 99284 PR EMERGENCY DEPT VISIT,LEVEL IV: ICD-10-PCS | Mod: ,,, | Performed by: EMERGENCY MEDICINE

## 2023-03-05 PROCEDURE — 99284 EMERGENCY DEPT VISIT MOD MDM: CPT | Mod: ,,, | Performed by: EMERGENCY MEDICINE

## 2023-03-05 PROCEDURE — 25000003 PHARM REV CODE 250: Performed by: EMERGENCY MEDICINE

## 2023-03-05 PROCEDURE — 99283 EMERGENCY DEPT VISIT LOW MDM: CPT

## 2023-03-05 RX ORDER — HYDROCODONE BITARTRATE AND ACETAMINOPHEN 5; 325 MG/1; MG/1
1 TABLET ORAL EVERY 6 HOURS PRN
Qty: 12 TABLET | Refills: 0 | Status: SHIPPED | OUTPATIENT
Start: 2023-03-05 | End: 2023-03-05 | Stop reason: SDUPTHER

## 2023-03-05 RX ORDER — HYDROCODONE BITARTRATE AND ACETAMINOPHEN 10; 325 MG/1; MG/1
1 TABLET ORAL
Status: COMPLETED | OUTPATIENT
Start: 2023-03-05 | End: 2023-03-05

## 2023-03-05 RX ORDER — HYDROCODONE BITARTRATE AND ACETAMINOPHEN 5; 325 MG/1; MG/1
1 TABLET ORAL EVERY 6 HOURS PRN
Qty: 12 TABLET | Refills: 0 | Status: SHIPPED | OUTPATIENT
Start: 2023-03-05 | End: 2023-03-09 | Stop reason: SDUPTHER

## 2023-03-05 RX ADMIN — HYDROCODONE BITARTRATE AND ACETAMINOPHEN 1 TABLET: 10; 325 TABLET ORAL at 10:03

## 2023-03-05 NOTE — ED NOTES
LOC: The patient is awake, alert, and oriented to self, place, time, and situation. Pt is calm and cooperative. Affect is appropriate.  Speech is appropriate and clear.     APPEARANCE: Patient resting comfortably in no acute distress.  Patient is clean and well groomed.    SKIN: 2-in rectangular patch of rash on the left upper chest- small dark spots with 2mm of surrounding erythema, mildly raised. Similar patch over the left scapula. This is quite tender and itchy for the patient. The skin is warm and dry; color consistent with ethnicity.  Patient has normal skin turgor and moist mucus membranes.  Skin intact     MUSCULOSKELETAL: Tenderness to palpation of the left upper chest, shoulder, and left scapula area.Patient moving upper and lower extremities without difficulty; denies other pain in the extremities or back.  Denies weakness.     RESPIRATORY: Airway is open and patent. Respirations spontaneous, even, easy, and non-labored.  No audible wheeze. Patient has a normal effort and rate.  No accessory muscle use noted. Denies cough.     CARDIAC:  Normal rate noted.  No peripheral edema noted. 2+ radial pulses bilaterally. No complaints of chest pain.      ABDOMEN: Soft and non tender to palpation.  No distention noted. Pt denies abdominal pain; denies nausea, vomiting, diarrhea, or constipation.    NEUROLOGIC: 5/5  strength and arm/shoulder strength bilaterally.Eyes open spontaneously.  Behavior appropriate to situation.  Follows commands; facial expression symmetrical.  Purposeful motor response noted; normal sensation in all extremities. Pt denies headache; denies lightheadedness or dizziness; denies visual disturbances; denies loss of balance; denies unilateral weakness.
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
## 2023-03-06 NOTE — ED NOTES
Enrique Martinez, a 58 y.o. male presents to the ED via POV w/ complaint of recent shingles dx  on chest and side with increased/no improvement in pain. D/c  from ED yesterday. AAOx4. Ambulatory to intake without difficulty.     Triage note:  Chief Complaint   Patient presents with    Shingles     Was here yesterday and dx with shingles. 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

## 2023-03-06 NOTE — DISCHARGE INSTRUCTIONS
As I explained, opiates need to be prescribed by your primary care physician.    You are already on multiple pain medications including tramadol, Norco, gabapentin.  Combining multiple pain medications can be dangerous.    You were provided with a 1 time short course of pain medications but further refills needed to be provided by your primary care physician.

## 2023-03-09 ENCOUNTER — HOSPITAL ENCOUNTER (EMERGENCY)
Facility: HOSPITAL | Age: 58
Discharge: HOME | End: 2023-03-09
Attending: EMERGENCY MEDICINE
Payer: MEDICAID

## 2023-03-09 VITALS
SYSTOLIC BLOOD PRESSURE: 151 MMHG | HEART RATE: 87 BPM | TEMPERATURE: 99 F | DIASTOLIC BLOOD PRESSURE: 80 MMHG | BODY MASS INDEX: 23.73 KG/M2 | WEIGHT: 147 LBS | OXYGEN SATURATION: 97 % | RESPIRATION RATE: 16 BRPM

## 2023-03-09 DIAGNOSIS — B02.9 HERPES ZOSTER WITHOUT COMPLICATION: Primary | ICD-10-CM

## 2023-03-09 PROCEDURE — 99284 PR EMERGENCY DEPT VISIT,LEVEL IV: ICD-10-PCS | Mod: ,,, | Performed by: EMERGENCY MEDICINE

## 2023-03-09 PROCEDURE — 99283 EMERGENCY DEPT VISIT LOW MDM: CPT

## 2023-03-09 PROCEDURE — 25000003 PHARM REV CODE 250: Performed by: EMERGENCY MEDICINE

## 2023-03-09 PROCEDURE — 99284 EMERGENCY DEPT VISIT MOD MDM: CPT | Mod: ,,, | Performed by: EMERGENCY MEDICINE

## 2023-03-09 RX ORDER — CALCIUM CARBONATE 200(500)MG
500 TABLET,CHEWABLE ORAL
Status: COMPLETED | OUTPATIENT
Start: 2023-03-09 | End: 2023-03-09

## 2023-03-09 RX ORDER — HYDROCODONE BITARTRATE AND ACETAMINOPHEN 5; 325 MG/1; MG/1
1 TABLET ORAL EVERY 6 HOURS PRN
Qty: 12 TABLET | Refills: 0 | Status: SHIPPED | OUTPATIENT
Start: 2023-03-09

## 2023-03-09 RX ORDER — HYDROCODONE BITARTRATE AND ACETAMINOPHEN 10; 325 MG/1; MG/1
1 TABLET ORAL
Status: COMPLETED | OUTPATIENT
Start: 2023-03-09 | End: 2023-03-09

## 2023-03-09 RX ADMIN — CALCIUM CARBONATE (ANTACID) CHEW TAB 500 MG 500 MG: 500 CHEW TAB at 07:03

## 2023-03-09 RX ADMIN — HYDROCODONE BITARTRATE AND ACETAMINOPHEN 1 TABLET: 10; 325 TABLET ORAL at 06:03

## 2023-03-09 NOTE — DISCHARGE INSTRUCTIONS
It can take weeks for your shingles to resolve.  Continue taking gabapentin.  Take Norco as needed for pain.  Norco is an opiate and further refills need to be provided by your PCP.

## 2023-03-09 NOTE — ED TRIAGE NOTES
"Enrique Martinez, a 58 y.o. male presents to the ED w/ complaint of herpes zoster. Rash noted on left upper back for the last week. No open lesions. Deneis fevers or chills. Patient states, "this hurts so bad, why cant you scratch my back?!".     Adult Physical Assessment  LOC: Enrique Martinez, 58 y.o. male verified via two identifiers.  The patient is awake, alert, oriented and speaking appropriately at this time.  APPEARANCE: Patient resting comfortably and appears to be in no acute distress at this time. Patient is clean and well groomed, patient's clothing is properly fastened.  SKIN:The skin is warm and dry, color consistent with ethnicity, patient has normal skin turgor and moist mucus membranes, skin intact, no breakdown or brusing noted. Patient has a rash on left upper back, no open lesions.  MUSCULOSKELETAL: Patient moving all extremities well, no obvious swelling or deformities noted.  RESPIRATORY: Airway is open and patent, respirations are spontaneous, patient has a normal effort and rate, no accessory muscle use noted.  CARDIAC: Patient has a normal rate and rhythm, no periphreal edema noted in any extremity, capillary refill < 3 seconds in all extremities  ABDOMEN: Soft and non tender to palpation, no abdominal distention noted. Bowel sounds present in all four quadrants.  NEUROLOGIC: Eyes open spontaneously, behavior appropriate to situation, follows commands, facial expression symmetrical, bilateral hand grasp equal and even, purposeful motor response noted, normal sensation in all extremities when touched with a finger.      Triage note:  Chief Complaint   Patient presents with    Herpes Zoster     Rash to L upper back x1 week. No open lesions. Denies fever or chills. 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

## 2023-03-09 NOTE — ED PROVIDER NOTES
Encounter Date: 3/9/2023       History     Chief Complaint   Patient presents with    Herpes Zoster     Rash to L upper back x1 week. No open lesions. Denies fever or chills. States is compliant with antiviral medications     59 yo M with pmhx ESRD- HD (MWF),GERD, CAD s/p PCI, anemia, DM, gout, HLD, HTN presents with chief complaint of shingles.  Patient was diagnosed with shingles approximately 1 week ago.  He was seen at the ED at that time.  He was prescribed valacyclovir.  He returned several days later and was seen by myself pain.  He reports compliance with gabapentin 600 t.i.d..  Despite that, pain was severe and he is prescribed a short course of hydrocodone-acetaminophen.  That was filled 5 days ago.  He is since completed that and the pain has persisted.  He is concerned that his shingles has not resolved.  He has been going to dialysis.  No fevers.  He reports completing the course of valacyclovir.  He notes that he is not seen his PCP since his ED encounter as the next appointment available is not for another 2 weeks.    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      Past Surgical History:   Procedure Laterality Date    ABDOMINAL SURGERY  1980s    AV FISTULA PLACEMENT Left 1/5/2023    Procedure: CREATION, AV FISTULA RADIOCEPHALIC;  Surgeon: ROHITH Pickard II, MD;  Location: [facility] OR [address] [place] [address];  Service: Vascular;  Laterality: Left;    CARDIAC CATHETERIZATION  2007     x 2    COLONOSCOPY N/A 4/5/2019    Procedure: COLONOSCOPY;  Surgeon: Jose L Carty MD;  Location: [facility] ([address] [place] [address]);  Service: Colon and Rectal;  Laterality: N/A;    CORONARY STENT PLACEMENT  2007     Family History   Problem Relation Age of Onset    Hypertension Father     Stroke Father     Heart attack Father     Hyperlipidemia Father     No Known Problems Sister     Drug abuse Brother     Heart attack Brother     Hypertension Brother      Social History     Tobacco Use    Smoking status: Some Days     Packs/day: 0.25     Years: 10.00     Pack years: 2.50     Types: Cigarettes    Smokeless tobacco: Never   Substance Use Topics    Alcohol use: No    Drug use: No     Review of Systems    Physical Exam     Initial Vitals [03/09/23 0611]   BP Pulse Resp Temp SpO2   (!) 151/80 87 18 98.9 °F (37.2 °C) 97 %      MAP       --         Physical Exam    Nursing note and vitals reviewed.  Constitutional: He appears well-developed and well-nourished. He is not diaphoretic. No distress.   HENT:   Head: Normocephalic.   Cardiovascular:  Normal rate.           Pulmonary/Chest: No stridor. No respiratory distress.     Neurological: He is alert.   Skin: Skin is warm.   Vesicular eruptions overlying left scapula and chest wall - majority of lesions scabbed over, all closed, no overlying erythema, warmth outside of the lesions   Psychiatric: Thought content normal.       ED Course   Procedures  Labs Reviewed - No data to display       Imaging Results    None          Medications   HYDROcodone-acetaminophen  mg per tablet 1 tablet (1 tablet Oral Given 3/9/23 0650)   calcium carbonate 200 mg calcium (500 mg) chewable tablet 500 mg (500 mg Oral Given 3/9/23 0700)     Medical Decision Making:   History:   Old Medical Records: I decided to obtain old medical records.  Initial Assessment:   59 yo M with pmhx ESRD- HD (MWF),GERD, CAD s/p PCI, anemia, DM, gout, HLD, HTN presents with chief complaint of shingles.     Differential Diagnosis:   Shingles    No evidence of cellulitis  No evidence of complicated zoster as only 1 dermatome involved  ED Management:  Patient encouraged to continue use of gabapentin.  He was provided with a short refill of analgesics as he can not see his PCP for another 2 weeks.  He again reminded that the ED does not provide long-term control.  Further refills must be provided his PCP.  Return precautions.  After receiving analgesics, he reported he has reflux and requested Tums.  He notes that this pain is in his neck and is consistent with previous reflux.  He declined a cardiac workup.                        Clinical Impression:   Final diagnoses:  [B02.9] Herpes zoster without complication (Primary)        ED Disposition Condition    Discharge Stable          ED Prescriptions       Medication Sig Dispense Start Date End Date Auth. Provider    HYDROcodone-acetaminophen (NORCO) 5-325 mg per tablet Take 1 tablet by mouth every 6 (six) hours as needed for Pain. 12 tablet 3/9/2023 -- Hematnh Zhou MD          Follow-up Information       Follow up With Specialties Details Why Contact Info    [facility] - Emergency Dept Emergency Medicine  As needed, If symptoms worsen [address]  [facility] [id number] [phone]             Hemanth Zhou MD  03/09/23 0719

## 2023-03-11 ENCOUNTER — HOSPITAL ENCOUNTER (EMERGENCY)
Facility: HOSPITAL | Age: 58
Discharge: HOME | End: 2023-03-11
Attending: EMERGENCY MEDICINE
Payer: MEDICAID

## 2023-03-11 VITALS
OXYGEN SATURATION: 97 % | BODY MASS INDEX: 23.63 KG/M2 | HEART RATE: 90 BPM | WEIGHT: 147 LBS | DIASTOLIC BLOOD PRESSURE: 95 MMHG | RESPIRATION RATE: 22 BRPM | HEIGHT: 66 IN | SYSTOLIC BLOOD PRESSURE: 174 MMHG | TEMPERATURE: 99 F

## 2023-03-11 DIAGNOSIS — B02.29 POST HERPETIC NEURALGIA: Primary | ICD-10-CM

## 2023-03-11 PROCEDURE — 99282 PR EMERGENCY DEPT VISIT,LEVEL II: ICD-10-PCS | Mod: ,,, | Performed by: PHYSICIAN ASSISTANT

## 2023-03-11 PROCEDURE — 99282 EMERGENCY DEPT VISIT SF MDM: CPT | Mod: ,,, | Performed by: PHYSICIAN ASSISTANT

## 2023-03-11 PROCEDURE — 99282 EMERGENCY DEPT VISIT SF MDM: CPT

## 2023-03-11 NOTE — ED NOTES
Pt seen in ED multiple times for herpes Zoster. He reports receing only 4 day course of Valcyclovir instead of 7. He was seen in ED again 2 days ago and prescribed Norco, but was unable to get prescription filled.

## 2023-03-11 NOTE — DISCHARGE INSTRUCTIONS
Pickup your prescription at [facility].  You need to follow-up with your primary doctor if you continue to have pain.

## 2023-03-11 NOTE — ED NOTES
Patient identifiers for Enrique Martinez 58 y.o. male checked and correct.  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 Patient is oriented x 4 and speaking appropriately.  APPEARANCE: Patient is well appearing and appears stated age. Patient is clean, well groomed, and dressed appropriately for season.   HEENT:   SKIN: The skin is warm and dry. Patient has normal skin turgor and moist mucus membranes.   MUSCULOSKELETAL: Patient is moving all extremities well with no obvious deformities. 3+ pulses palpated in all extremities.   RESPIRATORY: Airway is open and patent. Respirations are spontaneous and non-labored with normal effort and rate.  ABDOMEN: Soft and non-tender upon palpation and non distended.

## 2023-03-11 NOTE — ED PROVIDER NOTES
Encounter Date: 3/11/2023       History     Chief Complaint   Patient presents with    Herpes Zoster     On left side of back. Received rx for valacyclovir and pain medication. Reports didn't not get enough of either. Dialysis, MWF (went yesterday)      58-year-old male with multiple medical comorbidities listed below presents to the ED with pain related to his shingles.  This is patient's 3rd visit for this shingles infection.  He states that the doctor wrote an additional prescription for pain medication at his 3/9 ED visit but the pharmacy had an issue filling the prescription. He also states that he did not receive enough Valtrex.  Patient states that he had shingles outbreak over a week ago.  No other complaints besides pain in the area of his shingles outbreak.    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      Past Surgical History:   Procedure Laterality Date    ABDOMINAL SURGERY  1980s    AV FISTULA PLACEMENT Left 1/5/2023    Procedure: CREATION, AV FISTULA RADIOCEPHALIC;  Surgeon: ROHITH Pickard II, MD;  [facility];  Service: Vascular;  Laterality: Left;    CARDIAC CATHETERIZATION  2007    x 2    COLONOSCOPY N/A 4/5/2019    Procedure: COLONOSCOPY;  Surgeon: Jose L Carty MD;  Location: [facility] ENDO (4TH FLR);  Service: Colon and Rectal;  Laterality: N/A;    CORONARY STENT PLACEMENT  2007     Family History   Problem Relation Age of Onset     Hypertension Father     Stroke Father     Heart attack Father     Hyperlipidemia Father     No Known Problems Sister     Drug abuse Brother     Heart attack Brother     Hypertension Brother      Social History     Tobacco Use    Smoking status: Some Days     Packs/day: 0.25     Years: 10.00     Pack years: 2.50     Types: Cigarettes    Smokeless tobacco: Never   Substance Use Topics    Alcohol use: No    Drug use: No     Review of Systems   Constitutional:  Negative for fever.   HENT:  Negative for sore throat.    Respiratory:  Negative for shortness of breath.    Cardiovascular:  Negative for chest pain.   Gastrointestinal:  Negative for nausea.   Genitourinary:  Negative for dysuria.   Musculoskeletal:  Negative for back pain.   Skin:  Positive for rash (Pain).   Neurological:  Negative for weakness.   Hematological:  Does not bruise/bleed easily.     Physical Exam     Initial Vitals [03/11/23 0649]   BP Pulse Resp Temp SpO2   (!) 174/95 90 (!) 22 98.5 °F (36.9 °C) 97 %      MAP       --         Physical Exam    Nursing note and vitals reviewed.  Constitutional: He appears well-developed and well-nourished.  Non-toxic appearance. He does not appear ill. No distress.   HENT:   Head: Normocephalic and atraumatic.   Neck: Neck supple.   Normal range of motion.  Cardiovascular:  Normal rate and regular rhythm.     Exam reveals no gallop, no distant heart sounds and no friction rub.       No murmur heard.  Pulmonary/Chest: Effort normal and breath sounds normal. No accessory muscle usage. No tachypnea. No respiratory distress. He has no decreased breath sounds. He has no wheezes. He has no rhonchi. He has no rales.   Abdominal: He exhibits no distension.   Musculoskeletal:      Cervical back: Normal range of motion and neck supple.     Neurological: He is alert.   Skin: Rash noted.   Scattered rash to the left upper back and left chest in various stages of late healing.  No intact vesicles.  Healing ulcerations only.   No surrounding erythema.        ED Course   Procedures  Labs Reviewed - No data to display       Imaging Results    None          Medications - No data to display  Medical Decision Making:   History:   Old Medical Records: I decided to obtain old medical records.  Initial Assessment:   58-year-old male presents to the ED with pain in the area of his recent shingles outbreak.  Hypertensive but vitals otherwise within normal limits.  Afebrile  Differential Diagnosis:   My differential diagnosis includes but is not limited to:  Post herpetic neuralgia, cellulitis, opiate dependence  ED Management:  Discussed with [facility] where patient's recent Morral prescription was sent.  They reported that the pharmacist had attempted to call the ED several times to verify the prescription as the patient has ongoing prescriptions for tramadol prescribed by another provider and feel that an different pharmacy.  I confirmed prescription for patient's acute pain and they agreed to fill this prescription.  I notified patient that we will not be able to fill any additional narcotic prescriptions and that he should follow-up with his PCP for any refills.  I do not feel that a refill for Valtrex is indicated as patient's wounds appear to be healing and antivirals at this point in the outbreak would have little effect.  Patient voiced understanding and is comfortable with discharge plan.                         Clinical Impression:   Final diagnoses:  [B02.29] Post herpetic neuralgia (Primary)        ED Disposition Condition    Discharge Stable          ED Prescriptions    None       Follow-up Information    None          Cherie Conway PA-C  03/11/23 0959

## 2023-03-14 ENCOUNTER — HOSPITAL ENCOUNTER (EMERGENCY)
Facility: HOSPITAL | Age: 58
Discharge: HOME | End: 2023-03-14
Attending: EMERGENCY MEDICINE
Payer: MEDICARE

## 2023-03-14 VITALS
RESPIRATION RATE: 16 BRPM | TEMPERATURE: 99 F | DIASTOLIC BLOOD PRESSURE: 89 MMHG | BODY MASS INDEX: 23.73 KG/M2 | HEART RATE: 87 BPM | SYSTOLIC BLOOD PRESSURE: 148 MMHG | WEIGHT: 147 LBS | OXYGEN SATURATION: 98 %

## 2023-03-14 DIAGNOSIS — B02.9 HERPES ZOSTER WITHOUT COMPLICATION: Primary | ICD-10-CM

## 2023-03-14 PROCEDURE — 99282 EMERGENCY DEPT VISIT SF MDM: CPT | Mod: ,,, | Performed by: PHYSICIAN ASSISTANT

## 2023-03-14 PROCEDURE — 99281 EMR DPT VST MAYX REQ PHY/QHP: CPT

## 2023-03-14 PROCEDURE — 99282 PR EMERGENCY DEPT VISIT,LEVEL II: ICD-10-PCS | Mod: ,,, | Performed by: PHYSICIAN ASSISTANT

## 2023-03-14 NOTE — DISCHARGE INSTRUCTIONS
Cleared for dialysis today based off examination  Make sure to wear a shirt that completely covers affected area while at dialysis  If blisters/rash reforms keep area covered and get re-evaluated immediately  Return to your primary care provider or to the emergency department for new or worsening symptoms

## 2023-03-14 NOTE — Clinical Note
"Enrique Cote" Juan was seen and treated in our emergency department on 3/14/2023.  He may return with no restrictions on 03/15/2023.  You may return to dialysis as long as you are wearing a shirt that covers affected area from shingles     Sincerely,      Demi Wladrop PA-C    "

## 2023-03-14 NOTE — ED PROVIDER NOTES
"Encounter Date: 3/14/2023       History     Chief Complaint   Patient presents with    Follow Up     Dx with shingles. Wants a "clearance." Denies rash or pain.     58-year-old male with pmhx ESRD (HD MWF),GERD, CAD s/p PCI, anemia, DM, gout, HLD, HTN presents for clearance to return to dialysis post shingles infection.  He was diagnosed 10 days ago in the ED.  Completed course of valacyclovir.  Reports healing of rash on upper left back.  He denies complaints today.  Denies fever, pain, paresthesias, headache, neck pain.    The history is provided by the patient. 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      Past Surgical History:   Procedure Laterality Date    ABDOMINAL SURGERY  1980s    AV FISTULA PLACEMENT Left 1/5/2023    Procedure: CREATION, AV FISTULA RADIOCEPHALIC;  Surgeon: ROHITH Pickard II, MD;  Location: [facility] OR [address] [place] [address];  Service: Vascular;  Laterality: Left;    CARDIAC CATHETERIZATION  2007    x 2    COLONOSCOPY N/A 4/5/2019    Procedure: COLONOSCOPY;  Surgeon: Jose L Carty MD;  Location: [facility] ENDO (4TH FLR);  Service: Colon and Rectal;  Laterality: N/A;    CORONARY STENT PLACEMENT  2007     Family History   Problem Relation Age of Onset    Hypertension Father     Stroke Father     Heart attack Father     Hyperlipidemia Father     No Known Problems Sister     Drug abuse Brother     Heart attack Brother     " Hypertension Brother      Social History     Tobacco Use    Smoking status: Some Days     Packs/day: 0.25     Years: 10.00     Pack years: 2.50     Types: Cigarettes    Smokeless tobacco: Never   Substance Use Topics    Alcohol use: No    Drug use: No     Review of Systems   Constitutional:  Negative for fever.   Eyes:  Negative for photophobia and pain.   Respiratory:  Negative for shortness of breath.    Cardiovascular:  Negative for chest pain.   Musculoskeletal:  Negative for neck pain and neck stiffness.   Skin:  Positive for rash. Negative for color change.   Neurological:  Negative for numbness and headaches.        Negative for paresthesias     Physical Exam     Initial Vitals [03/14/23 1408]   BP Pulse Resp Temp SpO2   (!) 148/89 87 16 98.5 °F (36.9 °C) 98 %      MAP       --         Physical Exam    Nursing note and vitals reviewed.  Constitutional: He appears well-developed and well-nourished.  Non-toxic appearance. He does not appear ill.   HENT:   Head: Normocephalic and atraumatic.   Eyes: Conjunctivae and EOM are normal.   Neck: Neck supple.   Cardiovascular:  Regular rhythm.           Pulmonary/Chest: No respiratory distress.   Musculoskeletal:      Cervical back: Neck supple.     Neurological: He is alert and oriented to person, place, and time.   Skin: No bruising and no ecchymosis noted. No erythema.   Healing rash over left scapula/upper thoracic area.  No vesicles or open wounds.  Wounds are crusted over.  No erythema or drainage. Non-tender.     Psychiatric: He has a normal mood and affect. Thought content normal.       ED Course   Procedures  Labs Reviewed - No data to display       Imaging Results    None          Medications - No data to display  Medical Decision Making:   History:   Old Medical Records: I decided to obtain old medical records.  Old Records Summarized: records from previous admission(s) and records from clinic visits.       <> Summary of Records:   03/04/2023 diagnosed with  shingles in the ED  03/05/2023 visit to the ED for shingles complication  03/09/2023 visit to the ED for shingles complication  03/11/2020 ED visit for post herpetic neuralgia  Initial Assessment:   58-year-old male with pmhx ESRD (HD MWF),GERD, CAD s/p PCI, anemia, DM, gout, HLD, HTN presents for clearance to return to dialysis post shingles infection.    Differential Diagnosis:   Shingles  ED Management:  Diagnosed with shingles 10 days ago in the ED. He completed full course of valacyclovir. Vesicles have crusted over and appear to be healing well.  No signs of infection.  Areas non-tender.  He is afebrile and denies headache, neck stiffness, or photosensitivity.  He denies pain or symptoms today.  I believe he is safe to return to dialysis at this time. Discussed keeping rash covered.  Other:   I discussed test(s) with the performing physician.                        Clinical Impression:   Final diagnoses:  [B02.9] Herpes zoster without complication (Primary)        ED Disposition Condition    Discharge Stable          ED Prescriptions    None       Follow-up Information    None       STAFF ATTENDING PHYSICIAN NOTE:  I never personally evaluated or took part in any management decisions of Enrique Martinez, but was available for discussion and consultation in the emergency department to the [facility].  ____________________  German Valentine MD, [facility]  Emergency Medicine Staff          Gene Valentine MD  07/12/23 7500

## 2023-03-15 ENCOUNTER — PATIENT OUTREACH (OUTPATIENT)
Dept: EMERGENCY MEDICINE | Facility: HOSPITAL | Age: 58
End: 2023-03-15
Payer: MEDICARE

## 2023-03-15 NOTE — PROGRESS NOTES
Esa Medrano LPN  ED Navigator  Emergency Department    Project: [facility] [place], [place] ED Navigator  Role: Community Health Worker    Date: 03/15/2023  Patient Name: Enrique Martinez  MRN: [id number]  PCP: Primary Doctor No    Assessment:     Enrique Martinez is a 58 y.o. male who has presented to ED for f/u, herpes zoster. Patient has visited the ED 9 times in the past 3 months. Patient did not contact PCP.     ED Navigator Initial Assessment    ED Navigator Enrollment Documentation  Consent to Services  Does patient consent to completing the assessment?: Yes  Contact  Method of Initial Contact: Phone  Transportation  Does the patient have issues with Transportation?: No  Does the patient have transportation to and from healthcare appointments?: Yes  Insurance Coverage  Do you have coverage/adequate coverage?: Yes  Type/kind of coverage: Medicaid/[employer]  Is patient able to afford co-pays/deductibles?: Yes  Is patient able to afford HME or supplies?: Yes  Does patient have an established [facility] PCP?: Yes  Able to access?: Yes  Does the patient have a lack of adequate coverage?: No  Specialist Appointment  Did the patient come to the ED to see a specialist?: No  Does the patient have a pending specialist referral?: No  Does the patient have a specialist appointment made?: No  PCP Follow Up Appointment  Has the patient had an appointment with a primary care provider in the past year?: Yes  Approximate date: 9/15/22  Provider: Outside Doctor  Does the patient have a follow up appontment with a PCP?: No  When was the last time you saw your PCP?: 9/15/22  Why does the patient not have a follow up scheduled?: Other (see comments) (Comment: Pt will schedule as needed.)  Medications  Is patient able to afford medication?: Yes  Is patient unable to get medication due to lack of transportation?: No  Psychological  Does the patient have psycho-social concerns?: No  Food  Does the patient have concerns about food?: Yes  What concerns  does the patient have regarding food?: Lack of food  Communication/Education  Does the patient have limited English proficiency/English not primary language?: No  Does patient have low literacy and/or low health literacy?: No  Does patient have concerns with care?: No  Does patient have dissatisfaction with care?: No  Other Financial Concerns  Does the patient have immediate financial distress?: Yes  Does the patient have general financial concerns?: Yes  Other Social Barriers/Concerns  Does the patient have any additional barriers or concerns?: Unable to afford utilities  Primary Barrier  Barriers identified: Financial barrier (health insurance, employment, etc.)  Root Cause of ED Utilization: Lack of Transportation  Plan to address Lack of Transportation: Provided patient with Medicaid transportation telephone number ([phone])  Next steps: Provided Education  Was education/educational materials provided surrounding PCP services/creating a medical home?: Yes Was education verbal or written?: Written     Was education/educational materials provided surrounding low cost, healthy foods?: Yes Was education verbal or written?: Written     Was education/educational materials provided surrounding other items? If so, use comment to explain.: Yes Was education verbal or written?: Written   Plan: Provided information for [facility] 24/7 Nurse triage line, [phone] or 1-866-[facility] ([phone])  Expected Date of Follow Up 1: 4/12/23         Social History     Socioeconomic History    Marital status:    Tobacco Use    Smoking status: Some Days     Packs/day: 0.25     Years: 10.00     Pack years: 2.50     Types: Cigarettes    Smokeless tobacco: Never   Substance and Sexual Activity    Alcohol use: No    Drug use: No     Social Determinants of Health     Financial Resource Strain: Medium Risk    Difficulty of Paying Living Expenses: Somewhat hard   Transportation Needs: No Transportation Needs    Lack of  Transportation (Medical): No    Lack of Transportation (Non-Medical): No   Physical Activity: Inactive    Days of Exercise per Week: 0 days    Minutes of Exercise per Session: 0 min   Stress: No Stress Concern Present    Feeling of Stress : Only a little   Social Connections: Socially Isolated    Frequency of Communication with Friends and Family: Three times a week    Frequency of Social Gatherings with Friends and Family: Three times a week    Attends Faith Services: Never    Active Member of Clubs or Organizations: No    Attends Club or Organization Meetings: Never    Marital Status:    Housing Stability: Unknown    Unable to Pay for Housing in the Last Year: No    Unstable Housing in the Last Year: No       Plan:   Call placed to Pt in regards to recent ER visit for f/u, herpes zoster. Pt states that he he ok.  Pt is a dialysis Pt and requests information on groceries. Patient was given food bank/pantry resources for their region.  Patient was given education on Rent/Mortgage payment assistance for their region.  Patient was given utility assistance resources for their area.  Patient was given education on (The Right Care at the Right Level information, [facility] information, and Heart healthy diet tips). Pt advised that he may reach out as needed with any questions or concerns via Email or phone. Pt verbalized understanding.   Esa Medrano    Food Insecurity: resources provided.

## 2023-03-18 ENCOUNTER — HOSPITAL ENCOUNTER (EMERGENCY)
Facility: HOSPITAL | Age: 58
Discharge: HOME | End: 2023-03-19
Attending: EMERGENCY MEDICINE
Payer: MEDICAID

## 2023-03-18 DIAGNOSIS — M25.519 SHOULDER PAIN: ICD-10-CM

## 2023-03-18 DIAGNOSIS — M54.9 BACK PAIN: ICD-10-CM

## 2023-03-18 PROCEDURE — 99284 EMERGENCY DEPT VISIT MOD MDM: CPT | Mod: ,,, | Performed by: EMERGENCY MEDICINE

## 2023-03-18 PROCEDURE — 99284 EMERGENCY DEPT VISIT MOD MDM: CPT

## 2023-03-18 PROCEDURE — 93005 ELECTROCARDIOGRAM TRACING: CPT

## 2023-03-18 PROCEDURE — 93010 ELECTROCARDIOGRAM REPORT: CPT | Mod: ,,, | Performed by: INTERNAL MEDICINE

## 2023-03-18 PROCEDURE — 93010 EKG 12-LEAD: ICD-10-PCS | Mod: ,,, | Performed by: INTERNAL MEDICINE

## 2023-03-18 PROCEDURE — 25000003 PHARM REV CODE 250: Performed by: EMERGENCY MEDICINE

## 2023-03-18 PROCEDURE — 99284 PR EMERGENCY DEPT VISIT,LEVEL IV: ICD-10-PCS | Mod: ,,, | Performed by: EMERGENCY MEDICINE

## 2023-03-18 RX ORDER — OXYCODONE HYDROCHLORIDE 5 MG/1
5 TABLET ORAL
Status: COMPLETED | OUTPATIENT
Start: 2023-03-18 | End: 2023-03-18

## 2023-03-18 RX ADMIN — OXYCODONE HYDROCHLORIDE 5 MG: 5 TABLET ORAL at 10:03

## 2023-03-18 NOTE — Clinical Note
"Enrique"Satya Martinez was seen and treated in our emergency department on 3/18/2023.  He may return to work on 03/22/2023.       If you have any questions or concerns, please don't hesitate to call.      Randy Lawson, DO"

## 2023-03-19 VITALS
DIASTOLIC BLOOD PRESSURE: 92 MMHG | OXYGEN SATURATION: 97 % | HEART RATE: 90 BPM | RESPIRATION RATE: 18 BRPM | SYSTOLIC BLOOD PRESSURE: 179 MMHG | TEMPERATURE: 98 F

## 2023-03-19 PROCEDURE — 25000003 PHARM REV CODE 250: Performed by: EMERGENCY MEDICINE

## 2023-03-19 RX ORDER — OXYCODONE HYDROCHLORIDE 5 MG/1
5 TABLET ORAL
Status: COMPLETED | OUTPATIENT
Start: 2023-03-19 | End: 2023-03-19

## 2023-03-19 RX ORDER — TRAMADOL HYDROCHLORIDE AND ACETAMINOPHEN 37.5; 325 MG/1; MG/1
1 TABLET, FILM COATED ORAL EVERY 6 HOURS PRN
Qty: 12 TABLET | Refills: 0 | Status: SHIPPED | OUTPATIENT
Start: 2023-03-19 | End: 2023-03-22

## 2023-03-19 RX ORDER — METHOCARBAMOL 500 MG/1
1000 TABLET, FILM COATED ORAL 3 TIMES DAILY
Qty: 30 TABLET | Refills: 0 | Status: SHIPPED | OUTPATIENT
Start: 2023-03-19 | End: 2023-03-24

## 2023-03-19 RX ADMIN — OXYCODONE 5 MG: 5 TABLET ORAL at 12:03
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
## 2023-04-05 ENCOUNTER — HOSPITAL ENCOUNTER (INPATIENT)
Facility: HOSPITAL | Age: 58
LOS: 3 days | Discharge: LEFT BEFORE DISCHARGE | DRG: 189 | End: 2023-04-08
Attending: EMERGENCY MEDICINE | Admitting: STUDENT IN AN ORGANIZED HEALTH CARE EDUCATION/TRAINING PROGRAM
Payer: MEDICAID

## 2023-04-05 DIAGNOSIS — R06.02 SOB (SHORTNESS OF BREATH): ICD-10-CM

## 2023-04-05 DIAGNOSIS — N18.6 ESRD (END STAGE RENAL DISEASE): Primary | ICD-10-CM

## 2023-04-05 DIAGNOSIS — R07.9 CHEST PAIN: ICD-10-CM

## 2023-04-05 DIAGNOSIS — J96.01 ACUTE HYPOXEMIC RESPIRATORY FAILURE: ICD-10-CM

## 2023-04-05 PROBLEM — I16.0 HYPERTENSIVE URGENCY: Status: ACTIVE | Noted: 2023-04-05

## 2023-04-05 LAB
ALBUMIN SERPL BCP-MCNC: 3.1 G/DL (ref 3.5–5.2)
ALLENS TEST: ABNORMAL
ALP SERPL-CCNC: 136 U/L (ref 55–135)
ALT SERPL W/O P-5'-P-CCNC: 13 U/L (ref 10–44)
ANION GAP SERPL CALC-SCNC: 13 MMOL/L (ref 8–16)
AST SERPL-CCNC: 25 U/L (ref 10–40)
BACTERIA #/AREA URNS AUTO: NORMAL /HPF
BASOPHILS # BLD AUTO: 0.03 K/UL (ref 0–0.2)
BASOPHILS NFR BLD: 0.3 % (ref 0–1.9)
BILIRUB SERPL-MCNC: 0.5 MG/DL (ref 0.1–1)
BILIRUB UR QL STRIP: NEGATIVE
BNP SERPL-MCNC: 2998 PG/ML (ref 0–99)
BUN SERPL-MCNC: 56 MG/DL (ref 6–20)
CALCIUM SERPL-MCNC: 9.6 MG/DL (ref 8.7–10.5)
CHLORIDE SERPL-SCNC: 98 MMOL/L (ref 95–110)
CLARITY UR REFRACT.AUTO: CLEAR
CO2 SERPL-SCNC: 25 MMOL/L (ref 23–29)
COLOR UR AUTO: YELLOW
CREAT SERPL-MCNC: 8.2 MG/DL (ref 0.5–1.4)
DIFFERENTIAL METHOD: ABNORMAL
EOSINOPHIL # BLD AUTO: 0.2 K/UL (ref 0–0.5)
EOSINOPHIL NFR BLD: 1.7 % (ref 0–8)
ERYTHROCYTE [DISTWIDTH] IN BLOOD BY AUTOMATED COUNT: 15.4 % (ref 11.5–14.5)
EST. GFR  (NO RACE VARIABLE): 6.9 ML/MIN/1.73 M^2
ESTIMATED AVG GLUCOSE: 77 MG/DL (ref 68–131)
GLUCOSE SERPL-MCNC: 112 MG/DL (ref 70–110)
GLUCOSE SERPL-MCNC: 127 MG/DL (ref 70–110)
GLUCOSE UR QL STRIP: NEGATIVE
HBA1C MFR BLD: 4.3 % (ref 4–5.6)
HCO3 UR-SCNC: 25.8 MMOL/L (ref 24–28)
HCT VFR BLD AUTO: 27.7 % (ref 40–54)
HGB BLD-MCNC: 8.9 G/DL (ref 14–18)
HGB UR QL STRIP: NEGATIVE
HYALINE CASTS UR QL AUTO: 0 /LPF
IMM GRANULOCYTES # BLD AUTO: 0.03 K/UL (ref 0–0.04)
IMM GRANULOCYTES NFR BLD AUTO: 0.3 % (ref 0–0.5)
INFLUENZA A, MOLECULAR: NOT DETECTED
INFLUENZA B, MOLECULAR: NOT DETECTED
KETONES UR QL STRIP: NEGATIVE
LEUKOCYTE ESTERASE UR QL STRIP: NEGATIVE
LYMPHOCYTES # BLD AUTO: 0.8 K/UL (ref 1–4.8)
LYMPHOCYTES NFR BLD: 7.7 % (ref 18–48)
MAGNESIUM SERPL-MCNC: 2.2 MG/DL (ref 1.6–2.6)
MCH RBC QN AUTO: 32.1 PG (ref 27–31)
MCHC RBC AUTO-ENTMCNC: 32.1 G/DL (ref 32–36)
MCV RBC AUTO: 100 FL (ref 82–98)
MICROSCOPIC COMMENT: NORMAL
MONOCYTES # BLD AUTO: 0.8 K/UL (ref 0.3–1)
MONOCYTES NFR BLD: 7.4 % (ref 4–15)
NEUTROPHILS # BLD AUTO: 9 K/UL (ref 1.8–7.7)
NEUTROPHILS NFR BLD: 82.6 % (ref 38–73)
NITRITE UR QL STRIP: NEGATIVE
NRBC BLD-RTO: 0 /100 WBC
PCO2 BLDA: 36.7 MMHG (ref 35–45)
PH SMN: 7.45 [PH] (ref 7.35–7.45)
PH UR STRIP: 8 [PH] (ref 5–8)
PHOSPHATE SERPL-MCNC: 2.3 MG/DL (ref 2.7–4.5)
PLATELET # BLD AUTO: 196 K/UL (ref 150–450)
PMV BLD AUTO: 9.2 FL (ref 9.2–12.9)
PO2 BLDA: 93 MMHG (ref 40–60)
POC BE: 2 MMOL/L
POC SATURATED O2: 98 % (ref 95–100)
POC TCO2: 27 MMOL/L (ref 24–29)
POCT GLUCOSE: 112 MG/DL (ref 70–110)
POCT GLUCOSE: 190 MG/DL (ref 70–110)
POCT GLUCOSE: 85 MG/DL (ref 70–110)
POTASSIUM SERPL-SCNC: 4.6 MMOL/L (ref 3.5–5.1)
PROT SERPL-MCNC: 6.5 G/DL (ref 6–8.4)
PROT UR QL STRIP: ABNORMAL
RBC # BLD AUTO: 2.77 M/UL (ref 4.6–6.2)
RBC #/AREA URNS AUTO: 1 /HPF (ref 0–4)
RSV AG BY MOLECULAR METHOD: NOT DETECTED
SAMPLE: ABNORMAL
SARS-COV-2 RNA RESP QL NAA+PROBE: NOT DETECTED
SITE: ABNORMAL
SODIUM SERPL-SCNC: 136 MMOL/L (ref 136–145)
SP GR UR STRIP: 1.01 (ref 1–1.03)
SQUAMOUS #/AREA URNS AUTO: 0 /HPF
TROPONIN I SERPL DL<=0.01 NG/ML-MCNC: 0.08 NG/ML (ref 0–0.03)
TROPONIN I SERPL DL<=0.01 NG/ML-MCNC: 0.1 NG/ML (ref 0–0.03)
URN SPEC COLLECT METH UR: ABNORMAL
WBC # BLD AUTO: 10.89 K/UL (ref 3.9–12.7)
WBC #/AREA URNS AUTO: 0 /HPF (ref 0–5)

## 2023-04-05 PROCEDURE — 82803 BLOOD GASES ANY COMBINATION: CPT

## 2023-04-05 PROCEDURE — 94660 CPAP INITIATION&MGMT: CPT

## 2023-04-05 PROCEDURE — 84484 ASSAY OF TROPONIN QUANT: CPT | Performed by: PHYSICIAN ASSISTANT

## 2023-04-05 PROCEDURE — 20600001 HC STEP DOWN PRIVATE ROOM

## 2023-04-05 PROCEDURE — 96376 TX/PRO/DX INJ SAME DRUG ADON: CPT

## 2023-04-05 PROCEDURE — S4991 NICOTINE PATCH NONLEGEND: HCPCS

## 2023-04-05 PROCEDURE — 0241U SARS-COV2 (COVID) WITH FLU/RSV BY PCR: CPT

## 2023-04-05 PROCEDURE — 99900035 HC TECH TIME PER 15 MIN (STAT)

## 2023-04-05 PROCEDURE — 63600175 PHARM REV CODE 636 W HCPCS: Performed by: PHYSICIAN ASSISTANT

## 2023-04-05 PROCEDURE — 99223 PR INITIAL HOSPITAL CARE,LEVL III: ICD-10-PCS | Mod: ,,,

## 2023-04-05 PROCEDURE — 84100 ASSAY OF PHOSPHORUS: CPT | Performed by: PHYSICIAN ASSISTANT

## 2023-04-05 PROCEDURE — 25000003 PHARM REV CODE 250

## 2023-04-05 PROCEDURE — 27000221 HC OXYGEN, UP TO 24 HOURS

## 2023-04-05 PROCEDURE — 80053 COMPREHEN METABOLIC PANEL: CPT | Performed by: PHYSICIAN ASSISTANT

## 2023-04-05 PROCEDURE — 25000003 PHARM REV CODE 250: Performed by: HOSPITALIST

## 2023-04-05 PROCEDURE — 83036 HEMOGLOBIN GLYCOSYLATED A1C: CPT | Performed by: PHYSICIAN ASSISTANT

## 2023-04-05 PROCEDURE — 27100171 HC OXYGEN HIGH FLOW UP TO 24 HOURS

## 2023-04-05 PROCEDURE — 27000190 HC CPAP FULL FACE MASK W/VALVE

## 2023-04-05 PROCEDURE — 85025 COMPLETE CBC W/AUTO DIFF WBC: CPT | Performed by: PHYSICIAN ASSISTANT

## 2023-04-05 PROCEDURE — 83880 ASSAY OF NATRIURETIC PEPTIDE: CPT | Performed by: PHYSICIAN ASSISTANT

## 2023-04-05 PROCEDURE — 99291 CRITICAL CARE FIRST HOUR: CPT | Mod: CS,,, | Performed by: PHYSICIAN ASSISTANT

## 2023-04-05 PROCEDURE — 63600175 PHARM REV CODE 636 W HCPCS: Performed by: STUDENT IN AN ORGANIZED HEALTH CARE EDUCATION/TRAINING PROGRAM

## 2023-04-05 PROCEDURE — 90935 HEMODIALYSIS ONE EVALUATION: CPT | Mod: ,,, | Performed by: NURSE PRACTITIONER

## 2023-04-05 PROCEDURE — 99223 PR INITIAL HOSPITAL CARE,LEVL III: ICD-10-PCS | Mod: 25,,, | Performed by: NURSE PRACTITIONER

## 2023-04-05 PROCEDURE — 84484 ASSAY OF TROPONIN QUANT: CPT | Mod: 91

## 2023-04-05 PROCEDURE — 63600175 PHARM REV CODE 636 W HCPCS

## 2023-04-05 PROCEDURE — 94761 N-INVAS EAR/PLS OXIMETRY MLT: CPT

## 2023-04-05 PROCEDURE — 80100014 HC HEMODIALYSIS 1:1

## 2023-04-05 PROCEDURE — 82962 GLUCOSE BLOOD TEST: CPT

## 2023-04-05 PROCEDURE — 90935 PR HEMODIALYSIS, ONE EVALUATION: ICD-10-PCS | Mod: ,,, | Performed by: NURSE PRACTITIONER

## 2023-04-05 PROCEDURE — 99223 1ST HOSP IP/OBS HIGH 75: CPT | Mod: 25,,, | Performed by: NURSE PRACTITIONER

## 2023-04-05 PROCEDURE — 99291 PR CRITICAL CARE, E/M 30-74 MINUTES: ICD-10-PCS | Mod: CS,,, | Performed by: PHYSICIAN ASSISTANT

## 2023-04-05 PROCEDURE — 99291 CRITICAL CARE FIRST HOUR: CPT

## 2023-04-05 PROCEDURE — 96374 THER/PROPH/DIAG INJ IV PUSH: CPT

## 2023-04-05 PROCEDURE — 83735 ASSAY OF MAGNESIUM: CPT | Performed by: PHYSICIAN ASSISTANT

## 2023-04-05 PROCEDURE — 99223 1ST HOSP IP/OBS HIGH 75: CPT | Mod: ,,,

## 2023-04-05 PROCEDURE — 81001 URINALYSIS AUTO W/SCOPE: CPT | Performed by: PHYSICIAN ASSISTANT

## 2023-04-05 RX ORDER — HEPARIN SODIUM 5000 [USP'U]/ML
5000 INJECTION, SOLUTION INTRAVENOUS; SUBCUTANEOUS EVERY 8 HOURS
Status: DISCONTINUED | OUTPATIENT
Start: 2023-04-05 | End: 2023-04-08 | Stop reason: HOSPADM

## 2023-04-05 RX ORDER — FUROSEMIDE 40 MG/1
40 TABLET ORAL 2 TIMES DAILY
Status: DISCONTINUED | OUTPATIENT
Start: 2023-04-05 | End: 2023-04-05

## 2023-04-05 RX ORDER — ONDANSETRON 8 MG/1
8 TABLET, ORALLY DISINTEGRATING ORAL EVERY 8 HOURS PRN
Status: DISCONTINUED | OUTPATIENT
Start: 2023-04-05 | End: 2023-04-08 | Stop reason: HOSPADM

## 2023-04-05 RX ORDER — IBUPROFEN 200 MG
1 TABLET ORAL DAILY
Status: DISCONTINUED | OUTPATIENT
Start: 2023-04-05 | End: 2023-04-08 | Stop reason: HOSPADM

## 2023-04-05 RX ORDER — LOSARTAN POTASSIUM 50 MG/1
100 TABLET ORAL DAILY
Status: DISCONTINUED | OUTPATIENT
Start: 2023-04-05 | End: 2023-04-08 | Stop reason: HOSPADM

## 2023-04-05 RX ORDER — SODIUM CHLORIDE 0.9 % (FLUSH) 0.9 %
5 SYRINGE (ML) INJECTION
Status: DISCONTINUED | OUTPATIENT
Start: 2023-04-05 | End: 2023-04-08 | Stop reason: HOSPADM

## 2023-04-05 RX ORDER — SEVELAMER CARBONATE 800 MG/1
800 TABLET, FILM COATED ORAL 3 TIMES DAILY
Status: DISCONTINUED | OUTPATIENT
Start: 2023-04-05 | End: 2023-04-08 | Stop reason: HOSPADM

## 2023-04-05 RX ORDER — ACETAMINOPHEN 325 MG/1
650 TABLET ORAL EVERY 4 HOURS PRN
Status: DISCONTINUED | OUTPATIENT
Start: 2023-04-05 | End: 2023-04-08 | Stop reason: HOSPADM

## 2023-04-05 RX ORDER — POLYETHYLENE GLYCOL 3350 17 G/17G
17 POWDER, FOR SOLUTION ORAL 2 TIMES DAILY PRN
Status: DISCONTINUED | OUTPATIENT
Start: 2023-04-05 | End: 2023-04-08 | Stop reason: HOSPADM

## 2023-04-05 RX ORDER — BENZONATATE 100 MG/1
100 CAPSULE ORAL 3 TIMES DAILY PRN
Status: DISCONTINUED | OUTPATIENT
Start: 2023-04-05 | End: 2023-04-08 | Stop reason: HOSPADM

## 2023-04-05 RX ORDER — GLUCAGON 1 MG
1 KIT INJECTION
Status: DISCONTINUED | OUTPATIENT
Start: 2023-04-05 | End: 2023-04-08 | Stop reason: HOSPADM

## 2023-04-05 RX ORDER — ATORVASTATIN CALCIUM 40 MG/1
80 TABLET, FILM COATED ORAL DAILY
Status: DISCONTINUED | OUTPATIENT
Start: 2023-04-05 | End: 2023-04-08 | Stop reason: HOSPADM

## 2023-04-05 RX ORDER — INSULIN ASPART 100 [IU]/ML
0-5 INJECTION, SOLUTION INTRAVENOUS; SUBCUTANEOUS
Status: DISCONTINUED | OUTPATIENT
Start: 2023-04-05 | End: 2023-04-08 | Stop reason: HOSPADM

## 2023-04-05 RX ORDER — TRAMADOL HYDROCHLORIDE 50 MG/1
50 TABLET ORAL 2 TIMES DAILY PRN
Status: DISCONTINUED | OUTPATIENT
Start: 2023-04-05 | End: 2023-04-06

## 2023-04-05 RX ORDER — NALOXONE HCL 0.4 MG/ML
0.02 VIAL (ML) INJECTION
Status: DISCONTINUED | OUTPATIENT
Start: 2023-04-05 | End: 2023-04-08 | Stop reason: HOSPADM

## 2023-04-05 RX ORDER — BISACODYL 10 MG
10 SUPPOSITORY, RECTAL RECTAL DAILY PRN
Status: DISCONTINUED | OUTPATIENT
Start: 2023-04-05 | End: 2023-04-08 | Stop reason: HOSPADM

## 2023-04-05 RX ORDER — NIFEDIPINE 30 MG/1
90 TABLET, EXTENDED RELEASE ORAL 2 TIMES DAILY
Status: DISCONTINUED | OUTPATIENT
Start: 2023-04-05 | End: 2023-04-08 | Stop reason: HOSPADM

## 2023-04-05 RX ORDER — ACETAMINOPHEN 500 MG
1000 TABLET ORAL EVERY 8 HOURS PRN
Status: DISCONTINUED | OUTPATIENT
Start: 2023-04-05 | End: 2023-04-08 | Stop reason: HOSPADM

## 2023-04-05 RX ORDER — ASPIRIN 81 MG/1
81 TABLET ORAL DAILY
Status: DISCONTINUED | OUTPATIENT
Start: 2023-04-05 | End: 2023-04-08 | Stop reason: HOSPADM

## 2023-04-05 RX ORDER — HYDRALAZINE HYDROCHLORIDE 50 MG/1
100 TABLET, FILM COATED ORAL EVERY 8 HOURS
Status: DISCONTINUED | OUTPATIENT
Start: 2023-04-05 | End: 2023-04-08 | Stop reason: HOSPADM

## 2023-04-05 RX ORDER — MAG HYDROX/ALUMINUM HYD/SIMETH 200-200-20
30 SUSPENSION, ORAL (FINAL DOSE FORM) ORAL 4 TIMES DAILY PRN
Status: DISCONTINUED | OUTPATIENT
Start: 2023-04-05 | End: 2023-04-08 | Stop reason: HOSPADM

## 2023-04-05 RX ORDER — GABAPENTIN 300 MG/1
300 CAPSULE ORAL 3 TIMES DAILY
Status: DISCONTINUED | OUTPATIENT
Start: 2023-04-05 | End: 2023-04-08 | Stop reason: HOSPADM

## 2023-04-05 RX ORDER — TALC
6 POWDER (GRAM) TOPICAL NIGHTLY PRN
Status: DISCONTINUED | OUTPATIENT
Start: 2023-04-05 | End: 2023-04-08 | Stop reason: HOSPADM

## 2023-04-05 RX ORDER — DEXTROSE 40 %
30 GEL (GRAM) ORAL
Status: DISCONTINUED | OUTPATIENT
Start: 2023-04-05 | End: 2023-04-08 | Stop reason: HOSPADM

## 2023-04-05 RX ORDER — PANTOPRAZOLE SODIUM 40 MG/1
40 TABLET, DELAYED RELEASE ORAL DAILY
Status: DISCONTINUED | OUTPATIENT
Start: 2023-04-05 | End: 2023-04-08 | Stop reason: HOSPADM

## 2023-04-05 RX ORDER — LABETALOL 100 MG/1
300 TABLET, FILM COATED ORAL 2 TIMES DAILY
Status: DISCONTINUED | OUTPATIENT
Start: 2023-04-05 | End: 2023-04-08 | Stop reason: HOSPADM

## 2023-04-05 RX ORDER — PROCHLORPERAZINE EDISYLATE 5 MG/ML
5 INJECTION INTRAMUSCULAR; INTRAVENOUS EVERY 6 HOURS PRN
Status: DISCONTINUED | OUTPATIENT
Start: 2023-04-05 | End: 2023-04-08 | Stop reason: HOSPADM

## 2023-04-05 RX ORDER — TAMSULOSIN HYDROCHLORIDE 0.4 MG/1
0.4 CAPSULE ORAL DAILY
Status: DISCONTINUED | OUTPATIENT
Start: 2023-04-05 | End: 2023-04-08 | Stop reason: HOSPADM

## 2023-04-05 RX ORDER — FUROSEMIDE 10 MG/ML
100 INJECTION INTRAMUSCULAR; INTRAVENOUS
Status: DISCONTINUED | OUTPATIENT
Start: 2023-04-05 | End: 2023-04-08 | Stop reason: HOSPADM

## 2023-04-05 RX ORDER — FUROSEMIDE 10 MG/ML
80 INJECTION INTRAMUSCULAR; INTRAVENOUS
Status: COMPLETED | OUTPATIENT
Start: 2023-04-05 | End: 2023-04-05

## 2023-04-05 RX ORDER — SIMETHICONE 80 MG
1 TABLET,CHEWABLE ORAL 4 TIMES DAILY PRN
Status: DISCONTINUED | OUTPATIENT
Start: 2023-04-05 | End: 2023-04-08 | Stop reason: HOSPADM

## 2023-04-05 RX ORDER — SODIUM CHLORIDE 9 MG/ML
INJECTION, SOLUTION INTRAVENOUS ONCE
Status: DISCONTINUED | OUTPATIENT
Start: 2023-04-05 | End: 2023-04-06

## 2023-04-05 RX ORDER — DEXTROSE 40 %
15 GEL (GRAM) ORAL
Status: DISCONTINUED | OUTPATIENT
Start: 2023-04-05 | End: 2023-04-08 | Stop reason: HOSPADM

## 2023-04-05 RX ADMIN — FUROSEMIDE 100 MG: 10 INJECTION, SOLUTION INTRAMUSCULAR; INTRAVENOUS at 07:04

## 2023-04-05 RX ADMIN — PANTOPRAZOLE SODIUM 40 MG: 40 TABLET, DELAYED RELEASE ORAL at 11:04

## 2023-04-05 RX ADMIN — NIFEDIPINE 90 MG: 30 TABLET, FILM COATED, EXTENDED RELEASE ORAL at 09:04

## 2023-04-05 RX ADMIN — HEPARIN SODIUM 5000 UNITS: 5000 INJECTION INTRAVENOUS; SUBCUTANEOUS at 09:04

## 2023-04-05 RX ADMIN — SEVELAMER CARBONATE 800 MG: 800 TABLET, FILM COATED ORAL at 03:04

## 2023-04-05 RX ADMIN — SEVELAMER CARBONATE 800 MG: 800 TABLET, FILM COATED ORAL at 11:04

## 2023-04-05 RX ADMIN — FUROSEMIDE 100 MG: 10 INJECTION, SOLUTION INTRAMUSCULAR; INTRAVENOUS at 09:04

## 2023-04-05 RX ADMIN — GABAPENTIN 300 MG: 300 CAPSULE ORAL at 03:04

## 2023-04-05 RX ADMIN — HYDRALAZINE HYDROCHLORIDE 100 MG: 50 TABLET ORAL at 03:04

## 2023-04-05 RX ADMIN — LOSARTAN POTASSIUM 100 MG: 50 TABLET, FILM COATED ORAL at 11:04

## 2023-04-05 RX ADMIN — NIFEDIPINE 90 MG: 30 TABLET, FILM COATED, EXTENDED RELEASE ORAL at 06:04

## 2023-04-05 RX ADMIN — HEPARIN SODIUM 5000 UNITS: 5000 INJECTION INTRAVENOUS; SUBCUTANEOUS at 03:04

## 2023-04-05 RX ADMIN — LABETALOL HYDROCHLORIDE 300 MG: 100 TABLET, FILM COATED ORAL at 09:04

## 2023-04-05 RX ADMIN — GABAPENTIN 300 MG: 300 CAPSULE ORAL at 11:04

## 2023-04-05 RX ADMIN — HYDRALAZINE HYDROCHLORIDE 100 MG: 50 TABLET ORAL at 06:04

## 2023-04-05 RX ADMIN — LABETALOL HYDROCHLORIDE 300 MG: 100 TABLET, FILM COATED ORAL at 06:04

## 2023-04-05 RX ADMIN — ATORVASTATIN CALCIUM 80 MG: 40 TABLET, FILM COATED ORAL at 11:04

## 2023-04-05 RX ADMIN — ASPIRIN 81 MG: 81 TABLET, COATED ORAL at 11:04

## 2023-04-05 RX ADMIN — TRAMADOL HYDROCHLORIDE 50 MG: 50 TABLET, COATED ORAL at 09:04

## 2023-04-05 RX ADMIN — FUROSEMIDE 80 MG: 10 INJECTION, SOLUTION INTRAMUSCULAR; INTRAVENOUS at 12:04

## 2023-04-05 RX ADMIN — SEVELAMER CARBONATE 800 MG: 800 TABLET, FILM COATED ORAL at 09:04

## 2023-04-05 RX ADMIN — GABAPENTIN 300 MG: 300 CAPSULE ORAL at 09:04

## 2023-04-05 RX ADMIN — NICOTINE 1 PATCH: 14 PATCH, EXTENDED RELEASE TRANSDERMAL at 11:04

## 2023-04-05 RX ADMIN — HYDRALAZINE HYDROCHLORIDE 100 MG: 50 TABLET ORAL at 09:04

## 2023-04-05 RX ADMIN — TAMSULOSIN HYDROCHLORIDE 0.4 MG: 0.4 CAPSULE ORAL at 11:04

## 2023-04-05 NOTE — CONSULTS
[facility] - Emergency Dept  Nephrology  Consult Note    Patient Name: Enrique Martinez  MRN: [id number]  Admission Date: 4/5/2023  Hospital Length of Stay: 0 days  Attending Provider: Agueda Mcneill MD   Primary Care Physician: Primary Doctor No  Principal Problem:Acute hypoxemic respiratory failure    Inpatient consult to Nephrology  Consult performed by: Shelai Mejia DNP, FNRAUL-C  Consult ordered by: Demi Martínez PA-C  Reason for consult: ESRD    Inpatient consult to Nephrology  Consult performed by: Shelia Mejia DNP, FNSANTANAC  Consult ordered by: Jung Alicia PA-C  Reason for consult: ESRD        Subjective:     HPI: The patient is a 58 y.o. Black or  Male with multiple co morbidities including ESRD on HD MWF, CAD, DM, GERD, HTN, membranous nephropathy, and nephrotic syndrome who presents to ED on 4/5/2023 with complaints of shortness of breath. The patient is unable to provide adequate history. Additional history and patient information was obtained from past medical records and ER records. Per chart, the patient completed a full session of HD on Monday but developed shortness of breath that night prompting him to present to the ED. Upon arrival to the ED, /142 & SpO2 84% on RA w/ accessory muscle use. Placed on BiPAP. Troponin 0.077, BNP 2,998. CXR with pulmonary edema. Given lasix 80 mg IV. HD emergently initiated in the HD. Appears to be resting on 6 L HFNC. Nephrology consulted for management of ESRD and HD treatment.           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    Umbilical hernia        Past Surgical History:   Procedure Laterality Date    ABDOMINAL SURGERY  1980s    AV FISTULA PLACEMENT Left 1/5/2023    Procedure: CREATION, AV FISTULA RADIOCEPHALIC;  Surgeon: ROHITH Pickard II, MD;  Location: [facility] OR 2ND FLR;  Service: Vascular;  Laterality: Left;    CARDIAC CATHETERIZATION  2007    x 2    COLONOSCOPY N/A 4/5/2019    Procedure: COLONOSCOPY;  Surgeon: Jose L Carty MD;  Location: [facility] ENDO (4TH FLR);  Service: Colon and Rectal;  Laterality: N/A;    CORONARY STENT PLACEMENT  2007       Review of patient's allergies indicates:  No Known Allergies  Current Facility-Administered Medications   Medication Frequency    0.9%  NaCl infusion Once    acetaminophen tablet 1,000 mg Q8H PRN    acetaminophen tablet 650 mg Q4H PRN    aluminum-magnesium hydroxide-simethicone 200-200-20 mg/5 mL suspension 30 mL QID PRN    aspirin EC tablet 81 mg Daily    atorvastatin tablet 80 mg Daily    benzonatate capsule 100 mg TID PRN    bisacodyL suppository 10 mg Daily PRN    dextrose 10% bolus 125 mL 125 mL PRN    dextrose 10% bolus 250 mL 250 mL PRN    dextrose 40 % gel 15,000 mg PRN    dextrose 40 % gel 30,000 mg PRN    furosemide injection 100 mg Q12H    gabapentin capsule 300 mg TID    glucagon (human recombinant) injection 1 mg PRN    heparin (porcine) injection 5,000 Units Q8H    hydrALAZINE tablet 100 mg Q8H    insulin aspart U-100 pen 0-5 Units QID (AC + HS) PRN    labetaloL tablet 300 mg BID    losartan tablet 100 mg Daily    melatonin tablet 6 mg Nightly PRN    naloxone 0.4 mg/mL injection 0.02 mg PRN    nicotine 14 mg/24 hr 1 patch Daily    NIFEdipine 24 hr tablet 90 mg BID    ondansetron disintegrating tablet 8 mg Q8H PRN    pantoprazole EC tablet 40 mg Daily    polyethylene glycol packet 17 g BID PRN    prochlorperazine injection Soln 5 mg Q6H PRN    sevelamer carbonate  tablet 800 mg TID    simethicone chewable tablet 80 mg QID PRN    sodium chloride 0.9% flush 5 mL PRN    tamsulosin 24 hr capsule 0.4 mg Daily     Current Outpatient Medications   Medication    albuterol (PROVENTIL/VENTOLIN HFA) 90 mcg/actuation inhaler    aspirin (ECOTRIN) 81 MG EC tablet    blood-glucose meter kit    cloNIDine 0.3 mg/24 hr td ptwk (CATAPRES) 0.3 mg/24 hr    colchicine (COLCRYS) 0.6 mg tablet    famotidine (PEPCID) 20 MG tablet    furosemide (LASIX) 40 MG tablet    gabapentin (NEURONTIN) 100 MG capsule    guaiFENesin 100 mg/5 ml (ROBITUSSIN) 100 mg/5 mL syrup    hydrALAZINE (APRESOLINE) 100 MG tablet    HYDROcodone-acetaminophen (NORCO) 5-325 mg per tablet    labetaloL (NORMODYNE) 300 MG tablet    LIDOcaine-prilocaine (EMLA) cream    losartan (COZAAR) 100 MG tablet    melatonin (MELATIN) 3 mg tablet    NIFEdipine (PROCARDIA-XL) 90 MG (OSM) 24 hr tablet    omeprazole (PRILOSEC) 20 MG capsule    ondansetron (ZOFRAN-ODT) 8 MG TbDL    RENVELA 800 mg Tab    sildenafiL (VIAGRA) 100 MG tablet    tadalafiL (CIALIS) 2.5 mg Tab    tamsulosin (FLOMAX) 0.4 mg Cap    traMADoL (ULTRAM) 50 mg tablet    TRUE METRIX GLUCOSE TEST STRIP Strp    valACYclovir (VALTREX) 1000 MG tablet     Family History       Problem Relation (Age of Onset)    Drug abuse Brother    Heart attack Father, Brother    Hyperlipidemia Father    Hypertension Father, Brother    No Known Problems Sister    Stroke Father          Tobacco Use    Smoking status: Some Days     Packs/day: 0.25     Years: 10.00     Pack years: 2.50     Types: Cigarettes    Smokeless tobacco: Never   Substance and Sexual Activity    Alcohol use: No    Drug use: No    Sexual activity: Not on file     Review of Systems   Unable to perform ROS: Acuity of condition   Objective:     Vital Signs (Most Recent):  Temp: 100.2 °F (37.9 °C) (04/05/23 0700)  Pulse: 87 (04/05/23 1000)  Resp: (!) 22 (04/05/23 0900)  BP: (!) 165/79 (04/05/23  1000)  SpO2: 97 % (04/05/23 1000)   Vital Signs (24h Range):  Temp:  [99.1 °F (37.3 °C)-100.2 °F (37.9 °C)] 100.2 °F (37.9 °C)  Pulse:  [] 87  Resp:  [16-33] 22  SpO2:  [84 %-99 %] 97 %  BP: (139-222)/() 165/79        There is no height or weight on file to calculate BMI.  There is no height or weight on file to calculate BSA.    No intake/output data recorded.    Physical Exam  Vitals and nursing note reviewed.   Constitutional:       General: He is sleeping.      Appearance: He is well-developed. He is ill-appearing.      Interventions: Nasal cannula in place.   HENT:      Head: Normocephalic and atraumatic.      Nose:      Comments: NC in place     Mouth/Throat:      Pharynx: No oropharyngeal exudate.   Eyes:      Extraocular Movements: Extraocular movements intact.      Conjunctiva/sclera: Conjunctivae normal.   Cardiovascular:      Rate and Rhythm: Normal rate and regular rhythm.      Heart sounds: Normal heart sounds.      Arteriovenous access: Left arteriovenous access is present.  Pulmonary:      Effort: Respiratory distress present.   Abdominal:      General: Bowel sounds are normal. There is no distension.      Palpations: Abdomen is soft.      Tenderness: There is no abdominal tenderness.   Musculoskeletal:      Cervical back: Normal range of motion and neck supple.      Right lower leg: Edema present.      Left lower leg: Edema present.   Skin:     General: Skin is warm and dry.   Neurological:      Mental Status: He is easily aroused.       Significant Labs:  CBC:   Recent Labs   Lab 04/05/23  0043   WBC 10.89   RBC 2.77*   HGB 8.9*   HCT 27.7*      *   MCH 32.1*   MCHC 32.1     CMP:   Recent Labs   Lab 04/05/23 0043   *   CALCIUM 9.6   ALBUMIN 3.1*   PROT 6.5      K 4.6   CO2 25   CL 98   BUN 56*   CREATININE 8.2*   ALKPHOS 136*   ALT 13   AST 25   BILITOT 0.5     All labs within the past 24 hours have been reviewed.      Assessment/Plan:     Pulmonary  * Acute  hypoxemic respiratory failure  - HD for volume removal     Renal/  ESRD (end stage renal disease)  58 y.o. Black or  Male ESRD-HD M-W-F presents to ED on 4/5/2023 with diagnosis of: SOB (shortness of breath) [R06.02];Acute hypoxemic respiratory failure [J96.01]   Nephrology consulted for inpatient ESRD-HD management    2/2 Membranous nephropathy (bx proven), DM II, and HTN , on HD since 2/2022  Outpatient HD Information:  -Dialysis modality: Hemodialysis  -Outpatient HD unit: [facility]  -Nephrologist: Dr. Saez   -HD TX days: Monday/Wednesday/Friday, duration of treatment: 210 mins  -Last HD TX prior to hospital admission: 04/03/23  -Dialysis access: L AVF  -Residual urine: oliguric    -EDW: 61kg      Assessment:   - Will provide dialysis today (04/05/2023) with UF -3 L as BP tolerates for metabolic clearance and volume management   - Dialysis for metabolic clearance and volume management will be provided again tomorrow AM.   - Seen on HD. No complaints.   - Duration: 3 hrs  - Labs reviewed and dialysate to be adjusted to current labs.   - Will obtain OP dialysis records  - Continue to monitor intake and output  - Please avoid gadolinium, fleets, phos-based laxatives, NSAIDs  - Dialysis thrice weekly unless more urgent indications arise. Will evaluate RRT requirements Daily.    Anemia of ESRD   Recent Labs   Lab 04/05/23  0043   WBC 10.89   HGB 8.9*   HCT 27.7*        Lab Results   Component Value Date    FESATURATED 4 (L) 02/17/2022    FERRITIN 384 (H) 02/17/2022       - Goal in ESRD is Hgb of 10-11. Hgb 8.9.  - Will request iron studies in AM to assess further needs of supplementation   - Will review chronic care plan from outpatient dialysis center for ELISABET dosing.     Mineral Bone Disease in ESRD   Lab Results   Component Value Date    .3 (H) 02/17/2022    CALCIUM 9.6 04/05/2023    PHOS 2.3 (L) 04/05/2023       - F/U PO4, Mg, Calcium. And albumin levels.   - Renal diet with  protein intake goal 1.5 g/kg/d with 1 L fluid restriction   - Novasource with meals  - Continue home phos binder   - Daily renal panel so that phos and albumin is monitored daily.           Thank you for your consult. I will follow-up with patient. Please contact us if you have any additional questions.    Shelia Mejia DNP, FNP-C  Nephrology  [facility] - Emergency Dept

## 2023-04-05 NOTE — Clinical Note
Diagnosis: SOB (shortness of breath) [090505]   Future Attending Provider: PETER PRICE [[id number]]   Admitting Provider:: PETER PRICE [[id number]]

## 2023-04-05 NOTE — PROGRESS NOTES
Hd bedside 1:1 tx started. Lt lower arm av fistula + bruit and thrill 16g x2 lines taped and secured. Following orders:  Antibiotics on HD? No   Duration of Treatment 3 hours   Dialyzer F160   Dialysate Temperature (C) 36.5    mL/min    mL/min   K+ Potassium per Protocol   Ca++ Calcium per Protocol   Na+ Sodium per Protocol   Bicarb Bicarbonate per Protocol   Access Other (please specify)   Fluid Removal (L) 3L   Per following labs:   Latest Reference Range & Units Most Recent   WBC 3.90 - 12.70 K/uL 10.89  4/5/23 00:43   RBC 4.60 - 6.20 M/uL 2.77 (L)  4/5/23 00:43   Hemoglobin 14.0 - 18.0 g/dL 8.9 (L)  4/5/23 00:43   Hematocrit 40.0 - 54.0 % 27.7 (L)  4/5/23 00:43   MCV 82 - 98 fL 100 (H)  4/5/23 00:43   MCH 27.0 - 31.0 pg 32.1 (H)  4/5/23 00:43   MCHC 32.0 - 36.0 g/dL 32.1  4/5/23 00:43   RDW 11.5 - 14.5 % 15.4 (H)  4/5/23 00:43   Platelets 150 - 450 K/uL 196  4/5/23 00:43   MPV 9.2 - 12.9 fL 9.2  4/5/23 00:43   Gran % 38.0 - 73.0 % 82.6 (H)  4/5/23 00:43   Lymph % 18.0 - 48.0 % 7.7 (L)  4/5/23 00:43   Mono % 4.0 - 15.0 % 7.4  4/5/23 00:43   Eosinophil % 0.0 - 8.0 % 1.7  4/5/23 00:43   Basophil % 0.0 - 1.9 % 0.3  4/5/23 00:43   Immature Granulocytes 0.0 - 0.5 % 0.3  4/5/23 00:43   Gran # (ANC) 1.8 - 7.7 K/uL 9.0 (H)  4/5/23 00:43   Lymph # 1.0 - 4.8 K/uL 0.8 (L)  4/5/23 00:43   Mono # 0.3 - 1.0 K/uL 0.8  4/5/23 00:43   Eos # 0.0 - 0.5 K/uL 0.2  4/5/23 00:43   (L): Data is abnormally low  (H): Data is abnormally hig     Latest Reference Range & Units Most Recent   Sodium 136 - 145 mmol/L 136  4/5/23 00:43   Potassium 3.5 - 5.1 mmol/L 4.6  4/5/23 00:43   Chloride 95 - 110 mmol/L 98  4/5/23 00:43   CO2 23 - 29 mmol/L 25  4/5/23 00:43   Anion Gap 8 - 16 mmol/L 13  4/5/23 00:43   BUN 6 - 20 mg/dL 56 (H)  4/5/23 00:43   Creatinine 0.5 - 1.4 mg/dL 8.2 (H)  4/5/23 00:43   eGFR >60 mL/min/1.73 m^2 6.9 !  4/5/23 00:43   eGFR if non African American >60 mL/min/1.73 m^2 10.0 !  4/1/22 14:27   eGFR if   American >60 mL/min/1.73 m^2 11.6 !  4/1/22 14:27   Glucose 70 - 110 mg/dL 127 (H)  4/5/23 00:43   Calcium 8.7 - 10.5 mg/dL 9.6  4/5/23 00:43   Phosphorus 2.7 - 4.5 mg/dL 2.3 (L)  4/5/23 00:43   Magnesium 1.6 - 2.6 mg/dL 2.2  4/5/23 00:43   Alkaline Phosphatase 55 - 135 U/L 136 (H)  4/5/23 00:43   PROTEIN TOTAL 6.0 - 8.4 g/dL 6.5  4/5/23 00:43   Albumin 3.5 - 5.2 g/dL 3.1 (L)  4/5/23 00:43   Uric Acid 3.4 - 7.0 mg/dL  3.4 - 7.0 mg/dL 15.1 (H)  2/18/22 06:22  15.1 (H)  2/18/22 06:22   BILIRUBIN TOTAL 0.1 - 1.0 mg/dL 0.5  4/5/23 00:43   Bilirubin Direct 0.1 - 0.3 mg/dL 0.2  12/10/20 12:11   AST 10 - 40 U/L 25  4/5/23 00:43   (H): Data is abnormally high  !: Data is abnormal  (L): Data is abnormally low

## 2023-04-05 NOTE — SUBJECTIVE & OBJECTIVE
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        Past Surgical History:   Procedure Laterality Date    ABDOMINAL SURGERY  1980s    AV FISTULA PLACEMENT Left 1/5/2023    Procedure: CREATION, AV FISTULA RADIOCEPHALIC;  Surgeon: ROHITH Pickard II, MD;  Location: [facility] OR 2ND FLR;  Service: Vascular;  Laterality: Left;    CARDIAC CATHETERIZATION  2007    x 2    COLONOSCOPY N/A 4/5/2019    Procedure: COLONOSCOPY;  Surgeon: Jose L Carty MD;  Location: [facility] ENDO (4TH FLR);  Service: Colon and Rectal;  Laterality: N/A;    CORONARY STENT PLACEMENT  2007       Review of patient's allergies indicates:  No Known Allergies    No current facility-administered medications on file prior to encounter.     Current Outpatient Medications on File Prior to Encounter   Medication Sig    albuterol (PROVENTIL/VENTOLIN HFA) 90 mcg/actuation inhaler Inhale 2 puffs into the lungs every 6 (six) hours as needed for Wheezing or Shortness of Breath. Rescue    aspirin (ECOTRIN) 81 MG EC tablet Take 1 tablet (81 mg total) by mouth once daily.    blood-glucose meter kit Use as instructed    cloNIDine 0.3 mg/24 hr td ptwk (CATAPRES) 0.3 mg/24 hr Place 1 patch onto the skin every 7 days.    colchicine (COLCRYS) 0.6 mg tablet Take 0.6 mg by mouth daily as needed.    famotidine (PEPCID) 20 MG tablet Take 20 mg by mouth daily as needed for Heartburn.     furosemide (LASIX) 40 MG tablet Take 1 tablet (40 mg total) by mouth 2 (two) times daily.    gabapentin  (NEURONTIN) 100 MG capsule Take 1 capsule (100 mg total) by mouth once daily. (Patient taking differently: Take 300 mg by mouth 3 (three) times daily.)    guaiFENesin 100 mg/5 ml (ROBITUSSIN) 100 mg/5 mL syrup Take 10 mLs (200 mg total) by mouth 3 (three) times daily as needed for Cough.    hydrALAZINE (APRESOLINE) 100 MG tablet Take 1 tablet (100 mg total) by mouth every 8 (eight) hours.    HYDROcodone-acetaminophen (NORCO) 5-325 mg per tablet Take 1 tablet by mouth every 6 (six) hours as needed for Pain.    labetaloL (NORMODYNE) 300 MG tablet Take 1 tablet (300 mg total) by mouth 2 (two) times daily.    LIDOcaine-prilocaine (EMLA) cream Apply topically 2 (two) times daily as needed.    losartan (COZAAR) 100 MG tablet Take 1 tablet (100 mg total) by mouth once daily.    melatonin (MELATIN) 3 mg tablet Take 2 tablets (6 mg total) by mouth nightly as needed for Insomnia.    NIFEdipine (PROCARDIA-XL) 90 MG (OSM) 24 hr tablet Take 1 tablet (90 mg total) by mouth 2 (two) times a day.    omeprazole (PRILOSEC) 20 MG capsule Take 20 mg by mouth once daily.    ondansetron (ZOFRAN-ODT) 8 MG TbDL Take 1 tablet (8 mg total) by mouth every 8 (eight) hours as needed.    RENVELA 800 mg Tab Take 800 mg by mouth 3 (three) times daily.    sildenafiL (VIAGRA) 100 MG tablet Take 100 mg by mouth.    tadalafiL (CIALIS) 2.5 mg Tab Take 2.5 mg by mouth.    tamsulosin (FLOMAX) 0.4 mg Cap Take 1 capsule by mouth.    traMADoL (ULTRAM) 50 mg tablet Take 50 mg by mouth daily as needed.    TRUE METRIX GLUCOSE TEST STRIP Strp     valACYclovir (VALTREX) 1000 MG tablet Take 0.5 tablets (500 mg total) by mouth once daily. for 7 days    [DISCONTINUED] chlorthalidone (HYGROTEN) 50 MG Tab Take 50 mg by mouth once daily.     [DISCONTINUED] insulin (LANTUS SOLOSTAR U-100 INSULIN) glargine 100 units/mL (3mL) SubQ pen Inject 5 Units into the skin once daily.     Family History       Problem Relation (Age of Onset)    Drug abuse Brother    Heart attack  Father, Brother    Hyperlipidemia Father    Hypertension Father, Brother    No Known Problems Sister    Stroke Father          Tobacco Use    Smoking status: Some Days     Packs/day: 0.25     Years: 10.00     Pack years: 2.50     Types: Cigarettes    Smokeless tobacco: Never   Substance and Sexual Activity    Alcohol use: No    Drug use: No    Sexual activity: Not on file     Review of Systems   Constitutional:  Negative for chills and fever.   HENT:  Negative for trouble swallowing.    Eyes:  Negative for photophobia and visual disturbance.   Respiratory:  Positive for cough (non productive) and shortness of breath.    Cardiovascular:  Negative for chest pain and palpitations.   Gastrointestinal:  Negative for nausea and vomiting.   Genitourinary:  Positive for decreased urine volume. Negative for urgency.   Musculoskeletal:  Negative for gait problem and myalgias.   Skin:  Negative for color change and rash.   Neurological:  Negative for syncope and headaches.   Psychiatric/Behavioral:  Positive for agitation. Negative for confusion.    Objective:     Vital Signs (Most Recent):  Temp: 99.1 °F (37.3 °C) (04/05/23 0132)  Pulse: 96 (04/05/23 0402)  Resp: 16 (04/05/23 0402)  BP: (!) 173/86 (04/05/23 0402)  SpO2: (!) 94 % (04/05/23 0402)   Vital Signs (24h Range):  Temp:  [99.1 °F (37.3 °C)] 99.1 °F (37.3 °C)  Pulse:  [] 96  Resp:  [16-33] 16  SpO2:  [84 %-99 %] 94 %  BP: (173-222)/() 173/86        There is no height or weight on file to calculate BMI.    Physical Exam  Vitals and nursing note reviewed.   Constitutional:       Appearance: He is well-developed.   HENT:      Head: Normocephalic and atraumatic.      Nose:      Comments: NC in place     Mouth/Throat:      Pharynx: No oropharyngeal exudate.   Eyes:      Extraocular Movements: Extraocular movements intact.      Conjunctiva/sclera: Conjunctivae normal.   Cardiovascular:      Rate and Rhythm: Normal rate and regular rhythm.      Heart sounds:  Normal heart sounds.   Pulmonary:      Breath sounds: Wheezing present.      Comments: Appears uncomfortable breathing, use of accessory muscles noted  Decreased breath sounds   Abdominal:      General: Bowel sounds are normal. There is no distension.      Palpations: Abdomen is soft.      Tenderness: There is no abdominal tenderness.   Musculoskeletal:      Cervical back: Normal range of motion and neck supple.      Right lower leg: Edema present.      Left lower leg: Edema present.   Skin:     General: Skin is warm and dry.   Neurological:      General: No focal deficit present.      Mental Status: He is alert and oriented to person, place, and time.   Psychiatric:      Comments: Agitated           Significant Labs: All pertinent labs within the past 24 hours have been reviewed.  CBC:   Recent Labs   Lab 04/05/23 0043   WBC 10.89   HGB 8.9*   HCT 27.7*        CMP:   Recent Labs   Lab 04/05/23 0043      K 4.6   CL 98   CO2 25   *   BUN 56*   CREATININE 8.2*   CALCIUM 9.6   PROT 6.5   ALBUMIN 3.1*   BILITOT 0.5   ALKPHOS 136*   AST 25   ALT 13   ANIONGAP 13     Cardiac Markers:   Recent Labs   Lab 04/05/23 0043   BNP 2,998*     Magnesium:   Recent Labs   Lab 04/05/23 0043   MG 2.2     Troponin:   Recent Labs   Lab 04/05/23 0043   TROPONINI 0.077*     Urine Studies:   Recent Labs   Lab 04/05/23  0247   COLORU Yellow   APPEARANCEUA Clear   PHUR 8.0   SPECGRAV 1.010   PROTEINUA 1+*   GLUCUA Negative   KETONESU Negative   BILIRUBINUA Negative   OCCULTUA Negative   NITRITE Negative   LEUKOCYTESUR Negative   RBCUA 1   WBCUA 0   BACTERIA Rare   SQUAMEPITHEL 0   HYALINECASTS 0       Significant Imaging: I have reviewed all pertinent imaging results/findings within the past 24 hours.  X-Ray Chest AP Portable  Narrative: EXAMINATION:  XR CHEST AP PORTABLE    CLINICAL HISTORY:  Shortness of breath    TECHNIQUE:  Single frontal view of the chest was  performed.    COMPARISON:  10/03/2022.    FINDINGS:  Right-sided central catheter tip overlies the SVC.  No pneumothorax.    Cardiomegaly, perihilar edema and small right effusion, similar to prior.    Heart and lungs otherwise appear unchanged when allowing for differences in technique and positioning.  Impression: Right-sided central catheter tip overlies the SVC.  No pneumothorax.    Cardiomegaly, perihilar edema and small right effusion, similar to prior.    Electronically signed by: Jose El MD  Date:    04/05/2023  Time:    01:44

## 2023-04-05 NOTE — ASSESSMENT & PLAN NOTE
-Patient on HD MWF, last HD Monday(full session) presenting with pulmonary edema, fluid overload.   -Nephrology consulted, appreciate recs

## 2023-04-05 NOTE — H&P
[facility] - Emergency Dept  Hospital Medicine  History & Physical    Patient Name: Enrique Martinez  MRN: [id number]  Patient Class: OP- Observation  Admission Date: 4/5/2023  Attending Physician: Agueda Mcneill MD   Primary Care Provider: Primary Doctor No         Patient information was obtained from patient, past medical records and ER records.     Subjective:     Principal Problem:Acute hypoxemic respiratory failure    Chief Complaint:   Chief Complaint   Patient presents with    Shortness of Breath     Sob since yesterday, MWF dialysis, full session on Monday. Increased work of breathing, cough        HPI: Enrique Martinez is a 58-year-old male with ESRD on HD MWF, CAD, DM, GERD, HTN, membranous nephropathy, nephrotic syndrome who presents to the ED for acute onset dyspnea. Last dialysis was Monday, full session.  Developed shortness of breath acutely yesterday which worsened overnight prompting him to present to ED. Complains of new dry cough but no other complaints. Denies chest pain, fever, chills, abdominal pain, nausea, vomiting, congestion. States he is compliant with home meds. Makes urine still and reports taking 80 mg lasix PO BID.    Patient irritated on evaluation, cursing and saying he wants to leave AMA. When explained risks of leaving AMA to patient including risk of death, he agreed to stay for dialysis and better BP control.    Upon arrival to the ED, /142 & SpO2 84% on RA w/ accessory muscle use. Placed on BiPAP for 1 hour then transitioned to 4L NC satting 94%. Troponin 0.077, BNP 2,998. CXR with pulmonary edema. Given lasix 80 mg IV. Admitted to hospital medicine.      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    Umbilical hernia        Past Surgical History:   Procedure Laterality Date    ABDOMINAL SURGERY  1980s    AV FISTULA PLACEMENT Left 1/5/2023    Procedure: CREATION, AV FISTULA RADIOCEPHALIC;  Surgeon: ROHITH Pickard II, MD;  Location: [facility] OR 2ND FLR;  Service: Vascular;  Laterality: Left;    CARDIAC CATHETERIZATION  2007    x 2    COLONOSCOPY N/A 4/5/2019    Procedure: COLONOSCOPY;  Surgeon: Jose L Carty MD;  Location: [facility] ENDO (4TH FLR);  Service: Colon and Rectal;  Laterality: N/A;    CORONARY STENT PLACEMENT  2007       Review of patient's allergies indicates:  No Known Allergies    No current facility-administered medications on file prior to encounter.     Current Outpatient Medications on File Prior to Encounter   Medication Sig    albuterol (PROVENTIL/VENTOLIN HFA) 90 mcg/actuation inhaler Inhale 2 puffs into the lungs every 6 (six) hours as needed for Wheezing or Shortness of Breath. Rescue    aspirin (ECOTRIN) 81 MG EC tablet Take 1 tablet (81 mg total) by mouth once daily.    blood-glucose meter kit Use as instructed    cloNIDine 0.3 mg/24 hr td ptwk (CATAPRES) 0.3 mg/24 hr Place 1 patch onto the skin every 7 days.    colchicine (COLCRYS) 0.6 mg tablet Take 0.6 mg by mouth daily as needed.    famotidine (PEPCID) 20 MG tablet Take 20 mg by mouth daily as needed for Heartburn.     furosemide (LASIX) 40 MG tablet Take 1 tablet (40 mg total) by mouth 2 (two) times daily.    gabapentin (NEURONTIN) 100 MG capsule Take 1 capsule (100 mg total) by mouth once daily. (Patient taking differently: Take 300 mg by mouth 3 (three) times daily.)    guaiFENesin 100 mg/5 ml (ROBITUSSIN) 100 mg/5 mL syrup Take 10 mLs (200 mg total) by mouth 3 (three) times daily as needed for Cough.    hydrALAZINE  (APRESOLINE) 100 MG tablet Take 1 tablet (100 mg total) by mouth every 8 (eight) hours.    HYDROcodone-acetaminophen (NORCO) 5-325 mg per tablet Take 1 tablet by mouth every 6 (six) hours as needed for Pain.    labetaloL (NORMODYNE) 300 MG tablet Take 1 tablet (300 mg total) by mouth 2 (two) times daily.    LIDOcaine-prilocaine (EMLA) cream Apply topically 2 (two) times daily as needed.    losartan (COZAAR) 100 MG tablet Take 1 tablet (100 mg total) by mouth once daily.    melatonin (MELATIN) 3 mg tablet Take 2 tablets (6 mg total) by mouth nightly as needed for Insomnia.    NIFEdipine (PROCARDIA-XL) 90 MG (OSM) 24 hr tablet Take 1 tablet (90 mg total) by mouth 2 (two) times a day.    omeprazole (PRILOSEC) 20 MG capsule Take 20 mg by mouth once daily.    ondansetron (ZOFRAN-ODT) 8 MG TbDL Take 1 tablet (8 mg total) by mouth every 8 (eight) hours as needed.    RENVELA 800 mg Tab Take 800 mg by mouth 3 (three) times daily.    sildenafiL (VIAGRA) 100 MG tablet Take 100 mg by mouth.    tadalafiL (CIALIS) 2.5 mg Tab Take 2.5 mg by mouth.    tamsulosin (FLOMAX) 0.4 mg Cap Take 1 capsule by mouth.    traMADoL (ULTRAM) 50 mg tablet Take 50 mg by mouth daily as needed.    TRUE METRIX GLUCOSE TEST STRIP Strp     valACYclovir (VALTREX) 1000 MG tablet Take 0.5 tablets (500 mg total) by mouth once daily. for 7 days    [DISCONTINUED] chlorthalidone (HYGROTEN) 50 MG Tab Take 50 mg by mouth once daily.     [DISCONTINUED] insulin (LANTUS SOLOSTAR U-100 INSULIN) glargine 100 units/mL (3mL) SubQ pen Inject 5 Units into the skin once daily.     Family History       Problem Relation (Age of Onset)    Drug abuse Brother    Heart attack Father, Brother    Hyperlipidemia Father    Hypertension Father, Brother    No Known Problems Sister    Stroke Father          Tobacco Use    Smoking status: Some Days     Packs/day: 0.25     Years: 10.00     Pack years: 2.50     Types: Cigarettes    Smokeless tobacco: Never   Substance  and Sexual Activity    Alcohol use: No    Drug use: No    Sexual activity: Not on file     Review of Systems   Constitutional:  Negative for chills and fever.   HENT:  Negative for trouble swallowing.    Eyes:  Negative for photophobia and visual disturbance.   Respiratory:  Positive for cough (non productive) and shortness of breath.    Cardiovascular:  Negative for chest pain and palpitations.   Gastrointestinal:  Negative for nausea and vomiting.   Genitourinary:  Positive for decreased urine volume. Negative for urgency.   Musculoskeletal:  Negative for gait problem and myalgias.   Skin:  Negative for color change and rash.   Neurological:  Negative for syncope and headaches.   Psychiatric/Behavioral:  Positive for agitation. Negative for confusion.    Objective:     Vital Signs (Most Recent):  Temp: 99.1 °F (37.3 °C) (04/05/23 0132)  Pulse: 96 (04/05/23 0402)  Resp: 16 (04/05/23 0402)  BP: (!) 173/86 (04/05/23 0402)  SpO2: (!) 94 % (04/05/23 0402)   Vital Signs (24h Range):  Temp:  [99.1 °F (37.3 °C)] 99.1 °F (37.3 °C)  Pulse:  [] 96  Resp:  [16-33] 16  SpO2:  [84 %-99 %] 94 %  BP: (173-222)/() 173/86        There is no height or weight on file to calculate BMI.    Physical Exam  Vitals and nursing note reviewed.   Constitutional:       Appearance: He is well-developed.   HENT:      Head: Normocephalic and atraumatic.      Nose:      Comments: NC in place     Mouth/Throat:      Pharynx: No oropharyngeal exudate.   Eyes:      Extraocular Movements: Extraocular movements intact.      Conjunctiva/sclera: Conjunctivae normal.   Cardiovascular:      Rate and Rhythm: Normal rate and regular rhythm.      Heart sounds: Normal heart sounds.   Pulmonary:      Breath sounds: Wheezing present.      Comments: Appears uncomfortable breathing, use of accessory muscles noted  Decreased breath sounds   Abdominal:      General: Bowel sounds are normal. There is no distension.      Palpations: Abdomen is soft.       Tenderness: There is no abdominal tenderness.   Musculoskeletal:      Cervical back: Normal range of motion and neck supple.      Right lower leg: Edema present.      Left lower leg: Edema present.   Skin:     General: Skin is warm and dry.   Neurological:      General: No focal deficit present.      Mental Status: He is alert and oriented to person, place, and time.   Psychiatric:      Comments: Agitated           Significant Labs: All pertinent labs within the past 24 hours have been reviewed.  CBC:   Recent Labs   Lab 04/05/23 0043   WBC 10.89   HGB 8.9*   HCT 27.7*        CMP:   Recent Labs   Lab 04/05/23 0043      K 4.6   CL 98   CO2 25   *   BUN 56*   CREATININE 8.2*   CALCIUM 9.6   PROT 6.5   ALBUMIN 3.1*   BILITOT 0.5   ALKPHOS 136*   AST 25   ALT 13   ANIONGAP 13     Cardiac Markers:   Recent Labs   Lab 04/05/23 0043   BNP 2,998*     Magnesium:   Recent Labs   Lab 04/05/23 0043   MG 2.2     Troponin:   Recent Labs   Lab 04/05/23 0043   TROPONINI 0.077*     Urine Studies:   Recent Labs   Lab 04/05/23  0247   COLORU Yellow   APPEARANCEUA Clear   PHUR 8.0   SPECGRAV 1.010   PROTEINUA 1+*   GLUCUA Negative   KETONESU Negative   BILIRUBINUA Negative   OCCULTUA Negative   NITRITE Negative   LEUKOCYTESUR Negative   RBCUA 1   WBCUA 0   BACTERIA Rare   SQUAMEPITHEL 0   HYALINECASTS 0       Significant Imaging: I have reviewed all pertinent imaging results/findings within the past 24 hours.  X-Ray Chest AP Portable  Narrative: EXAMINATION:  XR CHEST AP PORTABLE    CLINICAL HISTORY:  Shortness of breath    TECHNIQUE:  Single frontal view of the chest was performed.    COMPARISON:  10/03/2022.    FINDINGS:  Right-sided central catheter tip overlies the SVC.  No pneumothorax.    Cardiomegaly, perihilar edema and small right effusion, similar to prior.    Heart and lungs otherwise appear unchanged when allowing for differences in technique and positioning.  Impression: Right-sided central  catheter tip overlies the SVC.  No pneumothorax.    Cardiomegaly, perihilar edema and small right effusion, similar to prior.    Electronically signed by: Jose El MD  Date:    04/05/2023  Time:    01:44       Assessment/Plan:     * Acute hypoxemic respiratory failure  Patient with Hypoxic Respiratory failure which is Acute.  he is not on home oxygen. Supplemental oxygen was provided and noted- Oxygen Concentration (%):  [60] 60    Signs/symptoms of respiratory failure include- increased work of breathing and use of accessory muscles. Contributing diagnoses includes - Pulmonary edema, ESRD Labs and images were reviewed. Patient Has recent VBG, which has been reviewed. Will treat underlying causes and adjust management of respiratory failure as follows-   -Improved somewhat after 1 hour BiPAP in ED, consider BiPAP qhs if needed after HD  -Suspect 2/2 hypervolemia from ESRD  -Covid/Flu/RSV ordered   -Do not suspected infectious source at this time  -Given IV lasix 80 mg in ED, plan for HD today  -Supplement O2 as needed, prn duonebs  -Cont home medications (lasix, antihypertensives)    Hypertensive urgency  Renovascular hypertension  Patient has a current diagnosis of hypertensive urgency (without evidence of end organ damage) which is uncontrolled.  Latest blood pressure and vitals reviewed-   Temp:  [99.1 °F (37.3 °C)]   Pulse:  []   Resp:  [16-33]   BP: (173-222)/()   SpO2:  [84 %-99 %] .   Patient currently off IV antihypertensives.   Home meds for hypertension were reviewed and noted below.   Hypertension Medications             cloNIDine 0.3 mg/24 hr td ptwk (CATAPRES) 0.3 mg/24 hr Place 1 patch onto the skin every 7 days.    furosemide (LASIX) 40 MG tablet Take 1 tablet (40 mg total) by mouth 2 (two) times daily.    hydrALAZINE (APRESOLINE) 100 MG tablet Take 1 tablet (100 mg total) by mouth every 8 (eight) hours.    labetaloL (NORMODYNE) 300 MG tablet Take 1 tablet (300 mg total) by mouth 2  (two) times daily.    losartan (COZAAR) 100 MG tablet Take 1 tablet (100 mg total) by mouth once daily.    NIFEdipine (PROCARDIA-XL) 90 MG (OSM) 24 hr tablet Take 1 tablet (90 mg total) by mouth 2 (two) times a day.          Medication adjustment for hospital antihypertensives is as follows- Continue on home medications as above, HD for volume removal.     Will aim for controlled BP reduction by medications noted above. Monitor and mitigate end organ damage as indicated.    ESRD (end stage renal disease)  -Patient on HD MWF, last HD Monday(full session) presenting with pulmonary edema, fluid overload.   -Nephrology consulted, appreciate recs    Benign prostatic hyperplasia  -Cont home flomax    Gastroesophageal reflux disease without esophagitis  -Cont home PPI    S/P coronary artery stent placement  Patient with previous coronary stent for CAD in past in 2007. Continue Labetalol, Asprin 81 mg po daily and Lipitor 80 mg po daily to treat CAD    Type 2 diabetes mellitus with chronic kidney disease on chronic dialysis, with long-term current use of insulin  Patient's FSGs are controlled on current medication regimen.  Last A1c reviewed-   Lab Results   Component Value Date    HGBA1C 4.3 04/05/2023     Most recent fingerstick glucose reviewed- No results for input(s): POCTGLUCOSE in the last 24 hours.  Current correctional scale  Low  Maintain anti-hyperglycemic dose as follows-   Antihyperglycemics (From admission, onward)    Start     Stop Route Frequency Ordered    04/05/23 0420  insulin aspart U-100 pen 0-5 Units         -- SubQ Before meals & nightly PRN 04/05/23 0322        Hold Oral hypoglycemics while patient is in the hospital.  -DM/Renal diet    Anemia due to chronic kidney disease, on chronic dialysis  - Current CBC reviewed-   Lab Results   Component Value Date    HGB 8.9 (L) 04/05/2023    HCT 27.7 (L) 04/05/2023     - Patient's anemia is currently controlled. S/p 0 units of PRBCs.   - Etiology likely d/t  ESRD, also with history SHANNAN/folate deficiency not on supplementation   - Monitor serial CBC and transfuse if patient becomes hemodynamically unstable, symptomatic or H/H drops below 7/21.     Membranous glomerulonephritis  -Cause of ESRD    Pure hypercholesterolemia  Chronic and controlled. Continue Lipitor 80 mg po daily.      VTE Risk Mitigation (From admission, onward)         Ordered     heparin (porcine) injection 5,000 Units  Every 8 hours         04/05/23 0322     IP VTE HIGH RISK PATIENT  Once         04/05/23 0322     Place sequential compression device  Until discontinued         04/05/23 0322                     On 04/05/2023, patient should be placed in hospital observation services under my care in collaboration with Riaz Elizabeth DO.      Demi Martínez PA-C  Department of Hospital Medicine  [facility] - Emergency Dept

## 2023-04-05 NOTE — PROGRESS NOTES
"Hospital Medicine Plan of Care Note     Admission H&P dated earlier this morning reviewed, and agree with assessment and plan as documented in addition to following:     S:  Pt seen and examined this morning on rounds.   Notified by nursing that oxygen saturations dropping on nasal cannula and patient tachypneic; BiPAP replaced.  HD initiated in ED after alerting nephrology; 3 L of fluid removed.  Patient so requiring high-flow 7 L to maintain oxygen saturations in low 90s.  Patient wanting to discharge, however after discussion regarding risks of doing so, amenable to stay the night.      O:  Vitals:    04/05/23 1145 04/05/23 1215 04/05/23 1449 04/05/23 1500   BP: (!) 179/84 (!) 190/94 (!) 171/99    BP Location:   Right arm    Patient Position:   Lying    Pulse: 91 92 95 94   Resp:   19 18   Temp:   99.8 °F (37.7 °C)    TempSrc:   Oral    SpO2: (!) 94% (!) 94% (!) 94% 96%   Weight:   68 kg (150 lb)    Height:   5' 6" (1.676 m)        Physical exam largely unchanged from admission.  Wheezing improved.  Bibasilar crackles.  Lower extremity edema improved.    Labs reviewed.     A/P:  Step-down unit  Continue high-flow nasal cannula; wean as tolerated  Continue IV Lasix 100 mg b.i.d.; however minimal urine output  May require further session of HD for volume removal  Optimize BP  "

## 2023-04-05 NOTE — ASSESSMENT & PLAN NOTE
Patient with Hypoxic Respiratory failure which is Acute.  he is not on home oxygen. Supplemental oxygen was provided and noted- Oxygen Concentration (%):  [60] 60    Signs/symptoms of respiratory failure include- increased work of breathing and use of accessory muscles. Contributing diagnoses includes - Pulmonary edema, ESRD Labs and images were reviewed. Patient Has recent VBG, which has been reviewed. Will treat underlying causes and adjust management of respiratory failure as follows-   -Improved somewhat after 1 hour BiPAP in ED, consider BiPAP qhs if needed after HD  -Suspect 2/2 hypervolemia from ESRD  -Covid/Flu/RSV ordered   -Do not suspected infectious source at this time  -Given IV lasix 80 mg in ED, plan for HD today  -Supplement O2 as needed, prn duonebs  -Cont home medications (lasix, antihypertensives)

## 2023-04-05 NOTE — HPI
The patient is a 58 y.o. Black or  Male with multiple co morbidities including ESRD on HD MWF, CAD, DM, GERD, HTN, membranous nephropathy, and nephrotic syndrome who presents to ED on 4/5/2023 with complaints of shortness of breath. The patient is unable to provide adequate history. Additional history and patient information was obtained from past medical records and ER records. Per chart, the patient completed a full session of HD on Monday but developed shortness of breath that night prompting him to present to the ED. Upon arrival to the ED, /142 & SpO2 84% on RA w/ accessory muscle use. Placed on BiPAP. Troponin 0.077, BNP 2,998. CXR with pulmonary edema. Given lasix 80 mg IV. HD emergently initiated in the HD. Appears to be resting on 6 L HFNC. Nephrology consulted for management of ESRD and HD treatment.

## 2023-04-05 NOTE — ED NOTES
Patient calling because he was in ER a couple weeks ago and his hgb was 9.6. Pt wanted to come in to have this checked next week because he was concerned about it dropping and needing procrit. Pt does not have any new symptoms though. Per last MD note we had stopped using procrit on patient because his hgb was good. Will bring patient in for a CBC with NP review. If hgb is still low they can decide if patient will need procrit or not. Pt v/u. Message sent to scheduling to set up appt.    Patient placed back on bipap per MD.

## 2023-04-05 NOTE — ASSESSMENT & PLAN NOTE
Patient with previous coronary stent for CAD in past in 2007. Continue Labetalol, Asprin 81 mg po daily and Lipitor 80 mg po daily to treat CAD

## 2023-04-05 NOTE — NURSING
3 hours HD tx completed. 3 L of fluid removed. Patient tolerated well. Blood returned. Needles pulled. Manual pressure held until hemostasis was achieved. Report given to primary nurse.

## 2023-04-05 NOTE — ASSESSMENT & PLAN NOTE
Renovascular hypertension  Patient has a current diagnosis of hypertensive urgency (without evidence of end organ damage) which is uncontrolled.  Latest blood pressure and vitals reviewed-   Temp:  [99.1 °F (37.3 °C)]   Pulse:  []   Resp:  [16-33]   BP: (173-222)/()   SpO2:  [84 %-99 %] .   Patient currently off IV antihypertensives.   Home meds for hypertension were reviewed and noted below.   Hypertension Medications             cloNIDine 0.3 mg/24 hr td ptwk (CATAPRES) 0.3 mg/24 hr Place 1 patch onto the skin every 7 days.    furosemide (LASIX) 40 MG tablet Take 1 tablet (40 mg total) by mouth 2 (two) times daily.    hydrALAZINE (APRESOLINE) 100 MG tablet Take 1 tablet (100 mg total) by mouth every 8 (eight) hours.    labetaloL (NORMODYNE) 300 MG tablet Take 1 tablet (300 mg total) by mouth 2 (two) times daily.    losartan (COZAAR) 100 MG tablet Take 1 tablet (100 mg total) by mouth once daily.    NIFEdipine (PROCARDIA-XL) 90 MG (OSM) 24 hr tablet Take 1 tablet (90 mg total) by mouth 2 (two) times a day.          Medication adjustment for hospital antihypertensives is as follows- Continue on home medications as above, HD for volume removal.     Will aim for controlled BP reduction by medications noted above. Monitor and mitigate end organ damage as indicated.

## 2023-04-05 NOTE — HOSPITAL COURSE
On morning of discharge, despite being weaned to nasal cannula in ED, patient again became hypoxic and tachypneic.  BiPAP replaced and nephrology notified for emergent HD. patient dialyzed in ED. while dialyzing, transitioned from BiPAP to high-flow nasal cannula.  Following HD and removal of 3L patient transferred to step-down unit on 7 L high-flow nasal cannula.  Patient wanting to discharge.  Upon removal of oxygen, oxygen saturations in low 80s inpatient with dizziness and nausea.  Discussed risks of leaving AMA.  Patient amenable to stay for continued management of hypoxia. He received additional dialysis session. Weaning oxygen

## 2023-04-05 NOTE — ED TRIAGE NOTES
Enrique Martinez, a 58 y.o. male presents to the ED w/ complaint of SOB and intermittent L sided chest pressure x2 hours. States has been feeling SOB with ambulation x1 week but now SOB at rest. Also reports dry cough. Hx of M/W/F dialysis last dialyzed yesterday.     Triage note:  Chief Complaint   Patient presents with    Shortness of Breath     Sob since yesterday, MWF dialysis, full session on Monday. 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

## 2023-04-05 NOTE — PLAN OF CARE
Problem: Adult Inpatient Plan of Care  Goal: Absence of Hospital-Acquired Illness or Injury  Outcome: Ongoing, Progressing     Problem: Adult Inpatient Plan of Care  Goal: Optimal Comfort and Wellbeing  Outcome: Ongoing, Progressing     Problem: Infection  Goal: Absence of Infection Signs and Symptoms  Outcome: Ongoing, Progressing

## 2023-04-05 NOTE — ASSESSMENT & PLAN NOTE
Patient's FSGs are controlled on current medication regimen.  Last A1c reviewed-   Lab Results   Component Value Date    HGBA1C 4.3 04/05/2023     Most recent fingerstick glucose reviewed- No results for input(s): POCTGLUCOSE in the last 24 hours.  Current correctional scale  Low  Maintain anti-hyperglycemic dose as follows-   Antihyperglycemics (From admission, onward)    Start     Stop Route Frequency Ordered    04/05/23 0420  insulin aspart U-100 pen 0-5 Units         -- SubQ Before meals & nightly PRN 04/05/23 0322        Hold Oral hypoglycemics while patient is in the hospital.  -DM/Renal diet

## 2023-04-05 NOTE — ASSESSMENT & PLAN NOTE
58 y.o. Black or  Male ESRD-HD M-W-F presents to ED on 4/5/2023 with diagnosis of: SOB (shortness of breath) [R06.02];Acute hypoxemic respiratory failure [J96.01]   Nephrology consulted for inpatient ESRD-HD management    2/2 Membranous nephropathy (bx proven), DM II, and HTN , on HD since 2/2022  Outpatient HD Information:  -Dialysis modality: Hemodialysis  -Outpatient HD unit: [facility]  -Nephrologist: Dr. Saez   -HD TX days: Monday/Wednesday/Friday, duration of treatment: 210 mins  -Last HD TX prior to hospital admission: 04/03/23  -Dialysis access: L AVF  -Residual urine: oliguric    -EDW: 61kg      Assessment:   - Will provide dialysis today (04/05/2023) with UF -3 L as BP tolerates for metabolic clearance and volume management   - Dialysis for metabolic clearance and volume management will be provided again tomorrow AM.   - Seen on HD. No complaints.   - Duration: 3 hrs  - Labs reviewed and dialysate to be adjusted to current labs.   - Will obtain OP dialysis records  - Continue to monitor intake and output  - Please avoid gadolinium, fleets, phos-based laxatives, NSAIDs  - Dialysis thrice weekly unless more urgent indications arise. Will evaluate RRT requirements Daily.    Anemia of ESRD   Recent Labs   Lab 04/05/23  0043   WBC 10.89   HGB 8.9*   HCT 27.7*        Lab Results   Component Value Date    FESATURATED 4 (L) 02/17/2022    FERRITIN 384 (H) 02/17/2022       - Goal in ESRD is Hgb of 10-11. Hgb 8.9.  - Will request iron studies in AM to assess further needs of supplementation   - Will review chronic care plan from outpatient dialysis center for ELISABET dosing.     Mineral Bone Disease in ESRD   Lab Results   Component Value Date    .3 (H) 02/17/2022    CALCIUM 9.6 04/05/2023    PHOS 2.3 (L) 04/05/2023       - F/U PO4, Mg, Calcium. And albumin levels.   - Renal diet with protein intake goal 1.5 g/kg/d with 1 L fluid restriction   - Novasource with meals  - Continue home  phos binder   - Daily renal panel so that phos and albumin is monitored daily.

## 2023-04-05 NOTE — ED PROVIDER NOTES
Encounter Date: 4/5/2023       History     Chief Complaint   Patient presents with    Shortness of Breath     Sob since yesterday, MWF dialysis, full session on Monday. Increased work of breathing, cough     58-year-old male presents to the ER for evaluation of acute onset of shortness of breath.  Upon arrival patient is hypertensive and short of breath with O2 saturation at 84%.  Winded and using accessory muscles to breathe.  Appears uncomfortable.  Patient with an extensive previous medical history detailed in chart including CAD, diabetes, kidney disease on dialysis Monday Wednesday Friday.  Last dialysis was Monday, scheduled to go again today.  States that he acutely became short of breath last night which worsened prompting the visit to the ED. denies any fevers or chills.  The infectious symptoms.    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      Past Surgical History:   Procedure Laterality Date    ABDOMINAL SURGERY  1980s    AV FISTULA PLACEMENT Left 1/5/2023    Procedure: CREATION, AV FISTULA RADIOCEPHALIC;  Surgeon: ROHITH Pickard II, MD;  Location: [facility] OR [address] [place] [address];  Service: Vascular;  Laterality: Left;    CARDIAC CATHETERIZATION  2007    x 2    COLONOSCOPY N/A 4/5/2019    Procedure: COLONOSCOPY;  Surgeon: Jose L Carty MD;  Location: [facility] (4TH FLR);  Service: Colon and Rectal;  Laterality: N/A;    CORONARY STENT PLACEMENT  2007     Family History    Problem Relation Age of Onset    Hypertension Father     Stroke Father     Heart attack Father     Hyperlipidemia Father     No Known Problems Sister     Drug abuse Brother     Heart attack Brother     Hypertension Brother      Social History     Tobacco Use    Smoking status: Some Days     Packs/day: 0.25     Years: 10.00     Pack years: 2.50     Types: Cigarettes    Smokeless tobacco: Never   Substance Use Topics    Alcohol use: No    Drug use: No     Review of Systems   Constitutional:  Negative for fever.   HENT:  Negative for sore throat.    Respiratory:  Positive for shortness of breath.    Cardiovascular:  Negative for chest pain.   Gastrointestinal:  Negative for nausea.   Genitourinary:  Negative for dysuria.   Musculoskeletal:  Negative for back pain.   Skin:  Negative for rash.   Neurological:  Negative for weakness.   Hematological:  Does not bruise/bleed easily.     Physical Exam     Initial Vitals   BP Pulse Resp Temp SpO2   04/05/23 0021 04/05/23 0021 04/05/23 0021 04/05/23 0132 04/05/23 0021   (!) 222/142 106 (!) 27 99.1 °F (37.3 °C) (!) 84 %      MAP       --                Physical Exam    Constitutional: Vital signs are normal. He appears well-developed and well-nourished.   HENT:   Head: Normocephalic and atraumatic.   Right Ear: Hearing normal.   Left Ear: Hearing normal.   Eyes: Conjunctivae are normal.   Cardiovascular:  Normal rate and regular rhythm.           Pitting edema to the lower extremities bilaterally   Pulmonary/Chest:   Decreased breath sounds throughout   Abdominal: Abdomen is soft. Bowel sounds are normal.   Musculoskeletal:         General: Normal range of motion.     Neurological: He is alert and oriented to person, place, and time.   Skin: Skin is warm and intact.   Psychiatric: He has a normal mood and affect. His speech is normal and behavior is normal. Cognition and memory are normal.       ED Course   Critical Care    Date/Time: 4/5/2023 2:55 AM  Performed by: Jung DANGELO  SHANTELL Alicia  Authorized by: Radhika Vee MD   Direct patient critical care time: 15 minutes  Additional history critical care time: 5 minutes  Ordering / reviewing critical care time: 5 minutes  Documentation critical care time: 5 minutes  Consulting other physicians critical care time: 10 minutes  Total critical care time (exclusive of procedural time) : 40 minutes  Critical care was necessary to treat or prevent imminent or life-threatening deterioration of the following conditions: respiratory failure.  Critical care was time spent personally by me on the following activities: pulse oximetry.      Labs Reviewed   CBC W/ AUTO DIFFERENTIAL - Abnormal; Notable for the following components:       Result Value    RBC 2.77 (*)     Hemoglobin 8.9 (*)     Hematocrit 27.7 (*)      (*)     MCH 32.1 (*)     RDW 15.4 (*)     Gran # (ANC) 9.0 (*)     Lymph # 0.8 (*)     Gran % 82.6 (*)     Lymph % 7.7 (*)     All other components within normal limits   COMPREHENSIVE METABOLIC PANEL - Abnormal; Notable for the following components:    Glucose 127 (*)     BUN 56 (*)     Creatinine 8.2 (*)     Albumin 3.1 (*)     Alkaline Phosphatase 136 (*)     eGFR 6.9 (*)     All other components within normal limits   B-TYPE NATRIURETIC PEPTIDE - Abnormal; Notable for the following components:    BNP 2,998 (*)     All other components within normal limits   TROPONIN I - Abnormal; Notable for the following components:    Troponin I 0.077 (*)     All other components within normal limits   ISTAT PROCEDURE - Abnormal; Notable for the following components:    POC PH 7.454 (*)     POC PO2 93 (*)     All other components within normal limits   URINALYSIS, REFLEX TO URINE CULTURE          Imaging Results              X-Ray Chest AP Portable (Final result)  Result time 04/05/23 01:44:05      Final result by Jose El MD (04/05/23 01:44:05)                   Impression:      Right-sided central catheter tip overlies the SVC.   No pneumothorax.    Cardiomegaly, perihilar edema and small right effusion, similar to prior.      Electronically signed by: Jose El MD  Date:    04/05/2023  Time:    01:44               Narrative:    EXAMINATION:  XR CHEST AP PORTABLE    CLINICAL HISTORY:  Shortness of breath    TECHNIQUE:  Single frontal view of the chest was performed.    COMPARISON:  10/03/2022.    FINDINGS:  Right-sided central catheter tip overlies the SVC.  No pneumothorax.    Cardiomegaly, perihilar edema and small right effusion, similar to prior.    Heart and lungs otherwise appear unchanged when allowing for differences in technique and positioning.                                       Medications   furosemide injection 80 mg (80 mg Intravenous Given 4/5/23 0048)     Medical Decision Making:   History:   Old Medical Records: I decided to obtain old medical records.  Old Records Summarized: records from clinic visits.  Initial Assessment:   58-year-old male with shortness of breath  Differential Diagnosis:   Flash pulmonary edema, fluid overload, need for dialysis, hypoxia, COVID, anemia  Independently Interpreted Test(s):   I have ordered and independently interpreted X-rays - see summary below.       <> Summary of X-Ray Reading(s): Increased pulmonary vascular congestion  I have ordered and independently interpreted EKG Reading(s) - see summary below       <> Summary of EKG Reading(s): Normal sinus rhythm, no STEMI  Clinical Tests:   Lab Tests: Ordered and Reviewed  Radiological Study: Ordered and Reviewed  Medical Tests: Ordered and Reviewed  ED Management:  Plan:   Routine labs, chest x-ray.  Patient still makes urine, will give Lasix 80 mg IV.  Patient started on BiPAP upon arrival to the ED.    Reassessment after 1 hour on BiPAP the patient doing much better.  Much more comfortable with his breathing and work of breathing has improved.  Blood pressure also improving.    Case discussed with case management and with Hospital  Medicine.  The patient will be admitted for observation.  Will need dialysis later today                        Clinical Impression:   Final diagnoses:  [R06.02] SOB (shortness of breath)        ED Disposition Condition    Observation                 Jung Alicia PA-C  04/05/23 0256

## 2023-04-05 NOTE — SUBJECTIVE & OBJECTIVE
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        Past Surgical History:   Procedure Laterality Date    ABDOMINAL SURGERY  1980s    AV FISTULA PLACEMENT Left 1/5/2023    Procedure: CREATION, AV FISTULA RADIOCEPHALIC;  Surgeon: ROHITH Pickard II, MD;  Location: [facility] OR 2ND FLR;  Service: Vascular;  Laterality: Left;    CARDIAC CATHETERIZATION  2007    x 2    COLONOSCOPY N/A 4/5/2019    Procedure: COLONOSCOPY;  Surgeon: Jose L Carty MD;  Location: [facility] (4TH FLR);  Service: Colon and Rectal;  Laterality: N/A;    CORONARY STENT PLACEMENT  2007       Review of patient's allergies indicates:  No Known Allergies  Current Facility-Administered Medications   Medication Frequency    0.9%  NaCl infusion Once    acetaminophen tablet 1,000 mg Q8H PRN    acetaminophen tablet 650 mg Q4H PRN    aluminum-magnesium hydroxide-simethicone 200-200-20 mg/5 mL suspension 30 mL QID PRN    aspirin EC tablet 81 mg Daily    atorvastatin tablet 80 mg Daily    benzonatate capsule 100 mg TID PRN    bisacodyL suppository 10 mg Daily PRN    dextrose 10% bolus 125 mL 125 mL PRN    dextrose 10% bolus 250 mL 250 mL PRN    dextrose 40 % gel 15,000 mg PRN    dextrose 40 % gel 30,000 mg PRN    furosemide injection 100 mg Q12H    gabapentin capsule 300 mg TID    glucagon (human recombinant) injection 1 mg PRN    heparin (porcine) injection 5,000 Units Q8H    hydrALAZINE tablet 100 mg Q8H    insulin aspart U-100 pen 0-5 Units QID (AC + HS) PRN    labetaloL  tablet 300 mg BID    losartan tablet 100 mg Daily    melatonin tablet 6 mg Nightly PRN    naloxone 0.4 mg/mL injection 0.02 mg PRN    nicotine 14 mg/24 hr 1 patch Daily    NIFEdipine 24 hr tablet 90 mg BID    ondansetron disintegrating tablet 8 mg Q8H PRN    pantoprazole EC tablet 40 mg Daily    polyethylene glycol packet 17 g BID PRN    prochlorperazine injection Soln 5 mg Q6H PRN    sevelamer carbonate tablet 800 mg TID    simethicone chewable tablet 80 mg QID PRN    sodium chloride 0.9% flush 5 mL PRN    tamsulosin 24 hr capsule 0.4 mg Daily     Current Outpatient Medications   Medication    albuterol (PROVENTIL/VENTOLIN HFA) 90 mcg/actuation inhaler    aspirin (ECOTRIN) 81 MG EC tablet    blood-glucose meter kit    cloNIDine 0.3 mg/24 hr td ptwk (CATAPRES) 0.3 mg/24 hr    colchicine (COLCRYS) 0.6 mg tablet    famotidine (PEPCID) 20 MG tablet    furosemide (LASIX) 40 MG tablet    gabapentin (NEURONTIN) 100 MG capsule    guaiFENesin 100 mg/5 ml (ROBITUSSIN) 100 mg/5 mL syrup    hydrALAZINE (APRESOLINE) 100 MG tablet    HYDROcodone-acetaminophen (NORCO) 5-325 mg per tablet    labetaloL (NORMODYNE) 300 MG tablet    LIDOcaine-prilocaine (EMLA) cream    losartan (COZAAR) 100 MG tablet    melatonin (MELATIN) 3 mg tablet    NIFEdipine (PROCARDIA-XL) 90 MG (OSM) 24 hr tablet    omeprazole (PRILOSEC) 20 MG capsule    ondansetron (ZOFRAN-ODT) 8 MG TbDL    RENVELA 800 mg Tab    sildenafiL (VIAGRA) 100 MG tablet    tadalafiL (CIALIS) 2.5 mg Tab    tamsulosin (FLOMAX) 0.4 mg Cap    traMADoL (ULTRAM) 50 mg tablet    TRUE METRIX GLUCOSE TEST STRIP Strp    valACYclovir (VALTREX) 1000 MG tablet     Family History       Problem Relation (Age of Onset)    Drug abuse Brother    Heart attack Father, Brother    Hyperlipidemia Father    Hypertension Father, Brother    No Known Problems Sister    Stroke Father          Tobacco Use    Smoking status: Some Days     Packs/day: 0.25     Years: 10.00     Pack years: 2.50     Types:  Cigarettes    Smokeless tobacco: Never   Substance and Sexual Activity    Alcohol use: No    Drug use: No    Sexual activity: Not on file     Review of Systems   Unable to perform ROS: Acuity of condition   Objective:     Vital Signs (Most Recent):  Temp: 100.2 °F (37.9 °C) (04/05/23 0700)  Pulse: 87 (04/05/23 1000)  Resp: (!) 22 (04/05/23 0900)  BP: (!) 165/79 (04/05/23 1000)  SpO2: 97 % (04/05/23 1000)   Vital Signs (24h Range):  Temp:  [99.1 °F (37.3 °C)-100.2 °F (37.9 °C)] 100.2 °F (37.9 °C)  Pulse:  [] 87  Resp:  [16-33] 22  SpO2:  [84 %-99 %] 97 %  BP: (139-222)/() 165/79        There is no height or weight on file to calculate BMI.  There is no height or weight on file to calculate BSA.    No intake/output data recorded.    Physical Exam  Vitals and nursing note reviewed.   Constitutional:       General: He is sleeping.      Appearance: He is well-developed. He is ill-appearing.      Interventions: Nasal cannula in place.   HENT:      Head: Normocephalic and atraumatic.      Nose:      Comments: NC in place     Mouth/Throat:      Pharynx: No oropharyngeal exudate.   Eyes:      Extraocular Movements: Extraocular movements intact.      Conjunctiva/sclera: Conjunctivae normal.   Cardiovascular:      Rate and Rhythm: Normal rate and regular rhythm.      Heart sounds: Normal heart sounds.      Arteriovenous access: Left arteriovenous access is present.  Pulmonary:      Effort: Respiratory distress present.   Abdominal:      General: Bowel sounds are normal. There is no distension.      Palpations: Abdomen is soft.      Tenderness: There is no abdominal tenderness.   Musculoskeletal:      Cervical back: Normal range of motion and neck supple.      Right lower leg: Edema present.      Left lower leg: Edema present.   Skin:     General: Skin is warm and dry.   Neurological:      Mental Status: He is easily aroused.       Significant Labs:  CBC:   Recent Labs   Lab 04/05/23  0043   WBC 10.89   RBC 2.77*    HGB 8.9*   HCT 27.7*      *   MCH 32.1*   MCHC 32.1     CMP:   Recent Labs   Lab 04/05/23  0043   *   CALCIUM 9.6   ALBUMIN 3.1*   PROT 6.5      K 4.6   CO2 25   CL 98   BUN 56*   CREATININE 8.2*   ALKPHOS 136*   ALT 13   AST 25   BILITOT 0.5     All labs within the past 24 hours have been reviewed.

## 2023-04-05 NOTE — ASSESSMENT & PLAN NOTE
- Current CBC reviewed-   Lab Results   Component Value Date    HGB 8.9 (L) 04/05/2023    HCT 27.7 (L) 04/05/2023     - Patient's anemia is currently controlled. S/p 0 units of PRBCs.   - Etiology likely d/t ESRD, also with history SHANNAN/folate deficiency not on supplementation   - Monitor serial CBC and transfuse if patient becomes hemodynamically unstable, symptomatic or H/H drops below 7/21.

## 2023-04-05 NOTE — ED NOTES
Care assumed from Dave MCCOY. Patient updated on POC. Patient remains in regular clothing refusing to be placed in gown. Patient tachypenic at 22 BPM. Productive cough present.     Patient identifiers verified and correct for Enrique Martinez  LOC: The patient is awake, alert and aware of environment with an appropriate affect, the patient is oriented x 3 and speaking appropriately.   APPEARANCE: Patient appears comfortable and in no acute distress, patient is clean and well groomed.  SKIN: The skin is warm and dry, color consistent with ethnicity, patient has normal skin turgor and moist mucus membranes, skin intact, no breakdown or bruising noted.   MUSCULOSKELETAL: Patient moving all extremities spontaneously, no swelling noted.  RESPIRATORY: Airway is open and patent, respirations are spontaneous, patient has a normal effort and rate, no accessory muscle use noted, pt placed on continuous pulse ox with O2 sats noted at 93% on 5L NC. Endorses SOB. Productive cough  CARDIAC: Pt placed on cardiac monitor. Patient has a normal rate and regular rhythm, no edema noted, capillary refill < 3 seconds. Denies chest pain HR 94  GASTRO: Soft and non tender to palpation, no distention noted, normoactive bowel sounds present in all four quadrants. Pt states bowel movements have been regular.  : Pt denies any pain or frequency with urination.  NEURO: Pt opens eyes spontaneously, behavior appropriate to situation, follows commands, facial expression symmetrical, bilateral hand grasp equal and even, purposeful motor response noted, normal sensation in all extremities when touched with a finger.

## 2023-04-05 NOTE — HPI
Enrique Martinez is a 58-year-old male with ESRD on HD MWF, CAD, DM, GERD, HTN, membranous nephropathy, nephrotic syndrome who presents to the ED for acute onset dyspnea. Last dialysis was Monday, full session.  Developed shortness of breath acutely yesterday which worsened overnight prompting him to present to ED. Complains of new dry cough but no other complaints. Denies chest pain, fever, chills, abdominal pain, nausea, vomiting, congestion. States he is complaint with home meds. Makes urine still and reports take 80 mg lasix PO BID.    Patient irritated on evaluation, cursing and saying he wants to leave AMA. When explained risks of leaving AMA to patient including risk of death, he agreed to stay for dialysis and better BP control.    Upon arrival to the ED, /142 & SpO2 84% on RA w/ accessory muscle use. Placed on BiPAP for 1 hour then transitioned to 4L NC satting 94%. Troponin 0.077, BNP 2,998. CXR with pulmonary edema. Given lasix 80 mg IV. Admitted to hospital medicine.

## 2023-04-06 LAB
ANION GAP SERPL CALC-SCNC: 13 MMOL/L (ref 8–16)
BASOPHILS # BLD AUTO: 0.03 K/UL (ref 0–0.2)
BASOPHILS NFR BLD: 0.2 % (ref 0–1.9)
BUN SERPL-MCNC: 46 MG/DL (ref 6–20)
CALCIUM SERPL-MCNC: 9.6 MG/DL (ref 8.7–10.5)
CHLORIDE SERPL-SCNC: 103 MMOL/L (ref 95–110)
CO2 SERPL-SCNC: 24 MMOL/L (ref 23–29)
CREAT SERPL-MCNC: 6.7 MG/DL (ref 0.5–1.4)
DIFFERENTIAL METHOD: ABNORMAL
EOSINOPHIL # BLD AUTO: 0.3 K/UL (ref 0–0.5)
EOSINOPHIL NFR BLD: 2.3 % (ref 0–8)
ERYTHROCYTE [DISTWIDTH] IN BLOOD BY AUTOMATED COUNT: 15.3 % (ref 11.5–14.5)
EST. GFR  (NO RACE VARIABLE): 8.8 ML/MIN/1.73 M^2
GLUCOSE SERPL-MCNC: 136 MG/DL (ref 70–110)
HCT VFR BLD AUTO: 27.5 % (ref 40–54)
HGB BLD-MCNC: 8.5 G/DL (ref 14–18)
IMM GRANULOCYTES # BLD AUTO: 0.03 K/UL (ref 0–0.04)
IMM GRANULOCYTES NFR BLD AUTO: 0.2 % (ref 0–0.5)
LYMPHOCYTES # BLD AUTO: 0.9 K/UL (ref 1–4.8)
LYMPHOCYTES NFR BLD: 7.1 % (ref 18–48)
MAGNESIUM SERPL-MCNC: 2.2 MG/DL (ref 1.6–2.6)
MCH RBC QN AUTO: 31.7 PG (ref 27–31)
MCHC RBC AUTO-ENTMCNC: 30.9 G/DL (ref 32–36)
MCV RBC AUTO: 103 FL (ref 82–98)
MONOCYTES # BLD AUTO: 0.8 K/UL (ref 0.3–1)
MONOCYTES NFR BLD: 6.6 % (ref 4–15)
NEUTROPHILS # BLD AUTO: 10.4 K/UL (ref 1.8–7.7)
NEUTROPHILS NFR BLD: 83.6 % (ref 38–73)
NRBC BLD-RTO: 0 /100 WBC
PHOSPHATE SERPL-MCNC: 2.8 MG/DL (ref 2.7–4.5)
PLATELET # BLD AUTO: 192 K/UL (ref 150–450)
PMV BLD AUTO: 9.4 FL (ref 9.2–12.9)
POCT GLUCOSE: 120 MG/DL (ref 70–110)
POCT GLUCOSE: 135 MG/DL (ref 70–110)
POCT GLUCOSE: 138 MG/DL (ref 70–110)
POCT GLUCOSE: 227 MG/DL (ref 70–110)
POTASSIUM SERPL-SCNC: 4.5 MMOL/L (ref 3.5–5.1)
RBC # BLD AUTO: 2.68 M/UL (ref 4.6–6.2)
SODIUM SERPL-SCNC: 140 MMOL/L (ref 136–145)
WBC # BLD AUTO: 12.41 K/UL (ref 3.9–12.7)

## 2023-04-06 PROCEDURE — 20600001 HC STEP DOWN PRIVATE ROOM

## 2023-04-06 PROCEDURE — 80100014 HC HEMODIALYSIS 1:1

## 2023-04-06 PROCEDURE — 94761 N-INVAS EAR/PLS OXIMETRY MLT: CPT

## 2023-04-06 PROCEDURE — 63600175 PHARM REV CODE 636 W HCPCS

## 2023-04-06 PROCEDURE — 83735 ASSAY OF MAGNESIUM: CPT | Performed by: STUDENT IN AN ORGANIZED HEALTH CARE EDUCATION/TRAINING PROGRAM

## 2023-04-06 PROCEDURE — 25000003 PHARM REV CODE 250: Performed by: STUDENT IN AN ORGANIZED HEALTH CARE EDUCATION/TRAINING PROGRAM

## 2023-04-06 PROCEDURE — 99900035 HC TECH TIME PER 15 MIN (STAT)

## 2023-04-06 PROCEDURE — 63600175 PHARM REV CODE 636 W HCPCS: Performed by: STUDENT IN AN ORGANIZED HEALTH CARE EDUCATION/TRAINING PROGRAM

## 2023-04-06 PROCEDURE — 80048 BASIC METABOLIC PNL TOTAL CA: CPT

## 2023-04-06 PROCEDURE — S4991 NICOTINE PATCH NONLEGEND: HCPCS

## 2023-04-06 PROCEDURE — 85025 COMPLETE CBC W/AUTO DIFF WBC: CPT

## 2023-04-06 PROCEDURE — 25000003 PHARM REV CODE 250: Performed by: HOSPITALIST

## 2023-04-06 PROCEDURE — 27100171 HC OXYGEN HIGH FLOW UP TO 24 HOURS

## 2023-04-06 PROCEDURE — 25000003 PHARM REV CODE 250

## 2023-04-06 PROCEDURE — 99233 PR SUBSEQUENT HOSPITAL CARE,LEVL III: ICD-10-PCS | Mod: ,,, | Performed by: STUDENT IN AN ORGANIZED HEALTH CARE EDUCATION/TRAINING PROGRAM

## 2023-04-06 PROCEDURE — 99233 SBSQ HOSP IP/OBS HIGH 50: CPT | Mod: ,,, | Performed by: STUDENT IN AN ORGANIZED HEALTH CARE EDUCATION/TRAINING PROGRAM

## 2023-04-06 PROCEDURE — 99900031 HC PATIENT EDUCATION (STAT)

## 2023-04-06 PROCEDURE — 84100 ASSAY OF PHOSPHORUS: CPT | Performed by: STUDENT IN AN ORGANIZED HEALTH CARE EDUCATION/TRAINING PROGRAM

## 2023-04-06 RX ORDER — SODIUM CHLORIDE 9 MG/ML
INJECTION, SOLUTION INTRAVENOUS ONCE
Status: DISCONTINUED | OUTPATIENT
Start: 2023-04-06 | End: 2023-04-07

## 2023-04-06 RX ORDER — HYDROCODONE BITARTRATE AND ACETAMINOPHEN 5; 325 MG/1; MG/1
1 TABLET ORAL EVERY 6 HOURS PRN
Status: DISCONTINUED | OUTPATIENT
Start: 2023-04-06 | End: 2023-04-08 | Stop reason: HOSPADM

## 2023-04-06 RX ORDER — LIDOCAINE AND PRILOCAINE 25; 25 MG/G; MG/G
CREAM TOPICAL
Status: DISCONTINUED | OUTPATIENT
Start: 2023-04-06 | End: 2023-04-08 | Stop reason: HOSPADM

## 2023-04-06 RX ORDER — METHOCARBAMOL 500 MG/1
500 TABLET, FILM COATED ORAL 2 TIMES DAILY PRN
Status: DISCONTINUED | OUTPATIENT
Start: 2023-04-06 | End: 2023-04-08 | Stop reason: HOSPADM

## 2023-04-06 RX ORDER — DIPHENHYDRAMINE HCL 25 MG
25 CAPSULE ORAL EVERY 6 HOURS PRN
Status: DISCONTINUED | OUTPATIENT
Start: 2023-04-06 | End: 2023-04-08 | Stop reason: HOSPADM

## 2023-04-06 RX ADMIN — HEPARIN SODIUM 5000 UNITS: 5000 INJECTION INTRAVENOUS; SUBCUTANEOUS at 05:04

## 2023-04-06 RX ADMIN — LABETALOL HYDROCHLORIDE 300 MG: 100 TABLET, FILM COATED ORAL at 10:04

## 2023-04-06 RX ADMIN — NIFEDIPINE 90 MG: 30 TABLET, FILM COATED, EXTENDED RELEASE ORAL at 09:04

## 2023-04-06 RX ADMIN — FUROSEMIDE 100 MG: 10 INJECTION, SOLUTION INTRAMUSCULAR; INTRAVENOUS at 07:04

## 2023-04-06 RX ADMIN — HYDRALAZINE HYDROCHLORIDE 100 MG: 50 TABLET ORAL at 09:04

## 2023-04-06 RX ADMIN — FUROSEMIDE 100 MG: 10 INJECTION, SOLUTION INTRAMUSCULAR; INTRAVENOUS at 09:04

## 2023-04-06 RX ADMIN — HEPARIN SODIUM 5000 UNITS: 5000 INJECTION INTRAVENOUS; SUBCUTANEOUS at 09:04

## 2023-04-06 RX ADMIN — SEVELAMER CARBONATE 800 MG: 800 TABLET, FILM COATED ORAL at 09:04

## 2023-04-06 RX ADMIN — HYDRALAZINE HYDROCHLORIDE 100 MG: 50 TABLET ORAL at 05:04

## 2023-04-06 RX ADMIN — NICOTINE 1 PATCH: 14 PATCH, EXTENDED RELEASE TRANSDERMAL at 09:04

## 2023-04-06 RX ADMIN — LOSARTAN POTASSIUM 100 MG: 50 TABLET, FILM COATED ORAL at 09:04

## 2023-04-06 RX ADMIN — DIPHENHYDRAMINE HYDROCHLORIDE 25 MG: 25 CAPSULE ORAL at 05:04

## 2023-04-06 RX ADMIN — LABETALOL HYDROCHLORIDE 300 MG: 100 TABLET, FILM COATED ORAL at 09:04

## 2023-04-06 RX ADMIN — HYDROCODONE BITARTRATE AND ACETAMINOPHEN 1 TABLET: 5; 325 TABLET ORAL at 09:04

## 2023-04-06 RX ADMIN — BENZONATATE 100 MG: 100 CAPSULE ORAL at 10:04

## 2023-04-06 RX ADMIN — METHOCARBAMOL 500 MG: 500 TABLET ORAL at 09:04

## 2023-04-06 NOTE — PROGRESS NOTES
Pt awake, alert, oriented. VSS. Pt on high flow O2 sats 95%. Pt with some swelling to left arm. Notified Filemon FORD ok to start HD. Pt has positive thrill/bruit noted. HD started to left arm fistula using 16 gauge needles. Will continue to monitor.

## 2023-04-06 NOTE — PLAN OF CARE
Problem: Adult Inpatient Plan of Care  Goal: Plan of Care Review  Outcome: Ongoing, Progressing  Goal: Patient-Specific Goal (Individualized)  Outcome: Ongoing, Progressing  Goal: Absence of Hospital-Acquired Illness or Injury  Outcome: Ongoing, Progressing  Goal: Optimal Comfort and Wellbeing  Outcome: Ongoing, Progressing  Goal: Readiness for Transition of Care  Outcome: Ongoing, Progressing     Problem: Infection  Goal: Absence of Infection Signs and Symptoms  Outcome: Ongoing, Progressing     Problem: Device-Related Complication Risk (Hemodialysis)  Goal: Safe, Effective Therapy Delivery  Outcome: Ongoing, Progressing     Problem: Hemodynamic Instability (Hemodialysis)  Goal: Effective Tissue Perfusion  Outcome: Ongoing, Progressing     Problem: Infection (Hemodialysis)  Goal: Absence of Infection Signs and Symptoms  Outcome: Ongoing, Progressing     Problem: Electrolyte Imbalance (Chronic Kidney Disease)  Goal: Electrolyte Balance  Outcome: Ongoing, Progressing     Problem: Fluid Volume Excess (Chronic Kidney Disease)  Goal: Fluid Balance  Outcome: Ongoing, Progressing     Problem: Diabetes Comorbidity  Goal: Blood Glucose Level Within Targeted Range  Outcome: Ongoing, Progressing     Problem: Fall Injury Risk  Goal: Absence of Fall and Fall-Related Injury  Outcome: Ongoing, Progressing

## 2023-04-06 NOTE — NURSING
Sent SecureChat to Dr Luna regarding patient's itching to upper back, patient has expressed concern over shingles. Order entered for diphenhydramine by physician.

## 2023-04-06 NOTE — NURSING
Adm diphenhydramine 25 mg PO per c/o itching to upper back. Assisted patient with arranging supper tray.

## 2023-04-06 NOTE — PLAN OF CARE
[facility] ([place] [address])  Initial Discharge Assessment       Primary Care Provider: Primary Doctor No    Admission Diagnosis: SOB (shortness of breath) [R06.02]  ESRD (end stage renal disease) [N18.6]  Chest pain [R07.9]  Acute hypoxemic respiratory failure [J96.01]    Admission Date: 4/5/2023  Expected Discharge Date: 4/8/2023    Discharge Barriers Identified: (P) None    Payor: MEDICAID / Plan: [place] [facility] CONNECT / Product Type: Managed Medicaid /     Extended Emergency Contact Information  Primary Emergency Contact: Atif Helm  Address: [address]           [place] [address] [place]  Mobile Phone: [phone]  Relation: Spouse  Secondary Emergency Contact: Hemant Martinez  Mobile Phone: [phone]  Relation: Son    Discharge Plan A: (P) Home  Discharge Plan B: (P) Home      [id number] - [place] - [address] AT [facility] [address]  [facility] [place] [address]  Phone: [phone] Fax: [phone]    [id number] - [place] - [address]  AT [facility] OF [facility] [address]  [address] [place] [address]  Phone: [phone] Fax: [phone]      Initial Assessment (most recent)       Adult Discharge Assessment - 04/06/23 1615          Discharge Assessment    Assessment Type Discharge Planning Assessment (P)      Confirmed/corrected address, phone number and insurance Yes (P)      Confirmed Demographics Correct on Facesheet (P)      Source of Information patient (P)      Does patient/caregiver understand observation status Yes (P)      Communicated JOAN with patient/caregiver Yes (P)      Reason For Admission Shortness of breath (P)      People in Home alone (P)      Facility Arrived From: Home (P)      Do you expect to return to your current living situation? Yes (P)      Do you have help at home or someone to help you manage your care at home? Yes (P)      Who are your caregiver(s) and their  phone number(s)? Hemant Martinez, son , [phone] (P)      Prior to hospitilization cognitive status: Alert/Oriented (P)      Current cognitive status: Alert/Oriented (P)      Home Accessibility wheelchair accessible (P)      Home Layout Able to live on 1st floor (P)      Equipment Currently Used at Home none (P)      Readmission within 30 days? No (P)      Do you currently have service(s) that help you manage your care at home? No (P)      Do you take prescription medications? No (P)      Do you have prescription coverage? Yes (P)      Coverage MEDICAID (P)      Do you have any problems affording any of your prescribed medications? No (P)      Is the patient taking medications as prescribed? yes (P)      Who is going to help you get home at discharge? Hemant Martinez son , [phone] (P)      How do you get to doctors appointments? car, drives self;family or friend will provide (P)      Are you on dialysis? Yes (P)      Dialysis Name and Scheduled days [facility] MWF (P)      Do you take coumadin? No (P)      Discharge Plan A Home (P)      Discharge Plan B Home (P)      DME Needed Upon Discharge  none (P)      Discharge Plan discussed with: Patient (P)      Discharge Barriers Identified None (P)         Physical Activity    On average, how many days per week do you engage in moderate to strenuous exercise (like a brisk walk)? 0 days (P)      On average, how many minutes do you engage in exercise at this level? 0 min (P)         Stress    Do you feel stress - tense, restless, nervous, or anxious, or unable to sleep at night because your mind is troubled all the time - these days? Only a little (P)         Social Connections    In a typical week, how many times do you talk on the phone with family, friends, or neighbors? Three times a week (P)      How often do you get together with friends or relatives? Three times a week (P)      Do you belong to any clubs or organizations such as Lutheran groups, unions,  fraternal or athletic groups, or school groups? No (P)                       Torie Olvera LMSW  [facility]   [id number]

## 2023-04-06 NOTE — NURSING
Spoke with hemodialysis nurse via phone, updated on patient's condition and oxygen needs. Will perform dialysis at bedside later today, per dialysis nurse.

## 2023-04-06 NOTE — NURSING
Sitting up on side of bed, eating breakfast. AAOx4. Resp evenand nonlabored with high-flow O2 at 40L/60%. No other distress noted currently.

## 2023-04-06 NOTE — NURSING
Patient resting in bed, eyes closed. RT has been in to see patient. Waiting until after dialysis to attempt weaning of O2.

## 2023-04-06 NOTE — RESPIRATORY THERAPY
RAPID RESPONSE RESPIRATORY THERAPY PROACTIVE ROUNDING NOTE             Time of visit: 933     Code Status: Full Code   : 1965  Bed: 91252/28748 A:   MRN: [id number]  Time spent at the bedside: < 15 min    SITUATION    Evaluated patient for: HFNC Compliance     BACKGROUND    Patient has a past medical history of Acute renal failure superimposed on stage 5 chronic kidney disease, not on chronic dialysis, Anemia, Coronary artery disease, Diabetes mellitus, type 2, GERD (gastroesophageal reflux disease), Gout, Hydrocele in adult, Hyperlipidemia, Hypertension, Inguinal hernia, Membranous glomerulonephritis, Nephrotic range proteinuria, Nephrotic syndrome, Obesity, 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    24 Hours Vitals Range:  Temp:  [97.9 °F (36.6 °C)-99 °F (37.2 °C)]   Pulse:  [81-90]   Resp:  [12-38]   BP: (138-163)/(63-84)   SpO2:  [93 %-96 %]     Labs:    Recent Labs     23  0043 23  0447    140   K 4.6 4.5   CL 98 103   CO2 25 24   CREATININE 8.2* 6.7*   * 136*   PHOS 2.3* 2.8   MG 2.2 2.2        Recent Labs     23  0048   PH 7.454*   PCO2 36.7   PO2 93*   HCO3 25.8   POCSATURATED 98   BE 2       ASSESSMENT/INTERVENTIONS    Pt up on edge of bed, no SOB endorsed, but pt appears slightly labored. Airvo HFNC in place and pt re-enforced on need to wear cannula at all times.    Last VS   Temp: 98.7 °F (37.1 °C) ( 113)  Pulse: 82 (1517)  Resp: 32 (1517)  BP: 138/65 ( 1132)  SpO2: 93 % (1517)    Level of Consciousness: Level of Consciousness (AVPU): alert  Respiratory Effort: Respiratory Effort: Normal, Unlabored Expansion/Accessory Muscle Usage: Expansion/Accessory Muscles/Retractions: no retractions, no use of accessory muscles  All Lung Field Breath Sounds: All Lung Fields Breath Sounds: Anterior:, Lateral:, diminished  O2 Device/Concentration: Airvo 40L/70%  Was the O2 device able to be weaned? No  Ambu at bedside: Ambu bag with the  patient?: Yes, Adult Ambu    Active Orders   Respiratory Care    Bipap QHS     Frequency: QHS     Number of Occurrences: Until Specified     Order Questions:      Inspiratory pressure 10      Expiratory pressure 05    Oxygen Continuous     Frequency: Continuous     Number of Occurrences: Until Specified     Order Questions:      Device type: High flow      Device: Comfort Flow      FiO2%: 60      LPM: 40      Titrate O2 per Oxygen Titration Protocol: Yes      To maintain SpO2 goal of: >= 90%      Notify MD of: Inability to achieve desired SpO2       RECOMMENDATIONS    We recommend: RRT Recs: Continue POC per primary team.      FOLLOW-UP    Please call back the Rapid Response RT, Ander Escamilla, RRT at x [phone] for any questions or concerns.

## 2023-04-06 NOTE — PROGRESS NOTES
[facility] ([place] [address])  [facility]  Progress Note    Patient Name: Enrique Martinez  MRN: [id number]  Patient Class: IP- Inpatient   Admission Date: 4/5/2023  Length of Stay: 1 days  Attending Physician: Parish Jones*  Primary Care Provider: Primary Doctor No        Subjective:     Principal Problem:Acute hypoxemic respiratory failure        HPI:  Enrique Martinez is a 58-year-old male with ESRD on HD MWF, CAD, DM, GERD, HTN, membranous nephropathy, nephrotic syndrome who presents to the ED for acute onset dyspnea. Last dialysis was Monday, full session.  Developed shortness of breath acutely yesterday which worsened overnight prompting him to present to ED. Complains of new dry cough but no other complaints. Denies chest pain, fever, chills, abdominal pain, nausea, vomiting, congestion. States he is complaint with home meds. Makes urine still and reports take 80 mg lasix PO BID.    Patient irritated on evaluation, cursing and saying he wants to leave AMA. When explained risks of leaving AMA to patient including risk of death, he agreed to stay for dialysis and better BP control.    Upon arrival to the ED, /142 & SpO2 84% on RA w/ accessory muscle use. Placed on BiPAP for 1 hour then transitioned to 4L NC satting 94%. Troponin 0.077, BNP 2,998. CXR with pulmonary edema. Given lasix 80 mg IV. Admitted to hospital medicine.      Overview/Hospital Course:  On morning of discharge, despite being weaned to nasal cannula in ED, patient again became hypoxic and tachypneic.  BiPAP replaced and nephrology notified for emergent HD. patient dialyzed in ED. while dialyzing, transitioned from BiPAP to high-flow nasal cannula.  Following HD and removal of 3L patient transferred to step-down unit on 7 L high-flow nasal cannula.  Patient wanting to discharge.  Upon removal of oxygen, oxygen saturations in low 80s inpatient with dizziness and nausea.  Discussed risks of leaving AMA.  Patient  amenable to stay for continued management of hypoxia.      Interval History: Pt feels well, denies shortness of breath.  Weaning oxygen and plan for HD today    Review of Systems  Objective:     Vital Signs (Most Recent):  Temp: 98.7 °F (37.1 °C) (04/06/23 1132)  Pulse: 87 (04/06/23 1204)  Resp: 12 (04/06/23 1204)  BP: 138/65 (04/06/23 1132)  SpO2: 95 % (04/06/23 1204) Vital Signs (24h Range):  Temp:  [97.9 °F (36.6 °C)-99.8 °F (37.7 °C)] 98.7 °F (37.1 °C)  Pulse:  [81-95] 87  Resp:  [12-38] 12  SpO2:  [93 %-97 %] 95 %  BP: (138-171)/(63-99) 138/65     Weight: 68 kg (150 lb)  Body mass index is 24.21 kg/m².    Intake/Output Summary (Last 24 hours) at 4/6/2023 1428  Last data filed at 4/6/2023 1100  Gross per 24 hour   Intake 354 ml   Output 550 ml   Net -196 ml      Physical Exam  Constitutional:       General: He is not in acute distress.     Appearance: Normal appearance.      Interventions: Nasal cannula in place.   Cardiovascular:      Rate and Rhythm: Normal rate and regular rhythm.      Heart sounds: No murmur heard.    No friction rub. No gallop.   Pulmonary:      Effort: Pulmonary effort is normal. No respiratory distress.   Abdominal:      General: Abdomen is flat. There is no distension.      Palpations: Abdomen is soft.      Tenderness: There is no abdominal tenderness. There is no guarding or rebound.   Musculoskeletal:      Cervical back: Normal range of motion and neck supple.      Right lower leg: Edema present.      Left lower leg: Edema present.   Skin:     General: Skin is warm and dry.   Neurological:      Mental Status: He is alert.       Significant Labs: All pertinent labs within the past 24 hours have been reviewed.    Significant Imaging: I have reviewed all pertinent imaging results/findings within the past 24 hours.      Assessment/Plan:      * Acute hypoxemic respiratory failure  Patient with Hypoxic Respiratory failure which is Acute.  he is not on home oxygen. Supplemental oxygen was  provided and noted- Oxygen Concentration (%):  [60] 60    Signs/symptoms of respiratory failure include- increased work of breathing and use of accessory muscles. Contributing diagnoses includes - Pulmonary edema, ESRD Labs and images were reviewed. Patient Has recent VBG, which has been reviewed. Will treat underlying causes and adjust management of respiratory failure as follows-   -Improved somewhat after 1 hour BiPAP in ED, consider BiPAP qhs if needed after HD  -Suspect 2/2 hypervolemia from ESRD  -Covid/Flu/RSV ordered   -Do not suspected infectious source at this time  -Given IV lasix 80 mg in ED, plan for HD today  -Supplement O2 as needed, prn duonebs  -Cont home medications (lasix, antihypertensives)    Hypertensive urgency  Renovascular hypertension  Patient has a current diagnosis of hypertensive urgency (without evidence of end organ damage) which is uncontrolled.  Latest blood pressure and vitals reviewed-   Temp:  [99.1 °F (37.3 °C)]   Pulse:  []   Resp:  [16-33]   BP: (173-222)/()   SpO2:  [84 %-99 %] .   Patient currently off IV antihypertensives.   Home meds for hypertension were reviewed and noted below.   Hypertension Medications             cloNIDine 0.3 mg/24 hr td ptwk (CATAPRES) 0.3 mg/24 hr Place 1 patch onto the skin every 7 days.    furosemide (LASIX) 40 MG tablet Take 1 tablet (40 mg total) by mouth 2 (two) times daily.    hydrALAZINE (APRESOLINE) 100 MG tablet Take 1 tablet (100 mg total) by mouth every 8 (eight) hours.    labetaloL (NORMODYNE) 300 MG tablet Take 1 tablet (300 mg total) by mouth 2 (two) times daily.    losartan (COZAAR) 100 MG tablet Take 1 tablet (100 mg total) by mouth once daily.    NIFEdipine (PROCARDIA-XL) 90 MG (OSM) 24 hr tablet Take 1 tablet (90 mg total) by mouth 2 (two) times a day.          Medication adjustment for hospital antihypertensives is as follows- Continue on home medications as above, HD for volume removal.     Will aim for controlled BP  reduction by medications noted above. Monitor and mitigate end organ damage as indicated.    ESRD (end stage renal disease)  -Patient on HD MWF, last HD Monday(full session) presenting with pulmonary edema, fluid overload.   -Nephrology consulted, appreciate recs    Benign prostatic hyperplasia  -Cont home flomax    Gastroesophageal reflux disease without esophagitis  -Cont home PPI    S/P coronary artery stent placement  Patient with previous coronary stent for CAD in past in 2007. Continue Labetalol, Asprin 81 mg po daily and Lipitor 80 mg po daily to treat CAD    Type 2 diabetes mellitus with chronic kidney disease on chronic dialysis, with long-term current use of insulin  Patient's FSGs are controlled on current medication regimen.  Last A1c reviewed-   Lab Results   Component Value Date    HGBA1C 4.3 04/05/2023     Most recent fingerstick glucose reviewed- No results for input(s): POCTGLUCOSE in the last 24 hours.  Current correctional scale  Low  Maintain anti-hyperglycemic dose as follows-   Antihyperglycemics (From admission, onward)    Start     Stop Route Frequency Ordered    04/05/23 0420  insulin aspart U-100 pen 0-5 Units         -- SubQ Before meals & nightly PRN 04/05/23 0322        Hold Oral hypoglycemics while patient is in the hospital.  -DM/Renal diet    Anemia due to chronic kidney disease, on chronic dialysis  - Current CBC reviewed-   Lab Results   Component Value Date    HGB 8.9 (L) 04/05/2023    HCT 27.7 (L) 04/05/2023     - Patient's anemia is currently controlled. S/p 0 units of PRBCs.   - Etiology likely d/t ESRD, also with history SHANNAN/folate deficiency not on supplementation   - Monitor serial CBC and transfuse if patient becomes hemodynamically unstable, symptomatic or H/H drops below 7/21.     Membranous glomerulonephritis  -Cause of ESRD    Pure hypercholesterolemia  Chronic and controlled. Continue Lipitor 80 mg po daily.    VTE Risk Mitigation (From admission, onward)          Ordered     heparin (porcine) injection 5,000 Units  Every 8 hours         04/05/23 0322     IP VTE HIGH RISK PATIENT  Once         04/05/23 0322     Place sequential compression device  Until discontinued         04/05/23 0322                Discharge Planning   JOAN: 4/8/2023     Code Status: Full Code   Is the patient medically ready for discharge?: No    Reason for patient still in hospital (select all that apply): Patient trending condition and Treatment             Parish Luna MD  Department of Hospital Medicine   [facility] ([place])

## 2023-04-06 NOTE — CARE UPDATE
RAPID RESPONSE NURSE ROUND       Rounding completed with charge RNJaneen for tachypnea reports 40L/70% on HFNC (Arivo) Bipap ordered QHS. Oxygen saturation goal > 89%. No additional concerns verbalized at this time. Instructed to call [phone] for further concerns or assistance.

## 2023-04-06 NOTE — SUBJECTIVE & OBJECTIVE
Interval History: Pt feels well, denies shortness of breath.  Weaning oxygen and plan for HD today    Review of Systems  Objective:     Vital Signs (Most Recent):  Temp: 98.7 °F (37.1 °C) (04/06/23 1132)  Pulse: 87 (04/06/23 1204)  Resp: 12 (04/06/23 1204)  BP: 138/65 (04/06/23 1132)  SpO2: 95 % (04/06/23 1204) Vital Signs (24h Range):  Temp:  [97.9 °F (36.6 °C)-99.8 °F (37.7 °C)] 98.7 °F (37.1 °C)  Pulse:  [81-95] 87  Resp:  [12-38] 12  SpO2:  [93 %-97 %] 95 %  BP: (138-171)/(63-99) 138/65     Weight: 68 kg (150 lb)  Body mass index is 24.21 kg/m².    Intake/Output Summary (Last 24 hours) at 4/6/2023 1428  Last data filed at 4/6/2023 1100  Gross per 24 hour   Intake 354 ml   Output 550 ml   Net -196 ml      Physical Exam  Constitutional:       General: He is not in acute distress.     Appearance: Normal appearance.      Interventions: Nasal cannula in place.   Cardiovascular:      Rate and Rhythm: Normal rate and regular rhythm.      Heart sounds: No murmur heard.    No friction rub. No gallop.   Pulmonary:      Effort: Pulmonary effort is normal. No respiratory distress.   Abdominal:      General: Abdomen is flat. There is no distension.      Palpations: Abdomen is soft.      Tenderness: There is no abdominal tenderness. There is no guarding or rebound.   Musculoskeletal:      Cervical back: Normal range of motion and neck supple.      Right lower leg: Edema present.      Left lower leg: Edema present.   Skin:     General: Skin is warm and dry.   Neurological:      Mental Status: He is alert.       Significant Labs: All pertinent labs within the past 24 hours have been reviewed.    Significant Imaging: I have reviewed all pertinent imaging results/findings within the past 24 hours.

## 2023-04-07 LAB
ANION GAP SERPL CALC-SCNC: 13 MMOL/L (ref 8–16)
BASOPHILS # BLD AUTO: 0.03 K/UL (ref 0–0.2)
BASOPHILS NFR BLD: 0.3 % (ref 0–1.9)
BUN SERPL-MCNC: 31 MG/DL (ref 6–20)
CALCIUM SERPL-MCNC: 9.6 MG/DL (ref 8.7–10.5)
CHLORIDE SERPL-SCNC: 105 MMOL/L (ref 95–110)
CO2 SERPL-SCNC: 22 MMOL/L (ref 23–29)
CREAT SERPL-MCNC: 5.7 MG/DL (ref 0.5–1.4)
DIFFERENTIAL METHOD: ABNORMAL
EOSINOPHIL # BLD AUTO: 0.3 K/UL (ref 0–0.5)
EOSINOPHIL NFR BLD: 3.5 % (ref 0–8)
ERYTHROCYTE [DISTWIDTH] IN BLOOD BY AUTOMATED COUNT: 15 % (ref 11.5–14.5)
EST. GFR  (NO RACE VARIABLE): 10.7 ML/MIN/1.73 M^2
GLUCOSE SERPL-MCNC: 107 MG/DL (ref 70–110)
HCT VFR BLD AUTO: 26.5 % (ref 40–54)
HGB BLD-MCNC: 8.1 G/DL (ref 14–18)
IMM GRANULOCYTES # BLD AUTO: 0.03 K/UL (ref 0–0.04)
IMM GRANULOCYTES NFR BLD AUTO: 0.3 % (ref 0–0.5)
LYMPHOCYTES # BLD AUTO: 1.1 K/UL (ref 1–4.8)
LYMPHOCYTES NFR BLD: 11.6 % (ref 18–48)
MCH RBC QN AUTO: 31.5 PG (ref 27–31)
MCHC RBC AUTO-ENTMCNC: 30.6 G/DL (ref 32–36)
MCV RBC AUTO: 103 FL (ref 82–98)
MONOCYTES # BLD AUTO: 0.9 K/UL (ref 0.3–1)
MONOCYTES NFR BLD: 9.3 % (ref 4–15)
NEUTROPHILS # BLD AUTO: 7.2 K/UL (ref 1.8–7.7)
NEUTROPHILS NFR BLD: 75 % (ref 38–73)
NRBC BLD-RTO: 0 /100 WBC
PLATELET # BLD AUTO: 194 K/UL (ref 150–450)
PMV BLD AUTO: 9.6 FL (ref 9.2–12.9)
POTASSIUM SERPL-SCNC: 4.1 MMOL/L (ref 3.5–5.1)
RBC # BLD AUTO: 2.57 M/UL (ref 4.6–6.2)
SODIUM SERPL-SCNC: 140 MMOL/L (ref 136–145)
WBC # BLD AUTO: 9.58 K/UL (ref 3.9–12.7)

## 2023-04-07 PROCEDURE — 99233 SBSQ HOSP IP/OBS HIGH 50: CPT | Mod: ,,, | Performed by: STUDENT IN AN ORGANIZED HEALTH CARE EDUCATION/TRAINING PROGRAM

## 2023-04-07 PROCEDURE — 27100171 HC OXYGEN HIGH FLOW UP TO 24 HOURS

## 2023-04-07 PROCEDURE — 25000003 PHARM REV CODE 250: Performed by: HOSPITALIST

## 2023-04-07 PROCEDURE — 20600001 HC STEP DOWN PRIVATE ROOM

## 2023-04-07 PROCEDURE — 25000242 PHARM REV CODE 250 ALT 637 W/ HCPCS: Performed by: STUDENT IN AN ORGANIZED HEALTH CARE EDUCATION/TRAINING PROGRAM

## 2023-04-07 PROCEDURE — 85025 COMPLETE CBC W/AUTO DIFF WBC: CPT

## 2023-04-07 PROCEDURE — 94799 UNLISTED PULMONARY SVC/PX: CPT

## 2023-04-07 PROCEDURE — 25000003 PHARM REV CODE 250: Performed by: NURSE PRACTITIONER

## 2023-04-07 PROCEDURE — 63600175 PHARM REV CODE 636 W HCPCS: Performed by: STUDENT IN AN ORGANIZED HEALTH CARE EDUCATION/TRAINING PROGRAM

## 2023-04-07 PROCEDURE — 80048 BASIC METABOLIC PNL TOTAL CA: CPT

## 2023-04-07 PROCEDURE — 94660 CPAP INITIATION&MGMT: CPT

## 2023-04-07 PROCEDURE — 94640 AIRWAY INHALATION TREATMENT: CPT

## 2023-04-07 PROCEDURE — 94761 N-INVAS EAR/PLS OXIMETRY MLT: CPT

## 2023-04-07 PROCEDURE — 99900035 HC TECH TIME PER 15 MIN (STAT)

## 2023-04-07 PROCEDURE — 25000003 PHARM REV CODE 250

## 2023-04-07 PROCEDURE — 63600175 PHARM REV CODE 636 W HCPCS

## 2023-04-07 PROCEDURE — 99233 PR SUBSEQUENT HOSPITAL CARE,LEVL III: ICD-10-PCS | Mod: ,,, | Performed by: STUDENT IN AN ORGANIZED HEALTH CARE EDUCATION/TRAINING PROGRAM

## 2023-04-07 PROCEDURE — 25000003 PHARM REV CODE 250: Performed by: STUDENT IN AN ORGANIZED HEALTH CARE EDUCATION/TRAINING PROGRAM

## 2023-04-07 PROCEDURE — 99233 SBSQ HOSP IP/OBS HIGH 50: CPT | Mod: ,,, | Performed by: NURSE PRACTITIONER

## 2023-04-07 PROCEDURE — 80100014 HC HEMODIALYSIS 1:1

## 2023-04-07 PROCEDURE — S4991 NICOTINE PATCH NONLEGEND: HCPCS

## 2023-04-07 PROCEDURE — 36415 COLL VENOUS BLD VENIPUNCTURE: CPT

## 2023-04-07 PROCEDURE — 99233 PR SUBSEQUENT HOSPITAL CARE,LEVL III: ICD-10-PCS | Mod: ,,, | Performed by: NURSE PRACTITIONER

## 2023-04-07 RX ORDER — ALBUTEROL SULFATE 5 MG/ML
2.5 SOLUTION RESPIRATORY (INHALATION)
Status: DISCONTINUED | OUTPATIENT
Start: 2023-04-07 | End: 2023-04-08 | Stop reason: HOSPADM

## 2023-04-07 RX ORDER — SODIUM CHLORIDE 9 MG/ML
INJECTION, SOLUTION INTRAVENOUS ONCE
Status: COMPLETED | OUTPATIENT
Start: 2023-04-07 | End: 2023-04-07

## 2023-04-07 RX ORDER — ALBUTEROL SULFATE 90 UG/1
2 AEROSOL, METERED RESPIRATORY (INHALATION) EVERY 6 HOURS PRN
Status: DISCONTINUED | OUTPATIENT
Start: 2023-04-07 | End: 2023-04-08 | Stop reason: HOSPADM

## 2023-04-07 RX ADMIN — FUROSEMIDE 100 MG: 10 INJECTION, SOLUTION INTRAMUSCULAR; INTRAVENOUS at 08:04

## 2023-04-07 RX ADMIN — NIFEDIPINE 90 MG: 30 TABLET, FILM COATED, EXTENDED RELEASE ORAL at 09:04

## 2023-04-07 RX ADMIN — ALBUTEROL SULFATE 2.5 MG: 2.5 SOLUTION RESPIRATORY (INHALATION) at 07:04

## 2023-04-07 RX ADMIN — NICOTINE 1 PATCH: 14 PATCH, EXTENDED RELEASE TRANSDERMAL at 09:04

## 2023-04-07 RX ADMIN — HEPARIN SODIUM 5000 UNITS: 5000 INJECTION INTRAVENOUS; SUBCUTANEOUS at 09:04

## 2023-04-07 RX ADMIN — SODIUM CHLORIDE: 9 INJECTION, SOLUTION INTRAVENOUS at 03:04

## 2023-04-07 RX ADMIN — PANTOPRAZOLE SODIUM 40 MG: 40 TABLET, DELAYED RELEASE ORAL at 09:04

## 2023-04-07 RX ADMIN — ALBUTEROL SULFATE 2.5 MG: 2.5 SOLUTION RESPIRATORY (INHALATION) at 02:04

## 2023-04-07 RX ADMIN — METHOCARBAMOL 500 MG: 500 TABLET ORAL at 06:04

## 2023-04-07 RX ADMIN — HYDRALAZINE HYDROCHLORIDE 100 MG: 50 TABLET ORAL at 09:04

## 2023-04-07 RX ADMIN — METHOCARBAMOL 500 MG: 500 TABLET ORAL at 07:04

## 2023-04-07 RX ADMIN — HYDROCODONE BITARTRATE AND ACETAMINOPHEN 1 TABLET: 5; 325 TABLET ORAL at 06:04

## 2023-04-07 RX ADMIN — LABETALOL HYDROCHLORIDE 300 MG: 100 TABLET, FILM COATED ORAL at 09:04

## 2023-04-07 RX ADMIN — HYDRALAZINE HYDROCHLORIDE 100 MG: 50 TABLET ORAL at 05:04

## 2023-04-07 RX ADMIN — HYDROCODONE BITARTRATE AND ACETAMINOPHEN 1 TABLET: 5; 325 TABLET ORAL at 09:04

## 2023-04-07 RX ADMIN — FUROSEMIDE 100 MG: 10 INJECTION, SOLUTION INTRAMUSCULAR; INTRAVENOUS at 07:04

## 2023-04-07 RX ADMIN — HEPARIN SODIUM 5000 UNITS: 5000 INJECTION INTRAVENOUS; SUBCUTANEOUS at 05:04

## 2023-04-07 RX ADMIN — GABAPENTIN 300 MG: 300 CAPSULE ORAL at 09:04

## 2023-04-07 RX ADMIN — LOSARTAN POTASSIUM 100 MG: 50 TABLET, FILM COATED ORAL at 09:04

## 2023-04-07 RX ADMIN — SEVELAMER CARBONATE 800 MG: 800 TABLET, FILM COATED ORAL at 09:04

## 2023-04-07 RX ADMIN — BENZONATATE 100 MG: 100 CAPSULE ORAL at 04:04

## 2023-04-07 NOTE — NURSING
Resting in bed, eyes open. Appears AAOx4. Resp even and unlabored. Resp even with O2 via AirVo at 35L/56%. Left forearm hemodialysis fistula noted. Appears in no acute distress. Continue to monitor.

## 2023-04-07 NOTE — PROGRESS NOTES
04/06/23 2000   Vital Signs   Pulse 84   SpO2 96 %   /69   Post-Hemodialysis Assessment   Rinseback Volume (mL) 250 mL   Blood Volume Processed (Liters) 50.8 L   Dialyzer Clearance Lightly streaked   Duration of Treatment 210 minutes   Total UF (mL) 3550 mL   Net Fluid Removal 3000   Patient Response to Treatment tolerated well     HD at the bedside finalized. 3L net UF removed. Both needles removed and hemostasis achieved after 10 mins holding pressures. Present thrill and bruit. Report given to primary nurse.

## 2023-04-07 NOTE — SUBJECTIVE & OBJECTIVE
Interval History: Remains on airvo  Dialysis again today    Review of Systems  Objective:     Vital Signs (Most Recent):  Temp: 98.9 °F (37.2 °C) (04/07/23 0815)  Pulse: 84 (04/07/23 1159)  Resp: (!) 24 (04/07/23 0815)  BP: (!) 150/71 (04/07/23 0815)  SpO2: 95 % (04/07/23 1159)   Vital Signs (24h Range):  Temp:  [98.3 °F (36.8 °C)-99.2 °F (37.3 °C)] 98.9 °F (37.2 °C)  Pulse:  [79-89] 84  Resp:  [13-46] 24  SpO2:  [91 %-98 %] 95 %  BP: (105-168)/(55-79) 150/71     Weight: 68.4 kg (150 lb 12.8 oz)  Body mass index is 24.34 kg/m².    Intake/Output Summary (Last 24 hours) at 4/7/2023 1341  Last data filed at 4/7/2023 0900  Gross per 24 hour   Intake 200 ml   Output 4000 ml   Net -3800 ml      Physical Exam  Constitutional:       General: He is not in acute distress.     Appearance: Normal appearance.      Interventions: Nasal cannula in place.   Cardiovascular:      Rate and Rhythm: Normal rate and regular rhythm.      Heart sounds: No murmur heard.    No friction rub. No gallop.   Pulmonary:      Effort: Pulmonary effort is normal. No respiratory distress.      Breath sounds: Rales present.   Abdominal:      General: Abdomen is flat. There is no distension.      Palpations: Abdomen is soft.      Tenderness: There is no abdominal tenderness. There is no guarding or rebound.   Musculoskeletal:      Cervical back: Normal range of motion and neck supple.      Right lower leg: Edema present.      Left lower leg: Edema present.   Skin:     General: Skin is warm and dry.   Neurological:      Mental Status: He is alert.       Significant Labs: All pertinent labs within the past 24 hours have been reviewed.    Significant Imaging: I have reviewed all pertinent imaging results/findings within the past 24 hours.

## 2023-04-07 NOTE — PLAN OF CARE
No events overnight. 3 L of fluid was removed with dialysis. Patient tolerated well. Vitals stable. No distress noted. Airvo weaned to 35L and 56%; patient wore bipap overnight approximately 4 hours.       Problem: Hemodynamic Instability (Hemodialysis)  Goal: Effective Tissue Perfusion  Outcome: Ongoing, Progressing  Intervention: Optimize Blood Flow  Flowsheets (Taken 4/7/2023 0623)  Stabilization Measures: (no complications noted\) other (see comments)     Problem: Electrolyte Imbalance (Chronic Kidney Disease)  Goal: Electrolyte Balance  Outcome: Ongoing, Progressing     Problem: Diabetes Comorbidity  Goal: Blood Glucose Level Within Targeted Range  Outcome: Ongoing, Progressing  Intervention: Monitor and Manage Glycemia  Flowsheets (Taken 4/7/2023 0623)  Glycemic Management: blood glucose monitored     Problem: Fall Injury Risk  Goal: Absence of Fall and Fall-Related Injury  Outcome: Ongoing, Progressing  Intervention: Identify and Manage Contributors  Flowsheets (Taken 4/7/2023 0623)  Self-Care Promotion:   independence encouraged   BADL personal objects within reach   BADL personal routines maintained   safe use of adaptive equipment encouraged   meal set-up provided  Medication Review/Management:   medications reviewed   high-risk medications identified     Problem: Fall Injury Risk  Goal: Absence of Fall and Fall-Related Injury  Outcome: Ongoing, Progressing  Intervention: Identify and Manage Contributors  Flowsheets (Taken 4/7/2023 0623)  Self-Care Promotion:   independence encouraged   BADL personal objects within reach   BADL personal routines maintained   safe use of adaptive equipment encouraged   meal set-up provided  Medication Review/Management:   medications reviewed   high-risk medications identified

## 2023-04-07 NOTE — ASSESSMENT & PLAN NOTE
58 y.o. Black or  Male ESRD-HD M-W-F presents to ED on 4/5/2023 with diagnosis of: SOB (shortness of breath) [R06.02];ESRD (end stage renal disease) [N18.6];Chest pain [R07.9];Acute hypoxemic respiratory failure [J96.01]   Nephrology consulted for inpatient ESRD-HD management    2/2 Membranous nephropathy (bx proven), DM II, and HTN , on HD since 2/2022  Outpatient HD Information:  -Dialysis modality: Hemodialysis  -Outpatient HD unit: [facility]  -Nephrologist: Dr. Saez   -HD TX days: Monday/Wednesday/Friday, duration of treatment: 210 mins  -Last HD TX prior to hospital admission: 04/03/23  -Dialysis access: L AVF  -Residual urine: oliguric    -EDW: 61kg      Assessment:   - Will provide dialysis today (04/07/2023) with UF -2-3 L as BP tolerates for metabolic clearance and volume management   - Duration: 3 hrs  - Labs reviewed and dialysate to be adjusted to current labs.   - Continue to monitor intake and output  - Please avoid gadolinium, fleets, phos-based laxatives, NSAIDs  - Dialysis thrice weekly unless more urgent indications arise. Will evaluate RRT requirements Daily.    Anemia of ESRD   Recent Labs   Lab 04/05/23  0043 04/06/23  0447 04/07/23  0521   WBC 10.89 12.41 9.58   HGB 8.9* 8.5* 8.1*   HCT 27.7* 27.5* 26.5*    192 194     Lab Results   Component Value Date    FESATURATED 4 (L) 02/17/2022    FERRITIN 384 (H) 02/17/2022       - Goal in ESRD is Hgb of 10-11. Hgb 8.1  - Will consider Epogen with HD if patient remains inpatient     Mineral Bone Disease in ESRD   Lab Results   Component Value Date    .3 (H) 02/17/2022    CALCIUM 9.6 04/07/2023    PHOS 2.8 04/06/2023       - F/U PO4, Mg, Calcium. And albumin levels.   - Renal diet with protein intake goal 1.5 g/kg/d with 1 L fluid restriction   - Novasource with meals  - Continue home phos binder   - Daily renal panel so that phos and albumin is monitored daily.

## 2023-04-07 NOTE — PROGRESS NOTES
[facility] ([place] [address])  [facility] Medicine  Progress Note    Patient Name: Enrique Martinez  MRN: [id number]  Patient Class: IP- Inpatient   Admission Date: 4/5/2023  Length of Stay: 2 days  Attending Physician: Parish Jones*  Primary Care Provider: Primary Doctor No        Subjective:     Principal Problem:Acute hypoxemic respiratory failure        HPI:  Enrique Martinez is a 58-year-old male with ESRD on HD MWF, CAD, DM, GERD, HTN, membranous nephropathy, nephrotic syndrome who presents to the ED for acute onset dyspnea. Last dialysis was Monday, full session.  Developed shortness of breath acutely yesterday which worsened overnight prompting him to present to ED. Complains of new dry cough but no other complaints. Denies chest pain, fever, chills, abdominal pain, nausea, vomiting, congestion. States he is complaint with home meds. Makes urine still and reports take 80 mg lasix PO BID.    Patient irritated on evaluation, cursing and saying he wants to leave AMA. When explained risks of leaving AMA to patient including risk of death, he agreed to stay for dialysis and better BP control.    Upon arrival to the ED, /142 & SpO2 84% on RA w/ accessory muscle use. Placed on BiPAP for 1 hour then transitioned to 4L NC satting 94%. Troponin 0.077, BNP 2,998. CXR with pulmonary edema. Given lasix 80 mg IV. Admitted to hospital medicine.      Overview/Hospital Course:  On morning of discharge, despite being weaned to nasal cannula in ED, patient again became hypoxic and tachypneic.  BiPAP replaced and nephrology notified for emergent HD. patient dialyzed in ED. while dialyzing, transitioned from BiPAP to high-flow nasal cannula.  Following HD and removal of 3L patient transferred to step-down unit on 7 L high-flow nasal cannula.  Patient wanting to discharge.  Upon removal of oxygen, oxygen saturations in low 80s inpatient with dizziness and nausea.  Discussed risks of leaving AMA.  Patient  amenable to stay for continued management of hypoxia.      Interval History: Remains on airvo  Dialysis again today    Review of Systems  Objective:     Vital Signs (Most Recent):  Temp: 98.9 °F (37.2 °C) (04/07/23 0815)  Pulse: 84 (04/07/23 1159)  Resp: (!) 24 (04/07/23 0815)  BP: (!) 150/71 (04/07/23 0815)  SpO2: 95 % (04/07/23 1159)   Vital Signs (24h Range):  Temp:  [98.3 °F (36.8 °C)-99.2 °F (37.3 °C)] 98.9 °F (37.2 °C)  Pulse:  [79-89] 84  Resp:  [13-46] 24  SpO2:  [91 %-98 %] 95 %  BP: (105-168)/(55-79) 150/71     Weight: 68.4 kg (150 lb 12.8 oz)  Body mass index is 24.34 kg/m².    Intake/Output Summary (Last 24 hours) at 4/7/2023 1341  Last data filed at 4/7/2023 0900  Gross per 24 hour   Intake 200 ml   Output 4000 ml   Net -3800 ml      Physical Exam  Constitutional:       General: He is not in acute distress.     Appearance: Normal appearance.      Interventions: Nasal cannula in place.   Cardiovascular:      Rate and Rhythm: Normal rate and regular rhythm.      Heart sounds: No murmur heard.    No friction rub. No gallop.   Pulmonary:      Effort: Pulmonary effort is normal. No respiratory distress.      Breath sounds: Rales present.   Abdominal:      General: Abdomen is flat. There is no distension.      Palpations: Abdomen is soft.      Tenderness: There is no abdominal tenderness. There is no guarding or rebound.   Musculoskeletal:      Cervical back: Normal range of motion and neck supple.      Right lower leg: Edema present.      Left lower leg: Edema present.   Skin:     General: Skin is warm and dry.   Neurological:      Mental Status: He is alert.       Significant Labs: All pertinent labs within the past 24 hours have been reviewed.    Significant Imaging: I have reviewed all pertinent imaging results/findings within the past 24 hours.      Assessment/Plan:      * Acute hypoxemic respiratory failure  Patient with Hypoxic Respiratory failure which is Acute.  he is not on home oxygen. Supplemental  oxygen was provided and noted- Oxygen Concentration (%):  [60] 60    Signs/symptoms of respiratory failure include- increased work of breathing and use of accessory muscles. Contributing diagnoses includes - Pulmonary edema, ESRD Labs and images were reviewed. Patient Has recent VBG, which has been reviewed. Will treat underlying causes and adjust management of respiratory failure as follows-   -Improved somewhat after 1 hour BiPAP in ED, consider BiPAP qhs if needed after HD  -Suspect 2/2 hypervolemia from ESRD  -Covid/Flu/RSV ordered   -Do not suspected infectious source at this time  -Given IV lasix 80 mg in ED, plan for HD today  -Supplement O2 as needed, prn duonebs  -Cont home medications (lasix, antihypertensives)    Hypertensive urgency  Renovascular hypertension  Patient has a current diagnosis of hypertensive urgency (without evidence of end organ damage) which is uncontrolled.  Latest blood pressure and vitals reviewed-   Temp:  [99.1 °F (37.3 °C)]   Pulse:  []   Resp:  [16-33]   BP: (173-222)/()   SpO2:  [84 %-99 %] .   Patient currently off IV antihypertensives.   Home meds for hypertension were reviewed and noted below.   Hypertension Medications             cloNIDine 0.3 mg/24 hr td ptwk (CATAPRES) 0.3 mg/24 hr Place 1 patch onto the skin every 7 days.    furosemide (LASIX) 40 MG tablet Take 1 tablet (40 mg total) by mouth 2 (two) times daily.    hydrALAZINE (APRESOLINE) 100 MG tablet Take 1 tablet (100 mg total) by mouth every 8 (eight) hours.    labetaloL (NORMODYNE) 300 MG tablet Take 1 tablet (300 mg total) by mouth 2 (two) times daily.    losartan (COZAAR) 100 MG tablet Take 1 tablet (100 mg total) by mouth once daily.    NIFEdipine (PROCARDIA-XL) 90 MG (OSM) 24 hr tablet Take 1 tablet (90 mg total) by mouth 2 (two) times a day.          Medication adjustment for hospital antihypertensives is as follows- Continue on home medications as above, HD for volume removal.     Will aim for  controlled BP reduction by medications noted above. Monitor and mitigate end organ damage as indicated.    ESRD (end stage renal disease)  -Patient on HD MWF, last HD Monday(full session) presenting with pulmonary edema, fluid overload.   -Nephrology consulted, appreciate recs    Benign prostatic hyperplasia  -Cont home flomax    Gastroesophageal reflux disease without esophagitis  -Cont home PPI    S/P coronary artery stent placement  Patient with previous coronary stent for CAD in past in 2007. Continue Labetalol, Asprin 81 mg po daily and Lipitor 80 mg po daily to treat CAD    Type 2 diabetes mellitus with chronic kidney disease on chronic dialysis, with long-term current use of insulin  Patient's FSGs are controlled on current medication regimen.  Last A1c reviewed-   Lab Results   Component Value Date    HGBA1C 4.3 04/05/2023     Most recent fingerstick glucose reviewed- No results for input(s): POCTGLUCOSE in the last 24 hours.  Current correctional scale  Low  Maintain anti-hyperglycemic dose as follows-   Antihyperglycemics (From admission, onward)    Start     Stop Route Frequency Ordered    04/05/23 0420  insulin aspart U-100 pen 0-5 Units         -- SubQ Before meals & nightly PRN 04/05/23 0322        Hold Oral hypoglycemics while patient is in the hospital.  -DM/Renal diet    Anemia due to chronic kidney disease, on chronic dialysis  - Current CBC reviewed-   Lab Results   Component Value Date    HGB 8.9 (L) 04/05/2023    HCT 27.7 (L) 04/05/2023     - Patient's anemia is currently controlled. S/p 0 units of PRBCs.   - Etiology likely d/t ESRD, also with history SHANNAN/folate deficiency not on supplementation   - Monitor serial CBC and transfuse if patient becomes hemodynamically unstable, symptomatic or H/H drops below 7/21.     Membranous glomerulonephritis  -Cause of ESRD    Pure hypercholesterolemia  Chronic and controlled. Continue Lipitor 80 mg po daily.      VTE Risk Mitigation (From admission,  onward)         Ordered     heparin (porcine) injection 5,000 Units  Every 8 hours         04/05/23 0322     IP VTE HIGH RISK PATIENT  Once         04/05/23 0322     Place sequential compression device  Until discontinued         04/05/23 0322                Discharge Planning   JOAN: 4/8/2023     Code Status: Full Code   Is the patient medically ready for discharge?: No    Reason for patient still in hospital (select all that apply): Patient trending condition and Treatment  Discharge Plan A: Home          Parish Luna MD  Department of Hospital Medicine   [facility] - Intensive Care ([place]-14)

## 2023-04-07 NOTE — PROGRESS NOTES
Bedside nurse informed of dialysis initiation.    1:1 Bedside HD treatment started as ordered.  VS's per Doc Flowsheet.    Dialysis Access: LFA AVF    Needle Size: 16 gauge X2  Insertion with no complications.    Will maintain telemetry and blood pressure monitoring throughout treatment.  Refer to dialysis flowsheet and MAR for details.

## 2023-04-07 NOTE — PROGRESS NOTES
Bedside 1:1 hemodialysis treatment completed.    Treatment time received: 3.5 hours    Net fluid removed: 2379 ml    Cramping towards end of treatment so unable to meet fluid goal of 3 liters. VSS.    Blood returned and needles X2 removed. Pressure held till hemostasis obtained.  Placed gauze and tape dressing to site.  Fistual with continued bruit and thrill post treatment.    Update given to bedside nurse.  Refer to flowsheet and MAR for details.

## 2023-04-07 NOTE — NURSING
Removed Airvo O2 delivery system and initiated O2 at 5L via NC. Patient talking, moving in bed. O2 sats sustain at upper 90's. Dialysis nurse continues at bedside. Continue to monitor.

## 2023-04-07 NOTE — SUBJECTIVE & OBJECTIVE
Interval History: Appears to oxygenating well on AirVo 35 L/56%. No distress on exam. HD completed on yesterday with 3 L removed. Electrolytes non emergent.     Review of patient's allergies indicates:  No Known Allergies  Current Facility-Administered Medications   Medication Frequency    0.9%  NaCl infusion Once    acetaminophen tablet 1,000 mg Q8H PRN    acetaminophen tablet 650 mg Q4H PRN    albuterol inhaler 2 puff Q6H PRN    albuterol nebulizer solution 2.5 mg Q6H WAKE    aluminum-magnesium hydroxide-simethicone 200-200-20 mg/5 mL suspension 30 mL QID PRN    aspirin EC tablet 81 mg Daily    atorvastatin tablet 80 mg Daily    benzonatate capsule 100 mg TID PRN    bisacodyL suppository 10 mg Daily PRN    dextrose 10% bolus 125 mL 125 mL PRN    dextrose 10% bolus 250 mL 250 mL PRN    dextrose 40 % gel 15,000 mg PRN    dextrose 40 % gel 30,000 mg PRN    diphenhydrAMINE capsule 25 mg Q6H PRN    furosemide injection 100 mg Q12H    gabapentin capsule 300 mg TID    glucagon (human recombinant) injection 1 mg PRN    heparin (porcine) injection 5,000 Units Q8H    hydrALAZINE tablet 100 mg Q8H    HYDROcodone-acetaminophen 5-325 mg per tablet 1 tablet Q6H PRN    insulin aspart U-100 pen 0-5 Units QID (AC + HS) PRN    labetaloL tablet 300 mg BID    LIDOcaine-prilocaine cream PRN    losartan tablet 100 mg Daily    melatonin tablet 6 mg Nightly PRN    methocarbamoL tablet 500 mg BID PRN    naloxone 0.4 mg/mL injection 0.02 mg PRN    nicotine 14 mg/24 hr 1 patch Daily    NIFEdipine 24 hr tablet 90 mg BID    ondansetron disintegrating tablet 8 mg Q8H PRN    pantoprazole EC tablet 40 mg Daily    polyethylene glycol packet 17 g BID PRN    prochlorperazine injection Soln 5 mg Q6H PRN    sevelamer carbonate tablet 800 mg TID    simethicone chewable tablet 80 mg QID PRN    sodium chloride 0.9% flush 5 mL PRN    tamsulosin 24 hr capsule 0.4 mg Daily       Objective:     Vital Signs (Most Recent):  Temp: 98.9 °F (37.2 °C) (04/07/23  0815)  Pulse: 84 (04/07/23 1159)  Resp: (!) 24 (04/07/23 0815)  BP: (!) 150/71 (04/07/23 0815)  SpO2: 95 % (04/07/23 1159)   Vital Signs (24h Range):  Temp:  [98.3 °F (36.8 °C)-99.2 °F (37.3 °C)] 98.9 °F (37.2 °C)  Pulse:  [79-89] 84  Resp:  [13-46] 24  SpO2:  [91 %-98 %] 95 %  BP: (105-168)/(55-79) 150/71     Weight: 68.4 kg (150 lb 12.8 oz) (04/07/23 0420)  Body mass index is 24.34 kg/m².  Body surface area is 1.78 meters squared.    I/O last 3 completed shifts:  In: 554 [P.O.:554]  Out: 4300 [Urine:750; Other:3550]    Physical Exam  Vitals and nursing note reviewed.   Constitutional:       General: He is sleeping.      Appearance: He is well-developed. He is ill-appearing.      Interventions: Nasal cannula in place.   HENT:      Head: Normocephalic and atraumatic.      Nose:      Comments: NC in place     Mouth/Throat:      Pharynx: No oropharyngeal exudate.   Eyes:      Extraocular Movements: Extraocular movements intact.      Conjunctiva/sclera: Conjunctivae normal.   Cardiovascular:      Rate and Rhythm: Normal rate and regular rhythm.      Heart sounds: Normal heart sounds.      Arteriovenous access: Left arteriovenous access is present.  Pulmonary:      Effort: Respiratory distress present.   Abdominal:      General: Bowel sounds are normal. There is no distension.      Palpations: Abdomen is soft.      Tenderness: There is no abdominal tenderness.   Musculoskeletal:      Cervical back: Normal range of motion and neck supple.      Right lower leg: Edema present.      Left lower leg: Edema present.   Skin:     General: Skin is warm and dry.   Neurological:      Mental Status: He is easily aroused.       Significant Labs:  CBC:   Recent Labs   Lab 04/07/23  0521   WBC 9.58   RBC 2.57*   HGB 8.1*   HCT 26.5*      *   MCH 31.5*   MCHC 30.6*     CMP:   Recent Labs   Lab 04/05/23  0043 04/06/23  0447 04/07/23  0521   *   < > 107   CALCIUM 9.6   < > 9.6   ALBUMIN 3.1*  --   --    PROT 6.5  --    --       < > 140   K 4.6   < > 4.1   CO2 25   < > 22*   CL 98   < > 105   BUN 56*   < > 31*   CREATININE 8.2*   < > 5.7*   ALKPHOS 136*  --   --    ALT 13  --   --    AST 25  --   --    BILITOT 0.5  --   --     < > = values in this interval not displayed.     All labs within the past 24 hours have been reviewed.

## 2023-04-07 NOTE — NURSING
Spoke with dialysis department on phone, updated on patient's condition. Unable to dialyze him in dialysis unit due to oxygen demands. Will perform dialysis at bedside later today.

## 2023-04-07 NOTE — NURSING
Decreased O2 to 3L via NC. Dialysis nurse continues at bedside. O2 sats continue in upper 90's. Patient continues in no acute distress. Physician made aware via SecureChat. Continue to monitor.

## 2023-04-07 NOTE — PROGRESS NOTES
[facility] ([place] [address])  Nephrology  Progress Note    Patient Name: Enrique Martinez  MRN: [id number]  Admission Date: 4/5/2023  Hospital Length of Stay: 2 days  Attending Provider: Parish Jones*   Primary Care Physician: Primary Doctor No  Principal Problem:Acute hypoxemic respiratory failure    Subjective:     Interval History: Appears to oxygenating well on AirVo 35 L/56%. No distress on exam. HD completed on yesterday with 3 L removed. Electrolytes non emergent.     Review of patient's allergies indicates:  No Known Allergies  Current Facility-Administered Medications   Medication Frequency    0.9%  NaCl infusion Once    acetaminophen tablet 1,000 mg Q8H PRN    acetaminophen tablet 650 mg Q4H PRN    albuterol inhaler 2 puff Q6H PRN    albuterol nebulizer solution 2.5 mg Q6H WAKE    aluminum-magnesium hydroxide-simethicone 200-200-20 mg/5 mL suspension 30 mL QID PRN    aspirin EC tablet 81 mg Daily    atorvastatin tablet 80 mg Daily    benzonatate capsule 100 mg TID PRN    bisacodyL suppository 10 mg Daily PRN    dextrose 10% bolus 125 mL 125 mL PRN    dextrose 10% bolus 250 mL 250 mL PRN    dextrose 40 % gel 15,000 mg PRN    dextrose 40 % gel 30,000 mg PRN    diphenhydrAMINE capsule 25 mg Q6H PRN    furosemide injection 100 mg Q12H    gabapentin capsule 300 mg TID    glucagon (human recombinant) injection 1 mg PRN    heparin (porcine) injection 5,000 Units Q8H    hydrALAZINE tablet 100 mg Q8H    HYDROcodone-acetaminophen 5-325 mg per tablet 1 tablet Q6H PRN    insulin aspart U-100 pen 0-5 Units QID (AC + HS) PRN    labetaloL tablet 300 mg BID    LIDOcaine-prilocaine cream PRN    losartan tablet 100 mg Daily    melatonin tablet 6 mg Nightly PRN    methocarbamoL tablet 500 mg BID PRN    naloxone 0.4 mg/mL injection 0.02 mg PRN    nicotine 14 mg/24 hr 1 patch Daily    NIFEdipine 24 hr tablet 90 mg BID    ondansetron disintegrating tablet 8 mg Q8H PRN     pantoprazole EC tablet 40 mg Daily    polyethylene glycol packet 17 g BID PRN    prochlorperazine injection Soln 5 mg Q6H PRN    sevelamer carbonate tablet 800 mg TID    simethicone chewable tablet 80 mg QID PRN    sodium chloride 0.9% flush 5 mL PRN    tamsulosin 24 hr capsule 0.4 mg Daily       Objective:     Vital Signs (Most Recent):  Temp: 98.9 °F (37.2 °C) (04/07/23 0815)  Pulse: 84 (04/07/23 1159)  Resp: (!) 24 (04/07/23 0815)  BP: (!) 150/71 (04/07/23 0815)  SpO2: 95 % (04/07/23 1159)   Vital Signs (24h Range):  Temp:  [98.3 °F (36.8 °C)-99.2 °F (37.3 °C)] 98.9 °F (37.2 °C)  Pulse:  [79-89] 84  Resp:  [13-46] 24  SpO2:  [91 %-98 %] 95 %  BP: (105-168)/(55-79) 150/71     Weight: 68.4 kg (150 lb 12.8 oz) (04/07/23 0420)  Body mass index is 24.34 kg/m².  Body surface area is 1.78 meters squared.    I/O last 3 completed shifts:  In: 554 [P.O.:554]  Out: 4300 [Urine:750; Other:3550]    Physical Exam  Vitals and nursing note reviewed.   Constitutional:       General: He is sleeping.      Appearance: He is well-developed. He is ill-appearing.      Interventions: Nasal cannula in place.   HENT:      Head: Normocephalic and atraumatic.      Nose:      Comments: NC in place     Mouth/Throat:      Pharynx: No oropharyngeal exudate.   Eyes:      Extraocular Movements: Extraocular movements intact.      Conjunctiva/sclera: Conjunctivae normal.   Cardiovascular:      Rate and Rhythm: Normal rate and regular rhythm.      Heart sounds: Normal heart sounds.      Arteriovenous access: Left arteriovenous access is present.  Pulmonary:      Effort: Respiratory distress present.   Abdominal:      General: Bowel sounds are normal. There is no distension.      Palpations: Abdomen is soft.      Tenderness: There is no abdominal tenderness.   Musculoskeletal:      Cervical back: Normal range of motion and neck supple.      Right lower leg: Edema present.      Left lower leg: Edema present.   Skin:     General: Skin is warm and  dry.   Neurological:      Mental Status: He is easily aroused.       Significant Labs:  CBC:   Recent Labs   Lab 04/07/23  0521   WBC 9.58   RBC 2.57*   HGB 8.1*   HCT 26.5*      *   MCH 31.5*   MCHC 30.6*     CMP:   Recent Labs   Lab 04/05/23  0043 04/06/23  0447 04/07/23  0521   *   < > 107   CALCIUM 9.6   < > 9.6   ALBUMIN 3.1*  --   --    PROT 6.5  --   --       < > 140   K 4.6   < > 4.1   CO2 25   < > 22*   CL 98   < > 105   BUN 56*   < > 31*   CREATININE 8.2*   < > 5.7*   ALKPHOS 136*  --   --    ALT 13  --   --    AST 25  --   --    BILITOT 0.5  --   --     < > = values in this interval not displayed.     All labs within the past 24 hours have been reviewed.       Assessment/Plan:     Pulmonary  * Acute hypoxemic respiratory failure  - HD for volume removal     Renal/  ESRD (end stage renal disease)  58 y.o. Black or  Male ESRD-HD M-W-F presents to ED on 4/5/2023 with diagnosis of: SOB (shortness of breath) [R06.02];ESRD (end stage renal disease) [N18.6];Chest pain [R07.9];Acute hypoxemic respiratory failure [J96.01]   Nephrology consulted for inpatient ESRD-HD management    2/2 Membranous nephropathy (bx proven), DM II, and HTN , on HD since 2/2022  Outpatient HD Information:  -Dialysis modality: Hemodialysis  -[facility] HD [facility]  -Nephrologist: Dr. Saez   -HD TX days: Monday/Wednesday/Friday, duration of treatment: 210 mins  -Last HD TX prior to hospital admission: 04/03/23  -Dialysis access: L AVF  -Residual urine: oliguric    -EDW: 61kg      Assessment:   - Will provide dialysis today (04/07/2023) with UF -2-3 L as BP tolerates for metabolic clearance and volume management   - Duration: 3 hrs  - Labs reviewed and dialysate to be adjusted to current labs.   - Continue to monitor intake and output  - Please avoid gadolinium, fleets, phos-based laxatives, NSAIDs  - Dialysis thrice weekly unless more urgent indications arise. Will evaluate RRT  requirements Daily.    Anemia of ESRD   Recent Labs   Lab 04/05/23  0043 04/06/23  0447 04/07/23  0521   WBC 10.89 12.41 9.58   HGB 8.9* 8.5* 8.1*   HCT 27.7* 27.5* 26.5*    192 194     Lab Results   Component Value Date    FESATURATED 4 (L) 02/17/2022    FERRITIN 384 (H) 02/17/2022       - Goal in ESRD is Hgb of 10-11. Hgb 8.1  - Will consider Epogen with HD if patient remains inpatient     Mineral Bone Disease in ESRD   Lab Results   Component Value Date    .3 (H) 02/17/2022    CALCIUM 9.6 04/07/2023    PHOS 2.8 04/06/2023       - F/U PO4, Mg, Calcium. And albumin levels.   - Renal diet with protein intake goal 1.5 g/kg/d with 1 L fluid restriction   - Novasource with meals  - Continue home phos binder   - Daily renal panel so that phos and albumin is monitored daily.           Thank you for your consult. I will follow-up with patient. Please contact us if you have any additional questions.    Shelia Mejai, [place], FNP-C  Nephrology  [facility] - [facility] ([place]

## 2023-04-08 VITALS
TEMPERATURE: 98 F | BODY MASS INDEX: 23.87 KG/M2 | WEIGHT: 148.5 LBS | SYSTOLIC BLOOD PRESSURE: 171 MMHG | OXYGEN SATURATION: 91 % | HEIGHT: 66 IN | HEART RATE: 84 BPM | DIASTOLIC BLOOD PRESSURE: 85 MMHG | RESPIRATION RATE: 24 BRPM

## 2023-04-08 LAB
ANION GAP SERPL CALC-SCNC: 12 MMOL/L (ref 8–16)
BASOPHILS # BLD AUTO: 0.03 K/UL (ref 0–0.2)
BASOPHILS NFR BLD: 0.4 % (ref 0–1.9)
BUN SERPL-MCNC: 26 MG/DL (ref 6–20)
CALCIUM SERPL-MCNC: 10.1 MG/DL (ref 8.7–10.5)
CHLORIDE SERPL-SCNC: 104 MMOL/L (ref 95–110)
CO2 SERPL-SCNC: 23 MMOL/L (ref 23–29)
CREAT SERPL-MCNC: 4.2 MG/DL (ref 0.5–1.4)
DIFFERENTIAL METHOD: ABNORMAL
EOSINOPHIL # BLD AUTO: 0.3 K/UL (ref 0–0.5)
EOSINOPHIL NFR BLD: 3.6 % (ref 0–8)
ERYTHROCYTE [DISTWIDTH] IN BLOOD BY AUTOMATED COUNT: 14.3 % (ref 11.5–14.5)
EST. GFR  (NO RACE VARIABLE): 15.4 ML/MIN/1.73 M^2
GLUCOSE SERPL-MCNC: 89 MG/DL (ref 70–110)
HCT VFR BLD AUTO: 26.6 % (ref 40–54)
HGB BLD-MCNC: 8.5 G/DL (ref 14–18)
IMM GRANULOCYTES # BLD AUTO: 0.03 K/UL (ref 0–0.04)
IMM GRANULOCYTES NFR BLD AUTO: 0.4 % (ref 0–0.5)
LYMPHOCYTES # BLD AUTO: 1.4 K/UL (ref 1–4.8)
LYMPHOCYTES NFR BLD: 17.3 % (ref 18–48)
MCH RBC QN AUTO: 32.1 PG (ref 27–31)
MCHC RBC AUTO-ENTMCNC: 32 G/DL (ref 32–36)
MCV RBC AUTO: 100 FL (ref 82–98)
MONOCYTES # BLD AUTO: 0.9 K/UL (ref 0.3–1)
MONOCYTES NFR BLD: 11.4 % (ref 4–15)
NEUTROPHILS # BLD AUTO: 5.4 K/UL (ref 1.8–7.7)
NEUTROPHILS NFR BLD: 66.9 % (ref 38–73)
NRBC BLD-RTO: 0 /100 WBC
PLATELET # BLD AUTO: 192 K/UL (ref 150–450)
PMV BLD AUTO: 9.4 FL (ref 9.2–12.9)
POCT GLUCOSE: 135 MG/DL (ref 70–110)
POTASSIUM SERPL-SCNC: 4.3 MMOL/L (ref 3.5–5.1)
RBC # BLD AUTO: 2.65 M/UL (ref 4.6–6.2)
SODIUM SERPL-SCNC: 139 MMOL/L (ref 136–145)
WBC # BLD AUTO: 7.99 K/UL (ref 3.9–12.7)

## 2023-04-08 PROCEDURE — 85025 COMPLETE CBC W/AUTO DIFF WBC: CPT

## 2023-04-08 PROCEDURE — 36415 COLL VENOUS BLD VENIPUNCTURE: CPT

## 2023-04-08 PROCEDURE — 25000003 PHARM REV CODE 250: Performed by: HOSPITALIST

## 2023-04-08 PROCEDURE — 94660 CPAP INITIATION&MGMT: CPT

## 2023-04-08 PROCEDURE — 99239 HOSP IP/OBS DSCHRG MGMT >30: CPT | Mod: ,,, | Performed by: STUDENT IN AN ORGANIZED HEALTH CARE EDUCATION/TRAINING PROGRAM

## 2023-04-08 PROCEDURE — 99900035 HC TECH TIME PER 15 MIN (STAT)

## 2023-04-08 PROCEDURE — 99239 PR HOSPITAL DISCHARGE DAY,>30 MIN: ICD-10-PCS | Mod: ,,, | Performed by: STUDENT IN AN ORGANIZED HEALTH CARE EDUCATION/TRAINING PROGRAM

## 2023-04-08 PROCEDURE — 25000003 PHARM REV CODE 250: Performed by: STUDENT IN AN ORGANIZED HEALTH CARE EDUCATION/TRAINING PROGRAM

## 2023-04-08 PROCEDURE — 80048 BASIC METABOLIC PNL TOTAL CA: CPT

## 2023-04-08 PROCEDURE — 25000003 PHARM REV CODE 250

## 2023-04-08 RX ADMIN — METHOCARBAMOL 500 MG: 500 TABLET ORAL at 08:04

## 2023-04-08 RX ADMIN — METHOCARBAMOL 500 MG: 500 TABLET ORAL at 02:04

## 2023-04-08 RX ADMIN — BENZONATATE 100 MG: 100 CAPSULE ORAL at 12:04

## 2023-04-08 RX ADMIN — GABAPENTIN 300 MG: 300 CAPSULE ORAL at 08:04

## 2023-04-08 RX ADMIN — HYDROCODONE BITARTRATE AND ACETAMINOPHEN 1 TABLET: 5; 325 TABLET ORAL at 05:04

## 2023-04-08 RX ADMIN — LOSARTAN POTASSIUM 100 MG: 50 TABLET, FILM COATED ORAL at 08:04

## 2023-04-08 RX ADMIN — HYDRALAZINE HYDROCHLORIDE 100 MG: 50 TABLET ORAL at 05:04

## 2023-04-08 RX ADMIN — NIFEDIPINE 90 MG: 30 TABLET, FILM COATED, EXTENDED RELEASE ORAL at 08:04

## 2023-04-08 NOTE — NURSING
"Removed IV site, tip intact. Telemetry discontinued. Written and oral DC instructions given to patient, verbalized understanding. Nurse has pleaded with patient to stay and wait for O2; patient declines to wait any longer, states "I'm leaving now."  "

## 2023-04-08 NOTE — NURSING
"Have been in contact with physician regarding patient's 6-minute walk test for O2 use at home; orders for home O2 have been entered. Patient made aware, states "I am not going to wait all day." Explained to patient that the home O2 would be delivered as soon as possible. Verbalized understanding. Physician aware of patient's desire to leave as soon as possible. Orders entered.  "

## 2023-04-08 NOTE — NURSING
This patient is asking if he can leave tonight. they aggressively weaned him from 30L/56% on airvo down to room air yesterday, and right after night RN got here, he had to be put back on nasal cannula. he completed his third day in a row of dialysis today. Nurse explained, that while he is able to sign himself out, he has to be able to walk himself out of the hospital and without oxygen because we don't offer that assistance for patient's who leave AMA. Nurse also explained from an insurance perspective regarding payouts if he leaves AMA. RN told him she wouldn't be taking his oxygen off because she didn't feel like it was safe enough to do so for him, but he could ask the RT when she made her rounds again. Dr. López notified via secure chat, and he spoke to patient via nurse's spectralink (#[phone]). He convinced that patient to stay for tonight and added doctor Cheryl to secure chat message since patient requested oxygen for home use.

## 2023-04-08 NOTE — NURSING
"Called in to room by patient, states he wishes to be discharged at once. Further states "I agreed to stay overnight, now it is day time and I am ready to go." O2 sats 93% on room air, have removed oxygen for trial period. Explained to patient the physician needs to see patient and put in discharge orders before nurse can discharge; patient requests nurse reach out to physician immediately. Sent physician SecureChat informing of patient's intention to leave hospital.  "

## 2023-04-08 NOTE — NURSING
"Patient calling out multiple times, asking "when am I getting out of here?" Explained multiple times to patient that the process takes a little time. Patient states "I am not waiting long, I'm ready to go now!" Attempts to calm and soothe patient only mildly successful.  "

## 2023-04-08 NOTE — PLAN OF CARE
No events overnight. Remains on 3L nasal cannula. Able to sit on side of bed unassisted. No bowel movement this shift. On dialysis but voided 150mL of urine. Vitals stable. Complains of muscle cramps and lower back aches. Patient wants to leave today. Dr. López spoke to patient overnight to convince him to stay for the duration of the shift. Patient wants to go home with home oxygen.    Problem: Infection  Goal: Absence of Infection Signs and Symptoms  Outcome: Ongoing, Progressing  Intervention: Prevent or Manage Infection  Flowsheets (Taken 4/8/2023 0612)  Fever Reduction/Comfort Measures:   lightweight clothing   lightweight bedding     Problem: Infection (Hemodialysis)  Goal: Absence of Infection Signs and Symptoms  Outcome: Ongoing, Progressing     Problem: Fluid Volume Excess (Chronic Kidney Disease)  Goal: Fluid Balance  Outcome: Ongoing, Progressing  Intervention: Monitor and Manage Hypervolemia  Flowsheets (Taken 4/8/2023 0612)  Skin Protection: adhesive use limited     Problem: Fall Injury Risk  Goal: Absence of Fall and Fall-Related Injury  Outcome: Ongoing, Progressing  Intervention: Identify and Manage Contributors  Flowsheets (Taken 4/8/2023 0612)  Self-Care Promotion: independence encouraged  Medication Review/Management:   medications reviewed   high-risk medications identified

## 2023-04-08 NOTE — NURSING
Home Oxygen Evaluation    Date Performed: 2023    1) Patient's Home O2 Sat on room air, while at rest: 92%        If O2 sats on room air at rest are 88% or below, patient qualifies. No additional testing needed. Document N/A in steps 2 and 3. If 89% or above, complete steps 2.      2) Patient's O2 Sat on room air while exercisin%        If O2 sats on room air while exercising remain 89% or above patient does not qualify, no further testing needed Document N/A in step 3. If O2 sats on room air while exercising are 88% or below, continue to step 3.      3) Patient's O2 Sat while exercising on O2: 95% at 2 LPM         (Must show improvement from #2 for patients to qualify)    If O2 sats improve on oxygen, patient qualifies for portable oxygen. If not, the patient does not qualify.

## 2023-04-17 ENCOUNTER — OFFICE VISIT (OUTPATIENT)
Dept: VASCULAR SURGERY | Facility: CLINIC | Age: 58
End: 2023-04-17
Attending: SURGERY
Payer: MEDICAID

## 2023-04-17 VITALS
HEART RATE: 77 BPM | SYSTOLIC BLOOD PRESSURE: 158 MMHG | DIASTOLIC BLOOD PRESSURE: 74 MMHG | BODY MASS INDEX: 24.1 KG/M2 | HEIGHT: 66 IN | WEIGHT: 149.94 LBS | TEMPERATURE: 98 F

## 2023-04-17 DIAGNOSIS — Z99.2 S/P HEMODIALYSIS CATHETER INSERTION: Primary | ICD-10-CM

## 2023-04-17 DIAGNOSIS — N18.6 ESRD ON HEMODIALYSIS: Primary | ICD-10-CM

## 2023-04-17 DIAGNOSIS — Z99.2 ESRD ON HEMODIALYSIS: Primary | ICD-10-CM

## 2023-04-17 PROCEDURE — 99214 OFFICE O/P EST MOD 30 MIN: CPT | Mod: PBBFAC,25 | Performed by: SURGERY

## 2023-04-17 PROCEDURE — 36589 PR REMOVAL TUNNELED CV CATH W/O SUBQ PORT OR PUMP: ICD-10-PCS | Mod: S$PBB,,, | Performed by: SURGERY

## 2023-04-17 PROCEDURE — 3044F HG A1C LEVEL LT 7.0%: CPT | Mod: CPTII,,, | Performed by: SURGERY

## 2023-04-17 PROCEDURE — 3066F NEPHROPATHY DOC TX: CPT | Mod: CPTII,,, | Performed by: SURGERY

## 2023-04-17 PROCEDURE — 3077F PR MOST RECENT SYSTOLIC BLOOD PRESSURE >= 140 MM HG: ICD-10-PCS | Mod: CPTII,,, | Performed by: SURGERY

## 2023-04-17 PROCEDURE — 3077F SYST BP >= 140 MM HG: CPT | Mod: CPTII,,, | Performed by: SURGERY

## 2023-04-17 PROCEDURE — 1159F PR MEDICATION LIST DOCUMENTED IN MEDICAL RECORD: ICD-10-PCS | Mod: CPTII,,, | Performed by: SURGERY

## 2023-04-17 PROCEDURE — 3078F PR MOST RECENT DIASTOLIC BLOOD PRESSURE < 80 MM HG: ICD-10-PCS | Mod: CPTII,,, | Performed by: SURGERY

## 2023-04-17 PROCEDURE — 3066F PR DOCUMENTATION OF TREATMENT FOR NEPHROPATHY: ICD-10-PCS | Mod: CPTII,,, | Performed by: SURGERY

## 2023-04-17 PROCEDURE — 1159F MED LIST DOCD IN RCRD: CPT | Mod: CPTII,,, | Performed by: SURGERY

## 2023-04-17 PROCEDURE — 36589 REMOVAL TUNNELED CV CATH: CPT | Mod: S$PBB,,, | Performed by: SURGERY

## 2023-04-17 PROCEDURE — 4010F PR ACE/ARB THEARPY RXD/TAKEN: ICD-10-PCS | Mod: CPTII,,, | Performed by: SURGERY

## 2023-04-17 PROCEDURE — 3008F PR BODY MASS INDEX (BMI) DOCUMENTED: ICD-10-PCS | Mod: CPTII,,, | Performed by: SURGERY

## 2023-04-17 PROCEDURE — 3078F DIAST BP <80 MM HG: CPT | Mod: CPTII,,, | Performed by: SURGERY

## 2023-04-17 PROCEDURE — 99999 PR PBB SHADOW E&M-EST. PATIENT-LVL IV: ICD-10-PCS | Mod: PBBFAC,,, | Performed by: SURGERY

## 2023-04-17 PROCEDURE — 3044F PR MOST RECENT HEMOGLOBIN A1C LEVEL <7.0%: ICD-10-PCS | Mod: CPTII,,, | Performed by: SURGERY

## 2023-04-17 PROCEDURE — 99999 PR PBB SHADOW E&M-EST. PATIENT-LVL IV: CPT | Mod: PBBFAC,,, | Performed by: SURGERY

## 2023-04-17 PROCEDURE — 3008F BODY MASS INDEX DOCD: CPT | Mod: CPTII,,, | Performed by: SURGERY

## 2023-04-17 PROCEDURE — 36589 REMOVAL TUNNELED CV CATH: CPT | Mod: PBBFAC | Performed by: SURGERY

## 2023-04-17 PROCEDURE — 4010F ACE/ARB THERAPY RXD/TAKEN: CPT | Mod: CPTII,,, | Performed by: SURGERY

## 2023-04-17 RX ORDER — FUROSEMIDE 80 MG/1
80 TABLET ORAL 2 TIMES DAILY
COMMUNITY
Start: 2023-04-16

## 2023-04-17 RX ORDER — GABAPENTIN 300 MG/1
300 CAPSULE ORAL 3 TIMES DAILY
COMMUNITY
Start: 2023-04-14

## 2023-04-17 RX ORDER — HYDRALAZINE HYDROCHLORIDE 100 MG/1
TABLET, FILM COATED ORAL
COMMUNITY

## 2023-04-17 NOTE — PROGRESS NOTES
Patient has used his AVF x3 for HD.  Return to clinic in 4 months for surveillance ultrasound of his left arm AV fistula. Procedure note below    Vascular Surgery Procedure Note    Date of Operation/Procedure: 4/17/23    Pre-operative Diagnosis: presence of tunneled dialysis catheter    Post-operative Diagnosis: same    Anesthesia: local    Operation/Procedure Performed: removal of right IJ tunneled dialysis catheter    Attending Surgeon: ROHITH Pickard II, MD    Resident/Fellow: Elo Linda MD PGY1    Indications: Patient successfully using permanent AV access for HD. No longer in need of HD catheter.    Procedure in Detail:       The patient was positioned on the table in supine position with the head of the bed at approximately 30 degrees. The area over the catheter tunnel and catheter exit site on the right chest was prepped and draped in normal sterile fashion. The cuff could be palpated 1cm from the catheter exit site. For local anesthesia 5cc of 1% lidocaine with epinephrine was injected into the subcutaneous tissues at the catheter exit site. Tension was applied to the catheter and the tissues adherent to the cuff were divided with scissors and blunt dissection. Once the tissues were divided circumferentially from the cuff, the catheter was removed intact through the catheter exit site. Manual was held on the neck at the point of venous entry for 10 minutes. The catheter exit site was dressed with 4x4 gauze and tegaderm.  The patient tolerated the procedure well.    Estimated Blood loss: 20ml    Complications: none    ROHITH Pickard II, MD, RPVI  Vascular Surgeon  [facility]

## 2023-04-22 NOTE — DISCHARGE SUMMARY
[facility] ([place] [address])  [facility]  Discharge Summary      Patient Name: Enrique Martinez  MRN: [id number]  HANNAH: [id number]  Patient Class: IP- Inpatient  Admission Date: 4/5/2023  Hospital Length of Stay: 3 days  Discharge Date and Time: 4/8/23  Attending Physician: Naomi att. providers found   Discharging Provider: Parish Luna MD  Primary Care Provider: Primary Doctor [facility] Medicine Team: [facility] MED D Parish Luna MD  Primary Care Team: [facility] MED D    HPI:   Enrique Martinez is a 58-year-old male with ESRD on HD MWF, CAD, DM, GERD, HTN, membranous nephropathy, nephrotic syndrome who presents to the ED for acute onset dyspnea. Last dialysis was Monday, full session.  Developed shortness of breath acutely yesterday which worsened overnight prompting him to present to ED. Complains of new dry cough but no other complaints. Denies chest pain, fever, chills, abdominal pain, nausea, vomiting, congestion. States he is complaint with home meds. Makes urine still and reports take 80 mg lasix PO BID.    Patient irritated on evaluation, cursing and saying he wants to leave AMA. When explained risks of leaving AMA to patient including risk of death, he agreed to stay for dialysis and better BP control.    Upon arrival to the ED, /142 & SpO2 84% on RA w/ accessory muscle use. Placed on BiPAP for 1 hour then transitioned to 4L NC satting 94%. Troponin 0.077, BNP 2,998. CXR with pulmonary edema. Given lasix 80 mg IV. Admitted to hospital medicine.      * No surgery found *      Hospital Course:   On morning of discharge, despite being weaned to nasal cannula in ED, patient again became hypoxic and tachypneic.  BiPAP replaced and nephrology notified for emergent HD. patient dialyzed in ED. while dialyzing, transitioned from BiPAP to high-flow nasal cannula.  Following HD and removal of 3L patient transferred to step-down unit on 7 L high-flow nasal cannula.  Patient  wanting to discharge.  Upon removal of oxygen, oxygen saturations in low 80s inpatient with dizziness and nausea.  Discussed risks of leaving AMA.  Patient amenable to stay for continued management of hypoxia. He received additional dialysis session. Weaning oxygen. Pt again requested to leave ama. Discussed with patient risks of leaving and proposed treatment plan. Evaluated for home oxygen. Patient demanded to leave prior to its arrangement.        Goals of Care Treatment Preferences:  Code Status: Full Code      Consults:   Consults (From admission, onward)        Status Ordering Provider     Inpatient consult to Nephrology  Once        Provider:  (Not yet assigned)    Completed GABI SMALLWOOD     Inpatient consult to Nephrology  Once        Provider:  (Not yet assigned)    Completed MAR ORDAZ          No new Assessment & Plan notes have been filed under this hospital service since the last note was generated.  Service: Hospital Medicine    Final Active Diagnoses:    Diagnosis Date Noted POA    PRINCIPAL PROBLEM:  Acute hypoxemic respiratory failure [J96.01] 10/03/2022 Yes    Hypertensive urgency [I16.0] 04/05/2023 Yes    ESRD (end stage renal disease) [N18.6] 10/03/2022 Yes    Gastroesophageal reflux disease without esophagitis [K21.9] 12/26/2020 Yes    Benign prostatic hyperplasia [N40.0] 10/01/2020 Yes    Type 2 diabetes mellitus with chronic kidney disease on chronic dialysis, with long-term current use of insulin [E11.22, N18.6, Z99.2, Z79.4] 09/18/2018 Not Applicable    S/P coronary artery stent placement [Z95.5] 09/18/2018 Not Applicable    Renovascular hypertension [I15.0] 09/18/2018 Yes    Anemia due to chronic kidney disease, on chronic dialysis [N18.6, D63.1, Z99.2] 09/18/2018 Not Applicable    Pure hypercholesterolemia [E78.00] 03/11/2014 Yes    Membranous glomerulonephritis [N05.2] 06/20/2013 Yes      Problems Resolved During this Admission:       Discharged Condition: against  "medical advice    Disposition: Left Against Medical Adv*    Follow Up:    Patient Instructions:      OXYGEN FOR HOME USE     Order Specific Question Answer Comments   Liter Flow 2    Duration With activity    Qualifying Test Performed at: Activity    Oxygen saturation at rest 92    Oxygen saturation with activity 87    Oxygen saturation with activity on oxygen 95    Portable mode: continuous    Route nasal cannula    Device: home concentrator with portable tanks    Length of need (in months): 99 mos    Patient condition with qualifying saturation CHF    Height: 5' 6" (1.676 m)    Weight: 67.4 kg (148 lb 8 oz)    Alternative treatment measures have been tried or considered and deemed clinically ineffective. Yes          Pending Diagnostic Studies:     None         Medications:  Reconciled Home Medications:      Medication List      CHANGE how you take these medications    gabapentin 100 MG capsule  Commonly known as: NEURONTIN  Take 1 capsule (100 mg total) by mouth once daily.  What changed:   · how much to take  · when to take this        CONTINUE taking these medications    albuterol 90 mcg/actuation inhaler  Commonly known as: PROVENTIL/VENTOLIN HFA  Inhale 2 puffs into the lungs every 6 (six) hours as needed for Wheezing or Shortness of Breath. Rescue     aspirin 81 MG EC tablet  Commonly known as: ECOTRIN  Take 1 tablet (81 mg total) by mouth once daily.     blood-glucose meter kit  Use as instructed     colchicine 0.6 mg tablet  Commonly known as: COLCRYS  Take 0.6 mg by mouth daily as needed.     famotidine 20 MG tablet  Commonly known as: PEPCID  Take 20 mg by mouth daily as needed for Heartburn.     guaiFENesin 100 mg/5 ml 100 mg/5 mL syrup  Commonly known as: ROBITUSSIN  Take 10 mLs (200 mg total) by mouth 3 (three) times daily as needed for Cough.     HYDROcodone-acetaminophen 5-325 mg per tablet  Commonly known as: NORCO  Take 1 tablet by mouth every 6 (six) hours as needed for Pain.     labetaloL 300 MG " tablet  Commonly known as: NORMODYNE  Take 1 tablet (300 mg total) by mouth 2 (two) times daily.     LIDOcaine-prilocaine cream  Commonly known as: EMLA  Apply topically 2 (two) times daily as needed.     losartan 100 MG tablet  Commonly known as: COZAAR  Take 1 tablet (100 mg total) by mouth once daily.     melatonin 3 mg tablet  Commonly known as: MELATIN  Take 2 tablets (6 mg total) by mouth nightly as needed for Insomnia.     NIFEdipine 90 MG (OSM) 24 hr tablet  Commonly known as: PROCARDIA-XL  Take 1 tablet (90 mg total) by mouth 2 (two) times a day.     omeprazole 20 MG capsule  Commonly known as: PRILOSEC  Take 20 mg by mouth once daily.     ondansetron 8 MG Tbdl  Commonly known as: ZOFRAN-ODT  Take 1 tablet (8 mg total) by mouth every 8 (eight) hours as needed.     RENVELA 800 mg Tab  Generic drug: sevelamer carbonate  Take 800 mg by mouth 3 (three) times daily.     sildenafiL 100 MG tablet  Commonly known as: VIAGRA  Take 100 mg by mouth.     tadalafiL 2.5 mg Tab  Commonly known as: CIALIS  Take 2.5 mg by mouth.     tamsulosin 0.4 mg Cap  Commonly known as: FLOMAX  Take 1 capsule by mouth.     TRUE METRIX GLUCOSE TEST STRIP Strp  Generic drug: blood sugar diagnostic     valACYclovir 1000 MG tablet  Commonly known as: VALTREX  Take 0.5 tablets (500 mg total) by mouth once daily. for 7 days            Indwelling Lines/Drains at time of discharge:   Lines/Drains/Airways     Central Venous Catheter Line  Duration                Hemodialysis Catheter 02/28/22 1300 right internal jugular 418 days          Drain  Duration                Hemodialysis AV Fistula Left forearm -- days                Time spent on the discharge of patient: 35 minutes         Parish Luna MD  Department of Hospital Medicine  [facility] - Intensive Care ([place])

## 2023-04-27 ENCOUNTER — HOSPITAL ENCOUNTER (INPATIENT)
Facility: HOSPITAL | Age: 58
LOS: 1 days | Discharge: LEFT BEFORE DISCHARGE | DRG: 871 | End: 2023-04-28
Attending: STUDENT IN AN ORGANIZED HEALTH CARE EDUCATION/TRAINING PROGRAM | Admitting: HOSPITALIST
Payer: MEDICARE

## 2023-04-27 DIAGNOSIS — R06.02 SOB (SHORTNESS OF BREATH): ICD-10-CM

## 2023-04-27 DIAGNOSIS — R06.02 SHORTNESS OF BREATH: ICD-10-CM

## 2023-04-27 DIAGNOSIS — R07.9 CHEST PAIN: ICD-10-CM

## 2023-04-27 DIAGNOSIS — J96.01 SEPSIS WITH ACUTE HYPOXIC RESPIRATORY FAILURE WITHOUT SEPTIC SHOCK: ICD-10-CM

## 2023-04-27 DIAGNOSIS — R65.20 SEPSIS WITH ACUTE HYPOXIC RESPIRATORY FAILURE WITHOUT SEPTIC SHOCK: ICD-10-CM

## 2023-04-27 DIAGNOSIS — A41.9 SEPSIS WITH ACUTE HYPOXIC RESPIRATORY FAILURE WITHOUT SEPTIC SHOCK: ICD-10-CM

## 2023-04-27 DIAGNOSIS — A41.9 SEPSIS, DUE TO UNSPECIFIED ORGANISM, UNSPECIFIED WHETHER ACUTE ORGAN DYSFUNCTION PRESENT: Primary | ICD-10-CM

## 2023-04-27 LAB
ALBUMIN SERPL BCP-MCNC: 3.8 G/DL (ref 3.5–5.2)
ALLENS TEST: NORMAL
ALP SERPL-CCNC: 183 U/L (ref 55–135)
ALT SERPL W/O P-5'-P-CCNC: 15 U/L (ref 10–44)
ANION GAP SERPL CALC-SCNC: 13 MMOL/L (ref 8–16)
AST SERPL-CCNC: 40 U/L (ref 10–40)
BASOPHILS # BLD AUTO: 0.05 K/UL (ref 0–0.2)
BASOPHILS NFR BLD: 0.8 % (ref 0–1.9)
BILIRUB SERPL-MCNC: 0.5 MG/DL (ref 0.1–1)
BNP SERPL-MCNC: 1962 PG/ML (ref 0–99)
BUN SERPL-MCNC: 19 MG/DL (ref 6–20)
BUN SERPL-MCNC: 19 MG/DL (ref 6–30)
CALCIUM SERPL-MCNC: 10.2 MG/DL (ref 8.7–10.5)
CHLORIDE SERPL-SCNC: 97 MMOL/L (ref 95–110)
CHLORIDE SERPL-SCNC: 99 MMOL/L (ref 95–110)
CO2 SERPL-SCNC: 26 MMOL/L (ref 23–29)
CREAT SERPL-MCNC: 4.4 MG/DL (ref 0.5–1.4)
CREAT SERPL-MCNC: 5.1 MG/DL (ref 0.5–1.4)
DIFFERENTIAL METHOD: ABNORMAL
EOSINOPHIL # BLD AUTO: 0 K/UL (ref 0–0.5)
EOSINOPHIL NFR BLD: 0.2 % (ref 0–8)
ERYTHROCYTE [DISTWIDTH] IN BLOOD BY AUTOMATED COUNT: 14.3 % (ref 11.5–14.5)
EST. GFR  (NO RACE VARIABLE): 14.6 ML/MIN/1.73 M^2
GLUCOSE SERPL-MCNC: 103 MG/DL (ref 70–110)
GLUCOSE SERPL-MCNC: 103 MG/DL (ref 70–110)
HCT VFR BLD AUTO: 32.9 % (ref 40–54)
HCT VFR BLD CALC: 34 %PCV (ref 36–54)
HCV AB SERPL QL IA: NORMAL
HGB BLD-MCNC: 10.4 G/DL (ref 14–18)
HIV 1+2 AB+HIV1 P24 AG SERPL QL IA: NORMAL
IMM GRANULOCYTES # BLD AUTO: 0.01 K/UL (ref 0–0.04)
IMM GRANULOCYTES NFR BLD AUTO: 0.2 % (ref 0–0.5)
LDH SERPL L TO P-CCNC: 0.98 MMOL/L (ref 0.5–2.2)
LYMPHOCYTES # BLD AUTO: 0.8 K/UL (ref 1–4.8)
LYMPHOCYTES NFR BLD: 13.3 % (ref 18–48)
MCH RBC QN AUTO: 31.8 PG (ref 27–31)
MCHC RBC AUTO-ENTMCNC: 31.6 G/DL (ref 32–36)
MCV RBC AUTO: 101 FL (ref 82–98)
MONOCYTES # BLD AUTO: 0.8 K/UL (ref 0.3–1)
MONOCYTES NFR BLD: 13.8 % (ref 4–15)
NEUTROPHILS # BLD AUTO: 4.3 K/UL (ref 1.8–7.7)
NEUTROPHILS NFR BLD: 71.7 % (ref 38–73)
NRBC BLD-RTO: 0 /100 WBC
PLATELET # BLD AUTO: 205 K/UL (ref 150–450)
PMV BLD AUTO: 8.7 FL (ref 9.2–12.9)
POC IONIZED CALCIUM: 1.09 MMOL/L (ref 1.06–1.42)
POC TCO2 (MEASURED): 25 MMOL/L (ref 23–29)
POTASSIUM BLD-SCNC: 4.4 MMOL/L (ref 3.5–5.1)
POTASSIUM SERPL-SCNC: 4.6 MMOL/L (ref 3.5–5.1)
PROT SERPL-MCNC: 8 G/DL (ref 6–8.4)
RBC # BLD AUTO: 3.27 M/UL (ref 4.6–6.2)
SAMPLE: ABNORMAL
SAMPLE: NORMAL
SITE: NORMAL
SODIUM BLD-SCNC: 135 MMOL/L (ref 136–145)
SODIUM SERPL-SCNC: 136 MMOL/L (ref 136–145)
WBC # BLD AUTO: 5.96 K/UL (ref 3.9–12.7)

## 2023-04-27 PROCEDURE — 25000003 PHARM REV CODE 250: Performed by: STUDENT IN AN ORGANIZED HEALTH CARE EDUCATION/TRAINING PROGRAM

## 2023-04-27 PROCEDURE — 93010 ELECTROCARDIOGRAM REPORT: CPT | Mod: ,,, | Performed by: INTERNAL MEDICINE

## 2023-04-27 PROCEDURE — 63600175 PHARM REV CODE 636 W HCPCS

## 2023-04-27 PROCEDURE — 99291 CRITICAL CARE FIRST HOUR: CPT | Mod: GC,,, | Performed by: STUDENT IN AN ORGANIZED HEALTH CARE EDUCATION/TRAINING PROGRAM

## 2023-04-27 PROCEDURE — 96367 TX/PROPH/DG ADDL SEQ IV INF: CPT

## 2023-04-27 PROCEDURE — 80047 BASIC METABLC PNL IONIZED CA: CPT

## 2023-04-27 PROCEDURE — 83605 ASSAY OF LACTIC ACID: CPT

## 2023-04-27 PROCEDURE — 85025 COMPLETE CBC W/AUTO DIFF WBC: CPT

## 2023-04-27 PROCEDURE — 99900035 HC TECH TIME PER 15 MIN (STAT)

## 2023-04-27 PROCEDURE — 99291 CRITICAL CARE FIRST HOUR: CPT

## 2023-04-27 PROCEDURE — 87389 HIV-1 AG W/HIV-1&-2 AB AG IA: CPT | Performed by: PHYSICIAN ASSISTANT

## 2023-04-27 PROCEDURE — 93005 ELECTROCARDIOGRAM TRACING: CPT

## 2023-04-27 PROCEDURE — 25000003 PHARM REV CODE 250

## 2023-04-27 PROCEDURE — 96365 THER/PROPH/DIAG IV INF INIT: CPT

## 2023-04-27 PROCEDURE — 96366 THER/PROPH/DIAG IV INF ADDON: CPT

## 2023-04-27 PROCEDURE — 83880 ASSAY OF NATRIURETIC PEPTIDE: CPT

## 2023-04-27 PROCEDURE — 80053 COMPREHEN METABOLIC PANEL: CPT

## 2023-04-27 PROCEDURE — 93010 EKG 12-LEAD: ICD-10-PCS | Mod: ,,, | Performed by: INTERNAL MEDICINE

## 2023-04-27 PROCEDURE — 86803 HEPATITIS C AB TEST: CPT | Performed by: PHYSICIAN ASSISTANT

## 2023-04-27 PROCEDURE — 99291 PR CRITICAL CARE, E/M 30-74 MINUTES: ICD-10-PCS | Mod: GC,,, | Performed by: STUDENT IN AN ORGANIZED HEALTH CARE EDUCATION/TRAINING PROGRAM

## 2023-04-27 PROCEDURE — 12000002 HC ACUTE/MED SURGE SEMI-PRIVATE ROOM

## 2023-04-27 PROCEDURE — 87040 BLOOD CULTURE FOR BACTERIA: CPT | Mod: 59

## 2023-04-27 RX ORDER — ACETAMINOPHEN 325 MG/1
650 TABLET ORAL
Status: COMPLETED | OUTPATIENT
Start: 2023-04-27 | End: 2023-04-27

## 2023-04-27 RX ADMIN — VANCOMYCIN HYDROCHLORIDE 1250 MG: 1.25 INJECTION, POWDER, LYOPHILIZED, FOR SOLUTION INTRAVENOUS at 09:04

## 2023-04-27 RX ADMIN — PIPERACILLIN SODIUM AND TAZOBACTAM SODIUM 4.5 G: 4; .5 INJECTION, POWDER, FOR SOLUTION INTRAVENOUS at 08:04

## 2023-04-27 RX ADMIN — ACETAMINOPHEN 650 MG: 325 TABLET ORAL at 08:04

## 2023-04-28 VITALS
RESPIRATION RATE: 18 BRPM | DIASTOLIC BLOOD PRESSURE: 82 MMHG | OXYGEN SATURATION: 96 % | WEIGHT: 135.81 LBS | HEART RATE: 100 BPM | SYSTOLIC BLOOD PRESSURE: 128 MMHG | TEMPERATURE: 98 F | BODY MASS INDEX: 21.83 KG/M2 | HEIGHT: 66 IN

## 2023-04-28 PROBLEM — E87.1 HYPONATREMIA: Status: ACTIVE | Noted: 2023-04-28

## 2023-04-28 PROBLEM — A41.9 SEPSIS WITH ACUTE HYPOXIC RESPIRATORY FAILURE WITHOUT SEPTIC SHOCK: Status: ACTIVE | Noted: 2023-04-28

## 2023-04-28 PROBLEM — I50.33 ACUTE ON CHRONIC DIASTOLIC HEART FAILURE: Status: ACTIVE | Noted: 2023-04-28

## 2023-04-28 PROBLEM — J96.01 SEPSIS WITH ACUTE HYPOXIC RESPIRATORY FAILURE WITHOUT SEPTIC SHOCK: Status: ACTIVE | Noted: 2023-04-28

## 2023-04-28 PROBLEM — R65.20 SEPSIS WITH ACUTE HYPOXIC RESPIRATORY FAILURE WITHOUT SEPTIC SHOCK: Status: ACTIVE | Noted: 2023-04-28

## 2023-04-28 LAB
ALBUMIN SERPL BCP-MCNC: 3.4 G/DL (ref 3.5–5.2)
ANION GAP SERPL CALC-SCNC: 13 MMOL/L (ref 8–16)
BASOPHILS # BLD AUTO: 0.03 K/UL (ref 0–0.2)
BASOPHILS NFR BLD: 0.6 % (ref 0–1.9)
BUN SERPL-MCNC: 25 MG/DL (ref 6–20)
CALCIUM SERPL-MCNC: 9.6 MG/DL (ref 8.7–10.5)
CHLORIDE SERPL-SCNC: 99 MMOL/L (ref 95–110)
CO2 SERPL-SCNC: 23 MMOL/L (ref 23–29)
CREAT SERPL-MCNC: 5 MG/DL (ref 0.5–1.4)
DIFFERENTIAL METHOD: ABNORMAL
EOSINOPHIL # BLD AUTO: 0.1 K/UL (ref 0–0.5)
EOSINOPHIL NFR BLD: 1.1 % (ref 0–8)
ERYTHROCYTE [DISTWIDTH] IN BLOOD BY AUTOMATED COUNT: 15.6 % (ref 11.5–14.5)
EST. GFR  (NO RACE VARIABLE): 12.5 ML/MIN/1.73 M^2
GLUCOSE SERPL-MCNC: 80 MG/DL (ref 70–110)
HBV SURFACE AB SER-ACNC: 20.8 MIU/ML
HBV SURFACE AB SER-ACNC: REACTIVE M[IU]/ML
HBV SURFACE AG SERPL QL IA: NORMAL
HCT VFR BLD AUTO: 35.8 % (ref 40–54)
HGB BLD-MCNC: 11.2 G/DL (ref 14–18)
IMM GRANULOCYTES # BLD AUTO: 0.01 K/UL (ref 0–0.04)
IMM GRANULOCYTES NFR BLD AUTO: 0.2 % (ref 0–0.5)
LACTATE SERPL-SCNC: 1.5 MMOL/L (ref 0.5–2.2)
LYMPHOCYTES # BLD AUTO: 1.2 K/UL (ref 1–4.8)
LYMPHOCYTES NFR BLD: 23.2 % (ref 18–48)
MAGNESIUM SERPL-MCNC: 1.8 MG/DL (ref 1.6–2.6)
MCH RBC QN AUTO: 31.5 PG (ref 27–31)
MCHC RBC AUTO-ENTMCNC: 31.3 G/DL (ref 32–36)
MCV RBC AUTO: 101 FL (ref 82–98)
MONOCYTES # BLD AUTO: 0.8 K/UL (ref 0.3–1)
MONOCYTES NFR BLD: 14.9 % (ref 4–15)
NEUTROPHILS # BLD AUTO: 3.2 K/UL (ref 1.8–7.7)
NEUTROPHILS NFR BLD: 60 % (ref 38–73)
NRBC BLD-RTO: 0 /100 WBC
PHOSPHATE SERPL-MCNC: 4.5 MG/DL (ref 2.7–4.5)
PLATELET # BLD AUTO: 211 K/UL (ref 150–450)
PMV BLD AUTO: 9.4 FL (ref 9.2–12.9)
POCT GLUCOSE: 106 MG/DL (ref 70–110)
POCT GLUCOSE: 83 MG/DL (ref 70–110)
POCT GLUCOSE: 98 MG/DL (ref 70–110)
POTASSIUM SERPL-SCNC: 4.6 MMOL/L (ref 3.5–5.1)
RBC # BLD AUTO: 3.55 M/UL (ref 4.6–6.2)
SARS-COV-2 RDRP RESP QL NAA+PROBE: NEGATIVE
SODIUM SERPL-SCNC: 135 MMOL/L (ref 136–145)
WBC # BLD AUTO: 5.31 K/UL (ref 3.9–12.7)

## 2023-04-28 PROCEDURE — 99223 1ST HOSP IP/OBS HIGH 75: CPT | Mod: ,,, | Performed by: HOSPITALIST

## 2023-04-28 PROCEDURE — 99223 PR INITIAL HOSPITAL CARE,LEVL III: ICD-10-PCS | Mod: ,,, | Performed by: HOSPITALIST

## 2023-04-28 PROCEDURE — 20600001 HC STEP DOWN PRIVATE ROOM

## 2023-04-28 PROCEDURE — 86706 HEP B SURFACE ANTIBODY: CPT | Mod: 91 | Performed by: NURSE PRACTITIONER

## 2023-04-28 PROCEDURE — 96372 THER/PROPH/DIAG INJ SC/IM: CPT | Performed by: HOSPITALIST

## 2023-04-28 PROCEDURE — 85025 COMPLETE CBC W/AUTO DIFF WBC: CPT | Performed by: HOSPITALIST

## 2023-04-28 PROCEDURE — 80069 RENAL FUNCTION PANEL: CPT | Performed by: HOSPITALIST

## 2023-04-28 PROCEDURE — 99214 OFFICE O/P EST MOD 30 MIN: CPT | Mod: ,,, | Performed by: NURSE PRACTITIONER

## 2023-04-28 PROCEDURE — 25500020 PHARM REV CODE 255: Performed by: HOSPITALIST

## 2023-04-28 PROCEDURE — 25000003 PHARM REV CODE 250: Performed by: HOSPITALIST

## 2023-04-28 PROCEDURE — 63600175 PHARM REV CODE 636 W HCPCS: Performed by: HOSPITALIST

## 2023-04-28 PROCEDURE — G0257 UNSCHED DIALYSIS ESRD PT HOS: HCPCS

## 2023-04-28 PROCEDURE — 83605 ASSAY OF LACTIC ACID: CPT | Performed by: HOSPITALIST

## 2023-04-28 PROCEDURE — 83735 ASSAY OF MAGNESIUM: CPT | Performed by: HOSPITALIST

## 2023-04-28 PROCEDURE — 87340 HEPATITIS B SURFACE AG IA: CPT | Performed by: NURSE PRACTITIONER

## 2023-04-28 PROCEDURE — U0002 COVID-19 LAB TEST NON-CDC: HCPCS

## 2023-04-28 PROCEDURE — 99214 PR OFFICE/OUTPT VISIT, EST, LEVL IV, 30-39 MIN: ICD-10-PCS | Mod: ,,, | Performed by: NURSE PRACTITIONER

## 2023-04-28 RX ORDER — CLONIDINE 0.3 MG/24H
1 PATCH, EXTENDED RELEASE TRANSDERMAL
COMMUNITY
Start: 2023-04-27

## 2023-04-28 RX ORDER — LORATADINE 10 MG/1
10 TABLET ORAL DAILY
COMMUNITY
Start: 2023-04-27

## 2023-04-28 RX ORDER — TAMSULOSIN HYDROCHLORIDE 0.4 MG/1
1 CAPSULE ORAL DAILY
Status: DISCONTINUED | OUTPATIENT
Start: 2023-04-28 | End: 2023-04-28 | Stop reason: HOSPADM

## 2023-04-28 RX ORDER — SEVELAMER CARBONATE 800 MG/1
800 TABLET, FILM COATED ORAL
Status: DISCONTINUED | OUTPATIENT
Start: 2023-04-28 | End: 2023-04-28 | Stop reason: HOSPADM

## 2023-04-28 RX ORDER — LABETALOL 100 MG/1
300 TABLET, FILM COATED ORAL 2 TIMES DAILY
Status: DISCONTINUED | OUTPATIENT
Start: 2023-04-28 | End: 2023-04-28 | Stop reason: HOSPADM

## 2023-04-28 RX ORDER — TALC
6 POWDER (GRAM) TOPICAL NIGHTLY PRN
Status: DISCONTINUED | OUTPATIENT
Start: 2023-04-28 | End: 2023-04-28

## 2023-04-28 RX ORDER — CETIRIZINE HYDROCHLORIDE 10 MG/1
10 TABLET ORAL DAILY
Status: DISCONTINUED | OUTPATIENT
Start: 2023-04-28 | End: 2023-04-28

## 2023-04-28 RX ORDER — HYDRALAZINE HYDROCHLORIDE 50 MG/1
100 TABLET, FILM COATED ORAL EVERY 8 HOURS
Status: DISCONTINUED | OUTPATIENT
Start: 2023-04-28 | End: 2023-04-28 | Stop reason: HOSPADM

## 2023-04-28 RX ORDER — TALC
6 POWDER (GRAM) TOPICAL NIGHTLY PRN
Status: DISCONTINUED | OUTPATIENT
Start: 2023-04-28 | End: 2023-04-28 | Stop reason: HOSPADM

## 2023-04-28 RX ORDER — NALOXONE HCL 0.4 MG/ML
0.02 VIAL (ML) INJECTION
Status: DISCONTINUED | OUTPATIENT
Start: 2023-04-28 | End: 2023-04-28 | Stop reason: HOSPADM

## 2023-04-28 RX ORDER — GLUCAGON 1 MG
1 KIT INJECTION
Status: DISCONTINUED | OUTPATIENT
Start: 2023-04-28 | End: 2023-04-28 | Stop reason: HOSPADM

## 2023-04-28 RX ORDER — ONDANSETRON 8 MG/1
8 TABLET, ORALLY DISINTEGRATING ORAL EVERY 8 HOURS PRN
Status: DISCONTINUED | OUTPATIENT
Start: 2023-04-28 | End: 2023-04-28 | Stop reason: HOSPADM

## 2023-04-28 RX ORDER — NIFEDIPINE 30 MG/1
90 TABLET, EXTENDED RELEASE ORAL 2 TIMES DAILY
Status: DISCONTINUED | OUTPATIENT
Start: 2023-04-28 | End: 2023-04-28 | Stop reason: HOSPADM

## 2023-04-28 RX ORDER — SODIUM CHLORIDE 0.9 % (FLUSH) 0.9 %
5 SYRINGE (ML) INJECTION
Status: DISCONTINUED | OUTPATIENT
Start: 2023-04-28 | End: 2023-04-28 | Stop reason: HOSPADM

## 2023-04-28 RX ORDER — SODIUM CHLORIDE 0.9 % (FLUSH) 0.9 %
10 SYRINGE (ML) INJECTION EVERY 6 HOURS PRN
Status: DISCONTINUED | OUTPATIENT
Start: 2023-04-28 | End: 2023-04-28 | Stop reason: HOSPADM

## 2023-04-28 RX ORDER — ASPIRIN 81 MG/1
81 TABLET ORAL DAILY
Status: DISCONTINUED | OUTPATIENT
Start: 2023-04-28 | End: 2023-04-28 | Stop reason: HOSPADM

## 2023-04-28 RX ORDER — OLOPATADINE HYDROCHLORIDE 1 MG/ML
SOLUTION/ DROPS OPHTHALMIC
COMMUNITY
Start: 2023-04-25

## 2023-04-28 RX ORDER — LIDOCAINE AND PRILOCAINE 25; 25 MG/G; MG/G
CREAM TOPICAL
Status: DISCONTINUED | OUTPATIENT
Start: 2023-04-28 | End: 2023-04-28

## 2023-04-28 RX ORDER — DEXTROSE 40 %
30 GEL (GRAM) ORAL
Status: DISCONTINUED | OUTPATIENT
Start: 2023-04-28 | End: 2023-04-28 | Stop reason: HOSPADM

## 2023-04-28 RX ORDER — ALBUTEROL SULFATE 90 UG/1
2 AEROSOL, METERED RESPIRATORY (INHALATION) EVERY 6 HOURS PRN
Status: DISCONTINUED | OUTPATIENT
Start: 2023-04-28 | End: 2023-04-28 | Stop reason: HOSPADM

## 2023-04-28 RX ORDER — PROCHLORPERAZINE EDISYLATE 5 MG/ML
5 INJECTION INTRAMUSCULAR; INTRAVENOUS EVERY 6 HOURS PRN
Status: DISCONTINUED | OUTPATIENT
Start: 2023-04-28 | End: 2023-04-28 | Stop reason: HOSPADM

## 2023-04-28 RX ORDER — COLCHICINE 0.6 MG/1
0.6 TABLET, FILM COATED ORAL DAILY PRN
Status: DISCONTINUED | OUTPATIENT
Start: 2023-04-28 | End: 2023-04-28

## 2023-04-28 RX ORDER — DEXTROSE 40 %
15 GEL (GRAM) ORAL
Status: DISCONTINUED | OUTPATIENT
Start: 2023-04-28 | End: 2023-04-28 | Stop reason: HOSPADM

## 2023-04-28 RX ORDER — ACETAMINOPHEN 325 MG/1
650 TABLET ORAL EVERY 4 HOURS PRN
Status: DISCONTINUED | OUTPATIENT
Start: 2023-04-28 | End: 2023-04-28 | Stop reason: HOSPADM

## 2023-04-28 RX ORDER — BENZONATATE 100 MG/1
100 CAPSULE ORAL 3 TIMES DAILY PRN
Status: DISCONTINUED | OUTPATIENT
Start: 2023-04-28 | End: 2023-04-28 | Stop reason: HOSPADM

## 2023-04-28 RX ORDER — SIMETHICONE 80 MG
1 TABLET,CHEWABLE ORAL 4 TIMES DAILY PRN
Status: DISCONTINUED | OUTPATIENT
Start: 2023-04-28 | End: 2023-04-28 | Stop reason: HOSPADM

## 2023-04-28 RX ORDER — DIPHENHYDRAMINE HCL 25 MG
25 CAPSULE ORAL EVERY 6 HOURS PRN
Status: DISCONTINUED | OUTPATIENT
Start: 2023-04-28 | End: 2023-04-28 | Stop reason: HOSPADM

## 2023-04-28 RX ORDER — FAMOTIDINE 20 MG/1
20 TABLET, FILM COATED ORAL DAILY PRN
Status: DISCONTINUED | OUTPATIENT
Start: 2023-04-28 | End: 2023-04-28 | Stop reason: HOSPADM

## 2023-04-28 RX ORDER — HEPARIN SODIUM 5000 [USP'U]/ML
5000 INJECTION, SOLUTION INTRAVENOUS; SUBCUTANEOUS EVERY 8 HOURS
Status: DISCONTINUED | OUTPATIENT
Start: 2023-04-28 | End: 2023-04-28 | Stop reason: HOSPADM

## 2023-04-28 RX ORDER — BISACODYL 10 MG
10 SUPPOSITORY, RECTAL RECTAL DAILY PRN
Status: DISCONTINUED | OUTPATIENT
Start: 2023-04-28 | End: 2023-04-28 | Stop reason: HOSPADM

## 2023-04-28 RX ORDER — INSULIN ASPART 100 [IU]/ML
0-5 INJECTION, SOLUTION INTRAVENOUS; SUBCUTANEOUS
Status: DISCONTINUED | OUTPATIENT
Start: 2023-04-28 | End: 2023-04-28 | Stop reason: HOSPADM

## 2023-04-28 RX ORDER — MAG HYDROX/ALUMINUM HYD/SIMETH 200-200-20
30 SUSPENSION, ORAL (FINAL DOSE FORM) ORAL 4 TIMES DAILY PRN
Status: DISCONTINUED | OUTPATIENT
Start: 2023-04-28 | End: 2023-04-28 | Stop reason: HOSPADM

## 2023-04-28 RX ORDER — CETIRIZINE HYDROCHLORIDE 5 MG/1
5 TABLET ORAL DAILY
Status: DISCONTINUED | OUTPATIENT
Start: 2023-04-29 | End: 2023-04-28 | Stop reason: HOSPADM

## 2023-04-28 RX ORDER — NALOXONE HCL 0.4 MG/ML
0.02 VIAL (ML) INJECTION
Status: DISCONTINUED | OUTPATIENT
Start: 2023-04-28 | End: 2023-04-28

## 2023-04-28 RX ORDER — METHOCARBAMOL 500 MG/1
500 TABLET, FILM COATED ORAL 2 TIMES DAILY PRN
Status: DISCONTINUED | OUTPATIENT
Start: 2023-04-28 | End: 2023-04-28 | Stop reason: HOSPADM

## 2023-04-28 RX ORDER — CLONIDINE 0.3 MG/24H
1 PATCH, EXTENDED RELEASE TRANSDERMAL
Status: DISCONTINUED | OUTPATIENT
Start: 2023-04-28 | End: 2023-04-28 | Stop reason: HOSPADM

## 2023-04-28 RX ORDER — LIDOCAINE AND PRILOCAINE 25; 25 MG/G; MG/G
CREAM TOPICAL 2 TIMES DAILY PRN
Status: DISCONTINUED | OUTPATIENT
Start: 2023-04-28 | End: 2023-04-28 | Stop reason: HOSPADM

## 2023-04-28 RX ORDER — POLYETHYLENE GLYCOL 3350 17 G/17G
17 POWDER, FOR SOLUTION ORAL 2 TIMES DAILY PRN
Status: DISCONTINUED | OUTPATIENT
Start: 2023-04-28 | End: 2023-04-28 | Stop reason: HOSPADM

## 2023-04-28 RX ORDER — LOSARTAN POTASSIUM 50 MG/1
100 TABLET ORAL DAILY
Status: DISCONTINUED | OUTPATIENT
Start: 2023-04-28 | End: 2023-04-28 | Stop reason: HOSPADM

## 2023-04-28 RX ORDER — FUROSEMIDE 10 MG/ML
100 INJECTION INTRAMUSCULAR; INTRAVENOUS
Status: DISCONTINUED | OUTPATIENT
Start: 2023-04-28 | End: 2023-04-28 | Stop reason: HOSPADM

## 2023-04-28 RX ADMIN — HEPARIN SODIUM 5000 UNITS: 5000 INJECTION INTRAVENOUS; SUBCUTANEOUS at 06:04

## 2023-04-28 RX ADMIN — FUROSEMIDE 100 MG: 10 INJECTION, SOLUTION INTRAMUSCULAR; INTRAVENOUS at 02:04

## 2023-04-28 RX ADMIN — CETIRIZINE HYDROCHLORIDE 10 MG: 10 TABLET, FILM COATED ORAL at 08:04

## 2023-04-28 RX ADMIN — CLONIDINE HYDROCHLORIDE 0.3 MG: 0.2 TABLET ORAL at 04:04

## 2023-04-28 RX ADMIN — CLONIDINE 1 PATCH: 0.3 PATCH TRANSDERMAL at 08:04

## 2023-04-28 RX ADMIN — LABETALOL HYDROCHLORIDE 300 MG: 100 TABLET, FILM COATED ORAL at 08:04

## 2023-04-28 RX ADMIN — TAMSULOSIN HYDROCHLORIDE 0.4 MG: 0.4 CAPSULE ORAL at 08:04

## 2023-04-28 RX ADMIN — IOHEXOL 75 ML: 350 INJECTION, SOLUTION INTRAVENOUS at 12:04

## 2023-04-28 RX ADMIN — NIFEDIPINE 90 MG: 30 TABLET, FILM COATED, EXTENDED RELEASE ORAL at 08:04

## 2023-04-28 RX ADMIN — BENZONATATE 100 MG: 100 CAPSULE ORAL at 02:04

## 2023-04-28 RX ADMIN — HYDRALAZINE HYDROCHLORIDE 100 MG: 25 TABLET, FILM COATED ORAL at 06:04

## 2023-04-28 RX ADMIN — PIPERACILLIN AND TAZOBACTAM 4.5 G: 4; .5 INJECTION, POWDER, FOR SOLUTION INTRAVENOUS at 08:04

## 2023-04-28 RX ADMIN — SEVELAMER CARBONATE 800 MG: 800 TABLET, FILM COATED ORAL at 05:04

## 2023-04-28 RX ADMIN — LOSARTAN POTASSIUM 100 MG: 50 TABLET, FILM COATED ORAL at 08:04

## 2023-04-28 RX ADMIN — SEVELAMER CARBONATE 800 MG: 800 TABLET, FILM COATED ORAL at 07:04

## 2023-04-28 RX ADMIN — ASPIRIN 81 MG: 81 TABLET, COATED ORAL at 08:04

## 2023-04-28 NOTE — PROGRESS NOTES
Hemodialysis treatment completed.    Treatment time received: 3 hours    Net fluid removed: 1707 ml    Cramping during treatment. VSS. Refer to dialysis flowsheet for further details.    Blood returned and needles X2 removed. Pressure held till hemostasis obtained.  Placed gauze and tape dressing to site.  Fistual with continued bruit and thrill post treatment.    Report given to Harry  Refer to flowsheet and MAR for details.  Patient transported back in wheelchair from Dialysis to primary unit.

## 2023-04-28 NOTE — PROGRESS NOTES
Patient arrived in a wheelchair to dialysis unit.   Report received from Harry Larose per Doc Flowsheet.    Observation Hemodialysis initiated using the following:    Dialysis Access: LFA AVF    Needle size: 15 gauge X2  Insertion with no complications.    Will Maintain telemetry and blood pressure monitoring throughout treatment.  Refer to flowsheet and MAR for details.

## 2023-04-28 NOTE — ASSESSMENT & PLAN NOTE
ESRD on iHD MWF  Alvaro Saez  3:30  LFA AVF  EDW:  61 kg    Plan/Recommendations:  -HD today  -UF 2-3L  -renal diet   -1.5L fluid restrictions  -continue lasix while IP  -can discharge home on oral lasix 80 mg BID

## 2023-04-28 NOTE — PROGRESS NOTES
Pharmacokinetic Initial Assessment: IV Vancomycin    Assessment/Plan:    Initiate intravenous vancomycin with loading dose of 1250 mg once with subsequent doses when random concentrations are less than 20 mcg/mL  Desired empiric serum trough concentration is 15 to 20 mcg/mL  Draw vancomycin random level on 4/28/23 at 2130.  Pharmacy will continue to follow and monitor vancomycin.      Please contact pharmacy at extension [phone] with any questions regarding this assessment.     Thank you for the consult,   Matt Timmons       Patient brief summary:  Enrique Martinez is a 58 y.o. male initiated on antimicrobial therapy with IV Vancomycin for treatment of suspected sepsis    Drug Allergies:   Review of patient's allergies indicates:  No Known Allergies    Actual Body Weight:   61.6kg    Renal Function:   Estimated Creatinine Clearance: 15.6 mL/min (A) (based on SCr of 4.4 mg/dL (H)).,     Dialysis Method (if applicable):  intermittent HD    CBC (last 72 hours):  Recent Labs   Lab Result Units 04/27/23 1952   WBC K/uL 5.96   Hemoglobin g/dL 10.4*   Hematocrit % 32.9*   Platelets K/uL 205   Gran % % 71.7   Lymph % % 13.3*   Mono % % 13.8   Eosinophil % % 0.2   Basophil % % 0.8   Differential Method  Automated       Metabolic Panel (last 72 hours):  Recent Labs   Lab Result Units 04/27/23 1952   Sodium mmol/L 136   Potassium mmol/L 4.6   Chloride mmol/L 97   CO2 mmol/L 26   Glucose mg/dL 103   BUN mg/dL 19   Creatinine mg/dL 4.4*   Albumin g/dL 3.8   Total Bilirubin mg/dL 0.5   Alkaline Phosphatase U/L 183*   AST U/L 40   ALT U/L 15       Drug levels (last 3 results):  No results for input(s): VANCOMYCINRA, VANCORANDOM, VANCOMYCINPE, VANCOPEAK, VANCOMYCINTR, VANCOTROUGH in the last 72 hours.    Microbiologic Results:  Microbiology Results (last 7 days)       Procedure Component Value Units Date/Time    Blood culture x two cultures. Draw prior to antibiotics. [[id number]] Collected: 04/27/23 1953    Order Status: Sent  Specimen: Blood from Peripheral, Antecubital, Right Updated: 04/27/23 2007    Blood culture x two cultures. Draw prior to antibiotics. [[id number]] Collected: 04/27/23 1954    Order Status: Sent Specimen: Blood from Peripheral, Forearm, Right Updated: 04/27/23 2007

## 2023-04-28 NOTE — CONSULTS
"[facility] ([place] [address])  Nephrology  Consult Note    Patient Name: Enrique Martinez  MRN: [id number]  Admission Date: 4/27/2023  Hospital Length of Stay: 0 days  Attending Provider: Harjinder Avendano MD   Primary Care Physician: Primary Doctor No  Principal Problem:Sepsis with acute hypoxic respiratory failure without septic shock    Inpatient consult to Nephrology  Consult performed by: Jung Hdez NP  Consult ordered by: Mike Reynoso MD  Reason for consult: ESRD        Subjective:     HPI: Enrique Martinez is a 57 yo male with CAD, DM, GERD, HTN, and ESRD on iHD MWF who presented to the emergency department with chief complaint of SOB.  He reports that he drank a sports drink over the weekend that was high in sodium that caused him to become fluid overloaded.  He arrived to his dialysis treatment on Monday 8L above his EDW.  He received additional dialysis sessions during the week (M-Thur) and was nearing his EDW, but during his session on Thursday he became diaphoretic and continued SOB and he was advised by his OP dialysis team to report to the ED for IV lasix.  In the ED, he had fever, tachycardia, and tachypnea.  BC were obtained and patient was started on BSA.  CT chest/abdomen/pelvis obtained read as "New bilateral pulmonary nodules, possibly representing infectious or inflammatory process.  Pulmonary emphysema and scattered reticulonodular opacities which may be related to small airways disease or sequela of aspiration".  Labs non-emergent, BNP 1962.  He was admitted to hospital medicine and Nephrology was consulted for ESRD management while IP.              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    Umbilical hernia        Past Surgical History:   Procedure Laterality Date    ABDOMINAL SURGERY  1980s    AV FISTULA PLACEMENT Left 1/5/2023    Procedure: CREATION, AV FISTULA RADIOCEPHALIC;  Surgeon: ROHITH Pickard II, MD;  Location: [facility] OR 2ND FLR;  Service: Vascular;  Laterality: Left;    CARDIAC CATHETERIZATION  2007    x 2    COLONOSCOPY N/A 4/5/2019    Procedure: COLONOSCOPY;  Surgeon: Jose L Carty MD;  Location: [facility] ENDO (4TH FLR);  Service: Colon and Rectal;  Laterality: N/A;    CORONARY STENT PLACEMENT  2007       Review of patient's allergies indicates:  No Known Allergies  Current Facility-Administered Medications   Medication Frequency    acetaminophen tablet 650 mg Q4H PRN    albuterol inhaler 2 puff Q6H PRN    aluminum-magnesium hydroxide-simethicone 200-200-20 mg/5 mL suspension 30 mL QID PRN    aspirin EC tablet 81 mg Daily    benzonatate capsule 100 mg TID PRN    bisacodyL suppository 10 mg Daily PRN    cetirizine tablet 10 mg Daily    cloNIDine 0.3 mg/24 hr td ptwk 1 patch Q7 Days    colchicine capsule/tablet 0.6 mg Daily PRN    dextrose 10% bolus 125 mL 125 mL PRN    dextrose 10% bolus 250 mL 250 mL PRN    dextrose 40 % gel 15,000 mg PRN    dextrose 40 % gel 30,000 mg PRN    diphenhydrAMINE capsule 25 mg Q6H PRN    famotidine tablet 20 mg Daily PRN    furosemide injection 100 mg Q12H    glucagon (human recombinant) injection 1 mg PRN    heparin (porcine) injection 5,000 Units Q8H    hydrALAZINE tablet 100 mg Q8H    insulin aspart U-100 pen 0-5 Units QID (AC + HS) PRN    labetaloL tablet 300 mg BID    LIDOcaine-prilocaine cream BID PRN    losartan tablet 100 mg Daily    melatonin tablet 6 mg Nightly PRN    methocarbamoL tablet 500 mg BID PRN    naloxone 0.4  mg/mL injection 0.02 mg PRN    NIFEdipine 24 hr tablet 90 mg BID    ondansetron disintegrating tablet 8 mg Q8H PRN    piperacillin-tazobactam (ZOSYN) 4.5 g in dextrose 5 % in water (D5W) 5 % 100 mL IVPB (MB+) Q12H    polyethylene glycol packet 17 g BID PRN    prochlorperazine injection Soln 5 mg Q6H PRN    sevelamer carbonate tablet 800 mg TID WM    simethicone chewable tablet 80 mg QID PRN    sodium chloride 0.9% flush 10 mL Q6H PRN    sodium chloride 0.9% flush 5 mL PRN    tamsulosin 24 hr capsule 0.4 mg Daily    vancomycin - pharmacy to dose pharmacy to manage frequency     Family History       Problem Relation (Age of Onset)    Drug abuse Brother    Heart attack Father, Brother    Hyperlipidemia Father    Hypertension Father, Brother    No Known Problems Sister    Stroke Father          Tobacco Use    Smoking status: Some Days     Packs/day: 0.25     Years: 10.00     Pack years: 2.50     Types: Cigarettes    Smokeless tobacco: Never   Substance and Sexual Activity    Alcohol use: No    Drug use: No    Sexual activity: Not on file     Review of Systems   Constitutional:  Positive for chills, diaphoresis and fever. Negative for activity change and appetite change.   HENT:  Negative for sore throat and trouble swallowing.    Respiratory:  Positive for cough and shortness of breath.    Cardiovascular:  Negative for chest pain, palpitations and leg swelling.   Gastrointestinal:  Negative for abdominal distention, nausea and vomiting.   Genitourinary:  Negative for dysuria.   Musculoskeletal:  Negative for back pain.   Neurological:  Negative for weakness.   Psychiatric/Behavioral:  Negative for confusion.    Objective:     Vital Signs (Most Recent):  Temp: 97.9 °F (36.6 °C) (04/28/23 1107)  Pulse: 88 (04/28/23 1107)  Resp: (!) 22 (04/28/23 1107)  BP: 109/66 (04/28/23 1107)  SpO2: (!) 94 % (04/28/23 1107)   Vital Signs (24h Range):  Temp:  [97.9 °F (36.6 °C)-102.1 °F (38.9 °C)] 97.9 °F (36.6  °C)  Pulse:  [] 88  Resp:  [18-28] 22  SpO2:  [91 %-97 %] 94 %  BP: (109-220)/() 109/66     Weight: 61.6 kg (135 lb 12.9 oz) (04/27/23 1845)  Body mass index is 21.92 kg/m².  Body surface area is 1.69 meters squared.    I/O last 3 completed shifts:  In: 350 [IV Piggyback:350]  Out: 75 [Urine:75]    Physical Exam  Vitals and nursing note reviewed.   Constitutional:       General: He is not in acute distress.     Appearance: He is not ill-appearing or toxic-appearing.   HENT:      Head: Normocephalic and atraumatic.      Mouth/Throat:      Pharynx: Oropharynx is clear. No oropharyngeal exudate.   Eyes:      General: No scleral icterus.  Neck:      Vascular: JVD present.   Cardiovascular:      Rate and Rhythm: Regular rhythm. Tachycardia present.      Pulses: Normal pulses.      Heart sounds: No murmur heard.     Arteriovenous access: Left arteriovenous access is present.  Pulmonary:      Effort: Tachypnea and prolonged expiration present. No accessory muscle usage.      Breath sounds: Examination of the right-middle field reveals wheezing. Examination of the left-middle field reveals wheezing. Examination of the right-lower field reveals wheezing. Examination of the left-lower field reveals wheezing. Wheezing present.   Abdominal:      General: There is no distension.      Palpations: Abdomen is soft.      Tenderness: There is no abdominal tenderness. There is no guarding.   Musculoskeletal:      Cervical back: Neck supple.      Right lower leg: No edema.      Left lower leg: No edema.   Lymphadenopathy:      Cervical: No cervical adenopathy.   Skin:     General: Skin is warm and dry.   Neurological:      General: No focal deficit present.      Mental Status: He is alert and oriented to person, place, and time.       Significant Labs:  CBC:   Recent Labs   Lab 04/28/23  0402   WBC 5.31   RBC 3.55*   HGB 11.2*   HCT 35.8*      *   MCH 31.5*   MCHC 31.3*     CMP:   Recent Labs   Lab  04/27/23 1952 04/28/23  0524    80   CALCIUM 10.2 9.6   ALBUMIN 3.8 3.4*   PROT 8.0  --     135*   K 4.6 4.6   CO2 26 23   CL 97 99   BUN 19 25*   CREATININE 4.4* 5.0*   ALKPHOS 183*  --    ALT 15  --    AST 40  --    BILITOT 0.5  --          Assessment/Plan:     Renal/  Chronic kidney disease-mineral and bone disorder  -renal diet  -phos binders with meals    ESRD (end stage renal disease)  ESRD on iHD MWF  Alvaro Saez  3:30  LFA AVF  EDW:  61 kg    Plan/Recommendations:  -HD today  -UF 2-3L  -renal diet   -1.5L fluid restrictions  -continue lasix while IP  -can discharge home on oral lasix 80 mg BID    ID  * Sepsis with acute hypoxic respiratory failure without septic shock  -mgmt per primary team  -started on Vanc/Zosyn    Oncology  Anemia due to chronic kidney disease, on chronic dialysis  Hgb @ goal for ESRD  -no need for EPO at this time, will defer to his Op dialysis unit.      Jung Lennon, NP  Nephrology  [facility] ([place]

## 2023-04-28 NOTE — ASSESSMENT & PLAN NOTE
Patient's FSGs are controlled on current medication regimen.  Last A1c reviewed-   Lab Results   Component Value Date    HGBA1C 4.3 04/05/2023     Most recent fingerstick glucose reviewed- No results for input(s): POCTGLUCOSE in the last 24 hours.  Current correctional scale  Low  Maintain anti-hyperglycemic dose as follows-   Antihyperglycemics (From admission, onward)    Start     Stop Route Frequency Ordered    04/28/23 0121  insulin aspart U-100 pen 0-5 Units         -- SubQ Before meals & nightly PRN 04/28/23 0130        Hold Oral hypoglycemics while patient is in the hospital.

## 2023-04-28 NOTE — ED NOTES
I-STAT Chem-8+ Results:   Value Reference Range   Sodium 135 136-145 mmol/L   Potassium  4.4 3.5-5.1 mmol/L   Chloride 99  mmol/L   Ionized Calcium 1.09 1.06-1.42 mmol/L   CO2 (measured) 25 23-29 mmol/L   Glucose 103  mg/dL   BUN 19 6-30 mg/dL   Creatinine 5.1 0.5-1.4 mg/dL   Hematocrit 34 36-54%

## 2023-04-28 NOTE — ASSESSMENT & PLAN NOTE
Chronic and uncontrolled currently  Resume home anti-hypertensive regimen - labetalol, losartan, clonidine TD, hydralazine

## 2023-04-28 NOTE — ASSESSMENT & PLAN NOTE
This patient does not have evidence of infective focus  My overall impression is sepsis.  Source: Unknown and CT chest/abd/pelvis read pending to eval for occult infection  Antibiotics given-   Antibiotics (72h ago, onward)    Start     Stop Route Frequency Ordered    04/28/23 0900  piperacillin-tazobactam (ZOSYN) 4.5 g in dextrose 5 % in water (D5W) 5 % 100 mL IVPB (MB+)         -- IV Every 12 hours (non-standard times) 04/28/23 0120    04/28/23 0220  vancomycin - pharmacy to dose  (vancomycin IVPB)        [facility].    -- IV pharmacy to manage frequency 04/28/23 0120        Latest lactate reviewed-  No results for input(s): LACTATE in the last 72 hours.  Organ dysfunction indicated by Acute respiratory failure    Fluid challenge Contraindicated- Fluid bolus is contraindicated in this patient due to End Stage Renal Disease     Post- resuscitation assessment Yes Perfusion exam was performed within 6 hours of septic shock presentation after bolus shows Adequate tissue perfusion assessed by non-invasive monitoring       Will Not start Pressors- Levophed for MAP of 65  Source control achieved by: Empiric IV antibiotics

## 2023-04-28 NOTE — ED PROVIDER NOTES
Encounter Date: 4/27/2023       History     Chief Complaint   Patient presents with    Multiple complaints     Dialysis 4 d  in row, told to come get lasix, placed on 2l nc states uses oxygen as needed at home     58-year-old male with past medical history of ESRD on HD MWF, CAD, DM, GERD, HTN, membranous nephropathy, nephrotic syndrome.  Patient now presents with four-day history of worsening shortness of breath.  Patient reports that he has been to dialysis every day for the last 4 days but still feels like he is fluid overloaded and was told to come get Lasix for additional fluid management in the emergency department.  Patient denies any chest pain, fevers, nausea/vomiting/diarrhea.     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      Past Surgical History:   Procedure Laterality Date    ABDOMINAL SURGERY  1980s    AV FISTULA PLACEMENT Left 1/5/2023    Procedure: CREATION, AV FISTULA RADIOCEPHALIC;  Surgeon: ROHITH Pickard II, MD;  Location: [facility] OR 2ND FLR;  Service: Vascular;  Laterality: Left;    CARDIAC CATHETERIZATION  2007    x 2    COLONOSCOPY N/A 4/5/2019    Procedure: COLONOSCOPY;  Surgeon: Jose L Carty MD;  Location: [facility] ENDO (4TH FLR);  Service: Colon and Rectal;  Laterality: N/A;    CORONARY STENT PLACEMENT  2007     Family History   Problem Relation Age of Onset    Hypertension Father     Stroke Father     Heart attack Father     Hyperlipidemia  Father     No Known Problems Sister     Drug abuse Brother     Heart attack Brother     Hypertension Brother      Social History     Tobacco Use    Smoking status: Some Days     Packs/day: 0.25     Years: 10.00     Pack years: 2.50     Types: Cigarettes    Smokeless tobacco: Never   Substance Use Topics    Alcohol use: No    Drug use: No     Review of Systems    Physical Exam     Initial Vitals [04/27/23 1845]   BP Pulse Resp Temp SpO2   (!) 183/104 (!) 112 (!) 28 (!) 102.1 °F (38.9 °C) (!) 91 %      MAP       --         Physical Exam    Nursing note and vitals reviewed.    Gen: AxOx3, well nourished, appears stated age, no pallor, no jaundice, appears well hydrated  Eye: EOMI, no scleral icterus, no periorbital edema or ecchymosis  Head: Normocephalic, atraumatic, no lesions, scalp appears normal  ENT: Neck supple, no stridor, no masses, no drooling or voice changes  CVS: All distal pulses intact with rapid rate and normal rhythm, no JVD, normal S1/S2, ES murmur   Pulm:  Respiratory rate 28, no accessory muscle use, study also percussion over bilateral bases up to level of T7/T8, decreased air entry over bases  Abd:  Mildly distended, soft, nontender, no fluid thrill, no organomegaly, no CVAT  Ext: No edema, no lesions, rashes, or deformity  Neuro: GCS15, moving all extremities, gait intact, face grossly symmetric  Psych: normal affect, cooperative, well groomed, makes good eye contact      ED Course   Procedures  Labs Reviewed   CBC W/ AUTO DIFFERENTIAL - Abnormal; Notable for the following components:       Result Value    RBC 3.27 (*)     Hemoglobin 10.4 (*)     Hematocrit 32.9 (*)      (*)     MCH 31.8 (*)     MCHC 31.6 (*)     MPV 8.7 (*)     Lymph # 0.8 (*)     Lymph % 13.3 (*)     All other components within normal limits   COMPREHENSIVE METABOLIC PANEL - Abnormal; Notable for the following components:    Creatinine 4.4 (*)     Alkaline Phosphatase 183 (*)     eGFR 14.6 (*)     All other components  within normal limits   B-TYPE NATRIURETIC PEPTIDE - Abnormal; Notable for the following components:    BNP 1,962 (*)     All other components within normal limits   ISTAT PROCEDURE - Abnormal; Notable for the following components:    POC Creatinine 5.1 (*)     POC Sodium 135 (*)     POC Hematocrit 34 (*)     All other components within normal limits   CULTURE, BLOOD   CULTURE, BLOOD   HIV 1 / 2 ANTIBODY    Narrative:     Release to patient->Immediate   HEPATITIS C ANTIBODY    Narrative:     Release to patient->Immediate   URINALYSIS, REFLEX TO URINE CULTURE   SARS-COV-2 RNA AMPLIFICATION, QUAL   ISTAT LACTATE   ISTAT CHEM8     EKG Readings: (Independently Interpreted)   Rate 121, rhythm regular, axis normal, MT/QRS normal, , ST depressions in V4/5/6.  No ST elevations.  Sinus tachycardia is likely demand ischemia     Imaging Results              CT Chest Abdomen Pelvis With Contrast (xpd) (In process)                      X-Ray Chest AP Portable (Final result)  Result time 04/27/23 20:00:43      Final result by Jung Mendoza MD (04/27/23 20:00:43)                   Impression:      1. Coarse interstitial attenuation could reflect mild residual edema however has improved since the previous exam.  No large focal consolidation.      Electronically signed by: Jung Mendoza MD  Date:    04/27/2023  Time:    20:00               Narrative:    EXAMINATION:  XR CHEST AP PORTABLE    CLINICAL HISTORY:  Shortness of breath    TECHNIQUE:  Single frontal view of the chest was performed.    COMPARISON:  04/05/2023    FINDINGS:  The cardiomediastinal silhouette is not enlarged noting magnification by technique..  There is no pleural effusion.  The trachea is midline.  The lungs are symmetrically expanded bilaterally with mildly coarse interstitial attenuation accentuated by habitus.  Overall, interstitial attenuation has improved since the previous exam suggesting improved edema..  No large focal consolidation seen.   There is no pneumothorax.  The osseous structures are remarkable for degenerative changes..                                       Medications   acetaminophen tablet 650 mg (650 mg Oral Given 4/27/23 2035)   vancomycin 1,250 mg in dextrose 5 % (D5W) 250 mL IVPB (Vial-Mate) (0 mg Intravenous Stopped 4/27/23 2254)   piperacillin-tazobactam (ZOSYN) 4.5 g in dextrose 5 % in water (D5W) 5 % 100 mL IVPB (MB+) (0 g Intravenous Stopped 4/27/23 2120)     Medical Decision Making:   Initial Assessment:   58-year-old male four-day history of worsening shortness of breath. Patient is able to speak, breathing spontaneously, hemodynamically stable, oriented, moving all 4 limbs spontaneously.  Examination consistent 1+ pitting edema lower limbs in addition to decreased air entry of bilateral lungs with steady dull to percussion.  Differential Diagnosis:   Patient presents concerning for:  Sepsis secondary to pneumonia/bacteremia also considered UTI  Considered HD line infection  Viral infection  Pulmonary edema setting of fluid overload  Clinical Tests:   Lab Tests: Ordered and Reviewed  Radiological Study: Ordered and Reviewed  Medical Tests: Ordered and Reviewed  ED Management:  Patient appeared very well initial presentation the examination was consistent with dullness and decreased air over bilateral lungs and effusion was considered.  Given temperature of 102° on presentation sepsis was considered and sepsis bundle was initiated including vancomycin and PICC test.  Lactate was normal.  CBC was grossly benign. CMP was consistent with chronic hemodialysis addition to BNP being at baseline.  Patient's heart rate and fever improved after receiving Tylenol.  Decided not to give patient IV fluid given concern for fluid overload.  Decided to order CT chest abdomen pelvis given concern for fever with unknown source.  Patient was admitted to Hospital Medicine for sepsis.  Patient remained stable in the emergency department.           ED  Course as of 04/28/23 0041   Thu Apr 27, 2023 2014 WBC: 5.96 [PM]   2014 Hemoglobin(!): 10.4  Up from baseline of 8.5 [PM]   2014 BP(!): 183/104 [PM]   2015 Temp(!): 102.1 °F (38.9 °C) [PM]   2015 Pulse(!): 112 [PM]   2015 SpO2: 96 % [PM]   2020 X-Ray Chest AP Portable  As per my independent interpretation there is coarse edema however this is improved from previous x-ray is and no gross focal infiltrates concerning for pneumonia [PM]   2038 POC Sodium(!): 135 [PM]   2038 POC Potassium: 4.4 [PM]   2038 Sodium: 136 [PM]   2038 Potassium: 4.6 [PM]   2038 BUN: 19 [PM]   2038 Creatinine(!): 4.4  Dialysis patient [PM]   2221 BNP(!): 1,962  Grossly patient's baseline given the fact he is dialysis patient [PM]   2229 POC Lactate: 0.98 [PM]      ED Course User Index  [PM] Manda Redd MD                 Clinical Impression:   Final diagnoses:  [R06.02] SOB (shortness of breath)  [R06.02] Shortness of breath  [A41.9] Sepsis, due to unspecified organism, unspecified whether acute organ dysfunction present (Primary)        ED Disposition Condition    Observation                 Manda Redd MD  Resident  04/28/23 0041

## 2023-04-28 NOTE — SUBJECTIVE & OBJECTIVE
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        Past Surgical History:   Procedure Laterality Date    ABDOMINAL SURGERY  1980s    AV FISTULA PLACEMENT Left 1/5/2023    Procedure: CREATION, AV FISTULA RADIOCEPHALIC;  Surgeon: ROHITH Pickard II, MD;  Location: [facility] OR 2ND FLR;  Service: Vascular;  Laterality: Left;    CARDIAC CATHETERIZATION  2007    x 2    COLONOSCOPY N/A 4/5/2019    Procedure: COLONOSCOPY;  Surgeon: Jose L Carty MD;  Location: [facility] ENDO (4TH FLR);  Service: Colon and Rectal;  Laterality: N/A;    CORONARY STENT PLACEMENT  2007       Review of patient's allergies indicates:  No Known Allergies    No current facility-administered medications on file prior to encounter.     Current Outpatient Medications on File Prior to Encounter   Medication Sig    albuterol (PROVENTIL/VENTOLIN HFA) 90 mcg/actuation inhaler Inhale 2 puffs into the lungs every 6 (six) hours as needed for Wheezing or Shortness of Breath. Rescue    aspirin (ECOTRIN) 81 MG EC tablet Take 1 tablet (81 mg total) by mouth once daily.    blood-glucose meter kit Use as instructed    cloNIDine 0.3 mg/24 hr td ptwk (CATAPRES) 0.3 mg/24 hr Place 1 patch onto the skin every 7 days.    colchicine (COLCRYS) 0.6 mg tablet Take 0.6 mg by mouth daily as needed.    famotidine (PEPCID) 20 MG tablet Take 20 mg by mouth daily as needed for Heartburn.     furosemide (LASIX) 80 MG tablet Take 80 mg by mouth 2 (two) times daily.    gabapentin (NEURONTIN) 300 MG capsule  Take 300 mg by mouth 3 (three) times daily.    guaiFENesin 100 mg/5 ml (ROBITUSSIN) 100 mg/5 mL syrup Take 10 mLs (200 mg total) by mouth 3 (three) times daily as needed for Cough.    hydrALAZINE (APRESOLINE) 100 MG tablet 1 tablet with food Orally Three times a day for 30 days    HYDROcodone-acetaminophen (NORCO) 5-325 mg per tablet Take 1 tablet by mouth every 6 (six) hours as needed for Pain.    labetaloL (NORMODYNE) 300 MG tablet Take 1 tablet (300 mg total) by mouth 2 (two) times daily.    LIDOcaine-prilocaine (EMLA) cream Apply topically 2 (two) times daily as needed.    loratadine (CLARITIN) 10 mg tablet Take 10 mg by mouth once daily.    losartan (COZAAR) 100 MG tablet Take 1 tablet (100 mg total) by mouth once daily.    melatonin (MELATIN) 3 mg tablet Take 2 tablets (6 mg total) by mouth nightly as needed for Insomnia.    NIFEdipine (PROCARDIA-XL) 90 MG (OSM) 24 hr tablet Take 1 tablet (90 mg total) by mouth 2 (two) times a day.    olopatadine (PATANOL) 0.1 % ophthalmic solution Place into both eyes.    omeprazole (PRILOSEC) 20 MG capsule Take 20 mg by mouth once daily.    ondansetron (ZOFRAN-ODT) 8 MG TbDL Take 1 tablet (8 mg total) by mouth every 8 (eight) hours as needed.    RENVELA 800 mg Tab Take 800 mg by mouth 3 (three) times daily.    sildenafiL (VIAGRA) 100 MG tablet Take 100 mg by mouth.    tadalafiL (CIALIS) 2.5 mg Tab Take 2.5 mg by mouth.    tamsulosin (FLOMAX) 0.4 mg Cap Take 1 capsule by mouth.    TRUE METRIX GLUCOSE TEST STRIP Strp     valACYclovir (VALTREX) 1000 MG tablet Take 0.5 tablets (500 mg total) by mouth once daily. for 7 days    [DISCONTINUED] chlorthalidone (HYGROTEN) 50 MG Tab Take 50 mg by mouth once daily.     [DISCONTINUED] insulin (LANTUS SOLOSTAR U-100 INSULIN) glargine 100 units/mL (3mL) SubQ pen Inject 5 Units into the skin once daily.     Family History       Problem Relation (Age of Onset)    Drug abuse Brother    Heart attack Father, Brother    Hyperlipidemia Father     Hypertension Father, Brother    No Known Problems Sister    Stroke Father          Tobacco Use    Smoking status: Some Days     Packs/day: 0.25     Years: 10.00     Pack years: 2.50     Types: Cigarettes    Smokeless tobacco: Never   Substance and Sexual Activity    Alcohol use: No    Drug use: No    Sexual activity: Not on file     Review of Systems   Constitutional:  Positive for chills, diaphoresis and fever. Negative for activity change and appetite change.   HENT:  Negative for sore throat and trouble swallowing.    Respiratory:  Positive for cough and shortness of breath.    Cardiovascular:  Negative for chest pain, palpitations and leg swelling.   Gastrointestinal:  Negative for abdominal distention, nausea and vomiting.   Genitourinary:  Negative for dysuria.   Musculoskeletal:  Negative for back pain.   Neurological:  Negative for weakness.   Psychiatric/Behavioral:  Negative for confusion.    Objective:     Vital Signs (Most Recent):  Temp: 98.9 °F (37.2 °C) (04/27/23 2344)  Pulse: 99 (04/27/23 2300)  Resp: (!) 22 (04/27/23 2300)  BP: (!) 184/96 (04/27/23 2300)  SpO2: 97 % (04/27/23 2300)   Vital Signs (24h Range):  Temp:  [98.9 °F (37.2 °C)-102.1 °F (38.9 °C)] 98.9 °F (37.2 °C)  Pulse:  [] 99  Resp:  [22-28] 22  SpO2:  [91 %-97 %] 97 %  BP: (167-220)/() 184/96     Weight: 61.6 kg (135 lb 12.9 oz)  Body mass index is 21.92 kg/m².    Physical Exam  Vitals and nursing note reviewed.   Constitutional:       General: He is not in acute distress.     Appearance: He is ill-appearing.   HENT:      Head: Normocephalic and atraumatic.      Mouth/Throat:      Pharynx: Oropharynx is clear. No oropharyngeal exudate.   Eyes:      General: No scleral icterus.  Neck:      Comments: JVD present  Cardiovascular:      Rate and Rhythm: Regular rhythm. Tachycardia present.      Pulses: Normal pulses.      Heart sounds: No murmur heard.     Arteriovenous access: Left arteriovenous access is present.  Pulmonary:       Effort: Tachypnea and prolonged expiration present. No accessory muscle usage.      Breath sounds: Examination of the right-middle field reveals wheezing. Examination of the left-middle field reveals wheezing. Examination of the right-lower field reveals wheezing. Examination of the left-lower field reveals wheezing. Wheezing present.   Abdominal:      General: There is no distension.      Palpations: Abdomen is soft.      Tenderness: There is no abdominal tenderness. There is no guarding.   Musculoskeletal:      Cervical back: Neck supple.      Right lower leg: No edema.      Left lower leg: No edema.   Lymphadenopathy:      Cervical: No cervical adenopathy.   Skin:     General: Skin is warm and dry.   Neurological:      General: No focal deficit present.      Mental Status: He is alert and oriented to person, place, and time.           Significant Labs: All pertinent labs within the past 24 hours have been reviewed.  A1C:   Recent Labs   Lab 04/05/23  0043   HGBA1C 4.3     ABGs: No results for input(s): PH, PCO2, HCO3, POCSATURATED, BE, TOTALHB, COHB, METHB, O2HB, POCFIO2, PO2 in the last 48 hours.  CBC:   Recent Labs   Lab 04/27/23 1952 04/27/23 2001   WBC 5.96  --    HGB 10.4*  --    HCT 32.9* 34*     --      CMP:   Recent Labs   Lab 04/27/23 1952      K 4.6   CL 97   CO2 26      BUN 19   CREATININE 4.4*   CALCIUM 10.2   PROT 8.0   ALBUMIN 3.8   BILITOT 0.5   ALKPHOS 183*   AST 40   ALT 15   ANIONGAP 13     Lactic Acid: No results for input(s): LACTATE in the last 48 hours.  Magnesium: No results for input(s): MG in the last 48 hours.  Troponin: No results for input(s): TROPONINI, TROPONINIHS in the last 48 hours.  Urine Studies: No results for input(s): COLORU, APPEARANCEUA, PHUR, SPECGRAV, PROTEINUA, GLUCUA, KETONESU, BILIRUBINUA, OCCULTUA, NITRITE, UROBILINOGEN, LEUKOCYTESUR, RBCUA, WBCUA, BACTERIA, SQUAMEPITHEL, HYALINECASTS in the last 48 hours.    Invalid input(s):  WRIGHTSUR    Significant Imaging: I have reviewed all pertinent imaging results/findings within the past 24 hours.    EXAMINATION:  XR CHEST AP PORTABLE     CLINICAL HISTORY:  Shortness of breath     TECHNIQUE:  Single frontal view of the chest was performed.     COMPARISON:  04/05/2023     FINDINGS:  The cardiomediastinal silhouette is not enlarged noting magnification by technique..  There is no pleural effusion.  The trachea is midline.  The lungs are symmetrically expanded bilaterally with mildly coarse interstitial attenuation accentuated by habitus.  Overall, interstitial attenuation has improved since the previous exam suggesting improved edema..  No large focal consolidation seen.  There is no pneumothorax.  The osseous structures are remarkable for degenerative changes..     Impression:     1. Coarse interstitial attenuation could reflect mild residual edema however has improved since the previous exam.  No large focal consolidation.        Electronically signed by: Jung Mendoza MD  Date:                                            04/27/2023  Time:                                           20:00           Exam Ended: 04/27/23 19:57

## 2023-04-28 NOTE — HPI
"Enrique Martinez is a 57 yo male with CAD, DM, GERD, HTN, and ESRD on iHD MWF who presented to the emergency department with chief complaint of SOB.  He reports that he drank a sports drink over the weekend that was high in sodium that caused him to become fluid overloaded.  He arrived to his dialysis treatment on Monday 8L above his EDW.  He received additional dialysis sessions during the week (M-Thur) and was nearing his EDW, but during his session on Thursday he became diaphoretic and continued SOB and he was advised by his OP dialysis team to report to the ED for IV lasix.  In the ED, he had fever, tachycardia, and tachypnea.  BC were obtained and patient was started on BSA.  CT chest/abdomen/pelvis obtained read as "New bilateral pulmonary nodules, possibly representing infectious or inflammatory process.  Pulmonary emphysema and scattered reticulonodular opacities which may be related to small airways disease or sequela of aspiration".  Labs non-emergent, BNP 1962.  He was admitted to hospital medicine and Nephrology was consulted for ESRD management while IP.          "

## 2023-04-28 NOTE — H&P
[facility] - Emergency Dept  [facility] Medicine  History & Physical    Patient Name: Enrique Martinez  MRN: [id number]  Patient Class: OP- Observation  Admission Date: 4/27/2023  Attending Physician: Harjinder Avendano MD [facility] A  Admitting Physician: Mike Reynoso MD  Primary Care Provider: Primary Doctor No         Patient information was obtained from patient, past medical records and ER records.     Subjective:     Principal Problem:Sepsis    Chief Complaint:   Chief Complaint   Patient presents with    Multiple complaints     Dialysis 4 d  in row, told to come get lasix, placed on 2l nc states uses oxygen as needed at home        HPI: 58-year-old male with past medical history of ESRD on HD MWF, CAD, DM, GERD, HTN, presents to the emergency department with complaints of shortness of breath.    He has been receiving outpatient dialysis for 4 days in a row due to concerns of hypervolemia, reports his weight prior to dialysis Thursday was 63 kg and his dry weight is 62 kg.  He states that dialysis center told him to report to the emergency room to receive IV Lasix, states he was receiving 100 mg IV Lasix in the past, confirmed on continuation of recent inpatient orders.    He was also noted the emergency room to have 3/4 sirs with fever tachycardia and tachypnea, is referred for admission concerning for sepsis of unclear etiology.  Blood cultures have been taken vancomycin and Zosyn administered and a CT chest abdomen pelvis has been completed to evaluate for occult infection, radiology read pending at this time.  He recently had a right tunneled IJ hemodialysis catheter removed in vascular surgery clinic on April 17th.  He was admitted to [facility] from April 5th to April 8th for concerns of volume overload and acute hypoxemic respiratory failure requiring noninvasive ventilation.          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    Umbilical hernia        Past Surgical History:   Procedure Laterality Date    ABDOMINAL SURGERY  1980s    AV FISTULA PLACEMENT Left 1/5/2023    Procedure: CREATION, AV FISTULA RADIOCEPHALIC;  Surgeon: ROHITH Pickard II, MD;  Location: [facility] OR 2ND FLR;  Service: Vascular;  Laterality: Left;    CARDIAC CATHETERIZATION  2007    x 2    COLONOSCOPY N/A 4/5/2019    Procedure: COLONOSCOPY;  Surgeon: Jose L Carty MD;  Location: [facility] ENDO (4TH FLR);  Service: Colon and Rectal;  Laterality: N/A;    CORONARY STENT PLACEMENT  2007       Review of patient's allergies indicates:  No Known Allergies    No current facility-administered medications on file prior to encounter.     Current Outpatient Medications on File Prior to Encounter   Medication Sig    albuterol (PROVENTIL/VENTOLIN HFA) 90 mcg/actuation inhaler Inhale 2 puffs into the lungs every 6 (six) hours as needed for Wheezing or Shortness of Breath. Rescue    aspirin (ECOTRIN) 81 MG EC tablet Take 1 tablet (81 mg total) by mouth once daily.    blood-glucose meter kit Use as instructed    cloNIDine 0.3 mg/24 hr td ptwk (CATAPRES) 0.3 mg/24 hr Place 1 patch onto the skin every 7 days.    colchicine (COLCRYS) 0.6 mg tablet Take 0.6 mg by mouth daily as needed.    famotidine (PEPCID) 20 MG tablet Take 20 mg by mouth daily as needed for Heartburn.     furosemide (LASIX) 80 MG tablet Take 80 mg by mouth 2 (two) times daily.    gabapentin (NEURONTIN) 300 MG capsule Take 300 mg by mouth 3 (three) times daily.    guaiFENesin 100 mg/5 ml  (ROBITUSSIN) 100 mg/5 mL syrup Take 10 mLs (200 mg total) by mouth 3 (three) times daily as needed for Cough.    hydrALAZINE (APRESOLINE) 100 MG tablet 1 tablet with food Orally Three times a day for 30 days    HYDROcodone-acetaminophen (NORCO) 5-325 mg per tablet Take 1 tablet by mouth every 6 (six) hours as needed for Pain.    labetaloL (NORMODYNE) 300 MG tablet Take 1 tablet (300 mg total) by mouth 2 (two) times daily.    LIDOcaine-prilocaine (EMLA) cream Apply topically 2 (two) times daily as needed.    loratadine (CLARITIN) 10 mg tablet Take 10 mg by mouth once daily.    losartan (COZAAR) 100 MG tablet Take 1 tablet (100 mg total) by mouth once daily.    melatonin (MELATIN) 3 mg tablet Take 2 tablets (6 mg total) by mouth nightly as needed for Insomnia.    NIFEdipine (PROCARDIA-XL) 90 MG (OSM) 24 hr tablet Take 1 tablet (90 mg total) by mouth 2 (two) times a day.    olopatadine (PATANOL) 0.1 % ophthalmic solution Place into both eyes.    omeprazole (PRILOSEC) 20 MG capsule Take 20 mg by mouth once daily.    ondansetron (ZOFRAN-ODT) 8 MG TbDL Take 1 tablet (8 mg total) by mouth every 8 (eight) hours as needed.    RENVELA 800 mg Tab Take 800 mg by mouth 3 (three) times daily.    sildenafiL (VIAGRA) 100 MG tablet Take 100 mg by mouth.    tadalafiL (CIALIS) 2.5 mg Tab Take 2.5 mg by mouth.    tamsulosin (FLOMAX) 0.4 mg Cap Take 1 capsule by mouth.    TRUE METRIX GLUCOSE TEST STRIP Strp     valACYclovir (VALTREX) 1000 MG tablet Take 0.5 tablets (500 mg total) by mouth once daily. for 7 days    [DISCONTINUED] chlorthalidone (HYGROTEN) 50 MG Tab Take 50 mg by mouth once daily.     [DISCONTINUED] insulin (LANTUS SOLOSTAR U-100 INSULIN) glargine 100 units/mL (3mL) SubQ pen Inject 5 Units into the skin once daily.     Family History       Problem Relation (Age of Onset)    Drug abuse Brother    Heart attack Father, Brother    Hyperlipidemia Father    Hypertension Father, Brother    No Known Problems  Sister    Stroke Father          Tobacco Use    Smoking status: Some Days     Packs/day: 0.25     Years: 10.00     Pack years: 2.50     Types: Cigarettes    Smokeless tobacco: Never   Substance and Sexual Activity    Alcohol use: No    Drug use: No    Sexual activity: Not on file     Review of Systems   Constitutional:  Positive for chills, diaphoresis and fever. Negative for activity change and appetite change.   HENT:  Negative for sore throat and trouble swallowing.    Respiratory:  Positive for cough and shortness of breath.    Cardiovascular:  Negative for chest pain, palpitations and leg swelling.   Gastrointestinal:  Negative for abdominal distention, nausea and vomiting.   Genitourinary:  Negative for dysuria.   Musculoskeletal:  Negative for back pain.   Neurological:  Negative for weakness.   Psychiatric/Behavioral:  Negative for confusion.    Objective:     Vital Signs (Most Recent):  Temp: 98.9 °F (37.2 °C) (04/27/23 2344)  Pulse: 99 (04/27/23 2300)  Resp: (!) 22 (04/27/23 2300)  BP: (!) 184/96 (04/27/23 2300)  SpO2: 97 % (04/27/23 2300)   Vital Signs (24h Range):  Temp:  [98.9 °F (37.2 °C)-102.1 °F (38.9 °C)] 98.9 °F (37.2 °C)  Pulse:  [] 99  Resp:  [22-28] 22  SpO2:  [91 %-97 %] 97 %  BP: (167-220)/() 184/96     Weight: 61.6 kg (135 lb 12.9 oz)  Body mass index is 21.92 kg/m².    Physical Exam  Vitals and nursing note reviewed.   Constitutional:       General: He is not in acute distress.     Appearance: He is ill-appearing.   HENT:      Head: Normocephalic and atraumatic.      Mouth/Throat:      Pharynx: Oropharynx is clear. No oropharyngeal exudate.   Eyes:      General: No scleral icterus.  Neck:      Comments: JVD present  Cardiovascular:      Rate and Rhythm: Regular rhythm. Tachycardia present.      Pulses: Normal pulses.      Heart sounds: No murmur heard.     Arteriovenous access: Left arteriovenous access is present.  Pulmonary:      Effort: Tachypnea and prolonged expiration  present. No accessory muscle usage.      Breath sounds: Examination of the right-middle field reveals wheezing. Examination of the left-middle field reveals wheezing. Examination of the right-lower field reveals wheezing. Examination of the left-lower field reveals wheezing. Wheezing present.   Abdominal:      General: There is no distension.      Palpations: Abdomen is soft.      Tenderness: There is no abdominal tenderness. There is no guarding.   Musculoskeletal:      Cervical back: Neck supple.      Right lower leg: No edema.      Left lower leg: No edema.   Lymphadenopathy:      Cervical: No cervical adenopathy.   Skin:     General: Skin is warm and dry.   Neurological:      General: No focal deficit present.      Mental Status: He is alert and oriented to person, place, and time.           Significant Labs: All pertinent labs within the past 24 hours have been reviewed.  A1C:   Recent Labs   Lab 04/05/23 0043   HGBA1C 4.3     ABGs: No results for input(s): PH, PCO2, HCO3, POCSATURATED, BE, TOTALHB, COHB, METHB, O2HB, POCFIO2, PO2 in the last 48 hours.  CBC:   Recent Labs   Lab 04/27/23 1952 04/27/23 2001   WBC 5.96  --    HGB 10.4*  --    HCT 32.9* 34*     --      CMP:   Recent Labs   Lab 04/27/23 1952      K 4.6   CL 97   CO2 26      BUN 19   CREATININE 4.4*   CALCIUM 10.2   PROT 8.0   ALBUMIN 3.8   BILITOT 0.5   ALKPHOS 183*   AST 40   ALT 15   ANIONGAP 13     Lactic Acid: No results for input(s): LACTATE in the last 48 hours.  Magnesium: No results for input(s): MG in the last 48 hours.  Troponin: No results for input(s): TROPONINI, TROPONINIHS in the last 48 hours.  Urine Studies: No results for input(s): COLORU, APPEARANCEUA, PHUR, SPECGRAV, PROTEINUA, GLUCUA, KETONESU, BILIRUBINUA, OCCULTUA, NITRITE, UROBILINOGEN, LEUKOCYTESUR, RBCUA, WBCUA, BACTERIA, SQUAMEPITHEL, HYALINECASTS in the last 48 hours.    Invalid input(s): WRIGHTSUR    Significant Imaging: I have reviewed all  pertinent imaging results/findings within the past 24 hours.    EXAMINATION:  XR CHEST AP PORTABLE     CLINICAL HISTORY:  Shortness of breath     TECHNIQUE:  Single frontal view of the chest was performed.     COMPARISON:  04/05/2023     FINDINGS:  The cardiomediastinal silhouette is not enlarged noting magnification by technique..  There is no pleural effusion.  The trachea is midline.  The lungs are symmetrically expanded bilaterally with mildly coarse interstitial attenuation accentuated by habitus.  Overall, interstitial attenuation has improved since the previous exam suggesting improved edema..  No large focal consolidation seen.  There is no pneumothorax.  The osseous structures are remarkable for degenerative changes..     Impression:     1. Coarse interstitial attenuation could reflect mild residual edema however has improved since the previous exam.  No large focal consolidation.        Electronically signed by: Jung Mendoza MD  Date:                                            04/27/2023  Time:                                           20:00           Exam Ended: 04/27/23 19:57           Assessment/Plan:     * Sepsis  This patient does not have evidence of infective focus  My overall impression is sepsis.  Source: Unknown and CT chest/abd/pelvis read pending to eval for occult infection  Antibiotics given-   Antibiotics (72h ago, onward)    Start     Stop Route Frequency Ordered    04/28/23 0900  piperacillin-tazobactam (ZOSYN) 4.5 g in dextrose 5 % in water (D5W) 5 % 100 mL IVPB (MB+)         -- IV Every 12 hours (non-standard times) 04/28/23 0120    04/28/23 0220  vancomycin - pharmacy to dose  (vancomycin IVPB)        See Hyperspace for full Linked Orders Report.    -- IV pharmacy to manage frequency 04/28/23 0120        Latest lactate reviewed-  No results for input(s): LACTATE in the last 72 hours.  Organ dysfunction indicated by Acute respiratory failure    Fluid challenge Contraindicated- Fluid  bolus is contraindicated in this patient due to End Stage Renal Disease     Post- resuscitation assessment Yes Perfusion exam was performed within 6 hours of septic shock presentation after bolus shows Adequate tissue perfusion assessed by non-invasive monitoring       Will Not start Pressors- Levophed for MAP of 65  Source control achieved by: Empiric IV antibiotics    ESRD (end stage renal disease)  Resume home phos binders  Nephrology consult  Daily renal function panel      Renovascular hypertension  Chronic and uncontrolled currently  Resume home anti-hypertensive regimen - labetalol, losartan, clonidine TD, hydralazine      Type 2 diabetes mellitus with chronic kidney disease on chronic dialysis, with long-term current use of insulin  Patient's FSGs are controlled on current medication regimen.  Last A1c reviewed-   Lab Results   Component Value Date    HGBA1C 4.3 04/05/2023     Most recent fingerstick glucose reviewed- No results for input(s): POCTGLUCOSE in the last 24 hours.  Current correctional scale  Low  Maintain anti-hyperglycemic dose as follows-   Antihyperglycemics (From admission, onward)    Start     Stop Route Frequency Ordered    04/28/23 0121  insulin aspart U-100 pen 0-5 Units         -- SubQ Before meals & nightly PRN 04/28/23 0130        Hold Oral hypoglycemics while patient is in the hospital.    Benign prostatic hyperplasia  Resume home tamsulosin      Gastroesophageal reflux disease without esophagitis  Resume famotidine PRN      VTE Risk Mitigation (From admission, onward)         Ordered     heparin (porcine) injection 5,000 Units  Every 8 hours         04/28/23 0130     IP VTE HIGH RISK PATIENT  Once         04/28/23 0130     Place sequential compression device  Until discontinued         04/28/23 0130     Place sequential compression device  Until discontinued         04/28/23 0130                   On 04/28/2023, patient should be placed in hospital observation services under my  care.        Mike Reynoso MD  Department of Hospital Medicine  [facility] - Emergency Dept

## 2023-04-28 NOTE — NURSING
Nurses Note -- 4 Eyes      4/28/2023   2:27 PM      Skin assessed during: Admit      [x] No Altered Skin Integrity Present    []Prevention Measures Documented      [] Yes- Altered Skin Integrity Present or Discovered   [] LDA Added if Not in Epic (Describe Wound)   [] New Altered Skin Integrity was Present on Admit and Documented in LDA   [] Wound Image Taken    Wound Care Consulted? No    Attending Nurse:  Robbin Pumphrey, RN     Second RN/Staff Member:  ROHITH Magana RN

## 2023-04-28 NOTE — PLAN OF CARE
[facility] - [facility] ([place] [address])  Initial Discharge Assessment       Primary Care Provider: Primary Doctor No    Admission Diagnosis: SOB (shortness of breath) [R06.02]  ESRD (end stage renal disease) [N18.6]  Chest pain [R07.9]  Acute hypoxemic respiratory failure [J96.01]    Admission Date: 4/5/2023  Expected Discharge Date: 4/8/2023    Discharge Barriers Identified: None    Payor: MEDICAID / Plan: [place] [facility] CONNECT / Product Type: Managed Medicaid /     Extended Emergency Contact Information  Primary Emergency Contact: Atif Helm  Address: [address]           [place] [address] [facility] [place]  Mobile Phone: [phone]  Relation: Spouse  Secondary Emergency Contact: Hemant Martinez  Mobile Phone: [phone]  Relation: Son    Discharge Plan A: (P) Home  Discharge Plan B: (P) Home with family      [id number] - [place]  [address] AT [facility] [address]  [facility] [place] [address]  Phone: [phone] Fax: [phone]    [facility] #[id number] - [place] - [address]  AT [facility] [address]  [address] [place] [address]  Phone: [phone] Fax: [phone]      Initial Assessment (most recent)       Adult Discharge Assessment - 04/28/23 1300          Discharge Assessment    Assessment Type Discharge Planning Assessment (P)      Confirmed/corrected address, phone number and insurance Yes (P)      Confirmed Demographics Correct on Facesheet (P)      Source of Information patient (P)      Does patient/caregiver understand observation status Yes (P)      Communicated JOAN with patient/caregiver Yes (P)      People in Home alone (P)      Facility Arrived From: HOME (P)      Do you expect to return to your current living situation? Yes (P)      Do you have help at home or someone to help you manage your care at home? Yes (P)      Who are your caregiver(s) and their phone number(s)? Hemant Martinez son,  [phone] (P)      Prior to hospitilization cognitive status: Alert/Oriented (P)      Current cognitive status: Alert/Oriented (P)      Equipment Currently Used at Home none (P)      Readmission within 30 days? No (P)      Patient currently being followed by outpatient case management? No (P)      Do you currently have service(s) that help you manage your care at home? No (P)      Do you take prescription medications? Yes (P)      Do you have prescription coverage? Yes (P)      Do you have any problems affording any of your prescribed medications? No (P)      Is the patient taking medications as prescribed? yes (P)      Who is going to help you get home at discharge? Hemant Martinez, son (P)      How do you get to doctors appointments? car, drives self;family or friend will provide (P)      Are you on dialysis? Yes (P)      Dialysis Name and Scheduled days [facility] (P)      Discharge Plan A Home (P)      Discharge Plan B Home with family (P)      DME Needed Upon Discharge  none (P)                           Torie Olvera LMSW  [facility]   [id number]

## 2023-04-28 NOTE — PLAN OF CARE
Patient alert and oriented x4. Patient states he needs to leave today, spoke with patient about the importance of receiving the medical care he needs to get better. Patient states he will think about it while he is in dialysis. PO intake fair, patient sleeping most of morning till transported to dialysis.

## 2023-04-28 NOTE — ED NOTES
Enrique Martinez, a 58 y.o. male presents to the ED w/ complaint of sob and cramps in stomach. Reports going to dialysis 4 days in a row, dialysis told pt to come to ED for IV lasix. Pt on 2L NC, states this is his baseline.     Triage note:  Chief Complaint   Patient presents with    Multiple complaints     Dialysis 4 d  in row, told to come get lasix, placed on 2l nc states uses oxygen as needed at home     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    LOC: The patient is awake, alert and aware of environment with an appropriate affect, the patient is oriented x 4 and speaking appropriately.  APPEARANCE: Patient resting comfortably and in no acute distress, patient is clean and well groomed, patient's clothing is properly fastened.  SKIN: The skin is warm and dry, color consistent with ethnicity, patient has normal skin turgor and moist mucus membranes, skin intact, no breakdown or bruising noted.  MUSCULOSKELETAL: Patient moving all extremities well, no obvious swelling or deformities noted.  RESPIRATORY: Airway is open and patent, respirations are spontaneous and even, patient has a normal effort and rate. +labored breathing, sob  CARDIAC: Patient has a normal rate and rhythm, no periphreal edema noted, capillary refill < 3 seconds. Normal +2 pedal pulses present.  ABDOMEN: Soft and non tender to palpation, no  distention noted. Patient denies any nausea, vomiting, diarrhea, or constipation.   NEUROLOGIC: Eyes open spontaneously, PERRL, behavior appropriate to situation, follows commands, facial expression symmetrical, bilateral hand grasp equal and even, purposeful motor response noted, normal sensation in all extremities.

## 2023-04-28 NOTE — SUBJECTIVE & OBJECTIVE
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        Past Surgical History:   Procedure Laterality Date    ABDOMINAL SURGERY  1980s    AV FISTULA PLACEMENT Left 1/5/2023    Procedure: CREATION, AV FISTULA RADIOCEPHALIC;  Surgeon: ROHITH Pickard II, MD;  Location: [facility] OR 2ND FLR;  Service: Vascular;  Laterality: Left;    CARDIAC CATHETERIZATION  2007    x 2    COLONOSCOPY N/A 4/5/2019    Procedure: COLONOSCOPY;  Surgeon: Jose L Carty MD;  Location: [facility] ENDO (4TH FLR);  Service: Colon and Rectal;  Laterality: N/A;    CORONARY STENT PLACEMENT  2007       Review of patient's allergies indicates:  No Known Allergies  Current Facility-Administered Medications   Medication Frequency    acetaminophen tablet 650 mg Q4H PRN    albuterol inhaler 2 puff Q6H PRN    aluminum-magnesium hydroxide-simethicone 200-200-20 mg/5 mL suspension 30 mL QID PRN    aspirin EC tablet 81 mg Daily    benzonatate capsule 100 mg TID PRN    bisacodyL suppository 10 mg Daily PRN    cetirizine tablet 10 mg Daily    cloNIDine 0.3 mg/24 hr td ptwk 1 patch Q7 Days    colchicine capsule/tablet 0.6 mg Daily PRN    dextrose 10% bolus 125 mL 125 mL PRN    dextrose 10% bolus 250 mL 250 mL PRN    dextrose 40 % gel 15,000 mg PRN    dextrose 40 % gel 30,000 mg PRN    diphenhydrAMINE capsule 25 mg Q6H PRN    famotidine tablet 20 mg Daily PRN    furosemide injection 100 mg Q12H    glucagon (human recombinant) injection 1 mg PRN    heparin (porcine) injection 5,000 Units Q8H     hydrALAZINE tablet 100 mg Q8H    insulin aspart U-100 pen 0-5 Units QID (AC + HS) PRN    labetaloL tablet 300 mg BID    LIDOcaine-prilocaine cream BID PRN    losartan tablet 100 mg Daily    melatonin tablet 6 mg Nightly PRN    methocarbamoL tablet 500 mg BID PRN    naloxone 0.4 mg/mL injection 0.02 mg PRN    NIFEdipine 24 hr tablet 90 mg BID    ondansetron disintegrating tablet 8 mg Q8H PRN    piperacillin-tazobactam (ZOSYN) 4.5 g in dextrose 5 % in water (D5W) 5 % 100 mL IVPB (MB+) Q12H    polyethylene glycol packet 17 g BID PRN    prochlorperazine injection Soln 5 mg Q6H PRN    sevelamer carbonate tablet 800 mg TID WM    simethicone chewable tablet 80 mg QID PRN    sodium chloride 0.9% flush 10 mL Q6H PRN    sodium chloride 0.9% flush 5 mL PRN    tamsulosin 24 hr capsule 0.4 mg Daily    vancomycin - pharmacy to dose pharmacy to manage frequency     Family History       Problem Relation (Age of Onset)    Drug abuse Brother    Heart attack Father, Brother    Hyperlipidemia Father    Hypertension Father, Brother    No Known Problems Sister    Stroke Father          Tobacco Use    Smoking status: Some Days     Packs/day: 0.25     Years: 10.00     Pack years: 2.50     Types: Cigarettes    Smokeless tobacco: Never   Substance and Sexual Activity    Alcohol use: No    Drug use: No    Sexual activity: Not on file     Review of Systems   Constitutional:  Positive for chills, diaphoresis and fever. Negative for activity change and appetite change.   HENT:  Negative for sore throat and trouble swallowing.    Respiratory:  Positive for cough and shortness of breath.    Cardiovascular:  Negative for chest pain, palpitations and leg swelling.   Gastrointestinal:  Negative for abdominal distention, nausea and vomiting.   Genitourinary:  Negative for dysuria.   Musculoskeletal:  Negative for back pain.   Neurological:  Negative for weakness.   Psychiatric/Behavioral:  Negative for confusion.    Objective:     Vital Signs (Most  Recent):  Temp: 97.9 °F (36.6 °C) (04/28/23 1107)  Pulse: 88 (04/28/23 1107)  Resp: (!) 22 (04/28/23 1107)  BP: 109/66 (04/28/23 1107)  SpO2: (!) 94 % (04/28/23 1107)   Vital Signs (24h Range):  Temp:  [97.9 °F (36.6 °C)-102.1 °F (38.9 °C)] 97.9 °F (36.6 °C)  Pulse:  [] 88  Resp:  [18-28] 22  SpO2:  [91 %-97 %] 94 %  BP: (109-220)/() 109/66     Weight: 61.6 kg (135 lb 12.9 oz) (04/27/23 1845)  Body mass index is 21.92 kg/m².  Body surface area is 1.69 meters squared.    I/O last 3 completed shifts:  In: 350 [IV Piggyback:350]  Out: 75 [Urine:75]    Physical Exam  Vitals and nursing note reviewed.   Constitutional:       General: He is not in acute distress.     Appearance: He is not ill-appearing or toxic-appearing.   HENT:      Head: Normocephalic and atraumatic.      Mouth/Throat:      Pharynx: Oropharynx is clear. No oropharyngeal exudate.   Eyes:      General: No scleral icterus.  Neck:      Vascular: JVD present.   Cardiovascular:      Rate and Rhythm: Regular rhythm. Tachycardia present.      Pulses: Normal pulses.      Heart sounds: No murmur heard.     Arteriovenous access: Left arteriovenous access is present.  Pulmonary:      Effort: Tachypnea and prolonged expiration present. No accessory muscle usage.      Breath sounds: Examination of the right-middle field reveals wheezing. Examination of the left-middle field reveals wheezing. Examination of the right-lower field reveals wheezing. Examination of the left-lower field reveals wheezing. Wheezing present.   Abdominal:      General: There is no distension.      Palpations: Abdomen is soft.      Tenderness: There is no abdominal tenderness. There is no guarding.   Musculoskeletal:      Cervical back: Neck supple.      Right lower leg: No edema.      Left lower leg: No edema.   Lymphadenopathy:      Cervical: No cervical adenopathy.   Skin:     General: Skin is warm and dry.   Neurological:      General: No focal deficit present.      Mental  Status: He is alert and oriented to person, place, and time.       Significant Labs:  CBC:   Recent Labs   Lab 04/28/23  0402   WBC 5.31   RBC 3.55*   HGB 11.2*   HCT 35.8*      *   MCH 31.5*   MCHC 31.3*     CMP:   Recent Labs   Lab 04/27/23 1952 04/28/23  0524    80   CALCIUM 10.2 9.6   ALBUMIN 3.8 3.4*   PROT 8.0  --     135*   K 4.6 4.6   CO2 26 23   CL 97 99   BUN 19 25*   CREATININE 4.4* 5.0*   ALKPHOS 183*  --    ALT 15  --    AST 40  --    BILITOT 0.5  --

## 2023-04-28 NOTE — HPI
58-year-old male with past medical history of ESRD on HD MWF, CAD, DM, GERD, HTN, presents to the emergency department with complaints of shortness of breath.    He has been receiving outpatient dialysis for 4 days in a row due to concerns of hypervolemia, reports his weight prior to dialysis Thursday was 63 kg and his dry weight is 62 kg.  He states that dialysis center told him to report to the emergency room to receive IV Lasix, states he was receiving 100 mg IV Lasix in the past, confirmed on continuation of recent inpatient orders.    He was also noted the emergency room to have 3/4 sirs with fever tachycardia and tachypnea, is referred for admission concerning for sepsis of unclear etiology.  Blood cultures have been taken vancomycin and Zosyn administered and a CT chest abdomen pelvis has been completed to evaluate for occult infection, radiology read pending at this time.  He recently had a right tunneled IJ hemodialysis catheter removed in vascular surgery clinic on April 17th.  He was admitted to [facility] from April 5th to April 8th for concerns of volume overload and acute hypoxemic respiratory failure requiring noninvasive ventilation.

## 2023-04-29 LAB — POCT GLUCOSE: 124 MG/DL (ref 70–110)

## 2023-04-29 NOTE — DISCHARGE SUMMARY
[facility] - [facility] ([facility]  Discharge Note  Short Stay          OUTCOME:  Patient left AMA  Pt demanded a dose of lasix and abx. He was informed that his sepsis workup would take at least 48 hours to complete since the time of admission before we can medically clear him safely for discharge. Patient insisted on discharge despite counseling. Patient left AMA.    DISPOSITION: Left Against Medical Advice    FINAL DIAGNOSIS:  Sepsis with acute hypoxic respiratory failure without septic shock

## 2023-05-02 LAB
BACTERIA BLD CULT: NORMAL
BACTERIA BLD CULT: NORMAL

## 2023-05-04 ENCOUNTER — PATIENT OUTREACH (OUTPATIENT)
Dept: EMERGENCY MEDICINE | Facility: HOSPITAL | Age: 58
End: 2023-05-04
Payer: MEDICARE

## 2023-05-04 NOTE — PROGRESS NOTES
Call placed to Pt to f/u from last encounter. Pt states he is still going to dialysis and is doing better. He had an infection after getting the catheter removed. That has since cleared up and he is producing urine without difficulty. Next f/u scheduled for 6-15-23.

## 2023-05-22 DIAGNOSIS — N18.6 ESRD ON DIALYSIS: Primary | ICD-10-CM

## 2023-05-22 DIAGNOSIS — Z99.2 ESRD ON DIALYSIS: Primary | ICD-10-CM

## 2023-05-22 RX ORDER — CINACALCET 30 MG/1
30 TABLET, FILM COATED ORAL
Qty: 38 TABLET | Refills: 1 | Status: SHIPPED | OUTPATIENT
Start: 2023-05-22 | End: 2023-11-15

## 2023-05-24 ENCOUNTER — TELEPHONE (OUTPATIENT)
Dept: NEPHROLOGY | Facility: CLINIC | Age: 58
End: 2023-05-24
Payer: MEDICARE

## 2023-05-24 NOTE — TELEPHONE ENCOUNTER
Spoke with Tessa confirmed that we got fax and will fax it back on tomorrow            ----- Message from Mak Saez MD sent at 5/24/2023 11:50 AM CDT -----  Regarding: RE: medication  Contact: @[phone]   REF [id number]  Can you call and get it. Thank you.    ----- Message -----  From: Maria D Chaparro MA  Sent: 5/23/2023   4:36 PM CDT  To: Mak Saez MD  Subject: FW: medication                                   Did you prescribe anything this isn't your patient  ----- Message -----  From: Jaime Olsen  Sent: 5/23/2023   1:51 PM CDT  To: Nadja Corado Staff  Subject: medication                                       [facility] calling to check status of prior auth request they sent for cinacalcet (SENSIPAR) 30 MG Tab.. pls call and [phone]   REF [id number]

## 2023-06-01 ENCOUNTER — TELEPHONE (OUTPATIENT)
Dept: NEPHROLOGY | Facility: CLINIC | Age: 58
End: 2023-06-01
Payer: MEDICARE

## 2023-06-01 NOTE — TELEPHONE ENCOUNTER
----- Message from Mervat Javier sent at 6/1/2023  4:46 PM CDT -----  Regarding: Prior Auth  Contact: [phone]  Lucien with [employer] is calling for the status of a prior auth for rx cinacalcet (SENSIPAR) 30 MG Tab

## 2023-06-23 NOTE — NURSING
Patient wanting to speak to the MD about prescriptions so he can leave.  Educated patient that he needed to stay in order to receive the IV antibiotics.MD notified.    yes
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
## 2023-07-31 PROBLEM — R65.20 SEPSIS WITH ACUTE HYPOXIC RESPIRATORY FAILURE WITHOUT SEPTIC SHOCK: Status: RESOLVED | Noted: 2023-04-28 | Resolved: 2023-07-31

## 2023-07-31 PROBLEM — J96.01 ACUTE HYPOXEMIC RESPIRATORY FAILURE: Status: RESOLVED | Noted: 2022-10-03 | Resolved: 2023-07-31

## 2023-07-31 PROBLEM — J96.01 SEPSIS WITH ACUTE HYPOXIC RESPIRATORY FAILURE WITHOUT SEPTIC SHOCK: Status: RESOLVED | Noted: 2023-04-28 | Resolved: 2023-07-31

## 2023-07-31 PROBLEM — A41.9 SEPSIS WITH ACUTE HYPOXIC RESPIRATORY FAILURE WITHOUT SEPTIC SHOCK: Status: RESOLVED | Noted: 2023-04-28 | Resolved: 2023-07-31

## 2023-08-14 NOTE — TELEPHONE ENCOUNTER
Per Dr. Paz bone marrow biopsy no longer medically indicated at this given improvement in blood counts.        This writer called Sydnee at [facility] pulmonology and left a voicemail in regards to Michele.

## 2023-08-25 NOTE — PROGRESS NOTES
"Subjective:       Enrique Martinez is a 55 y.o. male who is an established patient who was referred by Dr Mcduffie for evaluation of hydrocele.      Recently evaluated by general surgery for b/l inguinal hernias. Also noted to have large L hydrocele that is most bothersome x 3 years. He feels hydrocele is enlarging. Reports size is smaller in AM and gets larger throughout the day when standing. He feels it is related to his fluid intake.     He has significant PMH with ESRD (not on HD) awaiting transplant, CAD s/p stents, DM2, HTN.    He has been seeing urologist at [facility]. DEEDEE found from 2018 that reported L spermatocele.     DEEDEE 8/20 - large L hydrocele, epididymal cysts (L 1.6cm, 7mm; R 1.1cm)    8/12/2020  Arrived >60 minutes late for appointment. Again reports change in hydrocele size depending on fluid intake, activity, and time of day.       The following portions of the patient's history were reviewed and updated as appropriate: allergies, current medications, past family history, past medical history, past social history, past surgical history and problem list.    Review of Systems  Constitutional: no fever or chills  ENT: no nasal congestion or sore throat  Respiratory: no cough or shortness of breath  Cardiovascular: no chest pain or palpitations  Gastrointestinal: no nausea or vomiting, tolerating diet  Genitourinary: as per HPI  Hematologic/Lymphatic: no easy bruising or lymphadenopathy  Musculoskeletal: no arthralgias or myalgias  Skin: no rashes or lesions  Neurological: no seizures or tremors  Behavioral/Psych: no auditory or visual hallucinations       Objective:    Vitals: /86 (BP Location: Left arm, Patient Position: Sitting, BP Method: Large (Automatic))   Pulse 80   Ht 5' 6" (1.676 m)   Wt 80.3 kg (177 lb 0.5 oz)   BMI 28.57 kg/m²     Physical Exam   General: well developed, well nourished in no acute distress  Head: normocephalic, atraumatic  Neck: supple, trachea midline, no obvious " enlargement of thyroid  HEENT: EOMI, mucus membranes moist, sclera anicteric, no hearing impairment  Lungs: symmetric expansion, non-labored breathing  Cardiovascular: regular rate and rhythm, normal pulses  Abdomen: soft, non tender, non distended, no palpable masses, no hepatosplenomegaly, no hernias, bladder nonpalpable. No CVA tenderness.  Musculoskeletal: no peripheral edema, normal ROM in bilateral upper and lower extremities  Lymphatics: no cervical or inguinal lymphadenopathy  Skin: no rashes or lesions  Neuro: alert and oriented x 3, no gross deficits  Psych: normal judgment and insight, normal mood/affect and non-anxious  Genitourinary:   Penis -  circumcised penis without plaques, lesions, or scarring.  Urethra - orthotopic location without stenosis.  Scrotum  - no lesions or rashes, Testes - descended bilaterally, symmetric without masses, non tender. Able to palpate L testis in superior scrotum  Epididymides - no cysts or lesions, no spermatocele, symmetric   L SCROTUM WITH OBVIOUS ENLARGEMENT, SOFT SWELLING. KNOWN B/L INGUINAL HERNIAS  CYNTHIA: patient declined exam         Lab Review   Urine analysis today in clinic shows no urine     Lab Results   Component Value Date    WBC 6.66 10/31/2019    HGB 8.3 (L) 10/31/2019    HCT 25.4 (L) 10/31/2019    MCV 85 10/31/2019     10/31/2019     Lab Results   Component Value Date    CREATININE 6.3 (H) 10/31/2019    BUN 48 (H) 10/31/2019     Lab Results   Component Value Date    PSA 1.6 09/18/2018     No results found for: PSADIAG    Imaging  DEEDEE report reviewed  Reports and images were personally reviewed by me and discussed with the patient today         Assessment/Plan:      1. Scrotal swelling / communicating hydrocele   - History c/w communicating hydrocele and would therefore need hernia repair at time of surgery. He has seen general surgery as well.   - DEEDEE at [facility] demonstrated spermatocele - usually more firm on exam and do not fluctuate in size   - DEEDEE -  large L hydrocele, small epididymal cysts   - Concerning for communicating hydrocele with patent processus vaginalis. High risk of recurrence if hydrocelectomy performed without hernia repair.    - Recommend hernia repair first and if hydrocele persistent, then can do hydrocelectomy.      2. Bilateral inguinal hernia without obstruction or gangrene, recurrence not specified    - Seeing gen surg   - Planned for repair at time of transplant or after       Follow up PRN          Patient/Caregiver provided printed discharge information.

## 2023-09-14 ENCOUNTER — HOSPITAL ENCOUNTER (OUTPATIENT)
Dept: VASCULAR SURGERY | Facility: CLINIC | Age: 58
Discharge: HOME | End: 2023-09-14
Attending: SURGERY
Payer: MEDICARE

## 2023-09-14 ENCOUNTER — OFFICE VISIT (OUTPATIENT)
Dept: VASCULAR SURGERY | Facility: CLINIC | Age: 58
End: 2023-09-14
Attending: SURGERY
Payer: MEDICARE

## 2023-09-14 VITALS
WEIGHT: 147.69 LBS | HEART RATE: 95 BPM | SYSTOLIC BLOOD PRESSURE: 161 MMHG | DIASTOLIC BLOOD PRESSURE: 78 MMHG | TEMPERATURE: 99 F | HEIGHT: 66 IN | BODY MASS INDEX: 23.74 KG/M2

## 2023-09-14 DIAGNOSIS — Z99.2 ESRD ON HEMODIALYSIS: ICD-10-CM

## 2023-09-14 DIAGNOSIS — N18.6 ESRD ON HEMODIALYSIS: ICD-10-CM

## 2023-09-14 DIAGNOSIS — Z99.2 ESRD ON HEMODIALYSIS: Primary | ICD-10-CM

## 2023-09-14 DIAGNOSIS — Z99.2 ARTERIOVENOUS FISTULA FOR HEMODIALYSIS IN PLACE, PRIMARY: ICD-10-CM

## 2023-09-14 DIAGNOSIS — N18.6 ESRD ON HEMODIALYSIS: Primary | ICD-10-CM

## 2023-09-14 PROCEDURE — 93990 DOPPLER FLOW TESTING: CPT | Mod: PBBFAC | Performed by: SURGERY

## 2023-09-14 PROCEDURE — 99213 OFFICE O/P EST LOW 20 MIN: CPT | Mod: S$GLB,,, | Performed by: SURGERY

## 2023-09-14 PROCEDURE — 99213 PR OFFICE/OUTPT VISIT, EST, LEVL III, 20-29 MIN: ICD-10-PCS | Mod: S$GLB,,, | Performed by: SURGERY

## 2023-09-14 PROCEDURE — 99999 PR PBB SHADOW E&M-EST. PATIENT-LVL IV: CPT | Mod: PBBFAC,,, | Performed by: SURGERY

## 2023-09-14 PROCEDURE — 93990 DOPPLER FLOW TESTING: CPT | Mod: S$GLB,,, | Performed by: SURGERY

## 2023-09-14 PROCEDURE — 99999 PR PBB SHADOW E&M-EST. PATIENT-LVL IV: ICD-10-PCS | Mod: PBBFAC,,, | Performed by: SURGERY

## 2023-09-14 PROCEDURE — 99214 OFFICE O/P EST MOD 30 MIN: CPT | Mod: PBBFAC | Performed by: SURGERY

## 2023-09-14 PROCEDURE — 93990 PR DUPLEX HEMODIALYSIS ACCESS: ICD-10-PCS | Mod: S$GLB,,, | Performed by: SURGERY

## 2023-09-14 RX ORDER — PANTOPRAZOLE SODIUM 40 MG/1
40 TABLET, DELAYED RELEASE ORAL EVERY MORNING
COMMUNITY
Start: 2023-07-12

## 2023-09-14 RX ORDER — TRAMADOL HYDROCHLORIDE 50 MG/1
50 TABLET ORAL 2 TIMES DAILY PRN
COMMUNITY
Start: 2023-09-05

## 2023-09-14 RX ORDER — AMOXICILLIN 500 MG/1
500 CAPSULE ORAL
COMMUNITY
Start: 2023-09-09

## 2023-09-14 RX ORDER — METHOCARBAMOL 750 MG/1
750 TABLET, FILM COATED ORAL
COMMUNITY
Start: 2023-09-09

## 2024-04-01 ENCOUNTER — HOSPITAL ENCOUNTER (EMERGENCY)
Facility: HOSPITAL | Age: 59
Discharge: HOME | End: 2024-04-01
Attending: EMERGENCY MEDICINE
Payer: MEDICARE

## 2024-04-01 VITALS
WEIGHT: 150 LBS | HEIGHT: 66 IN | DIASTOLIC BLOOD PRESSURE: 92 MMHG | TEMPERATURE: 98 F | HEART RATE: 80 BPM | RESPIRATION RATE: 20 BRPM | OXYGEN SATURATION: 95 % | SYSTOLIC BLOOD PRESSURE: 178 MMHG | BODY MASS INDEX: 24.11 KG/M2

## 2024-04-01 DIAGNOSIS — I16.1 HYPERTENSIVE EMERGENCY: ICD-10-CM

## 2024-04-01 DIAGNOSIS — R09.02 HYPOXIA: ICD-10-CM

## 2024-04-01 DIAGNOSIS — R06.02 SHORTNESS OF BREATH: ICD-10-CM

## 2024-04-01 DIAGNOSIS — E87.70 HYPERVOLEMIA, UNSPECIFIED HYPERVOLEMIA TYPE: ICD-10-CM

## 2024-04-01 DIAGNOSIS — R06.03 RESPIRATORY DISTRESS: Primary | ICD-10-CM

## 2024-04-01 DIAGNOSIS — Z53.29 LEFT AGAINST MEDICAL ADVICE: ICD-10-CM

## 2024-04-01 PROBLEM — Z79.4 TYPE 2 DIABETES MELLITUS WITH CHRONIC KIDNEY DISEASE ON CHRONIC DIALYSIS, WITH LONG-TERM CURRENT USE OF INSULIN: Chronic | Status: ACTIVE | Noted: 2018-09-18

## 2024-04-01 PROBLEM — E11.22 TYPE 2 DIABETES MELLITUS WITH CHRONIC KIDNEY DISEASE ON CHRONIC DIALYSIS, WITH LONG-TERM CURRENT USE OF INSULIN: Chronic | Status: ACTIVE | Noted: 2018-09-18

## 2024-04-01 PROBLEM — I50.33 ACUTE ON CHRONIC DIASTOLIC HEART FAILURE: Chronic | Status: ACTIVE | Noted: 2023-04-28

## 2024-04-01 PROBLEM — I15.0 RENOVASCULAR HYPERTENSION: Chronic | Status: ACTIVE | Noted: 2018-09-18

## 2024-04-01 PROBLEM — Z99.2 ESRD ON HEMODIALYSIS: Chronic | Status: ACTIVE | Noted: 2022-10-03

## 2024-04-01 PROBLEM — N18.6 ESRD ON HEMODIALYSIS: Chronic | Status: ACTIVE | Noted: 2022-10-03

## 2024-04-01 PROBLEM — N18.6 TYPE 2 DIABETES MELLITUS WITH CHRONIC KIDNEY DISEASE ON CHRONIC DIALYSIS, WITH LONG-TERM CURRENT USE OF INSULIN: Chronic | Status: ACTIVE | Noted: 2018-09-18

## 2024-04-01 PROBLEM — Z86.19 HX OF SYPHILIS: Chronic | Status: ACTIVE | Noted: 2018-09-18

## 2024-04-01 PROBLEM — Z99.2 TYPE 2 DIABETES MELLITUS WITH CHRONIC KIDNEY DISEASE ON CHRONIC DIALYSIS, WITH LONG-TERM CURRENT USE OF INSULIN: Chronic | Status: ACTIVE | Noted: 2018-09-18

## 2024-04-01 PROBLEM — Z95.5 S/P CORONARY ARTERY STENT PLACEMENT: Chronic | Status: ACTIVE | Noted: 2018-09-18

## 2024-04-01 PROBLEM — Z86.19 HISTORY OF NEUROSYPHILIS: Chronic | Status: ACTIVE | Noted: 2021-02-26

## 2024-04-01 LAB
ALBUMIN SERPL BCP-MCNC: 3.6 G/DL (ref 3.5–5.2)
ALLENS TEST: ABNORMAL
ALP SERPL-CCNC: 163 U/L (ref 55–135)
ALT SERPL W/O P-5'-P-CCNC: 27 U/L (ref 10–44)
ANION GAP SERPL CALC-SCNC: 14 MMOL/L (ref 8–16)
AST SERPL-CCNC: 44 U/L (ref 10–40)
BASOPHILS # BLD AUTO: 0.05 K/UL (ref 0–0.2)
BASOPHILS NFR BLD: 0.5 % (ref 0–1.9)
BILIRUB SERPL-MCNC: 0.5 MG/DL (ref 0.1–1)
BNP SERPL-MCNC: 2877 PG/ML (ref 0–99)
BUN SERPL-MCNC: 35 MG/DL (ref 8–23)
CALCIUM SERPL-MCNC: 9.3 MG/DL (ref 8.7–10.5)
CHLORIDE SERPL-SCNC: 106 MMOL/L (ref 95–110)
CO2 SERPL-SCNC: 22 MMOL/L (ref 23–29)
CREAT SERPL-MCNC: 8.2 MG/DL (ref 0.5–1.4)
DIFFERENTIAL METHOD BLD: ABNORMAL
EOSINOPHIL # BLD AUTO: 0.2 K/UL (ref 0–0.5)
EOSINOPHIL NFR BLD: 2 % (ref 0–8)
ERYTHROCYTE [DISTWIDTH] IN BLOOD BY AUTOMATED COUNT: 16.8 % (ref 11.5–14.5)
EST. GFR  (NO RACE VARIABLE): 6.9 ML/MIN/1.73 M^2
GLUCOSE SERPL-MCNC: 110 MG/DL (ref 70–110)
HCO3 UR-SCNC: 23.3 MMOL/L (ref 24–28)
HCT VFR BLD AUTO: 28 % (ref 40–54)
HGB BLD-MCNC: 9 G/DL (ref 14–18)
IMM GRANULOCYTES # BLD AUTO: 0.04 K/UL (ref 0–0.04)
IMM GRANULOCYTES NFR BLD AUTO: 0.4 % (ref 0–0.5)
LYMPHOCYTES # BLD AUTO: 0.8 K/UL (ref 1–4.8)
LYMPHOCYTES NFR BLD: 7.4 % (ref 18–48)
MAGNESIUM SERPL-MCNC: 2.1 MG/DL (ref 1.6–2.6)
MCH RBC QN AUTO: 30.1 PG (ref 27–31)
MCHC RBC AUTO-ENTMCNC: 32.1 G/DL (ref 32–36)
MCV RBC AUTO: 94 FL (ref 82–98)
MONOCYTES # BLD AUTO: 0.8 K/UL (ref 0.3–1)
MONOCYTES NFR BLD: 7.3 % (ref 4–15)
NEUTROPHILS # BLD AUTO: 9.1 K/UL (ref 1.8–7.7)
NEUTROPHILS NFR BLD: 82.4 % (ref 38–73)
NRBC BLD-RTO: 0 /100 WBC
OHS QRS DURATION: 110 MS
OHS QRS DURATION: 110 MS
OHS QTC CALCULATION: 509 MS
OHS QTC CALCULATION: 519 MS
PCO2 BLDA: 41.3 MMHG (ref 35–45)
PH SMN: 7.36 [PH] (ref 7.35–7.45)
PHOSPHATE SERPL-MCNC: 3.7 MG/DL (ref 2.7–4.5)
PLATELET # BLD AUTO: 209 K/UL (ref 150–450)
PMV BLD AUTO: 9.3 FL (ref 9.2–12.9)
PO2 BLDA: 58 MMHG (ref 40–60)
POC BE: -2 MMOL/L
POC SATURATED O2: 88 % (ref 95–100)
POC TCO2: 25 MMOL/L (ref 24–29)
POTASSIUM SERPL-SCNC: 3.9 MMOL/L (ref 3.5–5.1)
PROT SERPL-MCNC: 6.7 G/DL (ref 6–8.4)
RBC # BLD AUTO: 2.99 M/UL (ref 4.6–6.2)
SAMPLE: ABNORMAL
SITE: ABNORMAL
SODIUM SERPL-SCNC: 142 MMOL/L (ref 136–145)
TROPONIN I SERPL DL<=0.01 NG/ML-MCNC: 0.05 NG/ML (ref 0–0.03)
WBC # BLD AUTO: 11.03 K/UL (ref 3.9–12.7)

## 2024-04-01 PROCEDURE — 84484 ASSAY OF TROPONIN QUANT: CPT

## 2024-04-01 PROCEDURE — 25000003 PHARM REV CODE 250

## 2024-04-01 PROCEDURE — 82803 BLOOD GASES ANY COMBINATION: CPT

## 2024-04-01 PROCEDURE — 27100171 HC OXYGEN HIGH FLOW UP TO 24 HOURS

## 2024-04-01 PROCEDURE — 83880 ASSAY OF NATRIURETIC PEPTIDE: CPT

## 2024-04-01 PROCEDURE — 27000190 HC CPAP FULL FACE MASK W/VALVE

## 2024-04-01 PROCEDURE — 93010 ELECTROCARDIOGRAM REPORT: CPT | Mod: ,,, | Performed by: INTERNAL MEDICINE

## 2024-04-01 PROCEDURE — 94761 N-INVAS EAR/PLS OXIMETRY MLT: CPT | Mod: XB

## 2024-04-01 PROCEDURE — 80048 BASIC METABOLIC PNL TOTAL CA: CPT | Mod: XB

## 2024-04-01 PROCEDURE — 99900035 HC TECH TIME PER 15 MIN (STAT)

## 2024-04-01 PROCEDURE — 83735 ASSAY OF MAGNESIUM: CPT

## 2024-04-01 PROCEDURE — 80053 COMPREHEN METABOLIC PANEL: CPT

## 2024-04-01 PROCEDURE — 93005 ELECTROCARDIOGRAM TRACING: CPT

## 2024-04-01 PROCEDURE — 99285 EMERGENCY DEPT VISIT HI MDM: CPT | Mod: 25

## 2024-04-01 PROCEDURE — 85025 COMPLETE CBC W/AUTO DIFF WBC: CPT

## 2024-04-01 PROCEDURE — 25000003 PHARM REV CODE 250: Performed by: EMERGENCY MEDICINE

## 2024-04-01 PROCEDURE — 94660 CPAP INITIATION&MGMT: CPT

## 2024-04-01 PROCEDURE — 84100 ASSAY OF PHOSPHORUS: CPT

## 2024-04-01 RX ORDER — LABETALOL 100 MG/1
300 TABLET, FILM COATED ORAL
Status: COMPLETED | OUTPATIENT
Start: 2024-04-01 | End: 2024-04-01

## 2024-04-01 RX ORDER — HYDRALAZINE HYDROCHLORIDE 25 MG/1
100 TABLET, FILM COATED ORAL
Status: COMPLETED | OUTPATIENT
Start: 2024-04-01 | End: 2024-04-01

## 2024-04-01 RX ORDER — NIFEDIPINE 30 MG/1
90 TABLET, EXTENDED RELEASE ORAL DAILY
Status: DISCONTINUED | OUTPATIENT
Start: 2024-04-01 | End: 2024-04-01

## 2024-04-01 RX ORDER — NIFEDIPINE 30 MG/1
90 TABLET, EXTENDED RELEASE ORAL
Status: COMPLETED | OUTPATIENT
Start: 2024-04-01 | End: 2024-04-01

## 2024-04-01 RX ORDER — ISOSORBIDE MONONITRATE 30 MG/1
30 TABLET, EXTENDED RELEASE ORAL
Status: COMPLETED | OUTPATIENT
Start: 2024-04-01 | End: 2024-04-01

## 2024-04-01 RX ADMIN — LABETALOL HYDROCHLORIDE 300 MG: 100 TABLET, FILM COATED ORAL at 01:04

## 2024-04-01 RX ADMIN — NIFEDIPINE 90 MG: 30 TABLET, FILM COATED, EXTENDED RELEASE ORAL at 01:04

## 2024-04-01 RX ADMIN — ISOSORBIDE MONONITRATE 30 MG: 30 TABLET, EXTENDED RELEASE ORAL at 02:04

## 2024-04-01 RX ADMIN — HYDRALAZINE HYDROCHLORIDE 100 MG: 25 TABLET ORAL at 01:04

## 2024-04-01 NOTE — ED TRIAGE NOTES
Enrique Martinez, a 59 y.o. male presents to the ED w/ complaint of shortness of breath. Hx ESRD on HD MWF. Pt states he hasn't taken his night time BP medication. Nitro paste and clonidine patch on chest. Pt states if he can get his blood pressure down, he will go to his HD appointment in the morning at 5AM and does not want to stay in the hospital or have dialysis done here.     Triage note:  Chief Complaint   Patient presents with    Shortness of Breath     CPAP     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

## 2024-04-01 NOTE — ED PROVIDER NOTES
Encounter Date: 4/1/2024       History     Chief Complaint   Patient presents with    Shortness of Breath     CPAP     HPI    Patient is a 58yo male with hx of CAD, T2DM, HTN, membraneous glomerulonephropathy, ESRD on dialysis MWF, presenting with SOB approximately 1 hour prior to arrival.     Per EMS: BP on their arrival to patient around 230/130, placed on CPAP, no hypoxia, 1 inch nitro paste    No CP, abd pain, N/V    Still makes urine. Has not missed dialysis.    Indicating that he has dialysis this morning at around 5am and that he would like to leave prior to that because he dose not want to be admitted in order to be dialyzed.     Has not taken his BP meds tonight (labetalol, hydralazine, nifedipine).      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 OR [address] [place] [address];  Service: Vascular;  Laterality: Left;    CARDIAC CATHETERIZATION  2007    x 2    COLONOSCOPY N/A 4/5/2019    Procedure: COLONOSCOPY;  Surgeon: Jose L Carty MD;  Location: [facility] ENDO (4TH FLR);  Service: Colon and Rectal;  Laterality: N/A;    CORONARY STENT PLACEMENT  2007     Family History   Problem Relation Age of Onset    Hypertension Father     Stroke Father     Heart attack Father      Hyperlipidemia Father     No Known Problems Sister     Drug abuse Brother     Heart attack Brother     Hypertension Brother      Social History     Tobacco Use    Smoking status: Some Days     Current packs/day: 0.25     Average packs/day: 0.3 packs/day for 10.0 years (2.5 ttl pk-yrs)     Types: Cigarettes    Smokeless tobacco: Never   Substance Use Topics    Alcohol use: No    Drug use: No     Physical Exam     Initial Vitals [04/01/24 0041]   BP Pulse Resp Temp SpO2   (!) 191/105 85 (!) 30 98.2 °F (36.8 °C) 100 %      MAP       --         Physical Exam    Constitutional: He appears well-developed and well-nourished. He is not diaphoretic. No distress.   HENT:   Head: Normocephalic and atraumatic.   Cardiovascular:  Normal rate and regular rhythm.           Pulmonary/Chest: Breath sounds normal. No respiratory distress.   On CPAP, not appearing in distress, no crackles appreciated   Musculoskeletal:         General: No tenderness or edema.     Neurological: He is alert.   Skin: Skin is warm and dry. No erythema.         ED Course   Procedures  Labs Reviewed   B-TYPE NATRIURETIC PEPTIDE - Abnormal; Notable for the following components:       Result Value    BNP 2,877 (*)     All other components within normal limits   CBC W/ AUTO DIFFERENTIAL - Abnormal; Notable for the following components:    RBC 2.99 (*)     Hemoglobin 9.0 (*)     Hematocrit 28.0 (*)     RDW 16.8 (*)     Gran # (ANC) 9.1 (*)     Lymph # 0.8 (*)     Gran % 82.4 (*)     Lymph % 7.4 (*)     All other components within normal limits   COMPREHENSIVE METABOLIC PANEL - Abnormal; Notable for the following components:    CO2 22 (*)     BUN 35 (*)     Creatinine 8.2 (*)     Alkaline Phosphatase 163 (*)     AST 44 (*)     eGFR 6.9 (*)     All other components within normal limits   TROPONIN I - Abnormal; Notable for the following components:    Troponin I 0.054 (*)     All other components within normal limits   ISTAT PROCEDURE - Abnormal; Notable for  "the following components:    POC HCO3 23.3 (*)     All other components within normal limits   MAGNESIUM   PHOSPHORUS     EKG Readings: (Independently Interpreted)   Rhythm: Normal Sinus Rhythm. Heart Rate: 86. Ectopy: PVCs. T Waves Flipped: AVR, AVL, V1 and V2. Axis: Normal. Other Findings: Prolonged QT Interval. Clinical Impression: Normal Sinus Rhythm with PVCs       Imaging Results              X-Ray Chest AP Portable (Final result)  Result time 04/01/24 01:44:49      Final result by Rangel Duran MD (04/01/24 01:44:49)                   Impression:      Mild cardiomegaly with probable mild interstitial edema and small pleural effusions.      Electronically signed by: Rangel Duran MD  Date:    04/01/2024  Time:    01:44               Narrative:    EXAMINATION:  XR CHEST AP PORTABLE    CLINICAL HISTORY:  Provided history is "shortness of breath;  ".    TECHNIQUE:  One view of the chest.    COMPARISON:  04/27/2023.    FINDINGS:  Cardiac wires overlie the chest.  Cardiomediastinal silhouette is enlarged and similar to the prior study.  Patient is slightly rotated.  Tortuous thoracic aorta with atherosclerotic vascular calcifications.  Lung volumes are low, accentuating basilar markings.  Coarse interstitial lung markings but no large focal area of consolidation.  Probable small pleural effusions.  No distinct pneumothorax.  Postoperative changes of left shoulder arthroplasty.                                       Medications   hydrALAZINE tablet 100 mg (100 mg Oral Given 4/1/24 0143)   labetaloL tablet 300 mg (300 mg Oral Given 4/1/24 0143)   NIFEdipine 24 hr tablet 90 mg (90 mg Oral Given 4/1/24 0143)   isosorbide mononitrate 24 hr tablet 30 mg (30 mg Oral Given 4/1/24 0235)     Medical Decision Making  Amount and/or Complexity of Data Reviewed  Labs: ordered.  Radiology: ordered.    Risk  Prescription drug management.    Patient is a 58yo male with hx of CAD, T2DM, HTN, membraneous glomerulonephropathy, " ESRD on dialysis MWF, presenting with SOB approximately 1 hour prior to arrival. BP per EMS initially 230/130, 1 inch nitro paste placed. On arrival, patient still significantly hypertensive with /105. Labs obtained and chest xray obtained as well. Stable anemia, no significant electrolyte abnormalities. Patient transitioned to NC. He was hypoxic on RA to the upper 80s. Chest xray with mild interstitial edema and small pleural effusions. Given home antihypertensives with improvement in his BP, although still remaining hypertensive. Patient was very adamant about not staying for admission. He insisted that he needed to get to dialysis and get to his car. He left AMA and was ubered directly to dialysis.            Attending Attestation:   Physician Attestation Statement for Resident:  As the supervising MD   Physician Attestation Statement: I have personally seen and examined this patient.   I agree with the above history.  -:   As the supervising MD I agree with the above PE.     As the supervising MD I agree with the above treatment, course, plan, and disposition.   -: I have interviewed and examined the patient. The patient wishes to leave the emergency department against medical advice.    - I have determined that the patient has the capacity to understand the information relevant to their care.   - The patient also understands the consequences of the various options after we discussed relevant information.   - I feel that the patient is able to understand the relevant information and responds appropriately to questions.   - I have attempted to persuade the patient to stay to receive care.   - The patient understands by leaving there is a possibility of death, disability, or serious injury.  - Despite the above information, the patient had made the decision to leave against medical advice.   - I have attempted to persuade the patient to follow up with a physician as soon as possible.   - I have also notified  the patient they may return at any time to the ED for further care.       I have reviewed and agree with the residents interpretation of the following: lab data, x-rays and EKG.  I have reviewed the following: old records at this facility.                                       Clinical Impression:  Final diagnoses:  [R06.02] Shortness of breath  [R06.03] Respiratory distress (Primary)  [R09.02] Hypoxia  [E87.70] Hypervolemia, unspecified hypervolemia type  [I16.1] Hypertensive emergency  [Z53.29] Left against medical advice          ED Disposition Condition    AMA Stable                Lisa Castellano DO  Resident  04/01/24 0621       Dhara Gonzalez MD  04/01/24 2527

## 2024-04-01 NOTE — DISCHARGE INSTRUCTIONS
Although you left the emergency department against medical advice you may return at any time and I encourage you to do so.  Please go directly to dialysis.    Tests today showed:   Labs Reviewed   CBC W/ AUTO DIFFERENTIAL - Abnormal; Notable for the following components:       Result Value    RBC 2.99 (*)     Hemoglobin 9.0 (*)     Hematocrit 28.0 (*)     RDW 16.8 (*)     Gran # (ANC) 9.1 (*)     Lymph # 0.8 (*)     Gran % 82.4 (*)     Lymph % 7.4 (*)     All other components within normal limits   COMPREHENSIVE METABOLIC PANEL - Abnormal; Notable for the following components:    CO2 22 (*)     BUN 35 (*)     Creatinine 8.2 (*)     Alkaline Phosphatase 163 (*)     AST 44 (*)     eGFR 6.9 (*)     All other components within normal limits   ISTAT PROCEDURE - Abnormal; Notable for the following components:    POC HCO3 23.3 (*)     All other components within normal limits   MAGNESIUM   PHOSPHORUS   B-TYPE NATRIURETIC PEPTIDE   TROPONIN I     X-Ray Chest AP Portable   Final Result      Mild cardiomegaly with probable mild interstitial edema and small pleural effusions.         Electronically signed by: Rangel Duran MD   Date:    04/01/2024   Time:    01:44          Treatments you had today:   Medications   hydrALAZINE tablet 100 mg (100 mg Oral Given 4/1/24 0143)   labetaloL tablet 300 mg (300 mg Oral Given 4/1/24 0143)   NIFEdipine 24 hr tablet 90 mg (90 mg Oral Given 4/1/24 0143)       Follow-Up Plan:  - Follow-up with primary care doctor within 3 - 5 days  - Additional testing and/or evaluation as directed by your primary doctor    Return to the Emergency Department for symptoms including but not limited to: worsening symptoms, shortness of breath or chest pain, vomiting with inability to hold down fluids, fevers greater than 100.4°F, passing out/fainting/unconsciousness, or other concerning symptoms.

## 2024-05-01 NOTE — ASSESSMENT & PLAN NOTE
Good control in the hospital in past 24 hours.  · Plan is to continue to monitor POCT glucose 4 times a day with each meal and at bedtime and cover with Novolog low dose sliding scale insulin.   · Target pre-meal glucose goal is <140 with all random glucoses <180 in non-critically ill patient.   Yes

## 2024-05-20 ENCOUNTER — HOSPITAL ENCOUNTER (OUTPATIENT)
Dept: CARDIOLOGY | Facility: HOSPITAL | Age: 59
Discharge: HOME | End: 2024-05-20
Attending: NURSE PRACTITIONER
Payer: MEDICARE

## 2024-05-20 VITALS
HEIGHT: 66 IN | SYSTOLIC BLOOD PRESSURE: 178 MMHG | DIASTOLIC BLOOD PRESSURE: 92 MMHG | WEIGHT: 149.94 LBS | BODY MASS INDEX: 24.1 KG/M2 | HEART RATE: 80 BPM

## 2024-05-20 DIAGNOSIS — R79.89 ELEVATED BRAIN NATRIURETIC PEPTIDE (BNP) LEVEL: Primary | ICD-10-CM

## 2024-05-20 DIAGNOSIS — R79.89 ELEVATED BRAIN NATRIURETIC PEPTIDE (BNP) LEVEL: ICD-10-CM

## 2024-05-20 LAB
ASCENDING AORTA: 3.6 CM
AV INDEX (PROSTH): 0.64
AV MEAN GRADIENT: 11 MMHG
AV PEAK GRADIENT: 21 MMHG
AV VALVE AREA BY VELOCITY RATIO: 2.82 CM²
AV VALVE AREA: 2.69 CM²
AV VELOCITY RATIO: 0.67
BSA FOR ECHO PROCEDURE: 1.78 M2
CV ECHO LV RWT: 0.44 CM
DOP CALC AO PEAK VEL: 2.31 M/S
DOP CALC AO VTI: 47.64 CM
DOP CALC LVOT AREA: 4.2 CM2
DOP CALC LVOT DIAMETER: 2.32 CM
DOP CALC LVOT PEAK VEL: 1.54 M/S
DOP CALC LVOT STROKE VOLUME: 128.36 CM3
DOP CALCLVOT PEAK VEL VTI: 30.38 CM
E WAVE DECELERATION TIME: 255.01 MSEC
E/A RATIO: 1.05
E/E' RATIO: 17.85 M/S
ECHO LV POSTERIOR WALL: 1.2 CM (ref 0.6–1.1)
FRACTIONAL SHORTENING: 32 % (ref 28–44)
HR MV ECHO: 73 BPM
INTERVENTRICULAR SEPTUM: 1.2 CM (ref 0.6–1.1)
IVRT: 68.51 MSEC
LA MAJOR: 6.24 CM
LA MINOR: 6.53 CM
LA WIDTH: 5.41 CM
LEFT ATRIUM SIZE: 5.59 CM
LEFT ATRIUM VOLUME INDEX MOD: 53.2 ML/M2
LEFT ATRIUM VOLUME INDEX: 93.2 ML/M2
LEFT ATRIUM VOLUME MOD: 93.7 CM3
LEFT ATRIUM VOLUME: 164.05 CM3
LEFT INTERNAL DIMENSION IN SYSTOLE: 3.76 CM (ref 2.1–4)
LEFT VENTRICLE DIASTOLIC VOLUME INDEX: 78.82 ML/M2
LEFT VENTRICLE DIASTOLIC VOLUME: 138.73 ML
LEFT VENTRICLE MASS INDEX: 155 G/M2
LEFT VENTRICLE SYSTOLIC VOLUME INDEX: 34.4 ML/M2
LEFT VENTRICLE SYSTOLIC VOLUME: 60.59 ML
LEFT VENTRICULAR INTERNAL DIMENSION IN DIASTOLE: 5.5 CM (ref 3.5–6)
LEFT VENTRICULAR MASS: 272.38 G
LV LATERAL E/E' RATIO: 14.5 M/S
LV SEPTAL E/E' RATIO: 23.2 M/S
MV A" WAVE DURATION": 15.22 MSEC
MV MEAN GRADIENT: 3 MMHG
MV PEAK A VEL: 1.1 M/S
MV PEAK E VEL: 1.16 M/S
OHS CV RV/LV RATIO: 0.84 CM
PISA TR MAX VEL: 3.68 M/S
PULM VEIN S/D RATIO: 0.69
PV PEAK D VEL: 0.88 M/S
PV PEAK S VEL: 0.61 M/S
RA MAJOR: 5.61 CM
RA PRESSURE ESTIMATED: 15 MMHG
RA WIDTH: 5.08 CM
RIGHT ATRIUM END SYSTOLIC VOLUME APICAL 4 CHAMBER INDEX BSA: 43.8 ML/M2
RIGHT ATRIUM VOLUME AREA LENGTH APICAL 4 CHAMBER: 78 ML
RIGHT VENTRICULAR END-DIASTOLIC DIMENSION: 4.6 CM
RV TB RVSP: 19 MMHG
SINUS: 3.7 CM
STJ: 3.4 CM
TDI LATERAL: 0.08 M/S
TDI SEPTAL: 0.05 M/S
TDI: 0.07 M/S
TR MAX PG: 54 MMHG
TRICUSPID ANNULAR PLANE SYSTOLIC EXCURSION: 2.17 CM
TV REST PULMONARY ARTERY PRESSURE: 69 MMHG
Z-SCORE OF LEFT VENTRICULAR DIMENSION IN END DIASTOLE: 1.22
Z-SCORE OF LEFT VENTRICULAR DIMENSION IN END SYSTOLE: 1.76

## 2024-05-20 PROCEDURE — 93306 TTE W/DOPPLER COMPLETE: CPT | Mod: 26,,, | Performed by: INTERNAL MEDICINE

## 2024-05-20 PROCEDURE — 93306 TTE W/DOPPLER COMPLETE: CPT

## 2024-06-28 ENCOUNTER — HOSPITAL ENCOUNTER (EMERGENCY)
Facility: HOSPITAL | Age: 59
Discharge: HOME | End: 2024-06-29
Attending: EMERGENCY MEDICINE
Payer: MEDICARE

## 2024-06-28 DIAGNOSIS — R07.9 CHEST PAIN: Primary | ICD-10-CM

## 2024-06-28 DIAGNOSIS — R06.02 SHORTNESS OF BREATH: ICD-10-CM

## 2024-06-28 PROBLEM — D64.9 ANEMIA: Status: ACTIVE | Noted: 2023-06-13

## 2024-06-28 PROBLEM — M10.9 GOUT: Status: ACTIVE | Noted: 2023-10-31

## 2024-06-28 PROBLEM — G62.9 POLYNEUROPATHY: Status: ACTIVE | Noted: 2023-10-31

## 2024-06-28 PROBLEM — Z99.2 END STAGE RENAL FAILURE ON DIALYSIS: Status: ACTIVE | Noted: 2023-10-31

## 2024-06-28 PROBLEM — E78.5 HYPERLIPIDEMIA: Status: ACTIVE | Noted: 2023-10-31

## 2024-06-28 PROBLEM — R06.09 CHRONIC DYSPNEA: Status: ACTIVE | Noted: 2023-10-31

## 2024-06-28 PROBLEM — Z86.010 HISTORY OF COLON POLYPS: Status: ACTIVE | Noted: 2023-07-12

## 2024-06-28 PROBLEM — Z86.0100 HISTORY OF COLON POLYPS: Status: ACTIVE | Noted: 2023-07-12

## 2024-06-28 PROBLEM — R13.10 DYSPHAGIA: Status: ACTIVE | Noted: 2023-06-13

## 2024-06-28 PROBLEM — N18.6 END STAGE RENAL FAILURE ON DIALYSIS: Status: ACTIVE | Noted: 2023-10-31

## 2024-06-28 PROBLEM — K63.5 COLON POLYPS: Status: ACTIVE | Noted: 2023-06-13

## 2024-06-28 PROBLEM — S43.006A DISLOCATION OF JOINT OF SHOULDER: Status: ACTIVE | Noted: 2024-01-31

## 2024-06-28 PROBLEM — M19.012 OSTEOARTHRITIS OF GLENOHUMERAL JOINT, LEFT: Status: ACTIVE | Noted: 2023-11-15

## 2024-06-28 PROBLEM — Z96.612 STATUS POST REPLACEMENT OF LEFT SHOULDER JOINT: Status: ACTIVE | Noted: 2024-01-31

## 2024-06-28 LAB
ALBUMIN SERPL BCP-MCNC: 3.8 G/DL (ref 3.5–5.2)
ALP SERPL-CCNC: 164 U/L (ref 55–135)
ALT SERPL W/O P-5'-P-CCNC: 13 U/L (ref 10–44)
ANION GAP SERPL CALC-SCNC: 20 MMOL/L (ref 8–16)
AST SERPL-CCNC: 27 U/L (ref 10–40)
BASOPHILS # BLD AUTO: 0.03 K/UL (ref 0–0.2)
BASOPHILS NFR BLD: 0.5 % (ref 0–1.9)
BILIRUB SERPL-MCNC: 0.6 MG/DL (ref 0.1–1)
BNP SERPL-MCNC: 1058 PG/ML (ref 0–99)
BUN SERPL-MCNC: 21 MG/DL (ref 8–23)
CALCIUM SERPL-MCNC: 9.4 MG/DL (ref 8.7–10.5)
CHLORIDE SERPL-SCNC: 101 MMOL/L (ref 95–110)
CO2 SERPL-SCNC: 17 MMOL/L (ref 23–29)
CREAT SERPL-MCNC: 4.6 MG/DL (ref 0.5–1.4)
DIFFERENTIAL METHOD BLD: ABNORMAL
EOSINOPHIL # BLD AUTO: 0.1 K/UL (ref 0–0.5)
EOSINOPHIL NFR BLD: 2.3 % (ref 0–8)
ERYTHROCYTE [DISTWIDTH] IN BLOOD BY AUTOMATED COUNT: 19.2 % (ref 11.5–14.5)
EST. GFR  (NO RACE VARIABLE): 13.7 ML/MIN/1.73 M^2
GLUCOSE SERPL-MCNC: 80 MG/DL (ref 70–110)
HCT VFR BLD AUTO: 35.2 % (ref 40–54)
HGB BLD-MCNC: 11.4 G/DL (ref 14–18)
HIV 1+2 AB+HIV1 P24 AG SERPL QL IA: NORMAL
IMM GRANULOCYTES # BLD AUTO: 0.01 K/UL (ref 0–0.04)
IMM GRANULOCYTES NFR BLD AUTO: 0.2 % (ref 0–0.5)
LYMPHOCYTES # BLD AUTO: 1.2 K/UL (ref 1–4.8)
LYMPHOCYTES NFR BLD: 20.2 % (ref 18–48)
MCH RBC QN AUTO: 29 PG (ref 27–31)
MCHC RBC AUTO-ENTMCNC: 32.4 G/DL (ref 32–36)
MCV RBC AUTO: 90 FL (ref 82–98)
MONOCYTES # BLD AUTO: 0.8 K/UL (ref 0.3–1)
MONOCYTES NFR BLD: 12.3 % (ref 4–15)
NEUTROPHILS # BLD AUTO: 3.9 K/UL (ref 1.8–7.7)
NEUTROPHILS NFR BLD: 64.5 % (ref 38–73)
NRBC BLD-RTO: 0 /100 WBC
PLATELET # BLD AUTO: 162 K/UL (ref 150–450)
PMV BLD AUTO: 8.5 FL (ref 9.2–12.9)
POTASSIUM SERPL-SCNC: 3.4 MMOL/L (ref 3.5–5.1)
PROT SERPL-MCNC: 7.1 G/DL (ref 6–8.4)
RBC # BLD AUTO: 3.93 M/UL (ref 4.6–6.2)
SODIUM SERPL-SCNC: 138 MMOL/L (ref 136–145)
TROPONIN I SERPL DL<=0.01 NG/ML-MCNC: 0.05 NG/ML (ref 0–0.03)
WBC # BLD AUTO: 6.08 K/UL (ref 3.9–12.7)

## 2024-06-28 PROCEDURE — 80053 COMPREHEN METABOLIC PANEL: CPT | Performed by: EMERGENCY MEDICINE

## 2024-06-28 PROCEDURE — 99285 EMERGENCY DEPT VISIT HI MDM: CPT | Mod: 25

## 2024-06-28 PROCEDURE — 25000003 PHARM REV CODE 250: Performed by: PHYSICIAN ASSISTANT

## 2024-06-28 PROCEDURE — 94640 AIRWAY INHALATION TREATMENT: CPT

## 2024-06-28 PROCEDURE — 25000242 PHARM REV CODE 250 ALT 637 W/ HCPCS: Performed by: PHYSICIAN ASSISTANT

## 2024-06-28 PROCEDURE — 93010 ELECTROCARDIOGRAM REPORT: CPT | Mod: ,,, | Performed by: INTERNAL MEDICINE

## 2024-06-28 PROCEDURE — 84484 ASSAY OF TROPONIN QUANT: CPT | Mod: 91 | Performed by: EMERGENCY MEDICINE

## 2024-06-28 PROCEDURE — 83880 ASSAY OF NATRIURETIC PEPTIDE: CPT | Performed by: EMERGENCY MEDICINE

## 2024-06-28 PROCEDURE — 85025 COMPLETE CBC W/AUTO DIFF WBC: CPT | Performed by: EMERGENCY MEDICINE

## 2024-06-28 PROCEDURE — 94761 N-INVAS EAR/PLS OXIMETRY MLT: CPT

## 2024-06-28 PROCEDURE — 93005 ELECTROCARDIOGRAM TRACING: CPT

## 2024-06-28 PROCEDURE — 87389 HIV-1 AG W/HIV-1&-2 AB AG IA: CPT | Performed by: PHYSICIAN ASSISTANT

## 2024-06-28 RX ORDER — HYDROCODONE BITARTRATE AND ACETAMINOPHEN 5; 325 MG/1; MG/1
1 TABLET ORAL
Status: COMPLETED | OUTPATIENT
Start: 2024-06-28 | End: 2024-06-28

## 2024-06-28 RX ORDER — CLONIDINE 0.3 MG/24H
1 PATCH, EXTENDED RELEASE TRANSDERMAL
Status: DISCONTINUED | OUTPATIENT
Start: 2024-06-28 | End: 2024-06-29 | Stop reason: HOSPADM

## 2024-06-28 RX ORDER — IPRATROPIUM BROMIDE AND ALBUTEROL SULFATE 2.5; .5 MG/3ML; MG/3ML
3 SOLUTION RESPIRATORY (INHALATION)
Status: COMPLETED | OUTPATIENT
Start: 2024-06-28 | End: 2024-06-28

## 2024-06-28 RX ADMIN — HYDROCODONE BITARTRATE AND ACETAMINOPHEN 1 TABLET: 5; 325 TABLET ORAL at 10:06

## 2024-06-28 RX ADMIN — IPRATROPIUM BROMIDE AND ALBUTEROL SULFATE 3 ML: 2.5; .5 SOLUTION RESPIRATORY (INHALATION) at 10:06

## 2024-06-28 RX ADMIN — CLONIDINE 1 PATCH: 0.3 PATCH TRANSDERMAL at 10:06

## 2024-06-28 RX ADMIN — IPRATROPIUM BROMIDE AND ALBUTEROL SULFATE 3 ML: 2.5; .5 SOLUTION RESPIRATORY (INHALATION) at 08:06

## 2024-06-29 VITALS
TEMPERATURE: 98 F | RESPIRATION RATE: 20 BRPM | HEART RATE: 77 BPM | DIASTOLIC BLOOD PRESSURE: 85 MMHG | SYSTOLIC BLOOD PRESSURE: 169 MMHG | OXYGEN SATURATION: 96 %

## 2024-06-29 LAB
OHS QRS DURATION: 100 MS
OHS QTC CALCULATION: 528 MS
TROPONIN I SERPL DL<=0.01 NG/ML-MCNC: 0.06 NG/ML (ref 0–0.03)

## 2024-06-29 RX ORDER — ALBUTEROL SULFATE 90 UG/1
2 AEROSOL, METERED RESPIRATORY (INHALATION) EVERY 6 HOURS PRN
Qty: 18 G | Refills: 0 | Status: SHIPPED | OUTPATIENT
Start: 2024-06-29

## 2024-06-29 NOTE — PROVIDER PROGRESS NOTES - EMERGENCY DEPT.
Encounter Date: 6/28/2024    ED Physician Progress Notes          Patient signed out to me by my colleague with instructions to follow-up pending work-up. Please see main ED note for previous ED stay documentation.      Patient signed out to me pending 2nd troponin. Briefly, patient is a 59-year-old male with ESRD on HD MWF, CAD, CHF, DM 2, hypertension, hyperlipidemia, history of neuro syphilis presents for shortness of breath which started after finishing dialysis earlier today. CXR: No acute abnormality with stable mild cardiomegaly. First troponin at baseline. Received albuterol treatments with improvement symptoms.     Second troponin 0.062 which is still patient's baseline.  He reports resolution of symptoms.  States he has a follow up appointment with pulmonology on 7/18.  Prescribed inhaler. Also advised to follow up with PCP and strict ED return precautions given with all questions answered.  He verbalized understanding and agreed to plan. Vitals are stable and safe for discharge.

## 2024-06-29 NOTE — ED PROVIDER NOTES
Encounter Date: 6/28/2024       History     Chief Complaint   Patient presents with    Shortness of Breath     SOB x6 hrs post dialysis. Got full session of dialysis.      59-year-old male with ESRD on HD MWF, CAD, CHF, DM 2, hypertension, hyperlipidemia, history of neuro syphilis presents for shortness of breath which started after finishing dialysis earlier today.  Reports difficulty breathing out.  States that he has due for a new clonidine patch and he thinks this might be causing his symptoms.  He denies chest pain, leg swelling, abdominal pain, vomiting or fever/chills.  He was fully dialyzed earlier today.  He does make urine. He is requesting something to eat.      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      Past Surgical History:   Procedure Laterality Date    ABDOMINAL SURGERY  1980s    AV FISTULA PLACEMENT Left 1/5/2023    Procedure: CREATION, AV FISTULA RADIOCEPHALIC;  Surgeon: ROHITH Pickard II, MD;  [facility];  Service: Vascular;  Laterality: Left;    CARDIAC CATHETERIZATION  2007    x 2    COLONOSCOPY N/A 4/5/2019    Procedure: COLONOSCOPY;  Surgeon: Jose L Carty MD;  Location: [facility] (4TH FLR);  Service: Colon and Rectal;  Laterality: N/A;    CORONARY STENT PLACEMENT  2007     Family History   Problem Relation Name Age of Onset    Hypertension Father      Stroke Father      Heart attack Father      Hyperlipidemia Father      No  Known Problems Sister      Drug abuse Brother      Heart attack Brother      Hypertension Brother       Social History     Tobacco Use    Smoking status: Some Days     Current packs/day: 0.25     Average packs/day: 0.3 packs/day for 10.0 years (2.5 ttl pk-yrs)     Types: Cigarettes    Smokeless tobacco: Never   Substance Use Topics    Alcohol use: No    Drug use: No     Review of Systems    Physical Exam     Initial Vitals [06/28/24 1901]   BP Pulse Resp Temp SpO2   (!) 162/93 86 (!) 24 97.9 °F (36.6 °C) 98 %      MAP       --         Physical Exam    Nursing note and vitals reviewed.  Constitutional: He appears well-developed and well-nourished. He is not diaphoretic. No distress.   HENT:   Head: Normocephalic and atraumatic.   Neck: Neck supple. No JVD present.   Normal range of motion.  Cardiovascular:  Normal rate, regular rhythm, normal heart sounds and intact distal pulses.     Exam reveals no gallop and no friction rub.       No murmur heard.  No lower extremity edema   Pulmonary/Chest: Tachypnea noted. No respiratory distress. He has decreased breath sounds. He has no wheezes. He has no rhonchi. He has no rales. He exhibits no tenderness.   Tachypneic with increased work of breathing but this improves with conversation   Musculoskeletal:         General: Normal range of motion.      Cervical back: Normal range of motion and neck supple.     Neurological: He is alert and oriented to person, place, and time.   Skin: Skin is warm and dry.   Psychiatric: He has a normal mood and affect.         ED Course   Procedures  Labs Reviewed   CBC W/ AUTO DIFFERENTIAL - Abnormal; Notable for the following components:       Result Value    RBC 3.93 (*)     Hemoglobin 11.4 (*)     Hematocrit 35.2 (*)     RDW 19.2 (*)     MPV 8.5 (*)     All other components within normal limits    Narrative:     Release to patient->Immediate   COMPREHENSIVE METABOLIC PANEL - Abnormal; Notable for the following components:    Potassium 3.4  (*)     CO2 17 (*)     Creatinine 4.6 (*)     Alkaline Phosphatase 164 (*)     eGFR 13.7 (*)     Anion Gap 20 (*)     All other components within normal limits    Narrative:     Release to patient->Immediate   TROPONIN I - Abnormal; Notable for the following components:    Troponin I 0.053 (*)     All other components within normal limits    Narrative:     Release to patient->Immediate   B-TYPE NATRIURETIC PEPTIDE - Abnormal; Notable for the following components:    BNP 1,058 (*)     All other components within normal limits    Narrative:     Release to patient->Immediate   HIV 1 / 2 ANTIBODY    Narrative:     Release to patient->Immediate     EKG Readings: (Independently Interpreted)   Initial Reading: No STEMI. Previous EKG: Compared with most recent EKG Previous EKG Date: 4/1/2024. Rhythm: Normal Sinus Rhythm. Heart Rate: 82. Ectopy: Frequent PVCs. ST Segments: Normal ST Segments. Clinical Impression: Normal Sinus Rhythm with PVCs       Imaging Results              X-Ray Chest AP Portable (Final result)  Result time 06/28/24 21:45:38      Final result by Hermelindo Monsalve MD (06/28/24 21:45:38)                   Impression:      No acute abnormality    Stable mild cardiomegaly.      Electronically signed by: Hermelindo Monsalve  Date:    06/28/2024  Time:    21:45               Narrative:    EXAMINATION:  XR CHEST AP PORTABLE    CLINICAL HISTORY:  dyspnea;    TECHNIQUE:  Single frontal view of the chest was performed.    COMPARISON:  04/01/2024    FINDINGS:  Left shoulder reversed arthroplasty.    Heart is enlarged.  Lungs are clear.  No effusion or pneumothorax.    No acute osseous abnormality.  Chronic changes of the AC joints.    No mass, consolidation or edema.                                       Medications   cloNIDine 0.3 mg/24 hr td ptwk 1 patch (has no administration in time range)   HYDROcodone-acetaminophen 5-325 mg per tablet 1 tablet (has no administration in time range)   albuterol-ipratropium 2.5 mg-0.5  mg/3 mL nebulizer solution 3 mL (has no administration in time range)   albuterol-ipratropium 2.5 mg-0.5 mg/3 mL nebulizer solution 3 mL (3 mLs Nebulization Given 6/28/24 2011)     Medical Decision Making  59-year-old male presenting for shortness of breath.  /93, initially tachypneic with a respiratory rate of 24 with otherwise normal vitals.  Nontoxic appearing.    Differential diagnosis:  Unclear presentation, concerns include hypervolemia, CHF exacerbation, COPD, I doubt ACS.  Low suspicion for pneumonia    Will check labs, give duo nebs, chest x-ray, EKG.    Workup is so far reassuring.  Patient reports feeling better with therapy but still feel short of breath.  Additional duo neb ordered.  Dispo pending 2nd troponin.  If negative and feeling better, anticipate discharge.  I am signing out to Qian Arredondo PA-C.    9:52 PM      Amount and/or Complexity of Data Reviewed  Labs: ordered. Decision-making details documented in ED Course.  Radiology: ordered and independent interpretation performed.  ECG/medicine tests: ordered and independent interpretation performed.    Risk  Prescription drug management.               ED Course as of 06/28/24 2152 Fri Jun 28, 2024 2027 Hemoglobin(!): 11.4 [CC]   2027 WBC: 6.08 [CC]   2055 Troponin I(!): 0.053  Elevated at baseline [CC]   2056 BNP(!): 1,058 [CC]   2056 Potassium(!): 3.4 [CC]   2123 Patient reports improvement of breathing with duo neb but still short of breath. Also reporting left should pain from prior shoulder surgery [CC]      ED Course User Index  [CC] Jailyn Fischer PA-C                           Clinical Impression:  Final diagnoses:  [R06.02] Shortness of breath  [R07.9] Chest pain                 Jailyn Fischer PA-C  06/28/24 2152

## 2024-06-29 NOTE — ED TRIAGE NOTES
Enrique Martinez, a 59 y.o. male presents to the ED w/ complaint of SOB and cough that started right after dialysis tx.     Triage note:  Chief Complaint   Patient presents with    Shortness of Breath     SOB x6 hrs post dialysis. Got full session of dialysis.      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

## 2024-06-29 NOTE — FIRST PROVIDER EVALUATION
Medical screening examination initiated.  I have conducted a focused provider triage encounter, findings are as follows:    Brief history of present illness:  59-year-old male with history of hypertension, end-stage renal disease presents with shortness of breath.  He finished his dialysis and he became quite short of breath.  He wonders if it is because he is supposed to change his clonidine patches today.  Denies any chest pain.    Vitals:    06/28/24 1901   BP: (!) 162/93   Pulse: 86   Resp: (!) 24   Temp: 97.9 °F (36.6 °C)   TempSrc: Oral   SpO2: 98%       Pertinent physical exam:  Tachypneic.  Lungs are clear.  No wheezing.  No leg edema.    Brief workup plan:  Will order a chest x-ray, EKG, cardiac labs    Preliminary workup initiated; this workup will be continued and followed by the physician or advanced practice provider that is assigned to the patient when roomed.

## 2024-09-20 PROCEDURE — 99285 EMERGENCY DEPT VISIT HI MDM: CPT

## 2024-09-21 ENCOUNTER — HOSPITAL ENCOUNTER (OUTPATIENT)
Facility: HOSPITAL | Age: 59
Discharge: LEFT BEFORE DISCHARGE | End: 2024-09-21
Attending: EMERGENCY MEDICINE | Admitting: STUDENT IN AN ORGANIZED HEALTH CARE EDUCATION/TRAINING PROGRAM
Payer: MEDICARE

## 2024-09-21 VITALS
HEART RATE: 87 BPM | BODY MASS INDEX: 24.83 KG/M2 | HEIGHT: 65 IN | DIASTOLIC BLOOD PRESSURE: 103 MMHG | SYSTOLIC BLOOD PRESSURE: 193 MMHG | OXYGEN SATURATION: 98 % | WEIGHT: 149 LBS | TEMPERATURE: 98 F | RESPIRATION RATE: 20 BRPM

## 2024-09-21 DIAGNOSIS — Z53.29 LEFT AGAINST MEDICAL ADVICE: Primary | ICD-10-CM

## 2024-09-21 DIAGNOSIS — R07.9 CHEST PAIN: ICD-10-CM

## 2024-09-21 DIAGNOSIS — E87.70 HYPERVOLEMIA ASSOCIATED WITH RENAL INSUFFICIENCY: ICD-10-CM

## 2024-09-21 DIAGNOSIS — N28.9 HYPERVOLEMIA ASSOCIATED WITH RENAL INSUFFICIENCY: ICD-10-CM

## 2024-09-21 LAB
ALBUMIN SERPL BCP-MCNC: 3 G/DL (ref 3.5–5.2)
ALBUMIN SERPL BCP-MCNC: 3.1 G/DL (ref 3.5–5.2)
ALP SERPL-CCNC: 181 U/L (ref 55–135)
ALP SERPL-CCNC: 252 U/L (ref 55–135)
ALT SERPL W/O P-5'-P-CCNC: 7 U/L (ref 10–44)
ALT SERPL W/O P-5'-P-CCNC: 8 U/L (ref 10–44)
ANION GAP SERPL CALC-SCNC: 15 MMOL/L (ref 8–16)
ANION GAP SERPL CALC-SCNC: 15 MMOL/L (ref 8–16)
AST SERPL-CCNC: 26 U/L (ref 10–40)
AST SERPL-CCNC: 27 U/L (ref 10–40)
BASOPHILS # BLD AUTO: 0.05 K/UL (ref 0–0.2)
BASOPHILS # BLD AUTO: 0.05 K/UL (ref 0–0.2)
BASOPHILS NFR BLD: 0.7 % (ref 0–1.9)
BASOPHILS NFR BLD: 0.7 % (ref 0–1.9)
BILIRUB SERPL-MCNC: 0.4 MG/DL (ref 0.1–1)
BILIRUB SERPL-MCNC: 0.4 MG/DL (ref 0.1–1)
BUN SERPL-MCNC: 21 MG/DL (ref 8–23)
BUN SERPL-MCNC: 23 MG/DL (ref 8–23)
CALCIUM SERPL-MCNC: 9.3 MG/DL (ref 8.7–10.5)
CALCIUM SERPL-MCNC: 9.5 MG/DL (ref 8.7–10.5)
CHLORIDE SERPL-SCNC: 102 MMOL/L (ref 95–110)
CHLORIDE SERPL-SCNC: 99 MMOL/L (ref 95–110)
CO2 SERPL-SCNC: 20 MMOL/L (ref 23–29)
CO2 SERPL-SCNC: 21 MMOL/L (ref 23–29)
CREAT SERPL-MCNC: 4.4 MG/DL (ref 0.5–1.4)
CREAT SERPL-MCNC: 4.8 MG/DL (ref 0.5–1.4)
DIFFERENTIAL METHOD BLD: ABNORMAL
DIFFERENTIAL METHOD BLD: ABNORMAL
EOSINOPHIL # BLD AUTO: 0.1 K/UL (ref 0–0.5)
EOSINOPHIL # BLD AUTO: 0.2 K/UL (ref 0–0.5)
EOSINOPHIL NFR BLD: 1.8 % (ref 0–8)
EOSINOPHIL NFR BLD: 2.1 % (ref 0–8)
ERYTHROCYTE [DISTWIDTH] IN BLOOD BY AUTOMATED COUNT: 18.7 % (ref 11.5–14.5)
ERYTHROCYTE [DISTWIDTH] IN BLOOD BY AUTOMATED COUNT: 18.8 % (ref 11.5–14.5)
EST. GFR  (NO RACE VARIABLE): 13 ML/MIN/1.73 M^2
EST. GFR  (NO RACE VARIABLE): 14.5 ML/MIN/1.73 M^2
GLUCOSE SERPL-MCNC: 103 MG/DL (ref 70–110)
GLUCOSE SERPL-MCNC: 87 MG/DL (ref 70–110)
HBV SURFACE AG SERPL QL IA: NORMAL
HCT VFR BLD AUTO: 26.7 % (ref 40–54)
HCT VFR BLD AUTO: 27.3 % (ref 40–54)
HGB BLD-MCNC: 8.5 G/DL (ref 14–18)
HGB BLD-MCNC: 8.5 G/DL (ref 14–18)
IMM GRANULOCYTES # BLD AUTO: 0.03 K/UL (ref 0–0.04)
IMM GRANULOCYTES # BLD AUTO: 0.03 K/UL (ref 0–0.04)
IMM GRANULOCYTES NFR BLD AUTO: 0.4 % (ref 0–0.5)
IMM GRANULOCYTES NFR BLD AUTO: 0.4 % (ref 0–0.5)
LYMPHOCYTES # BLD AUTO: 1.1 K/UL (ref 1–4.8)
LYMPHOCYTES # BLD AUTO: 1.4 K/UL (ref 1–4.8)
LYMPHOCYTES NFR BLD: 15.6 % (ref 18–48)
LYMPHOCYTES NFR BLD: 18.6 % (ref 18–48)
MAGNESIUM SERPL-MCNC: 1.9 MG/DL (ref 1.6–2.6)
MAGNESIUM SERPL-MCNC: 1.9 MG/DL (ref 1.6–2.6)
MCH RBC QN AUTO: 30 PG (ref 27–31)
MCH RBC QN AUTO: 30.2 PG (ref 27–31)
MCHC RBC AUTO-ENTMCNC: 31.1 G/DL (ref 32–36)
MCHC RBC AUTO-ENTMCNC: 31.8 G/DL (ref 32–36)
MCV RBC AUTO: 95 FL (ref 82–98)
MCV RBC AUTO: 97 FL (ref 82–98)
MONOCYTES # BLD AUTO: 0.7 K/UL (ref 0.3–1)
MONOCYTES # BLD AUTO: 0.7 K/UL (ref 0.3–1)
MONOCYTES NFR BLD: 9.4 % (ref 4–15)
MONOCYTES NFR BLD: 9.8 % (ref 4–15)
NEUTROPHILS # BLD AUTO: 5 K/UL (ref 1.8–7.7)
NEUTROPHILS # BLD AUTO: 5.3 K/UL (ref 1.8–7.7)
NEUTROPHILS NFR BLD: 68.4 % (ref 38–73)
NEUTROPHILS NFR BLD: 72.1 % (ref 38–73)
NRBC BLD-RTO: 0 /100 WBC
NRBC BLD-RTO: 0 /100 WBC
PLATELET # BLD AUTO: 229 K/UL (ref 150–450)
PLATELET # BLD AUTO: 239 K/UL (ref 150–450)
PMV BLD AUTO: 8.9 FL (ref 9.2–12.9)
PMV BLD AUTO: 9 FL (ref 9.2–12.9)
POCT GLUCOSE: 91 MG/DL (ref 70–110)
POTASSIUM SERPL-SCNC: 3.3 MMOL/L (ref 3.5–5.1)
POTASSIUM SERPL-SCNC: 3.4 MMOL/L (ref 3.5–5.1)
PROT SERPL-MCNC: 6 G/DL (ref 6–8.4)
PROT SERPL-MCNC: 6.4 G/DL (ref 6–8.4)
RBC # BLD AUTO: 2.81 M/UL (ref 4.6–6.2)
RBC # BLD AUTO: 2.83 M/UL (ref 4.6–6.2)
SODIUM SERPL-SCNC: 135 MMOL/L (ref 136–145)
SODIUM SERPL-SCNC: 137 MMOL/L (ref 136–145)
WBC # BLD AUTO: 7.27 K/UL (ref 3.9–12.7)
WBC # BLD AUTO: 7.31 K/UL (ref 3.9–12.7)

## 2024-09-21 PROCEDURE — 25000003 PHARM REV CODE 250

## 2024-09-21 PROCEDURE — 87340 HEPATITIS B SURFACE AG IA: CPT

## 2024-09-21 PROCEDURE — 25000003 PHARM REV CODE 250: Performed by: PHYSICIAN ASSISTANT

## 2024-09-21 PROCEDURE — 80053 COMPREHEN METABOLIC PANEL: CPT | Mod: 91

## 2024-09-21 PROCEDURE — 85025 COMPLETE CBC W/AUTO DIFF WBC: CPT | Performed by: PHYSICIAN ASSISTANT

## 2024-09-21 PROCEDURE — 85025 COMPLETE CBC W/AUTO DIFF WBC: CPT | Mod: 91

## 2024-09-21 PROCEDURE — 80053 COMPREHEN METABOLIC PANEL: CPT | Performed by: PHYSICIAN ASSISTANT

## 2024-09-21 PROCEDURE — 94761 N-INVAS EAR/PLS OXIMETRY MLT: CPT

## 2024-09-21 PROCEDURE — 25000242 PHARM REV CODE 250 ALT 637 W/ HCPCS

## 2024-09-21 PROCEDURE — G0257 UNSCHED DIALYSIS ESRD PT HOS: HCPCS

## 2024-09-21 PROCEDURE — 96374 THER/PROPH/DIAG INJ IV PUSH: CPT

## 2024-09-21 PROCEDURE — 99214 OFFICE O/P EST MOD 30 MIN: CPT | Mod: ,,, | Performed by: NURSE PRACTITIONER

## 2024-09-21 PROCEDURE — G0378 HOSPITAL OBSERVATION PER HR: HCPCS

## 2024-09-21 PROCEDURE — 25000003 PHARM REV CODE 250: Performed by: EMERGENCY MEDICINE

## 2024-09-21 PROCEDURE — 63600175 PHARM REV CODE 636 W HCPCS: Performed by: EMERGENCY MEDICINE

## 2024-09-21 PROCEDURE — 83735 ASSAY OF MAGNESIUM: CPT | Performed by: PHYSICIAN ASSISTANT

## 2024-09-21 PROCEDURE — 83735 ASSAY OF MAGNESIUM: CPT | Mod: 91

## 2024-09-21 PROCEDURE — 94640 AIRWAY INHALATION TREATMENT: CPT | Mod: XB

## 2024-09-21 RX ORDER — LOSARTAN POTASSIUM 50 MG/1
100 TABLET ORAL DAILY
Status: DISCONTINUED | OUTPATIENT
Start: 2024-09-21 | End: 2024-09-21 | Stop reason: HOSPADM

## 2024-09-21 RX ORDER — HYDRALAZINE HYDROCHLORIDE 20 MG/ML
10 INJECTION INTRAMUSCULAR; INTRAVENOUS
Status: COMPLETED | OUTPATIENT
Start: 2024-09-21 | End: 2024-09-21

## 2024-09-21 RX ORDER — OXYCODONE HYDROCHLORIDE 5 MG/1
10 TABLET ORAL
Status: COMPLETED | OUTPATIENT
Start: 2024-09-21 | End: 2024-09-21

## 2024-09-21 RX ORDER — NALOXONE HCL 0.4 MG/ML
0.02 VIAL (ML) INJECTION
Status: DISCONTINUED | OUTPATIENT
Start: 2024-09-21 | End: 2024-09-21 | Stop reason: HOSPADM

## 2024-09-21 RX ORDER — HEPARIN SODIUM 1000 [USP'U]/ML
1000 INJECTION, SOLUTION INTRAVENOUS; SUBCUTANEOUS ONCE
Status: CANCELLED | OUTPATIENT
Start: 2024-09-21

## 2024-09-21 RX ORDER — TAMSULOSIN HYDROCHLORIDE 0.4 MG/1
1 CAPSULE ORAL DAILY
Status: DISCONTINUED | OUTPATIENT
Start: 2024-09-21 | End: 2024-09-21 | Stop reason: HOSPADM

## 2024-09-21 RX ORDER — OXYCODONE HYDROCHLORIDE 5 MG/1
10 TABLET ORAL ONCE
Status: DISCONTINUED | OUTPATIENT
Start: 2024-09-21 | End: 2024-09-21

## 2024-09-21 RX ORDER — IPRATROPIUM BROMIDE AND ALBUTEROL SULFATE 2.5; .5 MG/3ML; MG/3ML
3 SOLUTION RESPIRATORY (INHALATION)
Status: COMPLETED | OUTPATIENT
Start: 2024-09-21 | End: 2024-09-21

## 2024-09-21 RX ORDER — IPRATROPIUM BROMIDE AND ALBUTEROL SULFATE 2.5; .5 MG/3ML; MG/3ML
3 SOLUTION RESPIRATORY (INHALATION) EVERY 4 HOURS PRN
Status: DISCONTINUED | OUTPATIENT
Start: 2024-09-21 | End: 2024-09-21 | Stop reason: HOSPADM

## 2024-09-21 RX ORDER — HEPARIN SODIUM 1000 [USP'U]/ML
1000 INJECTION, SOLUTION INTRAVENOUS; SUBCUTANEOUS
Status: CANCELLED | OUTPATIENT
Start: 2024-09-21 | End: 2024-09-22

## 2024-09-21 RX ORDER — GLUCAGON 1 MG
1 KIT INJECTION
Status: DISCONTINUED | OUTPATIENT
Start: 2024-09-21 | End: 2024-09-21 | Stop reason: HOSPADM

## 2024-09-21 RX ORDER — HYDRALAZINE HYDROCHLORIDE 25 MG/1
100 TABLET, FILM COATED ORAL EVERY 8 HOURS
Status: DISCONTINUED | OUTPATIENT
Start: 2024-09-21 | End: 2024-09-21 | Stop reason: HOSPADM

## 2024-09-21 RX ORDER — LABETALOL 100 MG/1
300 TABLET, FILM COATED ORAL 2 TIMES DAILY
Status: DISCONTINUED | OUTPATIENT
Start: 2024-09-21 | End: 2024-09-21 | Stop reason: HOSPADM

## 2024-09-21 RX ORDER — ASPIRIN 81 MG/1
81 TABLET ORAL DAILY
Status: DISCONTINUED | OUTPATIENT
Start: 2024-09-21 | End: 2024-09-21 | Stop reason: HOSPADM

## 2024-09-21 RX ORDER — PANTOPRAZOLE SODIUM 40 MG/1
40 TABLET, DELAYED RELEASE ORAL EVERY MORNING
Status: DISCONTINUED | OUTPATIENT
Start: 2024-09-21 | End: 2024-09-21 | Stop reason: HOSPADM

## 2024-09-21 RX ORDER — CLONIDINE 0.2 MG/24H
1 PATCH, EXTENDED RELEASE TRANSDERMAL
Status: DISCONTINUED | OUTPATIENT
Start: 2024-09-21 | End: 2024-09-21 | Stop reason: HOSPADM

## 2024-09-21 RX ORDER — NITROGLYCERIN 0.4 MG/1
0.4 TABLET SUBLINGUAL EVERY 5 MIN PRN
Status: DISCONTINUED | OUTPATIENT
Start: 2024-09-21 | End: 2024-09-21 | Stop reason: HOSPADM

## 2024-09-21 RX ORDER — ACETAMINOPHEN 500 MG
1000 TABLET ORAL EVERY 6 HOURS PRN
Status: DISCONTINUED | OUTPATIENT
Start: 2024-09-21 | End: 2024-09-21 | Stop reason: HOSPADM

## 2024-09-21 RX ORDER — TALC
6 POWDER (GRAM) TOPICAL NIGHTLY PRN
Status: DISCONTINUED | OUTPATIENT
Start: 2024-09-21 | End: 2024-09-21 | Stop reason: HOSPADM

## 2024-09-21 RX ORDER — SODIUM CHLORIDE 9 MG/ML
INJECTION, SOLUTION INTRAVENOUS ONCE
Status: COMPLETED | OUTPATIENT
Start: 2024-09-21 | End: 2024-09-21

## 2024-09-21 RX ORDER — IBUPROFEN 200 MG
16 TABLET ORAL
Status: DISCONTINUED | OUTPATIENT
Start: 2024-09-21 | End: 2024-09-21 | Stop reason: HOSPADM

## 2024-09-21 RX ORDER — IBUPROFEN 200 MG
24 TABLET ORAL
Status: DISCONTINUED | OUTPATIENT
Start: 2024-09-21 | End: 2024-09-21 | Stop reason: HOSPADM

## 2024-09-21 RX ORDER — HEPARIN SODIUM 5000 [USP'U]/ML
5000 INJECTION, SOLUTION INTRAVENOUS; SUBCUTANEOUS EVERY 8 HOURS
Status: DISCONTINUED | OUTPATIENT
Start: 2024-09-21 | End: 2024-09-21 | Stop reason: HOSPADM

## 2024-09-21 RX ORDER — POTASSIUM CHLORIDE 20 MEQ/1
40 TABLET, EXTENDED RELEASE ORAL ONCE
Status: DISCONTINUED | OUTPATIENT
Start: 2024-09-21 | End: 2024-09-21 | Stop reason: HOSPADM

## 2024-09-21 RX ORDER — ONDANSETRON HYDROCHLORIDE 2 MG/ML
4 INJECTION, SOLUTION INTRAVENOUS EVERY 6 HOURS PRN
Status: DISCONTINUED | OUTPATIENT
Start: 2024-09-21 | End: 2024-09-21 | Stop reason: HOSPADM

## 2024-09-21 RX ORDER — OXYCODONE HYDROCHLORIDE 5 MG/1
5 TABLET ORAL EVERY 6 HOURS PRN
Status: DISCONTINUED | OUTPATIENT
Start: 2024-09-21 | End: 2024-09-21 | Stop reason: HOSPADM

## 2024-09-21 RX ORDER — BISACODYL 10 MG/1
10 SUPPOSITORY RECTAL DAILY PRN
Status: DISCONTINUED | OUTPATIENT
Start: 2024-09-21 | End: 2024-09-21 | Stop reason: HOSPADM

## 2024-09-21 RX ORDER — NIFEDIPINE 30 MG/1
90 TABLET, EXTENDED RELEASE ORAL 2 TIMES DAILY
Status: DISCONTINUED | OUTPATIENT
Start: 2024-09-21 | End: 2024-09-21 | Stop reason: HOSPADM

## 2024-09-21 RX ORDER — LOSARTAN POTASSIUM 25 MG/1
25 TABLET ORAL DAILY
Status: DISCONTINUED | OUTPATIENT
Start: 2024-09-21 | End: 2024-09-21

## 2024-09-21 RX ORDER — OXYCODONE HYDROCHLORIDE 5 MG/1
10 TABLET ORAL ONCE
Status: COMPLETED | OUTPATIENT
Start: 2024-09-21 | End: 2024-09-21

## 2024-09-21 RX ORDER — SODIUM CHLORIDE 0.9 % (FLUSH) 0.9 %
10 SYRINGE (ML) INJECTION EVERY 8 HOURS PRN
Status: DISCONTINUED | OUTPATIENT
Start: 2024-09-21 | End: 2024-09-21 | Stop reason: HOSPADM

## 2024-09-21 RX ORDER — LOSARTAN POTASSIUM 50 MG/1
100 TABLET ORAL DAILY
Status: DISCONTINUED | OUTPATIENT
Start: 2024-09-21 | End: 2024-09-21

## 2024-09-21 RX ORDER — LABETALOL 100 MG/1
300 TABLET, FILM COATED ORAL
Status: COMPLETED | OUTPATIENT
Start: 2024-09-21 | End: 2024-09-21

## 2024-09-21 RX ORDER — PROCHLORPERAZINE EDISYLATE 5 MG/ML
5 INJECTION INTRAMUSCULAR; INTRAVENOUS EVERY 6 HOURS PRN
Status: DISCONTINUED | OUTPATIENT
Start: 2024-09-21 | End: 2024-09-21 | Stop reason: HOSPADM

## 2024-09-21 RX ORDER — SEVELAMER CARBONATE 800 MG/1
800 TABLET, FILM COATED ORAL 3 TIMES DAILY
Status: DISCONTINUED | OUTPATIENT
Start: 2024-09-21 | End: 2024-09-21 | Stop reason: HOSPADM

## 2024-09-21 RX ORDER — FUROSEMIDE 40 MG/1
80 TABLET ORAL 2 TIMES DAILY
Status: DISCONTINUED | OUTPATIENT
Start: 2024-09-21 | End: 2024-09-21 | Stop reason: HOSPADM

## 2024-09-21 RX ORDER — POLYETHYLENE GLYCOL 3350 17 G/17G
17 POWDER, FOR SOLUTION ORAL DAILY PRN
Status: DISCONTINUED | OUTPATIENT
Start: 2024-09-21 | End: 2024-09-21 | Stop reason: HOSPADM

## 2024-09-21 RX ORDER — CINACALCET 30 MG/1
30 TABLET, FILM COATED ORAL
Status: DISCONTINUED | OUTPATIENT
Start: 2024-09-23 | End: 2024-09-21 | Stop reason: HOSPADM

## 2024-09-21 RX ADMIN — LABETALOL HYDROCHLORIDE 300 MG: 100 TABLET, FILM COATED ORAL at 02:09

## 2024-09-21 RX ADMIN — CLONIDINE 1 PATCH: 0.2 PATCH TRANSDERMAL at 01:09

## 2024-09-21 RX ADMIN — HYDRALAZINE HYDROCHLORIDE 100 MG: 25 TABLET ORAL at 06:09

## 2024-09-21 RX ADMIN — FUROSEMIDE 80 MG: 40 TABLET ORAL at 08:09

## 2024-09-21 RX ADMIN — OXYCODONE HYDROCHLORIDE 10 MG: 5 TABLET ORAL at 06:09

## 2024-09-21 RX ADMIN — NIFEDIPINE 90 MG: 30 TABLET, FILM COATED, EXTENDED RELEASE ORAL at 03:09

## 2024-09-21 RX ADMIN — SEVELAMER CARBONATE 800 MG: 800 TABLET, FILM COATED ORAL at 08:09

## 2024-09-21 RX ADMIN — NITROGLYCERIN 0.4 MG: 0.4 TABLET, ORALLY DISINTEGRATING SUBLINGUAL at 02:09

## 2024-09-21 RX ADMIN — IPRATROPIUM BROMIDE AND ALBUTEROL SULFATE 3 ML: 2.5; .5 SOLUTION RESPIRATORY (INHALATION) at 01:09

## 2024-09-21 RX ADMIN — SODIUM CHLORIDE: 9 INJECTION, SOLUTION INTRAVENOUS at 09:09

## 2024-09-21 RX ADMIN — OXYCODONE HYDROCHLORIDE 10 MG: 5 TABLET ORAL at 01:09

## 2024-09-21 RX ADMIN — LOSARTAN POTASSIUM 100 MG: 50 TABLET, FILM COATED ORAL at 03:09

## 2024-09-21 RX ADMIN — PANTOPRAZOLE SODIUM 40 MG: 40 TABLET, DELAYED RELEASE ORAL at 06:09

## 2024-09-21 RX ADMIN — LABETALOL HYDROCHLORIDE 300 MG: 100 TABLET, FILM COATED ORAL at 08:09

## 2024-09-21 RX ADMIN — HYDRALAZINE HYDROCHLORIDE 10 MG: 20 INJECTION, SOLUTION INTRAMUSCULAR; INTRAVENOUS at 02:09

## 2024-09-21 NOTE — CARE UPDATE
ESRD   Last HD 09/20 removed 2.7 L.  Blood pressure 248/116, sob    Plan for ihd  Will remove 2.5-3L  Informed HD nurse

## 2024-09-21 NOTE — ED NOTES
Patient identifiers for Enrique Martinez 59 y.o. male checked and correct.  Chief Complaint   Patient presents with    Requesting Dialysis     Reports that he got dialysis today, but states they did not pull enough fluid off and he feels fluid overloaded, pt is requesting another session of dialysis     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 Pt in no acute distress at present time. Pt is clean and well groomed with clothes appropriately fastened.   Skin: Skin warm, dry & intact. Color consistent with ethnicity. Mucous membranes moist. No breakdown or brusing noted.   Musculoskeletal: Patient moving all extremities well, no obvious swelling or deformities noted.   Respiratory: Respirations spontaneous, even, and non-labored. Visible chest rise noted. Airway is open and patent. No accessory muscle use noted.   Neurologic: Sensation is intact. Speech is clear and appropriate. Eyes open spontaneously, behavior appropriate to situation, follows commands, facial expression symmetrical, bilateral hand grasp equal and even, purposeful motor response noted.  Cardiac: All peripheral pulses present. No Bilateral lower extremity edema. Cap refill is <3 seconds.  Abdomen: Abdomen soft, non distended, non tender to palpation.   : Pt voids independently, denies dysuria, hematuria, frequency.

## 2024-09-21 NOTE — ED NOTES
Nurses Note -- 4 Eyes      9/21/2024   7:18 AM      Skin assessed during: Admit      [] No Altered Skin Integrity Present    []Prevention Measures Documented      [x] Yes- Altered Skin Integrity Present or Discovered   [] LDA Added if Not in Epic (Describe Wound)   [] New Altered Skin Integrity was Present on Admit and Documented in LDA   [] Wound Image Taken    Wound Care Consulted? No    Attending Nurse:  Zulma Yepez RN/Staff Member:   cheyanne

## 2024-09-21 NOTE — SUBJECTIVE & OBJECTIVE
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        Past Surgical History:   Procedure Laterality Date    ABDOMINAL SURGERY  1980s    AV FISTULA PLACEMENT Left 1/5/2023    Procedure: CREATION, AV FISTULA RADIOCEPHALIC;  Surgeon: ROHITH Pickard II, MD;  Location: [facility] OR 2ND FLR;  Service: Vascular;  Laterality: Left;    CARDIAC CATHETERIZATION  2007    x 2    COLONOSCOPY N/A 4/5/2019    Procedure: COLONOSCOPY;  Surgeon: Jose L Carty MD;  Location: [facility] ENDO (4TH FLR);  Service: Colon and Rectal;  Laterality: N/A;    CORONARY STENT PLACEMENT  2007       Review of patient's allergies indicates:  No Known Allergies    No current facility-administered medications on file prior to encounter.     Current Outpatient Medications on File Prior to Encounter   Medication Sig    albuterol (PROVENTIL/VENTOLIN HFA) 90 mcg/actuation inhaler Inhale 2 puffs into the lungs every 6 (six) hours as needed for Wheezing or Shortness of Breath. Rescue    amoxicillin (AMOXIL) 500 MG capsule Take 500 mg by mouth.    aspirin (ECOTRIN) 81 MG EC tablet Take 1 tablet (81 mg total) by mouth once daily.    blood-glucose meter kit Use as instructed    cinacalcet (SENSIPAR) 30 MG Tab Take 1 tablet (30 mg total) by mouth every Mon, Wed, Fri.    cloNIDine 0.3 mg/24 hr td ptwk (CATAPRES) 0.3 mg/24 hr Place 1 patch onto the skin every 7 days.    colchicine (COLCRYS) 0.6 mg tablet Take 0.6 mg by mouth daily as needed.    famotidine (PEPCID) 20 MG tablet Take 20 mg by  mouth daily as needed for Heartburn.     furosemide (LASIX) 80 MG tablet Take 80 mg by mouth 2 (two) times daily.    gabapentin (NEURONTIN) 300 MG capsule Take 300 mg by mouth 3 (three) times daily.    guaiFENesin 100 mg/5 ml (ROBITUSSIN) 100 mg/5 mL syrup Take 10 mLs (200 mg total) by mouth 3 (three) times daily as needed for Cough.    hydrALAZINE (APRESOLINE) 100 MG tablet 1 tablet with food Orally Three times a day for 30 days    HYDROcodone-acetaminophen (NORCO) 5-325 mg per tablet Take 1 tablet by mouth every 6 (six) hours as needed for Pain.    labetaloL (NORMODYNE) 300 MG tablet Take 1 tablet (300 mg total) by mouth 2 (two) times daily.    LIDOcaine-prilocaine (EMLA) cream Apply topically 2 (two) times daily as needed.    loratadine (CLARITIN) 10 mg tablet Take 10 mg by mouth once daily.    losartan (COZAAR) 100 MG tablet Take 1 tablet (100 mg total) by mouth once daily.    melatonin (MELATIN) 3 mg tablet Take 2 tablets (6 mg total) by mouth nightly as needed for Insomnia.    methocarbamoL (ROBAXIN) 750 MG Tab Take 750 mg by mouth.    NIFEdipine (PROCARDIA-XL) 90 MG (OSM) 24 hr tablet Take 1 tablet (90 mg total) by mouth 2 (two) times a day.    olopatadine (PATANOL) 0.1 % ophthalmic solution Place into both eyes.    ondansetron (ZOFRAN-ODT) 8 MG TbDL Take 1 tablet (8 mg total) by mouth every 8 (eight) hours as needed.    pantoprazole (PROTONIX) 40 MG tablet Take 40 mg by mouth every morning.    RENVELA 800 mg Tab Take 800 mg by mouth 3 (three) times daily.    sildenafiL (VIAGRA) 100 MG tablet Take 100 mg by mouth.    tadalafiL (CIALIS) 2.5 mg Tab Take 2.5 mg by mouth.    tamsulosin (FLOMAX) 0.4 mg Cap Take 1 capsule by mouth.    traMADoL (ULTRAM) 50 mg tablet Take 50 mg by mouth 2 (two) times daily as needed.    TRUE METRIX GLUCOSE TEST STRIP Strp     valACYclovir (VALTREX) 1000 MG tablet Take 0.5 tablets (500 mg total) by mouth once daily. for 7 days    [DISCONTINUED] chlorthalidone (HYGROTEN) 50 MG Tab Take  50 mg by mouth once daily.     [DISCONTINUED] insulin (LANTUS SOLOSTAR U-100 INSULIN) glargine 100 units/mL (3mL) SubQ pen Inject 5 Units into the skin once daily.     Family History       Problem Relation (Age of Onset)    Drug abuse Brother    Heart attack Father, Brother    Hyperlipidemia Father    Hypertension Father, Brother    No Known Problems Sister    Stroke Father          Tobacco Use    Smoking status: Some Days     Current packs/day: 0.25     Average packs/day: 0.3 packs/day for 10.0 years (2.5 ttl pk-yrs)     Types: Cigarettes    Smokeless tobacco: Never   Substance and Sexual Activity    Alcohol use: No    Drug use: No    Sexual activity: Not on file       ROS  Pt refused to participate in ROS questions    Objective:     Vital Signs (Most Recent):  Temp: 98.2 °F (36.8 °C) (09/21/24 0924)  Pulse: 77 (09/21/24 1005)  Resp: 20 (09/21/24 0908)  BP: (!) 185/95 (09/21/24 1005)  SpO2: 96 % (09/21/24 1005) Vital Signs (24h Range):  Temp:  [98 °F (36.7 °C)-98.4 °F (36.9 °C)] 98.2 °F (36.8 °C)  Pulse:  [75-91] 77  Resp:  [16-24] 20  SpO2:  [94 %-100 %] 96 %  BP: (132-248)/() 185/95     Weight: 67.6 kg (149 lb)  Body mass index is 24.79 kg/m².     Physical Exam  Gen: in NAD, appears stated age  Neuro: AAOx4, CN2-12 grossly intact BL; motor, sensory, and strength grossly intact BL  HEENT: NTNC, EOMI, PERRLA, MMM; no thyromegaly or lymphadenopathy; no JVD appreciated  CVS: RRR, no m/r/g; S1/S2 auscultated with no S3 or S4; capillary refill < 2 sec  Resp: lungs CTAB, no w/r/r; no belabored breathing or accessory muscle use appreciated   Abd: BS+ in all 4 quadrants; NTND, soft to palpation; no organomegaly appreciated   Extrem: pulses full, equal, and regular over all 4 extremities; no UE or LE edema BL       Significant Labs: All pertinent labs within the past 24 hours have been reviewed.    Significant Imaging: I have reviewed all pertinent imaging results/findings within the past 24 hours.

## 2024-09-21 NOTE — SUBJECTIVE & OBJECTIVE
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        Past Surgical History:   Procedure Laterality Date    ABDOMINAL SURGERY  1980s    AV FISTULA PLACEMENT Left 1/5/2023    Procedure: CREATION, AV FISTULA RADIOCEPHALIC;  Surgeon: ROHITH Pickard II, MD;  Location: [facility] OR 2ND FLR;  Service: Vascular;  Laterality: Left;    CARDIAC CATHETERIZATION  2007    x 2    COLONOSCOPY N/A 4/5/2019    Procedure: COLONOSCOPY;  Surgeon: Jose L aCrty MD;  Location: [facility] ENDO (4TH FLR);  Service: Colon and Rectal;  Laterality: N/A;    CORONARY STENT PLACEMENT  2007       Review of patient's allergies indicates:  No Known Allergies  Current Facility-Administered Medications   Medication Frequency    acetaminophen tablet 1,000 mg Q6H PRN    albuterol-ipratropium 2.5 mg-0.5 mg/3 mL nebulizer solution 3 mL Q4H PRN    aspirin EC tablet 81 mg Daily    bisacodyL suppository 10 mg Daily PRN    [START ON 9/23/2024] cinacalcet tablet 30 mg Every Mon, Wed, Fri    cloNIDine 0.2 mg/24 hr td ptwk 1 patch ED 1 Time    dextrose 10% bolus 125 mL 125 mL PRN    dextrose 10% bolus 250 mL 250 mL PRN    furosemide tablet 80 mg BID    glucagon (human recombinant) injection 1 mg PRN    glucose chewable tablet 16 g PRN    glucose chewable tablet 24 g PRN    heparin (porcine) injection 5,000 Units Q8H    hydrALAZINE tablet 100 mg Q8H    labetaloL tablet 300 mg BID    losartan tablet 100 mg Daily    melatonin tablet 6 mg Nightly PRN    naloxone 0.4 mg/mL injection 0.02 mg  PRN    NIFEdipine 24 hr tablet 90 mg BID    nitroGLYCERIN SL tablet 0.4 mg Q5 Min PRN    ondansetron injection 4 mg Q6H PRN    oxyCODONE immediate release tablet 5 mg Q6H PRN    pantoprazole EC tablet 40 mg QAM    polyethylene glycol packet 17 g Daily PRN    potassium chloride SA CR tablet 40 mEq Once    prochlorperazine injection Soln 5 mg Q6H PRN    sevelamer carbonate tablet 800 mg TID    sodium chloride 0.9% flush 10 mL Q8H PRN    tamsulosin 24 hr capsule 0.4 mg Daily     Current Outpatient Medications   Medication    albuterol (PROVENTIL/VENTOLIN HFA) 90 mcg/actuation inhaler    amoxicillin (AMOXIL) 500 MG capsule    aspirin (ECOTRIN) 81 MG EC tablet    blood-glucose meter kit    cinacalcet (SENSIPAR) 30 MG Tab    cloNIDine 0.3 mg/24 hr td ptwk (CATAPRES) 0.3 mg/24 hr    colchicine (COLCRYS) 0.6 mg tablet    famotidine (PEPCID) 20 MG tablet    furosemide (LASIX) 80 MG tablet    gabapentin (NEURONTIN) 300 MG capsule    guaiFENesin 100 mg/5 ml (ROBITUSSIN) 100 mg/5 mL syrup    hydrALAZINE (APRESOLINE) 100 MG tablet    HYDROcodone-acetaminophen (NORCO) 5-325 mg per tablet    labetaloL (NORMODYNE) 300 MG tablet    LIDOcaine-prilocaine (EMLA) cream    loratadine (CLARITIN) 10 mg tablet    losartan (COZAAR) 100 MG tablet    melatonin (MELATIN) 3 mg tablet    methocarbamoL (ROBAXIN) 750 MG Tab    NIFEdipine (PROCARDIA-XL) 90 MG (OSM) 24 hr tablet    olopatadine (PATANOL) 0.1 % ophthalmic solution    ondansetron (ZOFRAN-ODT) 8 MG TbDL    pantoprazole (PROTONIX) 40 MG tablet    RENVELA 800 mg Tab    sildenafiL (VIAGRA) 100 MG tablet    tadalafiL (CIALIS) 2.5 mg Tab    tamsulosin (FLOMAX) 0.4 mg Cap    traMADoL (ULTRAM) 50 mg tablet    TRUE METRIX GLUCOSE TEST STRIP Strp    valACYclovir (VALTREX) 1000 MG tablet     Family History       Problem Relation (Age of Onset)    Drug abuse Brother    Heart attack Father, Brother    Hyperlipidemia Father    Hypertension Father, Brother    No Known Problems Sister    Stroke Father           Tobacco Use    Smoking status: Some Days     Current packs/day: 0.25     Average packs/day: 0.3 packs/day for 10.0 years (2.5 ttl pk-yrs)     Types: Cigarettes    Smokeless tobacco: Never   Substance and Sexual Activity    Alcohol use: No    Drug use: No    Sexual activity: Not on file     Review of Systems   Constitutional: Negative.    HENT: Negative.     Eyes: Negative.    Respiratory:  Positive for shortness of breath and wheezing.    Cardiovascular:  Positive for leg swelling. Negative for chest pain.   Gastrointestinal: Negative.    Endocrine: Negative.    Genitourinary:  Negative for decreased urine volume.   Musculoskeletal: Negative.    Hematological: Negative.    Psychiatric/Behavioral: Negative.     Objective:     Vital Signs (Most Recent):  Temp: 98 °F (36.7 °C) (09/21/24 0500)  Pulse: 76 (09/21/24 0703)  Resp: 18 (09/21/24 0612)  BP: (!) 195/87 (09/21/24 0703)  SpO2: 99 % (09/21/24 0703) Vital Signs (24h Range):  Temp:  [98 °F (36.7 °C)-98.4 °F (36.9 °C)] 98 °F (36.7 °C)  Pulse:  [75-91] 76  Resp:  [16-24] 18  SpO2:  [94 %-99 %] 99 %  BP: (132-248)/() 195/87     Weight: 67.6 kg (149 lb) (09/20/24 2350)  Body mass index is 24.79 kg/m².  Body surface area is 1.76 meters squared.    No intake/output data recorded.     Physical Exam  Vitals and nursing note reviewed.   HENT:      Mouth/Throat:      Pharynx: Oropharynx is clear.   Eyes:      Conjunctiva/sclera: Conjunctivae normal.   Cardiovascular:      Rate and Rhythm: Normal rate.      Pulses: Normal pulses.      Arteriovenous access: Left arteriovenous access is present.     Comments: JOSSE AVF   Pulmonary:      Effort: Pulmonary effort is normal.   Abdominal:      Palpations: Abdomen is soft.   Musculoskeletal:      Right lower leg: Edema present.      Left lower leg: Edema present.   Neurological:      Mental Status: He is alert and oriented to person, place, and time.   Psychiatric:         Mood and Affect: Mood normal.      Significant  Labs:  CBC:   Recent Labs   Lab 09/21/24 0413   WBC 7.27   RBC 2.81*   HGB 8.5*   HCT 26.7*      MCV 95   MCH 30.2   MCHC 31.8*     CMP:   Recent Labs   Lab 09/21/24 0413   GLU 87   CALCIUM 9.3   ALBUMIN 3.0*   PROT 6.0      K 3.3*   CO2 20*      BUN 23   CREATININE 4.8*   ALKPHOS 252*   ALT 7*   AST 26   BILITOT 0.4     All labs within the past 24 hours have been reviewed.

## 2024-09-21 NOTE — ED PROVIDER NOTES
Encounter Date: 9/20/2024       History     Chief Complaint   Patient presents with    Requesting Dialysis     Reports that he got dialysis today, but states they did not pull enough fluid off and he feels fluid overloaded, pt is requesting another session of dialysis     60 y/o M with PMH of ESRD on HD MWF (last HD today) presents to the ED for evaluation of worsening dyspnea and BLE edema. Patient reports he was 4L up, but staff at HD told him they could only take 2700 off. He has had continued worsening dyspnea since his HD session. He reports he is 4 lbs over his dry weight. He also complains of left lower extremity pain over a large surgical incision that was for a blood clot removal. Denies any other complaints. Denies chest pain.     The history is provided by the patient and medical records.     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      Past Surgical History:   Procedure Laterality Date    ABDOMINAL SURGERY  1980s    AV FISTULA PLACEMENT Left 1/5/2023    Procedure: CREATION, AV FISTULA RADIOCEPHALIC;  Surgeon: ROHITH Pickard II, MD;  [facility];  Service: Vascular;  Laterality: Left;    CARDIAC CATHETERIZATION  2007    x 2    COLONOSCOPY N/A 4/5/2019    Procedure: COLONOSCOPY;  Surgeon: Jose L Carty MD;  Location: [facility] ENDO (4TH FLR);  Service: Colon and Rectal;  Laterality: N/A;    CORONARY STENT PLACEMENT  2007     Family History    Problem Relation Name Age of Onset    Hypertension Father      Stroke Father      Heart attack Father      Hyperlipidemia Father      No Known Problems Sister      Drug abuse Brother      Heart attack Brother      Hypertension Brother       Social History     Tobacco Use    Smoking status: Some Days     Current packs/day: 0.25     Average packs/day: 0.3 packs/day for 10.0 years (2.5 ttl pk-yrs)     Types: Cigarettes    Smokeless tobacco: Never   Substance Use Topics    Alcohol use: No    Drug use: No     Review of Systems    Physical Exam     Initial Vitals [09/20/24 2350]   BP Pulse Resp Temp SpO2   132/83 91 18 98.2 °F (36.8 °C) 96 %      MAP       --         Physical Exam    Nursing note and vitals reviewed.  Constitutional: He appears well-developed and well-nourished. He is cooperative. No distress.   HENT:   Head: Normocephalic.   Mouth/Throat: Oropharynx is clear and moist.   Eyes: Pupils are equal, round, and reactive to light. No scleral icterus.   Neck: Neck supple.   Cardiovascular:  Normal rate and regular rhythm.           Pulmonary/Chest: No stridor. No respiratory distress. He has rales (bibasilar).   Slightly tachypneic, on 2L NC    Abdominal: Abdomen is soft. He exhibits no distension. There is no abdominal tenderness.   Musculoskeletal:         General: Edema (2+ pitting edema to BLE) present.      Cervical back: Neck supple.     Neurological: He is alert. GCS score is 15. GCS eye subscore is 4. GCS verbal subscore is 5. GCS motor subscore is 6.   Skin: Skin is warm and dry. No rash noted.   Large surgical incision from lower left leg up to left lower quadrant with sutures and steri strips in place. No erythema, induration, or dehiscence noted.          ED Course   Procedures  Labs Reviewed   CBC W/ AUTO DIFFERENTIAL - Abnormal       Result Value    WBC 7.31      RBC 2.83 (*)     Hemoglobin 8.5 (*)     Hematocrit 27.3 (*)     MCV 97      MCH 30.0      MCHC 31.1 (*)     RDW 18.7 (*)     Platelets  239      MPV 8.9 (*)     Immature Granulocytes 0.4      Gran # (ANC) 5.3      Immature Grans (Abs) 0.03      Lymph # 1.1      Mono # 0.7      Eos # 0.1      Baso # 0.05      nRBC 0      Gran % 72.1      Lymph % 15.6 (*)     Mono % 9.4      Eosinophil % 1.8      Basophil % 0.7      Differential Method Automated     COMPREHENSIVE METABOLIC PANEL - Abnormal    Sodium 135 (*)     Potassium 3.4 (*)     Chloride 99      CO2 21 (*)     Glucose 103      BUN 21      Creatinine 4.4 (*)     Calcium 9.5      Total Protein 6.4      Albumin 3.1 (*)     Total Bilirubin 0.4      Alkaline Phosphatase 181 (*)     AST 27      ALT 8 (*)     eGFR 14.5 (*)     Anion Gap 15     COMPREHENSIVE METABOLIC PANEL - Abnormal    Sodium 137      Potassium 3.3 (*)     Chloride 102      CO2 20 (*)     Glucose 87      BUN 23      Creatinine 4.8 (*)     Calcium 9.3      Total Protein 6.0      Albumin 3.0 (*)     Total Bilirubin 0.4      Alkaline Phosphatase 252 (*)     AST 26      ALT 7 (*)     eGFR 13.0 (*)     Anion Gap 15     CBC W/ AUTO DIFFERENTIAL - Abnormal    WBC 7.27      RBC 2.81 (*)     Hemoglobin 8.5 (*)     Hematocrit 26.7 (*)     MCV 95      MCH 30.2      MCHC 31.8 (*)     RDW 18.8 (*)     Platelets 229      MPV 9.0 (*)     Immature Granulocytes 0.4      Gran # (ANC) 5.0      Immature Grans (Abs) 0.03      Lymph # 1.4      Mono # 0.7      Eos # 0.2      Baso # 0.05      nRBC 0      Gran % 68.4      Lymph % 18.6      Mono % 9.8      Eosinophil % 2.1      Basophil % 0.7      Differential Method Automated     MAGNESIUM    Magnesium 1.9     MAGNESIUM    Magnesium 1.9     HEPATITIS B SURFACE ANTIGEN    Hepatitis B Surface Ag Non-reactive     POCT GLUCOSE, HAND-HELD DEVICE          Imaging Results              X-Ray Chest AP Portable (Final result)  Result time 09/21/24 02:16:10      Final result by Nicholas Blevins MD (09/21/24 02:16:10)                   Impression:      Increased pulmonary vascular engorgement and central interstitial  pulmonary markings, possibly related to pulmonary venous hypertension and interstitial pulmonary edema.  CHF is a consideration.  Interstitial pneumonia or other pathology not fully excluded.  Correlate clinically and with continued radiographic follow-up.    Electronically signed by resident: Bong Castro  Date:    09/21/2024  Time:    01:57    Electronically signed by: Nicholas Blevins  Date:    09/21/2024  Time:    02:16               Narrative:    EXAMINATION:  XR CHEST AP PORTABLE    CLINICAL HISTORY:  dyspnea;    TECHNIQUE:  Single frontal view of the chest was performed.    COMPARISON:  Chest x-ray 06/28/2024    FINDINGS:  LINES & TUBES: Cardiac monitoring leads overlie the bilateral nel-thoraces.    HEART & MEDIASTINUM: Mediastinal structures are midline.  Cardiopericardial silhouette remains mildly enlarged.  Pre-existing tortuosity and atherosclerotic calcification of the thoracic aorta, radiographically similar to the comparison study.    PLEURA: No pleural effusion or pneumothorax.    LUNGS: Lungs are symmetrically expanded.  Slightly increased attenuation of the right hemithorax relative to the left is most likely is secondary to the effects of patient rotation.    Mildly increased central pulmonary vascular engorgement and fine bilateral parahilar reticulonodular interstitial pulmonary opacities.  Linear opacity projecting over/in the right lower lung zone may represent artifact or perhaps interstitial or fissural fluid.  No consolidation.    SOFT TISSUE / BONES: No fracture.  Left shoulder arthroplasty, incompletely visualized.  No free intraperitoneal air apparent in the visualized upper abdomen.                                    X-Rays:   Independently Interpreted Readings:   Chest X-Ray: Increased vascular markings consistent with CHF are present.     Medications   cloNIDine 0.2 mg/24 hr td ptwk 1 patch (1 patch Transdermal Patch Applied 9/21/24 0127)   nitroGLYCERIN SL tablet 0.4 mg (0.4 mg  Sublingual Given 9/21/24 0240)   acetaminophen tablet 1,000 mg (has no administration in time range)   oxyCODONE immediate release tablet 5 mg (has no administration in time range)   ondansetron injection 4 mg (has no administration in time range)   sodium chloride 0.9% flush 10 mL (has no administration in time range)   albuterol-ipratropium 2.5 mg-0.5 mg/3 mL nebulizer solution 3 mL (has no administration in time range)   melatonin tablet 6 mg (has no administration in time range)   polyethylene glycol packet 17 g (has no administration in time range)   bisacodyL suppository 10 mg (has no administration in time range)   naloxone 0.4 mg/mL injection 0.02 mg (has no administration in time range)   glucose chewable tablet 16 g (has no administration in time range)   glucose chewable tablet 24 g (has no administration in time range)   glucagon (human recombinant) injection 1 mg (has no administration in time range)   heparin (porcine) injection 5,000 Units (5,000 Units Subcutaneous Not Given 9/21/24 0600)   prochlorperazine injection Soln 5 mg (has no administration in time range)   dextrose 10% bolus 125 mL 125 mL (has no administration in time range)   dextrose 10% bolus 250 mL 250 mL (has no administration in time range)   aspirin EC tablet 81 mg (has no administration in time range)   cinacalcet tablet 30 mg (has no administration in time range)   furosemide tablet 80 mg (has no administration in time range)   hydrALAZINE tablet 100 mg (100 mg Oral Given 9/21/24 0613)   labetaloL tablet 300 mg (has no administration in time range)   NIFEdipine 24 hr tablet 90 mg (90 mg Oral Given 9/21/24 0350)   pantoprazole EC tablet 40 mg (40 mg Oral Given 9/21/24 0612)   sevelamer carbonate tablet 800 mg (has no administration in time range)   tamsulosin 24 hr capsule 0.4 mg (has no administration in time range)   losartan tablet 100 mg (100 mg Oral Given 9/21/24 0350)   oxyCODONE immediate release tablet 10 mg (10 mg Oral  Given 9/21/24 0121)   albuterol-ipratropium 2.5 mg-0.5 mg/3 mL nebulizer solution 3 mL (3 mLs Nebulization Given 9/21/24 0159)   labetaloL tablet 300 mg (300 mg Oral Given 9/21/24 0206)   hydrALAZINE injection 10 mg (10 mg Intravenous Given 9/21/24 0213)   oxyCODONE immediate release tablet 10 mg (10 mg Oral Given 9/21/24 0612)     Medical Decision Making  60 y/o M presents with dyspnea, weight gain, BLE edema, and pain over recent surgical incision.     Patient is afebrile, hemodynamically stable, and in no acute distress on arrival. He is slightly tachypneic, on 2L NC, with bibasilar rales and lower extremity edema.     Suspect hypervolemia 2/2 ESRD. Will admit for emergent HD. Consulted and discussed with nephrology who will arrange for HD. Discussed with hospital medicine who accepted the patient. BP meds given for severe hypertension. Pain medications given.     Amount and/or Complexity of Data Reviewed  Labs: ordered.  Radiology: ordered. Decision-making details documented in ED Course.    Risk  OTC drugs.  Prescription drug management.               ED Course as of 09/21/24 0753   Sat Sep 21, 2024   0221 X-Ray Chest AP Portable  Increased pulmonary vascular engorgement and central interstitial pulmonary markings, possibly related to pulmonary venous hypertension and interstitial pulmonary edema.  CHF is a consideration.  Interstitial pneumonia or other pathology not fully excluded.  Correlate clinically and with continued radiographic follow-up.   [OW]      ED Course User Index  [OW] Rylee Merritt MD                           Clinical Impression:  Final diagnoses:  [E87.70, N28.9] Hypervolemia associated with renal insufficiency          ED Disposition Condition    Observation                 Rylee Merritt MD  Resident  09/21/24 0753

## 2024-09-21 NOTE — H&P
[facility] - Emergency Dept  [facility] Medicine  History & Physical    Patient Name: Enrique Martinez  MRN: [id number]  Patient Class: OP- Observation  Admission Date: 9/21/2024  Attending Physician: Tee Jin MD   Primary Care Provider: Naomi Primary Doctor         Patient information was obtained from patient and ER records.     Subjective:     Principal Problem:Hypervolemia associated with renal insufficiency    Chief Complaint:   Chief Complaint   Patient presents with    Requesting Dialysis     Reports that he got dialysis today, but states they did not pull enough fluid off and he feels fluid overloaded, pt is requesting another session of dialysis        HPI: Enrique Martinez is a 59 y.o. male with PMHx significant for ESRD on HD MWF, HTN, BPH, GERD admitted to hospital medicine for hypervolemia and HTN urgency. Patient reports worsening dyspnea throughout today. Endorses compliance with HD where 2.7L were removed though he was told that he was 4L over on exam. He also states that he is 4 lbs over his dry weight today. Reports compliance with all medications. Denies fever/chills, diaphoresis, lightheadedness, HA,  CP, cough, congestion, abdominal pain, n/v/d, urinary symptoms, changes in BMs, numbness/tingling, weakness.       In the ED, hypertensive to 238/125. Remaining vitals stable. H/H 8.5/27.3. K 3.4. Cr/BUN 4.4/21. CXR with Increased pulmonary vascular engorgement and central interstitial pulmonary markings, possibly related to pulmonary venous hypertension and interstitial pulmonary edema. CHF is a consideration. Interstitial pneumonia or other pathology not fully excluded. Given duoneb x3, clonidine patch x1, labetalol 300mg PO x1, losartan 100mg x1, nifepidine 90mg x1, and oxycodone 10mg x1. Hospital medicine was consulted for admission and further management.    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        Past Surgical History:   Procedure Laterality Date    ABDOMINAL SURGERY  1980s    AV FISTULA PLACEMENT Left 1/5/2023    Procedure: CREATION, AV FISTULA RADIOCEPHALIC;  Surgeon: ROHITH Pickard II, MD;  Location: [facility] OR 2ND FLR;  Service: Vascular;  Laterality: Left;    CARDIAC CATHETERIZATION  2007    x 2    COLONOSCOPY N/A 4/5/2019    Procedure: COLONOSCOPY;  Surgeon: Jose L Carty MD;  Location: [facility] ENDO (4TH FLR);  Service: Colon and Rectal;  Laterality: N/A;    CORONARY STENT PLACEMENT  2007       Review of patient's allergies indicates:  No Known Allergies    No current facility-administered medications on file prior to encounter.     Current Outpatient Medications on File Prior to Encounter   Medication Sig    albuterol (PROVENTIL/VENTOLIN HFA) 90 mcg/actuation inhaler Inhale 2 puffs into the lungs every 6 (six) hours as needed for Wheezing or Shortness of Breath. Rescue    amoxicillin (AMOXIL) 500 MG capsule Take 500 mg by mouth.    aspirin (ECOTRIN) 81 MG EC tablet Take 1 tablet (81 mg total) by mouth once daily.    blood-glucose meter kit Use as instructed    cinacalcet (SENSIPAR) 30 MG Tab Take 1 tablet (30 mg total) by mouth every Mon, Wed, Fri.    cloNIDine 0.3 mg/24 hr td ptwk (CATAPRES) 0.3 mg/24 hr Place 1 patch onto the skin every 7 days.    colchicine (COLCRYS) 0.6 mg tablet Take 0.6 mg by mouth daily as needed.    famotidine (PEPCID) 20 MG tablet Take 20 mg by mouth daily as needed for Heartburn.     furosemide (LASIX) 80 MG tablet Take 80 mg by mouth 2 (two) times daily.    gabapentin  (NEURONTIN) 300 MG capsule Take 300 mg by mouth 3 (three) times daily.    guaiFENesin 100 mg/5 ml (ROBITUSSIN) 100 mg/5 mL syrup Take 10 mLs (200 mg total) by mouth 3 (three) times daily as needed for Cough.    hydrALAZINE (APRESOLINE) 100 MG tablet 1 tablet with food Orally Three times a day for 30 days    HYDROcodone-acetaminophen (NORCO) 5-325 mg per tablet Take 1 tablet by mouth every 6 (six) hours as needed for Pain.    labetaloL (NORMODYNE) 300 MG tablet Take 1 tablet (300 mg total) by mouth 2 (two) times daily.    LIDOcaine-prilocaine (EMLA) cream Apply topically 2 (two) times daily as needed.    loratadine (CLARITIN) 10 mg tablet Take 10 mg by mouth once daily.    losartan (COZAAR) 100 MG tablet Take 1 tablet (100 mg total) by mouth once daily.    melatonin (MELATIN) 3 mg tablet Take 2 tablets (6 mg total) by mouth nightly as needed for Insomnia.    methocarbamoL (ROBAXIN) 750 MG Tab Take 750 mg by mouth.    NIFEdipine (PROCARDIA-XL) 90 MG (OSM) 24 hr tablet Take 1 tablet (90 mg total) by mouth 2 (two) times a day.    olopatadine (PATANOL) 0.1 % ophthalmic solution Place into both eyes.    ondansetron (ZOFRAN-ODT) 8 MG TbDL Take 1 tablet (8 mg total) by mouth every 8 (eight) hours as needed.    pantoprazole (PROTONIX) 40 MG tablet Take 40 mg by mouth every morning.    RENVELA 800 mg Tab Take 800 mg by mouth 3 (three) times daily.    sildenafiL (VIAGRA) 100 MG tablet Take 100 mg by mouth.    tadalafiL (CIALIS) 2.5 mg Tab Take 2.5 mg by mouth.    tamsulosin (FLOMAX) 0.4 mg Cap Take 1 capsule by mouth.    traMADoL (ULTRAM) 50 mg tablet Take 50 mg by mouth 2 (two) times daily as needed.    TRUE METRIX GLUCOSE TEST STRIP Strp     valACYclovir (VALTREX) 1000 MG tablet Take 0.5 tablets (500 mg total) by mouth once daily. for 7 days    [DISCONTINUED] chlorthalidone (HYGROTEN) 50 MG Tab Take 50 mg by mouth once daily.     [DISCONTINUED] insulin (LANTUS SOLOSTAR U-100 INSULIN) glargine 100 units/mL (3mL) SubQ pen  Inject 5 Units into the skin once daily.     Family History       Problem Relation (Age of Onset)    Drug abuse Brother    Heart attack Father, Brother    Hyperlipidemia Father    Hypertension Father, Brother    No Known Problems Sister    Stroke Father          Tobacco Use    Smoking status: Some Days     Current packs/day: 0.25     Average packs/day: 0.3 packs/day for 10.0 years (2.5 ttl pk-yrs)     Types: Cigarettes    Smokeless tobacco: Never   Substance and Sexual Activity    Alcohol use: No    Drug use: No    Sexual activity: Not on file       ROS  Pt refused to participate in ROS questions    Objective:     Vital Signs (Most Recent):  Temp: 98.2 °F (36.8 °C) (09/21/24 0924)  Pulse: 77 (09/21/24 1005)  Resp: 20 (09/21/24 0908)  BP: (!) 185/95 (09/21/24 1005)  SpO2: 96 % (09/21/24 1005) Vital Signs (24h Range):  Temp:  [98 °F (36.7 °C)-98.4 °F (36.9 °C)] 98.2 °F (36.8 °C)  Pulse:  [75-91] 77  Resp:  [16-24] 20  SpO2:  [94 %-100 %] 96 %  BP: (132-248)/() 185/95     Weight: 67.6 kg (149 lb)  Body mass index is 24.79 kg/m².     Physical Exam  Gen: in NAD, appears stated age  Neuro: AAOx4, CN2-12 grossly intact BL; motor, sensory, and strength grossly intact BL  HEENT: NTNC, EOMI, PERRLA, MMM; no thyromegaly or lymphadenopathy; no JVD appreciated  CVS: RRR, no m/r/g; S1/S2 auscultated with no S3 or S4; capillary refill < 2 sec  Resp: lungs CTAB, no w/r/r; no belabored breathing or accessory muscle use appreciated   Abd: BS+ in all 4 quadrants; NTND, soft to palpation; no organomegaly appreciated   Extrem: pulses full, equal, and regular over all 4 extremities; no UE or LE edema BL       Significant Labs: All pertinent labs within the past 24 hours have been reviewed.    Significant Imaging: I have reviewed all pertinent imaging results/findings within the past 24 hours.  Assessment/Plan:     * Hypervolemia associated with renal insufficiency  - Interval history and physical exam findings as described above  -  Nephrology consulted and HD offered  - After 100cc fluid removal, pt requested to be removed from HD  - SBP>190  - Pt stated he was leaving AMA        VTE Risk Mitigation (From admission, onward)           Ordered     heparin (porcine) injection 5,000 Units  Every 8 hours         09/21/24 0341     IP VTE HIGH RISK PATIENT  Once         09/21/24 0340     Place sequential compression device  Until discontinued         09/21/24 0340                       On 09/21/2024, patient should be placed in hospital observation services under my care.        Tee Jin MD FACP  Attending Physician  Medical Director - [facility]  Department of Hospital Medicine  9/21/2024

## 2024-09-21 NOTE — NURSING
Patient wants  to get  out of machine , NP  Elo  notified . Alexander Gasca  was at the bedside  to educate the patient , who  refused  to listen  to NP. Np order to stop  dialysis treatment . Patient's  Bp was  high, Dr Lesli Holcomb  at the bedside was aware  who educate the the patient  about need  of stabilizing the BP  before patient go home , who refused to listen to Dr Holcomb and  want  to leave  in AMA .   AMA  paper filled by Dr Lesli Holcomb , Dr Lesli Holcomb and two nurses  witnessed .    248 ML of UF  was removed . Dialysis treatment ran  for 1 hour. HD  needles were deaccessed  and pressure held in each site  for 5 minutes . Charge nurse aware about   situation , who  wheeled  the patient to ED  from [facility] . Report  given to primary nurse in ED.

## 2024-09-21 NOTE — ED TRIAGE NOTES
"Enrique Martinez, a 59 y.o. male presents to the ED w/ complaint of dialysis problem. Pt reports going to dialysis today and he states that dialysis removed 2.7 L but could not remove any more fluid because he was "overloaded". Pt endorses SOB, 98% on RA. AAOX4, ambulatory    Triage note:  Chief Complaint   Patient presents with    Requesting Dialysis     Reports that he got dialysis today, but states they did not pull enough fluid off and he feels fluid overloaded, pt is requesting another session of dialysis     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

## 2024-09-21 NOTE — Clinical Note
Diagnosis: Hypervolemia associated with renal insufficiency [9044812]   Future Attending Provider: ELIAN GARNER [[id number]]

## 2024-09-21 NOTE — NURSING
Patient  arrived  in [facility] . AO*4 .  Left forearm fistula accessed  with 16G needles  .   Dialysis treatment started .   Report  received from DARIUS Maya  before patient's arrival .

## 2024-09-21 NOTE — ASSESSMENT & PLAN NOTE
ESRD on iHD MWF  Alvaro Saez  3:30  LFA AVF  EDW:  65 kg      Plan/Recommendations:  -Pt above EDW - HD today  -UF 2.5-3L  -renal diet   -1.5L fluid restrictions  -continue home dose lasix 80 mg PO BID   -pre and post HD weight

## 2024-09-21 NOTE — PROGRESS NOTES
Arrived per w/c. Patient denies sob. Had dialysis yesterday. Requests 2 hrs of dialysis and 2 liters fluid removal. JOSE Urbina NP here and aware.  
Yes

## 2024-09-21 NOTE — HOSPITAL COURSE
Shortly after being admitted, nephrology placed HD orders for volume removal. However, after 100cc removed, pt requested to be disconnected from HD and stated he was leaving AMA. SBP>190 at time of HM at bedside advising. Pt seen and examined prior to departure. Plan discussed with pt, who was not agreeable or amenable; ER precautions were given, and Mr. Martinez was subsequently discharged.

## 2024-09-21 NOTE — HPI
Enrique Martinez is a 59 y.o. male with PMHx significant for ESRD on HD MWF, HTN, BPH, GERD admitted to hospital medicine for hypervolemia and HTN urgency. Patient reports worsening dyspnea throughout today. Endorses compliance with HD where 2.7L were removed though he was told that he was 4L over on exam. He also states that he is 4 lbs over his dry weight today. Reports compliance with all medications. Denies fever/chills, diaphoresis, lightheadedness, HA,  CP, cough, congestion, abdominal pain, n/v/d, urinary symptoms, changes in BMs, numbness/tingling, weakness.       In the ED, hypertensive to 238/125. Remaining vitals stable. H/H 8.5/27.3. K 3.4. Cr/BUN 4.4/21. CXR with Increased pulmonary vascular engorgement and central interstitial pulmonary markings, possibly related to pulmonary venous hypertension and interstitial pulmonary edema. CHF is a consideration. Interstitial pneumonia or other pathology not fully excluded. Given duoneb x3, clonidine patch x1, labetalol 300mg PO x1, losartan 100mg x1, nifepidine 90mg x1, and oxycodone 10mg x1. Hospital medicine was consulted for admission and further management.

## 2024-09-21 NOTE — HPI
60 y/o M with PMH of ESRD on HD MWF (last HD 9/20) presents to the ED for evaluation of worsening dyspnea and BLE edema. Patient reports he was 4L up, but staff at HD told him they could only take 2.7 L off. He has had continued worsening dyspnea since his HD session. He reports he is 4 lbs over his dry weight. He also complains of left lower extremity pain over a large surgical incision that was for a blood clot removal. Denies any other complaints. Denies chest pain. Chest x ray with edema. Nephrology consulted for ESRD.     HPI obtained via EMR and patient interview

## 2024-09-21 NOTE — ASSESSMENT & PLAN NOTE
- Interval history and physical exam findings as described above  - Nephrology consulted and HD offered  - After 100cc fluid removal, pt requested to be removed from HD  - SBP>190  - Pt stated he was leaving AMA

## 2024-09-21 NOTE — DISCHARGE SUMMARY
[facility] - Emergency Dept  [facility] Medicine  Discharge Summary      Patient Name: Enrique Martinez  MRN: [id number]  HANNAH: [id number]  Patient Class: OP- Observation  Admission Date: 9/21/2024  Hospital Length of Stay: 0 days  Discharge Date and Time:  09/21/2024 10:46 AM  Attending Physician: Tee Jin MD   Discharging Provider: Tee Jin MD  Primary Care Provider: Naomi, Primary Doctor  [facility] Team: [facility]  Tee Jin MD  Primary Care Team: [facility]    HPI:   Enrique Martinez is a 59 y.o. male with PMHx significant for ESRD on HD MWF, HTN, BPH, GERD admitted to hospital medicine for hypervolemia and HTN urgency. Patient reports worsening dyspnea throughout today. Endorses compliance with HD where 2.7L were removed though he was told that he was 4L over on exam. He also states that he is 4 lbs over his dry weight today. Reports compliance with all medications. Denies fever/chills, diaphoresis, lightheadedness, HA,  CP, cough, congestion, abdominal pain, n/v/d, urinary symptoms, changes in BMs, numbness/tingling, weakness.       In the ED, hypertensive to 238/125. Remaining vitals stable. H/H 8.5/27.3. K 3.4. Cr/BUN 4.4/21. CXR with Increased pulmonary vascular engorgement and central interstitial pulmonary markings, possibly related to pulmonary venous hypertension and interstitial pulmonary edema. CHF is a consideration. Interstitial pneumonia or other pathology not fully excluded. Given duoneb x3, clonidine patch x1, labetalol 300mg PO x1, losartan 100mg x1, nifepidine 90mg x1, and oxycodone 10mg x1. Hospital medicine was consulted for admission and further management.    * No surgery found *      Hospital Course:   Shortly after being admitted, nephrology placed HD orders for volume removal. However, after 100cc removed, pt requested to be disconnected from HD and stated he was leaving AMA. SBP>190 at time of HM at bedside advising. Pt seen and examined prior to departure.  Plan discussed with pt, who was not agreeable or amenable; ER precautions were given, and Mr. Martinez was subsequently discharged.       Goals of Care Treatment Preferences:  Code Status: Full Code         Consults:   Consults (From admission, onward)          Status Ordering Provider     Inpatient consult to Nephrology  Once        Provider:  (Not yet assigned)    HECTOR Solomon            No new Assessment & Plan notes have been filed under this hospital service since the last note was generated.  Service: Hospital Medicine    Final Active Diagnoses:    Diagnosis Date Noted POA    PRINCIPAL PROBLEM:  Hypervolemia associated with renal insufficiency [E87.70, N28.9] 09/21/2024 Yes    Hyperlipidemia [E78.5] 10/31/2023 Yes    Hypertensive urgency [I16.0] 04/05/2023 Yes    ESRD on hemodialysis [N18.6, Z99.2] 10/03/2022 Not Applicable     Chronic    Gastroesophageal reflux disease without esophagitis [K21.9] 12/26/2020 Yes    Benign prostatic hyperplasia [N40.0] 10/01/2020 Yes    Renovascular hypertension [I15.0] 09/18/2018 Yes     Chronic    Anemia due to chronic kidney disease, on chronic dialysis [N18.6, D63.1, Z99.2] 09/18/2018 Not Applicable    Hx of gout [Z87.39] 09/18/2018 Yes    Type 2 diabetes mellitus with chronic kidney disease on chronic dialysis, with long-term current use of insulin [E11.22, N18.6, Z99.2, Z79.4] 09/18/2018 Not Applicable     Chronic    S/P coronary artery stent placement [Z95.5] 09/18/2018 Not Applicable     Chronic      Problems Resolved During this Admission:       Discharged Condition: against medical advice    Disposition: Left Against Medical Adv*    Follow Up:    Patient Instructions:      Diet Cardiac     Diet diabetic     Diet renal     Notify your health care provider if you experience any of the following:  increased confusion or weakness     Notify your health care provider if you experience any of the following:  persistent dizziness, light-headedness, or visual  disturbances     Notify your health care provider if you experience any of the following:  worsening rash     Notify your health care provider if you experience any of the following:  severe persistent headache     Notify your health care provider if you experience any of the following:  difficulty breathing or increased cough     Notify your health care provider if you experience any of the following:  severe uncontrolled pain     Notify your health care provider if you experience any of the following:  persistent nausea and vomiting or diarrhea     Notify your health care provider if you experience any of the following:  temperature >100.4     Activity as tolerated       Significant Diagnostic Studies: N/A    Pending Diagnostic Studies:       None           Medications:  Reconciled Home Medications:      Medication List        CONTINUE taking these medications      albuterol 90 mcg/actuation inhaler  Commonly known as: PROVENTIL/VENTOLIN HFA  Inhale 2 puffs into the lungs every 6 (six) hours as needed for Wheezing or Shortness of Breath. Rescue     amoxicillin 500 MG capsule  Commonly known as: AMOXIL  Take 500 mg by mouth.     aspirin 81 MG EC tablet  Commonly known as: ECOTRIN  Take 1 tablet (81 mg total) by mouth once daily.     blood-glucose meter kit  Use as instructed     cinacalcet 30 MG Tab  Commonly known as: SENSIPAR  Take 1 tablet (30 mg total) by mouth every Mon, Wed, Fri.     cloNIDine 0.3 mg/24 hr td ptwk 0.3 mg/24 hr  Commonly known as: CATAPRES  Place 1 patch onto the skin every 7 days.     colchicine 0.6 mg tablet  Commonly known as: COLCRYS  Take 0.6 mg by mouth daily as needed.     famotidine 20 MG tablet  Commonly known as: PEPCID  Take 20 mg by mouth daily as needed for Heartburn.     furosemide 80 MG tablet  Commonly known as: LASIX  Take 80 mg by mouth 2 (two) times daily.     gabapentin 300 MG capsule  Commonly known as: NEURONTIN  Take 300 mg by mouth 3 (three) times daily.     guaiFENesin  100 mg/5 ml 100 mg/5 mL syrup  Commonly known as: ROBITUSSIN  Take 10 mLs (200 mg total) by mouth 3 (three) times daily as needed for Cough.     hydrALAZINE 100 MG tablet  Commonly known as: APRESOLINE  1 tablet with food Orally Three times a day for 30 days     HYDROcodone-acetaminophen 5-325 mg per tablet  Commonly known as: NORCO  Take 1 tablet by mouth every 6 (six) hours as needed for Pain.     labetaloL 300 MG tablet  Commonly known as: NORMODYNE  Take 1 tablet (300 mg total) by mouth 2 (two) times daily.     LIDOcaine-prilocaine cream  Commonly known as: EMLA  Apply topically 2 (two) times daily as needed.     loratadine 10 mg tablet  Commonly known as: CLARITIN  Take 10 mg by mouth once daily.     losartan 100 MG tablet  Commonly known as: COZAAR  Take 1 tablet (100 mg total) by mouth once daily.     melatonin 3 mg tablet  Commonly known as: MELATIN  Take 2 tablets (6 mg total) by mouth nightly as needed for Insomnia.     methocarbamoL 750 MG Tab  Commonly known as: ROBAXIN  Take 750 mg by mouth.     NIFEdipine 90 MG (OSM) 24 hr tablet  Commonly known as: PROCARDIA-XL  Take 1 tablet (90 mg total) by mouth 2 (two) times a day.     olopatadine 0.1 % ophthalmic solution  Commonly known as: PATANOL  Place into both eyes.     ondansetron 8 MG Tbdl  Commonly known as: ZOFRAN-ODT  Take 1 tablet (8 mg total) by mouth every 8 (eight) hours as needed.     pantoprazole 40 MG tablet  Commonly known as: PROTONIX  Take 40 mg by mouth every morning.     RENVELA 800 mg Tab  Generic drug: sevelamer carbonate  Take 800 mg by mouth 3 (three) times daily.     sildenafiL 100 MG tablet  Commonly known as: VIAGRA  Take 100 mg by mouth.     tadalafiL 2.5 mg Tab  Commonly known as: CIALIS  Take 2.5 mg by mouth.     tamsulosin 0.4 mg Cap  Commonly known as: FLOMAX  Take 1 capsule by mouth.     traMADoL 50 mg tablet  Commonly known as: ULTRAM  Take 50 mg by mouth 2 (two) times daily as needed.     TRUE METRIX GLUCOSE TEST STRIP  Strp  Generic drug: blood sugar diagnostic     valACYclovir 1000 MG tablet  Commonly known as: VALTREX  Take 0.5 tablets (500 mg total) by mouth once daily. for 7 days              Indwelling Lines/Drains at time of discharge:   Lines/Drains/Airways       Central Venous Catheter Line  Duration                  Hemodialysis Catheter 02/28/22 1300 right internal jugular 935 days              Drain  Duration                  Hemodialysis AV Fistula  Left forearm -- days                    Time spent on the discharge of patient: 35 minutes           Tee Jin MD FACP  Attending Physician  Medical Director - [facility] [place] [facility]  Department of [facility]  9/21/2024

## 2024-09-21 NOTE — ED NOTES
Nurses Note -- 4 Eyes      9/21/2024   1:06 AM      Skin assessed during: Admit      [] No Altered Skin Integrity Present    []Prevention Measures Documented      [x] Yes- Altered Skin Integrity Present or Discovered   [x] LDA Added if Not in Epic (Describe Wound)   [x] New Altered Skin Integrity was Present on Admit and Documented in LDA   [x] Wound Image Taken    Wound Care Consulted? No    Attending Nurse:  Isidoro Yepez RN/Staff Member:   Gregory

## 2024-10-31 NOTE — PROGRESS NOTES
3hr HD stopped 20 minutes early due to cramping. Cramping stopped after pt rinsed back. 1.6L of fluid removed. Both lumens of a RIJ permcath instilled with heparin, capped and taped. Report given to DARIUS Inman.   This patient contacted office for the following prescriptions to be filled:    Medication requested : Insulin Pen Needle (DROPLET PEN NEEDLES) 31G X 5 MM MISC   PCP: [facility]  Pharmacy or Print: Pharmacy   Mail order or Local pharmacy Local ([facility] [address] [place] [address] [phone] - F [phone] [[id number]]     Scheduled appointment if not seen by current providers in office: LOV: 10/15/24 UPCOMING: NONE

## 2024-11-14 NOTE — ASSESSMENT & PLAN NOTE
Initial labs significant for bicarb of 18, anion gap 19. AGMA likely 2/2 uremia.     - HD if amenable.   - Increase sodium bicarb to 1300 TID   Adult

## 2025-02-02 ENCOUNTER — HOSPITAL ENCOUNTER (EMERGENCY)
Facility: HOSPITAL | Age: 60
Discharge: LEFT BEFORE DISCHARGE | End: 2025-02-02
Attending: EMERGENCY MEDICINE
Payer: MEDICARE

## 2025-02-02 VITALS
DIASTOLIC BLOOD PRESSURE: 104 MMHG | HEIGHT: 65 IN | BODY MASS INDEX: 24.83 KG/M2 | OXYGEN SATURATION: 93 % | SYSTOLIC BLOOD PRESSURE: 215 MMHG | RESPIRATION RATE: 21 BRPM | WEIGHT: 149 LBS | HEART RATE: 87 BPM

## 2025-02-02 DIAGNOSIS — R06.02 SHORTNESS OF BREATH: ICD-10-CM

## 2025-02-02 DIAGNOSIS — J81.0 FLASH PULMONARY EDEMA: Primary | ICD-10-CM

## 2025-02-02 DIAGNOSIS — R06.02 SOB (SHORTNESS OF BREATH): ICD-10-CM

## 2025-02-02 DIAGNOSIS — R07.9 CHEST PAIN: ICD-10-CM

## 2025-02-02 LAB
ALBUMIN SERPL BCP-MCNC: 3.6 G/DL (ref 3.5–5.2)
ALP SERPL-CCNC: 153 U/L (ref 40–150)
ALT SERPL W/O P-5'-P-CCNC: 22 U/L (ref 10–44)
ANION GAP SERPL CALC-SCNC: 18 MMOL/L (ref 8–16)
AST SERPL-CCNC: 40 U/L (ref 10–40)
BASOPHILS # BLD AUTO: 0.06 K/UL (ref 0–0.2)
BASOPHILS NFR BLD: 0.6 % (ref 0–1.9)
BILIRUB SERPL-MCNC: 0.5 MG/DL (ref 0.1–1)
BNP SERPL-MCNC: 2326 PG/ML (ref 0–99)
BUN SERPL-MCNC: 63 MG/DL (ref 8–23)
CALCIUM SERPL-MCNC: 8.9 MG/DL (ref 8.7–10.5)
CHLORIDE SERPL-SCNC: 107 MMOL/L (ref 95–110)
CO2 SERPL-SCNC: 20 MMOL/L (ref 23–29)
CREAT SERPL-MCNC: 7.5 MG/DL (ref 0.5–1.4)
DIFFERENTIAL METHOD BLD: ABNORMAL
EOSINOPHIL # BLD AUTO: 0.1 K/UL (ref 0–0.5)
EOSINOPHIL NFR BLD: 1.3 % (ref 0–8)
ERYTHROCYTE [DISTWIDTH] IN BLOOD BY AUTOMATED COUNT: 16.8 % (ref 11.5–14.5)
EST. GFR  (NO RACE VARIABLE): 7.6 ML/MIN/1.73 M^2
GLUCOSE SERPL-MCNC: 125 MG/DL (ref 70–110)
HCT VFR BLD AUTO: 31 % (ref 40–54)
HGB BLD-MCNC: 9.8 G/DL (ref 14–18)
IMM GRANULOCYTES # BLD AUTO: 0.04 K/UL (ref 0–0.04)
IMM GRANULOCYTES NFR BLD AUTO: 0.4 % (ref 0–0.5)
LYMPHOCYTES # BLD AUTO: 1 K/UL (ref 1–4.8)
LYMPHOCYTES NFR BLD: 10.4 % (ref 18–48)
MCH RBC QN AUTO: 29.2 PG (ref 27–31)
MCHC RBC AUTO-ENTMCNC: 31.6 G/DL (ref 32–36)
MCV RBC AUTO: 92 FL (ref 82–98)
MONOCYTES # BLD AUTO: 0.5 K/UL (ref 0.3–1)
MONOCYTES NFR BLD: 5.4 % (ref 4–15)
NEUTROPHILS # BLD AUTO: 8.1 K/UL (ref 1.8–7.7)
NEUTROPHILS NFR BLD: 81.9 % (ref 38–73)
NRBC BLD-RTO: 0 /100 WBC
PLATELET # BLD AUTO: 163 K/UL (ref 150–450)
PMV BLD AUTO: 9.6 FL (ref 9.2–12.9)
POTASSIUM SERPL-SCNC: 4.6 MMOL/L (ref 3.5–5.1)
PROT SERPL-MCNC: 7.3 G/DL (ref 6–8.4)
RBC # BLD AUTO: 3.36 M/UL (ref 4.6–6.2)
SODIUM SERPL-SCNC: 145 MMOL/L (ref 136–145)
TROPONIN I SERPL DL<=0.01 NG/ML-MCNC: 82 NG/L (ref 0–35)
WBC # BLD AUTO: 9.94 K/UL (ref 3.9–12.7)

## 2025-02-02 PROCEDURE — 94660 CPAP INITIATION&MGMT: CPT

## 2025-02-02 PROCEDURE — 99285 EMERGENCY DEPT VISIT HI MDM: CPT | Mod: 25

## 2025-02-02 PROCEDURE — 25000003 PHARM REV CODE 250

## 2025-02-02 PROCEDURE — 83880 ASSAY OF NATRIURETIC PEPTIDE: CPT | Performed by: EMERGENCY MEDICINE

## 2025-02-02 PROCEDURE — 93005 ELECTROCARDIOGRAM TRACING: CPT

## 2025-02-02 PROCEDURE — 99900035 HC TECH TIME PER 15 MIN (STAT)

## 2025-02-02 PROCEDURE — 27100171 HC OXYGEN HIGH FLOW UP TO 24 HOURS

## 2025-02-02 PROCEDURE — 80053 COMPREHEN METABOLIC PANEL: CPT | Performed by: EMERGENCY MEDICINE

## 2025-02-02 PROCEDURE — 96366 THER/PROPH/DIAG IV INF ADDON: CPT

## 2025-02-02 PROCEDURE — 96365 THER/PROPH/DIAG IV INF INIT: CPT

## 2025-02-02 PROCEDURE — 93010 ELECTROCARDIOGRAM REPORT: CPT | Mod: ,,, | Performed by: INTERNAL MEDICINE

## 2025-02-02 PROCEDURE — 85025 COMPLETE CBC W/AUTO DIFF WBC: CPT | Performed by: EMERGENCY MEDICINE

## 2025-02-02 PROCEDURE — 63600175 PHARM REV CODE 636 W HCPCS

## 2025-02-02 PROCEDURE — 84484 ASSAY OF TROPONIN QUANT: CPT | Performed by: EMERGENCY MEDICINE

## 2025-02-02 PROCEDURE — 27000190 HC CPAP FULL FACE MASK W/VALVE

## 2025-02-02 PROCEDURE — 94761 N-INVAS EAR/PLS OXIMETRY MLT: CPT

## 2025-02-02 RX ORDER — LABETALOL 100 MG/1
300 TABLET, FILM COATED ORAL
Status: COMPLETED | OUTPATIENT
Start: 2025-02-02 | End: 2025-02-02

## 2025-02-02 RX ORDER — HYDRALAZINE HYDROCHLORIDE 25 MG/1
100 TABLET, FILM COATED ORAL
Status: COMPLETED | OUTPATIENT
Start: 2025-02-02 | End: 2025-02-02

## 2025-02-02 RX ORDER — HYDROCODONE BITARTRATE AND ACETAMINOPHEN 5; 325 MG/1; MG/1
1 TABLET ORAL
Status: COMPLETED | OUTPATIENT
Start: 2025-02-02 | End: 2025-02-02

## 2025-02-02 RX ORDER — NITROGLYCERIN 20 MG/100ML
5 INJECTION INTRAVENOUS CONTINUOUS
Status: DISCONTINUED | OUTPATIENT
Start: 2025-02-02 | End: 2025-02-03 | Stop reason: HOSPADM

## 2025-02-02 RX ORDER — FUROSEMIDE 40 MG/1
80 TABLET ORAL
Status: COMPLETED | OUTPATIENT
Start: 2025-02-02 | End: 2025-02-02

## 2025-02-02 RX ORDER — NIFEDIPINE 30 MG/1
90 TABLET, EXTENDED RELEASE ORAL
Status: COMPLETED | OUTPATIENT
Start: 2025-02-02 | End: 2025-02-02

## 2025-02-02 RX ADMIN — HYDROCODONE BITARTRATE AND ACETAMINOPHEN 1 TABLET: 5; 325 TABLET ORAL at 10:02

## 2025-02-02 RX ADMIN — LABETALOL HYDROCHLORIDE 300 MG: 100 TABLET, FILM COATED ORAL at 10:02

## 2025-02-02 RX ADMIN — NIFEDIPINE 90 MG: 30 TABLET, FILM COATED, EXTENDED RELEASE ORAL at 10:02

## 2025-02-02 RX ADMIN — FUROSEMIDE 80 MG: 40 TABLET ORAL at 10:02

## 2025-02-02 RX ADMIN — HYDRALAZINE HYDROCHLORIDE 100 MG: 25 TABLET ORAL at 10:02

## 2025-02-02 RX ADMIN — NITROGLYCERIN 400 MCG/MIN: 20 INJECTION INTRAVENOUS at 08:02

## 2025-02-03 LAB
OHS QRS DURATION: 104 MS
OHS QTC CALCULATION: 615 MS

## 2025-02-03 NOTE — ED TRIAGE NOTES
Enrique Martinez, a 59 y.o. male presents to the ED w/ complaint of SOB.     Was in the parking lot of [employer], became SOB. EMS found him tachypneic about 40 rpm. SpO2 was 76% on RA. End tidal was 27 and got up to 88% on 4L NC. Placed on C-pap and received 2 SL nitro en route.     Pt gets dialysis MWF, last dialysis was Friday, pt received full treatment.     Triage note:  Chief Complaint   Patient presents with    Shortness of Breath     SOB, RA SPO2 75%. Pt arrives on CPAP SPO2 95. 2 SL nitro en route. Dialysis MWF.     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

## 2025-02-03 NOTE — DISCHARGE INSTRUCTIONS
Return to ED if your symptoms persist or worsening including shortness of breath, dyspnea on exertion, chest pain, persistent nausea/vomiting, and fever/chills. Follow fluid restriction recommendations, take medication as prescribed, and continue with dialysis schedule.

## 2025-02-03 NOTE — ED NOTES
Pt had BM. Pt cleaned, noe-care completed. Pt gown changed, linens changed. Pt repositioned in bed.

## 2025-02-03 NOTE — ED PROVIDER NOTES
Source of History:  Patient and chart review    Chief complaint:  Shortness of Breath (SOB, RA SPO2 75%. Pt arrives on CPAP SPO2 95. 2 SL nitro en route. Dialysis MWF.)      HPI:  Enrique Martinez is a 59 y.o. male with a past medical history of ESRD on HD, CAD, T2DM, and HTN BIBEMS after patient called 911 secondary to acute onset shortness of breath and respiratory distress. Full HD session Friday with no symptoms afterward. AM medications taken. Denies associated chest pain, recent fever/chills, nauesa/vomiting, URI symptoms.    Per EMS patient found to be hypertensive with sBP 220. CPAP O2 requirement improved with 2 SL nitro en-route.      Review of patient's allergies indicates:  No Known Allergies    No current facility-administered medications on file prior to encounter.     Current Outpatient Medications on File Prior to Encounter   Medication Sig Dispense Refill    albuterol (PROVENTIL/VENTOLIN HFA) 90 mcg/actuation inhaler Inhale 2 puffs into the lungs every 6 (six) hours as needed for Wheezing or Shortness of Breath. Rescue 18 g 0    amoxicillin (AMOXIL) 500 MG capsule Take 500 mg by mouth.      aspirin (ECOTRIN) 81 MG EC tablet Take 1 tablet (81 mg total) by mouth once daily. 360 tablet 0    cloNIDine 0.3 mg/24 hr td ptwk (CATAPRES) 0.3 mg/24 hr Place 1 patch onto the skin every 7 days.      colchicine (COLCRYS) 0.6 mg tablet Take 0.6 mg by mouth daily as needed.      famotidine (PEPCID) 20 MG tablet Take 20 mg by mouth daily as needed for Heartburn.       furosemide (LASIX) 80 MG tablet Take 80 mg by mouth 2 (two) times daily.      gabapentin (NEURONTIN) 300 MG capsule Take 300 mg by mouth 3 (three) times daily.      guaiFENesin 100 mg/5 ml (ROBITUSSIN) 100 mg/5 mL syrup Take 10 mLs (200 mg total) by mouth 3 (three) times daily as needed for Cough. 180 mL 0    hydrALAZINE (APRESOLINE) 100 MG tablet 1 tablet with food Orally Three times a day for 30 days      HYDROcodone-acetaminophen (NORCO) 5-325 mg  per tablet Take 1 tablet by mouth every 6 (six) hours as needed for Pain. 12 tablet 0    LIDOcaine-prilocaine (EMLA) cream Apply topically 2 (two) times daily as needed.      loratadine (CLARITIN) 10 mg tablet Take 10 mg by mouth once daily.      melatonin (MELATIN) 3 mg tablet Take 2 tablets (6 mg total) by mouth nightly as needed for Insomnia.  0    methocarbamoL (ROBAXIN) 750 MG Tab Take 750 mg by mouth.      olopatadine (PATANOL) 0.1 % ophthalmic solution Place into both eyes.      ondansetron (ZOFRAN-ODT) 8 MG TbDL Take 1 tablet (8 mg total) by mouth every 8 (eight) hours as needed.      pantoprazole (PROTONIX) 40 MG tablet Take 40 mg by mouth every morning.      RENVELA 800 mg Tab Take 800 mg by mouth 3 (three) times daily.      sildenafiL (VIAGRA) 100 MG tablet Take 100 mg by mouth.      tadalafiL (CIALIS) 2.5 mg Tab Take 2.5 mg by mouth.      tamsulosin (FLOMAX) 0.4 mg Cap Take 1 capsule by mouth.      traMADoL (ULTRAM) 50 mg tablet Take 50 mg by mouth 2 (two) times daily as needed.      TRUE METRIX GLUCOSE TEST STRIP Strp       blood-glucose meter kit Use as instructed      cinacalcet (SENSIPAR) 30 MG Tab Take 1 tablet (30 mg total) by mouth every Mon, Wed, Fri. 38 tablet 1    labetaloL (NORMODYNE) 300 MG tablet Take 1 tablet (300 mg total) by mouth 2 (two) times daily. 60 tablet 11    losartan (COZAAR) 100 MG tablet Take 1 tablet (100 mg total) by mouth once daily. 90 tablet 3    NIFEdipine (PROCARDIA-XL) 90 MG (OSM) 24 hr tablet Take 1 tablet (90 mg total) by mouth 2 (two) times a day. 60 tablet 2    valACYclovir (VALTREX) 1000 MG tablet Take 0.5 tablets (500 mg total) by mouth once daily. for 7 days 4 tablet 0    [DISCONTINUED] chlorthalidone (HYGROTEN) 50 MG Tab Take 50 mg by mouth once daily.       [DISCONTINUED] insulin (LANTUS SOLOSTAR U-100 INSULIN) glargine 100 units/mL (3mL) SubQ pen Inject 5 Units into the skin once daily.         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      Past Surgical History:   Procedure Laterality Date    ABDOMINAL SURGERY  1980s    AV FISTULA PLACEMENT Left 1/5/2023    Procedure: CREATION, AV FISTULA RADIOCEPHALIC;  Surgeon: ROHITH Pickard II, MD;  Location: [facility] OR 2ND FLR;  Service: Vascular;  Laterality: Left;    CARDIAC CATHETERIZATION  2007    x 2    COLONOSCOPY N/A 4/5/2019    Procedure: COLONOSCOPY;  Surgeon: Jose L Carty MD;  Location: [facility] ENDO (4TH FLR);  Service: Colon and Rectal;  Laterality: N/A;    CORONARY STENT PLACEMENT  2007       Social History     Socioeconomic History    Marital status:    Tobacco Use    Smoking status: Some Days     Current packs/day: 0.25     Average packs/day: 0.3 packs/day for 10.0 years (2.5 ttl pk-yrs)     Types: Cigarettes    Smokeless tobacco: Never   Substance and Sexual Activity    Alcohol use: No    Drug use: No     Social Drivers of Health     Financial Resource Strain: Medium Risk (3/15/2023)    Overall Financial Resource Strain (CARDIA)     Difficulty of Paying Living Expenses: Somewhat hard   Food Insecurity: No Food Insecurity (10/9/2024)    Received from [facility]    Hunger Vital Sign     Worried About Running Out of Food in the Last Year: Never true     Ran Out of Food in the Last Year: Never true   Transportation Needs: No Transportation Needs (10/9/2024)    Received from [facility]    PRAPARE - Transportation     Lack of Transportation (Medical): No     Lack of Transportation (Non-Medical): No    Physical Activity: Inactive (4/6/2023)    Exercise Vital Sign     Days of Exercise per Week: 0 days     Minutes of Exercise per Session: 0 min   Stress: No Stress Concern Present (4/6/2023)    [employer] - Occupational Stress Questionnaire     Feeling of Stress : Only a little   Housing Stability: Low Risk  (10/9/2024)    Received from [facility]    Housing Stability Vital Sign     Unable to Pay for Housing in the Last Year: No     Number of Times Moved in the Last Year: 0     Homeless in the Last Year: No       Family History   Problem Relation Name Age of Onset    Hypertension Father      Stroke Father      Heart attack Father      Hyperlipidemia Father      No Known Problems Sister      Drug abuse Brother      Heart attack Brother      Hypertension Brother         Physical Exam:      Vitals:    02/02/25 2300   BP: (!) 215/104   Pulse: 87   Resp: (!) 21     Physical Exam    Gen: Acute respiratory distress  Mental Status: Alert and oriented, answering questions appropriately  Skin: Warm, dry. No rashes seen.  Eyes: No conjunctival injection.  Pulm: Increased work of breath, tachypnea, CPAP in place, unable to speak in short phrases, diffuse rales bilaterally  CV: Normocardic, regular rhythm with no murmurs gallops or rubs appreciated  Abd: Soft. Not distended. Nontender to palpation. No guarding or rebound.   MSK: No deformities. WWP. No unilateral swelling, erythema or palpable cords. Minimal lower extremity edema. LUE AVF with good thrill.  Neuro: Awake. Speech normal. No focal neuro deficit observed.      Procedures  Laboratory Studies:  Labs Reviewed   CBC W/ AUTO DIFFERENTIAL - Abnormal       Result Value    WBC 9.94      RBC 3.36 (*)     Hemoglobin 9.8 (*)     Hematocrit 31.0 (*)     MCV 92      MCH 29.2      MCHC 31.6 (*)     RDW 16.8 (*)     Platelets 163      MPV 9.6      Immature Granulocytes 0.4      Gran # (ANC) 8.1 (*)     Immature Grans (Abs) 0.04      Lymph # 1.0       "Mono # 0.5      Eos # 0.1      Baso # 0.06      nRBC 0      Gran % 81.9 (*)     Lymph % 10.4 (*)     Mono % 5.4      Eosinophil % 1.3      Basophil % 0.6      Differential Method Automated     COMPREHENSIVE METABOLIC PANEL - Abnormal    Sodium 145      Potassium 4.6      Chloride 107      CO2 20 (*)     Glucose 125 (*)     BUN 63 (*)     Creatinine 7.5 (*)     Calcium 8.9      Total Protein 7.3      Albumin 3.6      Total Bilirubin 0.5      Alkaline Phosphatase 153 (*)     AST 40      ALT 22      eGFR 7.6 (*)     Anion Gap 18 (*)    TROPONIN I HIGH SENSITIVITY - Abnormal    Troponin I High Sensitivity 82 (*)    B-TYPE NATRIURETIC PEPTIDE - Abnormal    BNP 2,326 (*)          Imaging Results              X-Ray Chest 1 View (Final result)  Result time 02/02/25 22:15:29      Final result by Rangel Duran MD (02/02/25 22:15:29)                   Impression:      Small right pleural effusion versus chronic blunting of the right costophrenic angle.  No detrimental change when compared with 09/21/2024.      Electronically signed by: Rangel Duran MD  Date:    02/02/2025  Time:    22:15               Narrative:    EXAMINATION:  XR CHEST 1 VIEW    CLINICAL HISTORY:  Provided history is "  Shortness of breath".    TECHNIQUE:  One view of the chest.    COMPARISON:  09/21/2024.    FINDINGS:  Cardiac wires overlie the chest.  Cardiomediastinal silhouette is enlarged.  Atherosclerotic calcifications overlie the aortic arch.  Coarse interstitial lung markings.  No large focal consolidation.  Right hemidiaphragm is elevated with probable small right pleural effusion versus chronic blunting of the right costophrenic angle, similar to prior.  No large pleural effusion.  No distinct pneumothorax.  Operative changes in the left shoulder.                                      Medications Given:  Medications   furosemide tablet 80 mg (80 mg Oral Given 2/2/25 2637)   hydrALAZINE tablet 100 mg (100 mg Oral Given 2/2/25 5022) "   HYDROcodone-acetaminophen 5-325 mg per tablet 1 tablet (1 tablet Oral Given 2/2/25 2247)   labetaloL tablet 300 mg (300 mg Oral Given 2/2/25 2246)   NIFEdipine 24 hr tablet 90 mg (90 mg Oral Given 2/2/25 2245)         ED Course as of 02/03/25 1316   Sun Feb 02, 2025 2058 Patient greatly symptomatically improved.  Blood pressure now systolic 179.  Nitro drip turned off.  Patient feels that he is breathing much better.  Respiratory distress has resolved, patient is breathing comfortably on BiPAP [RT]   2101 Patient is a 59-year-old male with past medical history ESRD on dialysis, still makes urine, peripheral vascular disease status post left fem-pop 8/24, hypertension, hyperlipidemia, BPH, CAD, stents that is presenting for sudden onset respiratory distress.  As per EMS report, EMS reports that patient called EMS for sudden onset of shortness of breath.  EMS found patient to be 75% on room air, placed on CPAP, given sublingual nitro.  Patient dialyzed last Friday, states that he was at baseline health when he had sudden onset shortness of breath and respiratory distress.  Patient denies any fevers, chills, chest pains, nausea, vomiting, diarrhea, abdominal pain.    Physical exam: 59-year-old male, initially respiratory distress improved with BiPAP, now breathing comfortably on BiPAP after nitro drip.  Patient initially was severely hypertensive with systolic 220, now pressure 179.  Initially patient had decreased air movement with bilateral crackles, now air moving improved throughout both lung fields.  Benign cardiac, abdominal exam.  Minimal pitting edema bilateral lower extremity.    Plan:  Consider flash pulmonary edema secondary to hypertensive episode.  The patient has greatly improved.  Consider admission for respiratory distress. [RT]   2101 Attestation of Dr. Deshpande (Attending Physician):      I have reviewed the record and the patient care provided by the Resident provider and agree with the documented  HPI, ROS, PE.  I also agree with the treatment plan and work up and I have performed an independent history / physical exam and MDM.   [RT]   2257 Patient has decided to leave against medical advice.  The patient is breathing comfortably on room air at this time.  Patient understands that we have recommended hospital admission for flash pulmonary edema and hypertensive emergency. [RT]   2258 Patient understands leaving against medical advice at this time patient is at risk for death, respiratory distress, respiratory failure, cardiac failure. [RT]      ED Course User Index  [RT] Valdemar Deshpande MD       Discussed with attending physician Dr. Deshpande    Medical Decision Making  Enrique Martinez is a 59 y.o. male with above past medical history presents with sympathetic crashing acute pulmonary edema  Patient is afebrile, hemodynamically stable, and in significant respiratory distress requiring CPAP.    Differential diagnoses considered include, but not limited to:  Sympathetic crashing acute pulmonary edema, flash pulmonary edema, fluid overload, COPD exacerbation, CHF exacerbation, anaphylaxis, ACS    Patient immediately placed on CPAP and started on a nitro drip with significant improvement in his symptoms over the course of a few hours.  Per my interpretation, EKG demonstrates normal sinus rate and rhythm. Labs significant for elevated BNP and troponin but otherwise nonactionable.  Chest x-ray per my interpretation demonstrates interstitial lung markings with no lobar consolidation. Throughout course patient remained continued to improve and was ultimately on room air with no increased work of breathing or hypoxia. Patient was advised to be admitted to the hospital given his significant respiratory distress upon arrival.  Patient insistent on leaving against medical advice despite being educated and understanding the risks including return of symptoms, acute hypoxic respiratory failure, and death.          Amount  and/or Complexity of Data Reviewed  Labs: ordered.  Radiology: ordered.    Risk  Prescription drug management.         Diagnostic Impression:    1. Flash pulmonary edema    2. SOB (shortness of breath)    3. Chest pain    4. Shortness of breath         ED Disposition Condition    AMA Stable               Patient and/or family understands the plan and is in agreement, verbalized understanding, questions answered    Sam Carrasco MD  Resident  Emergency Medicine                              Sam Carrasco MD  Resident  02/03/25 0157       Valdemar Deshpande MD  02/03/25 6540

## 2025-02-03 NOTE — ED NOTES
Pt called care team into room to discuss leaving AMA. Care team educated pt on importance of being admitted and advised him that it was unsafe to go home.  PT verbalized understanding of risks of leaving AMA and still wishes to leave AMA.  AMA forms signed.

## 2025-02-10 ENCOUNTER — HOSPITAL ENCOUNTER (EMERGENCY)
Facility: HOSPITAL | Age: 60
Discharge: HOME | End: 2025-02-10
Attending: STUDENT IN AN ORGANIZED HEALTH CARE EDUCATION/TRAINING PROGRAM
Payer: MEDICARE

## 2025-02-10 VITALS
BODY MASS INDEX: 23.76 KG/M2 | TEMPERATURE: 99 F | HEART RATE: 72 BPM | WEIGHT: 142.63 LBS | HEIGHT: 65 IN | SYSTOLIC BLOOD PRESSURE: 231 MMHG | DIASTOLIC BLOOD PRESSURE: 125 MMHG | RESPIRATION RATE: 17 BRPM | OXYGEN SATURATION: 95 %

## 2025-02-10 DIAGNOSIS — R06.02 SHORTNESS OF BREATH: ICD-10-CM

## 2025-02-10 LAB
ALBUMIN SERPL BCP-MCNC: 3.3 G/DL (ref 3.5–5.2)
ALP SERPL-CCNC: 120 U/L (ref 40–150)
ALT SERPL W/O P-5'-P-CCNC: 11 U/L (ref 10–44)
ANION GAP SERPL CALC-SCNC: 13 MMOL/L (ref 8–16)
AST SERPL-CCNC: 27 U/L (ref 10–40)
BASOPHILS # BLD AUTO: 0.02 K/UL (ref 0–0.2)
BASOPHILS NFR BLD: 0.4 % (ref 0–1.9)
BILIRUB SERPL-MCNC: 0.5 MG/DL (ref 0.1–1)
BNP SERPL-MCNC: 2444 PG/ML (ref 0–99)
BUN SERPL-MCNC: 24 MG/DL (ref 8–23)
CALCIUM SERPL-MCNC: 8.8 MG/DL (ref 8.7–10.5)
CHLORIDE SERPL-SCNC: 103 MMOL/L (ref 95–110)
CO2 SERPL-SCNC: 22 MMOL/L (ref 23–29)
CREAT SERPL-MCNC: 4.5 MG/DL (ref 0.5–1.4)
CTP QC/QA: YES
D DIMER PPP IA.FEU-MCNC: 1.2 MG/L FEU
DIFFERENTIAL METHOD BLD: ABNORMAL
EOSINOPHIL # BLD AUTO: 0.1 K/UL (ref 0–0.5)
EOSINOPHIL NFR BLD: 1.7 % (ref 0–8)
ERYTHROCYTE [DISTWIDTH] IN BLOOD BY AUTOMATED COUNT: 17.2 % (ref 11.5–14.5)
EST. GFR  (NO RACE VARIABLE): 14 ML/MIN/1.73 M^2
GLUCOSE SERPL-MCNC: 57 MG/DL (ref 70–110)
HCT VFR BLD AUTO: 26.6 % (ref 40–54)
HGB BLD-MCNC: 8.6 G/DL (ref 14–18)
IMM GRANULOCYTES # BLD AUTO: 0.02 K/UL (ref 0–0.04)
IMM GRANULOCYTES NFR BLD AUTO: 0.4 % (ref 0–0.5)
LYMPHOCYTES # BLD AUTO: 0.6 K/UL (ref 1–4.8)
LYMPHOCYTES NFR BLD: 11.9 % (ref 18–48)
MAGNESIUM SERPL-MCNC: 1.5 MG/DL (ref 1.6–2.6)
MCH RBC QN AUTO: 29.1 PG (ref 27–31)
MCHC RBC AUTO-ENTMCNC: 32.3 G/DL (ref 32–36)
MCV RBC AUTO: 90 FL (ref 82–98)
MONOCYTES # BLD AUTO: 0.4 K/UL (ref 0.3–1)
MONOCYTES NFR BLD: 8.5 % (ref 4–15)
NEUTROPHILS # BLD AUTO: 3.6 K/UL (ref 1.8–7.7)
NEUTROPHILS NFR BLD: 77.1 % (ref 38–73)
NRBC BLD-RTO: 0 /100 WBC
PHOSPHATE SERPL-MCNC: 3.6 MG/DL (ref 2.7–4.5)
PLATELET # BLD AUTO: 160 K/UL (ref 150–450)
PMV BLD AUTO: 9 FL (ref 9.2–12.9)
POC MOLECULAR INFLUENZA A AGN: NEGATIVE
POC MOLECULAR INFLUENZA B AGN: NEGATIVE
POTASSIUM SERPL-SCNC: 4.1 MMOL/L (ref 3.5–5.1)
PROT SERPL-MCNC: 6.5 G/DL (ref 6–8.4)
RBC # BLD AUTO: 2.96 M/UL (ref 4.6–6.2)
SARS-COV-2 RDRP RESP QL NAA+PROBE: NEGATIVE
SARS-COV-2 RDRP RESP QL NAA+PROBE: NEGATIVE
SODIUM SERPL-SCNC: 138 MMOL/L (ref 136–145)
TROPONIN I SERPL DL<=0.01 NG/ML-MCNC: 0.06 NG/ML (ref 0–0.03)
WBC # BLD AUTO: 4.69 K/UL (ref 3.9–12.7)

## 2025-02-10 PROCEDURE — 99285 EMERGENCY DEPT VISIT HI MDM: CPT | Mod: 25

## 2025-02-10 PROCEDURE — 83735 ASSAY OF MAGNESIUM: CPT | Performed by: STUDENT IN AN ORGANIZED HEALTH CARE EDUCATION/TRAINING PROGRAM

## 2025-02-10 PROCEDURE — 85379 FIBRIN DEGRADATION QUANT: CPT | Performed by: STUDENT IN AN ORGANIZED HEALTH CARE EDUCATION/TRAINING PROGRAM

## 2025-02-10 PROCEDURE — 96374 THER/PROPH/DIAG INJ IV PUSH: CPT

## 2025-02-10 PROCEDURE — 93010 ELECTROCARDIOGRAM REPORT: CPT | Mod: ,,, | Performed by: INTERNAL MEDICINE

## 2025-02-10 PROCEDURE — 87635 SARS-COV-2 COVID-19 AMP PRB: CPT | Performed by: STUDENT IN AN ORGANIZED HEALTH CARE EDUCATION/TRAINING PROGRAM

## 2025-02-10 PROCEDURE — 80053 COMPREHEN METABOLIC PANEL: CPT | Performed by: STUDENT IN AN ORGANIZED HEALTH CARE EDUCATION/TRAINING PROGRAM

## 2025-02-10 PROCEDURE — 84100 ASSAY OF PHOSPHORUS: CPT | Performed by: STUDENT IN AN ORGANIZED HEALTH CARE EDUCATION/TRAINING PROGRAM

## 2025-02-10 PROCEDURE — 63600175 PHARM REV CODE 636 W HCPCS: Performed by: STUDENT IN AN ORGANIZED HEALTH CARE EDUCATION/TRAINING PROGRAM

## 2025-02-10 PROCEDURE — 87502 INFLUENZA DNA AMP PROBE: CPT

## 2025-02-10 PROCEDURE — 84484 ASSAY OF TROPONIN QUANT: CPT | Performed by: STUDENT IN AN ORGANIZED HEALTH CARE EDUCATION/TRAINING PROGRAM

## 2025-02-10 PROCEDURE — 85025 COMPLETE CBC W/AUTO DIFF WBC: CPT | Performed by: STUDENT IN AN ORGANIZED HEALTH CARE EDUCATION/TRAINING PROGRAM

## 2025-02-10 PROCEDURE — 93005 ELECTROCARDIOGRAM TRACING: CPT

## 2025-02-10 PROCEDURE — 83880 ASSAY OF NATRIURETIC PEPTIDE: CPT | Performed by: STUDENT IN AN ORGANIZED HEALTH CARE EDUCATION/TRAINING PROGRAM

## 2025-02-10 RX ORDER — FUROSEMIDE 10 MG/ML
120 INJECTION INTRAMUSCULAR; INTRAVENOUS
Status: COMPLETED | OUTPATIENT
Start: 2025-02-10 | End: 2025-02-10

## 2025-02-10 RX ADMIN — FUROSEMIDE 120 MG: 10 INJECTION, SOLUTION INTRAMUSCULAR; INTRAVENOUS at 05:02

## 2025-02-10 NOTE — ED PROVIDER NOTES
ED Provider Note - 2/10/2025    History     Chief Complaint   Patient presents with    Shortness of Breath     Patient reports continued shortness of breath and dyspnea since dialysis earlier today. Patient states did full dialysis session today with no improvement. Was told to report to the ED. AV fistula to left forearm. Clonidine patches to torso noted in triage.       HPI     Enrique Martinez is a 59 y.o. year old male with past medical and surgical history as seen below, presenting with chief complaint of shortness of breath.  Patient states it started when he got home from his dialysis session.  Reports he has 2 lb over his dry weight.  Had max amount pulled off during dialysis session earlier today.  States that he is still makes urine and did take some oral Lasix at home today.      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      Past Surgical History:   Procedure Laterality Date    ABDOMINAL SURGERY  1980s    AV FISTULA PLACEMENT Left 1/5/2023    Procedure: CREATION, AV FISTULA RADIOCEPHALIC;  Surgeon: ROHITH Pickard II, MD;  [facility];  Service: Vascular;  Laterality: Left;    CARDIAC CATHETERIZATION  2007    x 2    COLONOSCOPY N/A 4/5/2019    Procedure: COLONOSCOPY;  Surgeon: Jose L Carty MD;  Location: [facility] ENDO (4TH FLR);  Service: Colon and Rectal;  Laterality: N/A;    CORONARY STENT PLACEMENT  2007         Family History   Problem Relation Name Age of Onset    Hypertension Father      Stroke Father       Heart attack Father      Hyperlipidemia Father      No Known Problems Sister      Drug abuse Brother      Heart attack Brother      Hypertension Brother       Social History     Tobacco Use    Smoking status: Some Days     Current packs/day: 0.25     Average packs/day: 0.3 packs/day for 10.0 years (2.5 ttl pk-yrs)     Types: Cigarettes    Smokeless tobacco: Never   Substance Use Topics    Alcohol use: No    Drug use: No     Social Drivers of Health with Concerns     Tobacco Use: Medium Risk (2/6/2025)    Received from [facility]    Patient History     Smoking Tobacco Use: Former     Smokeless Tobacco Use: Never     Passive Exposure: Not on file   Financial Resource Strain: Medium Risk (3/15/2023)    Overall Financial Resource Strain (CARDIA)     Difficulty of Paying Living Expenses: Somewhat hard   Physical Activity: Inactive (4/6/2023)    Exercise Vital Sign     Days of Exercise per Week: 0 days     Minutes of Exercise per Session: 0 min   Depression: Not on file   Health Literacy: Not on file   Social Isolation: Not on file      Review of patient's allergies indicates:  No Known Allergies    Review of Systems     A full Review of Systems (ROS) was performed and was negative unless otherwise stated in the HPI.      Physical Exam     Vitals:    02/10/25 1905 02/10/25 2009 02/10/25 2012 02/10/25 2013   BP: (!) 234/116  (!) 231/125    BP Location: Right arm      Patient Position: Lying      Pulse: 74 72     Resp: 16 17     Temp: 98.8 °F (37.1 °C)      SpO2: (!) 92% 97%  (S) 95%  Comment: ambulatory   Weight:       Height:            Physical Exam    Nursing note and vitals reviewed.  Constitutional: He appears well-developed and well-nourished. No distress.   HENT:   Head: Normocephalic and atraumatic.   Right Ear: External ear normal.   Left Ear: External ear normal.   Nose: Nose normal. Mouth/Throat: Oropharynx is clear and moist.   Eyes: Conjunctivae and EOM are normal. Pupils are equal, round, and reactive  to light.   Neck: Neck supple.   Normal range of motion.  Cardiovascular:  Normal rate and regular rhythm.           No murmur heard.  Pulmonary/Chest: Breath sounds normal. No stridor. No respiratory distress. He has no wheezes. He has no rhonchi. He has no rales.   Abdominal: Abdomen is soft. Bowel sounds are normal. There is no abdominal tenderness.   Musculoskeletal:         General: No edema. Normal range of motion.      Cervical back: Normal range of motion and neck supple.      Comments: Palpable thrill to left upper extremity     Neurological: He is alert and oriented to person, place, and time. He has normal strength. No cranial nerve deficit or sensory deficit.   Skin: Skin is warm and dry. No rash noted.   Psychiatric: He has a normal mood and affect. Thought content normal.         Lab Results- Independently reviewed by myself      Labs Reviewed   CBC W/ AUTO DIFFERENTIAL - Abnormal       Result Value    WBC 4.69      RBC 2.96 (*)     Hemoglobin 8.6 (*)     Hematocrit 26.6 (*)     MCV 90      MCH 29.1      MCHC 32.3      RDW 17.2 (*)     Platelets 160      MPV 9.0 (*)     Immature Granulocytes 0.4      Gran # (ANC) 3.6      Immature Grans (Abs) 0.02      Lymph # 0.6 (*)     Mono # 0.4      Eos # 0.1      Baso # 0.02      nRBC 0      Gran % 77.1 (*)     Lymph % 11.9 (*)     Mono % 8.5      Eosinophil % 1.7      Basophil % 0.4      Differential Method Automated     COMPREHENSIVE METABOLIC PANEL - Abnormal    Sodium 138      Potassium 4.1      Chloride 103      CO2 22 (*)     Glucose 57 (*)     BUN 24 (*)     Creatinine 4.5 (*)     Calcium 8.8      Total Protein 6.5      Albumin 3.3 (*)     Total Bilirubin 0.5      Alkaline Phosphatase 120      AST 27      ALT 11      eGFR 14 (*)     Anion Gap 13     TROPONIN I - Abnormal    Troponin I 0.063 (*)    B-TYPE NATRIURETIC PEPTIDE - Abnormal    BNP 2,444 (*)    D DIMER, QUANTITATIVE - Abnormal    D-Dimer 1.20 (*)    MAGNESIUM - Abnormal    Magnesium 1.5 (*)     PHOSPHORUS    Phosphorus 3.6     SARS-COV-2 RDRP GENE    POC Rapid COVID Negative       Acceptable Yes     POCT INFLUENZA A/B MOLECULAR    POC Molecular Influenza A Ag Negative      POC Molecular Influenza B Ag Negative       Acceptable Yes     SARS-COV-2 RDRP GENE    POC Rapid COVID Negative       Acceptable Yes             Imaging     Imaging Results              X-Ray Chest 1 View (Final result)  Result time 02/10/25 18:14:19      Final result by Gary Shaffer DO (02/10/25 18:14:19)                   Impression:      No acute abnormality.      Electronically signed by: Gary Shaffer  Date:    02/10/2025  Time:    18:14               Narrative:    EXAMINATION:  XR CHEST 1 VIEW    CLINICAL HISTORY:  shortness of breath;    TECHNIQUE:  Single frontal view of the chest was performed.    COMPARISON:  02/02/2025.    FINDINGS:  There are postop changes of reverse total shoulder arthroplasty on the left.  The lungs are well expanded and clear. No focal opacities are seen. The pleural spaces are clear. The cardiac silhouette is enlarged.  There is tortuosity of the thoracic aorta.  There are calcifications of the aortic arch.  The visualized osseous structures are unremarkable.                                    X-Rays:   Independently Interpreted Readings:   Chest X-Ray: No infiltrates.      EKG Readings: (Independently Interpreted)   Normal sinus rhythm   Rate 78   Normal axis   Artifact in V3      No STEMI           ED Course         Procedures         Orders Placed This Encounter    X-Ray Chest 1 View    CBC Auto Differential    Comprehensive Metabolic Panel    Troponin I    Brain Natriuretic Peptide    D-Dimer, Quantitative    Magnesium    Phosphorus    POCT COVID-19 Rapid Screening    POCT Influenza A/B Molecular    POCT COVID-19 Rapid Screening    EKG 12-lead    furosemide injection 120 mg                      Medical Decision Making       The patient's  list of active medical problems, social history, medications, and allergies as documented per RN staff has been reviewed.           Medical Decision Making  This is a 59 y.o. male presenting for shortness of breath.  Physical exam with clear lung sounds and without signs of increased work of breathing.  Satting between 98 and 100% on room air.    Differential discussion:  Negative troponin and unremarkable EKG and patient without chest pain making ACS unlikely.  D-dimer positive in setting of patient minimal objective signs of shortness of breath and inability to clear dimer due to ESRD do not feel a large contrast bolus would be beneficial at this time.    Patient without wheezing or significant smoking history making asthma and COPD unlikely.  Chest x-ray without evidence of pneumothorax, pneumonia, pulmonary edema, or pleural effusion.  Chest x-ray also without signs of fibrotic change or interstitial lung disease.  No indication of acidosis or other metabolic cause of SOB.      At time of reassessment, patient stating improvement of symptoms and asking for discharge.  At this point, patient appears safe for discharge and outpatient follow up. Return precautions given for worsening or changing symptoms.     Problems Addressed:  Shortness of breath: acute illness or injury    Amount and/or Complexity of Data Reviewed  External Data Reviewed: labs, radiology, ECG and notes.     Details: Records reviewed:   [facility] February 6[facility]   [facility]nd, [facility]  Labs: ordered.  Radiology: ordered and independent interpretation performed.  ECG/medicine tests: ordered and independent interpretation performed.    Risk  Prescription drug management.  Diagnosis or treatment significantly limited by social determinants of health.                    ED Prescriptions    None           Clinical Impression       Follow-up Information       Follow up With Specialties Details Why  Contact Info    Primary care provider of your choice  Schedule an appointment as soon as possible for a visit in 3 days For follow-up on today's visit.     [facility] Emergency Dept Emergency Medicine Go to  As needed, If symptoms worsen [address]  [facility] [address] [phone]            Referrals:  No orders of the defined types were placed in this encounter.      Disposition   ED Disposition Condition    Discharge Stable              Final diagnoses:  [R06.02] Shortness of breath        Darren Hall MD        02/15/2025          DISCLAIMER: This note was prepared with [employer] 360 voice recognition transcription software. Garbled syntax, mangled pronouns, and other bizarre constructions may be attributed to that software system.       Darren Hall MD  02/10/25 4091       Darren Hall MD  02/15/25 2044

## 2025-02-11 LAB
OHS QRS DURATION: 94 MS
OHS QTC CALCULATION: 478 MS

## 2025-02-11 NOTE — ED TRIAGE NOTES
Patient arrives to the ED w/ complaints of SOB. Reports completing full dialysis session this morning. Reports excess fluid buildup, but states dialysis nurse denied 2nd session. Reports SOB and dyspnea r/t fluid. Appears anxious, pursed-lip breathing. Reports post-surgical pain to L shin. Denies any pain elsewhere. Reports arrival to ED for 2nd dialysis session.

## 2025-02-11 NOTE — ED NOTES
Assumed care of pt. Pt currently in bed, talking on cell phone. NAD noted. Pt has no current complaints. Pt given ottoniel christiansen with md approval. Md also notified of current vitals. Call light within reach.

## 2025-04-25 RX ORDER — CLONIDINE 0.3 MG/24H
1 PATCH, EXTENDED RELEASE TRANSDERMAL
Qty: 12 PATCH | Refills: 3 | Status: SHIPPED | OUTPATIENT
Start: 2025-04-25 | End: 2026-04-25

## 2025-04-25 NOTE — PROGRESS NOTES
Seen and examined while on dialysis. Appears euvolemic. /104, the patient states clonidine patch fell off 2 days ago. Clonidine tab was given incenter. New clonidine patch script filled. Unfortunately Mary Grace Gunderson is down, so I am documenting it here.

## 2025-05-09 RX ORDER — NIFEDIPINE 90 MG/1
90 TABLET, EXTENDED RELEASE ORAL 2 TIMES DAILY
Qty: 60 TABLET | Refills: 2 | Status: SHIPPED | OUTPATIENT
Start: 2025-05-09 | End: 2025-08-07

## 2025-05-25 ENCOUNTER — HOSPITAL ENCOUNTER (EMERGENCY)
Facility: HOSPITAL | Age: 60
Discharge: HOME | End: 2025-05-25
Attending: EMERGENCY MEDICINE
Payer: MEDICARE

## 2025-05-25 VITALS
OXYGEN SATURATION: 96 % | TEMPERATURE: 98 F | BODY MASS INDEX: 23.66 KG/M2 | HEIGHT: 65 IN | HEART RATE: 78 BPM | SYSTOLIC BLOOD PRESSURE: 210 MMHG | RESPIRATION RATE: 16 BRPM | WEIGHT: 142 LBS | DIASTOLIC BLOOD PRESSURE: 101 MMHG

## 2025-05-25 DIAGNOSIS — I10 HYPERTENSION, UNSPECIFIED TYPE: ICD-10-CM

## 2025-05-25 DIAGNOSIS — T85.199A MALFUNCTION OF NERVE STIMULATOR LEAD, INITIAL ENCOUNTER: Primary | ICD-10-CM

## 2025-05-25 PROCEDURE — 25000003 PHARM REV CODE 250: Performed by: EMERGENCY MEDICINE

## 2025-05-25 PROCEDURE — 99283 EMERGENCY DEPT VISIT LOW MDM: CPT

## 2025-05-25 RX ORDER — CLONIDINE HYDROCHLORIDE 0.1 MG/1
0.2 TABLET ORAL
Status: COMPLETED | OUTPATIENT
Start: 2025-05-25 | End: 2025-05-25

## 2025-05-25 RX ADMIN — CLONIDINE HYDROCHLORIDE 0.2 MG: 0.1 TABLET ORAL at 07:05

## 2025-05-25 NOTE — DISCHARGE INSTRUCTIONS

## 2025-05-26 NOTE — ED NOTES
Pt has stimulator attached to left ear that became dislodged while taking off shirt. Pt states he just needs it taped in place until he can get to the clinic tomorrow. Pt hypertensive in triage but states he took his HTN medication immediately prior to arrival.    DISPLAY PLAN FREE TEXT

## 2025-05-28 NOTE — ED PROVIDER NOTES
Encounter Date: 5/25/2025       History     Chief Complaint   Patient presents with    Wound Check     Pt has stimulator to left ear. States it came off and needs it reattached.      Enrique Martinez is a 60 y.o. 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    The patient presents to the ED due to dislodged peripheral nerve stimulator. He uses it for neuropathy.  Patient has nerve stimulator device that adheres behind his ear over his mastoid by adhesive tape. He was attempting to get dressed and dislodged the device while putting on his shirt. He is here requesting tape to adhere the device until he can see his PCP tomorrow. He denies any other complaints. He states he went to dialysis as scheduled. He took his BP meds earlier before coming in however his blood pressure is still high. He denies shortness of breath, chest pain, headache dizziness or any other symptoms and is otherwise reporting he is in his normal state of health.       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     Umbilical hernia      Past Surgical History:   Procedure Laterality Date    ABDOMINAL SURGERY  1980s    AV FISTULA PLACEMENT Left 1/5/2023    Procedure: CREATION, AV FISTULA RADIOCEPHALIC;  Surgeon: ROHITH Pickard II, MD;  Location: [facility] OR 2ND FLR;  Service: Vascular;  Laterality: Left;    CARDIAC CATHETERIZATION  2007    x 2    COLONOSCOPY N/A 4/5/2019    Procedure: COLONOSCOPY;  Surgeon: Jose L Carty MD;  Location: [facility] ENDO (4TH FLR);  Service: Colon and Rectal;  Laterality: N/A;    CORONARY STENT PLACEMENT  2007     Family History   Problem Relation Name Age of Onset    Hypertension Father      Stroke Father      Heart attack Father      Hyperlipidemia Father      No Known Problems Sister      Drug abuse Brother      Heart attack Brother      Hypertension Brother       Social History[1]  Review of Systems   Constitutional:  Negative for fever.   HENT:  Negative for sore throat.    Respiratory:  Negative for shortness of breath.    Cardiovascular:  Negative for chest pain.   Gastrointestinal:  Negative for nausea.   Genitourinary:  Negative for dysuria.   Musculoskeletal:  Negative for back pain.   Skin:  Negative for rash.   Neurological:  Negative for weakness.       Physical Exam     Initial Vitals [05/25/25 1829]   BP Pulse Resp Temp SpO2   (!) 211/106 78 16 98.4 °F (36.9 °C) 96 %      MAP       --         Physical Exam    Constitutional: He appears well-nourished. He is not diaphoretic.  Non-toxic appearance. No distress.   HENT:   Head: Normocephalic and atraumatic.   No mastoid erythema/ ttp  Dislodged nerve stimulator unit box with some electrodes in place and others dislodged behind his left ear    Eyes: Conjunctivae are normal. Pupils are equal, round, and reactive to light. Right eye exhibits no nystagmus. Left eye exhibits no nystagmus.   Neck: Neck supple.   Cardiovascular:  Normal rate, regular rhythm, S1 normal and S2 normal.     Exam reveals no gallop.       No murmur  heard.  Pulmonary/Chest: Breath sounds normal. He has no wheezes. He has no rales.   Abdominal: Abdomen is soft. He exhibits no distension. There is no abdominal tenderness.   Musculoskeletal:      Cervical back: Neck supple.     Neurological: He is alert and oriented to person, place, and time. He has normal strength. No cranial nerve deficit or sensory deficit. GCS score is 15. GCS eye subscore is 4. GCS verbal subscore is 5. GCS motor subscore is 6.   Skin: Skin is warm and dry. No rash noted.   Psychiatric: He has a normal mood and affect. His behavior is normal.         ED Course   Procedures  Labs Reviewed - No data to display       Imaging Results    None          Medications   cloNIDine tablet 0.2 mg (0.2 mg Oral Given 5/25/25 1900)     Medical Decision Making  Differential Diagnosis includes, but is not limited to:  Hypertensive encephalopathy, CVA/TIA, intracranial hemorrhage, MI/ACS, aortic/carotid artery dissection, AAA, hypertensive nephropathy, medication non-compliance, anxiety, drug abuse/intoxication, essential hypertension      Risk  Prescription drug management.  Risk Details: Very pleasant 59 yo man with asymptomatic hypertension requesting tape for medical device. I adhered the stimulator to his mastoid with tegaderm    He takes clonidine and requested clonidine for his BP. He declined additional evaluation and treatment of his blood pressure including blood work, additional meds and observation. He has not missed dialysis. His lungs are CTAB and he does not appear significantly volume overloaded. He would like to go home. Discussed close follow up with PCP for device and BP.  Return precautions discussed.     I feel it is safe and appropriate at this time for the patient to be discharged to follow-up as detailed in their discharge instructions for reevaluation and possible continued outpatient workup and management. I have discussed the specifics of the workup with the patient and the patient  has verbalized understanding of the details of the workup, the diagnosis, the treatment plan, and the need for outpatient follow-up.  Although the patient has no emergent etiology today this does not preclude the development of an emergent condition so in addition, I have advised the patient that they can return to the ED and/or activate EMS at any time with worsening of their symptoms, change of their symptoms, or with any other medical complaint.  The patient remained comfortable and stable during their visit in the ED.  Discharge and follow-up instructions discussed with the patient who expressed understanding and willingness to comply with my recommendations.                                        Clinical Impression:  Final diagnoses:  [T85.199A] Malfunction of nerve stimulator lead, initial encounter (Primary)  [I10] Hypertension, unspecified type          ED Disposition Condition    Discharge Stable          ED Prescriptions    None       Follow-up Information       Follow up With Specialties Details Why Contact Info    please follow up with your PCP to reapply your nerve stiumlator, please come back with any problems                Portions of this note were dictated using voice recognition software and may contain dictation related errors in spelling/grammar/syntax not found on text review           [1]   Social History  Tobacco Use    Smoking status: Some Days     Current packs/day: 0.25     Average packs/day: 0.3 packs/day for 10.0 years (2.5 ttl pk-yrs)     Types: Cigarettes    Smokeless tobacco: Never   Substance Use Topics    Alcohol use: No    Drug use: No        Se Naidu Jr., MD  05/28/25 112

## 2025-06-18 RX ORDER — CLONIDINE 0.3 MG/24H
2 PATCH, EXTENDED RELEASE TRANSDERMAL
Qty: 24 PATCH | Refills: 3 | Status: SHIPPED | OUTPATIENT
Start: 2025-06-18 | End: 2026-06-18

## 2025-07-10 ENCOUNTER — HOSPITAL ENCOUNTER (EMERGENCY)
Facility: HOSPITAL | Age: 60
Discharge: HOME | End: 2025-07-10
Attending: EMERGENCY MEDICINE
Payer: MEDICARE

## 2025-07-10 VITALS
SYSTOLIC BLOOD PRESSURE: 178 MMHG | DIASTOLIC BLOOD PRESSURE: 88 MMHG | HEART RATE: 72 BPM | HEIGHT: 65 IN | BODY MASS INDEX: 24.16 KG/M2 | WEIGHT: 145 LBS | TEMPERATURE: 98 F | RESPIRATION RATE: 16 BRPM | OXYGEN SATURATION: 95 %

## 2025-07-10 DIAGNOSIS — N18.6 ESRD (END STAGE RENAL DISEASE): ICD-10-CM

## 2025-07-10 LAB
ABSOLUTE EOSINOPHIL (OHS): 0.09 K/UL
ABSOLUTE MONOCYTE (OHS): 0.54 K/UL (ref 0.3–1)
ABSOLUTE NEUTROPHIL COUNT (OHS): 3.03 K/UL (ref 1.8–7.7)
ALBUMIN SERPL BCP-MCNC: 3.4 G/DL (ref 3.5–5.2)
ALP SERPL-CCNC: 138 UNIT/L (ref 40–150)
ALT SERPL W/O P-5'-P-CCNC: 10 UNIT/L (ref 10–44)
ANION GAP (OHS): 14 MMOL/L (ref 8–16)
AST SERPL-CCNC: 22 UNIT/L (ref 11–45)
BACTERIA #/AREA URNS AUTO: NORMAL /HPF
BASOPHILS # BLD AUTO: 0.03 K/UL
BASOPHILS NFR BLD AUTO: 0.7 %
BILIRUB SERPL-MCNC: 0.5 MG/DL (ref 0.1–1)
BILIRUB UR QL STRIP.AUTO: NEGATIVE
BUN SERPL-MCNC: 39 MG/DL (ref 8–23)
CALCIUM SERPL-MCNC: 8 MG/DL (ref 8.7–10.5)
CHLORIDE SERPL-SCNC: 104 MMOL/L (ref 95–110)
CLARITY UR: CLEAR
CO2 SERPL-SCNC: 22 MMOL/L (ref 23–29)
COLOR UR AUTO: YELLOW
CREAT SERPL-MCNC: 8.1 MG/DL (ref 0.5–1.4)
CTP QC/QA: YES
CTP QC/QA: YES
ERYTHROCYTE [DISTWIDTH] IN BLOOD BY AUTOMATED COUNT: 16.9 % (ref 11.5–14.5)
GFR SERPLBLD CREATININE-BSD FMLA CKD-EPI: 7 ML/MIN/1.73/M2
GLUCOSE SERPL-MCNC: 139 MG/DL (ref 70–110)
GLUCOSE UR QL STRIP: NEGATIVE
HCT VFR BLD AUTO: 33 % (ref 40–54)
HGB BLD-MCNC: 10.8 GM/DL (ref 14–18)
HGB UR QL STRIP: NEGATIVE
HYALINE CASTS UR QL AUTO: 0 /LPF (ref 0–1)
IMM GRANULOCYTES # BLD AUTO: 0.01 K/UL (ref 0–0.04)
IMM GRANULOCYTES NFR BLD AUTO: 0.2 % (ref 0–0.5)
KETONES UR QL STRIP: NEGATIVE
LEUKOCYTE ESTERASE UR QL STRIP: NEGATIVE
LYMPHOCYTES # BLD AUTO: 0.84 K/UL (ref 1–4.8)
MCH RBC QN AUTO: 29.7 PG (ref 27–31)
MCHC RBC AUTO-ENTMCNC: 32.7 G/DL (ref 32–36)
MCV RBC AUTO: 91 FL (ref 82–98)
MICROSCOPIC COMMENT: NORMAL
NITRITE UR QL STRIP: NEGATIVE
NUCLEATED RBC (/100WBC) (OHS): 0 /100 WBC
PH UR STRIP: 7 [PH]
PLATELET # BLD AUTO: 150 K/UL (ref 150–450)
PMV BLD AUTO: 9.2 FL (ref 9.2–12.9)
POC MOLECULAR INFLUENZA A AGN: NEGATIVE
POC MOLECULAR INFLUENZA B AGN: NEGATIVE
POTASSIUM SERPL-SCNC: 3.7 MMOL/L (ref 3.5–5.1)
PROT SERPL-MCNC: 6.6 GM/DL (ref 6–8.4)
PROT UR QL STRIP: ABNORMAL
RBC # BLD AUTO: 3.64 M/UL (ref 4.6–6.2)
RBC #/AREA URNS AUTO: 1 /HPF (ref 0–4)
RELATIVE EOSINOPHIL (OHS): 2 %
RELATIVE LYMPHOCYTE (OHS): 18.5 % (ref 18–48)
RELATIVE MONOCYTE (OHS): 11.9 % (ref 4–15)
RELATIVE NEUTROPHIL (OHS): 66.7 % (ref 38–73)
SARS-COV-2 RDRP RESP QL NAA+PROBE: NEGATIVE
SODIUM SERPL-SCNC: 140 MMOL/L (ref 136–145)
SP GR UR STRIP: 1.02
SQUAMOUS #/AREA URNS AUTO: 1 /HPF
UROBILINOGEN UR STRIP-ACNC: NEGATIVE EU/DL
WBC # BLD AUTO: 4.54 K/UL (ref 3.9–12.7)
WBC #/AREA URNS AUTO: 1 /HPF (ref 0–5)

## 2025-07-10 PROCEDURE — 81001 URINALYSIS AUTO W/SCOPE: CPT | Performed by: EMERGENCY MEDICINE

## 2025-07-10 PROCEDURE — 87502 INFLUENZA DNA AMP PROBE: CPT

## 2025-07-10 PROCEDURE — 87635 SARS-COV-2 COVID-19 AMP PRB: CPT | Performed by: EMERGENCY MEDICINE

## 2025-07-10 PROCEDURE — 85025 COMPLETE CBC W/AUTO DIFF WBC: CPT | Performed by: EMERGENCY MEDICINE

## 2025-07-10 PROCEDURE — 99284 EMERGENCY DEPT VISIT MOD MDM: CPT | Mod: 25

## 2025-07-10 PROCEDURE — 82247 BILIRUBIN TOTAL: CPT | Performed by: EMERGENCY MEDICINE

## 2025-07-10 NOTE — ED PROVIDER NOTES
"Encounter Date: 7/10/2025       History     Chief Complaint   Patient presents with    Fever     Pt to the ER "to get checked" because he has a "low fever at dialysis yesterday." Pt has no other s/s of fever and is afebrile in triage. Pt also c/o chronic left leg pain at his scar incision site from his bypass sx.      Patient is a 60-year-old male with end-stage renal disease who was advised to come to the ED due to having fever during his dialysis treatment yesterday.  Patient believes his temperature may have been 102.  He is afebrile this morning and has no physical complaints.  He did not receive his full treatment yesterday due to the fever.      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      Past Surgical History:   Procedure Laterality Date    ABDOMINAL SURGERY  1980s    AV FISTULA PLACEMENT Left 1/5/2023    Procedure: CREATION, AV FISTULA RADIOCEPHALIC;  Surgeon: ROHITH Pickard II, MD;  Location: [facility] OR [address] [place] [address];  Service: Vascular;  Laterality: Left;    CARDIAC CATHETERIZATION  2007    x 2    COLONOSCOPY N/A 4/5/2019    Procedure: COLONOSCOPY;  Surgeon: Jose L Carty MD;  Location: [facility] (4TH FLR);  Service: Colon and Rectal;  Laterality: N/A;    CORONARY STENT PLACEMENT  2007     Family History   Problem Relation Name Age of Onset    Hypertension Father      Stroke Father      Heart attack Father      Hyperlipidemia Father      No Known Problems Sister  "     Drug abuse Brother      Heart attack Brother      Hypertension Brother       Social History[1]  Review of Systems   Constitutional:  Positive for fever.   HENT:  Negative for congestion and sore throat.    Respiratory:  Negative for cough and shortness of breath.    Cardiovascular:  Negative for chest pain.   Gastrointestinal:  Negative for nausea and vomiting.   Neurological:  Negative for headaches.   All other systems reviewed and are negative.      Physical Exam     Initial Vitals   BP Pulse Resp Temp SpO2   07/10/25 0614 07/10/25 0615 07/10/25 0614 07/10/25 0614 07/10/25 0615   (!) 174/84 72 18 98.4 °F (36.9 °C) 96 %      MAP       --                Physical Exam    Nursing note and vitals reviewed.  Constitutional: No distress.   HENT:   Head: Atraumatic.   Neck: Neck supple.   Cardiovascular:  Normal rate, regular rhythm and normal heart sounds.           Pulmonary/Chest: Breath sounds normal.   Abdominal: Abdomen is soft. There is no abdominal tenderness.   Musculoskeletal:         General: Edema present.      Cervical back: Neck supple.     Neurological: He is alert and oriented to person, place, and time.   Skin: Skin is warm and dry.   Psychiatric: Thought content normal.         ED Course   Procedures  Labs Reviewed   URINALYSIS, REFLEX TO URINE CULTURE - Abnormal       Result Value    Color, UA Yellow      Appearance, UA Clear      pH, UA 7.0      Spec Grav UA 1.020      Protein, UA 2+ (*)     Glucose, UA Negative      Ketones, UA Negative      Bilirubin, UA Negative      Blood, UA Negative      Nitrites, UA Negative      Urobilinogen, UA Negative      Leukocyte Esterase, UA Negative     COMPREHENSIVE METABOLIC PANEL - Abnormal    Sodium 140      Potassium 3.7      Chloride 104      CO2 22 (*)     Glucose 139 (*)     BUN 39 (*)     Creatinine 8.1 (*)     Calcium 8.0 (*)     Protein Total 6.6      Albumin 3.4 (*)     Bilirubin Total 0.5            AST 22      ALT 10      Anion Gap 14      eGFR  7 (*)    CBC WITH DIFFERENTIAL - Abnormal    WBC 4.54      RBC 3.64 (*)     HGB 10.8 (*)     HCT 33.0 (*)     MCV 91      MCH 29.7      MCHC 32.7      RDW 16.9 (*)     Platelet Count 150      MPV 9.2      Nucleated RBC 0      Neut % 66.7      Lymph % 18.5      Mono % 11.9      Eos % 2.0      Basophil % 0.7      Imm Grans % 0.2      Neut # 3.03      Lymph # 0.84 (*)     Mono # 0.54      Eos # 0.09      Baso # 0.03      Imm Grans # 0.01     CBC W/ AUTO DIFFERENTIAL    Narrative:     The following orders were created for panel order CBC auto differential.  Procedure                               Abnormality         Status                     ---------                               -----------         ------                     CBC with Differential[[id number]]       Abnormal            Final result                 Please view results for these tests on the individual orders.   URINALYSIS MICROSCOPIC    RBC, UA 1      WBC, UA 1      Bacteria, UA None      Squamous Epithelial Cells, UA 1      Hyaline Casts, UA 0      Microscopic Comment       POCT INFLUENZA A/B MOLECULAR    POC Molecular Influenza A Ag Negative      POC Molecular Influenza B Ag Negative       Acceptable Yes     SARS-COV-2 RDRP GENE    POC Rapid COVID Negative       Acceptable Yes            Imaging Results              X-Ray Chest PA And Lateral (Final result)  Result time 07/10/25 08:02:37      Final result by Justice Dubon MD (07/10/25 08:02:37)                   Impression:      No convincing evidence of acute cardiopulmonary disease.      Electronically signed by: Justice Dubon  Date:    07/10/2025  Time:    08:02               Narrative:    EXAMINATION:  XR CHEST PA AND LATERAL    CLINICAL HISTORY:  End stage renal disease    TECHNIQUE:  PA and lateral views of the chest were performed.    COMPARISON:  Chest radiograph performed 02/10/2025, 17:16 hours.    FINDINGS:  Cardiomediastinal contours appear grossly  unchanged in within normal limits    Linear/platelike opacities at the lung bases favored to reflect atelectasis and/or scar.    Lungs otherwise essentially clear.  No definite pneumothorax or large volume pleural effusion.    No acute findings in the visualized abdomen.  Osseous and soft tissue structures appear without definite acute change.  Scoliotic curvature of the spine.  Findings in keeping with left-sided reverse shoulder arthroplasty.                                       Medications - No data to display  Medical Decision Making  Emergent evaluation of a 60-year-old male who was noted with a fever while in his dialysis unit yesterday.  He presents to the ED today afebrile with no complaints.  Influenza and COVID-19 swabs are negative.  Chest x-ray shows no infiltrates.  CBC is unremarkable with a normal white blood cell count.  His potassium is 3.7.  Urinalysis without signs of infection.  I feel the patient may be safely discharged home to follow up with his physician as needed.  He may return to the ED for any further fever noted or any other urgent concerns.    Amount and/or Complexity of Data Reviewed  Labs: ordered.     Details: CBC with a normal white blood cell count of 4.54.  Urinalysis without signs of infection.  COVID-19 and influenza swabs are negative.  Radiology: ordered.     Details: Chest x-ray without infiltrates.                                      Clinical Impression:  Final diagnoses:  [N18.6] ESRD (end stage renal disease)          ED Disposition Condition    Discharge Stable          ED Prescriptions    None       Follow-up Information       Follow up With Specialties Details Why Contact Info    Your primary physician   As needed                      [1]   Social History  Tobacco Use    Smoking status: Some Days     Current packs/day: 0.25     Average packs/day: 0.3 packs/day for 10.0 years (2.5 ttl pk-yrs)     Types: Cigarettes    Smokeless tobacco: Never   Substance Use Topics     Alcohol use: No    Drug use: No        Michael Gaston MD  07/10/25 1004

## 2025-07-10 NOTE — ED NOTES
Introduced myself to pt to perform assessment and pt was immediately hostile and stressed he'd been waiting too long and wanted to leave. He refused to let me do assessment or get vital signs. I told him we still needed a urine sample and if insisted on leaving he could leave AMA but I'd get the provider to speak with him. He then agreed to give a urine sample. I spoke with provider and he agreed to speak with him to discharge. Pt allowed me to get discharge vitals once provider was at bedside with discharge paperwork.

## 2025-07-11 ENCOUNTER — HOSPITAL ENCOUNTER (OUTPATIENT)
Dept: VASCULAR SURGERY | Facility: CLINIC | Age: 60
Discharge: HOME | End: 2025-07-11
Attending: SURGERY
Payer: MEDICARE

## 2025-07-11 DIAGNOSIS — Z99.2 ESRD ON HEMODIALYSIS: ICD-10-CM

## 2025-07-11 DIAGNOSIS — N18.6 ESRD ON HEMODIALYSIS: Primary | ICD-10-CM

## 2025-07-11 DIAGNOSIS — Z99.2 ESRD ON HEMODIALYSIS: Primary | ICD-10-CM

## 2025-07-11 DIAGNOSIS — N18.6 ESRD ON HEMODIALYSIS: ICD-10-CM

## 2025-07-11 PROCEDURE — 93990 DOPPLER FLOW TESTING: CPT | Mod: S$GLB,,, | Performed by: STUDENT IN AN ORGANIZED HEALTH CARE EDUCATION/TRAINING PROGRAM

## 2025-07-16 DIAGNOSIS — I10 HYPERTENSION, UNSPECIFIED TYPE: Primary | ICD-10-CM

## 2025-07-16 RX ORDER — ISOSORBIDE MONONITRATE 30 MG/1
30 TABLET, EXTENDED RELEASE ORAL DAILY
Qty: 90 TABLET | Refills: 3 | Status: SHIPPED | OUTPATIENT
Start: 2025-07-16 | End: 2026-07-16

## 2025-07-22 ENCOUNTER — PATIENT OUTREACH (OUTPATIENT)
Facility: OTHER | Age: 60
End: 2025-07-22
Payer: MEDICARE

## 2025-07-22 NOTE — PROGRESS NOTES
Chari Genao, Patient Care Assistant  ED Navigator  Emergency Department    [place] [facility] [place]  Role: Community Health Worker    Date: 07/22/2025  Patient Name: Enrique Martinez  MRN: [id number]  PCP: No, Primary Doctor    Assessment:     Enrique Martinez is a 60 y.o. male who has presented to ED for ESRD. Patient has visited the ED 2 times in the past 3 months. Patient did not contact PCP.     ED Navigator Initial Assessment    ED Navigator Enrollment Documentation  Consent to Services  Contact  Transportation  Insurance Coverage  Specialist Appointment  PCP Follow Up Appointment  Medications  Psychological  Food  Communication/Education  Other Financial Concerns  Other Social Barriers/Concerns  Primary Barrier         Social History     Socioeconomic History    Marital status:    Tobacco Use    Smoking status: Some Days     Current packs/day: 0.25     Average packs/day: 0.3 packs/day for 10.0 years (2.5 ttl pk-yrs)     Types: Cigarettes    Smokeless tobacco: Never   Substance and Sexual Activity    Alcohol use: No    Drug use: No     Social Drivers of Health     Financial Resource Strain: High Risk (5/14/2025)    Received from [facility]    Overall Financial Resource Strain (CARDIA)     Difficulty of Paying Living Expenses: Hard   Food Insecurity: No Food Insecurity (5/14/2025)    Received from [facility]    Hunger Vital Sign     Worried About Running Out of Food in the Last Year: Never true     Ran Out of Food in the Last Year: Never true   Transportation Needs: No Transportation Needs (5/14/2025)    Received from [facility]    PRAPARE - Transportation     Lack of Transportation (Medical): No     Lack of Transportation (Non-Medical): No   Physical Activity: Inactive (5/14/2025)    Received from [facility]    Exercise Vital Sign     Days of Exercise per Week: 0 days     Minutes of Exercise per Session: 0 min   Stress: No Stress Concern Present (5/14/2025)    Received from [facility]    [employer] - Occupational Stress Questionnaire     Feeling of Stress : Not at all   Housing Stability: Low Risk  (5/14/2025)    Received from [facility]    Housing Stability Vital Sign     Unable to Pay for Housing in the Last Year: No     Number of Times Moved in the Last Year: 0     Homeless in the Last Year: No       Plan:         Appointment made with: No, Primary Doctor

## 2025-08-04 ENCOUNTER — HOSPITAL ENCOUNTER (EMERGENCY)
Facility: HOSPITAL | Age: 60
Discharge: HOME | End: 2025-08-04
Attending: EMERGENCY MEDICINE
Payer: MEDICARE

## 2025-08-04 VITALS
RESPIRATION RATE: 20 BRPM | BODY MASS INDEX: 22.98 KG/M2 | SYSTOLIC BLOOD PRESSURE: 173 MMHG | HEIGHT: 66 IN | WEIGHT: 143 LBS | OXYGEN SATURATION: 98 % | HEART RATE: 85 BPM | TEMPERATURE: 99 F | DIASTOLIC BLOOD PRESSURE: 78 MMHG

## 2025-08-04 DIAGNOSIS — H10.33 ACUTE CONJUNCTIVITIS OF BOTH EYES, UNSPECIFIED ACUTE CONJUNCTIVITIS TYPE: Primary | ICD-10-CM

## 2025-08-04 PROCEDURE — 99284 EMERGENCY DEPT VISIT MOD MDM: CPT

## 2025-08-04 PROCEDURE — 25000003 PHARM REV CODE 250

## 2025-08-04 RX ORDER — POLYMYXIN B SULFATE AND TRIMETHOPRIM 1; 10000 MG/ML; [USP'U]/ML
1 SOLUTION OPHTHALMIC EVERY 6 HOURS
Qty: 10 ML | Refills: 0 | Status: SHIPPED | OUTPATIENT
Start: 2025-08-04 | End: 2025-08-11

## 2025-08-04 RX ORDER — FLUORESCEIN SODIUM 1 MG/MG
1 STRIP OPHTHALMIC
Status: COMPLETED | OUTPATIENT
Start: 2025-08-04 | End: 2025-08-04

## 2025-08-04 RX ORDER — PROPARACAINE HYDROCHLORIDE 5 MG/ML
1 SOLUTION/ DROPS OPHTHALMIC
Status: COMPLETED | OUTPATIENT
Start: 2025-08-04 | End: 2025-08-04

## 2025-08-04 RX ORDER — OLOPATADINE HYDROCHLORIDE 1 MG/ML
1 SOLUTION OPHTHALMIC 2 TIMES DAILY
Qty: 5 ML | Refills: 0 | Status: SHIPPED | OUTPATIENT
Start: 2025-08-04 | End: 2025-08-04

## 2025-08-04 RX ORDER — FLUTICASONE PROPIONATE 50 MCG
1 SPRAY, SUSPENSION (ML) NASAL 2 TIMES DAILY PRN
Qty: 15 G | Refills: 0 | Status: SHIPPED | OUTPATIENT
Start: 2025-08-04 | End: 2025-09-03

## 2025-08-04 RX ORDER — OLOPATADINE HYDROCHLORIDE 1 MG/ML
1 SOLUTION OPHTHALMIC 2 TIMES DAILY
Qty: 5 ML | Refills: 0 | Status: SHIPPED | OUTPATIENT
Start: 2025-08-04 | End: 2025-08-18

## 2025-08-04 RX ORDER — FLUTICASONE PROPIONATE 50 MCG
1 SPRAY, SUSPENSION (ML) NASAL 2 TIMES DAILY PRN
Qty: 15 G | Refills: 0 | Status: SHIPPED | OUTPATIENT
Start: 2025-08-04 | End: 2025-08-04

## 2025-08-04 RX ORDER — POLYMYXIN B SULFATE AND TRIMETHOPRIM 1; 10000 MG/ML; [USP'U]/ML
1 SOLUTION OPHTHALMIC EVERY 6 HOURS
Qty: 10 ML | Refills: 0 | Status: SHIPPED | OUTPATIENT
Start: 2025-08-04 | End: 2025-08-04

## 2025-08-04 RX ADMIN — PROPARACAINE HYDROCHLORIDE 1 DROP: 5 SOLUTION/ DROPS OPHTHALMIC at 12:08

## 2025-08-04 RX ADMIN — FLUORESCEIN SODIUM 1 EACH: 1 STRIP OPHTHALMIC at 12:08

## 2025-08-04 NOTE — ED TRIAGE NOTES
Patient arrived to ED with complaints of pain, burning, and watering in both eyes. Patient stated symptoms started around 0300 today. Patient states using eye drops and rising eye out but no improvement. Patient report headache and SOB. Denies N/V and chest pain.

## 2025-08-04 NOTE — ED PROVIDER NOTES
"Encounter Date: 8/4/2025       History     Chief Complaint   Patient presents with    Eye Problem     3 am burning to both eyes, runny nose     61-year-old male with a past medical history of chronic kidney disease on dialysis, coronary artery disease, hypertension, HLD, T2DM who presents with bilateral eye pain.  Patient states he woke up at 3:00 a.m. and his eyes were painful, itchy, and tearing.  He also endorses blurred vision due to the tearing.  He also endorses nasal congestion and headache that started today.  He tried taking Zyrtec without relief.  He denies any purulent discharge from the eye.  States he has rubbing his eyes all day.  He also is having shortness of breath, but states he is "always short of breath because he is on dialysis ", and this is not any worse than he usually feels.  He denies fever, chills, vision loss, diplopia, flashes or floaters, photophobia, sore throat, cough, chest pain, difficulty breathing, nausea, vomiting, abdominal pain.        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      Past Surgical History:   Procedure Laterality Date    ABDOMINAL SURGERY  1980s    AV FISTULA PLACEMENT Left 1/5/2023    Procedure: CREATION, AV FISTULA RADIOCEPHALIC;  Surgeon: ROHITH Pickard II, MD;  Location: [facility] OR [address] [place] [address];  Service: Vascular;  Laterality: Left;    CARDIAC CATHETERIZATION  2007    x 2    COLONOSCOPY N/A 4/5/2019    Procedure: " "COLONOSCOPY;  Surgeon: Jose L Carty MD;  Location: [facility] (4TH FLR);  Service: Colon and Rectal;  Laterality: N/A;    CORONARY STENT PLACEMENT  2007     Family History   Problem Relation Name Age of Onset    Hypertension Father      Stroke Father      Heart attack Father      Hyperlipidemia Father      No Known Problems Sister      Drug abuse Brother      Heart attack Brother      Hypertension Brother       Social History[1]  Review of Systems    Physical Exam     Initial Vitals [08/04/25 1129]   BP Pulse Resp Temp SpO2   (!) 173/78 85 20 98.6 °F (37 °C) 98 %      MAP       --         Vitals:    08/04/25 1129   BP: (!) 173/78   Pulse: 85   Resp: 20   Temp: 98.6 °F (37 °C)   TempSrc: Oral   SpO2: 98%   Weight: 64.9 kg (143 lb)   Height: 5' 6" (1.676 m)      Physical Exam    Nursing note and vitals reviewed.  Constitutional: He appears well-nourished. He is not diaphoretic. No distress.   Well-appearing male sitting upright in bed.   HENT:   Head: Normocephalic and atraumatic. Mouth/Throat: Oropharynx is clear and moist. No oropharyngeal exudate.   Eyes: EOM are normal. Pupils are equal, round, and reactive to light.   Bilateral conjunctival injection.  Clear discharge bilaterally.  No foreign body noted.  No corneal abrasion seen with fluorescein exam.  Intra-ocular pressure is 14 mm Hg right eye and 12 mmHg left eye.   Neck:   Normal range of motion.  Cardiovascular:  Normal rate and regular rhythm.           No murmur heard.  Pulmonary/Chest: Breath sounds normal. No respiratory distress.   Musculoskeletal:      Cervical back: Normal range of motion.     Neurological: He is alert and oriented to person, place, and time. GCS score is 15. GCS eye subscore is 4. GCS verbal subscore is 5. GCS motor subscore is 6.   Skin: Skin is warm and dry.   Psychiatric: He has a normal mood and affect.         ED Course   Procedures  Labs Reviewed   HEPATITIS C ANTIBODY   HEP C VIRUS HOLD SPECIMEN   HIV 1 / 2 ANTIBODY "          Imaging Results    None          Medications   proparacaine 0.5 % ophthalmic solution 1 drop (1 drop Left Eye Given 8/4/25 1206)   fluorescein ophthalmic strip 1 each (1 each Left Eye Given 8/4/25 1206)     Medical Decision Making  Patient is a 60 year-old M who presents to the emergency department with complaints of bilateral eye pain, itching, and tearing.  Patient is non-toxic appearing, afebrile, and hemodynamically stable.  Visual acuity 20/50 right eye, 20/20 left eye, 20/30 both eyes    Differential diagnosis includes but is not limited to allergic conjunctivitis, viral conjunctivitis, superimposed bacterial conjunctivitis, corneal abrasion, corneal ulcer, acute angle closure glaucoma, ocular foreign body, scleritis    Pain improved with proparacaine.  Eye exam: intra-ocular pressure is not elevated.  No fluorescein uptake.  Bilateral conjunctival injection.    Disposition   Patient was discharged home with instruction to follow up with PCP in 3-5 days to discuss today's visit and any other concerns.  Referral placed to ophthalmology for follow-up.  Given olopatadine drops for allergic conjunctivitis and Polytrim drops due to concern for potential superimposed bacterial conjunctivitis as the patient has been constantly rubbing his eyes.  Also given Flonase for nasal congestion.  Instructed to take Tylenol for pain and headache.  Given strict ED return precautions. Patient reassessed, discussed dispo, given opportunity to ask additional questions prior to disposition.    Risk  Prescription drug management.              Attending Attestation:     Physician Attestation Statement for NP/PA:   I personally made/approved the management plan and take responsibility for the patient management.    Other NP/PA Attestation Additions:      Medical Decision Making: Patient will follow up with the PCP for recheck and return emergency department immediately for worsening signs or symptoms.                                         Clinical Impression:  Final diagnoses:  [H10.33] Acute conjunctivitis of both eyes, unspecified acute conjunctivitis type (Primary)          ED Disposition Condition    Discharge         Condition - Stable        ED Prescriptions       Medication Sig Dispense Start Date End Date Auth. Provider    olopatadine (PATANOL) 0.1 % ophthalmic solution  (Status: Discontinued) Place 1 drop into both eyes 2 (two) times daily. for 14 days 5 mL 8/4/2025 8/4/2025 Jacob Fernandez PA-C    polymyxin B sulf-trimethoprim (POLYTRIM) 10,000 unit- 1 mg/mL Drop  (Status: Discontinued) Place 1 drop into both eyes every 6 (six) hours. for 7 days 10 mL 8/4/2025 8/4/2025 Jacob Fernandez PA-C    fluticasone propionate (FLONASE) 50 mcg/actuation nasal spray  (Status: Discontinued) 1 spray (50 mcg total) by Each Nostril route 2 (two) times daily as needed for Rhinitis. 15 g 8/4/2025 8/4/2025 Jacob Fernandez PA-C    fluticasone propionate (FLONASE) 50 mcg/actuation nasal spray 1 spray (50 mcg total) by Each Nostril route 2 (two) times daily as needed for Rhinitis. 15 g 8/4/2025 9/3/2025 Jacob Fernandez PA-C    olopatadine (PATANOL) 0.1 % ophthalmic solution Place 1 drop into both eyes 2 (two) times daily. for 14 days 5 mL 8/4/2025 8/18/2025 Jacob Fernandez PA-C    polymyxin B sulf-trimethoprim (POLYTRIM) 10,000 unit- 1 mg/mL Drop Place 1 drop into both eyes every 6 (six) hours. for 7 days 10 mL 8/4/2025 8/11/2025 Jacob Fernandez PA-C          Follow-up Information       Follow up With Specialties Details Why Contact Jan [facility] - Emergency Dept Emergency Medicine Go to  If symptoms worsen [id number]  [facility] [address] [phone]                 Jacob Fernandez PA-C  08/04/25 1335         [1]   Social History  Tobacco Use    Smoking status: Some Days     Current packs/day: 0.25     Average packs/day: 0.3 packs/day for 10.0 years (2.5 ttl pk-yrs)     Types: Cigarettes    Smokeless tobacco: Never   Substance Use Topics     Alcohol use: No    Drug use: No        Jeff Barahona MD  08/04/25 2845

## 2025-08-04 NOTE — FIRST PROVIDER EVALUATION
"Medical screening examination initiated.  I have conducted a focused provider triage encounter, findings are as follows:    Brief history of present illness:  61 y/o c/o bilateral eye pain noted at 3 am today. Describes blurry vision. Increased tearing. Unable to complete HD today. Rhinorhea, as well.    Vitals:    08/04/25 1129   BP: (!) 173/78   Pulse: 85   Resp: 20   Temp: 98.6 °F (37 °C)   TempSrc: Oral   SpO2: 98%   Weight: 64.9 kg (143 lb)   Height: 5' 6" (1.676 m)       Pertinent physical exam:  uncomfortable, NAD, anxious    Brief workup plan:  exam, and symp mgmt    Preliminary workup initiated; this workup will be continued and followed by the physician or advanced practice provider that is assigned to the patient when roomed.  "

## 2025-08-08 ENCOUNTER — PATIENT OUTREACH (OUTPATIENT)
Facility: OTHER | Age: 60
End: 2025-08-08
Payer: MEDICARE

## 2025-08-13 RX ORDER — CLONIDINE HYDROCHLORIDE 0.3 MG/1
0.3 TABLET ORAL 2 TIMES DAILY PRN
Qty: 180 TABLET | Refills: 0 | Status: SHIPPED | OUTPATIENT
Start: 2025-08-13 | End: 2025-11-11

## 2025-08-21 ENCOUNTER — HOSPITAL ENCOUNTER (INPATIENT)
Facility: HOSPITAL | Age: 60
LOS: 2 days | DRG: 091 | End: 2025-08-23
Payer: MEDICARE

## 2025-08-21 DIAGNOSIS — E87.5 HYPERKALEMIA: Primary | ICD-10-CM

## 2025-08-21 DIAGNOSIS — R00.0 TACHYCARDIA: ICD-10-CM

## 2025-08-21 DIAGNOSIS — I46.9 CARDIAC ARREST: ICD-10-CM

## 2025-08-21 DIAGNOSIS — G93.1 ACUTE ANOXIC ENCEPHALOPATHY: ICD-10-CM

## 2025-08-21 DIAGNOSIS — J96.90 RESPIRATORY FAILURE: ICD-10-CM

## 2025-08-21 DIAGNOSIS — I50.9 CHF (CONGESTIVE HEART FAILURE): ICD-10-CM

## 2025-08-21 LAB
ABSOLUTE EOSINOPHIL (OHS): 0.05 K/UL
ABSOLUTE MONOCYTE (OHS): 0.16 K/UL (ref 0.3–1)
ABSOLUTE NEUTROPHIL COUNT (OHS): 1.85 K/UL (ref 1.8–7.7)
ALBUMIN SERPL BCP-MCNC: 3.1 G/DL (ref 3.5–5.2)
ALBUMIN SERPL BCP-MCNC: 3.1 G/DL (ref 3.5–5.2)
ALLENS TEST: YES
ALP SERPL-CCNC: 114 UNIT/L (ref 40–150)
ALP SERPL-CCNC: 95 UNIT/L (ref 40–150)
ALT SERPL W/O P-5'-P-CCNC: 52 UNIT/L (ref 10–44)
ALT SERPL W/O P-5'-P-CCNC: 95 UNIT/L (ref 10–44)
ANION GAP (OHS): 13 MMOL/L (ref 8–16)
ANION GAP (OHS): 15 MMOL/L (ref 8–16)
APTT PPP: 23.6 SECONDS (ref 21–32)
APTT PPP: <21 SECONDS (ref 21–32)
AST SERPL-CCNC: 178 UNIT/L (ref 11–45)
AST SERPL-CCNC: 93 UNIT/L (ref 11–45)
BASOPHILS # BLD AUTO: 0.02 K/UL
BASOPHILS NFR BLD AUTO: 0.6 %
BILIRUB SERPL-MCNC: 0.4 MG/DL (ref 0.1–1)
BILIRUB SERPL-MCNC: 0.4 MG/DL (ref 0.1–1)
BUN SERPL-MCNC: 28 MG/DL (ref 8–23)
BUN SERPL-MCNC: 31 MG/DL (ref 8–23)
CALCIUM SERPL-MCNC: 11.8 MG/DL (ref 8.7–10.5)
CALCIUM SERPL-MCNC: 9.2 MG/DL (ref 8.7–10.5)
CHLORIDE SERPL-SCNC: 100 MMOL/L (ref 95–110)
CHLORIDE SERPL-SCNC: 101 MMOL/L (ref 95–110)
CO2 SERPL-SCNC: 21 MMOL/L (ref 23–29)
CO2 SERPL-SCNC: 25 MMOL/L (ref 23–29)
CREAT SERPL-MCNC: 5.6 MG/DL (ref 0.5–1.4)
CREAT SERPL-MCNC: 5.7 MG/DL (ref 0.5–1.4)
EAG (OHS): 85 MG/DL (ref 68–131)
ERYTHROCYTE [DISTWIDTH] IN BLOOD BY AUTOMATED COUNT: 16.7 % (ref 11.5–14.5)
FIO2: 100 %
GFR SERPLBLD CREATININE-BSD FMLA CKD-EPI: 11 ML/MIN/1.73/M2
GFR SERPLBLD CREATININE-BSD FMLA CKD-EPI: 11 ML/MIN/1.73/M2
GLUCOSE SERPL-MCNC: 126 MG/DL (ref 70–110)
GLUCOSE SERPL-MCNC: 264 MG/DL (ref 70–110)
GLUCOSE SERPL-MCNC: 268 MG/DL (ref 70–110)
HBA1C MFR BLD: 4.6 % (ref 4–5.6)
HCT VFR BLD AUTO: 30.6 % (ref 40–54)
HCT VFR BLD CALC: 27.8 % (ref 36–54)
HGB BLD-MCNC: 9.1 G/DL (ref 9–18)
HGB BLD-MCNC: 9.2 GM/DL (ref 14–18)
HOLD SPECIMEN: NORMAL
IMM GRANULOCYTES # BLD AUTO: 0.02 K/UL (ref 0–0.04)
IMM GRANULOCYTES NFR BLD AUTO: 0.6 % (ref 0–0.5)
INR PPP: 1.1 (ref 0.8–1.2)
LACTATE SERPL-SCNC: 1.2 MMOL/L (ref 0.5–2.2)
LACTATE SERPL-SCNC: 4.1 MMOL/L (ref 0.5–2.2)
LACTATE SERPL-SCNC: 9.4 MMOL/L (ref 0.5–2.2)
LDH SERPL L TO P-CCNC: 8.2 MMOL/L (ref 0.4–1.3)
LYMPHOCYTES # BLD AUTO: 1.39 K/UL (ref 1–4.8)
MAGNESIUM SERPL-MCNC: 2.2 MG/DL (ref 1.6–2.6)
MCH RBC QN AUTO: 29.1 PG (ref 27–31)
MCHC RBC AUTO-ENTMCNC: 30.1 G/DL (ref 32–36)
MCV RBC AUTO: 97 FL (ref 82–98)
NUCLEATED RBC (/100WBC) (OHS): 0 /100 WBC
PCO2 BLDA: 56.9 MMHG (ref 35–45)
PEEP: 5
PH SMN: 7.12 [PH] (ref 7.35–7.45)
PHOSPHATE SERPL-MCNC: 7.8 MG/DL (ref 2.7–4.5)
PLATELET # BLD AUTO: 99 K/UL (ref 150–450)
PMV BLD AUTO: 9.3 FL (ref 9.2–12.9)
PO2 BLDA: 445 MMHG (ref 80–100)
POC BASE DEFICIT: -10.7 MMOL/L (ref -2–2)
POC HCO3: 18.4 MMOL/L (ref 24–28)
POC IONIZED CALCIUM: 1.48 MMOL/L (ref 1.06–1.42)
POC PERFORMED BY: ABNORMAL
POC SATURATED O2: >100 % (ref 95–100)
POC SET RR: 26
POCT GLUCOSE: 105 MG/DL (ref 70–110)
POCT GLUCOSE: 115 MG/DL (ref 70–110)
POCT GLUCOSE: 134 MG/DL (ref 70–110)
POCT GLUCOSE: 269 MG/DL (ref 70–110)
POCT GLUCOSE: 31 MG/DL (ref 70–110)
POCT GLUCOSE: 69 MG/DL (ref 70–110)
POTASSIUM BLD-SCNC: 5.2 MMOL/L (ref 3.5–5.1)
POTASSIUM SERPL-SCNC: 4.6 MMOL/L (ref 3.5–5.1)
POTASSIUM SERPL-SCNC: 5.7 MMOL/L (ref 3.5–5.1)
PROT SERPL-MCNC: 5.7 GM/DL (ref 6–8.4)
PROT SERPL-MCNC: 5.7 GM/DL (ref 6–8.4)
PROTHROMBIN TIME: 12.4 SECONDS (ref 9–12.5)
RBC # BLD AUTO: 3.16 M/UL (ref 4.6–6.2)
RELATIVE EOSINOPHIL (OHS): 1.4 %
RELATIVE LYMPHOCYTE (OHS): 39.8 % (ref 18–48)
RELATIVE MONOCYTE (OHS): 4.6 % (ref 4–15)
RELATIVE NEUTROPHIL (OHS): 53 % (ref 38–73)
SITE: ABNORMAL
SODIUM BLD-SCNC: 138 MMOL/L (ref 136–145)
SODIUM SERPL-SCNC: 137 MMOL/L (ref 136–145)
SODIUM SERPL-SCNC: 138 MMOL/L (ref 136–145)
SPECIMEN SOURCE: ABNORMAL
TROPONIN I SERPL HS-MCNC: 35 NG/L
TROPONIN I SERPL HS-MCNC: 68 NG/L
VT: 450
WBC # BLD AUTO: 3.49 K/UL (ref 3.9–12.7)

## 2025-08-21 PROCEDURE — 25000003 PHARM REV CODE 250

## 2025-08-21 PROCEDURE — 99900035 HC TECH TIME PER 15 MIN (STAT)

## 2025-08-21 PROCEDURE — 25500020 PHARM REV CODE 255: Performed by: FAMILY MEDICINE

## 2025-08-21 PROCEDURE — 85730 THROMBOPLASTIN TIME PARTIAL: CPT

## 2025-08-21 PROCEDURE — 80051 ELECTROLYTE PANEL: CPT

## 2025-08-21 PROCEDURE — 99291 CRITICAL CARE FIRST HOUR: CPT

## 2025-08-21 PROCEDURE — 96365 THER/PROPH/DIAG IV INF INIT: CPT

## 2025-08-21 PROCEDURE — 82962 GLUCOSE BLOOD TEST: CPT

## 2025-08-21 PROCEDURE — 84100 ASSAY OF PHOSPHORUS: CPT

## 2025-08-21 PROCEDURE — 83036 HEMOGLOBIN GLYCOSYLATED A1C: CPT

## 2025-08-21 PROCEDURE — 63600175 PHARM REV CODE 636 W HCPCS

## 2025-08-21 PROCEDURE — 83520 IMMUNOASSAY QUANT NOS NONAB: CPT

## 2025-08-21 PROCEDURE — 93005 ELECTROCARDIOGRAM TRACING: CPT

## 2025-08-21 PROCEDURE — 36415 COLL VENOUS BLD VENIPUNCTURE: CPT

## 2025-08-21 PROCEDURE — 5A1945Z RESPIRATORY VENTILATION, 24-96 CONSECUTIVE HOURS: ICD-10-PCS | Performed by: FAMILY MEDICINE

## 2025-08-21 PROCEDURE — 80053 COMPREHEN METABOLIC PANEL: CPT

## 2025-08-21 PROCEDURE — 63600175 PHARM REV CODE 636 W HCPCS: Performed by: INTERNAL MEDICINE

## 2025-08-21 PROCEDURE — 36620 INSERTION CATHETER ARTERY: CPT

## 2025-08-21 PROCEDURE — 94761 N-INVAS EAR/PLS OXIMETRY MLT: CPT

## 2025-08-21 PROCEDURE — 87040 BLOOD CULTURE FOR BACTERIA: CPT

## 2025-08-21 PROCEDURE — 93010 ELECTROCARDIOGRAM REPORT: CPT | Mod: ,,, | Performed by: INTERNAL MEDICINE

## 2025-08-21 PROCEDURE — 82947 ASSAY GLUCOSE BLOOD QUANT: CPT

## 2025-08-21 PROCEDURE — 03HY32Z INSERTION OF MONITORING DEVICE INTO UPPER ARTERY, PERCUTANEOUS APPROACH: ICD-10-PCS | Performed by: FAMILY MEDICINE

## 2025-08-21 PROCEDURE — 85610 PROTHROMBIN TIME: CPT

## 2025-08-21 PROCEDURE — 20000000 HC ICU ROOM

## 2025-08-21 PROCEDURE — 27201640 HC PAD, ARTICGEL

## 2025-08-21 PROCEDURE — 96375 TX/PRO/DX INJ NEW DRUG ADDON: CPT

## 2025-08-21 PROCEDURE — 85025 COMPLETE CBC W/AUTO DIFF WBC: CPT

## 2025-08-21 PROCEDURE — 94002 VENT MGMT INPAT INIT DAY: CPT

## 2025-08-21 PROCEDURE — 83735 ASSAY OF MAGNESIUM: CPT

## 2025-08-21 PROCEDURE — 51798 US URINE CAPACITY MEASURE: CPT

## 2025-08-21 PROCEDURE — 27100171 HC OXYGEN HIGH FLOW UP TO 24 HOURS

## 2025-08-21 PROCEDURE — 93010 ELECTROCARDIOGRAM REPORT: CPT | Mod: 76,,, | Performed by: INTERNAL MEDICINE

## 2025-08-21 PROCEDURE — 83605 ASSAY OF LACTIC ACID: CPT

## 2025-08-21 PROCEDURE — 84484 ASSAY OF TROPONIN QUANT: CPT

## 2025-08-21 RX ORDER — POLYETHYLENE GLYCOL 3350 17 G/17G
17 POWDER, FOR SOLUTION ORAL DAILY PRN
Status: DISCONTINUED | OUTPATIENT
Start: 2025-08-21 | End: 2025-08-22

## 2025-08-21 RX ORDER — FENTANYL CITRATE-0.9 % NACL/PF 10 MCG/ML
0-250 PLASTIC BAG, INJECTION (ML) INTRAVENOUS CONTINUOUS
Refills: 0 | Status: DISCONTINUED | OUTPATIENT
Start: 2025-08-21 | End: 2025-08-23

## 2025-08-21 RX ORDER — SODIUM CHLORIDE 0.9 % (FLUSH) 0.9 %
5 SYRINGE (ML) INJECTION
Status: DISCONTINUED | OUTPATIENT
Start: 2025-08-21 | End: 2025-08-23 | Stop reason: HOSPADM

## 2025-08-21 RX ORDER — SODIUM BICARBONATE 1 MEQ/ML
SYRINGE (ML) INTRAVENOUS CODE/TRAUMA/SEDATION MEDICATION
Status: COMPLETED | OUTPATIENT
Start: 2025-08-21 | End: 2025-08-21

## 2025-08-21 RX ORDER — TALC
6 POWDER (GRAM) TOPICAL NIGHTLY PRN
Status: DISCONTINUED | OUTPATIENT
Start: 2025-08-21 | End: 2025-08-23

## 2025-08-21 RX ORDER — HEPARIN SODIUM 5000 [USP'U]/ML
5000 INJECTION, SOLUTION INTRAVENOUS; SUBCUTANEOUS EVERY 8 HOURS
Status: DISCONTINUED | OUTPATIENT
Start: 2025-08-21 | End: 2025-08-22

## 2025-08-21 RX ORDER — NOREPINEPHRINE BITARTRATE/D5W 4MG/250ML
PLASTIC BAG, INJECTION (ML) INTRAVENOUS
Status: COMPLETED
Start: 2025-08-21 | End: 2025-08-21

## 2025-08-21 RX ORDER — EPINEPHRINE 0.1 MG/ML
INJECTION INTRAVENOUS CODE/TRAUMA/SEDATION MEDICATION
Status: COMPLETED | OUTPATIENT
Start: 2025-08-21 | End: 2025-08-21

## 2025-08-21 RX ORDER — IBUPROFEN 200 MG
24 TABLET ORAL
Status: DISCONTINUED | OUTPATIENT
Start: 2025-08-21 | End: 2025-08-23 | Stop reason: HOSPADM

## 2025-08-21 RX ORDER — INSULIN ASPART 100 [IU]/ML
0-5 INJECTION, SOLUTION INTRAVENOUS; SUBCUTANEOUS
Status: DISCONTINUED | OUTPATIENT
Start: 2025-08-21 | End: 2025-08-23 | Stop reason: HOSPADM

## 2025-08-21 RX ORDER — ONDANSETRON HYDROCHLORIDE 2 MG/ML
4 INJECTION, SOLUTION INTRAVENOUS EVERY 8 HOURS PRN
Status: DISCONTINUED | OUTPATIENT
Start: 2025-08-21 | End: 2025-08-23 | Stop reason: HOSPADM

## 2025-08-21 RX ORDER — NOREPINEPHRINE BITARTRATE/D5W 4MG/250ML
0-3 PLASTIC BAG, INJECTION (ML) INTRAVENOUS CONTINUOUS
Status: DISCONTINUED | OUTPATIENT
Start: 2025-08-21 | End: 2025-08-23

## 2025-08-21 RX ORDER — HYDRALAZINE HYDROCHLORIDE 20 MG/ML
10 INJECTION INTRAMUSCULAR; INTRAVENOUS ONCE
Status: COMPLETED | OUTPATIENT
Start: 2025-08-21 | End: 2025-08-21

## 2025-08-21 RX ORDER — NALOXONE HCL 0.4 MG/ML
0.02 VIAL (ML) INJECTION
Status: DISCONTINUED | OUTPATIENT
Start: 2025-08-21 | End: 2025-08-23 | Stop reason: HOSPADM

## 2025-08-21 RX ORDER — GLUCAGON 1 MG
1 KIT INJECTION
Status: DISCONTINUED | OUTPATIENT
Start: 2025-08-21 | End: 2025-08-23 | Stop reason: HOSPADM

## 2025-08-21 RX ORDER — IBUPROFEN 200 MG
16 TABLET ORAL
Status: DISCONTINUED | OUTPATIENT
Start: 2025-08-21 | End: 2025-08-23 | Stop reason: HOSPADM

## 2025-08-21 RX ORDER — ACETAMINOPHEN 325 MG/1
650 TABLET ORAL EVERY 4 HOURS PRN
Status: DISCONTINUED | OUTPATIENT
Start: 2025-08-21 | End: 2025-08-22

## 2025-08-21 RX ORDER — HEPARIN SODIUM 5000 [USP'U]/ML
5000 INJECTION, SOLUTION INTRAVENOUS; SUBCUTANEOUS EVERY 8 HOURS
Status: DISCONTINUED | OUTPATIENT
Start: 2025-08-21 | End: 2025-08-21

## 2025-08-21 RX ORDER — HYDRALAZINE HYDROCHLORIDE 25 MG/1
100 TABLET, FILM COATED ORAL EVERY 8 HOURS
Status: DISCONTINUED | OUTPATIENT
Start: 2025-08-21 | End: 2025-08-23 | Stop reason: HOSPADM

## 2025-08-21 RX ORDER — CLONIDINE 0.3 MG/24H
2 PATCH, EXTENDED RELEASE TRANSDERMAL
Status: DISCONTINUED | OUTPATIENT
Start: 2025-08-21 | End: 2025-08-23 | Stop reason: HOSPADM

## 2025-08-21 RX ORDER — CALCIUM CHLORIDE INJECTION 100 MG/ML
INJECTION, SOLUTION INTRAVENOUS CODE/TRAUMA/SEDATION MEDICATION
Status: COMPLETED | OUTPATIENT
Start: 2025-08-21 | End: 2025-08-21

## 2025-08-21 RX ORDER — HYDRALAZINE HYDROCHLORIDE 20 MG/ML
10 INJECTION INTRAMUSCULAR; INTRAVENOUS EVERY 4 HOURS PRN
Status: DISCONTINUED | OUTPATIENT
Start: 2025-08-21 | End: 2025-08-23 | Stop reason: HOSPADM

## 2025-08-21 RX ORDER — AMOXICILLIN 250 MG
1 CAPSULE ORAL 2 TIMES DAILY
Status: DISCONTINUED | OUTPATIENT
Start: 2025-08-21 | End: 2025-08-22

## 2025-08-21 RX ADMIN — HYDRALAZINE HYDROCHLORIDE 10 MG: 20 INJECTION INTRAMUSCULAR; INTRAVENOUS at 11:08

## 2025-08-21 RX ADMIN — NOREPINEPHRINE BITARTRATE 0.1 MCG/KG/MIN: 4 INJECTION, SOLUTION INTRAVENOUS at 03:08

## 2025-08-21 RX ADMIN — CLONIDINE 2 PATCH: 0.3 PATCH TRANSDERMAL at 08:08

## 2025-08-21 RX ADMIN — PIPERACILLIN AND TAZOBACTAM 4.5 G: 4; .5 INJECTION, POWDER, FOR SOLUTION INTRAVENOUS at 10:08

## 2025-08-21 RX ADMIN — EPINEPHRINE 1 MG: 0.1 INJECTION INTRACARDIAC; INTRAVENOUS at 03:08

## 2025-08-21 RX ADMIN — Medication 0.1 MCG/KG/MIN: at 03:08

## 2025-08-21 RX ADMIN — HYDRALAZINE HYDROCHLORIDE 10 MG: 20 INJECTION INTRAMUSCULAR; INTRAVENOUS at 07:08

## 2025-08-21 RX ADMIN — SODIUM BICARBONATE 50 MEQ: 84 INJECTION, SOLUTION INTRAVENOUS at 03:08

## 2025-08-21 RX ADMIN — IOHEXOL 100 ML: 350 INJECTION, SOLUTION INTRAVENOUS at 04:08

## 2025-08-21 RX ADMIN — VANCOMYCIN HYDROCHLORIDE 1250 MG: 1.25 INJECTION, POWDER, LYOPHILIZED, FOR SOLUTION INTRAVENOUS at 10:08

## 2025-08-21 RX ADMIN — HEPARIN SODIUM 5000 UNITS: 5000 INJECTION INTRAVENOUS; SUBCUTANEOUS at 09:08

## 2025-08-21 RX ADMIN — DEXTROSE MONOHYDRATE 12.5 G: 25 INJECTION, SOLUTION INTRAVENOUS at 08:08

## 2025-08-21 RX ADMIN — SODIUM CHLORIDE, POTASSIUM CHLORIDE, SODIUM LACTATE AND CALCIUM CHLORIDE 1000 ML: 600; 310; 30; 20 INJECTION, SOLUTION INTRAVENOUS at 03:08

## 2025-08-21 RX ADMIN — CALCIUM CHLORIDE 1 G: 100 INJECTION, SOLUTION INTRAVENOUS at 03:08

## 2025-08-21 RX ADMIN — DEXTROSE MONOHYDRATE 25 G: 25 INJECTION, SOLUTION INTRAVENOUS at 06:08

## 2025-08-21 RX ADMIN — HYDRALAZINE HYDROCHLORIDE 100 MG: 25 TABLET ORAL at 10:08

## 2025-08-21 RX ADMIN — SODIUM ZIRCONIUM CYCLOSILICATE 10 G: 5 POWDER, FOR SUSPENSION ORAL at 08:08

## 2025-08-22 PROBLEM — G93.1 ACUTE ANOXIC ENCEPHALOPATHY: Status: ACTIVE | Noted: 2025-08-22

## 2025-08-22 PROBLEM — E87.5 HYPERKALEMIA: Status: ACTIVE | Noted: 2025-08-22

## 2025-08-22 LAB
ABSOLUTE EOSINOPHIL (OHS): 0.02 K/UL
ABSOLUTE MONOCYTE (OHS): 0.54 K/UL (ref 0.3–1)
ABSOLUTE NEUTROPHIL COUNT (OHS): 5.28 K/UL (ref 1.8–7.7)
ALBUMIN SERPL BCP-MCNC: 2.9 G/DL (ref 3.5–5.2)
ALLENS TEST: YES
ALP SERPL-CCNC: 107 UNIT/L (ref 40–150)
ALT SERPL W/O P-5'-P-CCNC: 75 UNIT/L (ref 10–44)
ANION GAP (OHS): 12 MMOL/L (ref 8–16)
ANION GAP (OHS): 14 MMOL/L (ref 8–16)
ANION GAP (OHS): 15 MMOL/L (ref 8–16)
ANION GAP (OHS): 15 MMOL/L (ref 8–16)
AORTIC SIZE INDEX (SOV): 2.2 CM/M2
AORTIC SIZE INDEX: 1.7 CM/M2
APICAL FOUR CHAMBER EJECTION FRACTION: 60 %
APICAL TWO CHAMBER EJECTION FRACTION: 60 %
ASCENDING AORTA: 2.9 CM
AST SERPL-CCNC: 140 UNIT/L (ref 11–45)
AV INDEX (PROSTH): 0.72
AV MEAN GRADIENT: 6 MMHG
AV PEAK GRADIENT: 10 MMHG
AV REGURGITATION PRESSURE HALF TIME: 709 MS
AV VALVE AREA BY VELOCITY RATIO: 2.4 CM²
AV VALVE AREA: 2.5 CM²
AV VELOCITY RATIO: 0.69
BASOPHILS # BLD AUTO: 0.02 K/UL
BASOPHILS NFR BLD AUTO: 0.3 %
BILIRUB SERPL-MCNC: 0.5 MG/DL (ref 0.1–1)
BSA FOR ECHO PROCEDURE: 1.71 M2
BUN SERPL-MCNC: 36 MG/DL (ref 8–23)
BUN SERPL-MCNC: 37 MG/DL (ref 8–23)
BUN SERPL-MCNC: 37 MG/DL (ref 8–23)
BUN SERPL-MCNC: 39 MG/DL (ref 8–23)
CALCIUM SERPL-MCNC: 8.3 MG/DL (ref 8.7–10.5)
CALCIUM SERPL-MCNC: 8.4 MG/DL (ref 8.7–10.5)
CALCIUM SERPL-MCNC: 8.6 MG/DL (ref 8.7–10.5)
CALCIUM SERPL-MCNC: 8.6 MG/DL (ref 8.7–10.5)
CHLORIDE SERPL-SCNC: 101 MMOL/L (ref 95–110)
CHLORIDE SERPL-SCNC: 101 MMOL/L (ref 95–110)
CHLORIDE SERPL-SCNC: 102 MMOL/L (ref 95–110)
CHLORIDE SERPL-SCNC: 103 MMOL/L (ref 95–110)
CO2 SERPL-SCNC: 22 MMOL/L (ref 23–29)
CREAT SERPL-MCNC: 5.9 MG/DL (ref 0.5–1.4)
CREAT SERPL-MCNC: 6.1 MG/DL (ref 0.5–1.4)
CREAT SERPL-MCNC: 6.3 MG/DL (ref 0.5–1.4)
CREAT SERPL-MCNC: 6.7 MG/DL (ref 0.5–1.4)
CV ECHO LV RWT: 0.98 CM
DOP CALC AO PEAK VEL: 1.6 M/S
DOP CALC AO VTI: 31.1 CM
DOP CALC LVOT AREA: 3.5 CM2
DOP CALC LVOT DIAMETER: 2.1 CM
DOP CALC LVOT PEAK VEL: 1.1 M/S
DOP CALC MV VTI: 25.3 CM
DOP CALCLVOT PEAK VEL VTI: 22.3 CM
E WAVE DECELERATION TIME: 272 MSEC
E/A RATIO: 0.77
E/E' RATIO: 16 M/S
ECHO LV POSTERIOR WALL: 2 CM (ref 0.6–1.1)
ERYTHROCYTE [DISTWIDTH] IN BLOOD BY AUTOMATED COUNT: 16.4 % (ref 11.5–14.5)
FIO2: 40 %
FRACTIONAL SHORTENING: 31.7 % (ref 28–44)
GFR SERPLBLD CREATININE-BSD FMLA CKD-EPI: 10 ML/MIN/1.73/M2
GFR SERPLBLD CREATININE-BSD FMLA CKD-EPI: 10 ML/MIN/1.73/M2
GFR SERPLBLD CREATININE-BSD FMLA CKD-EPI: 9 ML/MIN/1.73/M2
GFR SERPLBLD CREATININE-BSD FMLA CKD-EPI: 9 ML/MIN/1.73/M2
GLUCOSE SERPL-MCNC: 110 MG/DL (ref 70–110)
GLUCOSE SERPL-MCNC: 111 MG/DL (ref 70–110)
GLUCOSE SERPL-MCNC: 113 MG/DL (ref 70–110)
GLUCOSE SERPL-MCNC: 114 MG/DL (ref 70–110)
GLUCOSE SERPL-MCNC: 114 MG/DL (ref 70–110)
GLUCOSE SERPL-MCNC: 96 MG/DL (ref 70–110)
HCT VFR BLD AUTO: 30.2 % (ref 40–54)
HGB BLD-MCNC: 9.6 GM/DL (ref 14–18)
IMM GRANULOCYTES # BLD AUTO: 0.01 K/UL (ref 0–0.04)
IMM GRANULOCYTES NFR BLD AUTO: 0.2 % (ref 0–0.5)
INR PPP: 1 (ref 0.8–1.2)
INTERVENTRICULAR SEPTUM: 1.5 CM (ref 0.6–1.1)
IVC DIAMETER: 1.54 CM
LACTATE SERPL-SCNC: 0.6 MMOL/L (ref 0.5–2.2)
LACTATE SERPL-SCNC: 0.6 MMOL/L (ref 0.5–2.2)
LEFT ATRIUM AREA SYSTOLIC (APICAL 2 CHAMBER): 28.39 CM2
LEFT ATRIUM AREA SYSTOLIC (APICAL 4 CHAMBER): 29.33 CM2
LEFT ATRIUM VOLUME INDEX MOD: 61 ML/M2
LEFT ATRIUM VOLUME MOD: 105 ML
LEFT INTERNAL DIMENSION IN SYSTOLE: 2.8 CM (ref 2.1–4)
LEFT VENTRICLE DIASTOLIC VOLUME INDEX: 42.69 ML/M2
LEFT VENTRICLE DIASTOLIC VOLUME: 73 ML
LEFT VENTRICLE END DIASTOLIC VOLUME APICAL 2 CHAMBER: 64.79 ML
LEFT VENTRICLE END DIASTOLIC VOLUME APICAL 4 CHAMBER INDEX BSA: 62.47 ML/M2
LEFT VENTRICLE END DIASTOLIC VOLUME APICAL 4 CHAMBER: 106.82 ML
LEFT VENTRICLE END SYSTOLIC VOLUME APICAL 2 CHAMBER: 100.94 ML
LEFT VENTRICLE END SYSTOLIC VOLUME APICAL 4 CHAMBER: 104.86 ML
LEFT VENTRICLE MASS INDEX: 180.4 G/M2
LEFT VENTRICLE SYSTOLIC VOLUME INDEX: 16.4 ML/M2
LEFT VENTRICLE SYSTOLIC VOLUME: 28 ML
LEFT VENTRICULAR INTERNAL DIMENSION IN DIASTOLE: 4.1 CM (ref 3.5–6)
LEFT VENTRICULAR MASS: 308.5 G
LV LATERAL E/E' RATIO: 24 M/S
LV SEPTAL E/E' RATIO: 12 M/S
LVED V (TEICH): 72.67 ML
LVES V (TEICH): 28.43 ML
LVOT MG: 2.44 MMHG
LVOT MV: 0.76 CM/S
LYMPHOCYTES # BLD AUTO: 0.44 K/UL (ref 1–4.8)
Lab: 1.7 CM/M
Lab: 2.3 CM/M
MAGNESIUM SERPL-MCNC: 2.1 MG/DL (ref 1.6–2.6)
MCH RBC QN AUTO: 28.6 PG (ref 27–31)
MCHC RBC AUTO-ENTMCNC: 31.8 G/DL (ref 32–36)
MCV RBC AUTO: 90 FL (ref 82–98)
MRSA PCR SCRN (OHS): NOT DETECTED
MV PEAK A VEL: 0.62 M/S
MV PEAK E VEL: 0.48 M/S
MV PEAK GRADIENT: 2 MMHG
MV VALVE AREA BY CONTINUITY EQUATION: 3.05 CM2
NT-PROBNP SERPL-MCNC: ABNORMAL PG/ML
NUCLEATED RBC (/100WBC) (OHS): 0 /100 WBC
OHS CV CPX PATIENT HEIGHT IN: 66
OHS CV RV/LV RATIO: 0.85 CM
OHS LV EJECTION FRACTION SIMPSONS BIPLANE MOD: 57 %
PCO2 BLDA: 35.6 MMHG (ref 35–45)
PH SMN: 7.43 [PH] (ref 7.35–7.45)
PHOSPHATE SERPL-MCNC: 5.6 MG/DL (ref 2.7–4.5)
PISA AR MAX VEL: 4.78 M/S
PISA MRMAX VEL: 5.87 M/S
PISA TR MAX VEL: 2.4 M/S
PLATELET # BLD AUTO: 124 K/UL (ref 150–450)
PMV BLD AUTO: 9.1 FL (ref 9.2–12.9)
PO2 BLDA: 67.7 MMHG (ref 80–100)
POC BASE DEFICIT: -0.1 MMOL/L (ref -2–2)
POC HCO3: 23.9 MMOL/L (ref 24–28)
POC PERFORMED BY: ABNORMAL
POC SATURATED O2: 93.4 % (ref 95–100)
POCT GLUCOSE: 101 MG/DL (ref 70–110)
POCT GLUCOSE: 103 MG/DL (ref 70–110)
POCT GLUCOSE: 127 MG/DL (ref 70–110)
POCT GLUCOSE: 151 MG/DL (ref 70–110)
POTASSIUM SERPL-SCNC: 4 MMOL/L (ref 3.5–5.1)
POTASSIUM SERPL-SCNC: 4.4 MMOL/L (ref 3.5–5.1)
POTASSIUM SERPL-SCNC: 5 MMOL/L (ref 3.5–5.1)
POTASSIUM SERPL-SCNC: 5.5 MMOL/L (ref 3.5–5.1)
PROT SERPL-MCNC: 5.6 GM/DL (ref 6–8.4)
PROTHROMBIN TIME: 11.9 SECONDS (ref 9–12.5)
RA VOL SYS: 41.44 ML
RBC # BLD AUTO: 3.36 M/UL (ref 4.6–6.2)
RELATIVE EOSINOPHIL (OHS): 0.3 %
RELATIVE LYMPHOCYTE (OHS): 7 % (ref 18–48)
RELATIVE MONOCYTE (OHS): 8.6 % (ref 4–15)
RELATIVE NEUTROPHIL (OHS): 83.6 % (ref 38–73)
RIGHT ATRIAL AREA: 16.7 CM2
RIGHT ATRIUM END SYSTOLIC VOLUME APICAL 4 CHAMBER INDEX BSA: 24.37 ML/M2
RIGHT ATRIUM VOLUME AREA LENGTH APICAL 4 CHAMBER: 41.68 ML
RIGHT VENTRICLE DIASTOLIC BASEL DIMENSION: 3.5 CM
RV TISSUE DOPPLER FREE WALL SYSTOLIC VELOCITY 1 (APICAL 4 CHAMBER VIEW): 15.87 CM/S
SINUS: 3.8 CM
SITE: ABNORMAL
SODIUM SERPL-SCNC: 137 MMOL/L (ref 136–145)
SODIUM SERPL-SCNC: 138 MMOL/L (ref 136–145)
SPECIMEN SOURCE: ABNORMAL
STJ: 3.2 CM
TDI LATERAL: 0.02 M/S
TDI SEPTAL: 0.04 M/S
TDI: 0.03 M/S
TR MAX PG: 23 MMHG
TRICUSPID ANNULAR PLANE SYSTOLIC EXCURSION: 2.9 CM
TROPONIN I SERPL HS-MCNC: 1603 NG/L
WBC # BLD AUTO: 6.31 K/UL (ref 3.9–12.7)
Z-SCORE OF LEFT VENTRICULAR DIMENSION IN END DIASTOLE: -1.47
Z-SCORE OF LEFT VENTRICULAR DIMENSION IN END SYSTOLE: -0.39

## 2025-08-22 PROCEDURE — 94761 N-INVAS EAR/PLS OXIMETRY MLT: CPT | Mod: XB

## 2025-08-22 PROCEDURE — 63600175 PHARM REV CODE 636 W HCPCS: Performed by: FAMILY MEDICINE

## 2025-08-22 PROCEDURE — 63600175 PHARM REV CODE 636 W HCPCS: Performed by: INTERNAL MEDICINE

## 2025-08-22 PROCEDURE — 20000000 HC ICU ROOM

## 2025-08-22 PROCEDURE — 80307 DRUG TEST PRSMV CHEM ANLYZR: CPT

## 2025-08-22 PROCEDURE — 63600175 PHARM REV CODE 636 W HCPCS: Performed by: NURSE PRACTITIONER

## 2025-08-22 PROCEDURE — 27100171 HC OXYGEN HIGH FLOW UP TO 24 HOURS

## 2025-08-22 PROCEDURE — 80053 COMPREHEN METABOLIC PANEL: CPT

## 2025-08-22 PROCEDURE — 99900026 HC AIRWAY MAINTENANCE (STAT)

## 2025-08-22 PROCEDURE — 84484 ASSAY OF TROPONIN QUANT: CPT

## 2025-08-22 PROCEDURE — 25000003 PHARM REV CODE 250

## 2025-08-22 PROCEDURE — 37799 UNLISTED PX VASCULAR SURGERY: CPT

## 2025-08-22 PROCEDURE — 25000003 PHARM REV CODE 250: Performed by: FAMILY MEDICINE

## 2025-08-22 PROCEDURE — 87641 MR-STAPH DNA AMP PROBE: CPT | Performed by: FAMILY MEDICINE

## 2025-08-22 PROCEDURE — 84100 ASSAY OF PHOSPHORUS: CPT

## 2025-08-22 PROCEDURE — 85025 COMPLETE CBC W/AUTO DIFF WBC: CPT

## 2025-08-22 PROCEDURE — 94003 VENT MGMT INPAT SUBQ DAY: CPT

## 2025-08-22 PROCEDURE — 63600175 PHARM REV CODE 636 W HCPCS

## 2025-08-22 PROCEDURE — 83880 ASSAY OF NATRIURETIC PEPTIDE: CPT

## 2025-08-22 PROCEDURE — 85610 PROTHROMBIN TIME: CPT

## 2025-08-22 PROCEDURE — 99900035 HC TECH TIME PER 15 MIN (STAT)

## 2025-08-22 PROCEDURE — 82803 BLOOD GASES ANY COMBINATION: CPT

## 2025-08-22 PROCEDURE — 51798 US URINE CAPACITY MEASURE: CPT

## 2025-08-22 PROCEDURE — 83735 ASSAY OF MAGNESIUM: CPT

## 2025-08-22 RX ORDER — PANTOPRAZOLE SODIUM 40 MG/10ML
40 INJECTION, POWDER, LYOPHILIZED, FOR SOLUTION INTRAVENOUS DAILY
Status: DISCONTINUED | OUTPATIENT
Start: 2025-08-22 | End: 2025-08-23 | Stop reason: HOSPADM

## 2025-08-22 RX ORDER — POLYETHYLENE GLYCOL 3350 17 G/17G
17 POWDER, FOR SOLUTION ORAL DAILY PRN
Status: DISCONTINUED | OUTPATIENT
Start: 2025-08-22 | End: 2025-08-23 | Stop reason: HOSPADM

## 2025-08-22 RX ORDER — ACETAMINOPHEN 325 MG/1
650 TABLET ORAL EVERY 4 HOURS PRN
Status: DISCONTINUED | OUTPATIENT
Start: 2025-08-22 | End: 2025-08-23 | Stop reason: HOSPADM

## 2025-08-22 RX ORDER — HEPARIN SODIUM 5000 [USP'U]/ML
5000 INJECTION, SOLUTION INTRAVENOUS; SUBCUTANEOUS EVERY 8 HOURS
Status: DISCONTINUED | OUTPATIENT
Start: 2025-08-22 | End: 2025-08-22

## 2025-08-22 RX ORDER — MUPIROCIN 20 MG/G
OINTMENT TOPICAL 2 TIMES DAILY
Status: DISCONTINUED | OUTPATIENT
Start: 2025-08-22 | End: 2025-08-23 | Stop reason: HOSPADM

## 2025-08-22 RX ORDER — FUROSEMIDE 10 MG/ML
40 INJECTION INTRAMUSCULAR; INTRAVENOUS ONCE
Status: COMPLETED | OUTPATIENT
Start: 2025-08-22 | End: 2025-08-22

## 2025-08-22 RX ORDER — HEPARIN SODIUM 5000 [USP'U]/ML
5000 INJECTION, SOLUTION INTRAVENOUS; SUBCUTANEOUS EVERY 8 HOURS
Status: DISCONTINUED | OUTPATIENT
Start: 2025-08-22 | End: 2025-08-23 | Stop reason: HOSPADM

## 2025-08-22 RX ORDER — AMOXICILLIN 250 MG
1 CAPSULE ORAL 2 TIMES DAILY
Status: DISCONTINUED | OUTPATIENT
Start: 2025-08-22 | End: 2025-08-23 | Stop reason: HOSPADM

## 2025-08-22 RX ORDER — NITROGLYCERIN 20 MG/100ML
0-400 INJECTION INTRAVENOUS CONTINUOUS
Status: DISCONTINUED | OUTPATIENT
Start: 2025-08-22 | End: 2025-08-22

## 2025-08-22 RX ORDER — NITROGLYCERIN 20 MG/100ML
0-400 INJECTION INTRAVENOUS CONTINUOUS
Status: DISCONTINUED | OUTPATIENT
Start: 2025-08-22 | End: 2025-08-23 | Stop reason: HOSPADM

## 2025-08-22 RX ADMIN — NITROGLYCERIN 20 MCG/MIN: 20 INJECTION INTRAVENOUS at 12:08

## 2025-08-22 RX ADMIN — FUROSEMIDE 40 MG: 10 INJECTION, SOLUTION INTRAVENOUS at 06:08

## 2025-08-22 RX ADMIN — PIPERACILLIN AND TAZOBACTAM 4.5 G: 4; .5 INJECTION, POWDER, FOR SOLUTION INTRAVENOUS at 08:08

## 2025-08-22 RX ADMIN — PIPERACILLIN AND TAZOBACTAM 4.5 G: 4; .5 INJECTION, POWDER, FOR SOLUTION INTRAVENOUS at 09:08

## 2025-08-22 RX ADMIN — HEPARIN SODIUM 5000 UNITS: 5000 INJECTION INTRAVENOUS; SUBCUTANEOUS at 05:08

## 2025-08-22 RX ADMIN — MUPIROCIN: 20 OINTMENT TOPICAL at 10:08

## 2025-08-22 RX ADMIN — HYDRALAZINE HYDROCHLORIDE 100 MG: 25 TABLET ORAL at 05:08

## 2025-08-22 RX ADMIN — NITROGLYCERIN 20 MCG/MIN: 20 INJECTION INTRAVENOUS at 10:08

## 2025-08-22 RX ADMIN — NITROGLYCERIN 45 MCG/MIN: 20 INJECTION INTRAVENOUS at 08:08

## 2025-08-22 RX ADMIN — HEPARIN SODIUM 5000 UNITS: 5000 INJECTION INTRAVENOUS; SUBCUTANEOUS at 02:08

## 2025-08-22 RX ADMIN — HYDRALAZINE HYDROCHLORIDE 100 MG: 25 TABLET ORAL at 09:08

## 2025-08-22 RX ADMIN — PANTOPRAZOLE SODIUM 40 MG: 40 INJECTION, POWDER, FOR SOLUTION INTRAVENOUS at 10:08

## 2025-08-22 RX ADMIN — HEPARIN SODIUM 5000 UNITS: 5000 INJECTION INTRAVENOUS; SUBCUTANEOUS at 09:08

## 2025-08-22 RX ADMIN — MUPIROCIN: 20 OINTMENT TOPICAL at 08:08

## 2025-08-23 ENCOUNTER — ANESTHESIA (OUTPATIENT)
Dept: INTENSIVE CARE | Facility: HOSPITAL | Age: 60
End: 2025-08-23
Payer: MEDICARE

## 2025-08-23 ENCOUNTER — ANESTHESIA EVENT (OUTPATIENT)
Dept: INTENSIVE CARE | Facility: HOSPITAL | Age: 60
End: 2025-08-23
Payer: MEDICARE

## 2025-08-23 VITALS
RESPIRATION RATE: 21 BRPM | TEMPERATURE: 99 F | SYSTOLIC BLOOD PRESSURE: 90 MMHG | DIASTOLIC BLOOD PRESSURE: 53 MMHG | HEIGHT: 66 IN | BODY MASS INDEX: 25.15 KG/M2 | OXYGEN SATURATION: 96 % | WEIGHT: 156.5 LBS | HEART RATE: 95 BPM

## 2025-08-23 PROBLEM — E87.5 HYPERKALEMIA: Status: RESOLVED | Noted: 2025-08-22 | Resolved: 2025-08-23

## 2025-08-23 LAB
ABSOLUTE EOSINOPHIL (OHS): 0.08 K/UL
ABSOLUTE MONOCYTE (OHS): 0.92 K/UL (ref 0.3–1)
ABSOLUTE NEUTROPHIL COUNT (OHS): 6.39 K/UL (ref 1.8–7.7)
ALBUMIN SERPL BCP-MCNC: 2.8 G/DL (ref 3.5–5.2)
ALLENS TEST: YES
ALP SERPL-CCNC: 104 UNIT/L (ref 40–150)
ALT SERPL W/O P-5'-P-CCNC: 54 UNIT/L (ref 10–44)
AMPHET UR QL SCN: NEGATIVE
ANION GAP (OHS): 13 MMOL/L (ref 8–16)
ANION GAP (OHS): 14 MMOL/L (ref 8–16)
ANION GAP (OHS): 15 MMOL/L (ref 8–16)
ANION GAP (OHS): 15 MMOL/L (ref 8–16)
AST SERPL-CCNC: 62 UNIT/L (ref 11–45)
BARBITURATE SCN PRESENT UR: NEGATIVE
BASOPHILS # BLD AUTO: 0.04 K/UL
BASOPHILS NFR BLD AUTO: 0.5 %
BENZODIAZ UR QL SCN: NEGATIVE
BILIRUB SERPL-MCNC: 0.4 MG/DL (ref 0.1–1)
BILIRUB UR QL STRIP.AUTO: NEGATIVE
BUN SERPL-MCNC: 43 MG/DL (ref 8–23)
BUN SERPL-MCNC: 44 MG/DL (ref 8–23)
BUN SERPL-MCNC: 46 MG/DL (ref 8–23)
BUN SERPL-MCNC: 48 MG/DL (ref 8–23)
CALCIUM SERPL-MCNC: 8.5 MG/DL (ref 8.7–10.5)
CALCIUM SERPL-MCNC: 8.5 MG/DL (ref 8.7–10.5)
CALCIUM SERPL-MCNC: 8.6 MG/DL (ref 8.7–10.5)
CALCIUM SERPL-MCNC: 8.6 MG/DL (ref 8.7–10.5)
CANNABINOIDS UR QL SCN: NEGATIVE
CHLORIDE SERPL-SCNC: 102 MMOL/L (ref 95–110)
CLARITY UR: CLEAR
CO2 SERPL-SCNC: 21 MMOL/L (ref 23–29)
CO2 SERPL-SCNC: 22 MMOL/L (ref 23–29)
COCAINE UR QL SCN: NEGATIVE
COLOR UR AUTO: YELLOW
CREAT SERPL-MCNC: 7.2 MG/DL (ref 0.5–1.4)
CREAT SERPL-MCNC: 7.4 MG/DL (ref 0.5–1.4)
CREAT SERPL-MCNC: 8 MG/DL (ref 0.5–1.4)
CREAT SERPL-MCNC: 8.6 MG/DL (ref 0.5–1.4)
CREAT UR-MCNC: 44.3 MG/DL (ref 23–375)
ERYTHROCYTE [DISTWIDTH] IN BLOOD BY AUTOMATED COUNT: 16.7 % (ref 11.5–14.5)
FIO2: 21 %
GFR SERPLBLD CREATININE-BSD FMLA CKD-EPI: 6 ML/MIN/1.73/M2
GFR SERPLBLD CREATININE-BSD FMLA CKD-EPI: 7 ML/MIN/1.73/M2
GFR SERPLBLD CREATININE-BSD FMLA CKD-EPI: 8 ML/MIN/1.73/M2
GFR SERPLBLD CREATININE-BSD FMLA CKD-EPI: 8 ML/MIN/1.73/M2
GLUCOSE SERPL-MCNC: 65 MG/DL (ref 70–110)
GLUCOSE SERPL-MCNC: 75 MG/DL (ref 70–110)
GLUCOSE SERPL-MCNC: 75 MG/DL (ref 70–110)
GLUCOSE SERPL-MCNC: 76 MG/DL (ref 70–110)
GLUCOSE UR QL STRIP: NEGATIVE
HCT VFR BLD AUTO: 30 % (ref 40–54)
HGB BLD-MCNC: 10.1 GM/DL (ref 14–18)
HGB UR QL STRIP: NEGATIVE
IMM GRANULOCYTES # BLD AUTO: 0.03 K/UL (ref 0–0.04)
IMM GRANULOCYTES NFR BLD AUTO: 0.3 % (ref 0–0.5)
INR PPP: 1.1 (ref 0.8–1.2)
KETONES UR QL STRIP: NEGATIVE
LEUKOCYTE ESTERASE UR QL STRIP: NEGATIVE
LYMPHOCYTES # BLD AUTO: 1.14 K/UL (ref 1–4.8)
MAGNESIUM SERPL-MCNC: 2 MG/DL (ref 1.6–2.6)
MCH RBC QN AUTO: 29.4 PG (ref 27–31)
MCHC RBC AUTO-ENTMCNC: 33.7 G/DL (ref 32–36)
MCV RBC AUTO: 87 FL (ref 82–98)
METHADONE UR QL SCN: NEGATIVE
MICROSCOPIC COMMENT: NORMAL
NITRITE UR QL STRIP: NEGATIVE
NUCLEATED RBC (/100WBC) (OHS): 0 /100 WBC
OHS QRS DURATION: 106 MS
OHS QRS DURATION: 134 MS
OHS QTC CALCULATION: 510 MS
OHS QTC CALCULATION: 548 MS
OPIATES UR QL SCN: NEGATIVE
PCO2 BLDA: 32.4 MMHG (ref 35–45)
PCO2 BLDA: 38.2 MMHG (ref 35–45)
PCO2 BLDA: 45.3 MMHG (ref 35–45)
PCO2 BLDA: 57.9 MMHG (ref 35–45)
PCP UR QL: NEGATIVE
PH SMN: 7.23 [PH] (ref 7.35–7.45)
PH SMN: 7.31 [PH] (ref 7.35–7.45)
PH SMN: 7.39 [PH] (ref 7.35–7.45)
PH SMN: 7.45 [PH] (ref 7.35–7.45)
PH UR STRIP: 7 [PH]
PHOSPHATE SERPL-MCNC: 6.1 MG/DL (ref 2.7–4.5)
PLATELET # BLD AUTO: 138 K/UL (ref 150–450)
PMV BLD AUTO: 9.4 FL (ref 9.2–12.9)
PO2 BLDA: 45.8 MMHG (ref 80–100)
PO2 BLDA: 52.7 MMHG (ref 80–100)
PO2 BLDA: 60 MMHG (ref 80–100)
PO2 BLDA: 73.6 MMHG (ref 80–100)
POC BASE DEFICIT: -1.1 MMOL/L (ref -2–2)
POC BASE DEFICIT: -1.4 MMOL/L (ref -2–2)
POC BASE DEFICIT: -3.2 MMOL/L (ref -2–2)
POC BASE DEFICIT: -3.7 MMOL/L (ref -2–2)
POC HCO3: 22.4 MMOL/L (ref 24–28)
POC HCO3: 23 MMOL/L (ref 24–28)
POC HCO3: 23.3 MMOL/L (ref 24–28)
POC HCO3: 24.4 MMOL/L (ref 24–28)
POC PERFORMED BY: ABNORMAL
POC SATURATED O2: 67.6 % (ref 95–100)
POC SATURATED O2: 83.4 % (ref 95–100)
POC SATURATED O2: 86.1 % (ref 95–100)
POC SATURATED O2: 94.1 % (ref 95–100)
POCT GLUCOSE: 113 MG/DL (ref 70–110)
POCT GLUCOSE: 53 MG/DL (ref 70–110)
POCT GLUCOSE: 58 MG/DL (ref 70–110)
POCT GLUCOSE: 71 MG/DL (ref 70–110)
POCT GLUCOSE: 89 MG/DL (ref 70–110)
POTASSIUM SERPL-SCNC: 4.9 MMOL/L (ref 3.5–5.1)
POTASSIUM SERPL-SCNC: 5 MMOL/L (ref 3.5–5.1)
POTASSIUM SERPL-SCNC: 5 MMOL/L (ref 3.5–5.1)
POTASSIUM SERPL-SCNC: 5.3 MMOL/L (ref 3.5–5.1)
PROT SERPL-MCNC: 5.7 GM/DL (ref 6–8.4)
PROT UR QL STRIP: ABNORMAL
PROTHROMBIN TIME: 12.1 SECONDS (ref 9–12.5)
RBC # BLD AUTO: 3.44 M/UL (ref 4.6–6.2)
RBC #/AREA URNS AUTO: <1 /HPF (ref 0–4)
RELATIVE EOSINOPHIL (OHS): 0.9 %
RELATIVE LYMPHOCYTE (OHS): 13.3 % (ref 18–48)
RELATIVE MONOCYTE (OHS): 10.7 % (ref 4–15)
RELATIVE NEUTROPHIL (OHS): 74.3 % (ref 38–73)
SITE: ABNORMAL
SODIUM SERPL-SCNC: 137 MMOL/L (ref 136–145)
SODIUM SERPL-SCNC: 138 MMOL/L (ref 136–145)
SODIUM SERPL-SCNC: 138 MMOL/L (ref 136–145)
SODIUM SERPL-SCNC: 139 MMOL/L (ref 136–145)
SP GR UR STRIP: 1.01
SPECIMEN SOURCE: ABNORMAL
UROBILINOGEN UR STRIP-ACNC: NEGATIVE EU/DL
VANCOMYCIN SERPL-MCNC: 18 UG/ML
WBC # BLD AUTO: 8.6 K/UL (ref 3.9–12.7)
WBC #/AREA URNS AUTO: 2 /HPF (ref 0–5)

## 2025-08-23 PROCEDURE — 81001 URINALYSIS AUTO W/SCOPE: CPT | Mod: XB

## 2025-08-23 PROCEDURE — 36556 INSERT NON-TUNNEL CV CATH: CPT | Mod: ,,, | Performed by: ANESTHESIOLOGY

## 2025-08-23 PROCEDURE — 63600175 PHARM REV CODE 636 W HCPCS

## 2025-08-23 PROCEDURE — 51798 US URINE CAPACITY MEASURE: CPT

## 2025-08-23 PROCEDURE — 25000003 PHARM REV CODE 250

## 2025-08-23 PROCEDURE — 99900035 HC TECH TIME PER 15 MIN (STAT)

## 2025-08-23 PROCEDURE — 80053 COMPREHEN METABOLIC PANEL: CPT

## 2025-08-23 PROCEDURE — 94003 VENT MGMT INPAT SUBQ DAY: CPT

## 2025-08-23 PROCEDURE — 51701 INSERT BLADDER CATHETER: CPT

## 2025-08-23 PROCEDURE — 99900026 HC AIRWAY MAINTENANCE (STAT)

## 2025-08-23 PROCEDURE — 80048 BASIC METABOLIC PNL TOTAL CA: CPT

## 2025-08-23 PROCEDURE — 84100 ASSAY OF PHOSPHORUS: CPT

## 2025-08-23 PROCEDURE — 82803 BLOOD GASES ANY COMBINATION: CPT

## 2025-08-23 PROCEDURE — 63600175 PHARM REV CODE 636 W HCPCS: Performed by: FAMILY MEDICINE

## 2025-08-23 PROCEDURE — 99223 1ST HOSP IP/OBS HIGH 75: CPT | Mod: ,,, | Performed by: PSYCHIATRY & NEUROLOGY

## 2025-08-23 PROCEDURE — 63600175 PHARM REV CODE 636 W HCPCS: Performed by: INTERNAL MEDICINE

## 2025-08-23 PROCEDURE — 83735 ASSAY OF MAGNESIUM: CPT

## 2025-08-23 PROCEDURE — 25000003 PHARM REV CODE 250: Performed by: INTERNAL MEDICINE

## 2025-08-23 PROCEDURE — 36600 WITHDRAWAL OF ARTERIAL BLOOD: CPT

## 2025-08-23 PROCEDURE — 27100171 HC OXYGEN HIGH FLOW UP TO 24 HOURS

## 2025-08-23 PROCEDURE — 85610 PROTHROMBIN TIME: CPT

## 2025-08-23 PROCEDURE — 25000003 PHARM REV CODE 250: Performed by: FAMILY MEDICINE

## 2025-08-23 PROCEDURE — 37799 UNLISTED PX VASCULAR SURGERY: CPT

## 2025-08-23 PROCEDURE — 94761 N-INVAS EAR/PLS OXIMETRY MLT: CPT

## 2025-08-23 PROCEDURE — 80307 DRUG TEST PRSMV CHEM ANLYZR: CPT

## 2025-08-23 PROCEDURE — 85025 COMPLETE CBC W/AUTO DIFF WBC: CPT

## 2025-08-23 PROCEDURE — 80202 ASSAY OF VANCOMYCIN: CPT | Performed by: FAMILY MEDICINE

## 2025-08-23 RX ORDER — NOREPINEPHRINE BITARTRATE/D5W 4MG/250ML
0-3 PLASTIC BAG, INJECTION (ML) INTRAVENOUS CONTINUOUS
Status: DISCONTINUED | OUTPATIENT
Start: 2025-08-23 | End: 2025-08-23

## 2025-08-23 RX ORDER — INDOMETHACIN 25 MG/1
50 CAPSULE ORAL ONCE
Status: COMPLETED | OUTPATIENT
Start: 2025-08-23 | End: 2025-08-23

## 2025-08-23 RX ORDER — NOREPINEPHRINE BITARTRATE/D5W 4MG/250ML
0-3 PLASTIC BAG, INJECTION (ML) INTRAVENOUS CONTINUOUS
Status: DISCONTINUED | OUTPATIENT
Start: 2025-08-23 | End: 2025-08-23 | Stop reason: HOSPADM

## 2025-08-23 RX ADMIN — HYDRALAZINE HYDROCHLORIDE 100 MG: 25 TABLET ORAL at 05:08

## 2025-08-23 RX ADMIN — NOREPINEPHRINE BITARTRATE 0.08 MCG/KG/MIN: 4 INJECTION, SOLUTION INTRAVENOUS at 06:08

## 2025-08-23 RX ADMIN — VANCOMYCIN HYDROCHLORIDE 500 MG: 500 INJECTION, POWDER, LYOPHILIZED, FOR SOLUTION INTRAVENOUS at 10:08

## 2025-08-23 RX ADMIN — SODIUM BICARBONATE 50 MEQ: 84 INJECTION, SOLUTION INTRAVENOUS at 07:08

## 2025-08-23 RX ADMIN — DEXTROSE MONOHYDRATE 12.5 G: 25 INJECTION, SOLUTION INTRAVENOUS at 07:08

## 2025-08-23 RX ADMIN — HEPARIN SODIUM 5000 UNITS: 5000 INJECTION INTRAVENOUS; SUBCUTANEOUS at 05:08

## 2025-08-23 RX ADMIN — PIPERACILLIN AND TAZOBACTAM 4.5 G: 4; .5 INJECTION, POWDER, FOR SOLUTION INTRAVENOUS at 08:08

## 2025-08-23 RX ADMIN — MUPIROCIN: 20 OINTMENT TOPICAL at 08:08

## 2025-08-23 RX ADMIN — DEXTROSE MONOHYDRATE 12.5 G: 25 INJECTION, SOLUTION INTRAVENOUS at 04:08

## 2025-08-23 RX ADMIN — PANTOPRAZOLE SODIUM 40 MG: 40 INJECTION, POWDER, FOR SOLUTION INTRAVENOUS at 08:08

## 2025-08-23 RX ADMIN — NOREPINEPHRINE BITARTRATE 0.06 MCG/KG/MIN: 4 INJECTION, SOLUTION INTRAVENOUS at 06:08

## 2025-08-23 RX ADMIN — HEPARIN SODIUM 5000 UNITS: 5000 INJECTION INTRAVENOUS; SUBCUTANEOUS at 02:08

## 2025-08-25 LAB
NSE SERPL-MCNC: 88 NG/ML
W AMPHETAMINE, SERUM, QUALITATIVE: NEGATIVE
W BARBITURATE, SERUM, QUALITATIVE: NEGATIVE
W BENZODIAZEPINE, SERUM, QUALITATIVE: NEGATIVE
W COCAINE, SERUM, QUALITATIVE: NEGATIVE
W ETHANOL, SERUM: NEGATIVE
W METHADONE, SERUM, QUALITATIVE: NEGATIVE
W OPIATE, SERUM, QUALITATIVE: NEGATIVE
W PHENCYCLIDINE, SERUM, QUALITATIVE: NEGATIVE
W PROPOXYPHENE, SERUM, QUALITATIVE: NEGATIVE
W THC, SERUM, QUALITATIVE: NEGATIVE

## 2025-08-27 LAB
BACTERIA BLD CULT: NORMAL
BACTERIA BLD CULT: NORMAL

## (undated) DEVICE — SUT MONOCRYL 3-0 SH U/D

## (undated) DEVICE — APPLICATOR CHLORAPREP ORN 26ML

## (undated) DEVICE — COVER LIGHT HANDLE 80/CA

## (undated) DEVICE — SUT 2-0 12-18IN SILK

## (undated) DEVICE — SET DECANTER MEDICHOICE

## (undated) DEVICE — PACK AV VASCULAR ACCESS OMC

## (undated) DEVICE — SUT 4-0 12-18IN SILK BLACK

## (undated) DEVICE — TOWEL OR DISP STRL BLUE 4/PK

## (undated) DEVICE — SYR ONLY LUER LOCK 20CC

## (undated) DEVICE — ELECTRODE REM PLYHSV RETURN 9

## (undated) DEVICE — GOWN SURGICAL X-LARGE

## (undated) DEVICE — SUT SILK 2-0 SH 18IN BLACK

## (undated) DEVICE — SPONGE DERMACEA 4X4IN 12PLY

## (undated) DEVICE — COVERS PROBE NR-48 STERILE

## (undated) DEVICE — LOOP VESSEL YELLOW MAXI

## (undated) DEVICE — SUT MCRYL PLUS 4-0 PS2 27IN

## (undated) DEVICE — SUT 3-0 12-18IN SILK